# Patient Record
Sex: FEMALE | Race: WHITE | NOT HISPANIC OR LATINO | Employment: UNEMPLOYED | URBAN - METROPOLITAN AREA
[De-identification: names, ages, dates, MRNs, and addresses within clinical notes are randomized per-mention and may not be internally consistent; named-entity substitution may affect disease eponyms.]

---

## 2017-03-23 ENCOUNTER — OFFICE VISIT (OUTPATIENT)
Dept: ORTHOPEDIC SURGERY | Facility: CLINIC | Age: 62
End: 2017-03-23

## 2017-03-23 VITALS — TEMPERATURE: 99.2 F | WEIGHT: 152 LBS | HEIGHT: 67 IN | BODY MASS INDEX: 23.86 KG/M2

## 2017-03-23 DIAGNOSIS — Z98.890 HISTORY OF HAMMER TOE CORRECTION: Primary | ICD-10-CM

## 2017-03-23 DIAGNOSIS — Z87.39 HISTORY OF HAMMER TOE CORRECTION: Primary | ICD-10-CM

## 2017-03-23 PROCEDURE — 99204 OFFICE O/P NEW MOD 45 MIN: CPT | Performed by: ORTHOPAEDIC SURGERY

## 2017-03-23 RX ORDER — LEVOTHYROXINE SODIUM 0.07 MG/1
75 TABLET ORAL DAILY
COMMUNITY
End: 2019-02-13 | Stop reason: SDUPTHER

## 2017-03-23 RX ORDER — ROSUVASTATIN CALCIUM 20 MG/1
20 TABLET, COATED ORAL DAILY
COMMUNITY
End: 2019-04-17 | Stop reason: SDUPTHER

## 2017-03-23 RX ORDER — ALPRAZOLAM 0.25 MG/1
0.25 TABLET ORAL 2 TIMES DAILY PRN
COMMUNITY
End: 2019-08-05 | Stop reason: SDUPTHER

## 2017-03-23 RX ORDER — CETIRIZINE HYDROCHLORIDE 10 MG/1
10 TABLET ORAL DAILY
COMMUNITY
End: 2019-05-21 | Stop reason: SDUPTHER

## 2017-03-23 RX ORDER — PROMETHAZINE HYDROCHLORIDE 25 MG/1
TABLET ORAL
Refills: 0 | COMMUNITY
Start: 2016-12-28 | End: 2019-02-13

## 2017-03-23 RX ORDER — HYDROCHLOROTHIAZIDE 25 MG/1
25 TABLET ORAL DAILY
COMMUNITY
End: 2019-06-26 | Stop reason: SDUPTHER

## 2017-03-23 RX ORDER — OSELTAMIVIR PHOSPHATE 75 MG/1
CAPSULE ORAL
Refills: 0 | COMMUNITY
Start: 2017-03-11 | End: 2019-02-13

## 2017-03-23 RX ORDER — DICYCLOMINE HCL 20 MG
TABLET ORAL
Refills: 0 | COMMUNITY
Start: 2017-02-09 | End: 2019-02-13

## 2017-03-23 RX ORDER — FLUOXETINE HYDROCHLORIDE 20 MG/5ML
LIQUID ORAL DAILY
COMMUNITY
End: 2019-04-17 | Stop reason: SDUPTHER

## 2017-03-23 RX ORDER — HYDROCODONE BITARTRATE AND ACETAMINOPHEN 5; 325 MG/1; MG/1
TABLET ORAL
Refills: 0 | COMMUNITY
Start: 2016-12-28 | End: 2019-05-08

## 2017-03-23 RX ORDER — PROPRANOLOL HYDROCHLORIDE 40 MG/1
40 TABLET ORAL 3 TIMES DAILY
COMMUNITY
End: 2019-02-13 | Stop reason: SDUPTHER

## 2017-03-23 RX ORDER — ZOLPIDEM TARTRATE 10 MG/1
10 TABLET ORAL NIGHTLY PRN
COMMUNITY
End: 2019-02-13 | Stop reason: SDUPTHER

## 2017-03-23 RX ORDER — LOPERAMIDE HYDROCHLORIDE 2 MG/1
CAPSULE ORAL
Refills: 0 | COMMUNITY
Start: 2017-02-09 | End: 2019-02-13

## 2017-03-23 NOTE — PROGRESS NOTES
"Patient:  Cynthia Portillo is a 62 y.o. female    Chief Complaint/ Reason for Visit:    Chief Complaint   Patient presents with   • Right Foot - Pain     Second and third hammertoe corrections in December 2016     • Foot Swelling       HPI:  The patient presents today accompanied by a , who was a patient of mine in the past, complaining of persistent pain and swelling of a constant and primarily aching in nature since she underwent right second and third hammertoe corrections by her podiatrist in December of last year.  She feels like the deformities are recurring somewhat, though she does note that the toes are straighter than they were to begin with, especially the right second toe.  She has persistent swelling in these 2 toes, and she says that she discussed these things with her podiatrist, and he felt like the \"plastic implants\" he had used to do the surgery had not worked, and he recommended doing revision surgery using \"metal implants\".  She has not had fever, chills, sweats, or shakes.  She has not had any drainage or problems with her incisions.  She does say the pain has been slowly getting better, but she was concerned by its persistence, as well as the persistence of swelling.  The pain is exacerbated by wearing shoes, by walking, and by standing.  It's alleviated by rest and elevation.  The pain is nonradiating.  She has been wearing some Silastic sleeves on the toes to try and help alleviate the swelling with limited relief.  She has occasionally taken some over-the-counter analgesics with limited relief as well.      PMH:    Past Medical History:   Diagnosis Date   • Diverticulitis    • Diverticulosis        PSH:    Past Surgical History:   Procedure Laterality Date   • ABDOMINAL SURGERY  2005, 2014   • FOOT SURGERY Right 12/2016    Second and third hammertoe corrections   • KNEE SURGERY     • SHOULDER SURGERY         Social Hx:    Social History     Social History   • Marital status: "      Spouse name: N/A   • Number of children: N/A   • Years of education: N/A     Occupational History   • Not on file.     Social History Main Topics   • Smoking status: Never Smoker   • Smokeless tobacco: Never Used   • Alcohol use 0.6 oz/week     1 Glasses of wine per week   • Drug use: No   • Sexual activity: Defer     Other Topics Concern   • Not on file     Social History Narrative   • No narrative on file       Family Hx:    Family History   Problem Relation Age of Onset   • Osteoporosis Mother        Meds:    Current Outpatient Prescriptions:   •  ALPRAZolam (XANAX) 0.25 MG tablet, Take 0.25 mg by mouth 2 (Two) Times a Day As Needed for Anxiety., Disp: , Rfl:   •  calcium acetate (PHOSLO) 667 MG capsule, Take 1,334 mg by mouth 3 (Three) Times a Day With Meals., Disp: , Rfl:   •  cetirizine (zyrTEC) 10 MG tablet, Take 10 mg by mouth Daily., Disp: , Rfl:   •  FLUoxetine (PROzac) 20 MG/5ML solution, Take  by mouth Daily., Disp: , Rfl:   •  hydrochlorothiazide (HYDRODIURIL) 25 MG tablet, Take 25 mg by mouth Daily., Disp: , Rfl:   •  levothyroxine (SYNTHROID, LEVOTHROID) 75 MCG tablet, Take 75 mcg by mouth Daily., Disp: , Rfl:   •  propranolol (INDERAL) 40 MG tablet, Take 40 mg by mouth 3 (Three) Times a Day., Disp: , Rfl:   •  rosuvastatin (CRESTOR) 20 MG tablet, Take 20 mg by mouth Daily., Disp: , Rfl:   •  zolpidem (AMBIEN) 10 MG tablet, Take 10 mg by mouth At Night As Needed for Sleep., Disp: , Rfl:   •  dicyclomine (BENTYL) 20 MG tablet, TK 1 T PO QID, Disp: , Rfl: 0  •  HYDROcodone-acetaminophen (NORCO) 5-325 MG per tablet, TK 1 TO 2 TS PO Q 4 TO 6 HOURS PRN, Disp: , Rfl: 0  •  loperamide (IMODIUM) 2 MG capsule, TK 1 C PO Q 4 H PRF DH, Disp: , Rfl: 0  •  oseltamivir (TAMIFLU) 75 MG capsule, TK 1 C PO BID, Disp: , Rfl: 0  •  promethazine (PHENERGAN) 25 MG tablet, TK 1 T PO  Q 4 HOURS PRF NAUSEA AND VOMITING, Disp: , Rfl: 0    Allergies:    Allergies   Allergen Reactions   • Pravastatin    • Zetia  "[Ezetimibe]        ROS:  Review of Systems   Constitutional: Negative for chills, diaphoresis and fever.   Musculoskeletal: Positive for arthralgias and joint swelling.        Swelling, pain, and tenderness in the right second and third toes status post second and third hammertoe corrections in December 2016.   All other systems reviewed and are negative.      Vitals:    03/23/17 1211   Temp: 99.2 °F (37.3 °C)   Weight: 152 lb (68.9 kg)   Height: 66.5\" (168.9 cm)       Physical Exam    The patient is awake, alert, and oriented ×3.  The patient is in no acute distress.  Breathing is regular and unlabored with a respiratory rate of 12/m.  Extraocular movements and pupillary responses are symmetrically intact. Sclerae are anicteric.   Hearing is within normal limits.  Speech is within normal limits.  There is no jugular venous distention.    She does have a slight limp antalgic from the right on inspection of her gait.  Her right second and third toes are diffusely, circumferentially swollen, and a little erythematous.  They're not excessively warm.  Her incisions are well healed with no sign of wound breakdown or infection.  She has no cellulitis, no lymphangitis, and no popliteal lymphadenopathy in the right lower extremity.  She has no calf tenderness and no venous cord.  Pedal pulses are intact, regular, and of normal amplitude with a current heart rate of about 72 bpm.  She does have tenderness diffusely on palpation of the second and third toes, and a little tenderness plantarly beneath the second and third MTP joints.  She has no tenderness in the other toes.  She does not have a hallux valgus deformity.  Motor strength is 5 over 5 in the right lower extremity.  She has good movement of her toes.  Her toenails appear fairly normal.  She has brisk capillary filling in all toes.  When she stands, there is a slight tendency towards dorsiflexion of the right second toe, but she does not have a pronounced hyper " dorsiflexion at this point.  The patient does concede that the position of the second and third toes is better than it was before she had the surgery.  The tenderness, however, is not.      Radiology: X-rays: The patient brought multiple images from her podiatrist's office, the most recent of which were from a few weeks ago.  Once I reviewed her more recent, and I did see postop images, 3 views of her right foot.  I did review several series of her preop images as well.  She apparently had resection arthroplasties of the second and third MTP joints.  I don't see any obvious implants.  It appears as though, if she was supposed to have had PIP fusions, this has failed.  I do not see any erosive changes or anything on these films that would suggest infection.        Assessment:  1. History of right second and third hammer toe correction, 3 months ago approximately, with persistent pain and swelling in the right second and third toes          Plan:  I discussed everything with the patient and her .  At this point, I would recommend doing nothing in the form of any additional or revision surgery.  I recommended management of her pain by the application of a prescription topical medication to be obtained from a compounding pharmacy.  I did write that prescription for her today for a blend of ketoprofen, gabapentin, and lidocaine.    She's going to continue to use her compressive sleeve.    She can continue to use ice as needed.    She's going to use accommodative footwear.    I explained to them that, even if she did require revision surgery down the road, that there was no benefited in receding with any additional surgery at this time.  On the contrary, given more time, perhaps even as long as another year, her pain and swelling may improve, and she may not need any more surgery.  I advised her to try the medication prescribed today, continue to use her compressive sleeves, and be as patient that she can.    The  patient asked if I would do revision surgery for her right second and third toes, and I explained that at this point, I was not comfortable doing so.

## 2017-06-22 ENCOUNTER — OFFICE VISIT (OUTPATIENT)
Dept: ORTHOPEDIC SURGERY | Facility: CLINIC | Age: 62
End: 2017-06-22

## 2017-06-22 VITALS — TEMPERATURE: 98.3 F | BODY MASS INDEX: 28.28 KG/M2 | HEIGHT: 61 IN | WEIGHT: 149.8 LBS

## 2017-06-22 DIAGNOSIS — Z87.39 H/O HAMMER TOE CORRECTION: Primary | ICD-10-CM

## 2017-06-22 DIAGNOSIS — Z98.890 H/O HAMMER TOE CORRECTION: Primary | ICD-10-CM

## 2017-06-22 PROCEDURE — 99212 OFFICE O/P EST SF 10 MIN: CPT | Performed by: ORTHOPAEDIC SURGERY

## 2017-06-22 PROCEDURE — 73630 X-RAY EXAM OF FOOT: CPT | Performed by: ORTHOPAEDIC SURGERY

## 2017-06-22 NOTE — PROGRESS NOTES
Patient:  Cynthia Portillo is a 62 y.o. female    Chief Complaint/ Reason for Visit:    Chief Complaint   Patient presents with   • Right Foot - Follow-up, Pain       HPI:  Patient returns today.  She still having pain in her right second and third toes.  She had hammertoe corrections by her podiatrist back in December.  I gave her some topical medication previously, but it did not give her a lot of relief.  She is here seeking other options.    She and I have already discussed that I am not willing to do revision hammertoe surgery.      PMH:    Past Medical History:   Diagnosis Date   • Diverticulitis    • Diverticulosis        PSH:    Past Surgical History:   Procedure Laterality Date   • ABDOMINAL SURGERY  2005, 2014   • FOOT SURGERY Right 12/2016    Second and third hammertoe corrections   • KNEE SURGERY     • SHOULDER SURGERY         Social Hx:    Social History     Social History   • Marital status:      Spouse name: N/A   • Number of children: N/A   • Years of education: N/A     Occupational History   • Not on file.     Social History Main Topics   • Smoking status: Never Smoker   • Smokeless tobacco: Never Used   • Alcohol use 0.6 oz/week     1 Glasses of wine per week   • Drug use: No   • Sexual activity: Defer     Other Topics Concern   • Not on file     Social History Narrative       Family Hx:    Family History   Problem Relation Age of Onset   • Osteoporosis Mother        Meds:    Current Outpatient Prescriptions:   •  ALPRAZolam (XANAX) 0.25 MG tablet, Take 0.25 mg by mouth 2 (Two) Times a Day As Needed for Anxiety., Disp: , Rfl:   •  calcium acetate (PHOSLO) 667 MG capsule, Take 1,334 mg by mouth 3 (Three) Times a Day With Meals., Disp: , Rfl:   •  cetirizine (zyrTEC) 10 MG tablet, Take 10 mg by mouth Daily., Disp: , Rfl:   •  dicyclomine (BENTYL) 20 MG tablet, TK 1 T PO QID, Disp: , Rfl: 0  •  FLUoxetine (PROzac) 20 MG/5ML solution, Take  by mouth Daily., Disp: , Rfl:   •  hydrochlorothiazide  "(HYDRODIURIL) 25 MG tablet, Take 25 mg by mouth Daily., Disp: , Rfl:   •  HYDROcodone-acetaminophen (NORCO) 5-325 MG per tablet, TK 1 TO 2 TS PO Q 4 TO 6 HOURS PRN, Disp: , Rfl: 0  •  levothyroxine (SYNTHROID, LEVOTHROID) 75 MCG tablet, Take 75 mcg by mouth Daily., Disp: , Rfl:   •  loperamide (IMODIUM) 2 MG capsule, TK 1 C PO Q 4 H PRF DH, Disp: , Rfl: 0  •  oseltamivir (TAMIFLU) 75 MG capsule, TK 1 C PO BID, Disp: , Rfl: 0  •  promethazine (PHENERGAN) 25 MG tablet, TK 1 T PO  Q 4 HOURS PRF NAUSEA AND VOMITING, Disp: , Rfl: 0  •  propranolol (INDERAL) 40 MG tablet, Take 40 mg by mouth 3 (Three) Times a Day., Disp: , Rfl:   •  rosuvastatin (CRESTOR) 20 MG tablet, Take 20 mg by mouth Daily., Disp: , Rfl:   •  zolpidem (AMBIEN) 10 MG tablet, Take 10 mg by mouth At Night As Needed for Sleep., Disp: , Rfl:     Allergies:    Allergies   Allergen Reactions   • Pravastatin    • Zetia [Ezetimibe]        ROS:  Review of Systems    Vitals:    06/22/17 1034   Temp: 98.3 °F (36.8 °C)   Weight: 149 lb 12.8 oz (67.9 kg)   Height: 61\" (154.9 cm)       Physical Exam    The patient is awake, alert, and oriented ×3.  The patient is in no acute distress.  Breathing is regular and unlabored with a respiratory rate of 12/m.  Extraocular movements and pupillary responses are symmetrically intact. Sclerae are anicteric.   Hearing is within normal limits.  Speech is within normal limits.  There is no jugular venous distention.    She does have a slight limp antalgic from the right on inspection of her gait. Her right second and third toes are diffusely, circumferentially swollen, and a little erythematous. They're not excessively warm. Her incisions are well healed with no sign of wound breakdown or infection. She has no cellulitis, no lymphangitis, and no popliteal lymphadenopathy in the right lower extremity. She has no calf tenderness and no venous cord. Pedal pulses are intact, regular, and of normal amplitude with a current heart rate " of about 72 bpm. She does have tenderness diffusely on palpation of the second and third toes, and a little tenderness plantarly beneath the second and third MTP joints. She has no tenderness in the other toes. She does not have a hallux valgus deformity. Motor strength is 5 over 5 in the right lower extremity. She has good movement of her toes. Her toenails appear fairly normal. She has brisk capillary filling in all toes. When she stands, there is a slight tendency towards dorsiflexion of the right second toe, but she does not have a pronounced hyper dorsiflexion at this point.         Radiology:X-rays: 3 views the patient's right forefoot were ordered and reviewed today to assess patient's complaints of persistent pain and swelling in her right second and third toes.  There is evidence of hammertoe surgery with resection of the PIP joints of the right second and third toes.  I do not see any obvious evidence of infection.  I suspect the patient has some radiolucent implants, but I cannot be certain.  Comparison images were not immediately available.    Labs:      Assessment:  1. H/O hammer toe correction, Right second and third, with persistent discomfort and swelling          Plan:  I discussed everything with the patient and her .  I reiterated that I was not willing to perform revision surgery, and recommended, in fact, that she wait until she was at least a year postop before considering revision surgery.  However, I explained that I would not, under any circumstances, be willing to do the revision hammertoe surgery.  I suggested that she seek yet another opinion, or perhaps return for additional evaluation and treatment to the original treating podiatrist.  I did make suggestions regarding taping of the toes, gel sleeves, etc.  Neurovascular precautions with specific respect to all these recommendations were reviewed, and the patient voiced understanding.

## 2019-02-13 ENCOUNTER — OFFICE VISIT (OUTPATIENT)
Dept: FAMILY MEDICINE CLINIC | Facility: CLINIC | Age: 64
End: 2019-02-13

## 2019-02-13 VITALS
RESPIRATION RATE: 16 BRPM | WEIGHT: 137 LBS | SYSTOLIC BLOOD PRESSURE: 118 MMHG | TEMPERATURE: 98.6 F | HEART RATE: 62 BPM | BODY MASS INDEX: 25.86 KG/M2 | OXYGEN SATURATION: 94 % | DIASTOLIC BLOOD PRESSURE: 78 MMHG | HEIGHT: 61 IN

## 2019-02-13 DIAGNOSIS — J31.0 RHINOSINUSITIS: ICD-10-CM

## 2019-02-13 DIAGNOSIS — Z48.89 POSTOPERATIVE VISIT: ICD-10-CM

## 2019-02-13 DIAGNOSIS — Z96.611 PRESENCE OF RIGHT ARTIFICIAL SHOULDER JOINT: ICD-10-CM

## 2019-02-13 DIAGNOSIS — N94.10 DYSPAREUNIA, FEMALE: ICD-10-CM

## 2019-02-13 DIAGNOSIS — E78.00 HYPERCHOLESTEROLEMIA: ICD-10-CM

## 2019-02-13 DIAGNOSIS — M25.511 ACUTE PAIN OF RIGHT SHOULDER: Primary | ICD-10-CM

## 2019-02-13 DIAGNOSIS — E03.9 HYPOTHYROIDISM, UNSPECIFIED TYPE: ICD-10-CM

## 2019-02-13 DIAGNOSIS — J32.9 RHINOSINUSITIS: ICD-10-CM

## 2019-02-13 PROCEDURE — 99203 OFFICE O/P NEW LOW 30 MIN: CPT | Performed by: INTERNAL MEDICINE

## 2019-02-13 RX ORDER — LEVOTHYROXINE SODIUM 0.07 MG/1
75 TABLET ORAL DAILY
Qty: 90 TABLET | Refills: 3 | Status: SHIPPED | OUTPATIENT
Start: 2019-02-13 | End: 2019-02-14 | Stop reason: SDUPTHER

## 2019-02-13 RX ORDER — ZOLPIDEM TARTRATE 10 MG/1
10 TABLET ORAL NIGHTLY PRN
Qty: 30 TABLET | Refills: 2 | Status: SHIPPED | OUTPATIENT
Start: 2019-02-13 | End: 2019-09-04 | Stop reason: SDUPTHER

## 2019-02-13 RX ORDER — PROPRANOLOL HYDROCHLORIDE 40 MG/1
40 TABLET ORAL 3 TIMES DAILY
Qty: 90 TABLET | Refills: 3 | Status: SHIPPED | OUTPATIENT
Start: 2019-02-13 | End: 2019-02-14 | Stop reason: SDUPTHER

## 2019-02-13 RX ORDER — CEFDINIR 300 MG/1
300 CAPSULE ORAL 2 TIMES DAILY
Qty: 14 CAPSULE | Refills: 3 | Status: SHIPPED | OUTPATIENT
Start: 2019-02-13 | End: 2019-05-08

## 2019-02-14 LAB
ALBUMIN SERPL-MCNC: 5 G/DL (ref 3.6–4.8)
ALBUMIN/GLOB SERPL: 2.8 {RATIO} (ref 1.2–2.2)
ALP SERPL-CCNC: 91 IU/L (ref 39–117)
ALT SERPL-CCNC: 38 IU/L (ref 0–32)
AST SERPL-CCNC: 36 IU/L (ref 0–40)
BILIRUB SERPL-MCNC: 0.6 MG/DL (ref 0–1.2)
BUN SERPL-MCNC: 17 MG/DL (ref 8–27)
BUN/CREAT SERPL: 28 (ref 12–28)
CALCIUM SERPL-MCNC: 10.1 MG/DL (ref 8.7–10.3)
CHLORIDE SERPL-SCNC: 98 MMOL/L (ref 96–106)
CHOLEST SERPL-MCNC: 183 MG/DL (ref 100–199)
CO2 SERPL-SCNC: 26 MMOL/L (ref 20–29)
CREAT SERPL-MCNC: 0.61 MG/DL (ref 0.57–1)
GLOBULIN SER CALC-MCNC: 1.8 G/DL (ref 1.5–4.5)
GLUCOSE SERPL-MCNC: 79 MG/DL (ref 65–99)
HDLC SERPL-MCNC: 34 MG/DL
LDLC SERPL CALC-MCNC: 95 MG/DL (ref 0–99)
LDLC/HDLC SERPL: 2.8 RATIO (ref 0–3.2)
POTASSIUM SERPL-SCNC: 3.9 MMOL/L (ref 3.5–5.2)
PROT SERPL-MCNC: 6.8 G/DL (ref 6–8.5)
SODIUM SERPL-SCNC: 141 MMOL/L (ref 134–144)
T4 SERPL-MCNC: 9.3 UG/DL (ref 4.5–12)
TRIGL SERPL-MCNC: 268 MG/DL (ref 0–149)
TSH SERPL DL<=0.005 MIU/L-ACNC: 0.87 UIU/ML (ref 0.45–4.5)
VLDLC SERPL CALC-MCNC: 54 MG/DL (ref 5–40)

## 2019-02-14 RX ORDER — PROPRANOLOL HYDROCHLORIDE 40 MG/1
40 TABLET ORAL 3 TIMES DAILY
Qty: 90 TABLET | Refills: 3 | Status: SHIPPED | OUTPATIENT
Start: 2019-02-14 | End: 2020-02-14

## 2019-02-14 RX ORDER — LEVOTHYROXINE SODIUM 0.07 MG/1
75 TABLET ORAL DAILY
Qty: 90 TABLET | Refills: 3 | Status: SHIPPED | OUTPATIENT
Start: 2019-02-14 | End: 2019-09-13 | Stop reason: SDUPTHER

## 2019-02-28 ENCOUNTER — TELEPHONE (OUTPATIENT)
Dept: FAMILY MEDICINE CLINIC | Facility: CLINIC | Age: 64
End: 2019-02-28

## 2019-02-28 NOTE — TELEPHONE ENCOUNTER
CALLED PT ADVISED PT PER DR. OLIVEIRA LABS WHERE NORMAL. PT UNDERSTOOD AND WOULD LIKE TO SIGN UP FOR MY CHART.     ----- Message from Martha Clinton sent at 2/27/2019  4:09 PM EST -----  Pt was told to call about her labs and has done so. Please call her back at [phone] with information.  Thank you

## 2019-03-22 ENCOUNTER — TELEPHONE (OUTPATIENT)
Dept: FAMILY MEDICINE CLINIC | Facility: CLINIC | Age: 64
End: 2019-03-22

## 2019-03-22 RX ORDER — LACTOBACILLUS ACIDOPHILUS 500MM CELL
1 CAPSULE ORAL 2 TIMES DAILY
Qty: 60 CAPSULE | Refills: 5 | Status: SHIPPED | OUTPATIENT
Start: 2019-03-22 | End: 2022-07-25

## 2019-03-22 NOTE — TELEPHONE ENCOUNTER
Completed ok per dr. Peterson    ----- Message from Martha Clinton sent at 3/22/2019  4:18 PM EDT -----  PT NEEDS A REFILL ON LACTOBACILLUS ACIDOPHILUS ORAL TABLETS. BID NO MG. THIS WAS CALLED IN BY HER PREVIOUS DR IS USUALLY OVER THE COUNTER BUT HER DR CALLED IT IN BECAUSE IT IS LESS  EXPENSIVE  BY PRESCRIPTION. CALL INTO [facility] AT [facility] AND  [address].    -[phone] ANY QUESTIONS PLEASE CALL PT  THANK YOU

## 2019-04-17 RX ORDER — ROSUVASTATIN CALCIUM 20 MG/1
20 TABLET, COATED ORAL DAILY
Qty: 90 TABLET | Refills: 2 | Status: SHIPPED | OUTPATIENT
Start: 2019-04-17 | End: 2019-05-08 | Stop reason: SDUPTHER

## 2019-04-17 RX ORDER — FLUOXETINE HYDROCHLORIDE 20 MG/5ML
20 LIQUID ORAL DAILY
Qty: 90 ML | Refills: 2 | Status: SHIPPED | OUTPATIENT
Start: 2019-04-17 | End: 2019-04-18 | Stop reason: SDUPTHER

## 2019-04-18 ENCOUNTER — TELEPHONE (OUTPATIENT)
Dept: FAMILY MEDICINE CLINIC | Facility: CLINIC | Age: 64
End: 2019-04-18

## 2019-04-18 ENCOUNTER — OFFICE VISIT (OUTPATIENT)
Dept: OBSTETRICS AND GYNECOLOGY | Facility: CLINIC | Age: 64
End: 2019-04-18

## 2019-04-18 ENCOUNTER — PROCEDURE VISIT (OUTPATIENT)
Dept: OBSTETRICS AND GYNECOLOGY | Facility: CLINIC | Age: 64
End: 2019-04-18

## 2019-04-18 VITALS
SYSTOLIC BLOOD PRESSURE: 118 MMHG | BODY MASS INDEX: 26.62 KG/M2 | HEIGHT: 61 IN | WEIGHT: 141 LBS | DIASTOLIC BLOOD PRESSURE: 72 MMHG

## 2019-04-18 DIAGNOSIS — N95.2 VAGINAL ATROPHY: ICD-10-CM

## 2019-04-18 DIAGNOSIS — Z13.9 SCREENING FOR CONDITION: Primary | ICD-10-CM

## 2019-04-18 DIAGNOSIS — R10.2 PELVIC PAIN IN FEMALE: Primary | ICD-10-CM

## 2019-04-18 DIAGNOSIS — N94.2 VAGINISMUS: ICD-10-CM

## 2019-04-18 DIAGNOSIS — R10.2 PELVIC CRAMPING: ICD-10-CM

## 2019-04-18 DIAGNOSIS — N94.10 DYSPAREUNIA, FEMALE: ICD-10-CM

## 2019-04-18 LAB
BILIRUB BLD-MCNC: NEGATIVE MG/DL
CLARITY, POC: CLEAR
COLOR UR: YELLOW
GLUCOSE UR STRIP-MCNC: NEGATIVE MG/DL
KETONES UR QL: NEGATIVE
LEUKOCYTE EST, POC: NEGATIVE
NITRITE UR-MCNC: NEGATIVE MG/ML
PH UR: 6 [PH] (ref 5–8)
PROT UR STRIP-MCNC: NEGATIVE MG/DL
RBC # UR STRIP: NEGATIVE /UL
SP GR UR: 1.02 (ref 1–1.03)
UROBILINOGEN UR QL: NORMAL

## 2019-04-18 PROCEDURE — 76830 TRANSVAGINAL US NON-OB: CPT | Performed by: OBSTETRICS & GYNECOLOGY

## 2019-04-18 PROCEDURE — 99204 OFFICE O/P NEW MOD 45 MIN: CPT | Performed by: OBSTETRICS & GYNECOLOGY

## 2019-04-18 PROCEDURE — 81002 URINALYSIS NONAUTO W/O SCOPE: CPT | Performed by: OBSTETRICS & GYNECOLOGY

## 2019-04-18 RX ORDER — DEXTROAMPHETAMINE SACCHARATE, AMPHETAMINE ASPARTATE MONOHYDRATE, DEXTROAMPHETAMINE SULFATE AND AMPHETAMINE SULFATE 5; 5; 5; 5 MG/1; MG/1; MG/1; MG/1
CAPSULE, EXTENDED RELEASE ORAL
COMMUNITY
Start: 2019-02-21 | End: 2019-12-12 | Stop reason: SDUPTHER

## 2019-04-18 RX ORDER — HYDROCODONE BITARTRATE AND ACETAMINOPHEN 5; 325 MG/1; MG/1
1 TABLET ORAL
COMMUNITY
Start: 2019-04-03 | End: 2019-05-08 | Stop reason: ALTCHOICE

## 2019-04-18 RX ORDER — FLUOXETINE HYDROCHLORIDE 20 MG/1
CAPSULE ORAL
COMMUNITY
Start: 2019-04-17 | End: 2019-07-29 | Stop reason: SDUPTHER

## 2019-04-18 RX ORDER — ESTRADIOL 0.1 MG/G
1 CREAM VAGINAL 2 TIMES WEEKLY
Qty: 45 G | Refills: 6 | Status: SHIPPED | OUTPATIENT
Start: 2019-04-18 | End: 2020-04-17 | Stop reason: SDUPTHER

## 2019-04-18 NOTE — PROGRESS NOTES
New GYN Exam    CC- Here for pain with sex.     Cynthia Portillo is a 64 y.o. female new patient who presents for pain with sex. She was pain with insertion. She also has random uterine cramping, no VB.     OB History      Para Term  AB Living    4 2 2   2 2    SAB TAB Ectopic Molar Multiple Live Births        2              Obstetric Comments    2   2 ex lap for 2 ectopics          Menarche:12  Menopause:55  HRT:none  Current contraception: post menopausal status  History of abnormal Pap smear: no  History of abnormal mammogram: no  Family history of uterine, colon or ovarian cancer: no  Family history of breast cancer: no  STD's: none  Last pap smear:2018  Gardasil: none  YECENIA: none      Health Maintenance   Topic Date Due   • ANNUAL PHYSICAL  1958   • TDAP/TD VACCINES (1 - Tdap) 1974   • ZOSTER VACCINE (1 of 2) 2005   • HEPATITIS C SCREENING  2017   • MAMMOGRAM  2017   • PAP SMEAR  2017   • COLONOSCOPY  2017   • INFLUENZA VACCINE  2019   • LIPID PANEL  2020       Past Medical History:   Diagnosis Date   • Arthritis    • Depression    • Diverticulitis    • Diverticulosis    • Hyperlipidemia    • Hypertension    • Thyroid disease        Past Surgical History:   Procedure Laterality Date   • ABDOMINAL SURGERY  ,     ex lap x 2,  diverticulitis   • APPENDECTOMY     • COLOSTOMY      had colostomy and then is was reversed   • FOOT SURGERY Right 2016    Second and third hammertoe corrections   • KNEE SURGERY     • SHOULDER SURGERY     • TONSILLECTOMY     • WISDOM TOOTH EXTRACTION           Current Outpatient Medications:   •  HYDROcodone-acetaminophen (NORCO) 5-325 MG per tablet, Take 1 tablet by mouth., Disp: , Rfl:   •  Acidophilus Lactobacillus capsule, Take 1 capsule by mouth 2 (Two) Times a Day., Disp: 60 capsule, Rfl: 5  •  ALPRAZolam (XANAX) 0.25 MG tablet, Take 0.25 mg by mouth 2 (Two) Times a Day As Needed for Anxiety., Disp: ,  Rfl:   •  amphetamine-dextroamphetamine XR (ADDERALL XR) 20 MG 24 hr capsule, , Disp: , Rfl:   •  calcium acetate (PHOSLO) 667 MG capsule, Take 1,334 mg by mouth 3 (Three) Times a Day With Meals., Disp: , Rfl:   •  cefdinir (OMNICEF) 300 MG capsule, Take 1 capsule by mouth 2 (Two) Times a Day., Disp: 14 capsule, Rfl: 3  •  cetirizine (zyrTEC) 10 MG tablet, Take 10 mg by mouth Daily., Disp: , Rfl:   •  estradiol (ESTRACE) 0.1 MG/GM vaginal cream, Insert 1 g into the vagina 2 (Two) Times a Week., Disp: 45 g, Rfl: 6  •  FLUoxetine (PROzac) 20 MG capsule, , Disp: , Rfl:   •  hydrochlorothiazide (HYDRODIURIL) 25 MG tablet, Take 25 mg by mouth Daily., Disp: , Rfl:   •  HYDROcodone-acetaminophen (NORCO) 5-325 MG per tablet, TK 1 TO 2 TS PO Q 4 TO 6 HOURS PRN, Disp: , Rfl: 0  •  levothyroxine (SYNTHROID, LEVOTHROID) 75 MCG tablet, Take 1 tablet by mouth Daily., Disp: 90 tablet, Rfl: 3  •  propranolol (INDERAL) 40 MG tablet, Take 1 tablet by mouth 3 (Three) Times a Day., Disp: 90 tablet, Rfl: 3  •  rosuvastatin (CRESTOR) 20 MG tablet, Take 1 tablet by mouth Daily., Disp: 90 tablet, Rfl: 2  •  zolpidem (AMBIEN) 10 MG tablet, Take 1 tablet by mouth At Night As Needed for Sleep., Disp: 30 tablet, Rfl: 2    Allergies   Allergen Reactions   • Azithromycin Other (See Comments)     Reaction unknown to patient (unable to remember)   • Pravastatin    • Zetia [Ezetimibe]        Social History     Tobacco Use   • Smoking status: Never Smoker   • Smokeless tobacco: Never Used   Substance Use Topics   • Alcohol use: Yes     Alcohol/week: 0.6 oz     Types: 1 Glasses of wine per week   • Drug use: No       Family History   Problem Relation Age of Onset   • Osteoporosis Mother    • Breast cancer Neg Hx    • Ovarian cancer Neg Hx    • Colon cancer Neg Hx    • Deep vein thrombosis Neg Hx    • Pulmonary embolism Neg Hx        Review of Systems   Constitutional: Negative for appetite change, fatigue, fever and unexpected weight change.   Eyes:  "Negative for photophobia and visual disturbance.   Respiratory: Negative for cough and shortness of breath.    Cardiovascular: Negative for chest pain and palpitations.   Gastrointestinal: Negative for abdominal distention, abdominal pain, constipation, diarrhea and nausea.   Endocrine: Negative for cold intolerance and heat intolerance.   Genitourinary: Positive for dyspareunia, pelvic pain and vaginal pain (dryness). Negative for dysuria, menstrual problem and vaginal discharge.   Musculoskeletal: Negative for back pain.   Skin: Negative for color change and rash.   Neurological: Negative for headaches.   Hematological: Negative for adenopathy. Does not bruise/bleed easily.   Psychiatric/Behavioral: Negative for dysphoric mood. The patient is not nervous/anxious.        /72   Ht 154.9 cm (61\")   Wt 64 kg (141 lb)   BMI 26.64 kg/m²     Physical Exam   Constitutional: She is oriented to person, place, and time. She appears well-developed and well-nourished.   HENT:   Head: Normocephalic and atraumatic.   Eyes: Conjunctivae are normal. No scleral icterus.   Neck: Neck supple. No thyromegaly present.   Cardiovascular: Normal rate, regular rhythm and normal heart sounds.   Pulmonary/Chest: Effort normal and breath sounds normal.   Abdominal: Soft. Bowel sounds are normal. She exhibits no distension and no mass. There is no tenderness. There is no rebound and no guarding. No hernia.   Genitourinary: Uterus normal. Pelvic exam was performed with patient supine. There is no rash, tenderness, lesion or injury on the right labia. There is no rash, tenderness, lesion or injury on the left labia. Cervix exhibits no motion tenderness, no discharge and no friability. Right adnexum displays no mass, no tenderness and no fullness. Left adnexum displays no mass, no tenderness and no fullness. There is tenderness in the vagina. No erythema or bleeding in the vagina. No foreign body in the vagina. No signs of injury around " the vagina. No vaginal discharge found.   Genitourinary Comments: Severe atrophy  + LPS   Neurological: She is alert and oriented to person, place, and time.   Skin: Skin is warm and dry.   Psychiatric: She has a normal mood and affect. Her behavior is normal. Judgment and thought content normal.   Nursing note and vitals reviewed.         Assessment/Plan  1) Dyspareunia- pt has vaginismus. Refer pelvic floor PT.   2) GYN HM: UTD 9/2018  SBE demonstrated and encouraged.  3) STD screening: declines Condoms encouraged.  4) Bone health - Weight bearing exercise, dietary calcium recommendations and vitamin D reviewed.   5) Diet and Exercise discussed  6) Smoking Status: No  7) Vaginal atrophy- Rx Estrace x 2/week.Patient counseled that vaginal estrogen rings, creams and tablets are available and highly effective at treating local vaginal symptoms such as atrophy and vaginal dryness.  Vaginal estrogen does not cause uterine overgrowth and does not require a progestogen to protect the uterus.  Very small amounts of estrogen are absorbed systemically.  For patients with a history of an estrogen dependent cancer such as breast cancer, the decision to use local estrogen for local vaginal symptoms should be made after consultation with her oncologist.  Possible side effects include local irritation or burning and/or vaginal bleeding and should always be reported.    8)MMG: UTD 9/2018  9) DEXA-UTD per pt  10)C scope-UTD 18 mos ago   11) Pelvic cramping- US today shows 5.6 cm, EL 0.2 cm, small fibroid anteriorly 0.6 x 0.7 cm, normal ovaries, no comparable data.   12) Follow up 3 months to recheck symptoms.        Cynthia was seen today for dyspareunia.    Diagnoses and all orders for this visit:    Screening for condition  -     POC Urinalysis Dipstick    Vaginismus  -     Ambulatory Referral to Physical Therapy Pelvic Floor    Dyspareunia, female    Vaginal atrophy    Pelvic cramping    Other orders  -     estradiol (ESTRACE)  0.1 MG/GM vaginal cream; Insert 1 g into the vagina 2 (Two) Times a Week.        Elizabeth Rosenthal MD  4/18/19  9:27 PM

## 2019-04-18 NOTE — TELEPHONE ENCOUNTER
COMPLETED    ----- Message from Yarely Bravo sent at 4/17/2019  3:14 PM EDT -----  REFILL ON:    CRESTOR 40MG QTY: 90    PROZAC 20MG QTY: 180    PHARMACY: [id number]    THANK YOU

## 2019-04-20 PROBLEM — R10.2 PELVIC CRAMPING: Status: ACTIVE | Noted: 2019-04-20

## 2019-04-20 PROBLEM — N95.2 VAGINAL ATROPHY: Status: ACTIVE | Noted: 2019-04-20

## 2019-05-08 ENCOUNTER — OFFICE VISIT (OUTPATIENT)
Dept: FAMILY MEDICINE CLINIC | Facility: CLINIC | Age: 64
End: 2019-05-08

## 2019-05-08 VITALS
HEART RATE: 62 BPM | HEIGHT: 61 IN | WEIGHT: 141.8 LBS | SYSTOLIC BLOOD PRESSURE: 112 MMHG | OXYGEN SATURATION: 98 % | DIASTOLIC BLOOD PRESSURE: 66 MMHG | BODY MASS INDEX: 26.77 KG/M2 | RESPIRATION RATE: 16 BRPM | TEMPERATURE: 98.2 F

## 2019-05-08 DIAGNOSIS — F98.8 ATTENTION DEFICIT DISORDER (ADD) WITHOUT HYPERACTIVITY: Primary | ICD-10-CM

## 2019-05-08 DIAGNOSIS — F32.5 MAJOR DEPRESSIVE DISORDER WITH SINGLE EPISODE, IN FULL REMISSION (HCC): ICD-10-CM

## 2019-05-08 DIAGNOSIS — E78.00 HYPERCHOLESTEROLEMIA: ICD-10-CM

## 2019-05-08 PROCEDURE — 99214 OFFICE O/P EST MOD 30 MIN: CPT | Performed by: INTERNAL MEDICINE

## 2019-05-08 RX ORDER — ROSUVASTATIN CALCIUM 20 MG/1
20 TABLET, COATED ORAL DAILY
Qty: 90 TABLET | Refills: 2 | Status: SHIPPED | OUTPATIENT
Start: 2019-05-08 | End: 2019-05-13 | Stop reason: SDUPTHER

## 2019-05-12 PROBLEM — F98.8 ATTENTION DEFICIT DISORDER (ADD) WITHOUT HYPERACTIVITY: Status: ACTIVE | Noted: 2019-05-12

## 2019-05-12 PROBLEM — F32.5 MAJOR DEPRESSIVE DISORDER WITH SINGLE EPISODE, IN FULL REMISSION: Status: ACTIVE | Noted: 2019-05-12

## 2019-05-12 NOTE — PROGRESS NOTES
Subjective   Cynthia Portillo is a 64 y.o. female. Patient is here today for   Chief Complaint   Patient presents with   • ADD     pt would like to discuss having physician prescribe mediication   • Anxiety     pt would like to discuss having physician prescribe medication           Vitals:    05/08/19 1305   BP: 112/66   Pulse: 62   Resp: 16   Temp: 98.2 °F (36.8 °C)   SpO2: 98%       Past Medical History:   Diagnosis Date   • Arthritis    • Depression    • Diverticulitis    • Diverticulosis    • Hyperlipidemia    • Hypertension    • Thyroid disease       Allergies   Allergen Reactions   • Azithromycin Other (See Comments)     Reaction unknown to patient (unable to remember)   • Pravastatin    • Zetia [Ezetimibe]       Social History     Socioeconomic History   • Marital status:      Spouse name: Not on file   • Number of children: Not on file   • Years of education: Not on file   • Highest education level: Not on file   Tobacco Use   • Smoking status: Never Smoker   • Smokeless tobacco: Never Used   Substance and Sexual Activity   • Alcohol use: Yes     Alcohol/week: 0.6 oz     Types: 1 Glasses of wine per week   • Drug use: No   • Sexual activity: Not Currently     Partners: Male     Birth control/protection: Post-menopausal        Current Outpatient Medications:   •  Acidophilus Lactobacillus capsule, Take 1 capsule by mouth 2 (Two) Times a Day., Disp: 60 capsule, Rfl: 5  •  ALPRAZolam (XANAX) 0.25 MG tablet, Take 0.25 mg by mouth 2 (Two) Times a Day As Needed for Anxiety., Disp: , Rfl:   •  amphetamine-dextroamphetamine XR (ADDERALL XR) 20 MG 24 hr capsule, , Disp: , Rfl:   •  calcium acetate (PHOSLO) 667 MG capsule, Take 1,334 mg by mouth 3 (Three) Times a Day With Meals., Disp: , Rfl:   •  cetirizine (zyrTEC) 10 MG tablet, Take 10 mg by mouth Daily., Disp: , Rfl:   •  estradiol (ESTRACE) 0.1 MG/GM vaginal cream, Insert 1 g into the vagina 2 (Two) Times a Week., Disp: 45 g, Rfl: 6  •  FLUoxetine  (PROzac) 20 MG capsule, , Disp: , Rfl:   •  hydrochlorothiazide (HYDRODIURIL) 25 MG tablet, Take 25 mg by mouth Daily., Disp: , Rfl:   •  levothyroxine (SYNTHROID, LEVOTHROID) 75 MCG tablet, Take 1 tablet by mouth Daily., Disp: 90 tablet, Rfl: 3  •  propranolol (INDERAL) 40 MG tablet, Take 1 tablet by mouth 3 (Three) Times a Day., Disp: 90 tablet, Rfl: 3  •  rosuvastatin (CRESTOR) 20 MG tablet, Take 1 tablet by mouth Daily., Disp: 90 tablet, Rfl: 2  •  zolpidem (AMBIEN) 10 MG tablet, Take 1 tablet by mouth At Night As Needed for Sleep., Disp: 30 tablet, Rfl: 2     Objective     This patient has ADD and situational anxiety, and depression.  These prescriptions have been written for her by her gynecologist.  She wanted to know if I can write these prescriptions for her instead.    She is not due for refill of these medications yet.    She feels that her ADD, depression, and situational anxiety is relatively well controlled.    She has sleep onset insomnia which is primary.  She takes Ambien occasionally to help her get to sleep at night.             Review of Systems   Constitutional: Negative.    HENT: Negative.    Respiratory: Negative.    Cardiovascular: Negative.    Musculoskeletal: Negative.    Psychiatric/Behavioral: Negative.        Physical Exam   Constitutional: She is oriented to person, place, and time. She appears well-developed and well-nourished.   HENT:   Head: Normocephalic and atraumatic.   Cardiovascular: Normal rate and regular rhythm.   Pulmonary/Chest: Effort normal.   Neurological: She is alert and oriented to person, place, and time.   Psychiatric: She has a normal mood and affect. Her behavior is normal.   Nursing note and vitals reviewed.        Problem List Items Addressed This Visit        Cardiovascular and Mediastinum    Hypercholesterolemia    Relevant Medications    rosuvastatin (CRESTOR) 20 MG tablet       Other    Major depressive disorder with single episode, in full remission  (CMS/[facility])    Attention deficit disorder (ADD) without hyperactivity - Primary            PLAN  She requested a refill for Crestor which I provided today.    She feels that her major depressive disorder is well controlled as is her ADD.  I would not have a problem with writing for prescriptions required to manage these issues.    I have asked her to let me know when she requires a refill.    I like to see her back every 3 to 4 months.  No Follow-up on file.

## 2019-05-13 ENCOUNTER — TELEPHONE (OUTPATIENT)
Dept: OBSTETRICS AND GYNECOLOGY | Facility: CLINIC | Age: 64
End: 2019-05-13

## 2019-05-13 ENCOUNTER — TELEPHONE (OUTPATIENT)
Dept: FAMILY MEDICINE CLINIC | Facility: CLINIC | Age: 64
End: 2019-05-13

## 2019-05-13 RX ORDER — ROSUVASTATIN CALCIUM 20 MG/1
20 TABLET, COATED ORAL DAILY
Qty: 90 TABLET | Refills: 2 | Status: SHIPPED | OUTPATIENT
Start: 2019-05-13 | End: 2019-11-30 | Stop reason: SDUPTHER

## 2019-05-13 NOTE — TELEPHONE ENCOUNTER
This is not a normal response to the cream, so I would advise her to stop using it and come and see me on Wednesday so we can look and see if anything is wrong ( like an allergic reaction, etc). Ok to overbook for Wed. AKR

## 2019-05-13 NOTE — TELEPHONE ENCOUNTER
COMPLETED    ----- Message from Ellis Tang sent at 5/13/2019  8:42 AM EDT -----  PT'S SCRIPT REFILL FOR rosuvastatin (CRESTOR) 20 MG I SHOW THAT IS WAS SUBMITTED FOR A REFILL BUT IT IS MARKED AS IT WAS PRINTED AND NOT ROUTED TO THE PHARMACY.  PLEASE RESEND TO THE [facility] ON [address]

## 2019-05-13 NOTE — TELEPHONE ENCOUNTER
Patient has been having pain since she started the cream.  She sees Dr. Galindo on the 23rd.  Is there anything she isn't doing and will not cause pain??

## 2019-05-13 NOTE — TELEPHONE ENCOUNTER
Yes, I am sorry, she just is worried that something serious is wrong. But it's very painful now using the cream.  Is there anything she can do for the pain? Stop using cream??

## 2019-05-21 ENCOUNTER — TELEPHONE (OUTPATIENT)
Dept: FAMILY MEDICINE CLINIC | Facility: CLINIC | Age: 64
End: 2019-05-21

## 2019-05-21 RX ORDER — CETIRIZINE HYDROCHLORIDE 10 MG/1
10 TABLET ORAL DAILY
Qty: 90 TABLET | Refills: 1 | Status: SHIPPED | OUTPATIENT
Start: 2019-05-21 | End: 2019-09-13 | Stop reason: SDUPTHER

## 2019-05-21 RX ORDER — OLOPATADINE HYDROCHLORIDE 1 MG/ML
1 SOLUTION/ DROPS OPHTHALMIC 2 TIMES DAILY
Qty: 5 ML | Refills: 5 | Status: SHIPPED | OUTPATIENT
Start: 2019-05-21 | End: 2020-11-06 | Stop reason: SDUPTHER

## 2019-05-21 RX ORDER — LOPERAMIDE HYDROCHLORIDE 2 MG/1
2 TABLET ORAL AS NEEDED
Qty: 30 TABLET | Refills: 2 | Status: SHIPPED | OUTPATIENT
Start: 2019-05-21 | End: 2021-09-01

## 2019-06-07 ENCOUNTER — OFFICE VISIT (OUTPATIENT)
Dept: FAMILY MEDICINE CLINIC | Facility: CLINIC | Age: 64
End: 2019-06-07

## 2019-06-07 VITALS
RESPIRATION RATE: 16 BRPM | DIASTOLIC BLOOD PRESSURE: 62 MMHG | HEART RATE: 95 BPM | SYSTOLIC BLOOD PRESSURE: 102 MMHG | HEIGHT: 61 IN | TEMPERATURE: 98.2 F | WEIGHT: 137 LBS | OXYGEN SATURATION: 98 % | BODY MASS INDEX: 25.86 KG/M2

## 2019-06-07 DIAGNOSIS — R16.0 HEPATOMEGALY: Primary | ICD-10-CM

## 2019-06-07 DIAGNOSIS — K76.0 HEPATIC STEATOSIS: ICD-10-CM

## 2019-06-07 PROCEDURE — 99214 OFFICE O/P EST MOD 30 MIN: CPT | Performed by: INTERNAL MEDICINE

## 2019-06-07 RX ORDER — FAMOTIDINE 20 MG/1
TABLET, FILM COATED ORAL
Refills: 0 | COMMUNITY
Start: 2019-06-01 | End: 2020-04-17

## 2019-06-07 RX ORDER — ONDANSETRON 4 MG/1
TABLET, ORALLY DISINTEGRATING ORAL
Refills: 0 | COMMUNITY
Start: 2019-06-01 | End: 2020-04-17

## 2019-06-07 NOTE — PATIENT INSTRUCTIONS
Reviewed and discussed hospital records as well as possible etiologies of hepatic steatosis and hepatomegaly.  Will recheck a conference metabolic panel and hemoglobin A1c today.  Will refer to gastroenterology.

## 2019-06-07 NOTE — PROGRESS NOTES
Subjective   Cynthia Portillo is a 64 y.o. female. Patient is here today for   Chief Complaint   Patient presents with   • hospital f/u     dx with an enlarged liver needs a referral        (Not on file)-  Risk for Readmission (LACE) No Data Recorded         Vitals:    06/07/19 1456   BP: 102/62   Pulse: 95   Resp: 16   Temp: 98.2 °F (36.8 °C)   SpO2: 98%     The following portions of the patient's history were reviewed and updated as appropriate: allergies, current medications, past family history, past medical history, past social history, past surgical history and problem list.    Past Medical History:   Diagnosis Date   • Arthritis    • Depression    • Diverticulitis    • Diverticulosis    • Hyperlipidemia    • Hypertension    • Thyroid disease       Allergies   Allergen Reactions   • Azithromycin Other (See Comments)     Reaction unknown to patient (unable to remember)   • Pravastatin    • Zetia [Ezetimibe]       Social History     Socioeconomic History   • Marital status:      Spouse name: Not on file   • Number of children: Not on file   • Years of education: Not on file   • Highest education level: Not on file   Tobacco Use   • Smoking status: Never Smoker   • Smokeless tobacco: Never Used   Substance and Sexual Activity   • Alcohol use: Yes     Alcohol/week: 0.6 oz     Types: 1 Glasses of wine per week   • Drug use: No   • Sexual activity: Not Currently     Partners: Male     Birth control/protection: Post-menopausal        Current Outpatient Medications:   •  Acidophilus Lactobacillus capsule, Take 1 capsule by mouth 2 (Two) Times a Day., Disp: 60 capsule, Rfl: 5  •  ALPRAZolam (XANAX) 0.25 MG tablet, Take 0.25 mg by mouth 2 (Two) Times a Day As Needed for Anxiety., Disp: , Rfl:   •  amphetamine-dextroamphetamine XR (ADDERALL XR) 20 MG 24 hr capsule, , Disp: , Rfl:   •  calcium acetate (PHOSLO) 667 MG capsule, Take 1,334 mg by mouth 3 (Three) Times a Day With Meals., Disp: , Rfl:   •  cetirizine  (zyrTEC) 10 MG tablet, Take 1 tablet by mouth Daily., Disp: 90 tablet, Rfl: 1  •  estradiol (ESTRACE) 0.1 MG/GM vaginal cream, Insert 1 g into the vagina 2 (Two) Times a Week., Disp: 45 g, Rfl: 6  •  famotidine (PEPCID) 20 MG tablet, TK 1 T PO Q 12 H, Disp: , Rfl: 0  •  FLUoxetine (PROzac) 20 MG capsule, , Disp: , Rfl:   •  hydrochlorothiazide (HYDRODIURIL) 25 MG tablet, Take 25 mg by mouth Daily., Disp: , Rfl:   •  levothyroxine (SYNTHROID, LEVOTHROID) 75 MCG tablet, Take 1 tablet by mouth Daily., Disp: 90 tablet, Rfl: 3  •  loperamide (IMODIUM A-D) 2 MG tablet, Take 1 tablet by mouth As Needed for Diarrhea., Disp: 30 tablet, Rfl: 2  •  olopatadine (PATANOL) 0.1 % ophthalmic solution, Administer 1 drop to both eyes 2 (Two) Times a Day., Disp: 5 mL, Rfl: 5  •  ondansetron ODT (ZOFRAN-ODT) 4 MG disintegrating tablet, DISSOLVE 1 T PO Q 8 H PRN NV. ALLOW T TO DISSOLVE ON TONGUE, Disp: , Rfl: 0  •  propranolol (INDERAL) 40 MG tablet, Take 1 tablet by mouth 3 (Three) Times a Day., Disp: 90 tablet, Rfl: 3  •  rosuvastatin (CRESTOR) 20 MG tablet, Take 1 tablet by mouth Daily., Disp: 90 tablet, Rfl: 2  •  zolpidem (AMBIEN) 10 MG tablet, Take 1 tablet by mouth At Night As Needed for Sleep., Disp: 30 tablet, Rfl: 2     Objective     History of Present Illness Cynthia was visiting her son in [place] and developed fever and diarrhea.  She went to the emergency room where CAT scan was done of her abdomen which showed hepatomegaly and profound hepatic steatosis.  Transaminase enzymes were minimally elevated.  She does not drink alcohol and states that she does not have diabetes.  A hemoglobin A1c last October was in the prediabetic range at 6.0.  Her diarrhea has resolved but her stools are still not back to normal.  She states that she has had C. difficile multiple times.    Review of Systems   Constitutional: Negative.    Respiratory: Negative.    Cardiovascular: Negative.    Gastrointestinal: Negative for abdominal  pain.   Genitourinary: Negative.    Neurological: Negative.    Psychiatric/Behavioral: Negative.        Physical Exam   Constitutional: She appears well-developed and well-nourished.   Cardiovascular: Normal rate, regular rhythm and normal heart sounds.   Pulmonary/Chest: Effort normal and breath sounds normal.   Abdominal: Soft. Bowel sounds are normal.   Tender upper abdomen without guarding or rebound.  Hepatomegaly is not appreciated.   Neurological: She is alert.   Psychiatric: She has a normal mood and affect. Her behavior is normal. Judgment and thought content normal.   Vitals reviewed.      ASSESSMENT     Problem List Items Addressed This Visit        Digestive    Hepatomegaly - Primary    Relevant Orders    Hemoglobin A1c    Comprehensive Metabolic Panel    Ambulatory Referral to Gastroenterology    Hepatic steatosis    Relevant Orders    Hemoglobin A1c    Comprehensive Metabolic Panel    Ambulatory Referral to Gastroenterology          Current outpatient and discharge medications have been reconciled for the patient.  Reviewed by: Cam Frias MD      PLAN    Patient Instructions   Reviewed and discussed hospital records as well as possible etiologies of hepatic steatosis and hepatomegaly.  Will recheck a conference metabolic panel and hemoglobin A1c today.  Will refer to gastroenterology.    No Follow-up on file.

## 2019-06-08 LAB
ALBUMIN SERPL-MCNC: 4.4 G/DL (ref 3.5–5.2)
ALBUMIN/GLOB SERPL: 2.4 G/DL
ALP SERPL-CCNC: 74 U/L (ref 39–117)
ALT SERPL-CCNC: 43 U/L (ref 1–33)
AST SERPL-CCNC: 30 U/L (ref 1–32)
BILIRUB SERPL-MCNC: 0.5 MG/DL (ref 0.2–1.2)
BUN SERPL-MCNC: 15 MG/DL (ref 8–23)
BUN/CREAT SERPL: 21.7 (ref 7–25)
CALCIUM SERPL-MCNC: 10.2 MG/DL (ref 8.6–10.5)
CHLORIDE SERPL-SCNC: 103 MMOL/L (ref 98–107)
CO2 SERPL-SCNC: 29.2 MMOL/L (ref 22–29)
CREAT SERPL-MCNC: 0.69 MG/DL (ref 0.57–1)
GLOBULIN SER CALC-MCNC: 1.8 GM/DL
GLUCOSE SERPL-MCNC: 78 MG/DL (ref 65–99)
HBA1C MFR BLD: 5.9 % (ref 4.8–5.6)
POTASSIUM SERPL-SCNC: 3.4 MMOL/L (ref 3.5–5.2)
PROT SERPL-MCNC: 6.2 G/DL (ref 6–8.5)
SODIUM SERPL-SCNC: 144 MMOL/L (ref 136–145)

## 2019-06-14 ENCOUNTER — TELEPHONE (OUTPATIENT)
Dept: FAMILY MEDICINE CLINIC | Facility: CLINIC | Age: 64
End: 2019-06-14

## 2019-06-14 NOTE — TELEPHONE ENCOUNTER
Spoke to patient and informed her of 5.9 A1C result.  Advised her per Dr. Frias to lower her carb and sugar intake and to follow up with Dr. Petesron in August as previously scheduled.    Subjective:  HPI                    Objective:  System    Physical Exam    General     ROS    Assessment/Plan:    PROGRESS  REPORT    Progress reporting period is from 5/15/2018 to 6/26/2018.       SUBJECTIVE  Subjective changes noted by patient: Has been doing all right. Work has been very tough, but she got a new job and will be starting next Monday. Has not been able to get back to the gym to resume her normal exercise routine.     Current Pain level: 3/10.     Initial Pain level: 4/10.   Changes in function:  Yes (See Goal flowsheet attached for changes in current functional level)  Adverse reaction to treatment or activity: None    OBJECTIVE  Changes noted in objective findings:  Yes, Patient has some improvement with gait and ankle ROM.    Standing Posture: Foot posture demonstrates a mild pes planus bilaterally    Gait: Mild pronation, increase pelvic rotation    AROM (PROM): (* indicates patient's pain)   ROM R ROM L   Plantarflexion 40 48   DF, knee straight 19 9   DF, knee bent 15    Likely was an error in measurement (either     Strength: (* indicates patient's pain)   MMT R MMT L   DF/Inv  5*   DF/Ev  5*   PF/Ev  4-   PF/Inv   4   Continues to exhibit ankle weakness that could contribute to ankle instability both laterally and medially.     Ankle/Foot Mobilizations (hyper vs hypo): Joint mobility normal unless otherwise noted.   - Talocrual:  - Subtalar:  - Talonavicular:  - Calcaneocuboid:  - Cubonavicular-cuniform:      Palpation: Continues to have tenderness in the heel and along the tibialis posterior.     ASSESSMENT/PLAN  Updated problem list and treatment plan:   Diagnosis 1:  Left Ankle Pain  Pain -  hot/cold therapy, manual therapy, splint/taping/bracing/orthotics, self management, education and home program  Decreased strength - therapeutic exercise, therapeutic activities and home program  Impaired gait - gait training and home program  Decreased function - therapeutic activities and home  program    STG/LTGs have been met or progress has been made towards goals:  Yes (See Goal flow sheet completed today.)  Assessment of Progress: The patient's condition is improving.  The patient's condition has potential to improve.  Self Management Plans:  Patient has been instructed in a home treatment program.  Patient  has been instructed in self management of symptoms.  I have re-evaluated this patient and find that the nature, scope, duration and intensity of the therapy is appropriate for the medical condition of the patient.  Coni Brar continues to require the following intervention to meet STG and LTG's:  PT    Recommendations:  Patient will be starting a new job next week. It is likely that symptoms will improve when she is not on her feet for 8 hours a day. Her present work has likely been a barrier to to additional progress given the physical load of the work and the time demand that has reduced HEP compliance.    We discussed a follow-up in 3 weeks over the phone to allow time to settle in to the new job routine and to return to the gym.   We will decide at that time what more will be needed from PT based on remaining symptoms.     Please refer to the daily flowsheet for treatment today, total treatment time and time spent performing 1:1 timed codes.

## 2019-06-26 ENCOUNTER — TELEPHONE (OUTPATIENT)
Dept: FAMILY MEDICINE CLINIC | Facility: CLINIC | Age: 64
End: 2019-06-26

## 2019-06-26 RX ORDER — HYDROCHLOROTHIAZIDE 25 MG/1
25 TABLET ORAL DAILY
Qty: 90 TABLET | Refills: 1 | Status: SHIPPED | OUTPATIENT
Start: 2019-06-26 | End: 2019-09-17 | Stop reason: SDUPTHER

## 2019-06-26 NOTE — TELEPHONE ENCOUNTER
COMPLETED    ----- Message from Martha Clinton sent at 6/26/2019  1:03 PM EDT -----  PT NEEDS A REFILL ON HER hydrochlorothiazide (HYDRODIURIL) 25 MG tablet  1QD #90          [facility] [address] IS HER PHARMACY    [phone] PT'S NUMBER IF ANY QUESTIONS    THANK YOU

## 2019-07-09 ENCOUNTER — OFFICE VISIT (OUTPATIENT)
Dept: GASTROENTEROLOGY | Facility: CLINIC | Age: 64
End: 2019-07-09

## 2019-07-09 VITALS
BODY MASS INDEX: 26.36 KG/M2 | HEIGHT: 61 IN | DIASTOLIC BLOOD PRESSURE: 80 MMHG | WEIGHT: 139.6 LBS | TEMPERATURE: 98.7 F | SYSTOLIC BLOOD PRESSURE: 112 MMHG

## 2019-07-09 DIAGNOSIS — K59.09 CHRONIC CONSTIPATION: ICD-10-CM

## 2019-07-09 DIAGNOSIS — R16.0 HEPATOMEGALY: ICD-10-CM

## 2019-07-09 DIAGNOSIS — K76.0 HEPATIC STEATOSIS: Primary | ICD-10-CM

## 2019-07-09 DIAGNOSIS — R15.9 FULL INCONTINENCE OF FECES: ICD-10-CM

## 2019-07-09 PROCEDURE — 99204 OFFICE O/P NEW MOD 45 MIN: CPT | Performed by: INTERNAL MEDICINE

## 2019-07-09 NOTE — PATIENT INSTRUCTIONS
Labs today    Start daily fiber supplementation--  Citrucel, or fiber con    Start miralax every day    Work on healthier eating    For any additional questions, concerns or changes to your condition after today's office visit please contact the office at [phone].

## 2019-07-09 NOTE — PROGRESS NOTES
"Chief Complaint   Patient presents with   • Abdominal Pain   • Abnormal CT       Subjective     HPI    Cynthia Portillo is a 64 y.o. female with a past medical history noted below who presents for evaluation of fecal incontinence and abnormal CT imaging.  Fecal incontinence has been an issue for a number of years.  Her history is notable for a history of a perforated diverticulum in 2005.  This required a prolonged hospitalization as well as colectomy with actual takedown.  She feels that this was the likely precipitant to her issues.  The timing is intermittent with episodes occurring a couple of times per month.  She will have loose stool-- uncontrollable.  She does take Imodium to treat this.  However in between these episodes she usually varies in stool consistency.  Frequently going at least 3 days in between bowel movements and having hard and pebbly stools.  She has not taking any fiber supplementation nor any laxative.  She has been followed by Dr. Kevin Arenas at [facility] in the past.  Her last colonoscopy was in 2018.  Dr. Arenas has offered her a sacral stimulator to help with the incontinence episodes.      In May, she was visiting her son, had a \"GI attack\" with pain, nausea, vomiting.  She was in [place], she went the the ER, CT demonstrated hepatomegaly and diffuse hepatic steatosis.  She used to weigh in the 150s.  She had a lot of weight loss following her surgery.  She is prediabetic and has had high triglycerides.  Her  endorses that she has a fairly saccharine diet.  Review of her labs show that she has had elevations in her transaminases going back to at least 2018.    Denies a family history of any GI malignancy.  She denies a family history of any cirrhosis or autoimmune or genetic liver diseases.    She does not smoke, she does not drink.          Past Medical History:   Diagnosis Date   • Arthritis    • Depression    • Diverticulitis    • Diverticulitis of colon    • Diverticulosis  "   • Hyperlipidemia    • Hypertension    • Thyroid disease          Current Outpatient Medications:   •  Acidophilus Lactobacillus capsule, Take 1 capsule by mouth 2 (Two) Times a Day., Disp: 60 capsule, Rfl: 5  •  ALPRAZolam (XANAX) 0.25 MG tablet, Take 0.25 mg by mouth 2 (Two) Times a Day As Needed for Anxiety., Disp: , Rfl:   •  amphetamine-dextroamphetamine XR (ADDERALL XR) 20 MG 24 hr capsule, , Disp: , Rfl:   •  calcium acetate (PHOSLO) 667 MG capsule, Take 1,334 mg by mouth 3 (Three) Times a Day With Meals., Disp: , Rfl:   •  cetirizine (zyrTEC) 10 MG tablet, Take 1 tablet by mouth Daily., Disp: 90 tablet, Rfl: 1  •  estradiol (ESTRACE) 0.1 MG/GM vaginal cream, Insert 1 g into the vagina 2 (Two) Times a Week., Disp: 45 g, Rfl: 6  •  famotidine (PEPCID) 20 MG tablet, TK 1 T PO Q 12 H, Disp: , Rfl: 0  •  FLUoxetine (PROzac) 20 MG capsule, , Disp: , Rfl:   •  hydrochlorothiazide (HYDRODIURIL) 25 MG tablet, Take 1 tablet by mouth Daily., Disp: 90 tablet, Rfl: 1  •  levothyroxine (SYNTHROID, LEVOTHROID) 75 MCG tablet, Take 1 tablet by mouth Daily., Disp: 90 tablet, Rfl: 3  •  loperamide (IMODIUM A-D) 2 MG tablet, Take 1 tablet by mouth As Needed for Diarrhea., Disp: 30 tablet, Rfl: 2  •  olopatadine (PATANOL) 0.1 % ophthalmic solution, Administer 1 drop to both eyes 2 (Two) Times a Day., Disp: 5 mL, Rfl: 5  •  ondansetron ODT (ZOFRAN-ODT) 4 MG disintegrating tablet, DISSOLVE 1 T PO Q 8 H PRN NV. ALLOW T TO DISSOLVE ON TONGUE, Disp: , Rfl: 0  •  propranolol (INDERAL) 40 MG tablet, Take 1 tablet by mouth 3 (Three) Times a Day., Disp: 90 tablet, Rfl: 3  •  rosuvastatin (CRESTOR) 20 MG tablet, Take 1 tablet by mouth Daily., Disp: 90 tablet, Rfl: 2  •  zolpidem (AMBIEN) 10 MG tablet, Take 1 tablet by mouth At Night As Needed for Sleep., Disp: 30 tablet, Rfl: 2    Allergies   Allergen Reactions   • Azithromycin Other (See Comments)     Reaction unknown to patient (unable to remember)   • Pravastatin    • Zetia  [Ezetimibe]        Social History     Socioeconomic History   • Marital status:      Spouse name: Not on file   • Number of children: Not on file   • Years of education: Not on file   • Highest education level: Not on file   Tobacco Use   • Smoking status: Never Smoker   • Smokeless tobacco: Never Used   Substance and Sexual Activity   • Alcohol use: Yes     Alcohol/week: 0.6 oz     Types: 1 Glasses of wine per week   • Drug use: No   • Sexual activity: Not Currently     Partners: Male     Birth control/protection: Post-menopausal       Family History   Problem Relation Age of Onset   • Osteoporosis Mother    • Breast cancer Neg Hx    • Ovarian cancer Neg Hx    • Colon cancer Neg Hx    • Deep vein thrombosis Neg Hx    • Pulmonary embolism Neg Hx        Review of Systems   Constitutional: Negative for activity change, appetite change and fatigue.   HENT: Negative for sore throat and trouble swallowing.    Respiratory: Negative.    Cardiovascular: Negative.    Gastrointestinal: Positive for constipation and diarrhea. Negative for abdominal distention, abdominal pain and blood in stool.        Fecal incontinence   Endocrine: Negative for cold intolerance and heat intolerance.   Genitourinary: Negative for difficulty urinating, dysuria and frequency.   Musculoskeletal: Negative for arthralgias, back pain and myalgias.   Skin: Negative.    Hematological: Negative for adenopathy. Does not bruise/bleed easily.   All other systems reviewed and are negative.      Objective     Vitals:    07/09/19 1454   BP: 112/80   Temp: 98.7 °F (37.1 °C)         07/09/19  1454   Weight: 63.3 kg (139 lb 9.6 oz)     Body mass index is 26.38 kg/m².    Physical Exam   Constitutional: She is oriented to person, place, and time. She appears well-developed and well-nourished. No distress.   HENT:   Head: Normocephalic and atraumatic.   Right Ear: External ear normal.   Left Ear: External ear normal.   Nose: Nose normal.   Mouth/Throat:  Oropharynx is clear and moist.   Eyes: Conjunctivae and EOM are normal. Right eye exhibits no discharge. Left eye exhibits no discharge. No scleral icterus.   Neck: Normal range of motion. Neck supple. No thyromegaly present.   No supraclavicular adenopathy   Cardiovascular: Normal rate, regular rhythm, normal heart sounds and intact distal pulses. Exam reveals no gallop.   No murmur heard.  No lower extremity edema   Pulmonary/Chest: Effort normal and breath sounds normal. No respiratory distress. She has no wheezes.   Abdominal: Soft. Normal appearance and bowel sounds are normal. She exhibits no distension and no mass. There is no hepatosplenomegaly. There is tenderness. There is no rigidity, no rebound and no guarding. No hernia.   Central obesity   Genitourinary:   Genitourinary Comments: Rectal exam deferred   Musculoskeletal: Normal range of motion. She exhibits no edema or tenderness.   No atrophy of upper or lower extremities.  Normal digits and nails of both hands.   Lymphadenopathy:     She has no cervical adenopathy.   Neurological: She is alert and oriented to person, place, and time. She displays no atrophy. Coordination normal.   Skin: Skin is warm and dry. No rash noted. She is not diaphoretic. No erythema.   Psychiatric: She has a normal mood and affect. Her behavior is normal. Judgment and thought content normal.   Vitals reviewed.      WBC   Date Value Ref Range Status   12/27/2018 6.47 4.5 - 11.0 10*3/uL Final     RBC   Date Value Ref Range Status   12/27/2018 4.48 4.0 - 5.2 10*6/uL Final     Hemoglobin   Date Value Ref Range Status   01/09/2019 11.7 (L) 12.0 - 16.0 g/dL Final     Hematocrit   Date Value Ref Range Status   01/09/2019 35.4 (L) 36.0 - 46.0 % Final     MCV   Date Value Ref Range Status   12/27/2018 91.5 80.0 - 100.0 fL Final     MCH   Date Value Ref Range Status   12/27/2018 30.6 26.0 - 34.0 pg Final     MCHC   Date Value Ref Range Status   12/27/2018 33.4 31.0 - 37.0 g/dL Final      RDW   Date Value Ref Range Status   12/27/2018 13.0 12.0 - 16.8 % Final     MPV   Date Value Ref Range Status   12/27/2018 10.0 6.7 - 10.8 fL Final     Platelets   Date Value Ref Range Status   12/27/2018 246 140 - 440 10*3/uL Final     Neutrophil Rel %   Date Value Ref Range Status   12/27/2018 44.5 (L) 45 - 80 % Final     Lymphocyte Rel %   Date Value Ref Range Status   12/27/2018 43.7 15 - 50 % Final     Monocyte Rel %   Date Value Ref Range Status   12/27/2018 4.3 0 - 15 % Final     Eosinophil %   Date Value Ref Range Status   12/27/2018 7.0 0 - 7 % Final     Basophil Rel %   Date Value Ref Range Status   12/27/2018 0.3 0 - 2 % Final     Immature Grans %   Date Value Ref Range Status   12/27/2018 0.2 (H) 0 % Final     Neutrophils Absolute   Date Value Ref Range Status   12/27/2018 2.88 2.0 - 8.8 10*3/uL Final     Lymphocytes Absolute   Date Value Ref Range Status   12/27/2018 2.83 0.7 - 5.5 10*3/uL Final     Monocytes Absolute   Date Value Ref Range Status   12/27/2018 0.28 0.0 - 1.7 10*3/uL Final     Eosinophils Absolute   Date Value Ref Range Status   12/27/2018 0.45 0.0 - 0.8 10*3/uL Final     Basophils Absolute   Date Value Ref Range Status   12/27/2018 0.02 0.0 - 0.2 10*3/uL Final     Immature Grans, Absolute   Date Value Ref Range Status   12/27/2018 0.01 <1 10*3/uL Final     nRBC   Date Value Ref Range Status   12/27/2018 0 0 /100(WBC) Final       Sodium   Date Value Ref Range Status   06/07/2019 144 136 - 145 mmol/L Final   01/09/2019 138 137 - 145 mmol/L Final     Potassium   Date Value Ref Range Status   06/07/2019 3.4 (L) 3.5 - 5.2 mmol/L Final   01/09/2019 4.1 3.5 - 5.1 mmol/L Final     CO2   Date Value Ref Range Status   01/09/2019 25 22 - 30 mmol/L Final     Total CO2   Date Value Ref Range Status   06/07/2019 29.2 (H) 22.0 - 29.0 mmol/L Final     Chloride   Date Value Ref Range Status   06/07/2019 103 98 - 107 mmol/L Final   01/09/2019 108 (H) 98 - 107 mmol/L Final     Creatinine   Date Value  Ref Range Status   06/07/2019 0.69 0.57 - 1.00 mg/dL Final   01/09/2019 0.6 (L) 0.7 - 1.5 mg/dL Final     BUN   Date Value Ref Range Status   06/07/2019 15 8 - 23 mg/dL Final   01/09/2019 18 7 - 20 mg/dL Final     BUN/Creatinine Ratio   Date Value Ref Range Status   06/07/2019 21.7 7.0 - 25.0 Final   01/09/2019 32.7 RATIO Final     Calcium   Date Value Ref Range Status   06/07/2019 10.2 8.6 - 10.5 mg/dL Final   01/09/2019 8.6 8.4 - 10.2 mg/dL Final     eGFR Non  Am   Date Value Ref Range Status   06/07/2019 86 >60 mL/min/1.73 Final     Alkaline Phosphatase   Date Value Ref Range Status   06/07/2019 74 39 - 117 U/L Final   12/27/2018 65 38 - 126 U/L Final     Total Protein   Date Value Ref Range Status   12/27/2018 6.6 6.3 - 8.2 g/dL Final     ALT (SGPT)   Date Value Ref Range Status   06/07/2019 43 (H) 1 - 33 U/L Final   12/27/2018 62 13 - 69 U/L Final     AST (SGOT)   Date Value Ref Range Status   06/07/2019 30 1 - 32 U/L Final   12/27/2018 50 (H) 15 - 46 U/L Final     Total Bilirubin   Date Value Ref Range Status   06/07/2019 0.5 0.2 - 1.2 mg/dL Final   12/27/2018 0.5 0.2 - 1.3 mg/dL Final     Albumin   Date Value Ref Range Status   06/07/2019 4.40 3.50 - 5.20 g/dL Final   12/27/2018 4.3 3.5 - 5.0 g/dL Final     Globulin   Date Value Ref Range Status   12/27/2018 2.3 1.5 - 4.5 g/dL Final     A/G Ratio   Date Value Ref Range Status   06/07/2019 2.4 g/dL Final   12/27/2018 1.9 1.1 - 2.5 RATIO Final       CT abd    1. No acute inflammatory or obstructive process of the abdomen or pelvis.     2. Hepatomegaly and profound hepatic steatosis.    Signed By: Luis Daniel Newman on 5/31/2019 8:42 PM      No notes on file    Assessment/Plan    Fecal incontinence: I suspect this is more related to overflow incontinence from her irregular bowel movements.  She is not really on any medication to either bulk the stools or treat the constipation    Elevated transaminases: Since 2015, associated with fatty liver  disease    Hepatic steatosis with hepatomegaly: Pronounced on her May CT exam.  Suspect this is a combination of her centralized obesity compounded by prediabetic status and high triglycerides    Plan  Liver serologic work-up today  Advised healthier eating--decrease sugary foods, focus on some weight loss  Daily MiraLAX with the goal of improving regularity along with daily fiber supplementation to help give her stools more form  Advised 2 to 3 cups of brewed coffee every day  May need to consider liver biopsy    Cynthia was seen today for abdominal pain and abnormal ct.    Diagnoses and all orders for this visit:    Hepatic steatosis  -     Alpha - 1 - Antitrypsin  -     CHRISTO  -     Anti-Smooth Muscle Antibody Titer  -     Ceruloplasmin  -     Ferritin  -     Gamma GT  -     Mitochondrial Antibodies, M2  -     IgG  -     BURCH Fibrosure    Hepatomegaly    Full incontinence of feces    Chronic constipation        I have discussed the above plan with the patient.  They verbalize understanding and are in agreement with the plan.  They have been advised to contact the office for any questions, concerns, or changes related to their health.    Dictated utilizing Dragon dictation

## 2019-07-10 ENCOUNTER — OFFICE VISIT (OUTPATIENT)
Dept: FAMILY MEDICINE CLINIC | Facility: CLINIC | Age: 64
End: 2019-07-10

## 2019-07-10 VITALS
RESPIRATION RATE: 16 BRPM | HEART RATE: 68 BPM | WEIGHT: 142 LBS | BODY MASS INDEX: 26.81 KG/M2 | DIASTOLIC BLOOD PRESSURE: 66 MMHG | OXYGEN SATURATION: 96 % | TEMPERATURE: 98 F | SYSTOLIC BLOOD PRESSURE: 112 MMHG | HEIGHT: 61 IN

## 2019-07-10 DIAGNOSIS — J01.90 ACUTE SINUSITIS, RECURRENCE NOT SPECIFIED, UNSPECIFIED LOCATION: Primary | ICD-10-CM

## 2019-07-10 LAB
A1AT SERPL-MCNC: 108 MG/DL (ref 90–200)
ACTIN IGG SERPL-ACNC: 21 UNITS (ref 0–19)
ANA SER QL: POSITIVE
CERULOPLASMIN SERPL-MCNC: 22.6 MG/DL (ref 19–39)
DSDNA AB SER-ACNC: <1 IU/ML (ref 0–9)
FERRITIN SERPL-MCNC: 208 NG/ML (ref 13–150)
GGT SERPL-CCNC: 30 U/L (ref 5–36)
IGG SERPL-MCNC: 482 MG/DL (ref 700–1600)
Lab: NORMAL
MITOCHONDRIA M2 IGG SER-ACNC: <20 UNITS (ref 0–20)

## 2019-07-10 PROCEDURE — 99213 OFFICE O/P EST LOW 20 MIN: CPT | Performed by: NURSE PRACTITIONER

## 2019-07-10 RX ORDER — BENZONATATE 200 MG/1
200 CAPSULE ORAL 3 TIMES DAILY PRN
Qty: 30 CAPSULE | Refills: 1 | Status: SHIPPED | OUTPATIENT
Start: 2019-07-10 | End: 2019-09-11

## 2019-07-10 RX ORDER — CEFUROXIME AXETIL 250 MG/1
250 TABLET ORAL 2 TIMES DAILY
Qty: 20 TABLET | Refills: 0 | Status: SHIPPED | OUTPATIENT
Start: 2019-07-10 | End: 2019-09-05

## 2019-07-10 NOTE — PROGRESS NOTES
Subjective   Cynthia Portillo is a 64 y.o. female.   Chief Complaint   Patient presents with   • Cough   • Sore Throat     Vitals:    07/10/19 1423   BP: 112/66   Pulse: 68   Resp: 16   Temp: 98 °F (36.7 °C)   SpO2: 96%     No LMP recorded. Patient is premenopausal.    Cynthia is a patient of Dr Peterson who is here for an acute visit.       Sinus Problem   This is a new problem. The current episode started in the past 7 days. The problem has been gradually worsening since onset. There has been no fever. The pain is moderate. Associated symptoms include congestion, coughing, headaches, sinus pressure and a sore throat. Pertinent negatives include no ear pain or shortness of breath. Treatments tried: takes zyrtec daily         The following portions of the patient's history were reviewed and updated as appropriate: allergies, current medications, past family history, past medical history, past social history, past surgical history and problem list.    Review of Systems   Constitutional: Positive for fatigue. Negative for fever.   HENT: Positive for congestion, sinus pressure and sore throat. Negative for ear pain.    Respiratory: Positive for cough. Negative for shortness of breath and wheezing.    Cardiovascular: Negative.    Neurological: Positive for headaches.       Objective   Physical Exam   Constitutional: Vital signs are normal. She appears well-developed and well-nourished. No distress.   HENT:   Head: Normocephalic.   Right Ear: Tympanic membrane and ear canal normal.   Left Ear: Tympanic membrane and ear canal normal.   Nose: Mucosal edema and rhinorrhea present.   Mouth/Throat: Uvula is midline.   Neurological: She is alert.       Assessment/Plan   Cynthia was seen today for cough and sore throat.    Diagnoses and all orders for this visit:    Acute sinusitis, recurrence not specified, unspecified location    Other orders  -     benzonatate (TESSALON) 200 MG capsule; Take 1 capsule by mouth 3 (Three) Times  a Day As Needed for Cough.  -     cefuroxime (CEFTIN) 250 MG tablet; Take 1 tablet by mouth 2 (Two) Times a Day.      Recommend saline nasal spray as needed for congestion  Follow up in 2 weeks if your symptoms persist or sooner if symptoms worsen or if new symptoms develop

## 2019-07-11 ENCOUNTER — TELEPHONE (OUTPATIENT)
Dept: GASTROENTEROLOGY | Facility: CLINIC | Age: 64
End: 2019-07-11

## 2019-07-11 LAB
A2 MACROGLOB SERPL-MCNC: 195 MG/DL (ref 110–276)
ALT SERPL W P-5'-P-CCNC: 50 IU/L (ref 0–40)
APO A-I SERPL-MCNC: 126 MG/DL (ref 116–209)
AST SERPL W P-5'-P-CCNC: 38 IU/L (ref 0–40)
BILIRUB SERPL-MCNC: 0.5 MG/DL (ref 0–1.2)
CHOLEST SERPL-MCNC: 189 MG/DL (ref 100–199)
FIBROSIS SCORING:: ABNORMAL
FIBROSIS STAGE SERPL QL: ABNORMAL
GGT SERPL-CCNC: 31 IU/L (ref 0–60)
GLUCOSE SERPL-MCNC: 91 MG/DL (ref 65–99)
HAPTOGLOB SERPL-MCNC: 136 MG/DL (ref 34–200)
INTERPRETATIONS: (REFERENCE): ABNORMAL
LABORATORY COMMENT REPORT: ABNORMAL
LIVER FIBR SCORE SERPL CALC.FIBROSURE: 0.27 (ref 0–0.21)
NASH SCORING (REFERENCE): ABNORMAL
NECROINFLAMMATORY ACT GRADE SERPL QL: ABNORMAL
NECROINFLAMMATORY ACT SCORE SERPL: 0.75
SERVICE CMNT-IMP: ABNORMAL
STEATOSIS GRADE (REFERENCE): ABNORMAL
STEATOSIS GRADING (REFERENCE): ABNORMAL
STEATOSIS SCORE (REFERENCE): 0.74 (ref 0–0.3)
TRIGL SERPL-MCNC: 397 MG/DL (ref 0–149)

## 2019-07-11 NOTE — TELEPHONE ENCOUNTER
Called pt and advised per Dr Beck that her lab work does fairly strongly go along with fatty liver disease.      A couple of labs were found to be very modestly elevated that can be associated with some autoimmune liver diseases, though it does not look like she in reality has these.     She needs to focus on  Healthier eating , modest weight loss to  Help her liver disease.  She will continue  To f/u her lab work. Pt verb understanding.

## 2019-07-11 NOTE — TELEPHONE ENCOUNTER
----- Message from Preethi Beck MD sent at 7/11/2019  2:06 PM EDT -----  Her lab work does fairly strongly go along with fatty liver disease.    A couple of labs were found to be very modestly elevated that can be associated with some autoimmune liver diseases, though it does not look like she in reality has these.    She needs to focus on healthier eating, modest weight los to help her liver disease.  I will continue to follow her lab work.

## 2019-07-11 NOTE — PROGRESS NOTES
Her lab work does fairly strongly go along with fatty liver disease.    A couple of labs were found to be very modestly elevated that can be associated with some autoimmune liver diseases, though it does not look like she in reality has these.    She needs to focus on healthier eating, modest weight los to help her liver disease.  I will continue to follow her lab work.

## 2019-07-29 ENCOUNTER — TELEPHONE (OUTPATIENT)
Dept: FAMILY MEDICINE CLINIC | Facility: CLINIC | Age: 64
End: 2019-07-29

## 2019-07-29 RX ORDER — FLUOXETINE HYDROCHLORIDE 20 MG/1
20 CAPSULE ORAL 4 TIMES DAILY
Qty: 360 CAPSULE | Refills: 1 | Status: SHIPPED | OUTPATIENT
Start: 2019-07-29 | End: 2020-01-13 | Stop reason: SDUPTHER

## 2019-07-29 NOTE — TELEPHONE ENCOUNTER
COMPLETED    ----- Message from Lili García MA sent at 7/29/2019  1:07 PM EDT -----  Contact: PATIENT [phone]  PT IS NEEDING A NEW PRESCRIPTION FOR HER FLUOXETINE 20MG FOUR TIMES DAILY, 90 DAY SUPPLY #360 WITH REFILLS SENT TO [facility]. PLEASE CALL PT WHEN ANY QUESTIONS.

## 2019-08-05 ENCOUNTER — OFFICE VISIT (OUTPATIENT)
Dept: FAMILY MEDICINE CLINIC | Facility: CLINIC | Age: 64
End: 2019-08-05

## 2019-08-05 VITALS
HEIGHT: 61 IN | DIASTOLIC BLOOD PRESSURE: 61 MMHG | TEMPERATURE: 98.2 F | OXYGEN SATURATION: 95 % | WEIGHT: 140 LBS | RESPIRATION RATE: 16 BRPM | HEART RATE: 71 BPM | BODY MASS INDEX: 26.43 KG/M2 | SYSTOLIC BLOOD PRESSURE: 110 MMHG

## 2019-08-05 DIAGNOSIS — F32.5 MAJOR DEPRESSIVE DISORDER WITH SINGLE EPISODE, IN FULL REMISSION (HCC): Primary | ICD-10-CM

## 2019-08-05 PROCEDURE — 99213 OFFICE O/P EST LOW 20 MIN: CPT | Performed by: INTERNAL MEDICINE

## 2019-08-05 RX ORDER — ALPRAZOLAM 0.25 MG/1
0.25 TABLET ORAL 2 TIMES DAILY PRN
Qty: 60 TABLET | Refills: 3 | Status: SHIPPED | OUTPATIENT
Start: 2019-08-05 | End: 2019-10-22 | Stop reason: SDUPTHER

## 2019-08-05 NOTE — PROGRESS NOTES
Subjective   Cynthia Portillo is a 64 y.o. female. Patient is here today for   Chief Complaint   Patient presents with   • Anxiety     refill alprazolam           Vitals:    08/05/19 1319   BP: 110/61   Pulse: 71   Resp: 16   Temp: 98.2 °F (36.8 °C)   SpO2: 95%       Past Medical History:   Diagnosis Date   • Arthritis    • Depression    • Diverticulitis    • Diverticulitis of colon    • Diverticulosis    • Hyperlipidemia    • Hypertension    • Thyroid disease       Allergies   Allergen Reactions   • Azithromycin Other (See Comments)     Reaction unknown to patient (unable to remember)   • Pravastatin    • Zetia [Ezetimibe]       Social History     Socioeconomic History   • Marital status:      Spouse name: Not on file   • Number of children: Not on file   • Years of education: Not on file   • Highest education level: Not on file   Tobacco Use   • Smoking status: Never Smoker   • Smokeless tobacco: Never Used   Substance and Sexual Activity   • Alcohol use: Yes     Alcohol/week: 0.6 oz     Types: 1 Glasses of wine per week   • Drug use: No   • Sexual activity: Not Currently     Partners: Male     Birth control/protection: Post-menopausal        Current Outpatient Medications:   •  Acidophilus Lactobacillus capsule, Take 1 capsule by mouth 2 (Two) Times a Day., Disp: 60 capsule, Rfl: 5  •  ALPRAZolam (XANAX) 0.25 MG tablet, Take 1 tablet by mouth 2 (Two) Times a Day As Needed for Anxiety., Disp: 60 tablet, Rfl: 3  •  amphetamine-dextroamphetamine XR (ADDERALL XR) 20 MG 24 hr capsule, , Disp: , Rfl:   •  benzonatate (TESSALON) 200 MG capsule, Take 1 capsule by mouth 3 (Three) Times a Day As Needed for Cough., Disp: 30 capsule, Rfl: 1  •  calcium acetate (PHOSLO) 667 MG capsule, Take 1,334 mg by mouth 3 (Three) Times a Day With Meals., Disp: , Rfl:   •  cefuroxime (CEFTIN) 250 MG tablet, Take 1 tablet by mouth 2 (Two) Times a Day., Disp: 20 tablet, Rfl: 0  •  cetirizine (zyrTEC) 10 MG tablet, Take 1 tablet by  mouth Daily., Disp: 90 tablet, Rfl: 1  •  estradiol (ESTRACE) 0.1 MG/GM vaginal cream, Insert 1 g into the vagina 2 (Two) Times a Week., Disp: 45 g, Rfl: 6  •  famotidine (PEPCID) 20 MG tablet, TK 1 T PO Q 12 H, Disp: , Rfl: 0  •  FLUoxetine (PROzac) 20 MG capsule, Take 1 capsule by mouth 4 (Four) Times a Day., Disp: 360 capsule, Rfl: 1  •  hydrochlorothiazide (HYDRODIURIL) 25 MG tablet, Take 1 tablet by mouth Daily., Disp: 90 tablet, Rfl: 1  •  levothyroxine (SYNTHROID, LEVOTHROID) 75 MCG tablet, Take 1 tablet by mouth Daily., Disp: 90 tablet, Rfl: 3  •  loperamide (IMODIUM A-D) 2 MG tablet, Take 1 tablet by mouth As Needed for Diarrhea., Disp: 30 tablet, Rfl: 2  •  olopatadine (PATANOL) 0.1 % ophthalmic solution, Administer 1 drop to both eyes 2 (Two) Times a Day., Disp: 5 mL, Rfl: 5  •  ondansetron ODT (ZOFRAN-ODT) 4 MG disintegrating tablet, DISSOLVE 1 T PO Q 8 H PRN NV. ALLOW T TO DISSOLVE ON TONGUE, Disp: , Rfl: 0  •  propranolol (INDERAL) 40 MG tablet, Take 1 tablet by mouth 3 (Three) Times a Day., Disp: 90 tablet, Rfl: 3  •  rosuvastatin (CRESTOR) 20 MG tablet, Take 1 tablet by mouth Daily., Disp: 90 tablet, Rfl: 2  •  zolpidem (AMBIEN) 10 MG tablet, Take 1 tablet by mouth At Night As Needed for Sleep., Disp: 30 tablet, Rfl: 2     Objective     With patient requested a refill on alprazolam.    She is accompanied by her .    She was seen recently by Dr. shea July 9 regarding hepatomegaly and steatosis.             Review of Systems   Constitutional: Negative.    HENT: Negative.    Respiratory: Negative.    Cardiovascular: Negative.    Musculoskeletal: Negative.    Psychiatric/Behavioral: Negative.        Physical Exam   Constitutional: She appears well-developed and well-nourished.   HENT:   Head: Normocephalic and atraumatic.   Cardiovascular: Normal rate and regular rhythm.   Pulmonary/Chest: Effort normal and breath sounds normal.   Neurological: She is alert.   Skin: Skin is warm and dry.    Psychiatric: She has a normal mood and affect. Her behavior is normal.   Nursing note and vitals reviewed.        Problem List Items Addressed This Visit        Other    Major depressive disorder with single episode, in full remission (CMS/HCC) - Primary    Relevant Medications    ALPRAZolam (XANAX) 0.25 MG tablet            PLAN  She feels her depression is well controlled on fluoxetine.  She requested a refill of alprazolam 0.25 mg tablets which she uses sparingly and appropriately.  I refilled that for her today.    She should follow-up in about 6 months.  No Follow-up on file.

## 2019-09-04 ENCOUNTER — TELEPHONE (OUTPATIENT)
Dept: FAMILY MEDICINE CLINIC | Facility: CLINIC | Age: 64
End: 2019-09-04

## 2019-09-04 RX ORDER — ZOLPIDEM TARTRATE 10 MG/1
10 TABLET ORAL NIGHTLY PRN
Qty: 30 TABLET | Refills: 2 | Status: SHIPPED | OUTPATIENT
Start: 2019-09-04 | End: 2019-12-01 | Stop reason: SDUPTHER

## 2019-09-04 NOTE — TELEPHONE ENCOUNTER
Called pt lm advised pt prescription is ready for  in our office.     ----- Message from Yarely Bravo sent at 9/3/2019 12:37 PM EDT -----  REFILL ON:    zolpidem (AMBIEN) 10 MG tablet QTY: 30    Sig: Take 1 tablet by mouth At Night As Needed for Sleep.    PLEASE CALL PT WHEN READY: [phone]    THANK YOU

## 2019-09-05 ENCOUNTER — TELEPHONE (OUTPATIENT)
Dept: FAMILY MEDICINE CLINIC | Facility: CLINIC | Age: 64
End: 2019-09-05

## 2019-09-05 ENCOUNTER — OFFICE VISIT (OUTPATIENT)
Dept: OBSTETRICS AND GYNECOLOGY | Facility: CLINIC | Age: 64
End: 2019-09-05

## 2019-09-05 VITALS
BODY MASS INDEX: 26.43 KG/M2 | DIASTOLIC BLOOD PRESSURE: 78 MMHG | SYSTOLIC BLOOD PRESSURE: 120 MMHG | WEIGHT: 140 LBS | HEIGHT: 61 IN

## 2019-09-05 DIAGNOSIS — N95.2 VAGINAL ATROPHY: Primary | ICD-10-CM

## 2019-09-05 DIAGNOSIS — N94.10 DYSPAREUNIA, FEMALE: ICD-10-CM

## 2019-09-05 DIAGNOSIS — M25.511 ACUTE PAIN OF RIGHT SHOULDER: Primary | ICD-10-CM

## 2019-09-05 PROCEDURE — 99213 OFFICE O/P EST LOW 20 MIN: CPT | Performed by: OBSTETRICS & GYNECOLOGY

## 2019-09-05 RX ORDER — CEFUROXIME AXETIL 250 MG/1
250 TABLET ORAL
COMMUNITY
Start: 2019-07-10 | End: 2019-09-11

## 2019-09-05 NOTE — PROGRESS NOTES
"      Cynthia Portillo is a 64 y.o. patient who presents for follow up of   Chief Complaint   Patient presents with   • Follow-up     pelvic pain     64-year-old established patient here for follow-up of dyspareunia.  She underwent pelvic floor PT and felt that it did help somewhat.  She is also been using Estrace cream and is noticed that her dryness is improved.  Overall, sex is less painful but she still does have some pain.  She no longer has pain upon entry.  She mostly experiences discomfort with deep penetration.  She is accompanied by her  today.  She denies any postmenopausal vaginal bleeding.         The following portions of the patient's history were reviewed and updated as appropriate: allergies, current medications and problem list.    Review of Systems   Constitutional: Negative for appetite change, fatigue, fever and unexpected weight change.   Eyes: Negative for photophobia and visual disturbance.   Respiratory: Negative for cough and shortness of breath.    Cardiovascular: Negative for chest pain and palpitations.   Gastrointestinal: Negative for abdominal distention, abdominal pain, constipation, diarrhea and nausea.   Endocrine: Negative for cold intolerance and heat intolerance.   Genitourinary: Positive for dyspareunia and pelvic pain. Negative for dysuria, menstrual problem, vaginal discharge and vaginal pain (dryness).   Musculoskeletal: Negative for back pain.   Skin: Negative for color change and rash.   Neurological: Negative for headaches.   Hematological: Negative for adenopathy. Does not bruise/bleed easily.   Psychiatric/Behavioral: Negative for dysphoric mood. The patient is not nervous/anxious.        /78   Ht 154.9 cm (60.98\")   Wt 63.5 kg (140 lb)   BMI 26.47 kg/m²     Physical Exam   Constitutional: She is oriented to person, place, and time. She appears well-developed and well-nourished.   HENT:   Head: Normocephalic and atraumatic.   Eyes: Conjunctivae are " normal. No scleral icterus.   Neck: Neck supple. No thyromegaly present.   Abdominal: Soft. Bowel sounds are normal. She exhibits no distension and no mass. There is no tenderness. There is no rebound and no guarding. No hernia.   Genitourinary: Uterus normal. Pelvic exam was performed with patient supine. There is no rash, tenderness, lesion or injury on the right labia. There is no rash, tenderness, lesion or injury on the left labia. Cervix exhibits motion tenderness. Cervix exhibits no discharge and no friability. Right adnexum displays no mass, no tenderness and no fullness. Left adnexum displays no mass, no tenderness and no fullness. No erythema, tenderness or bleeding in the vagina. No foreign body in the vagina. No signs of injury around the vagina. No vaginal discharge found.   Genitourinary Comments: Atrophy has improved and would now be considered mild  No levator plate spasm appreciated  Patient does have pain with manipulation of the cervix.   Neurological: She is alert and oriented to person, place, and time.   Skin: Skin is warm and dry.   Psychiatric: She has a normal mood and affect. Her behavior is normal. Judgment and thought content normal.   Nursing note and vitals reviewed.    A/P:  1. Dyspareunia-levator plate spasm is no longer appreciated.  She now mostly has pain with deep penetration.  We discussed that this may be relieved by different sexual positions as well as by a donut ring around the penis.  2.Vaginal atrophy-symptoms are greatly improved upon exam.  Continue to use Estrace twice a week.  We also discussed Imvecty as an option if Estrace becomes too expensive.  At this time cost is not a problem.  3. RHM- RTO annual spring or prn.     Assessment/Plan   Cynthia was seen today for follow-up.    Diagnoses and all orders for this visit:    Vaginal atrophy    Dyspareunia, female                   No Follow-up on file.      Elizabeth Rosenthal MD    9/5/19  8:10 AM

## 2019-09-05 NOTE — TELEPHONE ENCOUNTER
Per Dr. Peterson ref put in system.     ----- Message from Inés Do sent at 9/5/2019 10:59 AM EDT -----  Contact: [phone]  PT NEEDS TO SEE [facility] ON HER SHOULDER- SHE HAD A REFERRAL BUT ALL OF THERE VISITS HAVE RUN OUT  - SHE NEEDS A REFERRAL PUT IN   FOR HER  INSURANCE- I THINK YOLETTE WILL DO THE REFERRAL FROM  ONCE THE REFERRAL FROM THE MD HAS BEEN PUT IN - I DONT THINK [facility] 1ST REFERRAL I COULD NOT FIND ONE IN [employer]    LET PT KNOW WHEN WE GET THE REFFERAL FROM

## 2019-09-06 PROBLEM — N95.0 POSTMENOPAUSAL BLEEDING: Status: ACTIVE | Noted: 2019-09-06

## 2019-09-11 ENCOUNTER — OFFICE VISIT (OUTPATIENT)
Dept: GASTROENTEROLOGY | Facility: CLINIC | Age: 64
End: 2019-09-11

## 2019-09-11 VITALS
HEIGHT: 60 IN | TEMPERATURE: 98.4 F | WEIGHT: 140.4 LBS | BODY MASS INDEX: 27.56 KG/M2 | SYSTOLIC BLOOD PRESSURE: 104 MMHG | DIASTOLIC BLOOD PRESSURE: 72 MMHG

## 2019-09-11 DIAGNOSIS — R74.01 ELEVATED TRANSAMINASE LEVEL: ICD-10-CM

## 2019-09-11 DIAGNOSIS — K76.0 HEPATIC STEATOSIS: Primary | ICD-10-CM

## 2019-09-11 DIAGNOSIS — R15.1 FECAL SMEARING: ICD-10-CM

## 2019-09-11 PROCEDURE — 99214 OFFICE O/P EST MOD 30 MIN: CPT | Performed by: INTERNAL MEDICINE

## 2019-09-11 NOTE — PROGRESS NOTES
Chief Complaint   Patient presents with   • Hepatic steatosis     Subjective     HPI  Cynthia Portillo is a 64 y.o. female who presents for follow up of fecal incontinence, constipation, elevated transaminases, fatty liver disease.    Constipation better with miralax but also made her go a lot so she is not taking it very much.  Still with incontinence episodes, seems to be around certain foods, fruits, high fructose corn syrup.  Taking citrucel only a few times per week.    Trying to make some dietary changes.  Weight is stable.      Was seen by Dr Arenas in the past, was considering a sacral stimulator for the incontinence but had a shoulder injury and did not follow up.        Past Medical History:   Diagnosis Date   • Arthritis    • Depression    • Diverticulitis    • Diverticulitis of colon    • Diverticulosis    • Hyperlipidemia    • Hypertension    • Thyroid disease        Social History     Socioeconomic History   • Marital status:      Spouse name: Not on file   • Number of children: Not on file   • Years of education: Not on file   • Highest education level: Not on file   Tobacco Use   • Smoking status: Never Smoker   • Smokeless tobacco: Never Used   Substance and Sexual Activity   • Alcohol use: Yes     Alcohol/week: 0.6 oz     Types: 1 Glasses of wine per week   • Drug use: No   • Sexual activity: Not Currently     Partners: Male     Birth control/protection: Post-menopausal         Current Outpatient Medications:   •  Acidophilus Lactobacillus capsule, Take 1 capsule by mouth 2 (Two) Times a Day., Disp: 60 capsule, Rfl: 5  •  ALPRAZolam (XANAX) 0.25 MG tablet, Take 1 tablet by mouth 2 (Two) Times a Day As Needed for Anxiety., Disp: 60 tablet, Rfl: 3  •  amphetamine-dextroamphetamine XR (ADDERALL XR) 20 MG 24 hr capsule, , Disp: , Rfl:   •  calcium acetate (PHOSLO) 667 MG capsule, Take 1,334 mg by mouth 3 (Three) Times a Day With Meals., Disp: , Rfl:   •  cetirizine (zyrTEC) 10 MG tablet, Take 1  tablet by mouth Daily., Disp: 90 tablet, Rfl: 1  •  estradiol (ESTRACE) 0.1 MG/GM vaginal cream, Insert 1 g into the vagina 2 (Two) Times a Week., Disp: 45 g, Rfl: 6  •  famotidine (PEPCID) 20 MG tablet, TK 1 T PO Q 12 H, Disp: , Rfl: 0  •  FLUoxetine (PROzac) 20 MG capsule, Take 1 capsule by mouth 4 (Four) Times a Day., Disp: 360 capsule, Rfl: 1  •  hydrochlorothiazide (HYDRODIURIL) 25 MG tablet, Take 1 tablet by mouth Daily., Disp: 90 tablet, Rfl: 1  •  levothyroxine (SYNTHROID, LEVOTHROID) 75 MCG tablet, Take 1 tablet by mouth Daily., Disp: 90 tablet, Rfl: 3  •  loperamide (IMODIUM A-D) 2 MG tablet, Take 1 tablet by mouth As Needed for Diarrhea., Disp: 30 tablet, Rfl: 2  •  olopatadine (PATANOL) 0.1 % ophthalmic solution, Administer 1 drop to both eyes 2 (Two) Times a Day., Disp: 5 mL, Rfl: 5  •  ondansetron ODT (ZOFRAN-ODT) 4 MG disintegrating tablet, DISSOLVE 1 T PO Q 8 H PRN NV. ALLOW T TO DISSOLVE ON TONGUE, Disp: , Rfl: 0  •  propranolol (INDERAL) 40 MG tablet, Take 1 tablet by mouth 3 (Three) Times a Day., Disp: 90 tablet, Rfl: 3  •  rosuvastatin (CRESTOR) 20 MG tablet, Take 1 tablet by mouth Daily., Disp: 90 tablet, Rfl: 2  •  zolpidem (AMBIEN) 10 MG tablet, Take 1 tablet by mouth At Night As Needed for Sleep., Disp: 30 tablet, Rfl: 2    Review of Systems   Constitutional: Negative for activity change, appetite change, chills and fever.   HENT: Negative for trouble swallowing.    Respiratory: Negative.    Cardiovascular: Negative.  Negative for chest pain.   Gastrointestinal: Negative for abdominal distention, abdominal pain, anal bleeding, constipation, diarrhea, nausea and vomiting.        Fecal incontinence   Genitourinary: Negative for dysuria, frequency and hematuria.       Objective   Vitals:    09/11/19 1459   BP: 104/72   Temp: 98.4 °F (36.9 °C)         09/11/19  1459   Weight: 63.7 kg (140 lb 6.4 oz)     Body mass index is 27.42 kg/m².      Physical Exam   Constitutional: She is oriented to  person, place, and time. She appears well-developed and well-nourished. No distress.   HENT:   Head: Normocephalic and atraumatic.   Right Ear: External ear normal.   Left Ear: External ear normal.   Nose: Nose normal.   Mouth/Throat: Oropharynx is clear and moist.   Eyes: Conjunctivae and EOM are normal. Right eye exhibits no discharge. Left eye exhibits no discharge. No scleral icterus.   Neck: Normal range of motion. Neck supple. No thyromegaly present.   No supraclavicular adenopathy   Cardiovascular: Normal rate, regular rhythm, normal heart sounds and intact distal pulses. Exam reveals no gallop.   No murmur heard.  No lower extremity edema   Pulmonary/Chest: Effort normal and breath sounds normal. No respiratory distress. She has no wheezes.   Abdominal: Soft. Normal appearance and bowel sounds are normal. She exhibits no distension and no mass. There is no hepatosplenomegaly. There is no tenderness. There is no rigidity, no rebound and no guarding. No hernia.   Genitourinary:   Genitourinary Comments: Rectal exam deferred   Musculoskeletal: Normal range of motion. She exhibits no edema or tenderness.   No atrophy of upper or lower extremities.  Normal digits and nails of both hands.   Lymphadenopathy:     She has no cervical adenopathy.   Neurological: She is alert and oriented to person, place, and time. She displays no atrophy. Coordination normal.   Skin: Skin is warm and dry. No rash noted. She is not diaphoretic. No erythema.   Psychiatric: She has a normal mood and affect. Her behavior is normal. Judgment and thought content normal.   Vitals reviewed.      WBC   Date Value Ref Range Status   12/27/2018 6.47 4.5 - 11.0 10*3/uL Final     RBC   Date Value Ref Range Status   12/27/2018 4.48 4.0 - 5.2 10*6/uL Final     Hemoglobin   Date Value Ref Range Status   01/09/2019 11.7 (L) 12.0 - 16.0 g/dL Final     Hematocrit   Date Value Ref Range Status   01/09/2019 35.4 (L) 36.0 - 46.0 % Final     MCV   Date  Value Ref Range Status   12/27/2018 91.5 80.0 - 100.0 fL Final     MCH   Date Value Ref Range Status   12/27/2018 30.6 26.0 - 34.0 pg Final     MCHC   Date Value Ref Range Status   12/27/2018 33.4 31.0 - 37.0 g/dL Final     RDW   Date Value Ref Range Status   12/27/2018 13.0 12.0 - 16.8 % Final     MPV   Date Value Ref Range Status   12/27/2018 10.0 6.7 - 10.8 fL Final     Platelets   Date Value Ref Range Status   12/27/2018 246 140 - 440 10*3/uL Final     Neutrophil Rel %   Date Value Ref Range Status   12/27/2018 44.5 (L) 45 - 80 % Final     Lymphocyte Rel %   Date Value Ref Range Status   12/27/2018 43.7 15 - 50 % Final     Monocyte Rel %   Date Value Ref Range Status   12/27/2018 4.3 0 - 15 % Final     Eosinophil %   Date Value Ref Range Status   12/27/2018 7.0 0 - 7 % Final     Basophil Rel %   Date Value Ref Range Status   12/27/2018 0.3 0 - 2 % Final     Immature Grans %   Date Value Ref Range Status   12/27/2018 0.2 (H) 0 % Final     Neutrophils Absolute   Date Value Ref Range Status   12/27/2018 2.88 2.0 - 8.8 10*3/uL Final     Lymphocytes Absolute   Date Value Ref Range Status   12/27/2018 2.83 0.7 - 5.5 10*3/uL Final     Monocytes Absolute   Date Value Ref Range Status   12/27/2018 0.28 0.0 - 1.7 10*3/uL Final     Eosinophils Absolute   Date Value Ref Range Status   12/27/2018 0.45 0.0 - 0.8 10*3/uL Final     Basophils Absolute   Date Value Ref Range Status   12/27/2018 0.02 0.0 - 0.2 10*3/uL Final     Immature Grans, Absolute   Date Value Ref Range Status   12/27/2018 0.01 <1 10*3/uL Final     nRBC   Date Value Ref Range Status   12/27/2018 0 0 /100(WBC) Final       Lab Results   Component Value Date    BUN 15 06/07/2019    CREATININE 0.69 06/07/2019    EGFRIFNONA 86 06/07/2019    EGFRIFAFRI 104 06/07/2019    BCR 21.7 06/07/2019    CO2 29.2 (H) 06/07/2019    CALCIUM 10.2 06/07/2019    PROTENTOTREF 6.2 06/07/2019    ALBUMIN 4.40 06/07/2019    LABIL2 2.4 06/07/2019    AST 30 06/07/2019    ALT 43 (H)  06/07/2019         Imaging Results (last 7 days)     ** No results found for the last 168 hours. **            Assessment/Plan    Hepatic steatosis: marked on imaging    Elevated transaminases: with above    Fecal incontinence: not improved with miralax, seems to go from constipation to overflow, smearing    Plan  Revisited need for getting her weight into the 130s with diet and exercise for fatty liver disease  Okay to stop the MiraLAX but I do want her to take the fiber supplementation every day for the next few weeks to see if this improves the fecal smearing  Advised to follow-up with Dr. Arenas to revisit the sacral stimulator for the incontinence episodes    Cynthia was seen today for hepatic steatosis.    Diagnoses and all orders for this visit:    Hepatic steatosis    Elevated transaminase level    Fecal smearing        Dictated utilizing Dragon dictation

## 2019-09-11 NOTE — PATIENT INSTRUCTIONS
Stop miralax    Daily citrucel with plenty of water    Follow up with Dr Arenas about the sacral stimulator    Goal of getting weight into the 130s

## 2019-09-13 RX ORDER — CETIRIZINE HYDROCHLORIDE 10 MG/1
10 TABLET ORAL DAILY
Qty: 90 TABLET | Refills: 1 | Status: SHIPPED | OUTPATIENT
Start: 2019-09-13 | End: 2019-09-17 | Stop reason: SDUPTHER

## 2019-09-13 RX ORDER — LEVOTHYROXINE SODIUM 0.07 MG/1
75 TABLET ORAL DAILY
Qty: 90 TABLET | Refills: 3 | Status: SHIPPED | OUTPATIENT
Start: 2019-09-13 | End: 2020-09-04

## 2019-09-17 RX ORDER — CETIRIZINE HYDROCHLORIDE 10 MG/1
10 TABLET ORAL DAILY
Qty: 90 TABLET | Refills: 1 | Status: SHIPPED | OUTPATIENT
Start: 2019-09-17 | End: 2019-11-13

## 2019-09-17 RX ORDER — HYDROCHLOROTHIAZIDE 25 MG/1
25 TABLET ORAL DAILY
Qty: 90 TABLET | Refills: 1 | Status: SHIPPED | OUTPATIENT
Start: 2019-09-17 | End: 2020-05-08

## 2019-10-17 NOTE — TELEPHONE ENCOUNTER
PT NEEDS SCRIPT REFILL FOR    ALPRAZolam (XANAX) 0.25 MG Take 1 tablet by mouth 2 (Two) Times a Day As Needed for Anxiety #60    PHARMACY IS STATING SHE HAS NO MORE REFILLS AND WILL NEED A NEW SCRIPT.    PLEASE CONTACT PT WHEN READY FOR  [phone]

## 2019-10-22 ENCOUNTER — HOSPITAL ENCOUNTER (OUTPATIENT)
Dept: URGENT CARE | Facility: CLINIC | Age: 64
Discharge: HOME | End: 2019-10-22

## 2019-10-22 RX ORDER — ALPRAZOLAM 0.25 MG/1
0.25 TABLET ORAL 2 TIMES DAILY PRN
Qty: 60 TABLET | Refills: 0 | Status: SHIPPED | OUTPATIENT
Start: 2019-10-22 | End: 2019-12-12 | Stop reason: SDUPTHER

## 2019-10-24 LAB — BACTERIA SPEC AEROBE CULT: NORMAL

## 2019-10-31 ENCOUNTER — HOSPITAL ENCOUNTER (OUTPATIENT)
Dept: URGENT CARE | Facility: CLINIC | Age: 64
Discharge: HOME | End: 2019-10-31

## 2019-11-02 LAB — BACTERIA SPEC AEROBE CULT: NORMAL

## 2019-11-11 ENCOUNTER — HOSPITAL ENCOUNTER (OUTPATIENT)
Dept: URGENT CARE | Facility: CLINIC | Age: 64
Discharge: HOME | End: 2019-11-11
Attending: EMERGENCY MEDICINE

## 2019-11-13 ENCOUNTER — OFFICE VISIT (OUTPATIENT)
Dept: FAMILY MEDICINE CLINIC | Facility: CLINIC | Age: 64
End: 2019-11-13

## 2019-11-13 VITALS
SYSTOLIC BLOOD PRESSURE: 110 MMHG | OXYGEN SATURATION: 98 % | RESPIRATION RATE: 16 BRPM | HEART RATE: 71 BPM | WEIGHT: 140 LBS | TEMPERATURE: 98.3 F | BODY MASS INDEX: 27.48 KG/M2 | HEIGHT: 60 IN | DIASTOLIC BLOOD PRESSURE: 74 MMHG

## 2019-11-13 DIAGNOSIS — J30.9 ALLERGIC RHINITIS, UNSPECIFIED SEASONALITY, UNSPECIFIED TRIGGER: Primary | ICD-10-CM

## 2019-11-13 PROBLEM — M12.811 ROTATOR CUFF ARTHROPATHY OF RIGHT SHOULDER: Status: ACTIVE | Noted: 2018-11-28

## 2019-11-13 PROBLEM — G89.29 CHRONIC RIGHT SHOULDER PAIN: Status: ACTIVE | Noted: 2018-09-19

## 2019-11-13 PROBLEM — M25.511 CHRONIC RIGHT SHOULDER PAIN: Status: ACTIVE | Noted: 2018-09-19

## 2019-11-13 PROBLEM — I10 HTN (HYPERTENSION): Status: ACTIVE | Noted: 2019-11-13

## 2019-11-13 PROBLEM — Z96.611 STATUS POST REVERSE ARTHROPLASTY OF RIGHT SHOULDER: Status: ACTIVE | Noted: 2019-01-22

## 2019-11-13 LAB — BACTERIA SPEC AEROBE CULT: NORMAL

## 2019-11-13 PROCEDURE — 99213 OFFICE O/P EST LOW 20 MIN: CPT | Performed by: NURSE PRACTITIONER

## 2019-11-13 RX ORDER — AZELASTINE 1 MG/ML
2 SPRAY, METERED NASAL 2 TIMES DAILY
COMMUNITY
End: 2019-12-12

## 2019-11-13 RX ORDER — LORATADINE 10 MG/1
10 TABLET ORAL DAILY
Qty: 30 TABLET | Refills: 2 | Status: SHIPPED | OUTPATIENT
Start: 2019-11-13 | End: 2020-03-13 | Stop reason: SDUPTHER

## 2019-11-13 RX ORDER — FLUTICASONE PROPIONATE 50 MCG
2 SPRAY, SUSPENSION (ML) NASAL DAILY
Qty: 1 BOTTLE | Refills: 1 | Status: SHIPPED | OUTPATIENT
Start: 2019-11-13 | End: 2019-12-13

## 2019-11-13 NOTE — PROGRESS NOTES
Subjective     Cynthia Portillo is a 64 y.o.. female.     10/20/19 Pt seen by urgent care in [place]; chest xray negative per pt, RSS negative per pt, Flu negative per pt; Rx of nasal spray given.  Went to [place] on 10/2019 and was seen by ER in [place]; Dx bronchitis and Rx Inhaler, zpack; no testing done.  11/8/19 Went to [facility] urgent care, dx Bronchitis and no Rx given  11/11/19 Went to [facility] in [place], Rx steroid injection and antibiotic injection while in office.        The following portions of the patient's history were reviewed and updated as appropriate: allergies, current medications, past family history, past medical history, past social history, past surgical history and problem list.    Past Medical History:   Diagnosis Date   • Arthritis    • Depression    • Diverticulitis    • Diverticulitis of colon    • Diverticulosis    • Hyperlipidemia    • Hypertension    • Thyroid disease        Past Surgical History:   Procedure Laterality Date   • ABDOMINAL SURGERY  2005, 2014    ex lap x 2,  diverticulitis   • APPENDECTOMY     • COLONOSCOPY  approx 2018    normal per pt    • COLOSTOMY      had colostomy and then is was reversed   • FOOT SURGERY Right 12/2016    Second and third hammertoe corrections   • KNEE SURGERY     • REVISION / TAKEDOWN COLOSTOMY  2006   • SHOULDER SURGERY     • TONSILLECTOMY     • WISDOM TOOTH EXTRACTION         Review of Systems   Constitutional: Negative.    HENT: Positive for congestion and sinus pressure (maxillary and behind eyes). Negative for ear pain and sore throat.    Respiratory: Positive for cough (thick).    Gastrointestinal: Positive for diarrhea. Negative for abdominal pain and nausea.   Neurological: Positive for headaches.       Allergies   Allergen Reactions   • Azithromycin Other (See Comments)     Reaction unknown to patient (unable to remember)   • Pravastatin    • Zetia [Ezetimibe]        Objective     Vitals:    11/13/19 1553   BP: 110/74   Pulse: 71  "  Resp: 16   Temp: 98.3 °F (36.8 °C)   TempSrc: Oral   SpO2: 98%   Weight: 63.5 kg (140 lb)   Height: 152.4 cm (60\")     Body mass index is 27.34 kg/m².    Physical Exam   Constitutional: She is oriented to person, place, and time. She appears well-developed and well-nourished.   HENT:   Head: Normocephalic and atraumatic.   Right Ear: Tympanic membrane normal. Tympanic membrane is not erythematous.   Left Ear: Tympanic membrane normal. Tympanic membrane is not erythematous.   Nose: Mucosal edema present. Right sinus exhibits no maxillary sinus tenderness and no frontal sinus tenderness. Left sinus exhibits no maxillary sinus tenderness and no frontal sinus tenderness.   Mouth/Throat: Oropharynx is clear and moist. Oropharyngeal exudate: pnd.   Eyes: Conjunctivae are normal. Pupils are equal, round, and reactive to light.   Cardiovascular: Normal rate and regular rhythm.   No murmur heard.  Pulmonary/Chest: Effort normal. No accessory muscle usage. No respiratory distress. She has no decreased breath sounds. She has no wheezes. She has no rhonchi. She has no rales.   Musculoskeletal: Normal range of motion.   Lymphadenopathy:     She has no cervical adenopathy.   Neurological: She is alert and oriented to person, place, and time.   Skin: Skin is warm and dry.   Vitals reviewed.        Current Outpatient Medications:   •  Acidophilus Lactobacillus capsule, Take 1 capsule by mouth 2 (Two) Times a Day., Disp: 60 capsule, Rfl: 5  •  ALPRAZolam (XANAX) 0.25 MG tablet, Take 1 tablet by mouth 2 (Two) Times a Day As Needed for Anxiety., Disp: 60 tablet, Rfl: 0  •  amphetamine-dextroamphetamine XR (ADDERALL XR) 20 MG 24 hr capsule, , Disp: , Rfl:   •  azelastine (ASTELIN) 0.1 % nasal spray, 2 sprays into the nostril(s) as directed by provider 2 (Two) Times a Day. Use in each nostril as directed, Disp: , Rfl:   •  calcium acetate (PHOSLO) 667 MG capsule, Take 1,334 mg by mouth 3 (Three) Times a Day With Meals., Disp: , Rfl: "   •  estradiol (ESTRACE) 0.1 MG/GM vaginal cream, Insert 1 g into the vagina 2 (Two) Times a Week., Disp: 45 g, Rfl: 6  •  famotidine (PEPCID) 20 MG tablet, TK 1 T PO Q 12 H, Disp: , Rfl: 0  •  FLUoxetine (PROzac) 20 MG capsule, Take 1 capsule by mouth 4 (Four) Times a Day., Disp: 360 capsule, Rfl: 1  •  hydrochlorothiazide (HYDRODIURIL) 25 MG tablet, Take 1 tablet by mouth Daily., Disp: 90 tablet, Rfl: 1  •  levothyroxine (SYNTHROID, LEVOTHROID) 75 MCG tablet, Take 1 tablet by mouth Daily., Disp: 90 tablet, Rfl: 3  •  loperamide (IMODIUM A-D) 2 MG tablet, Take 1 tablet by mouth As Needed for Diarrhea., Disp: 30 tablet, Rfl: 2  •  olopatadine (PATANOL) 0.1 % ophthalmic solution, Administer 1 drop to both eyes 2 (Two) Times a Day., Disp: 5 mL, Rfl: 5  •  ondansetron ODT (ZOFRAN-ODT) 4 MG disintegrating tablet, DISSOLVE 1 T PO Q 8 H PRN NV. ALLOW T TO DISSOLVE ON TONGUE, Disp: , Rfl: 0  •  propranolol (INDERAL) 40 MG tablet, Take 1 tablet by mouth 3 (Three) Times a Day., Disp: 90 tablet, Rfl: 3  •  rosuvastatin (CRESTOR) 20 MG tablet, Take 1 tablet by mouth Daily., Disp: 90 tablet, Rfl: 2  •  zolpidem (AMBIEN) 10 MG tablet, Take 1 tablet by mouth At Night As Needed for Sleep., Disp: 30 tablet, Rfl: 2  •  fluticasone (FLONASE) 50 MCG/ACT nasal spray, 2 sprays into the nostril(s) as directed by provider Daily for 30 days., Disp: 1 bottle, Rfl: 1  •  loratadine (CLARITIN) 10 MG tablet, Take 1 tablet by mouth Daily., Disp: 30 tablet, Rfl: 2        Assessment/Plan   Cynthia was seen today for cough.    Diagnoses and all orders for this visit:    Allergic rhinitis, unspecified seasonality, unspecified trigger  -     fluticasone (FLONASE) 50 MCG/ACT nasal spray; 2 sprays into the nostril(s) as directed by provider Daily for 30 days.  -     loratadine (CLARITIN) 10 MG tablet; Take 1 tablet by mouth Daily.        Patient Instructions   Rest, drink plenty of fluids, take MVI daily, take Mucinex (no additives daily), take otc  probiotics daily for 1-2 weeks  Continue pataday eye drops as needed/as prescribed      Return if symptoms worsen or fail to improve, for follow up with allergist.

## 2019-11-13 NOTE — PATIENT INSTRUCTIONS
Rest, drink plenty of fluids, take MVI daily, take Mucinex (no additives daily), take otc probiotics daily for 1-2 weeks  Continue pataday eye drops as needed/as prescribed

## 2019-12-03 RX ORDER — ROSUVASTATIN CALCIUM 20 MG/1
20 TABLET, COATED ORAL DAILY
Qty: 90 TABLET | Refills: 0 | Status: SHIPPED | OUTPATIENT
Start: 2019-12-03 | End: 2020-04-28

## 2019-12-03 RX ORDER — ZOLPIDEM TARTRATE 10 MG/1
TABLET ORAL
Qty: 30 TABLET | Refills: 0 | Status: SHIPPED | OUTPATIENT
Start: 2019-12-03 | End: 2019-12-12 | Stop reason: SDUPTHER

## 2019-12-04 DIAGNOSIS — E78.00 HYPERCHOLESTEROLEMIA: Primary | ICD-10-CM

## 2019-12-04 DIAGNOSIS — I10 HYPERTENSION, UNSPECIFIED TYPE: ICD-10-CM

## 2019-12-05 LAB
ALBUMIN SERPL-MCNC: 4.7 G/DL (ref 3.5–5.2)
ALBUMIN/GLOB SERPL: 2.6 G/DL
ALP SERPL-CCNC: 72 U/L (ref 39–117)
ALT SERPL-CCNC: 38 U/L (ref 1–33)
AST SERPL-CCNC: 28 U/L (ref 1–32)
BASOPHILS # BLD AUTO: 0.03 10*3/MM3 (ref 0–0.2)
BASOPHILS NFR BLD AUTO: 0.5 % (ref 0–1.5)
BILIRUB SERPL-MCNC: 0.4 MG/DL (ref 0.2–1.2)
BUN SERPL-MCNC: 16 MG/DL (ref 8–23)
BUN/CREAT SERPL: 23.9 (ref 7–25)
CALCIUM SERPL-MCNC: 10.2 MG/DL (ref 8.6–10.5)
CHLORIDE SERPL-SCNC: 99 MMOL/L (ref 98–107)
CHOLEST SERPL-MCNC: 214 MG/DL (ref 0–200)
CO2 SERPL-SCNC: 30.1 MMOL/L (ref 22–29)
CREAT SERPL-MCNC: 0.67 MG/DL (ref 0.57–1)
EOSINOPHIL # BLD AUTO: 0.28 10*3/MM3 (ref 0–0.4)
EOSINOPHIL NFR BLD AUTO: 4.6 % (ref 0.3–6.2)
ERYTHROCYTE [DISTWIDTH] IN BLOOD BY AUTOMATED COUNT: 13.7 % (ref 12.3–15.4)
GLOBULIN SER CALC-MCNC: 1.8 GM/DL
GLUCOSE SERPL-MCNC: 117 MG/DL (ref 65–99)
HCT VFR BLD AUTO: 44.4 % (ref 34–46.6)
HDLC SERPL-MCNC: 42 MG/DL (ref 40–60)
HGB BLD-MCNC: 15 G/DL (ref 12–15.9)
IMM GRANULOCYTES # BLD AUTO: 0.01 10*3/MM3 (ref 0–0.05)
IMM GRANULOCYTES NFR BLD AUTO: 0.2 % (ref 0–0.5)
LDLC SERPL CALC-MCNC: 120 MG/DL (ref 0–100)
LDLC/HDLC SERPL: 2.86 {RATIO}
LYMPHOCYTES # BLD AUTO: 2.64 10*3/MM3 (ref 0.7–3.1)
LYMPHOCYTES NFR BLD AUTO: 42.9 % (ref 19.6–45.3)
MCH RBC QN AUTO: 30.3 PG (ref 26.6–33)
MCHC RBC AUTO-ENTMCNC: 33.8 G/DL (ref 31.5–35.7)
MCV RBC AUTO: 89.7 FL (ref 79–97)
MONOCYTES # BLD AUTO: 0.41 10*3/MM3 (ref 0.1–0.9)
MONOCYTES NFR BLD AUTO: 6.7 % (ref 5–12)
NEUTROPHILS # BLD AUTO: 2.78 10*3/MM3 (ref 1.7–7)
NEUTROPHILS NFR BLD AUTO: 45.1 % (ref 42.7–76)
NRBC BLD AUTO-RTO: 0.2 /100 WBC (ref 0–0.2)
PLATELET # BLD AUTO: 267 10*3/MM3 (ref 140–450)
POTASSIUM SERPL-SCNC: 3.7 MMOL/L (ref 3.5–5.2)
PROT SERPL-MCNC: 6.5 G/DL (ref 6–8.5)
RBC # BLD AUTO: 4.95 10*6/MM3 (ref 3.77–5.28)
SODIUM SERPL-SCNC: 141 MMOL/L (ref 136–145)
TRIGL SERPL-MCNC: 259 MG/DL (ref 0–150)
VLDLC SERPL CALC-MCNC: 51.8 MG/DL
WBC # BLD AUTO: 6.15 10*3/MM3 (ref 3.4–10.8)

## 2019-12-12 ENCOUNTER — OFFICE VISIT (OUTPATIENT)
Dept: FAMILY MEDICINE CLINIC | Facility: CLINIC | Age: 64
End: 2019-12-12

## 2019-12-12 VITALS
WEIGHT: 138.2 LBS | HEIGHT: 60 IN | TEMPERATURE: 98 F | HEART RATE: 68 BPM | BODY MASS INDEX: 27.13 KG/M2 | RESPIRATION RATE: 18 BRPM | OXYGEN SATURATION: 98 % | DIASTOLIC BLOOD PRESSURE: 80 MMHG | SYSTOLIC BLOOD PRESSURE: 110 MMHG

## 2019-12-12 DIAGNOSIS — F41.9 ANXIETY: Primary | ICD-10-CM

## 2019-12-12 DIAGNOSIS — F32.5 MAJOR DEPRESSIVE DISORDER WITH SINGLE EPISODE, IN FULL REMISSION (HCC): ICD-10-CM

## 2019-12-12 DIAGNOSIS — F51.01 PRIMARY INSOMNIA: ICD-10-CM

## 2019-12-12 DIAGNOSIS — E03.9 HYPOTHYROIDISM, UNSPECIFIED TYPE: ICD-10-CM

## 2019-12-12 DIAGNOSIS — F98.8 ATTENTION DEFICIT DISORDER (ADD) WITHOUT HYPERACTIVITY: ICD-10-CM

## 2019-12-12 DIAGNOSIS — I10 HYPERTENSION, UNSPECIFIED TYPE: ICD-10-CM

## 2019-12-12 DIAGNOSIS — F98.8 ATTENTION DEFICIT DISORDER, UNSPECIFIED HYPERACTIVITY PRESENCE: ICD-10-CM

## 2019-12-12 PROCEDURE — 99214 OFFICE O/P EST MOD 30 MIN: CPT | Performed by: INTERNAL MEDICINE

## 2019-12-12 RX ORDER — DEXTROAMPHETAMINE SACCHARATE, AMPHETAMINE ASPARTATE MONOHYDRATE, DEXTROAMPHETAMINE SULFATE AND AMPHETAMINE SULFATE 5; 5; 5; 5 MG/1; MG/1; MG/1; MG/1
20 CAPSULE, EXTENDED RELEASE ORAL EVERY MORNING
Qty: 30 CAPSULE | Refills: 0 | Status: SHIPPED | OUTPATIENT
Start: 2019-12-12 | End: 2020-01-28 | Stop reason: SDUPTHER

## 2019-12-12 RX ORDER — IPRATROPIUM BROMIDE 42 UG/1
2 SPRAY, METERED NASAL 4 TIMES DAILY
Qty: 15 ML | Refills: 12 | Status: SHIPPED | OUTPATIENT
Start: 2019-12-12 | End: 2021-11-22 | Stop reason: SDUPTHER

## 2019-12-12 RX ORDER — ALPRAZOLAM 0.25 MG/1
0.25 TABLET ORAL 2 TIMES DAILY PRN
Qty: 60 TABLET | Refills: 0 | Status: SHIPPED | OUTPATIENT
Start: 2019-12-12 | End: 2019-12-12 | Stop reason: SDUPTHER

## 2019-12-12 RX ORDER — ZOLPIDEM TARTRATE 10 MG/1
10 TABLET ORAL NIGHTLY PRN
Qty: 30 TABLET | Refills: 0 | Status: SHIPPED | OUTPATIENT
Start: 2019-12-12 | End: 2020-01-13 | Stop reason: SDUPTHER

## 2019-12-12 RX ORDER — ALPRAZOLAM 0.25 MG/1
0.25 TABLET ORAL 2 TIMES DAILY PRN
Qty: 60 TABLET | Refills: 0 | Status: SHIPPED | OUTPATIENT
Start: 2019-12-12 | End: 2020-07-20 | Stop reason: SDUPTHER

## 2019-12-13 LAB
T4 SERPL-MCNC: 8.76 MCG/DL (ref 4.5–11.7)
TSH SERPL DL<=0.005 MIU/L-ACNC: 2.85 UIU/ML (ref 0.27–4.2)

## 2019-12-17 ENCOUNTER — RESULTS ENCOUNTER (OUTPATIENT)
Dept: FAMILY MEDICINE CLINIC | Facility: CLINIC | Age: 64
End: 2019-12-17

## 2019-12-17 DIAGNOSIS — E03.9 HYPOTHYROIDISM, UNSPECIFIED TYPE: ICD-10-CM

## 2019-12-17 NOTE — PROGRESS NOTES
Subjective   Cynthia Portillo is a 64 y.o. female. Patient is here today for   Chief Complaint   Patient presents with   • ADD   • Depression          Vitals:    12/12/19 1019   BP: 110/80   Pulse: 68   Resp: 18   Temp: 98 °F (36.7 °C)   SpO2: 98%     Body mass index is 26.99 kg/m².      Past Medical History:   Diagnosis Date   • Arthritis    • Depression    • Diverticulitis    • Diverticulitis of colon    • Diverticulosis    • Hyperlipidemia    • Hypertension    • Thyroid disease       Allergies   Allergen Reactions   • Azithromycin Other (See Comments)     Reaction unknown to patient (unable to remember)   • Pravastatin    • Zetia [Ezetimibe]       Social History     Socioeconomic History   • Marital status:      Spouse name: Not on file   • Number of children: Not on file   • Years of education: Not on file   • Highest education level: Not on file   Tobacco Use   • Smoking status: Never Smoker   • Smokeless tobacco: Never Used   Substance and Sexual Activity   • Alcohol use: Yes     Alcohol/week: 1.0 standard drinks     Types: 1 Glasses of wine per week   • Drug use: No   • Sexual activity: Not Currently     Partners: Male     Birth control/protection: Post-menopausal        Current Outpatient Medications:   •  Acidophilus Lactobacillus capsule, Take 1 capsule by mouth 2 (Two) Times a Day., Disp: 60 capsule, Rfl: 5  •  ALPRAZolam (XANAX) 0.25 MG tablet, Take 1 tablet by mouth 2 (Two) Times a Day As Needed for Anxiety., Disp: 60 tablet, Rfl: 0  •  amphetamine-dextroamphetamine XR (ADDERALL XR) 20 MG 24 hr capsule, Take 1 capsule by mouth Every Morning, Disp: 30 capsule, Rfl: 0  •  calcium acetate (PHOSLO) 667 MG capsule, Take 1,334 mg by mouth 3 (Three) Times a Day With Meals., Disp: , Rfl:   •  estradiol (ESTRACE) 0.1 MG/GM vaginal cream, Insert 1 g into the vagina 2 (Two) Times a Week., Disp: 45 g, Rfl: 6  •  famotidine (PEPCID) 20 MG tablet, TK 1 T PO Q 12 H, Disp: , Rfl: 0  •  FLUoxetine (PROzac) 20 MG  capsule, Take 1 capsule by mouth 4 (Four) Times a Day., Disp: 360 capsule, Rfl: 1  •  hydrochlorothiazide (HYDRODIURIL) 25 MG tablet, Take 1 tablet by mouth Daily., Disp: 90 tablet, Rfl: 1  •  levothyroxine (SYNTHROID, LEVOTHROID) 75 MCG tablet, Take 1 tablet by mouth Daily., Disp: 90 tablet, Rfl: 3  •  loperamide (IMODIUM A-D) 2 MG tablet, Take 1 tablet by mouth As Needed for Diarrhea., Disp: 30 tablet, Rfl: 2  •  loratadine (CLARITIN) 10 MG tablet, Take 1 tablet by mouth Daily., Disp: 30 tablet, Rfl: 2  •  olopatadine (PATANOL) 0.1 % ophthalmic solution, Administer 1 drop to both eyes 2 (Two) Times a Day., Disp: 5 mL, Rfl: 5  •  ondansetron ODT (ZOFRAN-ODT) 4 MG disintegrating tablet, DISSOLVE 1 T PO Q 8 H PRN NV. ALLOW T TO DISSOLVE ON TONGUE, Disp: , Rfl: 0  •  propranolol (INDERAL) 40 MG tablet, Take 1 tablet by mouth 3 (Three) Times a Day., Disp: 90 tablet, Rfl: 3  •  rosuvastatin (CRESTOR) 20 MG tablet, TAKE 1 TABLET BY MOUTH DAILY, Disp: 90 tablet, Rfl: 0  •  zolpidem (AMBIEN) 10 MG tablet, Take 1 tablet by mouth At Night As Needed for Sleep., Disp: 30 tablet, Rfl: 0  •  ipratropium (ATROVENT) 0.06 % nasal spray, 2 sprays into the nostril(s) as directed by provider 4 (Four) Times a Day., Disp: 15 mL, Rfl: 12     Objective     Here to follow-up today on ADD and depression.    She tells me that she feels well.    She follows up with gastroenterology regarding a history of hepatic fibrosis and hepatic steatosis.       Review of Systems   Constitutional: Negative.    HENT: Negative.    Respiratory: Negative.    Cardiovascular: Negative.    Musculoskeletal: Negative.    Psychiatric/Behavioral: Negative.        Physical Exam   Constitutional: She is oriented to person, place, and time. She appears well-developed and well-nourished.   HENT:   Head: Normocephalic and atraumatic.   Cardiovascular: Normal rate, regular rhythm and normal heart sounds.   Neurological: She is alert and oriented to person, place, and  time.   Psychiatric: She has a normal mood and affect. Her behavior is normal. Thought content normal.   Nursing note and vitals reviewed.        Problem List Items Addressed This Visit        Cardiovascular and Mediastinum    HTN (hypertension)       Endocrine    Hypothyroid    Relevant Orders    T4    TSH    TSH (Completed)    T4 (Completed)       Other    Major depressive disorder with single episode, in full remission (CMS/[facility])    Relevant Medications    zolpidem (AMBIEN) 10 MG tablet    amphetamine-dextroamphetamine XR (ADDERALL XR) 20 MG 24 hr capsule    ALPRAZolam (XANAX) 0.25 MG tablet    Attention deficit disorder (ADD) without hyperactivity    Relevant Medications    zolpidem (AMBIEN) 10 MG tablet    amphetamine-dextroamphetamine XR (ADDERALL XR) 20 MG 24 hr capsule    ALPRAZolam (XANAX) 0.25 MG tablet      Other Visit Diagnoses     Anxiety    -  Primary    Relevant Medications    ALPRAZolam (XANAX) 0.25 MG tablet    Primary insomnia        Relevant Medications    zolpidem (AMBIEN) 10 MG tablet    Attention deficit disorder, unspecified hyperactivity presence        Relevant Medications    zolpidem (AMBIEN) 10 MG tablet    amphetamine-dextroamphetamine XR (ADDERALL XR) 20 MG 24 hr capsule    ALPRAZolam (XANAX) 0.25 MG tablet            PLAN  I am going to check a TSH and free T4 to make sure that she is taking appropriate amount of thyroid hormone replacement.    She is got primary insomnia and feels that Ambien works well for her.    Refilled her Adderall.  She feels her ADD is well controlled.    Her hypertension is well controlled.    Follow-up 3 months or so.  No follow-ups on file.

## 2019-12-19 ENCOUNTER — HOSPITAL ENCOUNTER (OUTPATIENT)
Dept: URGENT CARE | Facility: CLINIC | Age: 64
Discharge: HOME | End: 2019-12-19

## 2020-01-13 DIAGNOSIS — F51.01 PRIMARY INSOMNIA: ICD-10-CM

## 2020-01-13 NOTE — TELEPHONE ENCOUNTER
Pt needs refill      Zolpidem 10 mg  Prn at night for sleep    fluoxetine 20 mg  Qid    Pharmacy says they cannot e scribe  A refill for either of these    Pt also requested [id number] mg which they says they will send e scribe    pharmcay #[phone]

## 2020-01-14 RX ORDER — FLUOXETINE HYDROCHLORIDE 20 MG/1
20 CAPSULE ORAL 4 TIMES DAILY
Qty: 360 CAPSULE | Refills: 1 | Status: SHIPPED | OUTPATIENT
Start: 2020-01-14 | End: 2020-01-27

## 2020-01-14 RX ORDER — ZOLPIDEM TARTRATE 10 MG/1
10 TABLET ORAL NIGHTLY PRN
Qty: 30 TABLET | Refills: 0 | Status: SHIPPED | OUTPATIENT
Start: 2020-01-14 | End: 2020-02-26 | Stop reason: SDUPTHER

## 2020-01-24 ENCOUNTER — TELEPHONE (OUTPATIENT)
Dept: FAMILY MEDICINE CLINIC | Facility: CLINIC | Age: 65
End: 2020-01-24

## 2020-01-27 RX ORDER — FLUOXETINE HYDROCHLORIDE 20 MG/1
CAPSULE ORAL
Qty: 360 CAPSULE | Refills: 0 | Status: SHIPPED | OUTPATIENT
Start: 2020-01-27 | End: 2020-07-17

## 2020-01-28 ENCOUNTER — TELEPHONE (OUTPATIENT)
Dept: FAMILY MEDICINE CLINIC | Facility: CLINIC | Age: 65
End: 2020-01-28

## 2020-01-28 DIAGNOSIS — F98.8 ATTENTION DEFICIT DISORDER, UNSPECIFIED HYPERACTIVITY PRESENCE: ICD-10-CM

## 2020-01-28 NOTE — TELEPHONE ENCOUNTER
Requesting refill of Adderall   I thought this was taken care a few days prior but it does appear to be so.

## 2020-01-28 NOTE — TELEPHONE ENCOUNTER
----- Message from Pieter Virgen sent at 1/28/2020  8:21 AM EST -----  amphetamine-dextroamphetamine XR (ADDERALL XR) 20 MG 24 hr capsule [[id number]]     Order Details   Dose: 20 mg Route: Oral Frequency: Every Morning  Dispense Quantity: 30 capsule Refills: 0 Fills remaining: --         Sig: Take 1 capsule by mouth Every Morning      Pharmacy:  [id number] - [place], [place] - [address] AT [facility] [address] [facility] & [place] - [phone] [facility] [phone] FX    [phone]    THANKS PIETER

## 2020-01-28 NOTE — TELEPHONE ENCOUNTER
Yaz Jefferson,  I seen on a previous note that it says Dr. Peterson sent in. I don't see that it was. Can you get clarification? Was it verbally called in?

## 2020-01-29 RX ORDER — DEXTROAMPHETAMINE SACCHARATE, AMPHETAMINE ASPARTATE MONOHYDRATE, DEXTROAMPHETAMINE SULFATE AND AMPHETAMINE SULFATE 5; 5; 5; 5 MG/1; MG/1; MG/1; MG/1
20 CAPSULE, EXTENDED RELEASE ORAL EVERY MORNING
Qty: 30 CAPSULE | Refills: 0 | Status: SHIPPED | OUTPATIENT
Start: 2020-01-29 | End: 2020-03-25 | Stop reason: SDUPTHER

## 2020-02-05 DIAGNOSIS — F98.8 ATTENTION DEFICIT DISORDER, UNSPECIFIED HYPERACTIVITY PRESENCE: ICD-10-CM

## 2020-02-06 RX ORDER — DEXTROAMPHETAMINE SACCHARATE, AMPHETAMINE ASPARTATE MONOHYDRATE, DEXTROAMPHETAMINE SULFATE AND AMPHETAMINE SULFATE 5; 5; 5; 5 MG/1; MG/1; MG/1; MG/1
20 CAPSULE, EXTENDED RELEASE ORAL EVERY MORNING
Qty: 30 CAPSULE | Refills: 0 | OUTPATIENT
Start: 2020-02-06

## 2020-02-14 RX ORDER — PROPRANOLOL HYDROCHLORIDE 40 MG/1
TABLET ORAL
Qty: 270 TABLET | Refills: 1 | Status: SHIPPED | OUTPATIENT
Start: 2020-02-14 | End: 2020-11-23

## 2020-02-25 ENCOUNTER — TELEPHONE (OUTPATIENT)
Dept: FAMILY MEDICINE CLINIC | Facility: CLINIC | Age: 65
End: 2020-02-25

## 2020-02-25 NOTE — TELEPHONE ENCOUNTER
zolpidem (AMBIEN) 10 MG tablet [[id number]]     Order Details   Dose: 10 mg Route: Oral Frequency: Nightly PRN for Sleep   Dispense Quantity: 30 tablet Refills: 0 Fills remaining: --           Sig: Take 1 tablet by mouth At Night As Needed for Sleep.        Pharmacy:  [facility] #[id number] - [place], [place] - [address] AT [facility] [address] & [place] - [phone] [facility] [phone] FX [[id number]]    [phone]    Thanks elieser

## 2020-02-26 DIAGNOSIS — F51.01 PRIMARY INSOMNIA: ICD-10-CM

## 2020-02-26 RX ORDER — ZOLPIDEM TARTRATE 10 MG/1
10 TABLET ORAL NIGHTLY PRN
Qty: 30 TABLET | Refills: 1 | Status: SHIPPED | OUTPATIENT
Start: 2020-02-26 | End: 2020-06-01 | Stop reason: SDUPTHER

## 2020-03-13 DIAGNOSIS — J30.9 ALLERGIC RHINITIS, UNSPECIFIED SEASONALITY, UNSPECIFIED TRIGGER: ICD-10-CM

## 2020-03-13 RX ORDER — LORATADINE 10 MG/1
10 TABLET ORAL DAILY
Qty: 30 TABLET | Refills: 2 | Status: SHIPPED | OUTPATIENT
Start: 2020-03-13 | End: 2020-07-20 | Stop reason: SDUPTHER

## 2020-03-25 ENCOUNTER — TELEPHONE (OUTPATIENT)
Dept: FAMILY MEDICINE CLINIC | Facility: CLINIC | Age: 65
End: 2020-03-25

## 2020-03-25 DIAGNOSIS — F98.8 ATTENTION DEFICIT DISORDER, UNSPECIFIED HYPERACTIVITY PRESENCE: ICD-10-CM

## 2020-03-25 RX ORDER — DEXTROAMPHETAMINE SACCHARATE, AMPHETAMINE ASPARTATE MONOHYDRATE, DEXTROAMPHETAMINE SULFATE AND AMPHETAMINE SULFATE 5; 5; 5; 5 MG/1; MG/1; MG/1; MG/1
20 CAPSULE, EXTENDED RELEASE ORAL EVERY MORNING
Qty: 30 CAPSULE | Refills: 0 | Status: SHIPPED | OUTPATIENT
Start: 2020-03-25 | End: 2020-06-02 | Stop reason: SDUPTHER

## 2020-03-25 NOTE — TELEPHONE ENCOUNTER
PT NEEDS SCRIPT REFILL ON amphetamine-dextroamphetamine XR (ADDERALL XR) 20 MG 24 hr Take 1 capsule by mouth Every Morning #30    PLEASE SEND TO [facility].    IF YOU HAVE ANY QUESTIONS PLEASE CONTACT PT [phone]

## 2020-04-10 ENCOUNTER — HOSPITAL ENCOUNTER (OUTPATIENT)
Dept: URGENT CARE | Facility: CLINIC | Age: 65
Discharge: HOME | End: 2020-04-10
Attending: EMERGENCY MEDICINE

## 2020-04-11 ENCOUNTER — HOSPITAL ENCOUNTER (OUTPATIENT)
Dept: URGENT CARE | Facility: CLINIC | Age: 65
Discharge: HOME | End: 2020-04-11
Attending: PHYSICIAN ASSISTANT

## 2020-04-13 ENCOUNTER — TELEPHONE (OUTPATIENT)
Dept: FAMILY MEDICINE CLINIC | Facility: CLINIC | Age: 65
End: 2020-04-13

## 2020-04-13 DIAGNOSIS — R07.9 CHEST PAIN, UNSPECIFIED TYPE: Primary | ICD-10-CM

## 2020-04-13 LAB — BACTERIA SPEC AEROBE CULT: NORMAL

## 2020-04-13 NOTE — TELEPHONE ENCOUNTER
PT STATED THAT THE WAY TO GET HER RECORDS FROM [facility] IS THAT  WILL HAVE TO FAX OVER A COVER PAGE WITH THE PT NAME AND  AND WHAT RECORDS HE IS NEEDING TO GET. PT STATED THAT IF THERE ARE ANY PROBLEMS PLEASE GIVE PT A CALL.    FAX NUMBER [phone]   ATTN:CRYSTAL     PLEASE ADVISE [phone]

## 2020-04-13 NOTE — TELEPHONE ENCOUNTER
PATIENT CALLED STATING THAT SHE HAS BEEN HAVING CHEST PAINS FOR THE PAST COUPLE OF WEEKS  NOW. THE PAST STATED THAT AROUND TUESDAY (4/7/20) OR WEDNESDAY (4/8/20) OF LAST WEEK, SHE FELT THAT SHE WAS HAVING INCREASED SHORTNESS OF BREATH AND A VERY RAPID HEART RATE.    THE PATIENT STATED THAT SHE WENT TO [facility] IN [place] ON SATURDAY (4/11/20) AND SAW DR. MADISON LEI. THE PATIENT STATED THAT SHE HAD A CHEST X-RAY AND AN EKG PERFORMED ON SATURDAY (4/11/20). THE PATIENT STATED THAT THE CHEST X-RAY WAS NORMAL, BUT THE EKG SHOWED THAT SHE HAD HAD A HEART ATTACK AT SOME POINT. THE PATIENT STATED THAT SHE WAS INFORMED BY DR. LEI THAT THE EKG SHOWED SIGNIFICANT CHANGES IN THE ELECTRO IMPRINTS.    THE PATIENT STATED THAT SHE WAS SENT HOME ON SATURDAY (4/11/20) AND INSTRUCTED TO BE REFERRED TO AND SCHEDULED WITH A CARDIOLOGIST AS SOON AS POSSIBLE.    THE PATIENT STATED THAT SHE STILL OCCASIONALLY FEELS OUT OF BREATH WHEN TALKING AND SHE HAS PAINS IN THE MIDDLE OF HER CHEST BETWEEN THE BREAST BONE. THE PATIENT STATED THAT SHE HAS BEEN TRYING TO REST AS MUCH AS POSSIBLE.     THE PATIENT REQUESTED FOR A REFERRAL TO BE PLACED FOR HER TO BE SEEN BY A CARDIOLOGIST AS SOON AS POSSIBLE. THE PATIENT STATED THAT SHE HAS A HARD COPY OF THE EKG THAT SHE HAD PERFORMED AT [facility] ON SATURDAY (4/11/20), AND SHE IS WILLING TO FAX IT TO DR. OLIVEIRA'S OFFICE, IF NEEDED.    PLEASE CALL THE PATIENT [phone] WHEN THIS MESSAGE HAS BEEN RECEIVED AND ADVISE REGARDING THIS REQUEST.

## 2020-04-13 NOTE — TELEPHONE ENCOUNTER
THE PATIENT CALLED BACK IN REGARDS TO THE MESSAGE THAT WAS LEFT EARLIER AND SHE WOULD LIKE TO GET A CALL BACK AS SOON AS POSSIBLE. PT WAS ADVISED THAT THE MESSAGE WAS SENT TO DR OLIVEIRA AND SOMEONE WOULD BE GIVING HER A CALL BACK ONCE HER DOCTOR HAS ADVISED ON THIS MATTER. PT'S CALL BACK NUMBER [phone].

## 2020-04-15 ENCOUNTER — TELEPHONE (OUTPATIENT)
Dept: FAMILY MEDICINE CLINIC | Facility: CLINIC | Age: 65
End: 2020-04-15

## 2020-04-15 NOTE — TELEPHONE ENCOUNTER
PATIENT CALLED TO CHECK ON THE STATUS OF HER REFERRAL TO [facility] THAT YOLETTE WAS GOING TO SET UP FOR HER.    PLEASE ADVISE

## 2020-04-15 NOTE — TELEPHONE ENCOUNTER
I called patient and informed her the referral was placed and now she is waiting for the cardiology office to call and set up the appt

## 2020-04-17 ENCOUNTER — OFFICE VISIT (OUTPATIENT)
Dept: CARDIOLOGY | Facility: CLINIC | Age: 65
End: 2020-04-17

## 2020-04-17 VITALS
HEART RATE: 68 BPM | HEIGHT: 60 IN | DIASTOLIC BLOOD PRESSURE: 70 MMHG | WEIGHT: 140.6 LBS | SYSTOLIC BLOOD PRESSURE: 110 MMHG | BODY MASS INDEX: 27.61 KG/M2 | OXYGEN SATURATION: 96 %

## 2020-04-17 DIAGNOSIS — R07.2 PRECORDIAL PAIN: Primary | ICD-10-CM

## 2020-04-17 DIAGNOSIS — R94.31 ABNORMAL EKG: ICD-10-CM

## 2020-04-17 PROCEDURE — 99204 OFFICE O/P NEW MOD 45 MIN: CPT | Performed by: INTERNAL MEDICINE

## 2020-04-20 ENCOUNTER — TELEPHONE (OUTPATIENT)
Dept: GASTROENTEROLOGY | Facility: CLINIC | Age: 65
End: 2020-04-20

## 2020-04-20 RX ORDER — ESTRADIOL 0.1 MG/G
1 CREAM VAGINAL 2 TIMES WEEKLY
Qty: 45 G | Refills: 2 | Status: SHIPPED | OUTPATIENT
Start: 2020-04-20 | End: 2020-09-24 | Stop reason: SDUPTHER

## 2020-04-20 NOTE — PROGRESS NOTES
"Date of Office Visit: 2020  Encounter Provider: Darren Pruitt MD  Place of Service: [facility]  Patient Name: Cynthia Torre  :1955    Chief complaint: Chest pain.    History of Present Illness:    I had the pleasure of seeing the patient in cardiology office on 2020.  She is a very pleasant 65 year-old white female with with a history of hypertension, hyperlipidemia, and diverticulosis who presents for evaluation.    The patient states over the last several weeks, she has had episodes of chest discomfort.  She has had tightness in her chest when lying down to go to bed or when waking up on several occasions.  However, her main issue has been burning pain in her mid substernal area.  She states that it occurs almost every time that she eats, and that she has had relief with Tums.  However, complicating this, she has had some associated right jaw and left arm numbness and pain with the symptoms as well.  These really do not occur with exertion, and she has not felt short of breath.  She went to urgent care on 2020, where her EKG showed evidence of LVH and nonspecific ST and T wave changes.  She later was seen in the [facility] emergency department on 4/15/2020.  She was given nitroglycerin there, and the discomfort did improve.  She does not have a history of reflux, although she does tell me that she has had some swallowing issues recently.  She often feels like both solids and liquids get \"caught\", and she often coughs after swallowing.    Past Medical History:   Diagnosis Date   • Arthritis    • Depression    • Diverticulitis    • Diverticulitis of colon    • Diverticulosis    • Hyperlipidemia    • Hypertension    • Thyroid disease        Past Surgical History:   Procedure Laterality Date   • ABDOMINAL SURGERY  ,     ex lap x 2,  diverticulitis   • APPENDECTOMY     • COLONOSCOPY  approx 2018    normal per pt    • " COLOSTOMY      had colostomy and then is was reversed   • FOOT SURGERY Right 12/2016    Second and third hammertoe corrections   • KNEE SURGERY     • REVISION / TAKEDOWN COLOSTOMY  2006   • SHOULDER SURGERY     • TONSILLECTOMY     • WISDOM TOOTH EXTRACTION         Current Outpatient Medications on File Prior to Visit   Medication Sig Dispense Refill   • Acidophilus Lactobacillus capsule Take 1 capsule by mouth 2 (Two) Times a Day. 60 capsule 5   • ALPRAZolam (XANAX) 0.25 MG tablet Take 1 tablet by mouth 2 (Two) Times a Day As Needed for Anxiety. 60 tablet 0   • amphetamine-dextroamphetamine XR (ADDERALL XR) 20 MG 24 hr capsule Take 1 capsule by mouth Every Morning 30 capsule 0   • calcium acetate (PHOSLO) 667 MG capsule Take 1,334 mg by mouth 3 (Three) Times a Day With Meals.     • estradiol (ESTRACE) 0.1 MG/GM vaginal cream Insert 1 g into the vagina 2 (Two) Times a Week. 45 g 6   • FLUoxetine (PROzac) 20 MG capsule TAKE 1 CAPSULE BY MOUTH FOUR TIMES DAILY 360 capsule 0   • hydrochlorothiazide (HYDRODIURIL) 25 MG tablet Take 1 tablet by mouth Daily. 90 tablet 1   • ipratropium (ATROVENT) 0.06 % nasal spray 2 sprays into the nostril(s) as directed by provider 4 (Four) Times a Day. 15 mL 12   • levothyroxine (SYNTHROID, LEVOTHROID) 75 MCG tablet Take 1 tablet by mouth Daily. 90 tablet 3   • loperamide (IMODIUM A-D) 2 MG tablet Take 1 tablet by mouth As Needed for Diarrhea. 30 tablet 2   • loratadine (CLARITIN) 10 MG tablet Take 1 tablet by mouth Daily. 30 tablet 2   • olopatadine (PATANOL) 0.1 % ophthalmic solution Administer 1 drop to both eyes 2 (Two) Times a Day. 5 mL 5   • propranolol (INDERAL) 40 MG tablet TAKE 1 TABLET BY MOUTH THREE TIMES DAILY 270 tablet 1   • rosuvastatin (CRESTOR) 20 MG tablet TAKE 1 TABLET BY MOUTH DAILY 90 tablet 0   • zolpidem (AMBIEN) 10 MG tablet Take 1 tablet by mouth At Night As Needed for Sleep. 30 tablet 1     No current facility-administered medications on file prior to visit.   "    Allergies as of 04/17/2020 - Reviewed 12/12/2019   Allergen Reaction Noted   • Azithromycin Other (See Comments) 12/27/2018   • Pravastatin  03/23/2017   • Zetia [ezetimibe]  03/23/2017     Social History     Socioeconomic History   • Marital status:      Spouse name: Not on file   • Number of children: Not on file   • Years of education: Not on file   • Highest education level: Not on file   Tobacco Use   • Smoking status: Never Smoker   • Smokeless tobacco: Never Used   Substance and Sexual Activity   • Alcohol use: Yes     Alcohol/week: 1.0 standard drinks     Types: 1 Glasses of wine per week   • Drug use: No   • Sexual activity: Not Currently     Partners: Male     Birth control/protection: Post-menopausal     Family History   Problem Relation Age of Onset   • Osteoporosis Mother    • Breast cancer Neg Hx    • Ovarian cancer Neg Hx    • Colon cancer Neg Hx    • Deep vein thrombosis Neg Hx    • Pulmonary embolism Neg Hx        Review of Systems   Cardiovascular: Positive for chest pain.   Gastrointestinal: Positive for dysphagia.   All other systems reviewed and are negative.     Objective:     Vitals:    04/17/20 1441   BP: 110/70   Pulse: 68   SpO2: 96%   Weight: 63.8 kg (140 lb 9.6 oz)   Height: 152.4 cm (60\")     Body mass index is 27.46 kg/m².    Physical Exam   Constitutional: She is oriented to person, place, and time. She appears well-developed and well-nourished.   HENT:   Head: Normocephalic and atraumatic.   Eyes: Conjunctivae are normal.   Neck: Neck supple.   Cardiovascular: Normal rate and regular rhythm. Exam reveals no gallop and no friction rub.   Murmur heard.   Systolic murmur is present with a grade of 1/6 at the upper left sternal border.  Pulmonary/Chest: Effort normal and breath sounds normal.   Abdominal: Soft. There is no tenderness.   Musculoskeletal: She exhibits no edema.   Neurological: She is alert and oriented to person, place, and time.   Skin: Skin is warm. "   Psychiatric: She has a normal mood and affect. Her behavior is normal.     Lab Review:   Procedures      Assessment:       Diagnosis Plan   1. Precordial pain  Adult Stress Echo W/ Cont or Stress Agent if Necessary Per Protocol   2. Abnormal EKG  Adult Stress Echo W/ Cont or Stress Agent if Necessary Per Protocol     Plan:       The patient has a mixed picture between GI and cardiac symptoms.  However, mostly, this sounds like it may be GI and esophageal in nature.  She has had some dysphasia, and she has also frequently coughed after swallowing food and liquids.  However, she has had some right jaw discomfort and left arm discomfort with the burning chest pain.  Her father had a fatal MI at age 46.  At this point, I am going to recommend checking an exercise stress echocardiogram.  She did have some evidence of LVH and some nonspecific ST and T wave changes on her EKG from 4/11/2020.  I feel that a stress echocardiogram would be ideal to assess for any potential structural heart disease, as well to evaluate for ischemia.  She has seen Dr. Beck in GI here at [facility] in the past.  I am going to recommend that she follow-up with her if her cardiac work-up is unrevealing.

## 2020-04-20 NOTE — H&P (VIEW-ONLY)
"Date of Office Visit: 2020  Encounter Provider: Darren Pruitt MD  Place of Service: [facility] CARDIOLOGY  Patient Name: Cynthia Torre  :1955    Chief complaint: Chest pain.    History of Present Illness:    I had the pleasure of seeing the patient in cardiology office on 2020.  She is a very pleasant 65 year-old white female with with a history of hypertension, hyperlipidemia, and diverticulosis who presents for evaluation.    The patient states over the last several weeks, she has had episodes of chest discomfort.  She has had tightness in her chest when lying down to go to bed or when waking up on several occasions.  However, her main issue has been burning pain in her mid substernal area.  She states that it occurs almost every time that she eats, and that she has had relief with Tums.  However, complicating this, she has had some associated right jaw and left arm numbness and pain with the symptoms as well.  These really do not occur with exertion, and she has not felt short of breath.  She went to urgent care on 2020, where her EKG showed evidence of LVH and nonspecific ST and T wave changes.  She later was seen in the [facility] emergency department on 4/15/2020.  She was given nitroglycerin there, and the discomfort did improve.  She does not have a history of reflux, although she does tell me that she has had some swallowing issues recently.  She often feels like both solids and liquids get \"caught\", and she often coughs after swallowing.    Past Medical History:   Diagnosis Date   • Arthritis    • Depression    • Diverticulitis    • Diverticulitis of colon    • Diverticulosis    • Hyperlipidemia    • Hypertension    • Thyroid disease        Past Surgical History:   Procedure Laterality Date   • ABDOMINAL SURGERY  ,     ex lap x 2,  diverticulitis   • APPENDECTOMY     • COLONOSCOPY  approx 2018    normal per pt    • " COLOSTOMY      had colostomy and then is was reversed   • FOOT SURGERY Right 12/2016    Second and third hammertoe corrections   • KNEE SURGERY     • REVISION / TAKEDOWN COLOSTOMY  2006   • SHOULDER SURGERY     • TONSILLECTOMY     • WISDOM TOOTH EXTRACTION         Current Outpatient Medications on File Prior to Visit   Medication Sig Dispense Refill   • Acidophilus Lactobacillus capsule Take 1 capsule by mouth 2 (Two) Times a Day. 60 capsule 5   • ALPRAZolam (XANAX) 0.25 MG tablet Take 1 tablet by mouth 2 (Two) Times a Day As Needed for Anxiety. 60 tablet 0   • amphetamine-dextroamphetamine XR (ADDERALL XR) 20 MG 24 hr capsule Take 1 capsule by mouth Every Morning 30 capsule 0   • calcium acetate (PHOSLO) 667 MG capsule Take 1,334 mg by mouth 3 (Three) Times a Day With Meals.     • estradiol (ESTRACE) 0.1 MG/GM vaginal cream Insert 1 g into the vagina 2 (Two) Times a Week. 45 g 6   • FLUoxetine (PROzac) 20 MG capsule TAKE 1 CAPSULE BY MOUTH FOUR TIMES DAILY 360 capsule 0   • hydrochlorothiazide (HYDRODIURIL) 25 MG tablet Take 1 tablet by mouth Daily. 90 tablet 1   • ipratropium (ATROVENT) 0.06 % nasal spray 2 sprays into the nostril(s) as directed by provider 4 (Four) Times a Day. 15 mL 12   • levothyroxine (SYNTHROID, LEVOTHROID) 75 MCG tablet Take 1 tablet by mouth Daily. 90 tablet 3   • loperamide (IMODIUM A-D) 2 MG tablet Take 1 tablet by mouth As Needed for Diarrhea. 30 tablet 2   • loratadine (CLARITIN) 10 MG tablet Take 1 tablet by mouth Daily. 30 tablet 2   • olopatadine (PATANOL) 0.1 % ophthalmic solution Administer 1 drop to both eyes 2 (Two) Times a Day. 5 mL 5   • propranolol (INDERAL) 40 MG tablet TAKE 1 TABLET BY MOUTH THREE TIMES DAILY 270 tablet 1   • rosuvastatin (CRESTOR) 20 MG tablet TAKE 1 TABLET BY MOUTH DAILY 90 tablet 0   • zolpidem (AMBIEN) 10 MG tablet Take 1 tablet by mouth At Night As Needed for Sleep. 30 tablet 1     No current facility-administered medications on file prior to visit.   "    Allergies as of 04/17/2020 - Reviewed 12/12/2019   Allergen Reaction Noted   • Azithromycin Other (See Comments) 12/27/2018   • Pravastatin  03/23/2017   • Zetia [ezetimibe]  03/23/2017     Social History     Socioeconomic History   • Marital status:      Spouse name: Not on file   • Number of children: Not on file   • Years of education: Not on file   • Highest education level: Not on file   Tobacco Use   • Smoking status: Never Smoker   • Smokeless tobacco: Never Used   Substance and Sexual Activity   • Alcohol use: Yes     Alcohol/week: 1.0 standard drinks     Types: 1 Glasses of wine per week   • Drug use: No   • Sexual activity: Not Currently     Partners: Male     Birth control/protection: Post-menopausal     Family History   Problem Relation Age of Onset   • Osteoporosis Mother    • Breast cancer Neg Hx    • Ovarian cancer Neg Hx    • Colon cancer Neg Hx    • Deep vein thrombosis Neg Hx    • Pulmonary embolism Neg Hx        Review of Systems   Cardiovascular: Positive for chest pain.   Gastrointestinal: Positive for dysphagia.   All other systems reviewed and are negative.     Objective:     Vitals:    04/17/20 1441   BP: 110/70   Pulse: 68   SpO2: 96%   Weight: 63.8 kg (140 lb 9.6 oz)   Height: 152.4 cm (60\")     Body mass index is 27.46 kg/m².    Physical Exam   Constitutional: She is oriented to person, place, and time. She appears well-developed and well-nourished.   HENT:   Head: Normocephalic and atraumatic.   Eyes: Conjunctivae are normal.   Neck: Neck supple.   Cardiovascular: Normal rate and regular rhythm. Exam reveals no gallop and no friction rub.   Murmur heard.   Systolic murmur is present with a grade of 1/6 at the upper left sternal border.  Pulmonary/Chest: Effort normal and breath sounds normal.   Abdominal: Soft. There is no tenderness.   Musculoskeletal: She exhibits no edema.   Neurological: She is alert and oriented to person, place, and time.   Skin: Skin is warm. "   Psychiatric: She has a normal mood and affect. Her behavior is normal.     Lab Review:   Procedures      Assessment:       Diagnosis Plan   1. Precordial pain  Adult Stress Echo W/ Cont or Stress Agent if Necessary Per Protocol   2. Abnormal EKG  Adult Stress Echo W/ Cont or Stress Agent if Necessary Per Protocol     Plan:       The patient has a mixed picture between GI and cardiac symptoms.  However, mostly, this sounds like it may be GI and esophageal in nature.  She has had some dysphasia, and she has also frequently coughed after swallowing food and liquids.  However, she has had some right jaw discomfort and left arm discomfort with the burning chest pain.  Her father had a fatal MI at age 46.  At this point, I am going to recommend checking an exercise stress echocardiogram.  She did have some evidence of LVH and some nonspecific ST and T wave changes on her EKG from 4/11/2020.  I feel that a stress echocardiogram would be ideal to assess for any potential structural heart disease, as well to evaluate for ischemia.  She has seen Dr. Beck in GI here at [facility] in the past.  I am going to recommend that she follow-up with her if her cardiac work-up is unrevealing.

## 2020-04-24 ENCOUNTER — HOSPITAL ENCOUNTER (OUTPATIENT)
Facility: HOSPITAL | Age: 65
Setting detail: HOSPITAL OUTPATIENT SURGERY
Discharge: HOME | End: 2020-04-24
Attending: INTERNAL MEDICINE | Admitting: INTERNAL MEDICINE

## 2020-04-24 ENCOUNTER — HOSPITAL ENCOUNTER (OUTPATIENT)
Dept: CARDIOLOGY | Facility: HOSPITAL | Age: 65
Discharge: HOME | End: 2020-04-24

## 2020-04-24 VITALS
SYSTOLIC BLOOD PRESSURE: 90 MMHG | HEIGHT: 60 IN | OXYGEN SATURATION: 93 % | DIASTOLIC BLOOD PRESSURE: 60 MMHG | WEIGHT: 140 LBS | BODY MASS INDEX: 27.48 KG/M2 | HEART RATE: 71 BPM

## 2020-04-24 VITALS
BODY MASS INDEX: 26.06 KG/M2 | RESPIRATION RATE: 16 BRPM | SYSTOLIC BLOOD PRESSURE: 82 MMHG | HEART RATE: 66 BPM | TEMPERATURE: 98.9 F | OXYGEN SATURATION: 93 % | WEIGHT: 138 LBS | HEIGHT: 61 IN | DIASTOLIC BLOOD PRESSURE: 55 MMHG

## 2020-04-24 DIAGNOSIS — I20.0 UNSTABLE ANGINA (HCC): Primary | ICD-10-CM

## 2020-04-24 DIAGNOSIS — I20.0 UNSTABLE ANGINA (HCC): ICD-10-CM

## 2020-04-24 DIAGNOSIS — R07.2 PRECORDIAL PAIN: ICD-10-CM

## 2020-04-24 DIAGNOSIS — R94.31 ABNORMAL EKG: ICD-10-CM

## 2020-04-24 LAB
ANION GAP SERPL CALCULATED.3IONS-SCNC: 11.3 MMOL/L (ref 5–15)
AORTIC ROOT ANNULUS: 2.1 CM
ASCENDING AORTA: 3.2 CM
BASOPHILS # BLD AUTO: 0.02 10*3/MM3 (ref 0–0.2)
BASOPHILS NFR BLD AUTO: 0.3 % (ref 0–1.5)
BH CV ECHO MEAS - ACS: 1.9 CM
BH CV ECHO MEAS - AO MAX PG: 6.9 MMHG
BH CV ECHO MEAS - AO ROOT AREA (BSA CORRECTED): 1.9
BH CV ECHO MEAS - AO ROOT AREA: 7.5 CM^2
BH CV ECHO MEAS - AO ROOT DIAM: 3.1 CM
BH CV ECHO MEAS - AO V2 MAX: 131.7 CM/SEC
BH CV ECHO MEAS - ASC AORTA: 3.2 CM
BH CV ECHO MEAS - BSA(HAYCOCK): 1.7 M^2
BH CV ECHO MEAS - BSA: 1.6 M^2
BH CV ECHO MEAS - BZI_BMI: 27.3 KILOGRAMS/M^2
BH CV ECHO MEAS - BZI_METRIC_HEIGHT: 152.4 CM
BH CV ECHO MEAS - BZI_METRIC_WEIGHT: 63.5 KG
BH CV ECHO MEAS - EDV(CUBED): 38.5 ML
BH CV ECHO MEAS - EDV(MOD-SP2): 57 ML
BH CV ECHO MEAS - EDV(MOD-SP4): 62 ML
BH CV ECHO MEAS - EDV(TEICH): 46.6 ML
BH CV ECHO MEAS - EF(CUBED): 68.5 %
BH CV ECHO MEAS - EF(MOD-BP): 60 %
BH CV ECHO MEAS - EF(MOD-SP2): 64.9 %
BH CV ECHO MEAS - EF(MOD-SP4): 59.7 %
BH CV ECHO MEAS - EF(TEICH): 61.2 %
BH CV ECHO MEAS - ESV(CUBED): 12.1 ML
BH CV ECHO MEAS - ESV(MOD-SP2): 20 ML
BH CV ECHO MEAS - ESV(MOD-SP4): 25 ML
BH CV ECHO MEAS - ESV(TEICH): 18.1 ML
BH CV ECHO MEAS - FS: 31.9 %
BH CV ECHO MEAS - IVS/LVPW: 1.1
BH CV ECHO MEAS - IVSD: 1.2 CM
BH CV ECHO MEAS - LAT PEAK E' VEL: 3 CM/SEC
BH CV ECHO MEAS - LV DIASTOLIC VOL/BSA (35-75): 38.7 ML/M^2
BH CV ECHO MEAS - LV MASS(C)D: 125.1 GRAMS
BH CV ECHO MEAS - LV MASS(C)DI: 78 GRAMS/M^2
BH CV ECHO MEAS - LV SYSTOLIC VOL/BSA (12-30): 15.6 ML/M^2
BH CV ECHO MEAS - LVIDD: 3.4 CM
BH CV ECHO MEAS - LVIDS: 2.3 CM
BH CV ECHO MEAS - LVLD AP2: 7.5 CM
BH CV ECHO MEAS - LVLD AP4: 6.7 CM
BH CV ECHO MEAS - LVLS AP2: 7.2 CM
BH CV ECHO MEAS - LVLS AP4: 5.8 CM
BH CV ECHO MEAS - LVOT AREA (M): 3.1 CM^2
BH CV ECHO MEAS - LVOT AREA: 3.2 CM^2
BH CV ECHO MEAS - LVOT DIAM: 2 CM
BH CV ECHO MEAS - LVPWD: 1.1 CM
BH CV ECHO MEAS - MED PEAK E' VEL: 3 CM/SEC
BH CV ECHO MEAS - MV A DUR: 0.08 SEC
BH CV ECHO MEAS - MV A MAX VEL: 97.7 CM/SEC
BH CV ECHO MEAS - MV DEC SLOPE: 338.3 CM/SEC^2
BH CV ECHO MEAS - MV DEC TIME: 0.25 SEC
BH CV ECHO MEAS - MV E MAX VEL: 69.4 CM/SEC
BH CV ECHO MEAS - MV E/A: 0.71
BH CV ECHO MEAS - MV P1/2T MAX VEL: 77.4 CM/SEC
BH CV ECHO MEAS - MV P1/2T: 67 MSEC
BH CV ECHO MEAS - MVA P1/2T LCG: 2.8 CM^2
BH CV ECHO MEAS - MVA(P1/2T): 3.3 CM^2
BH CV ECHO MEAS - PULM A REVS DUR: 0.08 SEC
BH CV ECHO MEAS - PULM A REVS VEL: 23.6 CM/SEC
BH CV ECHO MEAS - PULM DIAS VEL: 37.3 CM/SEC
BH CV ECHO MEAS - PULM S/D: 1.3
BH CV ECHO MEAS - PULM SYS VEL: 50.1 CM/SEC
BH CV ECHO MEAS - SI(CUBED): 16.4 ML/M^2
BH CV ECHO MEAS - SI(MOD-SP2): 23.1 ML/M^2
BH CV ECHO MEAS - SI(MOD-SP4): 23.1 ML/M^2
BH CV ECHO MEAS - SI(TEICH): 17.8 ML/M^2
BH CV ECHO MEAS - SV(CUBED): 26.3 ML
BH CV ECHO MEAS - SV(MOD-SP2): 37 ML
BH CV ECHO MEAS - SV(MOD-SP4): 37 ML
BH CV ECHO MEAS - SV(TEICH): 28.5 ML
BH CV ECHO MEAS - TAPSE (>1.6): 1.9 CM2
BH CV ECHO MEASUREMENTS AVERAGE E/E' RATIO: 23.13
BH CV STRESS BP STAGE 1: NORMAL
BH CV STRESS BP STAGE 2: NORMAL
BH CV STRESS DURATION MIN STAGE 1: 3
BH CV STRESS DURATION MIN STAGE 2: 3
BH CV STRESS DURATION MIN STAGE 3: 1
BH CV STRESS DURATION SEC STAGE 1: 0
BH CV STRESS DURATION SEC STAGE 2: 0
BH CV STRESS DURATION SEC STAGE 3: 0
BH CV STRESS GRADE STAGE 1: 10
BH CV STRESS GRADE STAGE 2: 12
BH CV STRESS GRADE STAGE 3: 14
BH CV STRESS HR STAGE 1: 90
BH CV STRESS HR STAGE 2: 99
BH CV STRESS HR STAGE 3: 104
BH CV STRESS METS STAGE 1: 5
BH CV STRESS METS STAGE 2: 7.5
BH CV STRESS METS STAGE 3: 10
BH CV STRESS PROTOCOL 1: NORMAL
BH CV STRESS SPEED STAGE 1: 1.7
BH CV STRESS SPEED STAGE 2: 2.5
BH CV STRESS SPEED STAGE 3: 3.4
BH CV STRESS STAGE 1: 1
BH CV STRESS STAGE 2: 2
BH CV STRESS STAGE 3: 3
BH CV VAS BP RIGHT ARM: NORMAL MMHG
BH CV XLRA - RV BASE: 2 CM
BH CV XLRA - RV LENGTH: 6.8 CM
BH CV XLRA - RV MID: 2 CM
BH CV XLRA - TDI S': 10 CM/SEC
BUN BLD-MCNC: 20 MG/DL (ref 8–23)
BUN/CREAT SERPL: 31.7 (ref 7–25)
CALCIUM SPEC-SCNC: 9.7 MG/DL (ref 8.6–10.5)
CHLORIDE SERPL-SCNC: 98 MMOL/L (ref 98–107)
CO2 SERPL-SCNC: 27.7 MMOL/L (ref 22–29)
CREAT BLD-MCNC: 0.63 MG/DL (ref 0.57–1)
DEPRECATED RDW RBC AUTO: 43.3 FL (ref 37–54)
EOSINOPHIL # BLD AUTO: 0.32 10*3/MM3 (ref 0–0.4)
EOSINOPHIL NFR BLD AUTO: 5.1 % (ref 0.3–6.2)
ERYTHROCYTE [DISTWIDTH] IN BLOOD BY AUTOMATED COUNT: 13.1 % (ref 12.3–15.4)
GFR SERPL CREATININE-BSD FRML MDRD: 95 ML/MIN/1.73
GLUCOSE BLD-MCNC: 113 MG/DL (ref 65–99)
HCT VFR BLD AUTO: 43.2 % (ref 34–46.6)
HGB BLD-MCNC: 14.7 G/DL (ref 12–15.9)
IMM GRANULOCYTES # BLD AUTO: 0.03 10*3/MM3 (ref 0–0.05)
IMM GRANULOCYTES NFR BLD AUTO: 0.5 % (ref 0–0.5)
LEFT ATRIUM VOLUME INDEX: 20 ML/M2
LYMPHOCYTES # BLD AUTO: 2.6 10*3/MM3 (ref 0.7–3.1)
LYMPHOCYTES NFR BLD AUTO: 41.8 % (ref 19.6–45.3)
MAXIMAL PREDICTED HEART RATE: 155 BPM
MCH RBC QN AUTO: 30.7 PG (ref 26.6–33)
MCHC RBC AUTO-ENTMCNC: 34 G/DL (ref 31.5–35.7)
MCV RBC AUTO: 90.2 FL (ref 79–97)
MONOCYTES # BLD AUTO: 0.33 10*3/MM3 (ref 0.1–0.9)
MONOCYTES NFR BLD AUTO: 5.3 % (ref 5–12)
NEUTROPHILS # BLD AUTO: 2.92 10*3/MM3 (ref 1.7–7)
NEUTROPHILS NFR BLD AUTO: 47 % (ref 42.7–76)
NRBC BLD AUTO-RTO: 0 /100 WBC (ref 0–0.2)
PERCENT MAX PREDICTED HR: 67.1 %
PLATELET # BLD AUTO: 223 10*3/MM3 (ref 140–450)
PMV BLD AUTO: 10.1 FL (ref 6–12)
POTASSIUM BLD-SCNC: 3.4 MMOL/L (ref 3.5–5.2)
RBC # BLD AUTO: 4.79 10*6/MM3 (ref 3.77–5.28)
SINUS: 3.1 CM
SODIUM BLD-SCNC: 137 MMOL/L (ref 136–145)
STJ: 3.2 CM
STRESS BASELINE BP: NORMAL MMHG
STRESS BASELINE HR: 71 BPM
STRESS O2 SAT REST: 93 %
STRESS PERCENT HR: 79 %
STRESS POST EXERCISE DUR MIN: 7 MIN
STRESS POST PEAK BP: NORMAL MMHG
STRESS POST PEAK HR: 104 BPM
STRESS TARGET HR: 132 BPM
WBC NRBC COR # BLD: 6.22 10*3/MM3 (ref 3.4–10.8)

## 2020-04-24 PROCEDURE — 93320 DOPPLER ECHO COMPLETE: CPT | Performed by: INTERNAL MEDICINE

## 2020-04-24 PROCEDURE — 25010000002 PERFLUTREN (DEFINITY) 8.476 MG IN SODIUM CHLORIDE (PF) 0.9 % 10 ML INJECTION: Performed by: INTERNAL MEDICINE

## 2020-04-24 PROCEDURE — 93016 CV STRESS TEST SUPVJ ONLY: CPT | Performed by: INTERNAL MEDICINE

## 2020-04-24 PROCEDURE — 93320 DOPPLER ECHO COMPLETE: CPT

## 2020-04-24 PROCEDURE — 93458 L HRT ARTERY/VENTRICLE ANGIO: CPT | Performed by: INTERNAL MEDICINE

## 2020-04-24 PROCEDURE — 25010000002 HEPARIN (PORCINE) PER 1000 UNITS: Performed by: INTERNAL MEDICINE

## 2020-04-24 PROCEDURE — 93018 CV STRESS TEST I&R ONLY: CPT | Performed by: INTERNAL MEDICINE

## 2020-04-24 PROCEDURE — 93325 DOPPLER ECHO COLOR FLOW MAPG: CPT | Performed by: INTERNAL MEDICINE

## 2020-04-24 PROCEDURE — 93350 STRESS TTE ONLY: CPT

## 2020-04-24 PROCEDURE — C1769 GUIDE WIRE: HCPCS | Performed by: INTERNAL MEDICINE

## 2020-04-24 PROCEDURE — 0 IOPAMIDOL PER 1 ML: Performed by: INTERNAL MEDICINE

## 2020-04-24 PROCEDURE — C1894 INTRO/SHEATH, NON-LASER: HCPCS | Performed by: INTERNAL MEDICINE

## 2020-04-24 PROCEDURE — 25010000002 MIDAZOLAM PER 1 MG: Performed by: INTERNAL MEDICINE

## 2020-04-24 PROCEDURE — 93352 ADMIN ECG CONTRAST AGENT: CPT | Performed by: INTERNAL MEDICINE

## 2020-04-24 PROCEDURE — 93325 DOPPLER ECHO COLOR FLOW MAPG: CPT

## 2020-04-24 PROCEDURE — 99152 MOD SED SAME PHYS/QHP 5/>YRS: CPT | Performed by: INTERNAL MEDICINE

## 2020-04-24 PROCEDURE — 25010000002 FENTANYL CITRATE (PF) 100 MCG/2ML SOLUTION: Performed by: INTERNAL MEDICINE

## 2020-04-24 PROCEDURE — 93017 CV STRESS TEST TRACING ONLY: CPT

## 2020-04-24 PROCEDURE — 85025 COMPLETE CBC W/AUTO DIFF WBC: CPT | Performed by: INTERNAL MEDICINE

## 2020-04-24 PROCEDURE — 93350 STRESS TTE ONLY: CPT | Performed by: INTERNAL MEDICINE

## 2020-04-24 PROCEDURE — 80048 BASIC METABOLIC PNL TOTAL CA: CPT | Performed by: INTERNAL MEDICINE

## 2020-04-24 RX ORDER — LIDOCAINE HYDROCHLORIDE 20 MG/ML
INJECTION, SOLUTION INFILTRATION; PERINEURAL AS NEEDED
Status: DISCONTINUED | OUTPATIENT
Start: 2020-04-24 | End: 2020-04-24 | Stop reason: HOSPADM

## 2020-04-24 RX ORDER — MORPHINE SULFATE 2 MG/ML
1 INJECTION, SOLUTION INTRAMUSCULAR; INTRAVENOUS EVERY 4 HOURS PRN
Status: CANCELLED | OUTPATIENT
Start: 2020-04-24 | End: 2020-05-04

## 2020-04-24 RX ORDER — ONDANSETRON 2 MG/ML
4 INJECTION INTRAMUSCULAR; INTRAVENOUS EVERY 6 HOURS PRN
Status: CANCELLED | OUTPATIENT
Start: 2020-04-24

## 2020-04-24 RX ORDER — ONDANSETRON 4 MG/1
4 TABLET, FILM COATED ORAL EVERY 6 HOURS PRN
Status: CANCELLED | OUTPATIENT
Start: 2020-04-24

## 2020-04-24 RX ORDER — SODIUM CHLORIDE 9 MG/ML
100 INJECTION, SOLUTION INTRAVENOUS CONTINUOUS
Status: CANCELLED | OUTPATIENT
Start: 2020-04-24

## 2020-04-24 RX ORDER — MIDAZOLAM HYDROCHLORIDE 1 MG/ML
INJECTION INTRAMUSCULAR; INTRAVENOUS AS NEEDED
Status: DISCONTINUED | OUTPATIENT
Start: 2020-04-24 | End: 2020-04-24 | Stop reason: HOSPADM

## 2020-04-24 RX ORDER — NALOXONE HCL 0.4 MG/ML
0.4 VIAL (ML) INJECTION
Status: CANCELLED | OUTPATIENT
Start: 2020-04-24

## 2020-04-24 RX ORDER — SODIUM CHLORIDE 0.9 % (FLUSH) 0.9 %
3 SYRINGE (ML) INJECTION EVERY 12 HOURS SCHEDULED
Status: DISCONTINUED | OUTPATIENT
Start: 2020-04-24 | End: 2020-04-24 | Stop reason: HOSPADM

## 2020-04-24 RX ORDER — TEMAZEPAM 15 MG/1
15 CAPSULE ORAL NIGHTLY PRN
Status: CANCELLED | OUTPATIENT
Start: 2020-04-24

## 2020-04-24 RX ORDER — LIDOCAINE HYDROCHLORIDE 10 MG/ML
0.1 INJECTION, SOLUTION EPIDURAL; INFILTRATION; INTRACAUDAL; PERINEURAL ONCE AS NEEDED
Status: DISCONTINUED | OUTPATIENT
Start: 2020-04-24 | End: 2020-04-24 | Stop reason: HOSPADM

## 2020-04-24 RX ORDER — SODIUM CHLORIDE 0.9 % (FLUSH) 0.9 %
10 SYRINGE (ML) INJECTION AS NEEDED
Status: DISCONTINUED | OUTPATIENT
Start: 2020-04-24 | End: 2020-04-24 | Stop reason: HOSPADM

## 2020-04-24 RX ORDER — ACETAMINOPHEN 325 MG/1
650 TABLET ORAL EVERY 4 HOURS PRN
Status: CANCELLED | OUTPATIENT
Start: 2020-04-24

## 2020-04-24 RX ORDER — SODIUM CHLORIDE 9 MG/ML
75 INJECTION, SOLUTION INTRAVENOUS CONTINUOUS
Status: DISCONTINUED | OUTPATIENT
Start: 2020-04-24 | End: 2020-04-24 | Stop reason: HOSPADM

## 2020-04-24 RX ORDER — FENTANYL CITRATE 50 UG/ML
INJECTION, SOLUTION INTRAMUSCULAR; INTRAVENOUS AS NEEDED
Status: DISCONTINUED | OUTPATIENT
Start: 2020-04-24 | End: 2020-04-24 | Stop reason: HOSPADM

## 2020-04-24 RX ORDER — HYDROCODONE BITARTRATE AND ACETAMINOPHEN 5; 325 MG/1; MG/1
1 TABLET ORAL EVERY 4 HOURS PRN
Status: CANCELLED | OUTPATIENT
Start: 2020-04-24

## 2020-04-24 RX ADMIN — SODIUM CHLORIDE 75 ML/HR: 9 INJECTION, SOLUTION INTRAVENOUS at 13:01

## 2020-04-24 RX ADMIN — PERFLUTREN 3 ML: 6.52 INJECTION, SUSPENSION INTRAVENOUS at 11:14

## 2020-04-24 NOTE — DISCHARGE INSTRUCTIONS
[facility]  [address] [place] [address]    Coronary Angiogram (Radial/Ulnar Approach) After Care    Refer to this sheet in the next few weeks. These instructions provide you with information on caring for yourself after your procedure. Your caregiver may also give you more specific instructions. Your treatment has been planned according to current medical practices, but problems sometimes occur. Call your caregiver if you have any problems or questions after your procedure.    Home Care Instructions:  · You may shower the day after the procedure. Remove the bandage (dressing) and gently wash the site with plain soap and water. Gently pat the site dry. You may apply a band aid daily for 2 days if desired.    · Do not apply powder or lotion to the site.  · Do not submerge the affected site in water for 3 to 5 days or until the site is completely healed.   · Do not lift, push or pull anything over 5 pounds for 5 days after your procedure.  · Inspect the site at least twice daily. You may notice some bruising at the site and it may be tender for 1 to 2 weeks.     · Increase your fluid intake for the next 2 days.    · Keep arm elevated for 24 hours. For the remainder of the day, keep your arm in “Pledge of Allegiance” position when up and about.     · You may drive 24 hours after the procedure unless otherwise instructed by your caregiver.  · Do not operate machinery or power tools for 24 hours.  · A responsible adult should be with you for the first 24 hours after you arrive home. Do not make any important legal decisions or sign legal papers for 24 hours.  Do not drink alcohol for 24 hours.        Call Your Doctor if:   · You have unusual pain at the radial/ulnar (wrist) site.  · You have redness, warmth, swelling, or pain at the radial/ulnar (wrist) site.  · You have drainage (other than a small amount of blood on the dressing).  · You have chills or a fever > 101.  · Your arm becomes pale or  dark, cool, tingly, or numb.  · You have heavy bleeding from the site, hold pressure on the site for 20 minutes.  If the bleeding stops, apply a fresh bandage and call your cardiologist.  However, if you continue to have bleeding, call 911.

## 2020-04-27 ENCOUNTER — TELEPHONE (OUTPATIENT)
Dept: CARDIOLOGY | Facility: CLINIC | Age: 65
End: 2020-04-27

## 2020-04-27 NOTE — TELEPHONE ENCOUNTER
04/27/2020@ 10:04AM   Pt called she was seen in office by Dr Pruitt. She had a  Echo and a Stress Test which was abnormal   And EKG was abnormal. She had a Cath on Friday 4/24/20 which was normal.   She states she is still having chest pain  And wants to know what to do?  Please advise   Pt's call back # [phone]  Thanks Mic WATTS

## 2020-04-27 NOTE — TELEPHONE ENCOUNTER
Please have her added to my schedule since she is post cath.  I need to check her cath site so a video visit is really what we need to do.

## 2020-04-28 ENCOUNTER — TELEMEDICINE (OUTPATIENT)
Dept: CARDIOLOGY | Facility: CLINIC | Age: 65
End: 2020-04-28

## 2020-04-28 VITALS
SYSTOLIC BLOOD PRESSURE: 116 MMHG | WEIGHT: 140 LBS | HEIGHT: 61 IN | HEART RATE: 61 BPM | DIASTOLIC BLOOD PRESSURE: 73 MMHG | BODY MASS INDEX: 26.43 KG/M2

## 2020-04-28 DIAGNOSIS — I10 HYPERTENSION, UNSPECIFIED TYPE: ICD-10-CM

## 2020-04-28 DIAGNOSIS — R07.2 PRECORDIAL PAIN: Primary | ICD-10-CM

## 2020-04-28 DIAGNOSIS — E78.00 HIGH CHOLESTEROL: ICD-10-CM

## 2020-04-28 PROCEDURE — 99214 OFFICE O/P EST MOD 30 MIN: CPT | Performed by: NURSE PRACTITIONER

## 2020-04-28 RX ORDER — ROSUVASTATIN CALCIUM 20 MG/1
20 TABLET, COATED ORAL DAILY
Qty: 90 TABLET | Refills: 0 | Status: SHIPPED | OUTPATIENT
Start: 2020-04-28 | End: 2020-07-20

## 2020-04-28 NOTE — PROGRESS NOTES
[facility] CARDIOLOGY  [address]  [facility] [address]  Phone: [phone]      Patient Name: Cynthia Escobar  :1955  Age: 65 y.o.  Primary Cardiologist: Timothy Pruitt MD  Encounter Provider:  PIYUSH Belle      Chief Complaint:   Chief Complaint   Patient presents with   • Follow-up     Video visit   • Chest Pain         HPI  Cynthia Escobar is a 65 y.o. female who presents today via video visit secondary to COVID pandemic. Patient presents today for hospital follow-up.     Pt has a  history significant for chest pain, hypertension, hyperlipidemia.    Patient has been followed by Dr. Pruitt for episodes of chest pain.  She had a stress echocardiogram which revealed LVEF 60% with ST depression of 2 mm in the inferior lateral leads noted during stress.  Patient could only reach 69% of age-predicted maximum heart rate.  Patient subsequently underwent cardiac catheterization on  which revealed normal coronary arteries as well as normal LV systolic function.      Patient reports that unfortunately she continues to have episodes of chest pressure and difficulty breathing.  She states that she also notes that when she eats that sometimes she has difficulty swallowing her food and she has to regurgitate it and she would again into smaller pieces in order to get it to pass into her esophagus.  She does note that her chest pressure sometimes worsens with exertion.  She reports that when she has these episodes it does cause heart rate to elevate.  She states that she has not had any episodes of fatigue.  Blood pressure has been stable.  She denies any problems with her cardiac catheter insertion site.    The following portions of the patient's history were reviewed and updated as appropriate: allergies, current medications, past family history, past medical history, past social history, past surgical history and problem  "list.      Review of Systems   Constitution: Negative for malaise/fatigue.   Cardiovascular: Positive for chest pain. Negative for leg swelling.   Respiratory: Positive for shortness of breath.    Neurological: Negative for light-headedness.   All other systems reviewed and are negative.      OBJECTIVE:     Vitals:    04/28/20 0913   BP: 116/73   Pulse: 61   Weight: 63.5 kg (140 lb)   Height: 154.9 cm (61\")     Body mass index is 26.45 kg/m².    Physical Exam   Constitutional: She is oriented to person, place, and time. She appears well-developed.   HENT:   Head: Normocephalic and atraumatic.   Eyes: Conjunctivae are normal.   Pulmonary/Chest: No respiratory distress.   Neurological: She is alert and oriented to person, place, and time. GCS eye subscore is 4. GCS verbal subscore is 5. GCS motor subscore is 6.   Skin:   Right radial cath site well healed without erythema or ecchymosis,  good motor function of hand with no hematoma, no sensory deficits noted.     Psychiatric: She has a normal mood and affect. Her speech is normal and behavior is normal. Judgment and thought content normal. Cognition and memory are normal.         Procedures    Cardiac Procedures:  1. Stress echocardiogram 4/24/2020.  LVEF 60%.  Normal LV cavity size.  All LV wall segments contract normally.  Grade 1 diastolic dysfunction.  Patient complained of chest pain 6/10 during exercise which did not resolve until 15 minutes into recovery.  ST segment depression of 2 mm in the inferior lateral leads was noted during stress and returned to baseline after 5 to 9 minutes of recovery.  Patient could only reach 69% of age-predicted maximum heart rate.  Patient referred for left diagnostic cardiac catheterization.  2. Left diagnostic cardiac catheterization 4/24/2020.  Normal LV function.  Normal coronary angiography.    ASSESSMENT:      Diagnosis Plan   1. Precordial pain     2. High cholesterol     3. Hypertension, unspecified type           PLAN OF " CARE:     Patient is having episodes of chest pain originally evaluated by Dr. Pruitt.  Patient was found to have abnormal ECG portion of a echocardiogram stress test.  She subsequently underwent cardiac catheterization which revealed normal LV function as well as normal coronary angiography.  Patient continues to complain of chest pain.  She does state that at times the pain is worsened with exertion and also worsened with foods.  She reports that this sensation is a chest pressure with associated symptoms of heart palpitations and difficulty getting a deep breath.  She reports that she sometimes notices that she has difficulty getting her food to pass and sometimes has to regurgitate her food and chew it into smaller pieces to get it to go down.  She is a patient of Dr. Cunningham, gastroenterology.  At this point since we have determined that patient's coronary angiography as well as LV function are normal I think that her pain could be related to her GI tract.  I recommended that she follow-up with her GI physician for further evaluation and recommendations.  Patient agreed and verbalized understanding.  She will follow-up with Dr. Pruitt in 3 months to reevaluate symptoms.    This patient has consented to a telehealth visit via video. I spent 25 minutes in total including but not limited to the direct conversation with this patient. All vitals recorded within this visit are reported by the patient.         Discharge Medications           Accurate as of April 28, 2020  9:41 AM. If you have any questions, ask your nurse or doctor.               Continue These Medications      Instructions Start Date   Acidophilus Lactobacillus capsule   1 capsule, Oral, 2 Times Daily      ALPRAZolam 0.25 MG tablet  Commonly known as:  XANAX   0.25 mg, Oral, 2 Times Daily PRN      amphetamine-dextroamphetamine XR 20 MG 24 hr capsule  Commonly known as:  ADDERALL XR   20 mg, Oral, Every Morning      calcium acetate 667 MG  capsule  Commonly known as:  PHOSLO   1,334 mg, Oral, 3 Times Daily With Meals      estradiol 0.1 MG/GM vaginal cream  Commonly known as:  ESTRACE   1 g, Vaginal, 2 Times Weekly      FLUoxetine 20 MG capsule  Commonly known as:  PROzac   TAKE 1 CAPSULE BY MOUTH FOUR TIMES DAILY      hydroCHLOROthiazide 25 MG tablet  Commonly known as:  HYDRODIURIL   25 mg, Oral, Daily      ipratropium 0.06 % nasal spray  Commonly known as:  Atrovent   2 sprays, Nasal, 4 Times Daily      levothyroxine 75 MCG tablet  Commonly known as:  SYNTHROID, LEVOTHROID   75 mcg, Oral, Daily      loperamide 2 MG tablet  Commonly known as:  IMODIUM A-D   2 mg, Oral, As Needed      loratadine 10 MG tablet  Commonly known as:  Claritin   10 mg, Oral, Daily      olopatadine 0.1 % ophthalmic solution  Commonly known as:  PATANOL   1 drop, Both Eyes, 2 Times Daily      propranolol 40 MG tablet  Commonly known as:  INDERAL   TAKE 1 TABLET BY MOUTH THREE TIMES DAILY      rosuvastatin 20 MG tablet  Commonly known as:  CRESTOR   20 mg, Oral, Daily      zolpidem 10 MG tablet  Commonly known as:  AMBIEN   10 mg, Oral, Nightly PRN             Thank you for allowing me to participate in the care of your patient,         PIYUSH Belle  [facility]  04/28/20  9:21 AM      **Pat Disclaimer:**  Much of this encounter note is an electronic transcription/translation of spoken language to printed text. The electronic translation of spoken language may permit erroneous, or at times, nonsensical words or phrases to be inadvertently transcribed. Although I have reviewed the note for such errors, some may still exist.

## 2020-05-06 ENCOUNTER — OFFICE VISIT (OUTPATIENT)
Dept: GASTROENTEROLOGY | Facility: CLINIC | Age: 65
End: 2020-05-06

## 2020-05-06 VITALS — HEIGHT: 62 IN | WEIGHT: 138 LBS | TEMPERATURE: 98.7 F | BODY MASS INDEX: 25.4 KG/M2

## 2020-05-06 DIAGNOSIS — R13.19 ESOPHAGEAL DYSPHAGIA: Primary | ICD-10-CM

## 2020-05-06 DIAGNOSIS — R15.1 FECAL SMEARING: ICD-10-CM

## 2020-05-06 DIAGNOSIS — R07.2 PRECORDIAL CHEST PAIN: ICD-10-CM

## 2020-05-06 DIAGNOSIS — R74.01 ELEVATED TRANSAMINASE LEVEL: ICD-10-CM

## 2020-05-06 PROCEDURE — 99214 OFFICE O/P EST MOD 30 MIN: CPT | Performed by: INTERNAL MEDICINE

## 2020-05-06 RX ORDER — SODIUM CHLORIDE, SODIUM LACTATE, POTASSIUM CHLORIDE, CALCIUM CHLORIDE 600; 310; 30; 20 MG/100ML; MG/100ML; MG/100ML; MG/100ML
30 INJECTION, SOLUTION INTRAVENOUS CONTINUOUS
Status: CANCELLED | OUTPATIENT
Start: 2020-05-27

## 2020-05-06 RX ORDER — PANTOPRAZOLE SODIUM 20 MG/1
20 TABLET, DELAYED RELEASE ORAL DAILY
Qty: 30 TABLET | Refills: 3 | Status: SHIPPED | OUTPATIENT
Start: 2020-05-06 | End: 2020-09-04 | Stop reason: SDUPTHER

## 2020-05-06 NOTE — PROGRESS NOTES
Chief Complaint   Patient presents with   • Difficulty Swallowing     Subjective     HPI  Cynthia Escobar is a 65 y.o. female who presents for follow-up.  She has been seen previously for issues with fatty liver, elevated transaminases, and fecal incontinence.    She comes into the office today with a issue today with chest pain and issues with swallowing. Symptoms have been present for about 6 or so weeks.  Feels food sticking in the middle of her esophagus.  Occurs with all foods.  Has to focus on taking small bites.  She has to cough it back up and then rechews and swallows it down.  Associated with some heartburn.  Taking tums here and there.  She is not on any regular acid reducing medication.    She was having chest pain and apparently went to [facility].  There were some concerning findings on her EKG and she was sent to cardiology.  She apparently had some ST segment depression on her exercise stress test and then underwent a cardiac catheterization which was normal as well as a normal echocardiogram.    At times she will wake from sleep in the middle the night with lots of mucus in her mouth.    Still having some issues with fecal incontinence.  She describes issues with leakage of liquid stool.  Says it is not as bad as it has been.  She has not followed back up with Dr. Arenas regarding the sacral stimulator yet.     She is taking metamucil but inconsistently.  Has a bowel movement about every 4 days.      Today's visit was in the office.  Both the patient and I were wearing face masks and proper hand hygiene was performed before and after the physical exam.    Past Medical History:   Diagnosis Date   • Arthritis    • Depression    • Diverticulitis    • Diverticulitis of colon    • Diverticulosis    • Hyperlipidemia    • Hypertension    • Thyroid disease        Social History     Socioeconomic History   • Marital status:      Spouse name: Not on file   • Number of children:  Not on file   • Years of education: Not on file   • Highest education level: Not on file   Tobacco Use   • Smoking status: Never Smoker   • Smokeless tobacco: Never Used   Substance and Sexual Activity   • Alcohol use: Yes     Alcohol/week: 1.0 standard drinks     Types: 1 Glasses of wine per week   • Drug use: No   • Sexual activity: Not Currently     Partners: Male     Birth control/protection: Post-menopausal         Current Outpatient Medications:   •  Acidophilus Lactobacillus capsule, Take 1 capsule by mouth 2 (Two) Times a Day., Disp: 60 capsule, Rfl: 5  •  ALPRAZolam (XANAX) 0.25 MG tablet, Take 1 tablet by mouth 2 (Two) Times a Day As Needed for Anxiety., Disp: 60 tablet, Rfl: 0  •  amphetamine-dextroamphetamine XR (ADDERALL XR) 20 MG 24 hr capsule, Take 1 capsule by mouth Every Morning, Disp: 30 capsule, Rfl: 0  •  calcium acetate (PHOSLO) 667 MG capsule, Take 1,334 mg by mouth 3 (Three) Times a Day With Meals., Disp: , Rfl:   •  estradiol (ESTRACE) 0.1 MG/GM vaginal cream, Insert 1 g into the vagina 2 (Two) Times a Week., Disp: 45 g, Rfl: 2  •  FLUoxetine (PROzac) 20 MG capsule, TAKE 1 CAPSULE BY MOUTH FOUR TIMES DAILY, Disp: 360 capsule, Rfl: 0  •  hydrochlorothiazide (HYDRODIURIL) 25 MG tablet, Take 1 tablet by mouth Daily., Disp: 90 tablet, Rfl: 1  •  ipratropium (ATROVENT) 0.06 % nasal spray, 2 sprays into the nostril(s) as directed by provider 4 (Four) Times a Day., Disp: 15 mL, Rfl: 12  •  levothyroxine (SYNTHROID, LEVOTHROID) 75 MCG tablet, Take 1 tablet by mouth Daily., Disp: 90 tablet, Rfl: 3  •  loperamide (IMODIUM A-D) 2 MG tablet, Take 1 tablet by mouth As Needed for Diarrhea., Disp: 30 tablet, Rfl: 2  •  loratadine (CLARITIN) 10 MG tablet, Take 1 tablet by mouth Daily., Disp: 30 tablet, Rfl: 2  •  olopatadine (PATANOL) 0.1 % ophthalmic solution, Administer 1 drop to both eyes 2 (Two) Times a Day., Disp: 5 mL, Rfl: 5  •  propranolol (INDERAL) 40 MG tablet, TAKE 1 TABLET BY MOUTH THREE TIMES  "DAILY, Disp: 270 tablet, Rfl: 1  •  rosuvastatin (CRESTOR) 20 MG tablet, TAKE 1 TABLET BY MOUTH DAILY, Disp: 90 tablet, Rfl: 0  •  zolpidem (AMBIEN) 10 MG tablet, Take 1 tablet by mouth At Night As Needed for Sleep., Disp: 30 tablet, Rfl: 1  •  pantoprazole (PROTONIX) 20 MG EC tablet, Take 1 tablet by mouth Daily. Don't take this within 2 hours of the thyroid medication, Disp: 30 tablet, Rfl: 3    Review of Systems   Constitutional: Negative for activity change, appetite change and fatigue.   HENT: Positive for trouble swallowing. Negative for sore throat.    Respiratory: Negative.    Cardiovascular: Positive for chest pain.   Gastrointestinal: Negative for abdominal distention, abdominal pain and blood in stool.        Irregular bowel movements with some stool leakage   Endocrine: Negative for cold intolerance and heat intolerance.   Genitourinary: Negative for difficulty urinating, dysuria and frequency.   Musculoskeletal: Negative for arthralgias, back pain and myalgias.   Skin: Negative.    Hematological: Negative for adenopathy. Does not bruise/bleed easily.   All other systems reviewed and are negative.      Objective   Vitals:    05/06/20 1243   Temp: 98.7 °F (37.1 °C)         05/06/20  1243   Weight: 62.6 kg (138 lb)     Body mass index is 25.24 kg/m².          Temp 98.7 °F (37.1 °C) (Temporal)   Ht 157.5 cm (62\")   Wt 62.6 kg (138 lb)   BMI 25.24 kg/m²     General Appearance:  Alert, cooperative, no distress, appears stated age   Head:  Normocephalic, without obvious abnormality, atraumatic   Eyes:  PERRL, conjunctiva/corneas clear, EOM's intact   Ears:  Normal external appearance of ear, hearing intact   Nose: Nares normal, septum midline,mucosa normal, no drainage or sinus tenderness   Throat: Lips, mucosa, and tongue normal; teeth and gums normal   Neck: Supple, symmetrical, trachea midline, no adenopathy;  thyroid: not enlarged, symmetric, no tenderness/mass/nodule   Back:   Symmetric, no curvature, " ROM normal   Lungs:   Clear to auscultation bilaterally, respirations unlabored, effort normal   Heart:  Regular rate and rhythm, S1 and S2 normal, no murmur, rub, or gallop, no lower extremity edema   Abdomen:   Soft, non-tender, bowel sounds active all four quadrants,  no masses, no organomegaly   Rectal: Deferred   Musculoskeletal: Normal gait, normal station, no atrophy of extremities, normal strength and tone   Skin: Normal color, texture, and turgor, no rashes or lesions   Lymph nodes: Cervical, supraclavicular, and axillary nodes normal   Psychiatric: Alert and oriented x 3, normal mood and affect,  normal recent and remote memory, normal judgment and insight         WBC   Date Value Ref Range Status   04/24/2020 6.22 3.40 - 10.80 10*3/mm3 Final   12/04/2019 6.15 3.40 - 10.80 10*3/mm3 Final   12/27/2018 6.47 4.5 - 11.0 10*3/uL Final     RBC   Date Value Ref Range Status   04/24/2020 4.79 3.77 - 5.28 10*6/mm3 Final   12/04/2019 4.95 3.77 - 5.28 10*6/mm3 Final   12/27/2018 4.48 4.0 - 5.2 10*6/uL Final     Hemoglobin   Date Value Ref Range Status   04/24/2020 14.7 12.0 - 15.9 g/dL Final   01/09/2019 11.7 (L) 12.0 - 16.0 g/dL Final     Hematocrit   Date Value Ref Range Status   04/24/2020 43.2 34.0 - 46.6 % Final   01/09/2019 35.4 (L) 36.0 - 46.0 % Final     MCV   Date Value Ref Range Status   04/24/2020 90.2 79.0 - 97.0 fL Final   12/27/2018 91.5 80.0 - 100.0 fL Final     MCH   Date Value Ref Range Status   04/24/2020 30.7 26.6 - 33.0 pg Final   12/27/2018 30.6 26.0 - 34.0 pg Final     MCHC   Date Value Ref Range Status   04/24/2020 34.0 31.5 - 35.7 g/dL Final   12/27/2018 33.4 31.0 - 37.0 g/dL Final     RDW   Date Value Ref Range Status   04/24/2020 13.1 12.3 - 15.4 % Final   12/27/2018 13.0 12.0 - 16.8 % Final     RDW-SD   Date Value Ref Range Status   04/24/2020 43.3 37.0 - 54.0 fl Final     MPV   Date Value Ref Range Status   04/24/2020 10.1 6.0 - 12.0 fL Final   12/27/2018 10.0 6.7 - 10.8 fL Final      Platelets   Date Value Ref Range Status   04/24/2020 223 140 - 450 10*3/mm3 Final   12/27/2018 246 140 - 440 10*3/uL Final     Neutrophil Rel %   Date Value Ref Range Status   12/27/2018 44.5 (L) 45 - 80 % Final     Neutrophil %   Date Value Ref Range Status   04/24/2020 47.0 42.7 - 76.0 % Final     Lymphocyte Rel %   Date Value Ref Range Status   12/27/2018 43.7 15 - 50 % Final     Lymphocyte %   Date Value Ref Range Status   04/24/2020 41.8 19.6 - 45.3 % Final     Monocyte Rel %   Date Value Ref Range Status   12/27/2018 4.3 0 - 15 % Final     Monocyte %   Date Value Ref Range Status   04/24/2020 5.3 5.0 - 12.0 % Final     Eosinophil %   Date Value Ref Range Status   04/24/2020 5.1 0.3 - 6.2 % Final   12/27/2018 7.0 0 - 7 % Final     Basophil Rel %   Date Value Ref Range Status   12/27/2018 0.3 0 - 2 % Final     Basophil %   Date Value Ref Range Status   04/24/2020 0.3 0.0 - 1.5 % Final     Immature Grans %   Date Value Ref Range Status   04/24/2020 0.5 0.0 - 0.5 % Final   12/27/2018 0.2 (H) 0 % Final     Neutrophils Absolute   Date Value Ref Range Status   12/27/2018 2.88 2.0 - 8.8 10*3/uL Final     Neutrophils, Absolute   Date Value Ref Range Status   04/24/2020 2.92 1.70 - 7.00 10*3/mm3 Final     Lymphocytes Absolute   Date Value Ref Range Status   12/27/2018 2.83 0.7 - 5.5 10*3/uL Final     Lymphocytes, Absolute   Date Value Ref Range Status   04/24/2020 2.60 0.70 - 3.10 10*3/mm3 Final     Monocytes Absolute   Date Value Ref Range Status   12/27/2018 0.28 0.0 - 1.7 10*3/uL Final     Monocytes, Absolute   Date Value Ref Range Status   04/24/2020 0.33 0.10 - 0.90 10*3/mm3 Final     Eosinophils Absolute   Date Value Ref Range Status   12/27/2018 0.45 0.0 - 0.8 10*3/uL Final     Eosinophils, Absolute   Date Value Ref Range Status   04/24/2020 0.32 0.00 - 0.40 10*3/mm3 Final     Basophils Absolute   Date Value Ref Range Status   12/27/2018 0.02 0.0 - 0.2 10*3/uL Final     Basophils, Absolute   Date Value Ref  Range Status   04/24/2020 0.02 0.00 - 0.20 10*3/mm3 Final     Immature Grans, Absolute   Date Value Ref Range Status   04/24/2020 0.03 0.00 - 0.05 10*3/mm3 Final   12/27/2018 0.01 <1 10*3/uL Final     nRBC   Date Value Ref Range Status   04/24/2020 0.0 0.0 - 0.2 /100 WBC Final       Lab Results   Component Value Date    GLUCOSE 113 (H) 04/24/2020    BUN 20 04/24/2020    CREATININE 0.63 04/24/2020    EGFRIFNONA 95 04/24/2020    EGFRIFAFRI 107 12/04/2019    BCR 31.7 (H) 04/24/2020    CO2 27.7 04/24/2020    CALCIUM 9.7 04/24/2020    PROTENTOTREF 6.5 12/04/2019    ALBUMIN 4.70 12/04/2019    LABIL2 2.6 12/04/2019    AST 28 12/04/2019    ALT 38 (H) 12/04/2019         Imaging Results (Last 7 Days)     ** No results found for the last 168 hours. **            Assessment/Plan    Esophageal dysphagia: New issue.  Associated with heartburn.  Differential of stricture, spasm, eosinophilic esophagitis    Chest pain: Suspect she is actually having more reflux than she realizes given negative cardiac work-up    Elevated transaminase level: Associated with fatty liver disease.  Very mildly elevated ASMA and elevated CHRISTO    Fecal smearing: Slightly better    Plan  Start low-dose pantoprazole  EGD for further evaluation  Advised to ER for any food sticking greater than an hour  Continue fiber supplementation  Follow-up with Dr. Arenas regarding consideration of sacral stimulator for fecal incontinence episodes  Further recommendations after endoscopy    Cynthia was seen today for difficulty swallowing.    Diagnoses and all orders for this visit:    Esophageal dysphagia  -     Case Request; Standing  -     Follow Anesthesia Guidelines / Standing Orders; Future  -     Obtain Informed Consent; Future  -     Case Request    Precordial chest pain  -     Case Request; Standing  -     Follow Anesthesia Guidelines / Standing Orders; Future  -     Obtain Informed Consent; Future  -     Case Request    Elevated transaminase level    Fecal  smearing    Other orders  -     pantoprazole (PROTONIX) 20 MG EC tablet; Take 1 tablet by mouth Daily. Don't take this within 2 hours of the thyroid medication        Dictated utilizing Dragon dictation

## 2020-05-08 RX ORDER — HYDROCHLOROTHIAZIDE 25 MG/1
25 TABLET ORAL DAILY
Qty: 90 TABLET | Refills: 1 | Status: SHIPPED | OUTPATIENT
Start: 2020-05-08 | End: 2020-11-11

## 2020-05-13 ENCOUNTER — HOSPITAL ENCOUNTER (OUTPATIENT)
Dept: URGENT CARE | Facility: CLINIC | Age: 65
Discharge: HOME | End: 2020-05-13

## 2020-05-19 ENCOUNTER — TRANSCRIBE ORDERS (OUTPATIENT)
Dept: SLEEP MEDICINE | Facility: HOSPITAL | Age: 65
End: 2020-05-19

## 2020-05-19 DIAGNOSIS — Z01.818 OTHER SPECIFIED PRE-OPERATIVE EXAMINATION: Primary | ICD-10-CM

## 2020-05-26 ENCOUNTER — LAB (OUTPATIENT)
Dept: LAB | Facility: HOSPITAL | Age: 65
End: 2020-05-26

## 2020-05-26 ENCOUNTER — TRANSCRIBE ORDERS (OUTPATIENT)
Dept: SLEEP MEDICINE | Facility: HOSPITAL | Age: 65
End: 2020-05-26

## 2020-05-26 DIAGNOSIS — Z01.818 OTHER SPECIFIED PRE-OPERATIVE EXAMINATION: ICD-10-CM

## 2020-05-26 DIAGNOSIS — Z01.818 OTHER SPECIFIED PRE-OPERATIVE EXAMINATION: Primary | ICD-10-CM

## 2020-05-26 LAB — SARS-COV-2 RNA RESP QL NAA+PROBE: NOT DETECTED

## 2020-05-26 PROCEDURE — 87635 SARS-COV-2 COVID-19 AMP PRB: CPT

## 2020-05-26 PROCEDURE — C9803 HOPD COVID-19 SPEC COLLECT: HCPCS

## 2020-05-27 ENCOUNTER — ANESTHESIA EVENT (OUTPATIENT)
Dept: GASTROENTEROLOGY | Facility: HOSPITAL | Age: 65
End: 2020-05-27

## 2020-05-27 ENCOUNTER — HOSPITAL ENCOUNTER (OUTPATIENT)
Facility: HOSPITAL | Age: 65
Setting detail: HOSPITAL OUTPATIENT SURGERY
Discharge: HOME | End: 2020-05-27
Attending: INTERNAL MEDICINE | Admitting: INTERNAL MEDICINE

## 2020-05-27 ENCOUNTER — ANESTHESIA (OUTPATIENT)
Dept: GASTROENTEROLOGY | Facility: HOSPITAL | Age: 65
End: 2020-05-27

## 2020-05-27 VITALS
WEIGHT: 138 LBS | HEIGHT: 61 IN | SYSTOLIC BLOOD PRESSURE: 107 MMHG | TEMPERATURE: 97.8 F | OXYGEN SATURATION: 96 % | DIASTOLIC BLOOD PRESSURE: 68 MMHG | RESPIRATION RATE: 16 BRPM | HEART RATE: 60 BPM | BODY MASS INDEX: 26.06 KG/M2

## 2020-05-27 DIAGNOSIS — R13.19 ESOPHAGEAL DYSPHAGIA: ICD-10-CM

## 2020-05-27 DIAGNOSIS — R07.2 PRECORDIAL CHEST PAIN: ICD-10-CM

## 2020-05-27 PROCEDURE — 43450 DILATE ESOPHAGUS 1/MULT PASS: CPT | Performed by: INTERNAL MEDICINE

## 2020-05-27 PROCEDURE — 25010000002 PROPOFOL 10 MG/ML EMULSION: Performed by: ANESTHESIOLOGY

## 2020-05-27 PROCEDURE — 88305 TISSUE EXAM BY PATHOLOGIST: CPT | Performed by: INTERNAL MEDICINE

## 2020-05-27 PROCEDURE — 43239 EGD BIOPSY SINGLE/MULTIPLE: CPT | Performed by: INTERNAL MEDICINE

## 2020-05-27 RX ORDER — SODIUM CHLORIDE, SODIUM LACTATE, POTASSIUM CHLORIDE, CALCIUM CHLORIDE 600; 310; 30; 20 MG/100ML; MG/100ML; MG/100ML; MG/100ML
30 INJECTION, SOLUTION INTRAVENOUS CONTINUOUS
Status: DISCONTINUED | OUTPATIENT
Start: 2020-05-27 | End: 2020-05-27 | Stop reason: HOSPADM

## 2020-05-27 RX ORDER — LIDOCAINE HYDROCHLORIDE 20 MG/ML
INJECTION, SOLUTION INFILTRATION; PERINEURAL AS NEEDED
Status: DISCONTINUED | OUTPATIENT
Start: 2020-05-27 | End: 2020-05-27 | Stop reason: SURG

## 2020-05-27 RX ORDER — PROPOFOL 10 MG/ML
VIAL (ML) INTRAVENOUS CONTINUOUS PRN
Status: DISCONTINUED | OUTPATIENT
Start: 2020-05-27 | End: 2020-05-27 | Stop reason: SURG

## 2020-05-27 RX ORDER — PROPOFOL 10 MG/ML
VIAL (ML) INTRAVENOUS AS NEEDED
Status: DISCONTINUED | OUTPATIENT
Start: 2020-05-27 | End: 2020-05-27 | Stop reason: SURG

## 2020-05-27 RX ADMIN — SODIUM CHLORIDE, POTASSIUM CHLORIDE, SODIUM LACTATE AND CALCIUM CHLORIDE 30 ML/HR: 600; 310; 30; 20 INJECTION, SOLUTION INTRAVENOUS at 09:10

## 2020-05-27 RX ADMIN — PROPOFOL 50 MG: 10 INJECTION, EMULSION INTRAVENOUS at 09:19

## 2020-05-27 RX ADMIN — LIDOCAINE HYDROCHLORIDE 40 MG: 20 INJECTION, SOLUTION INFILTRATION; PERINEURAL at 09:19

## 2020-05-27 RX ADMIN — SODIUM CHLORIDE, POTASSIUM CHLORIDE, SODIUM LACTATE AND CALCIUM CHLORIDE: 600; 310; 30; 20 INJECTION, SOLUTION INTRAVENOUS at 09:15

## 2020-05-27 RX ADMIN — PROPOFOL 140 MCG/KG/MIN: 10 INJECTION, EMULSION INTRAVENOUS at 09:19

## 2020-05-27 NOTE — ANESTHESIA PREPROCEDURE EVALUATION
Anesthesia Evaluation     Patient summary reviewed and Nursing notes reviewed   NPO Solid Status: > 8 hours  NPO Liquid Status: > 2 hours           Airway   Mallampati: II  TM distance: >3 FB  Neck ROM: full  Dental - normal exam     Pulmonary - negative pulmonary ROS and normal exam   Cardiovascular - normal exam    (+) hypertension, angina, hyperlipidemia,       Neuro/Psych  (+) psychiatric history Depression and ADHD,     GI/Hepatic/Renal/Endo    (+)   liver disease,     Musculoskeletal     Abdominal    Substance History - negative use     OB/GYN negative ob/gyn ROS         Other   arthritis,                      Anesthesia Plan    ASA 2     MAC       Anesthetic plan, all risks, benefits, and alternatives have been provided, discussed and informed consent has been obtained with: patient.

## 2020-05-27 NOTE — ANESTHESIA POSTPROCEDURE EVALUATION
"Patient: Cynthia Escobar    Procedure Summary     Date:  05/27/20 Room / Location:   [facility]    Anesthesia Start:  0915 Anesthesia Stop:  0938    Procedure:  ESOPHAGOGASTRODUODENOSCOPY WITH BIOPSY AND BIOPSY POLYPECTOMY AND MACIEL DIALATION (N/A Esophagus) Diagnosis:       Esophageal dysphagia      Precordial chest pain      (Esophageal dysphagia [R13.10])      (Precordial chest pain [R07.2])    Surgeon:  Preethi Beck MD Provider:  Lam Robertson MD    Anesthesia Type:  MAC ASA Status:  2          Anesthesia Type: MAC    Vitals  No vitals data found for the desired time range.          Post Anesthesia Care and Evaluation    Patient location during evaluation: bedside  Patient participation: complete - patient participated  Level of consciousness: awake  Pain score: 2  Pain management: adequate  Airway patency: patent  Anesthetic complications: No anesthetic complications  PONV Status: none  Cardiovascular status: acceptable  Respiratory status: acceptable  Hydration status: acceptable    Comments: /75   Pulse 66   Temp 36.7 °C (98 °F) (Oral)   Resp 16   Ht 154.9 cm (61\")   Wt 62.6 kg (138 lb)   SpO2 94%   BMI 26.07 kg/m²         "

## 2020-05-28 LAB
CYTO UR: NORMAL
LAB AP CASE REPORT: NORMAL
LAB AP DIAGNOSIS COMMENT: NORMAL
PATH REPORT.FINAL DX SPEC: NORMAL
PATH REPORT.GROSS SPEC: NORMAL

## 2020-05-29 DIAGNOSIS — F51.01 PRIMARY INSOMNIA: ICD-10-CM

## 2020-06-01 ENCOUNTER — TELEPHONE (OUTPATIENT)
Dept: GASTROENTEROLOGY | Facility: CLINIC | Age: 65
End: 2020-06-01

## 2020-06-01 DIAGNOSIS — F98.8 ATTENTION DEFICIT DISORDER, UNSPECIFIED HYPERACTIVITY PRESENCE: ICD-10-CM

## 2020-06-01 DIAGNOSIS — F51.01 PRIMARY INSOMNIA: ICD-10-CM

## 2020-06-01 RX ORDER — ZOLPIDEM TARTRATE 10 MG/1
10 TABLET ORAL NIGHTLY PRN
Qty: 30 TABLET | Refills: 1 | Status: CANCELLED | OUTPATIENT
Start: 2020-06-01

## 2020-06-01 RX ORDER — DEXTROAMPHETAMINE SACCHARATE, AMPHETAMINE ASPARTATE MONOHYDRATE, DEXTROAMPHETAMINE SULFATE AND AMPHETAMINE SULFATE 5; 5; 5; 5 MG/1; MG/1; MG/1; MG/1
20 CAPSULE, EXTENDED RELEASE ORAL EVERY MORNING
Qty: 30 CAPSULE | Refills: 0 | Status: CANCELLED | OUTPATIENT
Start: 2020-06-01

## 2020-06-01 RX ORDER — ZOLPIDEM TARTRATE 10 MG/1
10 TABLET ORAL NIGHTLY PRN
Qty: 30 TABLET | Refills: 1 | Status: SHIPPED | OUTPATIENT
Start: 2020-06-01 | End: 2020-06-02

## 2020-06-01 NOTE — TELEPHONE ENCOUNTER
PATIENT CALLED AND STATES NO PHARMACY CAN GET   zolpidem (AMBIEN) 10 MG tablet    IT IS ON BACK ORDER.     PHARMACY SUGGESTED THAT A NEW DOSE/PRESCRIPTION BE CALLED IN. SHE USUALLY SPLITS IN HALF ANYWAY.     [id number] - [place] - [address] AT [facility] OF [address]/[facility] & [place] - [phone]  - [phone] [place]  [phone]    PATIENT CALL BACK NUMBER [phone]

## 2020-06-01 NOTE — TELEPHONE ENCOUNTER
Patient called in requesting a re-fill for amphetamine-dextroamphetamine XR (ADDERALL XR) 20 MG 24 hr capsule    Jens Garber    Patient call back [phone]

## 2020-06-01 NOTE — TELEPHONE ENCOUNTER
----- Message from Preethi Beck MD sent at 6/1/2020 12:24 PM EDT -----  The biopsies from her EGD do confirm mild gastritis and concerns for eosinophilic esophagitis.  She should continue the daily pantoprazole.  Office follow-up as scheduled.

## 2020-06-01 NOTE — PROGRESS NOTES
The biopsies from her EGD do confirm mild gastritis and concerns for eosinophilic esophagitis.  She should continue the daily pantoprazole.  Office follow-up as scheduled.

## 2020-06-02 DIAGNOSIS — F51.01 PRIMARY INSOMNIA: ICD-10-CM

## 2020-06-02 DIAGNOSIS — F98.8 ATTENTION DEFICIT DISORDER, UNSPECIFIED HYPERACTIVITY PRESENCE: ICD-10-CM

## 2020-06-02 RX ORDER — ZOLPIDEM TARTRATE 5 MG/1
5 TABLET ORAL NIGHTLY PRN
Qty: 30 TABLET | Refills: 2 | Status: SHIPPED | OUTPATIENT
Start: 2020-06-02 | End: 2020-08-20 | Stop reason: SDUPTHER

## 2020-06-02 RX ORDER — DEXTROAMPHETAMINE SACCHARATE, AMPHETAMINE ASPARTATE MONOHYDRATE, DEXTROAMPHETAMINE SULFATE AND AMPHETAMINE SULFATE 5; 5; 5; 5 MG/1; MG/1; MG/1; MG/1
20 CAPSULE, EXTENDED RELEASE ORAL EVERY MORNING
Qty: 30 CAPSULE | Refills: 0 | Status: SHIPPED | OUTPATIENT
Start: 2020-06-02 | End: 2020-07-20 | Stop reason: SDUPTHER

## 2020-06-04 DIAGNOSIS — E78.00 HIGH CHOLESTEROL: ICD-10-CM

## 2020-06-04 DIAGNOSIS — E03.9 HYPOTHYROIDISM, UNSPECIFIED TYPE: Primary | ICD-10-CM

## 2020-06-04 DIAGNOSIS — I10 HYPERTENSION, UNSPECIFIED TYPE: ICD-10-CM

## 2020-06-12 ENCOUNTER — RESULTS ENCOUNTER (OUTPATIENT)
Dept: FAMILY MEDICINE CLINIC | Facility: CLINIC | Age: 65
End: 2020-06-12

## 2020-06-12 DIAGNOSIS — E03.9 HYPOTHYROIDISM, UNSPECIFIED TYPE: ICD-10-CM

## 2020-06-12 DIAGNOSIS — E78.00 HIGH CHOLESTEROL: ICD-10-CM

## 2020-06-12 DIAGNOSIS — I10 HYPERTENSION, UNSPECIFIED TYPE: ICD-10-CM

## 2020-06-13 LAB
ALBUMIN SERPL-MCNC: 4.5 G/DL (ref 3.5–5.2)
ALBUMIN/GLOB SERPL: 2.4 G/DL
ALP SERPL-CCNC: 61 U/L (ref 39–117)
ALT SERPL-CCNC: 53 U/L (ref 1–33)
AST SERPL-CCNC: 48 U/L (ref 1–32)
BASOPHILS # BLD AUTO: 0.03 10*3/MM3 (ref 0–0.2)
BASOPHILS NFR BLD AUTO: 0.6 % (ref 0–1.5)
BILIRUB SERPL-MCNC: 0.6 MG/DL (ref 0.2–1.2)
BUN SERPL-MCNC: 20 MG/DL (ref 8–23)
BUN/CREAT SERPL: 26 (ref 7–25)
CALCIUM SERPL-MCNC: 9.9 MG/DL (ref 8.6–10.5)
CHLORIDE SERPL-SCNC: 102 MMOL/L (ref 98–107)
CHOLEST SERPL-MCNC: 196 MG/DL (ref 0–200)
CO2 SERPL-SCNC: 29.1 MMOL/L (ref 22–29)
CREAT SERPL-MCNC: 0.77 MG/DL (ref 0.57–1)
EOSINOPHIL # BLD AUTO: 0.28 10*3/MM3 (ref 0–0.4)
EOSINOPHIL NFR BLD AUTO: 5.2 % (ref 0.3–6.2)
ERYTHROCYTE [DISTWIDTH] IN BLOOD BY AUTOMATED COUNT: 13.3 % (ref 12.3–15.4)
GLOBULIN SER CALC-MCNC: 1.9 GM/DL
GLUCOSE SERPL-MCNC: 100 MG/DL (ref 65–99)
HCT VFR BLD AUTO: 40.7 % (ref 34–46.6)
HDLC SERPL-MCNC: 34 MG/DL (ref 40–60)
HGB BLD-MCNC: 14.1 G/DL (ref 12–15.9)
IMM GRANULOCYTES # BLD AUTO: 0.01 10*3/MM3 (ref 0–0.05)
IMM GRANULOCYTES NFR BLD AUTO: 0.2 % (ref 0–0.5)
LDLC SERPL CALC-MCNC: 102 MG/DL (ref 0–100)
LDLC/HDLC SERPL: 3.01 {RATIO}
LYMPHOCYTES # BLD AUTO: 2.41 10*3/MM3 (ref 0.7–3.1)
LYMPHOCYTES NFR BLD AUTO: 45 % (ref 19.6–45.3)
MCH RBC QN AUTO: 31 PG (ref 26.6–33)
MCHC RBC AUTO-ENTMCNC: 34.6 G/DL (ref 31.5–35.7)
MCV RBC AUTO: 89.5 FL (ref 79–97)
MONOCYTES # BLD AUTO: 0.36 10*3/MM3 (ref 0.1–0.9)
MONOCYTES NFR BLD AUTO: 6.7 % (ref 5–12)
NEUTROPHILS # BLD AUTO: 2.27 10*3/MM3 (ref 1.7–7)
NEUTROPHILS NFR BLD AUTO: 42.3 % (ref 42.7–76)
NRBC BLD AUTO-RTO: 0 /100 WBC (ref 0–0.2)
PLATELET # BLD AUTO: 239 10*3/MM3 (ref 140–450)
POTASSIUM SERPL-SCNC: 3.6 MMOL/L (ref 3.5–5.2)
PROT SERPL-MCNC: 6.4 G/DL (ref 6–8.5)
RBC # BLD AUTO: 4.55 10*6/MM3 (ref 3.77–5.28)
SODIUM SERPL-SCNC: 140 MMOL/L (ref 136–145)
T4 FREE SERPL-MCNC: 1.16 NG/DL (ref 0.93–1.7)
TRIGL SERPL-MCNC: 298 MG/DL (ref 0–150)
TSH SERPL DL<=0.005 MIU/L-ACNC: 3.85 UIU/ML (ref 0.27–4.2)
VLDLC SERPL CALC-MCNC: 59.6 MG/DL
WBC # BLD AUTO: 5.36 10*3/MM3 (ref 3.4–10.8)

## 2020-06-24 ENCOUNTER — OFFICE VISIT (OUTPATIENT)
Dept: FAMILY MEDICINE CLINIC | Facility: CLINIC | Age: 65
End: 2020-06-24

## 2020-06-24 VITALS
OXYGEN SATURATION: 95 % | DIASTOLIC BLOOD PRESSURE: 62 MMHG | WEIGHT: 141.6 LBS | TEMPERATURE: 97.1 F | SYSTOLIC BLOOD PRESSURE: 108 MMHG | HEIGHT: 61 IN | BODY MASS INDEX: 26.73 KG/M2 | HEART RATE: 64 BPM

## 2020-06-24 DIAGNOSIS — I10 HYPERTENSION, UNSPECIFIED TYPE: Primary | ICD-10-CM

## 2020-06-24 DIAGNOSIS — F32.5 MAJOR DEPRESSIVE DISORDER WITH SINGLE EPISODE, IN FULL REMISSION (HCC): ICD-10-CM

## 2020-06-24 DIAGNOSIS — F51.01 PRIMARY INSOMNIA: ICD-10-CM

## 2020-06-24 DIAGNOSIS — E78.00 HIGH CHOLESTEROL: ICD-10-CM

## 2020-06-24 DIAGNOSIS — E03.9 HYPOTHYROIDISM, UNSPECIFIED TYPE: ICD-10-CM

## 2020-06-24 DIAGNOSIS — E78.1 HYPERTRIGLYCERIDEMIA: ICD-10-CM

## 2020-06-24 PROCEDURE — 99214 OFFICE O/P EST MOD 30 MIN: CPT | Performed by: INTERNAL MEDICINE

## 2020-06-24 RX ORDER — ICOSAPENT ETHYL 1000 MG/1
2 CAPSULE ORAL 2 TIMES DAILY WITH MEALS
Qty: 120 CAPSULE | Refills: 3 | Status: SHIPPED | OUTPATIENT
Start: 2020-06-24 | End: 2020-09-14 | Stop reason: SDUPTHER

## 2020-06-24 RX ORDER — CARBOXYMETHYLCELLULOSE SODIUM 5 MG/ML
1 SOLUTION/ DROPS OPHTHALMIC 2 TIMES DAILY
COMMUNITY
Start: 2020-04-07 | End: 2021-11-30

## 2020-06-25 ENCOUNTER — TELEPHONE (OUTPATIENT)
Dept: FAMILY MEDICINE CLINIC | Facility: CLINIC | Age: 65
End: 2020-06-25

## 2020-06-25 NOTE — TELEPHONE ENCOUNTER
PATIENT CALLED AND STATED SHE WAS PRESCRIBED A MEDICATION FOR TRIGLYCERIDES.  PATIENT STATES INSURANCE NEEDS A PRIOR AUTHORIZATION.    PATIENTS CALL BACK NUMBER; [phone]    [id number] - [place], [place] - [address] AT [facility] OF [address]/[facility] & [place] - [phone] [facility] [phone] FX

## 2020-06-26 DIAGNOSIS — L65.9 HAIR LOSS: Primary | ICD-10-CM

## 2020-06-29 ENCOUNTER — TELEPHONE (OUTPATIENT)
Dept: FAMILY MEDICINE CLINIC | Facility: CLINIC | Age: 65
End: 2020-06-29

## 2020-06-29 NOTE — TELEPHONE ENCOUNTER
Pt has questions about medication Vescepa, can you please call her at [phone]    Thanks elieser

## 2020-06-30 LAB
IRON SATN MFR SERPL: 24 % (ref 20–50)
IRON SERPL-MCNC: 80 MCG/DL (ref 37–145)
TIBC SERPL-MCNC: 330 MCG/DL
UIBC SERPL-MCNC: 250 MCG/DL (ref 112–346)

## 2020-07-05 PROBLEM — E78.1 HYPERTRIGLYCERIDEMIA: Status: ACTIVE | Noted: 2020-07-05

## 2020-07-05 PROBLEM — F51.01 PRIMARY INSOMNIA: Status: ACTIVE | Noted: 2020-07-05

## 2020-07-05 NOTE — PROGRESS NOTES
Jessica Escobar is a 65 y.o. female. Patient is here today for   Chief Complaint   Patient presents with   • Hypertension     follow up labs    • Hypothyroidism   • Hyperlipidemia   • Alopecia     x 5 months   • GI Problem     for a long time, the last week is worse          Vitals:    06/24/20 1537   BP: 108/62   Pulse: 64   Temp: 97.1 °F (36.2 °C)   SpO2: 95%     Body mass index is 26.77 kg/m².      Past Medical History:   Diagnosis Date   • Arthritis    • Depression    • Diverticulitis    • Diverticulitis of colon    • Diverticulosis    • Hyperlipidemia    • Hypertension    • Thyroid disease       Allergies   Allergen Reactions   • Azithromycin Other (See Comments)     Reaction unknown to patient (unable to remember)   • Pravastatin    • Zetia [Ezetimibe]       Social History     Socioeconomic History   • Marital status:      Spouse name: Not on file   • Number of children: Not on file   • Years of education: Not on file   • Highest education level: Not on file   Tobacco Use   • Smoking status: Never Smoker   • Smokeless tobacco: Never Used   Substance and Sexual Activity   • Alcohol use: Not Currently     Alcohol/week: 1.0 standard drinks     Types: 1 Glasses of wine per week   • Drug use: No   • Sexual activity: Not Currently     Partners: Male     Birth control/protection: Post-menopausal        Current Outpatient Medications:   •  Acidophilus Lactobacillus capsule, Take 1 capsule by mouth 2 (Two) Times a Day., Disp: 60 capsule, Rfl: 5  •  ALPRAZolam (XANAX) 0.25 MG tablet, Take 1 tablet by mouth 2 (Two) Times a Day As Needed for Anxiety., Disp: 60 tablet, Rfl: 0  •  amphetamine-dextroamphetamine XR (ADDERALL XR) 20 MG 24 hr capsule, Take 1 capsule by mouth Every Morning, Disp: 30 capsule, Rfl: 0  •  calcium acetate (PHOSLO) 667 MG capsule, Take 1,334 mg by mouth 3 (Three) Times a Day With Meals., Disp: , Rfl:   •  carboxymethylcellulose (Lubricant Eye Drops PF) 0.5 % solution,  Inject 1 drop into the eye 2 (two) times a day., Disp: , Rfl:   •  estradiol (ESTRACE) 0.1 MG/GM vaginal cream, Insert 1 g into the vagina 2 (Two) Times a Week., Disp: 45 g, Rfl: 2  •  FLUoxetine (PROzac) 20 MG capsule, TAKE 1 CAPSULE BY MOUTH FOUR TIMES DAILY, Disp: 360 capsule, Rfl: 0  •  hydroCHLOROthiazide (HYDRODIURIL) 25 MG tablet, TAKE 1 TABLET BY MOUTH DAILY, Disp: 90 tablet, Rfl: 1  •  ipratropium (ATROVENT) 0.06 % nasal spray, 2 sprays into the nostril(s) as directed by provider 4 (Four) Times a Day., Disp: 15 mL, Rfl: 12  •  levothyroxine (SYNTHROID, LEVOTHROID) 75 MCG tablet, Take 1 tablet by mouth Daily., Disp: 90 tablet, Rfl: 3  •  loperamide (IMODIUM A-D) 2 MG tablet, Take 1 tablet by mouth As Needed for Diarrhea., Disp: 30 tablet, Rfl: 2  •  loratadine (CLARITIN) 10 MG tablet, Take 1 tablet by mouth Daily., Disp: 30 tablet, Rfl: 2  •  olopatadine (PATANOL) 0.1 % ophthalmic solution, Administer 1 drop to both eyes 2 (Two) Times a Day., Disp: 5 mL, Rfl: 5  •  pantoprazole (PROTONIX) 20 MG EC tablet, Take 1 tablet by mouth Daily. Don't take this within 2 hours of the thyroid medication, Disp: 30 tablet, Rfl: 3  •  propranolol (INDERAL) 40 MG tablet, TAKE 1 TABLET BY MOUTH THREE TIMES DAILY, Disp: 270 tablet, Rfl: 1  •  rosuvastatin (CRESTOR) 20 MG tablet, TAKE 1 TABLET BY MOUTH DAILY, Disp: 90 tablet, Rfl: 0  •  zolpidem (AMBIEN) 5 MG tablet, Take 1 tablet by mouth At Night As Needed for Sleep., Disp: 30 tablet, Rfl: 2  •  icosapent ethyl (Vascepa) 1 g capsule capsule, Take 2 g by mouth 2 (Two) Times a Day With Meals., Disp: 120 capsule, Rfl: 3     Objective     She is here to follow-up on hypertension, hypothyroidism, hypercholesterolemia.    She complains of hair loss for the last 5 months.    She complains of reflux symptoms.       Review of Systems   Constitutional: Negative.    HENT: Negative.    Respiratory: Negative.    Cardiovascular: Negative.  Negative for chest pain, palpitations and leg  swelling.   Gastrointestinal: Negative for vomiting.        She complains of heartburn.   Genitourinary: Negative.    Skin:        She is concerned about hair loss on her scalp.   Psychiatric/Behavioral: Negative.        Physical Exam   Constitutional: She is oriented to person, place, and time. She appears well-developed and well-nourished.   Pleasant, neatly groomed, in no distress.   HENT:   Head: Normocephalic and atraumatic.   Cardiovascular: Normal rate and normal heart sounds.   Pulmonary/Chest: Effort normal and breath sounds normal. No respiratory distress.   Neurological: She is alert and oriented to person, place, and time.   Skin:   It is difficult for me to appreciate any thinning of the hair of the scalp.   Psychiatric: She has a normal mood and affect. Her behavior is normal. Thought content normal.   Nursing note and vitals reviewed.        Problem List Items Addressed This Visit        Cardiovascular and Mediastinum    High cholesterol    Relevant Medications    icosapent ethyl (Vascepa) 1 g capsule capsule    HTN (hypertension) - Primary    Hypertriglyceridemia    Relevant Medications    icosapent ethyl (Vascepa) 1 g capsule capsule       Endocrine    Hypothyroid       Other    Major depressive disorder with single episode, in full remission (CMS/[facility])    Primary insomnia            PLAN  Her cholesterol appears to be very well controlled however her triglycerides are persistently in the neighborhood of 300.  I asked her to start taking for [employer] 2000 mg twice daily.  Like to have her back in 3 months to recheck a lipid and a comprehensive metabolic panel.    She is concerned about hair loss.  She is recently seen more hair loss over the last several months.  Unknown to check serum iron level.  Her thyroid hormone appears to be appropriately replaced on levothyroxine 75 mcg daily.     Hypothyroidism, as above.    She feels her insomnia is relatively well controlled on Ambien nightly.  I refill that  prescription for her at the beginning of this month.    She feels her depression is relatively well controlled.      No follow-ups on file.

## 2020-07-17 RX ORDER — FLUOXETINE HYDROCHLORIDE 20 MG/1
CAPSULE ORAL
Qty: 360 CAPSULE | Refills: 0 | Status: SHIPPED | OUTPATIENT
Start: 2020-07-17 | End: 2020-10-20

## 2020-07-20 DIAGNOSIS — F41.9 ANXIETY: ICD-10-CM

## 2020-07-20 DIAGNOSIS — J30.9 ALLERGIC RHINITIS, UNSPECIFIED SEASONALITY, UNSPECIFIED TRIGGER: ICD-10-CM

## 2020-07-20 DIAGNOSIS — F98.8 ATTENTION DEFICIT DISORDER, UNSPECIFIED HYPERACTIVITY PRESENCE: ICD-10-CM

## 2020-07-20 RX ORDER — ALPRAZOLAM 0.25 MG/1
0.25 TABLET ORAL 2 TIMES DAILY PRN
Qty: 60 TABLET | Refills: 0 | Status: SHIPPED | OUTPATIENT
Start: 2020-07-20 | End: 2020-09-08

## 2020-07-20 RX ORDER — ROSUVASTATIN CALCIUM 20 MG/1
20 TABLET, COATED ORAL DAILY
Qty: 90 TABLET | Refills: 0 | Status: SHIPPED | OUTPATIENT
Start: 2020-07-20 | End: 2020-10-20

## 2020-07-20 RX ORDER — DEXTROAMPHETAMINE SACCHARATE, AMPHETAMINE ASPARTATE MONOHYDRATE, DEXTROAMPHETAMINE SULFATE AND AMPHETAMINE SULFATE 5; 5; 5; 5 MG/1; MG/1; MG/1; MG/1
20 CAPSULE, EXTENDED RELEASE ORAL EVERY MORNING
Qty: 30 CAPSULE | Refills: 0 | Status: SHIPPED | OUTPATIENT
Start: 2020-07-20 | End: 2020-11-06 | Stop reason: SDUPTHER

## 2020-07-20 RX ORDER — LORATADINE 10 MG/1
10 TABLET ORAL DAILY
Qty: 30 TABLET | Refills: 2 | Status: SHIPPED | OUTPATIENT
Start: 2020-07-20 | End: 2020-10-30

## 2020-07-20 RX ORDER — ROSUVASTATIN CALCIUM 20 MG/1
TABLET, COATED ORAL
Qty: 90 TABLET | Refills: 0 | Status: SHIPPED | OUTPATIENT
Start: 2020-07-20 | End: 2020-07-20 | Stop reason: SDUPTHER

## 2020-07-20 NOTE — TELEPHONE ENCOUNTER
Caller: LukaszCynthia castro ASHANTI    Relationship: Self    Best call back number: [phone]    Medication needed:   Requested Prescriptions     Pending Prescriptions Disp Refills   • loratadine (Claritin) 10 MG tablet 30 tablet 2     Sig: Take 1 tablet by mouth Daily.   • rosuvastatin (CRESTOR) 20 MG tablet 90 tablet 0     Sig: Take 1 tablet by mouth Daily.   • amphetamine-dextroamphetamine XR (ADDERALL XR) 20 MG 24 hr capsule 30 capsule 0     Sig: Take 1 capsule by mouth Every Morning   • ALPRAZolam (XANAX) 0.25 MG tablet 60 tablet 0     Sig: Take 1 tablet by mouth 2 (Two) Times a Day As Needed for Anxiety.       [id number] - [place], [place] - [address] AT [facility] OF [facility] [address] /[facility] & [place] - [phone] [facility] [phone] FX

## 2020-07-23 DIAGNOSIS — L65.9 HAIR LOSS: Primary | ICD-10-CM

## 2020-07-23 DIAGNOSIS — L65.9 HAIR THINNING: ICD-10-CM

## 2020-07-27 ENCOUNTER — TELEPHONE (OUTPATIENT)
Dept: FAMILY MEDICINE CLINIC | Facility: CLINIC | Age: 65
End: 2020-07-27

## 2020-07-27 NOTE — TELEPHONE ENCOUNTER
PT CALLED STATING SHE TESTED POSITIVE FOR COVID.     PLEASE ADVISE     PT CALL BACK   [phone]

## 2020-08-03 ENCOUNTER — TELEPHONE (OUTPATIENT)
Dept: FAMILY MEDICINE CLINIC | Facility: CLINIC | Age: 65
End: 2020-08-03

## 2020-08-03 DIAGNOSIS — U07.1 COVID-19 VIRUS DETECTED: Primary | ICD-10-CM

## 2020-08-03 NOTE — TELEPHONE ENCOUNTER
Patient tested positive for covid on 7/22- needs to be retested so she can go back to work. Is still having some problem with lose of Taste and smell     Can we send an  Order for the testing to    [address] [facility]    Fax [phone]    Pt # [phone]

## 2020-08-05 ENCOUNTER — HOSPITAL ENCOUNTER (OUTPATIENT)
Dept: URGENT CARE | Facility: CLINIC | Age: 65
Discharge: HOME | End: 2020-08-05
Attending: FAMILY MEDICINE

## 2020-08-08 LAB — SARS-COV-2 RNA SPEC QL NAA+PROBE: DETECTED

## 2020-08-10 ENCOUNTER — TELEPHONE (OUTPATIENT)
Dept: FAMILY MEDICINE CLINIC | Facility: CLINIC | Age: 65
End: 2020-08-10

## 2020-08-10 NOTE — TELEPHONE ENCOUNTER
Patient states she has had a medication in the past for herpes simplex that she get on her face under her nose    She thinks the name of it was Ackcylvair?- she says has had ointment or pills  I do not see listed in her medications so will be a new one- she is not sure who prescribed it for her in the past    Pt [phone]    Pharmacy J[facility]

## 2020-08-17 ENCOUNTER — TELEPHONE (OUTPATIENT)
Dept: FAMILY MEDICINE CLINIC | Facility: CLINIC | Age: 65
End: 2020-08-17

## 2020-08-17 ENCOUNTER — HOSPITAL ENCOUNTER (OUTPATIENT)
Dept: URGENT CARE | Facility: CLINIC | Age: 65
Discharge: HOME | End: 2020-08-17
Attending: FAMILY MEDICINE

## 2020-08-17 ENCOUNTER — OFFICE VISIT (OUTPATIENT)
Dept: FAMILY MEDICINE CLINIC | Facility: CLINIC | Age: 65
End: 2020-08-17

## 2020-08-17 DIAGNOSIS — B00.2 ORAL HERPES: Primary | ICD-10-CM

## 2020-08-17 DIAGNOSIS — U07.1 COVID-19: Primary | ICD-10-CM

## 2020-08-17 PROCEDURE — 99441 PR PHYS/QHP TELEPHONE EVALUATION 5-10 MIN: CPT | Performed by: INTERNAL MEDICINE

## 2020-08-17 RX ORDER — VALACYCLOVIR HYDROCHLORIDE 1 G/1
2000 TABLET, FILM COATED ORAL 2 TIMES DAILY
Qty: 8 TABLET | Refills: 1 | Status: SHIPPED | OUTPATIENT
Start: 2020-08-17 | End: 2021-08-10

## 2020-08-17 NOTE — TELEPHONE ENCOUNTER
Pt wants this faxed to the last place we sent '[address] [facility]'  Fax: [phone].  She is aware I am faxing over for her.

## 2020-08-17 NOTE — TELEPHONE ENCOUNTER
PATIENT CALLED IN AND IS REQUESTING A NEW COVID-19 TEST TO BE SENT TO [facility]  ON [address] .        PATIENT CALL BACK [phone]

## 2020-08-18 PROBLEM — B00.2 ORAL HERPES: Status: ACTIVE | Noted: 2020-08-18

## 2020-08-18 NOTE — PROGRESS NOTES
Jessica Escobar is a 65 y.o. female. Patient is here today for No chief complaint on file.         There were no vitals filed for this visit.  There is no height or weight on file to calculate BMI.      Past Medical History:   Diagnosis Date   • Arthritis    • Depression    • Diverticulitis    • Diverticulitis of colon    • Diverticulosis    • Hyperlipidemia    • Hypertension    • Oral herpes 8/18/2020   • Thyroid disease       Allergies   Allergen Reactions   • Azithromycin Other (See Comments)     Reaction unknown to patient (unable to remember)   • Pravastatin    • Zetia [Ezetimibe]       Social History     Socioeconomic History   • Marital status:      Spouse name: Not on file   • Number of children: Not on file   • Years of education: Not on file   • Highest education level: Not on file   Tobacco Use   • Smoking status: Never Smoker   • Smokeless tobacco: Never Used   Substance and Sexual Activity   • Alcohol use: Not Currently     Alcohol/week: 1.0 standard drinks     Types: 1 Glasses of wine per week   • Drug use: No   • Sexual activity: Not Currently     Partners: Male     Birth control/protection: Post-menopausal        Current Outpatient Medications:   •  Acidophilus Lactobacillus capsule, Take 1 capsule by mouth 2 (Two) Times a Day., Disp: 60 capsule, Rfl: 5  •  ALPRAZolam (XANAX) 0.25 MG tablet, Take 1 tablet by mouth 2 (Two) Times a Day As Needed for Anxiety., Disp: 60 tablet, Rfl: 0  •  amphetamine-dextroamphetamine XR (ADDERALL XR) 20 MG 24 hr capsule, Take 1 capsule by mouth Every Morning, Disp: 30 capsule, Rfl: 0  •  calcium acetate (PHOSLO) 667 MG capsule, Take 1,334 mg by mouth 3 (Three) Times a Day With Meals., Disp: , Rfl:   •  carboxymethylcellulose (Lubricant Eye Drops PF) 0.5 % solution, Inject 1 drop into the eye 2 (two) times a day., Disp: , Rfl:   •  estradiol (ESTRACE) 0.1 MG/GM vaginal cream, Insert 1 g into the vagina 2 (Two) Times a Week., Disp: 45 g, Rfl:  2  •  FLUoxetine (PROzac) 20 MG capsule, TAKE 1 CAPSULE BY MOUTH FOUR TIMES DAILY, Disp: 360 capsule, Rfl: 0  •  hydroCHLOROthiazide (HYDRODIURIL) 25 MG tablet, TAKE 1 TABLET BY MOUTH DAILY, Disp: 90 tablet, Rfl: 1  •  icosapent ethyl (Vascepa) 1 g capsule capsule, Take 2 g by mouth 2 (Two) Times a Day With Meals., Disp: 120 capsule, Rfl: 3  •  ipratropium (ATROVENT) 0.06 % nasal spray, 2 sprays into the nostril(s) as directed by provider 4 (Four) Times a Day., Disp: 15 mL, Rfl: 12  •  levothyroxine (SYNTHROID, LEVOTHROID) 75 MCG tablet, Take 1 tablet by mouth Daily., Disp: 90 tablet, Rfl: 3  •  loperamide (IMODIUM A-D) 2 MG tablet, Take 1 tablet by mouth As Needed for Diarrhea., Disp: 30 tablet, Rfl: 2  •  loratadine (Claritin) 10 MG tablet, Take 1 tablet by mouth Daily., Disp: 30 tablet, Rfl: 2  •  olopatadine (PATANOL) 0.1 % ophthalmic solution, Administer 1 drop to both eyes 2 (Two) Times a Day., Disp: 5 mL, Rfl: 5  •  pantoprazole (PROTONIX) 20 MG EC tablet, Take 1 tablet by mouth Daily. Don't take this within 2 hours of the thyroid medication, Disp: 30 tablet, Rfl: 3  •  propranolol (INDERAL) 40 MG tablet, TAKE 1 TABLET BY MOUTH THREE TIMES DAILY, Disp: 270 tablet, Rfl: 1  •  rosuvastatin (CRESTOR) 20 MG tablet, Take 1 tablet by mouth Daily., Disp: 90 tablet, Rfl: 0  •  valACYclovir (Valtrex) 1000 MG tablet, Take 2 tablets by mouth 2 (Two) Times a Day. For one day (complete treatment per episode), Disp: 8 tablet, Rfl: 1  •  zolpidem (AMBIEN) 5 MG tablet, Take 1 tablet by mouth At Night As Needed for Sleep., Disp: 30 tablet, Rfl: 2     Objective     This is documentation for a tele-visit.  The patient has occasional bouts of oral herpes.  She is tried acyclovir in the past and found it to be useful and she requested a prescription for this medication.    I thought maybe valacyclovir might be a better choice because seated so much more convenient to take.    She does not currently have an episode of oral herpes  but she wants to be prepared in case it does happen.    The patient consented to the visit today.       Review of Systems   Constitutional: Negative.    HENT: Negative.    Respiratory: Negative.  Negative for apnea.    Cardiovascular: Negative.    Musculoskeletal: Negative.    Psychiatric/Behavioral: Negative.        Physical Exam      Problem List Items Addressed This Visit        Other    Oral herpes - Primary    Relevant Medications    valACYclovir (Valtrex) 1000 MG tablet            PLAN  I sent a prescription out for this patient to use as needed.  She tells me that she gets a couple bouts of oral herpes every year.    She should follow-up for a Medicare wellness visit once yearly.    Follow-up PRN.  No follow-ups on file.

## 2020-08-19 LAB — SARS-COV-2 RNA SPEC QL NAA+PROBE: NOT DETECTED

## 2020-08-20 ENCOUNTER — TELEPHONE (OUTPATIENT)
Dept: FAMILY MEDICINE CLINIC | Facility: CLINIC | Age: 65
End: 2020-08-20

## 2020-08-20 DIAGNOSIS — F51.01 PRIMARY INSOMNIA: ICD-10-CM

## 2020-08-20 NOTE — TELEPHONE ENCOUNTER
Caller: Cynthia Escobar    Relationship: Self    Best call back number: [phone]    Medication needed:   Requested Prescriptions     Pending Prescriptions Disp Refills   • zolpidem (AMBIEN) 5 MG tablet 30 tablet 2     Sig: Take 1 tablet by mouth At Night As Needed for Sleep.       When do you need the refill by: PT HAS FOUR DAYS LEFT      Does the patient have less than a 3 day supply:  [] Yes  [x] No    What is the patient's preferred pharmacy: [id number] - [place], [place] - [address] AT [address]/[facility] & [place] - [phone] [facility] [phone] FX

## 2020-08-21 RX ORDER — ZOLPIDEM TARTRATE 5 MG/1
5 TABLET ORAL NIGHTLY PRN
Qty: 30 TABLET | Refills: 2 | Status: SHIPPED | OUTPATIENT
Start: 2020-08-21 | End: 2020-09-08

## 2020-09-02 ENCOUNTER — OFFICE VISIT (OUTPATIENT)
Dept: FAMILY MEDICINE CLINIC | Facility: CLINIC | Age: 65
End: 2020-09-02

## 2020-09-02 VITALS
HEART RATE: 79 BPM | OXYGEN SATURATION: 95 % | DIASTOLIC BLOOD PRESSURE: 60 MMHG | BODY MASS INDEX: 26.85 KG/M2 | TEMPERATURE: 98 F | RESPIRATION RATE: 20 BRPM | WEIGHT: 142.2 LBS | HEIGHT: 61 IN | SYSTOLIC BLOOD PRESSURE: 124 MMHG

## 2020-09-02 DIAGNOSIS — R41.3 MEMORY LOSS: ICD-10-CM

## 2020-09-02 DIAGNOSIS — I10 HYPERTENSION, UNSPECIFIED TYPE: ICD-10-CM

## 2020-09-02 DIAGNOSIS — G44.229 CHRONIC TENSION-TYPE HEADACHE, NOT INTRACTABLE: Primary | ICD-10-CM

## 2020-09-02 PROCEDURE — 99214 OFFICE O/P EST MOD 30 MIN: CPT | Performed by: INTERNAL MEDICINE

## 2020-09-02 NOTE — PROGRESS NOTES
Jessica Escobar is a 65 y.o. female. Patient is here today for   Chief Complaint   Patient presents with   • Shortness of Breath     [facility] COVID + X2 C/O SOA CIUGH BODYACHES HEADACHE C/O CONFUSION ANXIETY INSOMNIA    • Diarrhea   • Cough   • Headache   • Anxiety   • Hypertension          Vitals:    09/02/20 0837   BP: 124/60   Pulse: 79   Resp: 20   Temp: 98 °F (36.7 °C)   SpO2: 95%     Body mass index is 26.87 kg/m².      Past Medical History:   Diagnosis Date   • Arthritis    • Depression    • Diverticulitis    • Diverticulitis of colon    • Diverticulosis    • Hyperlipidemia    • Hypertension    • Oral herpes 8/18/2020   • Thyroid disease       Allergies   Allergen Reactions   • Azithromycin Other (See Comments)     Reaction unknown to patient (unable to remember)   • Pravastatin    • Zetia [Ezetimibe]       Social History     Socioeconomic History   • Marital status:      Spouse name: Not on file   • Number of children: Not on file   • Years of education: Not on file   • Highest education level: Not on file   Tobacco Use   • Smoking status: Never Smoker   • Smokeless tobacco: Never Used   Substance and Sexual Activity   • Alcohol use: Not Currently     Alcohol/week: 1.0 standard drinks     Types: 1 Glasses of wine per week   • Drug use: No   • Sexual activity: Not Currently     Partners: Male     Birth control/protection: Post-menopausal        Current Outpatient Medications:   •  Acidophilus Lactobacillus capsule, Take 1 capsule by mouth 2 (Two) Times a Day., Disp: 60 capsule, Rfl: 5  •  ALPRAZolam (XANAX) 0.25 MG tablet, Take 1 tablet by mouth 2 (Two) Times a Day As Needed for Anxiety., Disp: 60 tablet, Rfl: 0  •  amphetamine-dextroamphetamine XR (ADDERALL XR) 20 MG 24 hr capsule, Take 1 capsule by mouth Every Morning, Disp: 30 capsule, Rfl: 0  •  calcium acetate (PHOSLO) 667 MG capsule, Take 1,334 mg by mouth 3 (Three) Times a Day With Meals., Disp: , Rfl:   •   carboxymethylcellulose (Lubricant Eye Drops PF) 0.5 % solution, Inject 1 drop into the eye 2 (two) times a day., Disp: , Rfl:   •  estradiol (ESTRACE) 0.1 MG/GM vaginal cream, Insert 1 g into the vagina 2 (Two) Times a Week., Disp: 45 g, Rfl: 2  •  FLUoxetine (PROzac) 20 MG capsule, TAKE 1 CAPSULE BY MOUTH FOUR TIMES DAILY, Disp: 360 capsule, Rfl: 0  •  hydroCHLOROthiazide (HYDRODIURIL) 25 MG tablet, TAKE 1 TABLET BY MOUTH DAILY, Disp: 90 tablet, Rfl: 1  •  icosapent ethyl (Vascepa) 1 g capsule capsule, Take 2 g by mouth 2 (Two) Times a Day With Meals., Disp: 120 capsule, Rfl: 3  •  ipratropium (ATROVENT) 0.06 % nasal spray, 2 sprays into the nostril(s) as directed by provider 4 (Four) Times a Day., Disp: 15 mL, Rfl: 12  •  levothyroxine (SYNTHROID, LEVOTHROID) 75 MCG tablet, Take 1 tablet by mouth Daily., Disp: 90 tablet, Rfl: 3  •  loperamide (IMODIUM A-D) 2 MG tablet, Take 1 tablet by mouth As Needed for Diarrhea., Disp: 30 tablet, Rfl: 2  •  loratadine (Claritin) 10 MG tablet, Take 1 tablet by mouth Daily., Disp: 30 tablet, Rfl: 2  •  olopatadine (PATANOL) 0.1 % ophthalmic solution, Administer 1 drop to both eyes 2 (Two) Times a Day., Disp: 5 mL, Rfl: 5  •  pantoprazole (PROTONIX) 20 MG EC tablet, Take 1 tablet by mouth Daily. Don't take this within 2 hours of the thyroid medication, Disp: 30 tablet, Rfl: 3  •  propranolol (INDERAL) 40 MG tablet, TAKE 1 TABLET BY MOUTH THREE TIMES DAILY, Disp: 270 tablet, Rfl: 1  •  rosuvastatin (CRESTOR) 20 MG tablet, Take 1 tablet by mouth Daily., Disp: 90 tablet, Rfl: 0  •  valACYclovir (Valtrex) 1000 MG tablet, Take 2 tablets by mouth 2 (Two) Times a Day. For one day (complete treatment per episode), Disp: 8 tablet, Rfl: 1  •  zolpidem (AMBIEN) 5 MG tablet, Take 1 tablet by mouth At Night As Needed for Sleep., Disp: 30 tablet, Rfl: 2     Objective     This patient has tested positive for COVID-19 twice.    Most recently, a couple weeks ago, she tested negative.    She has a  headache which starts in the front and goes towards the back of her scalp at least once daily.  She finds that ibuprofen works pretty well to resolve this.    She has abdominal pain.  I had made a referral for her to see gastroenterology sometime ago.  She has an appointment to see a gastroenterologist early next week for this issue.    She notes palpitations.  She has a cardiologist that she follows up with.  Her palpitations occur infrequently and are not associated with any chest pains, shortness of breath, dyspnea etc.    She is accustomed to taking Ambien 10 mg nightly as needed.  This turned out to be most nights.    I inadvertently filled a prescription for 5 mg the last time I refilled it.  I will make sure to fill it for 10 mg tablets when asked to fill it again.    Recently, she has been concerned about memory loss.       Review of Systems   Constitutional: Negative.    Respiratory: Negative.    Cardiovascular: Positive for palpitations.   Neurological: Positive for headaches.   Psychiatric/Behavioral:        She complains of some memory loss.       Physical Exam   Constitutional: She is oriented to person, place, and time. She appears well-developed and well-nourished.   Pleasant, neatly groomed, in no distress.   HENT:   Head: Normocephalic and atraumatic.   Neck:   No carotid bruit.   Cardiovascular: Normal rate, regular rhythm and normal heart sounds.   Pulmonary/Chest: Effort normal and breath sounds normal.   Neurological: She is alert and oriented to person, place, and time.   Psychiatric: She has a normal mood and affect. Her behavior is normal. Thought content normal.   Nursing note and vitals reviewed.        Problem List Items Addressed This Visit        Cardiovascular and Mediastinum    HTN (hypertension)       Nervous and Auditory    Chronic tension-type headache, not intractable - Primary    Relevant Orders    MRI Brain With & Without Contrast       Other    Memory loss    Relevant Orders     MRI Brain With & Without Contrast            PLAN  Her hypertension is well controlled.    She has a new headache which sounds like a tension headache but this is been daily for a few weeks now.    She also has a complaint of memory loss.  I am going to get an MRI of her head with and without contrast.    She has a complaint of diarrhea for which she is following up with gastroenterology.    She has palpitations.  I asked her to follow-up with her cardiologist.  No follow-ups on file.

## 2020-09-04 DIAGNOSIS — F51.01 PRIMARY INSOMNIA: ICD-10-CM

## 2020-09-04 DIAGNOSIS — F41.9 ANXIETY: ICD-10-CM

## 2020-09-04 RX ORDER — LEVOTHYROXINE SODIUM 0.07 MG/1
75 TABLET ORAL DAILY
Qty: 90 TABLET | Refills: 3 | Status: SHIPPED | OUTPATIENT
Start: 2020-09-04 | End: 2021-09-27

## 2020-09-04 RX ORDER — PANTOPRAZOLE SODIUM 20 MG/1
20 TABLET, DELAYED RELEASE ORAL DAILY
Qty: 30 TABLET | Refills: 3 | Status: SHIPPED | OUTPATIENT
Start: 2020-09-04 | End: 2021-01-04

## 2020-09-04 NOTE — TELEPHONE ENCOUNTER
Patient called in requesting a re-fill for    pantoprazole (PROTONIX) 20 MG EC tablet    Jens Garber.    Best call back # [phone]

## 2020-09-08 RX ORDER — ZOLPIDEM TARTRATE 10 MG/1
TABLET ORAL
Qty: 30 TABLET | Refills: 1 | Status: SHIPPED | OUTPATIENT
Start: 2020-09-08 | End: 2020-11-06

## 2020-09-08 RX ORDER — ALPRAZOLAM 0.25 MG/1
TABLET ORAL
Qty: 60 TABLET | Refills: 2 | Status: SHIPPED | OUTPATIENT
Start: 2020-09-08 | End: 2021-06-03

## 2020-09-09 ENCOUNTER — HOSPITAL ENCOUNTER (OUTPATIENT)
Dept: URGENT CARE | Facility: CLINIC | Age: 65
Discharge: HOME | End: 2020-09-09

## 2020-09-14 RX ORDER — ICOSAPENT ETHYL 1000 MG/1
2 CAPSULE ORAL 2 TIMES DAILY WITH MEALS
Qty: 120 CAPSULE | Refills: 3 | Status: SHIPPED | OUTPATIENT
Start: 2020-09-14 | End: 2020-11-25

## 2020-09-17 LAB — SARS-COV-2 RNA SPEC QL NAA+PROBE: NOT DETECTED

## 2020-09-27 RX ORDER — ESTRADIOL 0.1 MG/G
1 CREAM VAGINAL 2 TIMES WEEKLY
Qty: 45 G | Refills: 2 | Status: SHIPPED | OUTPATIENT
Start: 2020-09-28 | End: 2021-02-25 | Stop reason: SDUPTHER

## 2020-10-08 ENCOUNTER — OFFICE VISIT (OUTPATIENT)
Dept: CARDIOLOGY | Facility: CLINIC | Age: 65
End: 2020-10-08

## 2020-10-08 VITALS
SYSTOLIC BLOOD PRESSURE: 118 MMHG | BODY MASS INDEX: 26.39 KG/M2 | HEART RATE: 75 BPM | HEIGHT: 61 IN | OXYGEN SATURATION: 97 % | DIASTOLIC BLOOD PRESSURE: 78 MMHG | WEIGHT: 139.8 LBS

## 2020-10-08 DIAGNOSIS — E78.00 HIGH CHOLESTEROL: ICD-10-CM

## 2020-10-08 DIAGNOSIS — L65.9 HAIR LOSS: ICD-10-CM

## 2020-10-08 DIAGNOSIS — I10 HYPERTENSION, UNSPECIFIED TYPE: ICD-10-CM

## 2020-10-08 DIAGNOSIS — R07.2 PRECORDIAL PAIN: Primary | ICD-10-CM

## 2020-10-08 PROCEDURE — 99214 OFFICE O/P EST MOD 30 MIN: CPT | Performed by: NURSE PRACTITIONER

## 2020-10-08 NOTE — PROGRESS NOTES
[facility]  [address]  [facility] [address]  Phone: [phone]      Patient Name: Cynthia Escobar  :1955  Age: 65 y.o.  Primary Cardiologist: Timothy Pruitt MD  Encounter Provider:  PIYUSH Belle      Chief Complaint:   Chief Complaint   Patient presents with   • Chest Pain   • Follow-up         HPI  Cynthia Escobar is a 65 y.o. female who presents today for 3-month reevaluation.     Pt has a  history significant for hyperlipidemia, hypertension.    Patient reports that since she was last evaluated by us she has tested positive for COVID on 2 separate occasions.  She reports that she tested positive in July and then again in August.  She reports that since that time she continues to have some episodes of chest discomfort as well as increased fatigue and shortness of breath with exertion.  Patient states that when she exercises approximately 10 minutes into exercise she experiences chest discomfort and shortness of breath.  She reports that she slows down and then her symptoms improve.  Patient does take Adderall for the past year and reports that some of the symptoms did occur when she started taking the medication and she also wonders if they are residual from her COVID-19 infection.  She also is noting that she is seeing a dermatologist for hair loss and receiving injections of medications for this.  She denies any episodes of lightheadedness or lower extremity edema.      The following portions of the patient's history were reviewed and updated as appropriate: allergies, current medications, past family history, past medical history, past social history, past surgical history and problem list.      Review of Systems   Constitution: Positive for malaise/fatigue.   Cardiovascular: Positive for chest pain and dyspnea on exertion. Negative for leg swelling.   Respiratory: Positive for shortness of breath.   "  Neurological: Negative for light-headedness.   All other systems reviewed and are negative.      OBJECTIVE:     Vitals:    10/08/20 0908   BP: 118/78   Pulse: 75   SpO2: 97%   Weight: 63.4 kg (139 lb 12.8 oz)   Height: 154.9 cm (60.98\")     Body mass index is 26.43 kg/m².    Vitals signs and nursing note reviewed.   Constitutional:       Appearance: Normal appearance. Well-developed.   Eyes:      Conjunctiva/sclera: Conjunctivae normal.   Neck:      Vascular: No carotid bruit.   Pulmonary:      Breath sounds: Normal breath sounds.   Cardiovascular:      Normal rate. Regular rhythm. Normal S1 with normal intensity. Normal S2 with normal intensity.      Murmurs: There is no murmur.      No gallop. No click. No rub.   Edema:     Peripheral edema absent.   Musculoskeletal: Normal range of motion.   Skin:     General: Skin is warm and dry.   Neurological:      Mental Status: Alert and oriented to person, place, and time.      GCS: GCS eye subscore is 4. GCS verbal subscore is 5. GCS motor subscore is 6.   Psychiatric:         Speech: Speech normal.         Behavior: Behavior normal.         Thought Content: Thought content normal.         Judgment: Judgment normal.         Procedures    Cardiac Procedures:  1. Stress echocardiogram 4/24/2020.  LVEF 60%.  Normal LV cavity size.  All LV wall segments contract normally.  Grade 1 diastolic dysfunction.  Patient complained of chest pain 6/10 during exercise which did not resolve until 15 minutes into recovery.  ST segment depression of 2 mm in the inferior lateral leads was noted during stress and returned to baseline after 5 to 9 minutes of recovery.  Patient could only reach 69% of age-predicted maximum heart rate.  Patient referred for left diagnostic cardiac catheterization.  2. Left diagnostic cardiac catheterization 4/24/2020.  Normal LV function.  Normal coronary angiography.        ASSESSMENT:      Diagnosis Plan   1. Precordial pain     2. Hypertension, unspecified " type     3. High cholesterol     4. Hair loss           PLAN OF CARE:     1. Precordial pain.  Patient continues to complain of episodes of chest pain that worsen approximately 10 minutes into exercise.  She has had a stress echocardiogram which revealed preserved LVEF without any valvular abnormalities.  She did undergo a diagnostic cardiac catheterization in April 2020 which revealed normal coronary angiography.  I do not think that patient's chest pain is cardiac related.  We did discuss the fact that stimulant such as Adderall could be contributing to her symptoms as well as anxiety.  She could also have some exercise intolerance secondary to COVID infections several months ago.  Patient encouraged to continue exercise but to slow down if she experience symptoms.  Will continue to monitor.  2. Hypertension.  Blood pressure controlled in office at 118/78.  She will continue HCTZ 25 mg/day, propanolol 40 mg 3 times per day.  3. High cholesterol.  Currently on Crestor.  Patient also experiencing hair loss.  Hair loss is a side effect potentially associated with Crestor.  Advised patient that if she would like to trial stopping Crestor for 2 weeks to see if this improves her hair loss symptoms that she could.  She will notify the office if this does improve her symptoms.  If it does not improve her symptoms she will restart Crestor.  4. Hair loss.  As above for high cholesterol plan.  5. Follow-up with Dr. Pruitt in 6 months.  Sooner for any problems or complications.             Discharge Medications          Accurate as of October 8, 2020  9:59 AM. If you have any questions, ask your nurse or doctor.            Continue These Medications      Instructions Start Date   Acidophilus Lactobacillus capsule   1 capsule, Oral, 2 Times Daily      ALPRAZolam 0.25 MG tablet  Commonly known as: XANAX   TAKE 1 TABLET BY MOUTH TWICE DAILY AS NEEDED FOR ANXIETY      amphetamine-dextroamphetamine XR 20 MG 24 hr  capsule  Commonly known as: ADDERALL XR   20 mg, Oral, Every Morning      calcium acetate 667 MG capsule  Commonly known as: PHOSLO   1,334 mg, Oral, 3 Times Daily With Meals      estradiol 0.1 MG/GM vaginal cream  Commonly known as: ESTRACE   1 g, Vaginal, 2 Times Weekly      FLUoxetine 20 MG capsule  Commonly known as: PROzac   TAKE 1 CAPSULE BY MOUTH FOUR TIMES DAILY      hydroCHLOROthiazide 25 MG tablet  Commonly known as: HYDRODIURIL   25 mg, Oral, Daily      ipratropium 0.06 % nasal spray  Commonly known as: Atrovent   2 sprays, Nasal, 4 Times Daily      levothyroxine 75 MCG tablet  Commonly known as: SYNTHROID, LEVOTHROID   75 mcg, Oral, Daily      loperamide 2 MG tablet  Commonly known as: IMODIUM A-D   2 mg, Oral, As Needed      loratadine 10 MG tablet  Commonly known as: Claritin   10 mg, Oral, Daily      Lubricant Eye Drops PF 0.5 % solution  Generic drug: carboxymethylcellulose   1 drop, Intraocular, 2 times daily      olopatadine 0.1 % ophthalmic solution  Commonly known as: PATANOL   1 drop, Both Eyes, 2 Times Daily      pantoprazole 20 MG EC tablet  Commonly known as: PROTONIX   20 mg, Oral, Daily, Don't take this within 2 hours of the thyroid medication      propranolol 40 MG tablet  Commonly known as: INDERAL   TAKE 1 TABLET BY MOUTH THREE TIMES DAILY      rosuvastatin 20 MG tablet  Commonly known as: CRESTOR   20 mg, Oral, Daily      valACYclovir 1000 MG tablet  Commonly known as: Valtrex   2,000 mg, Oral, 2 Times Daily, For one day (complete treatment per episode)      Vascepa 1 g capsule capsule  Generic drug: icosapent ethyl   2 g, Oral, 2 Times Daily With Meals      zolpidem 10 MG tablet  Commonly known as: AMBIEN   TAKE 1 TABLET BY MOUTH EVERY NIGHT AT BEDTIME AS NEEDED FOR SLEEP             Thank you for allowing me to participate in the care of your patient,         PIYUSH Belle  [facility]  10/08/20  09:41 EDT      **Pat Disclaimer:**  Much of this encounter  note is an electronic transcription/translation of spoken language to printed text. The electronic translation of spoken language may permit erroneous, or at times, nonsensical words or phrases to be inadvertently transcribed. Although I have reviewed the note for such errors, some may still exist.

## 2020-10-20 RX ORDER — FLUOXETINE HYDROCHLORIDE 20 MG/1
CAPSULE ORAL
Qty: 360 CAPSULE | Refills: 1 | Status: SHIPPED | OUTPATIENT
Start: 2020-10-20 | End: 2021-01-22

## 2020-10-20 RX ORDER — ROSUVASTATIN CALCIUM 20 MG/1
20 TABLET, COATED ORAL DAILY
Qty: 90 TABLET | Refills: 1 | Status: SHIPPED | OUTPATIENT
Start: 2020-10-20 | End: 2021-08-11

## 2020-10-29 DIAGNOSIS — J30.9 ALLERGIC RHINITIS, UNSPECIFIED SEASONALITY, UNSPECIFIED TRIGGER: ICD-10-CM

## 2020-11-02 RX ORDER — LORATADINE 10 MG/1
TABLET ORAL
Qty: 30 TABLET | Refills: 5 | Status: SHIPPED | OUTPATIENT
Start: 2020-11-02 | End: 2021-08-02 | Stop reason: SDUPTHER

## 2020-11-06 DIAGNOSIS — F98.8 ATTENTION DEFICIT DISORDER, UNSPECIFIED HYPERACTIVITY PRESENCE: ICD-10-CM

## 2020-11-06 DIAGNOSIS — F51.01 PRIMARY INSOMNIA: ICD-10-CM

## 2020-11-06 RX ORDER — OLOPATADINE HYDROCHLORIDE 1 MG/ML
1 SOLUTION/ DROPS OPHTHALMIC 2 TIMES DAILY
Qty: 5 ML | Refills: 5 | Status: SHIPPED | OUTPATIENT
Start: 2020-11-06 | End: 2021-11-23

## 2020-11-06 RX ORDER — ZOLPIDEM TARTRATE 10 MG/1
TABLET ORAL
Qty: 30 TABLET | Refills: 1 | Status: SHIPPED | OUTPATIENT
Start: 2020-11-06 | End: 2021-01-04

## 2020-11-06 RX ORDER — DEXTROAMPHETAMINE SACCHARATE, AMPHETAMINE ASPARTATE MONOHYDRATE, DEXTROAMPHETAMINE SULFATE AND AMPHETAMINE SULFATE 5; 5; 5; 5 MG/1; MG/1; MG/1; MG/1
20 CAPSULE, EXTENDED RELEASE ORAL EVERY MORNING
Qty: 30 CAPSULE | Refills: 0 | Status: SHIPPED | OUTPATIENT
Start: 2020-11-06 | End: 2021-03-02 | Stop reason: SDUPTHER

## 2020-11-11 RX ORDER — HYDROCHLOROTHIAZIDE 25 MG/1
25 TABLET ORAL DAILY
Qty: 90 TABLET | Refills: 1 | Status: SHIPPED | OUTPATIENT
Start: 2020-11-11 | End: 2021-05-06

## 2020-11-23 RX ORDER — PROPRANOLOL HYDROCHLORIDE 40 MG/1
TABLET ORAL
Qty: 270 TABLET | Refills: 1 | Status: SHIPPED | OUTPATIENT
Start: 2020-11-23 | End: 2021-06-01

## 2020-11-25 RX ORDER — ICOSAPENT ETHYL 1000 MG/1
CAPSULE ORAL
Qty: 120 CAPSULE | Refills: 11 | Status: SHIPPED | OUTPATIENT
Start: 2020-11-25 | End: 2021-11-30

## 2020-12-01 ENCOUNTER — HOSPITAL ENCOUNTER (OUTPATIENT)
Dept: MRI IMAGING | Facility: HOSPITAL | Age: 65
Discharge: HOME | End: 2020-12-01

## 2020-12-01 LAB
CREAT BLD-MCNC: 0.7 MG/DL (ref 0.6–1.4)
GFR SERPLBLD BASED ON 1.73 SQ M-ARVRAT: >60 ML/MIN/{1.73_M2}

## 2021-01-04 DIAGNOSIS — F51.01 PRIMARY INSOMNIA: ICD-10-CM

## 2021-01-04 RX ORDER — PANTOPRAZOLE SODIUM 20 MG/1
TABLET, DELAYED RELEASE ORAL
Qty: 30 TABLET | Refills: 5 | Status: SHIPPED | OUTPATIENT
Start: 2021-01-04 | End: 2021-05-12

## 2021-01-04 RX ORDER — ZOLPIDEM TARTRATE 10 MG/1
TABLET ORAL
Qty: 30 TABLET | Refills: 2 | Status: SHIPPED | OUTPATIENT
Start: 2021-01-04 | End: 2021-04-15 | Stop reason: SDUPTHER

## 2021-01-12 DIAGNOSIS — E03.9 HYPOTHYROIDISM, UNSPECIFIED TYPE: ICD-10-CM

## 2021-01-12 DIAGNOSIS — R73.9 HYPERGLYCEMIA: ICD-10-CM

## 2021-01-12 DIAGNOSIS — E78.1 HYPERTRIGLYCERIDEMIA: ICD-10-CM

## 2021-01-12 DIAGNOSIS — I10 HYPERTENSION, UNSPECIFIED TYPE: Primary | ICD-10-CM

## 2021-01-18 LAB
ALBUMIN SERPL-MCNC: 4.4 G/DL (ref 3.5–5.2)
ALBUMIN/GLOB SERPL: 3.4 G/DL
ALP SERPL-CCNC: 64 U/L (ref 39–117)
ALT SERPL-CCNC: 47 U/L (ref 1–33)
APPEARANCE UR: ABNORMAL
AST SERPL-CCNC: 44 U/L (ref 1–32)
BACTERIA #/AREA URNS HPF: ABNORMAL /HPF
BASOPHILS # BLD AUTO: 0.03 10*3/MM3 (ref 0–0.2)
BASOPHILS NFR BLD AUTO: 0.6 % (ref 0–1.5)
BILIRUB SERPL-MCNC: 0.4 MG/DL (ref 0–1.2)
BILIRUB UR QL STRIP: NEGATIVE
BUN SERPL-MCNC: 23 MG/DL (ref 8–23)
BUN/CREAT SERPL: 37.7 (ref 7–25)
CALCIUM SERPL-MCNC: 9.4 MG/DL (ref 8.6–10.5)
CASTS URNS MICRO: ABNORMAL
CHLORIDE SERPL-SCNC: 100 MMOL/L (ref 98–107)
CHOLEST SERPL-MCNC: 198 MG/DL (ref 0–200)
CO2 SERPL-SCNC: 29 MMOL/L (ref 22–29)
COLOR UR: ABNORMAL
CREAT SERPL-MCNC: 0.61 MG/DL (ref 0.57–1)
CRYSTALS URNS MICRO: ABNORMAL
EOSINOPHIL # BLD AUTO: 0.26 10*3/MM3 (ref 0–0.4)
EOSINOPHIL NFR BLD AUTO: 5 % (ref 0.3–6.2)
EPI CELLS #/AREA URNS HPF: ABNORMAL /HPF
ERYTHROCYTE [DISTWIDTH] IN BLOOD BY AUTOMATED COUNT: 12.7 % (ref 12.3–15.4)
GLOBULIN SER CALC-MCNC: 1.3 GM/DL
GLUCOSE SERPL-MCNC: 138 MG/DL (ref 65–99)
GLUCOSE UR QL: NEGATIVE
HBA1C MFR BLD: 6.2 % (ref 4.8–5.6)
HCT VFR BLD AUTO: 42.3 % (ref 34–46.6)
HDLC SERPL-MCNC: 30 MG/DL (ref 40–60)
HGB BLD-MCNC: 14.4 G/DL (ref 12–15.9)
HGB UR QL STRIP: NEGATIVE
IMM GRANULOCYTES # BLD AUTO: 0.01 10*3/MM3 (ref 0–0.05)
IMM GRANULOCYTES NFR BLD AUTO: 0.2 % (ref 0–0.5)
KETONES UR QL STRIP: NEGATIVE
LDLC SERPL CALC-MCNC: 110 MG/DL (ref 0–100)
LDLC/HDLC SERPL: 3.37 {RATIO}
LEUKOCYTE ESTERASE UR QL STRIP: ABNORMAL
LYMPHOCYTES # BLD AUTO: 2.38 10*3/MM3 (ref 0.7–3.1)
LYMPHOCYTES NFR BLD AUTO: 45.5 % (ref 19.6–45.3)
MCH RBC QN AUTO: 31 PG (ref 26.6–33)
MCHC RBC AUTO-ENTMCNC: 34 G/DL (ref 31.5–35.7)
MCV RBC AUTO: 91 FL (ref 79–97)
MONOCYTES # BLD AUTO: 0.34 10*3/MM3 (ref 0.1–0.9)
MONOCYTES NFR BLD AUTO: 6.5 % (ref 5–12)
NEUTROPHILS # BLD AUTO: 2.21 10*3/MM3 (ref 1.7–7)
NEUTROPHILS NFR BLD AUTO: 42.2 % (ref 42.7–76)
NITRITE UR QL STRIP: NEGATIVE
NRBC BLD AUTO-RTO: 0 /100 WBC (ref 0–0.2)
PH UR STRIP: 5.5 [PH] (ref 5–8)
PLATELET # BLD AUTO: 208 10*3/MM3 (ref 140–450)
POTASSIUM SERPL-SCNC: 3.9 MMOL/L (ref 3.5–5.2)
PROT SERPL-MCNC: 5.7 G/DL (ref 6–8.5)
PROT UR QL STRIP: ABNORMAL
RBC # BLD AUTO: 4.65 10*6/MM3 (ref 3.77–5.28)
RBC #/AREA URNS HPF: ABNORMAL /HPF
SODIUM SERPL-SCNC: 137 MMOL/L (ref 136–145)
SP GR UR: ABNORMAL (ref 1–1.03)
T4 FREE SERPL-MCNC: 1.12 NG/DL (ref 0.93–1.7)
TRIGL SERPL-MCNC: 335 MG/DL (ref 0–150)
TSH SERPL DL<=0.005 MIU/L-ACNC: 3.1 UIU/ML (ref 0.27–4.2)
UROBILINOGEN UR STRIP-MCNC: ABNORMAL MG/DL
VLDLC SERPL CALC-MCNC: 58 MG/DL (ref 5–40)
WBC # BLD AUTO: 5.23 10*3/MM3 (ref 3.4–10.8)
WBC #/AREA URNS HPF: ABNORMAL /HPF

## 2021-01-22 ENCOUNTER — OFFICE VISIT (OUTPATIENT)
Dept: FAMILY MEDICINE CLINIC | Facility: CLINIC | Age: 66
End: 2021-01-22

## 2021-01-22 VITALS
TEMPERATURE: 97.5 F | OXYGEN SATURATION: 98 % | DIASTOLIC BLOOD PRESSURE: 64 MMHG | HEART RATE: 75 BPM | RESPIRATION RATE: 18 BRPM | HEIGHT: 61 IN | BODY MASS INDEX: 26.28 KG/M2 | WEIGHT: 139.2 LBS | SYSTOLIC BLOOD PRESSURE: 114 MMHG

## 2021-01-22 DIAGNOSIS — E03.9 HYPOTHYROIDISM, UNSPECIFIED TYPE: ICD-10-CM

## 2021-01-22 DIAGNOSIS — F32.5 MAJOR DEPRESSIVE DISORDER WITH SINGLE EPISODE, IN FULL REMISSION (HCC): ICD-10-CM

## 2021-01-22 DIAGNOSIS — R73.9 HYPERGLYCEMIA: ICD-10-CM

## 2021-01-22 DIAGNOSIS — F51.01 PRIMARY INSOMNIA: ICD-10-CM

## 2021-01-22 DIAGNOSIS — N30.00 ACUTE CYSTITIS WITHOUT HEMATURIA: ICD-10-CM

## 2021-01-22 DIAGNOSIS — I10 HYPERTENSION, UNSPECIFIED TYPE: Primary | ICD-10-CM

## 2021-01-22 DIAGNOSIS — F98.8 ATTENTION DEFICIT DISORDER (ADD) WITHOUT HYPERACTIVITY: ICD-10-CM

## 2021-01-22 DIAGNOSIS — E78.1 HYPERTRIGLYCERIDEMIA: ICD-10-CM

## 2021-01-22 PROCEDURE — 99214 OFFICE O/P EST MOD 30 MIN: CPT | Performed by: INTERNAL MEDICINE

## 2021-01-22 RX ORDER — SULFAMETHOXAZOLE AND TRIMETHOPRIM 800; 160 MG/1; MG/1
1 TABLET ORAL 2 TIMES DAILY
Qty: 14 TABLET | Refills: 0 | Status: CANCELLED | OUTPATIENT
Start: 2021-01-22

## 2021-01-22 RX ORDER — FLUOXETINE HYDROCHLORIDE 20 MG/1
CAPSULE ORAL
Qty: 360 CAPSULE | Refills: 1 | Status: SHIPPED | OUTPATIENT
Start: 2021-01-22 | End: 2021-08-12

## 2021-01-25 ENCOUNTER — TELEPHONE (OUTPATIENT)
Dept: FAMILY MEDICINE CLINIC | Facility: CLINIC | Age: 66
End: 2021-01-25

## 2021-01-25 NOTE — TELEPHONE ENCOUNTER
PATIENT STATES SHE CALLED  ON CALL Friday BECAUSE THE BACTRIM FROM DR OLIVEIRA DID NOT GET SENT TO THE PHARMACY . HE CALLED IN SOMETHING DIFFERENT AND SHE WOULD LIKE TO HAVE A CALL BACK PLEASE.     [phone]

## 2021-01-26 RX ORDER — SULFAMETHOXAZOLE AND TRIMETHOPRIM 800; 160 MG/1; MG/1
1 TABLET ORAL 2 TIMES DAILY
Qty: 14 TABLET | Refills: 0 | Status: SHIPPED | OUTPATIENT
Start: 2021-01-26 | End: 2021-08-10

## 2021-02-03 ENCOUNTER — TELEPHONE (OUTPATIENT)
Dept: FAMILY MEDICINE CLINIC | Facility: CLINIC | Age: 66
End: 2021-02-03

## 2021-02-03 NOTE — TELEPHONE ENCOUNTER
PATIENT STATED THAT DR. OLIVEIRA WAS SUPPOSED TO REFER HER TO A NEUROLOGIST BASED ON HER MRI RESULTS.       PLEASE ADVISE      CALL BACK NUMBER: [phone]

## 2021-02-16 PROBLEM — R73.9 HYPERGLYCEMIA: Status: ACTIVE | Noted: 2021-02-16

## 2021-02-16 PROBLEM — N30.00 ACUTE CYSTITIS WITHOUT HEMATURIA: Status: ACTIVE | Noted: 2021-02-16

## 2021-02-17 DIAGNOSIS — G44.229 CHRONIC TENSION-TYPE HEADACHE, NOT INTRACTABLE: Primary | ICD-10-CM

## 2021-02-17 NOTE — PROGRESS NOTES
Jessica Escobar is a 65 y.o. female. Patient is here today for   Chief Complaint   Patient presents with   • Anxiety     lab f/u.    • Depression   • ADD   • Hyperlipidemia   • Hypertension   • Hypothyroidism          Vitals:    01/22/21 1558   BP: 114/64   Pulse: 75   Resp: 18   Temp: 97.5 °F (36.4 °C)   SpO2: 98%     Body mass index is 26.32 kg/m².      Past Medical History:   Diagnosis Date   • Arthritis    • Depression    • Diverticulitis    • Diverticulitis of colon    • Diverticulosis    • Hyperlipidemia    • Hypertension    • Oral herpes 8/18/2020   • Thyroid disease       Allergies   Allergen Reactions   • Azithromycin Other (See Comments)     Reaction unknown to patient (unable to remember)   • Pravastatin    • Zetia [Ezetimibe]       Social History     Socioeconomic History   • Marital status:      Spouse name: Not on file   • Number of children: Not on file   • Years of education: Not on file   • Highest education level: Not on file   Tobacco Use   • Smoking status: Never Smoker   • Smokeless tobacco: Never Used   Substance and Sexual Activity   • Alcohol use: Not Currently     Alcohol/week: 1.0 standard drinks     Types: 1 Glasses of wine per week   • Drug use: No   • Sexual activity: Not Currently     Partners: Male     Birth control/protection: Post-menopausal        Current Outpatient Medications:   •  ALPRAZolam (XANAX) 0.25 MG tablet, TAKE 1 TABLET BY MOUTH TWICE DAILY AS NEEDED FOR ANXIETY, Disp: 60 tablet, Rfl: 2  •  amphetamine-dextroamphetamine XR (ADDERALL XR) 20 MG 24 hr capsule, Take 1 capsule by mouth Every Morning, Disp: 30 capsule, Rfl: 0  •  calcium acetate (PHOSLO) 667 MG capsule, Take 1,334 mg by mouth 3 (Three) Times a Day With Meals., Disp: , Rfl:   •  carboxymethylcellulose (Lubricant Eye Drops PF) 0.5 % solution, Inject 1 drop into the eye 2 (two) times a day., Disp: , Rfl:   •  estradiol (ESTRACE) 0.1 MG/GM vaginal cream, Insert 1 g into the vagina 2  (Two) Times a Week., Disp: 45 g, Rfl: 2  •  FLUoxetine (PROzac) 20 MG capsule, TAKE 1 CAPSULE BY MOUTH FOUR TIMES DAILY, Disp: 360 capsule, Rfl: 1  •  hydroCHLOROthiazide (HYDRODIURIL) 25 MG tablet, TAKE 1 TABLET BY MOUTH DAILY, Disp: 90 tablet, Rfl: 1  •  ipratropium (ATROVENT) 0.06 % nasal spray, 2 sprays into the nostril(s) as directed by provider 4 (Four) Times a Day., Disp: 15 mL, Rfl: 12  •  levothyroxine (SYNTHROID, LEVOTHROID) 75 MCG tablet, TAKE 1 TABLET BY MOUTH DAILY, Disp: 90 tablet, Rfl: 3  •  loperamide (IMODIUM A-D) 2 MG tablet, Take 1 tablet by mouth As Needed for Diarrhea., Disp: 30 tablet, Rfl: 2  •  loratadine (CLARITIN) 10 MG tablet, TAKE 1 TABLET BY MOUTH EVERY DAY, Disp: 30 tablet, Rfl: 5  •  olopatadine (PATANOL) 0.1 % ophthalmic solution, Administer 1 drop to both eyes 2 (Two) Times a Day., Disp: 5 mL, Rfl: 5  •  pantoprazole (PROTONIX) 20 MG EC tablet, TAKE 1 TABLET BY MOUTH EVERY DAY. DO NOT TAKE WITHIN 2 HOURS OF THYROID MEDICATION, Disp: 30 tablet, Rfl: 5  •  propranolol (INDERAL) 40 MG tablet, TAKE 1 TABLET BY MOUTH THREE TIMES DAILY, Disp: 270 tablet, Rfl: 1  •  rosuvastatin (CRESTOR) 20 MG tablet, TAKE 1 TABLET BY MOUTH DAILY, Disp: 90 tablet, Rfl: 1  •  valACYclovir (Valtrex) 1000 MG tablet, Take 2 tablets by mouth 2 (Two) Times a Day. For one day (complete treatment per episode), Disp: 8 tablet, Rfl: 1  •  Vascepa 1 g capsule capsule, TAKE 2 CAPSULES (2 GRAMS) TWICE A DAY WITH MEALS, Disp: 120 capsule, Rfl: 11  •  zolpidem (AMBIEN) 10 MG tablet, TAKE 1 TABLET BY MOUTH EVERY NIGHT AT BEDTIME AS NEEDED FOR SLEEP, Disp: 30 tablet, Rfl: 2  •  Acidophilus Lactobacillus capsule, Take 1 capsule by mouth 2 (Two) Times a Day., Disp: 60 capsule, Rfl: 5  •  sulfamethoxazole-trimethoprim (BACTRIM DS,SEPTRA DS) 800-160 MG per tablet, Take 1 tablet by mouth 2 (Two) Times a Day., Disp: 14 tablet, Rfl: 0     Objective     She is here to follow-up on labs done last week.    She tells me she feels  relatively well although she is noticed that she is urinating frequently.  She feels that she may have a urinary tract infection.       Review of Systems   Constitutional: Negative.    HENT: Negative.    Genitourinary: Positive for frequency.   Psychiatric/Behavioral: Negative.        Physical Exam  Vitals signs and nursing note reviewed.   Constitutional:       Appearance: Normal appearance.      Comments: Pleasant, neatly groomed, no distress.   Neck:      Vascular: No carotid bruit.   Cardiovascular:      Rate and Rhythm: Regular rhythm.      Heart sounds: Normal heart sounds. No murmur. No gallop.    Pulmonary:      Effort: No respiratory distress.      Breath sounds: Normal breath sounds. No wheezing or rales.   Neurological:      Mental Status: She is alert and oriented to person, place, and time.   Psychiatric:         Mood and Affect: Mood normal.         Behavior: Behavior normal.           Problems Addressed this Visit        Cardiac and Vasculature    HTN (hypertension) - Primary    Hypertriglyceridemia       Endocrine and Metabolic    Hypothyroid    Hyperglycemia       Genitourinary and Reproductive     Acute cystitis without hematuria       Mental Health    Major depressive disorder with single episode, in full remission (CMS/Formerly [facility])    Attention deficit disorder (ADD) without hyperactivity       Sleep    Primary insomnia      Diagnoses       Codes Comments    Hypertension, unspecified type    -  Primary ICD-10-CM: I10  ICD-9-CM: 401.9     Hypertriglyceridemia     ICD-10-CM: E78.1  ICD-9-CM: 272.1     Hypothyroidism, unspecified type     ICD-10-CM: E03.9  ICD-9-CM: 244.9     Hyperglycemia     ICD-10-CM: R73.9  ICD-9-CM: 790.29     Acute cystitis without hematuria     ICD-10-CM: N30.00  ICD-9-CM: 595.0     Primary insomnia     ICD-10-CM: F51.01  ICD-9-CM: 307.42     Major depressive disorder with single episode, in full remission (CMS/Formerly [facility])     ICD-10-CM: F32.5  ICD-9-CM: 296.26     Attention deficit  disorder (ADD) without hyperactivity     ICD-10-CM: F98.8  ICD-9-CM: 314.00             PLAN  Her hypertension is well controlled.    She has hypertriglyceridemia and she is prediabetic.  She tells me that she does not drink a significant amount of alcohol.    I like for her triglycerides to be a bit lower.  She is on Vascepa and this does not appear to be making much difference.    Her hypercholesterolemia is relatively well controlled on Crestor 20 mg daily.    She has acute cystitis.  I sent out a prescription for Bactrim DS 1 p.o. twice daily #14 no refills.    She has major depressive disorder and ADD which she feels are well controlled.    She has chronic insomnia and takes Ambien routinely.  She feels this works well for her.    I like to see her back in about 4 months.  Fasting labs prior to that visit should include hemoglobin A1c, comprehensive metabolic panel, CBC, urinalysis and lipid profile..  No follow-ups on file.

## 2021-02-17 NOTE — TELEPHONE ENCOUNTER
I seen that MRI was completed. I placed in referral for pt as urgent as she has been waiting on this for awhile.

## 2021-02-18 ENCOUNTER — OFFICE VISIT (OUTPATIENT)
Dept: GASTROENTEROLOGY | Facility: CLINIC | Age: 66
End: 2021-02-18

## 2021-02-18 VITALS
SYSTOLIC BLOOD PRESSURE: 118 MMHG | WEIGHT: 139 LBS | HEIGHT: 61 IN | DIASTOLIC BLOOD PRESSURE: 65 MMHG | BODY MASS INDEX: 26.24 KG/M2

## 2021-02-18 DIAGNOSIS — R15.1 FECAL SMEARING: Chronic | ICD-10-CM

## 2021-02-18 DIAGNOSIS — K59.1 FUNCTIONAL DIARRHEA: Chronic | ICD-10-CM

## 2021-02-18 DIAGNOSIS — K76.0 NAFLD (NONALCOHOLIC FATTY LIVER DISEASE): ICD-10-CM

## 2021-02-18 DIAGNOSIS — R74.01 ELEVATED TRANSAMINASE LEVEL: Chronic | ICD-10-CM

## 2021-02-18 DIAGNOSIS — K20.0 ESOPHAGITIS, EOSINOPHILIC: Chronic | ICD-10-CM

## 2021-02-18 DIAGNOSIS — R13.19 ESOPHAGEAL DYSPHAGIA: Primary | Chronic | ICD-10-CM

## 2021-02-18 PROCEDURE — 99214 OFFICE O/P EST MOD 30 MIN: CPT | Performed by: INTERNAL MEDICINE

## 2021-02-18 RX ORDER — FLUTICASONE PROPIONATE 220 UG/1
AEROSOL, METERED RESPIRATORY (INHALATION)
Qty: 12 G | Refills: 1 | Status: SHIPPED | OUTPATIENT
Start: 2021-02-18 | End: 2021-09-27

## 2021-02-18 NOTE — PROGRESS NOTES
Chief Complaint   Patient presents with   • Difficulty Swallowing       Subjective     HPI    Cynthia Escobar is a 65 y.o. female with a past medical history noted below who presents for follow-up.  She has issues with dysphagia, EGD and May 2020 with esophageal biopsies consistent with eosinophilic esophagitis, issues with diarrhea and fecal incontinence, elevated liver enzymes and fatty liver.    No change with her dysphagia.  Still on ppi but still feels food get stuck on a regular basis  Worse with bread and meat.  Simply avoids meat due to concerns.  Frequently has to heaven foods with liquids, regurgitate food back up.    Sees Dr Arenas with [facility] next week.  Still with fecal incontinence, smearing.  Tried 4 mg of Imodium daily but got too constipated with this.  Now takes 1 tablet but only as needed.    She had COVID in the summer.    Today's visit was in the office.  Both the patient and I were wearing face masks and proper hand hygiene was performed before and after the physical exam.           Current Outpatient Medications:   •  ALPRAZolam (XANAX) 0.25 MG tablet, TAKE 1 TABLET BY MOUTH TWICE DAILY AS NEEDED FOR ANXIETY, Disp: 60 tablet, Rfl: 2  •  amphetamine-dextroamphetamine XR (ADDERALL XR) 20 MG 24 hr capsule, Take 1 capsule by mouth Every Morning, Disp: 30 capsule, Rfl: 0  •  calcium acetate (PHOSLO) 667 MG capsule, Take 1,334 mg by mouth 3 (Three) Times a Day With Meals., Disp: , Rfl:   •  carboxymethylcellulose (Lubricant Eye Drops PF) 0.5 % solution, Inject 1 drop into the eye 2 (two) times a day., Disp: , Rfl:   •  estradiol (ESTRACE) 0.1 MG/GM vaginal cream, Insert 1 g into the vagina 2 (Two) Times a Week., Disp: 45 g, Rfl: 2  •  FLUoxetine (PROzac) 20 MG capsule, TAKE 1 CAPSULE BY MOUTH FOUR TIMES DAILY, Disp: 360 capsule, Rfl: 1  •  hydroCHLOROthiazide (HYDRODIURIL) 25 MG tablet, TAKE 1 TABLET BY MOUTH DAILY, Disp: 90 tablet, Rfl: 1  •  ipratropium (ATROVENT) 0.06 % nasal spray, 2 sprays  into the nostril(s) as directed by provider 4 (Four) Times a Day., Disp: 15 mL, Rfl: 12  •  levothyroxine (SYNTHROID, LEVOTHROID) 75 MCG tablet, TAKE 1 TABLET BY MOUTH DAILY, Disp: 90 tablet, Rfl: 3  •  loperamide (IMODIUM A-D) 2 MG tablet, Take 1 tablet by mouth As Needed for Diarrhea., Disp: 30 tablet, Rfl: 2  •  loratadine (CLARITIN) 10 MG tablet, TAKE 1 TABLET BY MOUTH EVERY DAY, Disp: 30 tablet, Rfl: 5  •  olopatadine (PATANOL) 0.1 % ophthalmic solution, Administer 1 drop to both eyes 2 (Two) Times a Day., Disp: 5 mL, Rfl: 5  •  pantoprazole (PROTONIX) 20 MG EC tablet, TAKE 1 TABLET BY MOUTH EVERY DAY. DO NOT TAKE WITHIN 2 HOURS OF THYROID MEDICATION, Disp: 30 tablet, Rfl: 5  •  propranolol (INDERAL) 40 MG tablet, TAKE 1 TABLET BY MOUTH THREE TIMES DAILY, Disp: 270 tablet, Rfl: 1  •  rosuvastatin (CRESTOR) 20 MG tablet, TAKE 1 TABLET BY MOUTH DAILY, Disp: 90 tablet, Rfl: 1  •  valACYclovir (Valtrex) 1000 MG tablet, Take 2 tablets by mouth 2 (Two) Times a Day. For one day (complete treatment per episode), Disp: 8 tablet, Rfl: 1  •  Vascepa 1 g capsule capsule, TAKE 2 CAPSULES (2 GRAMS) TWICE A DAY WITH MEALS, Disp: 120 capsule, Rfl: 11  •  zolpidem (AMBIEN) 10 MG tablet, TAKE 1 TABLET BY MOUTH EVERY NIGHT AT BEDTIME AS NEEDED FOR SLEEP, Disp: 30 tablet, Rfl: 2  •  Acidophilus Lactobacillus capsule, Take 1 capsule by mouth 2 (Two) Times a Day., Disp: 60 capsule, Rfl: 5  •  fluticasone (FLOVENT HFA) 220 MCG/ACT inhaler, Spray 2 puffs in the mouth and swallow twice daily.  Do not inhale.  Do not eat for 30 minutes following., Disp: 12 g, Rfl: 1  •  sulfamethoxazole-trimethoprim (BACTRIM DS,SEPTRA DS) 800-160 MG per tablet, Take 1 tablet by mouth 2 (Two) Times a Day., Disp: 14 tablet, Rfl: 0      Objective     Vitals:    02/18/21 0954   BP: 118/65         02/18/21  0954   Weight: 63 kg (139 lb)     Body mass index is 26.26 kg/m².    Physical Exam  Vitals signs reviewed.   Constitutional:       General: She is not in  acute distress.     Appearance: Normal appearance. She is well-developed. She is not diaphoretic.   HENT:      Head: Normocephalic.   Neck:      Thyroid: No thyromegaly.   Cardiovascular:      Rate and Rhythm: Normal rate and regular rhythm.   Pulmonary:      Effort: Pulmonary effort is normal. No respiratory distress.      Breath sounds: Normal breath sounds. No wheezing.   Abdominal:      General: Bowel sounds are normal. There is no distension.      Palpations: Abdomen is soft. Abdomen is not rigid. There is no mass.      Tenderness: There is no abdominal tenderness. There is no guarding or rebound.      Hernia: No hernia is present.   Genitourinary:     Comments: Rectal exam deferred  Musculoskeletal:         General: No tenderness.   Neurological:      Mental Status: She is alert and oriented to person, place, and time.      Motor: No atrophy.      Coordination: Coordination normal.   Psychiatric:         Behavior: Behavior normal.         Thought Content: Thought content normal.         Judgment: Judgment normal.             WBC   Date Value Ref Range Status   01/18/2021 5.23 3.40 - 10.80 10*3/mm3 Final   12/27/2018 6.47 4.5 - 11.0 10*3/uL Final     RBC   Date Value Ref Range Status   01/18/2021 4.65 3.77 - 5.28 10*6/mm3 Final   12/27/2018 4.48 4.0 - 5.2 10*6/uL Final     Hemoglobin   Date Value Ref Range Status   01/18/2021 14.4 12.0 - 15.9 g/dL Final   04/24/2020 14.7 12.0 - 15.9 g/dL Final   01/09/2019 11.7 (L) 12.0 - 16.0 g/dL Final     Hematocrit   Date Value Ref Range Status   01/18/2021 42.3 34.0 - 46.6 % Final   04/24/2020 43.2 34.0 - 46.6 % Final   01/09/2019 35.4 (L) 36.0 - 46.0 % Final     MCV   Date Value Ref Range Status   01/18/2021 91.0 79.0 - 97.0 fL Final   04/24/2020 90.2 79.0 - 97.0 fL Final   12/27/2018 91.5 80.0 - 100.0 fL Final     MCH   Date Value Ref Range Status   01/18/2021 31.0 26.6 - 33.0 pg Final   04/24/2020 30.7 26.6 - 33.0 pg Final   12/27/2018 30.6 26.0 - 34.0 pg Final     [facility]    Date Value Ref Range Status   01/18/2021 34.0 31.5 - 35.7 g/dL Final   04/24/2020 34.0 31.5 - 35.7 g/dL Final   12/27/2018 33.4 31.0 - 37.0 g/dL Final     RDW   Date Value Ref Range Status   01/18/2021 12.7 12.3 - 15.4 % Final   04/24/2020 13.1 12.3 - 15.4 % Final   12/27/2018 13.0 12.0 - 16.8 % Final     RDW-SD   Date Value Ref Range Status   04/24/2020 43.3 37.0 - 54.0 fl Final     MPV   Date Value Ref Range Status   04/24/2020 10.1 6.0 - 12.0 fL Final   12/27/2018 10.0 6.7 - 10.8 fL Final     Platelets   Date Value Ref Range Status   01/18/2021 208 140 - 450 10*3/mm3 Final   04/24/2020 223 140 - 450 10*3/mm3 Final   12/27/2018 246 140 - 440 10*3/uL Final     Neutrophil Rel %   Date Value Ref Range Status   01/18/2021 42.2 (L) 42.7 - 76.0 % Final   12/27/2018 44.5 (L) 45 - 80 % Final     Neutrophil %   Date Value Ref Range Status   04/24/2020 47.0 42.7 - 76.0 % Final     Lymphocyte Rel %   Date Value Ref Range Status   01/18/2021 45.5 (H) 19.6 - 45.3 % Final   12/27/2018 43.7 15 - 50 % Final     Lymphocyte %   Date Value Ref Range Status   04/24/2020 41.8 19.6 - 45.3 % Final     Monocyte Rel %   Date Value Ref Range Status   01/18/2021 6.5 5.0 - 12.0 % Final   12/27/2018 4.3 0 - 15 % Final     Monocyte %   Date Value Ref Range Status   04/24/2020 5.3 5.0 - 12.0 % Final     Eosinophil Rel %   Date Value Ref Range Status   01/18/2021 5.0 0.3 - 6.2 % Final     Eosinophil %   Date Value Ref Range Status   04/24/2020 5.1 0.3 - 6.2 % Final   12/27/2018 7.0 0 - 7 % Final     Basophil Rel %   Date Value Ref Range Status   01/18/2021 0.6 0.0 - 1.5 % Final   12/27/2018 0.3 0 - 2 % Final     Basophil %   Date Value Ref Range Status   04/24/2020 0.3 0.0 - 1.5 % Final     Immature Grans %   Date Value Ref Range Status   04/24/2020 0.5 0.0 - 0.5 % Final   12/27/2018 0.2 (H) 0 % Final     Neutrophils Absolute   Date Value Ref Range Status   01/18/2021 2.21 1.70 - 7.00 10*3/mm3 Final   12/27/2018 2.88 2.0 - 8.8 10*3/uL Final      Neutrophils, Absolute   Date Value Ref Range Status   04/24/2020 2.92 1.70 - 7.00 10*3/mm3 Final     Lymphocytes Absolute   Date Value Ref Range Status   01/18/2021 2.38 0.70 - 3.10 10*3/mm3 Final   12/27/2018 2.83 0.7 - 5.5 10*3/uL Final     Lymphocytes, Absolute   Date Value Ref Range Status   04/24/2020 2.60 0.70 - 3.10 10*3/mm3 Final     Monocytes Absolute   Date Value Ref Range Status   01/18/2021 0.34 0.10 - 0.90 10*3/mm3 Final   12/27/2018 0.28 0.0 - 1.7 10*3/uL Final     Monocytes, Absolute   Date Value Ref Range Status   04/24/2020 0.33 0.10 - 0.90 10*3/mm3 Final     Eosinophils Absolute   Date Value Ref Range Status   01/18/2021 0.26 0.00 - 0.40 10*3/mm3 Final   12/27/2018 0.45 0.0 - 0.8 10*3/uL Final     Eosinophils, Absolute   Date Value Ref Range Status   04/24/2020 0.32 0.00 - 0.40 10*3/mm3 Final     Basophils Absolute   Date Value Ref Range Status   01/18/2021 0.03 0.00 - 0.20 10*3/mm3 Final   12/27/2018 0.02 0.0 - 0.2 10*3/uL Final     Basophils, Absolute   Date Value Ref Range Status   04/24/2020 0.02 0.00 - 0.20 10*3/mm3 Final     Immature Grans, Absolute   Date Value Ref Range Status   04/24/2020 0.03 0.00 - 0.05 10*3/mm3 Final   12/27/2018 0.01 <1 10*3/uL Final     nRBC   Date Value Ref Range Status   01/18/2021 0.0 0.0 - 0.2 /100 WBC Final   04/24/2020 0.0 0.0 - 0.2 /100 WBC Final       Lab Results   Component Value Date    GLUCOSE 113 (H) 04/24/2020    BUN 23 01/18/2021    CREATININE 0.61 01/18/2021    EGFRIFNONA 98 01/18/2021    EGFRIFAFRI 119 01/18/2021    BCR 37.7 (H) 01/18/2021    CO2 29.0 01/18/2021    CALCIUM 9.4 01/18/2021    PROTENTOTREF 5.7 (L) 01/18/2021    ALBUMIN 4.40 01/18/2021    LABIL2 3.4 01/18/2021    AST 44 (H) 01/18/2021    ALT 47 (H) 01/18/2021         Imaging Results (Last 7 Days)     ** No results found for the last 168 hours. **          I personally reviewed data as detailed below:     The labs listed above.    Endoscopy procedures and pathology from:5/27/20      No  notes on file    Assessment/Plan   1.  Esophageal dysphagia: Persistent despite PPI.  Secondary to his eosinophilic esophagitis    2.  Eosinophilic esophagitis: Not responding to PPI    3.  Diarrhea, fecal smearing    4.  Elevated transaminases, NAFLD    Plan  Advised 1/2 tablet of Imodium daily to see if this bulk stool without constipating her  Swallowed fluticasone--discussed how to spray this into her mouth and swallow, not inhale--to treat EOE x4 weeks  Follow-up with Dr. Arenas  Repeat liver ultrasound given elevated transaminases, no recent imaging    Diagnoses and all orders for this visit:    1. Esophageal dysphagia (Primary)    2. Esophagitis, eosinophilic    3. Fecal smearing    4. Functional diarrhea    5. NAFLD (nonalcoholic fatty liver disease)  -     US Liver; Future    6. Elevated transaminase level  -     US Liver; Future    Other orders  -     fluticasone (FLOVENT HFA) 220 MCG/ACT inhaler; Spray 2 puffs in the mouth and swallow twice daily.  Do not inhale.  Do not eat for 30 minutes following.  Dispense: 12 g; Refill: 1        I have discussed the above plan with the patient.  They verbalize understanding and are in agreement with the plan.  They have been advised to contact the office for any questions, concerns, or changes related to their health.    Dictated utilizing Dragon dictation

## 2021-02-25 ENCOUNTER — OFFICE VISIT (OUTPATIENT)
Dept: OBSTETRICS AND GYNECOLOGY | Facility: CLINIC | Age: 66
End: 2021-02-25

## 2021-02-25 VITALS
WEIGHT: 135.3 LBS | SYSTOLIC BLOOD PRESSURE: 120 MMHG | BODY MASS INDEX: 23.97 KG/M2 | DIASTOLIC BLOOD PRESSURE: 74 MMHG | HEIGHT: 63 IN

## 2021-02-25 DIAGNOSIS — Z11.51 ENCOUNTER FOR SCREENING FOR HUMAN PAPILLOMAVIRUS (HPV): ICD-10-CM

## 2021-02-25 DIAGNOSIS — Z13.9 SCREENING FOR CONDITION: ICD-10-CM

## 2021-02-25 DIAGNOSIS — N95.2 VAGINAL ATROPHY: ICD-10-CM

## 2021-02-25 DIAGNOSIS — Z78.0 ASYMPTOMATIC MENOPAUSAL STATE: ICD-10-CM

## 2021-02-25 DIAGNOSIS — Z12.31 ENCOUNTER FOR SCREENING MAMMOGRAM FOR MALIGNANT NEOPLASM OF BREAST: ICD-10-CM

## 2021-02-25 DIAGNOSIS — Z01.419 PAP SMEAR, LOW-RISK: Primary | ICD-10-CM

## 2021-02-25 LAB
BILIRUB BLD-MCNC: NEGATIVE MG/DL
CLARITY, POC: CLEAR
COLOR UR: YELLOW
GLUCOSE UR STRIP-MCNC: NEGATIVE MG/DL
KETONES UR QL: NEGATIVE
LEUKOCYTE EST, POC: NEGATIVE
NITRITE UR-MCNC: NEGATIVE MG/ML
PH UR: 5 [PH] (ref 5–8)
PROT UR STRIP-MCNC: NEGATIVE MG/DL
RBC # UR STRIP: NEGATIVE /UL
SP GR UR: 1 (ref 1–1.03)
UROBILINOGEN UR QL: NORMAL

## 2021-02-25 PROCEDURE — G0101 CA SCREEN;PELVIC/BREAST EXAM: HCPCS | Performed by: OBSTETRICS & GYNECOLOGY

## 2021-02-25 PROCEDURE — 81002 URINALYSIS NONAUTO W/O SCOPE: CPT | Performed by: OBSTETRICS & GYNECOLOGY

## 2021-02-25 RX ORDER — ESTRADIOL 0.1 MG/G
1 CREAM VAGINAL 2 TIMES WEEKLY
Qty: 45 G | Refills: 2 | Status: SHIPPED | OUTPATIENT
Start: 2021-02-25 | End: 2021-11-30

## 2021-02-25 NOTE — PROGRESS NOTES
New GYN Exam    CC- Here for pain with sex.     Cynthia Escobar is a 65 y.o. female est pt who presents for annual exam.her pelvic pain with sex is much better. She is working in [place] at [employer] but lives in [place]. She still wants to get all of her testing here. She denies  VB and is still using vaginal estrogen cream. She is not SA at present as she just broke up with her partner.  She had COVID and lost a lot of hair. She also had mulluscum and it was ablated.     OB History        4    Para   2    Term   2            AB   2    Living   2       SAB        TAB        Ectopic   2    Molar        Multiple        Live Births              Obstetric Comments   2   2 ex lap for 2 ectopics             Menarche:12  Menopause:55  HRT:none  Current contraception: post menopausal status  History of abnormal Pap smear: no  History of abnormal mammogram: no  Family history of uterine, colon or ovarian cancer: no  Family history of breast cancer: no  STD's: none, ? molluscum  Last pap smear:2018  Gardasil: none  YECENIA: none   Vaginismus- resolved with pelvic floor PT      Health Maintenance   Topic Date Due   • DXA SCAN  1955   • COLONOSCOPY  1955   • ZOSTER VACCINE (1 of 2) 2005   • HEPATITIS C SCREENING  2017   • ANNUAL WELLNESS VISIT  2017   • MAMMOGRAM  2017   • PAP SMEAR  2017   • Pneumococcal Vaccine 65+ (1 of 1 - PPSV23) 2020   • LIPID PANEL  2022   • TDAP/TD VACCINES (2 - Tdap) 2030   • INFLUENZA VACCINE  Completed   • MENINGOCOCCAL VACCINE  Aged Out       Past Medical History:   Diagnosis Date   • Arthritis    • COVID-19 2020   • Depression    • Diverticulitis    • Diverticulitis of colon    • Diverticulosis    • Hyperlipidemia    • Hypertension    • Oral herpes 2020   • Thyroid disease        Past Surgical History:   Procedure Laterality Date   • ABDOMINAL SURGERY  ,     ex lap x 2,  diverticulitis   •  APPENDECTOMY     • CARDIAC CATHETERIZATION N/A 4/24/2020    Procedure: Coronary angiography;  Surgeon: Roberto Briones MD;  Location:  [facility];  Service: Cardiovascular;  Laterality: N/A;   • CARDIAC CATHETERIZATION N/A 4/24/2020    Procedure: Left heart cath;  Surgeon: Roberto Briones MD;  Location:  [facility];  Service: Cardiovascular;  Laterality: N/A;   • CARDIAC CATHETERIZATION N/A 4/24/2020    Procedure: Left ventriculography;  Surgeon: Roberto Briones MD;  Location:  [facility];  Service: Cardiovascular;  Laterality: N/A;   • COLONOSCOPY  approx 2018    normal per pt    • COLOSTOMY      had colostomy and then is was reversed   • ENDOSCOPY N/A 5/27/2020    Procedure: ESOPHAGOGASTRODUODENOSCOPY WITH BIOPSY AND BIOPSY POLYPECTOMY AND MACIEL DIALATION;  Surgeon: Preethi Beck MD;  Location: [facility];  Service: Gastroenterology;  Laterality: N/A;  PRE: ESOPHAGEAL DYSPHAGIA  POST: Z LINE 46, ESOPHAGEAL SPASM, GASTRITIS, FUNDIC POLYP   • FOOT SURGERY Right 12/2016    Second and third hammertoe corrections   • KNEE SURGERY     • REVISION / TAKEDOWN COLOSTOMY  2006   • SHOULDER SURGERY     • TONSILLECTOMY     • WISDOM TOOTH EXTRACTION           Current Outpatient Medications:   •  Acidophilus Lactobacillus capsule, Take 1 capsule by mouth 2 (Two) Times a Day., Disp: 60 capsule, Rfl: 5  •  ALPRAZolam (XANAX) 0.25 MG tablet, TAKE 1 TABLET BY MOUTH TWICE DAILY AS NEEDED FOR ANXIETY, Disp: 60 tablet, Rfl: 2  •  amphetamine-dextroamphetamine XR (ADDERALL XR) 20 MG 24 hr capsule, Take 1 capsule by mouth Every Morning, Disp: 30 capsule, Rfl: 0  •  calcium acetate (PHOSLO) 667 MG capsule, Take 1,334 mg by mouth 3 (Three) Times a Day With Meals., Disp: , Rfl:   •  carboxymethylcellulose (Lubricant Eye Drops PF) 0.5 % solution, Inject 1 drop into the eye 2 (two) times a day., Disp: , Rfl:   •  estradiol (ESTRACE) 0.1 MG/GM vaginal cream,  Insert 1 g into the vagina 2 (Two) Times a Week., Disp: 45 g, Rfl: 2  •  FLUoxetine (PROzac) 20 MG capsule, TAKE 1 CAPSULE BY MOUTH FOUR TIMES DAILY, Disp: 360 capsule, Rfl: 1  •  fluticasone (FLOVENT HFA) 220 MCG/ACT inhaler, Spray 2 puffs in the mouth and swallow twice daily.  Do not inhale.  Do not eat for 30 minutes following., Disp: 12 g, Rfl: 1  •  hydroCHLOROthiazide (HYDRODIURIL) 25 MG tablet, TAKE 1 TABLET BY MOUTH DAILY, Disp: 90 tablet, Rfl: 1  •  ipratropium (ATROVENT) 0.06 % nasal spray, 2 sprays into the nostril(s) as directed by provider 4 (Four) Times a Day., Disp: 15 mL, Rfl: 12  •  levothyroxine (SYNTHROID, LEVOTHROID) 75 MCG tablet, TAKE 1 TABLET BY MOUTH DAILY, Disp: 90 tablet, Rfl: 3  •  loperamide (IMODIUM A-D) 2 MG tablet, Take 1 tablet by mouth As Needed for Diarrhea., Disp: 30 tablet, Rfl: 2  •  loratadine (CLARITIN) 10 MG tablet, TAKE 1 TABLET BY MOUTH EVERY DAY, Disp: 30 tablet, Rfl: 5  •  olopatadine (PATANOL) 0.1 % ophthalmic solution, Administer 1 drop to both eyes 2 (Two) Times a Day., Disp: 5 mL, Rfl: 5  •  pantoprazole (PROTONIX) 20 MG EC tablet, TAKE 1 TABLET BY MOUTH EVERY DAY. DO NOT TAKE WITHIN 2 HOURS OF THYROID MEDICATION, Disp: 30 tablet, Rfl: 5  •  propranolol (INDERAL) 40 MG tablet, TAKE 1 TABLET BY MOUTH THREE TIMES DAILY, Disp: 270 tablet, Rfl: 1  •  rosuvastatin (CRESTOR) 20 MG tablet, TAKE 1 TABLET BY MOUTH DAILY, Disp: 90 tablet, Rfl: 1  •  sulfamethoxazole-trimethoprim (BACTRIM DS,SEPTRA DS) 800-160 MG per tablet, Take 1 tablet by mouth 2 (Two) Times a Day., Disp: 14 tablet, Rfl: 0  •  valACYclovir (Valtrex) 1000 MG tablet, Take 2 tablets by mouth 2 (Two) Times a Day. For one day (complete treatment per episode), Disp: 8 tablet, Rfl: 1  •  Vascepa 1 g capsule capsule, TAKE 2 CAPSULES (2 GRAMS) TWICE A DAY WITH MEALS, Disp: 120 capsule, Rfl: 11  •  zolpidem (AMBIEN) 10 MG tablet, TAKE 1 TABLET BY MOUTH EVERY NIGHT AT BEDTIME AS NEEDED FOR SLEEP, Disp: 30 tablet, Rfl:  "2    Allergies   Allergen Reactions   • Azithromycin Other (See Comments)     Reaction unknown to patient (unable to remember)   • Pravastatin    • Zetia [Ezetimibe]        Social History     Tobacco Use   • Smoking status: Never Smoker   • Smokeless tobacco: Never Used   Substance Use Topics   • Alcohol use: Not Currently     Alcohol/week: 1.0 standard drinks     Types: 1 Glasses of wine per week   • Drug use: No       Family History   Problem Relation Age of Onset   • Osteoporosis Mother    • Breast cancer Neg Hx    • Ovarian cancer Neg Hx    • Colon cancer Neg Hx    • Deep vein thrombosis Neg Hx    • Pulmonary embolism Neg Hx        Review of Systems   Constitutional: Positive for activity change. Negative for appetite change, fatigue, fever and unexpected weight change.   Eyes: Negative for photophobia and visual disturbance.   Respiratory: Negative for cough and shortness of breath.    Cardiovascular: Negative for chest pain and palpitations.   Gastrointestinal: Negative for abdominal distention, abdominal pain, constipation, diarrhea and nausea.   Endocrine: Negative for cold intolerance and heat intolerance.   Genitourinary: Negative for dyspareunia, dysuria, menstrual problem, pelvic pain, vaginal discharge and vaginal pain.   Musculoskeletal: Negative for back pain.   Skin: Negative for color change and rash.   Neurological: Negative for headaches.   Hematological: Negative for adenopathy. Does not bruise/bleed easily.   Psychiatric/Behavioral: Negative for dysphoric mood. The patient is not nervous/anxious.        /74   Ht 160 cm (63\")   Wt 61.4 kg (135 lb 4.8 oz)   Breastfeeding No   BMI 23.97 kg/m²     Physical Exam   Constitutional: She is oriented to person, place, and time. She appears well-developed.   HENT:   Head: Normocephalic and atraumatic.   Eyes: Conjunctivae are normal. No scleral icterus.   Neck: Neck supple. No thyromegaly present.   Cardiovascular: Normal rate, regular rhythm and " normal heart sounds.   Pulmonary/Chest: Effort normal and breath sounds normal. Right breast exhibits no inverted nipple, no mass, no nipple discharge, no skin change and no tenderness. Left breast exhibits no inverted nipple, no mass, no nipple discharge, no skin change and no tenderness.   Abdominal: Soft. Normal appearance and bowel sounds are normal. She exhibits no distension and no mass. There is no abdominal tenderness. There is no rebound and no guarding. No hernia.   Genitourinary:    Pelvic exam was performed with patient supine.   There is no rash, tenderness, lesion or injury on the right labia. There is no rash, tenderness, lesion or injury on the left labia. Cervix exhibits no motion tenderness, no discharge and no friability. Right adnexum displays no mass, no tenderness and no fullness. Left adnexum displays no mass, no tenderness and no fullness.    No vaginal discharge, erythema, tenderness or bleeding.   No erythema, tenderness or bleeding in the vagina.    No foreign body in the vagina.      No signs of injury in the vagina.      Genitourinary Comments: No LPS  Mild atrophy     Neurological: She is alert and oriented to person, place, and time.   Skin: Skin is warm and dry.   Psychiatric: Her behavior is normal. Mood, judgment and thought content normal.   Nursing note and vitals reviewed.         Assessment/Plan  1) Dyspareunia- improved, had vaginismus and s/p  pelvic floor PT.   2) GYN HM: check pap.   SBE demonstrated and encouraged.  3) STD screening: declines Condoms encouraged.  4) Bone health - Weight bearing exercise, dietary calcium recommendations and vitamin D reviewed.   5) Diet and Exercise discussed  6) Smoking Status: No  7) Vaginal atrophy- Rx Estrace x 2/week.Patient counseled that vaginal estrogen rings, creams and tablets are available and highly effective at treating local vaginal symptoms such as atrophy and vaginal dryness.  Vaginal estrogen does not cause uterine  overgrowth and does not require a progestogen to protect the uterus.  Very small amounts of estrogen are absorbed systemically.  For patients with a history of an estrogen dependent cancer such as breast cancer, the decision to use local estrogen for local vaginal symptoms should be made after consultation with her oncologist.  Possible side effects include local irritation or burning and/or vaginal bleeding and should always be reported.    8)MMG: UTD 9/2018, schedule MMG  9) DEXA- due, schedule DEXA  10)C scope- UTD 2017, repeat 5 years.  11) Parts of this document have been copied or forwarded from her previous visits and have been reviewed, updated and edited as indicated.   12)I saw the patient with a face mask, gloves and eye protection  The patient herself was masked.  Social distancing was observed as appropriate.  13) RTO 2 years annual or prn.        Diagnoses and all orders for this visit:    1. Pap smear, low-risk (Primary)  -     Pap IG (Image Guided)    2. Screening for condition  -     POC Urinalysis Dipstick  -     Mammo Screening Bilateral With CAD; Future  -     DEXA Bone Density Axial; Future    3. Encounter for screening for human papillomavirus (HPV)  -     Pap IG (Image Guided)    4. Encounter for screening mammogram for malignant neoplasm of breast   -     Mammo Screening Bilateral With CAD; Future    5. Asymptomatic menopausal state   -     DEXA Bone Density Axial; Future    6. Vaginal atrophy    Other orders  -     estradiol (ESTRACE) 0.1 MG/GM vaginal cream; Insert 1 g into the vagina 2 (Two) Times a Week.  Dispense: 45 g; Refill: 2        Elizabeth Rosenthal MD  2/25/2021  15:07 EST

## 2021-03-01 LAB
CYTOLOGIST CVX/VAG CYTO: NORMAL
CYTOLOGY CVX/VAG DOC CYTO: NORMAL
CYTOLOGY CVX/VAG DOC THIN PREP: NORMAL
DX ICD CODE: NORMAL
HIV 1 & 2 AB SER-IMP: NORMAL
Lab: NORMAL
OTHER STN SPEC: NORMAL
STAT OF ADQ CVX/VAG CYTO-IMP: NORMAL

## 2021-03-02 DIAGNOSIS — F98.8 ATTENTION DEFICIT DISORDER, UNSPECIFIED HYPERACTIVITY PRESENCE: ICD-10-CM

## 2021-03-02 RX ORDER — DEXTROAMPHETAMINE SACCHARATE, AMPHETAMINE ASPARTATE MONOHYDRATE, DEXTROAMPHETAMINE SULFATE AND AMPHETAMINE SULFATE 5; 5; 5; 5 MG/1; MG/1; MG/1; MG/1
20 CAPSULE, EXTENDED RELEASE ORAL EVERY MORNING
Qty: 30 CAPSULE | Refills: 0 | Status: SHIPPED | OUTPATIENT
Start: 2021-03-02 | End: 2021-06-15 | Stop reason: SDUPTHER

## 2021-03-02 NOTE — TELEPHONE ENCOUNTER
Caller: Cynthia Escobar    Relationship: Self    Best call back number: [phone]    Medication needed:   Requested Prescriptions     Pending Prescriptions Disp Refills   • amphetamine-dextroamphetamine XR (ADDERALL XR) 20 MG 24 hr capsule 30 capsule 0     Sig: Take 1 capsule by mouth Every Morning       When do you need the refill by: 3/5/21    What details did the patient provide when requesting the medication: PT HAS 4 DAYS OF MEDICATION LEFT    Does the patient have less than a 3 day supply:  [] Yes  [x] No    What is the patient's preferred pharmacy: [facility] #[id number] - [place], [place] - [address] AT [facility] [address] [facility] [address] & [place] - [phone] [facility] [phone] FX

## 2021-03-06 ENCOUNTER — HOSPITAL ENCOUNTER (OUTPATIENT)
Dept: ULTRASOUND IMAGING | Facility: HOSPITAL | Age: 66
Discharge: HOME | End: 2021-03-06
Admitting: INTERNAL MEDICINE

## 2021-03-06 DIAGNOSIS — K76.0 NAFLD (NONALCOHOLIC FATTY LIVER DISEASE): ICD-10-CM

## 2021-03-06 DIAGNOSIS — R74.01 ELEVATED TRANSAMINASE LEVEL: Chronic | ICD-10-CM

## 2021-03-06 PROCEDURE — 76705 ECHO EXAM OF ABDOMEN: CPT

## 2021-04-08 ENCOUNTER — OFFICE VISIT (OUTPATIENT)
Dept: CARDIOLOGY | Facility: CLINIC | Age: 66
End: 2021-04-08

## 2021-04-08 VITALS
HEART RATE: 69 BPM | DIASTOLIC BLOOD PRESSURE: 60 MMHG | WEIGHT: 136 LBS | HEIGHT: 63 IN | BODY MASS INDEX: 24.1 KG/M2 | OXYGEN SATURATION: 98 % | SYSTOLIC BLOOD PRESSURE: 108 MMHG

## 2021-04-08 DIAGNOSIS — U07.1 COVID-19 VIRUS INFECTION: ICD-10-CM

## 2021-04-08 DIAGNOSIS — R07.2 PRECORDIAL PAIN: ICD-10-CM

## 2021-04-08 DIAGNOSIS — M79.605 LOWER EXTREMITY PAIN, LEFT: Primary | ICD-10-CM

## 2021-04-08 DIAGNOSIS — R09.89 OTHER SPECIFIED SYMPTOMS AND SIGNS INVOLVING THE CIRCULATORY AND RESPIRATORY SYSTEMS: ICD-10-CM

## 2021-04-08 PROCEDURE — 93000 ELECTROCARDIOGRAM COMPLETE: CPT | Performed by: INTERNAL MEDICINE

## 2021-04-08 PROCEDURE — 99214 OFFICE O/P EST MOD 30 MIN: CPT | Performed by: INTERNAL MEDICINE

## 2021-04-14 ENCOUNTER — TELEPHONE (OUTPATIENT)
Dept: GASTROENTEROLOGY | Facility: CLINIC | Age: 66
End: 2021-04-14

## 2021-04-14 NOTE — TELEPHONE ENCOUNTER
----- Message from Preethi Beck MD sent at 3/11/2021  9:44 AM EST -----  Liver US shows fatty liver, no other abnormalities

## 2021-04-15 ENCOUNTER — HOSPITAL ENCOUNTER (OUTPATIENT)
Dept: CARDIOLOGY | Facility: HOSPITAL | Age: 66
Discharge: HOME | End: 2021-04-15

## 2021-04-15 VITALS
BODY MASS INDEX: 24.1 KG/M2 | WEIGHT: 136 LBS | SYSTOLIC BLOOD PRESSURE: 128 MMHG | DIASTOLIC BLOOD PRESSURE: 68 MMHG | HEART RATE: 72 BPM | HEIGHT: 63 IN

## 2021-04-15 DIAGNOSIS — R07.2 PRECORDIAL PAIN: ICD-10-CM

## 2021-04-15 DIAGNOSIS — F51.01 PRIMARY INSOMNIA: ICD-10-CM

## 2021-04-15 DIAGNOSIS — U07.1 COVID-19 VIRUS INFECTION: ICD-10-CM

## 2021-04-15 DIAGNOSIS — M79.605 LOWER EXTREMITY PAIN, LEFT: ICD-10-CM

## 2021-04-15 DIAGNOSIS — R09.89 OTHER SPECIFIED SYMPTOMS AND SIGNS INVOLVING THE CIRCULATORY AND RESPIRATORY SYSTEMS: ICD-10-CM

## 2021-04-15 LAB
BH CV LOWER ARTERIAL LEFT ABI RATIO: 1.26
BH CV LOWER ARTERIAL LEFT CALF RATIO: 1.18
BH CV LOWER ARTERIAL LEFT HIGH THIGH SYS MAX: 152 MMHG
BH CV LOWER ARTERIAL LEFT LOW THIGH SYS MAX: 151 MMHG
BH CV LOWER ARTERIAL LEFT POPLITEAL SYS MAX: 143 MMHG
BH CV LOWER ARTERIAL LEFT POST TIBIAL SYS MAX: 152 MMHG
BH CV LOWER ARTERIAL RIGHT ABI RATIO: 1.16
BH CV LOWER ARTERIAL RIGHT CALF RATIO: 1.25
BH CV LOWER ARTERIAL RIGHT HIGH THIGH SYS MAX: 148 MMHG
BH CV LOWER ARTERIAL RIGHT LOW THIGH SYS MAX: 146 MMHG
BH CV LOWER ARTERIAL RIGHT POPLITEAL SYS MAX: 151 MMHG
BH CV LOWER ARTERIAL RIGHT POST TIBIAL SYS MAX: 140 MMHG
UPPER ARTERIAL LEFT ARM BRACHIAL SYS MAX: 120 MMHG
UPPER ARTERIAL RIGHT ARM BRACHIAL SYS MAX: 121 MMHG

## 2021-04-15 PROCEDURE — 93923 UPR/LXTR ART STDY 3+ LVLS: CPT

## 2021-04-15 PROCEDURE — 93306 TTE W/DOPPLER COMPLETE: CPT

## 2021-04-15 PROCEDURE — 93356 MYOCRD STRAIN IMG SPCKL TRCK: CPT | Performed by: INTERNAL MEDICINE

## 2021-04-15 PROCEDURE — 93306 TTE W/DOPPLER COMPLETE: CPT | Performed by: INTERNAL MEDICINE

## 2021-04-15 PROCEDURE — 93923 UPR/LXTR ART STDY 3+ LVLS: CPT | Performed by: INTERNAL MEDICINE

## 2021-04-15 PROCEDURE — 93356 MYOCRD STRAIN IMG SPCKL TRCK: CPT

## 2021-04-15 NOTE — TELEPHONE ENCOUNTER
[facility] #[id number] - [place], [place] - [address] AT [address] - [phone] [place] [phone] FX  [phone]      DAYANARALindseyMoniCynthia ASHANTI (Self) [phone] (H)         Caller: Cynthia Escobar    Relationship: Self    Best call back number:   Moni Escobarbertraci BURRELL (Self) [phone] (H)         Medication needed:   Requested Prescriptions     Pending Prescriptions Disp Refills   • zolpidem (AMBIEN) 10 MG tablet 30 tablet 2     Sig: Take 1 tablet by mouth At Night As Needed. for sleep       When do you need the refill by: ASAP     What additional details did the patient provide when requesting the medication:     Does the patient have less than a 3 day supply:  [x] Yes  [] No    What is the patient's preferred pharmacy: [facility] #[id number] - [place], [place] - [address] AT [address] [facility] [phone] [place] [phone] FX

## 2021-04-16 LAB
AORTIC ARCH: 2.3 CM
ASCENDING AORTA: 3.5 CM
AV HCM GRAD VALS: 18 MMHG
AV LVOT PEAK GRADIENT: 5 MMHG
BH CV ECHO MEAS - ACS: 1.8 CM
BH CV ECHO MEAS - AO MAX PG (FULL): 3.4 MMHG
BH CV ECHO MEAS - AO MAX PG: 8.3 MMHG
BH CV ECHO MEAS - AO MEAN PG (FULL): 1 MMHG
BH CV ECHO MEAS - AO MEAN PG: 4 MMHG
BH CV ECHO MEAS - AO ROOT AREA (BSA CORRECTED): 2.1
BH CV ECHO MEAS - AO ROOT AREA: 9.6 CM^2
BH CV ECHO MEAS - AO ROOT DIAM: 3.5 CM
BH CV ECHO MEAS - AO V2 MAX: 144 CM/SEC
BH CV ECHO MEAS - AO V2 MEAN: 89.6 CM/SEC
BH CV ECHO MEAS - AO V2 VTI: 33 CM
BH CV ECHO MEAS - ASC AORTA: 3.5 CM
BH CV ECHO MEAS - AVA(I,A): 2.4 CM^2
BH CV ECHO MEAS - AVA(I,D): 2.4 CM^2
BH CV ECHO MEAS - AVA(V,A): 2.2 CM^2
BH CV ECHO MEAS - AVA(V,D): 2.2 CM^2
BH CV ECHO MEAS - BSA(HAYCOCK): 1.7 M^2
BH CV ECHO MEAS - BSA: 1.6 M^2
BH CV ECHO MEAS - BZI_BMI: 24.1 KILOGRAMS/M^2
BH CV ECHO MEAS - BZI_METRIC_HEIGHT: 160 CM
BH CV ECHO MEAS - BZI_METRIC_WEIGHT: 61.7 KG
BH CV ECHO MEAS - EDV(MOD-SP2): 60 ML
BH CV ECHO MEAS - EDV(MOD-SP4): 70 ML
BH CV ECHO MEAS - EDV(TEICH): 62 ML
BH CV ECHO MEAS - EF(CUBED): 83.1 %
BH CV ECHO MEAS - EF(MOD-BP): 65 %
BH CV ECHO MEAS - EF(MOD-SP2): 65 %
BH CV ECHO MEAS - EF(MOD-SP4): 64.3 %
BH CV ECHO MEAS - EF(TEICH): 76.7 %
BH CV ECHO MEAS - ESV(MOD-SP2): 21 ML
BH CV ECHO MEAS - ESV(MOD-SP4): 25 ML
BH CV ECHO MEAS - ESV(TEICH): 14.4 ML
BH CV ECHO MEAS - FS: 44.7 %
BH CV ECHO MEAS - IVS/LVPW: 1
BH CV ECHO MEAS - IVSD: 1.4 CM
BH CV ECHO MEAS - LAT PEAK E' VEL: 6 CM/SEC
BH CV ECHO MEAS - LV DIASTOLIC VOL/BSA (35-75): 42.6 ML/M^2
BH CV ECHO MEAS - LV MASS(C)D: 194.1 GRAMS
BH CV ECHO MEAS - LV MASS(C)DI: 118.3 GRAMS/M^2
BH CV ECHO MEAS - LV MAX PG: 4.9 MMHG
BH CV ECHO MEAS - LV MEAN PG: 3 MMHG
BH CV ECHO MEAS - LV SYSTOLIC VOL/BSA (12-30): 15.2 ML/M^2
BH CV ECHO MEAS - LV V1 MAX: 111 CM/SEC
BH CV ECHO MEAS - LV V1 MEAN: 83.7 CM/SEC
BH CV ECHO MEAS - LV V1 VTI: 28.3 CM
BH CV ECHO MEAS - LVIDD: 3.8 CM
BH CV ECHO MEAS - LVIDS: 2.1 CM
BH CV ECHO MEAS - LVLD AP2: 7 CM
BH CV ECHO MEAS - LVLD AP4: 7.6 CM
BH CV ECHO MEAS - LVLS AP2: 6.6 CM
BH CV ECHO MEAS - LVLS AP4: 6.3 CM
BH CV ECHO MEAS - LVOT AREA (M): 2.8 CM^2
BH CV ECHO MEAS - LVOT AREA: 2.8 CM^2
BH CV ECHO MEAS - LVOT DIAM: 1.9 CM
BH CV ECHO MEAS - LVPWD: 1.4 CM
BH CV ECHO MEAS - MED PEAK E' VEL: 5 CM/SEC
BH CV ECHO MEAS - MR MAX PG: 79.2 MMHG
BH CV ECHO MEAS - MR MAX VEL: 445 CM/SEC
BH CV ECHO MEAS - MV A DUR: 0.2 SEC
BH CV ECHO MEAS - MV A MAX VEL: 85.3 CM/SEC
BH CV ECHO MEAS - MV DEC SLOPE: 358 CM/SEC^2
BH CV ECHO MEAS - MV DEC TIME: 0.28 SEC
BH CV ECHO MEAS - MV E MAX VEL: 95.8 CM/SEC
BH CV ECHO MEAS - MV E/A: 1.1
BH CV ECHO MEAS - MV MAX PG: 6.3 MMHG
BH CV ECHO MEAS - MV MEAN PG: 3 MMHG
BH CV ECHO MEAS - MV P1/2T MAX VEL: 90.8 CM/SEC
BH CV ECHO MEAS - MV P1/2T: 74.3 MSEC
BH CV ECHO MEAS - MV V2 MAX: 125 CM/SEC
BH CV ECHO MEAS - MV V2 MEAN: 88.4 CM/SEC
BH CV ECHO MEAS - MV V2 VTI: 34.4 CM
BH CV ECHO MEAS - MVA P1/2T LCG: 2.4 CM^2
BH CV ECHO MEAS - MVA(P1/2T): 3 CM^2
BH CV ECHO MEAS - MVA(VTI): 2.3 CM^2
BH CV ECHO MEAS - PA MAX PG (FULL): 1.6 MMHG
BH CV ECHO MEAS - PA MAX PG: 2.8 MMHG
BH CV ECHO MEAS - PA V2 MAX: 84.2 CM/SEC
BH CV ECHO MEAS - PAPD(PI EDV): 8 MMHG
BH CV ECHO MEAS - PI END-D VEL: 140 CM/SEC
BH CV ECHO MEAS - PULM A REVS DUR: 0.13 SEC
BH CV ECHO MEAS - PULM A REVS VEL: 31.3 CM/SEC
BH CV ECHO MEAS - PULM DIAS VEL: 36.8 CM/SEC
BH CV ECHO MEAS - PULM S/D: 1.2
BH CV ECHO MEAS - PULM SYS VEL: 45 CM/SEC
BH CV ECHO MEAS - PV RVPEPC: 12 SEC
BH CV ECHO MEAS - PVA(V,A): 1.9 CM^2
BH CV ECHO MEAS - PVA(V,D): 1.9 CM^2
BH CV ECHO MEAS - QP/QS: 0.5
BH CV ECHO MEAS - RV MAX PG: 1.3 MMHG
BH CV ECHO MEAS - RV MEAN PG: 1 MMHG
BH CV ECHO MEAS - RV V1 MAX: 56.1 CM/SEC
BH CV ECHO MEAS - RV V1 MEAN: 42.3 CM/SEC
BH CV ECHO MEAS - RV V1 VTI: 14.2 CM
BH CV ECHO MEAS - RVOT AREA: 2.8 CM^2
BH CV ECHO MEAS - RVOT DIAM: 1.9 CM
BH CV ECHO MEAS - SI(AO): 193.4 ML/M^2
BH CV ECHO MEAS - SI(CUBED): 27.8 ML/M^2
BH CV ECHO MEAS - SI(LVOT): 48.9 ML/M^2
BH CV ECHO MEAS - SI(MOD-SP2): 23.8 ML/M^2
BH CV ECHO MEAS - SI(MOD-SP4): 27.4 ML/M^2
BH CV ECHO MEAS - SI(TEICH): 29 ML/M^2
BH CV ECHO MEAS - SUP REN AO DIAM: 1.8 CM
BH CV ECHO MEAS - SV(AO): 317.5 ML
BH CV ECHO MEAS - SV(CUBED): 45.6 ML
BH CV ECHO MEAS - SV(LVOT): 80.2 ML
BH CV ECHO MEAS - SV(MOD-SP2): 39 ML
BH CV ECHO MEAS - SV(MOD-SP4): 45 ML
BH CV ECHO MEAS - SV(RVOT): 40.3 ML
BH CV ECHO MEAS - SV(TEICH): 47.5 ML
BH CV ECHO MEAS - TAPSE (>1.6): 2.6 CM
BH CV ECHO MEASUREMENTS AVERAGE E/E' RATIO: 17.42
BH CV XLRA - RV BASE: 2.7 CM
BH CV XLRA - RV LENGTH: 6.4 CM
BH CV XLRA - RV MID: 2.2 CM
BH CV XLRA - TDI S': 11.9 CM/SEC
LEFT ATRIUM VOLUME INDEX: 23 ML/M2
MAXIMAL PREDICTED HEART RATE: 154 BPM
SINUS: 3.3 CM
STJ: 2.7 CM
STRESS TARGET HR: 131 BPM

## 2021-04-16 RX ORDER — ZOLPIDEM TARTRATE 10 MG/1
10 TABLET ORAL NIGHTLY PRN
Qty: 30 TABLET | Refills: 2 | Status: SHIPPED | OUTPATIENT
Start: 2021-04-16 | End: 2021-06-24 | Stop reason: SDUPTHER

## 2021-04-16 NOTE — PROGRESS NOTES
Date of Office Visit:  2021  Encounter Provider: Darren Pruitt MD  Place of Service: [facility] CARDIOLOGY  Patient Name: Cynthia Escobar  :1955    Chief complaint: Follow-up for moderate LVH.  Left lower extremity pain.    History of Present Illness:    I again had the pleasure of seeing the patient in cardiology office on 2020.  She is a very pleasant 66 year-old female with a history of hypertension, hyperlipidemia, and LVH who presents for follow-up.    I initially saw the patient in the office on 2020.  She had been having several weeks of chest discomfort, including chest tightness when lying down to go to bed on several occasions.  She had also been having a burning pain in her mid substernal area.  She went to urgent care on 2020, where her EKG showed evidence of LVH and nonspecific ST and T wave changes.  She underwent a stress echocardiogram on 2020, at which time she developed 6 out of 10 chest pain while exercising, as well as significant ST depression in the inferolateral leads.  She also had hypokinesis of the inferior wall and inferoseptum, suggestive of ischemia.  Otherwise, her ejection fraction was 60%, there was moderate left ventricular hypertrophy, and grade 1 diastolic dysfunction.  She went for a semiurgent left heart catheterization on the same day on 2020.  This showed angiographically normal coronary arteries.  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    The patient presents today for follow-up.  She had COVID-19 in 2020.  She was fairly ill at the time, although she recovered.  Since that time, she has had intermittent severe cramping in her left calf, which occurs during both exertion and at rest.  Her EKG from today again shows LVH, but no new changes.  Her triglycerides have also been up significantly, and her lipid panel from 2021 showed a triglyceride level of 335, , and  HDL 30.    Past Medical History:   Diagnosis Date   • Arthritis    • COVID-19 07/2020   • Depression    • Diverticulitis    • Diverticulitis of colon    • Diverticulosis    • Hyperlipidemia    • Hypertension    • Oral herpes 8/18/2020   • Thyroid disease        Past Surgical History:   Procedure Laterality Date   • ABDOMINAL SURGERY  2005, 2014    ex lap x 2,  diverticulitis   • APPENDECTOMY     • CARDIAC CATHETERIZATION N/A 4/24/2020    Procedure: Coronary angiography;  Surgeon: Roberto Briones MD;  Location:  [facility]AS[facility];  Service: Cardiovascular;  Laterality: N/A;   • CARDIAC CATHETERIZATION N/A 4/24/2020    Procedure: Left heart cath;  Surgeon: Roberto Briones MD;  Location:  [facility];  Service: Cardiovascular;  Laterality: N/A;   • CARDIAC CATHETERIZATION N/A 4/24/2020    Procedure: Left ventriculography;  Surgeon: Roberto Briones MD;  Location:  [facility];  Service: Cardiovascular;  Laterality: N/A;   • COLONOSCOPY  approx 2018    normal per pt    • COLOSTOMY      had colostomy and then is was reversed   • ENDOSCOPY N/A 5/27/2020    Procedure: ESOPHAGOGASTRODUODENOSCOPY WITH BIOPSY AND BIOPSY POLYPECTOMY AND MACIEL DIALATION;  Surgeon: Preethi Beck MD;  Location: [facility] ENDOSCOPY;  Service: Gastroenterology;  Laterality: N/A;  PRE: ESOPHAGEAL DYSPHAGIA  POST: Z LINE 46, ESOPHAGEAL SPASM, GASTRITIS, FUNDIC POLYP   • FOOT SURGERY Right 12/2016    Second and third hammertoe corrections   • KNEE SURGERY     • REVISION / TAKEDOWN COLOSTOMY  2006   • SHOULDER SURGERY     • TONSILLECTOMY     • WISDOM TOOTH EXTRACTION         Current Outpatient Medications on File Prior to Visit   Medication Sig Dispense Refill   • Acidophilus Lactobacillus capsule Take 1 capsule by mouth 2 (Two) Times a Day. 60 capsule 5   • ALPRAZolam (XANAX) 0.25 MG tablet TAKE 1 TABLET BY MOUTH TWICE DAILY AS NEEDED FOR ANXIETY 60 tablet 2   •  amphetamine-dextroamphetamine XR (ADDERALL XR) 20 MG 24 hr capsule Take 1 capsule by mouth Every Morning 30 capsule 0   • calcium acetate (PHOSLO) 667 MG capsule Take 1,334 mg by mouth 3 (Three) Times a Day With Meals.     • carboxymethylcellulose (Lubricant Eye Drops PF) 0.5 % solution Inject 1 drop into the eye 2 (two) times a day.     • estradiol (ESTRACE) 0.1 MG/GM vaginal cream Insert 1 g into the vagina 2 (Two) Times a Week. 45 g 2   • FLUoxetine (PROzac) 20 MG capsule TAKE 1 CAPSULE BY MOUTH FOUR TIMES DAILY 360 capsule 1   • fluticasone (FLOVENT HFA) 220 MCG/ACT inhaler Spray 2 puffs in the mouth and swallow twice daily.  Do not inhale.  Do not eat for 30 minutes following. 12 g 1   • hydroCHLOROthiazide (HYDRODIURIL) 25 MG tablet TAKE 1 TABLET BY MOUTH DAILY 90 tablet 1   • ipratropium (ATROVENT) 0.06 % nasal spray 2 sprays into the nostril(s) as directed by provider 4 (Four) Times a Day. 15 mL 12   • levothyroxine (SYNTHROID, LEVOTHROID) 75 MCG tablet TAKE 1 TABLET BY MOUTH DAILY 90 tablet 3   • loperamide (IMODIUM A-D) 2 MG tablet Take 1 tablet by mouth As Needed for Diarrhea. 30 tablet 2   • loratadine (CLARITIN) 10 MG tablet TAKE 1 TABLET BY MOUTH EVERY DAY 30 tablet 5   • olopatadine (PATANOL) 0.1 % ophthalmic solution Administer 1 drop to both eyes 2 (Two) Times a Day. 5 mL 5   • pantoprazole (PROTONIX) 20 MG EC tablet TAKE 1 TABLET BY MOUTH EVERY DAY. DO NOT TAKE WITHIN 2 HOURS OF THYROID MEDICATION 30 tablet 5   • propranolol (INDERAL) 40 MG tablet TAKE 1 TABLET BY MOUTH THREE TIMES DAILY 270 tablet 1   • rosuvastatin (CRESTOR) 20 MG tablet TAKE 1 TABLET BY MOUTH DAILY 90 tablet 1   • sulfamethoxazole-trimethoprim (BACTRIM DS,SEPTRA DS) 800-160 MG per tablet Take 1 tablet by mouth 2 (Two) Times a Day. 14 tablet 0   • valACYclovir (Valtrex) 1000 MG tablet Take 2 tablets by mouth 2 (Two) Times a Day. For one day (complete treatment per episode) 8 tablet 1   • Vascepa 1 g capsule capsule TAKE 2  "CAPSULES (2 GRAMS) TWICE A DAY WITH MEALS 120 capsule 11   • zolpidem (AMBIEN) 10 MG tablet TAKE 1 TABLET BY MOUTH EVERY NIGHT AT BEDTIME AS NEEDED FOR SLEEP 30 tablet 2     No current facility-administered medications on file prior to visit.     Allergies as of 04/08/2021 - Reviewed 04/08/2021   Allergen Reaction Noted   • Azithromycin Other (See Comments) 12/27/2018   • Pravastatin  03/23/2017   • Zetia [ezetimibe]  03/23/2017     Social History     Socioeconomic History   • Marital status:      Spouse name: Not on file   • Number of children: Not on file   • Years of education: Not on file   • Highest education level: Not on file   Tobacco Use   • Smoking status: Never Smoker   • Smokeless tobacco: Never Used   Substance and Sexual Activity   • Alcohol use: Not Currently     Alcohol/week: 1.0 standard drinks     Types: 1 Glasses of wine per week   • Drug use: No   • Sexual activity: Not Currently     Partners: Male     Birth control/protection: Post-menopausal     Family History   Problem Relation Age of Onset   • Osteoporosis Mother    • Breast cancer Neg Hx    • Ovarian cancer Neg Hx    • Colon cancer Neg Hx    • Deep vein thrombosis Neg Hx    • Pulmonary embolism Neg Hx        Review of Systems   Cardiovascular: Positive for claudication.   Neurological: Positive for excessive daytime sleepiness.   All other systems reviewed and are negative.     Objective:     Vitals:    04/08/21 0946   BP: 108/60   Pulse: 69   SpO2: 98%   Weight: 61.7 kg (136 lb)   Height: 160 cm (63\")     Body mass index is 24.09 kg/m².    Constitutional:       Appearance: Healthy appearance. Well-developed.   Eyes:      Conjunctiva/sclera: Conjunctivae normal.   HENT:      Head: Normocephalic and atraumatic.   Pulmonary:      Effort: Pulmonary effort is normal.      Breath sounds: Normal breath sounds.   Cardiovascular:      Normal rate. Regular rhythm.      Murmurs: There is no murmur.      No gallop.   Edema:     Peripheral edema " absent.   Abdominal:      Palpations: Abdomen is soft.      Tenderness: There is no abdominal tenderness.   Musculoskeletal:      Cervical back: Neck supple. Skin:     General: Skin is warm.   Neurological:      Mental Status: Alert and oriented to person, place, and time.   Psychiatric:         Behavior: Behavior normal.       Lab Review:     ECG 12 Lead    Date/Time: 4/8/2021 9:35 AM  Performed by: Darren Pruitt MD  Authorized by: Darren Pruitt MD   Comparison: compared with previous ECG from 4/11/2020  Similar to previous ECG  Rhythm: sinus rhythm  Rate: normal  BPM: 69  Other findings: non-specific ST-T wave changes and left ventricular hypertrophy with strain    Clinical impression: abnormal EKG            Lipid Panel    Lipid Panel 6/12/20 1/18/21   Total Cholesterol 196 198   Triglycerides 298 (A) 335 (A)   HDL Cholesterol 34 (A) 30 (A)   VLDL Cholesterol 59.6 58 (A)   LDL Cholesterol  102 (A) 110 (A)   LDL/HDL Ratio 3.01 3.37   (A) Abnormal value       Comments are available for some flowsheets but are not being displayed.              Cardiac Procedures:  1.  Stress echocardiogram on 4/24/2020: The patient had 6 out of 10 chest pain.  There was ST depression in the inferolateral leads and hypokinesis of the inferior wall and inferoseptum, suggestive of ischemia.  The ejection fraction was 60%.  There was moderate LVH.  There was grade 1 diastolic dysfunction.  2.  Left heart catheterization on 4/24/2020: Angiographically normal coronary arteries.  There was a mild mid LAD intramyocardial bridge.    Assessment:       Diagnosis Plan   1. Lower extremity pain, left  Doppler Arterial Multi Level Lower Extremity - Bilateral CAR    Duplex Venous Lower Extremity - Bilateral CAR   2. Other specified symptoms and signs involving the circulatory and respiratory systems   Doppler Arterial Multi Level Lower Extremity - Bilateral CAR   3. Precordial pain  Adult Transthoracic Echo Complete w/ Color,  Spectral and Contrast if Necessary Per Protocol   4. COVID-19 virus infection  Adult Transthoracic Echo Complete w/ Color, Spectral and Contrast if Necessary Per Protocol     Plan:       Because of the left lower extremity discomfort, I am going to check both an RETA and a venous Doppler of her lower extremities.  The pain has both typical and atypical features of claudication, and I am not sure if this is a vascular issue.  She has good pulses bilaterally.  I am going to repeat an echocardiogram, mainly to reevaluate the left ventricular hypertrophy, and to also assess her ejection fraction after having COVID-19 in July 2020.  After reviewing her previous echocardiogram, there is an appearance that could be consistent with amyloid, and this may need to be explored further.  If the echocardiogram currently looks the same or more convincing, I will likely get a PYP scan.  The EKG does not have low voltage, which would go against this, although I do not have other explanations to explain her moderate LVH.  Her blood pressure is actually well controlled on the HCTZ and propranolol.    With regards to the triglycerides, they were 335 on 1/18/2021.  She is on Vascepa.  Another agent such as TriCor may need to be added in the future if these do not come down.  She is also on Crestor 20 mg/day, and her LDL was 110.  Unfortunately, her HDL is low at 30.  Again, her coronary arteries were normal on the heart catheterization other than the mid LAD intramyocardial bridge.    Further plans will be made pending the results of the above testing.

## 2021-04-16 NOTE — TELEPHONE ENCOUNTER
PATIENT CALLING BACK IN, SHE STATES SHE IS OUT OF THE MEDICATION AND NEEDS THIS SENT IN TODAY.    PLEASE ADVISE: [phone]

## 2021-04-22 DIAGNOSIS — R94.31 ABNORMAL EKG: Primary | ICD-10-CM

## 2021-04-22 DIAGNOSIS — R93.1 ABNORMAL ECHOCARDIOGRAM: ICD-10-CM

## 2021-04-22 DIAGNOSIS — I51.7 LEFT VENTRICULAR HYPERTROPHY: ICD-10-CM

## 2021-04-22 NOTE — PROGRESS NOTES
I reviewed the echo images personally, and I called and discussed them.  Her myocardium is thickened and has a bright appearance.  I am concerned about cardiac amyloidosis.  I discussed my concerns with her, and I am going to start a work-up with a PYP scan to assess for ATTR amyloid.  This is going to be set up in the office as soon as possible.  PURA

## 2021-04-23 ENCOUNTER — TRANSCRIBE ORDERS (OUTPATIENT)
Dept: CARDIOLOGY | Facility: CLINIC | Age: 66
End: 2021-04-23

## 2021-04-29 ENCOUNTER — HOSPITAL ENCOUNTER (OUTPATIENT)
Dept: CARDIOLOGY | Facility: HOSPITAL | Age: 66
Discharge: HOME | End: 2021-04-29
Admitting: INTERNAL MEDICINE

## 2021-04-29 VITALS — BODY MASS INDEX: 24.1 KG/M2 | WEIGHT: 136 LBS | HEIGHT: 63 IN

## 2021-04-29 DIAGNOSIS — R93.1 ABNORMAL ECHOCARDIOGRAM: ICD-10-CM

## 2021-04-29 DIAGNOSIS — I51.7 LEFT VENTRICULAR HYPERTROPHY: ICD-10-CM

## 2021-04-29 DIAGNOSIS — R94.31 ABNORMAL EKG: ICD-10-CM

## 2021-04-29 LAB
MAXIMAL PREDICTED HEART RATE: 154 BPM
STRESS TARGET HR: 131 BPM

## 2021-04-29 PROCEDURE — 0 TECHNETIUM TC99M PYROPHOSPHATE: Performed by: INTERNAL MEDICINE

## 2021-04-29 PROCEDURE — 78803 RP LOCLZJ TUM SPECT 1 AREA: CPT | Performed by: INTERNAL MEDICINE

## 2021-04-29 PROCEDURE — 78803 RP LOCLZJ TUM SPECT 1 AREA: CPT

## 2021-04-29 PROCEDURE — A9538 TC99M PYROPHOSPHATE: HCPCS | Performed by: INTERNAL MEDICINE

## 2021-04-29 RX ADMIN — TECHNETIUM TC99M PYROPHOSPHATE 1 DOSE: 12 INJECTION INTRAVENOUS at 10:52

## 2021-05-03 DIAGNOSIS — E78.1 HYPERTRIGLYCERIDEMIA: Primary | ICD-10-CM

## 2021-05-05 RX ORDER — FLUTICASONE PROPIONATE 220 UG/1
AEROSOL, METERED RESPIRATORY (INHALATION)
Qty: 12 G | Refills: 1 | OUTPATIENT
Start: 2021-05-05

## 2021-05-06 RX ORDER — HYDROCHLOROTHIAZIDE 25 MG/1
25 TABLET ORAL DAILY
Qty: 90 TABLET | Refills: 1 | Status: SHIPPED | OUTPATIENT
Start: 2021-05-06 | End: 2021-08-23

## 2021-05-12 RX ORDER — PANTOPRAZOLE SODIUM 20 MG/1
TABLET, DELAYED RELEASE ORAL
Qty: 30 TABLET | Refills: 5 | Status: SHIPPED | OUTPATIENT
Start: 2021-05-12 | End: 2021-08-23

## 2021-05-14 ENCOUNTER — LAB (OUTPATIENT)
Dept: LAB | Facility: HOSPITAL | Age: 66
End: 2021-05-14

## 2021-05-14 DIAGNOSIS — E78.1 HYPERTRIGLYCERIDEMIA: ICD-10-CM

## 2021-05-14 LAB
CHOLEST SERPL-MCNC: 155 MG/DL (ref 0–200)
HDLC SERPL-MCNC: 39 MG/DL (ref 40–60)
LDLC SERPL CALC-MCNC: 89 MG/DL (ref 0–100)
LDLC/HDLC SERPL: 2.2 {RATIO}
TRIGL SERPL-MCNC: 151 MG/DL (ref 0–150)
VLDLC SERPL-MCNC: 27 MG/DL (ref 5–40)

## 2021-05-14 PROCEDURE — 36415 COLL VENOUS BLD VENIPUNCTURE: CPT

## 2021-05-14 PROCEDURE — 80061 LIPID PANEL: CPT

## 2021-05-20 ENCOUNTER — APPOINTMENT (OUTPATIENT)
Dept: BONE DENSITY | Facility: HOSPITAL | Age: 66
End: 2021-05-20

## 2021-05-20 ENCOUNTER — APPOINTMENT (OUTPATIENT)
Dept: MAMMOGRAPHY | Facility: HOSPITAL | Age: 66
End: 2021-05-20

## 2021-06-01 RX ORDER — PROPRANOLOL HYDROCHLORIDE 40 MG/1
TABLET ORAL
Qty: 270 TABLET | Refills: 1 | Status: SHIPPED | OUTPATIENT
Start: 2021-06-01 | End: 2022-08-30

## 2021-06-03 DIAGNOSIS — F41.9 ANXIETY: ICD-10-CM

## 2021-06-03 RX ORDER — ALPRAZOLAM 0.25 MG/1
TABLET ORAL
Qty: 60 TABLET | Refills: 2 | Status: SHIPPED | OUTPATIENT
Start: 2021-06-03 | End: 2021-12-22 | Stop reason: SDUPTHER

## 2021-06-15 DIAGNOSIS — F98.8 ATTENTION DEFICIT DISORDER, UNSPECIFIED HYPERACTIVITY PRESENCE: ICD-10-CM

## 2021-06-15 NOTE — TELEPHONE ENCOUNTER
Caller: Cynthia Escobar    Relationship: Self    Best call back number: [phone]    Medication needed:   Requested Prescriptions     Pending Prescriptions Disp Refills   • amphetamine-dextroamphetamine XR (ADDERALL XR) 20 MG 24 hr capsule 30 capsule 0     Sig: Take 1 capsule by mouth Every Morning       When do you need the refill by: ASAP      Does the patient have less than a 3 day supply:  [x] Yes  [] No    What is the patient's preferred pharmacy: [id number] - [place], [place] [address] AT [facility] [address] /[facility] & [place] - [phone] [phone] FX

## 2021-06-16 RX ORDER — DEXTROAMPHETAMINE SACCHARATE, AMPHETAMINE ASPARTATE MONOHYDRATE, DEXTROAMPHETAMINE SULFATE AND AMPHETAMINE SULFATE 5; 5; 5; 5 MG/1; MG/1; MG/1; MG/1
20 CAPSULE, EXTENDED RELEASE ORAL EVERY MORNING
Qty: 30 CAPSULE | Refills: 0 | Status: SHIPPED | OUTPATIENT
Start: 2021-06-16 | End: 2021-09-27 | Stop reason: SDUPTHER

## 2021-06-21 ENCOUNTER — OFFICE VISIT (OUTPATIENT)
Dept: GASTROENTEROLOGY | Facility: CLINIC | Age: 66
End: 2021-06-21

## 2021-06-21 VITALS
HEIGHT: 63 IN | DIASTOLIC BLOOD PRESSURE: 64 MMHG | WEIGHT: 133.4 LBS | SYSTOLIC BLOOD PRESSURE: 126 MMHG | BODY MASS INDEX: 23.64 KG/M2

## 2021-06-21 DIAGNOSIS — R13.19 ESOPHAGEAL DYSPHAGIA: Primary | Chronic | ICD-10-CM

## 2021-06-21 DIAGNOSIS — R74.01 ELEVATED TRANSAMINASE LEVEL: Chronic | ICD-10-CM

## 2021-06-21 DIAGNOSIS — K59.1 FUNCTIONAL DIARRHEA: Chronic | ICD-10-CM

## 2021-06-21 DIAGNOSIS — K20.0 ESOPHAGITIS, EOSINOPHILIC: Chronic | ICD-10-CM

## 2021-06-21 DIAGNOSIS — K76.0 HEPATIC STEATOSIS: Chronic | ICD-10-CM

## 2021-06-21 LAB
ALBUMIN SERPL-MCNC: 4.4 G/DL (ref 3.5–5.2)
ALBUMIN/GLOB SERPL: 2.8 G/DL
ALP SERPL-CCNC: 83 U/L (ref 39–117)
ALT SERPL-CCNC: 45 U/L (ref 1–33)
AST SERPL-CCNC: 33 U/L (ref 1–32)
BASOPHILS # BLD AUTO: NORMAL 10*3/UL
BASOPHILS # BLD MANUAL: 0.07 10*3/MM3 (ref 0–0.2)
BASOPHILS NFR BLD MANUAL: 1.1 % (ref 0–1.5)
BILIRUB SERPL-MCNC: 0.5 MG/DL (ref 0–1.2)
BUN SERPL-MCNC: 13 MG/DL (ref 8–23)
BUN/CREAT SERPL: 23.2 (ref 7–25)
CALCIUM SERPL-MCNC: 10.1 MG/DL (ref 8.6–10.5)
CHLORIDE SERPL-SCNC: 103 MMOL/L (ref 98–107)
CO2 SERPL-SCNC: 31.1 MMOL/L (ref 22–29)
CREAT SERPL-MCNC: 0.56 MG/DL (ref 0.57–1)
DIFFERENTIAL COMMENT: ABNORMAL
EOSINOPHIL # BLD AUTO: NORMAL 10*3/UL
EOSINOPHIL # BLD MANUAL: 0.5 10*3/MM3 (ref 0–0.4)
EOSINOPHIL NFR BLD AUTO: NORMAL %
EOSINOPHIL NFR BLD MANUAL: 8.4 % (ref 0.3–6.2)
ERYTHROCYTE [DISTWIDTH] IN BLOOD BY AUTOMATED COUNT: 13 % (ref 12.3–15.4)
GLOBULIN SER CALC-MCNC: 1.6 GM/DL
GLUCOSE SERPL-MCNC: 122 MG/DL (ref 65–99)
HCT VFR BLD AUTO: 43.4 % (ref 34–46.6)
HGB BLD-MCNC: 14.8 G/DL (ref 12–15.9)
LYMPHOCYTES # BLD AUTO: NORMAL 10*3/UL
LYMPHOCYTES # BLD MANUAL: 1.2 10*3/MM3 (ref 0.7–3.1)
LYMPHOCYTES NFR BLD AUTO: NORMAL %
LYMPHOCYTES NFR BLD MANUAL: 20 % (ref 19.6–45.3)
MCH RBC QN AUTO: 30.7 PG (ref 26.6–33)
MCHC RBC AUTO-ENTMCNC: 34.1 G/DL (ref 31.5–35.7)
MCV RBC AUTO: 90 FL (ref 79–97)
MONOCYTES # BLD MANUAL: 0.19 10*3/MM3 (ref 0.1–0.9)
MONOCYTES NFR BLD AUTO: NORMAL %
MONOCYTES NFR BLD MANUAL: 3.2 % (ref 5–12)
NEUTROPHILS # BLD MANUAL: 4.04 10*3/MM3 (ref 1.7–7)
NEUTROPHILS NFR BLD AUTO: NORMAL %
NEUTROPHILS NFR BLD MANUAL: 67.4 % (ref 42.7–76)
PLATELET # BLD AUTO: 265 10*3/MM3 (ref 140–450)
PLATELET BLD QL SMEAR: ABNORMAL
POTASSIUM SERPL-SCNC: 3.5 MMOL/L (ref 3.5–5.2)
PROT SERPL-MCNC: 6 G/DL (ref 6–8.5)
RBC # BLD AUTO: 4.82 10*6/MM3 (ref 3.77–5.28)
RBC MORPH BLD: ABNORMAL
SODIUM SERPL-SCNC: 143 MMOL/L (ref 136–145)
WBC # BLD AUTO: 5.99 10*3/MM3 (ref 3.4–10.8)

## 2021-06-21 PROCEDURE — 99214 OFFICE O/P EST MOD 30 MIN: CPT | Performed by: INTERNAL MEDICINE

## 2021-06-21 NOTE — PROGRESS NOTES
Chief Complaint   Patient presents with   • Difficulty Swallowing   • Diarrhea   • Hepatic Disease   • Abnormal Lab       Subjective     HPI    Cynthia Escobar is a 66 y.o. female with a past medical history noted below who presents for dysphagia due to eosinophilic esophagitis, issues with diarrhea and fecal incontinence, elevated liver enzymes and fatty liver.      Swallowing has worsened. She feels most foods come back up.  She has had some modest weight loss she attributes to the symptoms.  It simply takes too much effort to try to eat.  Worse with things like bread..  She does have eosinophilic esophagitis and has been on ppi. I gave her a short course of swallowed fluticasone but no relief.  Has to regurgitate with most meals.  Has to drink a lot of water.  Distal esophageal spasm noted last EGD.      Taking citrucel, imodium and doing ok with BMs    Today's visit was in the office.  Both the patient and I were wearing face masks and proper hand hygiene was performed before and after the physical exam.           Current Outpatient Medications:   •  Acidophilus Lactobacillus capsule, Take 1 capsule by mouth 2 (Two) Times a Day., Disp: 60 capsule, Rfl: 5  •  ALPRAZolam (XANAX) 0.25 MG tablet, TAKE 1 TABLET BY MOUTH TWICE DAILY AS NEEDED ANXIETY, Disp: 60 tablet, Rfl: 2  •  amphetamine-dextroamphetamine XR (ADDERALL XR) 20 MG 24 hr capsule, Take 1 capsule by mouth Every Morning, Disp: 30 capsule, Rfl: 0  •  calcium acetate (PHOSLO) 667 MG capsule, Take 1,334 mg by mouth 3 (Three) Times a Day With Meals., Disp: , Rfl:   •  carboxymethylcellulose (Lubricant Eye Drops PF) 0.5 % solution, Inject 1 drop into the eye 2 (two) times a day., Disp: , Rfl:   •  estradiol (ESTRACE) 0.1 MG/GM vaginal cream, Insert 1 g into the vagina 2 (Two) Times a Week., Disp: 45 g, Rfl: 2  •  FLUoxetine (PROzac) 20 MG capsule, TAKE 1 CAPSULE BY MOUTH FOUR TIMES DAILY, Disp: 360 capsule, Rfl: 1  •  hydroCHLOROthiazide (HYDRODIURIL)  25 MG tablet, TAKE 1 TABLET BY MOUTH DAILY, Disp: 90 tablet, Rfl: 1  •  ipratropium (ATROVENT) 0.06 % nasal spray, 2 sprays into the nostril(s) as directed by provider 4 (Four) Times a Day., Disp: 15 mL, Rfl: 12  •  levothyroxine (SYNTHROID, LEVOTHROID) 75 MCG tablet, TAKE 1 TABLET BY MOUTH DAILY, Disp: 90 tablet, Rfl: 3  •  loperamide (IMODIUM A-D) 2 MG tablet, Take 1 tablet by mouth As Needed for Diarrhea., Disp: 30 tablet, Rfl: 2  •  loratadine (CLARITIN) 10 MG tablet, TAKE 1 TABLET BY MOUTH EVERY DAY, Disp: 30 tablet, Rfl: 5  •  olopatadine (PATANOL) 0.1 % ophthalmic solution, Administer 1 drop to both eyes 2 (Two) Times a Day., Disp: 5 mL, Rfl: 5  •  pantoprazole (PROTONIX) 20 MG EC tablet, TAKE 1 TABLET BY MOUTH EVERY DAY. DO NOT TAKE WITHIN 2 HOURS OF THYROID MEDICATION, Disp: 30 tablet, Rfl: 5  •  propranolol (INDERAL) 40 MG tablet, TAKE 1 TABLET BY MOUTH THREE TIMES DAILY, Disp: 270 tablet, Rfl: 1  •  rosuvastatin (CRESTOR) 20 MG tablet, TAKE 1 TABLET BY MOUTH DAILY, Disp: 90 tablet, Rfl: 1  •  Vascepa 1 g capsule capsule, TAKE 2 CAPSULES (2 GRAMS) TWICE A DAY WITH MEALS, Disp: 120 capsule, Rfl: 11  •  zolpidem (AMBIEN) 10 MG tablet, Take 1 tablet by mouth At Night As Needed for Sleep. for sleep, Disp: 30 tablet, Rfl: 2  •  fluticasone (FLOVENT HFA) 220 MCG/ACT inhaler, Spray 2 puffs in the mouth and swallow twice daily.  Do not inhale.  Do not eat for 30 minutes following., Disp: 12 g, Rfl: 1  •  sulfamethoxazole-trimethoprim (BACTRIM DS,SEPTRA DS) 800-160 MG per tablet, Take 1 tablet by mouth 2 (Two) Times a Day., Disp: 14 tablet, Rfl: 0  •  valACYclovir (Valtrex) 1000 MG tablet, Take 2 tablets by mouth 2 (Two) Times a Day. For one day (complete treatment per episode), Disp: 8 tablet, Rfl: 1      Objective     Vitals:    06/21/21 1020   BP: 126/64         06/21/21  1020   Weight: 60.5 kg (133 lb 6.4 oz)     Body mass index is 23.63 kg/m².    Physical Exam  Constitutional:       Appearance: Normal  appearance.   Pulmonary:      Effort: Pulmonary effort is normal.   Neurological:      Mental Status: She is alert and oriented to person, place, and time.   Psychiatric:         Mood and Affect: Mood normal.         Behavior: Behavior normal.         Thought Content: Thought content normal.         Judgment: Judgment normal.             WBC   Date Value Ref Range Status   01/18/2021 5.23 3.40 - 10.80 10*3/mm3 Final   12/27/2018 6.47 4.5 - 11.0 10*3/uL Final     RBC   Date Value Ref Range Status   01/18/2021 4.65 3.77 - 5.28 10*6/mm3 Final   12/27/2018 4.48 4.0 - 5.2 10*6/uL Final     Hemoglobin   Date Value Ref Range Status   01/18/2021 14.4 12.0 - 15.9 g/dL Final   04/24/2020 14.7 12.0 - 15.9 g/dL Final   01/09/2019 11.7 (L) 12.0 - 16.0 g/dL Final     Hematocrit   Date Value Ref Range Status   01/18/2021 42.3 34.0 - 46.6 % Final   04/24/2020 43.2 34.0 - 46.6 % Final   01/09/2019 35.4 (L) 36.0 - 46.0 % Final     MCV   Date Value Ref Range Status   01/18/2021 91.0 79.0 - 97.0 fL Final   04/24/2020 90.2 79.0 - 97.0 fL Final   12/27/2018 91.5 80.0 - 100.0 fL Final     MCH   Date Value Ref Range Status   01/18/2021 31.0 26.6 - 33.0 pg Final   04/24/2020 30.7 26.6 - 33.0 pg Final   12/27/2018 30.6 26.0 - 34.0 pg Final     MCHC   Date Value Ref Range Status   01/18/2021 34.0 31.5 - 35.7 g/dL Final   04/24/2020 34.0 31.5 - 35.7 g/dL Final   12/27/2018 33.4 31.0 - 37.0 g/dL Final     RDW   Date Value Ref Range Status   01/18/2021 12.7 12.3 - 15.4 % Final   04/24/2020 13.1 12.3 - 15.4 % Final   12/27/2018 13.0 12.0 - 16.8 % Final     RDW-SD   Date Value Ref Range Status   04/24/2020 43.3 37.0 - 54.0 fl Final     MPV   Date Value Ref Range Status   04/24/2020 10.1 6.0 - 12.0 fL Final   12/27/2018 10.0 6.7 - 10.8 fL Final     Platelets   Date Value Ref Range Status   01/18/2021 208 140 - 450 10*3/mm3 Final   04/24/2020 223 140 - 450 10*3/mm3 Final   12/27/2018 246 140 - 440 10*3/uL Final     Neutrophil Rel %   Date Value Ref  Range Status   01/18/2021 42.2 (L) 42.7 - 76.0 % Final   12/27/2018 44.5 (L) 45 - 80 % Final     Neutrophil %   Date Value Ref Range Status   04/24/2020 47.0 42.7 - 76.0 % Final     Lymphocyte Rel %   Date Value Ref Range Status   01/18/2021 45.5 (H) 19.6 - 45.3 % Final   12/27/2018 43.7 15 - 50 % Final     Lymphocyte %   Date Value Ref Range Status   04/24/2020 41.8 19.6 - 45.3 % Final     Monocyte Rel %   Date Value Ref Range Status   01/18/2021 6.5 5.0 - 12.0 % Final   12/27/2018 4.3 0 - 15 % Final     Monocyte %   Date Value Ref Range Status   04/24/2020 5.3 5.0 - 12.0 % Final     Eosinophil Rel %   Date Value Ref Range Status   01/18/2021 5.0 0.3 - 6.2 % Final     Eosinophil %   Date Value Ref Range Status   04/24/2020 5.1 0.3 - 6.2 % Final   12/27/2018 7.0 0 - 7 % Final     Basophil Rel %   Date Value Ref Range Status   01/18/2021 0.6 0.0 - 1.5 % Final   12/27/2018 0.3 0 - 2 % Final     Basophil %   Date Value Ref Range Status   04/24/2020 0.3 0.0 - 1.5 % Final     Immature Grans %   Date Value Ref Range Status   04/24/2020 0.5 0.0 - 0.5 % Final   12/27/2018 0.2 (H) 0 % Final     Neutrophils Absolute   Date Value Ref Range Status   01/18/2021 2.21 1.70 - 7.00 10*3/mm3 Final   12/27/2018 2.88 2.0 - 8.8 10*3/uL Final     Neutrophils, Absolute   Date Value Ref Range Status   04/24/2020 2.92 1.70 - 7.00 10*3/mm3 Final     Lymphocytes Absolute   Date Value Ref Range Status   01/18/2021 2.38 0.70 - 3.10 10*3/mm3 Final   12/27/2018 2.83 0.7 - 5.5 10*3/uL Final     Lymphocytes, Absolute   Date Value Ref Range Status   04/24/2020 2.60 0.70 - 3.10 10*3/mm3 Final     Monocytes Absolute   Date Value Ref Range Status   01/18/2021 0.34 0.10 - 0.90 10*3/mm3 Final   12/27/2018 0.28 0.0 - 1.7 10*3/uL Final     Monocytes, Absolute   Date Value Ref Range Status   04/24/2020 0.33 0.10 - 0.90 10*3/mm3 Final     Eosinophils Absolute   Date Value Ref Range Status   01/18/2021 0.26 0.00 - 0.40 10*3/mm3 Final   12/27/2018 0.45 0.0 -  0.8 10*3/uL Final     Eosinophils, Absolute   Date Value Ref Range Status   04/24/2020 0.32 0.00 - 0.40 10*3/mm3 Final     Basophils Absolute   Date Value Ref Range Status   01/18/2021 0.03 0.00 - 0.20 10*3/mm3 Final   12/27/2018 0.02 0.0 - 0.2 10*3/uL Final     Basophils, Absolute   Date Value Ref Range Status   04/24/2020 0.02 0.00 - 0.20 10*3/mm3 Final     Immature Grans, Absolute   Date Value Ref Range Status   04/24/2020 0.03 0.00 - 0.05 10*3/mm3 Final   12/27/2018 0.01 <1 10*3/uL Final     nRBC   Date Value Ref Range Status   01/18/2021 0.0 0.0 - 0.2 /100 WBC Final   04/24/2020 0.0 0.0 - 0.2 /100 WBC Final       Lab Results   Component Value Date    GLUCOSE 113 (H) 04/24/2020    BUN 23 01/18/2021    CREATININE 0.61 01/18/2021    EGFRIFNONA 98 01/18/2021    EGFRIFAFRI 119 01/18/2021    BCR 37.7 (H) 01/18/2021    CO2 29.0 01/18/2021    CALCIUM 9.4 01/18/2021    PROTENTOTREF 5.7 (L) 01/18/2021    ALBUMIN 4.40 01/18/2021    LABIL2 3.4 01/18/2021    AST 44 (H) 01/18/2021    ALT 47 (H) 01/18/2021         RIGHT UPPER QUADRANT ULTRASOUND     HISTORY: Fatty liver     COMPARISON: None     TECHNIQUE: Real time ultrasound imaging of the right upper quadrant was  performed. Static images reviewed.     FINDINGS: Visualized portions of the pancreas are unremarkable. There is  increased echogenicity of the liver consistent with fatty infiltration.  No gallstones, wall thickening or pericholecystic fluid. Common duct  normal in size measuring about 4 mm. Right kidney measures 11.5 cm in  length and contains a cyst that measures about 2.4 cm.     IMPRESSION:  Compatible with fatty infiltration     This report was finalized on 3/8/2021 4:13 PM by Dr. Nav Leslie M.D.         I personally reviewed data as detailed below:     The labs listed above.    The radiology studies listed above.    Office notes from: 4/8/21 Cardiology      No notes on file    Assessment/Plan    1.  Esophageal dysphagia: Worsening.  Description sounds  more like a motility issue though also likely underlying complication of eosinophilic esophagitis    2.  Eosinophilic esophagitis: She is on PPI.  No response to dysphagia with swallowed fluticasone    3.  Elevated liver enzymes, hepatic steatosis: BMI is in normal range.  She is on a statin.  Recent normal cardiac work-up    4.  Functional diarrhea, incontinence: Stable on Imodium and fiber.  Follows with Dr. Arenas    Plan  Esophagram for further evaluation of her swallowing  She may need esophageal motility testing  Encouraged better nutrition, protein shakes  Update labs today with CBC, CMP  Continue fiber, as needed Imodium  Continue PPI    Diagnoses and all orders for this visit:    1. Esophageal dysphagia (Primary)  -     FL Esophagram Complete Double-Contrast; Future  -     CBC & Differential  -     Comprehensive Metabolic Panel    2. Esophagitis, eosinophilic  -     CBC & Differential  -     Comprehensive Metabolic Panel    3. Elevated transaminase level  -     CBC & Differential  -     Comprehensive Metabolic Panel    4. Hepatic steatosis  -     CBC & Differential  -     Comprehensive Metabolic Panel    5. Functional diarrhea        I have discussed the above plan with the patient.  They verbalize understanding and are in agreement with the plan.  They have been advised to contact the office for any questions, concerns, or changes related to their health.    Dictated utilizing Dragon dictation

## 2021-06-24 ENCOUNTER — TELEPHONE (OUTPATIENT)
Dept: GASTROENTEROLOGY | Facility: CLINIC | Age: 66
End: 2021-06-24

## 2021-06-24 DIAGNOSIS — F51.01 PRIMARY INSOMNIA: ICD-10-CM

## 2021-06-24 RX ORDER — ZOLPIDEM TARTRATE 10 MG/1
10 TABLET ORAL NIGHTLY PRN
Qty: 30 TABLET | Refills: 0 | Status: SHIPPED | OUTPATIENT
Start: 2021-06-24 | End: 2021-08-02 | Stop reason: SDUPTHER

## 2021-06-24 NOTE — TELEPHONE ENCOUNTER
----- Message from Preethi Beck MD sent at 6/24/2021  1:32 PM EDT -----  CBC is in normal range    Liver enzymes remain slightly elevated, will continue to monitor

## 2021-06-24 NOTE — TELEPHONE ENCOUNTER
Caller: Cynthia Escobar    Relationship: Self    Best call back number: [phone]    Medication needed:   Requested Prescriptions     Pending Prescriptions Disp Refills   • zolpidem (AMBIEN) 10 MG tablet 30 tablet 2     Sig: Take 1 tablet by mouth At Night As Needed for Sleep. for sleep       When do you need the refill by: 6-24-21    What additional details did the patient provide when requesting the medication: PATIENT ONLY HAS 2 LEFT    Does the patient have less than a 3 day supply:  [x] Yes  [] No    What is the patient's preferred pharmacy: [facility] #[id number] - [place], [place] - [address] AT [facility] OF [address] [facility] & [place] - [phone] [facility] [phone] FX

## 2021-07-14 ENCOUNTER — APPOINTMENT (OUTPATIENT)
Dept: GENERAL RADIOLOGY | Facility: HOSPITAL | Age: 66
End: 2021-07-14

## 2021-07-27 ENCOUNTER — HOSPITAL ENCOUNTER (OUTPATIENT)
Dept: GENERAL RADIOLOGY | Facility: HOSPITAL | Age: 66
Discharge: HOME | End: 2021-07-27
Admitting: INTERNAL MEDICINE

## 2021-07-27 DIAGNOSIS — F51.01 PRIMARY INSOMNIA: ICD-10-CM

## 2021-07-27 DIAGNOSIS — J30.9 ALLERGIC RHINITIS, UNSPECIFIED SEASONALITY, UNSPECIFIED TRIGGER: ICD-10-CM

## 2021-07-27 DIAGNOSIS — R13.19 ESOPHAGEAL DYSPHAGIA: Chronic | ICD-10-CM

## 2021-07-27 DIAGNOSIS — F41.9 ANXIETY: ICD-10-CM

## 2021-07-27 PROCEDURE — 74221 X-RAY XM ESOPHAGUS 2CNTRST: CPT

## 2021-07-27 RX ORDER — LORATADINE 10 MG/1
10 TABLET ORAL DAILY
Qty: 30 TABLET | Refills: 5 | OUTPATIENT
Start: 2021-07-27

## 2021-07-27 RX ORDER — ALPRAZOLAM 0.25 MG/1
TABLET ORAL
Qty: 60 TABLET | Refills: 2 | OUTPATIENT
Start: 2021-07-27

## 2021-07-27 RX ORDER — ZOLPIDEM TARTRATE 10 MG/1
10 TABLET ORAL NIGHTLY PRN
Qty: 30 TABLET | Refills: 0 | OUTPATIENT
Start: 2021-07-27

## 2021-07-27 RX ADMIN — BARIUM SULFATE 355 ML: 0.6 SUSPENSION ORAL at 10:45

## 2021-07-27 RX ADMIN — BARIUM SULFATE 135 ML: 980 POWDER, FOR SUSPENSION ORAL at 10:45

## 2021-07-27 RX ADMIN — ANTACID/ANTIFLATULENT 1 PACKET: 380; 550; 10; 10 GRANULE, EFFERVESCENT ORAL at 10:45

## 2021-07-27 NOTE — TELEPHONE ENCOUNTER
Caller: Cynthia Escobar    Relationship: Self    Best call back number: [phone]    Medication needed:   Requested Prescriptions     Pending Prescriptions Disp Refills   • zolpidem (AMBIEN) 10 MG tablet 30 tablet 0     Sig: Take 1 tablet by mouth At Night As Needed for Sleep. PT MUST HAVE APPT BEFORE ANY FURTHER REFILLS   • loratadine (CLARITIN) 10 MG tablet 30 tablet 5     Sig: Take 1 tablet by mouth Daily.   • ALPRAZolam (XANAX) 0.25 MG tablet 60 tablet 2       When do you need the refill by: 07/29/2021    What additional details did the patient provide when requesting the medication: PATIENT WILL BE OUT OF AMBIEN BY 07/29/2021 AND WILL BE OUT OF THE REST OF MEDICATIONS BY 07/31/2021    Does the patient have less than a 3 day supply:  [x] Yes  [] No    What is the patient's preferred pharmacy: [facility] #[id number] - [place], [place] - [address] [address] [facility] [address] [facility] [phone] [facility] [phone] FX

## 2021-07-30 ENCOUNTER — TELEPHONE (OUTPATIENT)
Dept: FAMILY MEDICINE CLINIC | Facility: CLINIC | Age: 66
End: 2021-07-30

## 2021-07-30 NOTE — TELEPHONE ENCOUNTER
PT IS AWARE SHE NEEDS AN APPT HOWEVER THERE IS NO APPT'S WITH DR OLIVEIRA UNTIL HE RETURNS AND PT IS SCHEDULED TO SEE DR OLIVEIRA ON 8-17-21.    PT IS OUT OF HER     ZOLPIDEM 10 MG 1 QHS          AND    NEEDS A REFILL ON HER     LORATADINE (CLARITIN) 10 MG 1 QD     CAN DR OLIVEIRA PLEASE GIVE HER REFILLS (30 DAYS WORTH) BECAUSE SHE DOES HAVE AN APPT ON 8-17-21?    ALSO PT IS DUE FOR FASTING LABS HOWEVER   DR BAIG DID A LIPID ON 5-14-21    AND    MARGO GUZMAN DID A CBC CMP ON 6-21-21 SO PLEASE ASK DR OLIVEIRA IF PT NEEDS TO REPEAT LABS PRIOR TO COMING IN ON 8-17-21    GOYO CANNON  [phone]    PT'S # [phone]

## 2021-07-30 NOTE — TELEPHONE ENCOUNTER
PATIENT CALLED TO CHECK THE STATUS OF THIS REFILL REQUEST, WOULD LIKE TO KNOW WHY THEY HAVE NOT BEEN FILLED YET.    PATIENT IS COMPLETELY OUT OF AMBIEN AND WILL BE OUT OF THE OTHER TWO ON 07/31/21.    PLEASE ADVISE  [phone]

## 2021-08-01 DIAGNOSIS — R13.19 ESOPHAGEAL DYSPHAGIA: Primary | ICD-10-CM

## 2021-08-02 DIAGNOSIS — J30.9 ALLERGIC RHINITIS, UNSPECIFIED SEASONALITY, UNSPECIFIED TRIGGER: ICD-10-CM

## 2021-08-02 DIAGNOSIS — F51.01 PRIMARY INSOMNIA: ICD-10-CM

## 2021-08-02 RX ORDER — ZOLPIDEM TARTRATE 10 MG/1
10 TABLET ORAL NIGHTLY PRN
Qty: 30 TABLET | Refills: 0 | Status: SHIPPED | OUTPATIENT
Start: 2021-08-02 | End: 2021-09-01 | Stop reason: SDUPTHER

## 2021-08-02 RX ORDER — LORATADINE 10 MG/1
10 TABLET ORAL DAILY
Qty: 30 TABLET | Refills: 5 | Status: SHIPPED | OUTPATIENT
Start: 2021-08-02 | End: 2022-03-01

## 2021-08-02 NOTE — TELEPHONE ENCOUNTER
Caller: Cynthia Escobar    Relationship: Self    Best call back number: [phone] (H)    What is the best time to reach you: ANYTIME    Who are you requesting to speak with (clinical staff, provider,  specific staff member): YOLETTE    Do you know the name of the person who called:     What was the call regarding: SOME OF HER MEDICATIONS    Do you require a callback: YES      THANKS

## 2021-08-10 ENCOUNTER — OFFICE VISIT (OUTPATIENT)
Dept: OTOLARYNGOLOGY | Facility: CLINIC | Age: 66
End: 2021-08-10

## 2021-08-10 VITALS — HEIGHT: 65 IN | TEMPERATURE: 97.1 F | WEIGHT: 135.2 LBS | BODY MASS INDEX: 22.53 KG/M2

## 2021-08-10 DIAGNOSIS — J30.9 ALLERGIC RHINITIS, UNSPECIFIED SEASONALITY, UNSPECIFIED TRIGGER: ICD-10-CM

## 2021-08-10 DIAGNOSIS — R13.13 DYSPHAGIA, CRICOPHARYNGEAL: Primary | ICD-10-CM

## 2021-08-10 PROCEDURE — 31575 DIAGNOSTIC LARYNGOSCOPY: CPT | Performed by: NURSE PRACTITIONER

## 2021-08-10 NOTE — PROGRESS NOTES
Patient Name: Cynthia Escobar   Visit Date: 08/10/2021   Patient ID: [id number]  Provider: PIYUSH Astorga    Sex: female  Location: [facility]   YOB: 1955  Location Address: [address] [place] [address], [address] [place] [address],?[place]?[id number]    Primary Care Provider Arian Peterson MD  Location Phone: [phone]    Referring Provider: Preethi Beck MD        Chief Complaint  Difficulty Swallowing    Subjective   Cynthia Escobar is a 66 y.o. female who presents to [facility] today as a consult from Preethi Beck MD for evaluation of dysphagia.  Patient has been recently diagnosed with eosinophilic esophagitis and is currently seeing gastroenterologist Dr. Beck.  Patient states over the past several years she has had difficulty swallowing foods such as meats and breads.  She states she will frequently get choked and will have to try to cough up these foods.  She states she will occasionally get choked on liquids as well.  She reports feeling like she has a lot of mucus and phlegm in the back of her throat.  She states approximately 18 months ago she had an esophageal dilatation from her gastroenterologist which did help her symptoms for short period of time.  She denies any painful swallowing or changes in her voice.  She is not a smoker.  She did recently have an esophagram ordered by her gastroenterologist performed on 7/27/2021.  This showed a prominent cricopharyngeus muscle with normal esophageal transit and no distal esophageal stricture or narrowing.  She did have mild gastroesophageal reflux noted.  Additionally she notes she has had an increase in clear rhinitis recently.  She states that she is using azelastine nasal spray for this but has stopped using it.  She reports previously being on immunotherapy for allergic rhinitis.      Current Outpatient Medications on File Prior to Visit   Medication Sig   • Acidophilus  Lactobacillus capsule Take 1 capsule by mouth 2 (Two) Times a Day.   • ALPRAZolam (XANAX) 0.25 MG tablet TAKE 1 TABLET BY MOUTH TWICE DAILY AS NEEDED ANXIETY   • amphetamine-dextroamphetamine XR (ADDERALL XR) 20 MG 24 hr capsule Take 1 capsule by mouth Every Morning   • calcium acetate (PHOSLO) 667 MG capsule Take 1,334 mg by mouth 3 (Three) Times a Day With Meals.   • carboxymethylcellulose (Lubricant Eye Drops PF) 0.5 % solution Inject 1 drop into the eye 2 (two) times a day.   • estradiol (ESTRACE) 0.1 MG/GM vaginal cream Insert 1 g into the vagina 2 (Two) Times a Week.   • FLUoxetine (PROzac) 20 MG capsule TAKE 1 CAPSULE BY MOUTH FOUR TIMES DAILY   • fluticasone (FLOVENT HFA) 220 MCG/ACT inhaler Spray 2 puffs in the mouth and swallow twice daily.  Do not inhale.  Do not eat for 30 minutes following.   • hydroCHLOROthiazide (HYDRODIURIL) 25 MG tablet TAKE 1 TABLET BY MOUTH DAILY   • ipratropium (ATROVENT) 0.06 % nasal spray 2 sprays into the nostril(s) as directed by provider 4 (Four) Times a Day.   • levothyroxine (SYNTHROID, LEVOTHROID) 75 MCG tablet TAKE 1 TABLET BY MOUTH DAILY   • loperamide (IMODIUM A-D) 2 MG tablet Take 1 tablet by mouth As Needed for Diarrhea.   • loratadine (CLARITIN) 10 MG tablet Take 1 tablet by mouth Daily.   • olopatadine (PATANOL) 0.1 % ophthalmic solution Administer 1 drop to both eyes 2 (Two) Times a Day.   • pantoprazole (PROTONIX) 20 MG EC tablet TAKE 1 TABLET BY MOUTH EVERY DAY. DO NOT TAKE WITHIN 2 HOURS OF THYROID MEDICATION   • propranolol (INDERAL) 40 MG tablet TAKE 1 TABLET BY MOUTH THREE TIMES DAILY   • rosuvastatin (CRESTOR) 20 MG tablet TAKE 1 TABLET BY MOUTH DAILY   • Vascepa 1 g capsule capsule TAKE 2 CAPSULES (2 GRAMS) TWICE A DAY WITH MEALS   • zolpidem (AMBIEN) 10 MG tablet Take 1 tablet by mouth At Night As Needed for Sleep. PT MUST HAVE APPT BEFORE ANY FURTHER REFILLS   • sulfamethoxazole-trimethoprim (BACTRIM DS,SEPTRA DS) 800-160 MG per tablet Take 1 tablet by  "mouth 2 (Two) Times a Day.   • valACYclovir (Valtrex) 1000 MG tablet Take 2 tablets by mouth 2 (Two) Times a Day. For one day (complete treatment per episode)     No current facility-administered medications on file prior to visit.        Social History     Tobacco Use   • Smoking status: Never Smoker   • Smokeless tobacco: Never Used   Vaping Use   • Vaping Use: Never used   Substance Use Topics   • Alcohol use: Not Currently     Alcohol/week: 1.0 standard drinks     Types: 1 Glasses of wine per week   • Drug use: No       Objective     Vital Signs:   Temp 97.1 °F (36.2 °C) (Temporal)   Ht 165.1 cm (65\")   Wt 61.3 kg (135 lb 3.2 oz)   BMI 22.50 kg/m²       Physical Exam  Constitutional:       General: She is not in acute distress.     Appearance: Normal appearance. She is not ill-appearing.   HENT:      Head: Normocephalic and atraumatic.      Jaw: There is normal jaw occlusion. No tenderness or pain on movement.      Salivary Glands: Right salivary gland is not diffusely enlarged or tender. Left salivary gland is not diffusely enlarged or tender.      Right Ear: Tympanic membrane, ear canal and external ear normal.      Left Ear: Tympanic membrane, ear canal and external ear normal.      Nose: Nose normal. No septal deviation.      Right Sinus: No maxillary sinus tenderness or frontal sinus tenderness.      Left Sinus: No maxillary sinus tenderness or frontal sinus tenderness.      Mouth/Throat:      Lips: Pink. No lesions.      Mouth: Mucous membranes are moist. No oral lesions.      Dentition: Normal dentition.      Tongue: No lesions.      Palate: No mass and lesions.      Pharynx: Oropharynx is clear. Uvula midline.      Tonsils: No tonsillar exudate.   Eyes:      Extraocular Movements: Extraocular movements intact.      Conjunctiva/sclera: Conjunctivae normal.      Pupils: Pupils are equal, round, and reactive to light.   Neck:      Thyroid: No thyroid mass, thyromegaly or thyroid tenderness.      Trachea: " Trachea normal.   Pulmonary:      Effort: Pulmonary effort is normal. No respiratory distress.   Musculoskeletal:         General: Normal range of motion.      Cervical back: Normal range of motion and neck supple. No tenderness.   Lymphadenopathy:      Cervical: No cervical adenopathy.   Skin:     General: Skin is warm and dry.   Neurological:      General: No focal deficit present.      Mental Status: She is alert and oriented to person, place, and time.      Cranial Nerves: No cranial nerve deficit.      Motor: No weakness.      Gait: Gait normal.   Psychiatric:         Mood and Affect: Mood normal.         Behavior: Behavior normal.         Thought Content: Thought content normal.         Judgment: Judgment normal.         Laryngoscopy    Date/Time: 8/10/2021 2:15 PM  Performed by: Cyndi Leigh APRN  Authorized by: Cyndi Leigh APRN   Local anesthesia used: yes    Anesthesia:  Local anesthesia used: yes  Local Anesthetic: lidocaine spray    Sedation:  Patient sedated: no    Patient tolerance: patient tolerated the procedure well with no immediate complications  Comments: I performed a flexible laryngoscopy.  Patient's right naris was sprayed with topical anesthetic and decongestant spray.  After ample time was given for the medication to take effect I passed the flexible laryngoscope through the right naris.  The nose is free of any lesions, polyps or purulence.  The base of tongue, vallecula, epiglottis, arytenoid cartilages and piriform sinuses are all normal in appearance.    Bilateral vocal folds abducted and abducted normally.  The subglottis was widely patent.  I see no lesions or masses.  The patient tolerated the procedure well.                      Result Review :              Assessment and Plan    Diagnoses and all orders for this visit:    1. Dysphagia, cricopharyngeal (Primary)  -     Case Request; Standing  -     Case Request    2. Allergic rhinitis, unspecified seasonality, unspecified  trigger    Other orders  -     Laryngoscopy  -     Follow Anesthesia Guidelines / Protocol; Future  -     Follow Anesthesia Guidelines / Protocol; Standing  -     NPO Diet; Standing  -     Obtain Informed Consent; Future    Risk and benefits for direct laryngoscopy, esophagoscopy and bronchoscopy with esophageal bougienage was discussed with the patient.  Possible complications and alternatives are discussed with the patient.  She would like to proceed with this and I discussed this with Dr. Leslie who agrees with the assessment and plan.  We will get this scheduled mid-to-late September at the patient's request and plan to see her back after the procedure.    I have discussed my findings with Dr. Leslie who agrees with my assessment and plan.   Follow Up   Return Follow-up after esophageal dilatation procedure with Dr. Leslie or myself.  Patient was given instructions and counseling regarding her condition or for health maintenance advice. Please see specific information pulled into the AVS if appropriate.      PIYUSH Astorga

## 2021-08-11 RX ORDER — ROSUVASTATIN CALCIUM 20 MG/1
20 TABLET, COATED ORAL DAILY
Qty: 90 TABLET | Refills: 1 | Status: SHIPPED | OUTPATIENT
Start: 2021-08-11 | End: 2021-08-19 | Stop reason: SDUPTHER

## 2021-08-12 RX ORDER — FLUOXETINE HYDROCHLORIDE 20 MG/1
CAPSULE ORAL
Qty: 360 CAPSULE | Refills: 1 | Status: SHIPPED | OUTPATIENT
Start: 2021-08-12 | End: 2022-02-11

## 2021-08-19 ENCOUNTER — TELEPHONE (OUTPATIENT)
Dept: GASTROENTEROLOGY | Facility: CLINIC | Age: 66
End: 2021-08-19

## 2021-08-19 RX ORDER — ROSUVASTATIN CALCIUM 20 MG/1
20 TABLET, COATED ORAL DAILY
Qty: 30 TABLET | Refills: 0 | Status: SHIPPED | OUTPATIENT
Start: 2021-08-19 | End: 2022-01-27

## 2021-08-19 NOTE — TELEPHONE ENCOUNTER
"----- Message from Preethi Beck MD sent at 8/1/2021 12:29 PM EDT -----  Esophagram showed normal peristalsis, \"prominent cricopharyngeus muscle which may contribute to patient's reported dysphagia. Normal esophageal transit.  No distal esophageal stricture or narrowing. Mild gastroesophageal reflux.\"    Will refer to ENT to assess the prominent cricopharyngeus muscle.  "

## 2021-08-19 NOTE — TELEPHONE ENCOUNTER
VM to pt with request to contact office.     It is noted that pt has been seen by PIYUSH Astorga with ENT on 8/10.  Results reviewed.

## 2021-08-19 NOTE — TELEPHONE ENCOUNTER
Caller: Cynthia Escobar    Relationship: Self    Best call back number: [phone]  Medication needed:   Requested Prescriptions     Pending Prescriptions Disp Refills   • rosuvastatin (CRESTOR) 20 MG tablet 90 tablet 1     Sig: Take 1 tablet by mouth Daily.       Does the patient have less than a 3 day supply:  [x] Yes  [] No    What is the patient's preferred pharmacy: [facility] #[id number] - [facility] [address] [address] AT [address]/[facility] & [place] - [phone] [facility] [phone] FX

## 2021-08-23 ENCOUNTER — OFFICE VISIT (OUTPATIENT)
Dept: GASTROENTEROLOGY | Facility: CLINIC | Age: 66
End: 2021-08-23

## 2021-08-23 ENCOUNTER — TELEPHONE (OUTPATIENT)
Dept: OTOLARYNGOLOGY | Facility: CLINIC | Age: 66
End: 2021-08-23

## 2021-08-23 VITALS — BODY MASS INDEX: 22.84 KG/M2 | WEIGHT: 137.1 LBS | HEIGHT: 65 IN | TEMPERATURE: 96.8 F

## 2021-08-23 DIAGNOSIS — K76.0 NAFLD (NONALCOHOLIC FATTY LIVER DISEASE): ICD-10-CM

## 2021-08-23 DIAGNOSIS — R13.19 ESOPHAGEAL DYSPHAGIA: Primary | ICD-10-CM

## 2021-08-23 DIAGNOSIS — K59.1 FUNCTIONAL DIARRHEA: Chronic | ICD-10-CM

## 2021-08-23 DIAGNOSIS — K20.0 ESOPHAGITIS, EOSINOPHILIC: ICD-10-CM

## 2021-08-23 DIAGNOSIS — R74.01 ELEVATED TRANSAMINASE LEVEL: ICD-10-CM

## 2021-08-23 PROCEDURE — 99214 OFFICE O/P EST MOD 30 MIN: CPT | Performed by: NURSE PRACTITIONER

## 2021-08-23 RX ORDER — HYDROCHLOROTHIAZIDE 25 MG/1
25 TABLET ORAL DAILY
Qty: 90 TABLET | Refills: 1 | Status: SHIPPED | OUTPATIENT
Start: 2021-08-23 | End: 2022-08-01

## 2021-08-23 RX ORDER — PANTOPRAZOLE SODIUM 20 MG/1
TABLET, DELAYED RELEASE ORAL
Qty: 30 TABLET | Refills: 5 | Status: SHIPPED | OUTPATIENT
Start: 2021-08-23 | End: 2022-09-13

## 2021-08-23 NOTE — TELEPHONE ENCOUNTER
Called patient to discuss preferred day to schedule surgery with Dr. Leslie. No answer, left voicemail to return call.

## 2021-08-23 NOTE — PROGRESS NOTES
Chief Complaint   Patient presents with   • Difficulty Swallowing       Cynthia Escobar is a  66 y.o. female here for a follow up visit for trouble swallowing.    HPI  66-year-old female presents today for follow-up visit for trouble swallowing.  She is a patient of Dr. Beck.  She was last seen in the office on 6/21/2021.  She is new to me today.  She is been having an issue with esophageal dysphagia and ended up having an esophagram done recently that showed:  CONCLUSION:   Prominent cricopharyngeus muscle which may contribute to patient's reported dysphagia.     She was referred at that time to an ear nose and throat for further evaluation.  The patient reports she has been seen by ear nose and throat and they are planning to do a surgery which she describes as a balloon dilation.  She supposed to call back her nose and throat office to schedule the surgery.  She does have history of eosinophilic esophagitis and admits she does pretty well with Flovent.  She tells me that her swallowing issues are definitely worse with certain foods like meat and breads.  She denies any abdominal pain, nausea and vomiting, constipation, rectal bleeding or melena.  She does have a history of functional diarrhea and admits that is pretty well controlled with daily Metamucil and Imodium OTC as needed.  She does follow Dr. Arenas for this.  She tells me she is not seen him in a long time because she is done really well.  Her last colonoscopy was in 2018.  The patient reports it was normal.  Her last EGD is on 5/27/2020.  She also has history of elevated liver enzymes secondary to fatty liver disease.  Most recent liver ultrasound done last year did show fatty infiltration.      Past Medical History:   Diagnosis Date   • Arthritis    • COVID-19 07/2020   • Depression    • Diverticulitis    • Diverticulitis of colon    • Diverticulosis    • Hyperlipidemia    • Hypertension    • Oral herpes 8/18/2020   • Thyroid disease         Past Surgical History:   Procedure Laterality Date   • ABDOMINAL SURGERY  2005, 2014    ex lap x 2,  diverticulitis   • APPENDECTOMY     • CARDIAC CATHETERIZATION N/A 4/24/2020    Procedure: Coronary angiography;  Surgeon: Roberto Briones MD;  Location:  [facility];  Service: Cardiovascular;  Laterality: N/A;   • CARDIAC CATHETERIZATION N/A 4/24/2020    Procedure: Left heart cath;  Surgeon: Roberto Briones MD;  Location:  [facility];  Service: Cardiovascular;  Laterality: N/A;   • CARDIAC CATHETERIZATION N/A 4/24/2020    Procedure: Left ventriculography;  Surgeon: Roberto Briones MD;  Location:  [facility];  Service: Cardiovascular;  Laterality: N/A;   • COLONOSCOPY  approx 2018    normal per pt    • COLOSTOMY      had colostomy and then is was reversed   • ENDOSCOPY N/A 5/27/2020    Procedure: ESOPHAGOGASTRODUODENOSCOPY WITH BIOPSY AND BIOPSY POLYPECTOMY AND MACIEL DIALATION;  Surgeon: Preethi Beck MD;  Location: [facility];  Service: Gastroenterology;  Laterality: N/A;  PRE: ESOPHAGEAL DYSPHAGIA  POST: Z LINE 46, ESOPHAGEAL SPASM, GASTRITIS, FUNDIC POLYP   • FOOT SURGERY Right 12/2016    Second and third hammertoe corrections   • KNEE SURGERY     • REVISION / TAKEDOWN COLOSTOMY  2006   • SHOULDER SURGERY     • TONSILLECTOMY     • WISDOM TOOTH EXTRACTION         Scheduled Meds:    Continuous Infusions:No current facility-administered medications for this visit.      PRN Meds:.    Allergies   Allergen Reactions   • Azithromycin Other (See Comments)     Reaction unknown to patient (unable to remember)   • Pravastatin    • Zetia [Ezetimibe]        Social History     Socioeconomic History   • Marital status:      Spouse name: Not on file   • Number of children: Not on file   • Years of education: Not on file   • Highest education level: Not on file   Tobacco Use   • Smoking status: Never Smoker   • Smokeless tobacco:  Never Used   Vaping Use   • Vaping Use: Never used   Substance and Sexual Activity   • Alcohol use: Not Currently     Alcohol/week: 1.0 standard drinks     Types: 1 Glasses of wine per week   • Drug use: No   • Sexual activity: Not Currently     Partners: Male     Birth control/protection: Post-menopausal       Family History   Problem Relation Age of Onset   • Osteoporosis Mother    • Breast cancer Neg Hx    • Ovarian cancer Neg Hx    • Colon cancer Neg Hx    • Deep vein thrombosis Neg Hx    • Pulmonary embolism Neg Hx        Review of Systems   Constitutional: Negative for appetite change, chills, diaphoresis, fatigue, fever and unexpected weight change.   HENT: Positive for trouble swallowing. Negative for nosebleeds, postnasal drip, sore throat and voice change.    Respiratory: Negative for cough, choking, chest tightness, shortness of breath and wheezing.    Cardiovascular: Negative for chest pain, palpitations and leg swelling.   Gastrointestinal: Positive for diarrhea. Negative for abdominal distention, abdominal pain, anal bleeding, blood in stool, constipation, nausea, rectal pain and vomiting.   Endocrine: Negative for polydipsia, polyphagia and polyuria.   Musculoskeletal: Negative for gait problem.   Skin: Negative for rash and wound.   Allergic/Immunologic: Negative for food allergies.   Neurological: Negative for dizziness, speech difficulty and light-headedness.   Psychiatric/Behavioral: Negative for confusion, self-injury, sleep disturbance and suicidal ideas.       Vitals:    08/23/21 1002   Temp: 96.8 °F (36 °C)       Physical Exam  Constitutional:       General: She is not in acute distress.     Appearance: She is well-developed. She is not ill-appearing.   HENT:      Head: Normocephalic.   Eyes:      Pupils: Pupils are equal, round, and reactive to light.   Cardiovascular:      Rate and Rhythm: Normal rate and regular rhythm.      Heart sounds: Normal heart sounds.   Pulmonary:      Effort:  Pulmonary effort is normal.      Breath sounds: Normal breath sounds.   Abdominal:      General: Bowel sounds are normal. There is no distension.      Palpations: Abdomen is soft. There is no mass.      Tenderness: There is no abdominal tenderness. There is no guarding or rebound.      Hernia: No hernia is present.   Musculoskeletal:         General: Normal range of motion.   Skin:     General: Skin is warm and dry.   Neurological:      Mental Status: She is alert and oriented to person, place, and time.   Psychiatric:         Speech: Speech normal.         Behavior: Behavior normal.         Judgment: Judgment normal.         No radiology results for the last 7 days     Assessment and plan     1. Esophageal dysphagia    2. Esophagitis, eosinophilic    3. NAFLD (nonalcoholic fatty liver disease)    4. Elevated transaminase level    5. Functional diarrhea    Reviewed esophagram results with her today.  Patient to follow-up with ear nose and throat to have that procedure done to hopefully help with her dysphagia.  Continue Flovent for her eosinophilic esophagitis.  Diarrhea seems pretty well controlled on Metamucil daily and Imodium as needed.  Reviewed most recent labs with her today.  LFTs are still slightly elevated (AST & ALT).  We will repeat at next office visit.  Patient to call the office with any issues.  Patient to follow-up with Dr. Beck in 3 months.  Patient is agreeable to the plan.

## 2021-08-31 RX ORDER — LEVOTHYROXINE SODIUM 0.07 MG/1
TABLET ORAL
Qty: 90 TABLET | Refills: 3 | OUTPATIENT
Start: 2021-08-31

## 2021-09-01 ENCOUNTER — OFFICE VISIT (OUTPATIENT)
Dept: FAMILY MEDICINE CLINIC | Facility: CLINIC | Age: 66
End: 2021-09-01

## 2021-09-01 VITALS
SYSTOLIC BLOOD PRESSURE: 120 MMHG | OXYGEN SATURATION: 95 % | BODY MASS INDEX: 22.09 KG/M2 | HEIGHT: 65 IN | HEART RATE: 76 BPM | RESPIRATION RATE: 18 BRPM | WEIGHT: 132.6 LBS | TEMPERATURE: 97.5 F | DIASTOLIC BLOOD PRESSURE: 64 MMHG

## 2021-09-01 DIAGNOSIS — F51.01 PRIMARY INSOMNIA: ICD-10-CM

## 2021-09-01 DIAGNOSIS — R13.19 ESOPHAGEAL DYSPHAGIA: ICD-10-CM

## 2021-09-01 DIAGNOSIS — K59.1 FUNCTIONAL DIARRHEA: Primary | ICD-10-CM

## 2021-09-01 DIAGNOSIS — K76.0 NAFLD (NONALCOHOLIC FATTY LIVER DISEASE): ICD-10-CM

## 2021-09-01 PROCEDURE — 99214 OFFICE O/P EST MOD 30 MIN: CPT | Performed by: NURSE PRACTITIONER

## 2021-09-01 RX ORDER — SUCRALFATE 1 G/1
1 TABLET ORAL 3 TIMES DAILY
Qty: 42 TABLET | Refills: 0 | Status: SHIPPED | OUTPATIENT
Start: 2021-09-01 | End: 2021-09-15

## 2021-09-01 RX ORDER — ZOLPIDEM TARTRATE 10 MG/1
10 TABLET ORAL NIGHTLY PRN
Qty: 30 TABLET | Refills: 0 | Status: SHIPPED | OUTPATIENT
Start: 2021-09-01 | End: 2021-09-28

## 2021-09-01 RX ORDER — LOPERAMIDE HYDROCHLORIDE 2 MG/1
2 TABLET ORAL 3 TIMES DAILY PRN
Qty: 90 TABLET | Refills: 0 | Status: SHIPPED | OUTPATIENT
Start: 2021-09-01

## 2021-09-01 NOTE — PROGRESS NOTES
Subjective     Cynthia Escobar is a 66 y.o.. female.     Pt stating she sees Dr. Beck, GI and has had an upper endoscopy that showed esophageal dysphagia, esophagitis. Pt stating that there is another procedure there they are planning to do as well. Pt stating she has seen a Dr. Turk as well as an ENT.    Pt having a right upper quad. U/S on 3/8/2021 with results of Visualized portions of the pancreas are unremarkable. There is increased echogenicity of the liver consistent with fatty infiltration. No gallstones, wall thickening or pericholecystic fluid. Common duct normal in size measuring about 4 mm. Right kidney measures 11.5 cm in length and contains a cyst that measures about 2.4 cm.      Pt requesting refill of her ambien. Pt stating she has been on for years and it helps with her insomnia. Pt denies any issues/concerns with ambien and denies any side effects.     Diarrhea   This is a recurrent problem. Episode onset: few months. The problem has been unchanged. Diarrhea characteristics: soft stool. Diarrhea awakens from sleep: has waken her up about twice due to cramping. Associated symptoms include abdominal pain (off and on), bloating (off and on) and increased flatus (off and on). Pertinent negatives include no fever or vomiting. Exacerbated by: chocoloate, soda drinks. She has tried anti-motility drug (probiotic) for the symptoms.       The following portions of the patient's history were reviewed and updated as appropriate: allergies, current medications, past family history, past medical history, past social history, past surgical history and problem list.    Past Medical History:   Diagnosis Date   • Arthritis    • COVID-19 07/2020   • Depression    • Diverticulitis    • Diverticulitis of colon    • Diverticulosis    • Hyperlipidemia    • Hypertension    • Oral herpes 8/18/2020   • Thyroid disease        Past Surgical History:   Procedure Laterality Date   • ABDOMINAL SURGERY  2005, 2014     ex lap x 2,  diverticulitis   • APPENDECTOMY     • CARDIAC CATHETERIZATION N/A 4/24/2020    Procedure: Coronary angiography;  Surgeon: Roberto Briones MD;  Location:  [facility];  Service: Cardiovascular;  Laterality: N/A;   • CARDIAC CATHETERIZATION N/A 4/24/2020    Procedure: Left heart cath;  Surgeon: Roberto Briones MD;  Location:  [facility];  Service: Cardiovascular;  Laterality: N/A;   • CARDIAC CATHETERIZATION N/A 4/24/2020    Procedure: Left ventriculography;  Surgeon: Roberto Briones MD;  Location:  [facility];  Service: Cardiovascular;  Laterality: N/A;   • COLONOSCOPY  approx 2018    normal per pt    • COLOSTOMY      had colostomy and then is was reversed   • ENDOSCOPY N/A 5/27/2020    Procedure: ESOPHAGOGASTRODUODENOSCOPY WITH BIOPSY AND BIOPSY POLYPECTOMY AND MACIEL DIALATION;  Surgeon: Preethi Beck MD;  Location: [facility] ENDOSCOPY;  Service: Gastroenterology;  Laterality: N/A;  PRE: ESOPHAGEAL DYSPHAGIA  POST: Z LINE 46, ESOPHAGEAL SPASM, GASTRITIS, FUNDIC POLYP   • FOOT SURGERY Right 12/2016    Second and third hammertoe corrections   • KNEE SURGERY     • REVISION / TAKEDOWN COLOSTOMY  2006   • SHOULDER SURGERY     • TONSILLECTOMY     • WISDOM TOOTH EXTRACTION         Review of Systems   Constitutional: Negative for fever.   Respiratory: Negative.    Cardiovascular: Negative.    Gastrointestinal: Positive for abdominal pain (off and on), bloating (off and on), diarrhea and flatus (off and on). Negative for vomiting.   Psychiatric/Behavioral: Negative for sleep disturbance (with use of ambien).       Allergies   Allergen Reactions   • Azithromycin Other (See Comments)     Reaction unknown to patient (unable to remember)   • Pravastatin    • Zetia [Ezetimibe]        Objective     Vitals:    09/01/21 0842   BP: 120/64   BP Location: Left arm   Patient Position: Sitting   Pulse: 76   Resp: 18   Temp: 97.5 °F (36.4 °C)  "  TempSrc: Oral   SpO2: 95%   Weight: 60.1 kg (132 lb 9.6 oz)   Height: 165.1 cm (65\")     Body mass index is 22.07 kg/m².    Physical Exam  Vitals reviewed.   HENT:      Head: Normocephalic.   Eyes:      Pupils: Pupils are equal, round, and reactive to light.   Cardiovascular:      Rate and Rhythm: Normal rate and regular rhythm.   Pulmonary:      Effort: Pulmonary effort is normal.      Breath sounds: Normal breath sounds.   Abdominal:      General: Bowel sounds are normal. There is no distension.      Palpations: Abdomen is soft. There is no hepatomegaly or splenomegaly.      Tenderness: There is abdominal tenderness in the right upper quadrant. There is no guarding or rebound. Negative signs include McBurney's sign.      Hernia: No hernia is present.   Musculoskeletal:         General: Normal range of motion.   Neurological:      Mental Status: She is alert and oriented to person, place, and time.   Psychiatric:         Behavior: Behavior normal.           Current Outpatient Medications:   •  Acidophilus Lactobacillus capsule, Take 1 capsule by mouth 2 (Two) Times a Day., Disp: 60 capsule, Rfl: 5  •  ALPRAZolam (XANAX) 0.25 MG tablet, TAKE 1 TABLET BY MOUTH TWICE DAILY AS NEEDED ANXIETY, Disp: 60 tablet, Rfl: 2  •  amphetamine-dextroamphetamine XR (ADDERALL XR) 20 MG 24 hr capsule, Take 1 capsule by mouth Every Morning, Disp: 30 capsule, Rfl: 0  •  calcium acetate (PHOSLO) 667 MG capsule, Take 1,334 mg by mouth 3 (Three) Times a Day With Meals., Disp: , Rfl:   •  carboxymethylcellulose (Lubricant Eye Drops PF) 0.5 % solution, Inject 1 drop into the eye 2 (two) times a day., Disp: , Rfl:   •  estradiol (ESTRACE) 0.1 MG/GM vaginal cream, Insert 1 g into the vagina 2 (Two) Times a Week., Disp: 45 g, Rfl: 2  •  FLUoxetine (PROzac) 20 MG capsule, TAKE 1 CAPSULE BY MOUTH FOUR TIMES DAILY, Disp: 360 capsule, Rfl: 1  •  fluticasone (FLOVENT HFA) 220 MCG/ACT inhaler, Spray 2 puffs in the mouth and swallow twice daily.  " Do not inhale.  Do not eat for 30 minutes following., Disp: 12 g, Rfl: 1  •  hydroCHLOROthiazide (HYDRODIURIL) 25 MG tablet, TAKE 1 TABLET BY MOUTH DAILY, Disp: 90 tablet, Rfl: 1  •  ipratropium (ATROVENT) 0.06 % nasal spray, 2 sprays into the nostril(s) as directed by provider 4 (Four) Times a Day., Disp: 15 mL, Rfl: 12  •  levothyroxine (SYNTHROID, LEVOTHROID) 75 MCG tablet, TAKE 1 TABLET BY MOUTH DAILY, Disp: 90 tablet, Rfl: 3  •  loratadine (CLARITIN) 10 MG tablet, Take 1 tablet by mouth Daily., Disp: 30 tablet, Rfl: 5  •  olopatadine (PATANOL) 0.1 % ophthalmic solution, Administer 1 drop to both eyes 2 (Two) Times a Day., Disp: 5 mL, Rfl: 5  •  pantoprazole (PROTONIX) 20 MG EC tablet, TAKE 1 TABLET BY MOUTH EVERY DAY. DO NOT TAKE WITHIN 2 HOURS OF THYROID MEDICATION, Disp: 30 tablet, Rfl: 5  •  propranolol (INDERAL) 40 MG tablet, TAKE 1 TABLET BY MOUTH THREE TIMES DAILY, Disp: 270 tablet, Rfl: 1  •  rosuvastatin (CRESTOR) 20 MG tablet, Take 1 tablet by mouth Daily., Disp: 30 tablet, Rfl: 0  •  Vascepa 1 g capsule capsule, TAKE 2 CAPSULES (2 GRAMS) TWICE A DAY WITH MEALS, Disp: 120 capsule, Rfl: 11  •  zolpidem (AMBIEN) 10 MG tablet, Take 1 tablet by mouth At Night As Needed for Sleep. PT MUST HAVE APPT BEFORE ANY FURTHER REFILLS, Disp: 30 tablet, Rfl: 0  •  loperamide (IMODIUM A-D) 2 MG tablet, Take 1 tablet by mouth 3 (Three) Times a Day As Needed for Diarrhea., Disp: 90 tablet, Rfl: 0  •  sucralfate (Carafate) 1 g tablet, Take 1 tablet by mouth 3 (Three) Times a Day for 14 days., Disp: 42 tablet, Rfl: 0    Recent Results (from the past 2016 hour(s))   CBC & Differential    Collection Time: 06/21/21 11:11 AM    Specimen: Blood   Result Value Ref Range    WBC 5.99 3.40 - 10.80 10*3/mm3    RBC 4.82 3.77 - 5.28 10*6/mm3    Hemoglobin 14.8 12.0 - 15.9 g/dL    Hematocrit 43.4 34.0 - 46.6 %    MCV 90.0 79.0 - 97.0 fL    MCH 30.7 26.6 - 33.0 pg    MCHC 34.1 31.5 - 35.7 g/dL    RDW 13.0 12.3 - 15.4 %    Platelets 265  140 - 450 10*3/mm3    Neutrophil Rel % CANCELED     Lymphocyte Rel % CANCELED     Monocyte Rel % CANCELED     Eosinophil Rel % CANCELED     Lymphocytes Absolute CANCELED     Eosinophils Absolute CANCELED     Basophils Absolute CANCELED    Comprehensive Metabolic Panel    Collection Time: 06/21/21 11:11 AM    Specimen: Blood   Result Value Ref Range    Glucose 122 (H) 65 - 99 mg/dL    BUN 13 8 - 23 mg/dL    Creatinine 0.56 (L) 0.57 - 1.00 mg/dL    eGFR Non African Am 108 >60 mL/min/1.73    eGFR African Am 131 >60 mL/min/1.73    BUN/Creatinine Ratio 23.2 7.0 - 25.0    Sodium 143 136 - 145 mmol/L    Potassium 3.5 3.5 - 5.2 mmol/L    Chloride 103 98 - 107 mmol/L    Total CO2 31.1 (H) 22.0 - 29.0 mmol/L    Calcium 10.1 8.6 - 10.5 mg/dL    Total Protein 6.0 6.0 - 8.5 g/dL    Albumin 4.40 3.50 - 5.20 g/dL    Globulin 1.6 gm/dL    A/G Ratio 2.8 g/dL    Total Bilirubin 0.5 0.0 - 1.2 mg/dL    Alkaline Phosphatase 83 39 - 117 U/L    AST (SGOT) 33 (H) 1 - 32 U/L    ALT (SGPT) 45 (H) 1 - 33 U/L   Manual Differential    Collection Time: 06/21/21 11:11 AM   Result Value Ref Range    Neutrophil Rel % 67.4 42.7 - 76.0 %    Lymphocyte Rel % 20.0 19.6 - 45.3 %    Monocyte Rel % 3.2 (L) 5.0 - 12.0 %    Eosinophil Rel % 8.4 (H) 0.3 - 6.2 %    Basophil Rel % 1.1 0.0 - 1.5 %    Neutrophils Absolute 4.04 1.70 - 7.00 10*3/mm3    Lymphocytes Absolute 1.20 0.70 - 3.10 10*3/mm3    Monocytes Absolute 0.19 0.10 - 0.90 10*3/mm3    Eosinophil Abs 0.50 (H) 0.00 - 0.40 10*3/mm3    Basophils Absolute 0.07 0.00 - 0.20 10*3/mm3    Differential Comment Comment     Comment Comment     Plt Comment Comment        FL Esophagram Complete Double-Contrast  PROCEDURE: FL ESOPHAGRAM COMPLETE DOUBLE-CONTRAST     COMPARISON: None     INDICATIONS: dysphagia, history of eosinophilic esophagitis   / 55 mGy/ F.T. 1.8 MIN/ 10 IMAGES     TECHNIQUE: An esophagram with air contrast was performed with fluoroscopy in the usual manner.     FINDINGS:   Images of the upper,  mid, and lower 1/3 of the esophagus were obtained.  There is normal esophageal   transit.  There is prominent indentation at the upper esophagus related to prominent   cricopharyngeus muscle (series 2 image 8/11).  No mucosal abnormality identified within the mid or   distal esophagus.  Mild gastroesophageal reflux during the exam.  Esophageal peristalsis appears   within normal limits.  There is a degenerative findings in the cervical spine with uncovertebral   spurring contributing to foraminal stenosis the C3-4, C4-5 and C5-6 levels partially imaged.    Patient successfully swallowed a barium tablet.  Total fluoroscopy time 1.8 minutes.     CONCLUSION:   Prominent cricopharyngeus muscle which may contribute to patient's reported dysphagia.     Normal esophageal transit.  No distal esophageal stricture or narrowing.       Mild gastroesophageal reflux.           JEANINE GONZALEZ MD         Electronically Signed and Approved By: JEANINE GONZALEZ MD on 7/27/2021 at 15:49                       Assessment/Plan   Diagnoses and all orders for this visit:    1. Functional diarrhea (Primary)  -     loperamide (IMODIUM A-D) 2 MG tablet; Take 1 tablet by mouth 3 (Three) Times a Day As Needed for Diarrhea.  Dispense: 90 tablet; Refill: 0  -     sucralfate (Carafate) 1 g tablet; Take 1 tablet by mouth 3 (Three) Times a Day for 14 days.  Dispense: 42 tablet; Refill: 0    2. Esophageal dysphagia    3. NAFLD (nonalcoholic fatty liver disease)    4. Primary insomnia  -     zolpidem (AMBIEN) 10 MG tablet; Take 1 tablet by mouth At Night As Needed for Sleep. PT MUST HAVE APPT BEFORE ANY FURTHER REFILLS  Dispense: 30 tablet; Refill: 0        Patient Instructions   Continue to eat a bland/brat diet, drink plenty of fluids-water preferably  Follow up with GI and ENT as directed/as recommended  Follow up with Dr. Peterson in 1-2 months  mary kate pulled and appropriate.      Return for follow up with GI as scheduled; Dr. Peterson in 1-2 months.

## 2021-09-01 NOTE — PATIENT INSTRUCTIONS
Continue to eat a bland/brat diet, drink plenty of fluids-water preferably  Follow up with GI and ENT as directed/as recommended  Follow up with Dr. Peterson in 1-2 months  mary kate valdivia and appropriate.

## 2021-09-10 ENCOUNTER — TELEPHONE (OUTPATIENT)
Dept: GASTROENTEROLOGY | Facility: CLINIC | Age: 66
End: 2021-09-10

## 2021-09-10 NOTE — TELEPHONE ENCOUNTER
----- Message from Ellis Velásquez sent at 9/10/2021  3:45 PM EDT -----  Regarding: Diarrhea  Contact: [phone]  Pt called and stated has been experiencing excessive diarrhea and unsure how to treat.  Please contact pt.  Thank You

## 2021-09-10 NOTE — TELEPHONE ENCOUNTER
"Call to pt.  Reports for past 5 days, having episodes of \"gushing diarrhea\".   Experiences cramping relieved after stooling.  Has used imodium with relief, and then diarrhea returns.  Episode of stool incontinence.      Denies blood, fever.      Asking how to manage ongoing symptoms.  Advise will send message to Dr Beck, and in the meantime - make sure hydrates ie: sip gatorade.  If OOC - go to ER.  May also contact MD on call as needed.  Verb understanding   "

## 2021-09-10 NOTE — TELEPHONE ENCOUNTER
Take imodium 2 tablets TID, brat diet, adequate fluids.  To ER or urgent care if symptoms not improving.

## 2021-09-12 PROCEDURE — 87635 SARS-COV-2 COVID-19 AMP PRB: CPT | Performed by: FAMILY MEDICINE

## 2021-09-13 ENCOUNTER — TELEPHONE (OUTPATIENT)
Dept: URGENT CARE | Facility: CLINIC | Age: 66
End: 2021-09-13

## 2021-09-13 NOTE — TELEPHONE ENCOUNTER
Patient called requesting her Covid 19 results.  She was tested yesterday with a PCR test here at [facility].  She is informed her results are not back yet.  She is informed that she may check her results on the [facility] [employer] website.  Also she is informed that if it is a positive test we will contact her.  Continue quarantining for now

## 2021-09-15 ENCOUNTER — TRANSCRIBE ORDERS (OUTPATIENT)
Dept: ADMINISTRATIVE | Facility: HOSPITAL | Age: 66
End: 2021-09-15

## 2021-09-15 DIAGNOSIS — U07.1 CLINICAL DIAGNOSIS OF SEVERE ACUTE RESPIRATORY SYNDROME CORONAVIRUS 2 (SARS-COV-2) DISEASE: Primary | ICD-10-CM

## 2021-09-16 ENCOUNTER — TELEPHONE (OUTPATIENT)
Dept: URGENT CARE | Facility: CLINIC | Age: 66
End: 2021-09-16

## 2021-09-16 RX ORDER — EPINEPHRINE 1 MG/ML
0.3 INJECTION, SOLUTION, CONCENTRATE INTRAVENOUS AS NEEDED
Status: DISCONTINUED | OUTPATIENT
Start: 2021-09-20 | End: 2021-09-22 | Stop reason: HOSPADM

## 2021-09-16 RX ORDER — DIPHENHYDRAMINE HYDROCHLORIDE 50 MG/ML
50 INJECTION INTRAMUSCULAR; INTRAVENOUS AS NEEDED
Status: DISCONTINUED | OUTPATIENT
Start: 2021-09-16 | End: 2021-09-22 | Stop reason: HOSPADM

## 2021-09-16 NOTE — TELEPHONE ENCOUNTER
Discussed positive COVID-19 test results with patient or parent/guardian.  Patient was advised to quarantine for 10 days from the onset of their symptoms or positive test result.  Patient was instructed to seek further evaluation if they develop chest pain or any difficulties breathing.  Patient vocalized understanding and agreement with this plan.

## 2021-09-20 ENCOUNTER — HOSPITAL ENCOUNTER (OUTPATIENT)
Dept: INFUSION THERAPY | Facility: HOSPITAL | Age: 66
Discharge: HOME | End: 2021-09-20
Admitting: EMERGENCY MEDICINE

## 2021-09-20 VITALS
RESPIRATION RATE: 16 BRPM | TEMPERATURE: 98.9 F | OXYGEN SATURATION: 96 % | DIASTOLIC BLOOD PRESSURE: 64 MMHG | HEART RATE: 67 BPM | SYSTOLIC BLOOD PRESSURE: 116 MMHG

## 2021-09-20 DIAGNOSIS — U07.1 COVID-19: Primary | ICD-10-CM

## 2021-09-20 PROCEDURE — M0243 CASIRIVI AND IMDEVI INFUSION: HCPCS | Performed by: EMERGENCY MEDICINE

## 2021-09-20 PROCEDURE — 25010000006 INJECTION, CASIRIVIMAB AND IMDEVIMAB, 1200 MG: Performed by: EMERGENCY MEDICINE

## 2021-09-20 RX ADMIN — CASIRIVIMAB AND IMDEVIMAB 300 MG: 600; 600 INJECTION, SOLUTION, CONCENTRATE INTRAVENOUS at 09:31

## 2021-09-23 PROCEDURE — 87635 SARS-COV-2 COVID-19 AMP PRB: CPT | Performed by: FAMILY MEDICINE

## 2021-09-27 ENCOUNTER — HOSPITAL ENCOUNTER (OUTPATIENT)
Dept: BONE DENSITY | Facility: HOSPITAL | Age: 66
End: 2021-09-27

## 2021-09-27 ENCOUNTER — HOSPITAL ENCOUNTER (OUTPATIENT)
Dept: MAMMOGRAPHY | Facility: HOSPITAL | Age: 66
End: 2021-09-27

## 2021-09-27 DIAGNOSIS — F98.8 ATTENTION DEFICIT DISORDER, UNSPECIFIED HYPERACTIVITY PRESENCE: ICD-10-CM

## 2021-09-27 DIAGNOSIS — F51.01 PRIMARY INSOMNIA: ICD-10-CM

## 2021-09-27 RX ORDER — FLUTICASONE PROPIONATE 220 UG/1
AEROSOL, METERED RESPIRATORY (INHALATION)
Qty: 12 G | Refills: 1 | Status: SHIPPED | OUTPATIENT
Start: 2021-09-27 | End: 2021-11-30

## 2021-09-27 RX ORDER — LEVOTHYROXINE SODIUM 0.07 MG/1
TABLET ORAL
Qty: 90 TABLET | Refills: 3 | Status: SHIPPED | OUTPATIENT
Start: 2021-09-27 | End: 2022-09-14

## 2021-09-27 NOTE — TELEPHONE ENCOUNTER
Caller: Cynthia Escobar    Relationship: Self    Medication requested (name and dosage):   amphetamine-dextroamphetamine XR (ADDERALL XR) 20 MG 24 hr capsule    Pharmacy where request should be sent:   [id number] - [place], [place] - [address] [facility] AT [facility] [address] [facility] [address] & [place] - [phone]  - [phone] FX  [phone]    Additional details provided by patient:     Best call back number: [phone]    Does the patient have less than a 3 day supply:  [] Yes  [x] No    Ellis Ortez Rep   09/27/21 12:11 EDT

## 2021-09-28 RX ORDER — ZOLPIDEM TARTRATE 10 MG/1
TABLET ORAL
Qty: 30 TABLET | Refills: 1 | Status: SHIPPED | OUTPATIENT
Start: 2021-09-28 | End: 2021-11-30 | Stop reason: SDUPTHER

## 2021-09-28 RX ORDER — DEXTROAMPHETAMINE SACCHARATE, AMPHETAMINE ASPARTATE MONOHYDRATE, DEXTROAMPHETAMINE SULFATE AND AMPHETAMINE SULFATE 5; 5; 5; 5 MG/1; MG/1; MG/1; MG/1
20 CAPSULE, EXTENDED RELEASE ORAL EVERY MORNING
Qty: 30 CAPSULE | Refills: 0 | Status: SHIPPED | OUTPATIENT
Start: 2021-09-28 | End: 2022-04-04 | Stop reason: SDUPTHER

## 2021-10-13 ENCOUNTER — OFFICE VISIT (OUTPATIENT)
Dept: FAMILY MEDICINE CLINIC | Facility: CLINIC | Age: 66
End: 2021-10-13

## 2021-10-13 VITALS
BODY MASS INDEX: 22.16 KG/M2 | RESPIRATION RATE: 18 BRPM | HEART RATE: 88 BPM | OXYGEN SATURATION: 98 % | DIASTOLIC BLOOD PRESSURE: 78 MMHG | TEMPERATURE: 97.7 F | SYSTOLIC BLOOD PRESSURE: 128 MMHG | WEIGHT: 133 LBS | HEIGHT: 65 IN

## 2021-10-13 DIAGNOSIS — I10 HYPERTENSION, UNSPECIFIED TYPE: Primary | ICD-10-CM

## 2021-10-13 PROCEDURE — 99214 OFFICE O/P EST MOD 30 MIN: CPT | Performed by: INTERNAL MEDICINE

## 2021-10-13 NOTE — PROGRESS NOTES
Jessica Escobar is a 66 y.o. female. Patient is here today for   Chief Complaint   Patient presents with   • Hypertension     follow up           Vitals:    10/13/21 1123   BP: 128/78   Pulse: 88   Resp: 18   Temp: 97.7 °F (36.5 °C)   SpO2: 98%     Body mass index is 22.16 kg/m².      Past Medical History:   Diagnosis Date   • Arthritis    • COVID-19 07/2020   • Depression    • Diverticulitis    • Diverticulitis of colon    • Diverticulosis    • Hyperlipidemia    • Hypertension    • Oral herpes 8/18/2020   • Thyroid disease       Allergies   Allergen Reactions   • Azithromycin Other (See Comments)     Reaction unknown to patient (unable to remember)   • Zetia [Ezetimibe] Other (See Comments)     cramps   • Pravastatin Rash      Social History     Socioeconomic History   • Marital status:    Tobacco Use   • Smoking status: Never Smoker   • Smokeless tobacco: Never Used   Vaping Use   • Vaping Use: Never used   Substance and Sexual Activity   • Alcohol use: Not Currently     Alcohol/week: 1.0 standard drink     Types: 1 Glasses of wine per week   • Drug use: No   • Sexual activity: Not Currently     Partners: Male     Birth control/protection: Post-menopausal        Current Outpatient Medications:   •  Acidophilus Lactobacillus capsule, Take 1 capsule by mouth 2 (Two) Times a Day., Disp: 60 capsule, Rfl: 5  •  ALPRAZolam (XANAX) 0.25 MG tablet, TAKE 1 TABLET BY MOUTH TWICE DAILY AS NEEDED ANXIETY, Disp: 60 tablet, Rfl: 2  •  amphetamine-dextroamphetamine XR (ADDERALL XR) 20 MG 24 hr capsule, Take 1 capsule by mouth Every Morning, Disp: 30 capsule, Rfl: 0  •  calcium acetate (PHOSLO) 667 MG capsule, Take 1,334 mg by mouth 3 (Three) Times a Day With Meals., Disp: , Rfl:   •  carboxymethylcellulose (Lubricant Eye Drops PF) 0.5 % solution, Inject 1 drop into the eye 2 (two) times a day., Disp: , Rfl:   •  estradiol (ESTRACE) 0.1 MG/GM vaginal cream, Insert 1 g into the vagina 2 (Two) Times a  Week., Disp: 45 g, Rfl: 2  •  FLUoxetine (PROzac) 20 MG capsule, TAKE 1 CAPSULE BY MOUTH FOUR TIMES DAILY, Disp: 360 capsule, Rfl: 1  •  fluticasone (Flovent HFA) 220 MCG/ACT inhaler, INSTILL 2 SPRAYS INTO THE MOUTH TWICE A DAY, Disp: 12 g, Rfl: 1  •  hydroCHLOROthiazide (HYDRODIURIL) 25 MG tablet, TAKE 1 TABLET BY MOUTH DAILY, Disp: 90 tablet, Rfl: 1  •  ipratropium (ATROVENT) 0.06 % nasal spray, 2 sprays into the nostril(s) as directed by provider 4 (Four) Times a Day., Disp: 15 mL, Rfl: 12  •  levothyroxine (SYNTHROID, LEVOTHROID) 75 MCG tablet, TAKE 1 TABLET BY MOUTH EVERY DAY, Disp: 90 tablet, Rfl: 3  •  loperamide (IMODIUM A-D) 2 MG tablet, Take 1 tablet by mouth 3 (Three) Times a Day As Needed for Diarrhea., Disp: 90 tablet, Rfl: 0  •  loratadine (CLARITIN) 10 MG tablet, Take 1 tablet by mouth Daily., Disp: 30 tablet, Rfl: 5  •  olopatadine (PATANOL) 0.1 % ophthalmic solution, Administer 1 drop to both eyes 2 (Two) Times a Day., Disp: 5 mL, Rfl: 5  •  pantoprazole (PROTONIX) 20 MG EC tablet, TAKE 1 TABLET BY MOUTH EVERY DAY. DO NOT TAKE WITHIN 2 HOURS OF THYROID MEDICATION, Disp: 30 tablet, Rfl: 5  •  propranolol (INDERAL) 40 MG tablet, TAKE 1 TABLET BY MOUTH THREE TIMES DAILY, Disp: 270 tablet, Rfl: 1  •  rosuvastatin (CRESTOR) 20 MG tablet, Take 1 tablet by mouth Daily., Disp: 30 tablet, Rfl: 0  •  sucralfate (CARAFATE) 1 GM/10ML suspension, Take 10 mL by mouth 4 (Four) Times a Day for 30 days., Disp: 1200 mL, Rfl: 0  •  Vascepa 1 g capsule capsule, TAKE 2 CAPSULES (2 GRAMS) TWICE A DAY WITH MEALS, Disp: 120 capsule, Rfl: 11  •  zolpidem (AMBIEN) 10 MG tablet, TAKE 1 TABLET BY MOUTH AT NIGHT AS NEEDED FOR SLEEP, Disp: 30 tablet, Rfl: 1     Objective     She is here to follow-up on hypertension.    She feels well.       Review of Systems   Constitutional: Negative.    HENT: Negative.    Respiratory: Negative.    Cardiovascular: Negative.    Musculoskeletal: Negative.    Psychiatric/Behavioral: Negative.         Physical Exam  Constitutional:       Appearance: Normal appearance.   Cardiovascular:      Rate and Rhythm: Regular rhythm.      Heart sounds: Normal heart sounds. No murmur heard.  No gallop.    Pulmonary:      Effort: No respiratory distress.      Breath sounds: Normal breath sounds. No wheezing or rales.   Neurological:      Mental Status: She is alert and oriented to person, place, and time.   Psychiatric:         Mood and Affect: Mood normal.         Behavior: Behavior normal.         Thought Content: Thought content normal.           Problems Addressed this Visit        Cardiac and Vasculature    HTN (hypertension) - Primary      Diagnoses       Codes Comments    Hypertension, unspecified type    -  Primary ICD-10-CM: I10  ICD-9-CM: 401.9             PLAN  Her hypertension is well controlled.    I asked her to follow-up for wellness visit in 3 to 4 months.  Fasting labs prior to that visit should include: Lipid profile, comprehensive metabolic panel, CBC, urinalysis, vitamin D level TSH and free T4.  No follow-ups on file.

## 2021-10-15 PROBLEM — R13.13 DYSPHAGIA, CRICOPHARYNGEAL: Status: ACTIVE | Noted: 2021-10-15

## 2021-11-15 ENCOUNTER — TELEPHONE (OUTPATIENT)
Dept: GASTROENTEROLOGY | Facility: CLINIC | Age: 66
End: 2021-11-15

## 2021-11-15 RX ORDER — ONDANSETRON 4 MG/1
4 TABLET, ORALLY DISINTEGRATING ORAL EVERY 8 HOURS PRN
Qty: 12 TABLET | Refills: 0 | OUTPATIENT
Start: 2021-11-15 | End: 2021-11-16

## 2021-11-15 NOTE — TELEPHONE ENCOUNTER
----- Message from Ellis Virk Rep sent at 11/15/2021  1:34 PM EST -----  Regarding: Question/issues  Contact: [phone]  Pt is having some issues and would like to speak to someone

## 2021-11-15 NOTE — TELEPHONE ENCOUNTER
Called pt and she reports waking up this am with lots of nausea and vomiting. She reports she has gotten sick 6x , it was yellow in color.  Pt reports that she has had very loose stools for the last 3-4 days.  Pt reports that she has been having one very loose stool per day. Pt denies a fever and chills.  Pt does report body aches.  Pt not sure what she has going on , but is asking if this could be related to her liver. Pt has not taken anything for diarrhea and is asking what can she take. Pt also asking if she can have something for nausea. Advised will send message to Dr Beck , but if symptoms worsen , advised to seek medical attn.  Verb understanding.

## 2021-11-15 NOTE — TELEPHONE ENCOUNTER
I am sending her some Zofran.  It sounds like she has some kind of viral gastroenteritis.  Recommend bland diet, staying hydrated with water, Gatorade/Pedialyte and to urgent care or ER if symptoms worsen.  I do not think it is related to her liver.

## 2021-11-16 NOTE — TELEPHONE ENCOUNTER
Call to pt.  Advise per DR Beck note.  Verb understanding.     States did go to Urgent Care today - tested negative for flu and covid.  Will comply with DR Beck' advice.

## 2021-11-22 RX ORDER — IPRATROPIUM BROMIDE 42 UG/1
2 SPRAY, METERED NASAL 4 TIMES DAILY
Qty: 15 ML | Refills: 12 | Status: SHIPPED | OUTPATIENT
Start: 2021-11-22 | End: 2022-03-04

## 2021-11-22 NOTE — TELEPHONE ENCOUNTER
Rx Refill Note  Requested Prescriptions     Pending Prescriptions Disp Refills   • ipratropium (Atrovent) 0.06 % nasal spray 15 mL 12     Si sprays into the nostril(s) as directed by provider 4 (Four) Times a Day.      Last office visit with prescribing clinician: 10/13/2021      Next office visit with prescribing clinician: 2022            Yarely Espino MA  21, 09:49 EST

## 2021-11-22 NOTE — TELEPHONE ENCOUNTER
Caller: Cynthia Escobar    Relationship: Self    Best call back number: [phone]     Requested Prescriptions     Pending Prescriptions Disp Refills   • ipratropium (Atrovent) 0.06 % nasal spray 15 mL 12     Si sprays into the nostril(s) as directed by provider 4 (Four) Times a Day.        Pharmacy where request should be sent:  [id number] - [place], [place] - [address] [facility] AT [facility] OF [address]/[facility] & [place] - [phone] [facility] [phone]   [phone]    Does the patient have less than a 3 day supply:  [x] Yes  [] No    PATIENT'S PRESCRIPTION HAS  BUT WOULD LIKE A NEW ONE CALLED IN FOR HER.     PATIENT IS ALSO REQUESTING VALACYCLOVIR 1GM TABLET. HUB DID NOT SEE THIS MEDICATION ON HER MED LIST.     Naty Doshi, Ellis Rep   21 09:37 EST

## 2021-11-23 RX ORDER — OLOPATADINE HYDROCHLORIDE 1 MG/ML
SOLUTION/ DROPS OPHTHALMIC
Qty: 5 ML | Refills: 5 | Status: SHIPPED | OUTPATIENT
Start: 2021-11-23 | End: 2022-12-01

## 2021-11-23 NOTE — TELEPHONE ENCOUNTER
Rx Refill Note  Requested Prescriptions     Pending Prescriptions Disp Refills   • olopatadine (PATANOL) 0.1 % ophthalmic solution [Pharmacy Med Name: OLOPATADINE 0.1% OPTH SOLN 5ML] 5 mL 5     Sig: INSTILL 1 DROP IN BOTH EYES TWICE DAILY      Last office visit with prescribing clinician: 10/13/2021      Next office visit with prescribing clinician: 4/13/2022            Yarely Espino MA  11/23/21, 09:52 EST

## 2021-11-30 ENCOUNTER — PRE-ADMISSION TESTING (OUTPATIENT)
Dept: PREADMISSION TESTING | Facility: HOSPITAL | Age: 66
End: 2021-11-30

## 2021-11-30 VITALS
DIASTOLIC BLOOD PRESSURE: 78 MMHG | WEIGHT: 130.07 LBS | HEART RATE: 63 BPM | SYSTOLIC BLOOD PRESSURE: 124 MMHG | RESPIRATION RATE: 16 BRPM | HEIGHT: 61 IN | TEMPERATURE: 98.5 F | BODY MASS INDEX: 24.56 KG/M2 | OXYGEN SATURATION: 92 %

## 2021-11-30 DIAGNOSIS — F51.01 PRIMARY INSOMNIA: ICD-10-CM

## 2021-11-30 DIAGNOSIS — Z01.818 PRE-OP TESTING: Primary | ICD-10-CM

## 2021-11-30 LAB
ANION GAP SERPL CALCULATED.3IONS-SCNC: 9.9 MMOL/L (ref 5–15)
BUN SERPL-MCNC: 19 MG/DL (ref 8–23)
BUN/CREAT SERPL: 32.2 (ref 7–25)
CALCIUM SPEC-SCNC: 9.4 MG/DL (ref 8.6–10.5)
CHLORIDE SERPL-SCNC: 103 MMOL/L (ref 98–107)
CO2 SERPL-SCNC: 24.1 MMOL/L (ref 22–29)
CREAT SERPL-MCNC: 0.59 MG/DL (ref 0.57–1)
GFR SERPL CREATININE-BSD FRML MDRD: 102 ML/MIN/1.73
GLUCOSE SERPL-MCNC: 84 MG/DL (ref 65–99)
POTASSIUM SERPL-SCNC: 3.8 MMOL/L (ref 3.5–5.2)
SODIUM SERPL-SCNC: 137 MMOL/L (ref 136–145)

## 2021-11-30 PROCEDURE — 36415 COLL VENOUS BLD VENIPUNCTURE: CPT

## 2021-11-30 PROCEDURE — 80048 BASIC METABOLIC PNL TOTAL CA: CPT

## 2021-11-30 RX ORDER — MULTIVIT-MINS NO.7/FOLIC ACID 1 MG
1 CAPSULE ORAL DAILY
COMMUNITY
End: 2021-11-30

## 2021-11-30 RX ORDER — ZOLPIDEM TARTRATE 10 MG/1
10 TABLET ORAL NIGHTLY PRN
Qty: 30 TABLET | Refills: 1 | Status: SHIPPED | OUTPATIENT
Start: 2021-11-30 | End: 2022-02-09

## 2021-11-30 RX ORDER — CHOLECALCIFEROL (VITAMIN D3) 125 MCG
1 CAPSULE ORAL DAILY
COMMUNITY

## 2021-11-30 NOTE — TELEPHONE ENCOUNTER
Caller: Cynthia Escobar    Relationship: Self    Best call back number: [phone]    Requested Prescriptions:   Requested Prescriptions     Pending Prescriptions Disp Refills   • zolpidem (AMBIEN) 10 MG tablet 30 tablet 1     Sig: Take 1 tablet by mouth At Night As Needed for Sleep.        Pharmacy where request should be sent: [facility] #[id number] - [place], [place] - [address] [facility] AT [facility] OF [address]/[facility] & [place] - [phone] [facility] [phone] FX     Additional details provided by patient: PATIENT ONLY HAS 2 TABLETS LEFT    Does the patient have less than a 3 day supply:  [x] Yes  [] No    Ellis Stevenson Rep   11/30/21 09:45 EST

## 2021-11-30 NOTE — TELEPHONE ENCOUNTER
Rx Refill Note  Requested Prescriptions     Pending Prescriptions Disp Refills   • zolpidem (AMBIEN) 10 MG tablet 30 tablet 1     Sig: Take 1 tablet by mouth At Night As Needed for Sleep.      Last office visit with prescribing clinician: 10/13/2021      Next office visit with prescribing clinician: 4/13/2022            Lili Arrington MA  11/30/21, 12:47 EST

## 2021-12-03 ENCOUNTER — TELEPHONE (OUTPATIENT)
Dept: OTOLARYNGOLOGY | Facility: CLINIC | Age: 66
End: 2021-12-03

## 2021-12-03 ENCOUNTER — ANESTHESIA EVENT (OUTPATIENT)
Dept: PERIOP | Facility: HOSPITAL | Age: 66
End: 2021-12-03

## 2021-12-03 NOTE — TELEPHONE ENCOUNTER
DELETE AFTER REVIEWING: Telephone encounter to be sent to the  pool     Caller: SANDOR HARTLEYMEHULIN    Relationship: SELF    Best call back number: [phone]    What is the best time to reach you: ASAP    Who are you requesting to speak with (clinical staff, provider,  specific staff member): CLINICAL STAFF      What was the call regarding: PT IS CALLING TO SPEAK WITH SOMEONE TOWARDS SURGERY SHE HAS ON 12.07.21 W/ DR. ROMANO. SHE STATES SHE IS A LITTLE NERVOUS AND HAS HAD PROBLEM IN THE PAST AND WOULD LIKE TO GET A GOOD IDEA ON HOW SURGERY WILL BE PERFORMED. ALSO ASKED IF SOMEONE COULD GO OVER HER LABS SHE HAD DONE. I ADVISED I'D SEND AS HIGH PRIORITY DUE TO HER SURGERY BEING ON Tuesday.     Do you require a callback: YES

## 2021-12-07 ENCOUNTER — ANESTHESIA (OUTPATIENT)
Dept: PERIOP | Facility: HOSPITAL | Age: 66
End: 2021-12-07

## 2021-12-07 ENCOUNTER — HOSPITAL ENCOUNTER (OUTPATIENT)
Facility: HOSPITAL | Age: 66
Setting detail: HOSPITAL OUTPATIENT SURGERY
Discharge: HOME | End: 2021-12-07
Attending: OTOLARYNGOLOGY | Admitting: OTOLARYNGOLOGY

## 2021-12-07 VITALS
HEART RATE: 61 BPM | DIASTOLIC BLOOD PRESSURE: 62 MMHG | SYSTOLIC BLOOD PRESSURE: 129 MMHG | RESPIRATION RATE: 16 BRPM | TEMPERATURE: 97 F | HEIGHT: 60 IN | WEIGHT: 127.43 LBS | BODY MASS INDEX: 25.02 KG/M2 | OXYGEN SATURATION: 97 %

## 2021-12-07 PROCEDURE — 25010000002 PROPOFOL 10 MG/ML EMULSION: Performed by: NURSE ANESTHETIST, CERTIFIED REGISTERED

## 2021-12-07 PROCEDURE — 25010000002 FENTANYL CITRATE (PF) 50 MCG/ML SOLUTION: Performed by: NURSE ANESTHETIST, CERTIFIED REGISTERED

## 2021-12-07 PROCEDURE — 25010000002 MIDAZOLAM PER 1MG: Performed by: ANESTHESIOLOGY

## 2021-12-07 PROCEDURE — 43450 DILATE ESOPHAGUS 1/MULT PASS: CPT | Performed by: OTOLARYNGOLOGY

## 2021-12-07 RX ORDER — PROMETHAZINE HYDROCHLORIDE 12.5 MG/1
25 TABLET ORAL ONCE AS NEEDED
Status: DISCONTINUED | OUTPATIENT
Start: 2021-12-07 | End: 2021-12-07 | Stop reason: HOSPADM

## 2021-12-07 RX ORDER — OXYCODONE HYDROCHLORIDE 5 MG/1
5 TABLET ORAL
Status: DISCONTINUED | OUTPATIENT
Start: 2021-12-07 | End: 2021-12-07 | Stop reason: HOSPADM

## 2021-12-07 RX ORDER — SODIUM CHLORIDE, SODIUM LACTATE, POTASSIUM CHLORIDE, CALCIUM CHLORIDE 600; 310; 30; 20 MG/100ML; MG/100ML; MG/100ML; MG/100ML
9 INJECTION, SOLUTION INTRAVENOUS CONTINUOUS PRN
Status: DISCONTINUED | OUTPATIENT
Start: 2021-12-07 | End: 2021-12-07 | Stop reason: HOSPADM

## 2021-12-07 RX ORDER — MEPERIDINE HYDROCHLORIDE 25 MG/ML
12.5 INJECTION INTRAMUSCULAR; INTRAVENOUS; SUBCUTANEOUS
Status: DISCONTINUED | OUTPATIENT
Start: 2021-12-07 | End: 2021-12-07 | Stop reason: HOSPADM

## 2021-12-07 RX ORDER — LIDOCAINE HYDROCHLORIDE 20 MG/ML
INJECTION, SOLUTION INFILTRATION; PERINEURAL AS NEEDED
Status: DISCONTINUED | OUTPATIENT
Start: 2021-12-07 | End: 2021-12-07 | Stop reason: SURG

## 2021-12-07 RX ORDER — FENTANYL CITRATE 50 UG/ML
INJECTION, SOLUTION INTRAMUSCULAR; INTRAVENOUS AS NEEDED
Status: DISCONTINUED | OUTPATIENT
Start: 2021-12-07 | End: 2021-12-07 | Stop reason: SURG

## 2021-12-07 RX ORDER — ONDANSETRON 2 MG/ML
4 INJECTION INTRAMUSCULAR; INTRAVENOUS ONCE AS NEEDED
Status: DISCONTINUED | OUTPATIENT
Start: 2021-12-07 | End: 2021-12-07 | Stop reason: HOSPADM

## 2021-12-07 RX ORDER — PROMETHAZINE HYDROCHLORIDE 25 MG/1
25 SUPPOSITORY RECTAL ONCE AS NEEDED
Status: DISCONTINUED | OUTPATIENT
Start: 2021-12-07 | End: 2021-12-07 | Stop reason: HOSPADM

## 2021-12-07 RX ORDER — ACETAMINOPHEN 500 MG
1000 TABLET ORAL ONCE
Status: COMPLETED | OUTPATIENT
Start: 2021-12-07 | End: 2021-12-07

## 2021-12-07 RX ORDER — MIDAZOLAM HYDROCHLORIDE 2 MG/2ML
2 INJECTION, SOLUTION INTRAMUSCULAR; INTRAVENOUS ONCE
Status: COMPLETED | OUTPATIENT
Start: 2021-12-07 | End: 2021-12-07

## 2021-12-07 RX ADMIN — LIDOCAINE HYDROCHLORIDE 100 MG: 20 INJECTION, SOLUTION INFILTRATION; PERINEURAL at 11:03

## 2021-12-07 RX ADMIN — SODIUM CHLORIDE, POTASSIUM CHLORIDE, SODIUM LACTATE AND CALCIUM CHLORIDE 9 ML/HR: 600; 310; 30; 20 INJECTION, SOLUTION INTRAVENOUS at 08:50

## 2021-12-07 RX ADMIN — ACETAMINOPHEN 1000 MG: 500 TABLET ORAL at 08:52

## 2021-12-07 RX ADMIN — FENTANYL CITRATE 25 MCG: 50 INJECTION, SOLUTION INTRAMUSCULAR; INTRAVENOUS at 11:02

## 2021-12-07 RX ADMIN — MIDAZOLAM HYDROCHLORIDE 2 MG: 1 INJECTION, SOLUTION INTRAMUSCULAR; INTRAVENOUS at 10:50

## 2021-12-07 RX ADMIN — PROPOFOL 99 MCG/KG/MIN: 10 INJECTION, EMULSION INTRAVENOUS at 11:03

## 2021-12-07 NOTE — OP NOTE
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  MAC anesthesia was established and the patient was prepped and draped in the usual manner for esophageal bougienage.  A 50 Fijian dilator was lubricated and inserted to 20 cm for 1 minute.  This process was repeated serially until the final dilation with a 60 Fijian dilator was performed.  The patient was then returned to the care of anesthesia.  The patient tolerated the procedure well and was transferred to the recovery room in satisfactory condition and without apparent complication.          Jamey Leslie MD     Date: 12/7/2021  Time: 11:36 EST

## 2021-12-07 NOTE — ANESTHESIA POSTPROCEDURE EVALUATION
Patient: Cynthia Escobar    Procedure Summary     Date: 12/07/21 Room / Location: [facility] OR 1 / [facility] OR [facility]    Anesthesia Start: 1100 Anesthesia Stop: 1125    Procedure: OR ESOPHAGEAL DILATATION with pacheco dilators  (N/A Esophagus) Diagnosis:       Dysphagia, cricopharyngeal      (Dysphagia, cricopharyngeal [R13.13])    Surgeons: Jamey Leslie MD Provider: Kenji Delgado MD    Anesthesia Type: general ASA Status: 3          Anesthesia Type: general    Vitals  Vitals Value Taken Time   /62 12/07/21 1202   Temp 36.1 °C (97 °F) 12/07/21 1232   Pulse 60 12/07/21 1206   Resp 16 12/07/21 1200   SpO2 95 % 12/07/21 1206   Vitals shown include unvalidated device data.        Post Anesthesia Care and Evaluation    Patient location during evaluation: bedside  Patient participation: complete - patient participated  Level of consciousness: awake  Pain management: adequate  Airway patency: patent  Anesthetic complications: No anesthetic complications  PONV Status: none  Cardiovascular status: acceptable and stable  Respiratory status: acceptable  Hydration status: acceptable    Comments: An Anesthesiologist personally participated in the most demanding procedures (including induction and emergence if applicable) in the anesthesia plan, monitored the course of anesthesia administration at frequent intervals and remained physically present and available for immediate diagnosis and treatment of emergencies.

## 2021-12-07 NOTE — H&P
"[facility]   HISTORY AND PHYSICAL    Patient Name: Cynthia Escobar  : 1955  MRN: [id number]  Primary Care Physician:  Arian Peterson MD  Date of admission: 2021    Subjective   Subjective     Chief Complaint: \"I am having trouble with solids and liquids.\"    HPI:    Cynthia Escobar is a 66 y.o. female who presents today for esophageal bougienage.  She was originally seen by PIYUSH Astorga on 8/10/2021 for evaluation of dysphagia.  She underwent an esophagram on 2021 which demonstrated prominent cricopharyngeus.  She does have a history of GERD.  She tells me she has not had any changes in her symptoms since her appointment.  She is otherwise doing well.    Review of Systems   The following systems were reviewed and negative;  constitution, eyes, ENT, respiratory, cardiovascular, gastrointestinal, genitourinary, integument, hematologic / lymphatic, musculoskeletal, neurological, behavioral/psych, endocrine, and allergies / immunologic.    Personal History     Past Medical History:   Diagnosis Date   • Arthritis    • COVID-19 2021   • Depression    • Diverticulitis of colon    • Diverticulosis    • GERD (gastroesophageal reflux disease)    • Hyperlipidemia    • Hypertension    • Left ventricular hypertrophy     FOLLOWED BY DR MARI. DENIES CHEST PAIN BUT STATES DOES GET SOA AT TIMES WITH EXERTION REPORTS THAT IT IS NOT A NEW ISSUE    • Oral herpes 2020   • SOB (shortness of breath)     STATES WITH EXERTION HAS BEEN ONGOING ISSUE NOT A NEW ISSUE   • Thyroid disease        Past Surgical History:   Procedure Laterality Date   • ABDOMINAL SURGERY  ,     ex lap x 2,  diverticulitis   • APPENDECTOMY     • CARDIAC CATHETERIZATION N/A 2020    Procedure: Coronary angiography;  Surgeon: Roberto Briones MD;  Location: [facility] INVASIVE LOCATION;  Service: Cardiovascular;  Laterality: N/A;   • CARDIAC CATHETERIZATION N/A 2020    " Procedure: Left heart cath;  Surgeon: Roberto Briones MD;  Location:  [facility] CATH INVASIVE LOCATION;  Service: Cardiovascular;  Laterality: N/A;   • CARDIAC CATHETERIZATION N/A 4/24/2020    Procedure: Left ventriculography;  Surgeon: Roberto Briones MD;  Location:  [facility] CATH INVASIVE LOCATION;  Service: Cardiovascular;  Laterality: N/A;   • COLONOSCOPY  approx 2018    normal per pt    • COLOSTOMY      had colostomy and then is was reversed   • ENDOSCOPY N/A 5/27/2020    Procedure: ESOPHAGOGASTRODUODENOSCOPY WITH BIOPSY AND BIOPSY POLYPECTOMY AND MACIEL DIALATION;  Surgeon: Preethi Beck MD;  Location: [facility] ENDOSCOPY;  Service: Gastroenterology;  Laterality: N/A;  PRE: ESOPHAGEAL DYSPHAGIA  POST: Z LINE 46, ESOPHAGEAL SPASM, GASTRITIS, FUNDIC POLYP   • FOOT SURGERY Right 12/2016    Second and third hammertoe corrections   • KNEE SURGERY Right    • REVISION / TAKEDOWN COLOSTOMY  2006   • SHOULDER SURGERY      HAS HAS COMPLETE SHOULDER ON RIGHT. LEFT SCOPED AND HAD 2ND SURGERY WITH HARDWARE PLACED   • TONSILLECTOMY     • WISDOM TOOTH EXTRACTION         Family History: family history includes Osteoporosis in her mother. Otherwise pertinent FHx was reviewed and not pertinent to current issue.    Social History:  reports that she has never smoked. She has never used smokeless tobacco. She reports previous alcohol use of about 1.0 standard drink of alcohol per week. She reports that she does not use drugs.    Home Medications:  ALPRAZolam, Acidophilus Lactobacillus, FLUoxetine, Vitamin D3, amphetamine-dextroamphetamine XR, hydroCHLOROthiazide, ipratropium, levothyroxine, loperamide, loratadine, olopatadine, pantoprazole, propranolol, rosuvastatin, and zolpidem      Allergies:  Allergies   Allergen Reactions   • Azithromycin Rash   • Pravastatin Rash   • Zetia [Ezetimibe] Myalgia     cramps       Objective   Objective     Vitals:   Temp:  [97.8 °F (36.6 °C)] 97.8 °F (36.6 °C)  Heart Rate:  [65] 65  Resp:   [16] 16  BP: (110)/(66) 110/66  Physical Exam   General: Well developed, well nourished patient of stated age in no acute distress. Voice is strong and clear.   Head: Normocephalic and atraumatic.   Face: No lesions. House-Brackmann I/VI bilaterally.    Neck: Supple, no thyromegaly, no lymphadenopathy, trachea midline   Respiratory: Clear to auscultation bilaterally, nonlabored respirations    Cardiovascular: RRR, no murmurs, rubs, or gallops, palpable pedal pulses bilaterally   Psychiatric: Appropriate affect, cooperative        Result Review    Result Review:  I have personally reviewed the results from the time of this admission to 12/7/2021 10:44 EST and agree with these findings:  []  Laboratory  []  Microbiology  []  Radiology  []  EKG/Telemetry   []  Cardiology/Vascular   []  Pathology  []  Old records  []  Other    Assessment/Plan   Assessment / Plan     Brief Patient Summary:  Cynthia Escobar is a 66 y.o. 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  We discussed the pathophysiology and natural history of this condition.  Options for management were discussed as well as the risks, benefits, and alternatives.  After thorough discussion she elected to proceed with esophageal bougienage.    Active Hospital Problems:  Active Hospital Problems    Diagnosis    • **Dysphagia, cricopharyngeal      Added automatically from request for surgery [id number]         Plan: Proceed with surgery      CODE STATUS:         Electronically signed by Jamey Leslie MD, 12/07/21, 10:44 AM EST.

## 2021-12-07 NOTE — ANESTHESIA PREPROCEDURE EVALUATION
Anesthesia Evaluation                  Airway   Mallampati: II  TM distance: >3 FB  Neck ROM: full  No difficulty expected  Dental      Pulmonary - normal exam    breath sounds clear to auscultation  Cardiovascular - normal exam    Rhythm: regular    (+) hypertension, hyperlipidemia,       Neuro/Psych  (+) psychiatric history ADHD and Depression,     GI/Hepatic/Renal/Endo    (+)  GERD,  liver disease fatty liver disease,     ROS Comment: dysphagia    Musculoskeletal     Abdominal    Substance History      OB/GYN          Other   arthritis,                      Anesthesia Plan    ASA 3     general       Anesthetic plan, all risks, benefits, and alternatives have been provided, discussed and informed consent has been obtained with: patient.

## 2021-12-14 ENCOUNTER — IMMUNIZATION (OUTPATIENT)
Dept: VACCINE CLINIC | Facility: HOSPITAL | Age: 66
End: 2021-12-14

## 2021-12-14 ENCOUNTER — TELEPHONE (OUTPATIENT)
Dept: GASTROENTEROLOGY | Facility: CLINIC | Age: 66
End: 2021-12-14

## 2021-12-14 ENCOUNTER — OFFICE VISIT (OUTPATIENT)
Dept: GASTROENTEROLOGY | Facility: CLINIC | Age: 66
End: 2021-12-14

## 2021-12-14 VITALS
HEIGHT: 60 IN | WEIGHT: 135 LBS | SYSTOLIC BLOOD PRESSURE: 132 MMHG | BODY MASS INDEX: 26.5 KG/M2 | DIASTOLIC BLOOD PRESSURE: 68 MMHG

## 2021-12-14 DIAGNOSIS — R74.01 ELEVATED TRANSAMINASE LEVEL: Chronic | ICD-10-CM

## 2021-12-14 DIAGNOSIS — K59.1 FUNCTIONAL DIARRHEA: Chronic | ICD-10-CM

## 2021-12-14 DIAGNOSIS — R13.14 PHARYNGOESOPHAGEAL DYSPHAGIA: Primary | Chronic | ICD-10-CM

## 2021-12-14 PROCEDURE — 91300 HC SARSCOV02 VAC 30MCG/0.3ML IM: CPT | Performed by: INTERNAL MEDICINE

## 2021-12-14 PROCEDURE — 0004A HC ADM SARSCOV2 30MCG/0.3ML BOOSTER: CPT | Performed by: INTERNAL MEDICINE

## 2021-12-14 PROCEDURE — 99214 OFFICE O/P EST MOD 30 MIN: CPT | Performed by: INTERNAL MEDICINE

## 2021-12-14 NOTE — PROGRESS NOTES
Chief Complaint   Patient presents with   • Difficulty Swallowing   • Diarrhea       Subjective     HPI    Cynthia Escobar is a 66 y.o. female with a past medical history noted below who presents for follow up.  She has a history of dysphagia due to eosinophilic esophagitis and a prominent cricopharyngeus,  issues with diarrhea and fecal incontinence, elevated liver enzymes and fatty liver    She is still complaining quite a bit of difficulty swallowing.  She has been on PPI as well as swallowed steroids for eosinophilic esophagitis and had no relief.  Recent esophagram demonstrated prominent cricopharyngeus.  Of note, she was able to completely swallow the barium tablet without any issue.  She connected with an ENT and just had dilation with that provider at [facility], she was dilated to 60 French.  Unfortunately, she says that this does not help.  Soft foods such as yogurt and applesauce go down easily.  She is able to drink liquids.  However meat, bread, scrambled eggs are still getting stuck.  She is points to her sternal notch.  Her weight is stable but she has lost significant weight due to symptoms and has to focus a lot on eating    Liver enzymes still mildly elevated.    BMs well controlled with imodium, takes at most 2 in a day.     Today's visit was in the office.  Both the patient and I were wearing face masks and proper hand hygiene was performed before and after the physical exam.           Current Outpatient Medications:   •  Acidophilus Lactobacillus capsule, Take 1 capsule by mouth 2 (Two) Times a Day., Disp: 60 capsule, Rfl: 5  •  ALPRAZolam (XANAX) 0.25 MG tablet, TAKE 1 TABLET BY MOUTH TWICE DAILY AS NEEDED ANXIETY, Disp: 60 tablet, Rfl: 2  •  amphetamine-dextroamphetamine XR (ADDERALL XR) 20 MG 24 hr capsule, Take 1 capsule by mouth Every Morning (Patient taking differently: Take 20 mg by mouth Daily As Needed  ), Disp: 30 capsule, Rfl: 0  •  Cholecalciferol (Vitamin D3) 50  MCG (2000 UT) tablet, Take 1 tablet by mouth Daily., Disp: , Rfl:   •  FLUoxetine (PROzac) 20 MG capsule, TAKE 1 CAPSULE BY MOUTH FOUR TIMES DAILY, Disp: 360 capsule, Rfl: 1  •  hydroCHLOROthiazide (HYDRODIURIL) 25 MG tablet, TAKE 1 TABLET BY MOUTH DAILY, Disp: 90 tablet, Rfl: 1  •  ibuprofen (ADVIL,MOTRIN) 100 MG/5ML suspension, Take 30 mL by mouth Every 6 (Six) Hours As Needed (pain)., Disp: 946 mL, Rfl: 0  •  ipratropium (Atrovent) 0.06 % nasal spray, 2 sprays into the nostril(s) as directed by provider 4 (Four) Times a Day., Disp: 15 mL, Rfl: 12  •  levothyroxine (SYNTHROID, LEVOTHROID) 75 MCG tablet, TAKE 1 TABLET BY MOUTH EVERY DAY, Disp: 90 tablet, Rfl: 3  •  loperamide (IMODIUM A-D) 2 MG tablet, Take 1 tablet by mouth 3 (Three) Times a Day As Needed for Diarrhea., Disp: 90 tablet, Rfl: 0  •  loratadine (CLARITIN) 10 MG tablet, Take 1 tablet by mouth Daily., Disp: 30 tablet, Rfl: 5  •  olopatadine (PATANOL) 0.1 % ophthalmic solution, INSTILL 1 DROP IN BOTH EYES TWICE DAILY, Disp: 5 mL, Rfl: 5  •  pantoprazole (PROTONIX) 20 MG EC tablet, TAKE 1 TABLET BY MOUTH EVERY DAY. DO NOT TAKE WITHIN 2 HOURS OF THYROID MEDICATION, Disp: 30 tablet, Rfl: 5  •  propranolol (INDERAL) 40 MG tablet, TAKE 1 TABLET BY MOUTH THREE TIMES DAILY (Patient taking differently: Take 40 mg by mouth Daily. ORDERED TID BUT REPORTS ONLY TAKES QD), Disp: 270 tablet, Rfl: 1  •  rosuvastatin (CRESTOR) 20 MG tablet, Take 1 tablet by mouth Daily. (Patient taking differently: Take 20 mg by mouth Every Night.), Disp: 30 tablet, Rfl: 0  •  zolpidem (AMBIEN) 10 MG tablet, Take 1 tablet by mouth At Night As Needed for Sleep., Disp: 30 tablet, Rfl: 1  •  hyoscyamine (LEVSIN) 0.125 MG SL tablet, Take 1 tablet by mouth 3 (Three) Times a Day Before Meals., Disp: 120 tablet, Rfl: 0      Objective     Vitals:    12/14/21 1339   BP: 132/68         12/14/21  1339   Weight: 61.2 kg (135 lb)     Body mass index is 26.37 kg/m².    Physical Exam  Constitutional:        General: She is not in acute distress.  Pulmonary:      Effort: Pulmonary effort is normal.   Neurological:      Mental Status: She is alert and oriented to person, place, and time.   Psychiatric:         Mood and Affect: Mood normal.         Behavior: Behavior normal.         Thought Content: Thought content normal.         Judgment: Judgment normal.             WBC   Date Value Ref Range Status   06/21/2021 5.99 3.40 - 10.80 10*3/mm3 Final   12/27/2018 6.47 4.5 - 11.0 10*3/uL Final     RBC   Date Value Ref Range Status   06/21/2021 4.82 3.77 - 5.28 10*6/mm3 Final   12/27/2018 4.48 4.0 - 5.2 10*6/uL Final     Hemoglobin   Date Value Ref Range Status   06/21/2021 14.8 12.0 - 15.9 g/dL Final   04/24/2020 14.7 12.0 - 15.9 g/dL Final   01/09/2019 11.7 (L) 12.0 - 16.0 g/dL Final     Hematocrit   Date Value Ref Range Status   06/21/2021 43.4 34.0 - 46.6 % Final   04/24/2020 43.2 34.0 - 46.6 % Final   01/09/2019 35.4 (L) 36.0 - 46.0 % Final     MCV   Date Value Ref Range Status   06/21/2021 90.0 79.0 - 97.0 fL Final   04/24/2020 90.2 79.0 - 97.0 fL Final   12/27/2018 91.5 80.0 - 100.0 fL Final     MCH   Date Value Ref Range Status   06/21/2021 30.7 26.6 - 33.0 pg Final   04/24/2020 30.7 26.6 - 33.0 pg Final   12/27/2018 30.6 26.0 - 34.0 pg Final     MCHC   Date Value Ref Range Status   06/21/2021 34.1 31.5 - 35.7 g/dL Final   04/24/2020 34.0 31.5 - 35.7 g/dL Final   12/27/2018 33.4 31.0 - 37.0 g/dL Final     RDW   Date Value Ref Range Status   06/21/2021 13.0 12.3 - 15.4 % Final   04/24/2020 13.1 12.3 - 15.4 % Final   12/27/2018 13.0 12.0 - 16.8 % Final     RDW-SD   Date Value Ref Range Status   04/24/2020 43.3 37.0 - 54.0 fl Final     MPV   Date Value Ref Range Status   04/24/2020 10.1 6.0 - 12.0 fL Final   12/27/2018 10.0 6.7 - 10.8 fL Final     Platelets   Date Value Ref Range Status   06/21/2021 265 140 - 450 10*3/mm3 Final   04/24/2020 223 140 - 450 10*3/mm3 Final   12/27/2018 246 140 - 440 10*3/uL Final      Neutrophil Rel %   Date Value Ref Range Status   06/21/2021 CANCELED       Comment:     Test not performed    Result canceled by the ancillary.     12/27/2018 44.5 (L) 45 - 80 % Final     Neutrophil %   Date Value Ref Range Status   04/24/2020 47.0 42.7 - 76.0 % Final     Lymphocyte Rel %   Date Value Ref Range Status   06/21/2021 CANCELED       Comment:     Test not performed    Result canceled by the ancillary.     12/27/2018 43.7 15 - 50 % Final     Lymphocyte %   Date Value Ref Range Status   04/24/2020 41.8 19.6 - 45.3 % Final     Monocyte Rel %   Date Value Ref Range Status   06/21/2021 CANCELED       Comment:     Test not performed    Result canceled by the ancillary.     12/27/2018 4.3 0 - 15 % Final     Monocyte %   Date Value Ref Range Status   04/24/2020 5.3 5.0 - 12.0 % Final     Eosinophil Rel %   Date Value Ref Range Status   06/21/2021 CANCELED       Comment:     Test not performed    Result canceled by the ancillary.     06/21/2021 8.4 (H) 0.3 - 6.2 % Final     Eosinophil %   Date Value Ref Range Status   04/24/2020 5.1 0.3 - 6.2 % Final   12/27/2018 7.0 0 - 7 % Final     Basophil Rel %   Date Value Ref Range Status   01/18/2021 0.6 0.0 - 1.5 % Final   12/27/2018 0.3 0 - 2 % Final     Basophil %   Date Value Ref Range Status   04/24/2020 0.3 0.0 - 1.5 % Final     Immature Grans %   Date Value Ref Range Status   04/24/2020 0.5 0.0 - 0.5 % Final   12/27/2018 0.2 (H) 0 % Final     Neutrophils Absolute   Date Value Ref Range Status   06/21/2021 4.04 1.70 - 7.00 10*3/mm3 Final   01/18/2021 2.21 1.70 - 7.00 10*3/mm3 Final   12/27/2018 2.88 2.0 - 8.8 10*3/uL Final     Neutrophils, Absolute   Date Value Ref Range Status   04/24/2020 2.92 1.70 - 7.00 10*3/mm3 Final     Lymphocytes Absolute   Date Value Ref Range Status   06/21/2021 CANCELED       Comment:     Test not performed    Result canceled by the ancillary.     06/21/2021 1.20 0.70 - 3.10 10*3/mm3 Final   12/27/2018 2.83 0.7 - 5.5 10*3/uL Final      Lymphocytes, Absolute   Date Value Ref Range Status   04/24/2020 2.60 0.70 - 3.10 10*3/mm3 Final     Monocytes Absolute   Date Value Ref Range Status   06/21/2021 0.19 0.10 - 0.90 10*3/mm3 Final   01/18/2021 0.34 0.10 - 0.90 10*3/mm3 Final   12/27/2018 0.28 0.0 - 1.7 10*3/uL Final     Monocytes, Absolute   Date Value Ref Range Status   04/24/2020 0.33 0.10 - 0.90 10*3/mm3 Final     Eosinophils Absolute   Date Value Ref Range Status   06/21/2021 CANCELED       Comment:     Test not performed    Result canceled by the ancillary.     12/27/2018 0.45 0.0 - 0.8 10*3/uL Final     Eosinophil Abs   Date Value Ref Range Status   06/21/2021 0.50 (H) 0.00 - 0.40 10*3/mm3 Final     Eosinophils, Absolute   Date Value Ref Range Status   04/24/2020 0.32 0.00 - 0.40 10*3/mm3 Final     Basophils Absolute   Date Value Ref Range Status   06/21/2021 CANCELED       Comment:     Test not performed    Result canceled by the ancillary.     06/21/2021 0.07 0.00 - 0.20 10*3/mm3 Final   12/27/2018 0.02 0.0 - 0.2 10*3/uL Final     Basophils, Absolute   Date Value Ref Range Status   04/24/2020 0.02 0.00 - 0.20 10*3/mm3 Final     Immature Grans, Absolute   Date Value Ref Range Status   04/24/2020 0.03 0.00 - 0.05 10*3/mm3 Final   12/27/2018 0.01 <1 10*3/uL Final     nRBC   Date Value Ref Range Status   01/18/2021 0.0 0.0 - 0.2 /100 WBC Final   04/24/2020 0.0 0.0 - 0.2 /100 WBC Final       Lab Results   Component Value Date    GLUCOSE 84 11/30/2021    BUN 19 11/30/2021    CREATININE 0.59 11/30/2021    EGFRIFNONA 102 11/30/2021    EGFRIFAFRI 131 06/21/2021    BCR 32.2 (H) 11/30/2021    CO2 24.1 11/30/2021    CALCIUM 9.4 11/30/2021    PROTENTOTREF 6.0 06/21/2021    ALBUMIN 4.40 06/21/2021    LABIL2 2.8 06/21/2021    AST 33 (H) 06/21/2021    ALT 45 (H) 06/21/2021     Esophagram  CONCLUSION:   Prominent cricopharyngeus muscle which may contribute to patient's reported dysphagia.     Normal esophageal transit.  No distal esophageal stricture or  narrowing.       Mild gastroesophageal reflux.           JEANINE GONZALEZ MD         Electronically Signed and Approved By: JEANINE GONZALEZ MD on 7/27/2021 at 15:49       I personally reviewed data as detailed below:       The radiology studies listed above.    Office notes from: 10/13/21 pcp note    Endoscopy procedures 12/7/21 ENT dilation      No notes on file    Assessment/Plan    1.  Pharyngoesophageal dysphagia: Given that she has not responded to treatments for dilated cricopharyngeus and treating the eosinophilic esophagitis, I really think there is a motility issue at play.  Also, the barium tablet on her esophagram past easily without getting hung up.    2.  Elevated transaminase level: Suspected medication associated versus fatty liver    3.  Functional diarrhea: She is doing well on fiber and Imodium    Plan  Update liver labs today along with GGT, repeating smooth muscle antibody  Check celiac studies  Hyoscyamine about 15 minutes before meals to see if this improves her swallowing  Will get esophageal motility testing to see what is going on with her swallowing    Diagnoses and all orders for this visit:    1. Pharyngoesophageal dysphagia (Primary)  -     COVID PRE-OP / PRE-PROCEDURE SCREENING ORDER (NO ISOLATION) - Swab, Nasopharynx  -     Case Request; Standing  -     Case Request    2. Elevated transaminase level  -     Comprehensive Metabolic Panel  -     Gamma GT  -     Anti-Smooth Muscle Antibody Titer  -     IgA  -     Tissue Transglutaminase, IgG  -     Tissue Transglutaminase, IgA    3. Functional diarrhea    Other orders  -     hyoscyamine (LEVSIN) 0.125 MG SL tablet; Take 1 tablet by mouth 3 (Three) Times a Day Before Meals.  Dispense: 120 tablet; Refill: 0  -     Follow Anesthesia Guidelines / Protocol; Future  -     Obtain Informed Consent; Future        I have discussed the above plan with the patient.  They verbalize understanding and are in agreement with the plan.  They have been advised to  contact the office for any questions, concerns, or changes related to their health.    Dictated utilizing Dragon dictation

## 2021-12-14 NOTE — H&P (VIEW-ONLY)
Chief Complaint   Patient presents with   • Difficulty Swallowing   • Diarrhea       Subjective     HPI    Cynthia Escobar is a 66 y.o. female with a past medical history noted below who presents for follow up.  She has a history of dysphagia due to eosinophilic esophagitis and a prominent cricopharyngeus,  issues with diarrhea and fecal incontinence, elevated liver enzymes and fatty liver    She is still complaining quite a bit of difficulty swallowing.  She has been on PPI as well as swallowed steroids for eosinophilic esophagitis and had no relief.  Recent esophagram demonstrated prominent cricopharyngeus.  Of note, she was able to completely swallow the barium tablet without any issue.  She connected with an ENT and just had dilation with that provider at [facility], she was dilated to 60 French.  Unfortunately, she says that this does not help.  Soft foods such as yogurt and applesauce go down easily.  She is able to drink liquids.  However meat, bread, scrambled eggs are still getting stuck.  She is points to her sternal notch.  Her weight is stable but she has lost significant weight due to symptoms and has to focus a lot on eating    Liver enzymes still mildly elevated.    BMs well controlled with imodium, takes at most 2 in a day.     Today's visit was in the office.  Both the patient and I were wearing face masks and proper hand hygiene was performed before and after the physical exam.           Current Outpatient Medications:   •  Acidophilus Lactobacillus capsule, Take 1 capsule by mouth 2 (Two) Times a Day., Disp: 60 capsule, Rfl: 5  •  ALPRAZolam (XANAX) 0.25 MG tablet, TAKE 1 TABLET BY MOUTH TWICE DAILY AS NEEDED ANXIETY, Disp: 60 tablet, Rfl: 2  •  amphetamine-dextroamphetamine XR (ADDERALL XR) 20 MG 24 hr capsule, Take 1 capsule by mouth Every Morning (Patient taking differently: Take 20 mg by mouth Daily As Needed  ), Disp: 30 capsule, Rfl: 0  •  Cholecalciferol (Vitamin D3) 50  MCG (2000 UT) tablet, Take 1 tablet by mouth Daily., Disp: , Rfl:   •  FLUoxetine (PROzac) 20 MG capsule, TAKE 1 CAPSULE BY MOUTH FOUR TIMES DAILY, Disp: 360 capsule, Rfl: 1  •  hydroCHLOROthiazide (HYDRODIURIL) 25 MG tablet, TAKE 1 TABLET BY MOUTH DAILY, Disp: 90 tablet, Rfl: 1  •  ibuprofen (ADVIL,MOTRIN) 100 MG/5ML suspension, Take 30 mL by mouth Every 6 (Six) Hours As Needed (pain)., Disp: 946 mL, Rfl: 0  •  ipratropium (Atrovent) 0.06 % nasal spray, 2 sprays into the nostril(s) as directed by provider 4 (Four) Times a Day., Disp: 15 mL, Rfl: 12  •  levothyroxine (SYNTHROID, LEVOTHROID) 75 MCG tablet, TAKE 1 TABLET BY MOUTH EVERY DAY, Disp: 90 tablet, Rfl: 3  •  loperamide (IMODIUM A-D) 2 MG tablet, Take 1 tablet by mouth 3 (Three) Times a Day As Needed for Diarrhea., Disp: 90 tablet, Rfl: 0  •  loratadine (CLARITIN) 10 MG tablet, Take 1 tablet by mouth Daily., Disp: 30 tablet, Rfl: 5  •  olopatadine (PATANOL) 0.1 % ophthalmic solution, INSTILL 1 DROP IN BOTH EYES TWICE DAILY, Disp: 5 mL, Rfl: 5  •  pantoprazole (PROTONIX) 20 MG EC tablet, TAKE 1 TABLET BY MOUTH EVERY DAY. DO NOT TAKE WITHIN 2 HOURS OF THYROID MEDICATION, Disp: 30 tablet, Rfl: 5  •  propranolol (INDERAL) 40 MG tablet, TAKE 1 TABLET BY MOUTH THREE TIMES DAILY (Patient taking differently: Take 40 mg by mouth Daily. ORDERED TID BUT REPORTS ONLY TAKES QD), Disp: 270 tablet, Rfl: 1  •  rosuvastatin (CRESTOR) 20 MG tablet, Take 1 tablet by mouth Daily. (Patient taking differently: Take 20 mg by mouth Every Night.), Disp: 30 tablet, Rfl: 0  •  zolpidem (AMBIEN) 10 MG tablet, Take 1 tablet by mouth At Night As Needed for Sleep., Disp: 30 tablet, Rfl: 1  •  hyoscyamine (LEVSIN) 0.125 MG SL tablet, Take 1 tablet by mouth 3 (Three) Times a Day Before Meals., Disp: 120 tablet, Rfl: 0      Objective     Vitals:    12/14/21 1339   BP: 132/68         12/14/21  1339   Weight: 61.2 kg (135 lb)     Body mass index is 26.37 kg/m².    Physical Exam  Constitutional:        General: She is not in acute distress.  Pulmonary:      Effort: Pulmonary effort is normal.   Neurological:      Mental Status: She is alert and oriented to person, place, and time.   Psychiatric:         Mood and Affect: Mood normal.         Behavior: Behavior normal.         Thought Content: Thought content normal.         Judgment: Judgment normal.             WBC   Date Value Ref Range Status   06/21/2021 5.99 3.40 - 10.80 10*3/mm3 Final   12/27/2018 6.47 4.5 - 11.0 10*3/uL Final     RBC   Date Value Ref Range Status   06/21/2021 4.82 3.77 - 5.28 10*6/mm3 Final   12/27/2018 4.48 4.0 - 5.2 10*6/uL Final     Hemoglobin   Date Value Ref Range Status   06/21/2021 14.8 12.0 - 15.9 g/dL Final   04/24/2020 14.7 12.0 - 15.9 g/dL Final   01/09/2019 11.7 (L) 12.0 - 16.0 g/dL Final     Hematocrit   Date Value Ref Range Status   06/21/2021 43.4 34.0 - 46.6 % Final   04/24/2020 43.2 34.0 - 46.6 % Final   01/09/2019 35.4 (L) 36.0 - 46.0 % Final     MCV   Date Value Ref Range Status   06/21/2021 90.0 79.0 - 97.0 fL Final   04/24/2020 90.2 79.0 - 97.0 fL Final   12/27/2018 91.5 80.0 - 100.0 fL Final     MCH   Date Value Ref Range Status   06/21/2021 30.7 26.6 - 33.0 pg Final   04/24/2020 30.7 26.6 - 33.0 pg Final   12/27/2018 30.6 26.0 - 34.0 pg Final     MCHC   Date Value Ref Range Status   06/21/2021 34.1 31.5 - 35.7 g/dL Final   04/24/2020 34.0 31.5 - 35.7 g/dL Final   12/27/2018 33.4 31.0 - 37.0 g/dL Final     RDW   Date Value Ref Range Status   06/21/2021 13.0 12.3 - 15.4 % Final   04/24/2020 13.1 12.3 - 15.4 % Final   12/27/2018 13.0 12.0 - 16.8 % Final     RDW-SD   Date Value Ref Range Status   04/24/2020 43.3 37.0 - 54.0 fl Final     MPV   Date Value Ref Range Status   04/24/2020 10.1 6.0 - 12.0 fL Final   12/27/2018 10.0 6.7 - 10.8 fL Final     Platelets   Date Value Ref Range Status   06/21/2021 265 140 - 450 10*3/mm3 Final   04/24/2020 223 140 - 450 10*3/mm3 Final   12/27/2018 246 140 - 440 10*3/uL Final      Neutrophil Rel %   Date Value Ref Range Status   06/21/2021 CANCELED       Comment:     Test not performed    Result canceled by the ancillary.     12/27/2018 44.5 (L) 45 - 80 % Final     Neutrophil %   Date Value Ref Range Status   04/24/2020 47.0 42.7 - 76.0 % Final     Lymphocyte Rel %   Date Value Ref Range Status   06/21/2021 CANCELED       Comment:     Test not performed    Result canceled by the ancillary.     12/27/2018 43.7 15 - 50 % Final     Lymphocyte %   Date Value Ref Range Status   04/24/2020 41.8 19.6 - 45.3 % Final     Monocyte Rel %   Date Value Ref Range Status   06/21/2021 CANCELED       Comment:     Test not performed    Result canceled by the ancillary.     12/27/2018 4.3 0 - 15 % Final     Monocyte %   Date Value Ref Range Status   04/24/2020 5.3 5.0 - 12.0 % Final     Eosinophil Rel %   Date Value Ref Range Status   06/21/2021 CANCELED       Comment:     Test not performed    Result canceled by the ancillary.     06/21/2021 8.4 (H) 0.3 - 6.2 % Final     Eosinophil %   Date Value Ref Range Status   04/24/2020 5.1 0.3 - 6.2 % Final   12/27/2018 7.0 0 - 7 % Final     Basophil Rel %   Date Value Ref Range Status   01/18/2021 0.6 0.0 - 1.5 % Final   12/27/2018 0.3 0 - 2 % Final     Basophil %   Date Value Ref Range Status   04/24/2020 0.3 0.0 - 1.5 % Final     Immature Grans %   Date Value Ref Range Status   04/24/2020 0.5 0.0 - 0.5 % Final   12/27/2018 0.2 (H) 0 % Final     Neutrophils Absolute   Date Value Ref Range Status   06/21/2021 4.04 1.70 - 7.00 10*3/mm3 Final   01/18/2021 2.21 1.70 - 7.00 10*3/mm3 Final   12/27/2018 2.88 2.0 - 8.8 10*3/uL Final     Neutrophils, Absolute   Date Value Ref Range Status   04/24/2020 2.92 1.70 - 7.00 10*3/mm3 Final     Lymphocytes Absolute   Date Value Ref Range Status   06/21/2021 CANCELED       Comment:     Test not performed    Result canceled by the ancillary.     06/21/2021 1.20 0.70 - 3.10 10*3/mm3 Final   12/27/2018 2.83 0.7 - 5.5 10*3/uL Final      Lymphocytes, Absolute   Date Value Ref Range Status   04/24/2020 2.60 0.70 - 3.10 10*3/mm3 Final     Monocytes Absolute   Date Value Ref Range Status   06/21/2021 0.19 0.10 - 0.90 10*3/mm3 Final   01/18/2021 0.34 0.10 - 0.90 10*3/mm3 Final   12/27/2018 0.28 0.0 - 1.7 10*3/uL Final     Monocytes, Absolute   Date Value Ref Range Status   04/24/2020 0.33 0.10 - 0.90 10*3/mm3 Final     Eosinophils Absolute   Date Value Ref Range Status   06/21/2021 CANCELED       Comment:     Test not performed    Result canceled by the ancillary.     12/27/2018 0.45 0.0 - 0.8 10*3/uL Final     Eosinophil Abs   Date Value Ref Range Status   06/21/2021 0.50 (H) 0.00 - 0.40 10*3/mm3 Final     Eosinophils, Absolute   Date Value Ref Range Status   04/24/2020 0.32 0.00 - 0.40 10*3/mm3 Final     Basophils Absolute   Date Value Ref Range Status   06/21/2021 CANCELED       Comment:     Test not performed    Result canceled by the ancillary.     06/21/2021 0.07 0.00 - 0.20 10*3/mm3 Final   12/27/2018 0.02 0.0 - 0.2 10*3/uL Final     Basophils, Absolute   Date Value Ref Range Status   04/24/2020 0.02 0.00 - 0.20 10*3/mm3 Final     Immature Grans, Absolute   Date Value Ref Range Status   04/24/2020 0.03 0.00 - 0.05 10*3/mm3 Final   12/27/2018 0.01 <1 10*3/uL Final     nRBC   Date Value Ref Range Status   01/18/2021 0.0 0.0 - 0.2 /100 WBC Final   04/24/2020 0.0 0.0 - 0.2 /100 WBC Final       Lab Results   Component Value Date    GLUCOSE 84 11/30/2021    BUN 19 11/30/2021    CREATININE 0.59 11/30/2021    EGFRIFNONA 102 11/30/2021    EGFRIFAFRI 131 06/21/2021    BCR 32.2 (H) 11/30/2021    CO2 24.1 11/30/2021    CALCIUM 9.4 11/30/2021    PROTENTOTREF 6.0 06/21/2021    ALBUMIN 4.40 06/21/2021    LABIL2 2.8 06/21/2021    AST 33 (H) 06/21/2021    ALT 45 (H) 06/21/2021     Esophagram  CONCLUSION:   Prominent cricopharyngeus muscle which may contribute to patient's reported dysphagia.     Normal esophageal transit.  No distal esophageal stricture or  narrowing.       Mild gastroesophageal reflux.           JEANINE GONZALEZ MD         Electronically Signed and Approved By: JEANINE GONZALEZ MD on 7/27/2021 at 15:49       I personally reviewed data as detailed below:       The radiology studies listed above.    Office notes from: 10/13/21 pcp note    Endoscopy procedures 12/7/21 ENT dilation      No notes on file    Assessment/Plan    1.  Pharyngoesophageal dysphagia: Given that she has not responded to treatments for dilated cricopharyngeus and treating the eosinophilic esophagitis, I really think there is a motility issue at play.  Also, the barium tablet on her esophagram past easily without getting hung up.    2.  Elevated transaminase level: Suspected medication associated versus fatty liver    3.  Functional diarrhea: She is doing well on fiber and Imodium    Plan  Update liver labs today along with GGT, repeating smooth muscle antibody  Check celiac studies  Hyoscyamine about 15 minutes before meals to see if this improves her swallowing  Will get esophageal motility testing to see what is going on with her swallowing    Diagnoses and all orders for this visit:    1. Pharyngoesophageal dysphagia (Primary)  -     COVID PRE-OP / PRE-PROCEDURE SCREENING ORDER (NO ISOLATION) - Swab, Nasopharynx  -     Case Request; Standing  -     Case Request    2. Elevated transaminase level  -     Comprehensive Metabolic Panel  -     Gamma GT  -     Anti-Smooth Muscle Antibody Titer  -     IgA  -     Tissue Transglutaminase, IgG  -     Tissue Transglutaminase, IgA    3. Functional diarrhea    Other orders  -     hyoscyamine (LEVSIN) 0.125 MG SL tablet; Take 1 tablet by mouth 3 (Three) Times a Day Before Meals.  Dispense: 120 tablet; Refill: 0  -     Follow Anesthesia Guidelines / Protocol; Future  -     Obtain Informed Consent; Future        I have discussed the above plan with the patient.  They verbalize understanding and are in agreement with the plan.  They have been advised to  contact the office for any questions, concerns, or changes related to their health.    Dictated utilizing Dragon dictation

## 2021-12-14 NOTE — TELEPHONE ENCOUNTER
SW Eloise for Esophageal Motility on 12/20  arrive at 8am  . Gave Prep instructions in office.    Advised PT  that  will call with final arrival time  24 hrs before procedure. If they do not get a phone call, arrival time will stay the same as given on instructions

## 2021-12-15 ENCOUNTER — HOSPITAL ENCOUNTER (OUTPATIENT)
Dept: MAMMOGRAPHY | Facility: HOSPITAL | Age: 66
Discharge: HOME | End: 2021-12-15

## 2021-12-15 ENCOUNTER — HOSPITAL ENCOUNTER (OUTPATIENT)
Dept: BONE DENSITY | Facility: HOSPITAL | Age: 66
Discharge: HOME | End: 2021-12-15

## 2021-12-15 DIAGNOSIS — Z13.9 SCREENING FOR CONDITION: ICD-10-CM

## 2021-12-15 DIAGNOSIS — Z78.0 ASYMPTOMATIC MENOPAUSAL STATE: ICD-10-CM

## 2021-12-15 DIAGNOSIS — Z12.31 ENCOUNTER FOR SCREENING MAMMOGRAM FOR MALIGNANT NEOPLASM OF BREAST: ICD-10-CM

## 2021-12-15 LAB
ACTIN IGG SERPL-ACNC: 18 UNITS (ref 0–19)
ALBUMIN SERPL-MCNC: 4.5 G/DL (ref 3.8–4.8)
ALBUMIN/GLOB SERPL: 2.6 {RATIO} (ref 1.2–2.2)
ALP SERPL-CCNC: 76 IU/L (ref 44–121)
ALT SERPL-CCNC: 35 IU/L (ref 0–32)
AST SERPL-CCNC: 34 IU/L (ref 0–40)
BILIRUB SERPL-MCNC: 0.5 MG/DL (ref 0–1.2)
BUN SERPL-MCNC: 20 MG/DL (ref 8–27)
BUN/CREAT SERPL: 30 (ref 12–28)
CALCIUM SERPL-MCNC: 9.7 MG/DL (ref 8.7–10.3)
CHLORIDE SERPL-SCNC: 102 MMOL/L (ref 96–106)
CO2 SERPL-SCNC: 24 MMOL/L (ref 20–29)
CREAT SERPL-MCNC: 0.67 MG/DL (ref 0.57–1)
GGT SERPL-CCNC: 24 IU/L (ref 0–60)
GLOBULIN SER CALC-MCNC: 1.7 G/DL (ref 1.5–4.5)
GLUCOSE SERPL-MCNC: 120 MG/DL (ref 65–99)
IGA SERPL-MCNC: 68 MG/DL (ref 87–352)
POTASSIUM SERPL-SCNC: 3.6 MMOL/L (ref 3.5–5.2)
PROT SERPL-MCNC: 6.2 G/DL (ref 6–8.5)
SODIUM SERPL-SCNC: 140 MMOL/L (ref 134–144)
TTG IGA SER-ACNC: <2 U/ML (ref 0–3)
TTG IGG SER-ACNC: <2 U/ML (ref 0–5)

## 2021-12-15 PROCEDURE — 77063 BREAST TOMOSYNTHESIS BI: CPT

## 2021-12-15 PROCEDURE — 77067 SCR MAMMO BI INCL CAD: CPT

## 2021-12-15 PROCEDURE — 77080 DXA BONE DENSITY AXIAL: CPT

## 2021-12-16 ENCOUNTER — TRANSCRIBE ORDERS (OUTPATIENT)
Dept: GASTROENTEROLOGY | Facility: CLINIC | Age: 66
End: 2021-12-16

## 2021-12-16 ENCOUNTER — TELEPHONE (OUTPATIENT)
Dept: GASTROENTEROLOGY | Facility: CLINIC | Age: 66
End: 2021-12-16

## 2021-12-16 DIAGNOSIS — Z01.812 ENCOUNTER FOR PREOPERATIVE SCREENING LABORATORY TESTING FOR COVID-19 VIRUS: Primary | ICD-10-CM

## 2021-12-16 DIAGNOSIS — Z01.818 OTHER SPECIFIED PRE-OPERATIVE EXAMINATION: Primary | ICD-10-CM

## 2021-12-16 DIAGNOSIS — Z20.822 ENCOUNTER FOR PREOPERATIVE SCREENING LABORATORY TESTING FOR COVID-19 VIRUS: Primary | ICD-10-CM

## 2021-12-17 ENCOUNTER — LAB (OUTPATIENT)
Dept: LAB | Facility: HOSPITAL | Age: 66
End: 2021-12-17

## 2021-12-17 ENCOUNTER — TRANSCRIBE ORDERS (OUTPATIENT)
Dept: LAB | Facility: HOSPITAL | Age: 66
End: 2021-12-17

## 2021-12-17 ENCOUNTER — TELEPHONE (OUTPATIENT)
Dept: GASTROENTEROLOGY | Facility: CLINIC | Age: 66
End: 2021-12-17

## 2021-12-17 DIAGNOSIS — Z01.818 PRE-OP TESTING: ICD-10-CM

## 2021-12-17 DIAGNOSIS — Z20.822 ENCOUNTER FOR PREOPERATIVE SCREENING LABORATORY TESTING FOR COVID-19 VIRUS: Primary | ICD-10-CM

## 2021-12-17 DIAGNOSIS — Z01.818 PRE-OP TESTING: Primary | ICD-10-CM

## 2021-12-17 DIAGNOSIS — Z01.812 ENCOUNTER FOR PREOPERATIVE SCREENING LABORATORY TESTING FOR COVID-19 VIRUS: Primary | ICD-10-CM

## 2021-12-17 LAB — SARS-COV-2 RNA PNL SPEC NAA+PROBE: NOT DETECTED

## 2021-12-17 PROCEDURE — U0004 COV-19 TEST NON-CDC HGH THRU: HCPCS

## 2021-12-17 PROCEDURE — U0005 INFEC AGEN DETEC AMPLI PROBE: HCPCS

## 2021-12-17 NOTE — TELEPHONE ENCOUNTER
----- Message from Geno Kunz CMA sent at 12/17/2021  9:02 AM EST -----  Regarding: Covid Orders  Please place order for pre- procedure covid test  Thank you

## 2021-12-20 ENCOUNTER — TELEPHONE (OUTPATIENT)
Dept: GASTROENTEROLOGY | Facility: CLINIC | Age: 66
End: 2021-12-20

## 2021-12-20 ENCOUNTER — HOSPITAL ENCOUNTER (OUTPATIENT)
Facility: HOSPITAL | Age: 66
Setting detail: HOSPITAL OUTPATIENT SURGERY
Discharge: HOME | End: 2021-12-20
Attending: INTERNAL MEDICINE | Admitting: INTERNAL MEDICINE

## 2021-12-20 PROCEDURE — G0463 HOSPITAL OUTPT CLINIC VISIT: HCPCS | Performed by: INTERNAL MEDICINE

## 2021-12-20 NOTE — TELEPHONE ENCOUNTER
Patient arrived for esophageal motility study and had to reschedule as she drank something on the way to her appointment. Patient has been rescheduled for 12/27/2021 per Yarely in scheduling. They need a new order for this procedure.

## 2021-12-20 NOTE — PROGRESS NOTES
PIP= DEXA shows osteopenia with a low risk of fracture. Rec adequate calcium and vit D daily with weight bearing exercises. Repeat DEXA in 2-3 years.

## 2021-12-21 ENCOUNTER — TELEPHONE (OUTPATIENT)
Dept: GASTROENTEROLOGY | Facility: CLINIC | Age: 66
End: 2021-12-21

## 2021-12-21 DIAGNOSIS — R76.8 LOW SERUM IGA FOR AGE: Primary | ICD-10-CM

## 2021-12-21 NOTE — PROGRESS NOTES
Labs show that her smooth muscle antibody is now in normal range (was mildly elevated 2 years ago), celiac testing is negative, ALT liver enzyme is mildly elevated but overall improved from prior, GGT is in normal range would suggest no evidence of significant liver inflammation.  Her IgA level is low and I am going to send her to allergy/immunology to assess this, this is usually not a significant issue but I think it warrants other work-up

## 2021-12-21 NOTE — TELEPHONE ENCOUNTER
----- Message from Preethi Beck MD sent at 12/21/2021 12:57 PM EST -----  Labs show that her smooth muscle antibody is now in normal range (was mildly elevated 2 years ago), celiac testing is negative, ALT liver enzyme is mildly elevated but overall improved from prior, GGT is in normal range would suggest no evidence of significant liver inflammation.  Her IgA level is low and I am going to send her to allergy/immunology to assess this, this is usually not a significant issue but I think it warrants other work-up

## 2021-12-22 DIAGNOSIS — F41.9 ANXIETY: ICD-10-CM

## 2021-12-22 RX ORDER — ALPRAZOLAM 0.25 MG/1
0.25 TABLET ORAL 2 TIMES DAILY PRN
Qty: 14 TABLET | Refills: 0 | Status: SHIPPED | OUTPATIENT
Start: 2021-12-22 | End: 2022-01-14

## 2021-12-28 PROCEDURE — U0005 INFEC AGEN DETEC AMPLI PROBE: HCPCS | Performed by: EMERGENCY MEDICINE

## 2021-12-28 PROCEDURE — U0004 COV-19 TEST NON-CDC HGH THRU: HCPCS | Performed by: EMERGENCY MEDICINE

## 2021-12-28 NOTE — TELEPHONE ENCOUNTER
Attempt return call to pt.  VM left with request to contact office.     DR Beck' note sent to pt via [facility].

## 2021-12-29 NOTE — TELEPHONE ENCOUNTER
Called pt and advised of Dr Beck note. Verb understanding.     Pt states she is ready to schedule her esophageal motility study.  Advised will send message to Betina in scheduling.

## 2021-12-30 ENCOUNTER — TELEPHONE (OUTPATIENT)
Dept: GASTROENTEROLOGY | Facility: CLINIC | Age: 66
End: 2021-12-30

## 2021-12-30 NOTE — TELEPHONE ENCOUNTER
Call to pt.  Advise referral has been placed to Dr Dwayne Butler - Allergist.  That office will contact her to arrange appt.  Verb understanding.

## 2021-12-30 NOTE — TELEPHONE ENCOUNTER
----- Message from Ellis Del Cid sent at 12/29/2021  3:51 PM EST -----  Regarding: call back  Contact: [phone]  Questions about a blood doctor..

## 2022-01-03 ENCOUNTER — TELEPHONE (OUTPATIENT)
Dept: GASTROENTEROLOGY | Facility: CLINIC | Age: 67
End: 2022-01-03

## 2022-01-03 DIAGNOSIS — R92.8 ABNORMAL MAMMOGRAM: Primary | ICD-10-CM

## 2022-01-05 DIAGNOSIS — R92.8 ABNORMAL MAMMOGRAM: Primary | ICD-10-CM

## 2022-01-11 ENCOUNTER — TELEPHONE (OUTPATIENT)
Dept: GASTROENTEROLOGY | Facility: CLINIC | Age: 67
End: 2022-01-11

## 2022-01-11 NOTE — TELEPHONE ENCOUNTER
----- Message from Ellis Frye sent at 1/11/2022  9:54 AM EST -----  Contact: [phone]  Pt is calling because she says she has this pain in her stomach and then the next minute she's just filled with diarrhea

## 2022-01-12 NOTE — TELEPHONE ENCOUNTER
Called pt and left vm for pt to call back.  Have attempted to reach pt multiple times with no success.

## 2022-01-13 ENCOUNTER — TELEPHONE (OUTPATIENT)
Dept: GASTROENTEROLOGY | Facility: CLINIC | Age: 67
End: 2022-01-13

## 2022-01-13 DIAGNOSIS — F41.9 ANXIETY: ICD-10-CM

## 2022-01-13 NOTE — TELEPHONE ENCOUNTER
See message of 1/13.  Pt calling to schedule esophageal motility study.      Message to Betina LAWSON

## 2022-01-13 NOTE — TELEPHONE ENCOUNTER
If she is having issues with breathing, I recommend that she be seen in urgent care for further assessment    She should be using the hyoscyamine prescribed in December--this will help with both diarrhea and the esophageal pain    Needs to get the motility testing rescheduled as soon as she is able.

## 2022-01-13 NOTE — TELEPHONE ENCOUNTER
Call to pt.  Reports constant epigastric discomfort - ranks at 5-6 on pain scale.  Sometimes interferes with breathing.      Occasional episodes of diarrhea - although none since 1/11.  Denies blood.     See note of 1/11 re: scheduling esophageal motility study.  Advise pt will send message to Dr Beck and also to .

## 2022-01-13 NOTE — TELEPHONE ENCOUNTER
----- Message from Ellis Vallecillo sent at 1/13/2022  1:48 PM EST -----  Regarding: call back  Contact: [phone]  Pt is returning phone call. Please give pt a call back.

## 2022-01-14 RX ORDER — ALPRAZOLAM 0.25 MG/1
TABLET ORAL
Qty: 60 TABLET | Refills: 0 | Status: SHIPPED | OUTPATIENT
Start: 2022-01-14 | End: 2022-06-16

## 2022-01-24 DIAGNOSIS — R13.14 PHARYNGOESOPHAGEAL DYSPHAGIA: Primary | ICD-10-CM

## 2022-01-24 NOTE — TELEPHONE ENCOUNTER
Hyoscyamine renewed    It looks like we have been struggling to get in touch with her to get her motility test rescheduled, can we please reach out to do so.

## 2022-01-24 NOTE — TELEPHONE ENCOUNTER
spoke with pt scheduled for esophageal motility on feb 3 arrive at 0730 am esau sheikh--reminded pt not to eat for 8 hours before and that covid testing is required

## 2022-01-27 RX ORDER — ROSUVASTATIN CALCIUM 20 MG/1
20 TABLET, COATED ORAL DAILY
Qty: 90 TABLET | Refills: 0 | Status: SHIPPED | OUTPATIENT
Start: 2022-01-27 | End: 2022-05-02

## 2022-01-27 NOTE — TELEPHONE ENCOUNTER
Rx Refill Note  Requested Prescriptions     Pending Prescriptions Disp Refills   • rosuvastatin (CRESTOR) 20 MG tablet [Pharmacy Med Name: ROSUVASTATIN 20MG TABLETS] 90 tablet 0     Sig: TAKE 1 TABLET BY MOUTH DAILY      Last office visit with prescribing clinician: 10/13/2021      Next office visit with prescribing clinician: 4/13/2022            Yarely Espino MA  01/27/22, 11:26 EST

## 2022-02-01 ENCOUNTER — LAB (OUTPATIENT)
Dept: LAB | Facility: HOSPITAL | Age: 67
End: 2022-02-01

## 2022-02-01 DIAGNOSIS — R13.14 PHARYNGOESOPHAGEAL DYSPHAGIA: ICD-10-CM

## 2022-02-01 LAB — SARS-COV-2 ORF1AB RESP QL NAA+PROBE: NOT DETECTED

## 2022-02-01 PROCEDURE — U0004 COV-19 TEST NON-CDC HGH THRU: HCPCS

## 2022-02-01 PROCEDURE — U0005 INFEC AGEN DETEC AMPLI PROBE: HCPCS

## 2022-02-01 PROCEDURE — C9803 HOPD COVID-19 SPEC COLLECT: HCPCS

## 2022-02-03 ENCOUNTER — HOSPITAL ENCOUNTER (OUTPATIENT)
Facility: HOSPITAL | Age: 67
Setting detail: HOSPITAL OUTPATIENT SURGERY
Discharge: HOME | End: 2022-02-03
Attending: INTERNAL MEDICINE | Admitting: INTERNAL MEDICINE

## 2022-02-03 VITALS
OXYGEN SATURATION: 92 % | BODY MASS INDEX: 24.54 KG/M2 | SYSTOLIC BLOOD PRESSURE: 109 MMHG | WEIGHT: 125 LBS | HEART RATE: 65 BPM | HEIGHT: 60 IN | DIASTOLIC BLOOD PRESSURE: 69 MMHG | RESPIRATION RATE: 16 BRPM

## 2022-02-03 PROCEDURE — 91010 ESOPHAGUS MOTILITY STUDY: CPT

## 2022-02-04 ENCOUNTER — APPOINTMENT (OUTPATIENT)
Dept: MAMMOGRAPHY | Facility: HOSPITAL | Age: 67
End: 2022-02-04

## 2022-02-04 ENCOUNTER — HOSPITAL ENCOUNTER (OUTPATIENT)
Dept: MAMMOGRAPHY | Facility: HOSPITAL | Age: 67
Discharge: HOME | End: 2022-02-04

## 2022-02-04 ENCOUNTER — HOSPITAL ENCOUNTER (OUTPATIENT)
Dept: ULTRASOUND IMAGING | Facility: HOSPITAL | Age: 67
Discharge: HOME | End: 2022-02-04

## 2022-02-04 DIAGNOSIS — R92.8 ABNORMAL MAMMOGRAM: ICD-10-CM

## 2022-02-04 PROCEDURE — 77065 DX MAMMO INCL CAD UNI: CPT

## 2022-02-04 PROCEDURE — 76642 ULTRASOUND BREAST LIMITED: CPT

## 2022-02-04 PROCEDURE — G0279 TOMOSYNTHESIS, MAMMO: HCPCS

## 2022-02-04 NOTE — H&P
[facility]  Pre Procedure History & Physical    Chief Complaint: Esophageal dysphagia      HPI:  66 y.o. female with a past medical history noted below who presents for follow up.  She has a history of dysphagia due to eosinophilic esophagitis and a prominent cricopharyngeus,  issues with diarrhea and fecal incontinence, elevated liver enzymes and fatty liver     She is still complaining quite a bit of difficulty swallowing.  She has been on PPI as well as swallowed steroids for eosinophilic esophagitis and had no relief.  Recent esophagram demonstrated prominent cricopharyngeus.  Of note, she was able to completely swallow the barium tablet without any issue.  She connected with an ENT and just had dilation with that provider at [facility], she was dilated to 60 Welsh.  Unfortunately, she says that this does not help.  Soft foods such as yogurt and applesauce go down easily.  She is able to drink liquids.  However meat, bread, scrambled eggs are still getting stuck.  She is points to her sternal notch.  Her weight is stable but she has lost significant weight due to symptoms and has to focus a lot on eating     Liver enzymes still mildly elevated.     BMs well controlled with imodium, takes at most 2 in a day.     Past Medical History:   Past Medical History:   Diagnosis Date   • Arthritis    • COVID-19 07/2020 9/7/2021   • Depression    • Diverticulitis of colon    • Diverticulosis    • GERD (gastroesophageal reflux disease)    • Hyperlipidemia    • Hypertension    • Left ventricular hypertrophy     FOLLOWED BY DR MARI. DENIES CHEST PAIN BUT STATES DOES GET SOA AT TIMES WITH EXERTION REPORTS THAT IT IS NOT A NEW ISSUE    • Oral herpes 8/18/2020   • SOB (shortness of breath)     STATES WITH EXERTION HAS BEEN ONGOING ISSUE NOT A NEW ISSUE   • Thyroid disease        Family History:  Family History   Problem Relation Age of Onset   • Osteoporosis Mother    • Breast  "cancer Neg Hx    • Ovarian cancer Neg Hx    • Colon cancer Neg Hx    • Deep vein thrombosis Neg Hx    • Pulmonary embolism Neg Hx    • Malig Hyperthermia Neg Hx        Social History:   reports that she has never smoked. She has never used smokeless tobacco. She reports previous alcohol use of about 1.0 standard drink of alcohol per week. She reports that she does not use drugs.    Medications:   No medications prior to admission.       Allergies:  Azithromycin, Pravastatin, and Zetia [ezetimibe]    ROS:    Pertinent items are noted in HPI     Objective     Blood pressure 109/69, pulse 65, resp. rate 16, height 152.4 cm (60\"), weight 56.7 kg (125 lb), SpO2 92 %, not currently breastfeeding.    Physical Exam   Constitutional: Pt is oriented to person, place, and time and well-developed, well-nourished, and in no distress.   HENT:   Mouth/Throat: Oropharynx is clear and moist.   Neck: Normal range of motion. Neck supple.   Cardiovascular: Normal rate, regular rhythm and normal heart sounds.    Pulmonary/Chest: Effort normal and breath sounds normal. No respiratory distress. No  wheezes.   Abdominal: Soft. Bowel sounds are normal.   Skin: Skin is warm and dry.   Psychiatric: Mood, memory, affect and judgment normal.     Assessment/Plan     Diagnosis: Esophageal dysphagia      Anticipated Surgical Procedure:  Esophageal motility testing      The risks, benefits, and alternatives of this procedure have been discussed with the patient or the responsible party- the patient understands and agrees to proceed.  "

## 2022-02-07 ENCOUNTER — TELEPHONE (OUTPATIENT)
Dept: OBSTETRICS AND GYNECOLOGY | Facility: CLINIC | Age: 67
End: 2022-02-07

## 2022-02-07 ENCOUNTER — OFFICE VISIT (OUTPATIENT)
Dept: CARDIOLOGY | Facility: CLINIC | Age: 67
End: 2022-02-07

## 2022-02-07 VITALS
OXYGEN SATURATION: 98 % | HEIGHT: 60 IN | HEART RATE: 69 BPM | SYSTOLIC BLOOD PRESSURE: 104 MMHG | WEIGHT: 125.8 LBS | BODY MASS INDEX: 24.7 KG/M2 | DIASTOLIC BLOOD PRESSURE: 68 MMHG

## 2022-02-07 DIAGNOSIS — R00.2 PALPITATIONS: Primary | ICD-10-CM

## 2022-02-07 DIAGNOSIS — F51.01 PRIMARY INSOMNIA: ICD-10-CM

## 2022-02-07 DIAGNOSIS — I51.7 LVH (LEFT VENTRICULAR HYPERTROPHY): ICD-10-CM

## 2022-02-07 PROCEDURE — 99214 OFFICE O/P EST MOD 30 MIN: CPT | Performed by: INTERNAL MEDICINE

## 2022-02-07 NOTE — TELEPHONE ENCOUNTER
I called again and pt did not answer . LMOVM to call back re: breast imaging.   Elizabeth Rosenthal MD

## 2022-02-08 NOTE — BRIEF OP NOTE
ESOPHAGEAL MOTILITY STUDY  Progress Note    Cynthia Flower  2/3/2022    Pre-op Diagnosis:   Pharyngoesophageal dysphagia [R13.14]       Post-Op Diagnosis Codes:     * Pharyngoesophageal dysphagia [R13.14]    Please see nursing note from 2/3/22 for documentation on this procedure      Preethi Beck MD     Date: 2/8/2022  Time: 17:06 EST

## 2022-02-09 RX ORDER — ZOLPIDEM TARTRATE 10 MG/1
10 TABLET ORAL NIGHTLY PRN
Qty: 30 TABLET | Refills: 1 | Status: SHIPPED | OUTPATIENT
Start: 2022-02-09 | End: 2022-04-14 | Stop reason: SDUPTHER

## 2022-02-09 NOTE — TELEPHONE ENCOUNTER
Rx Refill Note  Requested Prescriptions     Pending Prescriptions Disp Refills   • zolpidem (AMBIEN) 10 MG tablet [Pharmacy Med Name: ZOLPIDEM 10MG TABLETS] 30 tablet      Sig: TAKE 1 TABLET BY MOUTH AT NIGHT AS NEEDED FOR SLEEP      Last office visit with prescribing clinician: 10/13/2021      Next office visit with prescribing clinician: 4/13/2022            Yarely Espino MA  02/09/22, 10:01 EST

## 2022-02-11 RX ORDER — FLUOXETINE HYDROCHLORIDE 20 MG/1
CAPSULE ORAL
Qty: 360 CAPSULE | Refills: 0 | Status: SHIPPED | OUTPATIENT
Start: 2022-02-11 | End: 2022-06-13

## 2022-02-13 DIAGNOSIS — R92.8 ABNORMAL MAMMOGRAM: Primary | ICD-10-CM

## 2022-02-14 ENCOUNTER — TELEPHONE (OUTPATIENT)
Dept: SURGERY | Facility: CLINIC | Age: 67
End: 2022-02-14

## 2022-02-14 DIAGNOSIS — R92.8 ABNORMAL FINDING ON BREAST IMAGING: Primary | ICD-10-CM

## 2022-02-14 NOTE — PROGRESS NOTES
I called and s/w pt last week and she is interested in having an MRI and seeing a breast specialist. Please arrange MRI for her at [facility] ( she lives in [facility]) and refer her to Dr Álvarez.

## 2022-02-14 NOTE — TELEPHONE ENCOUNTER
I attempted to call the pt regarding the need for additional imaging. I have scheduled her stereotactic bx here at [facility] per providers request.  Dr Álvarez is requesting that Dr. Mcbride so the biopsy, so we would like for her to come here for that.  Left message for pt to call back so I can go over the date and time.    Bx scheduled at [facility] on 03/07/2022 arrive at 7:30 a.m.      CMA

## 2022-02-14 NOTE — PROGRESS NOTES
Date of Office Visit:  2022  Encounter Provider: Darren Pruitt MD  Place of Service: [facility] CARDIOLOGY  Patient Name: Cynthia Flower  :1955    Chief complaint: Follow-up for moderate LVH.  Left lower extremity pain.    History of Present Illness:    I again had the pleasure of seeing the patient in cardiology office on 2022.  She is a very pleasant 66 year-old female with a history of hypertension, hyperlipidemia, and LVH who presents for follow-up.    I initially saw the patient in the office on 2020.  She had been having several weeks of chest discomfort, including chest tightness when lying down to go to bed on several occasions.  She had also been having a burning pain in her mid substernal area.  She went to urgent care on 2020, where her EKG showed evidence of LVH and nonspecific ST and T wave changes.  She underwent a stress echocardiogram on 2020, at which time she developed 6 out of 10 chest pain while exercising, as well as significant ST depression in the inferolateral leads.  She also had hypokinesis of the inferior wall and inferoseptum, suggestive of ischemia.  Otherwise, her ejection fraction was 60%, there was moderate left ventricular hypertrophy, and grade 1 diastolic dysfunction.  She went for a semiurgent left heart catheterization on the same day on 2020.  This showed angiographically normal coronary arteries.  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  A follow-up echocardiogram on 4/15/2021 again showed moderate concentric LVH.  She did undergo a PYP scan on 2021, which was not suggestive of cardiac amyloidosis.    The patient presents today for follow-up.  She states that she has had several episodes of palpitations during which time she feels her heart racing between 5 and 20 minutes at a time.  She states that she has noticed this a few times per week.  It seems to be regular rather  than erratic.  She has not had any chest discomfort or shortness of breath.  She has had some issues with swallowing recently, and she is following up with gastroenterology for this.  Her blood pressure has actually been excellent, and is 104/68 today.    Past Medical History:   Diagnosis Date   • Arthritis    • COVID-19 07/2020 9/7/2021   • Depression    • Diverticulitis of colon    • Diverticulosis    • GERD (gastroesophageal reflux disease)    • Hyperlipidemia    • Hypertension    • Left ventricular hypertrophy     FOLLOWED BY DR MARI. DENIES CHEST PAIN BUT STATES DOES GET SOA AT TIMES WITH EXERTION REPORTS THAT IT IS NOT A NEW ISSUE    • Oral herpes 8/18/2020   • SOB (shortness of breath)     STATES WITH EXERTION HAS BEEN ONGOING ISSUE NOT A NEW ISSUE   • Thyroid disease        Past Surgical History:   Procedure Laterality Date   • ABDOMINAL SURGERY  2005, 2014    ex lap x 2,  diverticulitis   • APPENDECTOMY     • CARDIAC CATHETERIZATION N/A 4/24/2020    Procedure: Coronary angiography;  Surgeon: Roberto Briones MD;  Location: [facility] CATH INVASIVE LOCATION;  Service: Cardiovascular;  Laterality: N/A;   • CARDIAC CATHETERIZATION N/A 4/24/2020    Procedure: Left heart cath;  Surgeon: Roberto Briones MD;  Location: [facility] CATH INVASIVE LOCATION;  Service: Cardiovascular;  Laterality: N/A;   • CARDIAC CATHETERIZATION N/A 4/24/2020    Procedure: Left ventriculography;  Surgeon: Roberto Briones MD;  Location: [facility] CATH INVASIVE LOCATION;  Service: Cardiovascular;  Laterality: N/A;   • COLONOSCOPY  approx 2018    normal per pt    • COLOSTOMY      had colostomy and then is was reversed   • ENDOSCOPY N/A 5/27/2020    Procedure: ESOPHAGOGASTRODUODENOSCOPY WITH BIOPSY AND BIOPSY POLYPECTOMY AND MACIEL DIALATION;  Surgeon: Preethi Beck MD;  Location: [facility] ENDOSCOPY;  Service: Gastroenterology;  Laterality: N/A;  PRE: ESOPHAGEAL DYSPHAGIA  POST: Z LINE 46, ESOPHAGEAL SPASM, GASTRITIS,  FUNDIC POLYP   • ESOPHAGEAL DILATATION N/A 12/7/2021    Procedure: OR ESOPHAGEAL DILATATION with pacheco dilators ;  Surgeon: Jamey Leslie MD;  Location:  [facility];  Service: ENT;  Laterality: N/A;   • ESOPHAGEAL MOTILITY STUDY N/A 2/3/2022    Procedure: ESOPHAGEAL MOTILITY STUDY;  Surgeon: Harshil, Nurse Performed;  Location:  [facility];  Service: Gastroenterology;  Laterality: N/A;  dypshagia    • FOOT SURGERY Right 12/2016    Second and third hammertoe corrections   • KNEE SURGERY Right    • REVISION / TAKEDOWN COLOSTOMY  2006   • SHOULDER SURGERY      HAS HAS COMPLETE SHOULDER ON RIGHT. LEFT SCOPED AND HAD 2ND SURGERY WITH HARDWARE PLACED   • TONSILLECTOMY     • WISDOM TOOTH EXTRACTION         Current Outpatient Medications on File Prior to Visit   Medication Sig Dispense Refill   • Acidophilus Lactobacillus capsule Take 1 capsule by mouth 2 (Two) Times a Day. 60 capsule 5   • ALPRAZolam (XANAX) 0.25 MG tablet TAKE 1 TABLET BY MOUTH TWICE DAILY AS NEEDED FOR ANXIETY 60 tablet 0   • amphetamine-dextroamphetamine XR (ADDERALL XR) 20 MG 24 hr capsule Take 1 capsule by mouth Every Morning (Patient taking differently: Take 20 mg by mouth Daily As Needed  ) 30 capsule 0   • Cholecalciferol (Vitamin D3) 50 MCG (2000 UT) tablet Take 1 tablet by mouth Daily.     • hydroCHLOROthiazide (HYDRODIURIL) 25 MG tablet TAKE 1 TABLET BY MOUTH DAILY 90 tablet 1   • hyoscyamine (LEVSIN) 0.125 MG SL tablet DISSOLVE 1 TABLET UNDER THE TONGUE THREE TIMES DAILY BEFORE MEALS 270 tablet 0   • ibuprofen (ADVIL,MOTRIN) 100 MG/5ML suspension Take 30 mL by mouth Every 6 (Six) Hours As Needed (pain). 946 mL 0   • ipratropium (Atrovent) 0.06 % nasal spray 2 sprays into the nostril(s) as directed by provider 4 (Four) Times a Day. 15 mL 12   • levothyroxine (SYNTHROID, LEVOTHROID) 75 MCG tablet TAKE 1 TABLET BY MOUTH EVERY DAY 90 tablet 3   • loperamide (IMODIUM A-D) 2 MG tablet Take 1 tablet by mouth 3 (Three) Times a Day As  "Needed for Diarrhea. 90 tablet 0   • loratadine (CLARITIN) 10 MG tablet Take 1 tablet by mouth Daily. 30 tablet 5   • Multiple Vitamins-Minerals (V-C Forte) capsule Take 1 capsule by mouth Daily.     • olopatadine (PATANOL) 0.1 % ophthalmic solution INSTILL 1 DROP IN BOTH EYES TWICE DAILY 5 mL 5   • pantoprazole (PROTONIX) 20 MG EC tablet TAKE 1 TABLET BY MOUTH EVERY DAY. DO NOT TAKE WITHIN 2 HOURS OF THYROID MEDICATION 30 tablet 5   • propranolol (INDERAL) 40 MG tablet TAKE 1 TABLET BY MOUTH THREE TIMES DAILY (Patient taking differently: Take 40 mg by mouth Daily. ORDERED TID BUT REPORTS ONLY TAKES QD) 270 tablet 1   • rosuvastatin (CRESTOR) 20 MG tablet TAKE 1 TABLET BY MOUTH DAILY 90 tablet 0     No current facility-administered medications on file prior to visit.     Allergies as of 02/07/2022 - Reviewed 02/07/2022   Allergen Reaction Noted   • Azithromycin Rash 12/27/2018   • Pravastatin Rash 03/23/2017   • Zetia [ezetimibe] Myalgia 03/23/2017     Social History     Socioeconomic History   • Marital status:    Tobacco Use   • Smoking status: Never Smoker   • Smokeless tobacco: Never Used   Vaping Use   • Vaping Use: Never used   Substance and Sexual Activity   • Alcohol use: Not Currently     Alcohol/week: 1.0 standard drink     Types: 1 Glasses of wine per week   • Drug use: Never   • Sexual activity: Defer     Family History   Problem Relation Age of Onset   • Osteoporosis Mother    • Breast cancer Neg Hx    • Ovarian cancer Neg Hx    • Colon cancer Neg Hx    • Deep vein thrombosis Neg Hx    • Pulmonary embolism Neg Hx    • Malig Hyperthermia Neg Hx        Review of Systems   Cardiovascular: Positive for claudication.   Neurological: Positive for excessive daytime sleepiness.   All other systems reviewed and are negative.     Objective:     Vitals:    02/07/22 1437   BP: 104/68   Pulse: 69   SpO2: 98%   Weight: 57.1 kg (125 lb 12.8 oz)   Height: 152.4 cm (60\")     Body mass index is 24.57 " kg/m².    Constitutional:       Appearance: Healthy appearance. Well-developed.   Eyes:      Conjunctiva/sclera: Conjunctivae normal.   HENT:      Head: Normocephalic and atraumatic.   Pulmonary:      Effort: Pulmonary effort is normal.      Breath sounds: Normal breath sounds.   Cardiovascular:      Normal rate. Regular rhythm.      Murmurs: There is no murmur.      No gallop.   Edema:     Peripheral edema absent.   Abdominal:      Palpations: Abdomen is soft.      Tenderness: There is no abdominal tenderness.   Musculoskeletal:      Cervical back: Neck supple. Skin:     General: Skin is warm.   Neurological:      Mental Status: Alert and oriented to person, place, and time.   Psychiatric:         Behavior: Behavior normal.       Lab Review:   Procedures    Lipid Panel    Lipid Panel 5/14/21   Total Cholesterol 155   Triglycerides 151 (A)   HDL Cholesterol 39 (A)   VLDL Cholesterol 27   LDL Cholesterol  89   LDL/HDL Ratio 2.20   (A) Abnormal value               Cardiac Procedures:  1.  Stress echocardiogram on 4/24/2020: The patient had 6 out of 10 chest pain.  There was ST depression in the inferolateral leads and hypokinesis of the inferior wall and inferoseptum, suggestive of ischemia.  The ejection fraction was 60%.  There was moderate LVH.  There was grade 1 diastolic dysfunction.  2.  Left heart catheterization on 4/24/2020: Angiographically normal coronary arteries.  There was a mild mid LAD intramyocardial bridge.  3.  Echocardiogram on 4/15/2021: The ejection fraction was 60 to 65%.  There was moderate LVH.  There was grade 2 diastolic dysfunction.  4.  PYP scan on 4/29/2021: Not suggestive of ATTR cardiac amyloidosis.    Assessment:       Diagnosis Plan   1. Palpitations  Holter Monitor - 24 Hour   2. LVH (left ventricular hypertrophy)  [place] / Lambda Light Chains, Urine, 24 Hour - Urine, Clean Catch    Immunoglobulin Free LT Chains Blood    Protein Elec + Interp, Serum    Protein Electrophoresis, 24 Hr  Urine - Urine, Clean Catch     Plan:       Again, she has had palpitations several times a week for the last several weeks.  These last between 5 and 20 minutes, and feel fast and regular.  This would suggest she may be having episodes of SVT, although atrial fibrillation certainly needs to be ruled out as well.  I have recommended starting with a 24-hour Holter monitor.  If this is unrevealing, I will likely move to a 14-day Zio patch.    Her LVH is somewhat perplexing to me.  She really does not have a history of uncontrolled hypertension, and her blood pressure is excellent currently.  She is only on HCTZ and propranolol.  I did check a PYP scan on her previously, which was not suggestive of ATTR cardiac amyloidosis.  However, I am going to complete the work-up with light chain labs and protein electrophoresis assessment.    Her recent triglycerides were much better controlled at 151.  She will continue on the Crestor for now.  Her coronary arteries were normal on her heart catheterization other than the mid LAD intramyocardial bridge.

## 2022-02-15 NOTE — PROGRESS NOTES
Bx scheduled at [facility] on 03/07/2022 , left stereo

## 2022-02-16 ENCOUNTER — TELEPHONE (OUTPATIENT)
Dept: GASTROENTEROLOGY | Facility: CLINIC | Age: 67
End: 2022-02-16

## 2022-02-16 NOTE — TELEPHONE ENCOUNTER
----- Message from Ellis Friedman sent at 2/16/2022 11:16 AM EST -----  Regarding: Call Back  Contact: [phone]  PT has been up since 3:00 am with diarrhea and vomiting.Requesting to talk to a Dr. Beck or nurse.  PT also would like to discuss the results from the motility test.

## 2022-02-16 NOTE — TELEPHONE ENCOUNTER
If she is not taking her Imodium and fiber supplementation, I recommend using that.  If she thinks this is related to a viral illness with associated nausea, fever and chills, recommend supportive care with fluids and hydration.  To ER if she does develop dehydration.  She can also use the hyoscyamine that I prescribed to help with her esophagus; this can help ease cramping and diarrhea.

## 2022-02-16 NOTE — TELEPHONE ENCOUNTER
Called pt and pt reports she is still having lots of problems with vomiting and diarrhea. Pt reports that her diarrhea worsened today at 330a. She states she has had more than 10 watery stools with urgency today.  Pt denies a fever and chills.   Pt is asking what can she do or take .  Pt also asking about motility test results. Advised will send message to Dr Beck.

## 2022-02-21 ENCOUNTER — TELEPHONE (OUTPATIENT)
Dept: GASTROENTEROLOGY | Facility: CLINIC | Age: 67
End: 2022-02-21

## 2022-02-24 ENCOUNTER — LAB (OUTPATIENT)
Dept: LAB | Facility: HOSPITAL | Age: 67
End: 2022-02-24

## 2022-02-24 DIAGNOSIS — I51.7 LVH (LEFT VENTRICULAR HYPERTROPHY): ICD-10-CM

## 2022-02-24 PROCEDURE — 84155 ASSAY OF PROTEIN SERUM: CPT

## 2022-02-24 PROCEDURE — 36415 COLL VENOUS BLD VENIPUNCTURE: CPT

## 2022-02-24 PROCEDURE — 84165 PROTEIN E-PHORESIS SERUM: CPT

## 2022-02-24 PROCEDURE — 83521 IG LIGHT CHAINS FREE EACH: CPT

## 2022-02-25 ENCOUNTER — LAB (OUTPATIENT)
Dept: LAB | Facility: HOSPITAL | Age: 67
End: 2022-02-25

## 2022-02-25 DIAGNOSIS — I51.7 LVH (LEFT VENTRICULAR HYPERTROPHY): ICD-10-CM

## 2022-02-25 LAB
ALBUMIN SERPL ELPH-MCNC: 3.9 G/DL (ref 2.9–4.4)
ALBUMIN/GLOB SERPL: 1.8 {RATIO} (ref 0.7–1.7)
ALPHA1 GLOB SERPL ELPH-MCNC: 0.2 G/DL (ref 0–0.4)
ALPHA2 GLOB SERPL ELPH-MCNC: 0.8 G/DL (ref 0.4–1)
B-GLOBULIN SERPL ELPH-MCNC: 0.8 G/DL (ref 0.7–1.3)
GAMMA GLOB SERPL ELPH-MCNC: 0.5 G/DL (ref 0.4–1.8)
GLOBULIN SER CALC-MCNC: 2.2 G/DL (ref 2.2–3.9)
LABORATORY COMMENT REPORT: ABNORMAL
M PROTEIN SERPL ELPH-MCNC: ABNORMAL G/DL
PROT PATTERN SERPL ELPH-IMP: ABNORMAL
PROT SERPL-MCNC: 6.1 G/DL (ref 6–8.5)

## 2022-02-25 PROCEDURE — 84166 PROTEIN E-PHORESIS/URINE/CSF: CPT

## 2022-02-25 PROCEDURE — 84156 ASSAY OF PROTEIN URINE: CPT

## 2022-02-25 PROCEDURE — 81050 URINALYSIS VOLUME MEASURE: CPT

## 2022-02-25 PROCEDURE — 83521 IG LIGHT CHAINS FREE EACH: CPT

## 2022-02-26 LAB
KAPPA LC FREE SER-MCNC: 10.5 MG/L (ref 3.3–19.4)
KAPPA LC FREE/LAMBDA FREE SER: 0.09 {RATIO} (ref 0.26–1.65)
LAMBDA LC FREE SERPL-MCNC: 121.4 MG/L (ref 5.7–26.3)

## 2022-02-28 NOTE — PROGRESS NOTES
Dr. Hale has done a preliminary read, her motility studies suggestive of an esophageal spasm.  I am going to reach out to her cardiologist to see about using diltiazem (a blood pressure medicine that can be used for esophageal spasm) and we will keep her posted on the complete report once he has dictated it.

## 2022-03-01 ENCOUNTER — TELEPHONE (OUTPATIENT)
Dept: GASTROENTEROLOGY | Facility: CLINIC | Age: 67
End: 2022-03-01

## 2022-03-01 ENCOUNTER — TELEPHONE (OUTPATIENT)
Dept: CARDIOLOGY | Facility: CLINIC | Age: 67
End: 2022-03-01

## 2022-03-01 DIAGNOSIS — J30.9 ALLERGIC RHINITIS, UNSPECIFIED SEASONALITY, UNSPECIFIED TRIGGER: ICD-10-CM

## 2022-03-01 LAB
ALBUMIN 24H MFR UR ELPH: 31.7 %
ALPHA1 GLOB 24H MFR UR ELPH: 4.1 %
ALPHA2 GLOB 24H MFR UR ELPH: 19.4 %
B-GLOBULIN 24H MFR UR ELPH: 23.6 %
GAMMA GLOB 24H MFR UR ELPH: 21.3 %
KAPPA LC FREE 24H UR-MRATE: 14.12 MG/24 HR
KAPPA LC UR-MCNC: 15.69 MG/L (ref 0.63–113.79)
KAPPA LC/LAMBDA UR: 0.48 {RATIO} (ref 1.03–31.76)
LABORATORY COMMENT REPORT: ABNORMAL
LAMBDA LC FREE 24H UR-MRATE: 29.73 MG/24 HR
LAMBDA LC UR-MCNC: 33.03 MG/L (ref 0.47–11.77)
M PROTEIN 24H MFR UR ELPH: 11.4 %
M PROTEIN 24H UR ELPH-MRATE: 12.7 MG/24 HR
PROT 24H UR-MRATE: 112 MG/24 HR (ref 30–150)
PROT UR-MCNC: 12.4 MG/DL

## 2022-03-01 RX ORDER — LORATADINE 10 MG/1
10 TABLET ORAL DAILY
Qty: 30 TABLET | Refills: 5 | Status: SHIPPED | OUTPATIENT
Start: 2022-03-01 | End: 2022-11-08

## 2022-03-01 NOTE — TELEPHONE ENCOUNTER
----- Message from Preethi Beck MD sent at 2/28/2022  3:44 PM EST -----  Dr. Hale has done a preliminary read, her motility studies suggestive of an esophageal spasm.  I am going to reach out to her cardiologist to see about using diltiazem (a blood pressure medicine that can be used for esophageal spasm) and we will keep her posted on the complete report once he has dictated it.

## 2022-03-01 NOTE — TELEPHONE ENCOUNTER
Patient is returning Dr. Pruitt's call regarding test results.  I believe it is for the holter.  / [facility]     Patient can be reached at [phone]

## 2022-03-01 NOTE — TELEPHONE ENCOUNTER
Update-- Dr Pruitt is ok with the diltiazem.  He did caution that if her heart rate gets too low, that he would take her off the propranolol.    I am ordering the diltiazem, sending it to the [facility].    She should schedule office follow up 6 -8 weeks to see how she is doing

## 2022-03-02 NOTE — TELEPHONE ENCOUNTER
Called pt and advised of Dr Beck note. Verb understanding.  Pt made f/u appt for 04/18 at 1015a with Dr Beck.      Pt is asking if she is to continue the hyoscyamine. Advised will send message to Dr Beck.

## 2022-03-03 ENCOUNTER — TELEPHONE (OUTPATIENT)
Dept: GASTROENTEROLOGY | Facility: CLINIC | Age: 67
End: 2022-03-03

## 2022-03-03 DIAGNOSIS — D89.89 LIGHT CHAIN DISEASE, LAMBDA TYPE: Primary | ICD-10-CM

## 2022-03-03 DIAGNOSIS — R00.2 PALPITATIONS: Primary | ICD-10-CM

## 2022-03-03 NOTE — TELEPHONE ENCOUNTER
----- Message from Ellis Frye sent at 3/3/2022  8:05 AM EST -----  Contact: [phone]  Pt is calling says she wants to talk about the new medication they put her on says really bad side effects

## 2022-03-03 NOTE — TELEPHONE ENCOUNTER
Called pt and pt reports taking one dose of diltiazem yesterday around 4p.  She states that her heart was racing and her cheeks turned bright red.  When she would go stand up she would feel light headed and dizzy.. Pt did not check her bp or heart rate during this.  This am she feels much better.  She is asking what should she do.  Advised will send the message to Dr Beck and in the meantime advised pt hold med.   Verb understanding.

## 2022-03-04 ENCOUNTER — TELEPHONE (OUTPATIENT)
Dept: SURGERY | Facility: CLINIC | Age: 67
End: 2022-03-04

## 2022-03-04 ENCOUNTER — APPOINTMENT (OUTPATIENT)
Dept: GENERAL RADIOLOGY | Facility: HOSPITAL | Age: 67
End: 2022-03-04

## 2022-03-04 ENCOUNTER — HOSPITAL ENCOUNTER (EMERGENCY)
Facility: HOSPITAL | Age: 67
Discharge: HOME | End: 2022-03-05
Attending: EMERGENCY MEDICINE | Admitting: EMERGENCY MEDICINE

## 2022-03-04 DIAGNOSIS — R42 VERTIGO: ICD-10-CM

## 2022-03-04 DIAGNOSIS — R07.9 CHEST PAIN, UNSPECIFIED TYPE: Primary | ICD-10-CM

## 2022-03-04 DIAGNOSIS — E87.6 HYPOKALEMIA: ICD-10-CM

## 2022-03-04 LAB
ALBUMIN SERPL-MCNC: 4.6 G/DL (ref 3.5–5.2)
ALBUMIN/GLOB SERPL: 2.1 G/DL
ALP SERPL-CCNC: 72 U/L (ref 39–117)
ALT SERPL W P-5'-P-CCNC: 40 U/L (ref 1–33)
ANION GAP SERPL CALCULATED.3IONS-SCNC: 14.8 MMOL/L (ref 5–15)
AST SERPL-CCNC: 37 U/L (ref 1–32)
BASOPHILS # BLD AUTO: 0.04 10*3/MM3 (ref 0–0.2)
BASOPHILS NFR BLD AUTO: 0.4 % (ref 0–1.5)
BILIRUB SERPL-MCNC: 0.6 MG/DL (ref 0–1.2)
BUN SERPL-MCNC: 17 MG/DL (ref 8–23)
BUN/CREAT SERPL: 25.8 (ref 7–25)
CALCIUM SPEC-SCNC: 10.6 MG/DL (ref 8.6–10.5)
CHLORIDE SERPL-SCNC: 101 MMOL/L (ref 98–107)
CK MB SERPL-CCNC: 10.87 NG/ML
CK SERPL-CCNC: 212 U/L (ref 20–180)
CO2 SERPL-SCNC: 24.2 MMOL/L (ref 22–29)
CREAT SERPL-MCNC: 0.66 MG/DL (ref 0.57–1)
DEPRECATED RDW RBC AUTO: 40.8 FL (ref 37–54)
EGFRCR SERPLBLD CKD-EPI 2021: 96.9 ML/MIN/1.73
EOSINOPHIL # BLD AUTO: 0.21 10*3/MM3 (ref 0–0.4)
EOSINOPHIL NFR BLD AUTO: 1.9 % (ref 0.3–6.2)
ERYTHROCYTE [DISTWIDTH] IN BLOOD BY AUTOMATED COUNT: 13 % (ref 12.3–15.4)
GLOBULIN UR ELPH-MCNC: 2.2 GM/DL
GLUCOSE SERPL-MCNC: 93 MG/DL (ref 65–99)
HCT VFR BLD AUTO: 43.8 % (ref 34–46.6)
HGB BLD-MCNC: 15.7 G/DL (ref 12–15.9)
HOLD SPECIMEN: NORMAL
HOLD SPECIMEN: NORMAL
IMM GRANULOCYTES # BLD AUTO: 0.03 10*3/MM3 (ref 0–0.05)
IMM GRANULOCYTES NFR BLD AUTO: 0.3 % (ref 0–0.5)
LIPASE SERPL-CCNC: 47 U/L (ref 13–60)
LYMPHOCYTES # BLD AUTO: 5.37 10*3/MM3 (ref 0.7–3.1)
LYMPHOCYTES NFR BLD AUTO: 49.6 % (ref 19.6–45.3)
MAGNESIUM SERPL-MCNC: 2 MG/DL (ref 1.6–2.4)
MCH RBC QN AUTO: 31.1 PG (ref 26.6–33)
MCHC RBC AUTO-ENTMCNC: 35.8 G/DL (ref 31.5–35.7)
MCV RBC AUTO: 86.7 FL (ref 79–97)
MONOCYTES # BLD AUTO: 0.62 10*3/MM3 (ref 0.1–0.9)
MONOCYTES NFR BLD AUTO: 5.7 % (ref 5–12)
NEUTROPHILS NFR BLD AUTO: 4.56 10*3/MM3 (ref 1.7–7)
NEUTROPHILS NFR BLD AUTO: 42.1 % (ref 42.7–76)
NRBC BLD AUTO-RTO: 0 /100 WBC (ref 0–0.2)
NT-PROBNP SERPL-MCNC: 2306 PG/ML (ref 0–900)
PLATELET # BLD AUTO: 273 10*3/MM3 (ref 140–450)
PMV BLD AUTO: 10.6 FL (ref 6–12)
POTASSIUM SERPL-SCNC: 3.4 MMOL/L (ref 3.5–5.2)
PROT SERPL-MCNC: 6.8 G/DL (ref 6–8.5)
RBC # BLD AUTO: 5.05 10*6/MM3 (ref 3.77–5.28)
SODIUM SERPL-SCNC: 140 MMOL/L (ref 136–145)
TROPONIN I SERPL-MCNC: 0.03 NG/ML (ref 0–0.6)
WBC NRBC COR # BLD: 10.83 10*3/MM3 (ref 3.4–10.8)
WHOLE BLOOD HOLD SPECIMEN: NORMAL
WHOLE BLOOD HOLD SPECIMEN: NORMAL

## 2022-03-04 PROCEDURE — 82553 CREATINE MB FRACTION: CPT | Performed by: EMERGENCY MEDICINE

## 2022-03-04 PROCEDURE — 93005 ELECTROCARDIOGRAM TRACING: CPT | Performed by: EMERGENCY MEDICINE

## 2022-03-04 PROCEDURE — 83735 ASSAY OF MAGNESIUM: CPT | Performed by: EMERGENCY MEDICINE

## 2022-03-04 PROCEDURE — 93010 ELECTROCARDIOGRAM REPORT: CPT | Performed by: INTERNAL MEDICINE

## 2022-03-04 PROCEDURE — 93005 ELECTROCARDIOGRAM TRACING: CPT

## 2022-03-04 PROCEDURE — 83880 ASSAY OF NATRIURETIC PEPTIDE: CPT | Performed by: EMERGENCY MEDICINE

## 2022-03-04 PROCEDURE — 99283 EMERGENCY DEPT VISIT LOW MDM: CPT

## 2022-03-04 PROCEDURE — 80053 COMPREHEN METABOLIC PANEL: CPT | Performed by: EMERGENCY MEDICINE

## 2022-03-04 PROCEDURE — 84484 ASSAY OF TROPONIN QUANT: CPT

## 2022-03-04 PROCEDURE — 82550 ASSAY OF CK (CPK): CPT | Performed by: EMERGENCY MEDICINE

## 2022-03-04 PROCEDURE — 71045 X-RAY EXAM CHEST 1 VIEW: CPT

## 2022-03-04 PROCEDURE — 36415 COLL VENOUS BLD VENIPUNCTURE: CPT

## 2022-03-04 PROCEDURE — 85025 COMPLETE CBC W/AUTO DIFF WBC: CPT

## 2022-03-04 PROCEDURE — 83690 ASSAY OF LIPASE: CPT | Performed by: EMERGENCY MEDICINE

## 2022-03-04 RX ORDER — SODIUM CHLORIDE 0.9 % (FLUSH) 0.9 %
10 SYRINGE (ML) INJECTION AS NEEDED
Status: DISCONTINUED | OUTPATIENT
Start: 2022-03-04 | End: 2022-03-05 | Stop reason: HOSPADM

## 2022-03-04 RX ORDER — ASPIRIN 81 MG/1
324 TABLET, CHEWABLE ORAL ONCE
Status: DISCONTINUED | OUTPATIENT
Start: 2022-03-04 | End: 2022-03-05 | Stop reason: HOSPADM

## 2022-03-04 NOTE — TELEPHONE ENCOUNTER
Stereo Biopsy scheduled 3/7 had to be canceled.   This is being rescheduled for Dr. Mcbride to do.   I had to leave patient a message to call me back.

## 2022-03-05 ENCOUNTER — APPOINTMENT (OUTPATIENT)
Dept: CT IMAGING | Facility: HOSPITAL | Age: 67
End: 2022-03-05

## 2022-03-05 VITALS
DIASTOLIC BLOOD PRESSURE: 65 MMHG | OXYGEN SATURATION: 99 % | HEART RATE: 77 BPM | WEIGHT: 132.5 LBS | TEMPERATURE: 97.6 F | BODY MASS INDEX: 25.02 KG/M2 | HEIGHT: 61 IN | RESPIRATION RATE: 15 BRPM | SYSTOLIC BLOOD PRESSURE: 113 MMHG

## 2022-03-05 LAB
HOLD SPECIMEN: NORMAL
HOLD SPECIMEN: NORMAL
TROPONIN I SERPL-MCNC: 0.04 NG/ML (ref 0–0.6)

## 2022-03-05 PROCEDURE — 0 IOPAMIDOL PER 1 ML: Performed by: EMERGENCY MEDICINE

## 2022-03-05 PROCEDURE — 71275 CT ANGIOGRAPHY CHEST: CPT

## 2022-03-05 PROCEDURE — 84484 ASSAY OF TROPONIN QUANT: CPT

## 2022-03-05 RX ORDER — MECLIZINE HYDROCHLORIDE 25 MG/1
25 TABLET ORAL 3 TIMES DAILY PRN
Qty: 15 TABLET | Refills: 0 | Status: SHIPPED | OUTPATIENT
Start: 2022-03-05 | End: 2022-07-25

## 2022-03-05 RX ORDER — POTASSIUM CHLORIDE 750 MG/1
10 TABLET, FILM COATED, EXTENDED RELEASE ORAL DAILY
Qty: 10 TABLET | Refills: 0 | Status: SHIPPED | OUTPATIENT
Start: 2022-03-05 | End: 2022-08-18

## 2022-03-05 RX ADMIN — IOPAMIDOL 60 ML: 755 INJECTION, SOLUTION INTRAVENOUS at 03:43

## 2022-03-05 NOTE — EXTERNAL PATIENT INSTRUCTIONS
Patient Education   Table of Contents       Dizziness       Hypokalemia       Nonspecific Chest Pain, Adult     To view videos and all your education online visit,   [email or web address]   or scan this QR code with your smartphone.                  Dizziness     Dizziness is a common problem. It is a feeling of unsteadiness or light-headedness. You may feel like you are about to faint. Dizziness can lead to injury if you stumble or fall. Anyone can become dizzy, but dizziness is more common in older adults. This condition can be caused by a number of things, including medicines, dehydration, or illness.   Follow these instructions at home:   Eating and drinking         Drink enough fluid to keep your urine clear or pale yellow. This helps to keep you from becoming dehydrated. Try to drink more clear fluids, such as water.      Do not  drink alcohol.       Limit your caffeine intake if told to do so by your health care provider. Check ingredients and nutrition facts to see if a food or beverage contains caffeine.       Limit your salt (sodium) intake if told to do so by your health care provider. Check ingredients and nutrition facts to see if a food or beverage contains sodium.     Activity        Avoid making quick movements.       Rise slowly from chairs and steady yourself until you feel okay.       In the morning, first sit up on the side of the bed. When you feel okay, stand slowly while you hold onto something until you know that your balance is fine.       If you need to  one place for a long time, move your legs often. Tighten and relax the muscles in your legs while you are standing.      Do not  drive or use heavy machinery if you feel dizzy.       Avoid bending down if you feel dizzy. Place items in your home so that they are easy for you to reach without leaning over.     Lifestyle        Do not  use any products that contain nicotine or tobacco, such as cigarettes and e-cigarettes. If  you need help quitting, ask your health care provider.       Try to reduce your stress level by using methods such as yoga or meditation. Talk with your health care provider if you need help to manage your stress.     General instructions         Watch your dizziness for any changes.       Take over-the-counter and prescription medicines only as told by your health care provider. Talk with your health care provider if you think that your dizziness is caused by a medicine that you are taking.       Tell a friend or a family member that you are feeling dizzy. If he or she notices any changes in your behavior, have this person call your health care provider.       Keep all follow-up visits as told by your health care provider. This is important.     Contact a health care provider if:         Your dizziness does not go away.       Your dizziness or light-headedness gets worse.       You feel nauseous.       You have reduced hearing.       You have new symptoms.       You are unsteady on your feet or you feel like the room is spinning.     Get help right away if:         You vomit or have diarrhea and are unable to eat or drink anything.       You have problems talking, walking, swallowing, or using your arms, hands, or legs.       You feel generally weak.       You are not thinking clearly or you have trouble forming sentences. It may take a friend or family member to notice this.       You have chest pain, abdominal pain, shortness of breath, or sweating.       Your vision changes.       You have any bleeding.       You have a severe headache.       You have neck pain or a stiff neck.       You have a fever.     These symptoms may represent a serious problem that is an emergency. Do not wait to see if the symptoms will go away. Get medical help right away. Call your local emergency services (911 in the [place] Do not drive yourself to the hospital.   Summary         Dizziness is a feeling of unsteadiness or  light-headedness. This condition can be caused by a number of things, including medicines, dehydration, or illness.       Anyone can become dizzy, but dizziness is more common in older adults.       Drink enough fluid to keep your urine clear or pale yellow. Do not  drink alcohol.       Avoid making quick movements if you feel dizzy. Monitor your dizziness for any changes.     This information is not intended to replace advice given to you by your health care provider. Make sure you discuss any questions you have with your health care provider.     Document Released: 06/13/2002Document Revised: 12/21/2018Document Reviewed: 01/20/2018     [employer] Patient Education ? 2021 [employer]         Hypokalemia     Hypokalemia means that the amount of potassium in the blood is lower than normal. Potassium is a chemical (electrolyte) that helps regulate the amount of fluid in the body. It also stimulates muscle tightening (contraction) and helps nerves work properly.   Normally, most of the body's potassium is inside cells, and only a very small amount is in the blood. Because the amount in the blood is so small, minor changes to potassium levels in the blood can be life-threatening.   What are the causes?    This condition may be caused by:       Antibiotic medicine.       Diarrhea or vomiting. Taking too much of a medicine that helps you have a bowel movement (laxative) can cause diarrhea and lead to hypokalemia.       Chronic kidney disease (CKD).       Medicines that help the body get rid of excess fluid (diuretics).       Eating disorders, such as bulimia.       Low magnesium levels in the body.       Sweating a lot.     What are the signs or symptoms?    Symptoms of this condition include:       Weakness.       Constipation.       Fatigue.       Muscle cramps.       Mental confusion.       Skipped heartbeats or irregular heartbeat (palpitations).       Tingling or numbness.     How is this diagnosed?   This condition  is diagnosed with a blood test.   How is this treated?    This condition may be treated by:       Taking potassium supplements by mouth.       Adjusting the medicines that you take.       Eating more foods that contain a lot of potassium.     If your potassium level is very low, you may need to get potassium through an IV and be monitored in the hospital.   Follow these instructions at home:            Take over-the-counter and prescription medicines only as told by your health care provider. This includes vitamins and supplements.       Eat a healthy diet. A healthy diet includes fresh fruits and vegetables, whole grains, healthy fats, and lean proteins.      If instructed, eat more foods that contain a lot of potassium. This includes:       Nuts, such as peanuts and pistachios.       Seeds, such as sunflower seeds and pumpkin seeds.       Peas, lentils, and lima beans.       Whole grain and bran cereals and breads.       Fresh fruits and vegetables, such as apricots, avocado, bananas, cantaloupe, kiwi, oranges, tomatoes, asparagus, and potatoes.       Orange juice.       Tomato juice.       Red meats.       Yogurt.       Keep all follow-up visits as told by your health care provider. This is important.       Contact a health care provider if you:         Have weakness that gets worse.       Feel your heart pounding or racing.       Vomit.       Have diarrhea.       Have diabetes (diabetes mellitus) and you have trouble keeping your blood sugar (glucose) in your target range.     Get help right away if you:         Have chest pain.       Have shortness of breath.       Have vomiting or diarrhea that lasts for more than 2 days.       Faint.     Summary         Hypokalemia means that the amount of potassium in the blood is lower than normal.       This condition is diagnosed with a blood test.       Hypokalemia may be treated by taking potassium supplements, adjusting the medicines that you take, or eating more foods  that are high in potassium.       If your potassium level is very low, you may need to get potassium through an IV and be monitored in the hospital.     This information is not intended to replace advice given to you by your health care provider. Make sure you discuss any questions you have with your health care provider.     Document Released: 12/18/2006Document Revised: 07/31/2019Document Reviewed: 07/31/2019     [employer] Patient Education ? 2021 [employer]         Nonspecific Chest Pain, Adult     Chest pain can be caused by many different conditions. It can be caused by a condition that is life-threatening and requires treatment right away. It can also be caused by something that is not life-threatening. If you have chest pain, it can be hard to know the difference, so it is important to get help right away to make sure that you do not have a serious condition.    Some life-threatening causes of chest pain include:       Heart attack.       A tear in the body's main blood vessel (aortic dissection).       Inflammation around your heart (pericarditis).       A problem in the lungs, such as a blood clot (pulmonary embolism) or a collapsed lung (pneumothorax).      Some non life-threatening causes of chest pain include:       Heartburn.       Anxiety or stress.       Damage to the bones, muscles, and cartilage that make up your chest wall.       Pneumonia or bronchitis.       Shingles infection (varicella-zoster virus).      Chest pain can feel like:       Pain or discomfort on the surface of your chest or deep in your chest.       Crushing, pressure, aching, or squeezing pain.       Burning or tingling.       Dull or sharp pain that is worse when you move, cough, or take a deep breath.       Pain or discomfort that is also felt in your back, neck, jaw, shoulder, or arm, or pain that spreads to any of these areas.     Your chest pain may come and go. It may also be constant. Your health care provider will do lab  tests and other studies to find the cause of your pain. Treatment will depend on the cause of your chest pain.   Follow these instructions at home:   Medicines         Take over-the-counter and prescription medicines only as told by your health care provider.       If you were prescribed an antibiotic, take it as told by your health care provider. Do not  stop taking the antibiotic even if you start to feel better.     Lifestyle            Rest as directed by your health care provider.      Do not  use any products that contain nicotine or tobacco, such as cigarettes and e-cigarettes. If you need help quitting, ask your health care provider.      Do not  drink alcohol.      Make healthy lifestyle choices as recommended. These may include:       Getting regular exercise. Ask your health care provider to suggest some activities that are safe for you.       Eating a heart-healthy diet. This includes plenty of fresh fruits and vegetables, whole grains, low-fat (lean) protein, and low-fat dairy products. A dietitian can help you find healthy eating options.       Maintaining a healthy weight.       Managing any other health conditions you have, such as high blood pressure (hypertension) or diabetes.       Reducing stress, such as with yoga or relaxation techniques.     General instructions         Pay attention to any changes in your symptoms. Tell your health care provider about them or any new symptoms.       Avoid any activities that cause chest pain.       Keep all follow-up visits as told by your health care provider. This is important. This includes visits for any further testing if your chest pain does not go away.       Contact a health care provider if:         Your chest pain does not go away.       You feel depressed.       You have a fever.     Get help right away if:         Your chest pain gets worse.       You have a cough that gets worse, or you cough up blood.       You have severe pain in your abdomen.        You faint.       You have sudden, unexplained chest discomfort.       You have sudden, unexplained discomfort in your arms, back, neck, or jaw.       You have shortness of breath at any time.       You suddenly start to sweat, or your skin gets clammy.       You feel nausea or you vomit.       You suddenly feel lightheaded or dizzy.       You have severe weakness, or unexplained weakness or fatigue.       Your heart begins to beat quickly, or it feels like it is skipping beats.     These symptoms may represent a serious problem that is an emergency. Do not wait to see if the symptoms will go away. Get medical help right away. Call your local emergency services (911 in the [place] Do not drive yourself to the hospital.   Summary         Chest pain can be caused by a condition that is serious and requires urgent treatment. It may also be caused by something that is not life-threatening.       If you have chest pain, it is very important to see your health care provider. Your health care provider may do lab tests and other studies to find the cause of your pain.       Follow your health care provider's instructions on taking medicines, making lifestyle changes, and getting emergency treatment if symptoms become worse.       Keep all follow-up visits as told by your health care provider. This includes visits for any further testing if your chest pain does not go away.     This information is not intended to replace advice given to you by your health care provider. Make sure you discuss any questions you have with your health care provider.     Document Released: 09/27/2006Document Revised: 06/20/2019Document Reviewed: 06/20/2019     [employer] Patient Education ? 2021 [employer]

## 2022-03-05 NOTE — ED PROVIDER NOTES
Time: 11:22 PM EST  Arrived by: ambulance  Chief Complaint: CP, lightheadedness  History provided by: Patient  History is limited by: N/A     History of Present Illness:  Patient is a 66 y.o. female that presents to the emergency department with lightheadedness described as feeling off balance and intermittent chest discomfort beginning around 2030 today. She has not had this dizziness before. Her symptoms have improved now. She reports getting sweaty with the dizziness. Pt was driving when it began. She has had this happen a couple of times last week. She denies hearing loss, SOA, HA, N/V, and other new neurological symptoms. Pt works at [employer]. She denies Hx of CHF. She is not on anticoagulants.       History provided by:  Patient   used: No        Similar Symptoms Previously: Yes  Recently seen: Today at Urgent Care for CP      Patient Care Team  Primary Care Provider: Arian Peterson MD    Past Medical History:     Allergies   Allergen Reactions   • Azithromycin Rash   • Pravastatin Rash   • Zetia [Ezetimibe] Myalgia     cramps     Past Medical History:   Diagnosis Date   • Arthritis    • COVID-19 07/2020 9/7/2021   • Depression    • Diverticulitis of colon    • Diverticulosis    • GERD (gastroesophageal reflux disease)    • Hyperlipidemia    • Hypertension    • Left ventricular hypertrophy     FOLLOWED BY DR MARI. DENIES CHEST PAIN BUT STATES DOES GET SOA AT TIMES WITH EXERTION REPORTS THAT IT IS NOT A NEW ISSUE    • Oral herpes 8/18/2020   • SOB (shortness of breath)     STATES WITH EXERTION HAS BEEN ONGOING ISSUE NOT A NEW ISSUE   • Thyroid disease      Past Surgical History:   Procedure Laterality Date   • ABDOMINAL SURGERY  2005, 2014    ex lap x 2,  diverticulitis   • APPENDECTOMY     • CARDIAC CATHETERIZATION N/A 4/24/2020    Procedure: Coronary angiography;  Surgeon: Roberto Briones MD;  Location: [facility] CATH INVASIVE LOCATION;  Service: Cardiovascular;  Laterality:  N/A;   • CARDIAC CATHETERIZATION N/A 4/24/2020    Procedure: Left heart cath;  Surgeon: Roberto Briones MD;  Location: [facility] CATH INVASIVE LOCATION;  Service: Cardiovascular;  Laterality: N/A;   • CARDIAC CATHETERIZATION N/A 4/24/2020    Procedure: Left ventriculography;  Surgeon: Roberto Briones MD;  Location:  [facility] CATH INVASIVE LOCATION;  Service: Cardiovascular;  Laterality: N/A;   • COLONOSCOPY  approx 2018    normal per pt    • COLOSTOMY      had colostomy and then is was reversed   • ENDOSCOPY N/A 5/27/2020    Procedure: ESOPHAGOGASTRODUODENOSCOPY WITH BIOPSY AND BIOPSY POLYPECTOMY AND MACIEL DIALATION;  Surgeon: Preethi Beck MD;  Location:  [facility];  Service: Gastroenterology;  Laterality: N/A;  PRE: ESOPHAGEAL DYSPHAGIA  POST: Z LINE 46, ESOPHAGEAL SPASM, GASTRITIS, FUNDIC POLYP   • ESOPHAGEAL DILATATION N/A 12/7/2021    Procedure: OR ESOPHAGEAL DILATATION with maciel dilators ;  Surgeon: Jamey Leslie MD;  Location:  [facility];  Service: ENT;  Laterality: N/A;   • ESOPHAGEAL MOTILITY STUDY N/A 2/3/2022    Procedure: ESOPHAGEAL MOTILITY STUDY;  Surgeon: Harshil, Nurse Performed;  Location:  [facility];  Service: Gastroenterology;  Laterality: N/A;  dypshagia    • FOOT SURGERY Right 12/2016    Second and third hammertoe corrections   • KNEE SURGERY Right    • REVISION / TAKEDOWN COLOSTOMY  2006   • SHOULDER SURGERY      HAS HAS COMPLETE SHOULDER ON RIGHT. LEFT SCOPED AND HAD 2ND SURGERY WITH HARDWARE PLACED   • TONSILLECTOMY     • WISDOM TOOTH EXTRACTION       Family History   Problem Relation Age of Onset   • Osteoporosis Mother    • Breast cancer Neg Hx    • Ovarian cancer Neg Hx    • Colon cancer Neg Hx    • Deep vein thrombosis Neg Hx    • Pulmonary embolism Neg Hx    • Malig Hyperthermia Neg Hx        Home Medications:  Prior to Admission medications    Medication Sig Start Date End Date Taking? Authorizing Provider   Acidophilus Lactobacillus capsule Take  1 capsule by mouth 2 (Two) Times a Day. 3/22/19   Arian Peterson MD   ALPRAZolam (XANAX) 0.25 MG tablet TAKE 1 TABLET BY MOUTH TWICE DAILY AS NEEDED FOR ANXIETY 1/14/22   Arian Peterson MD   amphetamine-dextroamphetamine XR (ADDERALL XR) 20 MG 24 hr capsule Take 1 capsule by mouth Every Morning  Patient taking differently: Take 20 mg by mouth Daily As Needed   9/28/21   Arian Peterson MD   Cholecalciferol (Vitamin D3) 50 MCG (2000 UT) tablet Take 1 tablet by mouth Daily.    Provider, MD Eric   FLUoxetine (PROzac) 20 MG capsule TAKE 1 CAPSULE BY MOUTH FOUR TIMES DAILY 2/11/22   Arian Peterson MD   hydroCHLOROthiazide (HYDRODIURIL) 25 MG tablet TAKE 1 TABLET BY MOUTH DAILY 8/23/21   Arian Peterson MD   hyoscyamine (LEVSIN) 0.125 MG SL tablet DISSOLVE 1 TABLET UNDER THE TONGUE THREE TIMES DAILY BEFORE MEALS 1/24/22   Preethi Beck MD   ibuprofen (ADVIL,MOTRIN) 100 MG/5ML suspension Take 30 mL by mouth Every 6 (Six) Hours As Needed (pain). 12/7/21   Jamey Leslie MD   levothyroxine (SYNTHROID, LEVOTHROID) 75 MCG tablet TAKE 1 TABLET BY MOUTH EVERY DAY 9/27/21   Arian Peterson MD   loperamide (IMODIUM A-D) 2 MG tablet Take 1 tablet by mouth 3 (Three) Times a Day As Needed for Diarrhea. 9/1/21   Roxie Wilkinson APRN   loratadine (CLARITIN) 10 MG tablet TAKE 1 TABLET BY MOUTH DAILY 3/1/22   Arian Peterson MD   Multiple Vitamins-Minerals (V-C Forte) capsule Take 1 capsule by mouth Daily. 12/20/21   Emergency, Nurse Epic, RN   olopatadine (PATANOL) 0.1 % ophthalmic solution INSTILL 1 DROP IN BOTH EYES TWICE DAILY 11/23/21   Arian Peterson MD   pantoprazole (PROTONIX) 20 MG EC tablet TAKE 1 TABLET BY MOUTH EVERY DAY. DO NOT TAKE WITHIN 2 HOURS OF THYROID MEDICATION 8/23/21   Arian Peterson MD   propranolol (INDERAL) 40 MG tablet TAKE 1 TABLET BY MOUTH THREE TIMES DAILY  Patient taking differently: Take 40 mg by mouth Daily. ORDERED TID BUT REPORTS ONLY TAKES QD 6/1/21   Nicholas,  "Arian SANTOS MD   rosuvastatin (CRESTOR) 20 MG tablet TAKE 1 TABLET BY MOUTH DAILY 1/27/22   Arian Peterson MD   zolpidem (AMBIEN) 10 MG tablet TAKE 1 TABLET BY MOUTH AT NIGHT AS NEEDED FOR SLEEP 2/9/22   Arian Peterson MD   dilTIAZem (Cardizem) 30 MG tablet Take 1 tablet by mouth 3 (Three) Times a Day. 3/1/22 3/4/22  Preethi Beck MD   ipratropium (Atrovent) 0.06 % nasal spray 2 sprays into the nostril(s) as directed by provider 4 (Four) Times a Day. 11/22/21 3/4/22  Arian Peterson MD        Social History:   Social History     Tobacco Use   • Smoking status: Never Smoker   • Smokeless tobacco: Never Used   Vaping Use   • Vaping Use: Never used   Substance Use Topics   • Alcohol use: Not Currently     Alcohol/week: 1.0 standard drink     Types: 1 Glasses of wine per week   • Drug use: Never     Recent travel: not applicable     Review of Systems:  Review of Systems   Constitutional: Positive for diaphoresis. Negative for chills and fever.   HENT: Negative for ear discharge, hearing loss and nosebleeds.    Eyes: Negative for photophobia.   Respiratory: Positive for cough. Negative for shortness of breath.    Cardiovascular: Positive for chest pain.   Gastrointestinal: Negative for diarrhea, nausea and vomiting.   Genitourinary: Negative for dysuria.   Musculoskeletal: Negative for back pain and neck pain.   Skin: Negative for rash.   Neurological: Positive for dizziness and light-headedness. Negative for headaches.        Physical Exam:  /65   Pulse 77   Temp 97.6 °F (36.4 °C) (Oral)   Resp 15   Ht 154.9 cm (61\")   Wt 60.1 kg (132 lb 7.9 oz)   SpO2 99%   BMI 25.03 kg/m²     Physical Exam  Vitals and nursing note reviewed.   Constitutional:       General: She is not in acute distress.     Appearance: Normal appearance.   HENT:      Head: Normocephalic and atraumatic.      Nose: Nose normal.   Eyes:      General: No scleral icterus.  Cardiovascular:      Rate and Rhythm: Normal rate and regular " rhythm.      Heart sounds: Normal heart sounds.   Pulmonary:      Effort: Pulmonary effort is normal. No respiratory distress.      Breath sounds: Normal breath sounds.   Abdominal:      Palpations: Abdomen is soft.      Tenderness: There is no abdominal tenderness.   Musculoskeletal:         General: Normal range of motion.      Cervical back: Neck supple.      Right lower leg: No edema.      Left lower leg: No edema.   Skin:     General: Skin is warm and dry.   Neurological:      General: No focal deficit present.      Mental Status: She is alert and oriented to person, place, and time.      Cranial Nerves: No cranial nerve deficit.      Sensory: No sensory deficit.      Motor: No weakness.      Coordination: Coordination is intact.      Gait: Gait is intact.                Medications in the Emergency Department:  Medications   iopamidol (ISOVUE-370) 76 % injection 100 mL (60 mL Intravenous Given 3/5/22 0343)        Labs  Lab Results (last 24 hours)     ** No results found for the last 24 hours. **           Imaging:  No Radiology Exams Resulted Within Past 24 Hours    Procedures:  Procedures    EKG:    Rhythm: Sinus  Rate: 79  Intervals: Normal P wave, normal ND interval, left ventricular hypertrophy, normal axis, normal QT and QTc  ST Segment: non specific ST changes     EKG Comparison: no change when compared to 4/8/21    Interpreted by me      Progress                            Medical Decision Making:  MDM  Number of Diagnoses or Management Options  Chest pain, unspecified type  Hypokalemia  Vertigo  Diagnosis management comments: I spoke with the cardiologist on-call for Dr. Pruitt she felt that the patient can be discharged home.  The patient is to take medications as directed.  Return for worsening symptoms.  Follow-up with your doctor on Monday.       Amount and/or Complexity of Data Reviewed  Clinical lab tests: reviewed  Tests in the radiology section of CPT®: reviewed  Tests in the medicine  section of CPT®: reviewed  Decide to obtain previous medical records or to obtain history from someone other than the patient: yes  Obtain history from someone other than the patient: yes  Review and summarize past medical records: yes  Discuss the patient with other providers: yes  Independent visualization of images, tracings, or specimens: yes    Patient Progress  Patient progress: improved       Final diagnoses:   Chest pain, unspecified type   Hypokalemia   Vertigo        Disposition:  ED Disposition     ED Disposition   Discharge    Condition   Stable    Comment   --             Documentation assistance provided by Brian Hoff acting as scribe for Dr. Noel Nunes MD. Information recorded by the scribe was done at my direction and has been verified and validated by me.          Brian Hoff  03/04/22 2020       Noel Nunes DO  03/06/22 0748

## 2022-03-06 LAB
QT INTERVAL: 447 MS
QT INTERVAL: 492 MS

## 2022-03-07 ENCOUNTER — HOSPITAL ENCOUNTER (OUTPATIENT)
Dept: MAMMOGRAPHY | Facility: HOSPITAL | Age: 67
Discharge: HOME | End: 2022-03-07

## 2022-03-07 ENCOUNTER — TELEPHONE (OUTPATIENT)
Dept: SURGERY | Facility: CLINIC | Age: 67
End: 2022-03-07

## 2022-03-07 NOTE — TELEPHONE ENCOUNTER
Biopsy has been rescheduled to 3/14/2022 @ 8:30am, patient arrival 7:30am.    Called and left message with patient about appointment time, patient to call back and confirm.

## 2022-03-14 ENCOUNTER — HOSPITAL ENCOUNTER (OUTPATIENT)
Dept: MAMMOGRAPHY | Facility: HOSPITAL | Age: 67
Discharge: HOME | End: 2022-03-14
Admitting: NURSE PRACTITIONER

## 2022-03-14 VITALS
BODY MASS INDEX: 23.22 KG/M2 | SYSTOLIC BLOOD PRESSURE: 120 MMHG | WEIGHT: 123 LBS | OXYGEN SATURATION: 97 % | DIASTOLIC BLOOD PRESSURE: 69 MMHG | HEIGHT: 61 IN | TEMPERATURE: 98.2 F | RESPIRATION RATE: 18 BRPM | HEART RATE: 69 BPM

## 2022-03-14 DIAGNOSIS — R92.8 ABNORMAL FINDING ON BREAST IMAGING: ICD-10-CM

## 2022-03-14 PROCEDURE — 88341 IMHCHEM/IMCYTCHM EA ADD ANTB: CPT | Performed by: NURSE PRACTITIONER

## 2022-03-14 PROCEDURE — 88305 TISSUE EXAM BY PATHOLOGIST: CPT | Performed by: NURSE PRACTITIONER

## 2022-03-14 PROCEDURE — 88342 IMHCHEM/IMCYTCHM 1ST ANTB: CPT | Performed by: NURSE PRACTITIONER

## 2022-03-14 PROCEDURE — 0 LIDOCAINE 1 % SOLUTION: Performed by: NURSE PRACTITIONER

## 2022-03-14 RX ORDER — DIAZEPAM 5 MG/1
5 TABLET ORAL ONCE AS NEEDED
Status: DISCONTINUED | OUTPATIENT
Start: 2022-03-14 | End: 2022-03-15 | Stop reason: HOSPADM

## 2022-03-14 RX ORDER — LIDOCAINE HYDROCHLORIDE 10 MG/ML
1 INJECTION, SOLUTION INFILTRATION; PERINEURAL ONCE
Status: COMPLETED | OUTPATIENT
Start: 2022-03-14 | End: 2022-03-14

## 2022-03-14 RX ADMIN — Medication 1 ML: at 08:54

## 2022-03-14 RX ADMIN — LIDOCAINE HYDROCHLORIDE 18 ML: 10; .005 INJECTION, SOLUTION EPIDURAL; INFILTRATION; INTRACAUDAL; PERINEURAL at 08:55

## 2022-03-14 NOTE — NURSING NOTE
Biopsy site to left outer breast clear with Dermabond dry and intact. No firmness or swelling noted at or around biopsy site. Denies pain. Ice pack with protective covering applied to biopsy site. Discharge instructions discussed with understanding voiced by patient. Copies provided to patient. No distress noted. To home via private vehicle.

## 2022-03-14 NOTE — H&P
Name: Cynthia Flower ADMIT: 3/14/2022   : 1955  PCP: Arian Peterson MD    MRN: [id number] LOS: 0 days   AGE/SEX: 67 y.o. female  ROOM: Room/bed info not found       Chief complaint left breast lesion    Present Illness or Internal History:  Patient is a 67 y.o. female presents with a left breast lesion.     Past Surgical History:  Past Surgical History:   Procedure Laterality Date   • ABDOMINAL SURGERY  ,     ex lap x 2,  diverticulitis   • APPENDECTOMY     • CARDIAC CATHETERIZATION N/A 2020    Procedure: Coronary angiography;  Surgeon: Roberto Briones MD;  Location: [facility] CATH INVASIVE LOCATION;  Service: Cardiovascular;  Laterality: N/A;   • CARDIAC CATHETERIZATION N/A 2020    Procedure: Left heart cath;  Surgeon: Roberto Briones MD;  Location: [facility] CATH INVASIVE LOCATION;  Service: Cardiovascular;  Laterality: N/A;   • CARDIAC CATHETERIZATION N/A 2020    Procedure: Left ventriculography;  Surgeon: Roberto Briones MD;  Location: [facility] CATH INVASIVE LOCATION;  Service: Cardiovascular;  Laterality: N/A;   • COLONOSCOPY  approx 2018    normal per pt    • COLOSTOMY      had colostomy and then is was reversed   • ENDOSCOPY N/A 2020    Procedure: ESOPHAGOGASTRODUODENOSCOPY WITH BIOPSY AND BIOPSY POLYPECTOMY AND MACIEL DIALATION;  Surgeon: Preethi Beck MD;  Location: [facility] ENDOSCOPY;  Service: Gastroenterology;  Laterality: N/A;  PRE: ESOPHAGEAL DYSPHAGIA  POST: Z LINE 46, ESOPHAGEAL SPASM, GASTRITIS, FUNDIC POLYP   • ESOPHAGEAL DILATATION N/A 2021    Procedure: OR ESOPHAGEAL DILATATION with maciel dilators ;  Surgeon: Jamey Leslie MD;  Location: [facility];  Service: ENT;  Laterality: N/A;   • ESOPHAGEAL MOTILITY STUDY N/A 2/3/2022    Procedure: ESOPHAGEAL MOTILITY STUDY;  Surgeon: Harshil, Nurse Performed;  Location: [facility] ENDOSCOPY;  Service: Gastroenterology;  Laterality: N/A;  dypshagia    • FOOT SURGERY Right  12/2016    Second and third hammertoe corrections   • KNEE SURGERY Right    • REVISION / TAKEDOWN COLOSTOMY  2006   • SHOULDER SURGERY      HAS HAS COMPLETE SHOULDER ON RIGHT. LEFT SCOPED AND HAD 2ND SURGERY WITH HARDWARE PLACED   • TONSILLECTOMY     • WISDOM TOOTH EXTRACTION         Past Medical History:  Past Medical History:   Diagnosis Date   • Arthritis    • COVID-19 07/2020 9/7/2021   • Depression    • Diverticulitis of colon    • Diverticulosis    • GERD (gastroesophageal reflux disease)    • Hyperlipidemia    • Hypertension    • Left ventricular hypertrophy     FOLLOWED BY DR MARI. DENIES CHEST PAIN BUT STATES DOES GET SOA AT TIMES WITH EXERTION REPORTS THAT IT IS NOT A NEW ISSUE    • Oral herpes 8/18/2020   • SOB (shortness of breath)     STATES WITH EXERTION HAS BEEN ONGOING ISSUE NOT A NEW ISSUE   • Thyroid disease        Home Medications:  (Not in a hospital admission)      Allergies:  Azithromycin, Pravastatin, and Zetia [ezetimibe]    Family History:  Family History   Problem Relation Age of Onset   • Osteoporosis Mother    • Breast cancer Neg Hx    • Ovarian cancer Neg Hx    • Colon cancer Neg Hx    • Deep vein thrombosis Neg Hx    • Pulmonary embolism Neg Hx    • Malig Hyperthermia Neg Hx        Social History:  Social History     Tobacco Use   • Smoking status: Never Smoker   • Smokeless tobacco: Never Used   Vaping Use   • Vaping Use: Never used   Substance Use Topics   • Alcohol use: Not Currently     Alcohol/week: 1.0 standard drink     Types: 1 Glasses of wine per week   • Drug use: Never        Objective     Physical Exam:    No exam performed today,    Vital Signs  Temp:  [98.2 °F (36.8 °C)] 98.2 °F (36.8 °C)  Heart Rate:  [71] 71  Resp:  [18] 18  BP: (115)/(75) 115/75    Anticipated Surgical Procedure:  Stereotactic/ tomosynthesis guided left breast biopsy with clip placement    The risks, benefits and alternatives of this procedure have been discussed with the patient or  responsible party: Yes        Adam Mcbride Jr., MD  03/14/22  08:35 EDT

## 2022-03-15 LAB
LAB AP CASE REPORT: NORMAL
LAB AP DIAGNOSIS COMMENT: NORMAL
PATH REPORT.FINAL DX SPEC: NORMAL
PATH REPORT.GROSS SPEC: NORMAL

## 2022-03-16 ENCOUNTER — TELEPHONE (OUTPATIENT)
Dept: SURGERY | Facility: CLINIC | Age: 67
End: 2022-03-16

## 2022-03-16 NOTE — TELEPHONE ENCOUNTER
Patient called requesting stereo biopsy results     Wendy ordered breast bx    No chart on patient-prepared chart for MD review

## 2022-03-17 ENCOUNTER — TELEPHONE (OUTPATIENT)
Dept: SURGERY | Facility: CLINIC | Age: 67
End: 2022-03-17

## 2022-03-17 NOTE — TELEPHONE ENCOUNTER
New Patient Appointment scheduled with Wendy Roman NP on 9/19/2022 @ 10:30am, Arrive @ 10:00am    Called Pt. Patient expressed v/u of appointment    New Patient Packet sent in mail

## 2022-03-21 ENCOUNTER — TELEPHONE (OUTPATIENT)
Dept: SURGERY | Facility: CLINIC | Age: 67
End: 2022-03-21

## 2022-03-21 NOTE — PROGRESS NOTES
Please contact patient with benign biopsy results.  Routine mammogram is recommended, I will discuss further with her at her NEW patient appointment on 9/19/22, please add her to cancel list to be seen sooner if possible.  thanks

## 2022-03-21 NOTE — TELEPHONE ENCOUNTER
Patient is aware of negative results.       ----- Message from PIYUSH Catherine sent at 3/21/2022  6:18 AM EDT -----  Please contact patient with benign biopsy results.  Routine mammogram is recommended, I will discuss further with her at her NEW patient appointment on 9/19/22, please add her to cancel list to be seen sooner if possible.  thanks

## 2022-03-30 ENCOUNTER — LAB (OUTPATIENT)
Dept: LAB | Facility: HOSPITAL | Age: 67
End: 2022-03-30

## 2022-03-30 ENCOUNTER — CONSULT (OUTPATIENT)
Dept: ONCOLOGY | Facility: CLINIC | Age: 67
End: 2022-03-30

## 2022-03-30 ENCOUNTER — TELEPHONE (OUTPATIENT)
Dept: OBSTETRICS AND GYNECOLOGY | Facility: CLINIC | Age: 67
End: 2022-03-30

## 2022-03-30 VITALS
HEIGHT: 61 IN | WEIGHT: 126.5 LBS | HEART RATE: 75 BPM | BODY MASS INDEX: 23.88 KG/M2 | OXYGEN SATURATION: 95 % | DIASTOLIC BLOOD PRESSURE: 73 MMHG | TEMPERATURE: 96.8 F | SYSTOLIC BLOOD PRESSURE: 127 MMHG | RESPIRATION RATE: 16 BRPM

## 2022-03-30 DIAGNOSIS — D89.89 LIGHT CHAIN DISEASE, LAMBDA TYPE: Primary | ICD-10-CM

## 2022-03-30 DIAGNOSIS — D89.89 LIGHT CHAIN DISEASE, LAMBDA TYPE: ICD-10-CM

## 2022-03-30 LAB
ALBUMIN SERPL-MCNC: 4.3 G/DL (ref 3.5–5.2)
ALBUMIN/GLOB SERPL: 2 G/DL (ref 1.1–2.4)
ALP SERPL-CCNC: 69 U/L (ref 38–116)
ALT SERPL W P-5'-P-CCNC: 35 U/L (ref 0–33)
ANION GAP SERPL CALCULATED.3IONS-SCNC: 9.7 MMOL/L (ref 5–15)
AST SERPL-CCNC: 33 U/L (ref 0–32)
BASOPHILS # BLD AUTO: 0.03 10*3/MM3 (ref 0–0.2)
BASOPHILS NFR BLD AUTO: 0.4 % (ref 0–1.5)
BILIRUB SERPL-MCNC: 0.7 MG/DL (ref 0.2–1.2)
BUN SERPL-MCNC: 19 MG/DL (ref 6–20)
BUN/CREAT SERPL: 27.9 (ref 7.3–30)
CALCIUM SPEC-SCNC: 9.4 MG/DL (ref 8.5–10.2)
CHLORIDE SERPL-SCNC: 103 MMOL/L (ref 98–107)
CO2 SERPL-SCNC: 27.3 MMOL/L (ref 22–29)
CREAT SERPL-MCNC: 0.68 MG/DL (ref 0.6–1.1)
DEPRECATED RDW RBC AUTO: 42.7 FL (ref 37–54)
EGFRCR SERPLBLD CKD-EPI 2021: 95.6 ML/MIN/1.73
EOSINOPHIL # BLD AUTO: 0.45 10*3/MM3 (ref 0–0.4)
EOSINOPHIL NFR BLD AUTO: 5.3 % (ref 0.3–6.2)
ERYTHROCYTE [DISTWIDTH] IN BLOOD BY AUTOMATED COUNT: 13.3 % (ref 12.3–15.4)
GLOBULIN UR ELPH-MCNC: 2.2 GM/DL (ref 1.8–3.5)
GLUCOSE SERPL-MCNC: 118 MG/DL (ref 74–124)
HCT VFR BLD AUTO: 40.5 % (ref 34–46.6)
HGB BLD-MCNC: 14.1 G/DL (ref 12–15.9)
IMM GRANULOCYTES # BLD AUTO: 0.03 10*3/MM3 (ref 0–0.05)
IMM GRANULOCYTES NFR BLD AUTO: 0.4 % (ref 0–0.5)
LYMPHOCYTES # BLD AUTO: 2.82 10*3/MM3 (ref 0.7–3.1)
LYMPHOCYTES NFR BLD AUTO: 32.9 % (ref 19.6–45.3)
MCH RBC QN AUTO: 30.7 PG (ref 26.6–33)
MCHC RBC AUTO-ENTMCNC: 34.8 G/DL (ref 31.5–35.7)
MCV RBC AUTO: 88 FL (ref 79–97)
MONOCYTES # BLD AUTO: 0.55 10*3/MM3 (ref 0.1–0.9)
MONOCYTES NFR BLD AUTO: 6.4 % (ref 5–12)
NEUTROPHILS NFR BLD AUTO: 4.69 10*3/MM3 (ref 1.7–7)
NEUTROPHILS NFR BLD AUTO: 54.6 % (ref 42.7–76)
NRBC BLD AUTO-RTO: 0 /100 WBC (ref 0–0.2)
PLATELET # BLD AUTO: 225 10*3/MM3 (ref 140–450)
PMV BLD AUTO: 10.4 FL (ref 6–12)
POTASSIUM SERPL-SCNC: 4.1 MMOL/L (ref 3.5–4.7)
PROT SERPL-MCNC: 6.5 G/DL (ref 6.3–8)
RBC # BLD AUTO: 4.6 10*6/MM3 (ref 3.77–5.28)
SODIUM SERPL-SCNC: 140 MMOL/L (ref 134–145)
WBC NRBC COR # BLD: 8.57 10*3/MM3 (ref 3.4–10.8)

## 2022-03-30 PROCEDURE — 80053 COMPREHEN METABOLIC PANEL: CPT | Performed by: INTERNAL MEDICINE

## 2022-03-30 PROCEDURE — 85025 COMPLETE CBC W/AUTO DIFF WBC: CPT

## 2022-03-30 PROCEDURE — 36415 COLL VENOUS BLD VENIPUNCTURE: CPT

## 2022-03-30 PROCEDURE — 99204 OFFICE O/P NEW MOD 45 MIN: CPT | Performed by: INTERNAL MEDICINE

## 2022-03-30 RX ORDER — MOMETASONE FUROATE 1 MG/G
OINTMENT TOPICAL
COMMUNITY
Start: 2022-03-02 | End: 2022-07-25

## 2022-03-30 NOTE — PROGRESS NOTES
REFERRING PROVIDER:    Darren Pruitt MD  [id number]  [facility] [address]  [place] [address]    REASON FOR CONSULTATION:    Concern for AL amyloidosis    HISTORY OF PRESENT ILLNESS:  Cynthia Flower is a 67 y.o. female who is referred today due to concern for AL amyloidosis.    She had a stress echocardiogram on 4/24/2020 showing a normal left ventricular ejection fraction of 60% with moderate concentric hypertrophy but no wall motion abnormalities of the left ventricle.  There was impaired relaxation noted.  She complained of chest pain during the examination.  There was some ST segment depression.  Cardiac catheterization was performed on the same day.  No intervention was required.    Follow-up echocardiogram on 4/15/2021 showed a left ventricular ejection fraction of 61 to 65% with normal LV cavity size.  Left ventricular wall thickness showed moderate concentric hypertrophy.  Diastolic function was impaired.  No pericardial effusion.  Small left pleural effusion.    She had follow-up PYP imaging for cardiac amyloidosis that was not suggestive of ATTR amyloidosis.    Laboratory values on 2/24/2022 showed a high serum free light chain lambda of 121, normal kappa of 10.5, low ratio at 0.09.  Serum protein electrophoresis showed no evidence of an M spike.  Urine light chains were abnormal with an elevated lambda light chain of 33 and a low ratio of 0.48 with a 12.7 mg M spike on 24-hour urine protein electrophoresis.    BNP was elevated at 2306 on 3/4/2022.  The troponin was normal at 0.03.  CK was 212 with a CK-MB of 10.87.    No chest pain.  She notes some dyspnea on exertion and fatigue.  She notes some lightheadedness at times.  She does have some esophageal dysmotility.  She denies any issues with an enlarged tongue.  No history of blood disorders.      Past Medical History:   Diagnosis Date   • Anxiety    • Arthritis    • COVID-19 07/2020 9/7/2021   • Depression    • Diverticulitis of colon    •  Diverticulosis    • GERD (gastroesophageal reflux disease)    • History of Clostridioides difficile infection 2008    HAS HAD SEVERAL TIMES IN PAST PER PT (SAW INFECTIOUS DISEASE MD FOR THIS S/P COLOSTOMY/EXTENSIVE ANBX THERAPY)   • History of prediabetes    • History of snoring    • History of stress incontinence    • Hypercholesterolemia    • Hyperlipidemia    • Hypertension    • Left ventricular hypertrophy     FOLLOWED BY DR MARI. DENIES CHEST PAIN BUT STATES DOES GET SOA AT TIMES WITH EXERTION REPORTS THAT IT IS NOT A NEW ISSUE    • Oral herpes 08/18/2020   • Seasonal allergies     used to have allergy shots in the past   • SOB (shortness of breath)     STATES WITH EXERTION HAS BEEN ONGOING ISSUE NOT A NEW ISSUE   • Thyroid disease     Hypothyroid       Past Surgical History:   Procedure Laterality Date   • ABDOMINAL SURGERY  2005, 2014    ex lap x 2,  diverticulitis   • ABDOMINOPLASTY  2016   • ADENOIDECTOMY     • APPENDECTOMY     • CARDIAC CATHETERIZATION N/A 04/24/2020    Procedure: Coronary angiography;  Surgeon: Roberto Briones MD;  Location: [facility] INVASIVE LOCATION;  Service: Cardiovascular;  Laterality: N/A;   • CARDIAC CATHETERIZATION N/A 04/24/2020    Procedure: Left heart cath;  Surgeon: Roberto Briones MD;  Location: [facility] INVASIVE LOCATION;  Service: Cardiovascular;  Laterality: N/A;   • CARDIAC CATHETERIZATION N/A 04/24/2020    Procedure: Left ventriculography;  Surgeon: Roberto Briones MD;  Location: [facility] INVASIVE LOCATION;  Service: Cardiovascular;  Laterality: N/A;   • COLONOSCOPY  approx 2018    normal per pt    • COLOSTOMY  2005    had colostomy and then is was reversed   • COLOSTOMY CLOSURE  2006   • CORRECTION HAMMER TOE Right 2017   • ECTOPIC PREGNANCY SURGERY      1979, 1980   • ENDOSCOPY N/A 05/27/2020    Procedure: ESOPHAGOGASTRODUODENOSCOPY WITH BIOPSY AND BIOPSY POLYPECTOMY AND MACIEL DIALATION;  Surgeon: Preethi Beck MD;  Location:   [facility];  Service: Gastroenterology;  Laterality: N/A;  PRE: ESOPHAGEAL DYSPHAGIA  POST: Z LINE 46, ESOPHAGEAL SPASM, GASTRITIS, FUNDIC POLYP   • ESOPHAGEAL DILATATION N/A 12/07/2021    Procedure: OR ESOPHAGEAL DILATATION with pacheco dilators ;  Surgeon: Jamey Leslie MD;  Location:  [facility];  Service: ENT;  Laterality: N/A;   • ESOPHAGEAL MOTILITY STUDY N/A 02/03/2022    Procedure: ESOPHAGEAL MOTILITY STUDY;  Surgeon: Harshil, Nurse Performed;  Location:  [facility];  Service: Gastroenterology;  Laterality: N/A;  dypshagia    • FOOT SURGERY Right 12/2016    Second and third hammertoe corrections   • KNEE ARTHROSCOPY Right 2009   • KNEE SURGERY Right    • REVISION / TAKEDOWN COLOSTOMY  2006   • SHOULDER ARTHROSCOPY Right 2018    right shoulder arthroscopy with extensive rotator cuff, biceps and labral debridement, chondroplasty, & subacromial decompression with acromioplasty   • SHOULDER SURGERY      HAS HAS COMPLETE SHOULDER ON RIGHT. LEFT SCOPED AND HAD 2ND SURGERY WITH HARDWARE PLACED   • SHOULDER SURGERY Left     2012. 2013   • TONSILLECTOMY     • TOTAL SHOULDER REVERSE ARTHROPLASTY Right 2019   • WISDOM TOOTH EXTRACTION         SOCIAL HISTORY:   reports that she has never smoked. She has never used smokeless tobacco. She reports previous alcohol use of about 1.0 standard drink of alcohol per week. She reports that she does not use drugs.    FAMILY HISTORY:  family history includes Osteoporosis in her mother.    ALLERGIES:  Allergies   Allergen Reactions   • Azithromycin Rash   • Pravastatin Rash   • Zetia [Ezetimibe] Myalgia     cramps       MEDICATIONS:  The medication list has been reviewed with the patient by the medical assistant, and the list has been updated in the electronic medical record, which I reviewed.  Medication dosages and frequencies were confirmed to be accurate.    Review of Systems   Constitutional: Positive for activity change.   HENT: Positive for trouble  "swallowing.    Respiratory: Positive for shortness of breath.    Neurological: Positive for light-headedness.   All other systems reviewed and are negative.      Vitals:    03/30/22 1404   BP: 127/73   Pulse: 75   Resp: 16   Temp: 96.8 °F (36 °C)   TempSrc: Temporal   SpO2: 95%   Weight: 57.4 kg (126 lb 8 oz)   Height: 155 cm (61.02\")   PainSc: 0-No pain  Comment: Concern for AL amyloidosis       Physical Exam  Vitals reviewed.   Constitutional:       Appearance: She is well-developed.   HENT:      Head: Normocephalic and atraumatic.      Nose: Nose normal.   Eyes:      Conjunctiva/sclera: Conjunctivae normal.      Pupils: Pupils are equal, round, and reactive to light.   Cardiovascular:      Rate and Rhythm: Normal rate and regular rhythm.      Heart sounds: S1 normal and S2 normal. Murmur heard.    Systolic murmur is present with a grade of 3/6.    No friction rub. No gallop.   Pulmonary:      Effort: Pulmonary effort is normal. No respiratory distress.      Breath sounds: Normal breath sounds. No stridor. No wheezing, rhonchi or rales.   Chest:      Chest wall: No tenderness.   Breasts:      Right: No supraclavicular adenopathy.      Left: No supraclavicular adenopathy.       Abdominal:      General: Bowel sounds are normal. There is no distension.      Palpations: Abdomen is soft. There is no mass.      Tenderness: There is no abdominal tenderness. There is no guarding or rebound.   Musculoskeletal:         General: Normal range of motion.      Cervical back: Neck supple.   Lymphadenopathy:      Cervical: No cervical adenopathy.      Upper Body:      Right upper body: No supraclavicular adenopathy.      Left upper body: No supraclavicular adenopathy.   Skin:     General: Skin is warm and dry.      Findings: No erythema or rash.   Neurological:      Mental Status: She is alert and oriented to person, place, and time.      Cranial Nerves: No cranial nerve deficit.      Sensory: No sensory deficit.   Psychiatric:   "       Behavior: Behavior normal.         Thought Content: Thought content normal.         Judgment: Judgment normal.         DIAGNOSTIC DATA:  CBC & Differential (03/30/2022 14:22)      IMAGING:    None reviewed    ASSESSMENT:  This is a 67 y.o. female with:    *Concern for amyloidosis  · She had a stress echocardiogram on 4/24/2020 showing a normal left ventricular ejection fraction of 60% with moderate concentric hypertrophy but no wall motion abnormalities of the left ventricle.  There was impaired relaxation noted.  She complained of chest pain during the examination.  There was some ST segment depression.  Cardiac catheterization was performed on the same day.  No intervention was required.  · Follow-up echocardiogram on 4/15/2021 showed a left ventricular ejection fraction of 61 to 65% with normal LV cavity size.  Left ventricular wall thickness showed moderate concentric hypertrophy.  Diastolic function was impaired.  No pericardial effusion.  Small left pleural effusion.  · She had follow-up [facility] imaging for cardiac amyloidosis that was not suggestive of ATTR amyloidosis  · Laboratory values on 2/24/2022 showed a high serum free light chain lambda of 121, normal kappa of 10.5, low ratio at 0.09.  Serum protein electrophoresis showed no evidence of an M spike.  Urine light chains were abnormal with an elevated lambda light chain of 33 and a low ratio of 0.48 with a 12.7 mg M spike on 24-hour urine protein electrophoresis.  · BNP was elevated at 2306 on 3/4/2022.  The troponin was normal at 0.03.  CK was 212 with a CK-MB of 10.87.    PLAN:   1. Repeat labs today  2. Bone marrow aspiration and biopsy  3. May need a fat pad biopsy.  We will see what the bone marrow biopsy shows first.  Ultimately, may need myocardial biopsy.  4. Follow-up with me after the bone marrow biopsy for review    ORDERS PLACED DURING THIS ENCOUNTER  Orders Placed This Encounter   Procedures   • CT Guided Bone Marrow Biopsy And Aspiration      Standing Status:   Future     Standing Expiration Date:   3/30/2023     Order Specific Question:   Reason for Exam:     Answer:   eval for amyloidosis, abnormal light chains, concern for cardiac amyloid     Order Specific Question:   Release to patient     Answer:   Immediate   • Comprehensive Metabolic Panel     Order Specific Question:   Release to patient     Answer:   Immediate   • JUSTIN,PE and FLC, Serum     Order Specific Question:   Release to patient     Answer:   Immediate   • CBC & Differential     Standing Status:   Future     Number of Occurrences:   1     Standing Expiration Date:   3/25/2023     Order Specific Question:   Manual Differential     Answer:   No     Order Specific Question:   Release to patient     Answer:   Immediate

## 2022-03-30 NOTE — TELEPHONE ENCOUNTER
I have tried to call her to ask if she wants to cancel the MRI since her biopsy was normal, but I have not been able to reach her. Can you call her and see if she is okay cancelling MRI for now until she sees the breast specialist in the fall?   Thanks. Dr GUTIERREZ

## 2022-03-31 ENCOUNTER — TELEPHONE (OUTPATIENT)
Dept: ONCOLOGY | Facility: CLINIC | Age: 67
End: 2022-03-31

## 2022-03-31 LAB
ALBUMIN SERPL ELPH-MCNC: 3.7 G/DL (ref 2.9–4.4)
ALBUMIN/GLOB SERPL: 1.8 {RATIO} (ref 0.7–1.7)
ALPHA1 GLOB SERPL ELPH-MCNC: 0.2 G/DL (ref 0–0.4)
ALPHA2 GLOB SERPL ELPH-MCNC: 0.8 G/DL (ref 0.4–1)
B-GLOBULIN SERPL ELPH-MCNC: 0.7 G/DL (ref 0.7–1.3)
GAMMA GLOB SERPL ELPH-MCNC: 0.4 G/DL (ref 0.4–1.8)
GLOBULIN SER-MCNC: 2.1 G/DL (ref 2.2–3.9)
IGA SERPL-MCNC: 42 MG/DL (ref 87–352)
IGG SERPL-MCNC: 456 MG/DL (ref 586–1602)
IGM SERPL-MCNC: 68 MG/DL (ref 26–217)
INTERPRETATION SERPL IEP-IMP: ABNORMAL
KAPPA LC FREE SER-MCNC: 8.9 MG/L (ref 3.3–19.4)
KAPPA LC FREE/LAMBDA FREE SER: 0.08 {RATIO} (ref 0.26–1.65)
LABORATORY COMMENT REPORT: ABNORMAL
LAMBDA LC FREE SERPL-MCNC: 114.8 MG/L (ref 5.7–26.3)
M PROTEIN SERPL ELPH-MCNC: ABNORMAL G/DL
PROT SERPL-MCNC: 5.8 G/DL (ref 6–8.5)

## 2022-03-31 NOTE — TELEPHONE ENCOUNTER
Returned call to patient to let her know her CBC was normal and her CMP had elevated LFT's which she has had in the past, but they have improved slightly.  I explained that the other lab tests that were done are not back yet and may not be back till Monday.  She would like a call regarding those labs once they are back.

## 2022-03-31 NOTE — TELEPHONE ENCOUNTER
Caller: Cynthia Flower    Relationship: Self    Best call back number: [phone]    What is the best time to reach you: ANY TIME     Who are you requesting to speak with (clinical staff, provider,  specific staff member): CLINICAL    What was the call regarding: PATIENT WANTED TO KNOW LAB RESULTS    Do you require a callback: YES

## 2022-04-01 ENCOUNTER — TELEPHONE (OUTPATIENT)
Dept: ONCOLOGY | Facility: CLINIC | Age: 67
End: 2022-04-01

## 2022-04-01 NOTE — TELEPHONE ENCOUNTER
Caller: Cynthia Flower    Relationship: Self    Best call back number: [phone]  CAN CALL ANYTIME AND MAY LEAVE VM IF NEEDED.     Who are you requesting to speak with (clinical staff, provider,  specific staff member): CLINICAL STAFF.    What was the call regarding: PATIENT WOULD LIKE TO HAVE SOMEONE FROM THE CLINICAL STAFF TO CALL BACK TO REVIEW LAB RESULTS.    Do you require a callback: YES ASAP.            DJC/CRISTHIAN

## 2022-04-01 NOTE — TELEPHONE ENCOUNTER
Reviewed labs with pt. Pt will have bone marrow biopsy and follow up with Dr. Sal as scheduled. Pt V/U.

## 2022-04-04 ENCOUNTER — TELEPHONE (OUTPATIENT)
Dept: ONCOLOGY | Facility: CLINIC | Age: 67
End: 2022-04-04

## 2022-04-04 DIAGNOSIS — F98.8 ATTENTION DEFICIT DISORDER, UNSPECIFIED HYPERACTIVITY PRESENCE: ICD-10-CM

## 2022-04-04 NOTE — TELEPHONE ENCOUNTER
Caller: Cynthia Flower    Relationship: Self    Best call back number: [phone]    Requested Prescriptions:   Requested Prescriptions     Pending Prescriptions Disp Refills   • amphetamine-dextroamphetamine XR (ADDERALL XR) 20 MG 24 hr capsule 30 capsule 0     Sig: Take 1 capsule by mouth Every Morning        Pharmacy where request should be sent: [id number] - [place], [place] - [address] AT [facility] OF [address] & [place] - [phone] [facility] [phone] FX     Does the patient have less than a 3 day supply:  [] Yes  [] No    Ellis Hand Rep   04/04/22 09:59 EDT

## 2022-04-04 NOTE — TELEPHONE ENCOUNTER
Pts C/O bilateral calf pain and wrist pain for a while. She cant remember when. It wakes her up at night. She describes it as burning pain. D/W Martha Gaytan NP. Per Martha Pt will try NSAIDS and report to ED if pain is to much. She will call if this does not help. Pt V/U.

## 2022-04-04 NOTE — TELEPHONE ENCOUNTER
Caller: Cynthia Flower    Relationship: Self    Best call back number: [phone]    What is the best time to reach you: ANYTIME    Who are you requesting to speak with (clinical staff, provider,  specific staff member): DR VILLANUEVA OR NURSE    What was the call regarding: PT HAS A FEW MORE QUESTIONS REGARDING HER MOST RECENT LAB RESULTS. PLEASE CALL TO ADVISE.     Do you require a callback: YES

## 2022-04-05 NOTE — TELEPHONE ENCOUNTER
Rx Refill Note  Requested Prescriptions     Pending Prescriptions Disp Refills   • amphetamine-dextroamphetamine XR (ADDERALL XR) 20 MG 24 hr capsule 30 capsule 0     Sig: Take 1 capsule by mouth Every Morning      Last office visit with prescribing clinician: 10/13/2021      Next office visit with prescribing clinician: Visit date not found            Lili Arrington MA  04/05/22, 08:32 EDT

## 2022-04-06 NOTE — TELEPHONE ENCOUNTER
CALLED PT AND [facility] PER PT NEEDS TO SCHEDULE APPT FOR MED CHECK FOR MORE SCRIPT REFILLS    HUB TO READ:     PT NEEDS TO SCHEDULE APPT FOR MED CHECK FOR MORE SCRIPT REFILLS

## 2022-04-11 RX ORDER — DEXTROAMPHETAMINE SACCHARATE, AMPHETAMINE ASPARTATE MONOHYDRATE, DEXTROAMPHETAMINE SULFATE AND AMPHETAMINE SULFATE 5; 5; 5; 5 MG/1; MG/1; MG/1; MG/1
20 CAPSULE, EXTENDED RELEASE ORAL EVERY MORNING
Qty: 30 CAPSULE | Refills: 0 | Status: SHIPPED | OUTPATIENT
Start: 2022-04-11 | End: 2022-08-18

## 2022-04-11 NOTE — TELEPHONE ENCOUNTER
Rx Refill Note  Requested Prescriptions     Pending Prescriptions Disp Refills   • amphetamine-dextroamphetamine XR (ADDERALL XR) 20 MG 24 hr capsule 30 capsule 0     Sig: Take 1 capsule by mouth Every Morning      Last office visit with prescribing clinician: 10/13/2021      Next office visit with prescribing clinician: 5/16/2022            Yarely Espino MA  04/11/22, 14:23 EDT

## 2022-04-13 ENCOUNTER — HOSPITAL ENCOUNTER (OUTPATIENT)
Dept: CT IMAGING | Facility: HOSPITAL | Age: 67
Discharge: HOME | End: 2022-04-13
Admitting: INTERNAL MEDICINE

## 2022-04-13 VITALS
HEART RATE: 66 BPM | WEIGHT: 124 LBS | OXYGEN SATURATION: 93 % | TEMPERATURE: 98 F | BODY MASS INDEX: 23.41 KG/M2 | DIASTOLIC BLOOD PRESSURE: 49 MMHG | RESPIRATION RATE: 18 BRPM | HEIGHT: 61 IN | SYSTOLIC BLOOD PRESSURE: 106 MMHG

## 2022-04-13 DIAGNOSIS — D89.89 LIGHT CHAIN DISEASE, LAMBDA TYPE: ICD-10-CM

## 2022-04-13 LAB
BASOPHILS # BLD AUTO: 0.02 10*3/MM3 (ref 0–0.2)
BASOPHILS NFR BLD AUTO: 0.3 % (ref 0–1.5)
DEPRECATED RDW RBC AUTO: 41.6 FL (ref 37–54)
EOSINOPHIL # BLD AUTO: 0.36 10*3/MM3 (ref 0–0.4)
EOSINOPHIL NFR BLD AUTO: 5 % (ref 0.3–6.2)
ERYTHROCYTE [DISTWIDTH] IN BLOOD BY AUTOMATED COUNT: 13.1 % (ref 12.3–15.4)
HCT VFR BLD AUTO: 40.7 % (ref 34–46.6)
HGB BLD-MCNC: 14.1 G/DL (ref 12–15.9)
IMM GRANULOCYTES # BLD AUTO: 0.02 10*3/MM3 (ref 0–0.05)
IMM GRANULOCYTES NFR BLD AUTO: 0.3 % (ref 0–0.5)
INR PPP: 1 (ref 0.8–1.2)
LYMPHOCYTES # BLD AUTO: 3.21 10*3/MM3 (ref 0.7–3.1)
LYMPHOCYTES NFR BLD AUTO: 44.6 % (ref 19.6–45.3)
MCH RBC QN AUTO: 30.4 PG (ref 26.6–33)
MCHC RBC AUTO-ENTMCNC: 34.6 G/DL (ref 31.5–35.7)
MCV RBC AUTO: 87.7 FL (ref 79–97)
MONOCYTES # BLD AUTO: 0.51 10*3/MM3 (ref 0.1–0.9)
MONOCYTES NFR BLD AUTO: 7.1 % (ref 5–12)
NEUTROPHILS NFR BLD AUTO: 3.07 10*3/MM3 (ref 1.7–7)
NEUTROPHILS NFR BLD AUTO: 42.7 % (ref 42.7–76)
NRBC BLD AUTO-RTO: 0 /100 WBC (ref 0–0.2)
PLATELET # BLD AUTO: 244 10*3/MM3 (ref 140–450)
PMV BLD AUTO: 10.4 FL (ref 6–12)
PROTHROMBIN TIME: 12.5 SECONDS (ref 12.8–15.2)
RBC # BLD AUTO: 4.64 10*6/MM3 (ref 3.77–5.28)
WBC NRBC COR # BLD: 7.19 10*3/MM3 (ref 3.4–10.8)

## 2022-04-13 PROCEDURE — 88184 FLOWCYTOMETRY/ TC 1 MARKER: CPT

## 2022-04-13 PROCEDURE — 25010000002 FENTANYL CITRATE (PF) 50 MCG/ML SOLUTION: Performed by: RADIOLOGY

## 2022-04-13 PROCEDURE — 88305 TISSUE EXAM BY PATHOLOGIST: CPT | Performed by: INTERNAL MEDICINE

## 2022-04-13 PROCEDURE — 85610 PROTHROMBIN TIME: CPT

## 2022-04-13 PROCEDURE — 88323 CONSLTJ&REPRT MATRL PREP SLD: CPT

## 2022-04-13 PROCEDURE — 88368 INSITU HYBRIDIZATION MANUAL: CPT

## 2022-04-13 PROCEDURE — 85025 COMPLETE CBC W/AUTO DIFF WBC: CPT | Performed by: RADIOLOGY

## 2022-04-13 PROCEDURE — 88341 IMHCHEM/IMCYTCHM EA ADD ANTB: CPT

## 2022-04-13 PROCEDURE — 88264 CHROMOSOME ANALYSIS 20-25: CPT

## 2022-04-13 PROCEDURE — 77012 CT SCAN FOR NEEDLE BIOPSY: CPT

## 2022-04-13 PROCEDURE — 88182 CELL MARKER STUDY: CPT

## 2022-04-13 PROCEDURE — 88185 FLOWCYTOMETRY/TC ADD-ON: CPT

## 2022-04-13 PROCEDURE — 88313 SPECIAL STAINS GROUP 2: CPT | Performed by: INTERNAL MEDICINE

## 2022-04-13 PROCEDURE — 88313 SPECIAL STAINS GROUP 2: CPT

## 2022-04-13 PROCEDURE — 88237 TISSUE CULTURE BONE MARROW: CPT

## 2022-04-13 PROCEDURE — 88311 DECALCIFY TISSUE: CPT | Performed by: INTERNAL MEDICINE

## 2022-04-13 PROCEDURE — 25010000002 MIDAZOLAM PER 1 MG: Performed by: RADIOLOGY

## 2022-04-13 PROCEDURE — 88341 IMHCHEM/IMCYTCHM EA ADD ANTB: CPT | Performed by: INTERNAL MEDICINE

## 2022-04-13 PROCEDURE — 0 LIDOCAINE 1 % SOLUTION: Performed by: RADIOLOGY

## 2022-04-13 PROCEDURE — 88342 IMHCHEM/IMCYTCHM 1ST ANTB: CPT | Performed by: INTERNAL MEDICINE

## 2022-04-13 PROCEDURE — 88369 M/PHMTRC ALYSISHQUANT/SEMIQ: CPT

## 2022-04-13 PROCEDURE — 88377 M/PHMTRC ALYS ISHQUANT/SEMIQ: CPT

## 2022-04-13 PROCEDURE — 88300 SURGICAL PATH GROSS: CPT | Performed by: INTERNAL MEDICINE

## 2022-04-13 RX ORDER — LIDOCAINE HYDROCHLORIDE 10 MG/ML
20 INJECTION, SOLUTION INFILTRATION; PERINEURAL ONCE
Status: COMPLETED | OUTPATIENT
Start: 2022-04-13 | End: 2022-04-13

## 2022-04-13 RX ORDER — SODIUM CHLORIDE 9 MG/ML
INJECTION, SOLUTION INTRAVENOUS
Status: COMPLETED | OUTPATIENT
Start: 2022-04-13 | End: 2022-04-13

## 2022-04-13 RX ORDER — MOMETASONE FUROATE 1 MG/G
CREAM TOPICAL
COMMUNITY
Start: 2022-03-02 | End: 2022-05-04

## 2022-04-13 RX ORDER — MIDAZOLAM HYDROCHLORIDE 1 MG/ML
INJECTION INTRAMUSCULAR; INTRAVENOUS
Status: COMPLETED | OUTPATIENT
Start: 2022-04-13 | End: 2022-04-13

## 2022-04-13 RX ORDER — FENTANYL CITRATE 50 UG/ML
INJECTION, SOLUTION INTRAMUSCULAR; INTRAVENOUS
Status: COMPLETED | OUTPATIENT
Start: 2022-04-13 | End: 2022-04-13

## 2022-04-13 RX ADMIN — SODIUM CHLORIDE 25 ML/HR: 9 INJECTION, SOLUTION INTRAVENOUS at 10:12

## 2022-04-13 RX ADMIN — MIDAZOLAM 1 MG: 1 INJECTION INTRAMUSCULAR; INTRAVENOUS at 10:23

## 2022-04-13 RX ADMIN — LIDOCAINE HYDROCHLORIDE 20 ML: 10 INJECTION, SOLUTION INFILTRATION; PERINEURAL at 10:25

## 2022-04-13 RX ADMIN — FENTANYL CITRATE 50 MCG: 0.05 INJECTION, SOLUTION INTRAMUSCULAR; INTRAVENOUS at 10:23

## 2022-04-13 NOTE — POST-PROCEDURE NOTE
POST PROCEDURE NOTE    Procedure: bone marrow bx    Pre-Procedure Diagnosis: eval for amyloidosis    Post-procedure Diagnosis: same    Findings: successful ct guided bone marrow bx    Complications: no immediate    Blood loss: none    Specimen Removed: bone marrow aspirate and core    Disposition:   Expected discharge home

## 2022-04-13 NOTE — NURSING NOTE
Patient arrived in xray triage for a bone marrow biopsy. Protective goggles and mask in place with all patient interactions today.

## 2022-04-13 NOTE — NURSING NOTE
Patient taken out to car via wheelchair and RN assist. Patient's friend picked up. No issues or concerns noted.

## 2022-04-13 NOTE — DISCHARGE INSTRUCTIONS
EDUCATION /DISCHARGE INSTRUCTIONS  CT/US guided biopsy:  A biopsy is a procedure done to remove tissue for further analysis.  Before images are taken to locate the target area.  Images can be obtained using ultrasound, CT or MRI.  A physician will clean your skin with antiseptic soap, place a sterile towel around the site and administer a local anesthetic to numb the area.  The physician will then insert a special needle.  Sometimes images are taken of the needle after it is inserted to ensure the needle is in the correct area to be biopsied.   A sample is obtained and sent to the laboratory for study.  Occasionally the laboratory is unable to make a diagnosis from the sample and the procedure may need to be repeated.  Within a week the radiologist will send a report to your physician.  A pathologist will also examine the tissue and send a report.    Risks of the procedure include but are not limited to:   *  Bleeding    *  Infection   *  Puncture of surrounding organs *  Death     *  Lung collapse if the biopsy is near the chest which may require insertion of a      chest tube to re-inflate the lung if severe.    Benefits of the procedure:  Using x-ray helps to locate the area that requires a biopsy. The procedure is less invasive than a surgical procedure, there are no large incisions and it does not require anesthesia.    Alternatives to the procedure:  A biopsy can be performed surgically.  Risks of a surgical biopsy include exposure to anesthesia, infection, excessive bleeding and injury to abdominal organs.  A benefit of surgical biopsy is the ability to see the area to be biopsied and remove of a larger piece of tissue.    THIS EDUCATION INFORMATION WAS REVIEWED PRIOR TO PROCEDURE AND CONSENT. Patient initials__________________Time_____0908______________    Post Procedure:    *  Expect the biopsy site may be tender up to one week.    *  Rest today (no pushing pulling or straining).   *  Slowly increase  activity tomorrow.    *  If you received sedation do not drive for 24 hours.   *  Keep dressing clean and dry.   *  Leave dressing on puncture site for 24 hours.    *  You may shower when dressing removed.  Call your doctor if experiencing:   *  Signs of infection such as redness, swelling, excessive pain and / or foul        smelling drainage from the puncture site.   *  Chills or fever over 101 degrees (by mouth).   *  Unrelieved pain.   *  Any new or severe symptoms.   *  If experiencing sudden / severe shortness of breath or chest pain go to the       nearest emergency room.   Following the procedure:     Follow-up with the ordering physician as directed.    Continue to take other medications as directed by your physician unless    otherwise instructed.   If applicable, resume taking your blood thinners or Aspirin on __4/13/22_________.    If you have any concerns please call the Radiology Nurses Desk at [phone].  You are the most important factor in your recovery.  Follow the above instructions carefully.    Moderate Sedation  Sedation is the use of medicines to promote relaxation and relieve discomfort and anxiety during your procedure. Moderate sedation is a type of mild sedation, it is not anesthesia. When receiving moderate sedation you are less alert than normal, but you are still able to respond to instructions, touch, or both.    What are the risks?  Generally, this is a safe procedure. However, problems may occur, including but not limited to:  Getting too much medicine (over sedation).  Nausea or vomiting  Allergic reaction to medicines.  Trouble breathing. If this happens, a breathing tube may be used to help with breathing. It will be removed when you are awake and breathing on your own.    What happens prior to the procedure?  An IV catheter will be inserted into one of your veins.  Your nurse will do a full work up including reviewing your medications, allergies, medical history, who is  taking your home and other pertinent information.  Medicine to help you relax will be given through the IV tubing.  The registered nurse and physician will monitor you closely during the entire procedure.    What happens after the procedure?  Your blood pressure, heart rate, breathing rate, and blood oxygen level will be monitored often until the medicines you were given have worn off.  Do not drive for 24 hours.  If you are an inpatient, you will return to your room where your nurse will monitor you appropriately.        I read, or had read to me, this education sheet.        __________________________________________________________      ______4/13/22 0908________________________________  Patient/Person authorized to sign for patient                                                    Date/Time          ___________________________________________________________     _______4/13/22 0908_______________________________       Witness signature                                                                                              Date/Time    Discharge Instructions after receiving Moderate Sedation  These instructions provide you with information about caring for yourself after your procedure. Your health care provider may also give you more specific instructions. Your treatment has been planned according to current medical practices, but problems sometimes occur. Call your physican or the Radiology Nurses ([phone]) if you have any problems or questions after your procedure.    What can I expect after the procedure?  After your procedure, you may:  Feel sleepy or light headed for several hours.  Feel clumsy, dizzy or have poor balance for several hours.  Feel forgetful about what happened after the procedure.  Have poor judgment for several hours.  Feel nauseous or vomit.    For at least 24 hours after the procedure:  Have a responsible adult stay with you or frequently check on you until you are awake and  alert.   Rest as needed.    Do not:  Participate in activities in which you could fall or become injured.  Drive or operate heavy machinery  Take sleeping pills or medicines that cause drowsiness.  Make important decisions or sign legal documents.  Take care of children on your own.    Eating and drinking: Clear liquids then progress slowly to a normal diet.  If you vomit, drink water, juice, or soup when you can drink without vomiting.  Make sure you have little or no nausea before eating solid foods.    General instructions: Take over-the-counter and prescription medicines only as told by your health care provider .If you have sleep apnea, surgery and certain medicines can increase your risk for breathing problems. Follow instructions from your health care provider about wearing your sleep device: If you smoke, do not smoke without supervision.  Keep all follow-up visits as told by your health care provider. This is important.    Contact your physician if:  You continue to keep feeling nauseous or you keep vomiting. You continue to feel light-headed, develop a fever or a rash.  Get help right away if you have trouble breathing    I acknowledge the above instructions:    Patient/ Responsible person _________________________________Date ___4/13/22_________Time ____0908________    Witnessed by____________________________________________ Date____4/13/22_________ Time_____0908_______

## 2022-04-14 ENCOUNTER — TELEPHONE (OUTPATIENT)
Dept: INTERVENTIONAL RADIOLOGY/VASCULAR | Facility: HOSPITAL | Age: 67
End: 2022-04-14

## 2022-04-14 DIAGNOSIS — F51.01 PRIMARY INSOMNIA: ICD-10-CM

## 2022-04-14 RX ORDER — SODIUM CHLORIDE 9 MG/ML
25 INJECTION, SOLUTION INTRAVENOUS ONCE
Status: DISCONTINUED | OUTPATIENT
Start: 2022-04-14 | End: 2022-08-19

## 2022-04-14 RX ORDER — SODIUM CHLORIDE 0.9 % (FLUSH) 0.9 %
3 SYRINGE (ML) INJECTION EVERY 12 HOURS SCHEDULED
Status: DISCONTINUED | OUTPATIENT
Start: 2022-04-14 | End: 2022-08-19

## 2022-04-14 RX ORDER — SODIUM CHLORIDE 0.9 % (FLUSH) 0.9 %
10 SYRINGE (ML) INJECTION AS NEEDED
Status: DISCONTINUED | OUTPATIENT
Start: 2022-04-14 | End: 2022-08-19

## 2022-04-14 NOTE — TELEPHONE ENCOUNTER
Rx Refill Note  Requested Prescriptions     Pending Prescriptions Disp Refills   • zolpidem (AMBIEN) 10 MG tablet 30 tablet 1     Sig: Take 1 tablet by mouth At Night As Needed for Sleep.      Last office visit with prescribing clinician: 10/13/2021      Next office visit with prescribing clinician: 5/16/2022            Yarely Espino MA  04/14/22, 15:27 EDT

## 2022-04-14 NOTE — TELEPHONE ENCOUNTER
Caller: Cynthia Flower    Relationship: Self    Best call back number: [phone]    Requested Prescriptions:   Requested Prescriptions     Pending Prescriptions Disp Refills   • zolpidem (AMBIEN) 10 MG tablet 30 tablet 1     Sig: Take 1 tablet by mouth At Night As Needed for Sleep.        Pharmacy where request should be sent: [facility] #[id number] - [place], [place] - [address] [facility] OF [address] & [place] - [phone] [facility] [phone] FX       Does the patient have less than a 3 day supply:  [x] Yes  [] No    Ellis Machado Rep   04/14/22 11:10 EDT

## 2022-04-15 RX ORDER — ZOLPIDEM TARTRATE 10 MG/1
10 TABLET ORAL NIGHTLY PRN
Qty: 30 TABLET | Refills: 1 | Status: SHIPPED | OUTPATIENT
Start: 2022-04-15 | End: 2022-06-16

## 2022-04-18 ENCOUNTER — OFFICE VISIT (OUTPATIENT)
Dept: GASTROENTEROLOGY | Facility: CLINIC | Age: 67
End: 2022-04-18

## 2022-04-18 VITALS
TEMPERATURE: 97.2 F | HEIGHT: 61 IN | DIASTOLIC BLOOD PRESSURE: 68 MMHG | BODY MASS INDEX: 23.22 KG/M2 | WEIGHT: 123 LBS | SYSTOLIC BLOOD PRESSURE: 110 MMHG

## 2022-04-18 DIAGNOSIS — K59.1 FUNCTIONAL DIARRHEA: Chronic | ICD-10-CM

## 2022-04-18 DIAGNOSIS — R74.01 ELEVATED TRANSAMINASE LEVEL: Chronic | ICD-10-CM

## 2022-04-18 DIAGNOSIS — R63.4 WEIGHT LOSS, ABNORMAL: Chronic | ICD-10-CM

## 2022-04-18 DIAGNOSIS — K20.0 ESOPHAGITIS, EOSINOPHILIC: Chronic | ICD-10-CM

## 2022-04-18 DIAGNOSIS — R13.14 PHARYNGOESOPHAGEAL DYSPHAGIA: Primary | Chronic | ICD-10-CM

## 2022-04-18 DIAGNOSIS — K76.0 NAFLD (NONALCOHOLIC FATTY LIVER DISEASE): Chronic | ICD-10-CM

## 2022-04-18 PROCEDURE — 99214 OFFICE O/P EST MOD 30 MIN: CPT | Performed by: INTERNAL MEDICINE

## 2022-04-18 RX ORDER — MONTELUKAST SODIUM 10 MG/1
10 TABLET ORAL
Status: ON HOLD | COMMUNITY
Start: 2022-04-06 | End: 2022-08-19

## 2022-04-18 RX ORDER — FLUTICASONE PROPIONATE 50 MCG
2 SPRAY, SUSPENSION (ML) NASAL DAILY
COMMUNITY
Start: 2022-04-06 | End: 2022-07-25

## 2022-04-18 NOTE — PROGRESS NOTES
Chief Complaint   Patient presents with   • Difficulty Swallowing   • Esophageal Spasm   • Diarrhea       Subjective     HPI    Cynthia Flower is a 67 y.o. female with a past medical history noted below who presents for follow up.   She has a history of dysphagia that is multifactorial with eosinophilic esophagitis, a prominent cricopharyngeus, and esophageal dysmotility.  She also has   issues with diarrhea and fecal incontinence, elevated liver enzymes and fatty liver.  Esophageal motility testing, read by Dr. Hale, suggestive of esophageal spasm.  We trialed her on diltiazem with the permission of her cardiologist.  She took one dose, got leg cramps, flushing, dizzy and did not take any further    She is currently getting worked up for light chain amyloidosis-- she is being worked up by Dr Sal    Swallowing still laborious.  Takes long to eat solid food.  Does have explosive diarrhea episodes, occurs about 4 times per week.  Usually associated with dairy which she is not avoiding.  She has lost weight due to symptoms but current weight is stable to the past month.    Tried hyoscyamine, thinks it may have helped but she is not taking it regularly.    Taking ppi.  She tried swallowed steroids (fluticasone) with no relief    Liquids and soft foods go down easily.  She is eating things like activity a yogurt.  She has not tried to focus on blenderized and a lot of foods for weight maintenance.  Back in December, her weight was 135 pounds.        Today's visit was in the office.  Both the patient and I were wearing face masks and proper hand hygiene was performed before and after the physical exam.           Current Outpatient Medications:   •  Acidophilus Lactobacillus capsule, Take 1 capsule by mouth 2 (Two) Times a Day., Disp: 60 capsule, Rfl: 5  •  ALPRAZolam (XANAX) 0.25 MG tablet, TAKE 1 TABLET BY MOUTH TWICE DAILY AS NEEDED FOR ANXIETY, Disp: 60 tablet, Rfl: 0  •  amphetamine-dextroamphetamine XR  (ADDERALL XR) 20 MG 24 hr capsule, Take 1 capsule by mouth Every Morning, Disp: 30 capsule, Rfl: 0  •  Cholecalciferol (Vitamin D3) 50 MCG (2000 UT) tablet, Take 1 tablet by mouth Daily., Disp: , Rfl:   •  FLUoxetine (PROzac) 20 MG capsule, TAKE 1 CAPSULE BY MOUTH FOUR TIMES DAILY, Disp: 360 capsule, Rfl: 0  •  fluticasone (FLONASE) 50 MCG/ACT nasal spray, 2 sprays by Each Nare route Daily. Shake well before using., Disp: , Rfl:   •  hydroCHLOROthiazide (HYDRODIURIL) 25 MG tablet, TAKE 1 TABLET BY MOUTH DAILY, Disp: 90 tablet, Rfl: 1  •  hyoscyamine (LEVSIN) 0.125 MG SL tablet, DISSOLVE 1 TABLET UNDER THE TONGUE THREE TIMES DAILY BEFORE MEALS, Disp: 270 tablet, Rfl: 0  •  ibuprofen (ADVIL,MOTRIN) 100 MG/5ML suspension, Take 30 mL by mouth Every 6 (Six) Hours As Needed (pain)., Disp: 946 mL, Rfl: 0  •  levothyroxine (SYNTHROID, LEVOTHROID) 75 MCG tablet, TAKE 1 TABLET BY MOUTH EVERY DAY, Disp: 90 tablet, Rfl: 3  •  loperamide (IMODIUM A-D) 2 MG tablet, Take 1 tablet by mouth 3 (Three) Times a Day As Needed for Diarrhea., Disp: 90 tablet, Rfl: 0  •  loratadine (CLARITIN) 10 MG tablet, TAKE 1 TABLET BY MOUTH DAILY, Disp: 30 tablet, Rfl: 5  •  meclizine (ANTIVERT) 25 MG tablet, Take 1 tablet by mouth 3 (Three) Times a Day As Needed for Dizziness., Disp: 15 tablet, Rfl: 0  •  mometasone (ELOCON) 0.1 % cream, APPLY SPARINGLY TWO TIMES PER DAY TO THE RIGHT THUMB, Disp: , Rfl:   •  mometasone (ELOCON) 0.1 % ointment, APPLY SPARINGLY TWO TIMES PER DAY TO THE RIGHT THUMB, Disp: , Rfl:   •  montelukast (SINGULAIR) 10 MG tablet, Take 10 mg by mouth every night at bedtime., Disp: , Rfl:   •  Multiple Vitamins-Minerals (V-C Forte) capsule, Take 1 capsule by mouth Daily., Disp: , Rfl:   •  mupirocin (BACTROBAN) 2 % ointment, APPLY TWO TIMES PER DAY TO THE INFECTION/BIOPSY SITES, Disp: , Rfl:   •  olopatadine (PATANOL) 0.1 % ophthalmic solution, INSTILL 1 DROP IN BOTH EYES TWICE DAILY, Disp: 5 mL, Rfl: 5  •  pantoprazole (PROTONIX)  20 MG EC tablet, TAKE 1 TABLET BY MOUTH EVERY DAY. DO NOT TAKE WITHIN 2 HOURS OF THYROID MEDICATION, Disp: 30 tablet, Rfl: 5  •  potassium chloride 10 MEQ CR tablet, Take 1 tablet by mouth Daily., Disp: 10 tablet, Rfl: 0  •  propranolol (INDERAL) 40 MG tablet, TAKE 1 TABLET BY MOUTH THREE TIMES DAILY (Patient taking differently: Take 40 mg by mouth Daily. ORDERED TID BUT REPORTS ONLY TAKES QD), Disp: 270 tablet, Rfl: 1  •  rosuvastatin (CRESTOR) 20 MG tablet, TAKE 1 TABLET BY MOUTH DAILY, Disp: 90 tablet, Rfl: 0  •  zolpidem (AMBIEN) 10 MG tablet, Take 1 tablet by mouth At Night As Needed for Sleep., Disp: 30 tablet, Rfl: 1  No current facility-administered medications for this visit.    Facility-Administered Medications Ordered in Other Visits:   •  sodium chloride 0.9 % flush 10 mL, 10 mL, Intravenous, PRN, Nav Leslie MD  •  sodium chloride 0.9 % flush 3 mL, 3 mL, Intravenous, Q12H, Nav Leslie MD  •  sodium chloride 0.9 % infusion, 25 mL/hr, Intravenous, Once, Nav Leslie MD      Objective     Vitals:    04/18/22 1042   BP: 110/68   Temp: 97.2 °F (36.2 °C)         04/18/22  1042   Weight: 55.8 kg (123 lb)     Body mass index is 23.24 kg/m².    Physical Exam        WBC   Date Value Ref Range Status   04/13/2022 7.19 3.40 - 10.80 10*3/mm3 Final   06/21/2021 5.99 3.40 - 10.80 10*3/mm3 Final   12/27/2018 6.47 4.5 - 11.0 10*3/uL Final     RBC   Date Value Ref Range Status   04/13/2022 4.64 3.77 - 5.28 10*6/mm3 Final   06/21/2021 4.82 3.77 - 5.28 10*6/mm3 Final   12/27/2018 4.48 4.0 - 5.2 10*6/uL Final     Hemoglobin   Date Value Ref Range Status   04/13/2022 14.1 12.0 - 15.9 g/dL Final   01/09/2019 11.7 (L) 12.0 - 16.0 g/dL Final     Hematocrit   Date Value Ref Range Status   04/13/2022 40.7 34.0 - 46.6 % Final   01/09/2019 35.4 (L) 36.0 - 46.0 % Final     MCV   Date Value Ref Range Status   04/13/2022 87.7 79.0 - 97.0 fL Final   12/27/2018 91.5 80.0 - 100.0 fL Final     MCH   Date Value  Ref Range Status   04/13/2022 30.4 26.6 - 33.0 pg Final   12/27/2018 30.6 26.0 - 34.0 pg Final     MCHC   Date Value Ref Range Status   04/13/2022 34.6 31.5 - 35.7 g/dL Final   12/27/2018 33.4 31.0 - 37.0 g/dL Final     RDW   Date Value Ref Range Status   04/13/2022 13.1 12.3 - 15.4 % Final   12/27/2018 13.0 12.0 - 16.8 % Final     RDW-SD   Date Value Ref Range Status   04/13/2022 41.6 37.0 - 54.0 fl Final     MPV   Date Value Ref Range Status   04/13/2022 10.4 6.0 - 12.0 fL Final   12/27/2018 10.0 6.7 - 10.8 fL Final     Platelets   Date Value Ref Range Status   04/13/2022 244 140 - 450 10*3/mm3 Final   12/27/2018 246 140 - 440 10*3/uL Final     Neutrophil Rel %   Date Value Ref Range Status   12/27/2018 44.5 (L) 45 - 80 % Final     Neutrophil %   Date Value Ref Range Status   04/13/2022 42.7 42.7 - 76.0 % Final     Lymphocyte Rel %   Date Value Ref Range Status   12/27/2018 43.7 15 - 50 % Final     Lymphocyte %   Date Value Ref Range Status   04/13/2022 44.6 19.6 - 45.3 % Final     Monocyte Rel %   Date Value Ref Range Status   12/27/2018 4.3 0 - 15 % Final     Monocyte %   Date Value Ref Range Status   04/13/2022 7.1 5.0 - 12.0 % Final     Eosinophil %   Date Value Ref Range Status   04/13/2022 5.0 0.3 - 6.2 % Final   12/27/2018 7.0 0 - 7 % Final     Basophil Rel %   Date Value Ref Range Status   12/27/2018 0.3 0 - 2 % Final     Basophil %   Date Value Ref Range Status   04/13/2022 0.3 0.0 - 1.5 % Final     Immature Grans %   Date Value Ref Range Status   04/13/2022 0.3 0.0 - 0.5 % Final   12/27/2018 0.2 (H) 0 % Final     Neutrophils Absolute   Date Value Ref Range Status   12/27/2018 2.88 2.0 - 8.8 10*3/uL Final     Neutrophils, Absolute   Date Value Ref Range Status   04/13/2022 3.07 1.70 - 7.00 10*3/mm3 Final     Lymphocytes Absolute   Date Value Ref Range Status   12/27/2018 2.83 0.7 - 5.5 10*3/uL Final     Lymphocytes, Absolute   Date Value Ref Range Status   04/13/2022 3.21 (H) 0.70 - 3.10 10*3/mm3 Final      Monocytes Absolute   Date Value Ref Range Status   12/27/2018 0.28 0.0 - 1.7 10*3/uL Final     Monocytes, Absolute   Date Value Ref Range Status   04/13/2022 0.51 0.10 - 0.90 10*3/mm3 Final     Eosinophils Absolute   Date Value Ref Range Status   12/27/2018 0.45 0.0 - 0.8 10*3/uL Final     Eosinophils, Absolute   Date Value Ref Range Status   04/13/2022 0.36 0.00 - 0.40 10*3/mm3 Final     Basophils Absolute   Date Value Ref Range Status   12/27/2018 0.02 0.0 - 0.2 10*3/uL Final     Basophils, Absolute   Date Value Ref Range Status   04/13/2022 0.02 0.00 - 0.20 10*3/mm3 Final     Immature Grans, Absolute   Date Value Ref Range Status   04/13/2022 0.02 0.00 - 0.05 10*3/mm3 Final   12/27/2018 0.01 <1 10*3/uL Final     nRBC   Date Value Ref Range Status   04/13/2022 0.0 0.0 - 0.2 /100 WBC Final       Lab Results   Component Value Date    GLUCOSE 118 03/30/2022    BUN 19 03/30/2022    CREATININE 0.68 03/30/2022    EGFRIFNONA 92 12/14/2021    EGFRIFAFRI 106 12/14/2021    BCR 27.9 03/30/2022    CO2 27.3 03/30/2022    CALCIUM 9.4 03/30/2022    PROTENTOTREF 5.8 (L) 03/30/2022    ALBUMIN 4.30 03/30/2022    ALBUMIN 3.7 03/30/2022    LABIL2 1.8 (H) 03/30/2022    AST 33 (H) 03/30/2022    ALT 35 (H) 03/30/2022     CT PE protocol  IMPRESSION:                 No pulmonary embolism.  There is a small pericardial effusion.  No cardiac enlargement.  Diffuse   thickening of the left ventricular wall is identified.  No acute infiltrate.  Please see above   comments for further detail.                BRAN ECHEVERRIA JR, MD         Electronically Signed and Approved By: BRAN ECHEVERRIA JR, MD on 3/05/2022 at 3:13       I personally reviewed data as detailed below:     The labs listed above.    The radiology studies listed above.    Office notes from: 3/30/22 heme onc note           Assessment/Plan    1.  Pharyngoesophageal dysphagia: Multifactorial with esophageal dysmotility, cricopharyngeal achalasia, eosinophilic esophagitis.  She  has been on PPI without much relief.  Did not tolerate diltiazem.  Has not been taking the hyoscyamine regularly.  Failed swallowed steroids.    2.  Nonalcoholic fatty liver disease/elevated transaminase level: Serologic work-up negative.  She has lost quite a bit of weight so I am quite puzzled why her transaminases remain a bit high    3.  Functional diarrhea: I think this is lactose intolerance    4.  Eosinophilic esophagitis: On PPI    Plan  I encouraged her to use the hyoscyamine with all meals to see if this improves her swallowing-- she has not consistently tried this  I encouraged her to eat solid food but to invest in a good  and work on getting nutrition by mostly liquid means as she is able to tolerate this  We will follow-up her work-up for amyloid light chain disease as this may be part of the issue  I encouraged her to weigh her self regularly  I encouraged her to try a lactose-free diet  I will continue to follow her liver enzymes,?  Biopsy    Diagnoses and all orders for this visit:    1. Pharyngoesophageal dysphagia (Primary)    2. NAFLD (nonalcoholic fatty liver disease)    3. Elevated transaminase level    4. Functional diarrhea    5. Esophagitis, eosinophilic    6. Weight loss, abnormal        I have discussed the above plan with the patient.  They verbalize understanding and are in agreement with the plan.  They have been advised to contact the office for any questions, concerns, or changes related to their health.    Dictated utilizing Dragon dictation

## 2022-04-20 NOTE — PROGRESS NOTES
"[facility] GROUP OUTPATIENT FOLLOW UP CLINIC VISIT    REASON FOR FOLLOW-UP:    Concern for AL amyloidosis    HISTORY OF PRESENT ILLNESS:  Cynthia Flower is a 67 y.o. female who returns today for follow up of the above issue.      She remains fatigued.  Cardiac palpitations persist.  She tolerated the bone marrow aspiration and biopsy well.    REVIEW OF SYSTEMS:  As per the HPI    PHYSICAL EXAMINATION:    Vitals:    04/21/22 0926   BP: 122/73   Pulse: 69   Resp: 16   Temp: 97.1 °F (36.2 °C)   TempSrc: Temporal   SpO2: 98%   Weight: 55.4 kg (122 lb 1.6 oz)   Height: 155 cm (61.01\")   PainSc: 5  Comment: Concern for AL amylodiosis   PainLoc: Neck       General:  No acute distress, awake, alert and oriented  Skin:  Warm and dry, no visible rash  HEENT:  Normocephalic/atraumatic.  Wearing a face mask.  Chest:  Normal respiratory effort  Extremities:  No visible clubbing, cyanosis, or edema  Neuro/psych:  Grossly nonfocal.  Normal mood and affect.        DIAGNOSTIC DATA:  Tissue Pathology Exam (04/13/2022 10:28)      IMAGING:    None reviewed    ASSESSMENT:  This is a 67 y.o. female with:    *Concern for amyloidosis  · She had a stress echocardiogram on 4/24/2020 showing a normal left ventricular ejection fraction of 60% with moderate concentric hypertrophy but no wall motion abnormalities of the left ventricle.  There was impaired relaxation noted.  She complained of chest pain during the examination.  There was some ST segment depression.  Cardiac catheterization was performed on the same day.  No intervention was required.  · Follow-up echocardiogram on 4/15/2021 showed a left ventricular ejection fraction of 61 to 65% with normal LV cavity size.  Left ventricular wall thickness showed moderate concentric hypertrophy.  Diastolic function was impaired.  No pericardial effusion.  Small left pleural effusion.  · She had follow-up [facility] imaging for cardiac amyloidosis that was not suggestive of ATTR " amyloidosis  · Laboratory values on 2/24/2022 showed a high serum free light chain lambda of 121, normal kappa of 10.5, low ratio at 0.09.  Serum protein electrophoresis showed no evidence of an M spike.  Urine light chains were abnormal with an elevated lambda light chain of 33 and a low ratio of 0.48 with a 12.7 mg M spike on 24-hour urine protein electrophoresis.  · BNP was elevated at 2306 on 3/4/2022.  The troponin was normal at 0.03.  CK was 212 with a CK-MB of 10.87.  · Initial consult on 3/30/22. No M spike on serum protein electrophoresis. SIFE 'presence of monoclonal protein is unclear.'   · Bone marrow biopsy 4/13/22 normocellular, 12.1% plasma cells consistent with low volume plasma cell dyscrasia. FISH pending. No amyloid noted as Congo red staining negative.       PLAN:  1. Follow-up bone marrow FISH studies  2. I will communicate with Dr. Pruitt as the patient states she still needs to have some cardiac heart rhythm monitoring done (previously delayed because of her need for a breast biopsy).  3. I think at this point a fat pad biopsy is warranted.  I will refer her to general surgery for this.  4. If the fat pad biopsy is negative she will likely need a myocardial biopsy and she may need referral to a tertiary care center for this.  I will communicate with Dr. Pruitt her cardiologist about this as well.  5. I will follow-up with a fat pad biopsy results and make plans as appropriate based on this but otherwise plan to see her back in 3 to 4 months with labs done a few days prior.    I spent 51 minutes in this visit today reviewing her record, communicating with her and her , communicating with staff here in the office, communicating with interventional radiology, placing orders, documenting the encounter.

## 2022-04-21 ENCOUNTER — APPOINTMENT (OUTPATIENT)
Dept: LAB | Facility: HOSPITAL | Age: 67
End: 2022-04-21

## 2022-04-21 ENCOUNTER — OFFICE VISIT (OUTPATIENT)
Dept: ONCOLOGY | Facility: CLINIC | Age: 67
End: 2022-04-21

## 2022-04-21 VITALS
SYSTOLIC BLOOD PRESSURE: 122 MMHG | OXYGEN SATURATION: 98 % | HEIGHT: 61 IN | RESPIRATION RATE: 16 BRPM | WEIGHT: 122.1 LBS | DIASTOLIC BLOOD PRESSURE: 73 MMHG | TEMPERATURE: 97.1 F | BODY MASS INDEX: 23.05 KG/M2 | HEART RATE: 69 BPM

## 2022-04-21 DIAGNOSIS — R76.8 ELEVATED SERUM IMMUNOGLOBULIN FREE LIGHT CHAIN LEVEL: ICD-10-CM

## 2022-04-21 DIAGNOSIS — E65 FAT PAD: Primary | ICD-10-CM

## 2022-04-21 PROCEDURE — 99215 OFFICE O/P EST HI 40 MIN: CPT | Performed by: INTERNAL MEDICINE

## 2022-04-28 ENCOUNTER — TELEPHONE (OUTPATIENT)
Dept: ONCOLOGY | Facility: CLINIC | Age: 67
End: 2022-04-28

## 2022-04-28 NOTE — TELEPHONE ENCOUNTER
Pt called for Pathology results. I explained I would reviewed them with Dr. Sal when he is in the office tomorrow. She was informed myself or Dr. Sal would call her with results. Pt V/U.

## 2022-05-02 ENCOUNTER — TELEPHONE (OUTPATIENT)
Dept: ONCOLOGY | Facility: CLINIC | Age: 67
End: 2022-05-02

## 2022-05-02 ENCOUNTER — TELEPHONE (OUTPATIENT)
Dept: SURGERY | Facility: CLINIC | Age: 67
End: 2022-05-02

## 2022-05-02 RX ORDER — ROSUVASTATIN CALCIUM 20 MG/1
20 TABLET, COATED ORAL DAILY
Qty: 90 TABLET | Refills: 0 | Status: SHIPPED | OUTPATIENT
Start: 2022-05-02 | End: 2022-07-08 | Stop reason: SDUPTHER

## 2022-05-02 NOTE — TELEPHONE ENCOUNTER
Provider: KAY  Caller: SANDOR CRAWLEY   Relationship to Patient: SELF    Phone Number: [phone]  Reason for Call: PT IS WANTING DR VILLANUEVA'S NURSE, JESS TO CALL HER BACK ABOUT SOME TEST RESULTS

## 2022-05-02 NOTE — TELEPHONE ENCOUNTER
Pt C/O feels fatigued and states she has no energy for anything. Its interfering with her ADL. Reviewed Dr. Mcgraht note regarding the FISH results. Pt will have her Holter monitor placed soon and see Dr. Pruitt. Hopefully this helps determine the cause of her fatigue. Pt V/U.

## 2022-05-04 ENCOUNTER — HOSPITAL ENCOUNTER (OUTPATIENT)
Facility: HOSPITAL | Age: 67
Setting detail: HOSPITAL OUTPATIENT SURGERY
End: 2022-05-04
Attending: SURGERY | Admitting: SURGERY

## 2022-05-04 ENCOUNTER — OFFICE VISIT (OUTPATIENT)
Dept: SURGERY | Facility: CLINIC | Age: 67
End: 2022-05-04

## 2022-05-04 VITALS — BODY MASS INDEX: 23.18 KG/M2 | HEIGHT: 61 IN | WEIGHT: 122.8 LBS

## 2022-05-04 DIAGNOSIS — R76.8 ELEVATED SERUM IMMUNOGLOBULIN FREE LIGHT CHAIN LEVEL: Primary | ICD-10-CM

## 2022-05-04 PROBLEM — E85.81 LIGHT CHAIN (AL) AMYLOIDOSIS (HCC): Status: ACTIVE | Noted: 2022-05-04

## 2022-05-04 PROCEDURE — 99203 OFFICE O/P NEW LOW 30 MIN: CPT | Performed by: SURGERY

## 2022-05-04 NOTE — PROGRESS NOTES
General Surgery H&P/Consultation    Impression/Plan:    Notes. Cynthia Flower is a 67 y.o. with elevated serum immunoglobulin free light chains.  Abdominal fat pad biopsy was desired. All risks (including bleeding, infection), benefits, and alternatives were explained to the patient who agreed and wished to proceed.    Referring Provider: Nav Sal MD    Chief Complaint:    Fatigue    History of Present Illness:    Miss. Cynthia Flower is a 67 y.o. presenting for work-up of amyloidosis.  She has fatigue which has been prominent over the last 4 to 6 months.  There also are associated palpitations.  There are no alleviating factors.    Past Medical History:   Past Medical History:   Diagnosis Date   • Anxiety    • Arthritis    • COVID-19 07/2020 9/7/2021   • Depression    • Diverticulitis of colon    • Diverticulosis    • GERD (gastroesophageal reflux disease)    • History of Clostridioides difficile infection 2008    HAS HAD SEVERAL TIMES IN PAST PER PT (SAW INFECTIOUS DISEASE MD FOR THIS S/P COLOSTOMY/EXTENSIVE ANBX THERAPY)   • History of prediabetes    • History of snoring    • History of stress incontinence    • Hypercholesterolemia    • Hyperlipidemia    • Hypertension    • Left ventricular hypertrophy     FOLLOWED BY DR MARI. DENIES CHEST PAIN BUT STATES DOES GET SOA AT TIMES WITH EXERTION REPORTS THAT IT IS NOT A NEW ISSUE    • Liver disease    • Oral herpes 08/18/2020   • Seasonal allergies     used to have allergy shots in the past   • SOB (shortness of breath)     STATES WITH EXERTION HAS BEEN ONGOING ISSUE NOT A NEW ISSUE   • Thyroid disease     Hypothyroid          Past Surgical History:    Past Surgical History:   Procedure Laterality Date   • ABDOMINAL SURGERY  2005, 2014    ex lap x 2,  diverticulitis   • ABDOMINOPLASTY  2016   • ADENOIDECTOMY     • APPENDECTOMY     • CARDIAC CATHETERIZATION N/A 04/24/2020    Procedure: Coronary angiography;  Surgeon: Roberto Briones  MD;  Location: [facility] CATH INVASIVE LOCATION;  Service: Cardiovascular;  Laterality: N/A;   • CARDIAC CATHETERIZATION N/A 04/24/2020    Procedure: Left heart cath;  Surgeon: Roberto Briones MD;  Location: [facility] CATH INVASIVE LOCATION;  Service: Cardiovascular;  Laterality: N/A;   • CARDIAC CATHETERIZATION N/A 04/24/2020    Procedure: Left ventriculography;  Surgeon: Roberto Briones MD;  Location: [facility] CATH INVASIVE LOCATION;  Service: Cardiovascular;  Laterality: N/A;   • COLONOSCOPY  approx 2018    normal per pt    • COLOSTOMY  2005    had colostomy and then is was reversed   • COLOSTOMY CLOSURE  2006   • CORRECTION HAMMER TOE Right 2017   • ECTOPIC PREGNANCY SURGERY      1979, 1980   • ENDOSCOPY N/A 05/27/2020    Procedure: ESOPHAGOGASTRODUODENOSCOPY WITH BIOPSY AND BIOPSY POLYPECTOMY AND MACIEL DIALATION;  Surgeon: Preethi Beck MD;  Location: [facility] ENDOSCOPY;  Service: Gastroenterology;  Laterality: N/A;  PRE: ESOPHAGEAL DYSPHAGIA  POST: Z LINE 46, ESOPHAGEAL SPASM, GASTRITIS, FUNDIC POLYP   • ESOPHAGEAL DILATATION N/A 12/07/2021    Procedure: OR ESOPHAGEAL DILATATION with maciel dilators ;  Surgeon: Jamey Leslie MD;  Location: [facility] [place];  Service: ENT;  Laterality: N/A;   • ESOPHAGEAL MOTILITY STUDY N/A 02/03/2022    Procedure: ESOPHAGEAL MOTILITY STUDY;  Surgeon: Harshil, Nurse Performed;  Location: [facility] ENDOSCOPY;  Service: Gastroenterology;  Laterality: N/A;  dypshagia    • FOOT SURGERY Right 12/2016    Second and third hammertoe corrections   • KNEE ARTHROSCOPY Right 2009   • KNEE SURGERY Right    • REVISION / TAKEDOWN COLOSTOMY  2006   • SHOULDER ARTHROSCOPY Right 2018    right shoulder arthroscopy with extensive rotator cuff, biceps and labral debridement, chondroplasty, & subacromial decompression with acromioplasty   • SHOULDER SURGERY      HAS HAS COMPLETE SHOULDER ON RIGHT. LEFT SCOPED AND HAD 2ND SURGERY WITH HARDWARE PLACED   • SHOULDER SURGERY Left     2012.  2013   • TONSILLECTOMY     • TOTAL SHOULDER REVERSE ARTHROPLASTY Right 2019   • WISDOM TOOTH EXTRACTION           Family History:    Family History   Problem Relation Age of Onset   • Hypertension Mother    • Osteoporosis Mother    • Skin cancer Mother    • Heart disease Father    • Hypertension Father    • Breast cancer Neg Hx    • Ovarian cancer Neg Hx    • Colon cancer Neg Hx    • Deep vein thrombosis Neg Hx    • Pulmonary embolism Neg Hx    • Malig Hyperthermia Neg Hx          Social History:    Social History     Socioeconomic History   • Marital status:    Tobacco Use   • Smoking status: Never Smoker   • Smokeless tobacco: Never Used   Vaping Use   • Vaping Use: Never used   Substance and Sexual Activity   • Alcohol use: Not Currently     Alcohol/week: 1.0 standard drink     Types: 1 Glasses of wine per week   • Drug use: Never   • Sexual activity: Defer         Allergies:   Allergies   Allergen Reactions   • Azithromycin Rash   • Pravastatin Rash   • Zetia [Ezetimibe] Myalgia     cramps       Medications:     Current Outpatient Medications:   •  Acidophilus Lactobacillus capsule, Take 1 capsule by mouth 2 (Two) Times a Day., Disp: 60 capsule, Rfl: 5  •  ALPRAZolam (XANAX) 0.25 MG tablet, TAKE 1 TABLET BY MOUTH TWICE DAILY AS NEEDED FOR ANXIETY, Disp: 60 tablet, Rfl: 0  •  amphetamine-dextroamphetamine XR (ADDERALL XR) 20 MG 24 hr capsule, Take 1 capsule by mouth Every Morning, Disp: 30 capsule, Rfl: 0  •  Cholecalciferol (Vitamin D3) 50 MCG (2000 UT) tablet, Take 1 tablet by mouth Daily., Disp: , Rfl:   •  FLUoxetine (PROzac) 20 MG capsule, TAKE 1 CAPSULE BY MOUTH FOUR TIMES DAILY, Disp: 360 capsule, Rfl: 0  •  fluticasone (FLONASE) 50 MCG/ACT nasal spray, 2 sprays by Each Nare route Daily. Shake well before using., Disp: , Rfl:   •  hydroCHLOROthiazide (HYDRODIURIL) 25 MG tablet, TAKE 1 TABLET BY MOUTH DAILY, Disp: 90 tablet, Rfl: 1  •  hyoscyamine (LEVSIN) 0.125 MG SL tablet, DISSOLVE 1 TABLET UNDER  THE TONGUE THREE TIMES DAILY BEFORE MEALS, Disp: 270 tablet, Rfl: 0  •  ibuprofen (ADVIL,MOTRIN) 100 MG/5ML suspension, Take 30 mL by mouth Every 6 (Six) Hours As Needed (pain)., Disp: 946 mL, Rfl: 0  •  levothyroxine (SYNTHROID, LEVOTHROID) 75 MCG tablet, TAKE 1 TABLET BY MOUTH EVERY DAY, Disp: 90 tablet, Rfl: 3  •  loperamide (IMODIUM A-D) 2 MG tablet, Take 1 tablet by mouth 3 (Three) Times a Day As Needed for Diarrhea., Disp: 90 tablet, Rfl: 0  •  loratadine (CLARITIN) 10 MG tablet, TAKE 1 TABLET BY MOUTH DAILY, Disp: 30 tablet, Rfl: 5  •  meclizine (ANTIVERT) 25 MG tablet, Take 1 tablet by mouth 3 (Three) Times a Day As Needed for Dizziness., Disp: 15 tablet, Rfl: 0  •  olopatadine (PATANOL) 0.1 % ophthalmic solution, INSTILL 1 DROP IN BOTH EYES TWICE DAILY, Disp: 5 mL, Rfl: 5  •  pantoprazole (PROTONIX) 20 MG EC tablet, TAKE 1 TABLET BY MOUTH EVERY DAY. DO NOT TAKE WITHIN 2 HOURS OF THYROID MEDICATION, Disp: 30 tablet, Rfl: 5  •  propranolol (INDERAL) 40 MG tablet, TAKE 1 TABLET BY MOUTH THREE TIMES DAILY (Patient taking differently: Take 40 mg by mouth Daily. ORDERED TID BUT REPORTS ONLY TAKES QD), Disp: 270 tablet, Rfl: 1  •  rosuvastatin (CRESTOR) 20 MG tablet, TAKE 1 TABLET BY MOUTH DAILY, Disp: 90 tablet, Rfl: 0  •  mometasone (ELOCON) 0.1 % ointment, APPLY SPARINGLY TWO TIMES PER DAY TO THE RIGHT THUMB, Disp: , Rfl:   •  montelukast (SINGULAIR) 10 MG tablet, Take 10 mg by mouth every night at bedtime., Disp: , Rfl:   •  Multiple Vitamins-Minerals (V-C Forte) capsule, Take 1 capsule by mouth Daily., Disp: , Rfl:   •  mupirocin (BACTROBAN) 2 % ointment, APPLY TWO TIMES PER DAY TO THE INFECTION/BIOPSY SITES, Disp: , Rfl:   •  potassium chloride 10 MEQ CR tablet, Take 1 tablet by mouth Daily., Disp: 10 tablet, Rfl: 0  •  zolpidem (AMBIEN) 10 MG tablet, Take 1 tablet by mouth At Night As Needed for Sleep., Disp: 30 tablet, Rfl: 1  No current facility-administered medications for this  visit.    Facility-Administered Medications Ordered in Other Visits:   •  sodium chloride 0.9 % flush 10 mL, 10 mL, Intravenous, PRN, Nav Leslie MD  •  sodium chloride 0.9 % flush 3 mL, 3 mL, Intravenous, Q12H, Nav Leslie MD  •  sodium chloride 0.9 % infusion, 25 mL/hr, Intravenous, Once, Nav Leslie MD      Physical Exam:   • Constitutional: Well-developed well-nourished, no acute distress  • Eyes: Conjunctiva normal, sclera nonicteric  • Neck: Supple, trachea midline  • Respiratory: No increased work of breathing, normal inspiratory effort  • Cardiovascular: Regular rate, no jugular venous distention  • Gastrointestinal: Nondistended  • Skin:  Warm, dry, no rash on visualized skin surfaces  • Musculoskeletal: Symmetric strength, normal gait  • Psychiatric: Alert and oriented ×3, normal affect       Ender Hunt MD  General and Endoscopic Surgery  [facility]    [address], [address]  [place], [place], [address]  P: [phone]  F: [phone]

## 2022-05-06 ENCOUNTER — TELEPHONE (OUTPATIENT)
Dept: SURGERY | Facility: CLINIC | Age: 67
End: 2022-05-06

## 2022-05-06 NOTE — TELEPHONE ENCOUNTER
Patient called about switching her procedure to an in office procedure instead. She does not have a  for the surgery. Is it okay to do in office?

## 2022-05-09 ENCOUNTER — APPOINTMENT (OUTPATIENT)
Dept: PREADMISSION TESTING | Facility: HOSPITAL | Age: 67
End: 2022-05-09

## 2022-05-11 ENCOUNTER — PROCEDURE VISIT (OUTPATIENT)
Dept: SURGERY | Facility: CLINIC | Age: 67
End: 2022-05-11

## 2022-05-11 DIAGNOSIS — R76.8 ELEVATED SERUM IMMUNOGLOBULIN FREE LIGHT CHAIN LEVEL: Primary | ICD-10-CM

## 2022-05-11 PROCEDURE — 88325 CONSLTJ COMPRE RVW REC REPRT: CPT

## 2022-05-11 PROCEDURE — 11104 PUNCH BX SKIN SINGLE LESION: CPT | Performed by: SURGERY

## 2022-05-11 PROCEDURE — 82542 COL CHROMOTOGRAPHY QUAL/QUAN: CPT

## 2022-05-11 PROCEDURE — 88380 MICRODISSECTION LASER: CPT

## 2022-05-11 PROCEDURE — 88307 TISSUE EXAM BY PATHOLOGIST: CPT | Performed by: SURGERY

## 2022-05-11 PROCEDURE — 88313 SPECIAL STAINS GROUP 2: CPT | Performed by: SURGERY

## 2022-05-11 NOTE — PROGRESS NOTES
DATE OF Procedure: 05/11/22      SURGEON:   Ender Hunt MD     PREOPERATIVE DIAGNOSIS:  Elevated serum immunoglobulin free light chain level     POSTOPERATIVE DIAGNOSIS: Same     PROCEDURE PERFORMED:   Abdominal fat pad biopsy     ANESTHESIA: Local     SPECIMEN:  Abdominal fat pad biopsy     BLOOD LOSS: minimal     INDICATIONS FOR OPERATION:  Ms. Trent Portillo is a 67-year-old lady referred for fat pad biopsy for possible amyloidosis. All risks , benefits, and alternatives were explained to the patient and  she agreed and wished to proceed.  Informed consent was obtained.     OPERATIVE REPORT: In the procedure room a timeout was performed identifying correct patient, procedure, and location of procedure.  A skin incision was made overlying the anterior abdominal wall after injection of local anesthetic.  An approximately 2 by 2 x 2 centimeter area of skin and subcutaneous fat was removed for biopsy.  This was done with blunt and sharp dissection. Hemostasis was obtained.  The wound was closed in 2 layers with 3-0 Vicryl suture.  Dermabond was then applied over the incision.  She tolerated the procedure well.        Ender Hunt M.D.  General and Endoscopic Surgery  [facility]     [address] [place] [address], [address]  [place], [place], [address]  P: [phone]  F: [phone]

## 2022-05-19 ENCOUNTER — TELEPHONE (OUTPATIENT)
Dept: ONCOLOGY | Facility: CLINIC | Age: 67
End: 2022-05-19

## 2022-05-19 NOTE — TELEPHONE ENCOUNTER
Caller: YOLETTE    Relationship: Self    Best call back number: [phone]    Caller requesting test results: YOLETTE    What test was performed: BIOPSY    When was the test performed: 5/11/2022    Where was the test performed: [facility]

## 2022-05-19 NOTE — TELEPHONE ENCOUNTER
MADDIE, I READ JESS'S MESSAGE TO PATIENT & SHE V/U., NO NEED TO RETURN HER CALL, SHE WILL WAIT UNTIL SOMEONE CALLS HER WITH RESULTS.

## 2022-05-25 ENCOUNTER — TELEPHONE (OUTPATIENT)
Dept: SURGERY | Facility: CLINIC | Age: 67
End: 2022-05-25

## 2022-05-31 DIAGNOSIS — E85.81 LIGHT CHAIN (AL) AMYLOIDOSIS: Primary | ICD-10-CM

## 2022-06-03 ENCOUNTER — TELEPHONE (OUTPATIENT)
Dept: ONCOLOGY | Facility: CLINIC | Age: 67
End: 2022-06-03

## 2022-06-07 ENCOUNTER — TELEPHONE (OUTPATIENT)
Dept: ONCOLOGY | Facility: CLINIC | Age: 67
End: 2022-06-07

## 2022-06-07 NOTE — TELEPHONE ENCOUNTER
Pt C/O bilateral shooting leg and arm pain. Pt states this started a month or so ago. D/W Martha Gaytan NP. Per Martha Pt will need to contact her PCP. Pt informed and V/U.

## 2022-06-07 NOTE — TELEPHONE ENCOUNTER
Caller: Cynthia Flower    Relationship: Self    Best call back number:[phone]    What was the call regarding: CYNTHIA CALLED TO SAY THAT SHE IS HAVING A LOT OF PAIN IN HER LEGS WITH NO RELIEF. SHE SAYS NOTHING IS HELPING, AND IS WANTING A CALL BACK TO DISCUSS WHAT SHE SHOULD DO.    Do you require a callback: YES

## 2022-06-08 ENCOUNTER — OFFICE VISIT (OUTPATIENT)
Dept: FAMILY MEDICINE CLINIC | Facility: CLINIC | Age: 67
End: 2022-06-08

## 2022-06-08 VITALS
TEMPERATURE: 97.5 F | HEART RATE: 86 BPM | BODY MASS INDEX: 22.28 KG/M2 | DIASTOLIC BLOOD PRESSURE: 80 MMHG | WEIGHT: 118 LBS | RESPIRATION RATE: 18 BRPM | HEIGHT: 61 IN | SYSTOLIC BLOOD PRESSURE: 136 MMHG | OXYGEN SATURATION: 99 %

## 2022-06-08 DIAGNOSIS — R20.2 PARESTHESIA AND PAIN OF BOTH UPPER EXTREMITIES: Primary | ICD-10-CM

## 2022-06-08 DIAGNOSIS — R20.2 PARESTHESIA OF BILATERAL LEGS: ICD-10-CM

## 2022-06-08 DIAGNOSIS — M79.601 PARESTHESIA AND PAIN OF BOTH UPPER EXTREMITIES: Primary | ICD-10-CM

## 2022-06-08 DIAGNOSIS — M79.602 PARESTHESIA AND PAIN OF BOTH UPPER EXTREMITIES: Primary | ICD-10-CM

## 2022-06-08 PROCEDURE — 99214 OFFICE O/P EST MOD 30 MIN: CPT | Performed by: INTERNAL MEDICINE

## 2022-06-08 RX ORDER — TRAMADOL HYDROCHLORIDE 50 MG/1
50 TABLET ORAL EVERY 6 HOURS PRN
Qty: 40 TABLET | Refills: 1 | Status: SHIPPED | OUTPATIENT
Start: 2022-06-08 | End: 2022-07-08 | Stop reason: SDUPTHER

## 2022-06-09 ENCOUNTER — TELEPHONE (OUTPATIENT)
Dept: FAMILY MEDICINE CLINIC | Facility: CLINIC | Age: 67
End: 2022-06-09

## 2022-06-09 LAB
ALBUMIN SERPL-MCNC: 4.8 G/DL (ref 3.8–4.8)
ALBUMIN/GLOB SERPL: 3.2 {RATIO} (ref 1.2–2.2)
ALP SERPL-CCNC: 64 IU/L (ref 44–121)
ALT SERPL-CCNC: 39 IU/L (ref 0–32)
AST SERPL-CCNC: 42 IU/L (ref 0–40)
BASOPHILS # BLD AUTO: 0 X10E3/UL (ref 0–0.2)
BASOPHILS NFR BLD AUTO: 0 %
BILIRUB SERPL-MCNC: 0.7 MG/DL (ref 0–1.2)
BUN SERPL-MCNC: 15 MG/DL (ref 8–27)
BUN/CREAT SERPL: 27 (ref 12–28)
CALCIUM SERPL-MCNC: 9.4 MG/DL (ref 8.7–10.3)
CHLORIDE SERPL-SCNC: 103 MMOL/L (ref 96–106)
CK SERPL-CCNC: 530 U/L (ref 32–182)
CO2 SERPL-SCNC: 26 MMOL/L (ref 20–29)
CREAT SERPL-MCNC: 0.56 MG/DL (ref 0.57–1)
EGFRCR SERPLBLD CKD-EPI 2021: 100 ML/MIN/1.73
EOSINOPHIL # BLD AUTO: 0.2 X10E3/UL (ref 0–0.4)
EOSINOPHIL NFR BLD AUTO: 4 %
ERYTHROCYTE [DISTWIDTH] IN BLOOD BY AUTOMATED COUNT: 13.2 % (ref 11.7–15.4)
ERYTHROCYTE [SEDIMENTATION RATE] IN BLOOD BY WESTERGREN METHOD: 2 MM/HR (ref 0–40)
GLOBULIN SER CALC-MCNC: 1.5 G/DL (ref 1.5–4.5)
GLUCOSE SERPL-MCNC: 73 MG/DL (ref 65–99)
HCT VFR BLD AUTO: 43.5 % (ref 34–46.6)
HGB BLD-MCNC: 14.2 G/DL (ref 11.1–15.9)
IMM GRANULOCYTES # BLD AUTO: 0 X10E3/UL (ref 0–0.1)
IMM GRANULOCYTES NFR BLD AUTO: 0 %
LYMPHOCYTES # BLD AUTO: 2.8 X10E3/UL (ref 0.7–3.1)
LYMPHOCYTES NFR BLD AUTO: 49 %
MCH RBC QN AUTO: 29.5 PG (ref 26.6–33)
MCHC RBC AUTO-ENTMCNC: 32.6 G/DL (ref 31.5–35.7)
MCV RBC AUTO: 90 FL (ref 79–97)
MONOCYTES # BLD AUTO: 0.3 X10E3/UL (ref 0.1–0.9)
MONOCYTES NFR BLD AUTO: 6 %
NEUTROPHILS # BLD AUTO: 2.3 X10E3/UL (ref 1.4–7)
NEUTROPHILS NFR BLD AUTO: 41 %
PLATELET # BLD AUTO: 246 X10E3/UL (ref 150–450)
POTASSIUM SERPL-SCNC: 4 MMOL/L (ref 3.5–5.2)
PROT SERPL-MCNC: 6.3 G/DL (ref 6–8.5)
RBC # BLD AUTO: 4.82 X10E6/UL (ref 3.77–5.28)
SODIUM SERPL-SCNC: 142 MMOL/L (ref 134–144)
WBC # BLD AUTO: 5.6 X10E3/UL (ref 3.4–10.8)

## 2022-06-09 NOTE — TELEPHONE ENCOUNTER
Caller: JC    Relationship: [facility]     Best call back number: [phone]    EXT: [id number]    What test was performed: LABS    When was the test performed: 6/8/2022    Where was the test performed: [facility]     Additional notes: JC WANTED TO CALL AND LET THE PATIENTS PROVIDER KNOW THE PATIENT'S     CK LEVEL WAS HIGH

## 2022-06-09 NOTE — PROGRESS NOTES
Jessica Flower is a 67 y.o. female. Patient is here today for   Chief Complaint   Patient presents with   • Leg Pain     Bilateral leg pain for 6 months           Vitals:    06/08/22 1330   BP: 136/80   Pulse: 86   Resp: 18   Temp: 97.5 °F (36.4 °C)   SpO2: 99%     Body mass index is 22.31 kg/m².      Past Medical History:   Diagnosis Date   • Anxiety    • Arthritis    • COVID-19 07/2020 9/7/2021   • Depression    • Diverticulitis of colon    • Diverticulosis    • GERD (gastroesophageal reflux disease)    • History of Clostridioides difficile infection 2008    HAS HAD SEVERAL TIMES IN PAST PER PT (SAW INFECTIOUS DISEASE MD FOR THIS S/P COLOSTOMY/EXTENSIVE ANBX THERAPY)   • History of prediabetes    • History of snoring    • History of stress incontinence    • Hypercholesterolemia    • Hyperlipidemia    • Hypertension    • Left ventricular hypertrophy     FOLLOWED BY DR MARI. DENIES CHEST PAIN BUT STATES DOES GET SOA AT TIMES WITH EXERTION REPORTS THAT IT IS NOT A NEW ISSUE    • Liver disease    • Oral herpes 08/18/2020   • Seasonal allergies     used to have allergy shots in the past   • SOB (shortness of breath)     STATES WITH EXERTION HAS BEEN ONGOING ISSUE NOT A NEW ISSUE   • Thyroid disease     Hypothyroid      Allergies   Allergen Reactions   • Azithromycin Rash   • Pravastatin Rash   • Zetia [Ezetimibe] Myalgia     cramps      Social History     Socioeconomic History   • Marital status:    Tobacco Use   • Smoking status: Never Smoker   • Smokeless tobacco: Never Used   Vaping Use   • Vaping Use: Never used   Substance and Sexual Activity   • Alcohol use: Not Currently     Alcohol/week: 1.0 standard drink     Types: 1 Glasses of wine per week   • Drug use: Never   • Sexual activity: Defer        Current Outpatient Medications:   •  Acidophilus Lactobacillus capsule, Take 1 capsule by mouth 2 (Two) Times a Day., Disp: 60 capsule, Rfl: 5  •  ALPRAZolam (XANAX) 0.25 MG  tablet, TAKE 1 TABLET BY MOUTH TWICE DAILY AS NEEDED FOR ANXIETY, Disp: 60 tablet, Rfl: 0  •  amphetamine-dextroamphetamine XR (ADDERALL XR) 20 MG 24 hr capsule, Take 1 capsule by mouth Every Morning, Disp: 30 capsule, Rfl: 0  •  Cholecalciferol (Vitamin D3) 50 MCG (2000 UT) tablet, Take 1 tablet by mouth Daily., Disp: , Rfl:   •  fluticasone (FLONASE) 50 MCG/ACT nasal spray, 2 sprays by Each Nare route Daily. Shake well before using., Disp: , Rfl:   •  hydroCHLOROthiazide (HYDRODIURIL) 25 MG tablet, TAKE 1 TABLET BY MOUTH DAILY, Disp: 90 tablet, Rfl: 1  •  hyoscyamine (LEVSIN) 0.125 MG SL tablet, DISSOLVE 1 TABLET UNDER THE TONGUE THREE TIMES DAILY BEFORE MEALS, Disp: 270 tablet, Rfl: 0  •  ibuprofen (ADVIL,MOTRIN) 100 MG/5ML suspension, Take 30 mL by mouth Every 6 (Six) Hours As Needed (pain)., Disp: 946 mL, Rfl: 0  •  levothyroxine (SYNTHROID, LEVOTHROID) 75 MCG tablet, TAKE 1 TABLET BY MOUTH EVERY DAY, Disp: 90 tablet, Rfl: 3  •  loperamide (IMODIUM A-D) 2 MG tablet, Take 1 tablet by mouth 3 (Three) Times a Day As Needed for Diarrhea., Disp: 90 tablet, Rfl: 0  •  loratadine (CLARITIN) 10 MG tablet, TAKE 1 TABLET BY MOUTH DAILY, Disp: 30 tablet, Rfl: 5  •  meclizine (ANTIVERT) 25 MG tablet, Take 1 tablet by mouth 3 (Three) Times a Day As Needed for Dizziness., Disp: 15 tablet, Rfl: 0  •  mometasone (ELOCON) 0.1 % ointment, APPLY SPARINGLY TWO TIMES PER DAY TO THE RIGHT THUMB, Disp: , Rfl:   •  montelukast (SINGULAIR) 10 MG tablet, Take 10 mg by mouth every night at bedtime., Disp: , Rfl:   •  Multiple Vitamins-Minerals (V-C Forte) capsule, Take 1 capsule by mouth Daily., Disp: , Rfl:   •  mupirocin (BACTROBAN) 2 % ointment, APPLY TWO TIMES PER DAY TO THE INFECTION/BIOPSY SITES, Disp: , Rfl:   •  olopatadine (PATANOL) 0.1 % ophthalmic solution, INSTILL 1 DROP IN BOTH EYES TWICE DAILY, Disp: 5 mL, Rfl: 5  •  pantoprazole (PROTONIX) 20 MG EC tablet, TAKE 1 TABLET BY MOUTH EVERY DAY. DO NOT TAKE WITHIN 2 HOURS OF  THYROID MEDICATION, Disp: 30 tablet, Rfl: 5  •  potassium chloride 10 MEQ CR tablet, Take 1 tablet by mouth Daily., Disp: 10 tablet, Rfl: 0  •  propranolol (INDERAL) 40 MG tablet, TAKE 1 TABLET BY MOUTH THREE TIMES DAILY (Patient taking differently: Take 40 mg by mouth Daily. ORDERED TID BUT REPORTS ONLY TAKES QD), Disp: 270 tablet, Rfl: 1  •  rosuvastatin (CRESTOR) 20 MG tablet, TAKE 1 TABLET BY MOUTH DAILY, Disp: 90 tablet, Rfl: 0  •  zolpidem (AMBIEN) 10 MG tablet, Take 1 tablet by mouth At Night As Needed for Sleep., Disp: 30 tablet, Rfl: 1  •  FLUoxetine (PROzac) 20 MG capsule, TAKE 1 CAPSULE BY MOUTH FOUR TIMES DAILY, Disp: 360 capsule, Rfl: 0  •  traMADol (ULTRAM) 50 MG tablet, Take 1 tablet by mouth Every 6 (Six) Hours As Needed for Moderate Pain ., Disp: 40 tablet, Rfl: 1  No current facility-administered medications for this visit.    Facility-Administered Medications Ordered in Other Visits:   •  sodium chloride 0.9 % flush 10 mL, 10 mL, Intravenous, PRN, Nav Leslie MD  •  sodium chloride 0.9 % flush 3 mL, 3 mL, Intravenous, Q12H, Nav Leslie MD  •  sodium chloride 0.9 % infusion, 25 mL/hr, Intravenous, Once, Nav Leslie MD     Objective     Complains of bilateral leg pain for about 6 months.  Its interfering with her sleep.       Review of Systems   Constitutional: Negative.    HENT: Negative.    Respiratory: Negative.    Cardiovascular: Negative.    Musculoskeletal:        Bilateral lower extremity pain is been going on for months.   Psychiatric/Behavioral: Negative.        Physical Exam  Vitals and nursing note reviewed.   Constitutional:       Appearance: Normal appearance.      Comments: Pleasant, neatly groomed, in no distress.   Cardiovascular:      Rate and Rhythm: Regular rhythm.      Heart sounds: Normal heart sounds. No murmur heard.    No gallop.   Pulmonary:      Effort: No respiratory distress.      Breath sounds: Normal breath sounds. No wheezing or rales.    Neurological:      Mental Status: She is alert and oriented to person, place, and time.   Psychiatric:         Mood and Affect: Mood normal.         Behavior: Behavior normal.         Thought Content: Thought content normal.           Problems Addressed this Visit    None     Visit Diagnoses     Paresthesia and pain of both upper extremities    -  Primary    Relevant Orders    CBC & Differential (Completed)    Comprehensive Metabolic Panel (Completed)    Sedimentation rate, automated (Completed)    CK (Completed)    EMG & Nerve Conduction Test    EMG & Nerve Conduction Test    Paresthesia of bilateral legs        Relevant Orders    CBC & Differential (Completed)    Comprehensive Metabolic Panel (Completed)    Sedimentation rate, automated (Completed)    CK (Completed)    EMG & Nerve Conduction Test    EMG & Nerve Conduction Test      Diagnoses       Codes Comments    Paresthesia and pain of both upper extremities    -  Primary ICD-10-CM: R20.2, M79.601, M79.602  ICD-9-CM: 782.0, 729.5     Paresthesia of bilateral legs     ICD-10-CM: R20.2  ICD-9-CM: 782.0             PLAN  On room to arrange for her to get some lab work done and I would like her to get an EMG and nerve conduction test.  Would like her to follow-up to discuss the results when they are available.  No follow-ups on file.

## 2022-06-10 ENCOUNTER — TELEPHONE (OUTPATIENT)
Dept: FAMILY MEDICINE CLINIC | Facility: CLINIC | Age: 67
End: 2022-06-10

## 2022-06-10 NOTE — TELEPHONE ENCOUNTER
MARISABEL WITH DR REYNOLDS WITH [facility] CALLED TO SEE IF 8 PAGE LAB REQ WAS RECEIVED. FAXED 6/8/ AND 6/10    CALL BACK NUMBER [phone]  REF # [id number]    NEEDS SIGNATURE, DATE AND SENT BACK

## 2022-06-13 RX ORDER — FLUOXETINE HYDROCHLORIDE 20 MG/1
CAPSULE ORAL
Qty: 360 CAPSULE | Refills: 0 | Status: SHIPPED | OUTPATIENT
Start: 2022-06-13 | End: 2022-09-14

## 2022-06-14 ENCOUNTER — TELEPHONE (OUTPATIENT)
Dept: FAMILY MEDICINE CLINIC | Facility: CLINIC | Age: 67
End: 2022-06-14

## 2022-06-14 LAB
CYTO UR: NORMAL
DX PRELIMINARY: NORMAL
LAB AP CASE REPORT: NORMAL
LAB AP CLINICAL INFORMATION: NORMAL
LAB AP FLOW CYTOMETRY SUMMARY: NORMAL
LAB AP SPECIAL STAINS: NORMAL
Lab: NORMAL
PATH REPORT.ADDENDUM SPEC: NORMAL
PATH REPORT.FINAL DX SPEC: NORMAL
PATH REPORT.GROSS SPEC: NORMAL

## 2022-06-15 ENCOUNTER — TELEPHONE (OUTPATIENT)
Dept: FAMILY MEDICINE CLINIC | Facility: CLINIC | Age: 67
End: 2022-06-15

## 2022-06-15 DIAGNOSIS — F41.9 ANXIETY: ICD-10-CM

## 2022-06-15 DIAGNOSIS — F51.01 PRIMARY INSOMNIA: ICD-10-CM

## 2022-06-15 NOTE — TELEPHONE ENCOUNTER
Caller: Cynthia Flower    Relationship: Self    Best call back number: [phone]    Who are you requesting to speak with (clinical staff, provider,  specific staff member): YOLETTE     What was the call regarding: HAS A COUPLE QUESTIONS WOULD LIKE TO DISCUSS WITH YOLETTE     Do you require a callback: YES

## 2022-06-16 ENCOUNTER — HOSPITAL ENCOUNTER (OUTPATIENT)
Dept: GENERAL RADIOLOGY | Facility: HOSPITAL | Age: 67
Discharge: HOME | End: 2022-06-16
Admitting: PHYSICIAN ASSISTANT

## 2022-06-16 ENCOUNTER — OFFICE VISIT (OUTPATIENT)
Dept: GASTROENTEROLOGY | Facility: CLINIC | Age: 67
End: 2022-06-16

## 2022-06-16 VITALS
HEIGHT: 61 IN | DIASTOLIC BLOOD PRESSURE: 71 MMHG | TEMPERATURE: 97.2 F | SYSTOLIC BLOOD PRESSURE: 108 MMHG | WEIGHT: 116.6 LBS | HEART RATE: 75 BPM | BODY MASS INDEX: 22.01 KG/M2

## 2022-06-16 DIAGNOSIS — K76.0 NAFLD (NONALCOHOLIC FATTY LIVER DISEASE): ICD-10-CM

## 2022-06-16 DIAGNOSIS — K20.0 ESOPHAGITIS, EOSINOPHILIC: ICD-10-CM

## 2022-06-16 DIAGNOSIS — R63.4 WEIGHT LOSS: ICD-10-CM

## 2022-06-16 DIAGNOSIS — K59.1 FUNCTIONAL DIARRHEA: Chronic | ICD-10-CM

## 2022-06-16 DIAGNOSIS — R13.13 DYSPHAGIA, CRICOPHARYNGEAL: ICD-10-CM

## 2022-06-16 DIAGNOSIS — R13.19 ESOPHAGEAL DYSPHAGIA: Primary | ICD-10-CM

## 2022-06-16 PROCEDURE — 74018 RADEX ABDOMEN 1 VIEW: CPT

## 2022-06-16 PROCEDURE — 99215 OFFICE O/P EST HI 40 MIN: CPT | Performed by: PHYSICIAN ASSISTANT

## 2022-06-16 RX ORDER — ONDANSETRON 4 MG/1
4 TABLET, FILM COATED ORAL EVERY 8 HOURS PRN
Qty: 60 TABLET | Refills: 2 | Status: SHIPPED | OUTPATIENT
Start: 2022-06-16 | End: 2022-07-25

## 2022-06-16 RX ORDER — ZOLPIDEM TARTRATE 10 MG/1
10 TABLET ORAL NIGHTLY PRN
Qty: 30 TABLET | Refills: 2 | Status: SHIPPED | OUTPATIENT
Start: 2022-06-16 | End: 2022-09-21 | Stop reason: SDUPTHER

## 2022-06-16 RX ORDER — ALPRAZOLAM 0.25 MG/1
TABLET ORAL
Qty: 60 TABLET | Refills: 1 | Status: SHIPPED | OUTPATIENT
Start: 2022-06-16 | End: 2022-10-26

## 2022-06-16 NOTE — TELEPHONE ENCOUNTER
Pt. Requesting a refill of  ALPRAZolam (XANAX) 0.25 MG tablet  zolpidem (AMBIEN) 10 MG tablet  06/16/22 [facility]

## 2022-06-16 NOTE — PROGRESS NOTES
"Chief Complaint  Abdominal Pain, Nausea, and Diarrhea    Subjective        History of Present Illness  Cynthia Flower is a  67 y.o. female patient of Dr. Beck, new to me today, here for complaints of abdominal pain, nausea and diarrhea.  She has a past medical history significant for dysphagia which is multifactorial with eosinophilic esophagitis, prominent cricopharyngeus, and esophageal dysmotility.  She also has history of diarrhea and fecal incontinence as well as elevated liver enzymes secondary to fatty liver.    Her past surgical history is significant for complicated diverticulitis status post resection with subsequent takedown.  Her last colonoscopy was completed by Dr. Tariq Arenas at [facility].    She presents today with complaints of \"horrible stomach pain\" sometimes explosive diarrhea occurring up to 6-8 times per day.  She does report today she had crampy abdominal pain followed by solid chunks with liquid stool.  She continues to struggle with difficulty swallowing but states this is manageable.  She feels as though the Levsin has been helping in this regard.  She has been taking Imodium to help aid with the diarrhea.    She has undergone esophageal motility testing read by Dr. Hale suggestive of esophageal spasms.  She has been tried on diltiazem with permission of cardiologist but this was discontinued secondary to leg cramps, flushing, dizziness following 1 dose.    Recent labs from 6/8/2022: Total , ALT 39, AST 42, normal total bilirubin and alkaline phosphatase.  CBC without abnormality. Last hepatic ultrasound completed on 3/6/2021 consistent with fatty infiltration of the liver, no gallstones wall thickening or pericholecystic fluid.  No ductal dilatation.    She underwent work-up for amyloidosis 5/11/2022.  Left abdominal fat pad tissue with amyloid present.  She has been referred to [facility] for consultation with upcoming appointment on 6/22/2022.    She has lost 7 pounds " since last being seen in April.      Past Medical History:   Diagnosis Date   • Anxiety    • Arthritis    • COVID-19 07/2020 9/7/2021   • Depression    • Diverticulitis of colon    • Diverticulosis    • GERD (gastroesophageal reflux disease)    • History of Clostridioides difficile infection 2008    HAS HAD SEVERAL TIMES IN PAST PER PT (SAW INFECTIOUS DISEASE MD FOR THIS S/P COLOSTOMY/EXTENSIVE ANBX THERAPY)   • History of prediabetes    • History of snoring    • History of stress incontinence    • Hypercholesterolemia    • Hyperlipidemia    • Hypertension    • Left ventricular hypertrophy     FOLLOWED BY DR MARI. DENIES CHEST PAIN BUT STATES DOES GET SOA AT TIMES WITH EXERTION REPORTS THAT IT IS NOT A NEW ISSUE    • Liver disease    • Oral herpes 08/18/2020   • Seasonal allergies     used to have allergy shots in the past   • SOB (shortness of breath)     STATES WITH EXERTION HAS BEEN ONGOING ISSUE NOT A NEW ISSUE   • Thyroid disease     Hypothyroid       Past Surgical History:   Procedure Laterality Date   • ABDOMINAL SURGERY  2005, 2014    ex lap x 2,  diverticulitis   • ABDOMINOPLASTY  2016   • ADENOIDECTOMY     • APPENDECTOMY     • CARDIAC CATHETERIZATION N/A 04/24/2020    Procedure: Coronary angiography;  Surgeon: Roberto Briones MD;  Location:  [facility];  Service: Cardiovascular;  Laterality: N/A;   • CARDIAC CATHETERIZATION N/A 04/24/2020    Procedure: Left heart cath;  Surgeon: Roberto Briones MD;  Location:  [facility];  Service: Cardiovascular;  Laterality: N/A;   • CARDIAC CATHETERIZATION N/A 04/24/2020    Procedure: Left ventriculography;  Surgeon: Roberto Briones MD;  Location:  [facility];  Service: Cardiovascular;  Laterality: N/A;   • COLONOSCOPY  approx 2018    normal per pt    • COLOSTOMY  2005    had colostomy and then is was reversed   • COLOSTOMY CLOSURE  2006   • CORRECTION HAMMER TOE Right 2017   • ECTOPIC  PREGNANCY SURGERY      1979, 1980   • ENDOSCOPY N/A 05/27/2020    Procedure: ESOPHAGOGASTRODUODENOSCOPY WITH BIOPSY AND BIOPSY POLYPECTOMY AND MACIEL DIALATION;  Surgeon: Preethi Beck MD;  Location:  [facility];  Service: Gastroenterology;  Laterality: N/A;  PRE: ESOPHAGEAL DYSPHAGIA  POST: Z LINE 46, ESOPHAGEAL SPASM, GASTRITIS, FUNDIC POLYP   • ESOPHAGEAL DILATATION N/A 12/07/2021    Procedure: OR ESOPHAGEAL DILATATION with maciel dilators ;  Surgeon: Jamey Leslie MD;  Location:  [facility];  Service: ENT;  Laterality: N/A;   • ESOPHAGEAL MOTILITY STUDY N/A 02/03/2022    Procedure: ESOPHAGEAL MOTILITY STUDY;  Surgeon: Endo, Nurse Performed;  Location:  [facility];  Service: Gastroenterology;  Laterality: N/A;  dypshagia    • FOOT SURGERY Right 12/2016    Second and third hammertoe corrections   • KNEE ARTHROSCOPY Right 2009   • KNEE SURGERY Right    • REVISION / TAKEDOWN COLOSTOMY  2006   • SHOULDER ARTHROSCOPY Right 2018    right shoulder arthroscopy with extensive rotator cuff, biceps and labral debridement, chondroplasty, & subacromial decompression with acromioplasty   • SHOULDER SURGERY      HAS HAS COMPLETE SHOULDER ON RIGHT. LEFT SCOPED AND HAD 2ND SURGERY WITH HARDWARE PLACED   • SHOULDER SURGERY Left     2012. 2013   • TONSILLECTOMY     • TOTAL SHOULDER REVERSE ARTHROPLASTY Right 2019   • WISDOM TOOTH EXTRACTION         Family History   Problem Relation Age of Onset   • Hypertension Mother    • Osteoporosis Mother    • Skin cancer Mother    • Heart disease Father    • Hypertension Father    • Breast cancer Neg Hx    • Ovarian cancer Neg Hx    • Colon cancer Neg Hx    • Deep vein thrombosis Neg Hx    • Pulmonary embolism Neg Hx    • Malig Hyperthermia Neg Hx        Social History     Socioeconomic History   • Marital status:    Tobacco Use   • Smoking status: Never Smoker   • Smokeless tobacco: Never Used   Vaping Use   • Vaping Use: Never used   Substance and Sexual  Activity   • Alcohol use: Not Currently     Alcohol/week: 1.0 standard drink     Types: 1 Glasses of wine per week   • Drug use: Never   • Sexual activity: Defer       Allergies   Allergen Reactions   • Azithromycin Rash   • Pravastatin Rash   • Zetia [Ezetimibe] Myalgia     cramps       Current Outpatient Medications on File Prior to Visit   Medication Sig Dispense Refill   • Acidophilus Lactobacillus capsule Take 1 capsule by mouth 2 (Two) Times a Day. 60 capsule 5   • ALPRAZolam (XANAX) 0.25 MG tablet TAKE 1 TABLET BY MOUTH TWICE DAILY AS NEEDED FOR ANXIETY 60 tablet 1   • amphetamine-dextroamphetamine XR (ADDERALL XR) 20 MG 24 hr capsule Take 1 capsule by mouth Every Morning (Patient taking differently: Take 20 mg by mouth As Needed) 30 capsule 0   • Cholecalciferol (Vitamin D3) 50 MCG (2000 UT) tablet Take 1 tablet by mouth Daily.     • FLUoxetine (PROzac) 20 MG capsule TAKE 1 CAPSULE BY MOUTH FOUR TIMES DAILY 360 capsule 0   • fluticasone (FLONASE) 50 MCG/ACT nasal spray 2 sprays by Each Nare route Daily. Shake well before using.     • hydroCHLOROthiazide (HYDRODIURIL) 25 MG tablet TAKE 1 TABLET BY MOUTH DAILY 90 tablet 1   • hyoscyamine (LEVSIN) 0.125 MG SL tablet DISSOLVE 1 TABLET UNDER THE TONGUE THREE TIMES DAILY BEFORE MEALS 270 tablet 0   • levothyroxine (SYNTHROID, LEVOTHROID) 75 MCG tablet TAKE 1 TABLET BY MOUTH EVERY DAY 90 tablet 3   • loperamide (IMODIUM A-D) 2 MG tablet Take 1 tablet by mouth 3 (Three) Times a Day As Needed for Diarrhea. 90 tablet 0   • loratadine (CLARITIN) 10 MG tablet TAKE 1 TABLET BY MOUTH DAILY 30 tablet 5   • mupirocin (BACTROBAN) 2 % ointment APPLY TWO TIMES PER DAY TO THE INFECTION/BIOPSY SITES     • olopatadine (PATANOL) 0.1 % ophthalmic solution INSTILL 1 DROP IN BOTH EYES TWICE DAILY 5 mL 5   • pantoprazole (PROTONIX) 20 MG EC tablet TAKE 1 TABLET BY MOUTH EVERY DAY. DO NOT TAKE WITHIN 2 HOURS OF THYROID MEDICATION 30 tablet 5   • potassium chloride 10 MEQ CR tablet Take  "1 tablet by mouth Daily. 10 tablet 0   • propranolol (INDERAL) 40 MG tablet TAKE 1 TABLET BY MOUTH THREE TIMES DAILY (Patient taking differently: Take 40 mg by mouth Daily. ORDERED TID BUT REPORTS ONLY TAKES QD) 270 tablet 1   • rosuvastatin (CRESTOR) 20 MG tablet TAKE 1 TABLET BY MOUTH DAILY 90 tablet 0   • traMADol (ULTRAM) 50 MG tablet Take 1 tablet by mouth Every 6 (Six) Hours As Needed for Moderate Pain . 40 tablet 1   • zolpidem (AMBIEN) 10 MG tablet TAKE 1 TABLET BY MOUTH AT NIGHT AS NEEDED FOR SLEEP 30 tablet 2   • ibuprofen (ADVIL,MOTRIN) 100 MG/5ML suspension Take 30 mL by mouth Every 6 (Six) Hours As Needed (pain). 946 mL 0   • meclizine (ANTIVERT) 25 MG tablet Take 1 tablet by mouth 3 (Three) Times a Day As Needed for Dizziness. 15 tablet 0   • mometasone (ELOCON) 0.1 % ointment APPLY SPARINGLY TWO TIMES PER DAY TO THE RIGHT THUMB     • montelukast (SINGULAIR) 10 MG tablet Take 10 mg by mouth every night at bedtime.     • Multiple Vitamins-Minerals (V-C Forte) capsule Take 1 capsule by mouth Daily.     • [DISCONTINUED] ALPRAZolam (XANAX) 0.25 MG tablet TAKE 1 TABLET BY MOUTH TWICE DAILY AS NEEDED FOR ANXIETY 60 tablet 0   • [DISCONTINUED] zolpidem (AMBIEN) 10 MG tablet Take 1 tablet by mouth At Night As Needed for Sleep. 30 tablet 1     Current Facility-Administered Medications on File Prior to Visit   Medication Dose Route Frequency Provider Last Rate Last Admin   • sodium chloride 0.9 % flush 10 mL  10 mL Intravenous PRN Nav Leslie MD       • sodium chloride 0.9 % flush 3 mL  3 mL Intravenous Q12H Nav Leslie MD       • sodium chloride 0.9 % infusion  25 mL/hr Intravenous Once Nav Leslie MD           Review of Systems     Objective   Vital Signs:   /71   Pulse 75   Temp 97.2 °F (36.2 °C)   Ht 154.9 cm (61\")   Wt 52.9 kg (116 lb 9.6 oz)   BMI 22.03 kg/m²       Physical Exam  Vitals and nursing note reviewed.   Constitutional:       General: She is not in acute " distress.     Appearance: Normal appearance. She is not ill-appearing.   HENT:      Head: Normocephalic and atraumatic.      Right Ear: External ear normal.      Left Ear: External ear normal.   Eyes:      General: No scleral icterus.     Conjunctiva/sclera: Conjunctivae normal.      Pupils: Pupils are equal, round, and reactive to light.   Cardiovascular:      Rate and Rhythm: Normal rate and regular rhythm.      Heart sounds: Normal heart sounds.   Pulmonary:      Effort: Pulmonary effort is normal.      Breath sounds: Normal breath sounds.   Abdominal:      General: Abdomen is flat. Bowel sounds are normal. There is no distension.      Palpations: Abdomen is soft.      Tenderness: There is no abdominal tenderness. There is no guarding or rebound.   Musculoskeletal:      Cervical back: Normal range of motion and neck supple.   Skin:     General: Skin is warm and dry.   Neurological:      Mental Status: She is alert and oriented to person, place, and time.   Psychiatric:         Mood and Affect: Mood normal.         Behavior: Behavior normal.          Result Review :     CMP    CMP 3/4/22 3/30/22 3/30/22 6/8/22     1522 1522    Glucose 93 118  73   BUN 17 19  15   Creatinine 0.66 0.68  0.56 (A)   Sodium 140 140  142   Potassium 3.4 (A) 4.1  4.0   Chloride 101 103  103   Calcium 10.6 (A) 9.4  9.4   Total Protein   5.8 (A) 6.3   Albumin 4.60 4.30 3.7 4.8   Globulin   2.1 (A) 1.5   Total Bilirubin 0.6 0.7  0.7   Alkaline Phosphatase 72 69  64   AST (SGOT) 37 (A) 33 (A)  42 (A)   ALT (SGPT) 40 (A) 35 (A)  39 (A)   (A) Abnormal value       Comments are available for some flowsheets but are not being displayed.           CBC w/diff    CBC w/Diff 3/30/22 4/13/22 6/8/22   WBC 8.57 7.19 5.6   RBC 4.60 4.64 4.82   Hemoglobin 14.1 14.1 14.2   Hematocrit 40.5 40.7 43.5   MCV 88.0 87.7 90   MCH 30.7 30.4 29.5   MCHC 34.8 34.6 32.6   RDW 13.3 13.1 13.2   Platelets 225 244 246   Neutrophil Rel % 54.6 42.7 41   Immature  Granulocyte Rel % 0.4 0.3    Lymphocyte Rel % 32.9 44.6 49   Monocyte Rel % 6.4 7.1 6   Eosinophil Rel % 5.3 5.0 4   Basophil Rel % 0.4 0.3 0                             Assessment and Plan    Diagnoses and all orders for this visit:    1. Esophageal dysphagia (Primary)    2. Esophagitis, eosinophilic    3. Dysphagia, cricopharyngeal    4. NAFLD (nonalcoholic fatty liver disease)  -     US Liver; Future  -     XR Abdomen KUB; Future    5. Functional diarrhea  -     US Liver; Future  -     XR Abdomen KUB; Future    6. Weight loss    Other orders  -     ondansetron (Zofran) 4 MG tablet; Take 1 tablet by mouth Every 8 (Eight) Hours As Needed for Nausea or Vomiting.  Dispense: 60 tablet; Refill: 2      · Will obtain KUB to evaluate fecal burden particularly given the fact that she is reporting crampy abdominal pain intermixed with solid and liquid stools.   · She continues to have a mild persistent elevation in transaminases and a history of nonalcoholic fatty liver disease.  We will repeat hepatic ultrasound  · Continue 20 mg pantoprazole.  · Continue Levsin.  · We discussed the need for nutritional supplementation.  It does appear she has intolerance to lactose.  Also suggested clear boost or Ensure's.  We also discussed blending her meals in order to obtain adequate nutrition.  She reports she has not been doing this because she has been overwhelmed with her ongoing issues and recent diagnosis.  · Also send a prescription in for Zofran to help with her nausea.    · Follow-up in 3 months, sooner if necessary.      Follow Up   Return in about 3 months (around 9/16/2022) for dr. shea.    I spent 40 minutes caring for Cynthia Flower on this date of service. This time includes time spent by me in the following activities: preparing for the visit, reviewing tests, obtaining and/or reviewing a separately obtained history, performing a medically appropriate examination and/or evaluation , counseling and educating the  patient/family/caregiver, ordering medications, tests, or procedures, documenting information in the medical record, independently interpreting results and communicating that information with the patient/family/caregiver and care coordination.    Dragon dictation used throughout this note.     MERE Hurst

## 2022-06-22 ENCOUNTER — TELEPHONE (OUTPATIENT)
Dept: FAMILY MEDICINE CLINIC | Facility: CLINIC | Age: 67
End: 2022-06-22

## 2022-06-22 NOTE — TELEPHONE ENCOUNTER
Caller: [facility]    Best call back number: [phone]    What is the best time to reach you: 9:30-6 Monday-Friday     Who are you requesting to speak with (clinical staff, provider,  specific staff member): CLINICAL STAFF    Do you know the name of the person who called: TORREY    What was the call regarding: MARISABEL STATES SHE IS CHECKING ON AN 8 PAGE LAB SENT BY FAX ON June 8TH FOR DR OLIVEIRA TO SIGN OFF ON AND RETURN VIA FAX: [phone]    Do you require a callback: YES

## 2022-06-27 ENCOUNTER — TELEPHONE (OUTPATIENT)
Dept: FAMILY MEDICINE CLINIC | Facility: CLINIC | Age: 67
End: 2022-06-27

## 2022-06-27 NOTE — TELEPHONE ENCOUNTER
Caller: Cynthia Flower    Relationship to patient: Self    Best call back number: [phone]    Type of visit: OV    If rescheduling, when is the original appointment: 6/27/22    Additional notes: PATIENT STATES SHE IS UNABLE TO MAKE IT TO THE APPOINTMENT DUE TO A SCHEDULE CONFLICT WITH OTHER DOCTORS FOR TREATMENT OF AMYLOIDOSIS. REQUESTS A CALL BACK TO EITHER RESCHEDULE, OR SIMPLY REVIEW LABS RESULTS WITHOUT A FULL APPOINTMENT IF ABLE

## 2022-06-29 NOTE — TELEPHONE ENCOUNTER
CALLED PT AND RE-SCHEDULED APPT AS TELE-HEALTH VISIT WITH PCP ON 7/8/22 PER PT REQUEST (PT WAS UNABLE TO MAKE APPT ON 6/27/22 DUE TO A FEVER)

## 2022-06-29 NOTE — TELEPHONE ENCOUNTER
Caller: [facility]    Relationship: Other    Best call back number: [phone]    What was the call regarding: CALLING TO CHECK ON THE STATUS OF THE PAPERWORK THAT WAS FAXED ON 6/13. PLEASE ADVISE.     PLEASE FAX TO: [phone]    Do you require a callback: IF IT NEEDS TO BE RESENT.

## 2022-06-30 NOTE — TELEPHONE ENCOUNTER
I CALLED [email or web address] BACK AND INFORMED THAT TEE [facility]ICE IS STILL REVIEWING PAPERWORK AND WILL SEND IN AS SOON AS DR. OLIVEIRA FILL OUT, SHE VERBALIZED UNDERSTANDING.

## 2022-07-08 ENCOUNTER — OFFICE VISIT (OUTPATIENT)
Dept: FAMILY MEDICINE CLINIC | Facility: CLINIC | Age: 67
End: 2022-07-08

## 2022-07-08 DIAGNOSIS — E78.00 HIGH CHOLESTEROL: Primary | ICD-10-CM

## 2022-07-08 DIAGNOSIS — M19.90 ARTHRITIS: ICD-10-CM

## 2022-07-08 DIAGNOSIS — E85.81 LIGHT CHAIN (AL) AMYLOIDOSIS: ICD-10-CM

## 2022-07-08 PROCEDURE — 99442 PR PHYS/QHP TELEPHONE EVALUATION 11-20 MIN: CPT | Performed by: INTERNAL MEDICINE

## 2022-07-08 RX ORDER — TRAMADOL HYDROCHLORIDE 50 MG/1
50 TABLET ORAL EVERY 6 HOURS PRN
Qty: 40 TABLET | Refills: 2 | Status: SHIPPED | OUTPATIENT
Start: 2022-07-08

## 2022-07-08 RX ORDER — ROSUVASTATIN CALCIUM 20 MG/1
20 TABLET, COATED ORAL DAILY
Qty: 90 TABLET | Refills: 0 | Status: SHIPPED | OUTPATIENT
Start: 2022-07-08 | End: 2022-10-04

## 2022-07-12 NOTE — PROGRESS NOTES
Subjective   Cynthia Flower is a 67 y.o. female.     This is documentation for telephone visit.    The patient consented to the visit.    She has been enrolled in a clinical trial for treatment of amyloidosis.  Follows up with oncologist in [place] and also at [facility].              Review of Systems   Constitutional: Negative.    HENT: Negative.    Respiratory: Negative.    Cardiovascular: Negative.    Musculoskeletal: Negative.    Psychiatric/Behavioral: Negative.        Objective   Physical Exam  Of physical examination was not done.  This is a telephone visit.  Assessment & Plan   Diagnoses and all orders for this visit:    1. High cholesterol (Primary)    2. Arthritis    3. Light chain (AL) amyloidosis (HCC)    Other orders  -     traMADol (ULTRAM) 50 MG tablet; Take 1 tablet by mouth Every 6 (Six) Hours As Needed for Moderate Pain .  Dispense: 40 tablet; Refill: 2  -     rosuvastatin (CRESTOR) 20 MG tablet; Take 1 tablet by mouth Daily.  Dispense: 90 tablet; Refill: 0         She finds tramadol works pretty well to help manage her diffuse degenerative arthritis pain.  I refilled that for her today.    She needed a refill of rosuvastatin.  When we last checked her lipid profile and March, and looked very good so I refill that medication today 2.    She and I reviewed her note from her specialist at [place] regarding the diagnosis of in the management of amyloidosis.    I asked her to make a telephone visit appointment to see me in about 6 months.  Fasting labs prior that visit should include: Lipid profile, comprehensive metabolic panel, CBC, urinalysis,    Total time spent in consultation with the patient on the phone and and documentation exceeded 11 minutes.

## 2022-07-13 ENCOUNTER — APPOINTMENT (OUTPATIENT)
Dept: ULTRASOUND IMAGING | Facility: HOSPITAL | Age: 67
End: 2022-07-13

## 2022-07-20 ENCOUNTER — TELEPHONE (OUTPATIENT)
Dept: ONCOLOGY | Facility: CLINIC | Age: 67
End: 2022-07-20

## 2022-07-20 NOTE — TELEPHONE ENCOUNTER
Caller: Cynthia Flower    Relationship: Self    Best call back number: [phone]    What is the best time to reach you: ANYTIME    Who are you requesting to speak with (clinical staff, provider,  specific staff member): DR VILLANUEVA OR NURSE    What was the call regarding: PT JUST HAD LABS WITH DR LEE LAST WEEK SOMETIME. SHE WOULD LIKE TO KNOW IF SHE SHOULD STILL COME TO OUR OFFICE 7/27 FOR LABS AND THEN 8/1 FOR HER F/U WITH DR VILLANUEVA.     PLEASE CALL ADVISE.     Do you require a callback: YES

## 2022-07-21 NOTE — NURSING NOTE
Pt would like to skip her lab appt and just see Dr. Sal. She had labs on 6/22 at [facility]. Message sent to Dr. Sal to see if this is ok. Pt V/U.

## 2022-07-22 ENCOUNTER — TELEPHONE (OUTPATIENT)
Dept: SURGERY | Facility: CLINIC | Age: 67
End: 2022-07-22

## 2022-07-22 NOTE — TELEPHONE ENCOUNTER
Called pt to see if she was still interested in moving up her appt with PIYUSH Childress.  She states that she is having some medical issues and would prefer to keep her appt on 09/19/2022 at 10:30.  I mailed her NP paperwork to her again today- she said she has possibly lost it.

## 2022-07-25 ENCOUNTER — PATIENT MESSAGE (OUTPATIENT)
Dept: ONCOLOGY | Facility: HOSPITAL | Age: 67
End: 2022-07-25

## 2022-07-27 ENCOUNTER — APPOINTMENT (OUTPATIENT)
Dept: LAB | Facility: HOSPITAL | Age: 67
End: 2022-07-27

## 2022-08-01 ENCOUNTER — APPOINTMENT (OUTPATIENT)
Dept: LAB | Facility: HOSPITAL | Age: 67
End: 2022-08-01

## 2022-08-01 LAB
DX PRELIMINARY: NORMAL
LAB AP CASE REPORT: NORMAL
LAB AP CLINICAL INFORMATION: NORMAL
LAB AP DIAGNOSIS COMMENT: NORMAL
LAB AP SPECIAL STAINS: NORMAL
Lab: NORMAL
PATH REPORT.ADDENDUM SPEC: NORMAL
PATH REPORT.FINAL DX SPEC: NORMAL
PATH REPORT.GROSS SPEC: NORMAL

## 2022-08-01 RX ORDER — HYDROCHLOROTHIAZIDE 25 MG/1
25 TABLET ORAL DAILY
Qty: 90 TABLET | Refills: 1 | Status: SHIPPED | OUTPATIENT
Start: 2022-08-01 | End: 2022-08-30

## 2022-08-01 NOTE — PROGRESS NOTES
Discussed with Dr. Buenrostro at [facility].  He has been trying to get her on a clinical study there with standard of care daratumumab, Cytoxan, bortezomib, dexamethasone (Sissy-CyBorD) plus an antibody but her troponin levels with the study sponsor have all been normal/too low to enroll in the study.  Suspect stage IIIa disease.    Therefore, plan standard of care therapy here.  We will schedule follow-up for her.  Plan 6 cycles of therapy and then reevaluation at [facility] for high-dose therapy with autologous stem cell rescue.  If she has had a fantastic response after 3 cycles of therapy he could consider stem cell collection at that point.    Sissy-CyBorD    Daratumumab and hyaluronidase (Darzalex Faspro) as follows:  Cycles 1 & 2: 1800 mg SC once per day on days 1, 8, 15, 22  Cycles 3 to 6: 1800 mg SC once per day on days 1 & 15  Cycles 7 up to 24: 1800 mg SC once on day 1    Bortezomib (Velcade) as follows:  Cycles 1 to 6: 1.3 mg/m2 SC once per day on days 1, 8, 15, 22    Cyclophosphamide (Cytoxan) as follows:  Cycles 1 to 6: 300 mg/m2 (maximum dose of 500 mg) PO or IV once per day on days 1, 8, 15, 22    Dexamethasone (Decadron) as follows:  Cycles 1 to 6: 40 mg PO or IV once per day on days 1, 8, 15, 22 (see note)  28-day cycle for up to 24 cycles

## 2022-08-02 ENCOUNTER — TELEPHONE (OUTPATIENT)
Dept: GENERAL RADIOLOGY | Facility: HOSPITAL | Age: 67
End: 2022-08-02

## 2022-08-02 ENCOUNTER — TELEPHONE (OUTPATIENT)
Dept: ONCOLOGY | Facility: CLINIC | Age: 67
End: 2022-08-02

## 2022-08-02 DIAGNOSIS — I10 HYPERTENSION, UNSPECIFIED TYPE: ICD-10-CM

## 2022-08-02 DIAGNOSIS — Z79.899 ENCOUNTER FOR LONG-TERM (CURRENT) USE OF OTHER MEDICATIONS: ICD-10-CM

## 2022-08-02 DIAGNOSIS — E85.81 AL AMYLOIDOSIS: ICD-10-CM

## 2022-08-02 DIAGNOSIS — Z91.89 HIGH RISK FOR CHEMOTHERAPY-INDUCED INFECTIOUS COMPLICATION: ICD-10-CM

## 2022-08-02 DIAGNOSIS — Z13.220 SCREENING FOR CHOLESTEROL LEVEL: ICD-10-CM

## 2022-08-02 DIAGNOSIS — E85.81 LIGHT CHAIN (AL) AMYLOIDOSIS: ICD-10-CM

## 2022-08-02 DIAGNOSIS — I20.0 UNSTABLE ANGINA: ICD-10-CM

## 2022-08-02 DIAGNOSIS — R79.89 ELEVATED BRAIN NATRIURETIC PEPTIDE (BNP) LEVEL: Primary | ICD-10-CM

## 2022-08-02 DIAGNOSIS — Z79.899 HIGH RISK MEDICATION USE: ICD-10-CM

## 2022-08-02 DIAGNOSIS — R19.7 DIARRHEA, UNSPECIFIED TYPE: Primary | ICD-10-CM

## 2022-08-02 RX ORDER — DIPHENOXYLATE HYDROCHLORIDE AND ATROPINE SULFATE 2.5; .025 MG/1; MG/1
1 TABLET ORAL 4 TIMES DAILY PRN
Qty: 120 TABLET | Refills: 0 | Status: SHIPPED | OUTPATIENT
Start: 2022-08-02 | End: 2023-01-17 | Stop reason: SDUPTHER

## 2022-08-02 NOTE — TELEPHONE ENCOUNTER
Caller: Cynthia Flower    Relationship: Self    Best call back number: [phone]    Requested Prescriptions:   LOMOTIL 2.5 MG      Pharmacy where request should be sent: [id number] - [place], [place] - [address] AT [facility] OF [address] [facility] & [place] - [phone] [facility] [phone] FX     Additional details provided by patient: PT STATES THAT DR SY LEE WAS PRESCRIBING HER LOMOTIL 2.5 MG AND SHE IS GOING TO CONTINUE CARE AGAIN WITH DR VILLANUEVA. SHE HAS ONE PILL LEFT AND DOESN'T WANT TO RUN OUT OF THIS MED. PLEASE FILL ASAP AND CALL PT TO ADVISE.     Does the patient have less than a 3 day supply:  [x] Yes  [] No

## 2022-08-02 NOTE — TELEPHONE ENCOUNTER
Called pt with her appt with dr encarnacion and she has   asked if she needs to do all her scans at [facility] / mri etc etc - advised pt to talk with dr encarnacion write all questions since she did not qualify for the trial - she has also asked if a rx could be called in (dithenoxylate/atropin 2.5 mg) sent this mess to [facility]

## 2022-08-02 NOTE — TELEPHONE ENCOUNTER
----- Message from Nav Sal MD sent at 8/1/2022  4:24 PM EDT -----  Regarding: Follow-up  Wendy,    She has been trying to get on a clinical trial at [facility] but unfortunately she is not eligible.  Therefore, she needs to see me to discuss starting therapy with labs that day.  Labs including CBC, CMP, serum protein electrophoresis with immunofixation and serum free light chains, troponin, NTproBNP, 5th gen Troponin T (if we have that, otherwise just troponin T).    I will see her first with just labs.  Please also schedule an education session for treatment a few days after she sees me.  Please then schedule her first treatment soon after that.    Laura, see my note.  Plan Sissy-Emelina per protocol.  The protocol is outlined in my note.  Please put in the treatment plan for approval.    Thanks, LEWIS

## 2022-08-08 NOTE — PROGRESS NOTES
[facility] GROUP OUTPATIENT FOLLOW UP CLINIC VISIT    REASON FOR FOLLOW-UP:    Concern for AL amyloidosis    HISTORY OF PRESENT ILLNESS:  Cynthia Flower is a 67 y.o. female who returns today for follow up of the above issue.      She remains fatigued.  Cardiac palpitations persist.  She tolerated the bone marrow aspiration and biopsy well.  ***  REVIEW OF SYSTEMS:  As per the \[facility] [place] [facility]\""    PHYSICAL EXAMINATION:    There were no vitals filed for this visit.    General:  No acute distress, awake, alert and oriented  Skin:  Warm and dry, no visible rash  HEENT:  Normocephalic/atraumatic.  Wearing a face mask.  Chest:  Normal respiratory effort  Extremities:  No visible clubbing, cyanosis, or edema  Neuro/psych:  Grossly nonfocal.  Normal mood and affect.  ***      DIAGNOSTIC DATA:  ***  IMAGING:    ***  None reviewed    ASSESSMENT:  This is a 67 y.o. female with:    *Concern for amyloidosis  · She had a stress echocardiogram on 4/24/2020 showing a normal left ventricular ejection fraction of 60% with moderate concentric hypertrophy but no wall motion abnormalities of the left ventricle.  There was impaired relaxation noted.  She complained of chest pain during the examination.  There was some ST segment depression.  Cardiac catheterization was performed on the same day.  No intervention was required.  · Follow-up echocardiogram on 4/15/2021 showed a left ventricular ejection fraction of 61 to 65% with normal LV cavity size.  Left ventricular wall thickness showed moderate concentric hypertrophy.  Diastolic function was impaired.  No pericardial effusion.  Small left pleural effusion.  · She had follow-up PYP imaging for cardiac amyloidosis that was not suggestive of ATTR amyloidosis  · Laboratory values on 2/24/2022 showed a high serum free light chain lambda of 121, normal kappa of 10.5, low ratio at 0.09.  Serum protein electrophoresis showed no evidence of an M spike.  Urine light chains were abnormal with  an elevated lambda light chain of 33 and a low ratio of 0.48 with a 12.7 mg M spike on 24-hour urine protein electrophoresis.  · BNP was elevated at 2306 on 3/4/2022.  The troponin was normal at 0.03.  CK was 212 with a CK-MB of 10.87.  · Initial consult on 3/30/22. No M spike on serum protein electrophoresis. SIFE 'presence of monoclonal protein is unclear.'   · Bone marrow biopsy 4/13/22 normocellular, 12.1% plasma cells consistent with low volume plasma cell dyscrasia. FISH pending. No amyloid noted as Congo red staining negative.       PLAN:  ***  1. Follow-up bone marrow FISH studies  2. I will communicate with Dr. Pruitt as the patient states she still needs to have some cardiac heart rhythm monitoring done (previously delayed because of her need for a breast biopsy).  3. I think at this point a fat pad biopsy is warranted.  I will refer her to general surgery for this.  4. If the fat pad biopsy is negative she will likely need a myocardial biopsy and she may need referral to a tertiary care center for this.  I will communicate with Dr. Pruitt her cardiologist about this as well.  5. I will follow-up with a fat pad biopsy results and make plans as appropriate based on this but otherwise plan to see her back in 3 to 4 months with labs done a few days prior.    I spent 51 minutes in this visit today reviewing her record, communicating with her and her , communicating with staff here in the office, communicating with interventional radiology, placing orders, documenting the encounter.

## 2022-08-09 ENCOUNTER — OFFICE VISIT (OUTPATIENT)
Dept: ONCOLOGY | Facility: CLINIC | Age: 67
End: 2022-08-09

## 2022-08-09 ENCOUNTER — LAB (OUTPATIENT)
Dept: LAB | Facility: HOSPITAL | Age: 67
End: 2022-08-09

## 2022-08-09 ENCOUNTER — TELEPHONE (OUTPATIENT)
Dept: ONCOLOGY | Facility: HOSPITAL | Age: 67
End: 2022-08-09

## 2022-08-09 VITALS
WEIGHT: 116.9 LBS | OXYGEN SATURATION: 97 % | BODY MASS INDEX: 22.07 KG/M2 | DIASTOLIC BLOOD PRESSURE: 66 MMHG | RESPIRATION RATE: 16 BRPM | HEART RATE: 70 BPM | HEIGHT: 61 IN | SYSTOLIC BLOOD PRESSURE: 117 MMHG | TEMPERATURE: 97.8 F

## 2022-08-09 DIAGNOSIS — E85.81 LIGHT CHAIN (AL) AMYLOIDOSIS: ICD-10-CM

## 2022-08-09 DIAGNOSIS — I42.9 CARDIOMYOPATHY, UNSPECIFIED TYPE: ICD-10-CM

## 2022-08-09 DIAGNOSIS — E85.81 AL AMYLOIDOSIS: Primary | ICD-10-CM

## 2022-08-09 DIAGNOSIS — E85.81 AL AMYLOIDOSIS: ICD-10-CM

## 2022-08-09 DIAGNOSIS — E85.9 AMYLOIDOSIS: ICD-10-CM

## 2022-08-09 PROBLEM — Z92.25 STATUS POST RECENT IMMUNOSUPPRESSIVE THERAPY: Status: ACTIVE | Noted: 2022-08-09

## 2022-08-09 LAB
ALBUMIN SERPL-MCNC: 4.5 G/DL (ref 3.5–5.2)
ALBUMIN/GLOB SERPL: 2.3 G/DL (ref 1.1–2.4)
ALP SERPL-CCNC: 65 U/L (ref 38–116)
ALT SERPL W P-5'-P-CCNC: 59 U/L (ref 0–33)
ANION GAP SERPL CALCULATED.3IONS-SCNC: 12.6 MMOL/L (ref 5–15)
AST SERPL-CCNC: 45 U/L (ref 0–32)
BASOPHILS # BLD AUTO: 0.04 10*3/MM3 (ref 0–0.2)
BASOPHILS NFR BLD AUTO: 0.5 % (ref 0–1.5)
BILIRUB SERPL-MCNC: 0.5 MG/DL (ref 0.2–1.2)
BUN SERPL-MCNC: 10 MG/DL (ref 6–20)
BUN/CREAT SERPL: 14.9 (ref 7.3–30)
CALCIUM SPEC-SCNC: 10.1 MG/DL (ref 8.5–10.2)
CHLORIDE SERPL-SCNC: 100 MMOL/L (ref 98–107)
CO2 SERPL-SCNC: 26.4 MMOL/L (ref 22–29)
CREAT SERPL-MCNC: 0.67 MG/DL (ref 0.6–1.1)
DEPRECATED RDW RBC AUTO: 42.1 FL (ref 37–54)
EGFRCR SERPLBLD CKD-EPI 2021: 95.9 ML/MIN/1.73
EOSINOPHIL # BLD AUTO: 0.28 10*3/MM3 (ref 0–0.4)
EOSINOPHIL NFR BLD AUTO: 3.5 % (ref 0.3–6.2)
ERYTHROCYTE [DISTWIDTH] IN BLOOD BY AUTOMATED COUNT: 12.7 % (ref 12.3–15.4)
GLOBULIN UR ELPH-MCNC: 2 GM/DL (ref 1.8–3.5)
GLUCOSE SERPL-MCNC: 85 MG/DL (ref 74–124)
HCT VFR BLD AUTO: 42.6 % (ref 34–46.6)
HGB BLD-MCNC: 14.6 G/DL (ref 12–15.9)
IMM GRANULOCYTES # BLD AUTO: 0.01 10*3/MM3 (ref 0–0.05)
IMM GRANULOCYTES NFR BLD AUTO: 0.1 % (ref 0–0.5)
LYMPHOCYTES # BLD AUTO: 3.56 10*3/MM3 (ref 0.7–3.1)
LYMPHOCYTES NFR BLD AUTO: 44.7 % (ref 19.6–45.3)
MCH RBC QN AUTO: 31 PG (ref 26.6–33)
MCHC RBC AUTO-ENTMCNC: 34.3 G/DL (ref 31.5–35.7)
MCV RBC AUTO: 90.4 FL (ref 79–97)
MONOCYTES # BLD AUTO: 0.37 10*3/MM3 (ref 0.1–0.9)
MONOCYTES NFR BLD AUTO: 4.6 % (ref 5–12)
NEUTROPHILS NFR BLD AUTO: 3.71 10*3/MM3 (ref 1.7–7)
NEUTROPHILS NFR BLD AUTO: 46.6 % (ref 42.7–76)
NRBC BLD AUTO-RTO: 0 /100 WBC (ref 0–0.2)
NT-PROBNP SERPL-MCNC: 3960 PG/ML (ref 0–900)
PLATELET # BLD AUTO: 216 10*3/MM3 (ref 140–450)
PMV BLD AUTO: 10 FL (ref 6–12)
POTASSIUM SERPL-SCNC: 4 MMOL/L (ref 3.5–4.7)
PROT SERPL-MCNC: 6.5 G/DL (ref 6.3–8)
RBC # BLD AUTO: 4.71 10*6/MM3 (ref 3.77–5.28)
SODIUM SERPL-SCNC: 139 MMOL/L (ref 134–145)
TROPONIN T SERPL-MCNC: 0.03 NG/ML (ref 0–0.03)
WBC NRBC COR # BLD: 7.97 10*3/MM3 (ref 3.4–10.8)

## 2022-08-09 PROCEDURE — 99215 OFFICE O/P EST HI 40 MIN: CPT | Performed by: INTERNAL MEDICINE

## 2022-08-09 PROCEDURE — 84484 ASSAY OF TROPONIN QUANT: CPT | Performed by: INTERNAL MEDICINE

## 2022-08-09 PROCEDURE — 85025 COMPLETE CBC W/AUTO DIFF WBC: CPT

## 2022-08-09 PROCEDURE — 36415 COLL VENOUS BLD VENIPUNCTURE: CPT

## 2022-08-09 PROCEDURE — 80053 COMPREHEN METABOLIC PANEL: CPT

## 2022-08-09 PROCEDURE — 83880 ASSAY OF NATRIURETIC PEPTIDE: CPT | Performed by: INTERNAL MEDICINE

## 2022-08-09 RX ORDER — MEPERIDINE HYDROCHLORIDE 25 MG/ML
25 INJECTION INTRAMUSCULAR; INTRAVENOUS; SUBCUTANEOUS
Status: CANCELLED | OUTPATIENT
Start: 2022-08-30

## 2022-08-09 RX ORDER — ACETAMINOPHEN 500 MG
1000 TABLET ORAL ONCE
Status: CANCELLED | OUTPATIENT
Start: 2022-08-19

## 2022-08-09 RX ORDER — ACETAMINOPHEN 500 MG
1000 TABLET ORAL ONCE
Status: CANCELLED | OUTPATIENT
Start: 2022-08-30

## 2022-08-09 RX ORDER — PALONOSETRON 0.05 MG/ML
0.25 INJECTION, SOLUTION INTRAVENOUS ONCE
Status: CANCELLED | OUTPATIENT
Start: 2022-08-19

## 2022-08-09 RX ORDER — MEPERIDINE HYDROCHLORIDE 25 MG/ML
25 INJECTION INTRAMUSCULAR; INTRAVENOUS; SUBCUTANEOUS
Status: CANCELLED | OUTPATIENT
Start: 2022-08-19

## 2022-08-09 RX ORDER — ACETAMINOPHEN 500 MG
1000 TABLET ORAL ONCE
Status: CANCELLED | OUTPATIENT
Start: 2022-09-06

## 2022-08-09 RX ORDER — CHOLESTYRAMINE 4 G/9G
1 POWDER, FOR SUSPENSION ORAL
Qty: 60 PACKET | Refills: 4 | Status: SHIPPED | OUTPATIENT
Start: 2022-08-09

## 2022-08-09 RX ORDER — BORTEZOMIB 3.5 MG/1
1.3 INJECTION, POWDER, LYOPHILIZED, FOR SOLUTION INTRAVENOUS; SUBCUTANEOUS ONCE
Status: CANCELLED | OUTPATIENT
Start: 2022-08-19

## 2022-08-09 RX ORDER — FAMOTIDINE 10 MG/ML
20 INJECTION, SOLUTION INTRAVENOUS AS NEEDED
Status: CANCELLED | OUTPATIENT
Start: 2022-09-13

## 2022-08-09 RX ORDER — BORTEZOMIB 3.5 MG/1
1.3 INJECTION, POWDER, LYOPHILIZED, FOR SOLUTION INTRAVENOUS; SUBCUTANEOUS ONCE
Status: CANCELLED | OUTPATIENT
Start: 2022-08-30

## 2022-08-09 RX ORDER — PALONOSETRON 0.05 MG/ML
0.25 INJECTION, SOLUTION INTRAVENOUS ONCE
Status: CANCELLED | OUTPATIENT
Start: 2022-09-13

## 2022-08-09 RX ORDER — DIPHENHYDRAMINE HCL 25 MG
50 TABLET ORAL ONCE AS NEEDED
Status: CANCELLED | OUTPATIENT
Start: 2022-08-12

## 2022-08-09 RX ORDER — BORTEZOMIB 3.5 MG/1
1.3 INJECTION, POWDER, LYOPHILIZED, FOR SOLUTION INTRAVENOUS; SUBCUTANEOUS ONCE
Status: CANCELLED | OUTPATIENT
Start: 2022-09-06

## 2022-08-09 RX ORDER — SODIUM CHLORIDE 9 MG/ML
250 INJECTION, SOLUTION INTRAVENOUS ONCE
Status: CANCELLED | OUTPATIENT
Start: 2022-08-19

## 2022-08-09 RX ORDER — DIPHENHYDRAMINE HYDROCHLORIDE 50 MG/ML
50 INJECTION INTRAMUSCULAR; INTRAVENOUS AS NEEDED
Status: CANCELLED | OUTPATIENT
Start: 2022-08-19

## 2022-08-09 RX ORDER — MEPERIDINE HYDROCHLORIDE 25 MG/ML
25 INJECTION INTRAMUSCULAR; INTRAVENOUS; SUBCUTANEOUS
Status: CANCELLED | OUTPATIENT
Start: 2022-09-13

## 2022-08-09 RX ORDER — FAMOTIDINE 10 MG/ML
20 INJECTION, SOLUTION INTRAVENOUS AS NEEDED
Status: CANCELLED | OUTPATIENT
Start: 2022-08-30

## 2022-08-09 RX ORDER — MEPERIDINE HYDROCHLORIDE 25 MG/ML
25 INJECTION INTRAMUSCULAR; INTRAVENOUS; SUBCUTANEOUS
Status: CANCELLED | OUTPATIENT
Start: 2022-09-06

## 2022-08-09 RX ORDER — DIPHENHYDRAMINE HYDROCHLORIDE 50 MG/ML
50 INJECTION INTRAMUSCULAR; INTRAVENOUS AS NEEDED
Status: CANCELLED | OUTPATIENT
Start: 2022-09-13

## 2022-08-09 RX ORDER — ACETAMINOPHEN 500 MG
1000 TABLET ORAL ONCE
Status: CANCELLED | OUTPATIENT
Start: 2022-09-13

## 2022-08-09 RX ORDER — PALONOSETRON 0.05 MG/ML
0.25 INJECTION, SOLUTION INTRAVENOUS ONCE
Status: CANCELLED | OUTPATIENT
Start: 2022-09-06

## 2022-08-09 RX ORDER — DIPHENHYDRAMINE HYDROCHLORIDE 50 MG/ML
50 INJECTION INTRAMUSCULAR; INTRAVENOUS AS NEEDED
Status: CANCELLED | OUTPATIENT
Start: 2022-08-30

## 2022-08-09 RX ORDER — PALONOSETRON 0.05 MG/ML
0.25 INJECTION, SOLUTION INTRAVENOUS ONCE
Status: CANCELLED | OUTPATIENT
Start: 2022-08-30

## 2022-08-09 RX ORDER — FAMOTIDINE 10 MG/ML
20 INJECTION, SOLUTION INTRAVENOUS AS NEEDED
Status: CANCELLED | OUTPATIENT
Start: 2022-09-06

## 2022-08-09 RX ORDER — EPINEPHRINE 1 MG/ML
0.3 INJECTION, SOLUTION, CONCENTRATE INTRAVENOUS AS NEEDED
Status: CANCELLED | OUTPATIENT
Start: 2022-08-12

## 2022-08-09 RX ORDER — FAMOTIDINE 10 MG/ML
20 INJECTION, SOLUTION INTRAVENOUS AS NEEDED
Status: CANCELLED | OUTPATIENT
Start: 2022-08-19

## 2022-08-09 RX ORDER — SODIUM CHLORIDE 9 MG/ML
250 INJECTION, SOLUTION INTRAVENOUS ONCE
Status: CANCELLED | OUTPATIENT
Start: 2022-09-13

## 2022-08-09 RX ORDER — DIPHENHYDRAMINE HYDROCHLORIDE 50 MG/ML
50 INJECTION INTRAMUSCULAR; INTRAVENOUS AS NEEDED
Status: CANCELLED | OUTPATIENT
Start: 2022-09-06

## 2022-08-09 RX ORDER — BORTEZOMIB 3.5 MG/1
1.3 INJECTION, POWDER, LYOPHILIZED, FOR SOLUTION INTRAVENOUS; SUBCUTANEOUS ONCE
Status: CANCELLED | OUTPATIENT
Start: 2022-09-13

## 2022-08-09 RX ORDER — MONTELUKAST SODIUM 10 MG/1
10 TABLET ORAL ONCE
Status: CANCELLED | OUTPATIENT
Start: 2022-08-19

## 2022-08-09 RX ORDER — SODIUM CHLORIDE 9 MG/ML
250 INJECTION, SOLUTION INTRAVENOUS ONCE
Status: CANCELLED | OUTPATIENT
Start: 2022-08-30

## 2022-08-09 RX ORDER — SODIUM CHLORIDE 9 MG/ML
250 INJECTION, SOLUTION INTRAVENOUS ONCE
Status: CANCELLED | OUTPATIENT
Start: 2022-09-06

## 2022-08-09 NOTE — TELEPHONE ENCOUNTER
Therapy plan placed for Evlilyeld and message sent to scheduling. I attempted to call pt to let her know NOT to get the covid booster until 2 weeks after the evusheld. I provided my direct line to return call.

## 2022-08-09 NOTE — PROGRESS NOTES
"[facility] GROUP OUTPATIENT FOLLOW UP CLINIC VISIT    REASON FOR FOLLOW-UP:    AL amyloidosis    HISTORY OF PRESENT ILLNESS:  Cynthia Flower is a 67 y.o. female who returns today for follow up of the above issue.      She went to [place] and was hoping to enroll in a clinical study.  However, she ended up not being a candidate for the study and returns here to discuss systemic therapy.    She notes left lower extremity abdominal pain.  She notes frequent diarrhea.  Lomotil is helping some.  No bleeding.  Energy level is adequate but lower than she would like.    REVIEW OF SYSTEMS:  As per the HPI    PHYSICAL EXAMINATION:    Vitals:    08/09/22 1423   BP: 117/66   Pulse: 70   Resp: 16   Temp: 97.8 °F (36.6 °C)   TempSrc: Temporal   SpO2: 97%   Weight: 53 kg (116 lb 14.4 oz)   Height: 154.9 cm (60.98\")   PainSc:   6   PainLoc: Leg       General:  No acute distress, awake, alert and oriented  Skin:  Warm and dry, no visible rash  HEENT:  Normocephalic/atraumatic.  Wearing a face mask.  Chest:  Normal respiratory effort.  Lungs clear to auscultation bilaterally.  Heart: Regular rate and rhythm  Extremities:  No visible clubbing, cyanosis, or edema  Neuro/psych:  Grossly nonfocal.  Normal mood and affect.        DIAGNOSTIC DATA:  Tissue Pathology Exam (04/13/2022 10:28)  CBC & Differential (08/09/2022 14:12)  Comprehensive Metabolic Panel (08/09/2022 14:12)      IMAGING:    None reviewed    ASSESSMENT:  This is a 67 y.o. female with:    *AL amyloidosis  · She had a stress echocardiogram on 4/24/2020 showing a normal left ventricular ejection fraction of 60% with moderate concentric hypertrophy but no wall motion abnormalities of the left ventricle.  There was impaired relaxation noted.  She complained of chest pain during the examination.  There was some ST segment depression.  Cardiac catheterization was performed on the same day.  No intervention was required.  · Follow-up echocardiogram on 4/15/2021 " showed a left ventricular ejection fraction of 61 to 65% with normal LV cavity size.  Left ventricular wall thickness showed moderate concentric hypertrophy.  Diastolic function was impaired.  No pericardial effusion.  Small left pleural effusion.  · She had follow-up [facility] imaging for cardiac amyloidosis that was not suggestive of ATTR amyloidosis  · Laboratory values on 2/24/2022 showed a high serum free light chain lambda of 121, normal kappa of 10.5, low ratio at 0.09.  Serum protein electrophoresis showed no evidence of an M spike.  Urine light chains were abnormal with an elevated lambda light chain of 33 and a low ratio of 0.48 with a 12.7 mg M spike on 24-hour urine protein electrophoresis.  · BNP was elevated at 2306 on 3/4/2022.  The troponin was normal at 0.03.  CK was 212 with a CK-MB of 10.87.  · Initial consult on 3/30/22. No M spike on serum protein electrophoresis. SIFE 'presence of monoclonal protein is unclear.'   · Bone marrow biopsy 4/13/22 normocellular, 12.1% plasma cells consistent with low volume plasma cell dyscrasia. FISH with low level t(11;14) fusion only. No amyloid noted as Congo red staining negative.   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 Buenrostro at [facility]. Intended to enroll on a clinical study but her troponin as tested by the study sponsor was not high enough    *Diarrhea  · She is going to see a gastroenterologist at [facility] soon  · Suspicion for amyloid involvement of the GI tract  · She will continue Lomotil.  I recommended alternating with Imodium.  · Also, prescription for cholestyramine electronically sent to her pharmacy    PLAN:  1. Plan treatment with michael-CyBorD. Plan 6 cycles of therapy and then reevaluation at [facility] for high-dose therapy with autologous stem cell rescue.  If she has had a fantastic response after 3 cycles of therapy we could consider stem cell collection at that point.  2. She will follow-up at  [place] with cardiology and gastroenterology there in the near future  3. Follow-up pending labs from today  4. Plan [place] on Friday with her education appointment  5. She will have an education session with a nurse practitioner on Friday.  6. Appropriate prophylaxis to include acyclovir, Bactrim  7. We will try to treat through peripheral IVs.  8. We hope to be able to start with therapy next week.  We will schedule 8 consecutive weeks of therapy.  9. She agrees to proceed with treatment.  Goal is disease control.  Hopeful for autologous stem cell transplant at [place] after induction therapy.      Sissy-CyBorD regimen     Daratumumab and hyaluronidase (Darzalex Faspro) as follows:  Cycles 1 & 2: 1800 mg SC once per day on days 1, 8, 15, 22  Cycles 3 to 6: 1800 mg SC once per day on days 1 & 15  Cycles 7 up to 24: 1800 mg SC once on day 1     Bortezomib (Velcade) as follows:  Cycles 1 to 6: 1.3 mg/m2 SC once per day on days 1, 8, 15, 22     Cyclophosphamide (Cytoxan) as follows:  Cycles 1 to 6: 300 mg/m2 (maximum dose of 500 mg) PO or IV once per day on days 1, 8, 15, 22    Plan IV Cytoxan as she has difficulty swallowing pills.     Dexamethasone (Decadron) as follows:  Cycles 1 to 6: 40 mg PO or IV once per day on days 1, 8, 15, 22 (see note)  28-day cycle for up to 24 cycles    I spent 50 minutes in this visit today reviewing her record communicating with her and her  examining her placing orders communicating with staff and documenting the encounter.

## 2022-08-09 NOTE — TELEPHONE ENCOUNTER
----- Message from Nav Sal MD sent at 8/9/2022  3:45 PM EDT -----  Regarding: RE: few things  I know the tx plan has been in for a while; not sure why it isn't approved yet.     I advised her to get a 4th vaccine. Can you let her know if she wants the evusheld on Friday not to do it before then? Can you let scheduling know to schedulue it on Friday please? Thanks!    ----- Message -----  From: Laura Grace RN  Sent: 8/9/2022   3:43 PM EDT  To: Nav Sal MD  Subject: RE: few things                                   OP AMYLOIDOSIS Bortezomib SQ / Cyclophosphamide IV / Dexamethasone IV / Daratumumab and Hyaluronidase-fihj  plan was placed last week. Just sent a message to [employer] to see if it will be approved in time.     She can receive Evusheld on the day of her education if she has not had a covid booster in the last 2 weeks. If her immunization record is up to day she is good to go.   ----- Message -----  From: Nav Sal MD  Sent: 8/9/2022   3:09 PM EDT  To: Laura Grace RN  Subject: few things                                       1. She needs evusheld please. Could she have that Friday when she comes in for education?    2. I would typically go with oral cyclophosphamide but due to swallowing issues she would prefer to have IV    3. Is her treatment plan in the queue for insurance approval?    AURELIANO

## 2022-08-10 ENCOUNTER — TELEPHONE (OUTPATIENT)
Dept: ONCOLOGY | Facility: HOSPITAL | Age: 67
End: 2022-08-10

## 2022-08-10 LAB
ALBUMIN SERPL ELPH-MCNC: 3.8 G/DL (ref 2.9–4.4)
ALBUMIN/GLOB SERPL: 1.9 {RATIO} (ref 0.7–1.7)
ALPHA1 GLOB SERPL ELPH-MCNC: 0.2 G/DL (ref 0–0.4)
ALPHA2 GLOB SERPL ELPH-MCNC: 0.7 G/DL (ref 0.4–1)
B-GLOBULIN SERPL ELPH-MCNC: 0.7 G/DL (ref 0.7–1.3)
GAMMA GLOB SERPL ELPH-MCNC: 0.4 G/DL (ref 0.4–1.8)
GLOBULIN SER-MCNC: 2.1 G/DL (ref 2.2–3.9)
IGA SERPL-MCNC: 38 MG/DL (ref 87–352)
IGG SERPL-MCNC: 501 MG/DL (ref 586–1602)
IGM SERPL-MCNC: 70 MG/DL (ref 26–217)
INTERPRETATION SERPL IEP-IMP: ABNORMAL
KAPPA LC FREE SER-MCNC: 10.8 MG/L (ref 3.3–19.4)
KAPPA LC FREE/LAMBDA FREE SER: 0.09 {RATIO} (ref 0.26–1.65)
LABORATORY COMMENT REPORT: ABNORMAL
LAMBDA LC FREE SERPL-MCNC: 125.9 MG/L (ref 5.7–26.3)
M PROTEIN SERPL ELPH-MCNC: ABNORMAL G/DL
PROT SERPL-MCNC: 5.9 G/DL (ref 6–8.5)

## 2022-08-10 NOTE — TELEPHONE ENCOUNTER
I spoke to pt and she will receive Evusheld on 8/12/22. She will wait to get the covid booster until 2 weeks after Evusheld. Pt V/U.

## 2022-08-11 ENCOUNTER — APPOINTMENT (OUTPATIENT)
Dept: LAB | Facility: HOSPITAL | Age: 67
End: 2022-08-11

## 2022-08-11 RX ORDER — POTASSIUM CHLORIDE 1500 MG/1
20 TABLET, FILM COATED, EXTENDED RELEASE ORAL 2 TIMES DAILY
Qty: 60 TABLET | Refills: 1 | Status: SHIPPED | OUTPATIENT
Start: 2022-08-11 | End: 2022-10-06

## 2022-08-11 NOTE — TELEPHONE ENCOUNTER
Caller: Cynthia Flower    Relationship: Self    Best call back number: [phone]    Requested Prescriptions:   Requested Prescriptions     Pending Prescriptions Disp Refills   • potassium chloride ER (K-TAB) 20 MEQ tablet controlled-release ER tablet 60 tablet    • triamcinolone (KENALOG) 0.1 % ointment          Pharmacy where request should be sent: [id number] - [place], [place] - [address] [place], [place] [address]IE [facility] AT [facility] & [place] - [phone] [facility] [phone] FX     Additional details provided by patient: OUT OF POTASSIUM    Does the patient have less than a 3 day supply:  [x] Yes  [] No    Ellis Horton Rep   08/11/22 12:28 EDT

## 2022-08-12 ENCOUNTER — INFUSION (OUTPATIENT)
Dept: ONCOLOGY | Facility: HOSPITAL | Age: 67
End: 2022-08-12

## 2022-08-12 ENCOUNTER — LAB (OUTPATIENT)
Dept: LAB | Facility: HOSPITAL | Age: 67
End: 2022-08-12

## 2022-08-12 ENCOUNTER — APPOINTMENT (OUTPATIENT)
Dept: LAB | Facility: HOSPITAL | Age: 67
End: 2022-08-12

## 2022-08-12 ENCOUNTER — OFFICE VISIT (OUTPATIENT)
Dept: ONCOLOGY | Facility: CLINIC | Age: 67
End: 2022-08-12

## 2022-08-12 VITALS
SYSTOLIC BLOOD PRESSURE: 99 MMHG | BODY MASS INDEX: 22.15 KG/M2 | HEIGHT: 61 IN | TEMPERATURE: 97.8 F | HEART RATE: 71 BPM | DIASTOLIC BLOOD PRESSURE: 62 MMHG | WEIGHT: 117.3 LBS | RESPIRATION RATE: 16 BRPM

## 2022-08-12 VITALS — HEART RATE: 89 BPM | SYSTOLIC BLOOD PRESSURE: 107 MMHG | DIASTOLIC BLOOD PRESSURE: 72 MMHG

## 2022-08-12 DIAGNOSIS — E85.81 LIGHT CHAIN (AL) AMYLOIDOSIS: ICD-10-CM

## 2022-08-12 DIAGNOSIS — Z92.25 STATUS POST RECENT IMMUNOSUPPRESSIVE THERAPY: Primary | ICD-10-CM

## 2022-08-12 DIAGNOSIS — E85.81 AL AMYLOIDOSIS: Primary | ICD-10-CM

## 2022-08-12 LAB
ABO GROUP BLD: NORMAL
BLD GP AB SCN SERPL QL: NEGATIVE
KELL: NEGATIVE
RH BLD: POSITIVE
T&S EXPIRATION DATE: NORMAL

## 2022-08-12 PROCEDURE — 86850 RBC ANTIBODY SCREEN: CPT | Performed by: INTERNAL MEDICINE

## 2022-08-12 PROCEDURE — 25010000002 INJECTION, TIXAGEVIMAB AND CILGAVIMAB,: Performed by: INTERNAL MEDICINE

## 2022-08-12 PROCEDURE — 86901 BLOOD TYPING SEROLOGIC RH(D): CPT | Performed by: INTERNAL MEDICINE

## 2022-08-12 PROCEDURE — 36415 COLL VENOUS BLD VENIPUNCTURE: CPT

## 2022-08-12 PROCEDURE — 86900 BLOOD TYPING SEROLOGIC ABO: CPT | Performed by: INTERNAL MEDICINE

## 2022-08-12 PROCEDURE — 99215 OFFICE O/P EST HI 40 MIN: CPT | Performed by: NURSE PRACTITIONER

## 2022-08-12 PROCEDURE — M0220 HC INJECTION, TIXAGEVIMAB AND CILGAVIMAB: HCPCS | Performed by: INTERNAL MEDICINE

## 2022-08-12 PROCEDURE — 86905 BLOOD TYPING RBC ANTIGENS: CPT | Performed by: INTERNAL MEDICINE

## 2022-08-12 RX ORDER — ACYCLOVIR 400 MG/1
400 TABLET ORAL 2 TIMES DAILY
Qty: 60 TABLET | Refills: 5 | Status: SHIPPED | OUTPATIENT
Start: 2022-08-12 | End: 2022-10-18 | Stop reason: SDUPTHER

## 2022-08-12 RX ORDER — EPINEPHRINE 1 MG/ML
0.3 INJECTION, SOLUTION, CONCENTRATE INTRAVENOUS AS NEEDED
Status: CANCELLED | OUTPATIENT
Start: 2022-08-12

## 2022-08-12 RX ORDER — SULFAMETHOXAZOLE AND TRIMETHOPRIM 800; 160 MG/1; MG/1
1 TABLET ORAL 3 TIMES WEEKLY
Qty: 12 TABLET | Refills: 5 | Status: SHIPPED | OUTPATIENT
Start: 2022-08-12 | End: 2022-08-30 | Stop reason: SDUPTHER

## 2022-08-12 RX ORDER — DIPHENHYDRAMINE HCL 25 MG
50 CAPSULE ORAL ONCE AS NEEDED
Status: CANCELLED | OUTPATIENT
Start: 2022-08-12

## 2022-08-12 RX ORDER — ONDANSETRON HYDROCHLORIDE 8 MG/1
8 TABLET, FILM COATED ORAL 3 TIMES DAILY PRN
Qty: 30 TABLET | Refills: 5 | Status: SHIPPED | OUTPATIENT
Start: 2022-08-12 | End: 2022-11-08

## 2022-08-12 RX ADMIN — AZD7442 300 MG: KIT at 15:27

## 2022-08-12 NOTE — NURSING NOTE
"Patient presents for Evusheld injections    Patient given copy of Evusheld \"fact sheet for patients\".  Verbal consent for EUA Evusheld injections obtained.  Pt denies any symptoms of COVID and denies exposure to individual with COVID.  Evusheld administered in 2 separate IM injections.  Patients observed for ADR X 1 hour.  Patient discharged without complaints.            "

## 2022-08-12 NOTE — PROGRESS NOTES
[facility] Hematology/Oncology Treatment Plan Summary    Name: Cynthia Flower  Acct# [id number]  MD:     Diagnosis:     ICD-10-CM ICD-9-CM   1. AL amyloidosis (HCC)  E85.81 277.39          Goal of treatment: disease control    Treatment Medication(s):   1. Daratumumab and hyaluronidase (Darzalex Faspro) as follows:  Cycles 1 & 2: injection 1, 8, 15, 22  Cycles 3 to 6: injection 1 & 15    2. Bortezomib (Velcade) as follows:  Cycles 1 to 6: Injection 1, 8, 15, 22     3. Cyclophosphamide (Cytoxan) as follows:  Cycles 1 to 6:  IV on days 1, 8, 15, 22     4. Dexamethasone (Decadron) as follows:  Cycles 1 to 6: 40 mg in a.m. by mouth on days 1, 8, 15, 22       Number of cycles: 6 cycles then reevaluation for stem cell transplant    Starting on: 8/16/2022    Items for home use: Thermometer    Rx written for: [x] Nausea    [x] Pre-Treatment   acyclovir 400 mg by mouth twice daily , Bactrim DS 1 tab by mouth on Mondays, Wednesdays, and Fridays and ondansetron 8 mg by mouth every 8 hours as needed for nausea    Notes:         Completing Provider: PIYUSH Grewal           Date/time: 08/12/2022      Please note: You will be seen by a provider frequently with your treatment plan. This plan may change depending on many factors, if so, this will be discussed with you by your physician.  Last update 03/2022.

## 2022-08-12 NOTE — PROGRESS NOTES
TREATMENT  PREPARATION    Cynthia Flower  [id number]  1955    Chief Complaint: Treatment preparation and needs assessment    History of present illness:  Cynthia Flower is a 67 y.o. year old female who is here today for treatment preparation and needs assessment.  The patient has been diagnosed with   Encounter Diagnoses   Name Primary?   • AL amyloidosis (HCC) Yes   • Light chain (AL) amyloidosis (HCC)     and is scheduled to begin treatment with:     Oncology History:    Oncology/Hematology History    No history exists.       The current medication list and allergy list were reviewed and reconciled.     Past Medical History, Past Surgical History, Social History, Family History have been reviewed and are without significant changes except as mentioned.    Physical Exam:    Vitals:    08/12/22 1404   BP: 99/62   Pulse: 71   Resp: 16   Temp: 97.8 °F (36.6 °C)     Vitals:    08/12/22 1404   PainSc: 0-No pain        ECOG score: 0             Physical Exam  HENT:      Head: Normocephalic and atraumatic.   Eyes:      Extraocular Movements: Extraocular movements intact.      Conjunctiva/sclera: Conjunctivae normal.   Pulmonary:      Effort: Pulmonary effort is normal. No respiratory distress.   Neurological:      General: No focal deficit present.      Mental Status: She is alert and oriented to person, place, and time.   Psychiatric:         Mood and Affect: Mood normal.         Behavior: Behavior normal.           NEEDS ASSESSMENTS    Genetics  The patient's new diagnosis and family history have been reviewed for genetic counseling needs. A genetic referral is not recommended.     Psychosocial and Barriers to care  The patient has completed a PHQ-9 Depression Screening and the Distress Thermometer (DT) today.  PHQ-9 results show PHQ-2 Total Score: 6 PHQ-9 Total Score: PHQ-9 Total Score: 6     The patient scored their distress today as Distress Level: 6 on a scale of 0-10 with 0 being no distress  and 10 being extreme distress. Problems marked by the patient as being an issue for them within the last week include Practical Problems  Treatment decisions: Yes  Physical concerns  Fatigue: Yes  Pain: Yes .      Results were reviewed along with psychosocial resources offered by our cancer center.  Our Supportive Oncology team will be flagged for a score of 4 or above, and a same day call will be made for a score of 9 or 10.  A mental health referral is offered at that time. Patients who score less than 4 have been educated on our support services and can be referred to our  upon request.  The patient will be referred to our .       Nutrition  The patient has completed the malnutrition screening today. They scored Malnutrition Screening Tool  Have you recently lost weight without trying?  If yes, how much weight have you lost?: 0--> No  Have you been eating poorly because of a decreased appetite?: 0--> No  MST score: 0   with a score of 0-1 meaning not at risk in a score of 2 or greater meaning at risk.  Patients with a score of 3 or higher will be referred to our oncology dietitian for support. Patients beginning at risk treatment regimens or who have dietary concerns will also be referred to our oncology dietitian. The patient will be referred.    Functional Assessment  Persons who are age 70 or greater will be screened for qualification of a comprehensive geriatric assessment by our survivorship nurse practitioner.  Older adults with cancer face unique challenges. These may include an increased risk of drug reactions, financial burdens, and caregiver stress. The patient scored Patients scoring 14 or lower will referred for an older adult functional assessment with the survivorship advanced practice registered nurse to ensure all needed support is provided as patients plan for their treatments. The patient will not be referred.    Intravenous Access Assessment  The patient and I  "discussed planned intravenous chemo/biotherapy as well as other IV treatments that are often needed throughout the course of treatment. These may include, but are not limited to blood transfusions, antibiotics, and IV hydration. Discussed that depending on selected treatment and vein assessment, patient may require venous access device (VAD) which could include but not limited to a Mediport or PICC line. Risks and benefits of VADs reviewed. The patient will be treated via PIV.    Reproductive/Sexual Activity   People should avoid becoming pregnant and should not get a partner pregnant while undergoing chemo/biotherapy.  People of childbearing age should use effective contraception during active therapy. The best recommendation for all people is to use a barrier method for a minimum of 1 week after the last infusion of chemo/biotherapy to prevent your partner being exposed to byproducts from treatment medications in bodily fluids. Effective contraception should be discussed with your oncology team to make sure it is safe to take based on your diagnosis. Possible options include oral contraceptives, barrier methods. Chemo/biotherapy can change your ability to reproduce children in the future.  There are options for fertility preservation. NOT APPLICABLE    Advanced Care Planning  Advance Care Planning   The patient and I discussed advanced care planning, \"Conversations that Matter\".   This service is offered for development of advance directives with a certified ACP facilitator.  The patient does not have an up-to-date advanced directive. This document is not on file with our office. The patient is interested in an appointment with one of our facilitators to create or update their advanced directives.     Smoking cessation  Tobacco Use: Low Risk    • Smoking Tobacco Use: Never Smoker   • Smokeless Tobacco Use: Never Used       Patient and I discussed their tobacco use history. Referral will not be made for smoking " cessation.      Palliative Care  When appropriate, the patient and I discussed the availability palliative care services and when appropriate Hospice care. Palliative care is not the same as Hospice care which was explained to the patient.  The patient is not interested in additional information from our  on these services.     Survivorship   When appropriate, we discussed that we will refer the patient to survivorship clinic to discuss next steps following completion of planned treatment.  Reviewed this visit will include assessment of your physical, psychological, functional, and spiritual needs as a survivor and the need at attend this visit when scheduled.    TREATMENT EDUCATION    Today I met with the patient to discuss the chemo/biotherapy regimen recommended for treatment of AL amyloidosis (HCC)  - Ambulatory Referral to Multi-Disciplinary Clinic    Light chain (AL) amyloidosis (HCC)  - Provider communication  - Provider Communication  - Provider Communication  - Provider Communication  - acyclovir (Zovirax) 400 MG tablet  - sulfamethoxazole-trimethoprim (BACTRIM DS,SEPTRA DS) 800-160 MG per tablet  - ondansetron (ZOFRAN) 8 MG tablet  .  The patient was given explanation of treatment premed side effects including office policy that prohibits patients to drive if sedating medications are administered, MD explanation given regarding benefits, side effects, toxicities and goals of treatment.  The patient received a Chemotherapy/Biotherapy Plan Summary including diagnosis and explanation of specific treatment plan.    SIDE EFFECTS:  Common side effects were discussed with the patient and/or significant other.  Discussion included where applicable hair loss/discoloration, anemia/fatigue, infection/chills/fever, appetite, bleeding risk/precautions, constipation, diarrhea, mouth sores, taste alteration, loss of appetite, nausea/vomiting, peripheral neuropathy, skin/nail changes, rash, muscle  aches/weakness, photosensitivity, weight gain/loss, hearing loss, dizziness, menopausal symptoms, menstrual irregularity, sterility, high blood pressure, heart damage, liver damage, lung damage, kidney damage, DVT/PE risk, fluid retention, pleural/pericardial effusion, somnolence, electrolyte/LFT imbalance, vein exercises and/or the possible need for vascular access/port placement.  The patient was advised that although uncommon, leakage of an infused medication from the vein or venous access device may lead to skin breakdown and/or other tissue damage.  The patient was advised that he/she may have pain, bleeding, and/or bruising from the insertion of a needle in their vein or venous access device (port).  The patient was further advised that, in spite of proper technique, infection with redness and irritation may rarely occur at the site where the needle was inserted.  The patient was advised that if complications occur, additional medical treatment is available.  Finally, where applicable we have reviewed rare but potential immune mediated side effects including shortness of breath, cough, chest pain (pneumonitis), abdominal pain, diarrhea (colitis), thyroiditis (hypothyroid or hyperthyroid), hepatitis and liver dysfunction, nephritis and renal dysfunction.    Discussion also included side effects specific to drugs in the treatment plan, specifically:    Treatment Plans     Name Type Plan Dates Plan Provider         Active    OP AMYLOIDOSIS Bortezomib SQ / Cyclophosphamide IV / Dexamethasone IV / Daratumumab and Hyaluronidase-[facility]  ONCOLOGY TREATMENT  8/11/2022 - Present Nav Sal MD                    Questions answered and additional information discussed on topics including:  Anemia, Thrombocytopenia, Neutropenia, Nutrition and appetite changes, Constipation, Diarrhea, Nausea & vomiting, Mouth sores, Alopecia, Nervous system changes, Skin & nail changes, Organ toxicities and Vaccinations       Assessment and  Plan:    Diagnoses and all orders for this visit:    1. AL amyloidosis (HCC) (Primary)  -     Ambulatory Referral to Multi-Disciplinary Clinic    2. Light chain (AL) amyloidosis (HCC)  -     Provider communication  -     Provider Communication  -     Provider Communication  -     Provider Communication  -     acyclovir (Zovirax) 400 MG tablet; Take 1 tablet by mouth 2 (Two) Times a Day.  Dispense: 60 tablet; Refill: 5  -     sulfamethoxazole-trimethoprim (BACTRIM DS,SEPTRA DS) 800-160 MG per tablet; Take 1 tablet by mouth 3 (Three) Times a Week.  Dispense: 12 tablet; Refill: 5  -     ondansetron (ZOFRAN) 8 MG tablet; Take 1 tablet by mouth 3 (Three) Times a Day As Needed for Nausea or Vomiting.  Dispense: 30 tablet; Refill: 5      Orders Placed This Encounter   Procedures   • Ambulatory Referral to Multi-Disciplinary Clinic     Referral Priority:   Routine     Referral Type:   Consultation     Referral Reason:   Specialty Services Required     Number of Visits Requested:   1   • Provider communication     Darzalex Faspro is not indicated and is not recommended for use in patients with Light Chain Amyloidosis who have NYHA Class IIIB or Class IV Cardiac disease or Franco Stage IIIB.   • Provider Communication     Consider ordering Hepatitis B testing prior to starting Daratumumab.   • Provider Communication     Sign Type and Screen order within Day -6 of Cycle 1.   • Provider Communication     Consider prescribing a proton pump inhibitor due to high dose steroids.         1. The patient and I have reviewed their diagnosis and scheduled treatment plan. Needs assessment was completed where applicable including genetics, psychosocial needs, barriers to care, VAD evaluation, advanced care planning, survivorship, and palliative care services where indicated. Referrals have been ordered as appropriate based upon evaluation today and patient desires.   2. Chemo/biotherapy teaching was completed today and consent obtained. See  separate documentation for further details.  3. Adequate time was given to answer questions.  Patient made aware of their care team members and contact information if they have questions or problems throughout the treatment course.  4. Discussion held and written information provided describing frequency of office visits and ongoing monitoring throughout the treatment plan.     5. Reviewed with patient any prescribed medication sent to pharmacy.  Education provided regarding proper storage, safe handling, and proper disposal of unused medication.  6. Proper handling of body fluids and waste discussed and written information provided.  7. If appropriate, patient had pretreatment labs drawn today.  8. Discussion performed on Evusheld today, patient receiving today.  9. Daratumumab specific type and screen obtained today.    Learning assessment completed at initial patient encounter. See separate flowsheet. Chemo/biotherapy education comprehension assessed at today's visit.    I spent 75 minutes caring for Cynthia on this date of service. This time includes time spent by me in the following activities: preparing for the visit, reviewing tests, performing a medically appropriate examination and/or evaluation, counseling and educating the patient/family/caregiver, ordering medications, tests, or procedures, referring and communicating with other health care professionals, documenting information in the medical record and care coordination.     Alize Hernandez, APRN   08/12/22

## 2022-08-15 ENCOUNTER — TELEPHONE (OUTPATIENT)
Dept: OTHER | Facility: HOSPITAL | Age: 67
End: 2022-08-15

## 2022-08-15 ENCOUNTER — TELEPHONE (OUTPATIENT)
Dept: ONCOLOGY | Facility: CLINIC | Age: 67
End: 2022-08-15

## 2022-08-15 NOTE — TELEPHONE ENCOUNTER
Caller: Cynthia Flower    Relationship: Self    Best call back number: [phone]    What is the best time to reach you: ANYTIME    Who are you requesting to speak with (clinical staff, provider,  specific staff member): DR VILLANUEVA OR NURSE    What was the call regarding: PT RECVD 3 MEDS ON 8/12 - ACYCLOVIR, BACTRIM, AND ONDANSETRON. SHE NEEDS TO KNOW WHEN TO START THESE MEDS.    PLEASE CALL TO CLARIFY.     Do you require a callback: YES

## 2022-08-15 NOTE — TELEPHONE ENCOUNTER
Returned call to patient who I advised to start her medications today as directed, specifically the Bactrim DS and the acyclovir.  She will take the Zofran as needed once she starts her chemotherapy.  She v/u.  She also had questions regarding her schedule for treatment and that was discussed with her.  She v/u.

## 2022-08-15 NOTE — TELEPHONE ENCOUNTER
[facility] MULTIDISCIPLINARY CLINIC  SURVIVORSHIP SERVICES CARE COORDINATION NOTE  PHONE      Call placed to PATIENT RE: open referral to advance care planning visit.    Left voice mail for patient    Introduced myself and reviewed purpose and goals of advance care planning/shared decision making appointment..     Conversations that Matter advance care planning guide.    Patient encouraged to call the office at any point for additional information, resources or support.

## 2022-08-15 NOTE — PROGRESS NOTES
"[facility] GROUP OUTPATIENT FOLLOW UP CLINIC VISIT    REASON FOR FOLLOW-UP:    AL amyloidosis  Therapy with daratumumab, CyBorD initiated 8/16/2022    HISTORY OF PRESENT ILLNESS:  Cynthia Flower is a 67 y.o. female who returns today for follow up of the above issue.      She overall is doing well.  She notes ongoing fatigue as well as leg pain.  She has an appointment at [place] tomorrow with cardiology there.    REVIEW OF SYSTEMS:  As per the [facility]    PHYSICAL EXAMINATION:    Vitals:    08/16/22 0820   BP: 120/73   Pulse: 68   Resp: 16   Temp: 98 °F (36.7 °C)   SpO2: 96%   Weight: 54 kg (119 lb)   Height: 154.9 cm (60.98\")   PainSc: 0-No pain       General:  No acute distress, awake, alert and oriented  Skin:  Warm and dry, no visible rash  HEENT:  Normocephalic/atraumatic.  Wearing a face mask.  Chest:  Normal respiratory effort.  Lungs clear to auscultation bilaterally.  Heart: Regular rate and rhythm  Extremities:  No visible clubbing, cyanosis, or edema  Neuro/psych:  Grossly nonfocal.  Normal mood and affect.        DIAGNOSTIC DATA:  CBC and Differential (08/16/2022 07:47)    IMAGING:    None reviewed    ASSESSMENT:  This is a 67 y.o. female with:    *AL amyloidosis  · She had a stress echocardiogram on 4/24/2020 showing a normal left ventricular ejection fraction of 60% with moderate concentric hypertrophy but no wall motion abnormalities of the left ventricle.  There was impaired relaxation noted.  She complained of chest pain during the examination.  There was some ST segment depression.  Cardiac catheterization was performed on the same day.  No intervention was required.  · Follow-up echocardiogram on 4/15/2021 showed a left ventricular ejection fraction of 61 to 65% with normal LV cavity size.  Left ventricular wall thickness showed moderate concentric hypertrophy.  Diastolic function was impaired.  No pericardial effusion.  Small left pleural effusion.  · She had follow-up [facility] imaging " for cardiac amyloidosis that was not suggestive of ATTR amyloidosis  · Laboratory values on 2/24/2022 showed a high serum free light chain lambda of 121, normal kappa of 10.5, low ratio at 0.09.  Serum protein electrophoresis showed no evidence of an M spike.  Urine light chains were abnormal with an elevated lambda light chain of 33 and a low ratio of 0.48 with a 12.7 mg M spike on 24-hour urine protein electrophoresis.  · BNP was elevated at 2306 on 3/4/2022.  The troponin was normal at 0.03.  CK was 212 with a CK-MB of 10.87.  · Initial consult on 3/30/22. No M spike on serum protein electrophoresis. SIFE 'presence of monoclonal protein is unclear.'   · Bone marrow biopsy 4/13/22 normocellular, 12.1% plasma cells consistent with low volume plasma cell dyscrasia. FISH with low level t(11;14) fusion only. No amyloid noted as Congo red staining negative.   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 Buenrostro at [place]. Intended to enroll on a clinical study but her troponin as tested by the study sponsor was not high enough  · Therapy with michael-CyBorD initiated 8/16/2022    *Diarrhea  · She is going to see a gastroenterologist at [place] soon  · Suspicion for amyloid involvement of the GI tract  · She will continue Lomotil.  I recommended alternating with Imodium.  · Also, prescription for cholestyramine electronically sent to her pharmacy    PLAN:  1. Cycle 1 day 1 of therapy today.She agrees to proceed with treatment.  Goal is disease control.  Hopeful for autologous stem cell transplant at [place] after induction therapy.  2. Plan treatment with michael-CyBorD. Plan 6 cycles of therapy and then reevaluation at [place] for high-dose therapy with autologous stem cell rescue.  If she has had a fantastic response after 3 cycles of therapy we could consider stem cell collection at that point.  3. She will follow-up at [place] with cardiology and gastroenterology as  appropriate, cardiology tomorrow  4. Appropriate prophylaxis to include acyclovir, Bactrim which she started on 8/15  5. We will try to treat through peripheral IVs if possible.  6. Follow-up weekly as scheduled  7. I advised her to notify us with any adverse effects, questions or concerns.    High risk therapy requiring intensive monitoring      Sissy-CyBorD regimen     Daratumumab and hyaluronidase (Darzalex Faspro) as follows:  Cycles 1 & 2: 1800 mg SC once per day on days 1, 8, 15, 22  Cycles 3 to 6: 1800 mg SC once per day on days 1 & 15  Cycles 7 up to 24: 1800 mg SC once on day 1     Bortezomib (Velcade) as follows:  Cycles 1 to 6: 1.3 mg/m2 SC once per day on days 1, 8, 15, 22     Cyclophosphamide (Cytoxan) as follows:  Cycles 1 to 6: 300 mg/m2 (maximum dose of 500 mg) PO or IV once per day on days 1, 8, 15, 22    Plan IV Cytoxan as she has difficulty swallowing pills.     Dexamethasone (Decadron) as follows:  Cycles 1 to 6: 40 mg PO or IV once per day on days 1, 8, 15, 22 (see note)  28-day cycle for up to 24 cycles

## 2022-08-16 ENCOUNTER — INFUSION (OUTPATIENT)
Dept: ONCOLOGY | Facility: HOSPITAL | Age: 67
End: 2022-08-16

## 2022-08-16 ENCOUNTER — OFFICE VISIT (OUTPATIENT)
Dept: ONCOLOGY | Facility: CLINIC | Age: 67
End: 2022-08-16

## 2022-08-16 VITALS — HEART RATE: 69 BPM | SYSTOLIC BLOOD PRESSURE: 99 MMHG | DIASTOLIC BLOOD PRESSURE: 64 MMHG

## 2022-08-16 VITALS
BODY MASS INDEX: 22.47 KG/M2 | HEIGHT: 61 IN | RESPIRATION RATE: 16 BRPM | OXYGEN SATURATION: 96 % | SYSTOLIC BLOOD PRESSURE: 120 MMHG | DIASTOLIC BLOOD PRESSURE: 73 MMHG | TEMPERATURE: 98 F | HEART RATE: 68 BPM | WEIGHT: 119 LBS

## 2022-08-16 DIAGNOSIS — E85.81 LIGHT CHAIN (AL) AMYLOIDOSIS: ICD-10-CM

## 2022-08-16 DIAGNOSIS — E85.81 LIGHT CHAIN (AL) AMYLOIDOSIS: Primary | ICD-10-CM

## 2022-08-16 LAB
ALBUMIN SERPL-MCNC: 4.3 G/DL (ref 3.5–5.2)
ALBUMIN/GLOB SERPL: 2.2 G/DL (ref 1.1–2.4)
ALP SERPL-CCNC: 62 U/L (ref 38–116)
ALT SERPL W P-5'-P-CCNC: 34 U/L (ref 0–33)
ANION GAP SERPL CALCULATED.3IONS-SCNC: 10 MMOL/L (ref 5–15)
AST SERPL-CCNC: 29 U/L (ref 0–32)
BASOPHILS # BLD AUTO: 0.02 10*3/MM3 (ref 0–0.2)
BASOPHILS NFR BLD AUTO: 0.3 % (ref 0–1.5)
BILIRUB SERPL-MCNC: 0.5 MG/DL (ref 0.2–1.2)
BUN SERPL-MCNC: 11 MG/DL (ref 6–20)
BUN/CREAT SERPL: 13.8 (ref 7.3–30)
CALCIUM SPEC-SCNC: 10 MG/DL (ref 8.5–10.2)
CHLORIDE SERPL-SCNC: 104 MMOL/L (ref 98–107)
CO2 SERPL-SCNC: 26 MMOL/L (ref 22–29)
CREAT SERPL-MCNC: 0.8 MG/DL (ref 0.6–1.1)
DEPRECATED RDW RBC AUTO: 42.2 FL (ref 37–54)
EGFRCR SERPLBLD CKD-EPI 2021: 80.9 ML/MIN/1.73
EOSINOPHIL # BLD AUTO: 0.25 10*3/MM3 (ref 0–0.4)
EOSINOPHIL NFR BLD AUTO: 3.9 % (ref 0.3–6.2)
ERYTHROCYTE [DISTWIDTH] IN BLOOD BY AUTOMATED COUNT: 13 % (ref 12.3–15.4)
GLOBULIN UR ELPH-MCNC: 2 GM/DL (ref 1.8–3.5)
GLUCOSE SERPL-MCNC: 91 MG/DL (ref 74–124)
HCT VFR BLD AUTO: 38.7 % (ref 34–46.6)
HGB BLD-MCNC: 13.1 G/DL (ref 12–15.9)
IMM GRANULOCYTES # BLD AUTO: 0.01 10*3/MM3 (ref 0–0.05)
IMM GRANULOCYTES NFR BLD AUTO: 0.2 % (ref 0–0.5)
LYMPHOCYTES # BLD AUTO: 2.63 10*3/MM3 (ref 0.7–3.1)
LYMPHOCYTES NFR BLD AUTO: 41 % (ref 19.6–45.3)
MAGNESIUM SERPL-MCNC: 1.9 MG/DL (ref 1.8–2.5)
MCH RBC QN AUTO: 30.8 PG (ref 26.6–33)
MCHC RBC AUTO-ENTMCNC: 33.9 G/DL (ref 31.5–35.7)
MCV RBC AUTO: 90.8 FL (ref 79–97)
MONOCYTES # BLD AUTO: 0.4 10*3/MM3 (ref 0.1–0.9)
MONOCYTES NFR BLD AUTO: 6.2 % (ref 5–12)
NEUTROPHILS NFR BLD AUTO: 3.11 10*3/MM3 (ref 1.7–7)
NEUTROPHILS NFR BLD AUTO: 48.4 % (ref 42.7–76)
NRBC BLD AUTO-RTO: 0 /100 WBC (ref 0–0.2)
PLATELET # BLD AUTO: 221 10*3/MM3 (ref 140–450)
PMV BLD AUTO: 9.9 FL (ref 6–12)
POTASSIUM SERPL-SCNC: 4.1 MMOL/L (ref 3.5–4.7)
PROT SERPL-MCNC: 6.3 G/DL (ref 6.3–8)
RBC # BLD AUTO: 4.26 10*6/MM3 (ref 3.77–5.28)
SODIUM SERPL-SCNC: 140 MMOL/L (ref 134–145)
WBC NRBC COR # BLD: 6.42 10*3/MM3 (ref 3.4–10.8)

## 2022-08-16 PROCEDURE — 25010000002 DEXAMETHASONE SODIUM PHOSPHATE 100 MG/10ML SOLUTION 10 ML VIAL: Performed by: INTERNAL MEDICINE

## 2022-08-16 PROCEDURE — 96413 CHEMO IV INFUSION 1 HR: CPT

## 2022-08-16 PROCEDURE — 85025 COMPLETE CBC W/AUTO DIFF WBC: CPT

## 2022-08-16 PROCEDURE — 25010000002 DARATUMUMAB-HYALURONIDASE-FIHJ 1800-30000 MG-UT/15ML SOLUTION: Performed by: INTERNAL MEDICINE

## 2022-08-16 PROCEDURE — 99214 OFFICE O/P EST MOD 30 MIN: CPT | Performed by: INTERNAL MEDICINE

## 2022-08-16 PROCEDURE — 96375 TX/PRO/DX INJ NEW DRUG ADDON: CPT

## 2022-08-16 PROCEDURE — 25010000002 DIPHENHYDRAMINE PER 50 MG: Performed by: INTERNAL MEDICINE

## 2022-08-16 PROCEDURE — 96401 CHEMO ANTI-NEOPL SQ/IM: CPT

## 2022-08-16 PROCEDURE — 25010000002 LORAZEPAM PER 2 MG: Performed by: INTERNAL MEDICINE

## 2022-08-16 PROCEDURE — 83735 ASSAY OF MAGNESIUM: CPT

## 2022-08-16 PROCEDURE — 25010000002 CYCLOPHOSPHAMIDE 1 GM/5ML SOLUTION 5 ML VIAL: Performed by: INTERNAL MEDICINE

## 2022-08-16 PROCEDURE — 25010000002 PALONOSETRON PER 25 MCG: Performed by: INTERNAL MEDICINE

## 2022-08-16 PROCEDURE — 80053 COMPREHEN METABOLIC PANEL: CPT

## 2022-08-16 PROCEDURE — 25010000002 BORTEZOMIB PER 0.1 MG: Performed by: INTERNAL MEDICINE

## 2022-08-16 RX ORDER — LORAZEPAM 2 MG/ML
0.5 INJECTION INTRAMUSCULAR ONCE
Status: COMPLETED | OUTPATIENT
Start: 2022-08-16 | End: 2022-08-16

## 2022-08-16 RX ORDER — SODIUM CHLORIDE 9 MG/ML
250 INJECTION, SOLUTION INTRAVENOUS ONCE
Status: COMPLETED | OUTPATIENT
Start: 2022-08-16 | End: 2022-08-16

## 2022-08-16 RX ORDER — ACETAMINOPHEN 500 MG
1000 TABLET ORAL ONCE
Status: COMPLETED | OUTPATIENT
Start: 2022-08-16 | End: 2022-08-16

## 2022-08-16 RX ORDER — MONTELUKAST SODIUM 10 MG/1
10 TABLET ORAL ONCE
Status: COMPLETED | OUTPATIENT
Start: 2022-08-16 | End: 2022-08-16

## 2022-08-16 RX ORDER — PALONOSETRON 0.05 MG/ML
0.25 INJECTION, SOLUTION INTRAVENOUS ONCE
Status: COMPLETED | OUTPATIENT
Start: 2022-08-16 | End: 2022-08-16

## 2022-08-16 RX ORDER — BORTEZOMIB 3.5 MG/1
1.3 INJECTION, POWDER, LYOPHILIZED, FOR SOLUTION INTRAVENOUS; SUBCUTANEOUS ONCE
Status: COMPLETED | OUTPATIENT
Start: 2022-08-16 | End: 2022-08-16

## 2022-08-16 RX ADMIN — DEXAMETHASONE SODIUM PHOSPHATE 40 MG: 10 INJECTION, SOLUTION INTRAMUSCULAR; INTRAVENOUS at 09:18

## 2022-08-16 RX ADMIN — ACETAMINOPHEN 1000 MG: 500 TABLET ORAL at 09:01

## 2022-08-16 RX ADMIN — PALONOSETRON 0.25 MG: 0.05 INJECTION, SOLUTION INTRAVENOUS at 09:03

## 2022-08-16 RX ADMIN — LORAZEPAM 0.5 MG: 2 INJECTION INTRAMUSCULAR; INTRAVENOUS at 10:02

## 2022-08-16 RX ADMIN — MONTELUKAST 10 MG: 10 TABLET, FILM COATED ORAL at 09:01

## 2022-08-16 RX ADMIN — DARATUMUMAB AND HYALURONIDASE-FIHJ (HUMAN RECOMBINANT) 1800 MG: 1800; 30000 INJECTION SUBCUTANEOUS at 10:16

## 2022-08-16 RX ADMIN — CYCLOPHOSPHAMIDE 450 MG: 200 INJECTION, SOLUTION INTRAVENOUS at 10:28

## 2022-08-16 RX ADMIN — DIPHENHYDRAMINE HYDROCHLORIDE 50 MG: 50 INJECTION, SOLUTION INTRAMUSCULAR; INTRAVENOUS at 09:01

## 2022-08-16 RX ADMIN — BORTEZOMIB 2 MG: 3.5 INJECTION, POWDER, LYOPHILIZED, FOR SOLUTION INTRAVENOUS; SUBCUTANEOUS at 10:23

## 2022-08-16 RX ADMIN — SODIUM CHLORIDE 250 ML: 9 INJECTION, SOLUTION INTRAVENOUS at 08:56

## 2022-08-17 ENCOUNTER — INFUSION (OUTPATIENT)
Dept: ONCOLOGY | Facility: HOSPITAL | Age: 67
End: 2022-08-17

## 2022-08-17 ENCOUNTER — APPOINTMENT (OUTPATIENT)
Dept: OTHER | Facility: HOSPITAL | Age: 67
End: 2022-08-17

## 2022-08-17 ENCOUNTER — TELEPHONE (OUTPATIENT)
Dept: ONCOLOGY | Facility: CLINIC | Age: 67
End: 2022-08-17

## 2022-08-17 ENCOUNTER — OFFICE VISIT (OUTPATIENT)
Dept: ONCOLOGY | Facility: CLINIC | Age: 67
End: 2022-08-17

## 2022-08-17 VITALS
HEIGHT: 61 IN | HEART RATE: 73 BPM | TEMPERATURE: 98.2 F | OXYGEN SATURATION: 97 % | DIASTOLIC BLOOD PRESSURE: 54 MMHG | RESPIRATION RATE: 16 BRPM | SYSTOLIC BLOOD PRESSURE: 94 MMHG | WEIGHT: 121.4 LBS | BODY MASS INDEX: 22.92 KG/M2

## 2022-08-17 DIAGNOSIS — E85.81 AL AMYLOIDOSIS: Primary | ICD-10-CM

## 2022-08-17 DIAGNOSIS — R74.8 ELEVATED LIVER ENZYMES: ICD-10-CM

## 2022-08-17 DIAGNOSIS — R42 DIZZINESS: ICD-10-CM

## 2022-08-17 DIAGNOSIS — E85.81 LIGHT CHAIN (AL) AMYLOIDOSIS: Primary | ICD-10-CM

## 2022-08-17 LAB
ALBUMIN SERPL-MCNC: 4.7 G/DL (ref 3.5–5.2)
ALBUMIN/GLOB SERPL: 2.2 G/DL
ALP SERPL-CCNC: 73 U/L (ref 39–117)
ALT SERPL W P-5'-P-CCNC: 144 U/L (ref 1–33)
ANION GAP SERPL CALCULATED.3IONS-SCNC: 11.4 MMOL/L (ref 5–15)
AST SERPL-CCNC: 87 U/L (ref 1–32)
BASOPHILS # BLD AUTO: 0.01 10*3/MM3 (ref 0–0.2)
BASOPHILS NFR BLD AUTO: 0.1 % (ref 0–1.5)
BILIRUB SERPL-MCNC: 0.4 MG/DL (ref 0–1.2)
BUN SERPL-MCNC: 15 MG/DL (ref 8–23)
BUN/CREAT SERPL: 22.7 (ref 7–25)
CALCIUM SPEC-SCNC: 9.9 MG/DL (ref 8.6–10.5)
CHLORIDE SERPL-SCNC: 101 MMOL/L (ref 98–107)
CO2 SERPL-SCNC: 25.6 MMOL/L (ref 22–29)
CREAT SERPL-MCNC: 0.66 MG/DL (ref 0.57–1)
DEPRECATED RDW RBC AUTO: 43.1 FL (ref 37–54)
EGFRCR SERPLBLD CKD-EPI 2021: 96.3 ML/MIN/1.73
EOSINOPHIL # BLD AUTO: 0 10*3/MM3 (ref 0–0.4)
EOSINOPHIL NFR BLD AUTO: 0 % (ref 0.3–6.2)
ERYTHROCYTE [DISTWIDTH] IN BLOOD BY AUTOMATED COUNT: 13.1 % (ref 12.3–15.4)
GLOBULIN UR ELPH-MCNC: 2.1 GM/DL
GLUCOSE SERPL-MCNC: 107 MG/DL (ref 65–99)
HCT VFR BLD AUTO: 36.5 % (ref 34–46.6)
HGB BLD-MCNC: 12.6 G/DL (ref 12–15.9)
IMM GRANULOCYTES # BLD AUTO: 0.08 10*3/MM3 (ref 0–0.05)
IMM GRANULOCYTES NFR BLD AUTO: 0.5 % (ref 0–0.5)
LYMPHOCYTES # BLD AUTO: 0.94 10*3/MM3 (ref 0.7–3.1)
LYMPHOCYTES NFR BLD AUTO: 6.3 % (ref 19.6–45.3)
MAGNESIUM SERPL-MCNC: 2.1 MG/DL (ref 1.6–2.4)
MCH RBC QN AUTO: 31 PG (ref 26.6–33)
MCHC RBC AUTO-ENTMCNC: 34.5 G/DL (ref 31.5–35.7)
MCV RBC AUTO: 89.7 FL (ref 79–97)
MONOCYTES # BLD AUTO: 0.54 10*3/MM3 (ref 0.1–0.9)
MONOCYTES NFR BLD AUTO: 3.6 % (ref 5–12)
NEUTROPHILS NFR BLD AUTO: 13.42 10*3/MM3 (ref 1.7–7)
NEUTROPHILS NFR BLD AUTO: 89.5 % (ref 42.7–76)
NRBC BLD AUTO-RTO: 0 /100 WBC (ref 0–0.2)
PLATELET # BLD AUTO: 245 10*3/MM3 (ref 140–450)
PMV BLD AUTO: 10.6 FL (ref 6–12)
POTASSIUM SERPL-SCNC: 4.1 MMOL/L (ref 3.5–5.2)
PROT SERPL-MCNC: 6.8 G/DL (ref 6–8.5)
RBC # BLD AUTO: 4.07 10*6/MM3 (ref 3.77–5.28)
SODIUM SERPL-SCNC: 138 MMOL/L (ref 136–145)
WBC NRBC COR # BLD: 14.99 10*3/MM3 (ref 3.4–10.8)

## 2022-08-17 PROCEDURE — 85025 COMPLETE CBC W/AUTO DIFF WBC: CPT

## 2022-08-17 PROCEDURE — 83735 ASSAY OF MAGNESIUM: CPT

## 2022-08-17 PROCEDURE — 80053 COMPREHEN METABOLIC PANEL: CPT

## 2022-08-17 PROCEDURE — 96360 HYDRATION IV INFUSION INIT: CPT

## 2022-08-17 PROCEDURE — 99214 OFFICE O/P EST MOD 30 MIN: CPT

## 2022-08-17 RX ORDER — SODIUM CHLORIDE 9 MG/ML
500 INJECTION, SOLUTION INTRAVENOUS ONCE
Status: COMPLETED | OUTPATIENT
Start: 2022-08-17 | End: 2022-08-17

## 2022-08-17 RX ADMIN — SODIUM CHLORIDE 500 ML: 9 INJECTION, SOLUTION INTRAVENOUS at 14:49

## 2022-08-17 RX ADMIN — SODIUM CHLORIDE 500 ML: 9 INJECTION, SOLUTION INTRAVENOUS at 15:25

## 2022-08-17 NOTE — TELEPHONE ENCOUNTER
"  Caller: Cynthia Flower    Relationship: Self    Best call back number: [phone]    Who are you requesting to speak with (clinical staff, provider,  specific staff member): CLINICAL    What was the call regarding: PATIENT IS EXPERIENCING FATIGUE, LOSS OF BALANCE, STATES IT FEELS \"LIKE SHE HAS LOW BLOOD PRESSURE\" AND HAS HAD DIARRHEA     DWT TO OFFICE  "

## 2022-08-17 NOTE — PROGRESS NOTES
[facility] GROUP OUTPATIENT FOLLOW UP CLINIC VISIT    REASON FOR FOLLOW-UP:    AL amyloidosis  Therapy with daratumumab, CyBorD initiated 8/16/2022    HISTORY OF PRESENT ILLNESS: Cynthia Flower is a 67-year-old female with the above-mentioned history returning to the office today following cycle 1 of chemotherapy yesterday.  Patient called the office stating she woke up around 1 AM this morning feeling fatigued and as if her blood pressure was low.  She also reported some dizziness upon standing.  Patient also reports episodes of diarrhea.  Patient does report she has not had anything to eat or drink since dinner last night.  She states she was scheduled to see cardiology with [place] today but canceled appointment due to her symptoms.  She does continue to report ongoing leg pain.  However, this is unchanged from her baseline.  She denies fever, chills, signs infection.  She has no additional concerns today.    REVIEW OF SYSTEMS:  As per the [facility]    PHYSICAL EXAMINATION:    There were no vitals filed for this visit.   BP 94/54  Heart rate 73   Respirations 16  Temperature 98.2    General:  No acute distress, awake, alert and oriented  Skin:  Warm and dry, no visible rash  HEENT:  Normocephalic/atraumatic.  Wearing a face mask.  Chest:  Normal respiratory effort.  Lungs clear to auscultation bilaterally.  Heart: Regular rate and rhythm  Extremities:  No visible clubbing, cyanosis, or edema  Neuro/psych:  Grossly nonfocal.  Normal mood and affect.    I have reexamined the patient and the results are consistent with the previously documented exam. PIYUSH Saravia     DIAGNOSTIC DATA:  CBC & Differential (08/17/2022 14:42)  Comprehensive Metabolic Panel (08/17/2022 14:42)  Magnesium (08/17/2022 14:42)    IMAGING:    None reviewed    ASSESSMENT:  This is a 67 y.o. female with:    *AL amyloidosis  · She had a stress echocardiogram on 4/24/2020 showing a normal left ventricular ejection fraction of  60% with moderate concentric hypertrophy but no wall motion abnormalities of the left ventricle.  There was impaired relaxation noted.  She complained of chest pain during the examination.  There was some ST segment depression.  Cardiac catheterization was performed on the same day.  No intervention was required.  · Follow-up echocardiogram on 4/15/2021 showed a left ventricular ejection fraction of 61 to 65% with normal LV cavity size.  Left ventricular wall thickness showed moderate concentric hypertrophy.  Diastolic function was impaired.  No pericardial effusion.  Small left pleural effusion.  · She had follow-up [facility] imaging for cardiac amyloidosis that was not suggestive of ATTR amyloidosis  · Laboratory values on 2/24/2022 showed a high serum free light chain lambda of 121, normal kappa of 10.5, low ratio at 0.09.  Serum protein electrophoresis showed no evidence of an M spike.  Urine light chains were abnormal with an elevated lambda light chain of 33 and a low ratio of 0.48 with a 12.7 mg M spike on 24-hour urine protein electrophoresis.  · BNP was elevated at 2306 on 3/4/2022.  The troponin was normal at 0.03.  CK was 212 with a CK-MB of 10.87.  · Initial consult on 3/30/22. No M spike on serum protein electrophoresis. SIFE 'presence of monoclonal protein is unclear.'   · Bone marrow biopsy 4/13/22 normocellular, 12.1% plasma cells consistent with low volume plasma cell dyscrasia. FISH with low level t(11;14) fusion only. No amyloid noted as Congo red staining negative.   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 Buenrostro at [facility]. Intended to enroll on a clinical study but her troponin as tested by the study sponsor was not high enough  · Therapy with michael-CyBorD initiated 8/16/2022  · Patient seen in the office on 8/17/2022 for triage visit.  Patient with complaints of fatigue, dizziness and diarrhea.  Patient reports symptoms began early this morning.  Patient  reports dizziness worsening upon standing.  Blood pressure 94/54.  Patient does reports she is not have much to eat or drink since her treatment yesterday.  Plan to administer 1 L normal saline IV today in office. Discussed with the patient that some of the symptoms she is experiencing could be due to dehydration as well as premedication she received yesterday, including IV dexamethasone and IV Benadryl.  She was encouraged to utilize Lomotil and Imodium as needed.  Patient also taking Questran for bowel control. Additionally, CMP revealed elevated liver enzymes.  We will repeat these labs in 2 days.  Patient to report feeling some improvement following fluids today. We will plan to  additional IV fluids on Friday.    *Diarrhea  · She is going to see a gastroenterologist at [facility] soon  · Suspicion for amyloid involvement of the GI tract  · She will continue Lomotil.  I recommended alternating with Imodium.  · Also, prescription for cholestyramine electronically sent to her pharmacy    *Elevated liver enzymes  · 8/17/2022: AST 87, , bilirubin normal at 0.4 patient received cycle 1 day 1 Sissy-CyBorD yesterday.  Plan to have patient return on 8/19/2022 for repeat labs.       PLAN:  1. 1 L normal saline IV today  2. Return in 2 days for repeat labs, NP evaluation, and IV fluids  3. Continue supportive measures for diarrhea including Lomotil as needed  4. Cycle 1 day 8 scheduled for 8/26/2022. Goal is disease control.  Hopeful for autologous stem cell transplant at [facility] after induction therapy.  5. Plan treatment with sissy-CyBorD. Plan 6 cycles of therapy and then reevaluation at [facility] for high-dose therapy with autologous stem cell rescue.  If she has had a fantastic response after 3 cycles of therapy we could consider stem cell collection at that point.  6. She will follow-up at [facility] with cardiology and gastroenterology as appropriate, cardiology scheduled for the end of the  month  7. Continue acyclovir and Bactrim, which she started on 8/15  8. We will try to treat through peripheral IVs if possible.  9. Follow-up weekly as scheduled  10. Patient to notify us with any adverse effects, questions or concerns.    High risk therapy requiring intensive monitoring      Sissy-CyBorD regimen     Daratumumab and hyaluronidase (Darzalex Faspro) as follows:  Cycles 1 & 2: 1800 mg SC once per day on days 1, 8, 15, 22  Cycles 3 to 6: 1800 mg SC once per day on days 1 & 15  Cycles 7 up to 24: 1800 mg SC once on day 1     Bortezomib (Velcade) as follows:  Cycles 1 to 6: 1.3 mg/m2 SC once per day on days 1, 8, 15, 22     Cyclophosphamide (Cytoxan) as follows:  Cycles 1 to 6: 300 mg/m2 (maximum dose of 500 mg) PO or IV once per day on days 1, 8, 15, 22    Plan IV Cytoxan as she has difficulty swallowing pills.     Dexamethasone (Decadron) as follows:  Cycles 1 to 6: 40 mg PO or IV once per day on days 1, 8, 15, 22 (see note)  28-day cycle for up to 24 cycles

## 2022-08-17 NOTE — TELEPHONE ENCOUNTER
Okayed visit with BARBIE Garcia, today at [place] location. Patient reports she's been feeling unwell since about 1 am. She's been dizzy, disoriented, bumping into furniture and feels faint when standing. She confirmed someone could safely drive her to [place]. She admits to having a reaction yesterday during first treatment. She was due to see Alvian with [facility] yesterday, but that was cancelled and wanted to know when she should go about having that rescheduled. Asked scheduling to contact her for add-on today. Will route this to Radha for her review.

## 2022-08-17 NOTE — TELEPHONE ENCOUNTER
CALLED PT ABOUT HER APPT PT IS ON HER WAY - ADVISED SERGIO THAT THE PT IS COMING IN GOING STRAIGHT TO THE BACK FOR HER INFUSION -

## 2022-08-18 ENCOUNTER — APPOINTMENT (OUTPATIENT)
Dept: CT IMAGING | Facility: HOSPITAL | Age: 67
End: 2022-08-18

## 2022-08-18 ENCOUNTER — TELEPHONE (OUTPATIENT)
Dept: ONCOLOGY | Facility: CLINIC | Age: 67
End: 2022-08-18

## 2022-08-18 ENCOUNTER — HOSPITAL ENCOUNTER (INPATIENT)
Facility: HOSPITAL | Age: 67
LOS: 3 days | Discharge: HOME | End: 2022-08-21
Attending: EMERGENCY MEDICINE | Admitting: HOSPITALIST

## 2022-08-18 ENCOUNTER — APPOINTMENT (OUTPATIENT)
Dept: GENERAL RADIOLOGY | Facility: HOSPITAL | Age: 67
End: 2022-08-18

## 2022-08-18 DIAGNOSIS — J18.9 PNEUMONIA OF BOTH LUNGS DUE TO INFECTIOUS ORGANISM, UNSPECIFIED PART OF LUNG: ICD-10-CM

## 2022-08-18 DIAGNOSIS — J81.0 ACUTE PULMONARY EDEMA: Primary | ICD-10-CM

## 2022-08-18 DIAGNOSIS — R13.12 OROPHARYNGEAL DYSPHAGIA: ICD-10-CM

## 2022-08-18 PROBLEM — J96.01 ACUTE RESPIRATORY FAILURE WITH HYPOXIA (HCC): Status: ACTIVE | Noted: 2022-08-18

## 2022-08-18 LAB
ALBUMIN SERPL-MCNC: 4 G/DL (ref 3.5–5.2)
ALBUMIN/GLOB SERPL: 2 G/DL
ALP SERPL-CCNC: 65 U/L (ref 39–117)
ALT SERPL W P-5'-P-CCNC: 115 U/L (ref 1–33)
ANION GAP SERPL CALCULATED.3IONS-SCNC: 12.4 MMOL/L (ref 5–15)
AST SERPL-CCNC: 60 U/L (ref 1–32)
BASOPHILS # BLD AUTO: 0.01 10*3/MM3 (ref 0–0.2)
BASOPHILS NFR BLD AUTO: 0.1 % (ref 0–1.5)
BILIRUB SERPL-MCNC: 0.6 MG/DL (ref 0–1.2)
BUN SERPL-MCNC: 15 MG/DL (ref 8–23)
BUN/CREAT SERPL: 21.4 (ref 7–25)
CALCIUM SPEC-SCNC: 9.3 MG/DL (ref 8.6–10.5)
CHLORIDE SERPL-SCNC: 102 MMOL/L (ref 98–107)
CO2 SERPL-SCNC: 20.6 MMOL/L (ref 22–29)
CREAT SERPL-MCNC: 0.7 MG/DL (ref 0.57–1)
D-LACTATE SERPL-SCNC: 1.1 MMOL/L (ref 0.5–2)
DEPRECATED RDW RBC AUTO: 44.4 FL (ref 37–54)
EGFRCR SERPLBLD CKD-EPI 2021: 94.9 ML/MIN/1.73
EOSINOPHIL # BLD AUTO: 0.03 10*3/MM3 (ref 0–0.4)
EOSINOPHIL NFR BLD AUTO: 0.3 % (ref 0.3–6.2)
ERYTHROCYTE [DISTWIDTH] IN BLOOD BY AUTOMATED COUNT: 13.6 % (ref 12.3–15.4)
GLOBULIN UR ELPH-MCNC: 2 GM/DL
GLUCOSE SERPL-MCNC: 93 MG/DL (ref 65–99)
HCT VFR BLD AUTO: 34.7 % (ref 34–46.6)
HGB BLD-MCNC: 11.9 G/DL (ref 12–15.9)
HOLD SPECIMEN: NORMAL
HOLD SPECIMEN: NORMAL
IMM GRANULOCYTES # BLD AUTO: 0.06 10*3/MM3 (ref 0–0.05)
IMM GRANULOCYTES NFR BLD AUTO: 0.5 % (ref 0–0.5)
LYMPHOCYTES # BLD AUTO: 1.69 10*3/MM3 (ref 0.7–3.1)
LYMPHOCYTES NFR BLD AUTO: 15.3 % (ref 19.6–45.3)
MCH RBC QN AUTO: 30.7 PG (ref 26.6–33)
MCHC RBC AUTO-ENTMCNC: 34.3 G/DL (ref 31.5–35.7)
MCV RBC AUTO: 89.4 FL (ref 79–97)
MONOCYTES # BLD AUTO: 0.72 10*3/MM3 (ref 0.1–0.9)
MONOCYTES NFR BLD AUTO: 6.5 % (ref 5–12)
MRSA DNA SPEC QL NAA+PROBE: NORMAL
NEUTROPHILS NFR BLD AUTO: 77.3 % (ref 42.7–76)
NEUTROPHILS NFR BLD AUTO: 8.53 10*3/MM3 (ref 1.7–7)
NRBC BLD AUTO-RTO: 0 /100 WBC (ref 0–0.2)
NT-PROBNP SERPL-MCNC: ABNORMAL PG/ML (ref 0–900)
PLATELET # BLD AUTO: 207 10*3/MM3 (ref 140–450)
PMV BLD AUTO: 10.8 FL (ref 6–12)
POTASSIUM SERPL-SCNC: 3.9 MMOL/L (ref 3.5–5.2)
PROCALCITONIN SERPL-MCNC: 0.08 NG/ML (ref 0–0.25)
PROT SERPL-MCNC: 6 G/DL (ref 6–8.5)
RBC # BLD AUTO: 3.88 10*6/MM3 (ref 3.77–5.28)
SODIUM SERPL-SCNC: 135 MMOL/L (ref 136–145)
TROPONIN T SERPL-MCNC: 0.02 NG/ML (ref 0–0.03)
WBC NRBC COR # BLD: 11.04 10*3/MM3 (ref 3.4–10.8)
WHOLE BLOOD HOLD COAG: NORMAL
WHOLE BLOOD HOLD SPECIMEN: NORMAL

## 2022-08-18 PROCEDURE — 94760 N-INVAS EAR/PLS OXIMETRY 1: CPT

## 2022-08-18 PROCEDURE — 25010000002 ENOXAPARIN PER 10 MG: Performed by: HOSPITALIST

## 2022-08-18 PROCEDURE — 93005 ELECTROCARDIOGRAM TRACING: CPT | Performed by: EMERGENCY MEDICINE

## 2022-08-18 PROCEDURE — 84484 ASSAY OF TROPONIN QUANT: CPT | Performed by: EMERGENCY MEDICINE

## 2022-08-18 PROCEDURE — 99284 EMERGENCY DEPT VISIT MOD MDM: CPT

## 2022-08-18 PROCEDURE — 25010000002 VANCOMYCIN 5 G RECONSTITUTED SOLUTION: Performed by: HOSPITALIST

## 2022-08-18 PROCEDURE — 87040 BLOOD CULTURE FOR BACTERIA: CPT | Performed by: EMERGENCY MEDICINE

## 2022-08-18 PROCEDURE — 71260 CT THORAX DX C+: CPT

## 2022-08-18 PROCEDURE — 87070 CULTURE OTHR SPECIMN AEROBIC: CPT | Performed by: HOSPITALIST

## 2022-08-18 PROCEDURE — 87205 SMEAR GRAM STAIN: CPT | Performed by: HOSPITALIST

## 2022-08-18 PROCEDURE — 25010000002 LORAZEPAM PER 2 MG: Performed by: HOSPITALIST

## 2022-08-18 PROCEDURE — 25010000002 CEFEPIME PER 500 MG: Performed by: HOSPITALIST

## 2022-08-18 PROCEDURE — 25010000002 FUROSEMIDE PER 20 MG: Performed by: EMERGENCY MEDICINE

## 2022-08-18 PROCEDURE — 93010 ELECTROCARDIOGRAM REPORT: CPT | Performed by: INTERNAL MEDICINE

## 2022-08-18 PROCEDURE — 25010000002 PIPERACILLIN SOD-TAZOBACTAM PER 1 G: Performed by: EMERGENCY MEDICINE

## 2022-08-18 PROCEDURE — 83880 ASSAY OF NATRIURETIC PEPTIDE: CPT | Performed by: EMERGENCY MEDICINE

## 2022-08-18 PROCEDURE — U0005 INFEC AGEN DETEC AMPLI PROBE: HCPCS | Performed by: EMERGENCY MEDICINE

## 2022-08-18 PROCEDURE — 87641 MR-STAPH DNA AMP PROBE: CPT | Performed by: HOSPITALIST

## 2022-08-18 PROCEDURE — 83605 ASSAY OF LACTIC ACID: CPT | Performed by: EMERGENCY MEDICINE

## 2022-08-18 PROCEDURE — 80053 COMPREHEN METABOLIC PANEL: CPT | Performed by: EMERGENCY MEDICINE

## 2022-08-18 PROCEDURE — 25010000002 KETOROLAC TROMETHAMINE PER 15 MG: Performed by: HOSPITALIST

## 2022-08-18 PROCEDURE — 0 IOPAMIDOL PER 1 ML: Performed by: EMERGENCY MEDICINE

## 2022-08-18 PROCEDURE — 71045 X-RAY EXAM CHEST 1 VIEW: CPT

## 2022-08-18 PROCEDURE — 99223 1ST HOSP IP/OBS HIGH 75: CPT | Performed by: HOSPITALIST

## 2022-08-18 PROCEDURE — 84145 PROCALCITONIN (PCT): CPT | Performed by: HOSPITALIST

## 2022-08-18 PROCEDURE — U0004 COV-19 TEST NON-CDC HGH THRU: HCPCS | Performed by: EMERGENCY MEDICINE

## 2022-08-18 PROCEDURE — 85025 COMPLETE CBC W/AUTO DIFF WBC: CPT | Performed by: EMERGENCY MEDICINE

## 2022-08-18 RX ORDER — FUROSEMIDE 10 MG/ML
20 INJECTION INTRAMUSCULAR; INTRAVENOUS ONCE
Status: COMPLETED | OUTPATIENT
Start: 2022-08-18 | End: 2022-08-18

## 2022-08-18 RX ORDER — CEFEPIME 1 G/50ML
2 INJECTION, SOLUTION INTRAVENOUS ONCE
Status: COMPLETED | OUTPATIENT
Start: 2022-08-18 | End: 2022-08-18

## 2022-08-18 RX ORDER — ONDANSETRON 4 MG/1
4 TABLET, FILM COATED ORAL EVERY 6 HOURS PRN
Status: DISCONTINUED | OUTPATIENT
Start: 2022-08-18 | End: 2022-08-19 | Stop reason: SDUPTHER

## 2022-08-18 RX ORDER — MELATONIN
2000 DAILY
Status: DISCONTINUED | OUTPATIENT
Start: 2022-08-18 | End: 2022-08-21 | Stop reason: HOSPADM

## 2022-08-18 RX ORDER — PANTOPRAZOLE SODIUM 40 MG/1
40 TABLET, DELAYED RELEASE ORAL
Status: DISCONTINUED | OUTPATIENT
Start: 2022-08-19 | End: 2022-08-21 | Stop reason: HOSPADM

## 2022-08-18 RX ORDER — CEFEPIME 1 G/50ML
2 INJECTION, SOLUTION INTRAVENOUS EVERY 8 HOURS
Status: DISCONTINUED | OUTPATIENT
Start: 2022-08-19 | End: 2022-08-21 | Stop reason: HOSPADM

## 2022-08-18 RX ORDER — SODIUM CHLORIDE 0.9 % (FLUSH) 0.9 %
10 SYRINGE (ML) INJECTION AS NEEDED
Status: DISCONTINUED | OUTPATIENT
Start: 2022-08-18 | End: 2022-08-21 | Stop reason: HOSPADM

## 2022-08-18 RX ORDER — ENOXAPARIN SODIUM 100 MG/ML
40 INJECTION SUBCUTANEOUS DAILY
Status: DISCONTINUED | OUTPATIENT
Start: 2022-08-18 | End: 2022-08-21 | Stop reason: HOSPADM

## 2022-08-18 RX ORDER — FUROSEMIDE 10 MG/ML
40 INJECTION INTRAMUSCULAR; INTRAVENOUS ONCE
Status: COMPLETED | OUTPATIENT
Start: 2022-08-19 | End: 2022-08-19

## 2022-08-18 RX ORDER — MONTELUKAST SODIUM 10 MG/1
10 TABLET ORAL NIGHTLY
Status: DISCONTINUED | OUTPATIENT
Start: 2022-08-18 | End: 2022-08-20

## 2022-08-18 RX ORDER — ALBUTEROL SULFATE 2.5 MG/3ML
2.5 SOLUTION RESPIRATORY (INHALATION) ONCE AS NEEDED
Status: DISCONTINUED | OUTPATIENT
Start: 2022-08-18 | End: 2022-08-21 | Stop reason: HOSPADM

## 2022-08-18 RX ORDER — LORAZEPAM 2 MG/ML
1 INJECTION INTRAMUSCULAR NIGHTLY PRN
Status: DISCONTINUED | OUTPATIENT
Start: 2022-08-18 | End: 2022-08-21 | Stop reason: HOSPADM

## 2022-08-18 RX ORDER — ACYCLOVIR 800 MG/1
400 TABLET ORAL 2 TIMES DAILY
Status: DISCONTINUED | OUTPATIENT
Start: 2022-08-18 | End: 2022-08-21 | Stop reason: HOSPADM

## 2022-08-18 RX ORDER — SODIUM CHLORIDE 0.9 % (FLUSH) 0.9 %
10 SYRINGE (ML) INJECTION EVERY 12 HOURS SCHEDULED
Status: DISCONTINUED | OUTPATIENT
Start: 2022-08-18 | End: 2022-08-21 | Stop reason: HOSPADM

## 2022-08-18 RX ORDER — ALPRAZOLAM 0.25 MG/1
0.25 TABLET ORAL 2 TIMES DAILY PRN
Status: DISCONTINUED | OUTPATIENT
Start: 2022-08-18 | End: 2022-08-21 | Stop reason: HOSPADM

## 2022-08-18 RX ORDER — LEVOTHYROXINE SODIUM 0.07 MG/1
75 TABLET ORAL
Status: DISCONTINUED | OUTPATIENT
Start: 2022-08-19 | End: 2022-08-21 | Stop reason: HOSPADM

## 2022-08-18 RX ORDER — ROSUVASTATIN CALCIUM 20 MG/1
20 TABLET, COATED ORAL NIGHTLY
Status: DISCONTINUED | OUTPATIENT
Start: 2022-08-18 | End: 2022-08-21 | Stop reason: HOSPADM

## 2022-08-18 RX ORDER — KETOROLAC TROMETHAMINE 30 MG/ML
30 INJECTION, SOLUTION INTRAMUSCULAR; INTRAVENOUS EVERY 6 HOURS PRN
Status: DISCONTINUED | OUTPATIENT
Start: 2022-08-18 | End: 2022-08-21 | Stop reason: HOSPADM

## 2022-08-18 RX ORDER — ZOLPIDEM TARTRATE 5 MG/1
10 TABLET ORAL NIGHTLY PRN
Status: DISCONTINUED | OUTPATIENT
Start: 2022-08-18 | End: 2022-08-21 | Stop reason: HOSPADM

## 2022-08-18 RX ORDER — CETIRIZINE HYDROCHLORIDE 10 MG/1
10 TABLET ORAL DAILY
Status: DISCONTINUED | OUTPATIENT
Start: 2022-08-18 | End: 2022-08-21 | Stop reason: HOSPADM

## 2022-08-18 RX ORDER — SODIUM CHLORIDE 9 MG/ML
40 INJECTION, SOLUTION INTRAVENOUS AS NEEDED
Status: DISCONTINUED | OUTPATIENT
Start: 2022-08-18 | End: 2022-08-21 | Stop reason: HOSPADM

## 2022-08-18 RX ORDER — FLUOXETINE HYDROCHLORIDE 20 MG/1
20 CAPSULE ORAL 4 TIMES DAILY
Status: DISCONTINUED | OUTPATIENT
Start: 2022-08-18 | End: 2022-08-21 | Stop reason: HOSPADM

## 2022-08-18 RX ORDER — TRAMADOL HYDROCHLORIDE 50 MG/1
50 TABLET ORAL EVERY 6 HOURS PRN
Status: DISCONTINUED | OUTPATIENT
Start: 2022-08-18 | End: 2022-08-21 | Stop reason: HOSPADM

## 2022-08-18 RX ORDER — ACETAMINOPHEN 325 MG/1
650 TABLET ORAL EVERY 4 HOURS PRN
Status: DISCONTINUED | OUTPATIENT
Start: 2022-08-18 | End: 2022-08-21 | Stop reason: HOSPADM

## 2022-08-18 RX ORDER — ONDANSETRON 4 MG/1
8 TABLET, FILM COATED ORAL 3 TIMES DAILY PRN
Status: DISCONTINUED | OUTPATIENT
Start: 2022-08-18 | End: 2022-08-21 | Stop reason: HOSPADM

## 2022-08-18 RX ORDER — ONDANSETRON 2 MG/ML
4 INJECTION INTRAMUSCULAR; INTRAVENOUS EVERY 6 HOURS PRN
Status: DISCONTINUED | OUTPATIENT
Start: 2022-08-18 | End: 2022-08-21 | Stop reason: HOSPADM

## 2022-08-18 RX ORDER — KETOTIFEN FUMARATE 0.35 MG/ML
1 SOLUTION/ DROPS OPHTHALMIC 2 TIMES DAILY
Status: DISCONTINUED | OUTPATIENT
Start: 2022-08-18 | End: 2022-08-21 | Stop reason: HOSPADM

## 2022-08-18 RX ADMIN — Medication 10 ML: at 20:07

## 2022-08-18 RX ADMIN — KETOTIFEN FUMARATE 1 DROP: 0.35 SOLUTION/ DROPS OPHTHALMIC at 20:08

## 2022-08-18 RX ADMIN — ENOXAPARIN SODIUM 40 MG: 100 INJECTION SUBCUTANEOUS at 21:54

## 2022-08-18 RX ADMIN — LORAZEPAM 1 MG: 2 INJECTION INTRAMUSCULAR; INTRAVENOUS at 22:43

## 2022-08-18 RX ADMIN — FUROSEMIDE 20 MG: 10 INJECTION, SOLUTION INTRAMUSCULAR; INTRAVENOUS at 16:26

## 2022-08-18 RX ADMIN — VANCOMYCIN HYDROCHLORIDE 1250 MG: 5 INJECTION, POWDER, LYOPHILIZED, FOR SOLUTION INTRAVENOUS at 20:08

## 2022-08-18 RX ADMIN — IOPAMIDOL 100 ML: 755 INJECTION, SOLUTION INTRAVENOUS at 13:41

## 2022-08-18 RX ADMIN — KETOROLAC TROMETHAMINE 30 MG: 30 INJECTION, SOLUTION INTRAMUSCULAR; INTRAVENOUS at 22:41

## 2022-08-18 RX ADMIN — CEFEPIME 2 G: 1 INJECTION, SOLUTION INTRAVENOUS at 21:54

## 2022-08-18 RX ADMIN — TAZOBACTAM SODIUM AND PIPERACILLIN SODIUM 3.38 G: 375; 3 INJECTION, SOLUTION INTRAVENOUS at 15:37

## 2022-08-18 NOTE — TELEPHONE ENCOUNTER
Patient stated that she became short of breath and started having chest pain yesterday, she says her symptoms get slightly better when she takes Protonix, but when she gets up to walk she becomes so winded and has to sit back down. She also stated she has a cough and fever. RN told the patient that she should go to her nearest ER to be evaluated for her chest pain and shortness of breath. She v/u. Patient lives near [facility] so she will be going to their ER.

## 2022-08-19 ENCOUNTER — APPOINTMENT (OUTPATIENT)
Dept: ONCOLOGY | Facility: HOSPITAL | Age: 67
End: 2022-08-19

## 2022-08-19 ENCOUNTER — APPOINTMENT (OUTPATIENT)
Dept: GENERAL RADIOLOGY | Facility: HOSPITAL | Age: 67
End: 2022-08-19

## 2022-08-19 ENCOUNTER — TELEPHONE (OUTPATIENT)
Dept: ONCOLOGY | Facility: CLINIC | Age: 67
End: 2022-08-19

## 2022-08-19 ENCOUNTER — APPOINTMENT (OUTPATIENT)
Dept: CARDIOLOGY | Facility: HOSPITAL | Age: 67
End: 2022-08-19

## 2022-08-19 ENCOUNTER — APPOINTMENT (OUTPATIENT)
Dept: OTHER | Facility: HOSPITAL | Age: 67
End: 2022-08-19

## 2022-08-19 LAB
ANION GAP SERPL CALCULATED.3IONS-SCNC: 11 MMOL/L (ref 5–15)
ANION GAP SERPL CALCULATED.3IONS-SCNC: 11.5 MMOL/L (ref 5–15)
BASOPHILS # BLD AUTO: 0.01 10*3/MM3 (ref 0–0.2)
BASOPHILS NFR BLD AUTO: 0.1 % (ref 0–1.5)
BH CV ECHO MEAS - AO MAX PG: 61 MMHG
BH CV ECHO MEAS - AO MEAN PG: 26 MMHG
BH CV ECHO MEAS - AO ROOT DIAM: 2.6 CM
BH CV ECHO MEAS - AO V2 MAX: 392 CM/SEC
BH CV ECHO MEAS - AO V2 VTI: 57.1 CM
BH CV ECHO MEAS - EF(MOD-BP): 68 %
BH CV ECHO MEAS - IVSD: 2.2 CM
BH CV ECHO MEAS - LA DIMENSION: 3.4 CM
BH CV ECHO MEAS - LAT PEAK E' VEL: 6.4 CM/SEC
BH CV ECHO MEAS - LV MAX PG: 21 MMHG
BH CV ECHO MEAS - LV MEAN PG: 9 MMHG
BH CV ECHO MEAS - LV V1 MAX: 230 CM/SEC
BH CV ECHO MEAS - LV V1 VTI: 34.8 CM
BH CV ECHO MEAS - LVIDD: 3.2 CM
BH CV ECHO MEAS - LVIDS: 1.6 CM
BH CV ECHO MEAS - LVOT DIAM: 2.4 CM
BH CV ECHO MEAS - LVPWD: 1.9 CM
BH CV ECHO MEAS - MED PEAK E' VEL: 3.81 CM/SEC
BH CV ECHO MEAS - MR MAX PG: 47 MMHG
BH CV ECHO MEAS - MR MAX VEL: 344 CM/SEC
BH CV ECHO MEAS - MV A MAX VEL: 113 CM/SEC
BH CV ECHO MEAS - MV DEC TIME: 227 MSEC
BH CV ECHO MEAS - MV E MAX VEL: 79.7 CM/SEC
BH CV ECHO MEAS - MV E/A: 0.7
BH CV ECHO MEASUREMENTS AVERAGE E/E' RATIO: 15.61
BUN SERPL-MCNC: 14 MG/DL (ref 8–23)
BUN SERPL-MCNC: 16 MG/DL (ref 8–23)
BUN/CREAT SERPL: 17.5 (ref 7–25)
BUN/CREAT SERPL: 22.5 (ref 7–25)
CALCIUM SPEC-SCNC: 9.1 MG/DL (ref 8.6–10.5)
CALCIUM SPEC-SCNC: 9.2 MG/DL (ref 8.6–10.5)
CHLORIDE SERPL-SCNC: 101 MMOL/L (ref 98–107)
CHLORIDE SERPL-SCNC: 102 MMOL/L (ref 98–107)
CO2 SERPL-SCNC: 27 MMOL/L (ref 22–29)
CO2 SERPL-SCNC: 27.5 MMOL/L (ref 22–29)
CREAT SERPL-MCNC: 0.71 MG/DL (ref 0.57–1)
CREAT SERPL-MCNC: 0.8 MG/DL (ref 0.57–1)
DEPRECATED RDW RBC AUTO: 44.4 FL (ref 37–54)
EGFRCR SERPLBLD CKD-EPI 2021: 80.9 ML/MIN/1.73
EGFRCR SERPLBLD CKD-EPI 2021: 93.3 ML/MIN/1.73
EOSINOPHIL # BLD AUTO: 0.15 10*3/MM3 (ref 0–0.4)
EOSINOPHIL NFR BLD AUTO: 1.9 % (ref 0.3–6.2)
ERYTHROCYTE [DISTWIDTH] IN BLOOD BY AUTOMATED COUNT: 13.6 % (ref 12.3–15.4)
GLUCOSE SERPL-MCNC: 79 MG/DL (ref 65–99)
GLUCOSE SERPL-MCNC: 82 MG/DL (ref 65–99)
HCT VFR BLD AUTO: 36.8 % (ref 34–46.6)
HGB BLD-MCNC: 12.6 G/DL (ref 12–15.9)
IMM GRANULOCYTES # BLD AUTO: 0.02 10*3/MM3 (ref 0–0.05)
IMM GRANULOCYTES NFR BLD AUTO: 0.3 % (ref 0–0.5)
LEFT ATRIUM VOLUME INDEX: 36.4 ML/M2
LYMPHOCYTES # BLD AUTO: 2.88 10*3/MM3 (ref 0.7–3.1)
LYMPHOCYTES NFR BLD AUTO: 36.1 % (ref 19.6–45.3)
MAGNESIUM SERPL-MCNC: 2.1 MG/DL (ref 1.6–2.4)
MAXIMAL PREDICTED HEART RATE: 153 BPM
MCH RBC QN AUTO: 30.8 PG (ref 26.6–33)
MCHC RBC AUTO-ENTMCNC: 34.2 G/DL (ref 31.5–35.7)
MCV RBC AUTO: 90 FL (ref 79–97)
MONOCYTES # BLD AUTO: 0.72 10*3/MM3 (ref 0.1–0.9)
MONOCYTES NFR BLD AUTO: 9 % (ref 5–12)
NEUTROPHILS NFR BLD AUTO: 4.2 10*3/MM3 (ref 1.7–7)
NEUTROPHILS NFR BLD AUTO: 52.6 % (ref 42.7–76)
NRBC BLD AUTO-RTO: 0 /100 WBC (ref 0–0.2)
PLATELET # BLD AUTO: 185 10*3/MM3 (ref 140–450)
PMV BLD AUTO: 10.7 FL (ref 6–12)
POTASSIUM SERPL-SCNC: 3.2 MMOL/L (ref 3.5–5.2)
POTASSIUM SERPL-SCNC: 3.2 MMOL/L (ref 3.5–5.2)
RBC # BLD AUTO: 4.09 10*6/MM3 (ref 3.77–5.28)
SARS-COV-2 RNA PNL SPEC NAA+PROBE: NOT DETECTED
SODIUM SERPL-SCNC: 139 MMOL/L (ref 136–145)
SODIUM SERPL-SCNC: 141 MMOL/L (ref 136–145)
STRESS TARGET HR: 130 BPM
WBC NRBC COR # BLD: 7.98 10*3/MM3 (ref 3.4–10.8)

## 2022-08-19 PROCEDURE — 85025 COMPLETE CBC W/AUTO DIFF WBC: CPT | Performed by: HOSPITALIST

## 2022-08-19 PROCEDURE — 83735 ASSAY OF MAGNESIUM: CPT | Performed by: INTERNAL MEDICINE

## 2022-08-19 PROCEDURE — 25010000002 ONDANSETRON PER 1 MG: Performed by: HOSPITALIST

## 2022-08-19 PROCEDURE — 92610 EVALUATE SWALLOWING FUNCTION: CPT

## 2022-08-19 PROCEDURE — 80048 BASIC METABOLIC PNL TOTAL CA: CPT | Performed by: HOSPITALIST

## 2022-08-19 PROCEDURE — 74230 X-RAY XM SWLNG FUNCJ C+: CPT

## 2022-08-19 PROCEDURE — 93306 TTE W/DOPPLER COMPLETE: CPT

## 2022-08-19 PROCEDURE — 25010000002 FUROSEMIDE PER 20 MG: Performed by: HOSPITALIST

## 2022-08-19 PROCEDURE — 25010000002 CEFEPIME PER 500 MG: Performed by: HOSPITALIST

## 2022-08-19 PROCEDURE — 93306 TTE W/DOPPLER COMPLETE: CPT | Performed by: INTERNAL MEDICINE

## 2022-08-19 PROCEDURE — 25010000002 ENOXAPARIN PER 10 MG: Performed by: HOSPITALIST

## 2022-08-19 PROCEDURE — 80048 BASIC METABOLIC PNL TOTAL CA: CPT | Performed by: INTERNAL MEDICINE

## 2022-08-19 PROCEDURE — 92611 MOTION FLUOROSCOPY/SWALLOW: CPT

## 2022-08-19 PROCEDURE — 99232 SBSQ HOSP IP/OBS MODERATE 35: CPT | Performed by: INTERNAL MEDICINE

## 2022-08-19 PROCEDURE — 25010000002 KETOROLAC TROMETHAMINE PER 15 MG: Performed by: HOSPITALIST

## 2022-08-19 PROCEDURE — 25010000002 VANCOMYCIN 5 G RECONSTITUTED SOLUTION: Performed by: HOSPITALIST

## 2022-08-19 RX ORDER — POTASSIUM CHLORIDE 750 MG/1
40 CAPSULE, EXTENDED RELEASE ORAL ONCE
Status: COMPLETED | OUTPATIENT
Start: 2022-08-19 | End: 2022-08-19

## 2022-08-19 RX ORDER — POTASSIUM CHLORIDE 1.5 G/1.77G
40 POWDER, FOR SOLUTION ORAL EVERY 4 HOURS
Status: DISCONTINUED | OUTPATIENT
Start: 2022-08-19 | End: 2022-08-19

## 2022-08-19 RX ORDER — LOPERAMIDE HYDROCHLORIDE 2 MG/1
2 CAPSULE ORAL 4 TIMES DAILY PRN
Status: DISCONTINUED | OUTPATIENT
Start: 2022-08-19 | End: 2022-08-21 | Stop reason: HOSPADM

## 2022-08-19 RX ORDER — TRAMADOL HYDROCHLORIDE 50 MG/1
50 TABLET ORAL ONCE
Status: COMPLETED | OUTPATIENT
Start: 2022-08-19 | End: 2022-08-19

## 2022-08-19 RX ORDER — MORPHINE SULFATE 2 MG/ML
2 INJECTION, SOLUTION INTRAMUSCULAR; INTRAVENOUS EVERY 4 HOURS PRN
Status: DISCONTINUED | OUTPATIENT
Start: 2022-08-19 | End: 2022-08-21 | Stop reason: HOSPADM

## 2022-08-19 RX ORDER — ALPRAZOLAM 0.25 MG/1
0.25 TABLET ORAL ONCE
Status: COMPLETED | OUTPATIENT
Start: 2022-08-19 | End: 2022-08-19

## 2022-08-19 RX ORDER — DOXYCYCLINE 100 MG/1
100 CAPSULE ORAL EVERY 12 HOURS SCHEDULED
Status: DISCONTINUED | OUTPATIENT
Start: 2022-08-19 | End: 2022-08-21 | Stop reason: HOSPADM

## 2022-08-19 RX ADMIN — Medication 10 ML: at 09:00

## 2022-08-19 RX ADMIN — TRAMADOL HYDROCHLORIDE 50 MG: 50 TABLET, COATED ORAL at 12:09

## 2022-08-19 RX ADMIN — CETIRIZINE HYDROCHLORIDE TABLETS 10 MG: 10 TABLET, FILM COATED ORAL at 12:09

## 2022-08-19 RX ADMIN — BARIUM SULFATE 50 ML: 400 SUSPENSION ORAL at 13:14

## 2022-08-19 RX ADMIN — POTASSIUM CHLORIDE 40 MEQ: 750 CAPSULE, EXTENDED RELEASE ORAL at 18:21

## 2022-08-19 RX ADMIN — BARIUM SULFATE 1 TEASPOON(S): 0.6 CREAM ORAL at 13:14

## 2022-08-19 RX ADMIN — FLUOXETINE 20 MG: 20 CAPSULE ORAL at 18:21

## 2022-08-19 RX ADMIN — FLUOXETINE 20 MG: 20 CAPSULE ORAL at 22:40

## 2022-08-19 RX ADMIN — POTASSIUM CHLORIDE 40 MEQ: 1.5 POWDER, FOR SOLUTION ORAL at 12:09

## 2022-08-19 RX ADMIN — ALPRAZOLAM 0.25 MG: 0.25 TABLET ORAL at 12:10

## 2022-08-19 RX ADMIN — VANCOMYCIN HYDROCHLORIDE 750 MG: 5 INJECTION, POWDER, LYOPHILIZED, FOR SOLUTION INTRAVENOUS at 10:53

## 2022-08-19 RX ADMIN — ONDANSETRON 4 MG: 2 INJECTION INTRAMUSCULAR; INTRAVENOUS at 12:30

## 2022-08-19 RX ADMIN — ACYCLOVIR 400 MG: 800 TABLET ORAL at 12:11

## 2022-08-19 RX ADMIN — ACETAMINOPHEN 325MG 650 MG: 325 TABLET ORAL at 15:21

## 2022-08-19 RX ADMIN — ROSUVASTATIN 20 MG: 20 TABLET, FILM COATED ORAL at 21:01

## 2022-08-19 RX ADMIN — CEFEPIME 2 G: 1 INJECTION, SOLUTION INTRAVENOUS at 21:00

## 2022-08-19 RX ADMIN — FUROSEMIDE 40 MG: 10 INJECTION, SOLUTION INTRAMUSCULAR; INTRAVENOUS at 06:19

## 2022-08-19 RX ADMIN — KETOTIFEN FUMARATE 1 DROP: 0.35 SOLUTION/ DROPS OPHTHALMIC at 12:19

## 2022-08-19 RX ADMIN — PANTOPRAZOLE SODIUM 40 MG: 40 TABLET, DELAYED RELEASE ORAL at 12:09

## 2022-08-19 RX ADMIN — Medication 10 ML: at 21:00

## 2022-08-19 RX ADMIN — LOPERAMIDE HYDROCHLORIDE 2 MG: 2 CAPSULE ORAL at 22:40

## 2022-08-19 RX ADMIN — FLUOXETINE 20 MG: 20 CAPSULE ORAL at 15:20

## 2022-08-19 RX ADMIN — BARIUM SULFATE 55 ML: 0.81 POWDER, FOR SUSPENSION ORAL at 13:14

## 2022-08-19 RX ADMIN — TRAMADOL HYDROCHLORIDE 50 MG: 50 TABLET, COATED ORAL at 15:22

## 2022-08-19 RX ADMIN — KETOROLAC TROMETHAMINE 30 MG: 30 INJECTION, SOLUTION INTRAMUSCULAR; INTRAVENOUS at 06:27

## 2022-08-19 RX ADMIN — DOXYCYCLINE 100 MG: 100 CAPSULE ORAL at 21:01

## 2022-08-19 RX ADMIN — KETOTIFEN FUMARATE 1 DROP: 0.35 SOLUTION/ DROPS OPHTHALMIC at 21:05

## 2022-08-19 RX ADMIN — CEFEPIME 2 G: 1 INJECTION, SOLUTION INTRAVENOUS at 06:19

## 2022-08-19 RX ADMIN — ACETAMINOPHEN 325MG 650 MG: 325 TABLET ORAL at 12:09

## 2022-08-19 RX ADMIN — ALPRAZOLAM 0.25 MG: 0.25 TABLET ORAL at 15:21

## 2022-08-19 RX ADMIN — ACYCLOVIR 400 MG: 800 TABLET ORAL at 21:01

## 2022-08-19 RX ADMIN — CEFEPIME 2 G: 1 INJECTION, SOLUTION INTRAVENOUS at 15:20

## 2022-08-19 RX ADMIN — ZOLPIDEM TARTRATE 10 MG: 5 TABLET, FILM COATED ORAL at 21:01

## 2022-08-19 RX ADMIN — Medication 2000 UNITS: at 12:09

## 2022-08-19 NOTE — TELEPHONE ENCOUNTER
Need to cancel appt for today.  Wants Dr. Sal to know she was admitted to [facility] yesterday. She had too much fluid.

## 2022-08-20 LAB
ANION GAP SERPL CALCULATED.3IONS-SCNC: 7.5 MMOL/L (ref 5–15)
BUN SERPL-MCNC: 20 MG/DL (ref 8–23)
BUN/CREAT SERPL: 26.7 (ref 7–25)
CALCIUM SPEC-SCNC: 9.3 MG/DL (ref 8.6–10.5)
CHLORIDE SERPL-SCNC: 105 MMOL/L (ref 98–107)
CO2 SERPL-SCNC: 25.5 MMOL/L (ref 22–29)
CREAT SERPL-MCNC: 0.75 MG/DL (ref 0.57–1)
EGFRCR SERPLBLD CKD-EPI 2021: 87.4 ML/MIN/1.73
GLUCOSE SERPL-MCNC: 94 MG/DL (ref 65–99)
L PNEUMO1 AG UR QL IA: NEGATIVE
MAGNESIUM SERPL-MCNC: 2.2 MG/DL (ref 1.6–2.4)
POTASSIUM SERPL-SCNC: 4.2 MMOL/L (ref 3.5–5.2)
S PNEUM AG SPEC QL LA: NEGATIVE
SODIUM SERPL-SCNC: 138 MMOL/L (ref 136–145)

## 2022-08-20 PROCEDURE — 99232 SBSQ HOSP IP/OBS MODERATE 35: CPT | Performed by: INTERNAL MEDICINE

## 2022-08-20 PROCEDURE — 83735 ASSAY OF MAGNESIUM: CPT | Performed by: INTERNAL MEDICINE

## 2022-08-20 PROCEDURE — 80048 BASIC METABOLIC PNL TOTAL CA: CPT | Performed by: INTERNAL MEDICINE

## 2022-08-20 PROCEDURE — 25010000002 CEFEPIME PER 500 MG: Performed by: HOSPITALIST

## 2022-08-20 PROCEDURE — 25010000002 KETOROLAC TROMETHAMINE PER 15 MG: Performed by: HOSPITALIST

## 2022-08-20 PROCEDURE — 25010000002 ENOXAPARIN PER 10 MG: Performed by: HOSPITALIST

## 2022-08-20 PROCEDURE — 87449 NOS EACH ORGANISM AG IA: CPT | Performed by: INTERNAL MEDICINE

## 2022-08-20 PROCEDURE — 87899 AGENT NOS ASSAY W/OPTIC: CPT | Performed by: INTERNAL MEDICINE

## 2022-08-20 RX ADMIN — FLUOXETINE 20 MG: 20 CAPSULE ORAL at 11:07

## 2022-08-20 RX ADMIN — CEFEPIME 2 G: 1 INJECTION, SOLUTION INTRAVENOUS at 12:41

## 2022-08-20 RX ADMIN — DOXYCYCLINE 100 MG: 100 CAPSULE ORAL at 21:05

## 2022-08-20 RX ADMIN — FLUOXETINE 20 MG: 20 CAPSULE ORAL at 08:41

## 2022-08-20 RX ADMIN — CEFEPIME 2 G: 1 INJECTION, SOLUTION INTRAVENOUS at 21:04

## 2022-08-20 RX ADMIN — KETOROLAC TROMETHAMINE 30 MG: 30 INJECTION, SOLUTION INTRAMUSCULAR; INTRAVENOUS at 16:20

## 2022-08-20 RX ADMIN — LEVOTHYROXINE SODIUM 75 MCG: 0.07 TABLET ORAL at 08:40

## 2022-08-20 RX ADMIN — FLUOXETINE 20 MG: 20 CAPSULE ORAL at 17:20

## 2022-08-20 RX ADMIN — Medication 2000 UNITS: at 08:41

## 2022-08-20 RX ADMIN — Medication 10 ML: at 08:42

## 2022-08-20 RX ADMIN — Medication 10 ML: at 21:05

## 2022-08-20 RX ADMIN — ZOLPIDEM TARTRATE 10 MG: 5 TABLET, FILM COATED ORAL at 21:05

## 2022-08-20 RX ADMIN — TRAMADOL HYDROCHLORIDE 50 MG: 50 TABLET, COATED ORAL at 09:11

## 2022-08-20 RX ADMIN — FLUOXETINE 20 MG: 20 CAPSULE ORAL at 21:05

## 2022-08-20 RX ADMIN — PANTOPRAZOLE SODIUM 40 MG: 40 TABLET, DELAYED RELEASE ORAL at 08:40

## 2022-08-20 RX ADMIN — TRAMADOL HYDROCHLORIDE 50 MG: 50 TABLET, COATED ORAL at 18:37

## 2022-08-20 RX ADMIN — ROSUVASTATIN 20 MG: 20 TABLET, FILM COATED ORAL at 21:05

## 2022-08-20 RX ADMIN — DOXYCYCLINE 100 MG: 100 CAPSULE ORAL at 08:41

## 2022-08-20 RX ADMIN — KETOTIFEN FUMARATE 1 DROP: 0.35 SOLUTION/ DROPS OPHTHALMIC at 21:05

## 2022-08-20 RX ADMIN — CETIRIZINE HYDROCHLORIDE TABLETS 10 MG: 10 TABLET, FILM COATED ORAL at 08:41

## 2022-08-20 RX ADMIN — ENOXAPARIN SODIUM 40 MG: 100 INJECTION SUBCUTANEOUS at 08:41

## 2022-08-20 RX ADMIN — ACYCLOVIR 400 MG: 800 TABLET ORAL at 21:04

## 2022-08-20 RX ADMIN — ACYCLOVIR 400 MG: 800 TABLET ORAL at 08:40

## 2022-08-20 RX ADMIN — CEFEPIME 2 G: 1 INJECTION, SOLUTION INTRAVENOUS at 05:22

## 2022-08-21 ENCOUNTER — READMISSION MANAGEMENT (OUTPATIENT)
Dept: CALL CENTER | Facility: HOSPITAL | Age: 67
End: 2022-08-21

## 2022-08-21 VITALS
OXYGEN SATURATION: 98 % | DIASTOLIC BLOOD PRESSURE: 57 MMHG | HEART RATE: 82 BPM | TEMPERATURE: 98.4 F | BODY MASS INDEX: 23.33 KG/M2 | RESPIRATION RATE: 18 BRPM | HEIGHT: 61 IN | SYSTOLIC BLOOD PRESSURE: 128 MMHG | WEIGHT: 123.55 LBS

## 2022-08-21 PROBLEM — J81.0 ACUTE PULMONARY EDEMA (HCC): Status: RESOLVED | Noted: 2022-08-18 | Resolved: 2022-08-21

## 2022-08-21 PROBLEM — J96.01 ACUTE RESPIRATORY FAILURE WITH HYPOXIA (HCC): Status: RESOLVED | Noted: 2022-08-18 | Resolved: 2022-08-21

## 2022-08-21 PROBLEM — E85.9 AMYLOIDOSIS: Status: ACTIVE | Noted: 2022-05-04

## 2022-08-21 LAB
ANION GAP SERPL CALCULATED.3IONS-SCNC: 8.6 MMOL/L (ref 5–15)
BACTERIA SPEC RESP CULT: NORMAL
BUN SERPL-MCNC: 20 MG/DL (ref 8–23)
BUN/CREAT SERPL: 30.8 (ref 7–25)
CALCIUM SPEC-SCNC: 9.2 MG/DL (ref 8.6–10.5)
CHLORIDE SERPL-SCNC: 108 MMOL/L (ref 98–107)
CO2 SERPL-SCNC: 23.4 MMOL/L (ref 22–29)
CREAT SERPL-MCNC: 0.65 MG/DL (ref 0.57–1)
EGFRCR SERPLBLD CKD-EPI 2021: 96.6 ML/MIN/1.73
GLUCOSE SERPL-MCNC: 89 MG/DL (ref 65–99)
GRAM STN SPEC: NORMAL
GRAM STN SPEC: NORMAL
POTASSIUM SERPL-SCNC: 4.4 MMOL/L (ref 3.5–5.2)
QT INTERVAL: 419 MS
SODIUM SERPL-SCNC: 140 MMOL/L (ref 136–145)

## 2022-08-21 PROCEDURE — 25010000002 ENOXAPARIN PER 10 MG: Performed by: HOSPITALIST

## 2022-08-21 PROCEDURE — 25010000002 CEFEPIME PER 500 MG: Performed by: HOSPITALIST

## 2022-08-21 PROCEDURE — 99239 HOSP IP/OBS DSCHRG MGMT >30: CPT | Performed by: INTERNAL MEDICINE

## 2022-08-21 PROCEDURE — 80048 BASIC METABOLIC PNL TOTAL CA: CPT | Performed by: INTERNAL MEDICINE

## 2022-08-21 PROCEDURE — 25010000002 KETOROLAC TROMETHAMINE PER 15 MG: Performed by: HOSPITALIST

## 2022-08-21 RX ORDER — LEVOFLOXACIN 750 MG/1
750 TABLET ORAL DAILY
Qty: 7 TABLET | Refills: 0 | Status: SHIPPED | OUTPATIENT
Start: 2022-08-21 | End: 2022-08-28

## 2022-08-21 RX ADMIN — KETOROLAC TROMETHAMINE 30 MG: 30 INJECTION, SOLUTION INTRAMUSCULAR; INTRAVENOUS at 09:41

## 2022-08-21 RX ADMIN — ACYCLOVIR 400 MG: 800 TABLET ORAL at 09:43

## 2022-08-21 RX ADMIN — FLUOXETINE 20 MG: 20 CAPSULE ORAL at 09:44

## 2022-08-21 RX ADMIN — TRAMADOL HYDROCHLORIDE 50 MG: 50 TABLET, COATED ORAL at 05:24

## 2022-08-21 RX ADMIN — CEFEPIME 2 G: 1 INJECTION, SOLUTION INTRAVENOUS at 05:24

## 2022-08-21 RX ADMIN — Medication 2000 UNITS: at 09:43

## 2022-08-21 RX ADMIN — ENOXAPARIN SODIUM 40 MG: 100 INJECTION SUBCUTANEOUS at 09:44

## 2022-08-21 RX ADMIN — FLUOXETINE 20 MG: 20 CAPSULE ORAL at 12:11

## 2022-08-21 RX ADMIN — DOXYCYCLINE 100 MG: 100 CAPSULE ORAL at 09:44

## 2022-08-21 RX ADMIN — CEFEPIME 2 G: 1 INJECTION, SOLUTION INTRAVENOUS at 13:00

## 2022-08-21 RX ADMIN — LEVOTHYROXINE SODIUM 75 MCG: 0.07 TABLET ORAL at 05:25

## 2022-08-21 RX ADMIN — Medication 10 ML: at 09:45

## 2022-08-21 RX ADMIN — LEVOTHYROXINE SODIUM 75 MCG: 0.07 TABLET ORAL at 09:44

## 2022-08-21 RX ADMIN — PANTOPRAZOLE SODIUM 40 MG: 40 TABLET, DELAYED RELEASE ORAL at 05:25

## 2022-08-21 RX ADMIN — CETIRIZINE HYDROCHLORIDE TABLETS 10 MG: 10 TABLET, FILM COATED ORAL at 09:43

## 2022-08-21 NOTE — OUTREACH NOTE
Prep Survey    Flowsheet Row Responses   [facility]od[facility] facility patient discharged from? [facility]   Is LACE score < 7 ? No   Emergency Room discharge w/ pulse ox? No   Eligibility [facility]   Hospital [facility]   Date of Admission 08/18/22   Date of Discharge 08/21/22   Discharge Disposition Home or Self Care   Discharge diagnosis Acute hypoxic resp failure, Acute pulmonary edema, PNA, esophageal dysphagia   Does the patient have one of the following disease processes/diagnoses(primary or secondary)? COPD/Pneumonia   Does the patient have Home health ordered? No   Is there a DME ordered? No   Prep survey completed? Yes          JUAN ARROYO - Registered Nurse

## 2022-08-22 ENCOUNTER — TELEPHONE (OUTPATIENT)
Dept: ONCOLOGY | Facility: CLINIC | Age: 67
End: 2022-08-22

## 2022-08-22 ENCOUNTER — TRANSITIONAL CARE MANAGEMENT TELEPHONE ENCOUNTER (OUTPATIENT)
Dept: CALL CENTER | Facility: HOSPITAL | Age: 67
End: 2022-08-22

## 2022-08-22 NOTE — TELEPHONE ENCOUNTER
Caller: Cynthia Flower    Relationship: Self    Best call back number: [phone]    Who are you requesting to speak with (clinical staff, provider,  specific staff member): CLINICAL    Do you know the name of the person who called: DR VILLANUEVA'S NURSE    What was the call regarding: YOLETTE HAS HAD PNEUMONIA AND SHE IS WANTING TO KNOW WHEN SHE NEEDS TO R/S HER INFUSIONS      Do you require a callback: YES

## 2022-08-22 NOTE — OUTREACH NOTE
Call Center TCM Note    Flowsheet Row Responses   [facility] patient discharged from? [facility]   Does the patient have one of the following disease processes/diagnoses(primary or secondary)? COPD/Pneumonia   Was the primary reason for admission: Pneumonia   TCM attempt successful? Yes   Call start time 1336   Call end time 1346   Discharge diagnosis Acute hypoxic resp failure, Acute pulmonary edema, PNA, esophageal dysphagia   Person spoke with today (if not patient) and relationship Patient- YOLETTE   Medication alerts for this patient evoFLOXacin (Levaquin)   Meds reviewed with patient/caregiver? Yes   Is the patient having any side effects they believe may be caused by any medication additions or changes? Yes   Side effects comments  Has been expierencing Yellow diarrhea for months noted.   Does the patient have all medications ordered at discharge? Yes   Is the patient taking all medications as directed (includes completed medication regime)? Yes   Does the patient have a primary care provider?  Yes   Does the patient have an appointment with their PCP or specialist within 7 days of discharge? Yes   Comments regarding PCP Was able to schedule patient hosp fu with [facility]- 08/24 @9a   Comments Wants to see Dr. Sal only not her PCP. Was suggested not to leave the home for 7 days-    Has home health visited the patient within 72 hours of discharge? N/A   Pulse Ox monitoring None   Did the patient receive a copy of their discharge instructions? Yes   Nursing interventions Reviewed instructions with patient, Educated on MyChart   What is the patient's perception of their health status since discharge? Improving   Are the patient's immunizations up to date?  Yes   Nursing interventions Educated on importance of maintaining up to date immunizations as advised by provider, Advised patient to discuss with provider at next visit   If the patient is a current smoker, are they able to teach back resources for cessation? Not a  smoker   Is the patient/caregiver able to teach back the hierarchy of who to call/visit for symptoms/problems? PCP, Specialist, Home health nurse, Urgent Care, ED, 911 Yes   Is the patient able to teach back COPD zones? Yes   Is the patient/caregiver able to teach back signs and symptoms of worsening condition: Fever/chills, Shortness of breath, Chest pain   Is the patient/caregiver able to teach back importance of completing antibiotic course of treatment? Yes   TCM call completed? Yes   Wrap up additional comments Patients bottom is sore from diarrhea- Recommended vaseline- and to bring concern up to PCP          Felicitas Aguilar RN    8/22/2022, 13:47 EDT

## 2022-08-23 ENCOUNTER — APPOINTMENT (OUTPATIENT)
Dept: ONCOLOGY | Facility: HOSPITAL | Age: 67
End: 2022-08-23

## 2022-08-23 LAB
BACTERIA SPEC AEROBE CULT: NORMAL
BACTERIA SPEC AEROBE CULT: NORMAL

## 2022-08-24 ENCOUNTER — OFFICE VISIT (OUTPATIENT)
Dept: FAMILY MEDICINE CLINIC | Facility: CLINIC | Age: 67
End: 2022-08-24

## 2022-08-24 VITALS
BODY MASS INDEX: 22.09 KG/M2 | TEMPERATURE: 98.4 F | SYSTOLIC BLOOD PRESSURE: 126 MMHG | OXYGEN SATURATION: 97 % | WEIGHT: 117 LBS | HEART RATE: 74 BPM | DIASTOLIC BLOOD PRESSURE: 74 MMHG | HEIGHT: 61 IN | RESPIRATION RATE: 18 BRPM

## 2022-08-24 DIAGNOSIS — J18.9 PNEUMONIA OF BOTH LUNGS DUE TO INFECTIOUS ORGANISM, UNSPECIFIED PART OF LUNG: Primary | ICD-10-CM

## 2022-08-24 PROCEDURE — 99213 OFFICE O/P EST LOW 20 MIN: CPT | Performed by: INTERNAL MEDICINE

## 2022-08-24 NOTE — PROGRESS NOTES
Jessica Flower is a 67 y.o. female. Patient is here today for   Chief Complaint   Patient presents with   • Hospital Follow Up Visit     HOSP FOLLOW UP, PNEUMONIA           Vitals:    08/24/22 0853   BP: 126/74   Pulse: 74   Resp: 18   Temp: 98.4 °F (36.9 °C)   SpO2: 97%     Body mass index is 22.12 kg/m².      Past Medical History:   Diagnosis Date   • Anxiety    • Arthritis    • COVID-19 07/2020 9/7/2021   • Depression    • Diverticulitis of colon    • Diverticulosis    • GERD (gastroesophageal reflux disease)    • History of Clostridioides difficile infection 2008    HAS HAD SEVERAL TIMES IN PAST PER PT (SAW INFECTIOUS DISEASE MD FOR THIS S/P COLOSTOMY/EXTENSIVE ANBX THERAPY)   • History of prediabetes    • History of snoring    • History of stress incontinence    • Hypercholesterolemia    • Hyperlipidemia    • Hypertension    • Left ventricular hypertrophy     FOLLOWED BY DR MARI. DENIES CHEST PAIN BUT STATES DOES GET SOA AT TIMES WITH EXERTION REPORTS THAT IT IS NOT A NEW ISSUE    • Liver disease    • Oral herpes 08/18/2020   • Seasonal allergies     used to have allergy shots in the past   • SOB (shortness of breath)     STATES WITH EXERTION HAS BEEN ONGOING ISSUE NOT A NEW ISSUE   • Thyroid disease     Hypothyroid      Allergies   Allergen Reactions   • Azithromycin Rash and Other (See Comments)     Reaction unknown to patient (unable to remember)  Other reaction(s): Other: stomach issues, Stomach ache, Other: stomach issues, Stomach ache   • Ezetimibe Myalgia, Other (See Comments) and Rash     cramps  cramps   • Pravastatin Rash and Other (See Comments)      Social History     Socioeconomic History   • Marital status:    Tobacco Use   • Smoking status: Never Smoker   • Smokeless tobacco: Never Used   Vaping Use   • Vaping Use: Never used   Substance and Sexual Activity   • Alcohol use: Not Currently     Alcohol/week: 1.0 standard drink     Types: 1 Glasses of wine per  week   • Drug use: Never   • Sexual activity: Defer        Current Outpatient Medications:   •  acyclovir (Zovirax) 400 MG tablet, Take 1 tablet by mouth 2 (Two) Times a Day., Disp: 60 tablet, Rfl: 5  •  ALPRAZolam (XANAX) 0.25 MG tablet, TAKE 1 TABLET BY MOUTH TWICE DAILY AS NEEDED FOR ANXIETY, Disp: 60 tablet, Rfl: 1  •  Cholecalciferol (Vitamin D3) 50 MCG (2000 UT) tablet, Take 1 tablet by mouth Daily., Disp: , Rfl:   •  cholestyramine (QUESTRAN) 4 g packet, Take 1 packet by mouth 3 (Three) Times a Day With Meals. 1 packet  TID PRN diarrhea, Disp: 60 packet, Rfl: 4  •  diphenoxylate-atropine (LOMOTIL) 2.5-0.025 MG per tablet, Take 1 tablet by mouth 4 (Four) Times a Day As Needed for Diarrhea., Disp: 120 tablet, Rfl: 0  •  FLUoxetine (PROzac) 20 MG capsule, TAKE 1 CAPSULE BY MOUTH FOUR TIMES DAILY, Disp: 360 capsule, Rfl: 0  •  hydroCHLOROthiazide (HYDRODIURIL) 25 MG tablet, TAKE 1 TABLET BY MOUTH DAILY, Disp: 90 tablet, Rfl: 1  •  levoFLOXacin (Levaquin) 750 MG tablet, Take 1 tablet by mouth Daily for 7 days., Disp: 7 tablet, Rfl: 0  •  levothyroxine (SYNTHROID, LEVOTHROID) 75 MCG tablet, TAKE 1 TABLET BY MOUTH EVERY DAY, Disp: 90 tablet, Rfl: 3  •  loperamide (IMODIUM A-D) 2 MG tablet, Take 1 tablet by mouth 3 (Three) Times a Day As Needed for Diarrhea., Disp: 90 tablet, Rfl: 0  •  loratadine (CLARITIN) 10 MG tablet, TAKE 1 TABLET BY MOUTH DAILY, Disp: 30 tablet, Rfl: 5  •  mupirocin (BACTROBAN) 2 % ointment, APPLY TWO TIMES PER DAY TO THE INFECTION/BIOPSY SITES, Disp: , Rfl:   •  olopatadine (PATANOL) 0.1 % ophthalmic solution, INSTILL 1 DROP IN BOTH EYES TWICE DAILY, Disp: 5 mL, Rfl: 5  •  ondansetron (ZOFRAN) 8 MG tablet, Take 1 tablet by mouth 3 (Three) Times a Day As Needed for Nausea or Vomiting., Disp: 30 tablet, Rfl: 5  •  pantoprazole (PROTONIX) 20 MG EC tablet, TAKE 1 TABLET BY MOUTH EVERY DAY. DO NOT TAKE WITHIN 2 HOURS OF THYROID MEDICATION (Patient taking differently: Take 20 mg by mouth Daily.),  Disp: 30 tablet, Rfl: 5  •  potassium chloride ER (K-TAB) 20 MEQ tablet controlled-release ER tablet, Take 1 tablet by mouth 2 (Two) Times a Day., Disp: 60 tablet, Rfl: 1  •  propranolol (INDERAL) 40 MG tablet, TAKE 1 TABLET BY MOUTH THREE TIMES DAILY (Patient taking differently: Take 40 mg by mouth Daily. ORDERED TID BUT REPORTS ONLY TAKES QD), Disp: 270 tablet, Rfl: 1  •  propranolol (INDERAL) 40 MG tablet, Take 40 mg by mouth Daily., Disp: , Rfl:   •  rosuvastatin (CRESTOR) 20 MG tablet, Take 1 tablet by mouth Daily., Disp: 90 tablet, Rfl: 0  •  sulfamethoxazole-trimethoprim (BACTRIM DS,SEPTRA DS) 800-160 MG per tablet, Take 1 tablet by mouth 3 (Three) Times a Week., Disp: 12 tablet, Rfl: 5  •  traMADol (ULTRAM) 50 MG tablet, Take 1 tablet by mouth Every 6 (Six) Hours As Needed for Moderate Pain ., Disp: 40 tablet, Rfl: 2  •  triamcinolone (KENALOG) 0.1 % ointment, Apply  topically to the appropriate area as directed 2 (Two) Times a Day., Disp: 30 g, Rfl: 1  •  zolpidem (AMBIEN) 10 MG tablet, TAKE 1 TABLET BY MOUTH AT NIGHT AS NEEDED FOR SLEEP, Disp: 30 tablet, Rfl: 2     Objective     This patient was admitted to the hospital on August 18 and discharged on August 21.    She had her first round of chemotherapy for amyloidosis 2 days before she presented to the hospital.  The following day she went to the emergency room.  She was given 2 L of fluid and discharged.  The next morning she awoke short of breath she presented to the hospital again she was found to have a pneumonia on CT scan.    CT chest with contrast on August 18 revealed widespread interlobular septal thickening with patchy groundglass opacities in a peribronchial distribution suggesting pulmonary edema or atypical infection.    She had bilateral pleural effusions with overlaying basilar consolidation either due to atelectasis or pneumonia, she had cardiomegaly with small pericardial effusion and left ventricular wall thickening, she had subcarinal  and mediastinal lymphadenopathy.    She had a 5 mm indeterminate left lower lobe pulmonary nodule and serial CT follow-up was recommended.    She underwent an EGD with gastroenterology who did an esophageal dilation.    Sputum culture and COVID testing came back negative.  Procalcitonin was negative.       Review of Systems   Constitutional: Negative.    HENT: Negative.    Respiratory: Negative.    Cardiovascular: Negative.    Musculoskeletal: Negative.    Psychiatric/Behavioral: Negative.        Physical Exam  Vitals and nursing note reviewed.   Constitutional:       Appearance: Normal appearance. She is normal weight.      Comments: Pleasant, neatly groomed, in no distress.   Cardiovascular:      Rate and Rhythm: Regular rhythm.      Heart sounds: Murmur heard.      Comments: She had a 2/6 systolic ejection murmur.  Pulmonary:      Effort: Pulmonary effort is normal. No respiratory distress.      Breath sounds: Normal breath sounds. No stridor. No wheezing, rhonchi or rales.   Neurological:      Mental Status: She is alert and oriented to person, place, and time.   Psychiatric:         Mood and Affect: Mood normal.         Behavior: Behavior normal.         Thought Content: Thought content normal.           Problems Addressed this Visit        Pulmonary and Pneumonias    Pneumonia of both lungs due to infectious organism - Primary      Diagnoses       Codes Comments    Pneumonia of both lungs due to infectious organism, unspecified part of lung    -  Primary ICD-10-CM: J18.9  ICD-9-CM: 483.8             PLAN  I am going to arrange for her to get a CT scan with contrast of her chest to follow-up on her pneumonia.  I put an order in for this to be done in approximately 4 weeks.  No follow-ups on file.

## 2022-08-26 ENCOUNTER — TELEPHONE (OUTPATIENT)
Dept: ONCOLOGY | Facility: CLINIC | Age: 67
End: 2022-08-26

## 2022-08-26 NOTE — PROGRESS NOTES
"[facility] CBC GROUP OUTPATIENT FOLLOW UP CLINIC VISIT    REASON FOR FOLLOW-UP:    AL amyloidosis  Therapy with daratumumab, CyBorD initiated 8/16/2022    HISTORY OF PRESENT ILLNESS: Cynthia Flower is a 67 y.o.   female with the above-mentioned history returning to the office today for follow-up.    After treatment she developed some diarrhea which is not uncommon for her.  It was felt she needed IV fluids so she received IV fluids in the office.  The next day she developed shortness of breath.    She was admitted at [facility] from 8/18 through 8/21/2022 with pneumonia after presenting with shortness of breath.  She had a likely related to volume overload bnormal chest x-ray followed by CT imaging on 8/18/2022.  This showed no pulmonary embolism.  There is widespread interlobular thickening and patchy groundglass opacities suggesting pulmonary edema or potentially atypical infection.  Bilateral pleural effusions were noted.  Some cardiomegaly was noted.  Subcarinal mediastinal adenopathy were noted measuring up to 2.7 cm.  There was a 5 mm indeterminate left lower lobe pulmonary nodule.  She had a swallow study while she was there due to difficulty swallowing.  There was difficulty with A-P propulsion of the bolus with residues.  There was penetration with large bolus volumes but no penetration with single sips.  Mechanical soft diet with single sips of thin liquids were recommended.    She is feeling better at this point.  Intermittent diarrhea persists.  She has been lightheaded and her blood pressure has been running a little bit low.    She will see cardiology at [place] tomorrow.    REVIEW OF SYSTEMS:  As per the HPI    PHYSICAL EXAMINATION:    Vitals:    08/30/22 0906   BP: 98/63   Pulse: 72   Resp: 16   Temp: 97.1 °F (36.2 °C)   TempSrc: Temporal   SpO2: 98%   Weight: 53.5 kg (118 lb)   Height: 154.9 cm (60.98\")   PainSc: 0-No pain       General:  No acute distress, awake, alert and " oriented  Skin:  Warm and dry, no visible rash  HEENT:  Normocephalic/atraumatic.  Wearing a face mask.  Chest:  Normal respiratory effort.  Lungs clear to auscultation bilaterally.  Heart: Regular rate and rhythm  Extremities:  No visible clubbing, cyanosis, or edema  Neuro/psych:  Grossly nonfocal.  Normal mood and affect.    DIAGNOSTIC DATA:  CBC and Differential (08/30/2022 08:54)    IMAGING:    CT Chest With Contrast Diagnostic (08/18/2022 13:40)  I personally reviewed CT images.  Bilateral pleural effusions and evidence for volume overload.    ASSESSMENT:  This is a 67 y.o. female with:    *AL amyloidosis  · She had a stress echocardiogram on 4/24/2020 showing a normal left ventricular ejection fraction of 60% with moderate concentric hypertrophy but no wall motion abnormalities of the left ventricle.  There was impaired relaxation noted.  She complained of chest pain during the examination.  There was some ST segment depression.  Cardiac catheterization was performed on the same day.  No intervention was required.  · Follow-up echocardiogram on 4/15/2021 showed a left ventricular ejection fraction of 61 to 65% with normal LV cavity size.  Left ventricular wall thickness showed moderate concentric hypertrophy.  Diastolic function was impaired.  No pericardial effusion.  Small left pleural effusion.  · She had follow-up PYP imaging for cardiac amyloidosis that was not suggestive of ATTR amyloidosis  · Laboratory values on 2/24/2022 showed a high serum free light chain lambda of 121, normal kappa of 10.5, low ratio at 0.09.  Serum protein electrophoresis showed no evidence of an M spike.  Urine light chains were abnormal with an elevated lambda light chain of 33 and a low ratio of 0.48 with a 12.7 mg M spike on 24-hour urine protein electrophoresis.  · BNP was elevated at 2306 on 3/4/2022.  The troponin was normal at 0.03.  CK was 212 with a CK-MB of 10.87.  · Initial consult on 3/30/22. No M spike on serum  protein electrophoresis. SIFE 'presence of monoclonal protein is unclear.'   · Bone marrow biopsy 4/13/22 normocellular, 12.1% plasma cells consistent with low volume plasma cell dyscrasia. FISH with low level t(11;14) fusion only. No amyloid noted as Congo red staining negative.   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 Buenrostro at [place]. Intended to enroll on a clinical study but her troponin as tested by the study sponsor was not high enough  · Therapy with sissy-CyBorD initiated 8/16/2022  · Patient seen in the office on 8/17/2022 for triage visit.  Patient with complaints of fatigue, dizziness and diarrhea.  She was mildly hypotensive.  She was given IV fluids.  · Subsequent admission at [facility] with shortness of breath and volume overload.  She was diuresed and treated with antibiotics.    *Diarrhea  · She is going to see a gastroenterologist at [place] next week  · Suspicion for amyloid involvement of the GI tract  · She will continue Lomotil.  I recommended alternating with Imodium.  · Also, prescription for cholestyramine previously electronically sent to her pharmacy    *Elevated liver enzymes  · 8/17/2022: AST 87, , bilirubin normal at 0.4 patient received cycle 1 day 1 Sissy-CyBorD yesterday.  Plan to have patient return on 8/19/2022 for repeat labs.     *Cardiomyopathy:  · Likely due to amyloid  · Most recent echocardiogram on 8/17/2022 showed left ventricular wall thickness consistent with moderate to severe concentric hypertrophy along with left ventricular septal wall measuring 2.2 cm and posterior wall thickness at 1.9 cm likely due to amyloidosis.  LVEF 61 to 65%.  Grade 1 impaired relaxation.      PLAN:  1. Proceed with cycle 1 day 8 therapy with sissy-CyBorD today.  IV Cytoxan as she does not like taking pills.  Adjust the regimen accordingly related to adverse effects.  2. She will go to [place] tomorrow to be seen by cardio  oncology there  3. Discontinue hydrochlorothiazide.  Continue propranolol for now.  I suspect her medications will be adjusted tomorrow.  4. No more IV fluid infusions and she will keep up with excess fluid losses with oral intake.  5. She will be seen by gastroenterology at [place] next week  6. If possible, hopeful for autologous stem cell transplant at [place] after induction therapy following 6 cycles.  Depending on response after 3 cycles consideration of stem cell collection at that point.  7. Continue acyclovir and Bactrim, which she started on 8/15  8. We will try to treat through peripheral IVs if possible.  9. Follow-up weekly as scheduled  10. Patient to notify us with any adverse effects, questions or concerns.    High risk therapy requiring intensive monitoring    Sissy-CyBorD regimen     Daratumumab and hyaluronidase (Darzalex Faspro) as follows:  Cycles 1 & 2: 1800 mg SC once per day on days 1, 8, 15, 22  Cycles 3 to 6: 1800 mg SC once per day on days 1 & 15  Cycles 7 up to 24: 1800 mg SC once on day 1     Bortezomib (Velcade) as follows:  Cycles 1 to 6: 1.3 mg/m2 SC once per day on days 1, 8, 15, 22     Cyclophosphamide (Cytoxan) as follows:  Cycles 1 to 6: 300 mg/m2 (maximum dose of 500 mg) PO or IV once per day on days 1, 8, 15, 22    Plan IV Cytoxan as she has difficulty swallowing pills.     Dexamethasone (Decadron) as follows:  Cycles 1 to 6: 40 mg PO or IV once per day on days 1, 8, 15, 22 (see note)  28-day cycle for up to 24 cycles    I spent 40 minutes in this visit today reviewing her record communicating with her examining her placing orders communicating with staff and documenting the encounter.

## 2022-08-30 ENCOUNTER — INFUSION (OUTPATIENT)
Dept: ONCOLOGY | Facility: HOSPITAL | Age: 67
End: 2022-08-30

## 2022-08-30 ENCOUNTER — TELEPHONE (OUTPATIENT)
Dept: ONCOLOGY | Facility: CLINIC | Age: 67
End: 2022-08-30

## 2022-08-30 ENCOUNTER — OFFICE VISIT (OUTPATIENT)
Dept: ONCOLOGY | Facility: CLINIC | Age: 67
End: 2022-08-30

## 2022-08-30 VITALS
HEART RATE: 72 BPM | RESPIRATION RATE: 16 BRPM | DIASTOLIC BLOOD PRESSURE: 63 MMHG | BODY MASS INDEX: 22.28 KG/M2 | OXYGEN SATURATION: 98 % | SYSTOLIC BLOOD PRESSURE: 98 MMHG | TEMPERATURE: 97.1 F | HEIGHT: 61 IN | WEIGHT: 118 LBS

## 2022-08-30 DIAGNOSIS — E85.9 AMYLOIDOSIS: ICD-10-CM

## 2022-08-30 DIAGNOSIS — E85.81 LIGHT CHAIN (AL) AMYLOIDOSIS: Primary | ICD-10-CM

## 2022-08-30 DIAGNOSIS — R76.8 ELEVATED SERUM IMMUNOGLOBULIN FREE LIGHT CHAIN LEVEL: ICD-10-CM

## 2022-08-30 DIAGNOSIS — E85.81 LIGHT CHAIN (AL) AMYLOIDOSIS: ICD-10-CM

## 2022-08-30 LAB
ALBUMIN SERPL-MCNC: 4.3 G/DL (ref 3.5–5.2)
ALBUMIN/GLOB SERPL: 2.2 G/DL (ref 1.1–2.4)
ALP SERPL-CCNC: 67 U/L (ref 38–116)
ALT SERPL W P-5'-P-CCNC: 35 U/L (ref 0–33)
ANION GAP SERPL CALCULATED.3IONS-SCNC: 12.3 MMOL/L (ref 5–15)
AST SERPL-CCNC: 29 U/L (ref 0–32)
BASOPHILS # BLD AUTO: 0.03 10*3/MM3 (ref 0–0.2)
BASOPHILS NFR BLD AUTO: 0.5 % (ref 0–1.5)
BILIRUB SERPL-MCNC: 0.3 MG/DL (ref 0.2–1.2)
BUN SERPL-MCNC: 9 MG/DL (ref 6–20)
BUN/CREAT SERPL: 13.8 (ref 7.3–30)
CALCIUM SPEC-SCNC: 9.8 MG/DL (ref 8.5–10.2)
CHLORIDE SERPL-SCNC: 101 MMOL/L (ref 98–107)
CO2 SERPL-SCNC: 26.7 MMOL/L (ref 22–29)
CREAT SERPL-MCNC: 0.65 MG/DL (ref 0.6–1.1)
DEPRECATED RDW RBC AUTO: 46.5 FL (ref 37–54)
EGFRCR SERPLBLD CKD-EPI 2021: 96.6 ML/MIN/1.73
EOSINOPHIL # BLD AUTO: 0.18 10*3/MM3 (ref 0–0.4)
EOSINOPHIL NFR BLD AUTO: 3.1 % (ref 0.3–6.2)
ERYTHROCYTE [DISTWIDTH] IN BLOOD BY AUTOMATED COUNT: 13.6 % (ref 12.3–15.4)
GLOBULIN UR ELPH-MCNC: 2 GM/DL (ref 1.8–3.5)
GLUCOSE SERPL-MCNC: 89 MG/DL (ref 74–124)
HCT VFR BLD AUTO: 41.8 % (ref 34–46.6)
HGB BLD-MCNC: 13.7 G/DL (ref 12–15.9)
IMM GRANULOCYTES # BLD AUTO: 0.01 10*3/MM3 (ref 0–0.05)
IMM GRANULOCYTES NFR BLD AUTO: 0.2 % (ref 0–0.5)
LYMPHOCYTES # BLD AUTO: 2.63 10*3/MM3 (ref 0.7–3.1)
LYMPHOCYTES NFR BLD AUTO: 44.7 % (ref 19.6–45.3)
MCH RBC QN AUTO: 30.5 PG (ref 26.6–33)
MCHC RBC AUTO-ENTMCNC: 32.8 G/DL (ref 31.5–35.7)
MCV RBC AUTO: 93.1 FL (ref 79–97)
MONOCYTES # BLD AUTO: 0.5 10*3/MM3 (ref 0.1–0.9)
MONOCYTES NFR BLD AUTO: 8.5 % (ref 5–12)
NEUTROPHILS NFR BLD AUTO: 2.53 10*3/MM3 (ref 1.7–7)
NEUTROPHILS NFR BLD AUTO: 43 % (ref 42.7–76)
NRBC BLD AUTO-RTO: 0 /100 WBC (ref 0–0.2)
PLATELET # BLD AUTO: 256 10*3/MM3 (ref 140–450)
PMV BLD AUTO: 10.2 FL (ref 6–12)
POTASSIUM SERPL-SCNC: 4.2 MMOL/L (ref 3.5–4.7)
PROT SERPL-MCNC: 6.3 G/DL (ref 6.3–8)
RBC # BLD AUTO: 4.49 10*6/MM3 (ref 3.77–5.28)
SODIUM SERPL-SCNC: 140 MMOL/L (ref 134–145)
WBC NRBC COR # BLD: 5.88 10*3/MM3 (ref 3.4–10.8)

## 2022-08-30 PROCEDURE — 85025 COMPLETE CBC W/AUTO DIFF WBC: CPT

## 2022-08-30 PROCEDURE — 96401 CHEMO ANTI-NEOPL SQ/IM: CPT

## 2022-08-30 PROCEDURE — 96375 TX/PRO/DX INJ NEW DRUG ADDON: CPT

## 2022-08-30 PROCEDURE — 25010000002 PALONOSETRON PER 25 MCG: Performed by: INTERNAL MEDICINE

## 2022-08-30 PROCEDURE — 25010000002 CYCLOPHOSPHAMIDE 1 GM/5ML SOLUTION 5 ML VIAL: Performed by: INTERNAL MEDICINE

## 2022-08-30 PROCEDURE — 80053 COMPREHEN METABOLIC PANEL: CPT

## 2022-08-30 PROCEDURE — 99215 OFFICE O/P EST HI 40 MIN: CPT | Performed by: INTERNAL MEDICINE

## 2022-08-30 PROCEDURE — 96413 CHEMO IV INFUSION 1 HR: CPT

## 2022-08-30 PROCEDURE — 25010000002 DARATUMUMAB-HYALURONIDASE-FIHJ 1800-30000 MG-UT/15ML SOLUTION: Performed by: INTERNAL MEDICINE

## 2022-08-30 PROCEDURE — 25010000002 BORTEZOMIB PER 0.1 MG: Performed by: INTERNAL MEDICINE

## 2022-08-30 PROCEDURE — 25010000002 DEXAMETHASONE SODIUM PHOSPHATE 100 MG/10ML SOLUTION 10 ML VIAL: Performed by: INTERNAL MEDICINE

## 2022-08-30 RX ORDER — DIPHENHYDRAMINE HCL 25 MG
50 CAPSULE ORAL ONCE AS NEEDED
Status: CANCELLED | OUTPATIENT
Start: 2022-08-30

## 2022-08-30 RX ORDER — ACETAMINOPHEN 500 MG
1000 TABLET ORAL ONCE
Status: COMPLETED | OUTPATIENT
Start: 2022-08-30 | End: 2022-08-30

## 2022-08-30 RX ORDER — EPINEPHRINE 1 MG/ML
0.3 INJECTION, SOLUTION, CONCENTRATE INTRAVENOUS AS NEEDED
Status: CANCELLED | OUTPATIENT
Start: 2022-08-30

## 2022-08-30 RX ORDER — HYDROXYZINE PAMOATE 25 MG/1
25 CAPSULE ORAL ONCE
Status: COMPLETED | OUTPATIENT
Start: 2022-08-30 | End: 2022-08-30

## 2022-08-30 RX ORDER — DEXAMETHASONE 4 MG/1
40 TABLET ORAL ONCE
Status: CANCELLED
Start: 2022-09-06 | End: 2022-09-06

## 2022-08-30 RX ORDER — SODIUM CHLORIDE 9 MG/ML
250 INJECTION, SOLUTION INTRAVENOUS ONCE
Status: COMPLETED | OUTPATIENT
Start: 2022-08-30 | End: 2022-08-30

## 2022-08-30 RX ORDER — SULFAMETHOXAZOLE AND TRIMETHOPRIM 800; 160 MG/1; MG/1
1 TABLET ORAL 3 TIMES WEEKLY
Qty: 12 TABLET | Refills: 5 | Status: SHIPPED | OUTPATIENT
Start: 2022-08-31 | End: 2022-10-18 | Stop reason: SDUPTHER

## 2022-08-30 RX ORDER — BORTEZOMIB 3.5 MG/1
1.3 INJECTION, POWDER, LYOPHILIZED, FOR SOLUTION INTRAVENOUS; SUBCUTANEOUS ONCE
Status: COMPLETED | OUTPATIENT
Start: 2022-08-30 | End: 2022-08-30

## 2022-08-30 RX ORDER — HYDROXYZINE PAMOATE 25 MG/1
25 CAPSULE ORAL ONCE
Status: CANCELLED
Start: 2022-09-06 | End: 2022-09-06

## 2022-08-30 RX ORDER — HYDROXYZINE HYDROCHLORIDE 25 MG/ML
25 INJECTION, SOLUTION INTRAMUSCULAR ONCE
Status: CANCELLED
Start: 2022-08-30 | End: 2022-08-30

## 2022-08-30 RX ORDER — PALONOSETRON 0.05 MG/ML
0.25 INJECTION, SOLUTION INTRAVENOUS ONCE
Status: COMPLETED | OUTPATIENT
Start: 2022-08-30 | End: 2022-08-30

## 2022-08-30 RX ADMIN — SODIUM CHLORIDE 250 ML: 9 INJECTION, SOLUTION INTRAVENOUS at 10:26

## 2022-08-30 RX ADMIN — BORTEZOMIB 2 MG: 3.5 INJECTION, POWDER, LYOPHILIZED, FOR SOLUTION INTRAVENOUS; SUBCUTANEOUS at 11:49

## 2022-08-30 RX ADMIN — CYCLOPHOSPHAMIDE 450 MG: 200 INJECTION, SOLUTION INTRAVENOUS at 10:47

## 2022-08-30 RX ADMIN — DEXAMETHASONE SODIUM PHOSPHATE 40 MG: 10 INJECTION, SOLUTION INTRAMUSCULAR; INTRAVENOUS at 10:26

## 2022-08-30 RX ADMIN — PALONOSETRON 0.25 MG: 0.05 INJECTION, SOLUTION INTRAVENOUS at 10:17

## 2022-08-30 RX ADMIN — DARATUMUMAB AND HYALURONIDASE-FIHJ (HUMAN RECOMBINANT) 1800 MG: 1800; 30000 INJECTION SUBCUTANEOUS at 11:49

## 2022-08-30 RX ADMIN — ACETAMINOPHEN 1000 MG: 500 TABLET ORAL at 10:17

## 2022-08-30 RX ADMIN — HYDROXYZINE PAMOATE 25 MG: 25 CAPSULE ORAL at 10:17

## 2022-08-30 NOTE — TELEPHONE ENCOUNTER
Caller: Cynthia Flower    Relationship: Self    Best call back number: [phone]    What is the best time to reach you: ANYTIME    Who are you requesting to speak with (clinical staff, provider,  specific staff member): NURSE    What was the call regarding: PT CALLED BACK TRIED TO W/T TO ELIZABETH NO ANSWER.    PLEASE CALL BACK SHE NEEDS TO SPEAK TO ELIZABETH    Do you require a callback: YES

## 2022-08-31 ENCOUNTER — TELEPHONE (OUTPATIENT)
Dept: ONCOLOGY | Facility: CLINIC | Age: 67
End: 2022-08-31

## 2022-08-31 NOTE — TELEPHONE ENCOUNTER
Returned call to pharmacy after reviewing with Dr. Sal's nurse and the Decadron script is to be discontinued.  Pharmacist v/u.

## 2022-09-06 ENCOUNTER — INFUSION (OUTPATIENT)
Dept: ONCOLOGY | Facility: HOSPITAL | Age: 67
End: 2022-09-06

## 2022-09-06 ENCOUNTER — CLINICAL SUPPORT (OUTPATIENT)
Dept: OTHER | Facility: HOSPITAL | Age: 67
End: 2022-09-06

## 2022-09-06 ENCOUNTER — TELEPHONE (OUTPATIENT)
Dept: OTHER | Facility: HOSPITAL | Age: 67
End: 2022-09-06

## 2022-09-06 ENCOUNTER — OFFICE VISIT (OUTPATIENT)
Dept: ONCOLOGY | Facility: CLINIC | Age: 67
End: 2022-09-06

## 2022-09-06 VITALS
TEMPERATURE: 97.3 F | RESPIRATION RATE: 16 BRPM | HEART RATE: 71 BPM | WEIGHT: 120.7 LBS | DIASTOLIC BLOOD PRESSURE: 65 MMHG | BODY MASS INDEX: 22.79 KG/M2 | HEIGHT: 61 IN | OXYGEN SATURATION: 96 % | SYSTOLIC BLOOD PRESSURE: 112 MMHG

## 2022-09-06 VITALS — HEART RATE: 75 BPM | DIASTOLIC BLOOD PRESSURE: 74 MMHG | OXYGEN SATURATION: 96 % | SYSTOLIC BLOOD PRESSURE: 140 MMHG

## 2022-09-06 VITALS — BODY MASS INDEX: 22.82 KG/M2 | HEIGHT: 61 IN

## 2022-09-06 DIAGNOSIS — E85.81 AL AMYLOIDOSIS: ICD-10-CM

## 2022-09-06 DIAGNOSIS — E85.81 LIGHT CHAIN (AL) AMYLOIDOSIS: Primary | ICD-10-CM

## 2022-09-06 DIAGNOSIS — E85.81 LIGHT CHAIN (AL) AMYLOIDOSIS: ICD-10-CM

## 2022-09-06 LAB
ALBUMIN SERPL-MCNC: 4.3 G/DL (ref 3.5–5.2)
ALBUMIN/GLOB SERPL: 2.3 G/DL (ref 1.1–2.4)
ALP SERPL-CCNC: 63 U/L (ref 38–116)
ALT SERPL W P-5'-P-CCNC: 34 U/L (ref 0–33)
ANION GAP SERPL CALCULATED.3IONS-SCNC: 11.7 MMOL/L (ref 5–15)
AST SERPL-CCNC: 26 U/L (ref 0–32)
BASOPHILS # BLD AUTO: 0.05 10*3/MM3 (ref 0–0.2)
BASOPHILS NFR BLD AUTO: 0.7 % (ref 0–1.5)
BILIRUB SERPL-MCNC: 0.6 MG/DL (ref 0.2–1.2)
BUN SERPL-MCNC: 13 MG/DL (ref 6–20)
BUN/CREAT SERPL: 16.5 (ref 7.3–30)
CALCIUM SPEC-SCNC: 9.5 MG/DL (ref 8.5–10.2)
CHLORIDE SERPL-SCNC: 104 MMOL/L (ref 98–107)
CO2 SERPL-SCNC: 23.3 MMOL/L (ref 22–29)
CREAT SERPL-MCNC: 0.79 MG/DL (ref 0.6–1.1)
DEPRECATED RDW RBC AUTO: 46.6 FL (ref 37–54)
EGFRCR SERPLBLD CKD-EPI 2021: 82.1 ML/MIN/1.73
EOSINOPHIL # BLD AUTO: 0.1 10*3/MM3 (ref 0–0.4)
EOSINOPHIL NFR BLD AUTO: 1.3 % (ref 0.3–6.2)
ERYTHROCYTE [DISTWIDTH] IN BLOOD BY AUTOMATED COUNT: 13.8 % (ref 12.3–15.4)
GLOBULIN UR ELPH-MCNC: 1.9 GM/DL (ref 1.8–3.5)
GLUCOSE SERPL-MCNC: 89 MG/DL (ref 74–124)
HCT VFR BLD AUTO: 40.8 % (ref 34–46.6)
HGB BLD-MCNC: 13.3 G/DL (ref 12–15.9)
IMM GRANULOCYTES # BLD AUTO: 0.05 10*3/MM3 (ref 0–0.05)
IMM GRANULOCYTES NFR BLD AUTO: 0.7 % (ref 0–0.5)
LYMPHOCYTES # BLD AUTO: 2.61 10*3/MM3 (ref 0.7–3.1)
LYMPHOCYTES NFR BLD AUTO: 34 % (ref 19.6–45.3)
MCH RBC QN AUTO: 30.4 PG (ref 26.6–33)
MCHC RBC AUTO-ENTMCNC: 32.6 G/DL (ref 31.5–35.7)
MCV RBC AUTO: 93.4 FL (ref 79–97)
MONOCYTES # BLD AUTO: 0.65 10*3/MM3 (ref 0.1–0.9)
MONOCYTES NFR BLD AUTO: 8.5 % (ref 5–12)
NEUTROPHILS NFR BLD AUTO: 4.22 10*3/MM3 (ref 1.7–7)
NEUTROPHILS NFR BLD AUTO: 54.8 % (ref 42.7–76)
NRBC BLD AUTO-RTO: 0 /100 WBC (ref 0–0.2)
PLATELET # BLD AUTO: 261 10*3/MM3 (ref 140–450)
PMV BLD AUTO: 10.7 FL (ref 6–12)
POTASSIUM SERPL-SCNC: 4.6 MMOL/L (ref 3.5–4.7)
PROT SERPL-MCNC: 6.2 G/DL (ref 6.3–8)
RBC # BLD AUTO: 4.37 10*6/MM3 (ref 3.77–5.28)
SODIUM SERPL-SCNC: 139 MMOL/L (ref 134–145)
WBC NRBC COR # BLD: 7.68 10*3/MM3 (ref 3.4–10.8)

## 2022-09-06 PROCEDURE — 96375 TX/PRO/DX INJ NEW DRUG ADDON: CPT

## 2022-09-06 PROCEDURE — 96413 CHEMO IV INFUSION 1 HR: CPT

## 2022-09-06 PROCEDURE — 80053 COMPREHEN METABOLIC PANEL: CPT

## 2022-09-06 PROCEDURE — 25010000002 PALONOSETRON PER 25 MCG: Performed by: INTERNAL MEDICINE

## 2022-09-06 PROCEDURE — 96401 CHEMO ANTI-NEOPL SQ/IM: CPT

## 2022-09-06 PROCEDURE — 99214 OFFICE O/P EST MOD 30 MIN: CPT | Performed by: INTERNAL MEDICINE

## 2022-09-06 PROCEDURE — 25010000002 BORTEZOMIB PER 0.1 MG: Performed by: INTERNAL MEDICINE

## 2022-09-06 PROCEDURE — 85025 COMPLETE CBC W/AUTO DIFF WBC: CPT

## 2022-09-06 PROCEDURE — 25010000002 CYCLOPHOSPHAMIDE 1 GM/5ML SOLUTION 5 ML VIAL: Performed by: INTERNAL MEDICINE

## 2022-09-06 PROCEDURE — 25010000002 DARATUMUMAB-HYALURONIDASE-FIHJ 1800-30000 MG-UT/15ML SOLUTION: Performed by: INTERNAL MEDICINE

## 2022-09-06 PROCEDURE — 63710000001 DEXAMETHASONE PER 0.25 MG: Performed by: INTERNAL MEDICINE

## 2022-09-06 RX ORDER — SODIUM CHLORIDE 9 MG/ML
250 INJECTION, SOLUTION INTRAVENOUS ONCE
Status: COMPLETED | OUTPATIENT
Start: 2022-09-06 | End: 2022-09-06

## 2022-09-06 RX ORDER — HYDROXYZINE PAMOATE 25 MG/1
25 CAPSULE ORAL ONCE
Status: COMPLETED | OUTPATIENT
Start: 2022-09-06 | End: 2022-09-06

## 2022-09-06 RX ORDER — ACETAMINOPHEN 500 MG
1000 TABLET ORAL ONCE
Status: COMPLETED | OUTPATIENT
Start: 2022-09-06 | End: 2022-09-06

## 2022-09-06 RX ORDER — BORTEZOMIB 3.5 MG/1
1.3 INJECTION, POWDER, LYOPHILIZED, FOR SOLUTION INTRAVENOUS; SUBCUTANEOUS ONCE
Status: COMPLETED | OUTPATIENT
Start: 2022-09-06 | End: 2022-09-06

## 2022-09-06 RX ORDER — DEXAMETHASONE 4 MG/1
40 TABLET ORAL ONCE
Status: CANCELLED
Start: 2022-09-13 | End: 2022-09-13

## 2022-09-06 RX ORDER — SPIRONOLACTONE 25 MG/1
TABLET ORAL
COMMUNITY
Start: 2022-08-31

## 2022-09-06 RX ORDER — HYDROXYZINE PAMOATE 25 MG/1
25 CAPSULE ORAL ONCE
Status: CANCELLED
Start: 2022-09-13 | End: 2022-09-13

## 2022-09-06 RX ORDER — PALONOSETRON 0.05 MG/ML
0.25 INJECTION, SOLUTION INTRAVENOUS ONCE
Status: COMPLETED | OUTPATIENT
Start: 2022-09-06 | End: 2022-09-06

## 2022-09-06 RX ORDER — DEXAMETHASONE 4 MG/1
40 TABLET ORAL ONCE
Status: COMPLETED | OUTPATIENT
Start: 2022-09-06 | End: 2022-09-06

## 2022-09-06 RX ADMIN — HYDROXYZINE PAMOATE 25 MG: 25 CAPSULE ORAL at 11:52

## 2022-09-06 RX ADMIN — ACETAMINOPHEN 1000 MG: 500 TABLET ORAL at 11:52

## 2022-09-06 RX ADMIN — BORTEZOMIB 2 MG: 3.5 INJECTION, POWDER, LYOPHILIZED, FOR SOLUTION INTRAVENOUS; SUBCUTANEOUS at 12:12

## 2022-09-06 RX ADMIN — CYCLOPHOSPHAMIDE 450 MG: 200 INJECTION, SOLUTION INTRAVENOUS at 12:42

## 2022-09-06 RX ADMIN — DEXAMETHASONE 40 MG: 4 TABLET ORAL at 11:53

## 2022-09-06 RX ADMIN — SODIUM CHLORIDE 250 ML: 9 INJECTION, SOLUTION INTRAVENOUS at 11:51

## 2022-09-06 RX ADMIN — DARATUMUMAB AND HYALURONIDASE-FIHJ (HUMAN RECOMBINANT) 1800 MG: 1800; 30000 INJECTION SUBCUTANEOUS at 12:36

## 2022-09-06 RX ADMIN — PALONOSETRON 0.25 MG: 0.05 INJECTION, SOLUTION INTRAVENOUS at 12:01

## 2022-09-06 NOTE — TELEPHONE ENCOUNTER
[facility] MULTIDISCIPLINARY CLINIC  SURVIVORSHIP SERVICES CARE COORDINATION NOTE  PHONE      Call placed to patient RE: missed appointment for advance care planning visit.    Left voice mail for patient    Introduced myself and reviewed purpose and goals of advance care planning/shared decision making appointment..    Patient mailed handout - choosing a healthcare surrogate, advance care planning pamphlet, registration information for one hour virtual class held monthly as well as my contact information    Patient encouraged to call the office at any point for additional information, resources or support.

## 2022-09-06 NOTE — NURSING NOTE
Pt reported some dizziness on the way out of the infusion center. No SOA reported, pt stated she thinks she got up too fast. VSS, /74, HR 75, O2 96%. Pt insisted she was fine. I offered to take her down in a wheelchair but her  stated he would escort her. Instructed him to call us or 911, if necessary, if anything additional happens. MD notified.

## 2022-09-06 NOTE — PROGRESS NOTES
"Outpatient Oncology Nutrition  Assessment/PES    Patient Name:  Cynthia Flower  YOB: 1955  MRN: [id number]    Assessment Date:  9/6/2022    Comments:  Met patient today in the infusion area.   She reports that her appetite is fair, varies from day to day. She has been trying to limit food high in insoluble fiber but these are the types of foods she enjoys.   She does not tolerate boost or ensure supplements due to diarrhea.  She is already avoiding lactose containing foods.   Discussed Enterade and provided her with a taste of the product. She agreed to receive a trial supply. Recommended that she try to consume two a day but at least one a day. Will follow up to see if it was helpful in managing her symptoms.      General Info     Row Name 09/06/22 151       Today's Session    Person(s) attending today's session Patient;Spouse       General Information    Oncology patient? yes  AL amyloidosis, GI tract involvement       Oncology Specific Assessment    Type of treatment Chemotherapy    Frequency of treatment velcade, Cyclophosphamide, daratumumab, with aloxi    Reported symptoms Diarrhea                 Retired Nutritional Info/Activity     Row Name 09/06/22 1532          Nutrition/Diet History    Supplemental Drinks/Foods/Additives does not tolerate Ensure or Boost- worsened diarrhea                Home Nutrition Report     Row Name 09/06/22 1532          Home Nutrition Report    Diet No specific     Typical Intake (Food/Fluid/EN/PN) lactose free, trying to limit foods that are high in insoluble fiber but she prefers theses foods     Supplemental Drinks/Foods/Additives does not tolerate Ensure or Boost- worsened diarrhea     Factors Affecting Nutritional Intake appetite                Estimated/Assessed Needs - Anthropometrics     Row Name 09/06/22 4959          Anthropometrics    Height 154.9 cm (60.98\")     Weight for Calculation 54.4 kg (120 lb)     Additional Documentation Usual Body " Weight (UBW) (Group)            Usual Body Weight (UBW)    Usual Body Weight 56.7 kg (125 lb)     Weight Change (Amount and Duration) 5 lb wt loss over 6 months            Estimated/Assessed Needs    Additional Documentation KCAL/KG (Group);Protein Requirements (Group);Fluid Requirements (Group)            KCAL/KG    KCAL/KG 30 Kcal/Kg (kcal)     30 Kcal/Kg (kcal) 1632.96            Protein Requirements    Weight Used For Protein Calculations 54.4 kg (120 lb)     Est Protein Requirement Amount (gms/kg) 1.2 gm protein     Estimated Protein Requirements (gms/day) 65.32            Fluid Requirements    Fluid Requirements (mL/day) 1600     Estimated Fluid Requirement Method RDA Method     RDA Method (mL) 1600                Labs/Tests/Procedures/Meds     Row Name 09/06/22 1527          Labs/Procedures/Meds    Lab Results Reviewed reviewed            Diagnostic Tests/Procedures    Diagnostic Test/Procedure Reviewed reviewed            Medications    Pertinent Medications Reviewed reviewed     Pertinent Medications Comments xanax, vitamin D, questran, lomotil, synthroid, imodium, zofran,                   Problem/Interventions:   Problem 1     Row Name 09/06/22 1527          Nutrition Diagnoses Problem 1    Problem 1 Altered GI Function     Etiology (related to) Medical Diagnosis     Oncology Other (comment)  AL amyloidosis     Signs/Symptoms (evidenced by) Report/Observation     Reported GI Symptoms Diarrhea                        Intervention Goal     Row Name 09/06/22 1531          Intervention Goal    General Maintain nutrition;Meet nutritional needs for age/condition;Disease management/therapy;Provide information regarding MNT for treatment/condition;Reduce/improve symptoms     PO Tolerate PO;Meet estimated needs     Weight No significant weight loss                    Education/Evaluation     Row Name 09/06/22 1531          Education    Education Provided education regarding;Education topics     Education Topics --   foods with insoluble fiber, soluble fiber            Monitor/Evaluation    Monitor PO intake;Pertinent labs;Symptoms;Weight     Education Follow-up Reinforce PRN  will follow for tolerance, provided enterade for pt to try                 Electronically signed by:  Alvina Mcdonough RD, LD  09/06/22 15:34 EDT

## 2022-09-08 ENCOUNTER — TELEPHONE (OUTPATIENT)
Dept: ONCOLOGY | Facility: CLINIC | Age: 67
End: 2022-09-08

## 2022-09-08 NOTE — TELEPHONE ENCOUNTER
Caller: Cynthia Flower    Relationship: Self    Best call back number: [phone]    What is the best time to reach you: ANYTIME    Who are you requesting to speak with (clinical staff, provider,  specific staff member): DR VILLANUEVA    What was the call regarding: PT STATED SHE THOUGHT SHE WAS SUPPOSED TO COME BACK TO OUR OFFICE FOR AN EVUSHELD BOOSTER 2 WEEKS AFTER THE FIRST DOSE. SHE IS CONFUSED IF SHE HAS RECVD THIS OR NOT BECAUSE EMILIANA MENTIONED NOT BEING DUE AGAIN UNTIL FEB 2023.     PLEASE CALL PT TO ADVISE.     Do you require a callback: YES

## 2022-09-08 NOTE — TELEPHONE ENCOUNTER
Returned call to the patient with the information that was provided from ROMAIN Burrows RN, that her Evusheld is not due till 2/2023.  She v/u.

## 2022-09-09 NOTE — PROGRESS NOTES
"[facility] CBC GROUP OUTPATIENT FOLLOW UP CLINIC VISIT    REASON FOR FOLLOW-UP:    AL amyloidosis  Therapy with daratumumab, CyBorD initiated 8/16/2022    HISTORY OF PRESENT ILLNESS: Cynthia Flower is a 67 y.o. female with the above-mentioned history returning to the office today for follow-up.    She was seen at [facility] on 8/31. Light chains were already normal.  She had a cardiac MRI there last week that indicates the presence of amyloidosis.    She is occasionally lightheaded with near syncope but no syncope.  She feels good after treatment but then developed some left leg pain.  She notes that her tongue is a little bit sore again.    REVIEW OF SYSTEMS:  As per the [facility]    PHYSICAL EXAMINATION:    Vitals:    09/13/22 1059   BP: 94/43  Comment: feels light headed and dizzy while walking   Pulse: 68   Resp: 16   Temp: 97.3 °F (36.3 °C)   TempSrc: Temporal   SpO2: 97%   Weight: 53.9 kg (118 lb 12.8 oz)   Height: 154.9 cm (60.98\")   PainSc: 0-No pain     General:  No acute distress, awake, alert and oriented  Skin:  Warm and dry, no visible rash  HEENT:  Normocephalic/atraumatic.  Wearing a face mask.  I briefly had her pulled on her face mask.  Her tongue is irregular consistent with amyloidosis.  No thrush.  Chest:  Normal respiratory effort.  Lungs clear to auscultation bilaterally.  Heart: Regular rate and rhythm  Extremities:  No visible clubbing, cyanosis, or edema  Neuro/psych:  Grossly nonfocal.  Normal mood and affect.  Lymphatics: No palpable cervical supraclavicular axillary adenopathy    DIAGNOSTIC DATA:  CBC and Differential (09/13/2022 10:20)    IMAGING:    Cardiac MRI at [place] 9/9/2022  1.  Small left ventricular chamber size and normal systolic function (LVEF 64%). Moderate concentric hypertrophy.   2.  Small right ventricular chamber size and normal systolic function (RVEF 53%).   3.  Systolic anterior motion of the subvalvular mitral apparatus with mild-moderate MR.   4.  Mild TR. "   5.  There is diffuse subendocardial LGE with T1/ECV elevation in a pattern consistent with cardiac amyloidosis.   6.  No prior study for comparison.       ASSESSMENT:  This is a 67 y.o. female with:    *AL amyloidosis  · She had a stress echocardiogram on 4/24/2020 showing a normal left ventricular ejection fraction of 60% with moderate concentric hypertrophy but no wall motion abnormalities of the left ventricle.  There was impaired relaxation noted.  She complained of chest pain during the examination.  There was some ST segment depression.  Cardiac catheterization was performed on the same day.  No intervention was required.  · Follow-up echocardiogram on 4/15/2021 showed a left ventricular ejection fraction of 61 to 65% with normal LV cavity size.  Left ventricular wall thickness showed moderate concentric hypertrophy.  Diastolic function was impaired.  No pericardial effusion.  Small left pleural effusion.  · She had follow-up PYP imaging for cardiac amyloidosis that was not suggestive of ATTR amyloidosis  · Laboratory values on 2/24/2022 showed a high serum free light chain lambda of 121, normal kappa of 10.5, low ratio at 0.09.  Serum protein electrophoresis showed no evidence of an M spike.  Urine light chains were abnormal with an elevated lambda light chain of 33 and a low ratio of 0.48 with a 12.7 mg M spike on 24-hour urine protein electrophoresis.  · BNP was elevated at 2306 on 3/4/2022.  The troponin was normal at 0.03.  CK was 212 with a CK-MB of 10.87.  · Initial consult on 3/30/22. No M spike on serum protein electrophoresis. SIFE 'presence of monoclonal protein is unclear.'   · Bone marrow biopsy 4/13/22 normocellular, 12.1% plasma cells consistent with low volume plasma cell dyscrasia. FISH with low level t(11;14) fusion only. No amyloid noted as Congo red staining negative.   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 Buenrostro at [place]. Intended to enroll  on a clinical study but her troponin as tested by the study sponsor was not high enough  · Therapy with michael-CyBorD initiated 8/16/2022  · Patient seen in the office on 8/17/2022 for triage visit.  Patient with complaints of fatigue, dizziness and diarrhea.  She was mildly hypotensive.  She was given IV fluids.  · Subsequent admission at [facility] with shortness of breath and volume overload.  She was diuresed and treated with antibiotics.  · D8 therapy administered on 8/30  · Light chains normal at [place] on 8/31/22 (lambda light chain 1.3, down from 12.44)  · 9/6/22: Proceed with her next cycle of therapy.  · 9/13/22: Proceed with therapy    *Diarrhea  · She saw Dr. Hoyos with GI at [place] on 9/7.  · Suspicion for amyloid involvement of the GI tract  · Continue Lomotil and Imodium. Rifaximin prescribed by Dr. Hoyos.     *Elevated liver enzymes  · 8/17/2022: AST 87, , bilirubin normal at 0.4  · Normalized    *Cardiomyopathy:  · Most recent echocardiogram on 8/17/2022 showed left ventricular wall thickness consistent with moderate to severe concentric hypertrophy along with left ventricular septal wall measuring 2.2 cm and posterior wall thickness at 1.9 cm likely due to amyloidosis.  LVEF 61 to 65%.  Grade 1 impaired relaxation.  · Now followed by Dr. Lyles at [facility] with concerns for pre-existing HCM and AL cardiac amyloid.   · Cardiac MRI results above.  Amyloidosis evident.    *Sore tongue: If she wants Magic mouthwash she will let us know.    PLAN:  1. Proceed with cycle 1 day 22 therapy with michael-CyBorD today.  IV Cytoxan as she does not like taking pills.  She is tolerating oral dexamethasone.  Again, she does not believe that she will tolerate the pills.  Adjust the regimen accordingly related to adverse effects.  2. Spironolactone per Dr. Lyles at [facility]  3. No more IV fluid infusions and she will keep up with excess fluid losses with oral intake.  4. Follow-up with cardiology and  gastroenterology at [place].  She is also going to see an esophageal motility specialist there as well.  5. Cardiac catheterization and cardiac biopsy at [place] in the near future.  6. If possible, hopeful for autologous stem cell transplant at [place] after induction therapy following 6 cycles.  Depending on response after 3 cycles consideration of stem cell collection at that point.  To be determined.  7. Continue acyclovir and Bactrim for prophylaxis  8. We will try to treat through peripheral IVs if possible.  9. Follow-up weekly as scheduled  10. She knows to notify us with any concerns or issues in between her appointments.    High risk therapy requiring intensive monitoring    Sissy-CyBorD regimen     Daratumumab and hyaluronidase (Darzalex Faspro) as follows:  Cycles 1 & 2: 1800 mg SC once per day on days 1, 8, 15, 22  Cycles 3 to 6: 1800 mg SC once per day on days 1 & 15  Cycles 7 up to 24: 1800 mg SC once on day 1     Bortezomib (Velcade) as follows:  Cycles 1 to 6: 1.3 mg/m2 SC once per day on days 1, 8, 15, 22     Cyclophosphamide (Cytoxan) as follows:  Cycles 1 to 6: 300 mg/m2 (maximum dose of 500 mg) PO or IV once per day on days 1, 8, 15, 22    Plan IV Cytoxan as she has difficulty swallowing pills.     Dexamethasone (Decadron) as follows:  Cycles 1 to 6: 40 mg PO or IV once per day on days 1, 8, 15, 22 (see note)  28-day cycle for up to 24 cycles

## 2022-09-13 ENCOUNTER — OFFICE VISIT (OUTPATIENT)
Dept: ONCOLOGY | Facility: CLINIC | Age: 67
End: 2022-09-13

## 2022-09-13 ENCOUNTER — APPOINTMENT (OUTPATIENT)
Dept: OTHER | Facility: HOSPITAL | Age: 67
End: 2022-09-13

## 2022-09-13 ENCOUNTER — INFUSION (OUTPATIENT)
Dept: ONCOLOGY | Facility: HOSPITAL | Age: 67
End: 2022-09-13

## 2022-09-13 ENCOUNTER — READMISSION MANAGEMENT (OUTPATIENT)
Dept: CALL CENTER | Facility: HOSPITAL | Age: 67
End: 2022-09-13

## 2022-09-13 VITALS
BODY MASS INDEX: 22.43 KG/M2 | HEIGHT: 61 IN | RESPIRATION RATE: 16 BRPM | OXYGEN SATURATION: 97 % | DIASTOLIC BLOOD PRESSURE: 43 MMHG | HEART RATE: 68 BPM | TEMPERATURE: 97.3 F | SYSTOLIC BLOOD PRESSURE: 94 MMHG | WEIGHT: 118.8 LBS

## 2022-09-13 DIAGNOSIS — Z79.899 HIGH RISK MEDICATION USE: Primary | ICD-10-CM

## 2022-09-13 DIAGNOSIS — E85.81 LIGHT CHAIN (AL) AMYLOIDOSIS: Primary | ICD-10-CM

## 2022-09-13 DIAGNOSIS — E85.81 LIGHT CHAIN (AL) AMYLOIDOSIS: ICD-10-CM

## 2022-09-13 LAB
ALBUMIN SERPL-MCNC: 4.2 G/DL (ref 3.5–5.2)
ALBUMIN/GLOB SERPL: 2.3 G/DL (ref 1.1–2.4)
ALP SERPL-CCNC: 54 U/L (ref 38–116)
ALT SERPL W P-5'-P-CCNC: 36 U/L (ref 0–33)
ANION GAP SERPL CALCULATED.3IONS-SCNC: 8.6 MMOL/L (ref 5–15)
AST SERPL-CCNC: 26 U/L (ref 0–32)
BASOPHILS # BLD AUTO: 0.02 10*3/MM3 (ref 0–0.2)
BASOPHILS NFR BLD AUTO: 0.3 % (ref 0–1.5)
BILIRUB SERPL-MCNC: 0.5 MG/DL (ref 0.2–1.2)
BUN SERPL-MCNC: 15 MG/DL (ref 6–20)
BUN/CREAT SERPL: 20.3 (ref 7.3–30)
CALCIUM SPEC-SCNC: 9.9 MG/DL (ref 8.5–10.2)
CHLORIDE SERPL-SCNC: 105 MMOL/L (ref 98–107)
CO2 SERPL-SCNC: 25.4 MMOL/L (ref 22–29)
CREAT SERPL-MCNC: 0.74 MG/DL (ref 0.6–1.1)
DEPRECATED RDW RBC AUTO: 46.2 FL (ref 37–54)
EGFRCR SERPLBLD CKD-EPI 2021: 88.8 ML/MIN/1.73
EOSINOPHIL # BLD AUTO: 0.18 10*3/MM3 (ref 0–0.4)
EOSINOPHIL NFR BLD AUTO: 2.7 % (ref 0.3–6.2)
ERYTHROCYTE [DISTWIDTH] IN BLOOD BY AUTOMATED COUNT: 13.6 % (ref 12.3–15.4)
GLOBULIN UR ELPH-MCNC: 1.8 GM/DL (ref 1.8–3.5)
GLUCOSE SERPL-MCNC: 80 MG/DL (ref 74–124)
HCT VFR BLD AUTO: 41 % (ref 34–46.6)
HGB BLD-MCNC: 13.2 G/DL (ref 12–15.9)
IMM GRANULOCYTES # BLD AUTO: 0.01 10*3/MM3 (ref 0–0.05)
IMM GRANULOCYTES NFR BLD AUTO: 0.2 % (ref 0–0.5)
LYMPHOCYTES # BLD AUTO: 2.13 10*3/MM3 (ref 0.7–3.1)
LYMPHOCYTES NFR BLD AUTO: 32.4 % (ref 19.6–45.3)
MCH RBC QN AUTO: 30.1 PG (ref 26.6–33)
MCHC RBC AUTO-ENTMCNC: 32.2 G/DL (ref 31.5–35.7)
MCV RBC AUTO: 93.6 FL (ref 79–97)
MONOCYTES # BLD AUTO: 0.58 10*3/MM3 (ref 0.1–0.9)
MONOCYTES NFR BLD AUTO: 8.8 % (ref 5–12)
NEUTROPHILS NFR BLD AUTO: 3.65 10*3/MM3 (ref 1.7–7)
NEUTROPHILS NFR BLD AUTO: 55.6 % (ref 42.7–76)
NRBC BLD AUTO-RTO: 0 /100 WBC (ref 0–0.2)
PLATELET # BLD AUTO: 209 10*3/MM3 (ref 140–450)
PMV BLD AUTO: 10.6 FL (ref 6–12)
POTASSIUM SERPL-SCNC: 4.9 MMOL/L (ref 3.5–4.7)
PROT SERPL-MCNC: 6 G/DL (ref 6.3–8)
RBC # BLD AUTO: 4.38 10*6/MM3 (ref 3.77–5.28)
SODIUM SERPL-SCNC: 139 MMOL/L (ref 134–145)
WBC NRBC COR # BLD: 6.57 10*3/MM3 (ref 3.4–10.8)

## 2022-09-13 PROCEDURE — 25010000002 DARATUMUMAB-HYALURONIDASE-FIHJ 1800-30000 MG-UT/15ML SOLUTION: Performed by: INTERNAL MEDICINE

## 2022-09-13 PROCEDURE — 80053 COMPREHEN METABOLIC PANEL: CPT

## 2022-09-13 PROCEDURE — 63710000001 DEXAMETHASONE PER 0.25 MG: Performed by: INTERNAL MEDICINE

## 2022-09-13 PROCEDURE — 85025 COMPLETE CBC W/AUTO DIFF WBC: CPT

## 2022-09-13 PROCEDURE — 25010000002 BORTEZOMIB PER 0.1 MG: Performed by: INTERNAL MEDICINE

## 2022-09-13 PROCEDURE — 99214 OFFICE O/P EST MOD 30 MIN: CPT | Performed by: INTERNAL MEDICINE

## 2022-09-13 PROCEDURE — 25010000002 PALONOSETRON PER 25 MCG: Performed by: INTERNAL MEDICINE

## 2022-09-13 PROCEDURE — 96375 TX/PRO/DX INJ NEW DRUG ADDON: CPT

## 2022-09-13 PROCEDURE — 96413 CHEMO IV INFUSION 1 HR: CPT

## 2022-09-13 PROCEDURE — 96401 CHEMO ANTI-NEOPL SQ/IM: CPT

## 2022-09-13 PROCEDURE — 25010000002 CYCLOPHOSPHAMIDE 1 GM/5ML SOLUTION 5 ML VIAL: Performed by: INTERNAL MEDICINE

## 2022-09-13 RX ORDER — BORTEZOMIB 3.5 MG/1
1.3 INJECTION, POWDER, LYOPHILIZED, FOR SOLUTION INTRAVENOUS; SUBCUTANEOUS ONCE
Status: COMPLETED | OUTPATIENT
Start: 2022-09-13 | End: 2022-09-13

## 2022-09-13 RX ORDER — DEXAMETHASONE 4 MG/1
40 TABLET ORAL ONCE
Status: COMPLETED | OUTPATIENT
Start: 2022-09-13 | End: 2022-09-13

## 2022-09-13 RX ORDER — HYDROXYZINE PAMOATE 25 MG/1
25 CAPSULE ORAL ONCE
Status: COMPLETED | OUTPATIENT
Start: 2022-09-13 | End: 2022-09-13

## 2022-09-13 RX ORDER — DIPHENOXYLATE HYDROCHLORIDE AND ATROPINE SULFATE 2.5; .025 MG/1; MG/1
1 TABLET ORAL ONCE
Status: COMPLETED | OUTPATIENT
Start: 2022-09-13 | End: 2022-09-13

## 2022-09-13 RX ORDER — SODIUM CHLORIDE 9 MG/ML
250 INJECTION, SOLUTION INTRAVENOUS ONCE
Status: COMPLETED | OUTPATIENT
Start: 2022-09-13 | End: 2022-09-13

## 2022-09-13 RX ORDER — PALONOSETRON 0.05 MG/ML
0.25 INJECTION, SOLUTION INTRAVENOUS ONCE
Status: COMPLETED | OUTPATIENT
Start: 2022-09-13 | End: 2022-09-13

## 2022-09-13 RX ORDER — PANTOPRAZOLE SODIUM 20 MG/1
TABLET, DELAYED RELEASE ORAL
Qty: 30 TABLET | Refills: 5 | Status: SHIPPED | OUTPATIENT
Start: 2022-09-13

## 2022-09-13 RX ORDER — ACETAMINOPHEN 500 MG
1000 TABLET ORAL ONCE
Status: COMPLETED | OUTPATIENT
Start: 2022-09-13 | End: 2022-09-13

## 2022-09-13 RX ADMIN — SODIUM CHLORIDE 250 ML: 9 INJECTION, SOLUTION INTRAVENOUS at 11:45

## 2022-09-13 RX ADMIN — CYCLOPHOSPHAMIDE 450 MG: 200 INJECTION, SOLUTION INTRAVENOUS at 12:05

## 2022-09-13 RX ADMIN — HYDROXYZINE PAMOATE 25 MG: 25 CAPSULE ORAL at 11:42

## 2022-09-13 RX ADMIN — DARATUMUMAB AND HYALURONIDASE-FIHJ (HUMAN RECOMBINANT) 1800 MG: 1800; 30000 INJECTION SUBCUTANEOUS at 12:43

## 2022-09-13 RX ADMIN — BORTEZOMIB 2 MG: 3.5 INJECTION, POWDER, LYOPHILIZED, FOR SOLUTION INTRAVENOUS; SUBCUTANEOUS at 12:43

## 2022-09-13 RX ADMIN — DEXAMETHASONE 40 MG: 4 TABLET ORAL at 11:41

## 2022-09-13 RX ADMIN — PALONOSETRON HYDROCHLORIDE 0.25 MG: 0.25 INJECTION INTRAVENOUS at 11:47

## 2022-09-13 RX ADMIN — DIPHENOXYLATE HYDROCHLORIDE AND ATROPINE SULFATE 1 TABLET: 2.5; .025 TABLET ORAL at 12:34

## 2022-09-13 RX ADMIN — ACETAMINOPHEN 1000 MG: 500 TABLET ORAL at 11:42

## 2022-09-13 NOTE — OUTREACH NOTE
COPD/PN Week 4 Survey    Flowsheet Row Responses   [facility] facility patient discharged from? [facility]   Does the patient have one of the following disease processes/diagnoses(primary or secondary)? Pneumonia   Week 4 attempt successful? No  [according to chart--scheduled for chemo today and currently at visit]          BORIS MARCANO - Registered Nurse

## 2022-09-14 ENCOUNTER — TELEMEDICINE - AUDIO (OUTPATIENT)
Dept: NUTRITION | Facility: HOSPITAL | Age: 67
End: 2022-09-14

## 2022-09-14 ENCOUNTER — TELEPHONE (OUTPATIENT)
Dept: ONCOLOGY | Facility: CLINIC | Age: 67
End: 2022-09-14

## 2022-09-14 ENCOUNTER — DOCUMENTATION (OUTPATIENT)
Dept: ONCOLOGY | Facility: CLINIC | Age: 67
End: 2022-09-14

## 2022-09-14 RX ORDER — LEVOTHYROXINE SODIUM 0.07 MG/1
TABLET ORAL
Qty: 90 TABLET | Refills: 3 | Status: SHIPPED | OUTPATIENT
Start: 2022-09-14

## 2022-09-14 RX ORDER — FLUOXETINE HYDROCHLORIDE 20 MG/1
CAPSULE ORAL
Qty: 360 CAPSULE | Refills: 0 | Status: SHIPPED | OUTPATIENT
Start: 2022-09-14 | End: 2022-12-19

## 2022-09-14 NOTE — TELEPHONE ENCOUNTER
Rx Refill Note  Requested Prescriptions     Pending Prescriptions Disp Refills   • levothyroxine (SYNTHROID, LEVOTHROID) 75 MCG tablet [Pharmacy Med Name: LEVOTHYROXINE 0.075MG (75MCG) TABS] 90 tablet 3     Sig: TAKE 1 TABLET BY MOUTH EVERY DAY   • FLUoxetine (PROzac) 20 MG capsule [Pharmacy Med Name: FLUOXETINE 20MG CAPSULES] 360 capsule 0     Sig: TAKE 1 CAPSULE BY MOUTH FOUR TIMES DAILY      Last office visit with prescribing clinician: 8/24/2022      Next office visit with prescribing clinician: 3/3/2023            Yarely Espino MA  09/14/22, 09:22 EDT

## 2022-09-14 NOTE — TELEPHONE ENCOUNTER
Airway    Date/Time: 9/7/2022 7:20 AM  Performed by: Ben Beck M.D.  Authorized by: Ben Beck M.D.     Location:  OR  Urgency:  Elective  Indications for Airway Management:  Anesthesia      Spontaneous Ventilation: absent    Sedation Level:  Deep  Preoxygenated: Yes    Patient Position:  Sniffing  Mask Difficulty Assessment:  2 - vent by mask + OA or adjuvant +/- NMBA  Final Airway Type:  Endotracheal airway  Final Endotracheal Airway:  ETT  Cuffed: Yes    Technique Used for Successful ETT Placement:  Video laryngoscopy    Insertion Site:  Oral  Blade Type:  Glide  Laryngoscope Blade/Videolaryngoscope Blade Size:  3  ETT Size (mm):  7.5  Measured from:  Lips  ETT to Lips (cm):  21  Placement Verified by: auscultation and capnometry    Cormack-Lehane Classification:  Grade I - full view of glottis  Number of Attempts at Approach:  1           Returned call to patient who is reporting that she has dumping syndrome and she is excoriated in her rectal area with some blood noted on the tissue.  She thinks she may have a hemorrhoid that is not external.  She is currently using Desitin to the area, but this is not helping.  She needs further recommendations.  She is also wanting to know if the [employer] Magic solution could be sent to her pharmacy.  She stated that she spoke with Dr. Sal about this yesterday.

## 2022-09-14 NOTE — PROGRESS NOTES
Outpatient Oncology Nutrition      Patient Name:  Cynthia Flower  YOB: 1955  MRN: [id number]    Assessment Date:  9/14/2022    Comments:  Spoke to patient on the phone today as I missed following up yesterday. She reports that she does think Enterade was somewhat helpful in managing diarrhea. Agreeable to try two bottles a day. Left bag of enterade for her to be given next week when she is the infusion area.           Electronically signed by:  Alvina Mcdonough RD, DARI  09/14/22 15:30 EDT

## 2022-09-14 NOTE — TELEPHONE ENCOUNTER
Returned call to patient with recommendation that she use the flushable wipes instead of toilet tissue.  Use Aquaphor as a barrier in the area.  She should contact her GI MD at [facility] if has further rectal bleeding.  She can also use Sitz bath if available.   She v/u.

## 2022-09-19 ENCOUNTER — OFFICE VISIT (OUTPATIENT)
Dept: SURGERY | Facility: CLINIC | Age: 67
End: 2022-09-19

## 2022-09-19 VITALS
BODY MASS INDEX: 22.28 KG/M2 | HEIGHT: 61 IN | SYSTOLIC BLOOD PRESSURE: 110 MMHG | DIASTOLIC BLOOD PRESSURE: 70 MMHG | WEIGHT: 118 LBS

## 2022-09-19 DIAGNOSIS — D24.2 BENIGN NEOPLASM OF LEFT BREAST: Primary | ICD-10-CM

## 2022-09-19 DIAGNOSIS — Z80.3 FH: BREAST CANCER: ICD-10-CM

## 2022-09-19 PROCEDURE — 99214 OFFICE O/P EST MOD 30 MIN: CPT | Performed by: NURSE PRACTITIONER

## 2022-09-19 NOTE — PROGRESS NOTES
"[facility] GROUP OUTPATIENT FOLLOW UP CLINIC VISIT    REASON FOR FOLLOW-UP:    AL amyloidosis  Therapy with michael-CyBorD initiated 8/16/2022    HISTORY OF PRESENT ILLNESS: Cynthia Flower is a 67 y.o. female with the above-mentioned history returning to the office today for follow-up.    She continues to have significant diarrhea.  She is also having orthostatic symptoms.  Diarrhea waxes and wanes with her diet.      REVIEW OF SYSTEMS:  As per the HPI    PHYSICAL EXAMINATION:    Vitals:    09/20/22 1106   BP: 103/66   Pulse: 66   Resp: 16   Temp: 97.1 °F (36.2 °C)   TempSrc: Temporal   SpO2: 98%   Weight: 53.6 kg (118 lb 1.6 oz)   Height: 154.9 cm (60.98\")   PainSc: 0-No pain     General:  No acute distress, awake, alert and oriented  Skin:  Warm and dry, no visible rash  HEENT:  Normocephalic/atraumatic.  Wearing a face mask.  Chest:  Normal respiratory effort.  Lungs clear to auscultation bilaterally.  Heart: Regular rate and rhythm  Extremities:  No visible clubbing, cyanosis, or edema  Neuro/psych:  Grossly nonfocal.  Normal mood and affect.    DIAGNOSTIC DATA:  CBC and Differential (09/20/2022 10:38)      IMAGING:    Cardiac MRI at [facility] 9/9/2022  1.  Small left ventricular chamber size and normal systolic function (LVEF 64%). Moderate concentric hypertrophy.   2.  Small right ventricular chamber size and normal systolic function (RVEF 53%).   3.  Systolic anterior motion of the subvalvular mitral apparatus with mild-moderate MR.   4.  Mild TR.   5.  There is diffuse subendocardial LGE with T1/ECV elevation in a pattern consistent with cardiac amyloidosis.   6.  No prior study for comparison.       ASSESSMENT:  This is a 67 y.o. female with:    *AL amyloidosis  · She had a stress echocardiogram on 4/24/2020 showing a normal left ventricular ejection fraction of 60% with moderate concentric hypertrophy but no wall motion abnormalities of the left ventricle.  There was impaired relaxation " noted.  She complained of chest pain during the examination.  There was some ST segment depression.  Cardiac catheterization was performed on the same day.  No intervention was required.  · Follow-up echocardiogram on 4/15/2021 showed a left ventricular ejection fraction of 61 to 65% with normal LV cavity size.  Left ventricular wall thickness showed moderate concentric hypertrophy.  Diastolic function was impaired.  No pericardial effusion.  Small left pleural effusion.  · She had follow-up [facility] imaging for cardiac amyloidosis that was not suggestive of ATTR amyloidosis  · Laboratory values on 2/24/2022 showed a high serum free light chain lambda of 121, normal kappa of 10.5, low ratio at 0.09.  Serum protein electrophoresis showed no evidence of an M spike.  Urine light chains were abnormal with an elevated lambda light chain of 33 and a low ratio of 0.48 with a 12.7 mg M spike on 24-hour urine protein electrophoresis.  · BNP was elevated at 2306 on 3/4/2022.  The troponin was normal at 0.03.  CK was 212 with a CK-MB of 10.87.  · Initial consult on 3/30/22. No M spike on serum protein electrophoresis. SIFE 'presence of monoclonal protein is unclear.'   · Bone marrow biopsy 4/13/22 normocellular, 12.1% plasma cells consistent with low volume plasma cell dyscrasia. FISH with low level t(11;14) fusion only. No amyloid noted as Congo red staining negative.   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 Buenrostro at [place]. Intended to enroll on a clinical study but her troponin as tested by the study sponsor was not high enough  · Therapy with michael-CyBorD initiated 8/16/2022  · Patient seen in the office on 8/17/2022 for triage visit.  Patient with complaints of fatigue, dizziness and diarrhea.  She was mildly hypotensive.  She was given IV fluids.  · Subsequent admission at [facility] with shortness of breath and volume overload.  She was diuresed and treated with  antibiotics.  · D8 therapy administered on 8/30  · Light chains normal at [facility] on 8/31/22 (lambda light chain 1.3, down from 12.44)  · 9/20/22: Proceed with cycle 2-day 1 therapy    *Diarrhea  · She saw Dr. Hoyos with GI at [facility] [place] on 9/7.  · Suspicion for amyloid involvement of the GI tract  · Continue Lomotil and Imodium. Rifaximin prescribed by Dr. Hoyos.  She has completed this.    *Elevated liver enzymes  · 8/17/2022: AST 87, , bilirubin normal at 0.4  · Normalized    *Cardiomyopathy:  · Most recent echocardiogram on 8/17/2022 showed left ventricular wall thickness consistent with moderate to severe concentric hypertrophy along with left ventricular septal wall measuring 2.2 cm and posterior wall thickness at 1.9 cm likely due to amyloidosis.  LVEF 61 to 65%.  Grade 1 impaired relaxation.  · Now followed by Dr. Lyles at [facility] with concerns for pre-existing HCM and AL cardiac amyloid.   · Cardiac MRI results above.  Amyloidosis evident.  · Catheterization planned on 10/10.    *Sore tongue: If she wants Magic mouthwash she will let us know.  She did not complain of this today.    PLAN:  1. Proceed with cycle 2day 1 therapy with michael-CyBorD today.  IV Cytoxan as she does not like taking pills.  She is tolerating oral dexamethasone.  Adjust the regimen accordingly related to adverse effects.  2. Spironolactone per Dr. Lyles at [facility].  Management of orthostasis per [facility] [place] cardiology as well.  3. No more IV fluid infusions and she will keep up with excess fluid losses with oral intake.  4. Follow-up with cardiology and gastroenterology at [facility].  She is also going to see an esophageal motility specialist there as well.  5. Cardiac catheterization and cardiac biopsy at [facility] on 10/10.  6. If possible, hopeful for autologous stem cell transplant at [facility] after induction therapy following 6 cycles.  Depending on response after 3 cycles consideration of stem cell collection at that point.   To be determined.  7. Continue acyclovir and Bactrim for prophylaxis  8. We will try to treat through peripheral IVs as long as possible.  9. Follow-up weekly as scheduled  10. She knows to notify us with any concerns or issues in between her appointments.    High risk therapy requiring intensive monitoring    Sissy-CyBorD regimen     Daratumumab and hyaluronidase (Darzalex Faspro) as follows:  Cycles 1 & 2: 1800 mg SC once per day on days 1, 8, 15, 22  Cycles 3 to 6: 1800 mg SC once per day on days 1 & 15  Cycles 7 up to 24: 1800 mg SC once on day 1     Bortezomib (Velcade) as follows:  Cycles 1 to 6: 1.3 mg/m2 SC once per day on days 1, 8, 15, 22     Cyclophosphamide (Cytoxan) as follows:  Cycles 1 to 6: 300 mg/m2 (maximum dose of 500 mg) PO or IV once per day on days 1, 8, 15, 22    Plan IV Cytoxan as she has difficulty swallowing pills.     Dexamethasone (Decadron) as follows:  Cycles 1 to 6: 40 mg PO or IV once per day on days 1, 8, 15, 22 (see note)  28-day cycle for up to 24 cycles

## 2022-09-19 NOTE — PROGRESS NOTES
[facility]     Referring Provider: Elizabeth Rosenthal*     Chief complaint: abnormal breast imaging     HPI: Ms. Cynthia Flower is a 66 yo woman, seen at the request of Elizabeth Rosenthal*, for biopsy follow up.    I personally reviewed her records and summarized her relevant breast history/imaging:    She has a personal history of benign left stereotactic biopsy in 3/22, she has a family history of breast cancer in her MGM diagnosed at age 87, no family history of ovarian cancer.     12/15/2021: bilateral screening mammogram with tomosynthesis at [facility]  FINDINGS:  Ill-defined asymmetry is seen in the upper outer mid left breast.   No suspicious mass, area of architectural distortion or suspicious microcalcification is seen in the right breast.     CONCLUSION:  Needs further imaging evaluation.  The patient should be scheduled return for a diagnostic left mammogram.  The patient should also be scheduled for a targeted left breast ultrasound, should this be needed.   No radiographic changes of malignancy, right breast.   RECOMMENDATION(S):               ADDITIONAL MAMMOGRAPHIC VIEWS REQUIRED: LEFT BREAST  --NEED ADDITIONAL IMAGING EVALUATION.     ULTRASOUND: LEFT BREAST  --NEED ADDITIONAL IMAGING EVALUATION.     BIRADS:               DIAGNOSTIC CATEGORY 0--INCOMPLETE: NEEDS ADDITIONAL IMAGING EVALUATION.       BREAST COMPOSITION:          Scattered areas fibroglandular density.    2/4/2022: left diagnostic mammogram with tomosynthesis at [facility]  FINDINGS:          Mammogram:  The focal asymmetry in the upper-outer left breast disperses on the supplemental view   without well-defined mass.  There is residual glandular D on the spot views.   Ultrasound:  High-resolution focused/physician directed left breast ultrasound of the upper-outer   left breast from the 1200 hours to the 3 o'clock position performed, 9 cm from the nipple.  There   is a ridge of glandular tissue with a 3 mm simple  cyst.  No abnormal shadowing or suspicious mass.   IMPRESSION:  The focal asymmetry in the upper-outer left breast does represent a change compared to previous   mammograms, however no persistent mammographic mass or suspicious sonographic correlate.    Therefore, recommend breast MRI for further assessment.    If the patient is not a candidate for   breast MRI than additional options would include attempt at stereotactic biopsy .  All findings and   recommendations discussed directly with the patient at the time of exam.   RECOMMENDATION(S):               BREAST MRI: LEFT BREAST     BIRADS:               DIAGNOSTIC CATEGORY 0--INCOMPLETE: NEEDS ADDITIONAL IMAGING EVALUATION.     BREAST COMPOSITION:          Scattered areas fibroglandular density.    3/14/2022: left stereotactic biopsy at [facility]  PROCEDURE NOTE: Informed consent was obtained. Preliminary Tomosynthesis images of the left breast were obtained. The overlying skin was prepped in the usual sterile fashion. Local anesthesia was achieved with 1% lidocaine. A nick was made in the skin with a scalpel. Through the nick was inserted an 8 gauge Mammotome vacuum assisted device. Repeat stereotactic/Tomosynthesis images were obtained demonstrating adequate placement of the biopsy needle. Multiple tissue specimens were obtained.  A specimen radiograph was obtained demonstrating focal areas of increased density within the specimen. A metallic clip was placed to kathia the site. Pressure was applied until bleeding subsided and the overlying skin was cleaned and bandaged. Postbiopsy mammography of the left  breast demonstrates placement of a metallic clip at the 3 o'clock position.     The pathology result has returned as fibroadenomatoid hyperplasia with focal dense stromal fibrosis and sclerosing adenosis. This is concordant with imaging findings.     IMPRESSION:  Technically successful Tomosynthesis guided Mammotome vacuum assisted left breast biopsy with placement of  a metallic clip. The pathology result has returned as benign. Routine clinical and imaging follow-up is appropriate.    3/15/2022: pathology left breast biopsy  Final Diagnosis   1. Left Breast at 3:00 o'clock (not for calcifications), Stereotactic Core Biopsy:                 A. Fibroadenomatoid hyperplasia, focal dense stromal fibrosis, focal sclerosing adenosis and clustered      ductal cysts.                B. No atypical hyperplasia, in-situ carcinoma nor invasive carcinoma identified (see comment).                C. Rare focus of organizing fat necrosis noted       Today she presents with no breast concerns, she denies any breast lumps, pain, skin changes, or nipple discharge.  She denies any prior history of abnormal mammograms.   She is currently treated for cardiac amyloidosis.     She was by herself in clinic today.       Review of Systems   Constitutional: Positive for unexpected weight change.   HENT:   Positive for mouth sores, tinnitus and trouble swallowing.    Cardiovascular: Positive for chest pain.   Gastrointestinal: Positive for abdominal pain and diarrhea.   Neurological: Positive for dizziness.   All other systems reviewed and are negative.      Medications:    Current Outpatient Medications:   •  acyclovir (Zovirax) 400 MG tablet, Take 1 tablet by mouth 2 (Two) Times a Day., Disp: 60 tablet, Rfl: 5  •  ALPRAZolam (XANAX) 0.25 MG tablet, TAKE 1 TABLET BY MOUTH TWICE DAILY AS NEEDED FOR ANXIETY, Disp: 60 tablet, Rfl: 1  •  Cholecalciferol (Vitamin D3) 50 MCG (2000 UT) tablet, Take 1 tablet by mouth Daily., Disp: , Rfl:   •  cholestyramine (QUESTRAN) 4 g packet, Take 1 packet by mouth 3 (Three) Times a Day With Meals. 1 packet  TID PRN diarrhea, Disp: 60 packet, Rfl: 4  •  diphenoxylate-atropine (LOMOTIL) 2.5-0.025 MG per tablet, Take 1 tablet by mouth 4 (Four) Times a Day As Needed for Diarrhea., Disp: 120 tablet, Rfl: 0  •  FLUoxetine (PROzac) 20 MG capsule, TAKE 1 CAPSULE BY MOUTH FOUR TIMES  DAILY, Disp: 360 capsule, Rfl: 0  •  levothyroxine (SYNTHROID, LEVOTHROID) 75 MCG tablet, TAKE 1 TABLET BY MOUTH EVERY DAY, Disp: 90 tablet, Rfl: 3  •  loperamide (IMODIUM A-D) 2 MG tablet, Take 1 tablet by mouth 3 (Three) Times a Day As Needed for Diarrhea., Disp: 90 tablet, Rfl: 0  •  loratadine (CLARITIN) 10 MG tablet, TAKE 1 TABLET BY MOUTH DAILY, Disp: 30 tablet, Rfl: 5  •  mupirocin (BACTROBAN) 2 % ointment, APPLY TWO TIMES PER DAY TO THE INFECTION/BIOPSY SITES, Disp: , Rfl:   •  olopatadine (PATANOL) 0.1 % ophthalmic solution, INSTILL 1 DROP IN BOTH EYES TWICE DAILY, Disp: 5 mL, Rfl: 5  •  ondansetron (ZOFRAN) 8 MG tablet, Take 1 tablet by mouth 3 (Three) Times a Day As Needed for Nausea or Vomiting., Disp: 30 tablet, Rfl: 5  •  pantoprazole (PROTONIX) 20 MG EC tablet, TAKE 1 TABLET BY MOUTH EVERY DAY. DO NOT TAKE WITHIN 2 HOURS OF THYROID MEDICATION, Disp: 30 tablet, Rfl: 5  •  potassium chloride ER (K-TAB) 20 MEQ tablet controlled-release ER tablet, Take 1 tablet by mouth 2 (Two) Times a Day., Disp: 60 tablet, Rfl: 1  •  propranolol (INDERAL) 40 MG tablet, Take 40 mg by mouth Daily., Disp: , Rfl:   •  riFAXIMin (XIFAXAN) 550 MG tablet, Take 550 mg by mouth Every 8 (Eight) Hours., Disp: , Rfl:   •  rosuvastatin (CRESTOR) 20 MG tablet, Take 1 tablet by mouth Daily., Disp: 90 tablet, Rfl: 0  •  spironolactone (ALDACTONE) 25 MG tablet, , Disp: , Rfl:   •  sulfamethoxazole-trimethoprim (BACTRIM DS,SEPTRA DS) 800-160 MG per tablet, Take 1 tablet by mouth 3 (Three) Times a Week., Disp: 12 tablet, Rfl: 5  •  traMADol (ULTRAM) 50 MG tablet, Take 1 tablet by mouth Every 6 (Six) Hours As Needed for Moderate Pain ., Disp: 40 tablet, Rfl: 2  •  triamcinolone (KENALOG) 0.1 % ointment, Apply  topically to the appropriate area as directed 2 (Two) Times a Day., Disp: 30 g, Rfl: 1  •  zolpidem (AMBIEN) 10 MG tablet, TAKE 1 TABLET BY MOUTH AT NIGHT AS NEEDED FOR SLEEP, Disp: 30 tablet, Rfl: 2    Allergies:  Allergies    Allergen Reactions   • Azithromycin Rash and Other (See Comments)     Reaction unknown to patient (unable to remember)  Other reaction(s): Other: stomach issues, Stomach ache, Other: stomach issues, Stomach ache   • Ezetimibe Myalgia, Other (See Comments) and Rash     cramps  cramps   • Pravastatin Rash and Other (See Comments)       Medical history:  Past Medical History:   Diagnosis Date   • Anxiety    • Arthritis    • COVID-19 07/2020 9/7/2021   • Depression    • Diverticulitis of colon    • Diverticulosis    • GERD (gastroesophageal reflux disease)    • History of Clostridioides difficile infection 2008    HAS HAD SEVERAL TIMES IN PAST PER PT (SAW INFECTIOUS DISEASE MD FOR THIS S/P COLOSTOMY/EXTENSIVE ANBX THERAPY)   • History of prediabetes    • History of snoring    • History of stress incontinence    • Hypercholesterolemia    • Hyperlipidemia    • Hypertension    • Left ventricular hypertrophy     FOLLOWED BY DR MARI. DENIES CHEST PAIN BUT STATES DOES GET SOA AT TIMES WITH EXERTION REPORTS THAT IT IS NOT A NEW ISSUE    • Liver disease    • Oral herpes 08/18/2020   • Seasonal allergies     used to have allergy shots in the past   • SOB (shortness of breath)     STATES WITH EXERTION HAS BEEN ONGOING ISSUE NOT A NEW ISSUE   • Thyroid disease     Hypothyroid       Surgical History:  Past Surgical History:   Procedure Laterality Date   • ABDOMINAL SURGERY  2005, 2014    ex lap x 2,  diverticulitis   • ABDOMINOPLASTY  2016   • ADENOIDECTOMY     • APPENDECTOMY     • CARDIAC CATHETERIZATION N/A 04/24/2020    Procedure: Coronary angiography;  Surgeon: Roberto Briones MD;  Location: [facility] INVASIVE LOCATION;  Service: Cardiovascular;  Laterality: N/A;   • CARDIAC CATHETERIZATION N/A 04/24/2020    Procedure: Left heart cath;  Surgeon: Roberto Briones MD;  Location: [facility] CATH INVASIVE LOCATION;  Service: Cardiovascular;  Laterality: N/A;   • CARDIAC CATHETERIZATION N/A 04/24/2020     Procedure: Left ventriculography;  Surgeon: Roberto Briones MD;  Location: [facility] CATH INVASIVE LOCATION;  Service: Cardiovascular;  Laterality: N/A;   • COLONOSCOPY  approx 2018    normal per pt    • COLOSTOMY  2005    had colostomy and then is was reversed   • COLOSTOMY CLOSURE  2006   • CORRECTION HAMMER TOE Right 2017   • ECTOPIC PREGNANCY SURGERY      1979, 1980   • ENDOSCOPY N/A 05/27/2020    Procedure: ESOPHAGOGASTRODUODENOSCOPY WITH BIOPSY AND BIOPSY POLYPECTOMY AND MACIEL DIALATION;  Surgeon: Preethi Beck MD;  Location: [facility] ENDOSCOPY;  Service: Gastroenterology;  Laterality: N/A;  PRE: ESOPHAGEAL DYSPHAGIA  POST: Z LINE 46, ESOPHAGEAL SPASM, GASTRITIS, FUNDIC POLYP   • ESOPHAGEAL DILATATION N/A 12/07/2021    Procedure: OR ESOPHAGEAL DILATATION with maciel dilators ;  Surgeon: Jamey Leslie MD;  [place] [facility]  Service: ENT;  Laterality: N/A;   • ESOPHAGEAL MOTILITY STUDY N/A 02/03/2022    Procedure: ESOPHAGEAL MOTILITY STUDY;  Surgeon: Harshil, Nurse Performed;  Location: [facility] ENDOSCOPY;  Service: Gastroenterology;  Laterality: N/A;  dypshagia    • FOOT SURGERY Right 12/2016    Second and third hammertoe corrections   • KNEE ARTHROSCOPY Right 2009   • KNEE SURGERY Right    • REVISION / TAKEDOWN COLOSTOMY  2006   • SHOULDER ARTHROSCOPY Right 2018    right shoulder arthroscopy with extensive rotator cuff, biceps and labral debridement, chondroplasty, & subacromial decompression with acromioplasty   • SHOULDER SURGERY      HAS HAS COMPLETE SHOULDER ON RIGHT. LEFT SCOPED AND HAD 2ND SURGERY WITH HARDWARE PLACED   • SHOULDER SURGERY Left     2012. 2013   • TONSILLECTOMY     • TOTAL SHOULDER REVERSE ARTHROPLASTY Right 2019   • WISDOM TOOTH EXTRACTION         Family History:  Family History   Problem Relation Age of Onset   • Hypertension Mother    • Osteoporosis Mother    • Skin cancer Mother    • Heart disease Father    • Hypertension Father    • Breast cancer Maternal  "Grandmother    • Ovarian cancer Neg Hx    • Colon cancer Neg Hx    • Deep vein thrombosis Neg Hx    • Pulmonary embolism Neg Hx    • Malig Hyperthermia Neg Hx        Social History:   Social History     Socioeconomic History   • Marital status:    Tobacco Use   • Smoking status: Never Smoker   • Smokeless tobacco: Never Used   Vaping Use   • Vaping Use: Never used   Substance and Sexual Activity   • Alcohol use: Not Currently     Alcohol/week: 1.0 standard drink     Types: 1 Glasses of wine per week   • Drug use: Never   • Sexual activity: Defer     Patient does not drink caffeine.       GYNECOLOGIC HISTORY:   . P: 2. AB: 2.  Last menstrual period: age 55  Age at menarche: 12  Age at first childbirth: 25  Lactation/How long: n/a  Age at menopause: 55  Total years of oral contraceptive use: n/a  Total years of hormone replacement therapy: n/a      Physical Exam  Vitals:    22 1036   BP: 110/70   /70 (BP Location: Left arm)   Ht 154.9 cm (61\")   Wt 53.5 kg (118 lb)   BMI 22.30 kg/m²     ECOG 0 - Asymptomatic  General: NAD, well appearing  Psych: a&o x 3, normal mood and affect  Eyes: EOMI, no scleral icterus  ENMT: neck supple without masses or thyromegaly, mucus membranes moist  Resp: normal effort, CTAB  CV: RRR, no murmurs, no edema   GI: soft, NT, ND  MSK: normal gait, normal ROM in bilateral shoulders  Lymph nodes:  no cervical, supraclavicular or axillary lymphadenopathy  Breast: symmetric with bilateral ptosis  Right:  No visible abnormalities on inspection while seated, with arms raised or hands on hips. No masses, skin changes, or nipple abnormalities.  Left:  No visible abnormalities on inspection while seated, with arms raised or hands on hips. No masses, skin changes, or nipple abnormalities.      Assessment:    1)  67 y.o. F with left breast biopsy, benign and concordant in 3/22    2)  Family history of breast cancer  MGM at age 87      Discussion:  Imaging and pathology results " were reviewed, benign and concordant with routine follow up recommended.  We discussed recommendations for routine follow up with screening mammogram in 12/2022.  She is in agreement with this plan.    Her risk for breast cancer based on personal and family history was calculated and found to be average risk, 6.1% lifetime risk.    Plan:  In view of her normal imaging and examination I will not give her a follow-up in our office.  I have asked her to check her self-exam and to call us in the interim with any concerns and I would be happy to see her back.    She will be due her screening mammogram in 12/2022.    I have advised the patient to continue monthly breast self examination and to return to notify us if she develops new breast symptoms.       PIYUSH Catherine    I spent 45 minutes caring for Ms Flower on this date of service. This time includes time spent by me in the following activities: preparing for the visit, reviewing tests, obtaining and/or reviewing a separately obtained history, performing a medically appropriate examination and/or evaluation , counseling and educating the patient/family/caregiver and documenting information in the medical record.    CC:  Elizabeth Rosenthal*  Arian Peterson MD    EMR Dragon/transcription disclaimer:  Dictated using Dragon dictation

## 2022-09-20 ENCOUNTER — OFFICE VISIT (OUTPATIENT)
Dept: ONCOLOGY | Facility: CLINIC | Age: 67
End: 2022-09-20

## 2022-09-20 ENCOUNTER — INFUSION (OUTPATIENT)
Dept: ONCOLOGY | Facility: HOSPITAL | Age: 67
End: 2022-09-20

## 2022-09-20 VITALS
DIASTOLIC BLOOD PRESSURE: 66 MMHG | RESPIRATION RATE: 16 BRPM | HEART RATE: 66 BPM | TEMPERATURE: 97.1 F | WEIGHT: 118.1 LBS | HEIGHT: 61 IN | OXYGEN SATURATION: 98 % | SYSTOLIC BLOOD PRESSURE: 103 MMHG | BODY MASS INDEX: 22.3 KG/M2

## 2022-09-20 DIAGNOSIS — E85.81 LIGHT CHAIN (AL) AMYLOIDOSIS: ICD-10-CM

## 2022-09-20 DIAGNOSIS — E85.81 LIGHT CHAIN (AL) AMYLOIDOSIS: Primary | ICD-10-CM

## 2022-09-20 LAB
ALBUMIN SERPL-MCNC: 4.1 G/DL (ref 3.5–5.2)
ALBUMIN/GLOB SERPL: 2.2 G/DL (ref 1.1–2.4)
ALP SERPL-CCNC: 56 U/L (ref 38–116)
ALT SERPL W P-5'-P-CCNC: 36 U/L (ref 0–33)
ANION GAP SERPL CALCULATED.3IONS-SCNC: 9.7 MMOL/L (ref 5–15)
AST SERPL-CCNC: 28 U/L (ref 0–32)
BASOPHILS # BLD AUTO: 0.02 10*3/MM3 (ref 0–0.2)
BASOPHILS NFR BLD AUTO: 0.3 % (ref 0–1.5)
BILIRUB SERPL-MCNC: 0.6 MG/DL (ref 0.2–1.2)
BUN SERPL-MCNC: 17 MG/DL (ref 6–20)
BUN/CREAT SERPL: 23.3 (ref 7.3–30)
CALCIUM SPEC-SCNC: 9.4 MG/DL (ref 8.5–10.2)
CHLORIDE SERPL-SCNC: 103 MMOL/L (ref 98–107)
CO2 SERPL-SCNC: 23.3 MMOL/L (ref 22–29)
CREAT SERPL-MCNC: 0.73 MG/DL (ref 0.6–1.1)
DEPRECATED RDW RBC AUTO: 47.1 FL (ref 37–54)
EGFRCR SERPLBLD CKD-EPI 2021: 90.3 ML/MIN/1.73
EOSINOPHIL # BLD AUTO: 0.14 10*3/MM3 (ref 0–0.4)
EOSINOPHIL NFR BLD AUTO: 2.2 % (ref 0.3–6.2)
ERYTHROCYTE [DISTWIDTH] IN BLOOD BY AUTOMATED COUNT: 14.2 % (ref 12.3–15.4)
GLOBULIN UR ELPH-MCNC: 1.9 GM/DL (ref 1.8–3.5)
GLUCOSE SERPL-MCNC: 87 MG/DL (ref 74–124)
HCT VFR BLD AUTO: 39.3 % (ref 34–46.6)
HGB BLD-MCNC: 13.1 G/DL (ref 12–15.9)
IMM GRANULOCYTES # BLD AUTO: 0.02 10*3/MM3 (ref 0–0.05)
IMM GRANULOCYTES NFR BLD AUTO: 0.3 % (ref 0–0.5)
LYMPHOCYTES # BLD AUTO: 1.82 10*3/MM3 (ref 0.7–3.1)
LYMPHOCYTES NFR BLD AUTO: 28.8 % (ref 19.6–45.3)
MCH RBC QN AUTO: 31 PG (ref 26.6–33)
MCHC RBC AUTO-ENTMCNC: 33.3 G/DL (ref 31.5–35.7)
MCV RBC AUTO: 92.9 FL (ref 79–97)
MONOCYTES # BLD AUTO: 0.51 10*3/MM3 (ref 0.1–0.9)
MONOCYTES NFR BLD AUTO: 8.1 % (ref 5–12)
NEUTROPHILS NFR BLD AUTO: 3.82 10*3/MM3 (ref 1.7–7)
NEUTROPHILS NFR BLD AUTO: 60.3 % (ref 42.7–76)
NRBC BLD AUTO-RTO: 0 /100 WBC (ref 0–0.2)
PLATELET # BLD AUTO: 178 10*3/MM3 (ref 140–450)
PMV BLD AUTO: 10.9 FL (ref 6–12)
POTASSIUM SERPL-SCNC: 4.5 MMOL/L (ref 3.5–4.7)
PROT SERPL-MCNC: 6 G/DL (ref 6.3–8)
RBC # BLD AUTO: 4.23 10*6/MM3 (ref 3.77–5.28)
SODIUM SERPL-SCNC: 136 MMOL/L (ref 134–145)
WBC NRBC COR # BLD: 6.33 10*3/MM3 (ref 3.4–10.8)

## 2022-09-20 PROCEDURE — 63710000001 DEXAMETHASONE PER 0.25 MG: Performed by: INTERNAL MEDICINE

## 2022-09-20 PROCEDURE — 99214 OFFICE O/P EST MOD 30 MIN: CPT | Performed by: INTERNAL MEDICINE

## 2022-09-20 PROCEDURE — 25010000002 PALONOSETRON PER 25 MCG: Performed by: INTERNAL MEDICINE

## 2022-09-20 PROCEDURE — 96413 CHEMO IV INFUSION 1 HR: CPT

## 2022-09-20 PROCEDURE — 80053 COMPREHEN METABOLIC PANEL: CPT

## 2022-09-20 PROCEDURE — 96401 CHEMO ANTI-NEOPL SQ/IM: CPT

## 2022-09-20 PROCEDURE — 25010000002 DARATUMUMAB-HYALURONIDASE-FIHJ 1800-30000 MG-UT/15ML SOLUTION: Performed by: INTERNAL MEDICINE

## 2022-09-20 PROCEDURE — 96375 TX/PRO/DX INJ NEW DRUG ADDON: CPT

## 2022-09-20 PROCEDURE — 25010000002 BORTEZOMIB PER 0.1 MG: Performed by: INTERNAL MEDICINE

## 2022-09-20 PROCEDURE — 25010000002 CYCLOPHOSPHAMIDE 1 GM/5ML SOLUTION 5 ML VIAL: Performed by: INTERNAL MEDICINE

## 2022-09-20 PROCEDURE — 85025 COMPLETE CBC W/AUTO DIFF WBC: CPT

## 2022-09-20 RX ORDER — HYDROXYZINE PAMOATE 25 MG/1
25 CAPSULE ORAL ONCE
Status: COMPLETED | OUTPATIENT
Start: 2022-09-20 | End: 2022-09-20

## 2022-09-20 RX ORDER — ACETAMINOPHEN 500 MG
1000 TABLET ORAL ONCE
Status: CANCELLED | OUTPATIENT
Start: 2022-09-20

## 2022-09-20 RX ORDER — FAMOTIDINE 10 MG/ML
20 INJECTION, SOLUTION INTRAVENOUS AS NEEDED
Status: CANCELLED | OUTPATIENT
Start: 2022-09-20

## 2022-09-20 RX ORDER — SODIUM CHLORIDE 9 MG/ML
250 INJECTION, SOLUTION INTRAVENOUS ONCE
Status: CANCELLED | OUTPATIENT
Start: 2022-09-20

## 2022-09-20 RX ORDER — ACETAMINOPHEN 500 MG
1000 TABLET ORAL ONCE
Status: COMPLETED | OUTPATIENT
Start: 2022-09-20 | End: 2022-09-20

## 2022-09-20 RX ORDER — HYDROXYZINE PAMOATE 25 MG/1
25 CAPSULE ORAL ONCE
Status: CANCELLED
Start: 2022-09-20 | End: 2022-09-20

## 2022-09-20 RX ORDER — SODIUM CHLORIDE 9 MG/ML
250 INJECTION, SOLUTION INTRAVENOUS ONCE
Status: COMPLETED | OUTPATIENT
Start: 2022-09-20 | End: 2022-09-20

## 2022-09-20 RX ORDER — PALONOSETRON 0.05 MG/ML
0.25 INJECTION, SOLUTION INTRAVENOUS ONCE
Status: COMPLETED | OUTPATIENT
Start: 2022-09-20 | End: 2022-09-20

## 2022-09-20 RX ORDER — PALONOSETRON 0.05 MG/ML
0.25 INJECTION, SOLUTION INTRAVENOUS ONCE
Status: CANCELLED | OUTPATIENT
Start: 2022-09-20

## 2022-09-20 RX ORDER — DEXAMETHASONE 4 MG/1
40 TABLET ORAL ONCE
Status: COMPLETED | OUTPATIENT
Start: 2022-09-20 | End: 2022-09-20

## 2022-09-20 RX ORDER — DIPHENHYDRAMINE HYDROCHLORIDE 50 MG/ML
50 INJECTION INTRAMUSCULAR; INTRAVENOUS AS NEEDED
Status: CANCELLED | OUTPATIENT
Start: 2022-09-20

## 2022-09-20 RX ORDER — BORTEZOMIB 3.5 MG/1
1.3 INJECTION, POWDER, LYOPHILIZED, FOR SOLUTION INTRAVENOUS; SUBCUTANEOUS ONCE
Status: CANCELLED | OUTPATIENT
Start: 2022-09-20

## 2022-09-20 RX ORDER — BORTEZOMIB 3.5 MG/1
1.3 INJECTION, POWDER, LYOPHILIZED, FOR SOLUTION INTRAVENOUS; SUBCUTANEOUS ONCE
Status: COMPLETED | OUTPATIENT
Start: 2022-09-20 | End: 2022-09-20

## 2022-09-20 RX ORDER — MEPERIDINE HYDROCHLORIDE 25 MG/ML
25 INJECTION INTRAMUSCULAR; INTRAVENOUS; SUBCUTANEOUS
Status: CANCELLED | OUTPATIENT
Start: 2022-09-20

## 2022-09-20 RX ORDER — DEXAMETHASONE 4 MG/1
40 TABLET ORAL ONCE
Status: CANCELLED
Start: 2022-09-20 | End: 2022-09-20

## 2022-09-20 RX ADMIN — ACETAMINOPHEN 1000 MG: 500 TABLET ORAL at 12:00

## 2022-09-20 RX ADMIN — CYCLOPHOSPHAMIDE 450 MG: 200 INJECTION, SOLUTION INTRAVENOUS at 12:29

## 2022-09-20 RX ADMIN — HYDROXYZINE PAMOATE 25 MG: 25 CAPSULE ORAL at 12:00

## 2022-09-20 RX ADMIN — BORTEZOMIB 2 MG: 3.5 INJECTION, POWDER, LYOPHILIZED, FOR SOLUTION INTRAVENOUS; SUBCUTANEOUS at 12:46

## 2022-09-20 RX ADMIN — PALONOSETRON 0.25 MG: 0.25 INJECTION, SOLUTION INTRAVENOUS at 12:12

## 2022-09-20 RX ADMIN — DARATUMUMAB AND HYALURONIDASE-FIHJ (HUMAN RECOMBINANT) 1800 MG: 1800; 30000 INJECTION SUBCUTANEOUS at 12:46

## 2022-09-20 RX ADMIN — SODIUM CHLORIDE 250 ML: 9 INJECTION, SOLUTION INTRAVENOUS at 11:56

## 2022-09-20 RX ADMIN — DEXAMETHASONE 40 MG: 4 TABLET ORAL at 12:01

## 2022-09-21 DIAGNOSIS — F51.01 PRIMARY INSOMNIA: ICD-10-CM

## 2022-09-21 RX ORDER — ZOLPIDEM TARTRATE 10 MG/1
10 TABLET ORAL NIGHTLY PRN
Qty: 30 TABLET | Refills: 2 | Status: SHIPPED | OUTPATIENT
Start: 2022-09-21 | End: 2023-01-27

## 2022-09-21 NOTE — TELEPHONE ENCOUNTER
Caller: Cynthia Flower    Relationship: Self    Best call back number: [phone]       Requested Prescriptions:   Requested Prescriptions     Pending Prescriptions Disp Refills   • zolpidem (AMBIEN) 10 MG tablet 30 tablet 2     Sig: Take 1 tablet by mouth At Night As Needed for Sleep.        Pharmacy where request should be sent:      Additional details provided by patient: PATIENT HAS A 1 DAY SUPPLY LEFT OF THIS MEDICATION   Does the patient have less than a 3 day supply:  [x] Yes  [] No    Ellis Gomez Rep   09/21/22 08:08 EDT

## 2022-09-22 ENCOUNTER — PATIENT ROUNDING (BHMG ONLY) (OUTPATIENT)
Dept: SURGERY | Facility: CLINIC | Age: 67
End: 2022-09-22

## 2022-09-22 LAB
ALBUMIN SERPL ELPH-MCNC: 3.6 G/DL (ref 2.9–4.4)
ALBUMIN/GLOB SERPL: 1.8 {RATIO} (ref 0.7–1.7)
ALPHA1 GLOB SERPL ELPH-MCNC: 0.2 G/DL (ref 0–0.4)
ALPHA2 GLOB SERPL ELPH-MCNC: 0.8 G/DL (ref 0.4–1)
B-GLOBULIN SERPL ELPH-MCNC: 0.8 G/DL (ref 0.7–1.3)
GAMMA GLOB SERPL ELPH-MCNC: 0.3 G/DL (ref 0.4–1.8)
GLOBULIN SER-MCNC: 2.1 G/DL (ref 2.2–3.9)
IGA SERPL-MCNC: 9 MG/DL (ref 87–352)
IGG SERPL-MCNC: 391 MG/DL (ref 586–1602)
IGM SERPL-MCNC: 36 MG/DL (ref 26–217)
INTERPRETATION SERPL IEP-IMP: ABNORMAL
KAPPA LC FREE SER-MCNC: 5.3 MG/L (ref 3.3–19.4)
KAPPA LC FREE/LAMBDA FREE SER: 0.39 {RATIO} (ref 0.26–1.65)
LABORATORY COMMENT REPORT: ABNORMAL
LAMBDA LC FREE SERPL-MCNC: 13.7 MG/L (ref 5.7–26.3)
M PROTEIN SERPL ELPH-MCNC: 0.1 G/DL
PROT SERPL-MCNC: 5.7 G/DL (ref 6–8.5)

## 2022-09-22 NOTE — PROGRESS NOTES
September 22, 2022    Hello, may I speak with Cynthia Flower?    My name is Keke      I am  with [facility]  [address] [facility] [address] [phone].    Before we get started may I verify your date of birth? 1955    I am calling to officially welcome you to our practice and ask about your recent visit. Is this a good time to talk? No lmtc    Tell me about your visit with us. What things went well?         We're always looking for ways to make our patients' experiences even better. Do you have recommendations on ways we may improve?      Overall were you satisfied with your first visit to our practice?        I appreciate you taking the time to speak with me today. Is there anything else I can do for you?       Thank you, and have a great day.

## 2022-09-23 NOTE — PROGRESS NOTES
[facility] GROUP OUTPATIENT FOLLOW UP CLINIC VISIT    REASON FOR FOLLOW-UP:    AL amyloidosis  Therapy with michael-CyBorD initiated 8/16/2022    HISTORY OF PRESENT ILLNESS: Cynthia Flower is a 67 y.o. female with the above-mentioned history returning to the office today for follow-up.    Cycle 2-day 8 therapy today    She overall tolerates therapy very well.  She continues to have diarrhea.  She states this is very much dependent on what she eats.  She is not avoiding dairy products as well as she believes she should be.  She has had some lightheadedness and near syncope but no syncope.  She states that her urine is often dark.  She tries to keep up with water intake.  She does note some left-sided neck pain that started a few days ago.  She has been using acetaminophen with minimal improvement.  She does have some difficulty turning her neck in both directions.  No known injury.    REVIEW OF SYSTEMS:  As per the [facility]    PHYSICAL EXAMINATION:    There were no vitals filed for this visit.   General:  No acute distress, awake, alert and oriented  Skin:  Warm and dry, no visible rash  Neck: She is tender at the upper sternocleidomastoid area on the left just at the skull base  HEENT:  Normocephalic/atraumatic.  Wearing a face mask.  Chest:  Normal respiratory effort.  Lungs clear to auscultation bilaterally.  Heart: Regular rate and rhythm with grade 2 out of 6 early systolic murmur  Extremities:  No visible clubbing, cyanosis, or edema  Neuro/psych:  Grossly nonfocal.  Normal mood and affect.    DIAGNOSTIC DATA:  CBC and Differential (09/27/2022 09:51)      IMAGING:    Cardiac MRI at [place] 9/9/2022  1.  Small left ventricular chamber size and normal systolic function (LVEF 64%). Moderate concentric hypertrophy.   2.  Small right ventricular chamber size and normal systolic function (RVEF 53%).   3.  Systolic anterior motion of the subvalvular mitral apparatus with mild-moderate MR.   4.  Mild TR.    5.  There is diffuse subendocardial LGE with T1/ECV elevation in a pattern consistent with cardiac amyloidosis.   6.  No prior study for comparison.       ASSESSMENT:  This is a 67 y.o. female with:    *AL amyloidosis  · She had a stress echocardiogram on 4/24/2020 showing a normal left ventricular ejection fraction of 60% with moderate concentric hypertrophy but no wall motion abnormalities of the left ventricle.  There was impaired relaxation noted.  She complained of chest pain during the examination.  There was some ST segment depression.  Cardiac catheterization was performed on the same day.  No intervention was required.  · Follow-up echocardiogram on 4/15/2021 showed a left ventricular ejection fraction of 61 to 65% with normal LV cavity size.  Left ventricular wall thickness showed moderate concentric hypertrophy.  Diastolic function was impaired.  No pericardial effusion.  Small left pleural effusion.  · She had follow-up PYP imaging for cardiac amyloidosis that was not suggestive of ATTR amyloidosis  · Laboratory values on 2/24/2022 showed a high serum free light chain lambda of 121, normal kappa of 10.5, low ratio at 0.09.  Serum protein electrophoresis showed no evidence of an M spike.  Urine light chains were abnormal with an elevated lambda light chain of 33 and a low ratio of 0.48 with a 12.7 mg M spike on 24-hour urine protein electrophoresis.  · BNP was elevated at 2306 on 3/4/2022.  The troponin was normal at 0.03.  CK was 212 with a CK-MB of 10.87.  · Initial consult on 3/30/22. No M spike on serum protein electrophoresis. SIFE 'presence of monoclonal protein is unclear.'   · Bone marrow biopsy 4/13/22 normocellular, 12.1% plasma cells consistent with low volume plasma cell dyscrasia. FISH with low level t(11;14) fusion only. No amyloid noted as Congo red staining negative.   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 Buenrostro at [place]. Intended to enroll  on a clinical study but her troponin as tested by the study sponsor was not high enough  · Therapy with michael-CyBorD initiated 8/16/2022  · Patient seen in the office on 8/17/2022 for triage visit.  Patient with complaints of fatigue, dizziness and diarrhea.  She was mildly hypotensive.  She was given IV fluids.  · Subsequent admission at [facility] with shortness of breath and volume overload.  She was diuresed and treated with antibiotics.  · D8 therapy administered on 8/30  · Light chains normal at [place] on 8/31/22 (lambda light chain 1.3, down from 12.44)  · 9/27/2022: Cycle 2-day 8 of therapy    *Diarrhea  · She saw Dr. Hoyos with GI at [place] on 9/7.  · Suspicion for amyloid involvement of the GI tract  · Continue Lomotil and Imodium. Rifaximin prescribed by Dr. Hoyos.  She has completed this.  · Diarrhea waxes and wanes with what she eats and dairy products particularly worsen diarrhea    *Elevated liver enzymes  · 8/17/2022: AST 87, , bilirubin normal at 0.4  · Normalized.  Pending today.    *Cardiomyopathy:  · Most recent echocardiogram on 8/17/2022 showed left ventricular wall thickness consistent with moderate to severe concentric hypertrophy along with left ventricular septal wall measuring 2.2 cm and posterior wall thickness at 1.9 cm likely due to amyloidosis.  LVEF 61 to 65%.  Grade 1 impaired relaxation.  · Now followed by Dr. Lyles at [facility] with concerns for pre-existing HCM and AL cardiac amyloid.   · Cardiac MRI results above.  Amyloidosis evident.  · Catheterization planned at [place] on 10/11.    *Sore tongue: If she wants Magic mouthwash she will let us know.  She did not complain of this today.    *She does have some left neck pain without injury.    PLAN:    1. Proceed with cycle 2 day 8 therapy with michael-CyBorD today.  IV Cytoxan as she does not like taking pills.  She is tolerating oral dexamethasone.  Adjust the regimen accordingly related to adverse  effects.  2. Spironolactone per Dr. Lyles at [facility].  Management of orthostasis per [place] cardiology as well.  3. No more IV fluid infusions and she will keep up with excess fluid losses with oral intake.  4. Follow-up with cardiology and gastroenterology at [place].  She is also going to see an esophageal motility specialist there as well.  Swallowing is better at this point.  5. Cardiac catheterization and cardiac biopsy at [place] on 10/11.  6. If possible, hopeful for autologous stem cell transplant at [place] after induction therapy following 6 cycles.  Depending on response after 3 cycles consideration of stem cell collection at that point.  To be determined.  7. Continue acyclovir and Bactrim for prophylaxis  8. We will try to treat through peripheral IVs as long as possible.  9. Massage and heating pads for the left neck pain  10. She has follow-up scheduled for cycle 2-day 15 next week.  We will skip treatment in 2 weeks as she will be at [place] for her cardiac catheterization.  I will have her see a nurse practitioner in 3 weeks then for cycle 2-day 22 (1 week delayed).  I will see her back in 4 weeks for cycle 3-day 1.    High risk therapy requiring intensive monitoring    Sissy-CyBorD regimen     Daratumumab and hyaluronidase (Darzalex Faspro) as follows:  Cycles 1 & 2: 1800 mg SC once per day on days 1, 8, 15, 22  Cycles 3 to 6: 1800 mg SC once per day on days 1 & 15  Cycles 7 up to 24: 1800 mg SC once on day 1     Bortezomib (Velcade) as follows:  Cycles 1 to 6: 1.3 mg/m2 SC once per day on days 1, 8, 15, 22     Cyclophosphamide (Cytoxan) as follows:  Cycles 1 to 6: 300 mg/m2 (maximum dose of 500 mg) PO or IV once per day on days 1, 8, 15, 22    Plan IV Cytoxan as she has difficulty swallowing pills.     Dexamethasone (Decadron) as follows:  Cycles 1 to 6: 40 mg PO or IV once per day on days 1, 8, 15, 22 (see note)  28-day cycle for up to 24 cycles

## 2022-09-27 ENCOUNTER — OFFICE VISIT (OUTPATIENT)
Dept: ONCOLOGY | Facility: CLINIC | Age: 67
End: 2022-09-27

## 2022-09-27 ENCOUNTER — INFUSION (OUTPATIENT)
Dept: ONCOLOGY | Facility: HOSPITAL | Age: 67
End: 2022-09-27

## 2022-09-27 VITALS
TEMPERATURE: 98 F | WEIGHT: 121.6 LBS | HEIGHT: 61 IN | DIASTOLIC BLOOD PRESSURE: 71 MMHG | BODY MASS INDEX: 22.96 KG/M2 | OXYGEN SATURATION: 99 % | RESPIRATION RATE: 18 BRPM | SYSTOLIC BLOOD PRESSURE: 107 MMHG | HEART RATE: 69 BPM

## 2022-09-27 DIAGNOSIS — E85.81 LIGHT CHAIN (AL) AMYLOIDOSIS: Primary | ICD-10-CM

## 2022-09-27 DIAGNOSIS — E85.81 LIGHT CHAIN (AL) AMYLOIDOSIS: ICD-10-CM

## 2022-09-27 LAB
ALBUMIN SERPL-MCNC: 4.3 G/DL (ref 3.5–5.2)
ALBUMIN/GLOB SERPL: 2.3 G/DL (ref 1.1–2.4)
ALP SERPL-CCNC: 63 U/L (ref 38–116)
ALT SERPL W P-5'-P-CCNC: 48 U/L (ref 0–33)
ANION GAP SERPL CALCULATED.3IONS-SCNC: 12.1 MMOL/L (ref 5–15)
AST SERPL-CCNC: 36 U/L (ref 0–32)
BASOPHILS # BLD AUTO: 0.02 10*3/MM3 (ref 0–0.2)
BASOPHILS NFR BLD AUTO: 0.3 % (ref 0–1.5)
BILIRUB SERPL-MCNC: 0.5 MG/DL (ref 0.2–1.2)
BUN SERPL-MCNC: 14 MG/DL (ref 6–20)
BUN/CREAT SERPL: 16.3 (ref 7.3–30)
CALCIUM SPEC-SCNC: 9.7 MG/DL (ref 8.5–10.2)
CHLORIDE SERPL-SCNC: 102 MMOL/L (ref 98–107)
CO2 SERPL-SCNC: 23.9 MMOL/L (ref 22–29)
CREAT SERPL-MCNC: 0.86 MG/DL (ref 0.6–1.1)
DEPRECATED RDW RBC AUTO: 49.5 FL (ref 37–54)
EGFRCR SERPLBLD CKD-EPI 2021: 74.2 ML/MIN/1.73
EOSINOPHIL # BLD AUTO: 0.15 10*3/MM3 (ref 0–0.4)
EOSINOPHIL NFR BLD AUTO: 2.5 % (ref 0.3–6.2)
ERYTHROCYTE [DISTWIDTH] IN BLOOD BY AUTOMATED COUNT: 14.6 % (ref 12.3–15.4)
GLOBULIN UR ELPH-MCNC: 1.9 GM/DL (ref 1.8–3.5)
GLUCOSE SERPL-MCNC: 96 MG/DL (ref 74–124)
HCT VFR BLD AUTO: 38.9 % (ref 34–46.6)
HGB BLD-MCNC: 12.9 G/DL (ref 12–15.9)
IMM GRANULOCYTES # BLD AUTO: 0.03 10*3/MM3 (ref 0–0.05)
IMM GRANULOCYTES NFR BLD AUTO: 0.5 % (ref 0–0.5)
LYMPHOCYTES # BLD AUTO: 1.55 10*3/MM3 (ref 0.7–3.1)
LYMPHOCYTES NFR BLD AUTO: 26 % (ref 19.6–45.3)
MCH RBC QN AUTO: 31.1 PG (ref 26.6–33)
MCHC RBC AUTO-ENTMCNC: 33.2 G/DL (ref 31.5–35.7)
MCV RBC AUTO: 93.7 FL (ref 79–97)
MONOCYTES # BLD AUTO: 0.39 10*3/MM3 (ref 0.1–0.9)
MONOCYTES NFR BLD AUTO: 6.5 % (ref 5–12)
NEUTROPHILS NFR BLD AUTO: 3.82 10*3/MM3 (ref 1.7–7)
NEUTROPHILS NFR BLD AUTO: 64.2 % (ref 42.7–76)
NRBC BLD AUTO-RTO: 0 /100 WBC (ref 0–0.2)
PLATELET # BLD AUTO: 171 10*3/MM3 (ref 140–450)
PMV BLD AUTO: 11.1 FL (ref 6–12)
POTASSIUM SERPL-SCNC: 4.5 MMOL/L (ref 3.5–4.7)
PROT SERPL-MCNC: 6.2 G/DL (ref 6.3–8)
RBC # BLD AUTO: 4.15 10*6/MM3 (ref 3.77–5.28)
SODIUM SERPL-SCNC: 138 MMOL/L (ref 134–145)
WBC NRBC COR # BLD: 5.96 10*3/MM3 (ref 3.4–10.8)

## 2022-09-27 PROCEDURE — 99214 OFFICE O/P EST MOD 30 MIN: CPT | Performed by: INTERNAL MEDICINE

## 2022-09-27 PROCEDURE — 96375 TX/PRO/DX INJ NEW DRUG ADDON: CPT

## 2022-09-27 PROCEDURE — 25010000002 CYCLOPHOSPHAMIDE 1 GM/5ML SOLUTION 5 ML VIAL: Performed by: INTERNAL MEDICINE

## 2022-09-27 PROCEDURE — 63710000001 DEXAMETHASONE PER 0.25 MG: Performed by: INTERNAL MEDICINE

## 2022-09-27 PROCEDURE — 96401 CHEMO ANTI-NEOPL SQ/IM: CPT

## 2022-09-27 PROCEDURE — 25010000002 PALONOSETRON PER 25 MCG: Performed by: INTERNAL MEDICINE

## 2022-09-27 PROCEDURE — 25010000002 BORTEZOMIB PER 0.1 MG: Performed by: INTERNAL MEDICINE

## 2022-09-27 PROCEDURE — 25010000002 DARATUMUMAB-HYALURONIDASE-FIHJ 1800-30000 MG-UT/15ML SOLUTION: Performed by: INTERNAL MEDICINE

## 2022-09-27 PROCEDURE — 36415 COLL VENOUS BLD VENIPUNCTURE: CPT | Performed by: INTERNAL MEDICINE

## 2022-09-27 PROCEDURE — 85025 COMPLETE CBC W/AUTO DIFF WBC: CPT

## 2022-09-27 PROCEDURE — 80053 COMPREHEN METABOLIC PANEL: CPT | Performed by: INTERNAL MEDICINE

## 2022-09-27 PROCEDURE — 96413 CHEMO IV INFUSION 1 HR: CPT

## 2022-09-27 RX ORDER — SODIUM CHLORIDE 9 MG/ML
250 INJECTION, SOLUTION INTRAVENOUS ONCE
Status: CANCELLED | OUTPATIENT
Start: 2022-10-04

## 2022-09-27 RX ORDER — DEXAMETHASONE 4 MG/1
40 TABLET ORAL ONCE
Status: COMPLETED | OUTPATIENT
Start: 2022-09-27 | End: 2022-09-27

## 2022-09-27 RX ORDER — BORTEZOMIB 3.5 MG/1
1.3 INJECTION, POWDER, LYOPHILIZED, FOR SOLUTION INTRAVENOUS; SUBCUTANEOUS ONCE
Status: CANCELLED | OUTPATIENT
Start: 2022-09-27

## 2022-09-27 RX ORDER — DIPHENHYDRAMINE HYDROCHLORIDE 50 MG/ML
50 INJECTION INTRAMUSCULAR; INTRAVENOUS AS NEEDED
Status: CANCELLED | OUTPATIENT
Start: 2022-10-18

## 2022-09-27 RX ORDER — PALONOSETRON 0.05 MG/ML
0.25 INJECTION, SOLUTION INTRAVENOUS ONCE
Status: CANCELLED | OUTPATIENT
Start: 2022-10-04

## 2022-09-27 RX ORDER — PALONOSETRON 0.05 MG/ML
0.25 INJECTION, SOLUTION INTRAVENOUS ONCE
Status: COMPLETED | OUTPATIENT
Start: 2022-09-27 | End: 2022-09-27

## 2022-09-27 RX ORDER — MEPERIDINE HYDROCHLORIDE 25 MG/ML
25 INJECTION INTRAMUSCULAR; INTRAVENOUS; SUBCUTANEOUS
Status: CANCELLED | OUTPATIENT
Start: 2022-10-18

## 2022-09-27 RX ORDER — HYDROXYZINE PAMOATE 25 MG/1
25 CAPSULE ORAL ONCE
Status: COMPLETED | OUTPATIENT
Start: 2022-09-27 | End: 2022-09-27

## 2022-09-27 RX ORDER — SODIUM CHLORIDE 9 MG/ML
250 INJECTION, SOLUTION INTRAVENOUS ONCE
Status: CANCELLED | OUTPATIENT
Start: 2022-10-18

## 2022-09-27 RX ORDER — BORTEZOMIB 3.5 MG/1
1.3 INJECTION, POWDER, LYOPHILIZED, FOR SOLUTION INTRAVENOUS; SUBCUTANEOUS ONCE
Status: CANCELLED | OUTPATIENT
Start: 2022-10-18

## 2022-09-27 RX ORDER — ACETAMINOPHEN 500 MG
1000 TABLET ORAL ONCE
Status: COMPLETED | OUTPATIENT
Start: 2022-09-27 | End: 2022-09-27

## 2022-09-27 RX ORDER — PALONOSETRON 0.05 MG/ML
0.25 INJECTION, SOLUTION INTRAVENOUS ONCE
Status: CANCELLED | OUTPATIENT
Start: 2022-10-18

## 2022-09-27 RX ORDER — ACETAMINOPHEN 500 MG
1000 TABLET ORAL ONCE
Status: CANCELLED | OUTPATIENT
Start: 2022-10-04

## 2022-09-27 RX ORDER — MEPERIDINE HYDROCHLORIDE 25 MG/ML
25 INJECTION INTRAMUSCULAR; INTRAVENOUS; SUBCUTANEOUS
Status: CANCELLED | OUTPATIENT
Start: 2022-10-04

## 2022-09-27 RX ORDER — ACETAMINOPHEN 500 MG
1000 TABLET ORAL ONCE
Status: CANCELLED | OUTPATIENT
Start: 2022-09-27

## 2022-09-27 RX ORDER — SODIUM CHLORIDE 9 MG/ML
250 INJECTION, SOLUTION INTRAVENOUS ONCE
Status: CANCELLED | OUTPATIENT
Start: 2022-09-27

## 2022-09-27 RX ORDER — DEXAMETHASONE 4 MG/1
40 TABLET ORAL ONCE
Status: CANCELLED
Start: 2022-10-04 | End: 2022-10-04

## 2022-09-27 RX ORDER — DEXAMETHASONE 4 MG/1
40 TABLET ORAL ONCE
Status: CANCELLED
Start: 2022-09-27 | End: 2022-09-27

## 2022-09-27 RX ORDER — PALONOSETRON 0.05 MG/ML
0.25 INJECTION, SOLUTION INTRAVENOUS ONCE
Status: CANCELLED | OUTPATIENT
Start: 2022-09-27

## 2022-09-27 RX ORDER — FAMOTIDINE 10 MG/ML
20 INJECTION, SOLUTION INTRAVENOUS AS NEEDED
Status: CANCELLED | OUTPATIENT
Start: 2022-10-18

## 2022-09-27 RX ORDER — ACETAMINOPHEN 500 MG
1000 TABLET ORAL ONCE
Status: CANCELLED | OUTPATIENT
Start: 2022-10-18

## 2022-09-27 RX ORDER — FAMOTIDINE 10 MG/ML
20 INJECTION, SOLUTION INTRAVENOUS AS NEEDED
Status: CANCELLED | OUTPATIENT
Start: 2022-10-04

## 2022-09-27 RX ORDER — DEXAMETHASONE 4 MG/1
40 TABLET ORAL ONCE
Status: CANCELLED
Start: 2022-10-18 | End: 2022-10-18

## 2022-09-27 RX ORDER — MEPERIDINE HYDROCHLORIDE 25 MG/ML
25 INJECTION INTRAMUSCULAR; INTRAVENOUS; SUBCUTANEOUS
Status: CANCELLED | OUTPATIENT
Start: 2022-09-27

## 2022-09-27 RX ORDER — BORTEZOMIB 3.5 MG/1
1.3 INJECTION, POWDER, LYOPHILIZED, FOR SOLUTION INTRAVENOUS; SUBCUTANEOUS ONCE
Status: COMPLETED | OUTPATIENT
Start: 2022-09-27 | End: 2022-09-27

## 2022-09-27 RX ORDER — DIPHENHYDRAMINE HYDROCHLORIDE 50 MG/ML
50 INJECTION INTRAMUSCULAR; INTRAVENOUS AS NEEDED
Status: CANCELLED | OUTPATIENT
Start: 2022-09-27

## 2022-09-27 RX ORDER — DIPHENHYDRAMINE HYDROCHLORIDE 50 MG/ML
50 INJECTION INTRAMUSCULAR; INTRAVENOUS AS NEEDED
Status: CANCELLED | OUTPATIENT
Start: 2022-10-04

## 2022-09-27 RX ORDER — BORTEZOMIB 3.5 MG/1
1.3 INJECTION, POWDER, LYOPHILIZED, FOR SOLUTION INTRAVENOUS; SUBCUTANEOUS ONCE
Status: CANCELLED | OUTPATIENT
Start: 2022-10-04

## 2022-09-27 RX ORDER — HYDROXYZINE PAMOATE 25 MG/1
25 CAPSULE ORAL ONCE
Status: CANCELLED
Start: 2022-10-04 | End: 2022-10-04

## 2022-09-27 RX ORDER — HYDROXYZINE PAMOATE 25 MG/1
25 CAPSULE ORAL ONCE
Status: CANCELLED
Start: 2022-09-27 | End: 2022-09-27

## 2022-09-27 RX ORDER — FAMOTIDINE 10 MG/ML
20 INJECTION, SOLUTION INTRAVENOUS AS NEEDED
Status: CANCELLED | OUTPATIENT
Start: 2022-09-27

## 2022-09-27 RX ORDER — HYDROXYZINE PAMOATE 25 MG/1
25 CAPSULE ORAL ONCE
Status: CANCELLED
Start: 2022-10-18 | End: 2022-10-18

## 2022-09-27 RX ORDER — SODIUM CHLORIDE 9 MG/ML
250 INJECTION, SOLUTION INTRAVENOUS ONCE
Status: COMPLETED | OUTPATIENT
Start: 2022-09-27 | End: 2022-09-27

## 2022-09-27 RX ADMIN — CYCLOPHOSPHAMIDE 450 MG: 200 INJECTION, SOLUTION INTRAVENOUS at 11:29

## 2022-09-27 RX ADMIN — HYDROXYZINE PAMOATE 25 MG: 25 CAPSULE ORAL at 11:00

## 2022-09-27 RX ADMIN — PALONOSETRON 0.25 MG: 0.05 INJECTION, SOLUTION INTRAVENOUS at 11:00

## 2022-09-27 RX ADMIN — DEXAMETHASONE 40 MG: 4 TABLET ORAL at 11:00

## 2022-09-27 RX ADMIN — BORTEZOMIB 2 MG: 3.5 INJECTION, POWDER, LYOPHILIZED, FOR SOLUTION INTRAVENOUS; SUBCUTANEOUS at 12:06

## 2022-09-27 RX ADMIN — SODIUM CHLORIDE 250 ML: 9 INJECTION, SOLUTION INTRAVENOUS at 11:29

## 2022-09-27 RX ADMIN — ACETAMINOPHEN 1000 MG: 500 TABLET, FILM COATED ORAL at 11:00

## 2022-09-27 RX ADMIN — DARATUMUMAB AND HYALURONIDASE-FIHJ (HUMAN RECOMBINANT) 1800 MG: 1800; 30000 INJECTION SUBCUTANEOUS at 12:06

## 2022-10-04 ENCOUNTER — LAB (OUTPATIENT)
Dept: LAB | Facility: HOSPITAL | Age: 67
End: 2022-10-04

## 2022-10-04 ENCOUNTER — INFUSION (OUTPATIENT)
Dept: ONCOLOGY | Facility: HOSPITAL | Age: 67
End: 2022-10-04

## 2022-10-04 ENCOUNTER — OFFICE VISIT (OUTPATIENT)
Dept: ONCOLOGY | Facility: CLINIC | Age: 67
End: 2022-10-04

## 2022-10-04 ENCOUNTER — TELEMEDICINE - AUDIO (OUTPATIENT)
Dept: NUTRITION | Facility: HOSPITAL | Age: 67
End: 2022-10-04

## 2022-10-04 VITALS
TEMPERATURE: 97.7 F | DIASTOLIC BLOOD PRESSURE: 63 MMHG | RESPIRATION RATE: 18 BRPM | HEART RATE: 75 BPM | OXYGEN SATURATION: 96 % | SYSTOLIC BLOOD PRESSURE: 101 MMHG | BODY MASS INDEX: 22.88 KG/M2 | WEIGHT: 121.2 LBS | HEIGHT: 61 IN

## 2022-10-04 DIAGNOSIS — Z79.899 HIGH RISK MEDICATION USE: ICD-10-CM

## 2022-10-04 DIAGNOSIS — E85.81 LIGHT CHAIN (AL) AMYLOIDOSIS: Primary | ICD-10-CM

## 2022-10-04 DIAGNOSIS — E85.81 LIGHT CHAIN (AL) AMYLOIDOSIS: ICD-10-CM

## 2022-10-04 LAB
ALBUMIN SERPL-MCNC: 4.1 G/DL (ref 3.5–5.2)
ALBUMIN/GLOB SERPL: 2 G/DL (ref 1.1–2.4)
ALP SERPL-CCNC: 66 U/L (ref 38–116)
ALT SERPL W P-5'-P-CCNC: 39 U/L (ref 0–33)
ANION GAP SERPL CALCULATED.3IONS-SCNC: 9.5 MMOL/L (ref 5–15)
AST SERPL-CCNC: 36 U/L (ref 0–32)
BASOPHILS # BLD AUTO: 0.02 10*3/MM3 (ref 0–0.2)
BASOPHILS NFR BLD AUTO: 0.4 % (ref 0–1.5)
BILIRUB SERPL-MCNC: 0.7 MG/DL (ref 0.2–1.2)
BUN SERPL-MCNC: 16 MG/DL (ref 6–20)
BUN/CREAT SERPL: 19.5 (ref 7.3–30)
CALCIUM SPEC-SCNC: 9.5 MG/DL (ref 8.5–10.2)
CHLORIDE SERPL-SCNC: 104 MMOL/L (ref 98–107)
CO2 SERPL-SCNC: 23.5 MMOL/L (ref 22–29)
CREAT SERPL-MCNC: 0.82 MG/DL (ref 0.6–1.1)
DEPRECATED RDW RBC AUTO: 53.7 FL (ref 37–54)
EGFRCR SERPLBLD CKD-EPI 2021: 78.5 ML/MIN/1.73
EOSINOPHIL # BLD AUTO: 0.15 10*3/MM3 (ref 0–0.4)
EOSINOPHIL NFR BLD AUTO: 3.1 % (ref 0.3–6.2)
ERYTHROCYTE [DISTWIDTH] IN BLOOD BY AUTOMATED COUNT: 15.6 % (ref 12.3–15.4)
GLOBULIN UR ELPH-MCNC: 2.1 GM/DL (ref 1.8–3.5)
GLUCOSE BLDC-MCNC: 87 MG/DL (ref 74–124)
GLUCOSE SERPL-MCNC: 45 MG/DL (ref 74–124)
HCT VFR BLD AUTO: 40.9 % (ref 34–46.6)
HGB BLD-MCNC: 13.1 G/DL (ref 12–15.9)
IMM GRANULOCYTES # BLD AUTO: 0.02 10*3/MM3 (ref 0–0.05)
IMM GRANULOCYTES NFR BLD AUTO: 0.4 % (ref 0–0.5)
LYMPHOCYTES # BLD AUTO: 1.45 10*3/MM3 (ref 0.7–3.1)
LYMPHOCYTES NFR BLD AUTO: 30 % (ref 19.6–45.3)
Lab: NORMAL
MCH RBC QN AUTO: 30.5 PG (ref 26.6–33)
MCHC RBC AUTO-ENTMCNC: 32 G/DL (ref 31.5–35.7)
MCV RBC AUTO: 95.3 FL (ref 79–97)
MONOCYTES # BLD AUTO: 0.48 10*3/MM3 (ref 0.1–0.9)
MONOCYTES NFR BLD AUTO: 9.9 % (ref 5–12)
NEUTROPHILS NFR BLD AUTO: 2.71 10*3/MM3 (ref 1.7–7)
NEUTROPHILS NFR BLD AUTO: 56.2 % (ref 42.7–76)
NRBC BLD AUTO-RTO: 0 /100 WBC (ref 0–0.2)
PLATELET # BLD AUTO: 199 10*3/MM3 (ref 140–450)
PMV BLD AUTO: 11.2 FL (ref 6–12)
POTASSIUM SERPL-SCNC: 4.9 MMOL/L (ref 3.5–4.7)
PROT SERPL-MCNC: 6.2 G/DL (ref 6.3–8)
RBC # BLD AUTO: 4.29 10*6/MM3 (ref 3.77–5.28)
SODIUM SERPL-SCNC: 137 MMOL/L (ref 134–145)
WBC NRBC COR # BLD: 4.83 10*3/MM3 (ref 3.4–10.8)

## 2022-10-04 PROCEDURE — 25010000002 DARATUMUMAB-HYALURONIDASE-FIHJ 1800-30000 MG-UT/15ML SOLUTION: Performed by: INTERNAL MEDICINE

## 2022-10-04 PROCEDURE — 85025 COMPLETE CBC W/AUTO DIFF WBC: CPT

## 2022-10-04 PROCEDURE — 82962 GLUCOSE BLOOD TEST: CPT

## 2022-10-04 PROCEDURE — 96375 TX/PRO/DX INJ NEW DRUG ADDON: CPT

## 2022-10-04 PROCEDURE — 99214 OFFICE O/P EST MOD 30 MIN: CPT | Performed by: NURSE PRACTITIONER

## 2022-10-04 PROCEDURE — 25010000002 BORTEZOMIB PER 0.1 MG: Performed by: INTERNAL MEDICINE

## 2022-10-04 PROCEDURE — 80053 COMPREHEN METABOLIC PANEL: CPT

## 2022-10-04 PROCEDURE — 96401 CHEMO ANTI-NEOPL SQ/IM: CPT

## 2022-10-04 PROCEDURE — 25010000002 PALONOSETRON PER 25 MCG: Performed by: INTERNAL MEDICINE

## 2022-10-04 PROCEDURE — 25010000002 CYCLOPHOSPHAMIDE 1 GM/5ML SOLUTION 5 ML VIAL: Performed by: INTERNAL MEDICINE

## 2022-10-04 PROCEDURE — 63710000001 DEXAMETHASONE PER 0.25 MG: Performed by: INTERNAL MEDICINE

## 2022-10-04 PROCEDURE — 96413 CHEMO IV INFUSION 1 HR: CPT

## 2022-10-04 RX ORDER — SODIUM CHLORIDE 9 MG/ML
250 INJECTION, SOLUTION INTRAVENOUS ONCE
Status: COMPLETED | OUTPATIENT
Start: 2022-10-04 | End: 2022-10-04

## 2022-10-04 RX ORDER — ACETAMINOPHEN 500 MG
1000 TABLET ORAL ONCE
Status: COMPLETED | OUTPATIENT
Start: 2022-10-04 | End: 2022-10-04

## 2022-10-04 RX ORDER — HYDROXYZINE PAMOATE 25 MG/1
25 CAPSULE ORAL ONCE
Status: COMPLETED | OUTPATIENT
Start: 2022-10-04 | End: 2022-10-04

## 2022-10-04 RX ORDER — PALONOSETRON 0.05 MG/ML
0.25 INJECTION, SOLUTION INTRAVENOUS ONCE
Status: COMPLETED | OUTPATIENT
Start: 2022-10-04 | End: 2022-10-04

## 2022-10-04 RX ORDER — ROSUVASTATIN CALCIUM 20 MG/1
20 TABLET, COATED ORAL DAILY
Qty: 90 TABLET | Refills: 0 | Status: SHIPPED | OUTPATIENT
Start: 2022-10-04 | End: 2023-01-09

## 2022-10-04 RX ORDER — BORTEZOMIB 3.5 MG/1
1.3 INJECTION, POWDER, LYOPHILIZED, FOR SOLUTION INTRAVENOUS; SUBCUTANEOUS ONCE
Status: COMPLETED | OUTPATIENT
Start: 2022-10-04 | End: 2022-10-04

## 2022-10-04 RX ORDER — DEXAMETHASONE 4 MG/1
40 TABLET ORAL ONCE
Status: COMPLETED | OUTPATIENT
Start: 2022-10-04 | End: 2022-10-04

## 2022-10-04 RX ADMIN — CYCLOPHOSPHAMIDE 450 MG: 200 INJECTION, SOLUTION INTRAVENOUS at 12:53

## 2022-10-04 RX ADMIN — PALONOSETRON 0.25 MG: 0.05 INJECTION, SOLUTION INTRAVENOUS at 12:23

## 2022-10-04 RX ADMIN — BORTEZOMIB 2 MG: 3.5 INJECTION, POWDER, LYOPHILIZED, FOR SOLUTION INTRAVENOUS; SUBCUTANEOUS at 13:24

## 2022-10-04 RX ADMIN — DEXAMETHASONE 40 MG: 4 TABLET ORAL at 12:25

## 2022-10-04 RX ADMIN — SODIUM CHLORIDE 250 ML: 9 INJECTION, SOLUTION INTRAVENOUS at 12:23

## 2022-10-04 RX ADMIN — HYDROXYZINE PAMOATE 25 MG: 25 CAPSULE ORAL at 12:24

## 2022-10-04 RX ADMIN — ACETAMINOPHEN 1000 MG: 500 TABLET, FILM COATED ORAL at 12:24

## 2022-10-04 RX ADMIN — DARATUMUMAB AND HYALURONIDASE-FIHJ (HUMAN RECOMBINANT) 1800 MG: 1800; 30000 INJECTION SUBCUTANEOUS at 13:24

## 2022-10-04 NOTE — TELEPHONE ENCOUNTER
Rx Refill Note  Requested Prescriptions     Pending Prescriptions Disp Refills   • rosuvastatin (CRESTOR) 20 MG tablet [Pharmacy Med Name: ROSUVASTATIN 20MG TABLETS] 90 tablet 0     Sig: TAKE 1 TABLET BY MOUTH DAILY      Last office visit with prescribing clinician: 8/24/2022      Next office visit with prescribing clinician: 3/3/2023            Yarely Espino MA  10/04/22, 08:33 EDT

## 2022-10-04 NOTE — PROGRESS NOTES
"[facility] GROUP OUTPATIENT FOLLOW UP CLINIC VISIT    REASON FOR FOLLOW-UP:    AL amyloidosis  Therapy with michael-CyBorD initiated 8/16/2022    HISTORY OF PRESENT ILLNESS: Cynthia Flower is a 67 y.o. female with the above-mentioned history returning to the office today for follow-up.    Cycle 2-day 15 therapy today    She reports she is trying to keep up with her fluid intake.  She is looking forward to her appointment [place] as the ongoing dizziness is bothersome to her.  She will see them next week.  She continues having some stiffness in her neck and has taken Tylenol intermittently.  She has not tried any warm compresses or heating pads we discussed this today as well as routine stretching.  She denies any numbness and tingling or radiating pain.  She denies any increased headaches.  She is had no recent fevers, chills, or nausea.  Her urine fluctuates between darker and lighter colors which is an improvement upon last week she reports.    REVIEW OF SYSTEMS:  As per the HPI    PHYSICAL EXAMINATION:    Vitals:    10/04/22 1141   BP: 101/63   Pulse: 75   Resp: 18   Temp: 97.7 °F (36.5 °C)   TempSrc: Temporal   SpO2: 96%   Weight: 55 kg (121 lb 3.2 oz)   Height: 154.9 cm (60.98\")   PainSc:   6   PainLoc: Neck      General:  No acute distress, awake, alert and oriented  Eyes: EOMI  Skin:  Warm and dry, no visible rash  Neck: She is tender at the upper sternocleidomastoid area on the left just at the skull base  HEENT:  Normocephalic/atraumatic.  Wearing a face mask.  Chest:  Normal respiratory effort.  Lungs clear to auscultation bilaterally.  Heart: Regular rate and rhythm with grade 2 out of 6 early systolic murmur  Extremities:  No visible clubbing, cyanosis, or edema  Neuro/psych:  Grossly nonfocal.  Normal mood and affect.    DIAGNOSTIC DATA:  Glucose, Fingerstick (10/04/2022 13:21)  Comprehensive Metabolic Panel (10/04/2022 11:00)  CBC and Differential (10/04/2022 10:59)        IMAGING:  "   Cardiac MRI at [place] 9/9/2022  1.  Small left ventricular chamber size and normal systolic function (LVEF 64%). Moderate concentric hypertrophy.   2.  Small right ventricular chamber size and normal systolic function (RVEF 53%).   3.  Systolic anterior motion of the subvalvular mitral apparatus with mild-moderate MR.   4.  Mild TR.   5.  There is diffuse subendocardial LGE with T1/ECV elevation in a pattern consistent with cardiac amyloidosis.   6.  No prior study for comparison.       ASSESSMENT:  This is a 67 y.o. female with:    *AL amyloidosis  · She had a stress echocardiogram on 4/24/2020 showing a normal left ventricular ejection fraction of 60% with moderate concentric hypertrophy but no wall motion abnormalities of the left ventricle.  There was impaired relaxation noted.  She complained of chest pain during the examination.  There was some ST segment depression.  Cardiac catheterization was performed on the same day.  No intervention was required.  · Follow-up echocardiogram on 4/15/2021 showed a left ventricular ejection fraction of 61 to 65% with normal LV cavity size.  Left ventricular wall thickness showed moderate concentric hypertrophy.  Diastolic function was impaired.  No pericardial effusion.  Small left pleural effusion.  · She had follow-up PYP imaging for cardiac amyloidosis that was not suggestive of ATTR amyloidosis  · Laboratory values on 2/24/2022 showed a high serum free light chain lambda of 121, normal kappa of 10.5, low ratio at 0.09.  Serum protein electrophoresis showed no evidence of an M spike.  Urine light chains were abnormal with an elevated lambda light chain of 33 and a low ratio of 0.48 with a 12.7 mg M spike on 24-hour urine protein electrophoresis.  · BNP was elevated at 2306 on 3/4/2022.  The troponin was normal at 0.03.  CK was 212 with a CK-MB of 10.87.  · Initial consult on 3/30/22. No M spike on serum protein electrophoresis. SIFE 'presence of monoclonal protein  is unclear.'   · Bone marrow biopsy 4/13/22 normocellular, 12.1% plasma cells consistent with low volume plasma cell dyscrasia. FISH with low level t(11;14) fusion only. No amyloid noted as Congo red staining negative.   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 Buenrostro at [place]. Intended to enroll on a clinical study but her troponin as tested by the study sponsor was not high enough  · Therapy with michael-CyBorD initiated 8/16/2022  · Patient seen in the office on 8/17/2022 for triage visit.  Patient with complaints of fatigue, dizziness and diarrhea.  She was mildly hypotensive.  She was given IV fluids.  · Subsequent admission at [facility] with shortness of breath and volume overload.  She was diuresed and treated with antibiotics.  · D8 therapy administered on 8/30  · Light chains normal at [place] on 8/31/22 (lambda light chain 1.3, down from 12.44)  · 9/27/2022: Cycle 2-day 8 of therapy  · 10/4/2022 cycle 2-day 8 CyBorD.  Patient continues to push oral hydration.  She continues to have some neck stiffness but this has not worsened.  She will see [place] next week for further cardiac work-up.  Glucose was low upon initial labs today however the patient was given something to eat by nursing prior to rechecking.  This was therefore rechecked prior to her leaving the office and was 87.  Patient reports she was feeling fine.    *Diarrhea  · She saw Dr. Hoyos with GI at [place] on 9/7.  · Suspicion for amyloid involvement of the GI tract  · Continue Lomotil and Imodium. Rifaximin prescribed by Dr. Hoyos.  She has completed this.  · Diarrhea waxes and wanes with what she eats and dairy products particularly worsen diarrhea  · 10/4/2022 stable symptoms.    *Elevated liver enzymes  · 8/17/2022: AST 87, , bilirubin normal at 0.4  · 10/4/2022 ALT 39 and AST 36, previously 48 and 36.    *Cardiomyopathy:  · Most recent echocardiogram on 8/17/2022 showed  left ventricular wall thickness consistent with moderate to severe concentric hypertrophy along with left ventricular septal wall measuring 2.2 cm and posterior wall thickness at 1.9 cm likely due to amyloidosis.  LVEF 61 to 65%.  Grade 1 impaired relaxation.  · Now followed by Dr. Lyles at [facility] with concerns for pre-existing HCM and AL cardiac amyloid.   · Cardiac MRI results above.  Amyloidosis evident.  · Catheterization planned at [place] on 10/11.    *Sore tongue: If she wants Magic mouthwash she will let us know.  She did not complain of this today.    *She does have some left neck pain without injury.  This is stable.  Her movement is not limited and she has no radiating pain.  She will trial heating pad and gentle stretching.    PLAN:    1. Proceed with cycle 2 day 15 therapy with michael-CyBorD today.  IV Cytoxan as she does not like taking pills.  She is tolerating oral dexamethasone.  Adjust the regimen accordingly related to adverse effects.  2. Spironolactone per Dr. Lyles at [facility].  Management of orthostasis per [place] cardiology as well.  3. No more IV fluid infusions and she will keep up with excess fluid losses with oral intake.  4. Follow-up with cardiology and gastroenterology at [place].  She is also going to see an esophageal motility specialist there as well.  Swallowing is better at this point.  5. Cardiac catheterization and cardiac biopsy at [place] on 10/11.  6. If possible, hopeful for autologous stem cell transplant at [place] after induction therapy following 6 cycles.  Depending on response after 3 cycles consideration of stem cell collection at that point.  To be determined.  7. Continue acyclovir and Bactrim for prophylaxis  8. We will try to treat through peripheral IVs as long as possible.  9. Massage and heating pads for the left neck pain  10. Patient encouraged to eat routinely to prevent further hypoglycemic episodes though she was asymptomatic today.  11. We will skip  treatment next week as she will be at [employer] [facility] for her cardiac catheterization.  She will have her see a nurse practitioner in 2 weeks then for cycle 2-day 22 (1 week delayed).  Dr. Sal will see her back in 3 weeks for cycle 3-day 1.    High risk therapy requiring intensive monitoring    Sissy-CyBorD regimen     Daratumumab and hyaluronidase (Darzalex Faspro) as follows:  Cycles 1 & 2: 1800 mg SC once per day on days 1, 8, 15, 22  Cycles 3 to 6: 1800 mg SC once per day on days 1 & 15  Cycles 7 up to 24: 1800 mg SC once on day 1     Bortezomib (Velcade) as follows:  Cycles 1 to 6: 1.3 mg/m2 SC once per day on days 1, 8, 15, 22     Cyclophosphamide (Cytoxan) as follows:  Cycles 1 to 6: 300 mg/m2 (maximum dose of 500 mg) PO or IV once per day on days 1, 8, 15, 22    Plan IV Cytoxan as she has difficulty swallowing pills.     Dexamethasone (Decadron) as follows:  Cycles 1 to 6: 40 mg PO or IV once per day on days 1, 8, 15, 22 (see note)  28-day cycle for up to 24 cycles

## 2022-10-04 NOTE — PROGRESS NOTES
Outpatient Oncology Nutrition      Patient Name:  Cynthia Flower  YOB: 1955  MRN: [id number]    Assessment Date:  10/4/2022    Comments:  Provided several bottles of Enterade today for patient to use at home. She has several bottles left from last time. Encouraged patient to use at least one a day as she does feel it is helpful.         Electronically signed by:  Alvina Mcdonough RD, DARI  10/04/22 16:30 EDT

## 2022-10-06 RX ORDER — POTASSIUM CHLORIDE 1500 MG/1
TABLET, FILM COATED, EXTENDED RELEASE ORAL
Qty: 60 TABLET | Refills: 1 | Status: SHIPPED | OUTPATIENT
Start: 2022-10-06 | End: 2022-12-14

## 2022-10-07 ENCOUNTER — APPOINTMENT (OUTPATIENT)
Dept: INFUSION THERAPY | Facility: HOSPITAL | Age: 67
End: 2022-10-07

## 2022-10-17 DIAGNOSIS — E85.81 LIGHT CHAIN (AL) AMYLOIDOSIS: Primary | ICD-10-CM

## 2022-10-17 RX ORDER — BORTEZOMIB 3.5 MG/1
1 INJECTION, POWDER, LYOPHILIZED, FOR SOLUTION INTRAVENOUS; SUBCUTANEOUS ONCE
Status: CANCELLED | OUTPATIENT
Start: 2022-10-17

## 2022-10-17 NOTE — PROGRESS NOTES
Telephone call from Dr. Buenrostro at [place]. Due to persistent orthostasis, plan to decrease Velcade to 1.0 mg/m2. She also has some martin-orbital hematomas, and we will plan to check coags and a factor X level.     The dose has been decreased in the treatment plan, and the orders have been placed.

## 2022-10-18 ENCOUNTER — OFFICE VISIT (OUTPATIENT)
Dept: ONCOLOGY | Facility: CLINIC | Age: 67
End: 2022-10-18

## 2022-10-18 ENCOUNTER — INFUSION (OUTPATIENT)
Dept: ONCOLOGY | Facility: HOSPITAL | Age: 67
End: 2022-10-18

## 2022-10-18 ENCOUNTER — CLINICAL SUPPORT (OUTPATIENT)
Dept: OTHER | Facility: HOSPITAL | Age: 67
End: 2022-10-18

## 2022-10-18 VITALS
HEIGHT: 61 IN | DIASTOLIC BLOOD PRESSURE: 68 MMHG | BODY MASS INDEX: 23.24 KG/M2 | TEMPERATURE: 97.7 F | HEART RATE: 65 BPM | SYSTOLIC BLOOD PRESSURE: 111 MMHG | WEIGHT: 123.1 LBS | RESPIRATION RATE: 18 BRPM | OXYGEN SATURATION: 96 %

## 2022-10-18 DIAGNOSIS — Z79.899 HIGH RISK MEDICATION USE: Primary | ICD-10-CM

## 2022-10-18 DIAGNOSIS — E85.81 LIGHT CHAIN (AL) AMYLOIDOSIS: ICD-10-CM

## 2022-10-18 DIAGNOSIS — E85.81 LIGHT CHAIN (AL) AMYLOIDOSIS: Primary | ICD-10-CM

## 2022-10-18 LAB
ALBUMIN SERPL-MCNC: 4 G/DL (ref 3.5–5.2)
ALBUMIN/GLOB SERPL: 2.1 G/DL (ref 1.1–2.4)
ALP SERPL-CCNC: 69 U/L (ref 38–116)
ALT SERPL W P-5'-P-CCNC: 34 U/L (ref 0–33)
ANION GAP SERPL CALCULATED.3IONS-SCNC: 10.6 MMOL/L (ref 5–15)
APTT PPP: 32.1 SECONDS (ref 22.7–35.4)
AST SERPL-CCNC: 35 U/L (ref 0–32)
BASOPHILS # BLD AUTO: 0.02 10*3/MM3 (ref 0–0.2)
BASOPHILS NFR BLD AUTO: 0.4 % (ref 0–1.5)
BILIRUB SERPL-MCNC: 0.5 MG/DL (ref 0.2–1.2)
BUN SERPL-MCNC: 12 MG/DL (ref 6–20)
BUN/CREAT SERPL: 14.6 (ref 7.3–30)
CALCIUM SPEC-SCNC: 9.6 MG/DL (ref 8.5–10.2)
CHLORIDE SERPL-SCNC: 103 MMOL/L (ref 98–107)
CO2 SERPL-SCNC: 23.4 MMOL/L (ref 22–29)
CREAT SERPL-MCNC: 0.82 MG/DL (ref 0.6–1.1)
DEPRECATED RDW RBC AUTO: 55 FL (ref 37–54)
EGFRCR SERPLBLD CKD-EPI 2021: 78.5 ML/MIN/1.73
EOSINOPHIL # BLD AUTO: 0.21 10*3/MM3 (ref 0–0.4)
EOSINOPHIL NFR BLD AUTO: 4.1 % (ref 0.3–6.2)
ERYTHROCYTE [DISTWIDTH] IN BLOOD BY AUTOMATED COUNT: 15.8 % (ref 12.3–15.4)
FIBRINOGEN PPP-MCNC: 341 MG/DL (ref 219–464)
GLOBULIN UR ELPH-MCNC: 1.9 GM/DL (ref 1.8–3.5)
GLUCOSE SERPL-MCNC: 88 MG/DL (ref 74–124)
HCT VFR BLD AUTO: 35.9 % (ref 34–46.6)
HGB BLD-MCNC: 11.8 G/DL (ref 12–15.9)
IMM GRANULOCYTES # BLD AUTO: 0.02 10*3/MM3 (ref 0–0.05)
IMM GRANULOCYTES NFR BLD AUTO: 0.4 % (ref 0–0.5)
INR PPP: 0.98 (ref 0.9–1.1)
LYMPHOCYTES # BLD AUTO: 2.33 10*3/MM3 (ref 0.7–3.1)
LYMPHOCYTES NFR BLD AUTO: 45.7 % (ref 19.6–45.3)
MCH RBC QN AUTO: 31.4 PG (ref 26.6–33)
MCHC RBC AUTO-ENTMCNC: 32.9 G/DL (ref 31.5–35.7)
MCV RBC AUTO: 95.5 FL (ref 79–97)
MONOCYTES # BLD AUTO: 0.46 10*3/MM3 (ref 0.1–0.9)
MONOCYTES NFR BLD AUTO: 9 % (ref 5–12)
NEUTROPHILS NFR BLD AUTO: 2.06 10*3/MM3 (ref 1.7–7)
NEUTROPHILS NFR BLD AUTO: 40.4 % (ref 42.7–76)
NRBC BLD AUTO-RTO: 0 /100 WBC (ref 0–0.2)
PLATELET # BLD AUTO: 177 10*3/MM3 (ref 140–450)
PMV BLD AUTO: 10 FL (ref 6–12)
POTASSIUM SERPL-SCNC: 4.8 MMOL/L (ref 3.5–4.7)
PROT SERPL-MCNC: 5.9 G/DL (ref 6.3–8)
PROTHROMBIN TIME: 13.1 SECONDS (ref 11.7–14.2)
RBC # BLD AUTO: 3.76 10*6/MM3 (ref 3.77–5.28)
SODIUM SERPL-SCNC: 137 MMOL/L (ref 134–145)
WBC NRBC COR # BLD: 5.1 10*3/MM3 (ref 3.4–10.8)

## 2022-10-18 PROCEDURE — 25010000002 BORTEZOMIB PER 0.1 MG: Performed by: INTERNAL MEDICINE

## 2022-10-18 PROCEDURE — 85730 THROMBOPLASTIN TIME PARTIAL: CPT | Performed by: INTERNAL MEDICINE

## 2022-10-18 PROCEDURE — 80053 COMPREHEN METABOLIC PANEL: CPT

## 2022-10-18 PROCEDURE — 85610 PROTHROMBIN TIME: CPT | Performed by: INTERNAL MEDICINE

## 2022-10-18 PROCEDURE — 25010000002 CYCLOPHOSPHAMIDE 1 GM/5ML SOLUTION 5 ML VIAL: Performed by: INTERNAL MEDICINE

## 2022-10-18 PROCEDURE — 63710000001 DEXAMETHASONE PER 0.25 MG: Performed by: INTERNAL MEDICINE

## 2022-10-18 PROCEDURE — 25010000002 PALONOSETRON PER 25 MCG: Performed by: INTERNAL MEDICINE

## 2022-10-18 PROCEDURE — 96401 CHEMO ANTI-NEOPL SQ/IM: CPT

## 2022-10-18 PROCEDURE — 96413 CHEMO IV INFUSION 1 HR: CPT

## 2022-10-18 PROCEDURE — 96375 TX/PRO/DX INJ NEW DRUG ADDON: CPT

## 2022-10-18 PROCEDURE — 99214 OFFICE O/P EST MOD 30 MIN: CPT | Performed by: NURSE PRACTITIONER

## 2022-10-18 PROCEDURE — 25010000002 DARATUMUMAB-HYALURONIDASE-FIHJ 1800-30000 MG-UT/15ML SOLUTION: Performed by: INTERNAL MEDICINE

## 2022-10-18 PROCEDURE — 85025 COMPLETE CBC W/AUTO DIFF WBC: CPT

## 2022-10-18 PROCEDURE — 85384 FIBRINOGEN ACTIVITY: CPT | Performed by: INTERNAL MEDICINE

## 2022-10-18 RX ORDER — SULFAMETHOXAZOLE AND TRIMETHOPRIM 800; 160 MG/1; MG/1
1 TABLET ORAL 3 TIMES WEEKLY
Qty: 12 TABLET | Refills: 5 | Status: SHIPPED | OUTPATIENT
Start: 2022-10-19 | End: 2022-11-08

## 2022-10-18 RX ORDER — SODIUM CHLORIDE 9 MG/ML
250 INJECTION, SOLUTION INTRAVENOUS ONCE
Status: COMPLETED | OUTPATIENT
Start: 2022-10-18 | End: 2022-10-18

## 2022-10-18 RX ORDER — PALONOSETRON 0.05 MG/ML
0.25 INJECTION, SOLUTION INTRAVENOUS ONCE
Status: COMPLETED | OUTPATIENT
Start: 2022-10-18 | End: 2022-10-18

## 2022-10-18 RX ORDER — HYDROXYZINE PAMOATE 25 MG/1
25 CAPSULE ORAL ONCE
Status: COMPLETED | OUTPATIENT
Start: 2022-10-18 | End: 2022-10-18

## 2022-10-18 RX ORDER — ACYCLOVIR 400 MG/1
400 TABLET ORAL 2 TIMES DAILY
Qty: 60 TABLET | Refills: 5 | Status: SHIPPED | OUTPATIENT
Start: 2022-10-18 | End: 2022-11-08

## 2022-10-18 RX ORDER — MIDODRINE HYDROCHLORIDE 2.5 MG/1
2.5 TABLET ORAL 3 TIMES DAILY
Qty: 90 TABLET | Refills: 1 | COMMUNITY
Start: 2022-09-30 | End: 2022-11-01

## 2022-10-18 RX ORDER — ACETAMINOPHEN 500 MG
1000 TABLET ORAL ONCE
Status: COMPLETED | OUTPATIENT
Start: 2022-10-18 | End: 2022-10-18

## 2022-10-18 RX ORDER — BORTEZOMIB 3.5 MG/1
1 INJECTION, POWDER, LYOPHILIZED, FOR SOLUTION INTRAVENOUS; SUBCUTANEOUS ONCE
Status: COMPLETED | OUTPATIENT
Start: 2022-10-18 | End: 2022-10-18

## 2022-10-18 RX ORDER — MULTIVIT-MINS NO.7/FOLIC ACID 1 MG
1 CAPSULE ORAL DAILY
COMMUNITY
Start: 2022-09-21

## 2022-10-18 RX ORDER — DEXAMETHASONE 4 MG/1
40 TABLET ORAL ONCE
Status: COMPLETED | OUTPATIENT
Start: 2022-10-18 | End: 2022-10-18

## 2022-10-18 RX ADMIN — DARATUMUMAB AND HYALURONIDASE-FIHJ (HUMAN RECOMBINANT) 1800 MG: 1800; 30000 INJECTION SUBCUTANEOUS at 12:17

## 2022-10-18 RX ADMIN — CYCLOPHOSPHAMIDE 450 MG: 200 INJECTION, SOLUTION INTRAVENOUS at 11:40

## 2022-10-18 RX ADMIN — ACETAMINOPHEN 1000 MG: 500 TABLET, FILM COATED ORAL at 11:08

## 2022-10-18 RX ADMIN — PALONOSETRON 0.25 MG: 0.05 INJECTION, SOLUTION INTRAVENOUS at 11:13

## 2022-10-18 RX ADMIN — DEXAMETHASONE 40 MG: 4 TABLET ORAL at 11:09

## 2022-10-18 RX ADMIN — HYDROXYZINE PAMOATE 25 MG: 25 CAPSULE ORAL at 11:08

## 2022-10-18 RX ADMIN — BORTEZOMIB 1.5 MG: 3.5 INJECTION, POWDER, LYOPHILIZED, FOR SOLUTION INTRAVENOUS; SUBCUTANEOUS at 12:17

## 2022-10-18 RX ADMIN — SODIUM CHLORIDE 250 ML: 9 INJECTION, SOLUTION INTRAVENOUS at 11:07

## 2022-10-18 NOTE — PROGRESS NOTES
Outpatient Oncology Nutrition      Patient Name:  Cynthia Flower  YOB: 1955  MRN: [id number]    Assessment Date:  10/18/2022    Comments:  Follow up with patient today in infusion. Weight 123 lb.    Wt Readings from Last 3 Encounters:   10/18/22 55.8 kg (123 lb 1.6 oz)   10/04/22 55 kg (121 lb 3.2 oz)   09/27/22 55.2 kg (121 lb 9.6 oz)     Receiving Bortezomib, cyclphosphamide, dexamethasone, daratumumab and hyaluronidase today.   Reports appetite is a bit improved. Trying to drink Enterade and says it seems to be helping with the diarrhea and nausea. Also limiting dairy products which seem to make it worse.   Has enough Enterade until next week- will check back and provide more if needed.    Electronically signed by:  Alvina Mcdonough RD, LD  10/18/22 15:52 EDT

## 2022-10-19 LAB — FACT X ACT/NOR PPP: 97 % (ref 76–183)

## 2022-10-24 DIAGNOSIS — F41.9 ANXIETY: ICD-10-CM

## 2022-10-24 NOTE — PROGRESS NOTES
"[facility] CBC GROUP OUTPATIENT FOLLOW UP CLINIC VISIT    REASON FOR FOLLOW-UP:    AL amyloidosis  Therapy with michael-CyBorD initiated 8/16/2022    HISTORY OF PRESENT ILLNESS: Cynthia Flower is a 67 y.o. female with the above-mentioned history who is here today for lab review and evaluation    Due for cycle 3-day 1 Velcade and Cytoxan.      She is doing better at this point.  She is on midodrine 3 times daily now with improvement and orthostasis and hypotension.  She did have some periorbital hematomas immediately following her heart catheterization at [place].  We checked coagulation studies and a factor X level and all of these were normal.  Periorbital hematomas have resolved.  She feels some fullness in the right auditory canal.  No neuropathy.  Diarrhea persists.  She otherwise is doing well.      REVIEW OF SYSTEMS:  As per the HPI    PHYSICAL EXAMINATION:    Vitals:    10/25/22 0910   BP: 123/70   Pulse: 69   Resp: 18   Temp: 97.9 °F (36.6 °C)   TempSrc: Temporal   SpO2: 96%   Weight: 54 kg (119 lb 1.6 oz)   Height: 154.9 cm (60.98\")   PainSc: 0-No pain     Physical Exam  Vitals reviewed.   Constitutional:       General: She is not in acute distress.     Appearance: Normal appearance. She is well-developed.   HENT:      Head: Normocephalic and atraumatic.      Ears:      Comments: Both tympanic membranes are dull but adequate light reflex and no erythema.     Mouth/Throat:      Pharynx: No oropharyngeal exudate.   Eyes:      Pupils: Pupils are equal, round, and reactive to light.      Comments: No periorbital hematomas visible at this time   Cardiovascular:      Rate and Rhythm: Normal rate and regular rhythm.      Heart sounds: Murmur heard.    Systolic murmur is present with a grade of 2/6.  Pulmonary:      Effort: Pulmonary effort is normal. No respiratory distress.      Breath sounds: Normal breath sounds. No wheezing, rhonchi or rales.   Abdominal:      General: Bowel sounds are normal. " There is no distension.      Palpations: Abdomen is soft.   Musculoskeletal:         General: Normal range of motion.      Cervical back: Normal range of motion.   Skin:     General: Skin is warm and dry.      Findings: No rash.   Neurological:      Mental Status: She is alert and oriented to person, place, and time.       DIAGNOSTIC DATA:  CBC and Differential (10/25/2022 08:55)    IMAGING:    Cardiac MRI at [place] 9/9/2022  1.  Small left ventricular chamber size and normal systolic function (LVEF 64%). Moderate concentric hypertrophy.   2.  Small right ventricular chamber size and normal systolic function (RVEF 53%).   3.  Systolic anterior motion of the subvalvular mitral apparatus with mild-moderate MR.   4.  Mild TR.   5.  There is diffuse subendocardial LGE with T1/ECV elevation in a pattern consistent with cardiac amyloidosis.   6.  No prior study for comparison.       ASSESSMENT:  This is a 67 y.o. female with:    *AL amyloidosis  · She had a stress echocardiogram on 4/24/2020 showing a normal left ventricular ejection fraction of 60% with moderate concentric hypertrophy but no wall motion abnormalities of the left ventricle.  There was impaired relaxation noted.  She complained of chest pain during the examination.  There was some ST segment depression.  Cardiac catheterization was performed on the same day.  No intervention was required.  · Follow-up echocardiogram on 4/15/2021 showed a left ventricular ejection fraction of 61 to 65% with normal LV cavity size.  Left ventricular wall thickness showed moderate concentric hypertrophy.  Diastolic function was impaired.  No pericardial effusion.  Small left pleural effusion.  · She had follow-up PYP imaging for cardiac amyloidosis that was not suggestive of ATTR amyloidosis  · Laboratory values on 2/24/2022 showed a high serum free light chain lambda of 121, normal kappa of 10.5, low ratio at 0.09.  Serum protein electrophoresis showed no evidence of an M  spike.  Urine light chains were abnormal with an elevated lambda light chain of 33 and a low ratio of 0.48 with a 12.7 mg M spike on 24-hour urine protein electrophoresis.  · BNP was elevated at 2306 on 3/4/2022.  The troponin was normal at 0.03.  CK was 212 with a CK-MB of 10.87.  · Initial consult on 3/30/22. No M spike on serum protein electrophoresis. SIFE 'presence of monoclonal protein is unclear.'   · Bone marrow biopsy 4/13/22 normocellular, 12.1% plasma cells consistent with low volume plasma cell dyscrasia. FISH with low level t(11;14) fusion only. No amyloid noted as Congo red staining negative.   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 Buenrostro at [place]. Intended to enroll on a clinical study but her troponin as tested by the study sponsor was not high enough  · Therapy with sissy-CyBorD initiated 8/16/2022  · Patient seen in the office on 8/17/2022 for triage visit.  Patient with complaints of fatigue, dizziness and diarrhea.  She was mildly hypotensive.  She was given IV fluids.  · Subsequent admission at [facility] with shortness of breath and volume overload.  She was diuresed and treated with antibiotics.  · D8 therapy administered on 8/30  · Light chains normal at [place] on 8/31/22 (lambda light chain 1.3, down from 12.44)  · 9/27/2022: Cycle 2-day 8 of therapy  · 10/4/2022 cycle 2-day 8 CyBorD.  Patient continues to push oral hydration.  She continues to have some neck stiffness but this has not worsened.  She will see [place] next week for further cardiac work-up.  Glucose was low upon initial labs today however the patient was given something to eat by nursing prior to rechecking.  This was therefore rechecked prior to her leaving the office and was 87.  Patient reports she was feeling fine.  · 10/18/2020 to cycle 2-day 22 Sissy -CyBorD.  Velcade dose will be reduced to 1.0 mg/m² due to patient's persistent issues with  orthostasis.  · 10/25/2022: Cycle 3-day 1    *Diarrhea  · She saw Dr. Hoyos with GI at [place] on 9/7.  · Suspicion for amyloid involvement of the GI tract  · Continue Lomotil and Imodium. Rifaximin prescribed by Dr. Hoyos.  She has completed this.  · Diarrhea waxes and wanes with what she eats and dairy products particularly worsen diarrhea  · 10/25/2022: Symptoms stable    *Elevated liver enzymes  · 8/17/2022: AST 87, , bilirubin normal at 0.4  · 10/4/2022 ALT 39 and AST 36, previously 48 and 36.  · Liver labs pending    *Cardiomyopathy:  · Most recent echocardiogram on 8/17/2022 showed left ventricular wall thickness consistent with moderate to severe concentric hypertrophy along with left ventricular septal wall measuring 2.2 cm and posterior wall thickness at 1.9 cm likely due to amyloidosis.  LVEF 61 to 65%.  Grade 1 impaired relaxation.  · Now followed by Dr. Lyles at [facility] with concerns for pre-existing HCM and AL cardiac amyloid.   · Cardiac MRI results above.  Amyloidosis evident.  · Catheterization planned at [place] on 10/11.  · Status post cardiac cath at [place] placed on midodrine to help with orthostasis.  · Now on 2 tablets 3 times daily with improvement    *Sore tongue: If she wants Magic mouthwash she will let us know.  She did not complain of this again today.    *Periorbital hematomas: These have resolved.  Coagulation studies and a factor X level were all normal.        PLAN:  1. Proceed with cycle 3-day 1 Sissy-CyBorD today.  The Velcade dose has been reduced to 1.0 mg/m².  2. Patient receives IV Cytoxan as she does not like taking pills.  She continues on oral dexamethasone 40 mg given in the office.  3. Patient will continue midodrine as prescribed by [place].  At 2 tablets 3 times daily her symptoms have improved.  4. No more IV fluid infusions and she will keep up with excess fluid losses with oral intake.  5. Follow-up with cardiology and gastroenterology at [place].   She is also going to see an esophageal motility specialist there as well in the next few weeks.  Swallowing is better at this point.  6. If possible, hopeful for autologous stem cell transplant at [place] after induction therapy following 6 cycles.  Depending on response after 3 cycles consideration of stem cell collection at that point.  To be determined.  She follows up with Dr. Buenrostro in a few weeks.  7. Continue acyclovir and Bactrim for prophylaxis  8. We will try to treat through peripheral IVs as long as possible.    High risk therapy requiring intensive monitoring    Sissy-CyBorD regimen     Daratumumab and hyaluronidase (Darzalex Faspro) as follows:  Cycles 1 & 2: 1800 mg SC once per day on days 1, 8, 15, 22  Cycles 3 to 6: 1800 mg SC once per day on days 1 & 15  Cycles 7 up to 24: 1800 mg SC once on day 1     Bortezomib (Velcade) as follows:  Cycles 1 to 6: 1.3 mg/m2 SC once per day on days 1, 8, 15, 22     Cyclophosphamide (Cytoxan) as follows:  Cycles 1 to 6: 300 mg/m2 (maximum dose of 500 mg) PO or IV once per day on days 1, 8, 15, 22    Plan IV Cytoxan as she has difficulty swallowing pills.     Dexamethasone (Decadron) as follows:  Cycles 1 to 6: 40 mg PO or IV once per day on days 1, 8, 15, 22 (see note)  28-day cycle for up to 24 cycles

## 2022-10-25 ENCOUNTER — OFFICE VISIT (OUTPATIENT)
Dept: ONCOLOGY | Facility: CLINIC | Age: 67
End: 2022-10-25

## 2022-10-25 ENCOUNTER — INFUSION (OUTPATIENT)
Dept: ONCOLOGY | Facility: HOSPITAL | Age: 67
End: 2022-10-25

## 2022-10-25 VITALS
HEART RATE: 69 BPM | TEMPERATURE: 97.9 F | SYSTOLIC BLOOD PRESSURE: 123 MMHG | BODY MASS INDEX: 22.48 KG/M2 | OXYGEN SATURATION: 96 % | WEIGHT: 119.1 LBS | RESPIRATION RATE: 18 BRPM | HEIGHT: 61 IN | DIASTOLIC BLOOD PRESSURE: 70 MMHG

## 2022-10-25 DIAGNOSIS — E85.81 LIGHT CHAIN (AL) AMYLOIDOSIS: Primary | ICD-10-CM

## 2022-10-25 DIAGNOSIS — E85.81 LIGHT CHAIN (AL) AMYLOIDOSIS: ICD-10-CM

## 2022-10-25 LAB
ALBUMIN SERPL-MCNC: 4.2 G/DL (ref 3.5–5.2)
ALBUMIN/GLOB SERPL: 2 G/DL (ref 1.1–2.4)
ALP SERPL-CCNC: 71 U/L (ref 38–116)
ALT SERPL W P-5'-P-CCNC: 42 U/L (ref 0–33)
ANION GAP SERPL CALCULATED.3IONS-SCNC: 9.9 MMOL/L (ref 5–15)
AST SERPL-CCNC: 31 U/L (ref 0–32)
BASOPHILS # BLD AUTO: 0.04 10*3/MM3 (ref 0–0.2)
BASOPHILS NFR BLD AUTO: 0.4 % (ref 0–1.5)
BILIRUB SERPL-MCNC: 0.6 MG/DL (ref 0.2–1.2)
BUN SERPL-MCNC: 21 MG/DL (ref 6–20)
BUN/CREAT SERPL: 25.9 (ref 7.3–30)
CALCIUM SPEC-SCNC: 9.5 MG/DL (ref 8.5–10.2)
CHLORIDE SERPL-SCNC: 102 MMOL/L (ref 98–107)
CO2 SERPL-SCNC: 25.1 MMOL/L (ref 22–29)
CREAT SERPL-MCNC: 0.81 MG/DL (ref 0.6–1.1)
DEPRECATED RDW RBC AUTO: 54.7 FL (ref 37–54)
EGFRCR SERPLBLD CKD-EPI 2021: 79.7 ML/MIN/1.73
EOSINOPHIL # BLD AUTO: 0.17 10*3/MM3 (ref 0–0.4)
EOSINOPHIL NFR BLD AUTO: 1.9 % (ref 0.3–6.2)
ERYTHROCYTE [DISTWIDTH] IN BLOOD BY AUTOMATED COUNT: 15.8 % (ref 12.3–15.4)
GLOBULIN UR ELPH-MCNC: 2.1 GM/DL (ref 1.8–3.5)
GLUCOSE SERPL-MCNC: 92 MG/DL (ref 74–124)
HCT VFR BLD AUTO: 41.3 % (ref 34–46.6)
HGB BLD-MCNC: 13.7 G/DL (ref 12–15.9)
IMM GRANULOCYTES # BLD AUTO: 0.05 10*3/MM3 (ref 0–0.05)
IMM GRANULOCYTES NFR BLD AUTO: 0.6 % (ref 0–0.5)
LYMPHOCYTES # BLD AUTO: 3.43 10*3/MM3 (ref 0.7–3.1)
LYMPHOCYTES NFR BLD AUTO: 38.2 % (ref 19.6–45.3)
MCH RBC QN AUTO: 31.5 PG (ref 26.6–33)
MCHC RBC AUTO-ENTMCNC: 33.2 G/DL (ref 31.5–35.7)
MCV RBC AUTO: 94.9 FL (ref 79–97)
MONOCYTES # BLD AUTO: 0.78 10*3/MM3 (ref 0.1–0.9)
MONOCYTES NFR BLD AUTO: 8.7 % (ref 5–12)
NEUTROPHILS NFR BLD AUTO: 4.52 10*3/MM3 (ref 1.7–7)
NEUTROPHILS NFR BLD AUTO: 50.2 % (ref 42.7–76)
NRBC BLD AUTO-RTO: 0 /100 WBC (ref 0–0.2)
PLATELET # BLD AUTO: 258 10*3/MM3 (ref 140–450)
PMV BLD AUTO: 10.1 FL (ref 6–12)
POTASSIUM SERPL-SCNC: 4.4 MMOL/L (ref 3.5–4.7)
PROT SERPL-MCNC: 6.3 G/DL (ref 6.3–8)
RBC # BLD AUTO: 4.35 10*6/MM3 (ref 3.77–5.28)
SODIUM SERPL-SCNC: 137 MMOL/L (ref 134–145)
WBC NRBC COR # BLD: 8.99 10*3/MM3 (ref 3.4–10.8)

## 2022-10-25 PROCEDURE — 96401 CHEMO ANTI-NEOPL SQ/IM: CPT

## 2022-10-25 PROCEDURE — 99214 OFFICE O/P EST MOD 30 MIN: CPT | Performed by: INTERNAL MEDICINE

## 2022-10-25 PROCEDURE — 25010000002 BORTEZOMIB PER 0.1 MG: Performed by: INTERNAL MEDICINE

## 2022-10-25 PROCEDURE — 25010000002 CYCLOPHOSPHAMIDE 1 GM/5ML SOLUTION 5 ML VIAL: Performed by: INTERNAL MEDICINE

## 2022-10-25 PROCEDURE — 25010000002 DARATUMUMAB-HYALURONIDASE-FIHJ 1800-30000 MG-UT/15ML SOLUTION: Performed by: INTERNAL MEDICINE

## 2022-10-25 PROCEDURE — 96375 TX/PRO/DX INJ NEW DRUG ADDON: CPT

## 2022-10-25 PROCEDURE — 96413 CHEMO IV INFUSION 1 HR: CPT

## 2022-10-25 PROCEDURE — 85025 COMPLETE CBC W/AUTO DIFF WBC: CPT

## 2022-10-25 PROCEDURE — 80053 COMPREHEN METABOLIC PANEL: CPT

## 2022-10-25 PROCEDURE — 63710000001 DEXAMETHASONE PER 0.25 MG: Performed by: INTERNAL MEDICINE

## 2022-10-25 PROCEDURE — 25010000002 PALONOSETRON PER 25 MCG: Performed by: INTERNAL MEDICINE

## 2022-10-25 RX ORDER — DEXAMETHASONE 4 MG/1
40 TABLET ORAL ONCE
Status: CANCELLED
Start: 2022-11-08 | End: 2022-11-01

## 2022-10-25 RX ORDER — PALONOSETRON 0.05 MG/ML
0.25 INJECTION, SOLUTION INTRAVENOUS ONCE
Status: CANCELLED | OUTPATIENT
Start: 2022-11-08

## 2022-10-25 RX ORDER — ACETAMINOPHEN 500 MG
1000 TABLET ORAL ONCE
Status: CANCELLED | OUTPATIENT
Start: 2022-11-08

## 2022-10-25 RX ORDER — SODIUM CHLORIDE 9 MG/ML
250 INJECTION, SOLUTION INTRAVENOUS ONCE
Status: COMPLETED | OUTPATIENT
Start: 2022-10-25 | End: 2022-10-25

## 2022-10-25 RX ORDER — DEXAMETHASONE 4 MG/1
40 TABLET ORAL ONCE
Status: COMPLETED | OUTPATIENT
Start: 2022-10-25 | End: 2022-10-25

## 2022-10-25 RX ORDER — DIPHENHYDRAMINE HYDROCHLORIDE 50 MG/ML
50 INJECTION INTRAMUSCULAR; INTRAVENOUS AS NEEDED
Status: CANCELLED | OUTPATIENT
Start: 2022-11-08

## 2022-10-25 RX ORDER — ACETAMINOPHEN 500 MG
1000 TABLET ORAL ONCE
Status: CANCELLED | OUTPATIENT
Start: 2022-10-25

## 2022-10-25 RX ORDER — MEPERIDINE HYDROCHLORIDE 25 MG/ML
25 INJECTION INTRAMUSCULAR; INTRAVENOUS; SUBCUTANEOUS
Status: CANCELLED | OUTPATIENT
Start: 2022-10-25

## 2022-10-25 RX ORDER — MEPERIDINE HYDROCHLORIDE 25 MG/ML
25 INJECTION INTRAMUSCULAR; INTRAVENOUS; SUBCUTANEOUS
Status: CANCELLED | OUTPATIENT
Start: 2022-11-08

## 2022-10-25 RX ORDER — HYDROXYZINE PAMOATE 25 MG/1
25 CAPSULE ORAL ONCE
Status: CANCELLED
Start: 2022-10-25 | End: 2022-10-25

## 2022-10-25 RX ORDER — FAMOTIDINE 10 MG/ML
20 INJECTION, SOLUTION INTRAVENOUS AS NEEDED
Status: CANCELLED | OUTPATIENT
Start: 2022-10-25

## 2022-10-25 RX ORDER — HYDROXYZINE PAMOATE 25 MG/1
25 CAPSULE ORAL ONCE
Status: CANCELLED
Start: 2022-11-08 | End: 2022-11-08

## 2022-10-25 RX ORDER — BORTEZOMIB 3.5 MG/1
1 INJECTION, POWDER, LYOPHILIZED, FOR SOLUTION INTRAVENOUS; SUBCUTANEOUS ONCE
Status: CANCELLED | OUTPATIENT
Start: 2022-11-22

## 2022-10-25 RX ORDER — PALONOSETRON 0.05 MG/ML
0.25 INJECTION, SOLUTION INTRAVENOUS ONCE
Status: CANCELLED | OUTPATIENT
Start: 2022-10-25

## 2022-10-25 RX ORDER — BORTEZOMIB 3.5 MG/1
1 INJECTION, POWDER, LYOPHILIZED, FOR SOLUTION INTRAVENOUS; SUBCUTANEOUS ONCE
Status: CANCELLED | OUTPATIENT
Start: 2022-10-25

## 2022-10-25 RX ORDER — ACETAMINOPHEN 500 MG
1000 TABLET ORAL ONCE
Status: COMPLETED | OUTPATIENT
Start: 2022-10-25 | End: 2022-10-25

## 2022-10-25 RX ORDER — SODIUM CHLORIDE 9 MG/ML
250 INJECTION, SOLUTION INTRAVENOUS ONCE
Status: CANCELLED | OUTPATIENT
Start: 2022-11-08

## 2022-10-25 RX ORDER — PALONOSETRON 0.05 MG/ML
0.25 INJECTION, SOLUTION INTRAVENOUS ONCE
Status: COMPLETED | OUTPATIENT
Start: 2022-10-25 | End: 2022-10-25

## 2022-10-25 RX ORDER — FAMOTIDINE 10 MG/ML
20 INJECTION, SOLUTION INTRAVENOUS AS NEEDED
Status: CANCELLED | OUTPATIENT
Start: 2022-11-08

## 2022-10-25 RX ORDER — HYDROXYZINE PAMOATE 25 MG/1
25 CAPSULE ORAL ONCE
Status: COMPLETED | OUTPATIENT
Start: 2022-10-25 | End: 2022-10-25

## 2022-10-25 RX ORDER — DEXAMETHASONE 4 MG/1
40 TABLET ORAL ONCE
Status: CANCELLED
Start: 2022-11-22 | End: 2022-11-15

## 2022-10-25 RX ORDER — SODIUM CHLORIDE 9 MG/ML
250 INJECTION, SOLUTION INTRAVENOUS ONCE
Status: CANCELLED | OUTPATIENT
Start: 2022-11-22

## 2022-10-25 RX ORDER — SODIUM CHLORIDE 9 MG/ML
250 INJECTION, SOLUTION INTRAVENOUS ONCE
Status: CANCELLED | OUTPATIENT
Start: 2022-10-25

## 2022-10-25 RX ORDER — BORTEZOMIB 3.5 MG/1
1 INJECTION, POWDER, LYOPHILIZED, FOR SOLUTION INTRAVENOUS; SUBCUTANEOUS ONCE
Status: COMPLETED | OUTPATIENT
Start: 2022-10-25 | End: 2022-10-25

## 2022-10-25 RX ORDER — DEXAMETHASONE 4 MG/1
40 TABLET ORAL ONCE
Status: CANCELLED
Start: 2022-10-25 | End: 2022-10-25

## 2022-10-25 RX ORDER — DEXAMETHASONE 4 MG/1
40 TABLET ORAL ONCE
Status: CANCELLED
Start: 2022-11-08 | End: 2022-11-08

## 2022-10-25 RX ORDER — BORTEZOMIB 3.5 MG/1
1 INJECTION, POWDER, LYOPHILIZED, FOR SOLUTION INTRAVENOUS; SUBCUTANEOUS ONCE
Status: CANCELLED | OUTPATIENT
Start: 2022-11-08

## 2022-10-25 RX ORDER — PALONOSETRON 0.05 MG/ML
0.25 INJECTION, SOLUTION INTRAVENOUS ONCE
Status: CANCELLED | OUTPATIENT
Start: 2022-11-22

## 2022-10-25 RX ORDER — DIPHENHYDRAMINE HYDROCHLORIDE 50 MG/ML
50 INJECTION INTRAMUSCULAR; INTRAVENOUS AS NEEDED
Status: CANCELLED | OUTPATIENT
Start: 2022-10-25

## 2022-10-25 RX ADMIN — DARATUMUMAB AND HYALURONIDASE-FIHJ (HUMAN RECOMBINANT) 1800 MG: 1800; 30000 INJECTION SUBCUTANEOUS at 11:19

## 2022-10-25 RX ADMIN — BORTEZOMIB 1.5 MG: 3.5 INJECTION, POWDER, LYOPHILIZED, FOR SOLUTION INTRAVENOUS; SUBCUTANEOUS at 10:44

## 2022-10-25 RX ADMIN — SODIUM CHLORIDE 250 ML: 9 INJECTION, SOLUTION INTRAVENOUS at 10:15

## 2022-10-25 RX ADMIN — ACETAMINOPHEN 1000 MG: 500 TABLET ORAL at 10:17

## 2022-10-25 RX ADMIN — CYCLOPHOSPHAMIDE 450 MG: 200 INJECTION, SOLUTION INTRAVENOUS at 10:50

## 2022-10-25 RX ADMIN — PALONOSETRON 0.25 MG: 0.05 INJECTION, SOLUTION INTRAVENOUS at 10:16

## 2022-10-25 RX ADMIN — HYDROXYZINE PAMOATE 25 MG: 25 CAPSULE ORAL at 10:17

## 2022-10-25 RX ADMIN — DEXAMETHASONE 40 MG: 4 TABLET ORAL at 10:17

## 2022-10-26 ENCOUNTER — LAB (OUTPATIENT)
Dept: LAB | Facility: HOSPITAL | Age: 67
End: 2022-10-26

## 2022-10-26 DIAGNOSIS — N39.41 URGENCY INCONTINENCE: ICD-10-CM

## 2022-10-26 DIAGNOSIS — N39.41 URGENCY INCONTINENCE: Primary | ICD-10-CM

## 2022-10-26 LAB
ALBUMIN SERPL ELPH-MCNC: 3.6 G/DL (ref 2.9–4.4)
ALBUMIN/GLOB SERPL: 1.6 {RATIO} (ref 0.7–1.7)
ALPHA1 GLOB SERPL ELPH-MCNC: 0.2 G/DL (ref 0–0.4)
ALPHA2 GLOB SERPL ELPH-MCNC: 0.9 G/DL (ref 0.4–1)
B-GLOBULIN SERPL ELPH-MCNC: 0.8 G/DL (ref 0.7–1.3)
BILIRUB UR QL STRIP: NEGATIVE
CLARITY UR: CLEAR
COLOR UR: YELLOW
GAMMA GLOB SERPL ELPH-MCNC: 0.4 G/DL (ref 0.4–1.8)
GLOBULIN SER-MCNC: 2.3 G/DL (ref 2.2–3.9)
GLUCOSE UR STRIP-MCNC: NEGATIVE MG/DL
HGB UR QL STRIP.AUTO: NEGATIVE
IGA SERPL-MCNC: 8 MG/DL (ref 87–352)
IGG SERPL-MCNC: 370 MG/DL (ref 586–1602)
IGM SERPL-MCNC: 34 MG/DL (ref 26–217)
INTERPRETATION SERPL IEP-IMP: ABNORMAL
KAPPA LC FREE SER-MCNC: 6.2 MG/L (ref 3.3–19.4)
KAPPA LC FREE/LAMBDA FREE SER: 0.59 {RATIO} (ref 0.26–1.65)
KETONES UR QL STRIP: NEGATIVE
LABORATORY COMMENT REPORT: ABNORMAL
LAMBDA LC FREE SERPL-MCNC: 10.5 MG/L (ref 5.7–26.3)
LEUKOCYTE ESTERASE UR QL STRIP.AUTO: NEGATIVE
M PROTEIN SERPL ELPH-MCNC: 0.2 G/DL
NITRITE UR QL STRIP: NEGATIVE
PH UR STRIP.AUTO: 6 [PH] (ref 4.5–8)
PROT SERPL-MCNC: 5.9 G/DL (ref 6–8.5)
PROT UR QL STRIP: NEGATIVE
SP GR UR STRIP: 1.02 (ref 1–1.03)
UROBILINOGEN UR QL STRIP: NORMAL

## 2022-10-26 PROCEDURE — 81003 URINALYSIS AUTO W/O SCOPE: CPT

## 2022-10-27 RX ORDER — ALPRAZOLAM 0.25 MG/1
TABLET ORAL
Qty: 60 TABLET | Refills: 1 | Status: SHIPPED | OUTPATIENT
Start: 2022-10-27

## 2022-11-01 ENCOUNTER — APPOINTMENT (OUTPATIENT)
Dept: ONCOLOGY | Facility: HOSPITAL | Age: 67
End: 2022-11-01

## 2022-11-01 ENCOUNTER — TELEPHONE (OUTPATIENT)
Dept: ONCOLOGY | Facility: CLINIC | Age: 67
End: 2022-11-01

## 2022-11-01 PROCEDURE — 87081 CULTURE SCREEN ONLY: CPT | Performed by: FAMILY MEDICINE

## 2022-11-01 PROCEDURE — U0004 COV-19 TEST NON-CDC HGH THRU: HCPCS | Performed by: FAMILY MEDICINE

## 2022-11-01 NOTE — TELEPHONE ENCOUNTER
Returned call to patient after speaking with MELVA Grace RN, Dr. Sal's clinic nurse.  Patient has had exposure to strep, now has temp of 101. Cough, sore throat and sinus congestion.  Advised that we will cancel appointment for today, she should be seen by an Urgent Care Provider and call me back with the findings.  I gave her my direct phone number.  Recommended that she have flu, Covid and strep testing.  She will call me back.

## 2022-11-01 NOTE — TELEPHONE ENCOUNTER
Returned call to patient with the information from Dr. Sal's clinic nurse.  No chemo today.  Keep follow up appointment next week and call our office with the flu and Covid results once obtained.  She v/u.        Patient returned call to report that she is negative for Covid and strep from the 24 hour test that was sent out.  She remains congested, but no longer has a fever.  She will follow up as scheduled next week.

## 2022-11-01 NOTE — TELEPHONE ENCOUNTER
Returned call to patient who has tested negative at Urgent care for Strep, Flu and Covid.  The Urgent Care has also sent the flu and Covid tests out.  Those results should be back in the next couple of days.  Her temperature is now 97.6, B/P is 97/36.  Please advise if she should go ahead and come in today, or wait for the results of the other testing.

## 2022-11-07 ENCOUNTER — TELEPHONE (OUTPATIENT)
Dept: ONCOLOGY | Facility: CLINIC | Age: 67
End: 2022-11-07

## 2022-11-07 NOTE — PROGRESS NOTES
"[facility] CBC GROUP OUTPATIENT FOLLOW UP CLINIC VISIT    REASON FOR FOLLOW-UP:    AL amyloidosis  Therapy with michael-CyBorD initiated 8/16/2022    HISTORY OF PRESENT ILLNESS: Cynthia Flower is a 67 y.o. female with the above-mentioned history who is here today for lab review and evaluation    She missed cycle 3-day 8 Velcade and Cytoxan last week due to an upper respiratory infection.  Strep, COVID-19, and influenza testing were negative.  She continues to have some clear rhinorrhea but this has improved significantly.  She continues to have diarrhea which is unchanged.  She remains fatigued.  She is sleeping a lot.  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    REVIEW OF SYSTEMS:  As per the HPI    PHYSICAL EXAMINATION:    Vitals:    11/08/22 0804   BP: 124/71   Pulse: 67   Resp: 18   Temp: 97.3 °F (36.3 °C)   TempSrc: Temporal   SpO2: 98%   Weight: 55.2 kg (121 lb 11.2 oz)   Height: 154.9 cm (60.98\")   PainSc: 0-No pain     Physical Exam  Vitals reviewed.   Constitutional:       General: She is not in acute distress.     Appearance: Normal appearance. She is well-developed.   HENT:      Head: Normocephalic and atraumatic.      Mouth/Throat:      Pharynx: No oropharyngeal exudate.   Eyes:      Pupils: Pupils are equal, round, and reactive to light.   Cardiovascular:      Rate and Rhythm: Normal rate and regular rhythm.      Heart sounds: Murmur heard.    Systolic murmur is present with a grade of 2/6.  Pulmonary:      Effort: Pulmonary effort is normal. No respiratory distress.      Breath sounds: Normal breath sounds. No wheezing, rhonchi or rales.   Abdominal:      General: Bowel sounds are normal. There is no distension.      Palpations: Abdomen is soft.   Musculoskeletal:         General: Normal range of motion.      Cervical back: Normal range of motion.   Skin:     General: Skin is warm and dry. "      Findings: No rash.   Neurological:      Mental Status: She is alert and oriented to person, place, and time.       DIAGNOSTIC DATA:  CBC and Differential (11/08/2022 07:55)  Comprehensive Metabolic Panel (11/08/2022 07:55)      IMAGING:    None reviewed      ASSESSMENT:  This is a 67 y.o. female with:    *AL amyloidosis  · She had a stress echocardiogram on 4/24/2020 showing a normal left ventricular ejection fraction of 60% with moderate concentric hypertrophy but no wall motion abnormalities of the left ventricle.  There was impaired relaxation noted.  She complained of chest pain during the examination.  There was some ST segment depression.  Cardiac catheterization was performed on the same day.  No intervention was required.  · Follow-up echocardiogram on 4/15/2021 showed a left ventricular ejection fraction of 61 to 65% with normal LV cavity size.  Left ventricular wall thickness showed moderate concentric hypertrophy.  Diastolic function was impaired.  No pericardial effusion.  Small left pleural effusion.  · She had follow-up PYP imaging for cardiac amyloidosis that was not suggestive of ATTR amyloidosis  · Laboratory values on 2/24/2022 showed a high serum free light chain lambda of 121, normal kappa of 10.5, low ratio at 0.09.  Serum protein electrophoresis showed no evidence of an M spike.  Urine light chains were abnormal with an elevated lambda light chain of 33 and a low ratio of 0.48 with a 12.7 mg M spike on 24-hour urine protein electrophoresis.  · BNP was elevated at 2306 on 3/4/2022.  The troponin was normal at 0.03.  CK was 212 with a CK-MB of 10.87.  · Initial consult on 3/30/22. No M spike on serum protein electrophoresis. SIFE 'presence of monoclonal protein is unclear.'   · Bone marrow biopsy 4/13/22 normocellular, 12.1% plasma cells consistent with low volume plasma cell dyscrasia. FISH with low level t(11;14) fusion only. No amyloid noted as Congo red staining negative.   · Fat pad  biopsy 5/11/22 positive for amyloid, AL lambda  · Referred to Dr. Buenrostro at [place]. Intended to enroll on a clinical study but her troponin as tested by the study sponsor was not high enough  · Therapy with sissy-CyBorD initiated 8/16/2022  · Patient seen in the office on 8/17/2022 for triage visit.  Patient with complaints of fatigue, dizziness and diarrhea.  She was mildly hypotensive.  She was given IV fluids.  · Subsequent admission at [facility] with shortness of breath and volume overload.  She was diuresed and treated with antibiotics.  · D8 therapy administered on 8/30  · Light chains normal at [place] on 8/31/22 (lambda light chain 1.3, down from 12.44)  · 9/27/2022: Cycle 2-day 8 of therapy  · 10/4/2022 cycle 2-day 8 CyBorD.  Patient continues to push oral hydration.  She continues to have some neck stiffness but this has not worsened.  She will see [facility] next week for further cardiac work-up.  Glucose was low upon initial labs today however the patient was given something to eat by nursing prior to rechecking.  This was therefore rechecked prior to her leaving the office and was 87.  Patient reports she was feeling fine.  · 10/18/2020 to cycle 2-day 22 Sissy -CyBorD.  Velcade dose will be reduced to 1.0 mg/m² due to patient's persistent issues with orthostasis.  · 10/25/2022: Cycle 3-day 1  · 11/8/2022: Delay in cycle 3-day 15 therapy.  Day 8 omitted due to an upper respiratory infection.    *Diarrhea  · She saw Dr. Hoyos with GI at [place] on 9/7.  · Suspicion for amyloid involvement of the GI tract  · Continue Lomotil and Imodium. Rifaximin prescribed by Dr. Hoyos.  She has completed this.  · Diarrhea waxes and wanes with what she eats and dairy products particularly worsen diarrhea  · 11/8/2022: Symptoms stable    *Elevated liver enzymes  · 8/17/2022: AST 87, , bilirubin normal at 0.4  · 10/4/2022 ALT 39 and AST 36, previously 48 and 36.  · Liver labs normal as of  11/8/2022    *Cardiomyopathy:  · Most recent echocardiogram on 8/17/2022 showed left ventricular wall thickness consistent with moderate to severe concentric hypertrophy along with left ventricular septal wall measuring 2.2 cm and posterior wall thickness at 1.9 cm likely due to amyloidosis.  LVEF 61 to 65%.  Grade 1 impaired relaxation.  · Now followed by Dr. Lyles at [facility] with concerns for pre-existing HCM and AL cardiac amyloid.   · Cardiac MRI results above.  Amyloidosis evident.  · Catheterization performed at [place].  She is now going to see cardiac surgeon.    *Sore tongue: If she wants Magic mouthwash she will let us know.  She did not complain of this again today 11/8/2022.    *Periorbital hematomas: These have resolved.  Coagulation studies and a factor X level were all normal.      PLAN:  1. Proceed with cycle 3-day 15 Sissy-CyBorD today.  The Velcade dose has been reduced to 1.0 mg/m².  2. Patient receives IV Cytoxan as she does not like taking pills.    3. She continues on oral dexamethasone 40 mg given in the office.  4. Continue cardiac medications as managed by cardiology at [place]  5. No more IV fluid infusions and she will keep up with excess fluid losses with oral intake.  6. Follow-up with cardiology and gastroenterology at [place].  She will also be seen by cardiac surgery next Tuesday 11/15.  7. If possible, hopeful for autologous stem cell transplant at [place] after induction therapy following 6 cycles.  Depending on response after 3 cycles consideration of stem cell collection at that point.  To be determined.  She follows up with Dr. Buenrostro at [place]  8. Continue acyclovir and Bactrim for prophylaxis  9. We will try to treat through peripheral IVs as long as possible.  10. 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  I encouraged her to notify us when the  surgery might be and if she needs to be seen for treatment prior to the surgery.  Otherwise we will plan to see her after she has recovered and returned to [place] from [place].    High risk therapy requiring intensive monitoring    Sissy-CyBorD regimen     Daratumumab and hyaluronidase (Darzalex Faspro) as follows:  Cycles 1 & 2: 1800 mg SC once per day on days 1, 8, 15, 22  Cycles 3 to 6: 1800 mg SC once per day on days 1 & 15  Cycles 7 up to 24: 1800 mg SC once on day 1     Bortezomib (Velcade) as follows:  Cycles 1 to 6: 1.3 mg/m2 SC once per day on days 1, 8, 15, 22.  (Now dose reduced to 1.0 mg per metered squared.)     Cyclophosphamide (Cytoxan) as follows:  Cycles 1 to 6: 300 mg/m2 (maximum dose of 500 mg) PO or IV once per day on days 1, 8, 15, 22    Plan IV Cytoxan as she has difficulty swallowing pills.     Dexamethasone (Decadron) as follows:  Cycles 1 to 6: 40 mg PO or IV once per day on days 1, 8, 15, 22 (see note)  28-day cycle for up to 24 cycles

## 2022-11-07 NOTE — TELEPHONE ENCOUNTER
Caller: Cynthia Flower    Relationship to patient: Self    Best call back number: [phone]    Chief complaint: PATIENT NEEDS TO RESCHEDULE    Type of visit: PORT FLUSH, FOLLOW UP, AND INFUSION     Requested date: BEFORE  11-15-22 OR AFTER 11-15-22    If rescheduling, when is the original appointment: 11-15-22     Additional notes:PLEASE CALL PATIENT TO ADVISE

## 2022-11-08 ENCOUNTER — INFUSION (OUTPATIENT)
Dept: ONCOLOGY | Facility: HOSPITAL | Age: 67
End: 2022-11-08

## 2022-11-08 ENCOUNTER — OFFICE VISIT (OUTPATIENT)
Dept: ONCOLOGY | Facility: CLINIC | Age: 67
End: 2022-11-08

## 2022-11-08 VITALS
HEIGHT: 61 IN | DIASTOLIC BLOOD PRESSURE: 71 MMHG | HEART RATE: 67 BPM | TEMPERATURE: 97.3 F | WEIGHT: 121.7 LBS | RESPIRATION RATE: 18 BRPM | OXYGEN SATURATION: 98 % | SYSTOLIC BLOOD PRESSURE: 124 MMHG | BODY MASS INDEX: 22.98 KG/M2

## 2022-11-08 DIAGNOSIS — E85.81 LIGHT CHAIN (AL) AMYLOIDOSIS: Primary | ICD-10-CM

## 2022-11-08 DIAGNOSIS — E85.81 LIGHT CHAIN (AL) AMYLOIDOSIS: ICD-10-CM

## 2022-11-08 DIAGNOSIS — J30.9 ALLERGIC RHINITIS, UNSPECIFIED SEASONALITY, UNSPECIFIED TRIGGER: ICD-10-CM

## 2022-11-08 LAB
ALBUMIN SERPL-MCNC: 4.3 G/DL (ref 3.5–5.2)
ALBUMIN/GLOB SERPL: 2 G/DL (ref 1.1–2.4)
ALP SERPL-CCNC: 72 U/L (ref 38–116)
ALT SERPL W P-5'-P-CCNC: 28 U/L (ref 0–33)
ANION GAP SERPL CALCULATED.3IONS-SCNC: 11.4 MMOL/L (ref 5–15)
AST SERPL-CCNC: 29 U/L (ref 0–32)
BASOPHILS # BLD AUTO: 0.02 10*3/MM3 (ref 0–0.2)
BASOPHILS NFR BLD AUTO: 0.4 % (ref 0–1.5)
BILIRUB SERPL-MCNC: 0.4 MG/DL (ref 0.2–1.2)
BUN SERPL-MCNC: 12 MG/DL (ref 6–20)
BUN/CREAT SERPL: 14.6 (ref 7.3–30)
CALCIUM SPEC-SCNC: 9.8 MG/DL (ref 8.5–10.2)
CHLORIDE SERPL-SCNC: 102 MMOL/L (ref 98–107)
CO2 SERPL-SCNC: 23.6 MMOL/L (ref 22–29)
CREAT SERPL-MCNC: 0.82 MG/DL (ref 0.6–1.1)
DEPRECATED RDW RBC AUTO: 53.5 FL (ref 37–54)
EGFRCR SERPLBLD CKD-EPI 2021: 78.5 ML/MIN/1.73
EOSINOPHIL # BLD AUTO: 0.2 10*3/MM3 (ref 0–0.4)
EOSINOPHIL NFR BLD AUTO: 3.7 % (ref 0.3–6.2)
ERYTHROCYTE [DISTWIDTH] IN BLOOD BY AUTOMATED COUNT: 14.9 % (ref 12.3–15.4)
GLOBULIN UR ELPH-MCNC: 2.2 GM/DL (ref 1.8–3.5)
GLUCOSE SERPL-MCNC: 108 MG/DL (ref 74–124)
HCT VFR BLD AUTO: 39.9 % (ref 34–46.6)
HGB BLD-MCNC: 12.9 G/DL (ref 12–15.9)
IMM GRANULOCYTES # BLD AUTO: 0.02 10*3/MM3 (ref 0–0.05)
IMM GRANULOCYTES NFR BLD AUTO: 0.4 % (ref 0–0.5)
LYMPHOCYTES # BLD AUTO: 2.23 10*3/MM3 (ref 0.7–3.1)
LYMPHOCYTES NFR BLD AUTO: 41.8 % (ref 19.6–45.3)
MCH RBC QN AUTO: 31.4 PG (ref 26.6–33)
MCHC RBC AUTO-ENTMCNC: 32.3 G/DL (ref 31.5–35.7)
MCV RBC AUTO: 97.1 FL (ref 79–97)
MONOCYTES # BLD AUTO: 0.48 10*3/MM3 (ref 0.1–0.9)
MONOCYTES NFR BLD AUTO: 9 % (ref 5–12)
NEUTROPHILS NFR BLD AUTO: 2.39 10*3/MM3 (ref 1.7–7)
NEUTROPHILS NFR BLD AUTO: 44.7 % (ref 42.7–76)
NRBC BLD AUTO-RTO: 0 /100 WBC (ref 0–0.2)
PLATELET # BLD AUTO: 195 10*3/MM3 (ref 140–450)
PMV BLD AUTO: 9.9 FL (ref 6–12)
POTASSIUM SERPL-SCNC: 4.4 MMOL/L (ref 3.5–4.7)
PROT SERPL-MCNC: 6.5 G/DL (ref 6.3–8)
RBC # BLD AUTO: 4.11 10*6/MM3 (ref 3.77–5.28)
SODIUM SERPL-SCNC: 137 MMOL/L (ref 134–145)
WBC NRBC COR # BLD: 5.34 10*3/MM3 (ref 3.4–10.8)

## 2022-11-08 PROCEDURE — 80053 COMPREHEN METABOLIC PANEL: CPT | Performed by: INTERNAL MEDICINE

## 2022-11-08 PROCEDURE — 63710000001 DEXAMETHASONE PER 0.25 MG: Performed by: INTERNAL MEDICINE

## 2022-11-08 PROCEDURE — 96375 TX/PRO/DX INJ NEW DRUG ADDON: CPT

## 2022-11-08 PROCEDURE — 25010000002 BORTEZOMIB PER 0.1 MG: Performed by: INTERNAL MEDICINE

## 2022-11-08 PROCEDURE — 96401 CHEMO ANTI-NEOPL SQ/IM: CPT

## 2022-11-08 PROCEDURE — 25010000002 DARATUMUMAB-HYALURONIDASE-FIHJ 1800-30000 MG-UT/15ML SOLUTION: Performed by: INTERNAL MEDICINE

## 2022-11-08 PROCEDURE — 99214 OFFICE O/P EST MOD 30 MIN: CPT | Performed by: INTERNAL MEDICINE

## 2022-11-08 PROCEDURE — 96413 CHEMO IV INFUSION 1 HR: CPT

## 2022-11-08 PROCEDURE — 25010000002 PALONOSETRON PER 25 MCG: Performed by: INTERNAL MEDICINE

## 2022-11-08 PROCEDURE — 85025 COMPLETE CBC W/AUTO DIFF WBC: CPT

## 2022-11-08 PROCEDURE — 25010000002 CYCLOPHOSPHAMIDE 1 GM/5ML SOLUTION 5 ML VIAL: Performed by: INTERNAL MEDICINE

## 2022-11-08 RX ORDER — ACETAMINOPHEN 500 MG
1000 TABLET ORAL ONCE
Status: COMPLETED | OUTPATIENT
Start: 2022-11-08 | End: 2022-11-08

## 2022-11-08 RX ORDER — HYDROCHLOROTHIAZIDE 25 MG/1
25 TABLET ORAL DAILY
COMMUNITY
Start: 2022-10-28 | End: 2023-01-26

## 2022-11-08 RX ORDER — LORATADINE 10 MG/1
10 TABLET ORAL DAILY
Qty: 30 TABLET | Refills: 5 | Status: SHIPPED | OUTPATIENT
Start: 2022-11-08

## 2022-11-08 RX ORDER — DEXAMETHASONE 4 MG/1
40 TABLET ORAL ONCE
Status: COMPLETED | OUTPATIENT
Start: 2022-11-08 | End: 2022-11-08

## 2022-11-08 RX ORDER — ONDANSETRON HYDROCHLORIDE 8 MG/1
8 TABLET, FILM COATED ORAL 3 TIMES DAILY PRN
Qty: 30 TABLET | Refills: 5 | Status: SHIPPED | OUTPATIENT
Start: 2022-11-08

## 2022-11-08 RX ORDER — HYDROXYZINE PAMOATE 25 MG/1
25 CAPSULE ORAL ONCE
Status: COMPLETED | OUTPATIENT
Start: 2022-11-08 | End: 2022-11-08

## 2022-11-08 RX ORDER — PALONOSETRON 0.05 MG/ML
0.25 INJECTION, SOLUTION INTRAVENOUS ONCE
Status: COMPLETED | OUTPATIENT
Start: 2022-11-08 | End: 2022-11-08

## 2022-11-08 RX ORDER — ACYCLOVIR 400 MG/1
400 TABLET ORAL 2 TIMES DAILY
Qty: 60 TABLET | Refills: 5 | Status: SHIPPED | OUTPATIENT
Start: 2022-11-08 | End: 2023-01-17 | Stop reason: SDUPTHER

## 2022-11-08 RX ORDER — BORTEZOMIB 3.5 MG/1
1 INJECTION, POWDER, LYOPHILIZED, FOR SOLUTION INTRAVENOUS; SUBCUTANEOUS ONCE
Status: COMPLETED | OUTPATIENT
Start: 2022-11-08 | End: 2022-11-08

## 2022-11-08 RX ORDER — SULFAMETHOXAZOLE AND TRIMETHOPRIM 800; 160 MG/1; MG/1
1 TABLET ORAL 3 TIMES WEEKLY
Qty: 12 TABLET | Refills: 5 | Status: SHIPPED | OUTPATIENT
Start: 2022-11-09 | End: 2023-01-17 | Stop reason: SDUPTHER

## 2022-11-08 RX ORDER — SODIUM CHLORIDE 9 MG/ML
250 INJECTION, SOLUTION INTRAVENOUS ONCE
Status: COMPLETED | OUTPATIENT
Start: 2022-11-08 | End: 2022-11-08

## 2022-11-08 RX ADMIN — BORTEZOMIB 1.5 MG: 3.5 INJECTION, POWDER, LYOPHILIZED, FOR SOLUTION INTRAVENOUS; SUBCUTANEOUS at 09:10

## 2022-11-08 RX ADMIN — ACETAMINOPHEN 1000 MG: 500 TABLET ORAL at 08:38

## 2022-11-08 RX ADMIN — HYDROXYZINE PAMOATE 25 MG: 25 CAPSULE ORAL at 08:38

## 2022-11-08 RX ADMIN — PALONOSETRON 0.25 MG: 0.05 INJECTION, SOLUTION INTRAVENOUS at 08:43

## 2022-11-08 RX ADMIN — CYCLOPHOSPHAMIDE 450 MG: 200 INJECTION, SOLUTION INTRAVENOUS at 09:15

## 2022-11-08 RX ADMIN — SODIUM CHLORIDE 250 ML: 9 INJECTION, SOLUTION INTRAVENOUS at 08:38

## 2022-11-08 RX ADMIN — DARATUMUMAB AND HYALURONIDASE-FIHJ (HUMAN RECOMBINANT) 1800 MG: 1800; 30000 INJECTION SUBCUTANEOUS at 09:56

## 2022-11-08 RX ADMIN — DEXAMETHASONE 40 MG: 4 TABLET ORAL at 08:39

## 2022-11-15 ENCOUNTER — TELEPHONE (OUTPATIENT)
Dept: ONCOLOGY | Facility: CLINIC | Age: 67
End: 2022-11-15

## 2022-11-15 NOTE — TELEPHONE ENCOUNTER
Caller: Cynthia Flower    Relationship to patient: Self    Best call back number: [phone]    Chief complaint: PATIENT CALLING TO RESCHEDULE FROM 11/15/22    Type of visit:PORT FLUSH, FU, INFUSION    Requested date: NEXT AVAILABLE

## 2022-11-16 ENCOUNTER — TELEPHONE (OUTPATIENT)
Dept: ONCOLOGY | Facility: CLINIC | Age: 67
End: 2022-11-16

## 2022-11-16 NOTE — TELEPHONE ENCOUNTER
Pt will keep her Tuesday appt. She was seen at [place] for elective septal reduction surgery. Pt states the will schedule after her 3 cycle of Chemo.

## 2022-11-16 NOTE — TELEPHONE ENCOUNTER
----- Message from Fawn Ng sent at 11/16/2022 12:14 PM EST -----  Regarding: RE: APPT  Please let her know he wants her to keep the Tuesday appt, thanks  ----- Message -----  From: Nav Sal MD  Sent: 11/16/2022   8:01 AM EST  To: Fawn Ng, Laura Grace RN  Subject: RE: APPT                                         Next Tuesday is fine. She can stay on a Tuesday schedule.    Laura, can you call her, find out what the plan is, and see if she needs anything before then? [facility]        ----- Message -----  From: Fawn Ng  Sent: 11/15/2022   5:28 PM EST  To: Nav Sal MD  Subject: APPT                                             Patient is back from [facility],I placed her on your schedule 11/22 right after the other one you have in mid morning/afternoon. I was just guessing, anyway, she really wants this week, please advise?    Fawn

## 2022-11-17 ENCOUNTER — TELEPHONE (OUTPATIENT)
Dept: ONCOLOGY | Facility: CLINIC | Age: 67
End: 2022-11-17

## 2022-11-17 NOTE — TELEPHONE ENCOUNTER
Caller: Cynthia Flower    Relationship: Self    Best call back number: [phone]    Requested Prescriptions:   Requested Prescriptions     Pending Prescriptions Disp Refills   • propranolol (INDERAL) 40 MG tablet       Sig: Take 1 tablet by mouth Daily.        Pharmacy where request should be sent: [id number] - [place], [place] - [address] [facility] AT [facility] OF [address] [place] & [place] - [phone] [facility] [phone] FX       Does the patient have less than a 3 day supply:  [] Yes  [x] No    Ellis Menjivar Rep   11/17/22 09:02 EST

## 2022-11-17 NOTE — TELEPHONE ENCOUNTER
Pt called to see if her TX on 11/22 was the last day of cycle 3. I informed her it is day 22, the last day of cycle 3. Pt V/U.

## 2022-11-18 RX ORDER — PROPRANOLOL HYDROCHLORIDE 40 MG/1
40 TABLET ORAL DAILY
Qty: 90 TABLET | Refills: 1 | Status: SHIPPED | OUTPATIENT
Start: 2022-11-18 | End: 2023-02-02

## 2022-11-18 NOTE — TELEPHONE ENCOUNTER
Caller: Cynthia Flower    Relationship to patient: Self    Best call back number:[phone]    Patient is needing: PATIENT IS CALLING TO CHECK ON THE STATUS OF THIS REFILL REQUEST

## 2022-11-21 NOTE — PROGRESS NOTES
"[facility] GROUP OUTPATIENT FOLLOW UP CLINIC VISIT    REASON FOR FOLLOW-UP:    AL amyloidosis  Therapy with michael-CyBorD initiated 8/16/2022    HISTORY OF PRESENT ILLNESS: Cynthia Flower is a 67 y.o. female with the above-mentioned history who is here today for lab review and evaluation    She was seen by cardiac surgery at [facility].  Surgery is planned probably in the next few weeks.  Dyspnea on exertion and fatigue overall are better.  She does note some myalgias that started yesterday.  She notes that almost all of her muscles are sore.  She continues to have significant diarrhea after eating.  She saw an esophageal specialist at [facility] yesterday and was started on some new medication.    REVIEW OF SYSTEMS:  As per the HPI    PHYSICAL EXAMINATION:    Vitals:    11/22/22 1212   BP: 124/78   Pulse: 67   Resp: 18   Temp: 98.2 °F (36.8 °C)   TempSrc: Temporal   SpO2: 96%   Weight: 55.5 kg (122 lb 4.8 oz)   Height: 154.9 cm (60.98\")   PainSc:   4   PainLoc: Generalized     Physical Exam  Vitals reviewed.   Constitutional:       General: She is not in acute distress.     Appearance: Normal appearance. She is well-developed.   HENT:      Head: Normocephalic and atraumatic.      Mouth/Throat:      Pharynx: No oropharyngeal exudate.   Eyes:      Pupils: Pupils are equal, round, and reactive to light.   Cardiovascular:      Rate and Rhythm: Normal rate and regular rhythm.      Heart sounds: Murmur heard.    Systolic murmur is present with a grade of 2/6.  Pulmonary:      Effort: Pulmonary effort is normal. No respiratory distress.      Breath sounds: Normal breath sounds. No wheezing, rhonchi or rales.   Abdominal:      General: Bowel sounds are normal. There is no distension.      Palpations: Abdomen is soft.   Musculoskeletal:         General: Normal range of motion.      Cervical back: Normal range of motion.   Skin:     General: Skin is warm and dry.      Findings: No rash.   Neurological:      " Mental Status: She is alert and oriented to person, place, and time.       DIAGNOSTIC DATA:  CBC and Differential (11/22/2022 11:56)      IMAGING:    None reviewed      ASSESSMENT:  This is a 67 y.o. female with:    *AL amyloidosis  · She had a stress echocardiogram on 4/24/2020 showing a normal left ventricular ejection fraction of 60% with moderate concentric hypertrophy but no wall motion abnormalities of the left ventricle.  There was impaired relaxation noted.  She complained of chest pain during the examination.  There was some ST segment depression.  Cardiac catheterization was performed on the same day.  No intervention was required.  · Follow-up echocardiogram on 4/15/2021 showed a left ventricular ejection fraction of 61 to 65% with normal LV cavity size.  Left ventricular wall thickness showed moderate concentric hypertrophy.  Diastolic function was impaired.  No pericardial effusion.  Small left pleural effusion.  · She had follow-up PYP imaging for cardiac amyloidosis that was not suggestive of ATTR amyloidosis  · Laboratory values on 2/24/2022 showed a high serum free light chain lambda of 121, normal kappa of 10.5, low ratio at 0.09.  Serum protein electrophoresis showed no evidence of an M spike.  Urine light chains were abnormal with an elevated lambda light chain of 33 and a low ratio of 0.48 with a 12.7 mg M spike on 24-hour urine protein electrophoresis.  · BNP was elevated at 2306 on 3/4/2022.  The troponin was normal at 0.03.  CK was 212 with a CK-MB of 10.87.  · Initial consult on 3/30/22. No M spike on serum protein electrophoresis. SIFE 'presence of monoclonal protein is unclear.'   · Bone marrow biopsy 4/13/22 normocellular, 12.1% plasma cells consistent with low volume plasma cell dyscrasia. FISH with low level t(11;14) fusion only. No amyloid noted as Congo red staining negative.   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 Buenrostro at [place]. Intended  to enroll on a clinical study but her troponin as tested by the study sponsor was not high enough  · Therapy with sissy-CyBorD initiated 8/16/2022  · Patient seen in the office on 8/17/2022 for triage visit.  Patient with complaints of fatigue, dizziness and diarrhea.  She was mildly hypotensive.  She was given IV fluids.  · Subsequent admission at [facility] with shortness of breath and volume overload.  She was diuresed and treated with antibiotics.  · D8 therapy administered on 8/30  · Light chains normal at [facility] on 8/31/22 (lambda light chain 1.3, down from 12.44)  · 9/27/2022: Cycle 2-day 8 of therapy  · 10/4/2022 cycle 2-day 8 CyBorD.  Patient continues to push oral hydration.  She continues to have some neck stiffness but this has not worsened.  She will see [facility] next week for further cardiac work-up.  Glucose was low upon initial labs today however the patient was given something to eat by nursing prior to rechecking.  This was therefore rechecked prior to her leaving the office and was 87.  Patient reports she was feeling fine.  · 10/18/2020 to cycle 2-day 22 Sissy -CyBorD.  Velcade dose will be reduced to 1.0 mg/m² due to patient's persistent issues with orthostasis.  · 10/25/2022: Cycle 3-day 1  · 11/8/2022: Delay in cycle 3-day 15 therapy.  Day 8 omitted due to an upper respiratory infection.  · 11/22/2022: Cycle 3-day 22.  This will be the final planned treatment before she goes to [facility] for cardiac surgery.    *Diarrhea  · She saw Dr. Hoyos with GI at [facility] on 9/7.  · Suspicion for amyloid involvement of the GI tract  · Continue Lomotil and Imodium. Rifaximin prescribed by Dr. Hoyos.  She has completed this.  · Diarrhea waxes and wanes with what she eats and dairy products particularly worsen diarrhea  · Symptoms remain stable    *Elevated liver enzymes  · 8/17/2022: AST 87, , bilirubin normal at 0.4  · 10/4/2022 ALT 39 and AST 36, previously 48 and 36.  · Liver  labs normal as of 11/8/2022.  Pending today.    *Cardiomyopathy:  · Most recent echocardiogram on 8/17/2022 showed left ventricular wall thickness consistent with moderate to severe concentric hypertrophy along with left ventricular septal wall measuring 2.2 cm and posterior wall thickness at 1.9 cm likely due to amyloidosis.  LVEF 61 to 65%.  Grade 1 impaired relaxation.  · Now followed by Dr. Lyles at [facility] with concerns for pre-existing HCM and AL cardiac amyloid.   · Cardiac MRI results above.  Amyloidosis evident.  · Catheterization performed at [facility].  She has seen a cardiac surgeon.  Surgery planned.    *Sore tongue: If she wants Magic mouthwash she will let us know.  No complaints of this at this time..    *Periorbital hematomas: These have resolved.  Coagulation studies and a factor X level were all normal.    *Myalgias: Check a CK and magnesium level today      PLAN:  1. Proceed with cycle 3-day 22 Sissy-CyBorD today.  The Velcade dose has been reduced to 1.0 mg/m².  2. Patient receives IV Cytoxan as she does not like taking pills.    3. She continues on oral dexamethasone 40 mg weekly given in the office.  4. Continue cardiac medications as managed by cardiology at [facility]  5. No more IV fluid infusions and she will keep up with excess fluid losses with oral intake.  6. Follow-up with cardiology and gastroenterology at [facility].   7. Plans for cardiac surgery likely in a few weeks.  Anytime in December is fine with me.  8. If possible, then, hopeful for autologous stem cell transplant at [facility] after induction therapy.  She follows up with Dr. Buenrostro at [facility]  9. Continue acyclovir and Bactrim for prophylaxis  10. We will treat through peripheral IVs as long as possible.  11. As of 11/22/2022 I anticipate she will be going to [facility] for cardiac surgery sometime in December.  I will plan to see her back in mid January but this can be adjusted as needed..    High risk therapy requiring  intensive monitoring    Sissy-CyBorD regimen     Daratumumab and hyaluronidase (Darzalex Faspro) as follows:  Cycles 1 & 2: 1800 mg SC once per day on days 1, 8, 15, 22  Cycles 3 to 6: 1800 mg SC once per day on days 1 & 15  Cycles 7 up to 24: 1800 mg SC once on day 1     Bortezomib (Velcade) as follows:  Cycles 1 to 6: 1.3 mg/m2 SC once per day on days 1, 8, 15, 22.  (Now dose reduced to 1.0 mg per metered squared.)     Cyclophosphamide (Cytoxan) as follows:  Cycles 1 to 6: 300 mg/m2 (maximum dose of 500 mg) PO or IV once per day on days 1, 8, 15, 22    Plan IV Cytoxan as she has difficulty swallowing pills.     Dexamethasone (Decadron) as follows:  Cycles 1 to 6: 40 mg PO or IV once per day on days 1, 8, 15, 22 (see note)  28-day cycle for up to 24 cycles

## 2022-11-22 ENCOUNTER — INFUSION (OUTPATIENT)
Dept: ONCOLOGY | Facility: HOSPITAL | Age: 67
End: 2022-11-22

## 2022-11-22 ENCOUNTER — OFFICE VISIT (OUTPATIENT)
Dept: ONCOLOGY | Facility: CLINIC | Age: 67
End: 2022-11-22

## 2022-11-22 VITALS
DIASTOLIC BLOOD PRESSURE: 78 MMHG | HEIGHT: 61 IN | HEART RATE: 67 BPM | OXYGEN SATURATION: 96 % | RESPIRATION RATE: 18 BRPM | SYSTOLIC BLOOD PRESSURE: 124 MMHG | BODY MASS INDEX: 23.09 KG/M2 | WEIGHT: 122.3 LBS | TEMPERATURE: 98.2 F

## 2022-11-22 DIAGNOSIS — E85.81 LIGHT CHAIN (AL) AMYLOIDOSIS: ICD-10-CM

## 2022-11-22 DIAGNOSIS — E85.81 LIGHT CHAIN (AL) AMYLOIDOSIS: Primary | ICD-10-CM

## 2022-11-22 DIAGNOSIS — M79.10 MYALGIA: Primary | ICD-10-CM

## 2022-11-22 LAB
ALBUMIN SERPL-MCNC: 4.4 G/DL (ref 3.5–5.2)
ALBUMIN/GLOB SERPL: 2.2 G/DL (ref 1.1–2.4)
ALP SERPL-CCNC: 63 U/L (ref 38–116)
ALT SERPL W P-5'-P-CCNC: 30 U/L (ref 0–33)
ANION GAP SERPL CALCULATED.3IONS-SCNC: 10.8 MMOL/L (ref 5–15)
AST SERPL-CCNC: 33 U/L (ref 0–32)
BASOPHILS # BLD AUTO: 0.02 10*3/MM3 (ref 0–0.2)
BASOPHILS NFR BLD AUTO: 0.3 % (ref 0–1.5)
BILIRUB SERPL-MCNC: 0.4 MG/DL (ref 0.2–1.2)
BUN SERPL-MCNC: 15 MG/DL (ref 6–20)
BUN/CREAT SERPL: 17 (ref 7.3–30)
CALCIUM SPEC-SCNC: 9.9 MG/DL (ref 8.5–10.2)
CHLORIDE SERPL-SCNC: 106 MMOL/L (ref 98–107)
CK SERPL-CCNC: 142 U/L (ref 20–180)
CO2 SERPL-SCNC: 22.2 MMOL/L (ref 22–29)
CREAT SERPL-MCNC: 0.88 MG/DL (ref 0.6–1.1)
DEPRECATED RDW RBC AUTO: 51.7 FL (ref 37–54)
EGFRCR SERPLBLD CKD-EPI 2021: 72.1 ML/MIN/1.73
EOSINOPHIL # BLD AUTO: 0.2 10*3/MM3 (ref 0–0.4)
EOSINOPHIL NFR BLD AUTO: 3.5 % (ref 0.3–6.2)
ERYTHROCYTE [DISTWIDTH] IN BLOOD BY AUTOMATED COUNT: 14.7 % (ref 12.3–15.4)
GLOBULIN UR ELPH-MCNC: 2 GM/DL (ref 1.8–3.5)
GLUCOSE SERPL-MCNC: 103 MG/DL (ref 74–124)
HCT VFR BLD AUTO: 40.7 % (ref 34–46.6)
HGB BLD-MCNC: 13.4 G/DL (ref 12–15.9)
IMM GRANULOCYTES # BLD AUTO: 0.01 10*3/MM3 (ref 0–0.05)
IMM GRANULOCYTES NFR BLD AUTO: 0.2 % (ref 0–0.5)
LYMPHOCYTES # BLD AUTO: 2.03 10*3/MM3 (ref 0.7–3.1)
LYMPHOCYTES NFR BLD AUTO: 35.1 % (ref 19.6–45.3)
MAGNESIUM SERPL-MCNC: 2.2 MG/DL (ref 1.8–2.5)
MCH RBC QN AUTO: 31.6 PG (ref 26.6–33)
MCHC RBC AUTO-ENTMCNC: 32.9 G/DL (ref 31.5–35.7)
MCV RBC AUTO: 96 FL (ref 79–97)
MONOCYTES # BLD AUTO: 0.43 10*3/MM3 (ref 0.1–0.9)
MONOCYTES NFR BLD AUTO: 7.4 % (ref 5–12)
NEUTROPHILS NFR BLD AUTO: 3.09 10*3/MM3 (ref 1.7–7)
NEUTROPHILS NFR BLD AUTO: 53.5 % (ref 42.7–76)
NRBC BLD AUTO-RTO: 0 /100 WBC (ref 0–0.2)
PLATELET # BLD AUTO: 211 10*3/MM3 (ref 140–450)
PMV BLD AUTO: 10.3 FL (ref 6–12)
POTASSIUM SERPL-SCNC: 4.7 MMOL/L (ref 3.5–4.7)
PROT SERPL-MCNC: 6.4 G/DL (ref 6.3–8)
RBC # BLD AUTO: 4.24 10*6/MM3 (ref 3.77–5.28)
SODIUM SERPL-SCNC: 139 MMOL/L (ref 134–145)
WBC NRBC COR # BLD: 5.78 10*3/MM3 (ref 3.4–10.8)

## 2022-11-22 PROCEDURE — 83735 ASSAY OF MAGNESIUM: CPT | Performed by: INTERNAL MEDICINE

## 2022-11-22 PROCEDURE — 63710000001 DEXAMETHASONE PER 0.25 MG: Performed by: INTERNAL MEDICINE

## 2022-11-22 PROCEDURE — 96401 CHEMO ANTI-NEOPL SQ/IM: CPT

## 2022-11-22 PROCEDURE — 82550 ASSAY OF CK (CPK): CPT | Performed by: INTERNAL MEDICINE

## 2022-11-22 PROCEDURE — 99214 OFFICE O/P EST MOD 30 MIN: CPT | Performed by: INTERNAL MEDICINE

## 2022-11-22 PROCEDURE — 96375 TX/PRO/DX INJ NEW DRUG ADDON: CPT

## 2022-11-22 PROCEDURE — 25010000002 PALONOSETRON PER 25 MCG: Performed by: INTERNAL MEDICINE

## 2022-11-22 PROCEDURE — 85025 COMPLETE CBC W/AUTO DIFF WBC: CPT

## 2022-11-22 PROCEDURE — 36415 COLL VENOUS BLD VENIPUNCTURE: CPT | Performed by: INTERNAL MEDICINE

## 2022-11-22 PROCEDURE — 96413 CHEMO IV INFUSION 1 HR: CPT

## 2022-11-22 PROCEDURE — 80053 COMPREHEN METABOLIC PANEL: CPT

## 2022-11-22 PROCEDURE — 25010000002 CYCLOPHOSPHAMIDE 1 GM/5ML SOLUTION 5 ML VIAL: Performed by: INTERNAL MEDICINE

## 2022-11-22 PROCEDURE — 25010000002 BORTEZOMIB PER 0.1 MG: Performed by: INTERNAL MEDICINE

## 2022-11-22 RX ORDER — PALONOSETRON 0.05 MG/ML
0.25 INJECTION, SOLUTION INTRAVENOUS ONCE
Status: COMPLETED | OUTPATIENT
Start: 2022-11-22 | End: 2022-11-22

## 2022-11-22 RX ORDER — BORTEZOMIB 3.5 MG/1
1 INJECTION, POWDER, LYOPHILIZED, FOR SOLUTION INTRAVENOUS; SUBCUTANEOUS ONCE
Status: COMPLETED | OUTPATIENT
Start: 2022-11-22 | End: 2022-11-22

## 2022-11-22 RX ORDER — ONDANSETRON HYDROCHLORIDE 8 MG/1
8 TABLET, FILM COATED ORAL
COMMUNITY
Start: 2022-08-12 | End: 2023-03-21 | Stop reason: SDUPTHER

## 2022-11-22 RX ORDER — MONTELUKAST SODIUM 10 MG/1
10 TABLET ORAL
COMMUNITY
Start: 2022-11-04

## 2022-11-22 RX ORDER — DEXAMETHASONE 4 MG/1
40 TABLET ORAL ONCE
Status: COMPLETED | OUTPATIENT
Start: 2022-11-22 | End: 2022-11-22

## 2022-11-22 RX ORDER — HYOSCYAMINE SULFATE EXTENDED-RELEASE 0.38 MG/1
0.38 TABLET ORAL
COMMUNITY
Start: 2022-11-22 | End: 2023-05-22

## 2022-11-22 RX ORDER — OMEPRAZOLE 40 MG/1
40 CAPSULE, DELAYED RELEASE ORAL DAILY
COMMUNITY
Start: 2022-11-21 | End: 2023-02-02

## 2022-11-22 RX ORDER — SODIUM CHLORIDE 9 MG/ML
250 INJECTION, SOLUTION INTRAVENOUS ONCE
Status: COMPLETED | OUTPATIENT
Start: 2022-11-22 | End: 2022-11-22

## 2022-11-22 RX ADMIN — CYCLOPHOSPHAMIDE 450 MG: 200 INJECTION, SOLUTION INTRAVENOUS at 13:24

## 2022-11-22 RX ADMIN — SODIUM CHLORIDE 250 ML: 9 INJECTION, SOLUTION INTRAVENOUS at 13:23

## 2022-11-22 RX ADMIN — BORTEZOMIB 1.5 MG: 3.5 INJECTION, POWDER, LYOPHILIZED, FOR SOLUTION INTRAVENOUS; SUBCUTANEOUS at 13:56

## 2022-11-22 RX ADMIN — DEXAMETHASONE 40 MG: 4 TABLET ORAL at 12:56

## 2022-11-22 RX ADMIN — PALONOSETRON 0.25 MG: 0.05 INJECTION, SOLUTION INTRAVENOUS at 12:57

## 2022-11-22 NOTE — TELEPHONE ENCOUNTER
Call to pt.  Advise per DR Beck note.  Verb understanding.    Cheek Interpolation Flap Text: A decision was made to reconstruct the defect utilizing an interpolation axial flap and a staged reconstruction.  A telfa template was made of the defect.  This telfa template was then used to outline the Cheek Interpolation flap.  The donor area for the pedicle flap was then injected with anesthesia.  The flap was excised through the skin and subcutaneous tissue down to the layer of the underlying musculature.  The interpolation flap was carefully excised within this deep plane to maintain its blood supply.  The edges of the donor site were undermined.   The donor site was closed in a primary fashion.  The pedicle was then rotated into position and sutured.  Once the tube was sutured into place, adequate blood supply was confirmed with blanching and refill.  The pedicle was then wrapped with xeroform gauze and dressed appropriately with a telfa and gauze bandage to ensure continued blood supply and protect the attached pedicle.

## 2022-11-23 ENCOUNTER — TELEPHONE (OUTPATIENT)
Dept: FAMILY MEDICINE CLINIC | Facility: CLINIC | Age: 67
End: 2022-11-23

## 2022-11-23 NOTE — TELEPHONE ENCOUNTER
"Caller: Cynthia Flower    Relationship: Self    Best call back number:[phone]    What form or medical record are you requesting: ANY TESTING RESULTS OR OFFICE NOTES IN REGARDS TO PATIENT'S HEART ISSUES     Who is requesting this form or medical record from you: [facility].    How would you like to receive the form or medical records (pick-up, mail, fax): PLEASE UPLOAD TO PATIENT'S [employer]     Timeframe paperwork needed:     Additional notes: \"ATTENTION: DR. MARQUEZ\"    PHONE: [phone]    ADDRESS: [address] [place] [address], 5TH FLOOR, [facility]          "

## 2022-11-28 PROCEDURE — 87081 CULTURE SCREEN ONLY: CPT

## 2022-12-01 RX ORDER — OLOPATADINE HYDROCHLORIDE 1 MG/ML
SOLUTION/ DROPS OPHTHALMIC
Qty: 5 ML | Refills: 5 | Status: SHIPPED | OUTPATIENT
Start: 2022-12-01

## 2022-12-14 RX ORDER — POTASSIUM CHLORIDE 1500 MG/1
TABLET, FILM COATED, EXTENDED RELEASE ORAL
Qty: 60 TABLET | Refills: 1 | Status: SHIPPED | OUTPATIENT
Start: 2022-12-14

## 2022-12-19 RX ORDER — FLUOXETINE HYDROCHLORIDE 20 MG/1
CAPSULE ORAL
Qty: 360 CAPSULE | Refills: 0 | Status: SHIPPED | OUTPATIENT
Start: 2022-12-19

## 2023-01-09 RX ORDER — ROSUVASTATIN CALCIUM 20 MG/1
20 TABLET, COATED ORAL DAILY
Qty: 90 TABLET | Refills: 0 | Status: SHIPPED | OUTPATIENT
Start: 2023-01-09

## 2023-01-16 NOTE — PROGRESS NOTES
"[facility] GROUP OUTPATIENT FOLLOW UP CLINIC VISIT    REASON FOR FOLLOW-UP:    AL amyloidosis  Therapy with michael-CyBorD initiated 8/16/2022    HISTORY OF PRESENT ILLNESS: Cynthia Flower is a 67 y.o. female with the above-mentioned history who is here today for lab review and evaluation    Cardiac surgery performed at [facility] on 12/5/2022.  Pathology from the myectomy showed cardiac amyloidosis extensively involving the vessels and endocardium with myocyte hypertrophy and interstitial fibrosis.  Congo red stain was positive.    She spent some time in rehabilitation but now is doing well.  She continues to have some upper sternal pain and pain around the mid clavicles.  Dyspnea on exertion is improved a little bit.    She was seen at [facility] recently with plans to resume therapy for 3 more cycles and then consideration of peripheral blood stem cell and/or cardiac transplant.    REVIEW OF SYSTEMS:  As per the HPI    PHYSICAL EXAMINATION:    Vitals:    01/17/23 1530   BP: 120/74   Pulse: 74   Resp: 18   Temp: 97.7 °F (36.5 °C)   TempSrc: Temporal   SpO2: 96%   Weight: 53.7 kg (118 lb 6.4 oz)   Height: 154.9 cm (60.98\")   PainSc:   6   PainLoc: Chest     General:  No acute distress, awake, alert and oriented  Skin:  Warm and dry, no visible rash  HEENT:  Normocephalic/atraumatic.  Wearing a face mask.  Chest:  Normal respiratory effort.  Lungs clear to auscultation bilaterally.  Healed median sternotomy scar.  Heart: Regular rate and rhythm  Extremities:  No visible clubbing, cyanosis, or edema  Neuro/psych:  Grossly nonfocal.  Normal mood and affect.        DIAGNOSTIC DATA:  CBC & Differential (01/17/2023 15:09)    IMAGING:    None reviewed      ASSESSMENT:  This is a 67 y.o. female with:    *AL amyloidosis  · She had a stress echocardiogram on 4/24/2020 showing a normal left ventricular ejection fraction of 60% with moderate concentric hypertrophy but no wall motion abnormalities of the left " ventricle.  There was impaired relaxation noted.  She complained of chest pain during the examination.  There was some ST segment depression.  Cardiac catheterization was performed on the same day.  No intervention was required.  · Follow-up echocardiogram on 4/15/2021 showed a left ventricular ejection fraction of 61 to 65% with normal LV cavity size.  Left ventricular wall thickness showed moderate concentric hypertrophy.  Diastolic function was impaired.  No pericardial effusion.  Small left pleural effusion.  · She had follow-up PYP imaging for cardiac amyloidosis that was not suggestive of ATTR amyloidosis  · Laboratory values on 2/24/2022 showed a high serum free light chain lambda of 121, normal kappa of 10.5, low ratio at 0.09.  Serum protein electrophoresis showed no evidence of an M spike.  Urine light chains were abnormal with an elevated lambda light chain of 33 and a low ratio of 0.48 with a 12.7 mg M spike on 24-hour urine protein electrophoresis.  · BNP was elevated at 2306 on 3/4/2022.  The troponin was normal at 0.03.  CK was 212 with a CK-MB of 10.87.  · Initial consult on 3/30/22. No M spike on serum protein electrophoresis. SIFE 'presence of monoclonal protein is unclear.'   · Bone marrow biopsy 4/13/22 normocellular, 12.1% plasma cells consistent with low volume plasma cell dyscrasia. FISH with low level t(11;14) fusion only. No amyloid noted as Congo red staining negative.   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 Buenrostro at [facility]. Intended to enroll on a clinical study but her troponin as tested by the study sponsor was not high enough  · Therapy with michael-CyBorD initiated 8/16/2022  · Patient seen in the office on 8/17/2022 for triage visit.  Patient with complaints of fatigue, dizziness and diarrhea.  She was mildly hypotensive.  She was given IV fluids.  · Subsequent admission at [facility] with shortness of breath and volume overload.   She was diuresed and treated with antibiotics.  · D8 therapy administered on 8/30  · Light chains normal at [facility] on 8/31/22 (lambda light chain 1.3, down from 12.44)  · 9/27/2022: Cycle 2-day 8 of therapy  · 10/4/2022 cycle 2-day 8 CyBorD.  Patient continues to push oral hydration.  She continues to have some neck stiffness but this has not worsened.  She will see [facility] next week for further cardiac work-up.  Glucose was low upon initial labs today however the patient was given something to eat by nursing prior to rechecking.  This was therefore rechecked prior to her leaving the office and was 87.  Patient reports she was feeling fine.  · 10/18/2020 to cycle 2-day 22 Sissy -CyBorD.  Velcade dose will be reduced to 1.0 mg/m² due to patient's persistent issues with orthostasis.  · 10/25/2022: Cycle 3-day 1  · 11/8/2022: Delay in cycle 3-day 15 therapy.  Day 8 omitted due to an upper respiratory infection.  · 11/22/2022: Cycle 3-day 22.   final planned treatment before she goes to [facility] for cardiac surgery.  · Cardiac surgery performed at [facility] on 12/5/2022.  Pathology from the myectomy showed cardiac amyloidosis extensively involving the vessels and endocardium with myocyte hypertrophy and interstitial fibrosis.  Congo red stain was positive.  · Reviewed back on 1/17/2023 with plans to resume Darzalex, Cytoxan, Velcade, dexamethasone with cycle #4 on 1/24.    *Diarrhea  · She saw Dr. Hoyos with GI at [facility] on 9/7.  · Suspicion for amyloid involvement of the GI tract  · Continue Lomotil and Imodium. Rifaximin prescribed by Dr. Hoyos.  She has completed this.  · Diarrhea waxes and wanes with what she eats and dairy products particularly worsen diarrhea  · Symptoms remain stable    *Elevated liver enzymes  · Normalized and remained normal.    *Cardiomyopathy:  · Most recent echocardiogram on 8/17/2022 showed left ventricular wall thickness consistent with moderate to severe concentric  hypertrophy along with left ventricular septal wall measuring 2.2 cm and posterior wall thickness at 1.9 cm likely due to amyloidosis.  LVEF 61 to 65%.  Grade 1 impaired relaxation.  · Now followed by Dr. Lyles at [facility] with concerns for pre-existing HCM and AL cardiac amyloid.   · Cardiac MRI results above.  Amyloidosis evident.  · Catheterization performed at [facility].    · Cardiac surgery performed at [facility] on 12/5/2022.  Pathology from the myectomy showed cardiac amyloidosis extensively involving the vessels and endocardium with myocyte hypertrophy and interstitial fibrosis.  Congo red stain was positive.    *Periorbital hematomas: These have resolved.  Coagulation studies and a factor X level were all normal.    *History of myalgias    *History of sore tongue    PLAN:  1. On 1/24/2023 proceed with cycle #4 of therapy.  The Velcade dose has been reduced to 1.0 mg/m².  2. Patient receives IV Cytoxan as she does not tolerate taking pills.    3. She continues on oral dexamethasone weekly given in the office but we will plan to decrease the dose to 20 mg with ongoing cycles.  4. Continue cardiac medications as managed by cardiology at [facility]  5. Plan 3 more cycles of therapy for a total of 6.  After that, consideration of autologous peripheral blood stem cell transplant and potentially cardiac transplant.  6. Follow-up with cardiology and gastroenterology at [facility].   7. She continues to follow up with Dr. Buenrostro at [facility] and I will communicate with him  8. Resume acyclovir and Bactrim for prophylaxis  9. We will treat through peripheral IVs as long as possible.  10. Nurse practitioner visit in 2 weeks.  I will see her in 4 weeks.    High risk therapy requiring intensive monitoring    Sissy-CyBorD regimen     Daratumumab and hyaluronidase (Darzalex Faspro) as follows:  Cycles 1 & 2: 1800 mg SC once per day on days 1, 8, 15, 22  Cycles 3 to 6: 1800 mg SC once per day on days 1 & 15  Cycles 7 up to  24: 1800 mg SC once on day 1     Bortezomib (Velcade) as follows:  Cycles 1 to 6: 1.3 mg/m2 SC once per day on days 1, 8, 15, 22.  (Now dose reduced to 1.0 mg per metered squared.)     Cyclophosphamide (Cytoxan) as follows:  Cycles 1 to 6: 300 mg/m2 (maximum dose of 500 mg) PO or IV once per day on days 1, 8, 15, 22    Plan IV Cytoxan as she has difficulty swallowing pills.     Dexamethasone (Decadron) as follows:  Cycles 1 to 6: 40 mg PO or IV once per day on days 1, 8, 15, 22 (see note)  28-day cycle for up to 24 cycles  Plan to reduce the dexamethasone dose to 20 mg weekly    I spent 50 minutes in this visit today reviewing her record communicating with her examining her communicating with staff placing orders and documenting the encounter.

## 2023-01-17 ENCOUNTER — OFFICE VISIT (OUTPATIENT)
Dept: ONCOLOGY | Facility: CLINIC | Age: 68
End: 2023-01-17
Payer: MEDICARE

## 2023-01-17 ENCOUNTER — LAB (OUTPATIENT)
Dept: LAB | Facility: HOSPITAL | Age: 68
End: 2023-01-17
Payer: MEDICARE

## 2023-01-17 VITALS
RESPIRATION RATE: 18 BRPM | WEIGHT: 118.4 LBS | HEART RATE: 74 BPM | OXYGEN SATURATION: 96 % | HEIGHT: 61 IN | TEMPERATURE: 97.7 F | BODY MASS INDEX: 22.36 KG/M2 | DIASTOLIC BLOOD PRESSURE: 74 MMHG | SYSTOLIC BLOOD PRESSURE: 120 MMHG

## 2023-01-17 DIAGNOSIS — E85.81 LIGHT CHAIN (AL) AMYLOIDOSIS: ICD-10-CM

## 2023-01-17 DIAGNOSIS — R19.7 DIARRHEA, UNSPECIFIED TYPE: ICD-10-CM

## 2023-01-17 DIAGNOSIS — E85.81 LIGHT CHAIN (AL) AMYLOIDOSIS: Primary | ICD-10-CM

## 2023-01-17 LAB
ALBUMIN SERPL-MCNC: 4.7 G/DL (ref 3.5–5.2)
ALBUMIN/GLOB SERPL: 2.5 G/DL (ref 1.1–2.4)
ALP SERPL-CCNC: 75 U/L (ref 38–116)
ALT SERPL W P-5'-P-CCNC: 21 U/L (ref 0–33)
ANION GAP SERPL CALCULATED.3IONS-SCNC: 10.5 MMOL/L (ref 5–15)
AST SERPL-CCNC: 27 U/L (ref 0–32)
BASOPHILS # BLD AUTO: 0.02 10*3/MM3 (ref 0–0.2)
BASOPHILS NFR BLD AUTO: 0.3 % (ref 0–1.5)
BILIRUB SERPL-MCNC: 0.4 MG/DL (ref 0.2–1.2)
BUN SERPL-MCNC: 12 MG/DL (ref 6–20)
BUN/CREAT SERPL: 17.1 (ref 7.3–30)
CALCIUM SPEC-SCNC: 10 MG/DL (ref 8.5–10.2)
CHLORIDE SERPL-SCNC: 103 MMOL/L (ref 98–107)
CO2 SERPL-SCNC: 26.5 MMOL/L (ref 22–29)
CREAT SERPL-MCNC: 0.7 MG/DL (ref 0.6–1.1)
DEPRECATED RDW RBC AUTO: 44.1 FL (ref 37–54)
EGFRCR SERPLBLD CKD-EPI 2021: 94.9 ML/MIN/1.73
EOSINOPHIL # BLD AUTO: 0.28 10*3/MM3 (ref 0–0.4)
EOSINOPHIL NFR BLD AUTO: 4.1 % (ref 0.3–6.2)
ERYTHROCYTE [DISTWIDTH] IN BLOOD BY AUTOMATED COUNT: 13.1 % (ref 12.3–15.4)
GLOBULIN UR ELPH-MCNC: 1.9 GM/DL (ref 1.8–3.5)
GLUCOSE SERPL-MCNC: 96 MG/DL (ref 74–124)
HCT VFR BLD AUTO: 42.6 % (ref 34–46.6)
HGB BLD-MCNC: 13.1 G/DL (ref 12–15.9)
IMM GRANULOCYTES # BLD AUTO: 0.01 10*3/MM3 (ref 0–0.05)
IMM GRANULOCYTES NFR BLD AUTO: 0.1 % (ref 0–0.5)
LYMPHOCYTES # BLD AUTO: 3.13 10*3/MM3 (ref 0.7–3.1)
LYMPHOCYTES NFR BLD AUTO: 45.6 % (ref 19.6–45.3)
MCH RBC QN AUTO: 28.5 PG (ref 26.6–33)
MCHC RBC AUTO-ENTMCNC: 30.8 G/DL (ref 31.5–35.7)
MCV RBC AUTO: 92.6 FL (ref 79–97)
MONOCYTES # BLD AUTO: 0.39 10*3/MM3 (ref 0.1–0.9)
MONOCYTES NFR BLD AUTO: 5.7 % (ref 5–12)
NEUTROPHILS NFR BLD AUTO: 3.04 10*3/MM3 (ref 1.7–7)
NEUTROPHILS NFR BLD AUTO: 44.2 % (ref 42.7–76)
NRBC BLD AUTO-RTO: 0 /100 WBC (ref 0–0.2)
PLATELET # BLD AUTO: 259 10*3/MM3 (ref 140–450)
PMV BLD AUTO: 9.4 FL (ref 6–12)
POTASSIUM SERPL-SCNC: 4 MMOL/L (ref 3.5–4.7)
PROT SERPL-MCNC: 6.6 G/DL (ref 6.3–8)
RBC # BLD AUTO: 4.6 10*6/MM3 (ref 3.77–5.28)
SODIUM SERPL-SCNC: 140 MMOL/L (ref 134–145)
WBC NRBC COR # BLD: 6.87 10*3/MM3 (ref 3.4–10.8)

## 2023-01-17 PROCEDURE — 99215 OFFICE O/P EST HI 40 MIN: CPT | Performed by: INTERNAL MEDICINE

## 2023-01-17 PROCEDURE — 85025 COMPLETE CBC W/AUTO DIFF WBC: CPT

## 2023-01-17 PROCEDURE — 36415 COLL VENOUS BLD VENIPUNCTURE: CPT

## 2023-01-17 PROCEDURE — 80053 COMPREHEN METABOLIC PANEL: CPT

## 2023-01-17 RX ORDER — METOPROLOL SUCCINATE 25 MG/1
TABLET, EXTENDED RELEASE ORAL
COMMUNITY
Start: 2023-01-02

## 2023-01-17 RX ORDER — BORTEZOMIB 3.5 MG/1
1 INJECTION, POWDER, LYOPHILIZED, FOR SOLUTION INTRAVENOUS; SUBCUTANEOUS ONCE
Status: CANCELLED | OUTPATIENT
Start: 2023-02-14

## 2023-01-17 RX ORDER — BORTEZOMIB 3.5 MG/1
1 INJECTION, POWDER, LYOPHILIZED, FOR SOLUTION INTRAVENOUS; SUBCUTANEOUS ONCE
Status: CANCELLED | OUTPATIENT
Start: 2023-02-07

## 2023-01-17 RX ORDER — BORTEZOMIB 3.5 MG/1
1 INJECTION, POWDER, LYOPHILIZED, FOR SOLUTION INTRAVENOUS; SUBCUTANEOUS ONCE
Status: CANCELLED | OUTPATIENT
Start: 2023-01-31

## 2023-01-17 RX ORDER — FAMOTIDINE 10 MG/ML
20 INJECTION, SOLUTION INTRAVENOUS AS NEEDED
Status: CANCELLED | OUTPATIENT
Start: 2023-01-24

## 2023-01-17 RX ORDER — SODIUM CHLORIDE 9 MG/ML
250 INJECTION, SOLUTION INTRAVENOUS ONCE
Status: CANCELLED | OUTPATIENT
Start: 2023-02-14

## 2023-01-17 RX ORDER — PALONOSETRON 0.05 MG/ML
0.25 INJECTION, SOLUTION INTRAVENOUS ONCE
Status: CANCELLED | OUTPATIENT
Start: 2023-01-24

## 2023-01-17 RX ORDER — ACETAMINOPHEN 500 MG
1000 TABLET ORAL ONCE
Status: CANCELLED | OUTPATIENT
Start: 2023-01-24

## 2023-01-17 RX ORDER — FAMOTIDINE 10 MG/ML
20 INJECTION, SOLUTION INTRAVENOUS AS NEEDED
Status: CANCELLED | OUTPATIENT
Start: 2023-02-07

## 2023-01-17 RX ORDER — LIDOCAINE 50 MG/G
PATCH TOPICAL
COMMUNITY
Start: 2023-01-02

## 2023-01-17 RX ORDER — ACYCLOVIR 400 MG/1
400 TABLET ORAL 2 TIMES DAILY
Qty: 60 TABLET | Refills: 5 | Status: SHIPPED | OUTPATIENT
Start: 2023-01-17 | End: 2023-01-18 | Stop reason: SDUPTHER

## 2023-01-17 RX ORDER — BORTEZOMIB 3.5 MG/1
1 INJECTION, POWDER, LYOPHILIZED, FOR SOLUTION INTRAVENOUS; SUBCUTANEOUS ONCE
Status: CANCELLED | OUTPATIENT
Start: 2023-01-24

## 2023-01-17 RX ORDER — DIPHENHYDRAMINE HYDROCHLORIDE 50 MG/ML
50 INJECTION INTRAMUSCULAR; INTRAVENOUS AS NEEDED
Status: CANCELLED | OUTPATIENT
Start: 2023-02-07

## 2023-01-17 RX ORDER — OXYCODONE HYDROCHLORIDE 5 MG/1
TABLET ORAL
COMMUNITY
Start: 2023-01-02 | End: 2023-02-02

## 2023-01-17 RX ORDER — SODIUM CHLORIDE 9 MG/ML
250 INJECTION, SOLUTION INTRAVENOUS ONCE
Status: CANCELLED | OUTPATIENT
Start: 2023-02-07

## 2023-01-17 RX ORDER — SULFAMETHOXAZOLE AND TRIMETHOPRIM 800; 160 MG/1; MG/1
1 TABLET ORAL 3 TIMES WEEKLY
Qty: 12 TABLET | Refills: 5 | Status: SHIPPED | OUTPATIENT
Start: 2023-01-18 | End: 2023-01-18 | Stop reason: SDUPTHER

## 2023-01-17 RX ORDER — PALONOSETRON 0.05 MG/ML
0.25 INJECTION, SOLUTION INTRAVENOUS ONCE
Status: CANCELLED | OUTPATIENT
Start: 2023-02-14

## 2023-01-17 RX ORDER — METHOCARBAMOL 500 MG/1
500 TABLET, FILM COATED ORAL 2 TIMES DAILY PRN
COMMUNITY
Start: 2023-01-12

## 2023-01-17 RX ORDER — PALONOSETRON 0.05 MG/ML
0.25 INJECTION, SOLUTION INTRAVENOUS ONCE
Status: CANCELLED | OUTPATIENT
Start: 2023-01-31

## 2023-01-17 RX ORDER — PALONOSETRON 0.05 MG/ML
0.25 INJECTION, SOLUTION INTRAVENOUS ONCE
Status: CANCELLED | OUTPATIENT
Start: 2023-02-07

## 2023-01-17 RX ORDER — DIPHENHYDRAMINE HYDROCHLORIDE 50 MG/ML
50 INJECTION INTRAMUSCULAR; INTRAVENOUS AS NEEDED
Status: CANCELLED | OUTPATIENT
Start: 2023-01-24

## 2023-01-17 RX ORDER — SODIUM CHLORIDE 9 MG/ML
250 INJECTION, SOLUTION INTRAVENOUS ONCE
Status: CANCELLED | OUTPATIENT
Start: 2023-01-24

## 2023-01-17 RX ORDER — ASPIRIN 81 MG/1
TABLET, CHEWABLE ORAL
COMMUNITY
Start: 2023-01-12

## 2023-01-17 RX ORDER — HYDROXYZINE PAMOATE 25 MG/1
25 CAPSULE ORAL ONCE
Status: CANCELLED
Start: 2023-01-24 | End: 2023-01-24

## 2023-01-17 RX ORDER — HYDROXYZINE PAMOATE 25 MG/1
25 CAPSULE ORAL ONCE
Status: CANCELLED
Start: 2023-02-07 | End: 2023-02-07

## 2023-01-17 RX ORDER — MEPERIDINE HYDROCHLORIDE 25 MG/ML
25 INJECTION INTRAMUSCULAR; INTRAVENOUS; SUBCUTANEOUS
Status: CANCELLED | OUTPATIENT
Start: 2023-02-07

## 2023-01-17 RX ORDER — DIPHENOXYLATE HYDROCHLORIDE AND ATROPINE SULFATE 2.5; .025 MG/1; MG/1
1 TABLET ORAL 4 TIMES DAILY PRN
Qty: 120 TABLET | Refills: 0 | Status: SHIPPED | OUTPATIENT
Start: 2023-01-17 | End: 2023-03-28 | Stop reason: SDUPTHER

## 2023-01-17 RX ORDER — ACETAMINOPHEN 500 MG
1000 TABLET ORAL ONCE
Status: CANCELLED | OUTPATIENT
Start: 2023-02-07

## 2023-01-17 RX ORDER — NALOXONE HYDROCHLORIDE 4 MG/.1ML
SPRAY NASAL
COMMUNITY
Start: 2022-12-19 | End: 2023-02-02

## 2023-01-17 RX ORDER — SODIUM CHLORIDE 9 MG/ML
250 INJECTION, SOLUTION INTRAVENOUS ONCE
Status: CANCELLED | OUTPATIENT
Start: 2023-01-31

## 2023-01-17 RX ORDER — MEPERIDINE HYDROCHLORIDE 25 MG/ML
25 INJECTION INTRAMUSCULAR; INTRAVENOUS; SUBCUTANEOUS
Status: CANCELLED | OUTPATIENT
Start: 2023-01-24

## 2023-01-18 ENCOUNTER — TELEPHONE (OUTPATIENT)
Dept: ONCOLOGY | Facility: CLINIC | Age: 68
End: 2023-01-18
Payer: MEDICARE

## 2023-01-18 DIAGNOSIS — R19.7 DIARRHEA, UNSPECIFIED TYPE: ICD-10-CM

## 2023-01-18 DIAGNOSIS — E85.81 LIGHT CHAIN (AL) AMYLOIDOSIS: ICD-10-CM

## 2023-01-18 LAB
ALBUMIN SERPL ELPH-MCNC: 4 G/DL (ref 2.9–4.4)
ALBUMIN/GLOB SERPL: 1.9 {RATIO} (ref 0.7–1.7)
ALPHA1 GLOB SERPL ELPH-MCNC: 0.2 G/DL (ref 0–0.4)
ALPHA2 GLOB SERPL ELPH-MCNC: 0.9 G/DL (ref 0.4–1)
B-GLOBULIN SERPL ELPH-MCNC: 0.8 G/DL (ref 0.7–1.3)
GAMMA GLOB SERPL ELPH-MCNC: 0.3 G/DL (ref 0.4–1.8)
GLOBULIN SER-MCNC: 2.2 G/DL (ref 2.2–3.9)
IGA SERPL-MCNC: 7 MG/DL (ref 87–352)
IGG SERPL-MCNC: 331 MG/DL (ref 586–1602)
IGM SERPL-MCNC: 23 MG/DL (ref 26–217)
INTERPRETATION SERPL IEP-IMP: ABNORMAL
KAPPA LC FREE SER-MCNC: 5.3 MG/L (ref 3.3–19.4)
KAPPA LC FREE/LAMBDA FREE SER: 0.76 {RATIO} (ref 0.26–1.65)
LABORATORY COMMENT REPORT: ABNORMAL
LAMBDA LC FREE SERPL-MCNC: 7 MG/L (ref 5.7–26.3)
M PROTEIN SERPL ELPH-MCNC: 0.1 G/DL
PROT SERPL-MCNC: 6.2 G/DL (ref 6–8.5)

## 2023-01-18 RX ORDER — SULFAMETHOXAZOLE AND TRIMETHOPRIM 800; 160 MG/1; MG/1
1 TABLET ORAL 3 TIMES WEEKLY
Qty: 12 TABLET | Refills: 5 | Status: SHIPPED | OUTPATIENT
Start: 2023-01-18

## 2023-01-18 RX ORDER — DIPHENOXYLATE HYDROCHLORIDE AND ATROPINE SULFATE 2.5; .025 MG/1; MG/1
1 TABLET ORAL 4 TIMES DAILY PRN
Qty: 120 TABLET | Refills: 0 | Status: CANCELLED | OUTPATIENT
Start: 2023-01-18

## 2023-01-18 RX ORDER — ACYCLOVIR 400 MG/1
400 TABLET ORAL 2 TIMES DAILY
Qty: 60 TABLET | Refills: 5 | Status: SHIPPED | OUTPATIENT
Start: 2023-01-18 | End: 2023-03-28 | Stop reason: SDUPTHER

## 2023-01-18 NOTE — TELEPHONE ENCOUNTER
Pt asking if she should resume her potassium medication. Potassium as WNL yesterday. Dr. Peterson, PCP prescribed her the potassium. I advised ehr to reach out to him with questions regarding her potassium. Pt V/U.

## 2023-01-18 NOTE — TELEPHONE ENCOUNTER
----- Message from Ellis Kovacs sent at 1/18/2023 11:53 AM EST -----  Appts made pt was asking if she needs to start her potassium back up.    Thanks,  Martha   ----- Message -----  From: Laura Grace RN  Sent: 1/17/2023   4:29 PM EST  To: Ellis Kovacs    Can we discuss her appt for next week and moving forward. I have to review her plan.

## 2023-01-24 ENCOUNTER — TELEPHONE (OUTPATIENT)
Dept: ONCOLOGY | Facility: CLINIC | Age: 68
End: 2023-01-24
Payer: MEDICARE

## 2023-01-24 ENCOUNTER — LAB (OUTPATIENT)
Dept: LAB | Facility: HOSPITAL | Age: 68
End: 2023-01-24
Payer: MEDICARE

## 2023-01-24 ENCOUNTER — INFUSION (OUTPATIENT)
Dept: ONCOLOGY | Facility: HOSPITAL | Age: 68
End: 2023-01-24
Payer: MEDICARE

## 2023-01-24 VITALS
DIASTOLIC BLOOD PRESSURE: 64 MMHG | RESPIRATION RATE: 16 BRPM | TEMPERATURE: 98.4 F | HEART RATE: 77 BPM | SYSTOLIC BLOOD PRESSURE: 115 MMHG | WEIGHT: 120.2 LBS | BODY MASS INDEX: 22.72 KG/M2 | OXYGEN SATURATION: 94 %

## 2023-01-24 DIAGNOSIS — E85.81 LIGHT CHAIN (AL) AMYLOIDOSIS: Primary | ICD-10-CM

## 2023-01-24 LAB
ALBUMIN SERPL-MCNC: 4.2 G/DL (ref 3.5–5.2)
ALBUMIN/GLOB SERPL: 2.1 G/DL (ref 1.1–2.4)
ALP SERPL-CCNC: 72 U/L (ref 38–116)
ALT SERPL W P-5'-P-CCNC: 21 U/L (ref 0–33)
ANION GAP SERPL CALCULATED.3IONS-SCNC: 9.3 MMOL/L (ref 5–15)
AST SERPL-CCNC: 26 U/L (ref 0–32)
BASOPHILS # BLD AUTO: 0.03 10*3/MM3 (ref 0–0.2)
BASOPHILS NFR BLD AUTO: 0.4 % (ref 0–1.5)
BILIRUB SERPL-MCNC: 0.3 MG/DL (ref 0.2–1.2)
BUN SERPL-MCNC: 16 MG/DL (ref 6–20)
BUN/CREAT SERPL: 24.6 (ref 7.3–30)
CALCIUM SPEC-SCNC: 9.8 MG/DL (ref 8.5–10.2)
CHLORIDE SERPL-SCNC: 103 MMOL/L (ref 98–107)
CO2 SERPL-SCNC: 26.7 MMOL/L (ref 22–29)
CREAT SERPL-MCNC: 0.65 MG/DL (ref 0.6–1.1)
DEPRECATED RDW RBC AUTO: 43.9 FL (ref 37–54)
EGFRCR SERPLBLD CKD-EPI 2021: 96.6 ML/MIN/1.73
EOSINOPHIL # BLD AUTO: 0.29 10*3/MM3 (ref 0–0.4)
EOSINOPHIL NFR BLD AUTO: 3.8 % (ref 0.3–6.2)
ERYTHROCYTE [DISTWIDTH] IN BLOOD BY AUTOMATED COUNT: 13.4 % (ref 12.3–15.4)
GLOBULIN UR ELPH-MCNC: 2 GM/DL (ref 1.8–3.5)
GLUCOSE SERPL-MCNC: 96 MG/DL (ref 74–124)
HCT VFR BLD AUTO: 39.6 % (ref 34–46.6)
HGB BLD-MCNC: 13 G/DL (ref 12–15.9)
IMM GRANULOCYTES # BLD AUTO: 0.01 10*3/MM3 (ref 0–0.05)
IMM GRANULOCYTES NFR BLD AUTO: 0.1 % (ref 0–0.5)
LYMPHOCYTES # BLD AUTO: 2.8 10*3/MM3 (ref 0.7–3.1)
LYMPHOCYTES NFR BLD AUTO: 36.9 % (ref 19.6–45.3)
MCH RBC QN AUTO: 29.5 PG (ref 26.6–33)
MCHC RBC AUTO-ENTMCNC: 32.8 G/DL (ref 31.5–35.7)
MCV RBC AUTO: 89.8 FL (ref 79–97)
MONOCYTES # BLD AUTO: 0.54 10*3/MM3 (ref 0.1–0.9)
MONOCYTES NFR BLD AUTO: 7.1 % (ref 5–12)
NEUTROPHILS NFR BLD AUTO: 3.92 10*3/MM3 (ref 1.7–7)
NEUTROPHILS NFR BLD AUTO: 51.7 % (ref 42.7–76)
NRBC BLD AUTO-RTO: 0 /100 WBC (ref 0–0.2)
PLATELET # BLD AUTO: 261 10*3/MM3 (ref 140–450)
PMV BLD AUTO: 11 FL (ref 6–12)
POTASSIUM SERPL-SCNC: 4.2 MMOL/L (ref 3.5–4.7)
PROT SERPL-MCNC: 6.2 G/DL (ref 6.3–8)
RBC # BLD AUTO: 4.41 10*6/MM3 (ref 3.77–5.28)
SODIUM SERPL-SCNC: 139 MMOL/L (ref 134–145)
WBC NRBC COR # BLD: 7.59 10*3/MM3 (ref 3.4–10.8)

## 2023-01-24 PROCEDURE — 25010000002 DARATUMUMAB-HYALURONIDASE-FIHJ 1800-30000 MG-UT/15ML SOLUTION: Performed by: INTERNAL MEDICINE

## 2023-01-24 PROCEDURE — 96375 TX/PRO/DX INJ NEW DRUG ADDON: CPT

## 2023-01-24 PROCEDURE — 25010000002 BORTEZOMIB PER 0.1 MG: Performed by: INTERNAL MEDICINE

## 2023-01-24 PROCEDURE — 96413 CHEMO IV INFUSION 1 HR: CPT

## 2023-01-24 PROCEDURE — 25010000002 PALONOSETRON PER 25 MCG: Performed by: INTERNAL MEDICINE

## 2023-01-24 PROCEDURE — 80053 COMPREHEN METABOLIC PANEL: CPT

## 2023-01-24 PROCEDURE — 85025 COMPLETE CBC W/AUTO DIFF WBC: CPT

## 2023-01-24 PROCEDURE — 63710000001 DEXAMETHASONE PER 0.25 MG: Performed by: INTERNAL MEDICINE

## 2023-01-24 PROCEDURE — 96401 CHEMO ANTI-NEOPL SQ/IM: CPT

## 2023-01-24 PROCEDURE — 25010000002 CYCLOPHOSPHAMIDE 1 GM/5ML SOLUTION 5 ML VIAL: Performed by: INTERNAL MEDICINE

## 2023-01-24 RX ORDER — PALONOSETRON 0.05 MG/ML
0.25 INJECTION, SOLUTION INTRAVENOUS ONCE
Status: COMPLETED | OUTPATIENT
Start: 2023-01-24 | End: 2023-01-24

## 2023-01-24 RX ORDER — BORTEZOMIB 3.5 MG/1
1 INJECTION, POWDER, LYOPHILIZED, FOR SOLUTION INTRAVENOUS; SUBCUTANEOUS ONCE
Status: COMPLETED | OUTPATIENT
Start: 2023-01-24 | End: 2023-01-24

## 2023-01-24 RX ORDER — ACETAMINOPHEN 500 MG
1000 TABLET ORAL ONCE
Status: COMPLETED | OUTPATIENT
Start: 2023-01-24 | End: 2023-01-24

## 2023-01-24 RX ORDER — SODIUM CHLORIDE 9 MG/ML
250 INJECTION, SOLUTION INTRAVENOUS ONCE
Status: COMPLETED | OUTPATIENT
Start: 2023-01-24 | End: 2023-01-24

## 2023-01-24 RX ORDER — DEXAMETHASONE 4 MG/1
20 TABLET ORAL ONCE
Status: COMPLETED | OUTPATIENT
Start: 2023-01-24 | End: 2023-01-24

## 2023-01-24 RX ORDER — HYDROXYZINE PAMOATE 25 MG/1
25 CAPSULE ORAL ONCE
Status: COMPLETED | OUTPATIENT
Start: 2023-01-24 | End: 2023-01-24

## 2023-01-24 RX ADMIN — DARATUMUMAB AND HYALURONIDASE-FIHJ (HUMAN RECOMBINANT) 1800 MG: 1800; 30000 INJECTION SUBCUTANEOUS at 12:45

## 2023-01-24 RX ADMIN — PALONOSETRON 0.25 MG: 0.05 INJECTION, SOLUTION INTRAVENOUS at 12:28

## 2023-01-24 RX ADMIN — BORTEZOMIB 1.5 MG: 3.5 INJECTION, POWDER, LYOPHILIZED, FOR SOLUTION INTRAVENOUS; SUBCUTANEOUS at 12:46

## 2023-01-24 RX ADMIN — HYDROXYZINE PAMOATE 25 MG: 25 CAPSULE ORAL at 12:09

## 2023-01-24 RX ADMIN — ACETAMINOPHEN 1000 MG: 500 TABLET, FILM COATED ORAL at 12:09

## 2023-01-24 RX ADMIN — DEXAMETHASONE 20 MG: 4 TABLET ORAL at 12:08

## 2023-01-24 RX ADMIN — SODIUM CHLORIDE 250 ML: 9 INJECTION, SOLUTION INTRAVENOUS at 12:27

## 2023-01-24 RX ADMIN — CYCLOPHOSPHAMIDE 450 MG: 200 INJECTION, SOLUTION INTRAVENOUS at 12:30

## 2023-01-24 NOTE — TELEPHONE ENCOUNTER
Pt informed we have not received any information from [facility] [employer]. Fax number provided to pt so they can fax over. Pt V/U.

## 2023-01-24 NOTE — TELEPHONE ENCOUNTER
----- Message from Wendy Choe sent at 1/23/2023  2:40 PM EST -----  We don't have any forms on her.  There is something where it looks like they sent it to [facility] on 1-7 under encounter tab.  ----- Message -----  From: Lilia Flores  Sent: 1/20/2023   2:39 PM EST  To: Wendy Choe      ----- Message -----  From: Laura Grace RN  Sent: 1/20/2023   2:27 PM EST  To: [facility] Medical Records Pool    [employer] sent paperwork on 1/10/2023 to be filled out and they were needing medical records from June 30/2022 to present. Do we have the paperwork?

## 2023-01-26 DIAGNOSIS — F51.01 PRIMARY INSOMNIA: ICD-10-CM

## 2023-01-26 RX ORDER — HYDROCHLOROTHIAZIDE 25 MG/1
25 TABLET ORAL DAILY
Qty: 90 TABLET | Refills: 0 | Status: SHIPPED | OUTPATIENT
Start: 2023-01-26

## 2023-01-27 RX ORDER — ZOLPIDEM TARTRATE 10 MG/1
10 TABLET ORAL NIGHTLY PRN
Qty: 30 TABLET | Refills: 1 | Status: SHIPPED | OUTPATIENT
Start: 2023-01-27 | End: 2023-04-03 | Stop reason: SDUPTHER

## 2023-01-31 ENCOUNTER — APPOINTMENT (OUTPATIENT)
Dept: ONCOLOGY | Facility: HOSPITAL | Age: 68
End: 2023-01-31
Payer: MEDICARE

## 2023-02-02 ENCOUNTER — OFFICE VISIT (OUTPATIENT)
Dept: CARDIAC REHAB | Facility: HOSPITAL | Age: 68
End: 2023-02-02

## 2023-02-02 VITALS
WEIGHT: 120.81 LBS | OXYGEN SATURATION: 97 % | HEART RATE: 83 BPM | HEIGHT: 61 IN | DIASTOLIC BLOOD PRESSURE: 70 MMHG | RESPIRATION RATE: 20 BRPM | BODY MASS INDEX: 22.81 KG/M2 | SYSTOLIC BLOOD PRESSURE: 100 MMHG

## 2023-02-02 DIAGNOSIS — Z98.890 STATUS POST VENTRICULAR SEPTAL MYECTOMY: Primary | ICD-10-CM

## 2023-02-02 NOTE — PROGRESS NOTES
Phase II and III Education    Discipline Teaching:  Cardiac Rehab Phase II []      Cardiac Rehab Phase III []      Pulmonary Rehab II []      Pulmonary Rehab III []      Peripheral Artery Disease []        Class Given:  Asthma Management []      Beginners Cardiac Rehab []      Beginners Pulmonary Rehab []      CAD I []      CAD II []      CHF []      Diabetes Mellitus []     Diet & Cholesterol []     Diet Consult []      Energy Conservation []     Exercise []     Grocery Store Tour []     Harmonica Therapy []     High Blood Pressure []     Living with COPD []     Medication Safety []     Peripheral Artery Disease []     S & S of Infection []      Stress []        Education Topics:  Angina []      Arrhythmias []      Asthma Management []      Beginning Cardiac Rehab []      Blood Pressure [x]     Blood Sugar []     Breathing Retrainment []     CHF []     Cooking []     Daily Weight [x]     Diabetes Mellitus []      Diet []     Energy Conservation []     Exercise [x]      Foot Care []      Grocery Store Tour []      Heart Disease [x]      Heart Function []      Incision Care []     Living with COPD []     Medication Safety []     PAD Symptoms/Treatment []     Reconditioning Exercises []     S & S Infection []     Smoking Consult []     Stress Management []     Test Results []       Teaching Aids:  Video []      PAD Handout []      Pulmonary Workbook []      CAD Teaching Packet []      Stress Teaching Packet []     Exercise Teaching Packet []      Diet Teaching Packet []      CHF Teaching Packet []      Blood Pressure Teaching Packet [x]      Smoking Cessation Packet []      Medication Safety Handout []      Pflex Training []      Diabetes Teaching Packet []      Harmonica Therapy Packet []        Teaching Method:  Discussion [x]      Written Information [x]      Audio Visual [x]      Demonstration []        Teaching Recipient:  Patient [x]      Family []      Friend []      Legal Guardian []      Primary Care Giver []       Significant Other []        Barriers To Learning:  None []      Auditory []      Cultural []      Emotional []      Financial []      Language []    Mental []      Motivation []      Physical []      Reading Skills []      Nondenominational []      Verbal/Cognitive []      Visual []      Written/Cognitive []        Teaching Response:  Verified Via Teach back []      Return Demo Via Teach Back []      Reinforcement Needed []      Unable to Return Demo []      Unable to Comprehend []      Declines Teaching  []        Additional Education Topics:  Discussed  availability , verbalized understanding     Follow Up Instruction Comment:  Cardiac Rehab Phase 3, Did well with 6 minute walk and exercises.

## 2023-02-02 NOTE — PROGRESS NOTES
Chief Complaint  Cardiac Rehab Phase III    Cynthia Flower presents to [facility] CARDIOPULMONARY REHABILITATION    Physical Exam  Cardiovascular:      Rate and Rhythm: Normal rate and regular rhythm.      Pulses: Normal pulses.      Heart sounds: Normal heart sounds.   Pulmonary:      Effort: Pulmonary effort is normal.      Breath sounds: Normal breath sounds.   Skin:     General: Skin is warm and dry.   Neurological:      General: No focal deficit present.      Mental Status: She is alert and oriented to person, place, and time.   Psychiatric:         Mood and Affect: Mood normal.         Behavior: Behavior normal.      Comments: FPHQ-9 high ,  does have a , states spoke with her last week and can call her as much as needed. Offered  and gave her list of phone numbers. Declined  at this time but verbalized understanding        Result Review :          Assessment and Plan    Diagnoses and all orders for this visit:    1. Status post ventricular septal myectomy (Primary)        Aylin Osborn RN

## 2023-02-07 ENCOUNTER — INFUSION (OUTPATIENT)
Dept: ONCOLOGY | Facility: HOSPITAL | Age: 68
End: 2023-02-07
Payer: MEDICARE

## 2023-02-07 ENCOUNTER — OFFICE VISIT (OUTPATIENT)
Dept: ONCOLOGY | Facility: CLINIC | Age: 68
End: 2023-02-07
Payer: MEDICARE

## 2023-02-07 VITALS
HEIGHT: 61 IN | SYSTOLIC BLOOD PRESSURE: 101 MMHG | WEIGHT: 120.4 LBS | BODY MASS INDEX: 22.73 KG/M2 | RESPIRATION RATE: 18 BRPM | HEART RATE: 81 BPM | OXYGEN SATURATION: 96 % | TEMPERATURE: 97.1 F | DIASTOLIC BLOOD PRESSURE: 60 MMHG

## 2023-02-07 DIAGNOSIS — Z79.899 HIGH RISK MEDICATION USE: ICD-10-CM

## 2023-02-07 DIAGNOSIS — E85.81 LIGHT CHAIN (AL) AMYLOIDOSIS: Primary | ICD-10-CM

## 2023-02-07 LAB
ALBUMIN SERPL-MCNC: 4.5 G/DL (ref 3.5–5.2)
ALBUMIN/GLOB SERPL: 2.4 G/DL (ref 1.1–2.4)
ALP SERPL-CCNC: 73 U/L (ref 38–116)
ALT SERPL W P-5'-P-CCNC: 33 U/L (ref 0–33)
ANION GAP SERPL CALCULATED.3IONS-SCNC: 10.4 MMOL/L (ref 5–15)
AST SERPL-CCNC: 35 U/L (ref 0–32)
BASOPHILS # BLD AUTO: 0.03 10*3/MM3 (ref 0–0.2)
BASOPHILS NFR BLD AUTO: 0.5 % (ref 0–1.5)
BILIRUB SERPL-MCNC: 0.4 MG/DL (ref 0.2–1.2)
BUN SERPL-MCNC: 19 MG/DL (ref 6–20)
BUN/CREAT SERPL: 26.4 (ref 7.3–30)
CALCIUM SPEC-SCNC: 10 MG/DL (ref 8.5–10.2)
CHLORIDE SERPL-SCNC: 104 MMOL/L (ref 98–107)
CO2 SERPL-SCNC: 24.6 MMOL/L (ref 22–29)
CREAT SERPL-MCNC: 0.72 MG/DL (ref 0.6–1.1)
DEPRECATED RDW RBC AUTO: 43.9 FL (ref 37–54)
EGFRCR SERPLBLD CKD-EPI 2021: 91.8 ML/MIN/1.73
EOSINOPHIL # BLD AUTO: 0.19 10*3/MM3 (ref 0–0.4)
EOSINOPHIL NFR BLD AUTO: 3.4 % (ref 0.3–6.2)
ERYTHROCYTE [DISTWIDTH] IN BLOOD BY AUTOMATED COUNT: 13.7 % (ref 12.3–15.4)
GLOBULIN UR ELPH-MCNC: 1.9 GM/DL (ref 1.8–3.5)
GLUCOSE SERPL-MCNC: 88 MG/DL (ref 74–124)
HCT VFR BLD AUTO: 41.3 % (ref 34–46.6)
HGB BLD-MCNC: 13.3 G/DL (ref 12–15.9)
IMM GRANULOCYTES # BLD AUTO: 0.02 10*3/MM3 (ref 0–0.05)
IMM GRANULOCYTES NFR BLD AUTO: 0.4 % (ref 0–0.5)
LYMPHOCYTES # BLD AUTO: 2.35 10*3/MM3 (ref 0.7–3.1)
LYMPHOCYTES NFR BLD AUTO: 41.6 % (ref 19.6–45.3)
MCH RBC QN AUTO: 28.5 PG (ref 26.6–33)
MCHC RBC AUTO-ENTMCNC: 32.2 G/DL (ref 31.5–35.7)
MCV RBC AUTO: 88.6 FL (ref 79–97)
MONOCYTES # BLD AUTO: 0.45 10*3/MM3 (ref 0.1–0.9)
MONOCYTES NFR BLD AUTO: 8 % (ref 5–12)
NEUTROPHILS NFR BLD AUTO: 2.61 10*3/MM3 (ref 1.7–7)
NEUTROPHILS NFR BLD AUTO: 46.1 % (ref 42.7–76)
NRBC BLD AUTO-RTO: 0 /100 WBC (ref 0–0.2)
PLATELET # BLD AUTO: 230 10*3/MM3 (ref 140–450)
PMV BLD AUTO: 9.7 FL (ref 6–12)
POTASSIUM SERPL-SCNC: 4.3 MMOL/L (ref 3.5–4.7)
PROT SERPL-MCNC: 6.4 G/DL (ref 6.3–8)
RBC # BLD AUTO: 4.66 10*6/MM3 (ref 3.77–5.28)
SODIUM SERPL-SCNC: 139 MMOL/L (ref 134–145)
WBC NRBC COR # BLD: 5.65 10*3/MM3 (ref 3.4–10.8)

## 2023-02-07 PROCEDURE — 25010000002 DARATUMUMAB-HYALURONIDASE-FIHJ 1800-30000 MG-UT/15ML SOLUTION: Performed by: INTERNAL MEDICINE

## 2023-02-07 PROCEDURE — 99214 OFFICE O/P EST MOD 30 MIN: CPT | Performed by: NURSE PRACTITIONER

## 2023-02-07 PROCEDURE — 63710000001 DEXAMETHASONE PER 0.25 MG: Performed by: INTERNAL MEDICINE

## 2023-02-07 PROCEDURE — 25010000002 CYCLOPHOSPHAMIDE 1 GM/5ML SOLUTION 5 ML VIAL: Performed by: INTERNAL MEDICINE

## 2023-02-07 PROCEDURE — 80053 COMPREHEN METABOLIC PANEL: CPT

## 2023-02-07 PROCEDURE — 25010000002 PALONOSETRON PER 25 MCG: Performed by: INTERNAL MEDICINE

## 2023-02-07 PROCEDURE — 85025 COMPLETE CBC W/AUTO DIFF WBC: CPT

## 2023-02-07 PROCEDURE — 96401 CHEMO ANTI-NEOPL SQ/IM: CPT

## 2023-02-07 PROCEDURE — 96413 CHEMO IV INFUSION 1 HR: CPT

## 2023-02-07 PROCEDURE — 96375 TX/PRO/DX INJ NEW DRUG ADDON: CPT

## 2023-02-07 PROCEDURE — 25010000002 BORTEZOMIB PER 0.1 MG: Performed by: INTERNAL MEDICINE

## 2023-02-07 RX ORDER — SODIUM CHLORIDE 9 MG/ML
250 INJECTION, SOLUTION INTRAVENOUS ONCE
Status: COMPLETED | OUTPATIENT
Start: 2023-02-07 | End: 2023-02-07

## 2023-02-07 RX ORDER — PALONOSETRON 0.05 MG/ML
0.25 INJECTION, SOLUTION INTRAVENOUS ONCE
Status: COMPLETED | OUTPATIENT
Start: 2023-02-07 | End: 2023-02-07

## 2023-02-07 RX ORDER — HYDROXYZINE PAMOATE 25 MG/1
25 CAPSULE ORAL ONCE
Status: COMPLETED | OUTPATIENT
Start: 2023-02-07 | End: 2023-02-07

## 2023-02-07 RX ORDER — BORTEZOMIB 3.5 MG/1
1 INJECTION, POWDER, LYOPHILIZED, FOR SOLUTION INTRAVENOUS; SUBCUTANEOUS ONCE
Status: COMPLETED | OUTPATIENT
Start: 2023-02-07 | End: 2023-02-07

## 2023-02-07 RX ORDER — DEXAMETHASONE 4 MG/1
20 TABLET ORAL ONCE
Status: COMPLETED | OUTPATIENT
Start: 2023-02-07 | End: 2023-02-07

## 2023-02-07 RX ORDER — ACETAMINOPHEN 500 MG
1000 TABLET ORAL ONCE
Status: COMPLETED | OUTPATIENT
Start: 2023-02-07 | End: 2023-02-07

## 2023-02-07 RX ADMIN — CYCLOPHOSPHAMIDE 450 MG: 200 INJECTION, SOLUTION INTRAVENOUS at 14:48

## 2023-02-07 RX ADMIN — HYDROXYZINE PAMOATE 25 MG: 25 CAPSULE ORAL at 14:09

## 2023-02-07 RX ADMIN — SODIUM CHLORIDE 250 ML: 9 INJECTION, SOLUTION INTRAVENOUS at 14:18

## 2023-02-07 RX ADMIN — ACETAMINOPHEN 1000 MG: 500 TABLET, FILM COATED ORAL at 14:09

## 2023-02-07 RX ADMIN — BORTEZOMIB 1.5 MG: 3.5 INJECTION, POWDER, LYOPHILIZED, FOR SOLUTION INTRAVENOUS; SUBCUTANEOUS at 15:23

## 2023-02-07 RX ADMIN — DARATUMUMAB AND HYALURONIDASE-FIHJ (HUMAN RECOMBINANT) 1800 MG: 1800; 30000 INJECTION SUBCUTANEOUS at 15:27

## 2023-02-07 RX ADMIN — PALONOSETRON 0.25 MG: 0.05 INJECTION, SOLUTION INTRAVENOUS at 14:16

## 2023-02-07 RX ADMIN — DEXAMETHASONE 20 MG: 4 TABLET ORAL at 14:09

## 2023-02-07 NOTE — PROGRESS NOTES
"[facility] CBC GROUP OUTPATIENT FOLLOW UP CLINIC VISIT    REASON FOR FOLLOW-UP:    AL amyloidosis  Therapy with michael-CyBorD initiated 8/16/2022    HISTORY OF PRESENT ILLNESS: Cynthia Her is a 67 y.o. female with the above-mentioned history who is here today for lab review and evaluation  Still feels like she is recovering from cardiac surgery. Does cardiac rehab 2x/ week.     She reports that her diarrhea/ dumping is still present -taking medication as prescribed by gastro MD from [facility]. Alternates between lomotil and imodium 3 of each per day.       REVIEW OF SYSTEMS:  As per the HPI    PHYSICAL EXAMINATION:    Vitals:    02/07/23 1326   BP: 101/60   Pulse: 81   Resp: 18   Temp: 97.1 °F (36.2 °C)   TempSrc: Temporal   SpO2: 96%   Weight: 54.6 kg (120 lb 6.4 oz)   Height: 154.9 cm (60.98\")   PainSc:   4   PainLoc: Chest       Physical Exam  Vitals reviewed.   Constitutional:       General: She is not in acute distress.     Appearance: Normal appearance. She is well-developed.   HENT:      Head: Normocephalic and atraumatic.   Eyes:      Pupils: Pupils are equal, round, and reactive to light.   Cardiovascular:      Rate and Rhythm: Normal rate and regular rhythm.      Heart sounds: Normal heart sounds. No murmur heard.  Pulmonary:      Effort: Pulmonary effort is normal. No respiratory distress.      Breath sounds: Normal breath sounds. No wheezing, rhonchi or rales.   Chest:      Comments: Well healed sternotomy scar.   Abdominal:      General: Bowel sounds are normal. There is no distension.      Palpations: Abdomen is soft.   Musculoskeletal:         General: Normal range of motion.      Cervical back: Normal range of motion.   Skin:     General: Skin is warm and dry.      Findings: No rash.   Neurological:      Mental Status: She is alert and oriented to person, place, and time.         DIAGNOSTIC DATA:  CBC and Differential (02/07/2023 13:07)  Comprehensive Metabolic Panel (02/07/2023 " 13:07)    IMAGING:    None reviewed      ASSESSMENT:  This is a 67 y.o. female with:    *AL amyloidosis  · She had a stress echocardiogram on 4/24/2020 showing a normal left ventricular ejection fraction of 60% with moderate concentric hypertrophy but no wall motion abnormalities of the left ventricle.  There was impaired relaxation noted.  She complained of chest pain during the examination.  There was some ST segment depression.  Cardiac catheterization was performed on the same day.  No intervention was required.  · Follow-up echocardiogram on 4/15/2021 showed a left ventricular ejection fraction of 61 to 65% with normal LV cavity size.  Left ventricular wall thickness showed moderate concentric hypertrophy.  Diastolic function was impaired.  No pericardial effusion.  Small left pleural effusion.  · She had follow-up PYP imaging for cardiac amyloidosis that was not suggestive of ATTR amyloidosis  · Laboratory values on 2/24/2022 showed a high serum free light chain lambda of 121, normal kappa of 10.5, low ratio at 0.09.  Serum protein electrophoresis showed no evidence of an M spike.  Urine light chains were abnormal with an elevated lambda light chain of 33 and a low ratio of 0.48 with a 12.7 mg M spike on 24-hour urine protein electrophoresis.  · BNP was elevated at 2306 on 3/4/2022.  The troponin was normal at 0.03.  CK was 212 with a CK-MB of 10.87.  · Initial consult on 3/30/22. No M spike on serum protein electrophoresis. SIFE 'presence of monoclonal protein is unclear.'   · Bone marrow biopsy 4/13/22 normocellular, 12.1% plasma cells consistent with low volume plasma cell dyscrasia. FISH with low level t(11;14) fusion only. No amyloid noted as Congo red staining negative.   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 Buenrostro at [place]. Intended to enroll on a clinical study but her troponin as tested by the study sponsor was not high enough  · Therapy with michael-CyBorD  initiated 8/16/2022  · Patient seen in the office on 8/17/2022 for triage visit.  Patient with complaints of fatigue, dizziness and diarrhea.  She was mildly hypotensive.  She was given IV fluids.  · Subsequent admission at [facility] with shortness of breath and volume overload.  She was diuresed and treated with antibiotics.  · D8 therapy administered on 8/30  · Light chains normal at [place] on 8/31/22 (lambda light chain 1.3, down from 12.44)  · 9/27/2022: Cycle 2-day 8 of therapy  · 10/4/2022 cycle 2-day 8 CyBorD.  Patient continues to push oral hydration.  She continues to have some neck stiffness but this has not worsened.  She will see [place] next week for further cardiac work-up.  Glucose was low upon initial labs today however the patient was given something to eat by nursing prior to rechecking.  This was therefore rechecked prior to her leaving the office and was 87.  Patient reports she was feeling fine.  · 10/18/2020 to cycle 2-day 22 Sissy -CyBorD.  Velcade dose will be reduced to 1.0 mg/m² due to patient's persistent issues with orthostasis.  · 10/25/2022: Cycle 3-day 1  · 11/8/2022: Delay in cycle 3-day 15 therapy.  Day 8 omitted due to an upper respiratory infection.  · 11/22/2022: Cycle 3-day 22.   final planned treatment before she goes to [place] for cardiac surgery.  · Cardiac surgery performed at [place] on 12/5/2022.  Pathology from the myectomy showed cardiac amyloidosis extensively involving the vessels and endocardium with myocyte hypertrophy and interstitial fibrosis.  Congo red stain was positive.  · Reviewed back on 1/17/2023 with plans to resume Darzalex, Cytoxan, Velcade, dexamethasone with cycle #4 on 1/24.  · Patient seen 2/7/2023 (missed 1/31/2023 due to weather, which would have been day 8).  We will go ahead and proceed with today as cycle 4 day 15, day 15.    *Diarrhea  · She saw Dr. Hoyos with GI at [place] on 9/7.  · Suspicion for amyloid involvement  of the GI tract  · Continue Lomotil and Imodium. Rifaximin prescribed by Dr. Hoyos.  She has completed this.  · Diarrhea waxes and wanes with what she eats and dairy products particularly worsen diarrhea  · Symptoms remain stable, with Imodium and Lomotil.     *Elevated liver enzymes  · Normalized and remained normal.  · 2/7/2023 AST 35, will continue to monitor.     *Cardiomyopathy:  · Most recent echocardiogram on 8/17/2022 showed left ventricular wall thickness consistent with moderate to severe concentric hypertrophy along with left ventricular septal wall measuring 2.2 cm and posterior wall thickness at 1.9 cm likely due to amyloidosis.  LVEF 61 to 65%.  Grade 1 impaired relaxation.  · Now followed by Dr. Lyles at [facility] with concerns for pre-existing HCM and AL cardiac amyloid.   · Cardiac MRI results above.  Amyloidosis evident.  · Catheterization performed at [place].    · Cardiac surgery performed at [place] on 12/5/2022.  Pathology from the myectomy showed cardiac amyloidosis extensively involving the vessels and endocardium with myocyte hypertrophy and interstitial fibrosis.  Congo red stain was positive.    *Periorbital hematomas: These have resolved.  Coagulation studies and a factor X level were all normal.    *History of myalgias    *History of sore tongue    PLAN:  1. Proceed with cycle 4, day 15 Velcade, IV cytoxan, and Darzalex faspro today.  The Velcade dose has been reduced to 1.0 mg/m².  2. Patient receives IV Cytoxan as she does not tolerate taking pills.  3. Patient is given oral dexamethasone weekly in the office at a reduced dose of 20 mg.  4. Continue cardiac medications as managed by cardiology at [place].  5. Planning for total of 6 cycles, after that, consideration of autologous peripheral blood stem cell transplant, and potentially cardiac transplant.  6. Follow-up with cardiology and gastroenterology at [place].   7. She continues to follow up with Dr. Buenrostro at  [place].  8. Continue acyclovir and Bactrim for prophylaxis  9. We will treat through peripheral IVs as long as possible.  10. Follow-up in 2 weeks with Dr. Sal with repeat labs reevaluation prior to cycle 5, day 1.      High risk therapy requiring intensive monitoring    Sissy-CyBorD regimen     Daratumumab and hyaluronidase (Darzalex Faspro) as follows:  Cycles 1 & 2: 1800 mg SC once per day on days 1, 8, 15, 22  Cycles 3 to 6: 1800 mg SC once per day on days 1 & 15  Cycles 7 up to 24: 1800 mg SC once on day 1     Bortezomib (Velcade) as follows:  Cycles 1 to 6: 1.3 mg/m2 SC once per day on days 1, 8, 15, 22.  (Now dose reduced to 1.0 mg per metered squared.)     Cyclophosphamide (Cytoxan) as follows:  Cycles 1 to 6: 300 mg/m2 (maximum dose of 500 mg) PO or IV once per day on days 1, 8, 15, 22    Plan IV Cytoxan as she has difficulty swallowing pills.     Dexamethasone (Decadron) as follows:  Cycles 1 to 6: 40 mg PO or IV once per day on days 1, 8, 15, 22 (see note)  28-day cycle for up to 24 cycles  Plan to reduce the dexamethasone dose to 20 mg weekly    I

## 2023-02-10 ENCOUNTER — OFFICE VISIT (OUTPATIENT)
Dept: CARDIAC REHAB | Facility: HOSPITAL | Age: 68
End: 2023-02-10

## 2023-02-10 VITALS
WEIGHT: 120.81 LBS | SYSTOLIC BLOOD PRESSURE: 118 MMHG | OXYGEN SATURATION: 96 % | BODY MASS INDEX: 22.84 KG/M2 | HEART RATE: 90 BPM | DIASTOLIC BLOOD PRESSURE: 70 MMHG

## 2023-02-10 DIAGNOSIS — Z98.890 STATUS POST VENTRICULAR SEPTAL MYECTOMY: Primary | ICD-10-CM

## 2023-02-13 ENCOUNTER — OFFICE VISIT (OUTPATIENT)
Dept: CARDIAC REHAB | Facility: HOSPITAL | Age: 68
End: 2023-02-13

## 2023-02-13 VITALS
DIASTOLIC BLOOD PRESSURE: 70 MMHG | OXYGEN SATURATION: 94 % | WEIGHT: 121.69 LBS | SYSTOLIC BLOOD PRESSURE: 120 MMHG | BODY MASS INDEX: 23.01 KG/M2 | HEART RATE: 88 BPM

## 2023-02-13 DIAGNOSIS — Z98.890 STATUS POST VENTRICULAR SEPTAL MYECTOMY: Primary | ICD-10-CM

## 2023-02-13 NOTE — PROGRESS NOTES
"[facility] CBC GROUP OUTPATIENT FOLLOW UP CLINIC VISIT    REASON FOR FOLLOW-UP:    AL amyloidosis  Therapy with michael-CyBorD initiated 8/16/2022    HISTORY OF PRESENT ILLNESS: Cynthia Her is a 67 y.o. female with the above-mentioned history who is here today for lab review and evaluation    She continues cardiac rehabilitation.  She has fatigue after this but otherwise tolerates it well.  Her biggest issue continues to be diarrhea which she has not been able to get under adequate control.  She also notes some new subcutaneous nodules.      REVIEW OF SYSTEMS:  As per the HPI    PHYSICAL EXAMINATION:    Vitals:    02/14/23 0905   BP: 125/78   Pulse: 73   Resp: 18   Temp: 97.5 °F (36.4 °C)   TempSrc: Temporal   SpO2: 97%   Weight: 54.8 kg (120 lb 12.8 oz)   Height: 154.9 cm (60.98\")   PainSc: 0-No pain       Physical Exam  Vitals reviewed.   Constitutional:       General: She is not in acute distress.     Appearance: Normal appearance. She is well-developed.   HENT:      Head: Normocephalic and atraumatic.   Eyes:      Pupils: Pupils are equal, round, and reactive to light.   Cardiovascular:      Rate and Rhythm: Normal rate and regular rhythm.      Heart sounds: Normal heart sounds. No murmur heard.  Pulmonary:      Effort: Pulmonary effort is normal. No respiratory distress.      Breath sounds: Normal breath sounds. No wheezing, rhonchi or rales.   Chest:      Comments: Well healed sternotomy scar.   Abdominal:      General: Bowel sounds are normal. There is no distension.      Palpations: Abdomen is soft.   Musculoskeletal:         General: Normal range of motion.      Cervical back: Normal range of motion.   Skin:     General: Skin is warm and dry.      Findings: No rash.   Neurological:      Mental Status: She is alert and oriented to person, place, and time.         DIAGNOSTIC DATA:  CBC and Differential (02/14/2023 08:52)      IMAGING:    None reviewed      ASSESSMENT:  This is a 67 y.o. female " with:    *AL amyloidosis  · She had a stress echocardiogram on 4/24/2020 showing a normal left ventricular ejection fraction of 60% with moderate concentric hypertrophy but no wall motion abnormalities of the left ventricle.  There was impaired relaxation noted.  She complained of chest pain during the examination.  There was some ST segment depression.  Cardiac catheterization was performed on the same day.  No intervention was required.  · Follow-up echocardiogram on 4/15/2021 showed a left ventricular ejection fraction of 61 to 65% with normal LV cavity size.  Left ventricular wall thickness showed moderate concentric hypertrophy.  Diastolic function was impaired.  No pericardial effusion.  Small left pleural effusion.  · She had follow-up PYP imaging for cardiac amyloidosis that was not suggestive of ATTR amyloidosis  · Laboratory values on 2/24/2022 showed a high serum free light chain lambda of 121, normal kappa of 10.5, low ratio at 0.09.  Serum protein electrophoresis showed no evidence of an M spike.  Urine light chains were abnormal with an elevated lambda light chain of 33 and a low ratio of 0.48 with a 12.7 mg M spike on 24-hour urine protein electrophoresis.  · BNP was elevated at 2306 on 3/4/2022.  The troponin was normal at 0.03.  CK was 212 with a CK-MB of 10.87.  · Initial consult on 3/30/22. No M spike on serum protein electrophoresis. SIFE 'presence of monoclonal protein is unclear.'   · Bone marrow biopsy 4/13/22 normocellular, 12.1% plasma cells consistent with low volume plasma cell dyscrasia. FISH with low level t(11;14) fusion only. No amyloid noted as Congo red staining negative.   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 Buenrostro at [place]. Intended to enroll on a clinical study but her troponin as tested by the study sponsor was not high enough  · Therapy with michael-CyBorD initiated 8/16/2022  · Patient seen in the office on 8/17/2022 for triage visit.   Patient with complaints of fatigue, dizziness and diarrhea.  She was mildly hypotensive.  She was given IV fluids.  · Subsequent admission at [facility] with shortness of breath and volume overload.  She was diuresed and treated with antibiotics.  · D8 therapy administered on 8/30  · Light chains normal at [place] on 8/31/22 (lambda light chain 1.3, down from 12.44)  · 9/27/2022: Cycle 2-day 8 of therapy  · 10/4/2022 cycle 2-day 8 CyBorD.  Patient continues to push oral hydration.  She continues to have some neck stiffness but this has not worsened.  She will see [place] next week for further cardiac work-up.  Glucose was low upon initial labs today however the patient was given something to eat by nursing prior to rechecking.  This was therefore rechecked prior to her leaving the office and was 87.  Patient reports she was feeling fine.  · 10/18/2020 to cycle 2-day 22 Sissy -CyBorD.  Velcade dose will be reduced to 1.0 mg/m² due to patient's persistent issues with orthostasis.  · 10/25/2022: Cycle 3-day 1  · 11/8/2022: Delay in cycle 3-day 15 therapy.  Day 8 omitted due to an upper respiratory infection.  · 11/22/2022: Cycle 3-day 22.   final planned treatment before she goes to [place] for cardiac surgery.  · Cardiac surgery performed at [place] on 12/5/2022.  Pathology from the myectomy showed cardiac amyloidosis extensively involving the vessels and endocardium with myocyte hypertrophy and interstitial fibrosis.  Congo red stain was positive.  · Reviewed back on 1/17/2023 with plans to resume Darzalex, Cytoxan, Velcade, dexamethasone with cycle #4 on 1/24.  · Patient seen 2/7/2023 (missed 1/31/2023 due to weather, which would have been day 8).  We will go ahead and proceed with as cycle 4 day 15, day 15.  · 2/14/2023: Cycle 4-day 22    *Diarrhea  · She saw Dr. Hoyos with GI at [place] on 9/7.  · Suspicion for amyloid involvement of the GI tract  · Continue Lomotil and Imodium. Rifaximin  prescribed by Dr. Hoyos which she continues to use intermittently  · Diarrhea waxes and wanes with what she eats and dairy products particularly worsen diarrhea  · Symptoms remain stable, with Imodium and Lomotil.   · I encouraged also the use of Questran    *Elevated liver enzymes  · 2/14/2023: Mildly elevated    *Cardiomyopathy:  · Most recent echocardiogram on 8/17/2022 showed left ventricular wall thickness consistent with moderate to severe concentric hypertrophy along with left ventricular septal wall measuring 2.2 cm and posterior wall thickness at 1.9 cm likely due to amyloidosis.  LVEF 61 to 65%.  Grade 1 impaired relaxation.  · Now followed by Dr. Lyles at [facility] with concerns for pre-existing HCM and AL cardiac amyloid.   · Cardiac MRI results above.  Amyloidosis evident.  · Catheterization performed at [place].    · Cardiac surgery performed at [place] on 12/5/2022.  Pathology from the myectomy showed cardiac amyloidosis extensively involving the vessels and endocardium with myocyte hypertrophy and interstitial fibrosis.  Congo red stain was positive.    *Periorbital hematomas: These have resolved.  Coagulation studies and a factor X level were all normal.    *History of myalgias    *History of sore tongue    PLAN:  1. Proceed with cycle 4, day 22 Velcade, IV cytoxan, and Darzalex faspro today.  The Velcade dose has been reduced to 1.0 mg/m².  2. Patient receives IV Cytoxan as she does not tolerate taking pills.  3. Patient is given oral dexamethasone weekly in the office at a reduced dose of 20 mg.  4. Continue cardiac medications as managed by cardiology at [place].  5. Planning for total of 6 cycles, after that, consideration of autologous peripheral blood stem cell transplant.  She is told now that she does not require cardiac transplant.  6. Follow-up with cardiology and gastroenterology at [place].   7. She continues to follow up with Dr. Buenrostro at [place].  8. She will have a  thorough evaluation at [place] at the end of April 9. Continue acyclovir and Bactrim for prophylaxis  10. We will treat through peripheral IVs as long as possible.  11. Schedule weekly follow-up for the next 8 weeks    High risk therapy requiring intensive monitoring    Sissy-CyBorD regimen     Daratumumab and hyaluronidase (Darzalex Faspro) as follows:  Cycles 1 & 2: 1800 mg SC once per day on days 1, 8, 15, 22  Cycles 3 to 6: 1800 mg SC once per day on days 1 & 15  Cycles 7 up to 24: 1800 mg SC once on day 1     Bortezomib (Velcade) as follows:  Cycles 1 to 6: 1.3 mg/m2 SC once per day on days 1, 8, 15, 22.  (Now dose reduced to 1.0 mg per metered squared.)     Cyclophosphamide (Cytoxan) as follows:  Cycles 1 to 6: 300 mg/m2 (maximum dose of 500 mg) PO or IV once per day on days 1, 8, 15, 22    Plan IV Cytoxan as she has difficulty swallowing pills.     Dexamethasone (Decadron) as follows:  Cycles 1 to 6: 40 mg PO or IV once per day on days 1, 8, 15, 22 (see note)  28-day cycle for up to 24 cycles  Reduced the dexamethasone dose to 20 mg weekly

## 2023-02-14 ENCOUNTER — INFUSION (OUTPATIENT)
Dept: ONCOLOGY | Facility: HOSPITAL | Age: 68
End: 2023-02-14
Payer: MEDICARE

## 2023-02-14 ENCOUNTER — OFFICE VISIT (OUTPATIENT)
Dept: ONCOLOGY | Facility: CLINIC | Age: 68
End: 2023-02-14
Payer: MEDICARE

## 2023-02-14 VITALS
BODY MASS INDEX: 22.81 KG/M2 | OXYGEN SATURATION: 97 % | RESPIRATION RATE: 18 BRPM | SYSTOLIC BLOOD PRESSURE: 125 MMHG | HEIGHT: 61 IN | WEIGHT: 120.8 LBS | HEART RATE: 73 BPM | TEMPERATURE: 97.5 F | DIASTOLIC BLOOD PRESSURE: 78 MMHG

## 2023-02-14 DIAGNOSIS — E85.81 LIGHT CHAIN (AL) AMYLOIDOSIS: Primary | ICD-10-CM

## 2023-02-14 DIAGNOSIS — E85.81 LIGHT CHAIN (AL) AMYLOIDOSIS: ICD-10-CM

## 2023-02-14 DIAGNOSIS — E85.81 AL AMYLOIDOSIS: Primary | ICD-10-CM

## 2023-02-14 LAB
ALBUMIN SERPL-MCNC: 4.4 G/DL (ref 3.5–5.2)
ALBUMIN/GLOB SERPL: 2.2 G/DL (ref 1.1–2.4)
ALP SERPL-CCNC: 69 U/L (ref 38–116)
ALT SERPL W P-5'-P-CCNC: 38 U/L (ref 0–33)
ANION GAP SERPL CALCULATED.3IONS-SCNC: 13 MMOL/L (ref 5–15)
AST SERPL-CCNC: 41 U/L (ref 0–32)
BASOPHILS # BLD AUTO: 0.05 10*3/MM3 (ref 0–0.2)
BASOPHILS NFR BLD AUTO: 0.9 % (ref 0–1.5)
BILIRUB SERPL-MCNC: 0.4 MG/DL (ref 0.2–1.2)
BUN SERPL-MCNC: 16 MG/DL (ref 6–20)
BUN/CREAT SERPL: 21.3 (ref 7.3–30)
CALCIUM SPEC-SCNC: 10 MG/DL (ref 8.5–10.2)
CHLORIDE SERPL-SCNC: 102 MMOL/L (ref 98–107)
CO2 SERPL-SCNC: 23 MMOL/L (ref 22–29)
CREAT SERPL-MCNC: 0.75 MG/DL (ref 0.6–1.1)
DEPRECATED RDW RBC AUTO: 45.1 FL (ref 37–54)
EGFRCR SERPLBLD CKD-EPI 2021: 87.4 ML/MIN/1.73
EOSINOPHIL # BLD AUTO: 0.25 10*3/MM3 (ref 0–0.4)
EOSINOPHIL NFR BLD AUTO: 4.3 % (ref 0.3–6.2)
ERYTHROCYTE [DISTWIDTH] IN BLOOD BY AUTOMATED COUNT: 14 % (ref 12.3–15.4)
GLOBULIN UR ELPH-MCNC: 2 GM/DL (ref 1.8–3.5)
GLUCOSE SERPL-MCNC: 83 MG/DL (ref 74–124)
HCT VFR BLD AUTO: 43.4 % (ref 34–46.6)
HGB BLD-MCNC: 13.9 G/DL (ref 12–15.9)
IMM GRANULOCYTES # BLD AUTO: 0.02 10*3/MM3 (ref 0–0.05)
IMM GRANULOCYTES NFR BLD AUTO: 0.3 % (ref 0–0.5)
LYMPHOCYTES # BLD AUTO: 2.3 10*3/MM3 (ref 0.7–3.1)
LYMPHOCYTES NFR BLD AUTO: 39.5 % (ref 19.6–45.3)
MCH RBC QN AUTO: 28.5 PG (ref 26.6–33)
MCHC RBC AUTO-ENTMCNC: 32 G/DL (ref 31.5–35.7)
MCV RBC AUTO: 88.9 FL (ref 79–97)
MONOCYTES # BLD AUTO: 0.45 10*3/MM3 (ref 0.1–0.9)
MONOCYTES NFR BLD AUTO: 7.7 % (ref 5–12)
NEUTROPHILS NFR BLD AUTO: 2.76 10*3/MM3 (ref 1.7–7)
NEUTROPHILS NFR BLD AUTO: 47.3 % (ref 42.7–76)
NRBC BLD AUTO-RTO: 0 /100 WBC (ref 0–0.2)
PLATELET # BLD AUTO: 246 10*3/MM3 (ref 140–450)
PMV BLD AUTO: 10.1 FL (ref 6–12)
POTASSIUM SERPL-SCNC: 5 MMOL/L (ref 3.5–4.7)
PROT SERPL-MCNC: 6.4 G/DL (ref 6.3–8)
RBC # BLD AUTO: 4.88 10*6/MM3 (ref 3.77–5.28)
SODIUM SERPL-SCNC: 138 MMOL/L (ref 134–145)
WBC NRBC COR # BLD: 5.83 10*3/MM3 (ref 3.4–10.8)

## 2023-02-14 PROCEDURE — 25010000002 CYCLOPHOSPHAMIDE 1 GM/5ML SOLUTION 5 ML VIAL: Performed by: INTERNAL MEDICINE

## 2023-02-14 PROCEDURE — 25010000002 PALONOSETRON PER 25 MCG: Performed by: INTERNAL MEDICINE

## 2023-02-14 PROCEDURE — 80053 COMPREHEN METABOLIC PANEL: CPT

## 2023-02-14 PROCEDURE — 99214 OFFICE O/P EST MOD 30 MIN: CPT | Performed by: INTERNAL MEDICINE

## 2023-02-14 PROCEDURE — 63710000001 DEXAMETHASONE PER 0.25 MG: Performed by: INTERNAL MEDICINE

## 2023-02-14 PROCEDURE — 96401 CHEMO ANTI-NEOPL SQ/IM: CPT

## 2023-02-14 PROCEDURE — 25010000002 BORTEZOMIB PER 0.1 MG: Performed by: INTERNAL MEDICINE

## 2023-02-14 PROCEDURE — 85025 COMPLETE CBC W/AUTO DIFF WBC: CPT

## 2023-02-14 PROCEDURE — 96413 CHEMO IV INFUSION 1 HR: CPT

## 2023-02-14 PROCEDURE — 96375 TX/PRO/DX INJ NEW DRUG ADDON: CPT

## 2023-02-14 RX ORDER — BORTEZOMIB 3.5 MG/1
1 INJECTION, POWDER, LYOPHILIZED, FOR SOLUTION INTRAVENOUS; SUBCUTANEOUS ONCE
Status: COMPLETED | OUTPATIENT
Start: 2023-02-14 | End: 2023-02-14

## 2023-02-14 RX ORDER — DEXAMETHASONE 4 MG/1
20 TABLET ORAL ONCE
Status: COMPLETED | OUTPATIENT
Start: 2023-02-14 | End: 2023-02-14

## 2023-02-14 RX ORDER — SODIUM CHLORIDE 9 MG/ML
250 INJECTION, SOLUTION INTRAVENOUS ONCE
Status: COMPLETED | OUTPATIENT
Start: 2023-02-14 | End: 2023-02-14

## 2023-02-14 RX ORDER — PALONOSETRON 0.05 MG/ML
0.25 INJECTION, SOLUTION INTRAVENOUS ONCE
Status: COMPLETED | OUTPATIENT
Start: 2023-02-14 | End: 2023-02-14

## 2023-02-14 RX ADMIN — SODIUM CHLORIDE 250 ML: 9 INJECTION, SOLUTION INTRAVENOUS at 09:55

## 2023-02-14 RX ADMIN — PALONOSETRON 0.25 MG: 0.05 INJECTION, SOLUTION INTRAVENOUS at 09:54

## 2023-02-14 RX ADMIN — BORTEZOMIB 1.5 MG: 3.5 INJECTION, POWDER, LYOPHILIZED, FOR SOLUTION INTRAVENOUS; SUBCUTANEOUS at 10:13

## 2023-02-14 RX ADMIN — CYCLOPHOSPHAMIDE 450 MG: 200 INJECTION, SOLUTION INTRAVENOUS at 10:14

## 2023-02-14 RX ADMIN — DEXAMETHASONE 20 MG: 4 TABLET ORAL at 09:53

## 2023-02-17 ENCOUNTER — APPOINTMENT (OUTPATIENT)
Dept: CARDIAC REHAB | Facility: HOSPITAL | Age: 68
End: 2023-02-17

## 2023-02-20 ENCOUNTER — OFFICE VISIT (OUTPATIENT)
Dept: CARDIAC REHAB | Facility: HOSPITAL | Age: 68
End: 2023-02-20

## 2023-02-20 VITALS
BODY MASS INDEX: 22.8 KG/M2 | OXYGEN SATURATION: 97 % | HEART RATE: 90 BPM | SYSTOLIC BLOOD PRESSURE: 110 MMHG | DIASTOLIC BLOOD PRESSURE: 68 MMHG | WEIGHT: 120.59 LBS

## 2023-02-20 DIAGNOSIS — Z98.890 STATUS POST VENTRICULAR SEPTAL MYECTOMY: Primary | ICD-10-CM

## 2023-02-21 ENCOUNTER — INFUSION (OUTPATIENT)
Dept: ONCOLOGY | Facility: HOSPITAL | Age: 68
End: 2023-02-21
Payer: MEDICARE

## 2023-02-21 ENCOUNTER — OFFICE VISIT (OUTPATIENT)
Dept: ONCOLOGY | Facility: CLINIC | Age: 68
End: 2023-02-21
Payer: MEDICARE

## 2023-02-21 VITALS
SYSTOLIC BLOOD PRESSURE: 116 MMHG | RESPIRATION RATE: 18 BRPM | TEMPERATURE: 97.7 F | HEIGHT: 61 IN | DIASTOLIC BLOOD PRESSURE: 71 MMHG | BODY MASS INDEX: 22.9 KG/M2 | OXYGEN SATURATION: 98 % | HEART RATE: 79 BPM | WEIGHT: 121.3 LBS

## 2023-02-21 DIAGNOSIS — E85.81 LIGHT CHAIN (AL) AMYLOIDOSIS: Primary | ICD-10-CM

## 2023-02-21 DIAGNOSIS — Z79.899 HIGH RISK MEDICATION USE: ICD-10-CM

## 2023-02-21 DIAGNOSIS — E85.81 LIGHT CHAIN (AL) AMYLOIDOSIS: ICD-10-CM

## 2023-02-21 LAB
ALBUMIN SERPL-MCNC: 4.4 G/DL (ref 3.5–5.2)
ALBUMIN/GLOB SERPL: 2.2 G/DL (ref 1.1–2.4)
ALP SERPL-CCNC: 69 U/L (ref 38–116)
ALT SERPL W P-5'-P-CCNC: 34 U/L (ref 0–33)
ANION GAP SERPL CALCULATED.3IONS-SCNC: 12.1 MMOL/L (ref 5–15)
AST SERPL-CCNC: 28 U/L (ref 0–32)
BASOPHILS # BLD AUTO: 0.02 10*3/MM3 (ref 0–0.2)
BASOPHILS NFR BLD AUTO: 0.4 % (ref 0–1.5)
BILIRUB SERPL-MCNC: 0.5 MG/DL (ref 0.2–1.2)
BUN SERPL-MCNC: 16 MG/DL (ref 6–20)
BUN/CREAT SERPL: 22.2 (ref 7.3–30)
CALCIUM SPEC-SCNC: 9.8 MG/DL (ref 8.5–10.2)
CHLORIDE SERPL-SCNC: 106 MMOL/L (ref 98–107)
CO2 SERPL-SCNC: 22.9 MMOL/L (ref 22–29)
CREAT SERPL-MCNC: 0.72 MG/DL (ref 0.6–1.1)
DEPRECATED RDW RBC AUTO: 46.5 FL (ref 37–54)
EGFRCR SERPLBLD CKD-EPI 2021: 91.8 ML/MIN/1.73
EOSINOPHIL # BLD AUTO: 0.17 10*3/MM3 (ref 0–0.4)
EOSINOPHIL NFR BLD AUTO: 3.2 % (ref 0.3–6.2)
ERYTHROCYTE [DISTWIDTH] IN BLOOD BY AUTOMATED COUNT: 14.3 % (ref 12.3–15.4)
GLOBULIN UR ELPH-MCNC: 2 GM/DL (ref 1.8–3.5)
GLUCOSE SERPL-MCNC: 84 MG/DL (ref 74–124)
HCT VFR BLD AUTO: 42.5 % (ref 34–46.6)
HGB BLD-MCNC: 13.7 G/DL (ref 12–15.9)
IMM GRANULOCYTES # BLD AUTO: 0.02 10*3/MM3 (ref 0–0.05)
IMM GRANULOCYTES NFR BLD AUTO: 0.4 % (ref 0–0.5)
LYMPHOCYTES # BLD AUTO: 1.4 10*3/MM3 (ref 0.7–3.1)
LYMPHOCYTES NFR BLD AUTO: 26.7 % (ref 19.6–45.3)
MCH RBC QN AUTO: 29.1 PG (ref 26.6–33)
MCHC RBC AUTO-ENTMCNC: 32.2 G/DL (ref 31.5–35.7)
MCV RBC AUTO: 90.2 FL (ref 79–97)
MONOCYTES # BLD AUTO: 0.29 10*3/MM3 (ref 0.1–0.9)
MONOCYTES NFR BLD AUTO: 5.5 % (ref 5–12)
NEUTROPHILS NFR BLD AUTO: 3.34 10*3/MM3 (ref 1.7–7)
NEUTROPHILS NFR BLD AUTO: 63.8 % (ref 42.7–76)
NRBC BLD AUTO-RTO: 0 /100 WBC (ref 0–0.2)
PLATELET # BLD AUTO: 188 10*3/MM3 (ref 140–450)
PMV BLD AUTO: 10.2 FL (ref 6–12)
POTASSIUM SERPL-SCNC: 3.9 MMOL/L (ref 3.5–4.7)
PROT SERPL-MCNC: 6.4 G/DL (ref 6.3–8)
RBC # BLD AUTO: 4.71 10*6/MM3 (ref 3.77–5.28)
SODIUM SERPL-SCNC: 141 MMOL/L (ref 134–145)
WBC NRBC COR # BLD: 5.24 10*3/MM3 (ref 3.4–10.8)

## 2023-02-21 PROCEDURE — 25010000002 CYCLOPHOSPHAMIDE 1 GM/5ML SOLUTION 5 ML VIAL: Performed by: NURSE PRACTITIONER

## 2023-02-21 PROCEDURE — 85025 COMPLETE CBC W/AUTO DIFF WBC: CPT

## 2023-02-21 PROCEDURE — 25010000002 BORTEZOMIB PER 0.1 MG: Performed by: NURSE PRACTITIONER

## 2023-02-21 PROCEDURE — 99214 OFFICE O/P EST MOD 30 MIN: CPT | Performed by: NURSE PRACTITIONER

## 2023-02-21 PROCEDURE — 63710000001 DEXAMETHASONE PER 0.25 MG: Performed by: NURSE PRACTITIONER

## 2023-02-21 PROCEDURE — 80053 COMPREHEN METABOLIC PANEL: CPT

## 2023-02-21 PROCEDURE — 25010000002 PALONOSETRON PER 25 MCG: Performed by: NURSE PRACTITIONER

## 2023-02-21 PROCEDURE — 25010000002 DARATUMUMAB-HYALURONIDASE-FIHJ 1800-30000 MG-UT/15ML SOLUTION: Performed by: NURSE PRACTITIONER

## 2023-02-21 PROCEDURE — 96401 CHEMO ANTI-NEOPL SQ/IM: CPT

## 2023-02-21 PROCEDURE — 96413 CHEMO IV INFUSION 1 HR: CPT

## 2023-02-21 PROCEDURE — 96375 TX/PRO/DX INJ NEW DRUG ADDON: CPT

## 2023-02-21 RX ORDER — FAMOTIDINE 10 MG/ML
20 INJECTION, SOLUTION INTRAVENOUS AS NEEDED
Status: CANCELLED | OUTPATIENT
Start: 2023-02-21

## 2023-02-21 RX ORDER — DIPHENHYDRAMINE HYDROCHLORIDE 50 MG/ML
50 INJECTION INTRAMUSCULAR; INTRAVENOUS AS NEEDED
Status: CANCELLED | OUTPATIENT
Start: 2023-02-21

## 2023-02-21 RX ORDER — HYDROXYZINE PAMOATE 25 MG/1
25 CAPSULE ORAL ONCE
Status: CANCELLED
Start: 2023-02-21 | End: 2023-02-21

## 2023-02-21 RX ORDER — PALONOSETRON 0.05 MG/ML
0.25 INJECTION, SOLUTION INTRAVENOUS ONCE
Status: COMPLETED | OUTPATIENT
Start: 2023-02-21 | End: 2023-02-21

## 2023-02-21 RX ORDER — ACETAMINOPHEN 500 MG
1000 TABLET ORAL ONCE
Status: COMPLETED | OUTPATIENT
Start: 2023-02-21 | End: 2023-02-21

## 2023-02-21 RX ORDER — BORTEZOMIB 3.5 MG/1
1 INJECTION, POWDER, LYOPHILIZED, FOR SOLUTION INTRAVENOUS; SUBCUTANEOUS ONCE
Status: COMPLETED | OUTPATIENT
Start: 2023-02-21 | End: 2023-02-21

## 2023-02-21 RX ORDER — BORTEZOMIB 3.5 MG/1
1 INJECTION, POWDER, LYOPHILIZED, FOR SOLUTION INTRAVENOUS; SUBCUTANEOUS ONCE
Status: CANCELLED | OUTPATIENT
Start: 2023-02-21

## 2023-02-21 RX ORDER — PALONOSETRON 0.05 MG/ML
0.25 INJECTION, SOLUTION INTRAVENOUS ONCE
Status: CANCELLED | OUTPATIENT
Start: 2023-02-21

## 2023-02-21 RX ORDER — SODIUM CHLORIDE 9 MG/ML
250 INJECTION, SOLUTION INTRAVENOUS ONCE
Status: CANCELLED | OUTPATIENT
Start: 2023-02-21

## 2023-02-21 RX ORDER — LOPERAMIDE HYDROCHLORIDE 2 MG/1
2 CAPSULE ORAL ONCE
Status: COMPLETED | OUTPATIENT
Start: 2023-02-21 | End: 2023-02-21

## 2023-02-21 RX ORDER — ACETAMINOPHEN 500 MG
1000 TABLET ORAL ONCE
Status: CANCELLED | OUTPATIENT
Start: 2023-02-21

## 2023-02-21 RX ORDER — MEPERIDINE HYDROCHLORIDE 25 MG/ML
25 INJECTION INTRAMUSCULAR; INTRAVENOUS; SUBCUTANEOUS
Status: CANCELLED | OUTPATIENT
Start: 2023-02-21

## 2023-02-21 RX ORDER — HYDROXYZINE PAMOATE 25 MG/1
25 CAPSULE ORAL ONCE
Status: COMPLETED | OUTPATIENT
Start: 2023-02-21 | End: 2023-02-21

## 2023-02-21 RX ORDER — DEXAMETHASONE 4 MG/1
20 TABLET ORAL ONCE
Status: COMPLETED | OUTPATIENT
Start: 2023-02-21 | End: 2023-02-21

## 2023-02-21 RX ORDER — SODIUM CHLORIDE 9 MG/ML
250 INJECTION, SOLUTION INTRAVENOUS ONCE
Status: COMPLETED | OUTPATIENT
Start: 2023-02-21 | End: 2023-02-21

## 2023-02-21 RX ADMIN — ACETAMINOPHEN 1000 MG: 500 TABLET, FILM COATED ORAL at 10:36

## 2023-02-21 RX ADMIN — SODIUM CHLORIDE 250 ML: 9 INJECTION, SOLUTION INTRAVENOUS at 10:35

## 2023-02-21 RX ADMIN — BORTEZOMIB 1.5 MG: 3.5 INJECTION, POWDER, LYOPHILIZED, FOR SOLUTION INTRAVENOUS; SUBCUTANEOUS at 11:39

## 2023-02-21 RX ADMIN — CYCLOPHOSPHAMIDE 450 MG: 200 INJECTION, SOLUTION INTRAVENOUS at 11:01

## 2023-02-21 RX ADMIN — DARATUMUMAB AND HYALURONIDASE-FIHJ (HUMAN RECOMBINANT) 1800 MG: 1800; 30000 INJECTION SUBCUTANEOUS at 11:41

## 2023-02-21 RX ADMIN — DEXAMETHASONE 20 MG: 4 TABLET ORAL at 10:35

## 2023-02-21 RX ADMIN — PALONOSETRON HYDROCHLORIDE 0.25 MG: 0.25 INJECTION INTRAVENOUS at 10:37

## 2023-02-21 RX ADMIN — LOPERAMIDE HYDROCHLORIDE 2 MG: 2 CAPSULE ORAL at 11:08

## 2023-02-21 RX ADMIN — HYDROXYZINE PAMOATE 25 MG: 25 CAPSULE ORAL at 10:36

## 2023-02-21 NOTE — PROGRESS NOTES
"[facility] GROUP OUTPATIENT FOLLOW UP CLINIC VISIT    REASON FOR FOLLOW-UP:    AL amyloidosis  Therapy with michael-CyBorD initiated 8/16/2022    HISTORY OF PRESENT ILLNESS: Cynthia Her is a 67 y.o. with the above-mentioned issues here today for lab review and evaluation due for cycle 5, day 1 Darzalex Faspro, Velcade, cyclophosphamide.  She also receives dexamethasone 20 mg p.o. weekly in the office.      She does continue with cardiac rehabilitation.  She does report that she gets fatigued easily.  Does occasionally get pain in chest, relieved with lidocaine patches.     Last week ran temp after up treatment, up to 100.9 the evening after and has not recurred.  She denies any other  infectious symptoms.    Still with dumping issues.  Did start using questran and it has helped.  Her diarrhea/dumping issues are also very dependant on what she eats.       REVIEW OF SYSTEMS:  As per the HPI    PHYSICAL EXAMINATION:    Vitals:    02/21/23 0953   BP: 116/71   Pulse: 79   Resp: 18   Temp: 97.7 °F (36.5 °C)   TempSrc: Temporal   SpO2: 98%   Weight: 55 kg (121 lb 4.8 oz)   Height: 154.9 cm (60.98\")   PainSc:   3   PainLoc: Chest       Physical Exam  Vitals reviewed.   Constitutional:       General: She is not in acute distress.     Appearance: Normal appearance. She is well-developed.   HENT:      Head: Normocephalic and atraumatic.   Eyes:      Pupils: Pupils are equal, round, and reactive to light.   Cardiovascular:      Rate and Rhythm: Normal rate and regular rhythm.      Heart sounds: Normal heart sounds. No murmur heard.  Pulmonary:      Effort: Pulmonary effort is normal. No respiratory distress.      Breath sounds: Normal breath sounds. No wheezing, rhonchi or rales.   Chest:      Comments: Well healed sternotomy scar.   Abdominal:      General: Bowel sounds are normal. There is no distension.      Palpations: Abdomen is soft.   Musculoskeletal:         General: Normal range of motion.      " Cervical back: Normal range of motion.   Skin:     General: Skin is warm and dry.      Findings: No rash.   Neurological:      Mental Status: She is alert and oriented to person, place, and time.         DIAGNOSTIC DATA:  Lab Results   Component Value Date    WBC 5.24 02/21/2023    HGB 13.7 02/21/2023    HCT 42.5 02/21/2023    MCV 90.2 02/21/2023     02/21/2023     Lab Results   Component Value Date    GLUCOSE 84 02/21/2023    BUN 16 02/21/2023    CREATININE 0.72 02/21/2023    EGFRRESULT 100 06/08/2022    EGFR 91.8 02/21/2023    BCR 22.2 02/21/2023    K 3.9 02/21/2023    CO2 22.9 02/21/2023    CALCIUM 9.8 02/21/2023    PROTENTOTREF 6.2 01/17/2023    ALBUMIN 4.4 02/21/2023    LABIL2 1.9 (H) 01/17/2023    AST 28 02/21/2023    ALT 34 (H) 02/21/2023         IMAGING:    None reviewed      ASSESSMENT:  This is a 67 y.o. female with:    *AL amyloidosis  · She had a stress echocardiogram on 4/24/2020 showing a normal left ventricular ejection fraction of 60% with moderate concentric hypertrophy but no wall motion abnormalities of the left ventricle.  There was impaired relaxation noted.  She complained of chest pain during the examination.  There was some ST segment depression.  Cardiac catheterization was performed on the same day.  No intervention was required.  · Follow-up echocardiogram on 4/15/2021 showed a left ventricular ejection fraction of 61 to 65% with normal LV cavity size.  Left ventricular wall thickness showed moderate concentric hypertrophy.  Diastolic function was impaired.  No pericardial effusion.  Small left pleural effusion.  · She had follow-up PYP imaging for cardiac amyloidosis that was not suggestive of ATTR amyloidosis  · Laboratory values on 2/24/2022 showed a high serum free light chain lambda of 121, normal kappa of 10.5, low ratio at 0.09.  Serum protein electrophoresis showed no evidence of an M spike.  Urine light chains were abnormal with an elevated lambda light chain of 33 and a  low ratio of 0.48 with a 12.7 mg M spike on 24-hour urine protein electrophoresis.  · BNP was elevated at 2306 on 3/4/2022.  The troponin was normal at 0.03.  CK was 212 with a CK-MB of 10.87.  · Initial consult on 3/30/22. No M spike on serum protein electrophoresis. SIFE 'presence of monoclonal protein is unclear.'   · Bone marrow biopsy 4/13/22 normocellular, 12.1% plasma cells consistent with low volume plasma cell dyscrasia. FISH with low level t(11;14) fusion only. No amyloid noted as Congo red staining negative.   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 Buenrostro at [place]. Intended to enroll on a clinical study but her troponin as tested by the study sponsor was not high enough  · Therapy with sissy-CyBorD initiated 8/16/2022  · Patient seen in the office on 8/17/2022 for triage visit.  Patient with complaints of fatigue, dizziness and diarrhea.  She was mildly hypotensive.  She was given IV fluids.  · Subsequent admission at [facility] with shortness of breath and volume overload.  She was diuresed and treated with antibiotics.  · D8 therapy administered on 8/30  · Light chains normal at [place] on 8/31/22 (lambda light chain 1.3, down from 12.44)  · 9/27/2022: Cycle 2-day 8 of therapy  · 10/4/2022 cycle 2-day 8 CyBorD.  Patient continues to push oral hydration.  She continues to have some neck stiffness but this has not worsened.  She will see [place] next week for further cardiac work-up.  Glucose was low upon initial labs today however the patient was given something to eat by nursing prior to rechecking.  This was therefore rechecked prior to her leaving the office and was 87.  Patient reports she was feeling fine.  · 10/18/2020 to cycle 2-day 22 Sissy -CyBorD.  Velcade dose will be reduced to 1.0 mg/m² due to patient's persistent issues with orthostasis.  · 10/25/2022: Cycle 3-day 1  · 11/8/2022: Delay in cycle 3-day 15 therapy.  Day 8 omitted due  to an upper respiratory infection.  · 11/22/2022: Cycle 3-day 22.   final planned treatment before she goes to [place] for cardiac surgery.  · Cardiac surgery performed at [place] on 12/5/2022.  Pathology from the myectomy showed cardiac amyloidosis extensively involving the vessels and endocardium with myocyte hypertrophy and interstitial fibrosis.  Congo red stain was positive.  · Reviewed back on 1/17/2023 with plans to resume Darzalex, Cytoxan, Velcade, dexamethasone with cycle #4 on 1/24.  · Patient seen 2/7/2023 (missed 1/31/2023 due to weather, which would have been day 8).  We will go ahead and proceed with as cycle 4 day 15, day 15.  · 2/14/2023: Cycle 4-day 22  · 2/21/2023 cycle 5-day 1 Darzalex, Cytoxan (IV Cytoxan given in the office), Velcade, dexamethasone (20 mg p.o. given in the office)..    *Diarrhea  · She saw Dr. Hoyos with GI at [place] on 9/7.  · Suspicion for amyloid involvement of the GI tract  · Continue Lomotil and Imodium. Rifaximin prescribed by Dr. Hoyos which she continues to use intermittently  · Diarrhea waxes and wanes with what she eats and dairy products particularly worsen diarrhea  · Symptoms remain stable, with Imodium and Lomotil.   · I encouraged also the use of Questran    *Elevated liver enzymes  · 2/14/2023: Mildly elevated  · 2/21/2023 slightly improved today, will continue to monitor.    *Cardiomyopathy:  · Most recent echocardiogram on 8/17/2022 showed left ventricular wall thickness consistent with moderate to severe concentric hypertrophy along with left ventricular septal wall measuring 2.2 cm and posterior wall thickness at 1.9 cm likely due to amyloidosis.  LVEF 61 to 65%.  Grade 1 impaired relaxation.  · Now followed by Dr. Lyles at [facility] with concerns for pre-existing HCM and AL cardiac amyloid.   · Cardiac MRI results above.  Amyloidosis evident.  · Catheterization performed at [place].    · Cardiac surgery performed at [place] on 12/5/2022.   Pathology from the myectomy showed cardiac amyloidosis extensively involving the vessels and endocardium with myocyte hypertrophy and interstitial fibrosis.  Congo red stain was positive.    *Periorbital hematomas: These have resolved.  Coagulation studies and a factor X level were all normal.    *History of myalgias    *History of sore tongue    PLAN:  1. Proceed with cycle 5, day 1 Velcade, IV Cytoxan, Darzalex Faspro, and oral dexamethasone 20 mg weekly in the office.  2. Continue cardiac medications and cardiac rehab as managed by cardiology at [place].  3. Continue Imodium, Lomotil, and Questran for diarrhea control.  4. Return weekly for follow-up with MD or NP for labs and treatment.  5. Planning for total of 6 cycles, after that, consideration of autologous peripheral blood stem cell transplant.  She is told now that she does not require cardiac transplant.  6. Follow-up with cardiology and gastroenterology at [place].   7. She continues to follow up with Dr. Buenrostro at [place].  8. She will have a thorough evaluation at [place] at the end of April  9. Continue acyclovir and Bactrim for prophylaxis  10. We will treat through peripheral IVs as long as possible.  11. Schedule weekly follow-up for the next 8 weeks    High risk therapy requiring intensive monitoring    Sissy-CyBorD regimen     Daratumumab and hyaluronidase (Darzalex Faspro) as follows:  Cycles 1 & 2: 1800 mg SC once per day on days 1, 8, 15, 22  Cycles 3 to 6: 1800 mg SC once per day on days 1 & 15  Cycles 7 up to 24: 1800 mg SC once on day 1     Bortezomib (Velcade) as follows:  Cycles 1 to 6: 1.3 mg/m2 SC once per day on days 1, 8, 15, 22.  (Now dose reduced to 1.0 mg per metered squared.)     Cyclophosphamide (Cytoxan) as follows:  Cycles 1 to 6: 300 mg/m2 (maximum dose of 500 mg) PO or IV once per day on days 1, 8, 15, 22    Plan IV Cytoxan as she has difficulty swallowing pills.     Dexamethasone (Decadron) as follows:  Cycles 1  to 6: 40 mg PO or IV once per day on days 1, 8, 15, 22 (see note)  28-day cycle for up to 24 cycles  Reduced the dexamethasone dose to 20 mg weekly

## 2023-02-22 ENCOUNTER — TELEPHONE (OUTPATIENT)
Dept: ONCOLOGY | Facility: CLINIC | Age: 68
End: 2023-02-22
Payer: MEDICARE

## 2023-02-22 LAB
ALBUMIN SERPL ELPH-MCNC: 4 G/DL (ref 2.9–4.4)
ALBUMIN/GLOB SERPL: 1.2 {RATIO} (ref 0.7–1.7)
ALPHA1 GLOB SERPL ELPH-MCNC: 0.3 G/DL (ref 0–0.4)
ALPHA2 GLOB SERPL ELPH-MCNC: 0.8 G/DL (ref 0.4–1)
B-GLOBULIN SERPL ELPH-MCNC: 1.2 G/DL (ref 0.7–1.3)
GAMMA GLOB SERPL ELPH-MCNC: 1.1 G/DL (ref 0.4–1.8)
GLOBULIN SER-MCNC: 3.5 G/DL (ref 2.2–3.9)
IGA SERPL-MCNC: 16 MG/DL (ref 87–352)
IGG SERPL-MCNC: 1265 MG/DL (ref 586–1602)
IGM SERPL-MCNC: 85 MG/DL (ref 26–217)
INTERPRETATION SERPL IEP-IMP: ABNORMAL
KAPPA LC FREE SER-MCNC: 85.8 MG/L (ref 3.3–19.4)
KAPPA LC FREE/LAMBDA FREE SER: 8.58 {RATIO} (ref 0.26–1.65)
LABORATORY COMMENT REPORT: ABNORMAL
LAMBDA LC FREE SERPL-MCNC: 10 MG/L (ref 5.7–26.3)
M PROTEIN SERPL ELPH-MCNC: 0.8 G/DL
PROT SERPL-MCNC: 7.5 G/DL (ref 6–8.5)

## 2023-02-22 NOTE — TELEPHONE ENCOUNTER
Pt C/O  fever of 100.7 with bright red cheeks. She also expressed she feels very depressed. D/W Dr. Sal. Per Dr. Sal  should monitor and call if her fever returns or she develops new symptoms. Pt states she has a therapist ans will reeach out to her concerning her depression. Pt V/U.

## 2023-02-23 DIAGNOSIS — I10 HYPERTENSION, UNSPECIFIED TYPE: Primary | ICD-10-CM

## 2023-02-23 DIAGNOSIS — Z79.899 ENCOUNTER FOR LONG-TERM (CURRENT) USE OF OTHER MEDICATIONS: ICD-10-CM

## 2023-02-24 ENCOUNTER — APPOINTMENT (OUTPATIENT)
Dept: CARDIAC REHAB | Facility: HOSPITAL | Age: 68
End: 2023-02-24

## 2023-02-28 ENCOUNTER — TELEPHONE (OUTPATIENT)
Dept: FAMILY MEDICINE CLINIC | Facility: CLINIC | Age: 68
End: 2023-02-28

## 2023-02-28 ENCOUNTER — INFUSION (OUTPATIENT)
Dept: ONCOLOGY | Facility: HOSPITAL | Age: 68
End: 2023-02-28
Payer: MEDICARE

## 2023-02-28 ENCOUNTER — OFFICE VISIT (OUTPATIENT)
Dept: ONCOLOGY | Facility: CLINIC | Age: 68
End: 2023-02-28
Payer: MEDICARE

## 2023-02-28 VITALS
SYSTOLIC BLOOD PRESSURE: 124 MMHG | DIASTOLIC BLOOD PRESSURE: 79 MMHG | OXYGEN SATURATION: 99 % | HEIGHT: 61 IN | TEMPERATURE: 97.1 F | BODY MASS INDEX: 22.96 KG/M2 | WEIGHT: 121.6 LBS | HEART RATE: 72 BPM | RESPIRATION RATE: 16 BRPM

## 2023-02-28 DIAGNOSIS — E85.81 LIGHT CHAIN (AL) AMYLOIDOSIS: ICD-10-CM

## 2023-02-28 DIAGNOSIS — E85.81 LIGHT CHAIN (AL) AMYLOIDOSIS: Primary | ICD-10-CM

## 2023-02-28 DIAGNOSIS — Z79.899 HIGH RISK MEDICATION USE: ICD-10-CM

## 2023-02-28 LAB
ALBUMIN SERPL-MCNC: 4.3 G/DL (ref 3.5–5.2)
ALBUMIN/GLOB SERPL: 2.4 G/DL (ref 1.1–2.4)
ALP SERPL-CCNC: 63 U/L (ref 38–116)
ALT SERPL W P-5'-P-CCNC: 32 U/L (ref 0–33)
ANION GAP SERPL CALCULATED.3IONS-SCNC: 10.9 MMOL/L (ref 5–15)
AST SERPL-CCNC: 33 U/L (ref 0–32)
BASOPHILS # BLD AUTO: 0.03 10*3/MM3 (ref 0–0.2)
BASOPHILS NFR BLD AUTO: 0.6 % (ref 0–1.5)
BILIRUB SERPL-MCNC: 0.5 MG/DL (ref 0.2–1.2)
BUN SERPL-MCNC: 17 MG/DL (ref 6–20)
BUN/CREAT SERPL: 24.6 (ref 7.3–30)
CALCIUM SPEC-SCNC: 9.5 MG/DL (ref 8.5–10.2)
CHLORIDE SERPL-SCNC: 106 MMOL/L (ref 98–107)
CO2 SERPL-SCNC: 23.1 MMOL/L (ref 22–29)
CREAT SERPL-MCNC: 0.69 MG/DL (ref 0.6–1.1)
DEPRECATED RDW RBC AUTO: 48.2 FL (ref 37–54)
EGFRCR SERPLBLD CKD-EPI 2021: 95.3 ML/MIN/1.73
EOSINOPHIL # BLD AUTO: 0.19 10*3/MM3 (ref 0–0.4)
EOSINOPHIL NFR BLD AUTO: 3.6 % (ref 0.3–6.2)
ERYTHROCYTE [DISTWIDTH] IN BLOOD BY AUTOMATED COUNT: 15.1 % (ref 12.3–15.4)
GLOBULIN UR ELPH-MCNC: 1.8 GM/DL (ref 1.8–3.5)
GLUCOSE SERPL-MCNC: 80 MG/DL (ref 74–124)
HCT VFR BLD AUTO: 40.6 % (ref 34–46.6)
HGB BLD-MCNC: 13.2 G/DL (ref 12–15.9)
IMM GRANULOCYTES # BLD AUTO: 0.02 10*3/MM3 (ref 0–0.05)
IMM GRANULOCYTES NFR BLD AUTO: 0.4 % (ref 0–0.5)
LYMPHOCYTES # BLD AUTO: 1.65 10*3/MM3 (ref 0.7–3.1)
LYMPHOCYTES NFR BLD AUTO: 31 % (ref 19.6–45.3)
MCH RBC QN AUTO: 29.1 PG (ref 26.6–33)
MCHC RBC AUTO-ENTMCNC: 32.5 G/DL (ref 31.5–35.7)
MCV RBC AUTO: 89.4 FL (ref 79–97)
MONOCYTES # BLD AUTO: 0.36 10*3/MM3 (ref 0.1–0.9)
MONOCYTES NFR BLD AUTO: 6.8 % (ref 5–12)
NEUTROPHILS NFR BLD AUTO: 3.07 10*3/MM3 (ref 1.7–7)
NEUTROPHILS NFR BLD AUTO: 57.6 % (ref 42.7–76)
NRBC BLD AUTO-RTO: 0 /100 WBC (ref 0–0.2)
PLATELET # BLD AUTO: 192 10*3/MM3 (ref 140–450)
PMV BLD AUTO: 10.3 FL (ref 6–12)
POTASSIUM SERPL-SCNC: 4.6 MMOL/L (ref 3.5–4.7)
PROT SERPL-MCNC: 6.1 G/DL (ref 6.3–8)
RBC # BLD AUTO: 4.54 10*6/MM3 (ref 3.77–5.28)
SODIUM SERPL-SCNC: 140 MMOL/L (ref 134–145)
WBC NRBC COR # BLD: 5.32 10*3/MM3 (ref 3.4–10.8)

## 2023-02-28 PROCEDURE — 63710000001 DEXAMETHASONE PER 0.25 MG: Performed by: NURSE PRACTITIONER

## 2023-02-28 PROCEDURE — 96401 CHEMO ANTI-NEOPL SQ/IM: CPT

## 2023-02-28 PROCEDURE — 25010000002 CYCLOPHOSPHAMIDE 1 GM/5ML SOLUTION 5 ML VIAL: Performed by: NURSE PRACTITIONER

## 2023-02-28 PROCEDURE — 25010000002 PALONOSETRON PER 25 MCG: Performed by: NURSE PRACTITIONER

## 2023-02-28 PROCEDURE — 96375 TX/PRO/DX INJ NEW DRUG ADDON: CPT

## 2023-02-28 PROCEDURE — 85025 COMPLETE CBC W/AUTO DIFF WBC: CPT

## 2023-02-28 PROCEDURE — 96413 CHEMO IV INFUSION 1 HR: CPT

## 2023-02-28 PROCEDURE — 99214 OFFICE O/P EST MOD 30 MIN: CPT | Performed by: NURSE PRACTITIONER

## 2023-02-28 PROCEDURE — 25010000002 BORTEZOMIB PER 0.1 MG: Performed by: NURSE PRACTITIONER

## 2023-02-28 RX ORDER — DEXAMETHASONE 4 MG/1
20 TABLET ORAL ONCE
Status: COMPLETED | OUTPATIENT
Start: 2023-02-28 | End: 2023-02-28

## 2023-02-28 RX ORDER — PALONOSETRON 0.05 MG/ML
0.25 INJECTION, SOLUTION INTRAVENOUS ONCE
Status: CANCELLED | OUTPATIENT
Start: 2023-02-28

## 2023-02-28 RX ORDER — BORTEZOMIB 3.5 MG/1
1 INJECTION, POWDER, LYOPHILIZED, FOR SOLUTION INTRAVENOUS; SUBCUTANEOUS ONCE
Status: CANCELLED | OUTPATIENT
Start: 2023-02-28

## 2023-02-28 RX ORDER — PALONOSETRON 0.05 MG/ML
0.25 INJECTION, SOLUTION INTRAVENOUS ONCE
Status: COMPLETED | OUTPATIENT
Start: 2023-02-28 | End: 2023-02-28

## 2023-02-28 RX ORDER — SODIUM CHLORIDE 9 MG/ML
250 INJECTION, SOLUTION INTRAVENOUS ONCE
Status: COMPLETED | OUTPATIENT
Start: 2023-02-28 | End: 2023-02-28

## 2023-02-28 RX ORDER — SODIUM CHLORIDE 9 MG/ML
250 INJECTION, SOLUTION INTRAVENOUS ONCE
Status: CANCELLED | OUTPATIENT
Start: 2023-02-28

## 2023-02-28 RX ORDER — BORTEZOMIB 3.5 MG/1
1 INJECTION, POWDER, LYOPHILIZED, FOR SOLUTION INTRAVENOUS; SUBCUTANEOUS ONCE
Status: COMPLETED | OUTPATIENT
Start: 2023-02-28 | End: 2023-02-28

## 2023-02-28 RX ADMIN — CYCLOPHOSPHAMIDE 450 MG: 200 INJECTION, SOLUTION INTRAVENOUS at 10:46

## 2023-02-28 RX ADMIN — PALONOSETRON HYDROCHLORIDE 0.25 MG: 0.25 INJECTION INTRAVENOUS at 10:39

## 2023-02-28 RX ADMIN — SODIUM CHLORIDE 250 ML: 9 INJECTION, SOLUTION INTRAVENOUS at 10:40

## 2023-02-28 RX ADMIN — DEXAMETHASONE 20 MG: 4 TABLET ORAL at 10:34

## 2023-02-28 RX ADMIN — BORTEZOMIB 1.5 MG: 3.5 INJECTION, POWDER, LYOPHILIZED, FOR SOLUTION INTRAVENOUS; SUBCUTANEOUS at 11:17

## 2023-02-28 NOTE — TELEPHONE ENCOUNTER
Caller: Cynthia Her    Relationship: Self    Best call back number: [phone]    What was the call regarding: PATIENT STATED SHE HAS HAD LABS PREFORMED TODAY, 02-28-20203, BY [facility]COL[facility].    PATIENT IS REQUESTING TO KNOW IF THE RESULTS FROM THESE LABS MAY BE USED AT HER UPCOMING VISIT WITH DR OLIVERIA ON 03- INSTEAD OF HAVING LABS DRAWN TOMORROW, 03- AS SHE NEEDS TO LIMIT HER DRIVING.    Do you require a callback: PLEASE CALL TO DISCUSS AND ADVISE.

## 2023-02-28 NOTE — PROGRESS NOTES
"[facility] GROUP OUTPATIENT FOLLOW UP CLINIC VISIT    REASON FOR FOLLOW-UP:    AL amyloidosis  Therapy with michael-CyBorD initiated 8/16/2022    HISTORY OF PRESENT ILLNESS: Cynthia Her is a 67 y.o. female with the above mentioned history here today for lab review and evaluation prior to cycle 5, day 8 velcade, darzalex, cyclophosphamide.  She also receives dexamethasone 20 mg p.o. weekly in the office.    Following her treatment last week she did again have a fever.  She states that it started the afternoon after treatments.  Her temperature got up to about 100.7 or 100.9.  It resolved after Tylenol and Motrin, and did not recur the next day.  She denies any other infectious symptoms.  She has noticed some worsening pain in her left lower extremity, which is concerning as this is one of the symptoms when she was first diagnosed.  She is also noticed that her M spike has increased up to 0.8.  She was told that Dr. Sal is reaching out to [place].  The patient is also contacted them as well.  There was discussion about moving her April appointments up until March, however she has not yet heard back.    She reports that she had significant issues last week after treatment with dumping as well.  She admits she is not taking Questran on a regular basis.  The past few days she has been taking it more often it does seem to help.  She admits she does not take it regularly as she does not like the taste, and having to dissolve it in liquid.    She does have ongoing fatigue.  She states that she has not been doing cardiac rehab regularly due to the fatigue.      REVIEW OF SYSTEMS:  As per the HPI    PHYSICAL EXAMINATION:    Vitals:    02/28/23 1003   BP: 124/79   Pulse: 72   Resp: 16   Temp: 97.1 °F (36.2 °C)   TempSrc: Temporal   SpO2: 99%   Weight: 55.2 kg (121 lb 9.6 oz)   Height: 154.9 cm (60.98\")   PainSc: 0-No pain       Physical Exam  Vitals reviewed.   Constitutional:       General: She is not in " acute distress.     Appearance: Normal appearance. She is well-developed.   HENT:      Head: Normocephalic and atraumatic.   Eyes:      Pupils: Pupils are equal, round, and reactive to light.   Cardiovascular:      Rate and Rhythm: Normal rate and regular rhythm.      Heart sounds: Normal heart sounds. No murmur heard.  Pulmonary:      Effort: Pulmonary effort is normal. No respiratory distress.      Breath sounds: Normal breath sounds. No wheezing, rhonchi or rales.   Abdominal:      General: Bowel sounds are normal. There is no distension.      Palpations: Abdomen is soft.   Musculoskeletal:         General: Normal range of motion.      Cervical back: Normal range of motion.   Skin:     General: Skin is warm and dry.      Findings: No rash.   Neurological:      Mental Status: She is alert and oriented to person, place, and time.         DIAGNOSTIC DATA:  Lab Results   Component Value Date    WBC 5.32 02/28/2023    HGB 13.2 02/28/2023    HCT 40.6 02/28/2023    MCV 89.4 02/28/2023     02/28/2023     Lab Results   Component Value Date    GLUCOSE 80 02/28/2023    BUN 17 02/28/2023    CREATININE 0.69 02/28/2023    EGFRRESULT 100 06/08/2022    EGFR 95.3 02/28/2023    BCR 24.6 02/28/2023    K 4.6 02/28/2023    CO2 23.1 02/28/2023    CALCIUM 9.5 02/28/2023    PROTENTOTREF 7.5 02/21/2023    ALBUMIN 4.3 02/28/2023    LABIL2 1.2 02/21/2023    AST 33 (H) 02/28/2023    ALT 32 02/28/2023         IMAGING:    None reviewed      ASSESSMENT:  This is a 67 y.o. female with:    *AL amyloidosis  · She had a stress echocardiogram on 4/24/2020 showing a normal left ventricular ejection fraction of 60% with moderate concentric hypertrophy but no wall motion abnormalities of the left ventricle.  There was impaired relaxation noted.  She complained of chest pain during the examination.  There was some ST segment depression.  Cardiac catheterization was performed on the same day.  No intervention was required.  · Follow-up  echocardiogram on 4/15/2021 showed a left ventricular ejection fraction of 61 to 65% with normal LV cavity size.  Left ventricular wall thickness showed moderate concentric hypertrophy.  Diastolic function was impaired.  No pericardial effusion.  Small left pleural effusion.  · She had follow-up PYP imaging for cardiac amyloidosis that was not suggestive of ATTR amyloidosis  · Laboratory values on 2/24/2022 showed a high serum free light chain lambda of 121, normal kappa of 10.5, low ratio at 0.09.  Serum protein electrophoresis showed no evidence of an M spike.  Urine light chains were abnormal with an elevated lambda light chain of 33 and a low ratio of 0.48 with a 12.7 mg M spike on 24-hour urine protein electrophoresis.  · BNP was elevated at 2306 on 3/4/2022.  The troponin was normal at 0.03.  CK was 212 with a CK-MB of 10.87.  · Initial consult on 3/30/22. No M spike on serum protein electrophoresis. SIFE 'presence of monoclonal protein is unclear.'   · Bone marrow biopsy 4/13/22 normocellular, 12.1% plasma cells consistent with low volume plasma cell dyscrasia. FISH with low level t(11;14) fusion only. No amyloid noted as Congo red staining negative.   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 Buenrostro at [place]. Intended to enroll on a clinical study but her troponin as tested by the study sponsor was not high enough  · Therapy with michael-CyBorD initiated 8/16/2022  · Patient seen in the office on 8/17/2022 for triage visit.  Patient with complaints of fatigue, dizziness and diarrhea.  She was mildly hypotensive.  She was given IV fluids.  · Subsequent admission at [facility] with shortness of breath and volume overload.  She was diuresed and treated with antibiotics.  · D8 therapy administered on 8/30  · Light chains normal at [place] on 8/31/22 (lambda light chain 1.3, down from 12.44)  · 9/27/2022: Cycle 2-day 8 of therapy  · 10/4/2022 cycle 2-day 8  CyBorD.  Patient continues to push oral hydration.  She continues to have some neck stiffness but this has not worsened.  She will see [place] next week for further cardiac work-up.  Glucose was low upon initial labs today however the patient was given something to eat by nursing prior to rechecking.  This was therefore rechecked prior to her leaving the office and was 87.  Patient reports she was feeling fine.  · 10/18/2020 to cycle 2-day 22 Sissy -CyBorD.  Velcade dose will be reduced to 1.0 mg/m² due to patient's persistent issues with orthostasis.  · 10/25/2022: Cycle 3-day 1  · 11/8/2022: Delay in cycle 3-day 15 therapy.  Day 8 omitted due to an upper respiratory infection.  · 11/22/2022: Cycle 3-day 22.   final planned treatment before she goes to [place] for cardiac surgery.  · Cardiac surgery performed at [place] on 12/5/2022.  Pathology from the myectomy showed cardiac amyloidosis extensively involving the vessels and endocardium with myocyte hypertrophy and interstitial fibrosis.  Congo red stain was positive.  · Reviewed back on 1/17/2023 with plans to resume Darzalex, Cytoxan, Velcade, dexamethasone with cycle #4 on 1/24.  · Patient seen 2/7/2023 (missed 1/31/2023 due to weather, which would have been day 8).  We will go ahead and proceed with as cycle 4 day 15, day 15.  · 2/14/2023: Cycle 4-day 22  · 2/21/2023 cycle 5-day 1 Darzalex, Cytoxan (IV Cytoxan given in the office), Velcade, dexamethasone (20 mg p.o. given in the office).  · Repeat labs from 2/21/2023 showing M spike up to 0.8, free kappa light chain up to 85.8 (previously 5.3 on 1/17/2023), ratio 8.58 (was previously 0.76 on 1/17/2023).    *Diarrhea  · She saw Dr. Hoyos with GI at [place] on 9/7.  · Suspicion for amyloid involvement of the GI tract  · Continue Lomotil and Imodium. Rifaximin prescribed by Dr. Hoyos which she continues to use intermittently  · Diarrhea waxes and wanes with what she eats and dairy products particularly  worsen diarrhea  · Symptoms remain stable, with Imodium and Lomotil.   · Again encouraged the regular use of Questran    *Elevated liver enzymes  · 2/14/2023: Mildly elevated  · 2/21/2023 slightly improved today, will continue to monitor.    *Cardiomyopathy:  · Most recent echocardiogram on 8/17/2022 showed left ventricular wall thickness consistent with moderate to severe concentric hypertrophy along with left ventricular septal wall measuring 2.2 cm and posterior wall thickness at 1.9 cm likely due to amyloidosis.  LVEF 61 to 65%.  Grade 1 impaired relaxation.  · Now followed by Dr. Lyles at [facility] with concerns for pre-existing HCM and AL cardiac amyloid.   · Cardiac MRI results above.  Amyloidosis evident.  · Catheterization performed at [place].    · Cardiac surgery performed at [place] on 12/5/2022.  Pathology from the myectomy showed cardiac amyloidosis extensively involving the vessels and endocardium with myocyte hypertrophy and interstitial fibrosis.  Congo red stain was positive.    *Periorbital hematomas: These have resolved.  Coagulation studies and a factor X level were all normal.    *History of myalgias    *History of sore tongue    PLAN:  1. Proceed with cycle 5-day 8 Velcade, IV cyclophosphamide, and oral dexamethasone 20 mg today.  2. Continue Imodium, Lomotil, and Questran for diarrhea control.  3. Dr. Sal is reaching out to [place], Dr. Buenrostro regarding elevation in recents SPEP, JUSTIN, FLC.   4. Continue cardiac medications and cardiac rehab as managed by cardiology at [place].  5. Return weekly for follow-up with MD or NP for labs and treatment.  6. Planning for total of 6 cycles, after that, consideration of autologous peripheral blood stem cell transplant.  She is told now that she does not require cardiac transplant.  7. Follow-up with cardiology and gastroenterology at [place].   8. She continues to follow up with Dr. Buenrostro at [place].  9. She will have a thorough  evaluation at [place] at the end of April although this may be moved up to sometime in March given recent elevations in blood work.  10. Continue acyclovir and Bactrim for prophylaxis  11. We will treat through peripheral IVs as long as possible.  12. Schedule weekly follow-up for the next 8 weeks    High risk therapy requiring intensive monitoring    Sissy-CyBorD regimen     Daratumumab and hyaluronidase (Darzalex Faspro) as follows:  Cycles 1 & 2: 1800 mg SC once per day on days 1, 8, 15, 22  Cycles 3 to 6: 1800 mg SC once per day on days 1 & 15  Cycles 7 up to 24: 1800 mg SC once on day 1     Bortezomib (Velcade) as follows:  Cycles 1 to 6: 1.3 mg/m2 SC once per day on days 1, 8, 15, 22.  (Now dose reduced to 1.0 mg per metered squared.)     Cyclophosphamide (Cytoxan) as follows:  Cycles 1 to 6: 300 mg/m2 (maximum dose of 500 mg) PO or IV once per day on days 1, 8, 15, 22    Plan IV Cytoxan as she has difficulty swallowing pills.     Dexamethasone (Decadron) as follows:  Cycles 1 to 6: 40 mg PO or IV once per day on days 1, 8, 15, 22 (see note)  28-day cycle for up to 24 cycles  Reduced the dexamethasone dose to 20 mg weekly

## 2023-03-07 ENCOUNTER — OFFICE VISIT (OUTPATIENT)
Dept: ONCOLOGY | Facility: CLINIC | Age: 68
End: 2023-03-07
Payer: MEDICARE

## 2023-03-07 ENCOUNTER — TELEPHONE (OUTPATIENT)
Dept: ONCOLOGY | Facility: CLINIC | Age: 68
End: 2023-03-07
Payer: MEDICARE

## 2023-03-07 ENCOUNTER — INFUSION (OUTPATIENT)
Dept: ONCOLOGY | Facility: HOSPITAL | Age: 68
End: 2023-03-07
Payer: MEDICARE

## 2023-03-07 VITALS
RESPIRATION RATE: 16 BRPM | HEART RATE: 73 BPM | SYSTOLIC BLOOD PRESSURE: 130 MMHG | BODY MASS INDEX: 23.07 KG/M2 | TEMPERATURE: 98.2 F | OXYGEN SATURATION: 98 % | HEIGHT: 61 IN | DIASTOLIC BLOOD PRESSURE: 74 MMHG | WEIGHT: 122.2 LBS

## 2023-03-07 DIAGNOSIS — E85.81 LIGHT CHAIN (AL) AMYLOIDOSIS: ICD-10-CM

## 2023-03-07 DIAGNOSIS — E85.81 LIGHT CHAIN (AL) AMYLOIDOSIS: Primary | ICD-10-CM

## 2023-03-07 DIAGNOSIS — Z79.899 HIGH RISK MEDICATION USE: Primary | ICD-10-CM

## 2023-03-07 LAB
ALBUMIN SERPL-MCNC: 4.2 G/DL (ref 3.5–5.2)
ALBUMIN/GLOB SERPL: 2.3 G/DL (ref 1.1–2.4)
ALP SERPL-CCNC: 67 U/L (ref 38–116)
ALT SERPL W P-5'-P-CCNC: 34 U/L (ref 0–33)
ANION GAP SERPL CALCULATED.3IONS-SCNC: 11.4 MMOL/L (ref 5–15)
AST SERPL-CCNC: 30 U/L (ref 0–32)
BASOPHILS # BLD AUTO: 0.02 10*3/MM3 (ref 0–0.2)
BASOPHILS NFR BLD AUTO: 0.4 % (ref 0–1.5)
BILIRUB SERPL-MCNC: 0.4 MG/DL (ref 0.2–1.2)
BUN SERPL-MCNC: 15 MG/DL (ref 6–20)
BUN/CREAT SERPL: 19.7 (ref 7.3–30)
CALCIUM SPEC-SCNC: 9.7 MG/DL (ref 8.5–10.2)
CHLORIDE SERPL-SCNC: 104 MMOL/L (ref 98–107)
CO2 SERPL-SCNC: 23.6 MMOL/L (ref 22–29)
CREAT SERPL-MCNC: 0.76 MG/DL (ref 0.6–1.1)
DEPRECATED RDW RBC AUTO: 51.2 FL (ref 37–54)
EGFRCR SERPLBLD CKD-EPI 2021: 86 ML/MIN/1.73
EOSINOPHIL # BLD AUTO: 0.15 10*3/MM3 (ref 0–0.4)
EOSINOPHIL NFR BLD AUTO: 2.7 % (ref 0.3–6.2)
ERYTHROCYTE [DISTWIDTH] IN BLOOD BY AUTOMATED COUNT: 15.7 % (ref 12.3–15.4)
GLOBULIN UR ELPH-MCNC: 1.8 GM/DL (ref 1.8–3.5)
GLUCOSE SERPL-MCNC: 83 MG/DL (ref 74–124)
HCT VFR BLD AUTO: 41 % (ref 34–46.6)
HGB BLD-MCNC: 13.3 G/DL (ref 12–15.9)
IGA1 MFR SER: <50 MG/DL (ref 70–400)
IGG1 SER-MCNC: <300 MG/DL (ref 700–1600)
IGM SERPL-MCNC: <25 MG/DL (ref 40–230)
IMM GRANULOCYTES # BLD AUTO: 0.03 10*3/MM3 (ref 0–0.05)
IMM GRANULOCYTES NFR BLD AUTO: 0.5 % (ref 0–0.5)
LYMPHOCYTES # BLD AUTO: 1.9 10*3/MM3 (ref 0.7–3.1)
LYMPHOCYTES NFR BLD AUTO: 34.6 % (ref 19.6–45.3)
MCH RBC QN AUTO: 29.4 PG (ref 26.6–33)
MCHC RBC AUTO-ENTMCNC: 32.4 G/DL (ref 31.5–35.7)
MCV RBC AUTO: 90.5 FL (ref 79–97)
MONOCYTES # BLD AUTO: 0.44 10*3/MM3 (ref 0.1–0.9)
MONOCYTES NFR BLD AUTO: 8 % (ref 5–12)
NEUTROPHILS NFR BLD AUTO: 2.95 10*3/MM3 (ref 1.7–7)
NEUTROPHILS NFR BLD AUTO: 53.8 % (ref 42.7–76)
NRBC BLD AUTO-RTO: 0 /100 WBC (ref 0–0.2)
PLATELET # BLD AUTO: 212 10*3/MM3 (ref 140–450)
PMV BLD AUTO: 10.4 FL (ref 6–12)
POTASSIUM SERPL-SCNC: 4.5 MMOL/L (ref 3.5–4.7)
PROT SERPL-MCNC: 6 G/DL (ref 6.3–8)
RBC # BLD AUTO: 4.53 10*6/MM3 (ref 3.77–5.28)
SODIUM SERPL-SCNC: 139 MMOL/L (ref 134–145)
WBC NRBC COR # BLD: 5.49 10*3/MM3 (ref 3.4–10.8)

## 2023-03-07 PROCEDURE — 96375 TX/PRO/DX INJ NEW DRUG ADDON: CPT

## 2023-03-07 PROCEDURE — 82784 ASSAY IGA/IGD/IGG/IGM EACH: CPT | Performed by: INTERNAL MEDICINE

## 2023-03-07 PROCEDURE — 85025 COMPLETE CBC W/AUTO DIFF WBC: CPT

## 2023-03-07 PROCEDURE — 96413 CHEMO IV INFUSION 1 HR: CPT

## 2023-03-07 PROCEDURE — 25010000002 DARATUMUMAB-HYALURONIDASE-FIHJ 1800-30000 MG-UT/15ML SOLUTION: Performed by: NURSE PRACTITIONER

## 2023-03-07 PROCEDURE — 96401 CHEMO ANTI-NEOPL SQ/IM: CPT

## 2023-03-07 PROCEDURE — 99214 OFFICE O/P EST MOD 30 MIN: CPT | Performed by: NURSE PRACTITIONER

## 2023-03-07 PROCEDURE — 63710000001 DEXAMETHASONE PER 0.25 MG: Performed by: NURSE PRACTITIONER

## 2023-03-07 PROCEDURE — 80053 COMPREHEN METABOLIC PANEL: CPT

## 2023-03-07 PROCEDURE — 25010000002 CYCLOPHOSPHAMIDE 1 GM/5ML SOLUTION 5 ML VIAL: Performed by: NURSE PRACTITIONER

## 2023-03-07 PROCEDURE — 25010000002 BORTEZOMIB PER 0.1 MG: Performed by: NURSE PRACTITIONER

## 2023-03-07 PROCEDURE — 25010000002 PALONOSETRON PER 25 MCG: Performed by: NURSE PRACTITIONER

## 2023-03-07 RX ORDER — HYDROXYZINE PAMOATE 25 MG/1
25 CAPSULE ORAL ONCE
Status: CANCELLED
Start: 2023-03-07 | End: 2023-03-07

## 2023-03-07 RX ORDER — SODIUM CHLORIDE 9 MG/ML
250 INJECTION, SOLUTION INTRAVENOUS ONCE
Status: COMPLETED | OUTPATIENT
Start: 2023-03-07 | End: 2023-03-07

## 2023-03-07 RX ORDER — DEXAMETHASONE 4 MG/1
20 TABLET ORAL ONCE
Status: COMPLETED | OUTPATIENT
Start: 2023-03-07 | End: 2023-03-07

## 2023-03-07 RX ORDER — PALONOSETRON 0.05 MG/ML
0.25 INJECTION, SOLUTION INTRAVENOUS ONCE
Status: COMPLETED | OUTPATIENT
Start: 2023-03-07 | End: 2023-03-07

## 2023-03-07 RX ORDER — ACETAMINOPHEN 500 MG
1000 TABLET ORAL ONCE
Status: CANCELLED | OUTPATIENT
Start: 2023-03-07

## 2023-03-07 RX ORDER — FAMOTIDINE 10 MG/ML
20 INJECTION, SOLUTION INTRAVENOUS AS NEEDED
Status: CANCELLED | OUTPATIENT
Start: 2023-03-07

## 2023-03-07 RX ORDER — ACETAMINOPHEN 500 MG
1000 TABLET ORAL ONCE
Status: COMPLETED | OUTPATIENT
Start: 2023-03-07 | End: 2023-03-07

## 2023-03-07 RX ORDER — PALONOSETRON 0.05 MG/ML
0.25 INJECTION, SOLUTION INTRAVENOUS ONCE
Status: CANCELLED | OUTPATIENT
Start: 2023-03-07

## 2023-03-07 RX ORDER — SODIUM CHLORIDE 9 MG/ML
250 INJECTION, SOLUTION INTRAVENOUS ONCE
Status: CANCELLED | OUTPATIENT
Start: 2023-03-07

## 2023-03-07 RX ORDER — HYDROXYZINE PAMOATE 25 MG/1
25 CAPSULE ORAL ONCE
Status: COMPLETED | OUTPATIENT
Start: 2023-03-07 | End: 2023-03-07

## 2023-03-07 RX ORDER — DIPHENHYDRAMINE HYDROCHLORIDE 50 MG/ML
50 INJECTION INTRAMUSCULAR; INTRAVENOUS AS NEEDED
Status: CANCELLED | OUTPATIENT
Start: 2023-03-07

## 2023-03-07 RX ORDER — BORTEZOMIB 3.5 MG/1
1 INJECTION, POWDER, LYOPHILIZED, FOR SOLUTION INTRAVENOUS; SUBCUTANEOUS ONCE
Status: COMPLETED | OUTPATIENT
Start: 2023-03-07 | End: 2023-03-07

## 2023-03-07 RX ORDER — BORTEZOMIB 3.5 MG/1
1 INJECTION, POWDER, LYOPHILIZED, FOR SOLUTION INTRAVENOUS; SUBCUTANEOUS ONCE
Status: CANCELLED | OUTPATIENT
Start: 2023-03-07

## 2023-03-07 RX ADMIN — ACETAMINOPHEN 1000 MG: 500 TABLET, FILM COATED ORAL at 10:57

## 2023-03-07 RX ADMIN — HYDROXYZINE PAMOATE 25 MG: 25 CAPSULE ORAL at 10:57

## 2023-03-07 RX ADMIN — SODIUM CHLORIDE 250 ML: 9 INJECTION, SOLUTION INTRAVENOUS at 10:56

## 2023-03-07 RX ADMIN — DEXAMETHASONE 20 MG: 4 TABLET ORAL at 10:57

## 2023-03-07 RX ADMIN — BORTEZOMIB 1.5 MG: 3.5 INJECTION, POWDER, LYOPHILIZED, FOR SOLUTION INTRAVENOUS; SUBCUTANEOUS at 11:53

## 2023-03-07 RX ADMIN — PALONOSETRON HYDROCHLORIDE 0.25 MG: 0.25 INJECTION INTRAVENOUS at 10:58

## 2023-03-07 RX ADMIN — CYCLOPHOSPHAMIDE 450 MG: 200 INJECTION, SOLUTION INTRAVENOUS at 11:19

## 2023-03-07 RX ADMIN — DARATUMUMAB AND HYALURONIDASE-FIHJ (HUMAN RECOMBINANT) 1800 MG: 1800; 30000 INJECTION SUBCUTANEOUS at 11:52

## 2023-03-07 NOTE — PROGRESS NOTES
"[facility] GROUP OUTPATIENT FOLLOW UP CLINIC VISIT    REASON FOR FOLLOW-UP:    AL amyloidosis  Therapy with michael-CyBorD initiated 8/16/2022    HISTORY OF PRESENT ILLNESS: Cynthia Her is a 67 y.o. female with the above mentioned history here today for lab review and evaluation prior to cycle 5, day 15 velcade, darzalex, cyclophosphamide.  She also receives dexamethasone 20 mg p.o. weekly in the office.    Patient with recent M spike increased up to 0.8.  This prompted discussion with Dr. Sal and her team at [place], Dr. Buenrostro.  We will draw a repeat paraprotein studies today for review.  Patient is already scheduled for additional follow-up including repeat bone marrow biopsy in April at [place].    Patient has had difficulty with low-grade fevers after treatment.  She has also been having trouble with dumping syndrome.  She had gotten off taking Questran but is now back to taking this routinely.    She admittedly is more anxious with recent change in her numbers.    She denies other concerns at this time.    REVIEW OF SYSTEMS:  As per the HPI    PHYSICAL EXAMINATION:    Vitals:    03/07/23 1024   BP: 130/74   Pulse: 73   Resp: 16   Temp: 98.2 °F (36.8 °C)   TempSrc: Temporal   SpO2: 98%   Weight: 55.4 kg (122 lb 3.2 oz)   Height: 154.9 cm (60.98\")   PainSc:   4   PainLoc: Leg       Physical Exam  Vitals reviewed.   Constitutional:       General: She is not in acute distress.     Appearance: Normal appearance. She is well-developed.   HENT:      Head: Normocephalic and atraumatic.   Eyes:      Pupils: Pupils are equal, round, and reactive to light.   Cardiovascular:      Rate and Rhythm: Normal rate and regular rhythm.      Heart sounds: Normal heart sounds. No murmur heard.  Pulmonary:      Effort: Pulmonary effort is normal. No respiratory distress.      Breath sounds: Normal breath sounds. No wheezing, rhonchi or rales.   Abdominal:      General: Bowel sounds are normal. There is no " distension.      Palpations: Abdomen is soft.   Musculoskeletal:         General: Normal range of motion.      Cervical back: Normal range of motion.   Skin:     General: Skin is warm and dry.      Findings: No rash.   Neurological:      Mental Status: She is alert and oriented to person, place, and time.         DIAGNOSTIC DATA:  Lab Results   Component Value Date    WBC 5.49 03/07/2023    HGB 13.3 03/07/2023    HCT 41.0 03/07/2023    MCV 90.5 03/07/2023     03/07/2023     Lab Results   Component Value Date    GLUCOSE 83 03/07/2023    BUN 15 03/07/2023    CREATININE 0.76 03/07/2023    EGFRRESULT 100 06/08/2022    EGFR 86.0 03/07/2023    BCR 19.7 03/07/2023    K 4.5 03/07/2023    CO2 23.6 03/07/2023    CALCIUM 9.7 03/07/2023    PROTENTOTREF 7.5 02/21/2023    ALBUMIN 4.2 03/07/2023    LABIL2 1.2 02/21/2023    AST 30 03/07/2023    ALT 34 (H) 03/07/2023         IMAGING:    None reviewed      ASSESSMENT:  This is a 67 y.o. female with:    *AL amyloidosis  · She had a stress echocardiogram on 4/24/2020 showing a normal left ventricular ejection fraction of 60% with moderate concentric hypertrophy but no wall motion abnormalities of the left ventricle.  There was impaired relaxation noted.  She complained of chest pain during the examination.  There was some ST segment depression.  Cardiac catheterization was performed on the same day.  No intervention was required.  · Follow-up echocardiogram on 4/15/2021 showed a left ventricular ejection fraction of 61 to 65% with normal LV cavity size.  Left ventricular wall thickness showed moderate concentric hypertrophy.  Diastolic function was impaired.  No pericardial effusion.  Small left pleural effusion.  · She had follow-up PYP imaging for cardiac amyloidosis that was not suggestive of ATTR amyloidosis  · Laboratory values on 2/24/2022 showed a high serum free light chain lambda of 121, normal kappa of 10.5, low ratio at 0.09.  Serum protein electrophoresis showed no  evidence of an M spike.  Urine light chains were abnormal with an elevated lambda light chain of 33 and a low ratio of 0.48 with a 12.7 mg M spike on 24-hour urine protein electrophoresis.  · BNP was elevated at 2306 on 3/4/2022.  The troponin was normal at 0.03.  CK was 212 with a CK-MB of 10.87.  · Initial consult on 3/30/22. No M spike on serum protein electrophoresis. SIFE 'presence of monoclonal protein is unclear.'   · Bone marrow biopsy 4/13/22 normocellular, 12.1% plasma cells consistent with low volume plasma cell dyscrasia. FISH with low level t(11;14) fusion only. No amyloid noted as Congo red staining negative.   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 Buenrostro at [place]. Intended to enroll on a clinical study but her troponin as tested by the study sponsor was not high enough  · Therapy with sissy-CyBorD initiated 8/16/2022  · Patient seen in the office on 8/17/2022 for triage visit.  Patient with complaints of fatigue, dizziness and diarrhea.  She was mildly hypotensive.  She was given IV fluids.  · Subsequent admission at [facility] with shortness of breath and volume overload.  She was diuresed and treated with antibiotics.  · D8 therapy administered on 8/30  · Light chains normal at [place] on 8/31/22 (lambda light chain 1.3, down from 12.44)  · 9/27/2022: Cycle 2-day 8 of therapy  · 10/4/2022 cycle 2-day 8 CyBorD.  Patient continues to push oral hydration.  She continues to have some neck stiffness but this has not worsened.  She will see [place] next week for further cardiac work-up.  Glucose was low upon initial labs today however the patient was given something to eat by nursing prior to rechecking.  This was therefore rechecked prior to her leaving the office and was 87.  Patient reports she was feeling fine.  · 10/18/2020 to cycle 2-day 22 Sissy -CyBorD.  Velcade dose will be reduced to 1.0 mg/m² due to patient's persistent issues with  orthostasis.  · 10/25/2022: Cycle 3-day 1  · 11/8/2022: Delay in cycle 3-day 15 therapy.  Day 8 omitted due to an upper respiratory infection.  · 11/22/2022: Cycle 3-day 22.   final planned treatment before she goes to [place] for cardiac surgery.  · Cardiac surgery performed at [place] on 12/5/2022.  Pathology from the myectomy showed cardiac amyloidosis extensively involving the vessels and endocardium with myocyte hypertrophy and interstitial fibrosis.  Congo red stain was positive.  · Reviewed back on 1/17/2023 with plans to resume Darzalex, Cytoxan, Velcade, dexamethasone with cycle #4 on 1/24.  · Patient seen 2/7/2023 (missed 1/31/2023 due to weather, which would have been day 8).  We will go ahead and proceed with as cycle 4 day 15, day 15.  · 2/14/2023: Cycle 4-day 22  · 2/21/2023 cycle 5-day 1 Darzalex, Cytoxan (IV Cytoxan given in the office), Velcade, dexamethasone (20 mg p.o. given in the office).  · Repeat labs from 2/21/2023 showing M spike up to 0.8, free kappa light chain up to 85.8 (previously 5.3 on 1/17/2023), ratio 8.58 (was previously 0.76 on 1/17/2023).    *Diarrhea  · She saw Dr. Hoyos with GI at [place] on 9/7.  · Suspicion for amyloid involvement of the GI tract  · Continue Lomotil and Imodium. Rifaximin prescribed by Dr. Hoyos which she continues to use intermittently  · Diarrhea waxes and wanes with what she eats and dairy products particularly worsen diarrhea  · Continue Questran, Lomotil and Imodium.    *Elevated liver enzymes  · 2/14/2023: Mildly elevated  · 2/21/2023 slightly improved today, will continue to monitor.    *Cardiomyopathy:  · Most recent echocardiogram on 8/17/2022 showed left ventricular wall thickness consistent with moderate to severe concentric hypertrophy along with left ventricular septal wall measuring 2.2 cm and posterior wall thickness at 1.9 cm likely due to amyloidosis.  LVEF 61 to 65%.  Grade 1 impaired relaxation.  · Now followed by Dr. Lyles at [facility]  with concerns for pre-existing HCM and AL cardiac amyloid.   · Cardiac MRI results above.  Amyloidosis evident.  · Catheterization performed at [place].    · Cardiac surgery performed at [place] on 12/5/2022.  Pathology from the myectomy showed cardiac amyloidosis extensively involving the vessels and endocardium with myocyte hypertrophy and interstitial fibrosis.  Congo red stain was positive.    *Periorbital hematomas: These have resolved.  Coagulation studies and a factor X level were all normal.    *History of myalgias    *History of sore tongue    PLAN:  1. Proceed with cycle 5-day 15 Velcade, IV cyclophosphamide, and oral dexamethasone 20 mg today.  2. Continue Imodium, Lomotil, and Questran for diarrhea control.  3. Dr. Sal discussed patient's case with [place], Dr. Buenrostro regarding elevation in recents SPEP, JUSTIN, FLC.  Repeat paraprotein studies ordered for today, pending.  4. Continue cardiac medications and cardiac rehab as managed by cardiology at [place].  5. Return weekly for follow-up with MD or NP for labs and treatment.  6. Planning for total of 6 cycles, after that, consideration of autologous peripheral blood stem cell transplant.  She is told now that she does not require cardiac transplant.  7. Follow-up with cardiology and gastroenterology at [place].   8. She continues to follow up with Dr. Buenrostro at [place].  9. She will have a thorough evaluation at [place] in April though again we are obtaining additional lab work again today in light of increasing M spike recently.    10. Continue acyclovir and Bactrim for prophylaxis  11. We will treat through peripheral IVs as long as possible.    High risk therapy requiring intensive monitoring    Sissy-CyBorD regimen     Daratumumab and hyaluronidase (Darzalex Faspro) as follows:  Cycles 1 & 2: 1800 mg SC once per day on days 1, 8, 15, 22  Cycles 3 to 6: 1800 mg SC once per day on days 1 & 15  Cycles 7 up to 24: 1800 mg SC once  on day 1     Bortezomib (Velcade) as follows:  Cycles 1 to 6: 1.3 mg/m2 SC once per day on days 1, 8, 15, 22.  (Now dose reduced to 1.0 mg per metered squared.)     Cyclophosphamide (Cytoxan) as follows:  Cycles 1 to 6: 300 mg/m2 (maximum dose of 500 mg) PO or IV once per day on days 1, 8, 15, 22    Plan IV Cytoxan as she has difficulty swallowing pills.     Dexamethasone (Decadron) as follows:  Cycles 1 to 6: 40 mg PO or IV once per day on days 1, 8, 15, 22 (see note)  28-day cycle for up to 24 cycles  Reduced the dexamethasone dose to 20 mg weekly

## 2023-03-07 NOTE — NURSING NOTE
V/O given by Olamide for Dr. Buenrostro. We will collect a SPEP, free light chain and Immunoglobulins. Orders placed and infusions MANNY Preciado notified to collect today.

## 2023-03-07 NOTE — TELEPHONE ENCOUNTER
Spoke to Dr. Buenrostro office to request labs to draw in our office today. Brigida states she will send the message STAT. Phone number and fax number provided.

## 2023-03-08 LAB
ALBUMIN SERPL ELPH-MCNC: 3.6 G/DL (ref 2.9–4.4)
ALBUMIN/GLOB SERPL: 1.9 {RATIO} (ref 0.7–1.7)
ALPHA1 GLOB SERPL ELPH-MCNC: 0.2 G/DL (ref 0–0.4)
ALPHA2 GLOB SERPL ELPH-MCNC: 0.8 G/DL (ref 0.4–1)
B-GLOBULIN SERPL ELPH-MCNC: 0.7 G/DL (ref 0.7–1.3)
GAMMA GLOB SERPL ELPH-MCNC: 0.2 G/DL (ref 0.4–1.8)
GLOBULIN SER CALC-MCNC: 1.9 G/DL (ref 2.2–3.9)
KAPPA LC FREE SER-MCNC: 3.4 MG/L (ref 3.3–19.4)
KAPPA LC FREE/LAMBDA FREE SER: 0.83 {RATIO} (ref 0.26–1.65)
LABORATORY COMMENT REPORT: ABNORMAL
LAMBDA LC FREE SERPL-MCNC: 4.1 MG/L (ref 5.7–26.3)
M PROTEIN SERPL ELPH-MCNC: 0.1 G/DL
PROT PATTERN SERPL ELPH-IMP: ABNORMAL
PROT SERPL-MCNC: 5.5 G/DL (ref 6–8.5)

## 2023-03-14 ENCOUNTER — INFUSION (OUTPATIENT)
Dept: ONCOLOGY | Facility: HOSPITAL | Age: 68
End: 2023-03-14
Payer: MEDICARE

## 2023-03-14 ENCOUNTER — OFFICE VISIT (OUTPATIENT)
Dept: ONCOLOGY | Facility: CLINIC | Age: 68
End: 2023-03-14
Payer: MEDICARE

## 2023-03-14 VITALS
DIASTOLIC BLOOD PRESSURE: 67 MMHG | BODY MASS INDEX: 23.2 KG/M2 | SYSTOLIC BLOOD PRESSURE: 132 MMHG | WEIGHT: 122.9 LBS | OXYGEN SATURATION: 95 % | RESPIRATION RATE: 18 BRPM | HEIGHT: 61 IN | HEART RATE: 78 BPM | TEMPERATURE: 97.7 F

## 2023-03-14 DIAGNOSIS — E85.81 LIGHT CHAIN (AL) AMYLOIDOSIS: Primary | ICD-10-CM

## 2023-03-14 LAB
ALBUMIN SERPL-MCNC: 4.8 G/DL (ref 3.5–5.2)
ALBUMIN/GLOB SERPL: 2.4 G/DL (ref 1.1–2.4)
ALP SERPL-CCNC: 72 U/L (ref 38–116)
ALT SERPL W P-5'-P-CCNC: 36 U/L (ref 0–33)
ANION GAP SERPL CALCULATED.3IONS-SCNC: 12 MMOL/L (ref 5–15)
AST SERPL-CCNC: 31 U/L (ref 0–32)
BASOPHILS # BLD AUTO: 0.03 10*3/MM3 (ref 0–0.2)
BASOPHILS NFR BLD AUTO: 0.5 % (ref 0–1.5)
BILIRUB SERPL-MCNC: 0.5 MG/DL (ref 0.2–1.2)
BUN SERPL-MCNC: 16 MG/DL (ref 6–20)
BUN/CREAT SERPL: 21.9 (ref 7.3–30)
CALCIUM SPEC-SCNC: 10.2 MG/DL (ref 8.5–10.2)
CHLORIDE SERPL-SCNC: 102 MMOL/L (ref 98–107)
CO2 SERPL-SCNC: 25 MMOL/L (ref 22–29)
CREAT SERPL-MCNC: 0.73 MG/DL (ref 0.6–1.1)
DEPRECATED RDW RBC AUTO: 51.8 FL (ref 37–54)
EGFRCR SERPLBLD CKD-EPI 2021: 89.7 ML/MIN/1.73
EOSINOPHIL # BLD AUTO: 0.2 10*3/MM3 (ref 0–0.4)
EOSINOPHIL NFR BLD AUTO: 3.3 % (ref 0.3–6.2)
ERYTHROCYTE [DISTWIDTH] IN BLOOD BY AUTOMATED COUNT: 16 % (ref 12.3–15.4)
GLOBULIN UR ELPH-MCNC: 2 GM/DL (ref 1.8–3.5)
GLUCOSE SERPL-MCNC: 72 MG/DL (ref 74–124)
HCT VFR BLD AUTO: 44.9 % (ref 34–46.6)
HGB BLD-MCNC: 14.4 G/DL (ref 12–15.9)
IMM GRANULOCYTES # BLD AUTO: 0.02 10*3/MM3 (ref 0–0.05)
IMM GRANULOCYTES NFR BLD AUTO: 0.3 % (ref 0–0.5)
LYMPHOCYTES # BLD AUTO: 2.35 10*3/MM3 (ref 0.7–3.1)
LYMPHOCYTES NFR BLD AUTO: 38.3 % (ref 19.6–45.3)
MCH RBC QN AUTO: 29 PG (ref 26.6–33)
MCHC RBC AUTO-ENTMCNC: 32.1 G/DL (ref 31.5–35.7)
MCV RBC AUTO: 90.5 FL (ref 79–97)
MONOCYTES # BLD AUTO: 0.52 10*3/MM3 (ref 0.1–0.9)
MONOCYTES NFR BLD AUTO: 8.5 % (ref 5–12)
NEUTROPHILS NFR BLD AUTO: 3.02 10*3/MM3 (ref 1.7–7)
NEUTROPHILS NFR BLD AUTO: 49.1 % (ref 42.7–76)
NRBC BLD AUTO-RTO: 0 /100 WBC (ref 0–0.2)
PLATELET # BLD AUTO: 237 10*3/MM3 (ref 140–450)
PMV BLD AUTO: 10.3 FL (ref 6–12)
POTASSIUM SERPL-SCNC: 4 MMOL/L (ref 3.5–4.7)
PROT SERPL-MCNC: 6.8 G/DL (ref 6.3–8)
RBC # BLD AUTO: 4.96 10*6/MM3 (ref 3.77–5.28)
SODIUM SERPL-SCNC: 139 MMOL/L (ref 134–145)
WBC NRBC COR # BLD: 6.14 10*3/MM3 (ref 3.4–10.8)

## 2023-03-14 PROCEDURE — 99214 OFFICE O/P EST MOD 30 MIN: CPT | Performed by: NURSE PRACTITIONER

## 2023-03-14 PROCEDURE — 1159F MED LIST DOCD IN RCRD: CPT | Performed by: NURSE PRACTITIONER

## 2023-03-14 PROCEDURE — 25010000002 CYCLOPHOSPHAMIDE 1 GM/5ML SOLUTION 5 ML VIAL: Performed by: NURSE PRACTITIONER

## 2023-03-14 PROCEDURE — 96401 CHEMO ANTI-NEOPL SQ/IM: CPT

## 2023-03-14 PROCEDURE — 1160F RVW MEDS BY RX/DR IN RCRD: CPT | Performed by: NURSE PRACTITIONER

## 2023-03-14 PROCEDURE — 3078F DIAST BP <80 MM HG: CPT | Performed by: NURSE PRACTITIONER

## 2023-03-14 PROCEDURE — 3075F SYST BP GE 130 - 139MM HG: CPT | Performed by: NURSE PRACTITIONER

## 2023-03-14 PROCEDURE — 63710000001 DEXAMETHASONE PER 0.25 MG: Performed by: NURSE PRACTITIONER

## 2023-03-14 PROCEDURE — 25010000002 BORTEZOMIB PER 0.1 MG: Performed by: NURSE PRACTITIONER

## 2023-03-14 PROCEDURE — 80053 COMPREHEN METABOLIC PANEL: CPT

## 2023-03-14 PROCEDURE — 25010000002 PALONOSETRON PER 25 MCG: Performed by: NURSE PRACTITIONER

## 2023-03-14 PROCEDURE — 1126F AMNT PAIN NOTED NONE PRSNT: CPT | Performed by: NURSE PRACTITIONER

## 2023-03-14 PROCEDURE — 85025 COMPLETE CBC W/AUTO DIFF WBC: CPT

## 2023-03-14 PROCEDURE — 96375 TX/PRO/DX INJ NEW DRUG ADDON: CPT

## 2023-03-14 PROCEDURE — 96413 CHEMO IV INFUSION 1 HR: CPT

## 2023-03-14 RX ORDER — BORTEZOMIB 3.5 MG/1
1 INJECTION, POWDER, LYOPHILIZED, FOR SOLUTION INTRAVENOUS; SUBCUTANEOUS ONCE
Status: CANCELLED | OUTPATIENT
Start: 2023-03-14

## 2023-03-14 RX ORDER — SODIUM CHLORIDE 9 MG/ML
250 INJECTION, SOLUTION INTRAVENOUS ONCE
Status: CANCELLED | OUTPATIENT
Start: 2023-03-14

## 2023-03-14 RX ORDER — PALONOSETRON 0.05 MG/ML
0.25 INJECTION, SOLUTION INTRAVENOUS ONCE
Status: COMPLETED | OUTPATIENT
Start: 2023-03-14 | End: 2023-03-14

## 2023-03-14 RX ORDER — DEXAMETHASONE 4 MG/1
20 TABLET ORAL ONCE
Status: COMPLETED | OUTPATIENT
Start: 2023-03-14 | End: 2023-03-14

## 2023-03-14 RX ORDER — BORTEZOMIB 3.5 MG/1
1 INJECTION, POWDER, LYOPHILIZED, FOR SOLUTION INTRAVENOUS; SUBCUTANEOUS ONCE
Status: COMPLETED | OUTPATIENT
Start: 2023-03-14 | End: 2023-03-14

## 2023-03-14 RX ORDER — PALONOSETRON 0.05 MG/ML
0.25 INJECTION, SOLUTION INTRAVENOUS ONCE
Status: CANCELLED | OUTPATIENT
Start: 2023-03-14

## 2023-03-14 RX ORDER — SODIUM CHLORIDE 9 MG/ML
250 INJECTION, SOLUTION INTRAVENOUS ONCE
Status: COMPLETED | OUTPATIENT
Start: 2023-03-14 | End: 2023-03-14

## 2023-03-14 RX ADMIN — BORTEZOMIB 1.5 MG: 3.5 INJECTION, POWDER, LYOPHILIZED, FOR SOLUTION INTRAVENOUS; SUBCUTANEOUS at 11:34

## 2023-03-14 RX ADMIN — PALONOSETRON 0.25 MG: 0.05 INJECTION, SOLUTION INTRAVENOUS at 11:12

## 2023-03-14 RX ADMIN — DEXAMETHASONE 20 MG: 4 TABLET ORAL at 11:11

## 2023-03-14 RX ADMIN — CYCLOPHOSPHAMIDE 450 MG: 200 INJECTION, SOLUTION INTRAVENOUS at 11:36

## 2023-03-14 RX ADMIN — SODIUM CHLORIDE 250 ML: 9 INJECTION, SOLUTION INTRAVENOUS at 11:13

## 2023-03-14 NOTE — PROGRESS NOTES
"[facility] GROUP OUTPATIENT FOLLOW UP CLINIC VISIT    REASON FOR FOLLOW-UP:    AL amyloidosis  Therapy with michael-CyBorD initiated 8/16/2022    HISTORY OF PRESENT ILLNESS: Cynthia Flower is a 68 y.o. female with the above mentioned sure today for lab review and evaluation prior to cycle 5, day 22 Velcade, cyclophosphamide.  She also receives dexamethasone 20 mg p.o. weekly in the office.  Thankfully, the repeat serum paraprotein studies which were done a week ago were more in line with her previous results.  She is currently scheduled to go in April back to [place] for lots of testing prior to reconsideration of renal transplant.      She continues to report intermittent pain in the right side of her chest, relieved with lidocaine patches.  She is no longer doing cardiac rehab, but rather doing some exercises at home.      She does still have some low grade fevers intermittently after treatment.  She also continues to struggle with dumping syndrome which has improved with the use of Questran.  She has ongoing fatigue.  No other new problems or concerns today.        REVIEW OF SYSTEMS:  As per the HPI    PHYSICAL EXAMINATION:    Vitals:    03/14/23 1041   BP: 132/67   Pulse: 78   Resp: 18   Temp: 97.7 °F (36.5 °C)   TempSrc: Temporal   SpO2: 95%   Weight: 55.7 kg (122 lb 14.4 oz)   Height: 154.9 cm (60.98\")   PainSc: 0-No pain       Physical Exam  Vitals reviewed.   Constitutional:       General: She is not in acute distress.     Appearance: Normal appearance. She is well-developed.   HENT:      Head: Normocephalic and atraumatic.   Eyes:      Pupils: Pupils are equal, round, and reactive to light.   Cardiovascular:      Rate and Rhythm: Normal rate and regular rhythm.      Heart sounds: Normal heart sounds. No murmur heard.  Pulmonary:      Effort: Pulmonary effort is normal. No respiratory distress.      Breath sounds: Normal breath sounds. No wheezing, rhonchi or rales.   Abdominal:      General: " Bowel sounds are normal. There is no distension.      Palpations: Abdomen is soft.   Musculoskeletal:         General: Normal range of motion.      Cervical back: Normal range of motion.   Skin:     General: Skin is warm and dry.      Findings: No rash.   Neurological:      Mental Status: She is alert and oriented to person, place, and time.         DIAGNOSTIC DATA:  Lab Results   Component Value Date    WBC 6.14 03/14/2023    HGB 14.4 03/14/2023    HCT 44.9 03/14/2023    MCV 90.5 03/14/2023     03/14/2023     Lab Results   Component Value Date    GLUCOSE 72 (L) 03/14/2023    BUN 16 03/14/2023    CREATININE 0.73 03/14/2023    EGFRRESULT 100 06/08/2022    EGFR 89.7 03/14/2023    BCR 21.9 03/14/2023    K 4.0 03/14/2023    CO2 25.0 03/14/2023    CALCIUM 10.2 03/14/2023    PROTENTOTREF 5.5 (L) 03/07/2023    ALBUMIN 4.8 03/14/2023    LABIL2 1.9 (H) 03/07/2023    AST 31 03/14/2023    ALT 36 (H) 03/14/2023         IMAGING:    None reviewed      ASSESSMENT:  This is a 68 y.o. female with:    *AL amyloidosis  · She had a stress echocardiogram on 4/24/2020 showing a normal left ventricular ejection fraction of 60% with moderate concentric hypertrophy but no wall motion abnormalities of the left ventricle.  There was impaired relaxation noted.  She complained of chest pain during the examination.  There was some ST segment depression.  Cardiac catheterization was performed on the same day.  No intervention was required.  · Follow-up echocardiogram on 4/15/2021 showed a left ventricular ejection fraction of 61 to 65% with normal LV cavity size.  Left ventricular wall thickness showed moderate concentric hypertrophy.  Diastolic function was impaired.  No pericardial effusion.  Small left pleural effusion.  · She had follow-up PYP imaging for cardiac amyloidosis that was not suggestive of ATTR amyloidosis  · Laboratory values on 2/24/2022 showed a high serum free light chain lambda of 121, normal kappa of 10.5, low ratio  at 0.09.  Serum protein electrophoresis showed no evidence of an M spike.  Urine light chains were abnormal with an elevated lambda light chain of 33 and a low ratio of 0.48 with a 12.7 mg M spike on 24-hour urine protein electrophoresis.  · BNP was elevated at 2306 on 3/4/2022.  The troponin was normal at 0.03.  CK was 212 with a CK-MB of 10.87.  · Initial consult on 3/30/22. No M spike on serum protein electrophoresis. SIFE 'presence of monoclonal protein is unclear.'   · Bone marrow biopsy 4/13/22 normocellular, 12.1% plasma cells consistent with low volume plasma cell dyscrasia. FISH with low level t(11;14) fusion only. No amyloid noted as Congo red staining negative.   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 Buenrostro at [place]. Intended to enroll on a clinical study but her troponin as tested by the study sponsor was not high enough  · Therapy with sissy-CyBorD initiated 8/16/2022  · Patient seen in the office on 8/17/2022 for triage visit.  Patient with complaints of fatigue, dizziness and diarrhea.  She was mildly hypotensive.  She was given IV fluids.  · Subsequent admission at [facility] with shortness of breath and volume overload.  She was diuresed and treated with antibiotics.  · D8 therapy administered on 8/30  · Light chains normal at [place] on 8/31/22 (lambda light chain 1.3, down from 12.44)  · 9/27/2022: Cycle 2-day 8 of therapy  · 10/4/2022 cycle 2-day 8 CyBorD.  Patient continues to push oral hydration.  She continues to have some neck stiffness but this has not worsened.  She will see [place] next week for further cardiac work-up.  Glucose was low upon initial labs today however the patient was given something to eat by nursing prior to rechecking.  This was therefore rechecked prior to her leaving the office and was 87.  Patient reports she was feeling fine.  · 10/18/2020 to cycle 2-day 22 Sissy -CyBorD.  Velcade dose will be reduced to 1.0  mg/m² due to patient's persistent issues with orthostasis.  · 10/25/2022: Cycle 3-day 1  · 11/8/2022: Delay in cycle 3-day 15 therapy.  Day 8 omitted due to an upper respiratory infection.  · 11/22/2022: Cycle 3-day 22.   final planned treatment before she goes to [place] for cardiac surgery.  · Cardiac surgery performed at [place] on 12/5/2022.  Pathology from the myectomy showed cardiac amyloidosis extensively involving the vessels and endocardium with myocyte hypertrophy and interstitial fibrosis.  Congo red stain was positive.  · Reviewed back on 1/17/2023 with plans to resume Darzalex, Cytoxan, Velcade, dexamethasone with cycle #4 on 1/24.  · Patient seen 2/7/2023 (missed 1/31/2023 due to weather, which would have been day 8).  We will go ahead and proceed with as cycle 4 day 15, day 15.  · 2/14/2023: Cycle 4-day 22  · 2/21/2023 cycle 5-day 1 Darzalex, Cytoxan (IV Cytoxan given in the office), Velcade, dexamethasone (20 mg p.o. given in the office).  · Repeat labs from 2/21/2023 showing M spike up to 0.8, free kappa light chain up to 85.8 (previously 5.3 on 1/17/2023), ratio 8.58 (was previously 0.76 on 1/17/2023).  · We ended up repeating labs on 3/7/2023 M spike 0.1, free kappa light chain 3.4, ratio 0.83.  We will continue on with treatment.  She is scheduled to return to [place] in April.    *Diarrhea  · She saw Dr. Hoyos with GI at [place] on 9/7.  · Suspicion for amyloid involvement of the GI tract  · Continue Lomotil and Imodium. Rifaximin prescribed by Dr. Hoyos which she continues to use intermittently  · Diarrhea waxes and wanes with what she eats and dairy products particularly worsen diarrhea  · Continue Questran, Lomotil and Imodium.    *Elevated liver enzymes  · 2/14/2023: Mildly elevated  · 2/21/2023 slightly improved today, will continue to monitor.  · 3/14/2023 ALT 36, AST 31, total bilirubin 0.5.    *Cardiomyopathy:  · Most recent echocardiogram on 8/17/2022 showed left ventricular  wall thickness consistent with moderate to severe concentric hypertrophy along with left ventricular septal wall measuring 2.2 cm and posterior wall thickness at 1.9 cm likely due to amyloidosis.  LVEF 61 to 65%.  Grade 1 impaired relaxation.  · Now followed by Dr. Lyles at [facility] with concerns for pre-existing HCM and AL cardiac amyloid.   · Cardiac MRI results above.  Amyloidosis evident.  · Catheterization performed at [place].    · Cardiac surgery performed at [place] on 12/5/2022.  Pathology from the myectomy showed cardiac amyloidosis extensively involving the vessels and endocardium with myocyte hypertrophy and interstitial fibrosis.  Congo red stain was positive.    *Periorbital hematomas: These have resolved.  Coagulation studies and a factor X level were all normal.    *History of myalgias    *History of sore tongue    PLAN:  1. Proceed with cycle 5, day 22 Velcade, IV cyclophosphamide, and oral dexamethasone 20 mg daily.  2. Continue Imodium, Lomotil, and Questran for diarrhea control.  3. Patient is scheduled to follow-up at [place] in April.  4. Continue cardiac medications as managed by cardiology at [place].  5. Planning for total of 6 cycles, after that, consideration of autologous peripheral blood stem cell transplant.  She is told now that she does not require cardiac transplant.  6. Follow-up with cardiology and gastroenterology at [place].   7. She continues to follow up with Dr. Buenrostro at [place].  8. Continue acyclovir and Bactrim for prophylaxis  9. We will treat through peripheral IVs as long as possible.    High risk therapy requiring intensive monitoring    Sissy-CyBorD regimen     Daratumumab and hyaluronidase (Darzalex Faspro) as follows:  Cycles 1 & 2: 1800 mg SC once per day on days 1, 8, 15, 22  Cycles 3 to 6: 1800 mg SC once per day on days 1 & 15  Cycles 7 up to 24: 1800 mg SC once on day 1     Bortezomib (Velcade) as follows:  Cycles 1 to 6: 1.3 mg/m2 SC once per day  on days 1, 8, 15, 22.  (Now dose reduced to 1.0 mg per metered squared.)     Cyclophosphamide (Cytoxan) as follows:  Cycles 1 to 6: 300 mg/m2 (maximum dose of 500 mg) PO or IV once per day on days 1, 8, 15, 22    Plan IV Cytoxan as she has difficulty swallowing pills.     Dexamethasone (Decadron) as follows:  Cycles 1 to 6: 40 mg PO or IV once per day on days 1, 8, 15, 22 (see note)  28-day cycle for up to 24 cycles  Reduced the dexamethasone dose to 20 mg weekly

## 2023-03-21 ENCOUNTER — INFUSION (OUTPATIENT)
Dept: ONCOLOGY | Facility: HOSPITAL | Age: 68
End: 2023-03-21
Payer: MEDICARE

## 2023-03-21 ENCOUNTER — OFFICE VISIT (OUTPATIENT)
Dept: ONCOLOGY | Facility: CLINIC | Age: 68
End: 2023-03-21
Payer: MEDICARE

## 2023-03-21 VITALS
BODY MASS INDEX: 23.35 KG/M2 | OXYGEN SATURATION: 97 % | DIASTOLIC BLOOD PRESSURE: 76 MMHG | TEMPERATURE: 97.7 F | SYSTOLIC BLOOD PRESSURE: 127 MMHG | HEART RATE: 78 BPM | RESPIRATION RATE: 16 BRPM | WEIGHT: 123.7 LBS | HEIGHT: 61 IN

## 2023-03-21 DIAGNOSIS — Z79.899 HIGH RISK MEDICATION USE: ICD-10-CM

## 2023-03-21 DIAGNOSIS — E85.81 LIGHT CHAIN (AL) AMYLOIDOSIS: Primary | ICD-10-CM

## 2023-03-21 LAB
ALBUMIN SERPL-MCNC: 4.4 G/DL (ref 3.5–5.2)
ALBUMIN/GLOB SERPL: 2.3 G/DL (ref 1.1–2.4)
ALP SERPL-CCNC: 70 U/L (ref 38–116)
ALT SERPL W P-5'-P-CCNC: 37 U/L (ref 0–33)
ANION GAP SERPL CALCULATED.3IONS-SCNC: 12.2 MMOL/L (ref 5–15)
AST SERPL-CCNC: 31 U/L (ref 0–32)
BASOPHILS # BLD AUTO: 0.03 10*3/MM3 (ref 0–0.2)
BASOPHILS NFR BLD AUTO: 0.5 % (ref 0–1.5)
BILIRUB SERPL-MCNC: 0.4 MG/DL (ref 0.2–1.2)
BUN SERPL-MCNC: 16 MG/DL (ref 6–20)
BUN/CREAT SERPL: 20.5 (ref 7.3–30)
CALCIUM SPEC-SCNC: 9.7 MG/DL (ref 8.5–10.2)
CHLORIDE SERPL-SCNC: 107 MMOL/L (ref 98–107)
CO2 SERPL-SCNC: 22.8 MMOL/L (ref 22–29)
CREAT SERPL-MCNC: 0.78 MG/DL (ref 0.6–1.1)
DEPRECATED RDW RBC AUTO: 53.3 FL (ref 37–54)
EGFRCR SERPLBLD CKD-EPI 2021: 82.9 ML/MIN/1.73
EOSINOPHIL # BLD AUTO: 0.24 10*3/MM3 (ref 0–0.4)
EOSINOPHIL NFR BLD AUTO: 4.4 % (ref 0.3–6.2)
ERYTHROCYTE [DISTWIDTH] IN BLOOD BY AUTOMATED COUNT: 16.4 % (ref 12.3–15.4)
GLOBULIN UR ELPH-MCNC: 1.9 GM/DL (ref 1.8–3.5)
GLUCOSE SERPL-MCNC: 86 MG/DL (ref 74–124)
HCT VFR BLD AUTO: 42.6 % (ref 34–46.6)
HGB BLD-MCNC: 13.8 G/DL (ref 12–15.9)
IMM GRANULOCYTES # BLD AUTO: 0.03 10*3/MM3 (ref 0–0.05)
IMM GRANULOCYTES NFR BLD AUTO: 0.5 % (ref 0–0.5)
LYMPHOCYTES # BLD AUTO: 1.8 10*3/MM3 (ref 0.7–3.1)
LYMPHOCYTES NFR BLD AUTO: 33 % (ref 19.6–45.3)
MCH RBC QN AUTO: 29.1 PG (ref 26.6–33)
MCHC RBC AUTO-ENTMCNC: 32.4 G/DL (ref 31.5–35.7)
MCV RBC AUTO: 89.7 FL (ref 79–97)
MONOCYTES # BLD AUTO: 0.51 10*3/MM3 (ref 0.1–0.9)
MONOCYTES NFR BLD AUTO: 9.3 % (ref 5–12)
NEUTROPHILS NFR BLD AUTO: 2.85 10*3/MM3 (ref 1.7–7)
NEUTROPHILS NFR BLD AUTO: 52.3 % (ref 42.7–76)
NRBC BLD AUTO-RTO: 0 /100 WBC (ref 0–0.2)
PLATELET # BLD AUTO: 215 10*3/MM3 (ref 140–450)
PMV BLD AUTO: 10.4 FL (ref 6–12)
POTASSIUM SERPL-SCNC: 4.6 MMOL/L (ref 3.5–4.7)
PROT SERPL-MCNC: 6.3 G/DL (ref 6.3–8)
RBC # BLD AUTO: 4.75 10*6/MM3 (ref 3.77–5.28)
SODIUM SERPL-SCNC: 142 MMOL/L (ref 134–145)
WBC NRBC COR # BLD: 5.46 10*3/MM3 (ref 3.4–10.8)

## 2023-03-21 PROCEDURE — 96375 TX/PRO/DX INJ NEW DRUG ADDON: CPT

## 2023-03-21 PROCEDURE — 1126F AMNT PAIN NOTED NONE PRSNT: CPT | Performed by: NURSE PRACTITIONER

## 2023-03-21 PROCEDURE — 25010000002 DARATUMUMAB-HYALURONIDASE-FIHJ 1800-30000 MG-UT/15ML SOLUTION: Performed by: NURSE PRACTITIONER

## 2023-03-21 PROCEDURE — 96401 CHEMO ANTI-NEOPL SQ/IM: CPT

## 2023-03-21 PROCEDURE — 80053 COMPREHEN METABOLIC PANEL: CPT

## 2023-03-21 PROCEDURE — 85025 COMPLETE CBC W/AUTO DIFF WBC: CPT

## 2023-03-21 PROCEDURE — 25010000002 CYCLOPHOSPHAMIDE 1 GM/5ML SOLUTION 5 ML VIAL: Performed by: NURSE PRACTITIONER

## 2023-03-21 PROCEDURE — 99214 OFFICE O/P EST MOD 30 MIN: CPT | Performed by: NURSE PRACTITIONER

## 2023-03-21 PROCEDURE — 63710000001 DEXAMETHASONE PER 0.25 MG: Performed by: NURSE PRACTITIONER

## 2023-03-21 PROCEDURE — 3074F SYST BP LT 130 MM HG: CPT | Performed by: NURSE PRACTITIONER

## 2023-03-21 PROCEDURE — 3078F DIAST BP <80 MM HG: CPT | Performed by: NURSE PRACTITIONER

## 2023-03-21 PROCEDURE — 25010000002 PALONOSETRON PER 25 MCG: Performed by: NURSE PRACTITIONER

## 2023-03-21 PROCEDURE — 96413 CHEMO IV INFUSION 1 HR: CPT

## 2023-03-21 PROCEDURE — 25010000002 BORTEZOMIB PER 0.1 MG: Performed by: NURSE PRACTITIONER

## 2023-03-21 RX ORDER — DIPHENHYDRAMINE HYDROCHLORIDE 50 MG/ML
50 INJECTION INTRAMUSCULAR; INTRAVENOUS AS NEEDED
Status: CANCELLED | OUTPATIENT
Start: 2023-03-21

## 2023-03-21 RX ORDER — SODIUM CHLORIDE 9 MG/ML
250 INJECTION, SOLUTION INTRAVENOUS ONCE
Status: CANCELLED | OUTPATIENT
Start: 2023-03-21

## 2023-03-21 RX ORDER — BORTEZOMIB 3.5 MG/1
1 INJECTION, POWDER, LYOPHILIZED, FOR SOLUTION INTRAVENOUS; SUBCUTANEOUS ONCE
Status: CANCELLED | OUTPATIENT
Start: 2023-03-28

## 2023-03-21 RX ORDER — DIPHENHYDRAMINE HYDROCHLORIDE 50 MG/ML
50 INJECTION INTRAMUSCULAR; INTRAVENOUS AS NEEDED
Status: CANCELLED | OUTPATIENT
Start: 2023-04-04

## 2023-03-21 RX ORDER — ACETAMINOPHEN 500 MG
1000 TABLET ORAL ONCE
Status: COMPLETED | OUTPATIENT
Start: 2023-03-21 | End: 2023-03-21

## 2023-03-21 RX ORDER — SODIUM CHLORIDE 9 MG/ML
250 INJECTION, SOLUTION INTRAVENOUS ONCE
Status: CANCELLED | OUTPATIENT
Start: 2023-03-28

## 2023-03-21 RX ORDER — HYDROXYZINE PAMOATE 25 MG/1
25 CAPSULE ORAL ONCE
Status: COMPLETED | OUTPATIENT
Start: 2023-03-21 | End: 2023-03-21

## 2023-03-21 RX ORDER — SODIUM CHLORIDE 9 MG/ML
250 INJECTION, SOLUTION INTRAVENOUS ONCE
Status: COMPLETED | OUTPATIENT
Start: 2023-03-21 | End: 2023-03-21

## 2023-03-21 RX ORDER — BORTEZOMIB 3.5 MG/1
1 INJECTION, POWDER, LYOPHILIZED, FOR SOLUTION INTRAVENOUS; SUBCUTANEOUS ONCE
Status: COMPLETED | OUTPATIENT
Start: 2023-03-21 | End: 2023-03-21

## 2023-03-21 RX ORDER — ACETAMINOPHEN 500 MG
1000 TABLET ORAL ONCE
Status: CANCELLED | OUTPATIENT
Start: 2023-04-04

## 2023-03-21 RX ORDER — FAMOTIDINE 10 MG/ML
20 INJECTION, SOLUTION INTRAVENOUS AS NEEDED
Status: CANCELLED | OUTPATIENT
Start: 2023-04-04

## 2023-03-21 RX ORDER — PALONOSETRON 0.05 MG/ML
0.25 INJECTION, SOLUTION INTRAVENOUS ONCE
Status: CANCELLED | OUTPATIENT
Start: 2023-03-28

## 2023-03-21 RX ORDER — BORTEZOMIB 3.5 MG/1
1 INJECTION, POWDER, LYOPHILIZED, FOR SOLUTION INTRAVENOUS; SUBCUTANEOUS ONCE
OUTPATIENT
Start: 2023-04-11

## 2023-03-21 RX ORDER — PALONOSETRON 0.05 MG/ML
0.25 INJECTION, SOLUTION INTRAVENOUS ONCE
Status: CANCELLED | OUTPATIENT
Start: 2023-04-04

## 2023-03-21 RX ORDER — BORTEZOMIB 3.5 MG/1
1 INJECTION, POWDER, LYOPHILIZED, FOR SOLUTION INTRAVENOUS; SUBCUTANEOUS ONCE
Status: CANCELLED | OUTPATIENT
Start: 2023-04-04

## 2023-03-21 RX ORDER — HYDROXYZINE PAMOATE 25 MG/1
25 CAPSULE ORAL ONCE
Status: CANCELLED
Start: 2023-04-04 | End: 2023-04-04

## 2023-03-21 RX ORDER — HYDROXYZINE PAMOATE 25 MG/1
25 CAPSULE ORAL ONCE
Status: CANCELLED
Start: 2023-03-21 | End: 2023-03-21

## 2023-03-21 RX ORDER — PALONOSETRON 0.05 MG/ML
0.25 INJECTION, SOLUTION INTRAVENOUS ONCE
Status: COMPLETED | OUTPATIENT
Start: 2023-03-21 | End: 2023-03-21

## 2023-03-21 RX ORDER — ACETAMINOPHEN 500 MG
1000 TABLET ORAL ONCE
Status: CANCELLED | OUTPATIENT
Start: 2023-03-21

## 2023-03-21 RX ORDER — SODIUM CHLORIDE 9 MG/ML
250 INJECTION, SOLUTION INTRAVENOUS ONCE
Status: CANCELLED | OUTPATIENT
Start: 2023-04-04

## 2023-03-21 RX ORDER — DEXAMETHASONE 4 MG/1
20 TABLET ORAL ONCE
Status: COMPLETED | OUTPATIENT
Start: 2023-03-21 | End: 2023-03-21

## 2023-03-21 RX ORDER — SODIUM CHLORIDE 9 MG/ML
250 INJECTION, SOLUTION INTRAVENOUS ONCE
OUTPATIENT
Start: 2023-04-11

## 2023-03-21 RX ORDER — BORTEZOMIB 3.5 MG/1
1 INJECTION, POWDER, LYOPHILIZED, FOR SOLUTION INTRAVENOUS; SUBCUTANEOUS ONCE
Status: CANCELLED | OUTPATIENT
Start: 2023-03-21

## 2023-03-21 RX ORDER — FAMOTIDINE 10 MG/ML
20 INJECTION, SOLUTION INTRAVENOUS AS NEEDED
Status: CANCELLED | OUTPATIENT
Start: 2023-03-21

## 2023-03-21 RX ORDER — PALONOSETRON 0.05 MG/ML
0.25 INJECTION, SOLUTION INTRAVENOUS ONCE
OUTPATIENT
Start: 2023-04-11

## 2023-03-21 RX ORDER — PALONOSETRON 0.05 MG/ML
0.25 INJECTION, SOLUTION INTRAVENOUS ONCE
Status: CANCELLED | OUTPATIENT
Start: 2023-03-21

## 2023-03-21 RX ADMIN — ACETAMINOPHEN 1000 MG: 500 TABLET, FILM COATED ORAL at 09:36

## 2023-03-21 RX ADMIN — HYDROXYZINE PAMOATE 25 MG: 25 CAPSULE ORAL at 09:36

## 2023-03-21 RX ADMIN — BORTEZOMIB 1.5 MG: 3.5 INJECTION, POWDER, LYOPHILIZED, FOR SOLUTION INTRAVENOUS; SUBCUTANEOUS at 10:19

## 2023-03-21 RX ADMIN — DARATUMUMAB AND HYALURONIDASE-FIHJ (HUMAN RECOMBINANT) 1800 MG: 1800; 30000 INJECTION SUBCUTANEOUS at 10:19

## 2023-03-21 RX ADMIN — CYCLOPHOSPHAMIDE 450 MG: 200 INJECTION, SOLUTION INTRAVENOUS at 10:28

## 2023-03-21 RX ADMIN — DEXAMETHASONE 20 MG: 4 TABLET ORAL at 09:37

## 2023-03-21 RX ADMIN — PALONOSETRON 0.25 MG: 0.05 INJECTION, SOLUTION INTRAVENOUS at 09:40

## 2023-03-21 RX ADMIN — SODIUM CHLORIDE 250 ML: 9 INJECTION, SOLUTION INTRAVENOUS at 09:39

## 2023-03-21 NOTE — PROGRESS NOTES
"[facility] GROUP OUTPATIENT FOLLOW UP CLINIC VISIT    REASON FOR FOLLOW-UP:    AL amyloidosis  Therapy with michael-CyBorD initiated 8/16/2022    HISTORY OF PRESENT ILLNESS: Cynthia Flower is a 68 y.o. female with the above mentioned sure today for lab review and evaluation prior to cycle 6, day 1 Velcade, cyclophosphamide.  She also receives dexamethasone 20 mg p.o. weekly in the office.  Thankfully, the repeat serum paraprotein studies which were done 2 weeks ago were more in line with her previous results.  She is currently scheduled to go back to [place] in April for reevaluation.     She is struggling with intermittent pain in her tongue related to the amyloidosis involvement.  She is using Magic mouthwash which is effective but often short-lived.  She tries to watch what she eats as well.    Patient does have dumping syndrome but this is currently well managed with Questran and Imodium.    She denies other concerns today.    REVIEW OF SYSTEMS:  As per the HPI    PHYSICAL EXAMINATION:    Vitals:    03/21/23 0912   BP: 127/76   Pulse: 78   Resp: 16   Temp: 97.7 °F (36.5 °C)   TempSrc: Temporal   SpO2: 97%   Weight: 56.1 kg (123 lb 11.2 oz)   Height: 154.9 cm (60.98\")   PainSc: 0-No pain       Physical Exam  Vitals reviewed.   Constitutional:       General: She is not in acute distress.     Appearance: Normal appearance. She is well-developed.   HENT:      Head: Normocephalic and atraumatic.      Mouth/Throat:      Tongue: Lesions (rippling of left side of tongue - AL involvement) present.   Eyes:      Pupils: Pupils are equal, round, and reactive to light.   Cardiovascular:      Rate and Rhythm: Normal rate and regular rhythm.      Heart sounds: Normal heart sounds. No murmur heard.  Pulmonary:      Effort: Pulmonary effort is normal. No respiratory distress.      Breath sounds: Normal breath sounds. No wheezing, rhonchi or rales.   Abdominal:      General: Bowel sounds are normal. There is no " distension.      Palpations: Abdomen is soft.   Musculoskeletal:         General: Normal range of motion.      Cervical back: Normal range of motion.   Skin:     General: Skin is warm and dry.      Findings: No rash.   Neurological:      Mental Status: She is alert and oriented to person, place, and time.         DIAGNOSTIC DATA:  Lab Results   Component Value Date    WBC 5.46 03/21/2023    HGB 13.8 03/21/2023    HCT 42.6 03/21/2023    MCV 89.7 03/21/2023     03/21/2023     Lab Results   Component Value Date    GLUCOSE 72 (L) 03/14/2023    BUN 16 03/14/2023    CREATININE 0.73 03/14/2023    EGFRRESULT 100 06/08/2022    EGFR 89.7 03/14/2023    BCR 21.9 03/14/2023    K 4.0 03/14/2023    CO2 25.0 03/14/2023    CALCIUM 10.2 03/14/2023    PROTENTOTREF 5.5 (L) 03/07/2023    ALBUMIN 4.8 03/14/2023    LABIL2 1.9 (H) 03/07/2023    AST 31 03/14/2023    ALT 36 (H) 03/14/2023         IMAGING:    None reviewed      ASSESSMENT:  This is a 68 y.o. female with:    *AL amyloidosis  · She had a stress echocardiogram on 4/24/2020 showing a normal left ventricular ejection fraction of 60% with moderate concentric hypertrophy but no wall motion abnormalities of the left ventricle.  There was impaired relaxation noted.  She complained of chest pain during the examination.  There was some ST segment depression.  Cardiac catheterization was performed on the same day.  No intervention was required.  · Follow-up echocardiogram on 4/15/2021 showed a left ventricular ejection fraction of 61 to 65% with normal LV cavity size.  Left ventricular wall thickness showed moderate concentric hypertrophy.  Diastolic function was impaired.  No pericardial effusion.  Small left pleural effusion.  · She had follow-up PYP imaging for cardiac amyloidosis that was not suggestive of ATTR amyloidosis  · Laboratory values on 2/24/2022 showed a high serum free light chain lambda of 121, normal kappa of 10.5, low ratio at 0.09.  Serum protein  electrophoresis showed no evidence of an M spike.  Urine light chains were abnormal with an elevated lambda light chain of 33 and a low ratio of 0.48 with a 12.7 mg M spike on 24-hour urine protein electrophoresis.  · BNP was elevated at 2306 on 3/4/2022.  The troponin was normal at 0.03.  CK was 212 with a CK-MB of 10.87.  · Initial consult on 3/30/22. No M spike on serum protein electrophoresis. SIFE 'presence of monoclonal protein is unclear.'   · Bone marrow biopsy 4/13/22 normocellular, 12.1% plasma cells consistent with low volume plasma cell dyscrasia. FISH with low level t(11;14) fusion only. No amyloid noted as Congo red staining negative.   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 Buenrostro at [place]. Intended to enroll on a clinical study but her troponin as tested by the study sponsor was not high enough  · Therapy with sissy-CyBorD initiated 8/16/2022  · Patient seen in the office on 8/17/2022 for triage visit.  Patient with complaints of fatigue, dizziness and diarrhea.  She was mildly hypotensive.  She was given IV fluids.  · Subsequent admission at [facility] with shortness of breath and volume overload.  She was diuresed and treated with antibiotics.  · D8 therapy administered on 8/30  · Light chains normal at [place] on 8/31/22 (lambda light chain 1.3, down from 12.44)  · 9/27/2022: Cycle 2-day 8 of therapy  · 10/4/2022 cycle 2-day 8 CyBorD.  Patient continues to push oral hydration.  She continues to have some neck stiffness but this has not worsened.  She will see [place] next week for further cardiac work-up.  Glucose was low upon initial labs today however the patient was given something to eat by nursing prior to rechecking.  This was therefore rechecked prior to her leaving the office and was 87.  Patient reports she was feeling fine.  · 10/18/2020 to cycle 2-day 22 Sissy -CyBorD.  Velcade dose will be reduced to 1.0 mg/m² due to patient's  persistent issues with orthostasis.  · 10/25/2022: Cycle 3-day 1  · 11/8/2022: Delay in cycle 3-day 15 therapy.  Day 8 omitted due to an upper respiratory infection.  · 11/22/2022: Cycle 3-day 22.   final planned treatment before she goes to [place] for cardiac surgery.  · Cardiac surgery performed at [place] on 12/5/2022.  Pathology from the myectomy showed cardiac amyloidosis extensively involving the vessels and endocardium with myocyte hypertrophy and interstitial fibrosis.  Congo red stain was positive.  · Reviewed back on 1/17/2023 with plans to resume Darzalex, Cytoxan, Velcade, dexamethasone with cycle #4 on 1/24.  · Patient seen 2/7/2023 (missed 1/31/2023 due to weather, which would have been day 8).  We will go ahead and proceed with as cycle 4 day 15, day 15.  · 2/14/2023: Cycle 4-day 22  · 2/21/2023 cycle 5-day 1 Darzalex, Cytoxan (IV Cytoxan given in the office), Velcade, dexamethasone (20 mg p.o. given in the office).  · Repeat labs from 2/21/2023 showing M spike up to 0.8, free kappa light chain up to 85.8 (previously 5.3 on 1/17/2023), ratio 8.58 (was previously 0.76 on 1/17/2023).  · We ended up repeating labs on 3/7/2023 M spike 0.1, free kappa light chain 3.4, ratio 0.83.  We will continue on with treatment.  She is scheduled to return to [place] in April.    *Diarrhea  · She saw Dr. Hoyos with GI at [place] on 9/7.  · Suspicion for amyloid involvement of the GI tract  · Continue Lomotil and Imodium. Rifaximin prescribed by Dr. Hoyos which she continues to use intermittently  · Diarrhea waxes and wanes with what she eats and dairy products particularly worsen diarrhea  · Continue Questran, Lomotil and Imodium.    *Elevated liver enzymes  · 2/14/2023: Mildly elevated  · 2/21/2023 slightly improved today, will continue to monitor.  · 3/21/2023 ALT 37, AST 31, total bilirubin 0.4.    *Cardiomyopathy:  · Most recent echocardiogram on 8/17/2022 showed left ventricular wall thickness  consistent with moderate to severe concentric hypertrophy along with left ventricular septal wall measuring 2.2 cm and posterior wall thickness at 1.9 cm likely due to amyloidosis.  LVEF 61 to 65%.  Grade 1 impaired relaxation.  · Now followed by Dr. Lyles at [facility] with concerns for pre-existing HCM and AL cardiac amyloid.   · Cardiac MRI results above.  Amyloidosis evident.  · Catheterization performed at [place].    · Cardiac surgery performed at [place] on 12/5/2022.  Pathology from the myectomy showed cardiac amyloidosis extensively involving the vessels and endocardium with myocyte hypertrophy and interstitial fibrosis.  Congo red stain was positive.    *Periorbital hematomas: These have resolved.  Coagulation studies and a factor X level were all normal.    *History of myalgias    *History of sore tongue. Continuing magic mouthwash.    PLAN:  1. Proceed with cycle 6, day 1 Velcade, IV cyclophosphamide, and oral dexamethasone 20 mg daily.  2. Continue Imodium, Lomotil, and Questran for diarrhea control.  3. Continue magic mouthwash for sore tongue.  4. Patient is scheduled to follow-up at [place] in April.  5. Continue cardiac medications as managed by cardiology at [place].  6. Planning for total of 6 cycles, after that, consideration of autologous peripheral blood stem cell transplant.  She is told now that she does not require cardiac transplant.  7. Follow-up with cardiology and gastroenterology at [place].   8. She continues to follow up with Dr. Buenrostro at [place].  9. Continue acyclovir and Bactrim for prophylaxis  10. We will treat through peripheral IVs as long as possible.    This patient is on high risk drug therapy requiring intensive monitoring for toxicity.        Sissy-CyBorD regimen     Daratumumab and hyaluronidase (Darzalex Faspro) as follows:  Cycles 1 & 2: 1800 mg SC once per day on days 1, 8, 15, 22  Cycles 3 to 6: 1800 mg SC once per day on days 1 & 15  Cycles 7 up to 24:  1800 mg SC once on day 1     Bortezomib (Velcade) as follows:  Cycles 1 to 6: 1.3 mg/m2 SC once per day on days 1, 8, 15, 22.  (Now dose reduced to 1.0 mg per metered squared.)     Cyclophosphamide (Cytoxan) as follows:  Cycles 1 to 6: 300 mg/m2 (maximum dose of 500 mg) PO or IV once per day on days 1, 8, 15, 22    Plan IV Cytoxan as she has difficulty swallowing pills.     Dexamethasone (Decadron) as follows:  Cycles 1 to 6: 40 mg PO or IV once per day on days 1, 8, 15, 22 (see note)  28-day cycle for up to 24 cycles  Reduced the dexamethasone dose to 20 mg weekly

## 2023-03-22 LAB
ALBUMIN SERPL ELPH-MCNC: 3.9 G/DL (ref 2.9–4.4)
ALBUMIN/GLOB SERPL: 1.9 {RATIO} (ref 0.7–1.7)
ALPHA1 GLOB SERPL ELPH-MCNC: 0.2 G/DL (ref 0–0.4)
ALPHA2 GLOB SERPL ELPH-MCNC: 0.8 G/DL (ref 0.4–1)
B-GLOBULIN SERPL ELPH-MCNC: 0.8 G/DL (ref 0.7–1.3)
GAMMA GLOB SERPL ELPH-MCNC: 0.3 G/DL (ref 0.4–1.8)
GLOBULIN SER-MCNC: 2.1 G/DL (ref 2.2–3.9)
IGA SERPL-MCNC: <5 MG/DL (ref 87–352)
IGG SERPL-MCNC: 328 MG/DL (ref 586–1602)
IGM SERPL-MCNC: 21 MG/DL (ref 26–217)
INTERPRETATION SERPL IEP-IMP: ABNORMAL
KAPPA LC FREE SER-MCNC: 3.9 MG/L (ref 3.3–19.4)
KAPPA LC FREE/LAMBDA FREE SER: 0.98 {RATIO} (ref 0.26–1.65)
LABORATORY COMMENT REPORT: ABNORMAL
LAMBDA LC FREE SERPL-MCNC: 4 MG/L (ref 5.7–26.3)
M PROTEIN SERPL ELPH-MCNC: 0.1 G/DL
PROT SERPL-MCNC: 6 G/DL (ref 6–8.5)

## 2023-03-27 NOTE — PROGRESS NOTES
"[facility] GROUP OUTPATIENT FOLLOW UP CLINIC VISIT    REASON FOR FOLLOW-UP:    AL amyloidosis  Therapy with michael-CyBorD initiated 8/16/2022    HISTORY OF PRESENT ILLNESS: Cynthia Flower is a 68 y.o. female with the above mentioned sure today for lab review and evaluation    Cycle 6-day 8 therapy today.    Her tongue remains tender.  She continues to have some tenderness on the hard palate as well.  Diarrhea continues, sometimes worse than others.  Questran and Lomotil do help.    In the near future she will travel to [place] for a thorough evaluation.    REVIEW OF SYSTEMS:  As per the HPI    PHYSICAL EXAMINATION:    Vitals:    03/28/23 1123   BP: 123/73   Pulse: 76   Resp: 18   Temp: 97.5 °F (36.4 °C)   TempSrc: Temporal   SpO2: 96%   Weight: 56.4 kg (124 lb 6.4 oz)   Height: 154.9 cm (60.98\")   PainSc: 0-No pain       Physical Exam  Vitals reviewed.   Constitutional:       General: She is not in acute distress.     Appearance: Normal appearance. She is well-developed.   HENT:      Head: Normocephalic and atraumatic.      Mouth/Throat:      Tongue: Lesions (rippling of left side of tongue - AL involvement) present.   Eyes:      Pupils: Pupils are equal, round, and reactive to light.   Cardiovascular:      Rate and Rhythm: Normal rate and regular rhythm.      Heart sounds: Normal heart sounds. No murmur heard.  Pulmonary:      Effort: Pulmonary effort is normal. No respiratory distress.      Breath sounds: Normal breath sounds. No wheezing, rhonchi or rales.   Abdominal:      General: Bowel sounds are normal. There is no distension.      Palpations: Abdomen is soft.   Musculoskeletal:         General: Normal range of motion.      Cervical back: Normal range of motion.   Skin:     General: Skin is warm and dry.      Findings: No rash.   Neurological:      Mental Status: She is alert and oriented to person, place, and time.         DIAGNOSTIC DATA:  CBC and Differential (03/28/2023 " 11:10)  Comprehensive Metabolic Panel (03/28/2023 11:10)      IMAGING:    None reviewed      ASSESSMENT:  This is a 68 y.o. female with:    *AL amyloidosis  · She had a stress echocardiogram on 4/24/2020 showing a normal left ventricular ejection fraction of 60% with moderate concentric hypertrophy but no wall motion abnormalities of the left ventricle.  There was impaired relaxation noted.  She complained of chest pain during the examination.  There was some ST segment depression.  Cardiac catheterization was performed on the same day.  No intervention was required.  · Follow-up echocardiogram on 4/15/2021 showed a left ventricular ejection fraction of 61 to 65% with normal LV cavity size.  Left ventricular wall thickness showed moderate concentric hypertrophy.  Diastolic function was impaired.  No pericardial effusion.  Small left pleural effusion.  · She had follow-up PYP imaging for cardiac amyloidosis that was not suggestive of ATTR amyloidosis  · Laboratory values on 2/24/2022 showed a high serum free light chain lambda of 121, normal kappa of 10.5, low ratio at 0.09.  Serum protein electrophoresis showed no evidence of an M spike.  Urine light chains were abnormal with an elevated lambda light chain of 33 and a low ratio of 0.48 with a 12.7 mg M spike on 24-hour urine protein electrophoresis.  · BNP was elevated at 2306 on 3/4/2022.  The troponin was normal at 0.03.  CK was 212 with a CK-MB of 10.87.  · Initial consult on 3/30/22. No M spike on serum protein electrophoresis. SIFE 'presence of monoclonal protein is unclear.'   · Bone marrow biopsy 4/13/22 normocellular, 12.1% plasma cells consistent with low volume plasma cell dyscrasia. FISH with low level t(11;14) fusion only. No amyloid noted as Congo red staining negative.   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 Buenrostro at [place]. Intended to enroll on a clinical study but her troponin as tested by the study  sponsor was not high enough  · Therapy with sissy-CyBorD initiated 8/16/2022  · Patient seen in the office on 8/17/2022 for triage visit.  Patient with complaints of fatigue, dizziness and diarrhea.  She was mildly hypotensive.  She was given IV fluids.  · Subsequent admission at [facility] with shortness of breath and volume overload.  She was diuresed and treated with antibiotics.  · D8 therapy administered on 8/30  · Light chains normal at [place] [facility] on 8/31/22 (lambda light chain 1.3, down from 12.44)  · 9/27/2022: Cycle 2-day 8 of therapy  · 10/4/2022 cycle 2-day 8 CyBorD.  Patient continues to push oral hydration.  She continues to have some neck stiffness but this has not worsened.  She will see [place] next week for further cardiac work-up.  Glucose was low upon initial labs today however the patient was given something to eat by nursing prior to rechecking.  This was therefore rechecked prior to her leaving the office and was 87.  Patient reports she was feeling fine.  · 10/18/2020 to cycle 2-day 22 Sissy -CyBorD.  Velcade dose will be reduced to 1.0 mg/m² due to patient's persistent issues with orthostasis.  · 10/25/2022: Cycle 3-day 1  · 11/8/2022: Delay in cycle 3-day 15 therapy.  Day 8 omitted due to an upper respiratory infection.  · 11/22/2022: Cycle 3-day 22.   final planned treatment before she goes to [place] for cardiac surgery.  · Cardiac surgery performed at [place] on 12/5/2022.  Pathology from the myectomy showed cardiac amyloidosis extensively involving the vessels and endocardium with myocyte hypertrophy and interstitial fibrosis.  Congo red stain was positive.  · Reviewed back on 1/17/2023 with plans to resume Darzalex, Cytoxan, Velcade, dexamethasone with cycle #4 on 1/24.  · Patient seen 2/7/2023 (missed 1/31/2023 due to weather, which would have been day 8).  We will go ahead and proceed with as cycle 4 day 15, day 15.  · 2/14/2023: Cycle 4-day 22  · 2/21/2023 cycle  5-day 1 Darzalex, Cytoxan (IV Cytoxan given in the office), Velcade, dexamethasone (20 mg p.o. given in the office).  · Repeat labs from 2/21/2023 showing M spike up to 0.8, free kappa light chain up to 85.8 (previously 5.3 on 1/17/2023), ratio 8.58 (was previously 0.76 on 1/17/2023).  · We ended up repeating labs on 3/7/2023 M spike 0.1, free kappa light chain 3.4, ratio 0.83.  We will continue on with treatment.  She is scheduled to return to [place] in April.    *Diarrhea  · She saw Dr. Hoyos with GI at [place] on 9/7.  · Suspicion for amyloid involvement of the GI tract  · Continue Lomotil and Imodium. Rifaximin prescribed by Dr. Hoyos which she continues to use intermittently  · Diarrhea waxes and wanes with what she eats and dairy products particularly worsen diarrhea  · Continue Questran, Lomotil and Imodium.    *Elevated liver enzymes  · 3/28/2023: ALT 36, AST 36, alkaline phosphatase normal    *Cardiomyopathy:  · Most recent echocardiogram on 8/17/2022 showed left ventricular wall thickness consistent with moderate to severe concentric hypertrophy along with left ventricular septal wall measuring 2.2 cm and posterior wall thickness at 1.9 cm likely due to amyloidosis.  LVEF 61 to 65%.  Grade 1 impaired relaxation.  · Now followed by Dr. Lyles at [facility] with concerns for pre-existing HCM and AL cardiac amyloid.   · Cardiac MRI results above.  Amyloidosis evident.  · Catheterization performed at [place].    · Cardiac surgery performed at [facility] on 12/5/2022.  Pathology from the myectomy showed cardiac amyloidosis extensively involving the vessels and endocardium with myocyte hypertrophy and interstitial fibrosis.  Congo red stain was positive.    *History of periorbital hematomas: These have resolved.  Coagulation studies and a factor X level were all normal.    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 Continuing magic mouthwash.    PLAN:  1. Proceed  with cycle 6, day 8 Velcade, IV cyclophosphamide, and oral dexamethasone 20 mg daily.  2. Continue Imodium, Lomotil, and Questran for diarrhea control.  3. Continue magic mouthwash for glossitis.  4. Patient is scheduled to follow-up at [place] in April.  5. Continue cardiac medications as managed by cardiology at [place].  6. Planning for total of 6 cycles, after that, consideration of autologous peripheral blood stem cell transplant.  She is told now that she does not require cardiac transplant.  7. Follow-up with cardiology and gastroenterology at [place].   8. She continues to follow up with Dr. Buenrostro at [place].  9. Continue acyclovir and Bactrim for prophylaxis  10. We will treat through peripheral IVs as long as possible.    This patient is on high risk drug therapy requiring intensive monitoring for toxicity.        Sissy-CyBorD regimen     Daratumumab and hyaluronidase (Darzalex Faspro) as follows:  Cycles 1 & 2: 1800 mg SC once per day on days 1, 8, 15, 22  Cycles 3 to 6: 1800 mg SC once per day on days 1 & 15  Cycles 7 up to 24: 1800 mg SC once on day 1     Bortezomib (Velcade) as follows:  Cycles 1 to 6: 1.3 mg/m2 SC once per day on days 1, 8, 15, 22.  (Now dose reduced to 1.0 mg per metered squared.)     Cyclophosphamide (Cytoxan) as follows:  Cycles 1 to 6: 300 mg/m2 (maximum dose of 500 mg) PO or IV once per day on days 1, 8, 15, 22    Plan IV Cytoxan as she has difficulty swallowing pills.     Dexamethasone (Decadron) as follows:  Cycles 1 to 6: 40 mg PO or IV once per day on days 1, 8, 15, 22 (see note)  28-day cycle for up to 24 cycles  Reduced the dexamethasone dose to 20 mg weekly

## 2023-03-28 ENCOUNTER — OFFICE VISIT (OUTPATIENT)
Dept: ONCOLOGY | Facility: CLINIC | Age: 68
End: 2023-03-28
Payer: MEDICARE

## 2023-03-28 ENCOUNTER — INFUSION (OUTPATIENT)
Dept: ONCOLOGY | Facility: HOSPITAL | Age: 68
End: 2023-03-28
Payer: MEDICARE

## 2023-03-28 VITALS
TEMPERATURE: 97.5 F | BODY MASS INDEX: 23.49 KG/M2 | HEIGHT: 61 IN | OXYGEN SATURATION: 96 % | HEART RATE: 76 BPM | SYSTOLIC BLOOD PRESSURE: 123 MMHG | DIASTOLIC BLOOD PRESSURE: 73 MMHG | RESPIRATION RATE: 18 BRPM | WEIGHT: 124.4 LBS

## 2023-03-28 DIAGNOSIS — E85.81 LIGHT CHAIN (AL) AMYLOIDOSIS: ICD-10-CM

## 2023-03-28 DIAGNOSIS — R19.7 DIARRHEA, UNSPECIFIED TYPE: ICD-10-CM

## 2023-03-28 DIAGNOSIS — E85.81 LIGHT CHAIN (AL) AMYLOIDOSIS: Primary | ICD-10-CM

## 2023-03-28 LAB
ALBUMIN SERPL-MCNC: 4.2 G/DL (ref 3.5–5.2)
ALBUMIN/GLOB SERPL: 2.2 G/DL (ref 1.1–2.4)
ALP SERPL-CCNC: 62 U/L (ref 38–116)
ALT SERPL W P-5'-P-CCNC: 36 U/L (ref 0–33)
ANION GAP SERPL CALCULATED.3IONS-SCNC: 12.3 MMOL/L (ref 5–15)
AST SERPL-CCNC: 36 U/L (ref 0–32)
BASOPHILS # BLD AUTO: 0.03 10*3/MM3 (ref 0–0.2)
BASOPHILS NFR BLD AUTO: 0.6 % (ref 0–1.5)
BILIRUB SERPL-MCNC: 0.5 MG/DL (ref 0.2–1.2)
BUN SERPL-MCNC: 18 MG/DL (ref 6–20)
BUN/CREAT SERPL: 23.7 (ref 7.3–30)
CALCIUM SPEC-SCNC: 9.6 MG/DL (ref 8.5–10.2)
CHLORIDE SERPL-SCNC: 105 MMOL/L (ref 98–107)
CO2 SERPL-SCNC: 21.7 MMOL/L (ref 22–29)
CREAT SERPL-MCNC: 0.76 MG/DL (ref 0.6–1.1)
DEPRECATED RDW RBC AUTO: 56.4 FL (ref 37–54)
EGFRCR SERPLBLD CKD-EPI 2021: 85.5 ML/MIN/1.73
EOSINOPHIL # BLD AUTO: 0.23 10*3/MM3 (ref 0–0.4)
EOSINOPHIL NFR BLD AUTO: 4.5 % (ref 0.3–6.2)
ERYTHROCYTE [DISTWIDTH] IN BLOOD BY AUTOMATED COUNT: 17.3 % (ref 12.3–15.4)
GLOBULIN UR ELPH-MCNC: 1.9 GM/DL (ref 1.8–3.5)
GLUCOSE SERPL-MCNC: 77 MG/DL (ref 74–124)
HCT VFR BLD AUTO: 39.7 % (ref 34–46.6)
HGB BLD-MCNC: 13 G/DL (ref 12–15.9)
IMM GRANULOCYTES # BLD AUTO: 0.02 10*3/MM3 (ref 0–0.05)
IMM GRANULOCYTES NFR BLD AUTO: 0.4 % (ref 0–0.5)
LYMPHOCYTES # BLD AUTO: 1.56 10*3/MM3 (ref 0.7–3.1)
LYMPHOCYTES NFR BLD AUTO: 30.5 % (ref 19.6–45.3)
MCH RBC QN AUTO: 29.5 PG (ref 26.6–33)
MCHC RBC AUTO-ENTMCNC: 32.7 G/DL (ref 31.5–35.7)
MCV RBC AUTO: 90 FL (ref 79–97)
MONOCYTES # BLD AUTO: 0.39 10*3/MM3 (ref 0.1–0.9)
MONOCYTES NFR BLD AUTO: 7.6 % (ref 5–12)
NEUTROPHILS NFR BLD AUTO: 2.88 10*3/MM3 (ref 1.7–7)
NEUTROPHILS NFR BLD AUTO: 56.4 % (ref 42.7–76)
NRBC BLD AUTO-RTO: 0 /100 WBC (ref 0–0.2)
PLATELET # BLD AUTO: 205 10*3/MM3 (ref 140–450)
PMV BLD AUTO: 10.5 FL (ref 6–12)
POTASSIUM SERPL-SCNC: 4.3 MMOL/L (ref 3.5–4.7)
PROT SERPL-MCNC: 6.1 G/DL (ref 6.3–8)
RBC # BLD AUTO: 4.41 10*6/MM3 (ref 3.77–5.28)
SODIUM SERPL-SCNC: 139 MMOL/L (ref 134–145)
WBC NRBC COR # BLD: 5.11 10*3/MM3 (ref 3.4–10.8)

## 2023-03-28 PROCEDURE — 99214 OFFICE O/P EST MOD 30 MIN: CPT | Performed by: INTERNAL MEDICINE

## 2023-03-28 PROCEDURE — 1159F MED LIST DOCD IN RCRD: CPT | Performed by: INTERNAL MEDICINE

## 2023-03-28 PROCEDURE — 80053 COMPREHEN METABOLIC PANEL: CPT

## 2023-03-28 PROCEDURE — 25010000002 CYCLOPHOSPHAMIDE 1 GM/5ML SOLUTION 5 ML VIAL: Performed by: NURSE PRACTITIONER

## 2023-03-28 PROCEDURE — 25010000002 BORTEZOMIB PER 0.1 MG: Performed by: NURSE PRACTITIONER

## 2023-03-28 PROCEDURE — 25010000002 PALONOSETRON PER 25 MCG: Performed by: NURSE PRACTITIONER

## 2023-03-28 PROCEDURE — 3074F SYST BP LT 130 MM HG: CPT | Performed by: INTERNAL MEDICINE

## 2023-03-28 PROCEDURE — 1126F AMNT PAIN NOTED NONE PRSNT: CPT | Performed by: INTERNAL MEDICINE

## 2023-03-28 PROCEDURE — 1160F RVW MEDS BY RX/DR IN RCRD: CPT | Performed by: INTERNAL MEDICINE

## 2023-03-28 PROCEDURE — 96375 TX/PRO/DX INJ NEW DRUG ADDON: CPT

## 2023-03-28 PROCEDURE — 3078F DIAST BP <80 MM HG: CPT | Performed by: INTERNAL MEDICINE

## 2023-03-28 PROCEDURE — 96413 CHEMO IV INFUSION 1 HR: CPT

## 2023-03-28 PROCEDURE — 63710000001 DEXAMETHASONE PER 0.25 MG: Performed by: INTERNAL MEDICINE

## 2023-03-28 PROCEDURE — 85025 COMPLETE CBC W/AUTO DIFF WBC: CPT

## 2023-03-28 PROCEDURE — 96401 CHEMO ANTI-NEOPL SQ/IM: CPT

## 2023-03-28 RX ORDER — BORTEZOMIB 3.5 MG/1
1 INJECTION, POWDER, LYOPHILIZED, FOR SOLUTION INTRAVENOUS; SUBCUTANEOUS ONCE
Status: COMPLETED | OUTPATIENT
Start: 2023-03-28 | End: 2023-03-28

## 2023-03-28 RX ORDER — MEPERIDINE HYDROCHLORIDE 25 MG/ML
25 INJECTION INTRAMUSCULAR; INTRAVENOUS; SUBCUTANEOUS
Status: CANCELLED | OUTPATIENT
Start: 2023-04-04

## 2023-03-28 RX ORDER — DIPHENOXYLATE HYDROCHLORIDE AND ATROPINE SULFATE 2.5; .025 MG/1; MG/1
1 TABLET ORAL 4 TIMES DAILY PRN
Qty: 120 TABLET | Refills: 0 | Status: SHIPPED | OUTPATIENT
Start: 2023-03-28

## 2023-03-28 RX ORDER — SODIUM CHLORIDE 9 MG/ML
250 INJECTION, SOLUTION INTRAVENOUS ONCE
Status: COMPLETED | OUTPATIENT
Start: 2023-03-28 | End: 2023-03-28

## 2023-03-28 RX ORDER — DEXAMETHASONE 4 MG/1
20 TABLET ORAL ONCE
Status: COMPLETED | OUTPATIENT
Start: 2023-03-28 | End: 2023-03-28

## 2023-03-28 RX ORDER — ACYCLOVIR 400 MG/1
400 TABLET ORAL 2 TIMES DAILY
Qty: 60 TABLET | Refills: 5 | Status: SHIPPED | OUTPATIENT
Start: 2023-03-28

## 2023-03-28 RX ORDER — PALONOSETRON 0.05 MG/ML
0.25 INJECTION, SOLUTION INTRAVENOUS ONCE
Status: COMPLETED | OUTPATIENT
Start: 2023-03-28 | End: 2023-03-28

## 2023-03-28 RX ADMIN — CYCLOPHOSPHAMIDE 450 MG: 200 INJECTION, SOLUTION INTRAVENOUS at 13:27

## 2023-03-28 RX ADMIN — SODIUM CHLORIDE 250 ML: 9 INJECTION, SOLUTION INTRAVENOUS at 12:27

## 2023-03-28 RX ADMIN — DEXAMETHASONE 20 MG: 4 TABLET ORAL at 12:57

## 2023-03-28 RX ADMIN — PALONOSETRON 0.25 MG: 0.05 INJECTION, SOLUTION INTRAVENOUS at 12:28

## 2023-03-28 RX ADMIN — BORTEZOMIB 1.5 MG: 3.5 INJECTION, POWDER, LYOPHILIZED, FOR SOLUTION INTRAVENOUS; SUBCUTANEOUS at 13:23

## 2023-03-29 ENCOUNTER — DOCUMENTATION (OUTPATIENT)
Dept: ONCOLOGY | Facility: CLINIC | Age: 68
End: 2023-03-29
Payer: MEDICARE

## 2023-03-29 ENCOUNTER — HOSPITAL ENCOUNTER (EMERGENCY)
Facility: HOSPITAL | Age: 68
Discharge: HOME | End: 2023-03-29
Attending: EMERGENCY MEDICINE | Admitting: EMERGENCY MEDICINE
Payer: MEDICARE

## 2023-03-29 ENCOUNTER — TELEPHONE (OUTPATIENT)
Dept: ONCOLOGY | Facility: CLINIC | Age: 68
End: 2023-03-29
Payer: MEDICARE

## 2023-03-29 ENCOUNTER — APPOINTMENT (OUTPATIENT)
Dept: GENERAL RADIOLOGY | Facility: HOSPITAL | Age: 68
End: 2023-03-29
Payer: MEDICARE

## 2023-03-29 VITALS
RESPIRATION RATE: 19 BRPM | OXYGEN SATURATION: 96 % | WEIGHT: 131.17 LBS | DIASTOLIC BLOOD PRESSURE: 72 MMHG | BODY MASS INDEX: 24.77 KG/M2 | HEIGHT: 61 IN | SYSTOLIC BLOOD PRESSURE: 129 MMHG | HEART RATE: 73 BPM | TEMPERATURE: 98.7 F

## 2023-03-29 DIAGNOSIS — Z86.39 HISTORY OF AMYLOIDOSIS: ICD-10-CM

## 2023-03-29 DIAGNOSIS — R50.9 FEVER, UNSPECIFIED FEVER CAUSE: Primary | ICD-10-CM

## 2023-03-29 LAB
ALBUMIN SERPL-MCNC: 4.2 G/DL (ref 3.5–5.2)
ALBUMIN/GLOB SERPL: 2.3 G/DL
ALP SERPL-CCNC: 62 U/L (ref 39–117)
ALT SERPL W P-5'-P-CCNC: 40 U/L (ref 1–33)
ANION GAP SERPL CALCULATED.3IONS-SCNC: 9.9 MMOL/L (ref 5–15)
AST SERPL-CCNC: 31 U/L (ref 1–32)
BASOPHILS # BLD AUTO: 0.02 10*3/MM3 (ref 0–0.2)
BASOPHILS NFR BLD AUTO: 0.1 % (ref 0–1.5)
BILIRUB SERPL-MCNC: 0.5 MG/DL (ref 0–1.2)
BILIRUB UR QL STRIP: NEGATIVE
BUN SERPL-MCNC: 18 MG/DL (ref 8–23)
BUN/CREAT SERPL: 24.7 (ref 7–25)
CALCIUM SPEC-SCNC: 9.3 MG/DL (ref 8.6–10.5)
CHLORIDE SERPL-SCNC: 104 MMOL/L (ref 98–107)
CLARITY UR: CLEAR
CO2 SERPL-SCNC: 24.1 MMOL/L (ref 22–29)
COLOR UR: YELLOW
CREAT SERPL-MCNC: 0.73 MG/DL (ref 0.57–1)
D-LACTATE SERPL-SCNC: 1.3 MMOL/L (ref 0.5–2)
DEPRECATED RDW RBC AUTO: 56.1 FL (ref 37–54)
EGFRCR SERPLBLD CKD-EPI 2021: 89.7 ML/MIN/1.73
EOSINOPHIL # BLD AUTO: 0 10*3/MM3 (ref 0–0.4)
EOSINOPHIL NFR BLD AUTO: 0 % (ref 0.3–6.2)
ERYTHROCYTE [DISTWIDTH] IN BLOOD BY AUTOMATED COUNT: 17.6 % (ref 12.3–15.4)
FLUAV AG NPH QL: NEGATIVE
FLUBV AG NPH QL IA: NEGATIVE
GLOBULIN UR ELPH-MCNC: 1.8 GM/DL
GLUCOSE SERPL-MCNC: 119 MG/DL (ref 65–99)
GLUCOSE UR STRIP-MCNC: ABNORMAL MG/DL
HCT VFR BLD AUTO: 35.9 % (ref 34–46.6)
HGB BLD-MCNC: 12.1 G/DL (ref 12–15.9)
HGB UR QL STRIP.AUTO: NEGATIVE
HOLD SPECIMEN: NORMAL
HOLD SPECIMEN: NORMAL
IMM GRANULOCYTES # BLD AUTO: 0.09 10*3/MM3 (ref 0–0.05)
IMM GRANULOCYTES NFR BLD AUTO: 0.6 % (ref 0–0.5)
KETONES UR QL STRIP: NEGATIVE
LEUKOCYTE ESTERASE UR QL STRIP.AUTO: NEGATIVE
LYMPHOCYTES # BLD AUTO: 1.56 10*3/MM3 (ref 0.7–3.1)
LYMPHOCYTES NFR BLD AUTO: 10.9 % (ref 19.6–45.3)
MCH RBC QN AUTO: 29.4 PG (ref 26.6–33)
MCHC RBC AUTO-ENTMCNC: 33.7 G/DL (ref 31.5–35.7)
MCV RBC AUTO: 87.1 FL (ref 79–97)
MONOCYTES # BLD AUTO: 0.85 10*3/MM3 (ref 0.1–0.9)
MONOCYTES NFR BLD AUTO: 6 % (ref 5–12)
NEUTROPHILS NFR BLD AUTO: 11.73 10*3/MM3 (ref 1.7–7)
NEUTROPHILS NFR BLD AUTO: 82.4 % (ref 42.7–76)
NITRITE UR QL STRIP: NEGATIVE
NRBC BLD AUTO-RTO: 0 /100 WBC (ref 0–0.2)
PH UR STRIP.AUTO: 6 [PH] (ref 5–8)
PLATELET # BLD AUTO: 204 10*3/MM3 (ref 140–450)
PMV BLD AUTO: 10.7 FL (ref 6–12)
POTASSIUM SERPL-SCNC: 4 MMOL/L (ref 3.5–5.2)
PROT SERPL-MCNC: 6 G/DL (ref 6–8.5)
PROT UR QL STRIP: NEGATIVE
RBC # BLD AUTO: 4.12 10*6/MM3 (ref 3.77–5.28)
S PYO AG THROAT QL: NEGATIVE
SARS-COV-2 RNA RESP QL NAA+PROBE: NOT DETECTED
SODIUM SERPL-SCNC: 138 MMOL/L (ref 136–145)
SP GR UR STRIP: >1.03 (ref 1–1.03)
UROBILINOGEN UR QL STRIP: ABNORMAL
WBC NRBC COR # BLD: 14.25 10*3/MM3 (ref 3.4–10.8)
WHOLE BLOOD HOLD COAG: NORMAL
WHOLE BLOOD HOLD SPECIMEN: NORMAL

## 2023-03-29 PROCEDURE — 71045 X-RAY EXAM CHEST 1 VIEW: CPT

## 2023-03-29 PROCEDURE — 85025 COMPLETE CBC W/AUTO DIFF WBC: CPT

## 2023-03-29 PROCEDURE — 87804 INFLUENZA ASSAY W/OPTIC: CPT | Performed by: PHYSICIAN ASSISTANT

## 2023-03-29 PROCEDURE — 87086 URINE CULTURE/COLONY COUNT: CPT | Performed by: PHYSICIAN ASSISTANT

## 2023-03-29 PROCEDURE — 87081 CULTURE SCREEN ONLY: CPT | Performed by: PHYSICIAN ASSISTANT

## 2023-03-29 PROCEDURE — 87880 STREP A ASSAY W/OPTIC: CPT | Performed by: PHYSICIAN ASSISTANT

## 2023-03-29 PROCEDURE — 81003 URINALYSIS AUTO W/O SCOPE: CPT | Performed by: PHYSICIAN ASSISTANT

## 2023-03-29 PROCEDURE — 99283 EMERGENCY DEPT VISIT LOW MDM: CPT

## 2023-03-29 PROCEDURE — U0005 INFEC AGEN DETEC AMPLI PROBE: HCPCS | Performed by: PHYSICIAN ASSISTANT

## 2023-03-29 PROCEDURE — C9803 HOPD COVID-19 SPEC COLLECT: HCPCS | Performed by: PHYSICIAN ASSISTANT

## 2023-03-29 PROCEDURE — 80053 COMPREHEN METABOLIC PANEL: CPT

## 2023-03-29 PROCEDURE — 83605 ASSAY OF LACTIC ACID: CPT | Performed by: PHYSICIAN ASSISTANT

## 2023-03-29 PROCEDURE — U0004 COV-19 TEST NON-CDC HGH THRU: HCPCS | Performed by: PHYSICIAN ASSISTANT

## 2023-03-29 RX ORDER — DIPHENHYDRAMINE HYDROCHLORIDE AND LIDOCAINE HYDROCHLORIDE AND ALUMINUM HYDROXIDE AND MAGNESIUM HYDRO
10 KIT ONCE
Status: COMPLETED | OUTPATIENT
Start: 2023-03-29 | End: 2023-03-29

## 2023-03-29 RX ADMIN — DIPHENHYDRAMINE HYDROCHLORIDE AND LIDOCAINE HYDROCHLORIDE AND ALUMINUM HYDROXIDE AND MAGNESIUM HYDRO 10 ML: KIT at 17:40

## 2023-03-29 NOTE — TELEPHONE ENCOUNTER
Pt states she's had a fever since 9 am. She cannot control it with OTC medication. Last check 1 hour ago it was 101.0. She is achy and fatigued. She denies any other symptoms.  Pt was advised to report to [place] urgent care or ED. She needs blood cultures and to be seen locally. Pt V/U.

## 2023-03-29 NOTE — TELEPHONE ENCOUNTER
PT CALLING BACK TO LET ELIZABETH KNOW THAT SHE IS AT [facility] RIGHT NOW.    CALL PT WITH ANY QUESTIONS [phone].

## 2023-03-29 NOTE — TELEPHONE ENCOUNTER
----- Message from Cynthia Flower sent at 3/29/2023  1:19 PM EDT -----  Regarding: Fever  Contact: [phone]  Hi Dr. Sal or Laura,  I’m running a 101 temp and it doesn’t seem to be going down after meds.    Cynthia Portillo   [phone]

## 2023-03-29 NOTE — ED PROVIDER NOTES
"Time: 4:39 PM EDT  Date of encounter:  3/29/2023  Independent Historian/Clinical History and Information was obtained by:   Patient  Chief Complaint   Patient presents with   • Fever       History is limited by: N/A    History of Present Illness:  Patient is a 68 y.o. year old female who presents to the emergency department for evaluation of fever while on chemo. Pt with hx of amyloidosis. Had fever of 101.4 this AM. States having mouth irritation and usually uses mucositis mouthwash but denies any other symptoms. Had 1 of 2 chemo shots yesterday. States had \"bowel dumping\" prior to chemo which is normal for her. Has been taking lomotil with some relief.  (Joey Ruth PA-C provider in triage 4:40 PM EDT )     HPI    Patient Care Team  Primary Care Provider: Arian Peterson MD    Past Medical History:     Allergies   Allergen Reactions   • Azithromycin Rash and Other (See Comments)     Reaction unknown to patient (unable to remember)  Other reaction(s): Other: stomach issues, Stomach ache, Other: stomach issues, Stomach ache   • Ezetimibe Myalgia, Other (See Comments) and Rash     cramps  cramps   • Pravastatin Rash and Other (See Comments)     Past Medical History:   Diagnosis Date   • AL amyloidosis (HCC)    • Anxiety    • Arthritis    • COVID-19 07/2020 9/7/2021   • Depression    • Diverticulitis of colon    • Diverticulosis    • GERD (gastroesophageal reflux disease)    • History of Clostridioides difficile infection 2008    HAS HAD SEVERAL TIMES IN PAST PER PT (SAW INFECTIOUS DISEASE MD FOR THIS S/P COLOSTOMY/EXTENSIVE ANBX THERAPY)   • History of prediabetes    • History of snoring    • History of stress incontinence    • Hypercholesterolemia    • Hyperlipidemia    • Hypertension    • Left ventricular hypertrophy     FOLLOWED BY DR MARI. DENIES CHEST PAIN BUT STATES DOES GET SOA AT TIMES WITH EXERTION REPORTS THAT IT IS NOT A NEW ISSUE    • Liver disease    • Oral herpes 08/18/2020   • Seasonal " allergies     used to have allergy shots in the past   • SOB (shortness of breath)     STATES WITH EXERTION HAS BEEN ONGOING ISSUE NOT A NEW ISSUE   • Thyroid disease     Hypothyroid     Past Surgical History:   Procedure Laterality Date   • ABDOMINAL SURGERY  2005, 2014    ex lap x 2,  diverticulitis   • ABDOMINOPLASTY  2016   • ADENOIDECTOMY     • APPENDECTOMY     • CARDIAC CATHETERIZATION N/A 04/24/2020    Procedure: Coronary angiography;  Surgeon: Roberto Briones MD;  Location: [facility] CATH INVASIVE LOCATION;  Service: Cardiovascular;  Laterality: N/A;   • CARDIAC CATHETERIZATION N/A 04/24/2020    Procedure: Left heart cath;  Surgeon: Roberto Briones MD;  Location:  [facility] CATH INVASIVE LOCATION;  Service: Cardiovascular;  Laterality: N/A;   • CARDIAC CATHETERIZATION N/A 04/24/2020    Procedure: Left ventriculography;  Surgeon: Roberto Briones MD;  Location:  [facility] CATH INVASIVE LOCATION;  Service: Cardiovascular;  Laterality: N/A;   • COLONOSCOPY  approx 2018    normal per pt    • COLOSTOMY  2005    had colostomy and then is was reversed   • COLOSTOMY CLOSURE  2006   • CORRECTION HAMMER TOE Right 2017   • ECTOPIC PREGNANCY SURGERY      1979, 1980   • ENDOSCOPY N/A 05/27/2020    Procedure: ESOPHAGOGASTRODUODENOSCOPY WITH BIOPSY AND BIOPSY POLYPECTOMY AND MACIEL DIALATION;  Surgeon: Preethi Beck MD;  Location: [facility] ENDOSCOPY;  Service: Gastroenterology;  Laterality: N/A;  PRE: ESOPHAGEAL DYSPHAGIA  POST: Z LINE 46, ESOPHAGEAL SPASM, GASTRITIS, FUNDIC POLYP   • ESOPHAGEAL DILATATION N/A 12/07/2021    Procedure: OR ESOPHAGEAL DILATATION with maciel dilators ;  Surgeon: Jamey Leslie MD;  [facility];  Service: ENT;  Laterality: N/A;   • ESOPHAGEAL MOTILITY STUDY N/A 02/03/2022    Procedure: ESOPHAGEAL MOTILITY STUDY;  Surgeon: Endo, Nurse Performed;  Location: [facility] ENDOSCOPY;  Service: Gastroenterology;  Laterality: N/A;  dypshagia    • FOOT SURGERY Right  12/2016    Second and third hammertoe corrections   • KNEE ARTHROSCOPY Right 2009   • KNEE SURGERY Right    • REVISION / TAKEDOWN COLOSTOMY  2006   • SHOULDER ARTHROSCOPY Right 2018    right shoulder arthroscopy with extensive rotator cuff, biceps and labral debridement, chondroplasty, & subacromial decompression with acromioplasty   • SHOULDER SURGERY      HAS HAS COMPLETE SHOULDER ON RIGHT. LEFT SCOPED AND HAD 2ND SURGERY WITH HARDWARE PLACED   • SHOULDER SURGERY Left     2012. 2013   • TONSILLECTOMY     • TOTAL SHOULDER REVERSE ARTHROPLASTY Right 2019   • WISDOM TOOTH EXTRACTION       Family History   Problem Relation Age of Onset   • Hypertension Mother    • Osteoporosis Mother    • Skin cancer Mother    • Heart disease Father    • Hypertension Father    • Breast cancer Maternal Grandmother    • Ovarian cancer Neg Hx    • Colon cancer Neg Hx    • Deep vein thrombosis Neg Hx    • Pulmonary embolism Neg Hx    • Malig Hyperthermia Neg Hx        Home Medications:  Prior to Admission medications    Medication Sig Start Date End Date Taking? Authorizing Provider   acyclovir (Zovirax) 400 MG tablet Take 1 tablet by mouth 2 (Two) Times a Day. 3/28/23   Nav Sal MD   ALPRAZolam (XANAX) 0.25 MG tablet TAKE 1 TABLET BY MOUTH TWICE DAILY AS NEEDED FOR ANXIETY 10/27/22   Cam Frias MD   aspirin 81 MG chewable tablet CHEW AND SWALLOW 1 TABLET DAILY WITH BREAKFAST 1/12/23   ProviderEric MD   Cholecalciferol (Vitamin D3) 50 MCG (2000 UT) tablet Take 1 tablet by mouth Daily.    Eric Weston MD   Cholecalciferol 50 MCG (2000 UT) tablet Take 1 tablet by mouth Daily.    Eric Weston MD   cholestyramine (QUESTRAN) 4 g packet Take 1 packet by mouth 3 (Three) Times a Day With Meals. 1 packet  TID PRN diarrhea 8/9/22   Nav Sal MD   ciclopirox (LOPROX) 1 % shampoo APPLY TOPICALLY 3 TIMES EVERY WEEK 10/20/22   Eric Weston MD   diphenoxylate-atropine (LOMOTIL) 2.5-0.025 MG per tablet  Take 1 tablet by mouth 4 (Four) Times a Day As Needed for Diarrhea. 3/28/23   Nav Sal MD   empagliflozin (JARDIANCE) 10 MG tablet tablet Take 1 tablet by mouth Daily. 11/7/22   Eric Weston MD   FLUoxetine (PROzac) 20 MG capsule TAKE 1 CAPSULE BY MOUTH FOUR TIMES DAILY 12/19/22   Arian Peterson MD   guaiFENesin (MUCINEX) 600 MG 12 hr tablet Take 2 tablets by mouth 2 (Two) Times a Day. 12/1/22   Bethany Anders MD   hydroCHLOROthiazide (HYDRODIURIL) 25 MG tablet TAKE 1 TABLET BY MOUTH DAILY 1/26/23   Arian Peterson MD   hyoscyamine (LEVBID) 0.375 MG 12 hr tablet Take 1 tablet by mouth. 11/22/22 5/22/23  Eric Weston MD   levothyroxine (SYNTHROID, LEVOTHROID) 75 MCG tablet TAKE 1 TABLET BY MOUTH EVERY DAY 9/14/22   Arian Peterson MD   lidocaine (LIDODERM) 5 % APPLY 1 PATCH TOPICALLY EVERY DAY. REMOVE AND DISCARD AFTER 12 HOURS 1/2/23   Eric Weston MD   loperamide (IMODIUM A-D) 2 MG tablet Take 1 tablet by mouth 3 (Three) Times a Day As Needed for Diarrhea. 9/1/21   Roxie Wilkinson APRN   loratadine (CLARITIN) 10 MG tablet TAKE 1 TABLET BY MOUTH DAILY 11/8/22   Arian Peterson MD   methocarbamol (ROBAXIN) 500 MG tablet Take 1 tablet by mouth 2 (Two) Times a Day As Needed. 1/12/23   Eric Weston MD   metoprolol succinate XL (TOPROL-XL) 25 MG 24 hr tablet  1/2/23   Eric Weston MD   midodrine (PROAMATINE) 5 MG tablet Take 1 tablet by mouth 3 (Three) Times a Day. 10/26/22 2/2/23  Eric Weston MD   montelukast (SINGULAIR) 10 MG tablet Take 1 tablet by mouth every night at bedtime. 11/4/22   Eric Weston MD   Multiple Vitamins-Minerals (V-C FORTE PO) Take 1 capsule by mouth Daily. 9/21/22   Eric Weston MD   Multiple Vitamins-Minerals (V-C Forte) capsule Take 1 capsule by mouth Daily. 9/21/22   Provider, MD Eric   mupirocin (BACTROBAN) 2 % ointment APPLY TWO TIMES PER DAY TO THE INFECTION/BIOPSY SITES 3/1/22   Provider,  MD Eric   olopatadine (PATANOL) 0.1 % ophthalmic solution INSTILL 1 DROP IN BOTH EYES TWICE DAILY 12/1/22   Arian Peterson MD   ondansetron (ZOFRAN) 8 MG tablet Take 1 tablet by mouth 3 (Three) Times a Day As Needed for Nausea or Vomiting. 11/8/22   Nav Sal MD   pantoprazole (PROTONIX) 20 MG EC tablet TAKE 1 TABLET BY MOUTH EVERY DAY. DO NOT TAKE WITHIN 2 HOURS OF THYROID MEDICATION 9/13/22   Arian Peterson MD   Pediatric Multivitamins-Iron (multivitamin chewable) chewable tablet Chew 1 tablet Daily. 12/20/22 12/21/23  Eric Weston MD   potassium chloride ER (K-TAB) 20 MEQ tablet controlled-release ER tablet TAKE 1 TABLET BY MOUTH TWICE DAILY 12/14/22   Arian Peterson MD   rosuvastatin (CRESTOR) 20 MG tablet TAKE 1 TABLET BY MOUTH DAILY 1/9/23   Arian Peterson MD   spironolactone (ALDACTONE) 25 MG tablet  8/31/22   ProviderEric MD   sulfamethoxazole-trimethoprim (BACTRIM DS,SEPTRA DS) 800-160 MG per tablet Take 1 tablet by mouth 3 (Three) Times a Week. 1/18/23   Nav Sal MD   traMADol (ULTRAM) 50 MG tablet Take 1 tablet by mouth Every 6 (Six) Hours As Needed for Moderate Pain . 7/8/22   Arian Peterson MD   triamcinolone (KENALOG) 0.1 % ointment Apply  topically to the appropriate area as directed 2 (Two) Times a Day. 8/11/22   Arian Peterson MD   zolpidem (AMBIEN) 10 MG tablet TAKE 1 TABLET BY MOUTH AT NIGHT AS NEEDED FOR SLEEP 1/27/23   Arian Peterson MD        Social History:   Social History     Tobacco Use   • Smoking status: Never   • Smokeless tobacco: Never   Vaping Use   • Vaping Use: Never used   Substance Use Topics   • Alcohol use: Not Currently     Alcohol/week: 1.0 standard drink     Types: 1 Glasses of wine per week   • Drug use: Never         Review of Systems:  Review of Systems   Constitutional: Positive for fever. Negative for chills.   HENT: Negative for congestion and sore throat.         +tongue pain   Respiratory: Negative for cough and  "shortness of breath.    Cardiovascular: Negative for chest pain and palpitations.   Gastrointestinal: Negative for abdominal pain, diarrhea, nausea and vomiting.   Genitourinary: Negative for dysuria.   Neurological: Negative for headaches.   All other systems reviewed and are negative.       Physical Exam:  /72   Pulse 73   Temp 98.7 °F (37.1 °C) (Oral)   Resp 19   Ht 154.9 cm (61\")   Wt 59.5 kg (131 lb 2.8 oz)   SpO2 96%   BMI 24.79 kg/m²     Physical Exam  Vitals and nursing note reviewed.   Constitutional:       Appearance: Normal appearance. She is not ill-appearing or toxic-appearing.   HENT:      Head: Normocephalic.      Nose: Nose normal.      Mouth/Throat:      Mouth: Mucous membranes are moist.      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   Cardiovascular:      Rate and Rhythm: Normal rate and regular rhythm.   Pulmonary:      Effort: Pulmonary effort is normal.      Breath sounds: Normal breath sounds.   Abdominal:      Palpations: Abdomen is soft.      Tenderness: There is no abdominal tenderness. There is no guarding or rebound.      Comments: Chemo injection site with minimal erythema, no induration, no tenderness, no drainage.   Musculoskeletal:         General: Normal range of motion.      Cervical back: Normal range of motion.   Skin:     General: Skin is warm and dry.      Capillary Refill: Capillary refill takes less than 2 seconds.   Neurological:      Mental Status: She is alert.                  Procedures:  Procedures      Medical Decision Making:      Comorbidities that affect care:    amyloidosis, HTN, thyroid disease    External Notes reviewed:      Oncology note reviewed from 3/28/2023.  History of amyloidosis.  Therapy with [facility] initiated 8/16/22. she is going to be seen at [facility] for evaluation. CBC drawn at office showed WBC 5.11    The following orders were placed and all results were independently analyzed " by me:  Orders Placed This Encounter   Procedures   • COVID-19,APTIMA PANTHER(MARYELLEN), [facility]/ [facility], NP/OP SWAB IN UTM/VTM/SALINE TRANSPORT MEDIA,24 HR TAT - Swab, Nasal Cavity   • Influenza Antigen, Rapid - Swab, Nasopharynx   • Rapid Strep A Screen - Swab, Throat   • Beta Strep Culture, Throat - Swab, Throat   • Urine Culture - Urine,   • XR Chest 1 View   • [place] Draw   • Comprehensive Metabolic Panel   • CBC Auto Differential   • Lactic Acid, Plasma   • Urinalysis With Culture If Indicated - Urine, Clean Catch   • CBC & Differential   • Green Top (Gel)   • Lavender Top   • Gold Top - SST   • Light Blue Top       Medications Given in the Emergency Department:  Medications   First Mouthwash (Magic Mouthwash) 10 mL (10 mL Swish & Spit Given 3/29/23 1740)        ED Course:    The patient was initially evaluated in the triage area where orders were placed. The patient was later dispositioned by Joey Ruth PA-C.      The patient was advised to stay for completion of workup which includes but is not limited to communication of labs and radiological results, reassessment and plan. The patient was advised that leaving prior to disposition by a provider could result in critical findings that are not communicated to the patient.     ED Course as of 03/30/23 0208   Wed Mar 29, 2023   1946 Consult with Dr. Kingsley with [facility] oncology. Discussed labs, presentation. States recommendation of supportive care and antipyretics with a CBC to be redrawn in the office on Friday.  [KM]      ED Course User Index  [KM] Joey Ruth PA-C       Labs:    Lab Results (last 24 hours)     Procedure Component Value Units Date/Time    CBC & Differential [[id number]]  (Abnormal) Collected: 03/29/23 1624    Specimen: Blood Updated: 03/29/23 1633    Narrative:      The following orders were created for panel order CBC & Differential.  Procedure                               Abnormality         Status                     ---------                                -----------         ------                     CBC Auto Differential[[id number]]        Abnormal            Final result                 Please view results for these tests on the individual orders.    Comprehensive Metabolic Panel [[id number]]  (Abnormal) Collected: 03/29/23 1624    Specimen: Blood Updated: 03/29/23 1717     Glucose 119 mg/dL      BUN 18 mg/dL      Creatinine 0.73 mg/dL      Sodium 138 mmol/L      Potassium 4.0 mmol/L      Comment: Slight hemolysis detected by analyzer. Results may be affected.        Chloride 104 mmol/L      CO2 24.1 mmol/L      Calcium 9.3 mg/dL      Total Protein 6.0 g/dL      Albumin 4.2 g/dL      ALT (SGPT) 40 U/L      AST (SGOT) 31 U/L      Comment: Slight hemolysis detected by analyzer. Results may be affected.        Alkaline Phosphatase 62 U/L      Total Bilirubin 0.5 mg/dL      Globulin 1.8 gm/dL      A/G Ratio 2.3 g/dL      BUN/Creatinine Ratio 24.7     Anion Gap 9.9 mmol/L      eGFR 89.7 mL/min/1.73     Narrative:      GFR Normal >60  Chronic Kidney Disease <60  Kidney Failure <15      CBC Auto Differential [[id number]]  (Abnormal) Collected: 03/29/23 1624    Specimen: Blood Updated: 03/29/23 1633     WBC 14.25 10*3/mm3      RBC 4.12 10*6/mm3      Hemoglobin 12.1 g/dL      Hematocrit 35.9 %      MCV 87.1 fL      MCH 29.4 pg      MCHC 33.7 g/dL      RDW 17.6 %      RDW-SD 56.1 fl      MPV 10.7 fL      Platelets 204 10*3/mm3      Neutrophil % 82.4 %      Lymphocyte % 10.9 %      Monocyte % 6.0 %      Eosinophil % 0.0 %      Basophil % 0.1 %      Immature Grans % 0.6 %      Neutrophils, Absolute 11.73 10*3/mm3      Lymphocytes, Absolute 1.56 10*3/mm3      Monocytes, Absolute 0.85 10*3/mm3      Eosinophils, Absolute 0.00 10*3/mm3      Basophils, Absolute 0.02 10*3/mm3      Immature Grans, Absolute 0.09 10*3/mm3      nRBC 0.0 /100 WBC     COVID-19,APTIMA PANTHER(MARYELLEN),[facility]/ [facility], NP/OP SWAB IN UTM/VTM/SALINE TRANSPORT MEDIA,24 HR TAT - Swab, Nasal Cavity  [[id number]]  (Normal) Collected: 03/29/23 1709    Specimen: Swab from Nasal Cavity Updated: 03/29/23 2247     COVID19 Not Detected    Narrative:      Fact sheet for providers: https://www.fda.gov/media/024645/download     Fact sheet for patients: https://www.fda.gov/media/992995/download    Test performed by RT PCR.    Influenza Antigen, Rapid - Swab, Nasopharynx [[id number]]  (Normal) Collected: 03/29/23 1709    Specimen: Swab from Nasopharynx Updated: 03/29/23 1741     Influenza A Ag, EIA Negative     Influenza B Ag, EIA Negative    Rapid Strep A Screen - Swab, Throat [[id number]]  (Normal) Collected: 03/29/23 1709    Specimen: Swab from Throat Updated: 03/29/23 1736     Strep A Ag Negative    Beta Strep Culture, Throat - Swab, Throat [[id number]] Collected: 03/29/23 1709    Specimen: Swab from Throat Updated: 03/29/23 1736    Urinalysis With Culture If Indicated - Urine, Clean Catch [[id number]]  (Abnormal) Collected: 03/29/23 1728    Specimen: Urine, Clean Catch Updated: 03/29/23 1738     Color, UA Yellow     Appearance, UA Clear     pH, UA 6.0     Specific Gravity, UA >1.030     Glucose, UA >=1000 mg/dL (3+)     Ketones, UA Negative     Bilirubin, UA Negative     Blood, UA Negative     Protein, UA Negative     Leuk Esterase, UA Negative     Nitrite, UA Negative     Urobilinogen, UA 1.0 E.U./dL    Narrative:      In absence of clinical symptoms, the presence of pyuria, bacteria, and/or nitrites on the urinalysis result does not correlate with infection.  Urine microscopic not indicated.    Urine Culture - Urine, Urine, Clean Catch [[id number]] Collected: 03/29/23 1728    Specimen: Urine, Clean Catch Updated: 03/29/23 1738    Lactic Acid, Plasma [[id number]]  (Normal) Collected: 03/29/23 1739    Specimen: Blood Updated: 03/29/23 1805     Lactate 1.3 mmol/L            Imaging:    XR Chest 1 View    Result Date: 3/29/2023  PROCEDURE: XR CHEST 1 VW  COMPARISON: [facility], CR, XR CHEST 1 VW, 8/18/2022,  11:28.  INDICATIONS: fever, immunocompromised  FINDINGS:  Patient is now status post median sternotomy.  Heart size and pulmonary vessels are within normal limits.  Lungs are clear bilaterally.  No pleural effusion.  No evidence pneumothorax.  Patient is again noted be status post reversed total right shoulder arthroplasty.        1. Interval median sternotomy 2. No acute cardiopulmonary disease       FAM RAY MD       Electronically Signed and Approved By: FAM ARY MD on 3/29/2023 at 19:02                 Differential Diagnosis and Discussion:      Fever: Based on the complaint of fever, differential diagnosis includes but is not limited to meningitis, pneumonia, pyelonephritis, acute uti,  systemic immune response syndrome, sepsis, viral syndrome, fungal infection, tick born illness and other bacterial infections.    All labs were reviewed and interpreted by me.  All X-rays were independently reviewed by me.    MDM         Patient Care Considerations:          Consultants/Shared Management Plan:    Consultant: I have discussed the case with Dr. Kingsley with hem/onc at [facility] with pt oncology group who states recommendation for supportive care and f/u in clinic in 2 days for recheck of CBC    Social Determinants of Health:    Patient is independent, reliable, and has access to care.       Disposition and Care Coordination:    Discharged: The patient is suitable and stable for discharge with no need for consideration of observation or admission.    I have explained the patient´s condition, diagnoses and treatment plan based on the information available to me at this time. I have answered questions and addressed any concerns. The patient has a good  understanding of the patient´s diagnosis, condition, and treatment plan as can be expected at this point. The vital signs have been stable. The patient´s condition is stable and appropriate for discharge from the emergency department.      The  patient will pursue further outpatient evaluation with the primary care physician or other designated or consulting physician as outlined in the discharge instructions. They are agreeable to this plan of care and follow-up instructions have been explained in detail. The patient has received these instructions in written format and have expressed an understanding of the discharge instructions. The patient is aware that any significant change in condition or worsening of symptoms should prompt an immediate return to this or the closest emergency department or call to 91.  I have explained discharge medications and the need for follow up with the patient/caretakers. This was also printed in the discharge instructions. Patient was discharged with the following medications and follow up:      Medication List      No changes were made to your prescriptions during this visit.      Arian Peterson MD  [address] [facility] [address] [phone]          [facility] EMERGENCY ROOM  [address] [place] [id number] [phone]    If symptoms worsen    [facility] RADIATION ONCOLOGY  [address] [place] [id number] [phone]  Call   and schedule a CBC re-draw on Friday 3/31/2023       Final diagnoses:   Fever, unspecified fever cause   History of amyloidosis        ED Disposition     ED Disposition   Discharge    Condition   Stable    Comment   --             This medical record created using voice recognition software.           Joey Ruth PA-C  03/30/23 0209

## 2023-03-29 NOTE — DISCHARGE INSTRUCTIONS
I have consulted with your oncologist group who recommends using Tylenol and ibuprofen for fever as needed and have your blood counts redrawn in the office on Friday.  Please call tomorrow morning to schedule a blood draw.  Return to the emergency department if you have any worsening concerning symptoms including abdominal pain, nausea, vomiting, fever uncontrolled by medications, unable to eat or drink, worsening redness/swelling/pain around injection site.

## 2023-03-30 DIAGNOSIS — F41.9 ANXIETY: ICD-10-CM

## 2023-03-30 LAB — BACTERIA SPEC AEROBE CULT: NO GROWTH

## 2023-03-30 RX ORDER — ALPRAZOLAM 0.25 MG/1
TABLET ORAL
Qty: 60 TABLET | OUTPATIENT
Start: 2023-03-30

## 2023-03-30 NOTE — PROGRESS NOTES
On-call MD note:    Patient called reporting having fever up to 101 Fahrenheit.  We directed patient to check out at [facility].    Patient had a work-up, no evidence for urinary tract infection, negative for strep throat.  Viral panel including COVID-19    CBC study reported mild leukocytosis WBC 14,250 including ANC 11,000, from baseline WBC 5100 yesterday.    Suspect the patient may have some kind of viral infection.    I recommended to repeat CBC in 2 days.    Discussed with Joey Ruth PA-C at [facility].    ARINA MEYERS M.D., Ph.D.    3/29/2023.

## 2023-03-31 LAB — BACTERIA SPEC AEROBE CULT: NORMAL

## 2023-04-03 DIAGNOSIS — F51.01 PRIMARY INSOMNIA: ICD-10-CM

## 2023-04-03 NOTE — TELEPHONE ENCOUNTER
"Caller: Cynthia Flower \"YOLETTE\"    Relationship: Self    Best call back number: [phone]    Requested Prescriptions:   Requested Prescriptions     Pending Prescriptions Disp Refills   • zolpidem (AMBIEN) 10 MG tablet 30 tablet 1     Sig: Take 1 tablet by mouth At Night As Needed for Sleep.        Pharmacy where request should be sent: [id number] - [place], [place] - [address] [facility] [address] /[facility] [phone] [facility] [phone]      Last office visit with prescribing clinician: 8/24/2022   Last telemedicine visit with prescribing clinician: 4/10/2023   Next office visit with prescribing clinician: 4/10/2023     Additional details provided by patient: PATIENT STATES SHE HAS 4 DAYS REMAINING    Does the patient have less than a 3 day supply:  [x] Yes  [] No    Would you like a call back once the refill request has been completed: [x] Yes [] No    If the office needs to give you a call back, can they leave a voicemail: [x] Yes [] No    Ellis Branham   04/03/23 09:45 EDT         "

## 2023-04-04 ENCOUNTER — OFFICE VISIT (OUTPATIENT)
Dept: ONCOLOGY | Facility: CLINIC | Age: 68
End: 2023-04-04
Payer: MEDICARE

## 2023-04-04 ENCOUNTER — INFUSION (OUTPATIENT)
Dept: ONCOLOGY | Facility: HOSPITAL | Age: 68
End: 2023-04-04
Payer: MEDICARE

## 2023-04-04 VITALS
RESPIRATION RATE: 16 BRPM | HEIGHT: 61 IN | DIASTOLIC BLOOD PRESSURE: 70 MMHG | WEIGHT: 125.1 LBS | OXYGEN SATURATION: 95 % | TEMPERATURE: 97.1 F | HEART RATE: 70 BPM | BODY MASS INDEX: 23.62 KG/M2 | SYSTOLIC BLOOD PRESSURE: 115 MMHG

## 2023-04-04 DIAGNOSIS — Z91.89 HIGH RISK FOR CHEMOTHERAPY-INDUCED INFECTIOUS COMPLICATION: Primary | ICD-10-CM

## 2023-04-04 DIAGNOSIS — E85.81 LIGHT CHAIN (AL) AMYLOIDOSIS: Primary | ICD-10-CM

## 2023-04-04 DIAGNOSIS — E85.81 LIGHT CHAIN (AL) AMYLOIDOSIS: ICD-10-CM

## 2023-04-04 LAB
ALBUMIN SERPL-MCNC: 4.1 G/DL (ref 3.5–5.2)
ALBUMIN/GLOB SERPL: 2.2 G/DL (ref 1.1–2.4)
ALP SERPL-CCNC: 67 U/L (ref 38–116)
ALT SERPL W P-5'-P-CCNC: 35 U/L (ref 0–33)
ANION GAP SERPL CALCULATED.3IONS-SCNC: 9.7 MMOL/L (ref 5–15)
AST SERPL-CCNC: 36 U/L (ref 0–32)
BASOPHILS # BLD AUTO: 0.03 10*3/MM3 (ref 0–0.2)
BASOPHILS NFR BLD AUTO: 0.7 % (ref 0–1.5)
BILIRUB SERPL-MCNC: 0.4 MG/DL (ref 0.2–1.2)
BUN SERPL-MCNC: 12 MG/DL (ref 6–20)
BUN/CREAT SERPL: 15.4 (ref 7.3–30)
CALCIUM SPEC-SCNC: 9.4 MG/DL (ref 8.5–10.2)
CHLORIDE SERPL-SCNC: 105 MMOL/L (ref 98–107)
CO2 SERPL-SCNC: 25.3 MMOL/L (ref 22–29)
CREAT SERPL-MCNC: 0.78 MG/DL (ref 0.6–1.1)
DEPRECATED RDW RBC AUTO: 57.1 FL (ref 37–54)
EGFRCR SERPLBLD CKD-EPI 2021: 82.9 ML/MIN/1.73
EOSINOPHIL # BLD AUTO: 0.23 10*3/MM3 (ref 0–0.4)
EOSINOPHIL NFR BLD AUTO: 5.5 % (ref 0.3–6.2)
ERYTHROCYTE [DISTWIDTH] IN BLOOD BY AUTOMATED COUNT: 17.5 % (ref 12.3–15.4)
GLOBULIN UR ELPH-MCNC: 1.9 GM/DL (ref 1.8–3.5)
GLUCOSE SERPL-MCNC: 95 MG/DL (ref 74–124)
HCT VFR BLD AUTO: 38.4 % (ref 34–46.6)
HGB BLD-MCNC: 12.7 G/DL (ref 12–15.9)
IMM GRANULOCYTES # BLD AUTO: 0.01 10*3/MM3 (ref 0–0.05)
IMM GRANULOCYTES NFR BLD AUTO: 0.2 % (ref 0–0.5)
LYMPHOCYTES # BLD AUTO: 1.43 10*3/MM3 (ref 0.7–3.1)
LYMPHOCYTES NFR BLD AUTO: 34.2 % (ref 19.6–45.3)
MCH RBC QN AUTO: 29.7 PG (ref 26.6–33)
MCHC RBC AUTO-ENTMCNC: 33.1 G/DL (ref 31.5–35.7)
MCV RBC AUTO: 89.7 FL (ref 79–97)
MONOCYTES # BLD AUTO: 0.34 10*3/MM3 (ref 0.1–0.9)
MONOCYTES NFR BLD AUTO: 8.1 % (ref 5–12)
NEUTROPHILS NFR BLD AUTO: 2.14 10*3/MM3 (ref 1.7–7)
NEUTROPHILS NFR BLD AUTO: 51.3 % (ref 42.7–76)
NRBC BLD AUTO-RTO: 0 /100 WBC (ref 0–0.2)
PLATELET # BLD AUTO: 167 10*3/MM3 (ref 140–450)
PMV BLD AUTO: 9.9 FL (ref 6–12)
POTASSIUM SERPL-SCNC: 4.3 MMOL/L (ref 3.5–4.7)
PROT SERPL-MCNC: 6 G/DL (ref 6.3–8)
RBC # BLD AUTO: 4.28 10*6/MM3 (ref 3.77–5.28)
SODIUM SERPL-SCNC: 140 MMOL/L (ref 134–145)
WBC NRBC COR # BLD: 4.18 10*3/MM3 (ref 3.4–10.8)

## 2023-04-04 PROCEDURE — 63710000001 DEXAMETHASONE PER 0.25 MG: Performed by: NURSE PRACTITIONER

## 2023-04-04 PROCEDURE — 96375 TX/PRO/DX INJ NEW DRUG ADDON: CPT

## 2023-04-04 PROCEDURE — 96401 CHEMO ANTI-NEOPL SQ/IM: CPT

## 2023-04-04 PROCEDURE — 85025 COMPLETE CBC W/AUTO DIFF WBC: CPT

## 2023-04-04 PROCEDURE — 99214 OFFICE O/P EST MOD 30 MIN: CPT | Performed by: NURSE PRACTITIONER

## 2023-04-04 PROCEDURE — 80053 COMPREHEN METABOLIC PANEL: CPT

## 2023-04-04 PROCEDURE — 25010000002 BORTEZOMIB PER 0.1 MG: Performed by: NURSE PRACTITIONER

## 2023-04-04 PROCEDURE — 25010000002 CYCLOPHOSPHAMIDE 1 GM/5ML SOLUTION 5 ML VIAL: Performed by: NURSE PRACTITIONER

## 2023-04-04 PROCEDURE — 1159F MED LIST DOCD IN RCRD: CPT | Performed by: NURSE PRACTITIONER

## 2023-04-04 PROCEDURE — 96413 CHEMO IV INFUSION 1 HR: CPT

## 2023-04-04 PROCEDURE — 25010000002 PALONOSETRON PER 25 MCG: Performed by: NURSE PRACTITIONER

## 2023-04-04 PROCEDURE — 3078F DIAST BP <80 MM HG: CPT | Performed by: NURSE PRACTITIONER

## 2023-04-04 PROCEDURE — 3074F SYST BP LT 130 MM HG: CPT | Performed by: NURSE PRACTITIONER

## 2023-04-04 PROCEDURE — 1126F AMNT PAIN NOTED NONE PRSNT: CPT | Performed by: NURSE PRACTITIONER

## 2023-04-04 PROCEDURE — 25010000002 DARATUMUMAB-HYALURONIDASE-FIHJ 1800-30000 MG-UT/15ML SOLUTION: Performed by: NURSE PRACTITIONER

## 2023-04-04 PROCEDURE — 1160F RVW MEDS BY RX/DR IN RCRD: CPT | Performed by: NURSE PRACTITIONER

## 2023-04-04 RX ORDER — PALONOSETRON 0.05 MG/ML
0.25 INJECTION, SOLUTION INTRAVENOUS ONCE
Status: COMPLETED | OUTPATIENT
Start: 2023-04-04 | End: 2023-04-04

## 2023-04-04 RX ORDER — HYDROXYZINE PAMOATE 25 MG/1
25 CAPSULE ORAL ONCE
Status: COMPLETED | OUTPATIENT
Start: 2023-04-04 | End: 2023-04-04

## 2023-04-04 RX ORDER — SODIUM CHLORIDE 9 MG/ML
250 INJECTION, SOLUTION INTRAVENOUS ONCE
Status: COMPLETED | OUTPATIENT
Start: 2023-04-04 | End: 2023-04-04

## 2023-04-04 RX ORDER — BORTEZOMIB 3.5 MG/1
1 INJECTION, POWDER, LYOPHILIZED, FOR SOLUTION INTRAVENOUS; SUBCUTANEOUS ONCE
Status: COMPLETED | OUTPATIENT
Start: 2023-04-04 | End: 2023-04-04

## 2023-04-04 RX ORDER — ACETAMINOPHEN 500 MG
1000 TABLET ORAL ONCE
Status: COMPLETED | OUTPATIENT
Start: 2023-04-04 | End: 2023-04-04

## 2023-04-04 RX ORDER — DEXAMETHASONE 4 MG/1
20 TABLET ORAL ONCE
Status: COMPLETED | OUTPATIENT
Start: 2023-04-04 | End: 2023-04-04

## 2023-04-04 RX ORDER — ZOLPIDEM TARTRATE 10 MG/1
10 TABLET ORAL NIGHTLY PRN
Qty: 30 TABLET | Refills: 1 | Status: SHIPPED | OUTPATIENT
Start: 2023-04-04 | End: 2023-04-10 | Stop reason: SDUPTHER

## 2023-04-04 RX ADMIN — DARATUMUMAB AND HYALURONIDASE-FIHJ (HUMAN RECOMBINANT) 1800 MG: 1800; 30000 INJECTION SUBCUTANEOUS at 11:26

## 2023-04-04 RX ADMIN — CYCLOPHOSPHAMIDE 450 MG: 200 INJECTION, SOLUTION INTRAVENOUS at 10:46

## 2023-04-04 RX ADMIN — HYDROXYZINE PAMOATE 25 MG: 25 CAPSULE ORAL at 10:36

## 2023-04-04 RX ADMIN — ACETAMINOPHEN 1000 MG: 500 TABLET, FILM COATED ORAL at 10:36

## 2023-04-04 RX ADMIN — PALONOSETRON 0.25 MG: 0.05 INJECTION, SOLUTION INTRAVENOUS at 10:35

## 2023-04-04 RX ADMIN — DEXAMETHASONE 20 MG: 4 TABLET ORAL at 10:36

## 2023-04-04 RX ADMIN — BORTEZOMIB 1.5 MG: 3.5 INJECTION, POWDER, LYOPHILIZED, FOR SOLUTION INTRAVENOUS; SUBCUTANEOUS at 11:26

## 2023-04-04 RX ADMIN — SODIUM CHLORIDE 250 ML: 9 INJECTION, SOLUTION INTRAVENOUS at 10:36

## 2023-04-04 NOTE — PROGRESS NOTES
"[facility] CBC GROUP OUTPATIENT FOLLOW UP CLINIC VISIT    REASON FOR FOLLOW-UP:    AL amyloidosis  Therapy with sissy-CyBorD initiated 8/16/2022    HISTORY OF PRESENT ILLNESS: Cynthia Flower is a 68 y.o. female with the above-mentioned history who is here today for lab review and evaluation due for continued Sissy-CyBorD therapy.  She is due for cycle 6, day 15 today.    Following her last treatment on 3/29/2023 patient went to the ER due to persistent fever up to 101.  Patient was seen at [facility] ER, and work-up was essentially negative.  No further fevers but a lot more fatigue.  We discussed potentially preemptively premedicating this evening and tomorrow for fevers.    Diarrhea about the same as it has been.  Lomotil and Questran do help.  Questran actually makes her constipated sometimes.    April 12th back to [place] and for more testing on the 17th.     Her tongue remains tender.  She continues to have some tenderness on the hard palate as well.     REVIEW OF SYSTEMS:  As per the HPI    PHYSICAL EXAMINATION:    Vitals:    04/04/23 0955   BP: 115/70   Pulse: 70   Resp: 16   Temp: 97.1 °F (36.2 °C)   TempSrc: Temporal   SpO2: 95%   Weight: 56.7 kg (125 lb 1.6 oz)   Height: 154.9 cm (60.98\")   PainSc: 0-No pain       Physical Exam  Vitals reviewed.   Constitutional:       General: She is not in acute distress.     Appearance: Normal appearance. She is well-developed.   HENT:      Head: Normocephalic and atraumatic.      Mouth/Throat:      Tongue: Lesions (rippling of left side of tongue - AL involvement) present.   Eyes:      Pupils: Pupils are equal, round, and reactive to light.   Cardiovascular:      Rate and Rhythm: Normal rate and regular rhythm.      Heart sounds: Normal heart sounds. No murmur heard.  Pulmonary:      Effort: Pulmonary effort is normal. No respiratory distress.      Breath sounds: Normal breath sounds. No wheezing, rhonchi or rales.   Abdominal:      General: " Bowel sounds are normal. There is no distension.      Palpations: Abdomen is soft.   Musculoskeletal:         General: Normal range of motion.      Cervical back: Normal range of motion.   Skin:     General: Skin is warm and dry.      Findings: No rash.   Neurological:      Mental Status: She is alert and oriented to person, place, and time.         DIAGNOSTIC DATA:  CBC and Differential (04/04/2023 09:42)        IMAGING:    None reviewed      ASSESSMENT:  This is a 68 y.o. female with:    *AL amyloidosis  · She had a stress echocardiogram on 4/24/2020 showing a normal left ventricular ejection fraction of 60% with moderate concentric hypertrophy but no wall motion abnormalities of the left ventricle.  There was impaired relaxation noted.  She complained of chest pain during the examination.  There was some ST segment depression.  Cardiac catheterization was performed on the same day.  No intervention was required.  · Follow-up echocardiogram on 4/15/2021 showed a left ventricular ejection fraction of 61 to 65% with normal LV cavity size.  Left ventricular wall thickness showed moderate concentric hypertrophy.  Diastolic function was impaired.  No pericardial effusion.  Small left pleural effusion.  · She had follow-up PYP imaging for cardiac amyloidosis that was not suggestive of ATTR amyloidosis  · Laboratory values on 2/24/2022 showed a high serum free light chain lambda of 121, normal kappa of 10.5, low ratio at 0.09.  Serum protein electrophoresis showed no evidence of an M spike.  Urine light chains were abnormal with an elevated lambda light chain of 33 and a low ratio of 0.48 with a 12.7 mg M spike on 24-hour urine protein electrophoresis.  · BNP was elevated at 2306 on 3/4/2022.  The troponin was normal at 0.03.  CK was 212 with a CK-MB of 10.87.  · Initial consult on 3/30/22. No M spike on serum protein electrophoresis. MARIO 'presence of monoclonal protein is unclear.'   · Bone marrow biopsy 4/13/22  normocellular, 12.1% plasma cells consistent with low volume plasma cell dyscrasia. FISH with low level t(11;14) fusion only. No amyloid noted as Congo red staining negative.   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 Buenrostro at [place]. Intended to enroll on a clinical study but her troponin as tested by the study sponsor was not high enough  · Therapy with sissy-CyBorD initiated 8/16/2022  · Patient seen in the office on 8/17/2022 for triage visit.  Patient with complaints of fatigue, dizziness and diarrhea.  She was mildly hypotensive.  She was given IV fluids.  · Subsequent admission at [facility] with shortness of breath and volume overload.  She was diuresed and treated with antibiotics.  · D8 therapy administered on 8/30  · Light chains normal at [facility] on 8/31/22 (lambda light chain 1.3, down from 12.44)  · 9/27/2022: Cycle 2-day 8 of therapy  · 10/4/2022 cycle 2-day 8 CyBorD.  Patient continues to push oral hydration.  She continues to have some neck stiffness but this has not worsened.  She will see [facility] next week for further cardiac work-up.  Glucose was low upon initial labs today however the patient was given something to eat by nursing prior to rechecking.  This was therefore rechecked prior to her leaving the office and was 87.  Patient reports she was feeling fine.  · 10/18/2020 to cycle 2-day 22 Sissy -CyBorD.  Velcade dose will be reduced to 1.0 mg/m² due to patient's persistent issues with orthostasis.  · 10/25/2022: Cycle 3-day 1  · 11/8/2022: Delay in cycle 3-day 15 therapy.  Day 8 omitted due to an upper respiratory infection.  · 11/22/2022: Cycle 3-day 22.   final planned treatment before she goes to [place] for cardiac surgery.  · Cardiac surgery performed at [facility] on 12/5/2022.  Pathology from the myectomy showed cardiac amyloidosis extensively involving the vessels and endocardium with myocyte hypertrophy and interstitial  fibrosis.  Congo red stain was positive.  · Reviewed back on 1/17/2023 with plans to resume Darzalex, Cytoxan, Velcade, dexamethasone with cycle #4 on 1/24.  · Patient seen 2/7/2023 (missed 1/31/2023 due to weather, which would have been day 8).  We will go ahead and proceed with as cycle 4 day 15, day 15.  · 2/14/2023: Cycle 4-day 22  · 2/21/2023 cycle 5-day 1 Darzalex, Cytoxan (IV Cytoxan given in the office), Velcade, dexamethasone (20 mg p.o. given in the office).  · Repeat labs from 2/21/2023 showing M spike up to 0.8, free kappa light chain up to 85.8 (previously 5.3 on 1/17/2023), ratio 8.58 (was previously 0.76 on 1/17/2023).  · We ended up repeating labs on 3/7/2023 M spike 0.1, free kappa light chain 3.4, ratio 0.83.  We will continue on with treatment.  She is scheduled to return to [place] in April.  · 4/4/2023 cycle 6, day 15 Darzalex-CyBorD.  Patient will be returning next week to [place] on the 12th.    *Diarrhea  · She saw Dr. Hoyos with GI at [place] on 9/7.  · Suspicion for amyloid involvement of the GI tract  · Continue Lomotil and Imodium. Rifaximin prescribed by Dr. Hoyos which she continues to use intermittently  · Diarrhea waxes and wanes with what she eats and dairy products particularly worsen diarrhea  · Continue Questran, Lomotil and Imodium.    *Elevated liver enzymes  · 4/4/2023 AST 36, ALT 35, alkaline phosphatase 67, total bilirubin 0.4.    *Cardiomyopathy:  · Most recent echocardiogram on 8/17/2022 showed left ventricular wall thickness consistent with moderate to severe concentric hypertrophy along with left ventricular septal wall measuring 2.2 cm and posterior wall thickness at 1.9 cm likely due to amyloidosis.  LVEF 61 to 65%.  Grade 1 impaired relaxation.  · Now followed by Dr. Lyles at [facility] with concerns for pre-existing HCM and AL cardiac amyloid.   · Cardiac MRI results above.  Amyloidosis evident.  · Catheterization performed at [place].    · Cardiac surgery  performed at [place] on 12/5/2022.  Pathology from the myectomy showed cardiac amyloidosis extensively involving the vessels and endocardium with myocyte hypertrophy and interstitial fibrosis.  Congo red stain was positive.    *History of periorbital hematomas: These have resolved.  Coagulation studies and a factor X level were all normal.    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 Continuing magic mouthwash.    PLAN:  1. Proceed with cycle 6, day 15 Velcade, IV Cytoxan, Darzalex Faspro, dexamethasone.  2. Continue Imodium, Lomotil, and Questran for diarrhea control.  3. Continue Magic mouthwash for glossitis.  4. Patient returns in 1 week for follow-up with Dr. Sal for cycle 6-day 22-due for Velcade, IV Cytoxan, dexamethasone.  5. After that, patient will return to [place] April 12 for testing and she states additional testing on the 17th for consideration of autologous peripheral blood stem cell transplant.  She is told now that she does not require cardiac transplant.  6. Continue cardiac medications as managed by cardiology at [place].  7. Follow-up with cardiology and gastroenterology at [place].   8. She continues to follow up with Dr. Buenrostro at [place].  9. Continue acyclovir and Bactrim for prophylaxis  10. We will treat through peripheral IVs as long as possible.    This patient is on high risk drug therapy requiring intensive monitoring for toxicity.        Sissy-CyBorD regimen     Daratumumab and hyaluronidase (Darzalex Faspro) as follows:  Cycles 1 & 2: 1800 mg SC once per day on days 1, 8, 15, 22  Cycles 3 to 6: 1800 mg SC once per day on days 1 & 15  Cycles 7 up to 24: 1800 mg SC once on day 1     Bortezomib (Velcade) as follows:  Cycles 1 to 6: 1.3 mg/m2 SC once per day on days 1, 8, 15, 22.  (Now dose reduced to 1.0 mg per metered squared.)     Cyclophosphamide (Cytoxan) as follows:  Cycles 1 to 6: 300 mg/m2 (maximum dose of 500 mg) PO  or IV once per day on days 1, 8, 15, 22    Plan IV Cytoxan as she has difficulty swallowing pills.     Dexamethasone (Decadron) as follows:  Cycles 1 to 6: 40 mg PO or IV once per day on days 1, 8, 15, 22 (see note)  28-day cycle for up to 24 cycles  Reduced the dexamethasone dose to 20 mg weekly

## 2023-04-10 ENCOUNTER — OFFICE VISIT (OUTPATIENT)
Dept: FAMILY MEDICINE CLINIC | Facility: CLINIC | Age: 68
End: 2023-04-10
Payer: MEDICARE

## 2023-04-10 VITALS
TEMPERATURE: 94.8 F | OXYGEN SATURATION: 95 % | DIASTOLIC BLOOD PRESSURE: 66 MMHG | SYSTOLIC BLOOD PRESSURE: 118 MMHG | WEIGHT: 126 LBS | BODY MASS INDEX: 23.79 KG/M2 | HEART RATE: 85 BPM | HEIGHT: 61 IN

## 2023-04-10 DIAGNOSIS — F41.9 ANXIETY: ICD-10-CM

## 2023-04-10 DIAGNOSIS — F51.01 PRIMARY INSOMNIA: ICD-10-CM

## 2023-04-10 PROCEDURE — 99213 OFFICE O/P EST LOW 20 MIN: CPT | Performed by: INTERNAL MEDICINE

## 2023-04-10 PROCEDURE — 3078F DIAST BP <80 MM HG: CPT | Performed by: INTERNAL MEDICINE

## 2023-04-10 PROCEDURE — 3074F SYST BP LT 130 MM HG: CPT | Performed by: INTERNAL MEDICINE

## 2023-04-10 RX ORDER — ZOLPIDEM TARTRATE 10 MG/1
10 TABLET ORAL NIGHTLY PRN
Qty: 30 TABLET | Refills: 1 | Status: SHIPPED | OUTPATIENT
Start: 2023-04-10

## 2023-04-10 RX ORDER — ALPRAZOLAM 0.25 MG/1
0.25 TABLET ORAL 2 TIMES DAILY PRN
Qty: 60 TABLET | Refills: 1 | Status: SHIPPED | OUTPATIENT
Start: 2023-04-10

## 2023-04-10 NOTE — PROGRESS NOTES
"[facility] CBC GROUP OUTPATIENT FOLLOW UP CLINIC VISIT    REASON FOR FOLLOW-UP:    AL amyloidosis  Therapy with sissy-CyBorD initiated 8/16/2022    HISTORY OF PRESENT ILLNESS: Cynthia Flower is a 68 y.o. female with the above-mentioned history who is here today for lab review and evaluation due for continued Sissy-CyBorD therapy.      She is due for cycle 6, day 22 today.    Everything is stable.  Intermittent right upper extremity and left lower extremity numbness.  She continues to have intermittent diarrhea.  Tongue remains tender particularly on the left side.  No further fevers.    REVIEW OF SYSTEMS:  As per the HPI    PHYSICAL EXAMINATION:    Vitals:    04/11/23 0924   BP: 129/68   Pulse: 74   Resp: 18   Temp: 97.3 °F (36.3 °C)   TempSrc: Temporal   SpO2: 97%   Weight: 57 kg (125 lb 9.6 oz)   Height: 154.9 cm (60.98\")   PainSc: 0-No pain     General:  No acute distress, awake, alert and oriented  Skin:  Warm and dry, no visible rash  HEENT:  Normocephalic/atraumatic.   Chest:  Normal respiratory effort.  Lungs clear to auscultation bilaterally  Heart: Regular rate and rhythm  Extremities:  No visible clubbing, cyanosis, or edema  Neuro/psych:  Grossly nonfocal.  Normal mood and affect.    DIAGNOSTIC DATA:  CBC and Differential (04/11/2023 09:04)    IMAGING:    None reviewed      ASSESSMENT:  This is a 68 y.o. female with:    *AL amyloidosis  · She had a stress echocardiogram on 4/24/2020 showing a normal left ventricular ejection fraction of 60% with moderate concentric hypertrophy but no wall motion abnormalities of the left ventricle.  There was impaired relaxation noted.  She complained of chest pain during the examination.  There was some ST segment depression.  Cardiac catheterization was performed on the same day.  No intervention was required.  · Follow-up echocardiogram on 4/15/2021 showed a left ventricular ejection fraction of 61 to 65% with normal LV cavity size.  Left ventricular wall " thickness showed moderate concentric hypertrophy.  Diastolic function was impaired.  No pericardial effusion.  Small left pleural effusion.  · She had follow-up PYP imaging for cardiac amyloidosis that was not suggestive of ATTR amyloidosis  · Laboratory values on 2/24/2022 showed a high serum free light chain lambda of 121, normal kappa of 10.5, low ratio at 0.09.  Serum protein electrophoresis showed no evidence of an M spike.  Urine light chains were abnormal with an elevated lambda light chain of 33 and a low ratio of 0.48 with a 12.7 mg M spike on 24-hour urine protein electrophoresis.  · BNP was elevated at 2306 on 3/4/2022.  The troponin was normal at 0.03.  CK was 212 with a CK-MB of 10.87.  · Initial consult on 3/30/22. No M spike on serum protein electrophoresis. SIFE 'presence of monoclonal protein is unclear.'   · Bone marrow biopsy 4/13/22 normocellular, 12.1% plasma cells consistent with low volume plasma cell dyscrasia. FISH with low level t(11;14) fusion only. No amyloid noted as Congo red staining negative.   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 Buenrostro at [place]. Intended to enroll on a clinical study but her troponin as tested by the study sponsor was not high enough  · Therapy with michael-CyBorD initiated 8/16/2022  · Patient seen in the office on 8/17/2022 for triage visit.  Patient with complaints of fatigue, dizziness and diarrhea.  She was mildly hypotensive.  She was given IV fluids.  · Subsequent admission at [facility] with shortness of breath and volume overload.  She was diuresed and treated with antibiotics.  · D8 therapy administered on 8/30  · Light chains normal at [place] [facility] on 8/31/22 (lambda light chain 1.3, down from 12.44)  · 9/27/2022: Cycle 2-day 8 of therapy  · 10/4/2022 cycle 2-day 8 CyBorD.  Patient continues to push oral hydration.  She continues to have some neck stiffness but this has not worsened.  She will see  [facility] next week for further cardiac work-up.  Glucose was low upon initial labs today however the patient was given something to eat by nursing prior to rechecking.  This was therefore rechecked prior to her leaving the office and was 87.  Patient reports she was feeling fine.  · 10/18/2020 to cycle 2-day 22 Sissy -CyBorD.  Velcade dose will be reduced to 1.0 mg/m² due to patient's persistent issues with orthostasis.  · 10/25/2022: Cycle 3-day 1  · 11/8/2022: Delay in cycle 3-day 15 therapy.  Day 8 omitted due to an upper respiratory infection.  · 11/22/2022: Cycle 3-day 22.   final planned treatment before she goes to [place] for cardiac surgery.  · Cardiac surgery performed at [place] on 12/5/2022.  Pathology from the myectomy showed cardiac amyloidosis extensively involving the vessels and endocardium with myocyte hypertrophy and interstitial fibrosis.  Congo red stain was positive.  · Reviewed back on 1/17/2023 with plans to resume Darzalex, Cytoxan, Velcade, dexamethasone with cycle #4 on 1/24.  · Patient seen 2/7/2023 (missed 1/31/2023 due to weather, which would have been day 8).  We will go ahead and proceed with as cycle 4 day 15, day 15.  · 2/14/2023: Cycle 4-day 22  · 2/21/2023 cycle 5-day 1 Darzalex, Cytoxan (IV Cytoxan given in the office), Velcade, dexamethasone (20 mg p.o. given in the office).  · Repeat labs from 2/21/2023 showing M spike up to 0.8, free kappa light chain up to 85.8 (previously 5.3 on 1/17/2023), ratio 8.58 (was previously 0.76 on 1/17/2023).  · We ended up repeating labs on 3/7/2023 M spike 0.1, free kappa light chain 3.4, ratio 0.83.  We will continue on with treatment.  She is scheduled to return to [place] in April.  · 4/11/2023 cycle 6, day 22 Darzalex-CyBorD.  Final planned therapy.  · She travels to [place] tomorrow for extensive evaluation    *Diarrhea  · She saw Dr. Hoyos with GI at [facility] on 9/7.  · Suspicion for amyloid involvement of the GI  tract  · Continue Lomotil and Imodium. Rifaximin prescribed by Dr. Hoyos which she continues to use intermittently  · Diarrhea waxes and wanes with what she eats and dairy products particularly worsen diarrhea  · Continue Questran, Lomotil and Imodium.    *Elevated liver enzymes  · Pending today    *Cardiomyopathy:  · Most recent echocardiogram on 8/17/2022 showed left ventricular wall thickness consistent with moderate to severe concentric hypertrophy along with left ventricular septal wall measuring 2.2 cm and posterior wall thickness at 1.9 cm likely due to amyloidosis.  LVEF 61 to 65%.  Grade 1 impaired relaxation.  · Now followed by Dr. Lyles at [facility] with concerns for pre-existing HCM and AL cardiac amyloid.   · Cardiac MRI results above.  Amyloidosis evident.  · Catheterization performed at [place].   · Cardiac surgery performed at [place] on 12/5/2022.  Pathology from the myectomy showed cardiac amyloidosis extensively involving the vessels and endocardium with myocyte hypertrophy and interstitial fibrosis.  Congo red stain was positive.    *History of periorbital hematomas: These have resolved.  Coagulation studies and a factor X level were all normal.    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 Continuing magic mouthwash.    PLAN:  1. Proceed with cycle 6, day 22 of therapy today  2. Continue Imodium, Lomotil, and Questran for diarrhea control.  3. Continue Magic mouthwash for glossitis.  4. Evaluation at [place] tomorrow  5. Continue cardiac medications as managed by cardiology at [place].  6. Continue acyclovir and Bactrim for prophylaxis  7. We will treat through peripheral IVs as long as possible.  8. Follow-up here to be determined based on plans at [place]    This patient is on high risk drug therapy requiring intensive monitoring for toxicity.

## 2023-04-10 NOTE — PROGRESS NOTES
Jessica Flower is a 68 y.o. female. Patient is here today for   Chief Complaint   Patient presents with   • Follow-up     Meds refill           Vitals:    04/10/23 1337   BP: 118/66   Pulse: 85   Temp: 94.8 °F (34.9 °C)   SpO2: 95%     Body mass index is 23.82 kg/m².      Past Medical History:   Diagnosis Date   • AL amyloidosis    • Anxiety    • Arthritis    • COVID-19 07/2020 9/7/2021   • Depression    • Diverticulitis of colon    • Diverticulosis    • GERD (gastroesophageal reflux disease)    • History of Clostridioides difficile infection 2008    HAS HAD SEVERAL TIMES IN PAST PER PT (SAW INFECTIOUS DISEASE MD FOR THIS S/P COLOSTOMY/EXTENSIVE ANBX THERAPY)   • History of prediabetes    • History of snoring    • History of stress incontinence    • Hypercholesterolemia    • Hyperlipidemia    • Hypertension    • Left ventricular hypertrophy     FOLLOWED BY DR MARI. DENIES CHEST PAIN BUT STATES DOES GET SOA AT TIMES WITH EXERTION REPORTS THAT IT IS NOT A NEW ISSUE    • Liver disease    • Oral herpes 08/18/2020   • Seasonal allergies     used to have allergy shots in the past   • SOB (shortness of breath)     STATES WITH EXERTION HAS BEEN ONGOING ISSUE NOT A NEW ISSUE   • Thyroid disease     Hypothyroid      Allergies   Allergen Reactions   • Azithromycin Rash and Other (See Comments)     Reaction unknown to patient (unable to remember)  Other reaction(s): Other: stomach issues, Stomach ache, Other: stomach issues, Stomach ache   • Ezetimibe Myalgia, Other (See Comments) and Rash     cramps  cramps   • Pravastatin Rash and Other (See Comments)      Social History     Socioeconomic History   • Marital status:    • Number of children: 2   Tobacco Use   • Smoking status: Never   • Smokeless tobacco: Never   Vaping Use   • Vaping Use: Never used   Substance and Sexual Activity   • Alcohol use: Not Currently     Alcohol/week: 1.0 standard drink     Types: 1 Glasses of wine per week    • Drug use: Never   • Sexual activity: Defer        Current Outpatient Medications:   •  acyclovir (Zovirax) 400 MG tablet, Take 1 tablet by mouth 2 (Two) Times a Day., Disp: 60 tablet, Rfl: 5  •  ALPRAZolam (XANAX) 0.25 MG tablet, Take 1 tablet by mouth 2 (Two) Times a Day As Needed for Anxiety. for anxiety, Disp: 60 tablet, Rfl: 1  •  aspirin 81 MG chewable tablet, CHEW AND SWALLOW 1 TABLET DAILY WITH BREAKFAST, Disp: , Rfl:   •  Cholecalciferol (Vitamin D3) 50 MCG (2000 UT) tablet, Take 1 tablet by mouth Daily., Disp: , Rfl:   •  cholestyramine (QUESTRAN) 4 g packet, Take 1 packet by mouth 3 (Three) Times a Day With Meals. 1 packet  TID PRN diarrhea, Disp: 60 packet, Rfl: 4  •  ciclopirox (LOPROX) 1 % shampoo, APPLY TOPICALLY 3 TIMES EVERY WEEK, Disp: , Rfl:   •  diphenoxylate-atropine (LOMOTIL) 2.5-0.025 MG per tablet, Take 1 tablet by mouth 4 (Four) Times a Day As Needed for Diarrhea., Disp: 120 tablet, Rfl: 0  •  empagliflozin (JARDIANCE) 10 MG tablet tablet, Take 1 tablet by mouth Daily., Disp: , Rfl:   •  FLUoxetine (PROzac) 20 MG capsule, TAKE 1 CAPSULE BY MOUTH FOUR TIMES DAILY, Disp: 360 capsule, Rfl: 0  •  guaiFENesin (MUCINEX) 600 MG 12 hr tablet, Take 2 tablets by mouth 2 (Two) Times a Day., Disp: 40 tablet, Rfl: 0  •  hyoscyamine (LEVBID) 0.375 MG 12 hr tablet, Take 1 tablet by mouth., Disp: , Rfl:   •  levothyroxine (SYNTHROID, LEVOTHROID) 75 MCG tablet, TAKE 1 TABLET BY MOUTH EVERY DAY, Disp: 90 tablet, Rfl: 3  •  lidocaine (LIDODERM) 5 %, APPLY 1 PATCH TOPICALLY EVERY DAY. REMOVE AND DISCARD AFTER 12 HOURS, Disp: , Rfl:   •  loperamide (IMODIUM A-D) 2 MG tablet, Take 1 tablet by mouth 3 (Three) Times a Day As Needed for Diarrhea., Disp: 90 tablet, Rfl: 0  •  loratadine (CLARITIN) 10 MG tablet, TAKE 1 TABLET BY MOUTH DAILY, Disp: 30 tablet, Rfl: 5  •  metoprolol succinate XL (TOPROL-XL) 25 MG 24 hr tablet, , Disp: , Rfl:   •  Multiple Vitamins-Minerals (V-C FORTE PO), Take 1 capsule by mouth  Daily., Disp: , Rfl:   •  Multiple Vitamins-Minerals (V-C Forte) capsule, Take 1 capsule by mouth Daily., Disp: , Rfl:   •  mupirocin (BACTROBAN) 2 % ointment, APPLY TWO TIMES PER DAY TO THE INFECTION/BIOPSY SITES, Disp: , Rfl:   •  olopatadine (PATANOL) 0.1 % ophthalmic solution, INSTILL 1 DROP IN BOTH EYES TWICE DAILY, Disp: 5 mL, Rfl: 5  •  ondansetron (ZOFRAN) 8 MG tablet, Take 1 tablet by mouth 3 (Three) Times a Day As Needed for Nausea or Vomiting., Disp: 30 tablet, Rfl: 5  •  pantoprazole (PROTONIX) 20 MG EC tablet, TAKE 1 TABLET BY MOUTH EVERY DAY. DO NOT TAKE WITHIN 2 HOURS OF THYROID MEDICATION, Disp: 30 tablet, Rfl: 5  •  Pediatric Multivitamins-Iron (multivitamin chewable) chewable tablet, Chew 1 tablet Daily., Disp: , Rfl:   •  riFAXIMin (Xifaxan) 550 MG tablet, Take 1 tablet by mouth Every 8 (Eight) Hours for 14 days. For irritable bowel syndrome or diarrhea, Disp: 42 tablet, Rfl: 0  •  rosuvastatin (CRESTOR) 20 MG tablet, TAKE 1 TABLET BY MOUTH DAILY, Disp: 90 tablet, Rfl: 0  •  spironolactone (ALDACTONE) 25 MG tablet, , Disp: , Rfl:   •  sulfamethoxazole-trimethoprim (BACTRIM DS,SEPTRA DS) 800-160 MG per tablet, Take 1 tablet by mouth 3 (Three) Times a Week., Disp: 12 tablet, Rfl: 5  •  traMADol (ULTRAM) 50 MG tablet, Take 1 tablet by mouth Every 6 (Six) Hours As Needed for Moderate Pain ., Disp: 40 tablet, Rfl: 2  •  zolpidem (AMBIEN) 10 MG tablet, Take 1 tablet by mouth At Night As Needed for Sleep., Disp: 30 tablet, Rfl: 1  •  hydroCHLOROthiazide (HYDRODIURIL) 25 MG tablet, TAKE 1 TABLET BY MOUTH DAILY (Patient not taking: Reported on 4/10/2023), Disp: 90 tablet, Rfl: 0  •  methocarbamol (ROBAXIN) 500 MG tablet, Take 1 tablet by mouth 2 (Two) Times a Day As Needed., Disp: , Rfl:   •  midodrine (PROAMATINE) 5 MG tablet, Take 1 tablet by mouth 3 (Three) Times a Day., Disp: 90 tablet, Rfl: 3  •  montelukast (SINGULAIR) 10 MG tablet, Take 1 tablet by mouth every night at bedtime. (Patient not taking:  Reported on 4/10/2023), Disp: , Rfl:   •  potassium chloride ER (K-TAB) 20 MEQ tablet controlled-release ER tablet, TAKE 1 TABLET BY MOUTH TWICE DAILY (Patient not taking: Reported on 4/10/2023), Disp: 60 tablet, Rfl: 1  •  triamcinolone (KENALOG) 0.1 % ointment, Apply  topically to the appropriate area as directed 2 (Two) Times a Day. (Patient not taking: Reported on 4/10/2023), Disp: 30 g, Rfl: 1     Objective     History of Present Illness  She needs some medications filled today.  Specifically she requested clonazepam which she takes situationally for her anxiety and she feels that works well for her.    She has also been taking Ambien 10 mg nightly for many years and finds that to be indispensable.    Otherwise she feels relatively well though she has been Very busy by physicians and [place] regarding her amyloidosis.  She recently had a myomectomy to treat her cardiac amyloidosis and she is feeling pretty well regarding her cardiac function.  She denies any dyspnea, she has no lower extremity edema.  She denies chest pain.    She also continues to receive chemotherapy for the management of her amyloidosis.       Review of Systems   Constitutional: Negative.    HENT: Negative.    Respiratory: Negative.    Cardiovascular: Negative.    Musculoskeletal: Negative.    Psychiatric/Behavioral: Negative.        Physical Exam  Vitals and nursing note reviewed.   Constitutional:       Appearance: Normal appearance.      Comments: Pleasant, neatly groomed, no distress.   Cardiovascular:      Rate and Rhythm: Regular rhythm.      Heart sounds: Normal heart sounds. No murmur heard.    No gallop.   Pulmonary:      Effort: No respiratory distress.      Breath sounds: Normal breath sounds. No wheezing or rales.   Neurological:      Mental Status: She is alert and oriented to person, place, and time.   Psychiatric:         Mood and Affect: Mood normal.         Behavior: Behavior normal.         Thought Content: Thought  content normal.           Problems Addressed this Visit        Sleep    Primary insomnia    Relevant Medications    zolpidem (AMBIEN) 10 MG tablet   Other Visit Diagnoses     Anxiety        Relevant Medications    ALPRAZolam (XANAX) 0.25 MG tablet      Diagnoses       Codes Comments    Primary insomnia     ICD-10-CM: F51.01  ICD-9-CM: 307.42     Anxiety     ICD-10-CM: F41.9  ICD-9-CM: 300.00             PLAN  She feels her generalized anxiety is well controlled onAlprazolam as needed.  I refill that prescription for today.    I also refilled her prescription for Ambien 10 mg nightly.    I reminded her that both of these medications cause sedation and that she should not use Ambien and clonazepam at the same time.    She has never had a Medicare annual wellness visit which she should arrange to see me in the future.      No follow-ups on file.

## 2023-04-11 ENCOUNTER — OFFICE VISIT (OUTPATIENT)
Dept: ONCOLOGY | Facility: CLINIC | Age: 68
End: 2023-04-11
Payer: MEDICARE

## 2023-04-11 ENCOUNTER — INFUSION (OUTPATIENT)
Dept: ONCOLOGY | Facility: HOSPITAL | Age: 68
End: 2023-04-11
Payer: MEDICARE

## 2023-04-11 VITALS
HEART RATE: 74 BPM | TEMPERATURE: 97.3 F | SYSTOLIC BLOOD PRESSURE: 129 MMHG | HEIGHT: 61 IN | DIASTOLIC BLOOD PRESSURE: 68 MMHG | RESPIRATION RATE: 18 BRPM | BODY MASS INDEX: 23.71 KG/M2 | OXYGEN SATURATION: 97 % | WEIGHT: 125.6 LBS

## 2023-04-11 DIAGNOSIS — E85.81 LIGHT CHAIN (AL) AMYLOIDOSIS: Primary | ICD-10-CM

## 2023-04-11 LAB
ALBUMIN SERPL-MCNC: 4 G/DL (ref 3.5–5.2)
ALBUMIN/GLOB SERPL: 1.9 G/DL (ref 1.1–2.4)
ALP SERPL-CCNC: 64 U/L (ref 38–116)
ALT SERPL W P-5'-P-CCNC: 31 U/L (ref 0–33)
ANION GAP SERPL CALCULATED.3IONS-SCNC: 9.8 MMOL/L (ref 5–15)
AST SERPL-CCNC: 27 U/L (ref 0–32)
BASOPHILS # BLD AUTO: 0.02 10*3/MM3 (ref 0–0.2)
BASOPHILS NFR BLD AUTO: 0.5 % (ref 0–1.5)
BILIRUB SERPL-MCNC: 0.6 MG/DL (ref 0.2–1.2)
BUN SERPL-MCNC: 14 MG/DL (ref 6–20)
BUN/CREAT SERPL: 17.9 (ref 7.3–30)
CALCIUM SPEC-SCNC: 9.5 MG/DL (ref 8.5–10.2)
CHLORIDE SERPL-SCNC: 105 MMOL/L (ref 98–107)
CO2 SERPL-SCNC: 25.2 MMOL/L (ref 22–29)
CREAT SERPL-MCNC: 0.78 MG/DL (ref 0.6–1.1)
DEPRECATED RDW RBC AUTO: 57.3 FL (ref 37–54)
EGFRCR SERPLBLD CKD-EPI 2021: 82.9 ML/MIN/1.73
EOSINOPHIL # BLD AUTO: 0.18 10*3/MM3 (ref 0–0.4)
EOSINOPHIL NFR BLD AUTO: 4.5 % (ref 0.3–6.2)
ERYTHROCYTE [DISTWIDTH] IN BLOOD BY AUTOMATED COUNT: 17.8 % (ref 12.3–15.4)
GLOBULIN UR ELPH-MCNC: 2.1 GM/DL (ref 1.8–3.5)
GLUCOSE SERPL-MCNC: 113 MG/DL (ref 74–124)
HCT VFR BLD AUTO: 38.3 % (ref 34–46.6)
HGB BLD-MCNC: 12.9 G/DL (ref 12–15.9)
IMM GRANULOCYTES # BLD AUTO: 0.01 10*3/MM3 (ref 0–0.05)
IMM GRANULOCYTES NFR BLD AUTO: 0.3 % (ref 0–0.5)
LYMPHOCYTES # BLD AUTO: 1.04 10*3/MM3 (ref 0.7–3.1)
LYMPHOCYTES NFR BLD AUTO: 26.3 % (ref 19.6–45.3)
MCH RBC QN AUTO: 30.3 PG (ref 26.6–33)
MCHC RBC AUTO-ENTMCNC: 33.7 G/DL (ref 31.5–35.7)
MCV RBC AUTO: 89.9 FL (ref 79–97)
MONOCYTES # BLD AUTO: 0.32 10*3/MM3 (ref 0.1–0.9)
MONOCYTES NFR BLD AUTO: 8.1 % (ref 5–12)
NEUTROPHILS NFR BLD AUTO: 2.39 10*3/MM3 (ref 1.7–7)
NEUTROPHILS NFR BLD AUTO: 60.3 % (ref 42.7–76)
NRBC BLD AUTO-RTO: 0 /100 WBC (ref 0–0.2)
PLATELET # BLD AUTO: 156 10*3/MM3 (ref 140–450)
PMV BLD AUTO: 10 FL (ref 6–12)
POTASSIUM SERPL-SCNC: 4.3 MMOL/L (ref 3.5–4.7)
PROT SERPL-MCNC: 6.1 G/DL (ref 6.3–8)
RBC # BLD AUTO: 4.26 10*6/MM3 (ref 3.77–5.28)
SODIUM SERPL-SCNC: 140 MMOL/L (ref 134–145)
WBC NRBC COR # BLD: 3.96 10*3/MM3 (ref 3.4–10.8)

## 2023-04-11 PROCEDURE — 96401 CHEMO ANTI-NEOPL SQ/IM: CPT

## 2023-04-11 PROCEDURE — 3074F SYST BP LT 130 MM HG: CPT | Performed by: INTERNAL MEDICINE

## 2023-04-11 PROCEDURE — 3078F DIAST BP <80 MM HG: CPT | Performed by: INTERNAL MEDICINE

## 2023-04-11 PROCEDURE — 1126F AMNT PAIN NOTED NONE PRSNT: CPT | Performed by: INTERNAL MEDICINE

## 2023-04-11 PROCEDURE — 36415 COLL VENOUS BLD VENIPUNCTURE: CPT

## 2023-04-11 PROCEDURE — 99214 OFFICE O/P EST MOD 30 MIN: CPT | Performed by: INTERNAL MEDICINE

## 2023-04-11 PROCEDURE — 85025 COMPLETE CBC W/AUTO DIFF WBC: CPT

## 2023-04-11 PROCEDURE — 80053 COMPREHEN METABOLIC PANEL: CPT

## 2023-04-11 PROCEDURE — 25010000002 CYCLOPHOSPHAMIDE 1 GM/5ML SOLUTION 5 ML VIAL: Performed by: NURSE PRACTITIONER

## 2023-04-11 PROCEDURE — 1160F RVW MEDS BY RX/DR IN RCRD: CPT | Performed by: INTERNAL MEDICINE

## 2023-04-11 PROCEDURE — 96413 CHEMO IV INFUSION 1 HR: CPT

## 2023-04-11 PROCEDURE — 1159F MED LIST DOCD IN RCRD: CPT | Performed by: INTERNAL MEDICINE

## 2023-04-11 PROCEDURE — 63710000001 DEXAMETHASONE PER 0.25 MG: Performed by: NURSE PRACTITIONER

## 2023-04-11 PROCEDURE — 96375 TX/PRO/DX INJ NEW DRUG ADDON: CPT

## 2023-04-11 PROCEDURE — 25010000002 PALONOSETRON PER 25 MCG: Performed by: NURSE PRACTITIONER

## 2023-04-11 PROCEDURE — 25010000002 BORTEZOMIB PER 0.1 MG: Performed by: NURSE PRACTITIONER

## 2023-04-11 RX ORDER — SODIUM CHLORIDE 9 MG/ML
250 INJECTION, SOLUTION INTRAVENOUS ONCE
Status: COMPLETED | OUTPATIENT
Start: 2023-04-11 | End: 2023-04-11

## 2023-04-11 RX ORDER — PALONOSETRON 0.05 MG/ML
0.25 INJECTION, SOLUTION INTRAVENOUS ONCE
Status: COMPLETED | OUTPATIENT
Start: 2023-04-11 | End: 2023-04-11

## 2023-04-11 RX ORDER — DEXAMETHASONE 4 MG/1
20 TABLET ORAL ONCE
Status: COMPLETED | OUTPATIENT
Start: 2023-04-11 | End: 2023-04-11

## 2023-04-11 RX ORDER — BORTEZOMIB 3.5 MG/1
1 INJECTION, POWDER, LYOPHILIZED, FOR SOLUTION INTRAVENOUS; SUBCUTANEOUS ONCE
Status: COMPLETED | OUTPATIENT
Start: 2023-04-11 | End: 2023-04-11

## 2023-04-11 RX ADMIN — SODIUM CHLORIDE 250 ML: 9 INJECTION, SOLUTION INTRAVENOUS at 10:10

## 2023-04-11 RX ADMIN — PALONOSETRON 0.25 MG: 0.05 INJECTION, SOLUTION INTRAVENOUS at 10:10

## 2023-04-11 RX ADMIN — DEXAMETHASONE 20 MG: 4 TABLET ORAL at 10:11

## 2023-04-11 RX ADMIN — CYCLOPHOSPHAMIDE 450 MG: 200 INJECTION, SOLUTION INTRAVENOUS at 10:38

## 2023-04-11 RX ADMIN — BORTEZOMIB 1.5 MG: 3.5 INJECTION, POWDER, LYOPHILIZED, FOR SOLUTION INTRAVENOUS; SUBCUTANEOUS at 11:21

## 2023-04-24 ENCOUNTER — TELEPHONE (OUTPATIENT)
Dept: ONCOLOGY | Facility: CLINIC | Age: 68
End: 2023-04-24
Payer: MEDICARE

## 2023-04-24 NOTE — TELEPHONE ENCOUNTER
"  Caller: Cynthia Flower \"YOLETTE\"    Relationship: Self    Best call back number: [phone]    What is the best time to reach you: ANYTIME    Who are you requesting to speak with (clinical staff, provider, specific staff member): ELIZABETH BRYANT    What was the call regarding: PT WOULD LIKE TO SPEAK WITH ELIZABETH ABOUT HER VISIT TO [facility]. PLEASE CALL TO DISCUSS FURTHER.     Do you require a callback: YES  "

## 2023-04-24 NOTE — TELEPHONE ENCOUNTER
Pt states Dr. Buenrostro would like pt to continue Darzalex monthly for 18 months. He also advised her to stop taking Bactrim and continue acyclovir. I have requested records from Dr. Roper office. I will review with Dr. Sal when we have records. Pt V/U.

## 2023-04-27 RX ORDER — PANTOPRAZOLE SODIUM 20 MG/1
TABLET, DELAYED RELEASE ORAL
Qty: 30 TABLET | Refills: 5 | Status: SHIPPED | OUTPATIENT
Start: 2023-04-27

## 2023-05-03 ENCOUNTER — TELEPHONE (OUTPATIENT)
Dept: ONCOLOGY | Facility: CLINIC | Age: 68
End: 2023-05-03
Payer: MEDICARE

## 2023-05-03 NOTE — TELEPHONE ENCOUNTER
Pt was diagnosed with the flu last Monday. She was given tamiflu. [facility] went [facility] to [facility] because she wasn't feeling well. They prescribed prednisone and omnicef. She is going back to [facility] because she still doesn't feel better. She is scheduled in our office for darzalex on Tuesday. Pt will check in on Friday and let us know how she is doing. We will decide then whether to proceed on Tuesday or reschedule. Pt V/U.

## 2023-05-09 ENCOUNTER — INFUSION (OUTPATIENT)
Dept: ONCOLOGY | Facility: HOSPITAL | Age: 68
End: 2023-05-09
Payer: MEDICARE

## 2023-05-09 VITALS
BODY MASS INDEX: 23.35 KG/M2 | WEIGHT: 123.6 LBS | SYSTOLIC BLOOD PRESSURE: 126 MMHG | TEMPERATURE: 98.4 F | RESPIRATION RATE: 16 BRPM | HEART RATE: 84 BPM | DIASTOLIC BLOOD PRESSURE: 69 MMHG | OXYGEN SATURATION: 98 %

## 2023-05-09 DIAGNOSIS — E85.81 LIGHT CHAIN (AL) AMYLOIDOSIS: Primary | ICD-10-CM

## 2023-05-09 LAB
ALBUMIN SERPL-MCNC: 4.3 G/DL (ref 3.5–5.2)
ALBUMIN/GLOB SERPL: 2.3 G/DL (ref 1.1–2.4)
ALP SERPL-CCNC: 72 U/L (ref 38–116)
ALT SERPL W P-5'-P-CCNC: 33 U/L (ref 0–33)
ANION GAP SERPL CALCULATED.3IONS-SCNC: 12.4 MMOL/L (ref 5–15)
AST SERPL-CCNC: 35 U/L (ref 0–32)
BASOPHILS # BLD AUTO: 0.02 10*3/MM3 (ref 0–0.2)
BASOPHILS NFR BLD AUTO: 0.4 % (ref 0–1.5)
BILIRUB SERPL-MCNC: 0.6 MG/DL (ref 0.2–1.2)
BUN SERPL-MCNC: 13 MG/DL (ref 6–20)
BUN/CREAT SERPL: 17.6 (ref 7.3–30)
CALCIUM SPEC-SCNC: 9.9 MG/DL (ref 8.5–10.2)
CHLORIDE SERPL-SCNC: 103 MMOL/L (ref 98–107)
CO2 SERPL-SCNC: 23.6 MMOL/L (ref 22–29)
CREAT SERPL-MCNC: 0.74 MG/DL (ref 0.6–1.1)
DEPRECATED RDW RBC AUTO: 52.4 FL (ref 37–54)
EGFRCR SERPLBLD CKD-EPI 2021: 88.3 ML/MIN/1.73
EOSINOPHIL # BLD AUTO: 0.12 10*3/MM3 (ref 0–0.4)
EOSINOPHIL NFR BLD AUTO: 2.3 % (ref 0.3–6.2)
ERYTHROCYTE [DISTWIDTH] IN BLOOD BY AUTOMATED COUNT: 15.7 % (ref 12.3–15.4)
GLOBULIN UR ELPH-MCNC: 1.9 GM/DL (ref 1.8–3.5)
GLUCOSE SERPL-MCNC: 79 MG/DL (ref 74–124)
HCT VFR BLD AUTO: 39.2 % (ref 34–46.6)
HGB BLD-MCNC: 12.7 G/DL (ref 12–15.9)
IMM GRANULOCYTES # BLD AUTO: 0.02 10*3/MM3 (ref 0–0.05)
IMM GRANULOCYTES NFR BLD AUTO: 0.4 % (ref 0–0.5)
LYMPHOCYTES # BLD AUTO: 1.77 10*3/MM3 (ref 0.7–3.1)
LYMPHOCYTES NFR BLD AUTO: 34.3 % (ref 19.6–45.3)
MCH RBC QN AUTO: 29.7 PG (ref 26.6–33)
MCHC RBC AUTO-ENTMCNC: 32.4 G/DL (ref 31.5–35.7)
MCV RBC AUTO: 91.6 FL (ref 79–97)
MONOCYTES # BLD AUTO: 0.37 10*3/MM3 (ref 0.1–0.9)
MONOCYTES NFR BLD AUTO: 7.2 % (ref 5–12)
NEUTROPHILS NFR BLD AUTO: 2.86 10*3/MM3 (ref 1.7–7)
NEUTROPHILS NFR BLD AUTO: 55.4 % (ref 42.7–76)
NRBC BLD AUTO-RTO: 0 /100 WBC (ref 0–0.2)
PLATELET # BLD AUTO: 189 10*3/MM3 (ref 140–450)
PMV BLD AUTO: 9.8 FL (ref 6–12)
POTASSIUM SERPL-SCNC: 4.1 MMOL/L (ref 3.5–4.7)
PROT SERPL-MCNC: 6.2 G/DL (ref 6.3–8)
RBC # BLD AUTO: 4.28 10*6/MM3 (ref 3.77–5.28)
SODIUM SERPL-SCNC: 139 MMOL/L (ref 134–145)
WBC NRBC COR # BLD: 5.16 10*3/MM3 (ref 3.4–10.8)

## 2023-05-09 PROCEDURE — 85025 COMPLETE CBC W/AUTO DIFF WBC: CPT

## 2023-05-09 PROCEDURE — 25010000002 DARATUMUMAB-HYALURONIDASE-FIHJ 1800-30000 MG-UT/15ML SOLUTION: Performed by: NURSE PRACTITIONER

## 2023-05-09 PROCEDURE — 96401 CHEMO ANTI-NEOPL SQ/IM: CPT

## 2023-05-09 PROCEDURE — 80053 COMPREHEN METABOLIC PANEL: CPT

## 2023-05-09 PROCEDURE — 63710000001 DEXAMETHASONE PER 0.25 MG: Performed by: NURSE PRACTITIONER

## 2023-05-09 RX ORDER — DEXAMETHASONE 4 MG/1
20 TABLET ORAL ONCE
Status: COMPLETED | OUTPATIENT
Start: 2023-05-09 | End: 2023-05-09

## 2023-05-09 RX ORDER — ACETAMINOPHEN 500 MG
1000 TABLET ORAL ONCE
Status: COMPLETED | OUTPATIENT
Start: 2023-05-09 | End: 2023-05-09

## 2023-05-09 RX ORDER — HYDROXYZINE PAMOATE 25 MG/1
25 CAPSULE ORAL ONCE
Status: COMPLETED | OUTPATIENT
Start: 2023-05-09 | End: 2023-05-09

## 2023-05-09 RX ORDER — HYDROXYZINE PAMOATE 25 MG/1
25 CAPSULE ORAL ONCE
Status: CANCELLED | OUTPATIENT
Start: 2023-05-09 | End: 2023-05-09

## 2023-05-09 RX ORDER — FAMOTIDINE 10 MG/ML
20 INJECTION, SOLUTION INTRAVENOUS AS NEEDED
Status: CANCELLED | OUTPATIENT
Start: 2023-05-09

## 2023-05-09 RX ORDER — DIPHENHYDRAMINE HYDROCHLORIDE 50 MG/ML
50 INJECTION INTRAMUSCULAR; INTRAVENOUS AS NEEDED
Status: CANCELLED | OUTPATIENT
Start: 2023-05-09

## 2023-05-09 RX ORDER — ACETAMINOPHEN 500 MG
1000 TABLET ORAL ONCE
Status: CANCELLED | OUTPATIENT
Start: 2023-05-09

## 2023-05-09 RX ADMIN — DARATUMUMAB AND HYALURONIDASE-FIHJ (HUMAN RECOMBINANT) 1800 MG: 1800; 30000 INJECTION SUBCUTANEOUS at 15:11

## 2023-05-09 RX ADMIN — ACETAMINOPHEN 1000 MG: 500 TABLET, FILM COATED ORAL at 14:25

## 2023-05-09 RX ADMIN — DEXAMETHASONE 20 MG: 4 TABLET ORAL at 14:22

## 2023-05-09 RX ADMIN — HYDROXYZINE PAMOATE 25 MG: 25 CAPSULE ORAL at 14:24

## 2023-05-10 LAB
ALBUMIN SERPL ELPH-MCNC: 3.7 G/DL (ref 2.9–4.4)
ALBUMIN/GLOB SERPL: 1.8 {RATIO} (ref 0.7–1.7)
ALPHA1 GLOB SERPL ELPH-MCNC: 0.2 G/DL (ref 0–0.4)
ALPHA2 GLOB SERPL ELPH-MCNC: 0.9 G/DL (ref 0.4–1)
B-GLOBULIN SERPL ELPH-MCNC: 0.8 G/DL (ref 0.7–1.3)
GAMMA GLOB SERPL ELPH-MCNC: 0.3 G/DL (ref 0.4–1.8)
GLOBULIN SER-MCNC: 2.1 G/DL (ref 2.2–3.9)
IGA SERPL-MCNC: 6 MG/DL (ref 87–352)
IGG SERPL-MCNC: 285 MG/DL (ref 586–1602)
IGM SERPL-MCNC: 27 MG/DL (ref 26–217)
INTERPRETATION SERPL IEP-IMP: ABNORMAL
KAPPA LC FREE SER-MCNC: 4.8 MG/L (ref 3.3–19.4)
KAPPA LC FREE/LAMBDA FREE SER: 0.86 {RATIO} (ref 0.26–1.65)
LABORATORY COMMENT REPORT: ABNORMAL
LAMBDA LC FREE SERPL-MCNC: 5.6 MG/L (ref 5.7–26.3)
M PROTEIN SERPL ELPH-MCNC: 0.1 G/DL
PROT SERPL-MCNC: 5.8 G/DL (ref 6–8.5)

## 2023-05-11 RX ORDER — ROSUVASTATIN CALCIUM 20 MG/1
20 TABLET, COATED ORAL DAILY
Qty: 90 TABLET | Refills: 0 | OUTPATIENT
Start: 2023-05-11

## 2023-05-17 RX ORDER — ROSUVASTATIN CALCIUM 20 MG/1
20 TABLET, COATED ORAL DAILY
Qty: 90 TABLET | Refills: 0 | OUTPATIENT
Start: 2023-05-17

## 2023-05-17 NOTE — TELEPHONE ENCOUNTER
"  Caller: Cynthia Flower \"YOLETTE\"    Relationship: Self    Best call back number: [phone]    Who are you requesting to speak with (clinical staff, provider,  specific staff member): DR. OLIVEIRA OR MA    What was the call regarding: PATIENT STATES SHE WAS INFORMED BY HER PHARMACY THAT THEY WERE UNABLE TO REFILL THIS MEDICATION AND SHE WASN'T FOR SURE WHY. PATIENT IS REQUESTING A CALL BACK TO DISCUSS THIS.     HUB ATTEMPTED TO WARM TRANSFER AND WAS UNSUCCESSFUL.       "

## 2023-05-17 NOTE — TELEPHONE ENCOUNTER
"  Caller: Cynthia Flower \"YOLETTE\"    Relationship: Self    Best call back number: [phone]*    What is the best time to reach you: ANYTIME    Who are you requesting to speak with (clinical staff, provider,  specific staff member): CLINICAL    What was the call regarding: PATIENT REQUESTING A CALL BACK TO ADVISE ON WHY THE ROSUVASTATIN HAS BEEN DENIED REFILLS. THE PATIENT STATES SHE HAS BEEN OUT OF MEDICATION FOR 2 DAYS.    Do you require a callback: YES          "

## 2023-05-18 RX ORDER — ROSUVASTATIN CALCIUM 20 MG/1
20 TABLET, COATED ORAL DAILY
Qty: 90 TABLET | Refills: 0 | Status: SHIPPED | OUTPATIENT
Start: 2023-05-18

## 2023-06-06 ENCOUNTER — LAB (OUTPATIENT)
Dept: LAB | Facility: HOSPITAL | Age: 68
End: 2023-06-06
Payer: MEDICARE

## 2023-06-06 ENCOUNTER — OFFICE VISIT (OUTPATIENT)
Dept: ONCOLOGY | Facility: CLINIC | Age: 68
End: 2023-06-06
Payer: MEDICARE

## 2023-06-06 ENCOUNTER — INFUSION (OUTPATIENT)
Dept: ONCOLOGY | Facility: HOSPITAL | Age: 68
End: 2023-06-06
Payer: MEDICARE

## 2023-06-06 VITALS
RESPIRATION RATE: 18 BRPM | WEIGHT: 128.6 LBS | TEMPERATURE: 97.9 F | DIASTOLIC BLOOD PRESSURE: 76 MMHG | HEIGHT: 61 IN | OXYGEN SATURATION: 96 % | SYSTOLIC BLOOD PRESSURE: 145 MMHG | HEART RATE: 75 BPM | BODY MASS INDEX: 24.28 KG/M2

## 2023-06-06 DIAGNOSIS — R19.7 DIARRHEA, UNSPECIFIED TYPE: ICD-10-CM

## 2023-06-06 DIAGNOSIS — E85.81 LIGHT CHAIN (AL) AMYLOIDOSIS: Primary | ICD-10-CM

## 2023-06-06 DIAGNOSIS — E85.81 LIGHT CHAIN (AL) AMYLOIDOSIS: ICD-10-CM

## 2023-06-06 LAB
ALBUMIN SERPL-MCNC: 4.4 G/DL (ref 3.5–5.2)
ALBUMIN/GLOB SERPL: 2.8 G/DL (ref 1.1–2.4)
ALP SERPL-CCNC: 74 U/L (ref 38–116)
ALT SERPL W P-5'-P-CCNC: 23 U/L (ref 0–33)
ANION GAP SERPL CALCULATED.3IONS-SCNC: 9.7 MMOL/L (ref 5–15)
AST SERPL-CCNC: 24 U/L (ref 0–32)
BASOPHILS # BLD AUTO: 0.02 10*3/MM3 (ref 0–0.2)
BASOPHILS NFR BLD AUTO: 0.4 % (ref 0–1.5)
BILIRUB SERPL-MCNC: 0.6 MG/DL (ref 0.2–1.2)
BUN SERPL-MCNC: 19 MG/DL (ref 6–20)
BUN/CREAT SERPL: 27.5 (ref 7.3–30)
CALCIUM SPEC-SCNC: 10 MG/DL (ref 8.5–10.2)
CHLORIDE SERPL-SCNC: 104 MMOL/L (ref 98–107)
CO2 SERPL-SCNC: 26.3 MMOL/L (ref 22–29)
CREAT SERPL-MCNC: 0.69 MG/DL (ref 0.6–1.1)
DEPRECATED RDW RBC AUTO: 44.2 FL (ref 37–54)
EGFRCR SERPLBLD CKD-EPI 2021: 94.7 ML/MIN/1.73
EOSINOPHIL # BLD AUTO: 0.15 10*3/MM3 (ref 0–0.4)
EOSINOPHIL NFR BLD AUTO: 3.1 % (ref 0.3–6.2)
ERYTHROCYTE [DISTWIDTH] IN BLOOD BY AUTOMATED COUNT: 12.9 % (ref 12.3–15.4)
GLOBULIN UR ELPH-MCNC: 1.6 GM/DL (ref 1.8–3.5)
GLUCOSE SERPL-MCNC: 96 MG/DL (ref 74–124)
HCT VFR BLD AUTO: 41.2 % (ref 34–46.6)
HGB BLD-MCNC: 13.2 G/DL (ref 12–15.9)
IMM GRANULOCYTES # BLD AUTO: 0.01 10*3/MM3 (ref 0–0.05)
IMM GRANULOCYTES NFR BLD AUTO: 0.2 % (ref 0–0.5)
LYMPHOCYTES # BLD AUTO: 1.95 10*3/MM3 (ref 0.7–3.1)
LYMPHOCYTES NFR BLD AUTO: 40.8 % (ref 19.6–45.3)
MCH RBC QN AUTO: 29.7 PG (ref 26.6–33)
MCHC RBC AUTO-ENTMCNC: 32 G/DL (ref 31.5–35.7)
MCV RBC AUTO: 92.8 FL (ref 79–97)
MONOCYTES # BLD AUTO: 0.42 10*3/MM3 (ref 0.1–0.9)
MONOCYTES NFR BLD AUTO: 8.8 % (ref 5–12)
NEUTROPHILS NFR BLD AUTO: 2.23 10*3/MM3 (ref 1.7–7)
NEUTROPHILS NFR BLD AUTO: 46.7 % (ref 42.7–76)
NRBC BLD AUTO-RTO: 0 /100 WBC (ref 0–0.2)
PLATELET # BLD AUTO: 193 10*3/MM3 (ref 140–450)
PMV BLD AUTO: 10 FL (ref 6–12)
POTASSIUM SERPL-SCNC: 4.5 MMOL/L (ref 3.5–4.7)
PROT SERPL-MCNC: 6 G/DL (ref 6.3–8)
RBC # BLD AUTO: 4.44 10*6/MM3 (ref 3.77–5.28)
SODIUM SERPL-SCNC: 140 MMOL/L (ref 134–145)
WBC NRBC COR # BLD: 4.78 10*3/MM3 (ref 3.4–10.8)

## 2023-06-06 PROCEDURE — 96401 CHEMO ANTI-NEOPL SQ/IM: CPT

## 2023-06-06 PROCEDURE — 1160F RVW MEDS BY RX/DR IN RCRD: CPT | Performed by: INTERNAL MEDICINE

## 2023-06-06 PROCEDURE — 3077F SYST BP >= 140 MM HG: CPT | Performed by: INTERNAL MEDICINE

## 2023-06-06 PROCEDURE — 99214 OFFICE O/P EST MOD 30 MIN: CPT | Performed by: INTERNAL MEDICINE

## 2023-06-06 PROCEDURE — 80053 COMPREHEN METABOLIC PANEL: CPT | Performed by: INTERNAL MEDICINE

## 2023-06-06 PROCEDURE — 1126F AMNT PAIN NOTED NONE PRSNT: CPT | Performed by: INTERNAL MEDICINE

## 2023-06-06 PROCEDURE — 36415 COLL VENOUS BLD VENIPUNCTURE: CPT

## 2023-06-06 PROCEDURE — 63710000001 DEXAMETHASONE PER 0.25 MG: Performed by: INTERNAL MEDICINE

## 2023-06-06 PROCEDURE — 1159F MED LIST DOCD IN RCRD: CPT | Performed by: INTERNAL MEDICINE

## 2023-06-06 PROCEDURE — 25010000002 DARATUMUMAB-HYALURONIDASE-FIHJ 1800-30000 MG-UT/15ML SOLUTION: Performed by: INTERNAL MEDICINE

## 2023-06-06 PROCEDURE — 85025 COMPLETE CBC W/AUTO DIFF WBC: CPT

## 2023-06-06 PROCEDURE — 3078F DIAST BP <80 MM HG: CPT | Performed by: INTERNAL MEDICINE

## 2023-06-06 RX ORDER — DEXAMETHASONE 4 MG/1
20 TABLET ORAL ONCE
Status: COMPLETED | OUTPATIENT
Start: 2023-06-06 | End: 2023-06-06

## 2023-06-06 RX ORDER — HYDROXYZINE PAMOATE 25 MG/1
25 CAPSULE ORAL ONCE
Status: COMPLETED | OUTPATIENT
Start: 2023-06-06 | End: 2023-06-06

## 2023-06-06 RX ORDER — HYDROXYZINE PAMOATE 25 MG/1
25 CAPSULE ORAL ONCE
Status: CANCELLED | OUTPATIENT
Start: 2023-06-06 | End: 2023-06-06

## 2023-06-06 RX ORDER — ACETAMINOPHEN 500 MG
1000 TABLET ORAL ONCE
Status: CANCELLED | OUTPATIENT
Start: 2023-06-06

## 2023-06-06 RX ORDER — FAMOTIDINE 10 MG/ML
20 INJECTION, SOLUTION INTRAVENOUS AS NEEDED
Status: CANCELLED | OUTPATIENT
Start: 2023-06-06

## 2023-06-06 RX ORDER — DIPHENHYDRAMINE HYDROCHLORIDE 50 MG/ML
50 INJECTION INTRAMUSCULAR; INTRAVENOUS AS NEEDED
Status: CANCELLED | OUTPATIENT
Start: 2023-06-06

## 2023-06-06 RX ORDER — SODIUM FLUORIDE 5 MG/G
CREAM DENTAL
COMMUNITY
Start: 2023-05-11

## 2023-06-06 RX ORDER — DIPHENOXYLATE HYDROCHLORIDE AND ATROPINE SULFATE 2.5; .025 MG/1; MG/1
1 TABLET ORAL 4 TIMES DAILY PRN
Qty: 120 TABLET | Refills: 0 | Status: SHIPPED | OUTPATIENT
Start: 2023-06-06

## 2023-06-06 RX ORDER — MEPERIDINE HYDROCHLORIDE 25 MG/ML
25 INJECTION INTRAMUSCULAR; INTRAVENOUS; SUBCUTANEOUS
Status: CANCELLED | OUTPATIENT
Start: 2023-06-06

## 2023-06-06 RX ORDER — ACYCLOVIR 400 MG/1
400 TABLET ORAL 2 TIMES DAILY
Qty: 60 TABLET | Refills: 5 | Status: SHIPPED | OUTPATIENT
Start: 2023-06-06

## 2023-06-06 RX ORDER — ACETAMINOPHEN 500 MG
1000 TABLET ORAL ONCE
Status: COMPLETED | OUTPATIENT
Start: 2023-06-06 | End: 2023-06-06

## 2023-06-06 RX ADMIN — DEXAMETHASONE 20 MG: 4 TABLET ORAL at 10:43

## 2023-06-06 RX ADMIN — DARATUMUMAB AND HYALURONIDASE-FIHJ (HUMAN RECOMBINANT) 1800 MG: 1800; 30000 INJECTION SUBCUTANEOUS at 11:22

## 2023-06-06 RX ADMIN — HYDROXYZINE PAMOATE 25 MG: 25 CAPSULE ORAL at 10:43

## 2023-06-06 RX ADMIN — ACETAMINOPHEN 1000 MG: 500 TABLET, FILM COATED ORAL at 10:43

## 2023-06-06 NOTE — PROGRESS NOTES
"[facility] OUTPATIENT FOLLOW UP CLINIC VISIT    REASON FOR FOLLOW-UP:    AL amyloidosis  Therapy with michael-CyBorD initiated 8/16/2022    HISTORY OF PRESENT ILLNESS: Cynthia Flower is a 68 y.o. female with the above-mentioned history who is here today for lab review and evaluation.    She was seen at [facility] with recommendations to continue monthly Darzelex for 18 months. Advised stopping Bactrim and continuing acyclovir. Initiated this on 5/9.    She continues to have difficulty swallowing.  She saw Dr. Aranda at [facility] with an EGD and Botox planned in a few weeks.    She continues to have diarrhea.  This improves with Xifaxan but then relapses.    Otherwise her cardiac status is excellent at this point and she denies any other problems.    REVIEW OF SYSTEMS:  As per the HPI    PHYSICAL EXAMINATION:    Vitals:    06/06/23 1007   BP: 145/76   Pulse: 75   Resp: 18   Temp: 97.9 °F (36.6 °C)   TempSrc: Temporal   SpO2: 96%   Weight: 58.3 kg (128 lb 9.6 oz)   Height: 154.9 cm (60.98\")   PainSc: 0-No pain     General:  No acute distress, awake, alert and oriented  Skin:  Warm and dry, no visible rash  HEENT:  Normocephalic/atraumatic.   Chest:  Normal respiratory effort.  Lungs clear to auscultation bilaterally  Heart: Regular rate and rhythm  Extremities:  No visible clubbing, cyanosis, or edema  Neuro/psych:  Grossly nonfocal.  Normal mood and affect.    DIAGNOSTIC DATA:  CBC and Differential (06/06/2023 09:49)     IMAGING:    None reviewed      ASSESSMENT:  This is a 68 y.o. female with:    *AL amyloidosis  She had a stress echocardiogram on 4/24/2020 showing a normal left ventricular ejection fraction of 60% with moderate concentric hypertrophy but no wall motion abnormalities of the left ventricle.  There was impaired relaxation noted.  She complained of chest pain during the examination.  There was some ST segment depression.  Cardiac catheterization was performed on the same day.  No " intervention was required.  Follow-up echocardiogram on 4/15/2021 showed a left ventricular ejection fraction of 61 to 65% with normal LV cavity size.  Left ventricular wall thickness showed moderate concentric hypertrophy.  Diastolic function was impaired.  No pericardial effusion.  Small left pleural effusion.  She had follow-up [facility] imaging for cardiac amyloidosis that was not suggestive of ATTR amyloidosis  Laboratory values on 2/24/2022 showed a high serum free light chain lambda of 121, normal kappa of 10.5, low ratio at 0.09.  Serum protein electrophoresis showed no evidence of an M spike.  Urine light chains were abnormal with an elevated lambda light chain of 33 and a low ratio of 0.48 with a 12.7 mg M spike on 24-hour urine protein electrophoresis.  BNP was elevated at 2306 on 3/4/2022.  The troponin was normal at 0.03.  CK was 212 with a CK-MB of 10.87.  Initial consult on 3/30/22. No M spike on serum protein electrophoresis. SIFE 'presence of monoclonal protein is unclear.'   Bone marrow biopsy 4/13/22 normocellular, 12.1% plasma cells consistent with low volume plasma cell dyscrasia. FISH with low level t(11;14) fusion only. No amyloid noted as Congo red staining negative.   Fat pad biopsy 5/11/22 positive for amyloid, AL lambda  Referred to Dr. Buenrostro at [facility]. Intended to enroll on a clinical study but her troponin as tested by the study sponsor was not high enough  Therapy with michael-CyBorD initiated 8/16/2022  Patient seen in the office on 8/17/2022 for triage visit.  Patient with complaints of fatigue, dizziness and diarrhea.  She was mildly hypotensive.  She was given IV fluids.  Subsequent admission at [facility] with shortness of breath and volume overload.  She was diuresed and treated with antibiotics.  D8 therapy administered on 8/30  Light chains normal at [facility] on 8/31/22 (lambda light chain 1.3, down from 12.44)  9/27/2022: Cycle 2-day 8 of therapy  10/4/2022 cycle  2-day 8 CyBorD.  Patient continues to push oral hydration.  She continues to have some neck stiffness but this has not worsened.  She will see [facility] next week for further cardiac work-up.  Glucose was low upon initial labs today however the patient was given something to eat by nursing prior to rechecking.  This was therefore rechecked prior to her leaving the office and was 87.  Patient reports she was feeling fine.  10/18/2020 to cycle 2-day 22 Sissy -CyBorD.  Velcade dose will be reduced to 1.0 mg/m² due to patient's persistent issues with orthostasis.  10/25/2022: Cycle 3-day 1  11/8/2022: Delay in cycle 3-day 15 therapy.  Day 8 omitted due to an upper respiratory infection.  11/22/2022: Cycle 3-day 22.   final planned treatment before she goes to [facility] for cardiac surgery.  Cardiac surgery performed at [facility] on 12/5/2022.  Pathology from the myectomy showed cardiac amyloidosis extensively involving the vessels and endocardium with myocyte hypertrophy and interstitial fibrosis.  Congo red stain was positive.  Reviewed back on 1/17/2023 with plans to resume Darzalex, Cytoxan, Velcade, dexamethasone with cycle #4 on 1/24.  Patient seen 2/7/2023 (missed 1/31/2023 due to weather, which would have been day 8).  We will go ahead and proceed with as cycle 4 day 15, day 15.  2/14/2023: Cycle 4-day 22  2/21/2023 cycle 5-day 1 Darzalex, Cytoxan (IV Cytoxan given in the office), Velcade, dexamethasone (20 mg p.o. given in the office).  Repeat labs from 2/21/2023 showing M spike up to 0.8, free kappa light chain up to 85.8 (previously 5.3 on 1/17/2023), ratio 8.58 (was previously 0.76 on 1/17/2023).  We ended up repeating labs on 3/7/2023 M spike 0.1, free kappa light chain 3.4, ratio 0.83.  We will continue on with treatment.  She is scheduled to return to [facility] in April.  4/11/2023 cycle 6, day 22 Darzalex-CyBorD.  Final planned therapy.  She was seen at [facility] with recommendations for monthly  Darzalex for 18 months. Bactrim stopped and she continues acyclovir. No immediate plans for ASCT    *Diarrhea  She saw Dr. Hoyos with GI at [facility] on 9/7.  Suspicion for amyloid involvement of the GI tract  Continue Lomotil and Imodium. Rifaximin prescribed by Dr. Hoyos which she continues to use intermittently  Diarrhea waxes and wanes with what she eats and dairy products particularly worsen diarrhea  Continue Questran, Lomotil and Imodium.    *Elevated liver enzymes  Pending today    *Cardiomyopathy:  Most recent echocardiogram on 8/17/2022 showed left ventricular wall thickness consistent with moderate to severe concentric hypertrophy along with left ventricular septal wall measuring 2.2 cm and posterior wall thickness at 1.9 cm likely due to amyloidosis.  LVEF 61 to 65%.  Grade 1 impaired relaxation.  Now followed by Dr. Lyles at [facility] with concerns for pre-existing HCM and AL cardiac amyloid.   Cardiac MRI results above.  Amyloidosis evident.  Catheterization performed at [facility].   Cardiac surgery performed at [facility] on 12/5/2022.  Pathology from the myectomy showed cardiac amyloidosis extensively involving the vessels and endocardium with myocyte hypertrophy and interstitial fibrosis.  Congo red stain was positive.  Symptoms improved significantly    *History of periorbital hematomas: These have resolved.  Coagulation studies and a factor X level were all normal.    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 Continuing magic mouthwash.    *Dysphagia:  She saw Dr. Aranda at [facility] with plans for an EGD and Botox in a few weeks    PLAN:  Monthly Darzalex continues x 18 months, initiated in May 2023.  Proceed today.  Continue Imodium, Lomotil, and Questran for diarrhea control.  Lomotil refilled today.  Continue Magic mouthwash as needed for glossitis.  Continue cardiac medications as managed by cardiology at [facility].  Procedure with   Manoj at [place] in a few weeks  Continue acyclovir for prophylaxis.  No longer on Bactrim.  Follow-up with me on 7/5 for her next Darzalex injection.  Continue to monitor serum free light chains.    This patient is on high risk drug therapy requiring intensive monitoring for toxicity.

## 2023-06-07 LAB
ALBUMIN SERPL ELPH-MCNC: 3.8 G/DL (ref 2.9–4.4)
ALBUMIN/GLOB SERPL: 1.8 {RATIO} (ref 0.7–1.7)
ALPHA1 GLOB SERPL ELPH-MCNC: 0.2 G/DL (ref 0–0.4)
ALPHA2 GLOB SERPL ELPH-MCNC: 0.8 G/DL (ref 0.4–1)
B-GLOBULIN SERPL ELPH-MCNC: 0.9 G/DL (ref 0.7–1.3)
GAMMA GLOB SERPL ELPH-MCNC: 0.3 G/DL (ref 0.4–1.8)
GLOBULIN SER-MCNC: 2.2 G/DL (ref 2.2–3.9)
IGA SERPL-MCNC: <5 MG/DL (ref 87–352)
IGG SERPL-MCNC: 287 MG/DL (ref 586–1602)
IGM SERPL-MCNC: 21 MG/DL (ref 26–217)
INTERPRETATION SERPL IEP-IMP: ABNORMAL
KAPPA LC FREE SER-MCNC: 3.6 MG/L (ref 3.3–19.4)
KAPPA LC FREE/LAMBDA FREE SER: 0.9 {RATIO} (ref 0.26–1.65)
LABORATORY COMMENT REPORT: ABNORMAL
LAMBDA LC FREE SERPL-MCNC: 4 MG/L (ref 5.7–26.3)
M PROTEIN SERPL ELPH-MCNC: 0.1 G/DL
PROT SERPL-MCNC: 6 G/DL (ref 6–8.5)

## 2023-06-08 DIAGNOSIS — F51.01 PRIMARY INSOMNIA: ICD-10-CM

## 2023-06-08 DIAGNOSIS — J30.9 ALLERGIC RHINITIS, UNSPECIFIED SEASONALITY, UNSPECIFIED TRIGGER: ICD-10-CM

## 2023-06-08 RX ORDER — LORATADINE 10 MG/1
10 TABLET ORAL DAILY
Qty: 30 TABLET | Refills: 5 | Status: SHIPPED | OUTPATIENT
Start: 2023-06-08

## 2023-06-08 RX ORDER — ZOLPIDEM TARTRATE 10 MG/1
10 TABLET ORAL NIGHTLY PRN
Qty: 30 TABLET | Refills: 1 | Status: SHIPPED | OUTPATIENT
Start: 2023-06-08

## 2023-06-08 NOTE — TELEPHONE ENCOUNTER
"Caller: Cynthia Flower \"YOLETTE\"    Relationship: Self    Best call back number: [phone]*    Requested Prescriptions:   Requested Prescriptions     Pending Prescriptions Disp Refills    loratadine (CLARITIN) 10 MG tablet 30 tablet 5     Sig: Take 1 tablet by mouth Daily.    zolpidem (AMBIEN) 10 MG tablet 30 tablet 1     Sig: Take 1 tablet by mouth At Night As Needed for Sleep.        Pharmacy where request should be sent: [id number] - [facility] [address] [place], [place] [address]IE [facility] AT [facility] OF [address] [facility] & [place] - [phone] [facility] [phone] FX     Last office visit with prescribing clinician: 4/10/2023   Last telemedicine visit with prescribing clinician: Visit date not found   Next office visit with prescribing clinician: 8/14/2023     Additional details provided by patient: PATIENT OUT OF THE LORATADINE. PATIENT HAS 5 DAYS OF THE ZOLPIDEM REMAINING.    Does the patient have less than a 3 day supply:  [x] Yes  [] No    Would you like a call back once the refill request has been completed: [] Yes [x] No    If the office needs to give you a call back, can they leave a voicemail: [] Yes [x] No    Milton Narvaez   06/08/23 12:03 EDT         "

## 2023-07-03 RX ORDER — BUPROPION HYDROCHLORIDE 100 MG/1
100 TABLET, EXTENDED RELEASE ORAL 2 TIMES DAILY
Status: CANCELLED | OUTPATIENT
Start: 2023-07-03

## 2023-07-07 NOTE — TELEPHONE ENCOUNTER
CONTACTED PT AND SCHEDULED HOSPITAL FU FOR 9/2/20 WITH DR. OLIVEIRA  
Caller: Cynthia Escobar    Relationship to patient: Self    Best call back number: [phone]    Type of visit: E.R. FOLLOW-UP    Requested date: PT STATES DR. OLIVEIRA TOLD HER TO FOLLOW UP IN TWO WEEKS.     
no

## 2023-08-07 RX ORDER — FLUOXETINE HYDROCHLORIDE 20 MG/1
CAPSULE ORAL
Qty: 360 CAPSULE | Refills: 0 | Status: SHIPPED | OUTPATIENT
Start: 2023-08-07

## 2023-08-08 NOTE — PROGRESS NOTES
"[facility] GROUP OUTPATIENT FOLLOW UP CLINIC VISIT    REASON FOR FOLLOW-UP:    AL amyloidosis  Therapy with michael-CyBorD initiated 8/16/2022    HISTORY OF PRESENT ILLNESS: Cynthia Flower is a 68 y.o. female with the above-mentioned history who is here today for lab review and evaluation.    She continues to do about the same.  She traveled last week and is exhausted from the trip but overall did very well.  She watched her diet very closely and had minimal diarrhea while traveling.  She does continue to have some perineal and perianal tenderness and occasional bleeding from all of the diarrhea.  She is using some topical barrier creams and ointments.      REVIEW OF SYSTEMS:  As per the HPI    PHYSICAL EXAMINATION:    Vitals:    08/09/23 1040   BP: 133/78   Pulse: 82   Resp: 18   Temp: 98 øF (36.7 øC)   TempSrc: Temporal   SpO2: 97%   Weight: 57.2 kg (126 lb 3.2 oz)   Height: 154.9 cm (60.98\")   PainSc: 0-No pain       General:  No acute distress, awake, alert and oriented  Skin:  Warm and dry, no visible rash  HEENT:  Normocephalic/atraumatic.   Chest:  Normal respiratory effort.  Lungs clear to auscultation bilaterally  Heart: Regular rate and rhythm with a grade 2 out of 6 early systolic murmur, stable  Extremities:  No visible clubbing, cyanosis, or edema  Neuro/psych:  Grossly nonfocal.  Normal mood and affect.    DIAGNOSTIC DATA:  CBC and Differential (08/09/2023 10:39)       IMAGING:    None reviewed      ASSESSMENT:  This is a 68 y.o. female with:    *AL amyloidosis  She had a stress echocardiogram on 4/24/2020 showing a normal left ventricular ejection fraction of 60% with moderate concentric hypertrophy but no wall motion abnormalities of the left ventricle.  There was impaired relaxation noted.  She complained of chest pain during the examination.  There was some ST segment depression.  Cardiac catheterization was performed on the same day.  No intervention was required.  Follow-up " echocardiogram on 4/15/2021 showed a left ventricular ejection fraction of 61 to 65% with normal LV cavity size.  Left ventricular wall thickness showed moderate concentric hypertrophy.  Diastolic function was impaired.  No pericardial effusion.  Small left pleural effusion.  She had follow-up PYP imaging for cardiac amyloidosis that was not suggestive of ATTR amyloidosis  Laboratory values on 2/24/2022 showed a high serum free light chain lambda of 121, normal kappa of 10.5, low ratio at 0.09.  Serum protein electrophoresis showed no evidence of an M spike.  Urine light chains were abnormal with an elevated lambda light chain of 33 and a low ratio of 0.48 with a 12.7 mg M spike on 24-hour urine protein electrophoresis.  BNP was elevated at 2306 on 3/4/2022.  The troponin was normal at 0.03.  CK was 212 with a CK-MB of 10.87.  Initial consult on 3/30/22. No M spike on serum protein electrophoresis. SIFE 'presence of monoclonal protein is unclear.'   Bone marrow biopsy 4/13/22 normocellular, 12.1% plasma cells consistent with low volume plasma cell dyscrasia. FISH with low level t(11;14) fusion only. No amyloid noted as Congo red staining negative.   Fat pad biopsy 5/11/22 positive for amyloid, AL lambda  Referred to Dr. Buenrostro at [place]. Intended to enroll on a clinical study but her troponin as tested by the study sponsor was not high enough  Therapy with michael-CyBorD initiated 8/16/2022  Patient seen in the office on 8/17/2022 for triage visit.  Patient with complaints of fatigue, dizziness and diarrhea.  She was mildly hypotensive.  She was given IV fluids.  Subsequent admission at [facility] with shortness of breath and volume overload.  She was diuresed and treated with antibiotics.  D8 therapy administered on 8/30  Light chains normal at [place] on 8/31/22 (lambda light chain 1.3, down from 12.44)  9/27/2022: Cycle 2-day 8 of therapy  10/4/2022 cycle 2-day 8 CyBorD.  Patient continues to  push oral hydration.  She continues to have some neck stiffness but this has not worsened.  She will see [facility] next week for further cardiac work-up.  Glucose was low upon initial labs today however the patient was given something to eat by nursing prior to rechecking.  This was therefore rechecked prior to her leaving the office and was 87.  Patient reports she was feeling fine.  10/18/2020 to cycle 2-day 22 Sissy -CyBorD.  Velcade dose will be reduced to 1.0 mg/mý due to patient's persistent issues with orthostasis.  10/25/2022: Cycle 3-day 1  11/8/2022: Delay in cycle 3-day 15 therapy.  Day 8 omitted due to an upper respiratory infection.  11/22/2022: Cycle 3-day 22.   final planned treatment before she goes to [facility] for cardiac surgery.  Cardiac surgery performed at [facility] on 12/5/2022.  Pathology from the myectomy showed cardiac amyloidosis extensively involving the vessels and endocardium with myocyte hypertrophy and interstitial fibrosis.  Congo red stain was positive.  Reviewed back on 1/17/2023 with plans to resume Darzalex, Cytoxan, Velcade, dexamethasone with cycle #4 on 1/24.  Patient seen 2/7/2023 (missed 1/31/2023 due to weather, which would have been day 8).  We will go ahead and proceed with as cycle 4 day 15, day 15.  2/14/2023: Cycle 4-day 22  2/21/2023 cycle 5-day 1 Darzalex, Cytoxan (IV Cytoxan given in the office), Velcade, dexamethasone (20 mg p.o. given in the office).  Repeat labs from 2/21/2023 showing M spike up to 0.8, free kappa light chain up to 85.8 (previously 5.3 on 1/17/2023), ratio 8.58 (was previously 0.76 on 1/17/2023).  We ended up repeating labs on 3/7/2023 M spike 0.1, free kappa light chain 3.4, ratio 0.83.  We will continue on with treatment.  She is scheduled to return to [facility] in April.  4/11/2023 cycle 6, day 22 Darzalex-CyBorD.  Final planned therapy.  She was seen at [facility] with recommendations for monthly Darzalex for 18 months. Bactrim stopped and  she continues acyclovir. No immediate plans for ASCT  8/9/2023: Proceed with Darzalex    *Diarrhea  She saw Dr. Hoyos with GI at [place] on 9/7.  Suspicion for amyloid involvement of the GI tract  Continue Lomotil and Imodium. Rifaximin prescribed by Dr. Hoyos which she continues to use intermittently with improvement but this is only prescribed for 1 week out of 4  Diarrhea waxes and wanes with what she eats and dairy products particularly worsen diarrhea  Continue Questran, Lomotil and Imodium.    *Elevated liver enzymes  Follow intermittently    *Cardiomyopathy:  Most recent echocardiogram on 8/17/2022 showed left ventricular wall thickness consistent with moderate to severe concentric hypertrophy along with left ventricular septal wall measuring 2.2 cm and posterior wall thickness at 1.9 cm likely due to amyloidosis.  LVEF 61 to 65%.  Grade 1 impaired relaxation.  Now followed by Dr. Lyles at [facility] with concerns for pre-existing HCM and AL cardiac amyloid.   Cardiac MRI results above.  Amyloidosis evident.  Catheterization performed at [facility].   Cardiac surgery performed at [facility] on 12/5/2022.  Pathology from the myectomy showed cardiac amyloidosis extensively involving the vessels and endocardium with myocyte hypertrophy and interstitial fibrosis.  Congo red stain was positive.  Symptoms improved significantly    *History of periorbital hematomas: These have resolved.  Coagulation studies and a factor X level were all normal.    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 Continuing magic mouthwash.    *Dysphagia:  She saw Dr. Aranda at [place] with Botox injections performed a couple of weeks ago.  Symptoms unfortunately are not much better.    PLAN:    Monthly Darzalex continues x 18 months, initiated in May 2023.  Proceed again today.  Continue Xifaxan Imodium, Lomotil, and Questran for diarrhea control.   Continue barrier creams and  ointments  Continue Magic mouthwash as needed for glossitis.  Continue cardiac medications as managed by cardiology at [place].  Continue acyclovir for prophylaxis.  No longer on Bactrim.  Follow-up in 4 weeks with labs and Darzalex    This patient remains on high risk drug therapy requiring intensive monitoring for toxicity.

## 2023-08-09 ENCOUNTER — INFUSION (OUTPATIENT)
Dept: ONCOLOGY | Facility: HOSPITAL | Age: 68
End: 2023-08-09
Payer: MEDICARE

## 2023-08-09 ENCOUNTER — LAB (OUTPATIENT)
Dept: LAB | Facility: HOSPITAL | Age: 68
End: 2023-08-09
Payer: MEDICARE

## 2023-08-09 ENCOUNTER — OFFICE VISIT (OUTPATIENT)
Dept: ONCOLOGY | Facility: CLINIC | Age: 68
End: 2023-08-09
Payer: MEDICARE

## 2023-08-09 VITALS
OXYGEN SATURATION: 97 % | RESPIRATION RATE: 18 BRPM | HEIGHT: 61 IN | BODY MASS INDEX: 23.83 KG/M2 | DIASTOLIC BLOOD PRESSURE: 78 MMHG | WEIGHT: 126.2 LBS | SYSTOLIC BLOOD PRESSURE: 133 MMHG | HEART RATE: 82 BPM | TEMPERATURE: 98 F

## 2023-08-09 DIAGNOSIS — E85.81 LIGHT CHAIN (AL) AMYLOIDOSIS: ICD-10-CM

## 2023-08-09 DIAGNOSIS — R19.7 DIARRHEA, UNSPECIFIED TYPE: ICD-10-CM

## 2023-08-09 DIAGNOSIS — E85.81 LIGHT CHAIN (AL) AMYLOIDOSIS: Primary | ICD-10-CM

## 2023-08-09 LAB
BASOPHILS # BLD AUTO: 0.03 10*3/MM3 (ref 0–0.2)
BASOPHILS NFR BLD AUTO: 0.6 % (ref 0–1.5)
DEPRECATED RDW RBC AUTO: 43.6 FL (ref 37–54)
EOSINOPHIL # BLD AUTO: 0.18 10*3/MM3 (ref 0–0.4)
EOSINOPHIL NFR BLD AUTO: 3.5 % (ref 0.3–6.2)
ERYTHROCYTE [DISTWIDTH] IN BLOOD BY AUTOMATED COUNT: 13.6 % (ref 12.3–15.4)
HCT VFR BLD AUTO: 43.4 % (ref 34–46.6)
HGB BLD-MCNC: 14.2 G/DL (ref 12–15.9)
IMM GRANULOCYTES # BLD AUTO: 0.02 10*3/MM3 (ref 0–0.05)
IMM GRANULOCYTES NFR BLD AUTO: 0.4 % (ref 0–0.5)
LYMPHOCYTES # BLD AUTO: 2.02 10*3/MM3 (ref 0.7–3.1)
LYMPHOCYTES NFR BLD AUTO: 39.8 % (ref 19.6–45.3)
MCH RBC QN AUTO: 28.6 PG (ref 26.6–33)
MCHC RBC AUTO-ENTMCNC: 32.7 G/DL (ref 31.5–35.7)
MCV RBC AUTO: 87.5 FL (ref 79–97)
MONOCYTES # BLD AUTO: 0.38 10*3/MM3 (ref 0.1–0.9)
MONOCYTES NFR BLD AUTO: 7.5 % (ref 5–12)
NEUTROPHILS NFR BLD AUTO: 2.45 10*3/MM3 (ref 1.7–7)
NEUTROPHILS NFR BLD AUTO: 48.2 % (ref 42.7–76)
NRBC BLD AUTO-RTO: 0 /100 WBC (ref 0–0.2)
PLATELET # BLD AUTO: 201 10*3/MM3 (ref 140–450)
PMV BLD AUTO: 10.6 FL (ref 6–12)
RBC # BLD AUTO: 4.96 10*6/MM3 (ref 3.77–5.28)
WBC NRBC COR # BLD: 5.08 10*3/MM3 (ref 3.4–10.8)

## 2023-08-09 PROCEDURE — 99214 OFFICE O/P EST MOD 30 MIN: CPT | Performed by: INTERNAL MEDICINE

## 2023-08-09 PROCEDURE — 36415 COLL VENOUS BLD VENIPUNCTURE: CPT

## 2023-08-09 PROCEDURE — 1160F RVW MEDS BY RX/DR IN RCRD: CPT | Performed by: INTERNAL MEDICINE

## 2023-08-09 PROCEDURE — 96401 CHEMO ANTI-NEOPL SQ/IM: CPT

## 2023-08-09 PROCEDURE — 63710000001 DEXAMETHASONE PER 0.25 MG: Performed by: INTERNAL MEDICINE

## 2023-08-09 PROCEDURE — 1126F AMNT PAIN NOTED NONE PRSNT: CPT | Performed by: INTERNAL MEDICINE

## 2023-08-09 PROCEDURE — 85025 COMPLETE CBC W/AUTO DIFF WBC: CPT

## 2023-08-09 PROCEDURE — 3078F DIAST BP <80 MM HG: CPT | Performed by: INTERNAL MEDICINE

## 2023-08-09 PROCEDURE — 3075F SYST BP GE 130 - 139MM HG: CPT | Performed by: INTERNAL MEDICINE

## 2023-08-09 PROCEDURE — 25010000002 DARATUMUMAB-HYALURONIDASE-FIHJ 1800-30000 MG-UT/15ML SOLUTION: Performed by: INTERNAL MEDICINE

## 2023-08-09 PROCEDURE — 1159F MED LIST DOCD IN RCRD: CPT | Performed by: INTERNAL MEDICINE

## 2023-08-09 RX ORDER — DIPHENOXYLATE HYDROCHLORIDE AND ATROPINE SULFATE 2.5; .025 MG/1; MG/1
1 TABLET ORAL 4 TIMES DAILY PRN
Qty: 120 TABLET | Refills: 0 | Status: SHIPPED | OUTPATIENT
Start: 2023-08-09

## 2023-08-09 RX ORDER — HYDROXYZINE PAMOATE 25 MG/1
25 CAPSULE ORAL ONCE
Status: COMPLETED | OUTPATIENT
Start: 2023-08-09 | End: 2023-08-09

## 2023-08-09 RX ORDER — ACETAMINOPHEN 500 MG
1000 TABLET ORAL ONCE
Status: COMPLETED | OUTPATIENT
Start: 2023-08-09 | End: 2023-08-09

## 2023-08-09 RX ORDER — DEXAMETHASONE 4 MG/1
20 TABLET ORAL ONCE
Status: COMPLETED | OUTPATIENT
Start: 2023-08-09 | End: 2023-08-09

## 2023-08-09 RX ORDER — SODIUM FLUORIDE 5 MG/G
CREAM DENTAL
COMMUNITY
Start: 2023-07-19

## 2023-08-09 RX ADMIN — DEXAMETHASONE 20 MG: 4 TABLET ORAL at 11:22

## 2023-08-09 RX ADMIN — HYDROXYZINE PAMOATE 25 MG: 25 CAPSULE ORAL at 11:22

## 2023-08-09 RX ADMIN — ACETAMINOPHEN 1000 MG: 500 TABLET, FILM COATED ORAL at 11:22

## 2023-08-09 RX ADMIN — DARATUMUMAB AND HYALURONIDASE-FIHJ (HUMAN RECOMBINANT) 1800 MG: 1800; 30000 INJECTION SUBCUTANEOUS at 12:00

## 2023-08-14 ENCOUNTER — TELEPHONE (OUTPATIENT)
Dept: FAMILY MEDICINE CLINIC | Facility: CLINIC | Age: 68
End: 2023-08-14

## 2023-08-14 DIAGNOSIS — F51.01 PRIMARY INSOMNIA: ICD-10-CM

## 2023-08-14 RX ORDER — ZOLPIDEM TARTRATE 10 MG/1
10 TABLET ORAL NIGHTLY PRN
Qty: 30 TABLET | Refills: 1 | Status: SHIPPED | OUTPATIENT
Start: 2023-08-14

## 2023-08-14 RX ORDER — ROSUVASTATIN CALCIUM 20 MG/1
20 TABLET, COATED ORAL DAILY
Qty: 90 TABLET | Refills: 0 | Status: SHIPPED | OUTPATIENT
Start: 2023-08-14

## 2023-08-14 NOTE — TELEPHONE ENCOUNTER
"    Caller: Cynthia Flower \"YOLETTE\"    Relationship: Self    Best call back number:     [phone] (Mobile)       Requested Prescriptions:   Requested Prescriptions     Pending Prescriptions Disp Refills    zolpidem (AMBIEN) 10 MG tablet 30 tablet 1     Sig: Take 1 tablet by mouth At Night As Needed for Sleep.    rosuvastatin (Crestor) 20 MG tablet 90 tablet 0     Sig: Take 1 tablet by mouth Daily.        Pharmacy where request should be sent: [id number] - [place], [place] - [address] [facility] AT [facility] [address] & [place] - [phone] [facility] [phone] FX     Last office visit with prescribing clinician: 4/10/2023   Last telemedicine visit with prescribing clinician: Visit date not found   Next office visit with prescribing clinician: 8/28/2023     Additional details provided by patient:     Does the patient have less than a 3 day supply:  [] Yes  [] No    Would you like a call back once the refill request has been completed: [] Yes [] No    If the office needs to give you a call back, can they leave a voicemail: [] Yes [] No    Ellis Monteiro   08/14/23 10:49 EDT       "

## 2023-08-14 NOTE — TELEPHONE ENCOUNTER
"    Caller: Cynthia Flower \"YOLETTE\"    Relationship: Self    Best call back number:[phone] (Mobile)     What medication are you requesting: TRY ANOTHER ANTIDEPRESSANT / TO REPLACE PROZAC    What are your current symptoms:     How long have you been experiencing symptoms:     Have you had these symptoms before:    [x] Yes  [] No    Have you been treated for these symptoms before:   [x] Yes  [] No    If a prescription is needed, what is your preferred pharmacy and phone number: [id number] - [place], [place] - [address] AT [address]/[facility] & [place] - [phone]  - [phone] FX     Additional notes:      "

## 2023-09-01 NOTE — PROGRESS NOTES
"[facility] GROUP OUTPATIENT FOLLOW UP CLINIC VISIT    REASON FOR FOLLOW-UP:    AL amyloidosis  Therapy with michael-CyBorD initiated 8/16/2022    HISTORY OF PRESENT ILLNESS: Cynthia Flower is a 68 y.o. female with the above-mentioned history who is here today for lab review and evaluation.    She is doing about the same.  She continues to have difficulty swallowing.  Recently at [place] she did have a fluoroscopic esophagram that did not demonstrate esophageal motility.  She continues to have diarrhea.  Medications she is taking to help but are not completely controlling the diarrhea.  She does note increasing simultaneous nodules mostly on her legs as well as some skin changes with hypopigmentation.  She also developed a blister on the left side of her nose that is healing.    REVIEW OF SYSTEMS:  As per the HPI    PHYSICAL EXAMINATION:    Vitals:    09/06/23 0950   BP: 127/77   Pulse: 79   Resp: 18   Temp: 98.6 °F (37 °C)   TempSrc: Temporal   SpO2: 96%   Weight: 57.3 kg (126 lb 4.8 oz)   Height: 154.9 cm (60.98\")   PainSc: 0-No pain     General:  No acute distress, awake, alert and oriented  Skin:  Warm and dry, no visible rash.  Scattered small hypopigmented patches.  Small 3 to 4 mm area of excoriation on the left side of her nose.  HEENT:  Normocephalic/atraumatic.   Chest:  Normal respiratory effort.  Lungs clear to auscultation bilaterally  Heart: Regular rate and rhythm with a grade 2 out of 6 early systolic murmur, stable  Extremities:  No visible clubbing, cyanosis, or edema  Neuro/psych:  Grossly nonfocal.  Normal mood and affect.    DIAGNOSTIC DATA:  CBC and Differential (09/06/2023 09:45)       IMAGING:    None reviewed      ASSESSMENT:  This is a 68 y.o. female with:    *AL amyloidosis  She had a stress echocardiogram on 4/24/2020 showing a normal left ventricular ejection fraction of 60% with moderate concentric hypertrophy but no wall motion abnormalities of the left ventricle.  There " was impaired relaxation noted.  She complained of chest pain during the examination.  There was some ST segment depression.  Cardiac catheterization was performed on the same day.  No intervention was required.  Follow-up echocardiogram on 4/15/2021 showed a left ventricular ejection fraction of 61 to 65% with normal LV cavity size.  Left ventricular wall thickness showed moderate concentric hypertrophy.  Diastolic function was impaired.  No pericardial effusion.  Small left pleural effusion.  She had follow-up PYP imaging for cardiac amyloidosis that was not suggestive of ATTR amyloidosis  Laboratory values on 2/24/2022 showed a high serum free light chain lambda of 121, normal kappa of 10.5, low ratio at 0.09.  Serum protein electrophoresis showed no evidence of an M spike.  Urine light chains were abnormal with an elevated lambda light chain of 33 and a low ratio of 0.48 with a 12.7 mg M spike on 24-hour urine protein electrophoresis.  BNP was elevated at 2306 on 3/4/2022.  The troponin was normal at 0.03.  CK was 212 with a CK-MB of 10.87.  Initial consult on 3/30/22. No M spike on serum protein electrophoresis. SIFE 'presence of monoclonal protein is unclear.'   Bone marrow biopsy 4/13/22 normocellular, 12.1% plasma cells consistent with low volume plasma cell dyscrasia. FISH with low level t(11;14) fusion only. No amyloid noted as Congo red staining negative.   Fat pad biopsy 5/11/22 positive for amyloid, AL lambda  Referred to Dr. Buenrostro at [place]. Intended to enroll on a clinical study but her troponin as tested by the study sponsor was not high enough  Therapy with michael-CyBorD initiated 8/16/2022  Patient seen in the office on 8/17/2022 for triage visit.  Patient with complaints of fatigue, dizziness and diarrhea.  She was mildly hypotensive.  She was given IV fluids.  Subsequent admission at [facility] with shortness of breath and volume overload.  She was diuresed and treated with  antibiotics.  D8 therapy administered on 8/30  Light chains normal at [facility] on 8/31/22 (lambda light chain 1.3, down from 12.44)  9/27/2022: Cycle 2-day 8 of therapy  10/4/2022 cycle 2-day 8 CyBorD.  Patient continues to push oral hydration.  She continues to have some neck stiffness but this has not worsened.  She will see [facility] next week for further cardiac work-up.  Glucose was low upon initial labs today however the patient was given something to eat by nursing prior to rechecking.  This was therefore rechecked prior to her leaving the office and was 87.  Patient reports she was feeling fine.  10/18/2020 to cycle 2-day 22 Sissy -CyBorD.  Velcade dose will be reduced to 1.0 mg/m² due to patient's persistent issues with orthostasis.  10/25/2022: Cycle 3-day 1  11/8/2022: Delay in cycle 3-day 15 therapy.  Day 8 omitted due to an upper respiratory infection.  11/22/2022: Cycle 3-day 22.   final planned treatment before she goes to [place] for cardiac surgery.  Cardiac surgery performed at [facility] on 12/5/2022.  Pathology from the myectomy showed cardiac amyloidosis extensively involving the vessels and endocardium with myocyte hypertrophy and interstitial fibrosis.  Congo red stain was positive.  Reviewed back on 1/17/2023 with plans to resume Darzalex, Cytoxan, Velcade, dexamethasone with cycle #4 on 1/24.  Patient seen 2/7/2023 (missed 1/31/2023 due to weather, which would have been day 8).  We will go ahead and proceed with as cycle 4 day 15, day 15.  2/14/2023: Cycle 4-day 22  2/21/2023 cycle 5-day 1 Darzalex, Cytoxan (IV Cytoxan given in the office), Velcade, dexamethasone (20 mg p.o. given in the office).  Repeat labs from 2/21/2023 showing M spike up to 0.8, free kappa light chain up to 85.8 (previously 5.3 on 1/17/2023), ratio 8.58 (was previously 0.76 on 1/17/2023).  We ended up repeating labs on 3/7/2023 M spike 0.1, free kappa light chain 3.4, ratio 0.83.  We will continue on with  treatment.  She is scheduled to return to [place] in April.  4/11/2023 cycle 6, day 22 Darzalex-CyBorD.  Final planned therapy.  She was seen at [facility] with recommendations for monthly Darzalex for 18 months. Bactrim stopped and she continues acyclovir. No immediate plans for ASCT  8/9/2023: Proceed with Darzalex  9/6/2023: Proceed with Darzalex    *Diarrhea  She saw Dr. Hoyos with GI at [facility] on 9/7.  Suspicion for amyloid involvement of the GI tract  Continue Lomotil and Imodium. Rifaximin prescribed by Dr. Hoyos which she continues to use intermittently with improvement but this is only prescribed for 1 week out of 4  Diarrhea waxes and wanes with what she eats and dairy products particularly worsen diarrhea  Continue Questran, Lomotil and Imodium.  These help but did not completely control the issue.    *Elevated liver enzymes  Follow intermittently    *Cardiomyopathy:  Most recent echocardiogram on 8/17/2022 showed left ventricular wall thickness consistent with moderate to severe concentric hypertrophy along with left ventricular septal wall measuring 2.2 cm and posterior wall thickness at 1.9 cm likely due to amyloidosis.  LVEF 61 to 65%.  Grade 1 impaired relaxation.  Now followed by Dr. Lyles at [facility] with concerns for pre-existing HCM and AL cardiac amyloid.   Cardiac MRI results above.  Amyloidosis evident.  Catheterization performed at [facility].   Cardiac surgery performed at [facility] on 12/5/2022.  Pathology from the myectomy showed cardiac amyloidosis extensively involving the vessels and endocardium with myocyte hypertrophy and interstitial fibrosis.  Congo red stain was positive.  Symptoms improved significantly    *History of periorbital hematomas: These have resolved.  Coagulation studies and a factor X level were all normal.    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 Continuing magic mouthwash.    *Dysphagia:  She saw   Manoj at [facility] with Botox injections performed.  Symptoms unfortunately are not much better.  Recent esophagram confirmed esophageal dysmotility.  Follow-up at [facility].    *Skin changes with hypopigmentation and blister on the left side of her nose  Concerning for new manifestations of amyloidosis    *Subcutaneous nodules, also increasing in number and size    PLAN:  Monthly Darzalex continues x 18 months, initiated in May 2023.  Proceed again today.  Continue Xifaxan Imodium, Lomotil, and Questran for diarrhea control.   Continue barrier creams and ointments  Continue Magic mouthwash as needed for glossitis.  Continue cardiac medications as managed by cardiology at [facility].  Continue acyclovir for prophylaxis.  No longer on Bactrim.  She will be seen at [facility] in 4 weeks.  Therefore, her next visit here will be in 5 weeks with a nurse practitioner.  I will see her back in 9 weeks.  If at [facility] it is felt like she is progressing, we will need to discuss with Dr. Buenrostro change in therapy.    This patient remains on high risk drug therapy requiring intensive monitoring for toxicity.

## 2023-09-06 ENCOUNTER — LAB (OUTPATIENT)
Dept: LAB | Facility: HOSPITAL | Age: 68
End: 2023-09-06
Payer: MEDICARE

## 2023-09-06 ENCOUNTER — OFFICE VISIT (OUTPATIENT)
Dept: ONCOLOGY | Facility: CLINIC | Age: 68
End: 2023-09-06
Payer: MEDICARE

## 2023-09-06 ENCOUNTER — INFUSION (OUTPATIENT)
Dept: ONCOLOGY | Facility: HOSPITAL | Age: 68
End: 2023-09-06
Payer: MEDICARE

## 2023-09-06 VITALS
BODY MASS INDEX: 23.85 KG/M2 | HEART RATE: 79 BPM | SYSTOLIC BLOOD PRESSURE: 127 MMHG | WEIGHT: 126.3 LBS | OXYGEN SATURATION: 96 % | RESPIRATION RATE: 18 BRPM | TEMPERATURE: 98.6 F | DIASTOLIC BLOOD PRESSURE: 77 MMHG | HEIGHT: 61 IN

## 2023-09-06 DIAGNOSIS — E85.81 LIGHT CHAIN (AL) AMYLOIDOSIS: ICD-10-CM

## 2023-09-06 DIAGNOSIS — E85.81 LIGHT CHAIN (AL) AMYLOIDOSIS: Primary | ICD-10-CM

## 2023-09-06 LAB
ALBUMIN SERPL-MCNC: 4.5 G/DL (ref 3.5–5.2)
ALBUMIN/GLOB SERPL: 2.8 G/DL
ALP SERPL-CCNC: 75 U/L (ref 39–117)
ALT SERPL W P-5'-P-CCNC: 32 U/L (ref 1–33)
ANION GAP SERPL CALCULATED.3IONS-SCNC: 13.8 MMOL/L (ref 5–15)
AST SERPL-CCNC: 29 U/L (ref 1–32)
BASOPHILS # BLD AUTO: 0.01 10*3/MM3 (ref 0–0.2)
BASOPHILS NFR BLD AUTO: 0.2 % (ref 0–1.5)
BILIRUB SERPL-MCNC: 0.7 MG/DL (ref 0–1.2)
BUN SERPL-MCNC: 19 MG/DL (ref 8–23)
BUN/CREAT SERPL: 24.7 (ref 7–25)
CALCIUM SPEC-SCNC: 9.5 MG/DL (ref 8.6–10.5)
CHLORIDE SERPL-SCNC: 103 MMOL/L (ref 98–107)
CO2 SERPL-SCNC: 23.2 MMOL/L (ref 22–29)
CREAT SERPL-MCNC: 0.77 MG/DL (ref 0.6–1.1)
DEPRECATED RDW RBC AUTO: 47.6 FL (ref 37–54)
EGFRCR SERPLBLD CKD-EPI 2021: 84.1 ML/MIN/1.73
EOSINOPHIL # BLD AUTO: 0.15 10*3/MM3 (ref 0–0.4)
EOSINOPHIL NFR BLD AUTO: 3.2 % (ref 0.3–6.2)
ERYTHROCYTE [DISTWIDTH] IN BLOOD BY AUTOMATED COUNT: 14.2 % (ref 12.3–15.4)
GLOBULIN UR ELPH-MCNC: 1.6 GM/DL
GLUCOSE SERPL-MCNC: 81 MG/DL (ref 65–99)
HCT VFR BLD AUTO: 45.6 % (ref 34–46.6)
HGB BLD-MCNC: 14.4 G/DL (ref 12–15.9)
IMM GRANULOCYTES # BLD AUTO: 0.02 10*3/MM3 (ref 0–0.05)
IMM GRANULOCYTES NFR BLD AUTO: 0.4 % (ref 0–0.5)
LYMPHOCYTES # BLD AUTO: 1.81 10*3/MM3 (ref 0.7–3.1)
LYMPHOCYTES NFR BLD AUTO: 38 % (ref 19.6–45.3)
MCH RBC QN AUTO: 28.6 PG (ref 26.6–33)
MCHC RBC AUTO-ENTMCNC: 31.6 G/DL (ref 31.5–35.7)
MCV RBC AUTO: 90.5 FL (ref 79–97)
MONOCYTES # BLD AUTO: 0.38 10*3/MM3 (ref 0.1–0.9)
MONOCYTES NFR BLD AUTO: 8 % (ref 5–12)
NEUTROPHILS NFR BLD AUTO: 2.39 10*3/MM3 (ref 1.7–7)
NEUTROPHILS NFR BLD AUTO: 50.2 % (ref 42.7–76)
NRBC BLD AUTO-RTO: 0 /100 WBC (ref 0–0.2)
PLATELET # BLD AUTO: 207 10*3/MM3 (ref 140–450)
PMV BLD AUTO: 10 FL (ref 6–12)
POTASSIUM SERPL-SCNC: 4.3 MMOL/L (ref 3.5–5.2)
PROT SERPL-MCNC: 6.1 G/DL (ref 6–8.5)
RBC # BLD AUTO: 5.04 10*6/MM3 (ref 3.77–5.28)
SODIUM SERPL-SCNC: 140 MMOL/L (ref 136–145)
WBC NRBC COR # BLD: 4.76 10*3/MM3 (ref 3.4–10.8)

## 2023-09-06 PROCEDURE — 96401 CHEMO ANTI-NEOPL SQ/IM: CPT

## 2023-09-06 PROCEDURE — 3074F SYST BP LT 130 MM HG: CPT | Performed by: INTERNAL MEDICINE

## 2023-09-06 PROCEDURE — 85025 COMPLETE CBC W/AUTO DIFF WBC: CPT

## 2023-09-06 PROCEDURE — 99214 OFFICE O/P EST MOD 30 MIN: CPT | Performed by: INTERNAL MEDICINE

## 2023-09-06 PROCEDURE — 3078F DIAST BP <80 MM HG: CPT | Performed by: INTERNAL MEDICINE

## 2023-09-06 PROCEDURE — 36415 COLL VENOUS BLD VENIPUNCTURE: CPT

## 2023-09-06 PROCEDURE — 1160F RVW MEDS BY RX/DR IN RCRD: CPT | Performed by: INTERNAL MEDICINE

## 2023-09-06 PROCEDURE — 25010000002 DARATUMUMAB-HYALURONIDASE-FIHJ 1800-30000 MG-UT/15ML SOLUTION: Performed by: INTERNAL MEDICINE

## 2023-09-06 PROCEDURE — 63710000001 DEXAMETHASONE PER 0.25 MG: Performed by: INTERNAL MEDICINE

## 2023-09-06 PROCEDURE — 1126F AMNT PAIN NOTED NONE PRSNT: CPT | Performed by: INTERNAL MEDICINE

## 2023-09-06 PROCEDURE — 1159F MED LIST DOCD IN RCRD: CPT | Performed by: INTERNAL MEDICINE

## 2023-09-06 RX ORDER — ACETAMINOPHEN 500 MG
1000 TABLET ORAL ONCE
Status: CANCELLED | OUTPATIENT
Start: 2023-09-06

## 2023-09-06 RX ORDER — HYDROXYZINE PAMOATE 25 MG/1
25 CAPSULE ORAL ONCE
Status: COMPLETED | OUTPATIENT
Start: 2023-09-06 | End: 2023-09-06

## 2023-09-06 RX ORDER — MEPERIDINE HYDROCHLORIDE 25 MG/ML
25 INJECTION INTRAMUSCULAR; INTRAVENOUS; SUBCUTANEOUS
Status: CANCELLED | OUTPATIENT
Start: 2023-09-06

## 2023-09-06 RX ORDER — HYDROXYZINE PAMOATE 25 MG/1
25 CAPSULE ORAL ONCE
Status: CANCELLED | OUTPATIENT
Start: 2023-09-06 | End: 2023-09-06

## 2023-09-06 RX ORDER — FAMOTIDINE 10 MG/ML
20 INJECTION, SOLUTION INTRAVENOUS AS NEEDED
Status: CANCELLED | OUTPATIENT
Start: 2023-09-06

## 2023-09-06 RX ORDER — DIPHENHYDRAMINE HYDROCHLORIDE 50 MG/ML
50 INJECTION INTRAMUSCULAR; INTRAVENOUS AS NEEDED
Status: CANCELLED | OUTPATIENT
Start: 2023-09-06

## 2023-09-06 RX ORDER — DEXAMETHASONE 4 MG/1
20 TABLET ORAL ONCE
Status: COMPLETED | OUTPATIENT
Start: 2023-09-06 | End: 2023-09-06

## 2023-09-06 RX ORDER — ACETAMINOPHEN 500 MG
1000 TABLET ORAL ONCE
Status: COMPLETED | OUTPATIENT
Start: 2023-09-06 | End: 2023-09-06

## 2023-09-06 RX ADMIN — DARATUMUMAB AND HYALURONIDASE-FIHJ (HUMAN RECOMBINANT) 1800 MG: 1800; 30000 INJECTION SUBCUTANEOUS at 11:23

## 2023-09-06 RX ADMIN — DEXAMETHASONE 20 MG: 4 TABLET ORAL at 10:37

## 2023-09-06 RX ADMIN — HYDROXYZINE PAMOATE 25 MG: 25 CAPSULE ORAL at 10:38

## 2023-09-06 RX ADMIN — ACETAMINOPHEN 1000 MG: 500 TABLET, FILM COATED ORAL at 10:37

## 2023-09-07 LAB
ALBUMIN SERPL ELPH-MCNC: 3.8 G/DL (ref 2.9–4.4)
ALBUMIN/GLOB SERPL: 1.8 {RATIO} (ref 0.7–1.7)
ALPHA1 GLOB SERPL ELPH-MCNC: 0.2 G/DL (ref 0–0.4)
ALPHA2 GLOB SERPL ELPH-MCNC: 0.8 G/DL (ref 0.4–1)
B-GLOBULIN SERPL ELPH-MCNC: 0.9 G/DL (ref 0.7–1.3)
GAMMA GLOB SERPL ELPH-MCNC: 0.3 G/DL (ref 0.4–1.8)
GLOBULIN SER-MCNC: 2.2 G/DL (ref 2.2–3.9)
IGA SERPL-MCNC: <5 MG/DL (ref 87–352)
IGG SERPL-MCNC: 298 MG/DL (ref 586–1602)
IGM SERPL-MCNC: 19 MG/DL (ref 26–217)
INTERPRETATION SERPL IEP-IMP: ABNORMAL
KAPPA LC FREE SER-MCNC: 3.1 MG/L (ref 3.3–19.4)
KAPPA LC FREE/LAMBDA FREE SER: 0.89 {RATIO} (ref 0.26–1.65)
LABORATORY COMMENT REPORT: ABNORMAL
LAMBDA LC FREE SERPL-MCNC: 3.5 MG/L (ref 5.7–26.3)
M PROTEIN SERPL ELPH-MCNC: 0.1 G/DL
PROT SERPL-MCNC: 6 G/DL (ref 6–8.5)

## 2023-09-07 NOTE — PROGRESS NOTES
"[facility] GROUP OUTPATIENT FOLLOW UP CLINIC VISIT    REASON FOR FOLLOW-UP:    AL amyloidosis  Therapy with daratumumab, CyBorD initiated 8/16/2022    HISTORY OF PRESENT ILLNESS: Cynthia Flower is a 67 y.o. female with the above-mentioned history returning to the office today for follow-up.    She was seen by Dr. Lyles with cardio-oncology at [place]. Concern for pre-existing HCM as well as the possibility of cardiac amyloid. HCTZ was stopped and spironolactone was started.     She tolerated treatment last week very well.  No significant shortness of breath.  Diarrhea improved but now has worsened.  Imodium does help.  She did not complain of any lightheadedness today.    REVIEW OF SYSTEMS:  As per the HPI    PHYSICAL EXAMINATION:    Vitals:    09/06/22 1025   BP: 112/65   Pulse: 71   Resp: 16   Temp: 97.3 °F (36.3 °C)   TempSrc: Temporal   SpO2: 96%   Weight: 54.7 kg (120 lb 11.2 oz)   Height: 154.9 cm (60.98\")   PainSc: 0-No pain       General:  No acute distress, awake, alert and oriented  Skin:  Warm and dry, no visible rash  HEENT:  Normocephalic/atraumatic.  Wearing a face mask.  Chest:  Normal respiratory effort.  Lungs clear to auscultation bilaterally.  Heart: Regular rate and rhythm  Extremities:  No visible clubbing, cyanosis, or edema  Neuro/psych:  Grossly nonfocal.  Normal mood and affect.    DIAGNOSTIC DATA:  CBC and Differential (09/06/2022 09:58)  Comprehensive Metabolic Panel (09/06/2022 09:58)    IMAGING:    None reviewed    ASSESSMENT:  This is a 67 y.o. female with:    *AL amyloidosis  · She had a stress echocardiogram on 4/24/2020 showing a normal left ventricular ejection fraction of 60% with moderate concentric hypertrophy but no wall motion abnormalities of the left ventricle.  There was impaired relaxation noted.  She complained of chest pain during the examination.  There was some ST segment depression.  Cardiac catheterization was performed on the same day.  No " intervention was required.  · Follow-up echocardiogram on 4/15/2021 showed a left ventricular ejection fraction of 61 to 65% with normal LV cavity size.  Left ventricular wall thickness showed moderate concentric hypertrophy.  Diastolic function was impaired.  No pericardial effusion.  Small left pleural effusion.  · She had follow-up PYP imaging for cardiac amyloidosis that was not suggestive of ATTR amyloidosis  · Laboratory values on 2/24/2022 showed a high serum free light chain lambda of 121, normal kappa of 10.5, low ratio at 0.09.  Serum protein electrophoresis showed no evidence of an M spike.  Urine light chains were abnormal with an elevated lambda light chain of 33 and a low ratio of 0.48 with a 12.7 mg M spike on 24-hour urine protein electrophoresis.  · BNP was elevated at 2306 on 3/4/2022.  The troponin was normal at 0.03.  CK was 212 with a CK-MB of 10.87.  · Initial consult on 3/30/22. No M spike on serum protein electrophoresis. SIFE 'presence of monoclonal protein is unclear.'   · Bone marrow biopsy 4/13/22 normocellular, 12.1% plasma cells consistent with low volume plasma cell dyscrasia. FISH with low level t(11;14) fusion only. No amyloid noted as Congo red staining negative.   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 Buenrostro at [facility]. Intended to enroll on a clinical study but her troponin as tested by the study sponsor was not high enough  · Therapy with michael-CyBorD initiated 8/16/2022  · Patient seen in the office on 8/17/2022 for triage visit.  Patient with complaints of fatigue, dizziness and diarrhea.  She was mildly hypotensive.  She was given IV fluids.  · Subsequent admission at [facility] with shortness of breath and volume overload.  She was diuresed and treated with antibiotics.  · D8 therapy administered on 8/30  · 9/6/22: Proceed with her next cycle of therapy.    *Diarrhea  · She is going to see Dr. Hoyos with GI at [facility]  tomorrow  · Suspicion for amyloid involvement of the GI tract  · She will continue Lomotil.  I recommended alternating with Imodium.  · Also, prescription for cholestyramine previously electronically sent to her pharmacy    *Elevated liver enzymes  · 8/17/2022: AST 87, , bilirubin normal at 0.4  · Normalized    *Cardiomyopathy:  · Likely due to amyloid  · Most recent echocardiogram on 8/17/2022 showed left ventricular wall thickness consistent with moderate to severe concentric hypertrophy along with left ventricular septal wall measuring 2.2 cm and posterior wall thickness at 1.9 cm likely due to amyloidosis.  LVEF 61 to 65%.  Grade 1 impaired relaxation.  · Now followed by Dr. Lyles at [facility] with concerns for pre-existing HCM and AL cardiac amyloid.   · Catheterization and biopsy planned for Friday this week      PLAN:  1. Proceed with cycle 1 day 15 therapy with sissy-CyBorD today.  IV Cytoxan as she does not like taking pills.  We discussed this again today.  Dr. Lyles had suggested pursuing oral Cytoxan rather than IV.  Again, she does not believe that she will tolerate the pills.  Adjust the regimen accordingly related to adverse effects.  2. Spironolactone per Dr. Lyles at [facility]  3. No more IV fluid infusions and she will keep up with excess fluid losses with oral intake.  4. She will be seen by gastroenterology at [facility] tomorrow  5. Cardiac catheterization and cardiac biopsy on Friday  6. If possible, hopeful for autologous stem cell transplant at [facility] after induction therapy following 6 cycles.  Depending on response after 3 cycles consideration of stem cell collection at that point.  7. Continue acyclovir and Bactrim, which she started on 8/15  8. We will try to treat through peripheral IVs if possible.  9. Follow-up weekly as scheduled  10. Patient to notify us with any adverse effects, questions or concerns.    High risk therapy requiring intensive monitoring    Sissy-CyBorD  regimen     Daratumumab and hyaluronidase (Darzalex Faspro) as follows:  Cycles 1 & 2: 1800 mg SC once per day on days 1, 8, 15, 22  Cycles 3 to 6: 1800 mg SC once per day on days 1 & 15  Cycles 7 up to 24: 1800 mg SC once on day 1     Bortezomib (Velcade) as follows:  Cycles 1 to 6: 1.3 mg/m2 SC once per day on days 1, 8, 15, 22     Cyclophosphamide (Cytoxan) as follows:  Cycles 1 to 6: 300 mg/m2 (maximum dose of 500 mg) PO or IV once per day on days 1, 8, 15, 22    Plan IV Cytoxan as she has difficulty swallowing pills.     Dexamethasone (Decadron) as follows:  Cycles 1 to 6: 40 mg PO or IV once per day on days 1, 8, 15, 22 (see note)  28-day cycle for up to 24 cycles   Hemigard Postcare Instructions: The HEMIGARD strips are to remain completely dry for at least 5-7 days.

## 2023-09-14 DIAGNOSIS — F41.9 ANXIETY: Primary | ICD-10-CM

## 2023-09-14 NOTE — TELEPHONE ENCOUNTER
Rx Refill Note  Requested Prescriptions     Pending Prescriptions Disp Refills    ALPRAZolam (XANAX) 0.25 MG tablet [Pharmacy Med Name: ALPRAZOLAM 0.25MG TABLETS] 60 tablet      Sig: TAKE 1 TABLET BY MOUTH TWICE DAILY AS NEEDED FOR ANXIETY      Last office visit with prescribing clinician: 4/10/2023   Last telemedicine visit with prescribing clinician: Visit date not found   Next office visit with prescribing clinician: Visit date not found                         Would you like a call back once the refill request has been completed: [] Yes [] No    If the office needs to give you a call back, can they leave a voicemail: [] Yes [] No    Higinio Moeller MA  09/14/23, 16:45 EDT

## 2023-09-15 RX ORDER — ALPRAZOLAM 0.25 MG/1
TABLET ORAL
Qty: 60 TABLET | Refills: 0 | Status: SHIPPED | OUTPATIENT
Start: 2023-09-15

## 2023-09-15 NOTE — TELEPHONE ENCOUNTER
Called pt and informed her that patient that she should not take this AND zolpidem (Ambien) at the same time. Pt understood verbally.

## 2023-09-28 ENCOUNTER — TELEPHONE (OUTPATIENT)
Dept: OBSTETRICS AND GYNECOLOGY | Facility: CLINIC | Age: 68
End: 2023-09-28
Payer: MEDICARE

## 2023-09-28 ENCOUNTER — TRANSCRIBE ORDERS (OUTPATIENT)
Dept: ADMINISTRATIVE | Facility: HOSPITAL | Age: 68
End: 2023-09-28
Payer: MEDICARE

## 2023-09-28 DIAGNOSIS — Z12.31 VISIT FOR SCREENING MAMMOGRAM: Primary | ICD-10-CM

## 2023-09-28 PROCEDURE — 87510 GARDNER VAG DNA DIR PROBE: CPT | Performed by: FAMILY MEDICINE

## 2023-09-28 PROCEDURE — 87480 CANDIDA DNA DIR PROBE: CPT | Performed by: FAMILY MEDICINE

## 2023-09-28 PROCEDURE — 87660 TRICHOMONAS VAGIN DIR PROBE: CPT | Performed by: FAMILY MEDICINE

## 2023-09-28 NOTE — TELEPHONE ENCOUNTER
"    Caller: Cynthia Flower \"YOLETTE\"    Relationship to patient: Self    Best call back number: [phone]    PT STATES SHE HAS BEEN ITCHING FOR AWHILE AND HAS BEEN USING VAGASIL AND IT HELPS SOME AND IS WANTING TO KNOW IF THERE ARE ANY OTHER RECOMMENDATIONS ON WHAT SHE COULD USE TO HELP HER ITCHING . PLEASE ADVISE PT ON RECOMMENDATIONS .           "

## 2023-10-05 DIAGNOSIS — R19.7 DIARRHEA, UNSPECIFIED TYPE: ICD-10-CM

## 2023-10-05 DIAGNOSIS — L98.411 SKIN ULCER OF BUTTOCK, LIMITED TO BREAKDOWN OF SKIN: Primary | ICD-10-CM

## 2023-10-05 RX ORDER — DIPHENOXYLATE HYDROCHLORIDE AND ATROPINE SULFATE 2.5; .025 MG/1; MG/1
1 TABLET ORAL 4 TIMES DAILY PRN
Qty: 120 TABLET | Refills: 0 | Status: SHIPPED | OUTPATIENT
Start: 2023-10-05

## 2023-10-11 ENCOUNTER — INFUSION (OUTPATIENT)
Dept: ONCOLOGY | Facility: HOSPITAL | Age: 68
End: 2023-10-11
Payer: MEDICARE

## 2023-10-11 ENCOUNTER — LAB (OUTPATIENT)
Dept: LAB | Facility: HOSPITAL | Age: 68
End: 2023-10-11
Payer: MEDICARE

## 2023-10-11 ENCOUNTER — APPOINTMENT (OUTPATIENT)
Dept: ONCOLOGY | Facility: HOSPITAL | Age: 68
End: 2023-10-11
Payer: MEDICARE

## 2023-10-11 ENCOUNTER — OFFICE VISIT (OUTPATIENT)
Dept: ONCOLOGY | Facility: CLINIC | Age: 68
End: 2023-10-11
Payer: MEDICARE

## 2023-10-11 VITALS
SYSTOLIC BLOOD PRESSURE: 124 MMHG | TEMPERATURE: 98.6 F | HEART RATE: 73 BPM | OXYGEN SATURATION: 97 % | HEIGHT: 61 IN | DIASTOLIC BLOOD PRESSURE: 75 MMHG | WEIGHT: 129.3 LBS | BODY MASS INDEX: 24.41 KG/M2

## 2023-10-11 DIAGNOSIS — E85.81 LIGHT CHAIN (AL) AMYLOIDOSIS: ICD-10-CM

## 2023-10-11 DIAGNOSIS — E85.81 LIGHT CHAIN (AL) AMYLOIDOSIS: Primary | ICD-10-CM

## 2023-10-11 DIAGNOSIS — Z79.899 HIGH RISK MEDICATION USE: ICD-10-CM

## 2023-10-11 DIAGNOSIS — R19.7 DIARRHEA, UNSPECIFIED TYPE: ICD-10-CM

## 2023-10-11 DIAGNOSIS — S30.810A EXCORIATION OF BUTTOCK, INITIAL ENCOUNTER: ICD-10-CM

## 2023-10-11 LAB
BASOPHILS # BLD AUTO: 0.02 10*3/MM3 (ref 0–0.2)
BASOPHILS NFR BLD AUTO: 0.4 % (ref 0–1.5)
DEPRECATED RDW RBC AUTO: 47.5 FL (ref 37–54)
EOSINOPHIL # BLD AUTO: 0.22 10*3/MM3 (ref 0–0.4)
EOSINOPHIL NFR BLD AUTO: 4.2 % (ref 0.3–6.2)
ERYTHROCYTE [DISTWIDTH] IN BLOOD BY AUTOMATED COUNT: 14.1 % (ref 12.3–15.4)
HCT VFR BLD AUTO: 43.1 % (ref 34–46.6)
HGB BLD-MCNC: 14 G/DL (ref 12–15.9)
IMM GRANULOCYTES # BLD AUTO: 0.01 10*3/MM3 (ref 0–0.05)
IMM GRANULOCYTES NFR BLD AUTO: 0.2 % (ref 0–0.5)
LYMPHOCYTES # BLD AUTO: 2.05 10*3/MM3 (ref 0.7–3.1)
LYMPHOCYTES NFR BLD AUTO: 38.8 % (ref 19.6–45.3)
MCH RBC QN AUTO: 29.7 PG (ref 26.6–33)
MCHC RBC AUTO-ENTMCNC: 32.5 G/DL (ref 31.5–35.7)
MCV RBC AUTO: 91.5 FL (ref 79–97)
MONOCYTES # BLD AUTO: 0.47 10*3/MM3 (ref 0.1–0.9)
MONOCYTES NFR BLD AUTO: 8.9 % (ref 5–12)
NEUTROPHILS NFR BLD AUTO: 2.52 10*3/MM3 (ref 1.7–7)
NEUTROPHILS NFR BLD AUTO: 47.5 % (ref 42.7–76)
NRBC BLD AUTO-RTO: 0 /100 WBC (ref 0–0.2)
PLATELET # BLD AUTO: 188 10*3/MM3 (ref 140–450)
PMV BLD AUTO: 10.1 FL (ref 6–12)
RBC # BLD AUTO: 4.71 10*6/MM3 (ref 3.77–5.28)
WBC NRBC COR # BLD: 5.29 10*3/MM3 (ref 3.4–10.8)

## 2023-10-11 PROCEDURE — 85025 COMPLETE CBC W/AUTO DIFF WBC: CPT

## 2023-10-11 PROCEDURE — 25010000002 DARATUMUMAB-HYALURONIDASE-FIHJ 1800-30000 MG-UT/15ML SOLUTION: Performed by: NURSE PRACTITIONER

## 2023-10-11 PROCEDURE — 63710000001 DEXAMETHASONE PER 0.25 MG: Performed by: NURSE PRACTITIONER

## 2023-10-11 PROCEDURE — 96401 CHEMO ANTI-NEOPL SQ/IM: CPT

## 2023-10-11 PROCEDURE — 36415 COLL VENOUS BLD VENIPUNCTURE: CPT

## 2023-10-11 RX ORDER — ACETAMINOPHEN 500 MG
1000 TABLET ORAL ONCE
Status: COMPLETED | OUTPATIENT
Start: 2023-10-11 | End: 2023-10-11

## 2023-10-11 RX ORDER — HYDROXYZINE PAMOATE 25 MG/1
25 CAPSULE ORAL ONCE
Status: CANCELLED | OUTPATIENT
Start: 2023-10-11 | End: 2023-10-11

## 2023-10-11 RX ORDER — FAMOTIDINE 10 MG/ML
20 INJECTION, SOLUTION INTRAVENOUS AS NEEDED
Status: CANCELLED | OUTPATIENT
Start: 2023-10-11

## 2023-10-11 RX ORDER — DIPHENHYDRAMINE HYDROCHLORIDE 50 MG/ML
50 INJECTION INTRAMUSCULAR; INTRAVENOUS AS NEEDED
Status: CANCELLED | OUTPATIENT
Start: 2023-10-11

## 2023-10-11 RX ORDER — HYDROXYZINE PAMOATE 25 MG/1
25 CAPSULE ORAL ONCE
Status: COMPLETED | OUTPATIENT
Start: 2023-10-11 | End: 2023-10-11

## 2023-10-11 RX ORDER — DEXAMETHASONE 4 MG/1
20 TABLET ORAL ONCE
Status: COMPLETED | OUTPATIENT
Start: 2023-10-11 | End: 2023-10-11

## 2023-10-11 RX ORDER — ACETAMINOPHEN 500 MG
1000 TABLET ORAL ONCE
Status: CANCELLED | OUTPATIENT
Start: 2023-10-11

## 2023-10-11 RX ADMIN — DEXAMETHASONE 20 MG: 4 TABLET ORAL at 10:34

## 2023-10-11 RX ADMIN — DARATUMUMAB AND HYALURONIDASE-FIHJ (HUMAN RECOMBINANT) 1800 MG: 1800; 30000 INJECTION SUBCUTANEOUS at 11:25

## 2023-10-11 RX ADMIN — ACETAMINOPHEN 1000 MG: 500 TABLET ORAL at 10:33

## 2023-10-11 RX ADMIN — HYDROXYZINE PAMOATE 25 MG: 25 CAPSULE ORAL at 10:33

## 2023-10-11 NOTE — PROGRESS NOTES
"[facility] GROUP OUTPATIENT FOLLOW UP CLINIC VISIT    REASON FOR FOLLOW-UP:    AL amyloidosis  Therapy with michael-CyBorD initiated 8/16/2022    HISTORY OF PRESENT ILLNESS: Cynthia Flower is a 68 y.o. female with the above-mentioned history who is here today for lab review and evaluation.    She continues to feel about the same overall.  She is still having difficulties with swallowing.  She has followed up with her [place] team on 10/4/2023.      Cardiology has started her back on metoprolol and has had her move her evening midodrine dose up to late afternoon.  They plan to repeat her echo in 3 months when she follows up.      She continues to have diarrhea however her current regimen of Questran, Lomotil, Imodium, and rifampin has helped.  She is still having at least 5 episodes of diarrhea a day and can have up to 10 episodes on a bad day.  She has noticed that typically dairy products and sweets trigger her diarrhea.      Patient plans to follow-up with the wound care center at [facility] in approximately 2 weeks.  She is still having excoriation surrounding her rectum from frequent diarrhea.  Continues use of barrier creams and ointments, however she feels that she needs a thicker barrier than what she currently has. She also developed a blister on the left side of her nose that continues to heal. No other concerns noted at this time.     REVIEW OF SYSTEMS:  As per the HPI    PHYSICAL EXAMINATION:    Vitals:    10/11/23 0933   BP: 124/75   Pulse: 73   Temp: 98.6 øF (37 øC)   TempSrc: Temporal   SpO2: 97%   Weight: 58.7 kg (129 lb 4.8 oz)   Height: 154.9 cm (60.98\")   PainSc: 0-No pain       General:  No acute distress, awake, alert and oriented  Skin:  Warm and dry, no visible rash.  Scattered small hypopigmented patches.  Small 3 to 4 mm area of excoriation on the left side of her nose- healing. Mild excoriation surrounding rectum. No open wounds.  HEENT:  Normocephalic/atraumatic. "   Chest:  Normal respiratory effort.  Lungs clear to auscultation bilaterally  Heart: Regular rate and rhythm with a grade 2 out of 6 early systolic murmur, stable  Extremities:  No visible clubbing, cyanosis, or edema  Neuro/psych:  Grossly nonfocal.  Normal mood and affect.    DIAGNOSTIC DATA:  CBC and Differential (10/11/2023 09:20)       IMAGING:    None reviewed      ASSESSMENT:  This is a 68 y.o. female with:    *AL amyloidosis  She had a stress echocardiogram on 4/24/2020 showing a normal left ventricular ejection fraction of 60% with moderate concentric hypertrophy but no wall motion abnormalities of the left ventricle.  There was impaired relaxation noted.  She complained of chest pain during the examination.  There was some ST segment depression.  Cardiac catheterization was performed on the same day.  No intervention was required.  Follow-up echocardiogram on 4/15/2021 showed a left ventricular ejection fraction of 61 to 65% with normal LV cavity size.  Left ventricular wall thickness showed moderate concentric hypertrophy.  Diastolic function was impaired.  No pericardial effusion.  Small left pleural effusion.  She had follow-up PYP imaging for cardiac amyloidosis that was not suggestive of ATTR amyloidosis  Laboratory values on 2/24/2022 showed a high serum free light chain lambda of 121, normal kappa of 10.5, low ratio at 0.09.  Serum protein electrophoresis showed no evidence of an M spike.  Urine light chains were abnormal with an elevated lambda light chain of 33 and a low ratio of 0.48 with a 12.7 mg M spike on 24-hour urine protein electrophoresis.  BNP was elevated at 2306 on 3/4/2022.  The troponin was normal at 0.03.  CK was 212 with a CK-MB of 10.87.  Initial consult on 3/30/22. No M spike on serum protein electrophoresis. SIFE 'presence of monoclonal protein is unclear.'   Bone marrow biopsy 4/13/22 normocellular, 12.1% plasma cells consistent with low volume plasma cell dyscrasia. FISH with  low level t(11;14) fusion only. No amyloid noted as Congo red staining negative.   Fat pad biopsy 5/11/22 positive for amyloid, AL lambda  Referred to Dr. Buenrostro at [facility]. Intended to enroll on a clinical study but her troponin as tested by the study sponsor was not high enough  Therapy with sissy-CyBorD initiated 8/16/2022  Patient seen in the office on 8/17/2022 for triage visit.  Patient with complaints of fatigue, dizziness and diarrhea.  She was mildly hypotensive.  She was given IV fluids.  Subsequent admission at [facility] with shortness of breath and volume overload.  She was diuresed and treated with antibiotics.  D8 therapy administered on 8/30  Light chains normal at [facility] on 8/31/22 (lambda light chain 1.3, down from 12.44)  9/27/2022: Cycle 2-day 8 of therapy  10/4/2022 cycle 2-day 8 CyBorD.  Patient continues to push oral hydration.  She continues to have some neck stiffness but this has not worsened.  She will see [facility] next week for further cardiac work-up.  Glucose was low upon initial labs today however the patient was given something to eat by nursing prior to rechecking.  This was therefore rechecked prior to her leaving the office and was 87.  Patient reports she was feeling fine.  10/18/2020 to cycle 2-day 22 Sissy -CyBorD.  Velcade dose will be reduced to 1.0 mg/mý due to patient's persistent issues with orthostasis.  10/25/2022: Cycle 3-day 1  11/8/2022: Delay in cycle 3-day 15 therapy.  Day 8 omitted due to an upper respiratory infection.  11/22/2022: Cycle 3-day 22.   final planned treatment before she goes to [facility] for cardiac surgery.  Cardiac surgery performed at [facility] on 12/5/2022.  Pathology from the myectomy showed cardiac amyloidosis extensively involving the vessels and endocardium with myocyte hypertrophy and interstitial fibrosis.  Congo red stain was positive.  Reviewed back on 1/17/2023 with plans to resume Darzalex, Cytoxan, Velcade,  dexamethasone with cycle #4 on 1/24.  Patient seen 2/7/2023 (missed 1/31/2023 due to weather, which would have been day 8).  We will go ahead and proceed with as cycle 4 day 15, day 15.  2/14/2023: Cycle 4-day 22  2/21/2023 cycle 5-day 1 Darzalex, Cytoxan (IV Cytoxan given in the office), Velcade, dexamethasone (20 mg p.o. given in the office).  Repeat labs from 2/21/2023 showing M spike up to 0.8, free kappa light chain up to 85.8 (previously 5.3 on 1/17/2023), ratio 8.58 (was previously 0.76 on 1/17/2023).  We ended up repeating labs on 3/7/2023 M spike 0.1, free kappa light chain 3.4, ratio 0.83.  We will continue on with treatment.  She is scheduled to return to [facility] in April.  4/11/2023 cycle 6, day 22 Darzalex-CyBorD.  Final planned therapy.  She was seen at [facility] with recommendations for monthly Darzalex for 18 months. Bactrim stopped and she continues acyclovir. No immediate plans for ASCT  8/9/2023: Proceed with Darzalex  9/6/2023: Proceed with Darzalex  10/11/2023: Proceed with Darzalex    *Diarrhea  She saw Dr. Hoyos with GI at [facility] on 9/7.  Suspicion for amyloid involvement of the GI tract  Continue Lomotil and Imodium. Rifaximin prescribed by Dr. Hoyos which she continues to use intermittently with improvement but this is only prescribed for 1 week out of 4  Diarrhea waxes and wanes with what she eats and dairy products particularly worsen diarrhea  Continue Questran, Lomotil and Imodium.  These help but did not completely control the issue.  10/11/2023: Patient continues on rifaximin, Questran, Lomotil, and Imodium for diarrhea management.  Diarrhea still not completely controlled. Averages at least 5 episodes daily.     *Elevated liver enzymes  Follow intermittently    *Cardiomyopathy:  Most recent echocardiogram on 8/17/2022 showed left ventricular wall thickness consistent with moderate to severe concentric hypertrophy along with left ventricular septal wall measuring 2.2 cm and  posterior wall thickness at 1.9 cm likely due to amyloidosis.  LVEF 61 to 65%.  Grade 1 impaired relaxation.  Now followed by Dr. Lyles at [facility] with concerns for pre-existing HCM and AL cardiac amyloid.   Cardiac MRI results above.  Amyloidosis evident.  Catheterization performed at [facility].   Cardiac surgery performed at [facility] on 12/5/2022.  Pathology from the myectomy showed cardiac amyloidosis extensively involving the vessels and endocardium with myocyte hypertrophy and interstitial fibrosis.  Congo red stain was positive.  Symptoms improved significantly  10/11/2023: Seen by cardiology at [facility] on 10/4/2023. Patient was restarted on metoprolol XL.  Midodrine adjusted to morning and early afternoon.  ECHO to be repeated in 3 months with follow-up.    *History of periorbital hematomas: These have resolved.  Coagulation studies and a factor X level were all normal.    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   Continuing magic mouthwash.    *Dysphagia:  She saw Dr. Aranda at [facility] with Botox injections performed.  Symptoms unfortunately are not much better.  Recent esophagram confirmed esophageal dysmotility.  Follow-up at [facility].  10/11/2023: unchanged    *Skin changes with hypopigmentation and blister on the left side of her nose  Concerning for new manifestations of amyloidosis    *Subcutaneous nodules, also increasing in number and size    *Rectal irritation  10/11/2023: Patient has been using barrier creams and ointments to her bottom.  She is to follow-up with wound care in the next couple weeks.  Thankfully she does not have any open areas today on exam.  She is to continue barrier products.  I did send her in a prescription for a zinc oxide paste that will be thicker and hopefully provide more protection.  She has been encouraged to use her waffle cushion she has at home for offloading and comfort.    PLAN:  Monthly Darzalex  continues x 18 months, initiated in May 2023.  Proceed again today.  Continue Xifaxan, Imodium, Lomotil, and Questran for diarrhea control.   Continue barrier creams and ointments- Paste sent to pharmacy to trail for thicker barrier  Follow up with wound care  Continue Magic mouthwash as needed for glossitis.  Continue cardiac medications as managed by cardiology at [facility].  Continue acyclovir for prophylaxis.  No longer on Bactrim.  She will follow up with [facility] again in January 2024. Last seen on 10/4/2023. (Heme, GI, cardiology)  Return in 4 weeks for follow up with Dr. Sal, labs and scheduled Darzalex.       This patient remains on high risk drug therapy requiring intensive monitoring for toxicity.

## 2023-10-16 DIAGNOSIS — F51.01 PRIMARY INSOMNIA: ICD-10-CM

## 2023-10-16 NOTE — TELEPHONE ENCOUNTER
Rx Refill Note  Requested Prescriptions     Pending Prescriptions Disp Refills    zolpidem (AMBIEN) 10 MG tablet [Pharmacy Med Name: ZOLPIDEM 10MG TABLETS] 30 tablet      Sig: TAKE 1 TABLET BY MOUTH AT NIGHT AS NEEDED FOR SLEEP      Last office visit with prescribing clinician: 4/10/2023   Last telemedicine visit with prescribing clinician: Visit date not found   Next office visit with prescribing clinician: Visit date not found                         Would you like a call back once the refill request has been completed: [] Yes [] No    If the office needs to give you a call back, can they leave a voicemail: [] Yes [] No    Higinio Moeller MA  10/16/23, 12:09 EDT

## 2023-10-17 RX ORDER — ZOLPIDEM TARTRATE 10 MG/1
10 TABLET ORAL NIGHTLY PRN
Qty: 30 TABLET | Refills: 1 | Status: SHIPPED | OUTPATIENT
Start: 2023-10-17

## 2023-10-23 RX ORDER — PANTOPRAZOLE SODIUM 20 MG/1
TABLET, DELAYED RELEASE ORAL
Qty: 90 TABLET | Refills: 1 | Status: SHIPPED | OUTPATIENT
Start: 2023-10-23

## 2023-10-31 ENCOUNTER — APPOINTMENT (OUTPATIENT)
Dept: GENERAL RADIOLOGY | Facility: HOSPITAL | Age: 68
End: 2023-10-31
Payer: MEDICARE

## 2023-10-31 ENCOUNTER — OFFICE VISIT (OUTPATIENT)
Dept: WOUND CARE | Facility: HOSPITAL | Age: 68
End: 2023-10-31
Payer: MEDICARE

## 2023-10-31 ENCOUNTER — HOSPITAL ENCOUNTER (EMERGENCY)
Facility: HOSPITAL | Age: 68
Discharge: HOME | End: 2023-10-31
Attending: EMERGENCY MEDICINE | Admitting: EMERGENCY MEDICINE
Payer: MEDICARE

## 2023-10-31 ENCOUNTER — APPOINTMENT (OUTPATIENT)
Dept: CT IMAGING | Facility: HOSPITAL | Age: 68
End: 2023-10-31
Payer: MEDICARE

## 2023-10-31 VITALS
SYSTOLIC BLOOD PRESSURE: 134 MMHG | RESPIRATION RATE: 20 BRPM | OXYGEN SATURATION: 92 % | DIASTOLIC BLOOD PRESSURE: 67 MMHG | HEART RATE: 92 BPM | HEIGHT: 61 IN | TEMPERATURE: 99.2 F | BODY MASS INDEX: 24.6 KG/M2 | WEIGHT: 130.29 LBS

## 2023-10-31 VITALS
DIASTOLIC BLOOD PRESSURE: 80 MMHG | HEART RATE: 94 BPM | RESPIRATION RATE: 20 BRPM | TEMPERATURE: 100 F | SYSTOLIC BLOOD PRESSURE: 112 MMHG

## 2023-10-31 DIAGNOSIS — R07.89 RIGHT-SIDED CHEST WALL PAIN: Primary | ICD-10-CM

## 2023-10-31 DIAGNOSIS — Z98.890 HISTORY OF VENTRICULAR SEPTAL MYECTOMY: ICD-10-CM

## 2023-10-31 DIAGNOSIS — Z91.89 AT HIGH RISK FOR SKIN BREAKDOWN: ICD-10-CM

## 2023-10-31 DIAGNOSIS — R50.9 LOW GRADE FEVER: ICD-10-CM

## 2023-10-31 DIAGNOSIS — Z86.39 HISTORY OF AMYLOIDOSIS: ICD-10-CM

## 2023-10-31 DIAGNOSIS — E85.81 LIGHT CHAIN (AL) AMYLOIDOSIS: ICD-10-CM

## 2023-10-31 DIAGNOSIS — K59.1 FUNCTIONAL DIARRHEA: Chronic | ICD-10-CM

## 2023-10-31 DIAGNOSIS — I44.7 LEFT BUNDLE BRANCH BLOCK: ICD-10-CM

## 2023-10-31 DIAGNOSIS — R07.9 RIGHT-SIDED CHEST PAIN: Primary | ICD-10-CM

## 2023-10-31 LAB
ALBUMIN SERPL-MCNC: 4.5 G/DL (ref 3.5–5.2)
ALBUMIN/GLOB SERPL: 2 G/DL
ALP SERPL-CCNC: 92 U/L (ref 39–117)
ALT SERPL W P-5'-P-CCNC: 36 U/L (ref 1–33)
ANION GAP SERPL CALCULATED.3IONS-SCNC: 12.9 MMOL/L (ref 5–15)
AST SERPL-CCNC: 30 U/L (ref 1–32)
BASOPHILS # BLD AUTO: 0.03 10*3/MM3 (ref 0–0.2)
BASOPHILS NFR BLD AUTO: 0.3 % (ref 0–1.5)
BILIRUB SERPL-MCNC: 0.8 MG/DL (ref 0–1.2)
BUN SERPL-MCNC: 17 MG/DL (ref 8–23)
BUN/CREAT SERPL: 23 (ref 7–25)
CALCIUM SPEC-SCNC: 9.7 MG/DL (ref 8.6–10.5)
CHLORIDE SERPL-SCNC: 99 MMOL/L (ref 98–107)
CO2 SERPL-SCNC: 25.1 MMOL/L (ref 22–29)
CREAT SERPL-MCNC: 0.74 MG/DL (ref 0.57–1)
DEPRECATED RDW RBC AUTO: 45 FL (ref 37–54)
EGFRCR SERPLBLD CKD-EPI 2021: 88.3 ML/MIN/1.73
EOSINOPHIL # BLD AUTO: 0.19 10*3/MM3 (ref 0–0.4)
EOSINOPHIL NFR BLD AUTO: 2.1 % (ref 0.3–6.2)
ERYTHROCYTE [DISTWIDTH] IN BLOOD BY AUTOMATED COUNT: 13.9 % (ref 12.3–15.4)
FLUAV SUBTYP SPEC NAA+PROBE: NOT DETECTED
FLUBV RNA ISLT QL NAA+PROBE: NOT DETECTED
GEN 5 2HR TROPONIN T REFLEX: 44 NG/L
GLOBULIN UR ELPH-MCNC: 2.2 GM/DL
GLUCOSE SERPL-MCNC: 86 MG/DL (ref 65–99)
HCT VFR BLD AUTO: 41.2 % (ref 34–46.6)
HGB BLD-MCNC: 13.5 G/DL (ref 12–15.9)
HOLD SPECIMEN: NORMAL
HOLD SPECIMEN: NORMAL
IMM GRANULOCYTES # BLD AUTO: 0.03 10*3/MM3 (ref 0–0.05)
IMM GRANULOCYTES NFR BLD AUTO: 0.3 % (ref 0–0.5)
LIPASE SERPL-CCNC: 28 U/L (ref 13–60)
LYMPHOCYTES # BLD AUTO: 2.14 10*3/MM3 (ref 0.7–3.1)
LYMPHOCYTES NFR BLD AUTO: 23.3 % (ref 19.6–45.3)
MAGNESIUM SERPL-MCNC: 2 MG/DL (ref 1.6–2.4)
MCH RBC QN AUTO: 29 PG (ref 26.6–33)
MCHC RBC AUTO-ENTMCNC: 32.8 G/DL (ref 31.5–35.7)
MCV RBC AUTO: 88.6 FL (ref 79–97)
MONOCYTES # BLD AUTO: 0.62 10*3/MM3 (ref 0.1–0.9)
MONOCYTES NFR BLD AUTO: 6.8 % (ref 5–12)
NEUTROPHILS NFR BLD AUTO: 6.16 10*3/MM3 (ref 1.7–7)
NEUTROPHILS NFR BLD AUTO: 67.2 % (ref 42.7–76)
NRBC BLD AUTO-RTO: 0 /100 WBC (ref 0–0.2)
NT-PROBNP SERPL-MCNC: 607.5 PG/ML (ref 0–900)
PLATELET # BLD AUTO: 180 10*3/MM3 (ref 140–450)
PMV BLD AUTO: 10.8 FL (ref 6–12)
POTASSIUM SERPL-SCNC: 4.4 MMOL/L (ref 3.5–5.2)
PROT SERPL-MCNC: 6.7 G/DL (ref 6–8.5)
QT INTERVAL: 438 MS
QTC INTERVAL: 546 MS
RBC # BLD AUTO: 4.65 10*6/MM3 (ref 3.77–5.28)
RSV RNA NPH QL NAA+NON-PROBE: NOT DETECTED
SARS-COV-2 RNA RESP QL NAA+PROBE: NOT DETECTED
SODIUM SERPL-SCNC: 137 MMOL/L (ref 136–145)
TROPONIN T DELTA: 2 NG/L
TROPONIN T SERPL HS-MCNC: 42 NG/L
WBC NRBC COR # BLD: 9.17 10*3/MM3 (ref 3.4–10.8)
WHOLE BLOOD HOLD COAG: NORMAL
WHOLE BLOOD HOLD SPECIMEN: NORMAL

## 2023-10-31 PROCEDURE — 71045 X-RAY EXAM CHEST 1 VIEW: CPT

## 2023-10-31 PROCEDURE — 93005 ELECTROCARDIOGRAM TRACING: CPT

## 2023-10-31 PROCEDURE — 36415 COLL VENOUS BLD VENIPUNCTURE: CPT

## 2023-10-31 PROCEDURE — 1160F RVW MEDS BY RX/DR IN RCRD: CPT | Performed by: EMERGENCY MEDICINE

## 2023-10-31 PROCEDURE — 93005 ELECTROCARDIOGRAM TRACING: CPT | Performed by: EMERGENCY MEDICINE

## 2023-10-31 PROCEDURE — 83735 ASSAY OF MAGNESIUM: CPT | Performed by: EMERGENCY MEDICINE

## 2023-10-31 PROCEDURE — 83690 ASSAY OF LIPASE: CPT | Performed by: EMERGENCY MEDICINE

## 2023-10-31 PROCEDURE — G0463 HOSPITAL OUTPT CLINIC VISIT: HCPCS | Performed by: EMERGENCY MEDICINE

## 2023-10-31 PROCEDURE — 1159F MED LIST DOCD IN RCRD: CPT | Performed by: EMERGENCY MEDICINE

## 2023-10-31 PROCEDURE — 3079F DIAST BP 80-89 MM HG: CPT | Performed by: EMERGENCY MEDICINE

## 2023-10-31 PROCEDURE — 87637 SARSCOV2&INF A&B&RSV AMP PRB: CPT | Performed by: EMERGENCY MEDICINE

## 2023-10-31 PROCEDURE — 3074F SYST BP LT 130 MM HG: CPT | Performed by: EMERGENCY MEDICINE

## 2023-10-31 PROCEDURE — 99285 EMERGENCY DEPT VISIT HI MDM: CPT

## 2023-10-31 PROCEDURE — 71260 CT THORAX DX C+: CPT

## 2023-10-31 PROCEDURE — 84484 ASSAY OF TROPONIN QUANT: CPT | Performed by: EMERGENCY MEDICINE

## 2023-10-31 PROCEDURE — 25510000001 IOPAMIDOL PER 1 ML: Performed by: EMERGENCY MEDICINE

## 2023-10-31 PROCEDURE — 85025 COMPLETE CBC W/AUTO DIFF WBC: CPT | Performed by: EMERGENCY MEDICINE

## 2023-10-31 PROCEDURE — 83880 ASSAY OF NATRIURETIC PEPTIDE: CPT | Performed by: EMERGENCY MEDICINE

## 2023-10-31 PROCEDURE — 80053 COMPREHEN METABOLIC PANEL: CPT | Performed by: EMERGENCY MEDICINE

## 2023-10-31 RX ORDER — DOXYCYCLINE 100 MG/1
100 CAPSULE ORAL 2 TIMES DAILY
Qty: 14 CAPSULE | Refills: 0 | Status: SHIPPED | OUTPATIENT
Start: 2023-10-31

## 2023-10-31 RX ORDER — SODIUM CHLORIDE 0.9 % (FLUSH) 0.9 %
10 SYRINGE (ML) INJECTION AS NEEDED
Status: DISCONTINUED | OUTPATIENT
Start: 2023-10-31 | End: 2023-10-31 | Stop reason: HOSPADM

## 2023-10-31 RX ORDER — ACETAMINOPHEN 325 MG/1
975 TABLET ORAL ONCE
Status: COMPLETED | OUTPATIENT
Start: 2023-10-31 | End: 2023-10-31

## 2023-10-31 RX ORDER — ANORECTAL OINTMENT 15.7; .44; 24; 20.6 G/100G; G/100G; G/100G; G/100G
1 OINTMENT TOPICAL 2 TIMES DAILY
Qty: 20 G | Refills: 2 | Status: SHIPPED | OUTPATIENT
Start: 2023-10-31

## 2023-10-31 RX ORDER — ASPIRIN 81 MG/1
324 TABLET, CHEWABLE ORAL ONCE
Status: DISCONTINUED | OUTPATIENT
Start: 2023-10-31 | End: 2023-10-31 | Stop reason: HOSPADM

## 2023-10-31 RX ADMIN — IOPAMIDOL 100 ML: 755 INJECTION, SOLUTION INTRAVENOUS at 17:36

## 2023-10-31 RX ADMIN — ACETAMINOPHEN 975 MG: 325 TABLET ORAL at 17:10

## 2023-10-31 NOTE — DISCHARGE INSTRUCTIONS
Both sets your heart enzymes came back looking okay tonight and no signs of a heart attack were found.    Your CT scan of the chest showed no pneumonia or blood clots or anything concerning causing your pain.    It could just be some musculoskeletal chest wall pain like a muscle strain or pinched nerve possibly that will get better in a couple days.    If you keep having the fevers you may want to start taking the antibiotics and definitely call your primary doctor to follow-up with.

## 2023-10-31 NOTE — PROGRESS NOTES
"Chief Complaint  Wound Check (NEW PT, WOUNDS TO RECTAL AREA FROM \"DUMPING SYNDROME\" PT CURRENTLY TREATS AREA WITH MARGOT BARRIER OINTMENT AS NEEDED, PT COMPLAINS OF BURNING SENSATION.  (NON DIABETIC))    Subjective      History of Present Illness    Cynthia Her  is a 68 y.o. female with a history of amyloidosis.  Patient reportedly has chronic diarrhea and severe dumping syndrome.  She describes the diarrhea is just liquid which causes a lot of scalding and skin breakdown.  She says she has managed to get both under control at this point and generally uses a barrier cream such as Desitin.  She says when the symptoms start she gets severe burning of the perianal tissues.    Today the patient's main concern is some right-sided chest pain that started last night.  She describes it as an aching pain that feels like it could be a pulled muscle.  The pain is worse with deep inspiration.  Patient has a history of chronic dyspnea on exertion but says she is never had this pain before.  She says she had open heart surgery due to the amyloidosis and they had to \"clean out\" her ventricle so her heart could function better.  It appears from the records that she had a LV septal myectomy.  That was done in December 2022.  Patient says the pain started a couple hours after she was playing with her grandchildren.    Patient has multiple medical problems but is not a diabetic and has never been a smoker.      Allergies:  Azithromycin, Ezetimibe, and Pravastatin      Current Outpatient Medications:     acyclovir (Zovirax) 400 MG tablet, Take 1 tablet by mouth 2 (Two) Times a Day., Disp: 60 tablet, Rfl: 5    ALPRAZolam (XANAX) 0.25 MG tablet, TAKE 1 TABLET BY MOUTH TWICE DAILY AS NEEDED FOR ANXIETY, Disp: 60 tablet, Rfl: 0    aspirin 81 MG chewable tablet, CHEW AND SWALLOW 1 TABLET DAILY WITH BREAKFAST, Disp: , Rfl:     Cholecalciferol (Vitamin D3) 50 MCG (2000 UT) tablet, Take 1 tablet by mouth Daily., Disp: , Rfl:     " cholestyramine (QUESTRAN) 4 g packet, Take 1 packet by mouth 3 (Three) Times a Day With Meals. 1 packet  TID PRN diarrhea, Disp: 60 packet, Rfl: 4    ciclopirox (LOPROX) 1 % shampoo, APPLY TOPICALLY 3 TIMES EVERY WEEK, Disp: , Rfl:     diphenoxylate-atropine (LOMOTIL) 2.5-0.025 MG per tablet, Take 1 tablet by mouth 4 (Four) Times a Day As Needed for Diarrhea., Disp: 120 tablet, Rfl: 0    empagliflozin (JARDIANCE) 10 MG tablet tablet, Take 1 tablet by mouth Daily., Disp: , Rfl:     FLUoxetine (PROzac) 20 MG capsule, TAKE 1 CAPSULE BY MOUTH FOUR TIMES DAILY, Disp: 360 capsule, Rfl: 0    levothyroxine (SYNTHROID, LEVOTHROID) 75 MCG tablet, TAKE 1 TABLET BY MOUTH EVERY DAY, Disp: 90 tablet, Rfl: 3    lidocaine (LIDODERM) 5 %, APPLY 1 PATCH TOPICALLY EVERY DAY. REMOVE AND DISCARD AFTER 12 HOURS, Disp: , Rfl:     loratadine (CLARITIN) 10 MG tablet, Take 1 tablet by mouth Daily., Disp: 30 tablet, Rfl: 5    midodrine (PROAMATINE) 5 MG tablet, Take 1 tablet by mouth 2 (Two) Times a Day., Disp: , Rfl:     Multiple Vitamins-Minerals (V-C Forte) capsule, Take 1 capsule by mouth Daily., Disp: , Rfl:     olopatadine (PATANOL) 0.1 % ophthalmic solution, INSTILL 1 DROP IN BOTH EYES TWICE DAILY, Disp: 5 mL, Rfl: 5    pantoprazole (PROTONIX) 20 MG EC tablet, TAKE 1 TABLET BY MOUTH EVERY DAY. DO NOT TAKE WITHIN 2 HOURS OF THYROID MEDICATION, Disp: 90 tablet, Rfl: 1    Pediatric Multivitamins-Iron (multivitamin chewable) chewable tablet, Chew 1 tablet Daily., Disp: , Rfl:     rosuvastatin (Crestor) 20 MG tablet, Take 1 tablet by mouth Daily., Disp: 90 tablet, Rfl: 0    spironolactone (ALDACTONE) 25 MG tablet, Take 1 tablet by mouth 2 (Two) Times a Day., Disp: , Rfl:     zinc oxide (DESITIN) 40 % paste paste, Apply  topically to the appropriate area as directed Every 1 (One) Hour As Needed (Apply to irritated skin to create barrier) for up to 30 days., Disp: 453 g, Rfl: 2    zolpidem (AMBIEN) 10 MG tablet, TAKE 1 TABLET BY MOUTH AT  NIGHT AS NEEDED FOR SLEEP, Disp: 30 tablet, Rfl: 1    Menthol-Zinc Oxide (Calmoseptine) 0.44-20.6 % ointment, Apply 1 application  topically to the appropriate area as directed 2 (Two) Times a Day., Disp: 20 g, Rfl: 2    Sodium Fluoride 5000 Plus 1.1 % cream, , Disp: , Rfl:   No current facility-administered medications for this visit.    Facility-Administered Medications Ordered in Other Visits:     aspirin chewable tablet 324 mg, 324 mg, Oral, Once, Emergency, Triage Protocol, MD    sodium chloride 0.9 % flush 10 mL, 10 mL, Intravenous, PRN, Emergency, Triage Protocol, MD    Past Medical History:   Diagnosis Date    AL amyloidosis     Anxiety     Arthritis     COVID-19 07/2020 9/7/2021    Depression     Diverticulitis of colon     Diverticulosis     GERD (gastroesophageal reflux disease)     History of Clostridioides difficile infection 2008    HAS HAD SEVERAL TIMES IN PAST PER PT (SAW INFECTIOUS DISEASE MD FOR THIS S/P COLOSTOMY/EXTENSIVE ANBX THERAPY)    History of prediabetes     History of snoring     History of stress incontinence     Hypercholesterolemia     Hyperlipidemia     Hypertension     Left ventricular hypertrophy     FOLLOWED BY DR MARI. DENIES CHEST PAIN BUT STATES DOES GET SOA AT TIMES WITH EXERTION REPORTS THAT IT IS NOT A NEW ISSUE     Liver disease     Oral herpes 08/18/2020    Seasonal allergies     used to have allergy shots in the past    SOB (shortness of breath)     STATES WITH EXERTION HAS BEEN ONGOING ISSUE NOT A NEW ISSUE    Thyroid disease     Hypothyroid     Past Surgical History:   Procedure Laterality Date    ABDOMINAL SURGERY  2005, 2014    ex lap x 2,  diverticulitis    ABDOMINOPLASTY  2016    ADENOIDECTOMY      APPENDECTOMY      CARDIAC CATHETERIZATION N/A 04/24/2020    Procedure: Coronary angiography;  Surgeon: Roberto Briones MD;  Location: [facility] INVASIVE LOCATION;  Service: Cardiovascular;  Laterality: N/A;    CARDIAC CATHETERIZATION N/A 04/24/2020     Procedure: Left heart cath;  Surgeon: Roberto Briones MD;  Location: [facility] CATH INVASIVE LOCATION;  Service: Cardiovascular;  Laterality: N/A;    CARDIAC CATHETERIZATION N/A 04/24/2020    Procedure: Left ventriculography;  Surgeon: Roberto Briones MD;  Location:  [facility] CATH INVASIVE LOCATION;  Service: Cardiovascular;  Laterality: N/A;    CARDIAC SURGERY      open heart surgery,    COLONOSCOPY  approx 2018    normal per pt     COLOSTOMY  2005    had colostomy and then is was reversed    COLOSTOMY CLOSURE  2006    CORRECTION HAMMER TOE Right 2017    ECTOPIC PREGNANCY SURGERY      1979, 1980    ENDOSCOPY N/A 05/27/2020    Procedure: ESOPHAGOGASTRODUODENOSCOPY WITH BIOPSY AND BIOPSY POLYPECTOMY AND MACIEL DIALATION;  Surgeon: Preethi Beck MD;  Location:  [facility];  Service: Gastroenterology;  Laterality: N/A;  PRE: ESOPHAGEAL DYSPHAGIA  POST: Z LINE 46, ESOPHAGEAL SPASM, GASTRITIS, FUNDIC POLYP    ENDOSCOPY N/A 06/20/2023    ESOPHAGEAL DILATATION N/A 12/07/2021    Procedure: OR ESOPHAGEAL DILATATION with maciel dilators ;  Surgeon: Jamey Leslie MD;  Location:  [facility];  Service: ENT;  Laterality: N/A;    ESOPHAGEAL MOTILITY STUDY N/A 02/03/2022    Procedure: ESOPHAGEAL MOTILITY STUDY;  Surgeon: Harshil, Nurse Performed;  Location:  [facility];  Service: Gastroenterology;  Laterality: N/A;  dypshagia     FOOT SURGERY Right 12/2016    Second and third hammertoe corrections    KNEE ARTHROSCOPY Right 2009    KNEE SURGERY Right     REVISION / TAKEDOWN COLOSTOMY  2006    SHOULDER ARTHROSCOPY Right 2018    right shoulder arthroscopy with extensive rotator cuff, biceps and labral debridement, chondroplasty, & subacromial decompression with acromioplasty    SHOULDER SURGERY      HAS HAS COMPLETE SHOULDER ON RIGHT. LEFT SCOPED AND HAD 2ND SURGERY WITH HARDWARE PLACED    SHOULDER SURGERY Left     2012. 2013    TONSILLECTOMY      TOTAL SHOULDER REVERSE ARTHROPLASTY Right 2019     WISDOM TOOTH EXTRACTION       Social History     Socioeconomic History    Marital status:     Number of children: 2   Tobacco Use    Smoking status: Never    Smokeless tobacco: Never   Vaping Use    Vaping Use: Never used   Substance and Sexual Activity    Alcohol use: Not Currently     Alcohol/week: 1.0 standard drink of alcohol     Types: 1 Glasses of wine per week    Drug use: Never    Sexual activity: Defer           Objective     Vitals:    10/31/23 1348   BP: 112/80   BP Location: Left arm   Patient Position: Sitting   Pulse: 94   Resp: 20  Comment: 98% RA   Temp: 100 °F (37.8 °C)   TempSrc: Temporal   PainSc:   6   PainLoc: Chest     There is no height or weight on file to calculate BMI.    STEADI Fall Risk Assessment has not been completed.     Review of Systems     ROS:  Per HPI.     I have reviewed the HPI and ROS as documented by MA/RN. Savannah Summers MD    Physical Exam     NAD  No respiratory distress, no cough, lungs clear  RRR  AAOx3, pleasant, cooperative  Bilateral buttocks, perineal, and perianal tissues all pink and healthy without evidence of wounds or deep tissue pressure injury.      Result Review :  The following data was reviewed by: Savannah Summers MD on 10/31/2023:    Hematology-oncology note, CBC, CMP, cardiac echo, hemoglobin A1c             Assessment and Plan   Diagnoses and all orders for this visit:    1. Right-sided chest pain (Primary)    2. History of ventricular septal myectomy    3. Light chain (AL) amyloidosis    4. Low grade fever    5. Functional diarrhea    6. At high risk for skin breakdown  -     Menthol-Zinc Oxide (Calmoseptine) 0.44-20.6 % ointment; Apply 1 application  topically to the appropriate area as directed 2 (Two) Times a Day.  Dispense: 20 g; Refill: 2      Patient with chronic dumping syndrome and chronic diarrhea.  Currently she has no skin breakdown or wounds and her tissues appear healthy.  She is advised to continue using the barrier cream whenever  she starts having the diarrhea again so she can prevent breakdown rather than try to treat it once its happened.  We recommend Calmoseptine, prescription sent to her pharmacy and samples provided.    Given patient's complicated history, the greater concern today is her new onset of right-sided chest pain.  She is hemodynamically stable here but does have a low-grade temperature of 100.  I have spoken with the patient and recommended evaluation in the emergency department which she is agreeable to.  I called and spoke with Dr. Maykel Reaves to notify him of the patient's pending arrival to the ED.    Return to [facility] as needed.    Patient was given instructions and counseling regarding their condition or for health maintenance advice, as well as the wound care plan and recommendations. They understand and agree with the plan.  They will follow back up here in the clinic but are instructed to contact us in the interim should they have any new, returning, or worsening symptoms or concerns. Please see specific information pulled into the AVS if appropriate.     Dragon Dictation utilized for chart completion.    Follow Up   Return if symptoms worsen or fail to improve.      Savannah Summers MD

## 2023-10-31 NOTE — ED PROVIDER NOTES
Time: 3:14 PM EDT  Date of encounter:  10/31/2023  Independent Historian/Clinical History and Information was obtained by:   Patient    History is limited by: N/A    Chief Complaint   Patient presents with    Chest Pain    Shortness of Breath         History of Present Illness:  Patient is a 68 y.o. year old female who presents to the emergency department for evaluation of right-sided chest pain, shortness of air that began at about 2100 last night, also reports associated nausea.      Patient states that the pain is in the right side of the chest as well as her posterior neck and back.  Patient reports history of open heart surgery in December, history of amyloidosis.  Patient also reports recently lifting her grandchildren and believes she may have pulled something.      She was also noted at the wound care clinic earlier to have a low-grade fever of 100 °F and does report some feelings of malaise and subjective fevers and body aches and headaches and mild cough.    She reports possibly one of her grandchildren was sick.    Patient Care Team  Primary Care Provider: Arian Peterson MD    Past Medical History:     Allergies   Allergen Reactions    Azithromycin Rash and Other (See Comments)     Reaction unknown to patient (unable to remember)  Other reaction(s): Other: stomach issues, Stomach ache, Other: stomach issues, Stomach ache    Ezetimibe Myalgia, Other (See Comments) and Rash     cramps  cramps    Pravastatin Rash and Other (See Comments)     Past Medical History:   Diagnosis Date    AL amyloidosis     Anxiety     Arthritis     COVID-19 07/2020 9/7/2021    Depression     Diverticulitis of colon     Diverticulosis     GERD (gastroesophageal reflux disease)     History of Clostridioides difficile infection 2008    HAS HAD SEVERAL TIMES IN PAST PER PT (SAW INFECTIOUS DISEASE MD FOR THIS S/P COLOSTOMY/EXTENSIVE ANBX THERAPY)    History of prediabetes     History of snoring     History of stress incontinence      Hypercholesterolemia     Hyperlipidemia     Hypertension     Left ventricular hypertrophy     FOLLOWED BY DR MARI. DENIES CHEST PAIN BUT STATES DOES GET SOA AT TIMES WITH EXERTION REPORTS THAT IT IS NOT A NEW ISSUE     Liver disease     Oral herpes 08/18/2020    Seasonal allergies     used to have allergy shots in the past    SOB (shortness of breath)     STATES WITH EXERTION HAS BEEN ONGOING ISSUE NOT A NEW ISSUE    Thyroid disease     Hypothyroid     Past Surgical History:   Procedure Laterality Date    ABDOMINAL SURGERY  2005, 2014    ex lap x 2,  diverticulitis    ABDOMINOPLASTY  2016    ADENOIDECTOMY      APPENDECTOMY      CARDIAC CATHETERIZATION N/A 04/24/2020    Procedure: Coronary angiography;  Surgeon: Roberto Briones MD;  Location: [facility] CATH INVASIVE LOCATION;  Service: Cardiovascular;  Laterality: N/A;    CARDIAC CATHETERIZATION N/A 04/24/2020    Procedure: Left heart cath;  Surgeon: Roberto Briones MD;  Location[facility] C[facility] INVASIVE LOC[facility];  Service: Cardiovascular;  Laterality: N/A;    CARDIAC CATHETERIZATION N/A 04/24/2020    Procedure: Left ventriculography;  Surgeon: Roberto Briones MD;  Location: [facility] CATH INVASIVE LOCATION;  Service: Cardiovascular;  Laterality: N/A;    CARDIAC SURGERY      open heart surgery,    COLONOSCOPY  approx 2018    normal per pt     COLOSTOMY  2005    had colostomy and then is was reversed    COLOSTOMY CLOSURE  2006    CORRECTION HAMMER TOE Right 2017    ECTOPIC PREGNANCY SURGERY      1979, 1980    ENDOSCOPY N/A 05/27/2020    Procedure: ESOPHAGOGASTRODUODENOSCOPY WITH BIOPSY AND BIOPSY POLYPECTOMY AND MACIEL DIALATION;  Surgeon: Preethi Beck MD;  Location: [facility] ENDOSCOPY;  Service: Gastroenterology;  Laterality: N/A;  PRE: ESOPHAGEAL DYSPHAGIA  POST: Z LINE 46, ESOPHAGEAL SPASM, GASTRITIS, FUNDIC POLYP    ENDOSCOPY N/A 06/20/2023    ESOPHAGEAL DILATATION N/A 12/07/2021    Procedure: OR ESOPHAGEAL DILATATION with maciel  dilators ;  Surgeon: Jamey Leslie MD;  Location:  [facility];  Service: ENT;  Laterality: N/A;    ESOPHAGEAL MOTILITY STUDY N/A 02/03/2022    Procedure: ESOPHAGEAL MOTILITY STUDY;  Surgeon: Harshil, Nurse Performed;  Location:  [facility];  Service: Gastroenterology;  Laterality: N/A;  dypshagia     FOOT SURGERY Right 12/2016    Second and third hammertoe corrections    KNEE ARTHROSCOPY Right 2009    KNEE SURGERY Right     REVISION / TAKEDOWN COLOSTOMY  2006    SHOULDER ARTHROSCOPY Right 2018    right shoulder arthroscopy with extensive rotator cuff, biceps and labral debridement, chondroplasty, & subacromial decompression with acromioplasty    SHOULDER SURGERY      HAS HAS COMPLETE SHOULDER ON RIGHT. LEFT SCOPED AND HAD 2ND SURGERY WITH HARDWARE PLACED    SHOULDER SURGERY Left     2012. 2013    TONSILLECTOMY      TOTAL SHOULDER REVERSE ARTHROPLASTY Right 2019    WISDOM TOOTH EXTRACTION       Family History   Problem Relation Age of Onset    Hypertension Mother     Osteoporosis Mother     Skin cancer Mother     Heart disease Father     Hypertension Father     Breast cancer Maternal Grandmother     Ovarian cancer Neg Hx     Colon cancer Neg Hx     Deep vein thrombosis Neg Hx     Pulmonary embolism Neg Hx     Malig Hyperthermia Neg Hx        Home Medications:  Prior to Admission medications    Medication Sig Start Date End Date Taking? Authorizing Provider   acyclovir (Zovirax) 400 MG tablet Take 1 tablet by mouth 2 (Two) Times a Day. 6/6/23   Nav Sal MD   ALPRAZolam (XANAX) 0.25 MG tablet TAKE 1 TABLET BY MOUTH TWICE DAILY AS NEEDED FOR ANXIETY 9/15/23   Kentrell Canela MD   aspirin 81 MG chewable tablet CHEW AND SWALLOW 1 TABLET DAILY WITH BREAKFAST 1/12/23   ProviderEric MD   Cholecalciferol (Vitamin D3) 50 MCG (2000 UT) tablet Take 1 tablet by mouth Daily.    Eric Weston MD   cholestyramine (QUESTRAN) 4 g packet Take 1 packet by mouth 3 (Three) Times a Day With  Meals. 1 packet  TID PRN diarrhea 8/9/22   Nav Sal MD   ciclopirox (LOPROX) 1 % shampoo APPLY TOPICALLY 3 TIMES EVERY WEEK 10/20/22   Eric Weston MD   diphenoxylate-atropine (LOMOTIL) 2.5-0.025 MG per tablet Take 1 tablet by mouth 4 (Four) Times a Day As Needed for Diarrhea. 10/5/23   Nav Sal MD   empagliflozin (JARDIANCE) 10 MG tablet tablet Take 1 tablet by mouth Daily. 11/7/22   Eric Weston MD   FLUoxetine (PROzac) 20 MG capsule TAKE 1 CAPSULE BY MOUTH FOUR TIMES DAILY 8/7/23   Arian Peterson MD   levothyroxine (SYNTHROID, LEVOTHROID) 75 MCG tablet TAKE 1 TABLET BY MOUTH EVERY DAY 9/14/22   Arian Peterson MD   lidocaine (LIDODERM) 5 % APPLY 1 PATCH TOPICALLY EVERY DAY. REMOVE AND DISCARD AFTER 12 HOURS 1/2/23   Eric Weston MD   loratadine (CLARITIN) 10 MG tablet Take 1 tablet by mouth Daily. 6/8/23   Arian Peterson MD   Menthol-Zinc Oxide (Calmoseptine) 0.44-20.6 % ointment Apply 1 application  topically to the appropriate area as directed 2 (Two) Times a Day. 10/31/23   Savannah Summers MD   midodrine (PROAMATINE) 5 MG tablet Take 1 tablet by mouth 2 (Two) Times a Day. 6/26/23   Eric Weston MD   Multiple Vitamins-Minerals (V-C Forte) capsule Take 1 capsule by mouth Daily. 9/21/22   Eric Weston MD   olopatadine (PATANOL) 0.1 % ophthalmic solution INSTILL 1 DROP IN BOTH EYES TWICE DAILY 12/1/22   Arian Peterson MD   pantoprazole (PROTONIX) 20 MG EC tablet TAKE 1 TABLET BY MOUTH EVERY DAY. DO NOT TAKE WITHIN 2 HOURS OF THYROID MEDICATION 10/23/23   Arian Peterson MD   Pediatric Multivitamins-Iron (multivitamin chewable) chewable tablet Chew 1 tablet Daily. 12/20/22 12/21/23  Eric Weston MD   rosuvastatin (Crestor) 20 MG tablet Take 1 tablet by mouth Daily. 8/14/23   Arian Peterson MD   Sodium Fluoride 5000 Plus 1.1 % cream  7/19/23   Provider, MD Eric   spironolactone (ALDACTONE) 25 MG tablet Take 1 tablet by mouth 2  "(Two) Times a Day. 6/25/23   Provider, MD Eric   zinc oxide (DESITIN) 40 % paste paste Apply  topically to the appropriate area as directed Every 1 (One) Hour As Needed (Apply to irritated skin to create barrier) for up to 30 days. 10/11/23 11/10/23  Joslyn Cheung APRN   zolpidem (AMBIEN) 10 MG tablet TAKE 1 TABLET BY MOUTH AT NIGHT AS NEEDED FOR SLEEP 10/17/23   Arian Peterson MD        Social History:   Social History     Tobacco Use    Smoking status: Never    Smokeless tobacco: Never   Vaping Use    Vaping Use: Never used   Substance Use Topics    Alcohol use: Not Currently     Alcohol/week: 1.0 standard drink of alcohol     Types: 1 Glasses of wine per week    Drug use: Never         Review of Systems:  Review of Systems   Respiratory:  Positive for shortness of breath.    Cardiovascular:  Positive for chest pain.   Gastrointestinal:  Positive for nausea.        Physical Exam:  /67   Pulse 92   Temp 99.2 °F (37.3 °C) (Oral)   Resp 20   Ht 154.9 cm (60.98\")   Wt 59.1 kg (130 lb 4.7 oz)   SpO2 92%   BMI 24.63 kg/m²           General: Awake alert and in no obvious distress    HEENT: Head normocephalic atraumatic, eyes PERRLA EOMI, nose normal, oropharynx normal.    Neck: Supple full range of motion, no meningismus, no lymphadenopathy    Chest wall exam: There is reproducible tenderness to palpation of the right chest wall noted but normal visual inspection    Heart: Regular rate and rhythm, no murmurs or rubs, 2+ radial pulses bilaterally    Lungs: Clear to auscultation bilaterally without wheezes or crackles, no respiratory distress    Abdomen: Soft, nontender, nondistended, no rebound or guarding    Skin: Warm, dry, no rash    Musculoskeletal: Normal range of motion, no lower extremity edema or calf tenderness    Neurologic: Oriented x3, no motor deficits no sensory deficits    Psychiatric: Mood appears stable, no psychosis              Procedures:  Procedures      Medical Decision " Making:      Comorbidities that affect care:    Open heart surgery history, history of amyloidosis    External Notes reviewed:    Previous EKG: I reviewed her previous EKG showing incomplete bundle branch block.      The following orders were placed and all results were independently analyzed by me:  Orders Placed This Encounter   Procedures    COVID-19, FLU A/B, RSV PCR - Swab, Nasopharynx    XR Chest 1 View    CT Chest With Contrast Diagnostic    Barry Draw    High Sensitivity Troponin T    Comprehensive Metabolic Panel    Lipase    BNP    Magnesium    CBC Auto Differential    High Sensitivity Troponin T 2Hr    NPO Diet NPO Type: Strict NPO    Undress & Gown    Continuous Pulse Oximetry    Oxygen Therapy- Nasal Cannula; Titrate 1-6 LPM Per SpO2; 90 - 95%    ECG 12 Lead ED Triage Standing Order; Chest Pain    ECG 12 Lead ED Triage Standing Order; Chest Pain    Insert Peripheral IV    CBC & Differential    Green Top (Gel)    Lavender Top    Gold Top - SST    Light Blue Top       Medications Given in the Emergency Department:  Medications   sodium chloride 0.9 % flush 10 mL (has no administration in time range)   aspirin chewable tablet 324 mg (has no administration in time range)   acetaminophen (TYLENOL) tablet 975 mg (975 mg Oral Given 10/31/23 1710)   iopamidol (ISOVUE-370) 76 % injection 100 mL (100 mL Intravenous Given 10/31/23 1736)        ED Course:        ED Course as of 10/31/23 1959   Tue Oct 31, 2023   1630 .  EKG: I interpreted her twelve-lead EKG as normal sinus rhythm at 93 bpm with normal P waves, QRS showing left bundle branch block with some concave ST elevations due to bundle branch block but no acute ischemia noted. [VS]      ED Course User Index  [VS] Maykel Reaves MD       Labs:    Lab Results (last 24 hours)       Procedure Component Value Units Date/Time    High Sensitivity Troponin T [[id number]]  (Abnormal) Collected: 10/31/23 1635    Specimen: Blood Updated: 10/31/23 1711     HS Troponin  T 42 ng/L     Narrative:      High Sensitive Troponin T Reference Range:  <10.0 ng/L- Negative Female for AMI  <15.0 ng/L- Negative Male for AMI  >=10 - Abnormal Female indicating possible myocardial injury.  >=15 - Abnormal Male indicating possible myocardial injury.   Clinicians would have to utilize clinical acumen, EKG, Troponin, and serial changes to determine if it is an Acute Myocardial Infarction or myocardial injury due to an underlying chronic condition.         CBC & Differential [[id number]]  (Normal) Collected: 10/31/23 1635    Specimen: Blood Updated: 10/31/23 1646    Narrative:      The following orders were created for panel order CBC & Differential.  Procedure                               Abnormality         Status                     ---------                               -----------         ------                     CBC Auto Differential[[id number]]        Normal              Final result                 Please view results for these tests on the individual orders.    Comprehensive Metabolic Panel [[id number]]  (Abnormal) Collected: 10/31/23 1635    Specimen: Blood Updated: 10/31/23 1711     Glucose 86 mg/dL      BUN 17 mg/dL      Creatinine 0.74 mg/dL      Sodium 137 mmol/L      Potassium 4.4 mmol/L      Chloride 99 mmol/L      CO2 25.1 mmol/L      Calcium 9.7 mg/dL      Total Protein 6.7 g/dL      Albumin 4.5 g/dL      ALT (SGPT) 36 U/L      AST (SGOT) 30 U/L      Alkaline Phosphatase 92 U/L      Total Bilirubin 0.8 mg/dL      Globulin 2.2 gm/dL      A/G Ratio 2.0 g/dL      BUN/Creatinine Ratio 23.0     Anion Gap 12.9 mmol/L      eGFR 88.3 mL/min/1.73     Narrative:      GFR Normal >60  Chronic Kidney Disease <60  Kidney Failure <15      Lipase [[id number]]  (Normal) Collected: 10/31/23 1635    Specimen: Blood Updated: 10/31/23 1711     Lipase 28 U/L     BNP [[id number]]  (Normal) Collected: 10/31/23 1635    Specimen: Blood Updated: 10/31/23 1707     proBNP 607.5 pg/mL     Narrative:      This  assay is used as an aid in the diagnosis of individuals suspected of having heart failure. It can be used as an aid in the diagnosis of acute decompensated heart failure (ADHF) in patients presenting with signs and symptoms of ADHF to the emergency department (ED). In addition, NT-proBNP of <300 pg/mL indicates ADHF is not likely.    Age Range Result Interpretation  NT-proBNP Concentration (pg/mL:      <50             Positive            >450                   Gray                 300-450                    Negative             <300    50-75           Positive            >900                  Gray                300-900                  Negative            <300      >75             Positive            >1800                  Gray                300-1800                  Negative            <300    Magnesium [[id number]]  (Normal) Collected: 10/31/23 1635    Specimen: Blood Updated: 10/31/23 1711     Magnesium 2.0 mg/dL     CBC Auto Differential [[id number]]  (Normal) Collected: 10/31/23 1635    Specimen: Blood Updated: 10/31/23 1646     WBC 9.17 10*3/mm3      RBC 4.65 10*6/mm3      Hemoglobin 13.5 g/dL      Hematocrit 41.2 %      MCV 88.6 fL      MCH 29.0 pg      MCHC 32.8 g/dL      RDW 13.9 %      RDW-SD 45.0 fl      MPV 10.8 fL      Platelets 180 10*3/mm3      Neutrophil % 67.2 %      Lymphocyte % 23.3 %      Monocyte % 6.8 %      Eosinophil % 2.1 %      Basophil % 0.3 %      Immature Grans % 0.3 %      Neutrophils, Absolute 6.16 10*3/mm3      Lymphocytes, Absolute 2.14 10*3/mm3      Monocytes, Absolute 0.62 10*3/mm3      Eosinophils, Absolute 0.19 10*3/mm3      Basophils, Absolute 0.03 10*3/mm3      Immature Grans, Absolute 0.03 10*3/mm3      nRBC 0.0 /100 WBC     COVID-19, FLU A/B, RSV PCR - Swab, Nasopharynx [[id number]]  (Normal) Collected: 10/31/23 1638    Specimen: Swab from Nasopharynx Updated: 10/31/23 1732     COVID19 Not Detected     Influenza A PCR Not Detected     Influenza B PCR Not Detected     RSV, PCR  Not Detected    Narrative:      Fact sheet for providers: https://www.fda.gov/media/567715/download    Fact sheet for patients: https://www.fda.gov/media/600942/download    Test performed by PCR.    High Sensitivity Troponin T 2Hr [[id number]]  (Abnormal) Collected: 10/31/23 1846    Specimen: Blood Updated: 10/31/23 1926     HS Troponin T 44 ng/L      Troponin T Delta 2 ng/L     Narrative:      High Sensitive Troponin T Reference Range:  <10.0 ng/L- Negative Female for AMI  <15.0 ng/L- Negative Male for AMI  >=10 - Abnormal Female indicating possible myocardial injury.  >=15 - Abnormal Male indicating possible myocardial injury.   Clinicians would have to utilize clinical acumen, EKG, Troponin, and serial changes to determine if it is an Acute Myocardial Infarction or myocardial injury due to an underlying chronic condition.                  Imaging:    CT Chest With Contrast Diagnostic    Result Date: 10/31/2023  PROCEDURE: CT CHEST W CONTRAST DIAGNOSTIC  COMPARISON:  [facility], CT, CT CHEST W CONTRAST DIAGNOSTIC, 8/18/2022, 13:31. INDICATIONS: rt side chest pain  TECHNIQUE: After obtaining the patient's consent, CT images were obtained with non-ionic intravenous contrast material.   PROTOCOL:   Standard imaging protocol performed    RADIATION:   DLP: 278.6mGy*cm   Automated exposure control was utilized to minimize radiation dose. CONTRAST: 100cc Isovue 370 I.V.  FINDINGS:  There are no filling defects to indicate acute pulmonary embolic disease.  The heart is enlarged.  The patient has had previous cardiac surgery.  There are no enlarged hilar or mediastinal lymph nodes.  There are no layering pleural effusions.  There are few bandlike linear densities in the lower lobes felt to represent subsegmental atelectasis.  There is a 4 mm nodule in the posterior basilar segment of the left lower lobe on image 77 of series 404. There is a 3 mm nodule in the lateral basilar segment on the same image slice.   These were not seen well on the previous exam as the patient had interlobular septal thickening and hazy areas of ground-glass density.  Nodules of this size usually do not warrant further workup.  There are no abnormalities beneath the visualized hemidiaphragms.  There is scattered artifact from the patient's right shoulder arthroplasty.  There are no suspicious osteolytic or sclerotic lesions within the bony thorax.         1. No acute pulmonary embolic disease. 2. Cardiomegaly with previous surgery. 3. Bilateral lower lobe subsegmental atelectasis. 4. There are 4 mm and 3 mm nodules in the left lower lobe respectively.  Nodules of this size usually do not warrant follow-up.     ANDRE EDWARDS MD       Electronically Signed and Approved By: ANDRE EDWARDS MD on 10/31/2023 at 17:50             XR Chest 1 View    Result Date: 10/31/2023  PROCEDURE: XR CHEST 1 VW  COMPARISON: [facility], CR, XR CHEST 2 VW, 6/28/2023, 13:46.  INDICATIONS: Shortness of air with chest pains started last night  FINDINGS:  The lungs are clear bilaterally.  The cardiac and mediastinal silhouettes appear normal.  A left atrial occluded devices been placed.        1. No acute cardiopulmonary disease       Braden Erickson M.D.       Electronically Signed and Approved By: Braden Erickson M.D. on 10/31/2023 at 16:01                Differential Diagnosis and Discussion:      Chest Pain:  Based on the patient's signs and symptoms, I considered aortic dissection, myocardial infaction, pulmonary embolism, cardiac tamponade, pericarditis, pneumothorax, musculoskeletal chest pain and other differential diagnosis as an etiology of the patient's chest pain.   Fever: Based on the complaint of fever, differential diagnosis includes but is not limited to meningitis, pneumonia, pyelonephritis, acute uti,  systemic immune response syndrome, sepsis, viral syndrome, fungal infection, tick born illness and other bacterial infections.    All labs were reviewed and  interpreted by me.  All X-rays impressions were independently interpreted by me.  EKG was interpreted by me.  CT scan radiology impression was interpreted by me.    MDM     Amount and/or Complexity of Data Reviewed  Clinical lab tests: reviewed  Tests in the radiology section of CPT®: reviewed  Tests in the medicine section of CPT®: reviewed           This patient is a 68-year-old female with history of amyloidosis and previous open heart surgery for this, now presenting with right-sided chest pain and a low-grade fever and mild cough.    I considered possibility of pneumonia or bronchitis.    I considered possibility of PE.    She does have some reproducible tenderness on the chest wall exam.    Chest x-ray was unremarkable and vital signs are stable but I note she has a low-grade fever here.    She states it is somewhat worse with inspiration so I will get a CT of the chest with contrast to rule out PE versus infiltrate not seen on x-ray.    We are swabbing her for COVID-19 and flu as well.    COVID and flu came back negative and CT of the chest came back unremarkable today.  Both troponins are negative for acute MI, and patient looks to be in no distress and her vital signs look stable for outpatient follow-up with her PCP.          Patient Care Considerations:          Consultants/Shared Management Plan:        Social Determinants of Health:    Patient is independent, reliable, and has access to care.       Disposition and Care Coordination:    Discharged: I considered escalation of care by admitting this patient for observation, however the patient has improved and is suitable and  stable for discharge.    I have explained the patient´s condition, diagnoses and treatment plan based on the information available to me at this time. I have answered questions and addressed any concerns. The patient has a good  understanding of the patient´s diagnosis, condition, and treatment plan as can be expected at this point.  The vital signs have been stable. The patient´s condition is stable and appropriate for discharge from the emergency department.      The patient will pursue further outpatient evaluation with the primary care physician or other designated or consulting physician as outlined in the discharge instructions. They are agreeable to this plan of care and follow-up instructions have been explained in detail. The patient has received these instructions in written format and have expressed an understanding of the discharge instructions. The patient is aware that any significant change in condition or worsening of symptoms should prompt an immediate return to this or the closest emergency department or call to [facility].  I have explained discharge medications and the need for follow up with the patient/caretakers. This was also printed in the discharge instructions. Patient was discharged with the following medications and follow up:      Medication List        New Prescriptions      doxycycline 100 MG capsule  Commonly known as: MONODOX  Take 1 capsule by mouth 2 (Two) Times a Day.               Where to Get Your Medications        These medications were sent to [facility][id number] - [address] AT [facility] OF [address] [phone] [facility] [phone] [address] [address]      Phone: [phone]   doxycycline 100 MG capsule      Arian Peterson MD  [address] [facility] [address] [phone]    Call in 1 day  As needed, for a follow-up appointment       Final diagnoses:   Right-sided chest wall pain   History of amyloidosis   Left bundle branch block        ED Disposition       ED Disposition   Discharge    Condition   Stable    Comment   --               This medical record created using voice recognition software.             Maykel Reaves MD  10/31/23 1959

## 2023-11-01 ENCOUNTER — HOSPITAL ENCOUNTER (OUTPATIENT)
Dept: MAMMOGRAPHY | Facility: HOSPITAL | Age: 68
Discharge: HOME | End: 2023-11-01
Payer: MEDICARE

## 2023-11-07 NOTE — PROGRESS NOTES
"[facility] CBC GROUP OUTPATIENT FOLLOW UP CLINIC VISIT    REASON FOR FOLLOW-UP:    AL amyloidosis  Therapy with michael-CyBorD initiated 8/16/2022    HISTORY OF PRESENT ILLNESS: Cynthia Her is a 68 y.o. female with the above-mentioned history who is here today for lab review and evaluation.    She was in the ER on 10/31 no chest pain or shortness of breath.  Negative CT imaging.  Left bundle branch block was noted on EKG.  She followed up this result with cardiology at [place].    She did have a fever and a sore throat over the weekend particularly on Sunday.  This resolved and she is feeling back to normal.    She continues to have diarrhea associated with dumping syndrome.  She continues to have some difficulty swallowing.  In spite of this, she eats well and is gaining weight.  She will follow-up at [facility] [place] with multiple physicians in the near future including plans with Dr. Aranda for esophageal manometry.    REVIEW OF SYSTEMS:  As per the HPI    PHYSICAL EXAMINATION:    Vitals:    11/08/23 0925   BP: 148/80   Pulse: 80   Resp: 18   Temp: 98.2 °F (36.8 °C)   TempSrc: Temporal   SpO2: 97%   Weight: 58.6 kg (129 lb 3.2 oz)   Height: 154.9 cm (60.98\")   PainSc: 0-No pain     General:  No acute distress, awake, alert and oriented  Skin:  Warm and dry, no visible rash.   HEENT:  Normocephalic/atraumatic.   Chest:  Normal respiratory effort.  Lungs clear to auscultation bilaterally  Heart: Regular rate and rhythm with a grade 2 out of 6 early systolic murmur, stable  Extremities:  No visible clubbing, cyanosis, or edema  Neuro/psych:  Grossly nonfocal.  Normal mood and affect.    DIAGNOSTIC DATA:  CBC and Differential (11/08/2023 09:18)       IMAGING:    None reviewed      ASSESSMENT:  This is a 68 y.o. female with:    *AL amyloidosis  She had a stress echocardiogram on 4/24/2020 showing a normal left ventricular ejection fraction of 60% with moderate concentric hypertrophy but no wall motion " abnormalities of the left ventricle.  There was impaired relaxation noted.  She complained of chest pain during the examination.  There was some ST segment depression.  Cardiac catheterization was performed on the same day.  No intervention was required.  Follow-up echocardiogram on 4/15/2021 showed a left ventricular ejection fraction of 61 to 65% with normal LV cavity size.  Left ventricular wall thickness showed moderate concentric hypertrophy.  Diastolic function was impaired.  No pericardial effusion.  Small left pleural effusion.  She had follow-up PYP imaging for cardiac amyloidosis that was not suggestive of ATTR amyloidosis  Laboratory values on 2/24/2022 showed a high serum free light chain lambda of 121, normal kappa of 10.5, low ratio at 0.09.  Serum protein electrophoresis showed no evidence of an M spike.  Urine light chains were abnormal with an elevated lambda light chain of 33 and a low ratio of 0.48 with a 12.7 mg M spike on 24-hour urine protein electrophoresis.  BNP was elevated at 2306 on 3/4/2022.  The troponin was normal at 0.03.  CK was 212 with a CK-MB of 10.87.  Initial consult on 3/30/22. No M spike on serum protein electrophoresis. SIFE 'presence of monoclonal protein is unclear.'   Bone marrow biopsy 4/13/22 normocellular, 12.1% plasma cells consistent with low volume plasma cell dyscrasia. FISH with low level t(11;14) fusion only. No amyloid noted as Congo red staining negative.   Fat pad biopsy 5/11/22 positive for amyloid, AL lambda  Referred to Dr. Buenrostro at [facility]. Intended to enroll on a clinical study but her troponin as tested by the study sponsor was not high enough  Therapy with michael-CyBorD initiated 8/16/2022  Patient seen in the office on 8/17/2022 for triage visit.  Patient with complaints of fatigue, dizziness and diarrhea.  She was mildly hypotensive.  She was given IV fluids.  Subsequent admission at [facility] with shortness of breath and volume  overload.  She was diuresed and treated with antibiotics.  D8 therapy administered on 8/30  Light chains normal at [facility] on 8/31/22 (lambda light chain 1.3, down from 12.44)  9/27/2022: Cycle 2-day 8 of therapy  10/4/2022 cycle 2-day 8 CyBorD.  Patient continues to push oral hydration.  She continues to have some neck stiffness but this has not worsened.  She will see [facility] next week for further cardiac work-up.  Glucose was low upon initial labs today however the patient was given something to eat by nursing prior to rechecking.  This was therefore rechecked prior to her leaving the office and was 87.  Patient reports she was feeling fine.  10/18/2020 to cycle 2-day 22 Sissy -CyBorD.  Velcade dose will be reduced to 1.0 mg/m² due to patient's persistent issues with orthostasis.  10/25/2022: Cycle 3-day 1  11/8/2022: Delay in cycle 3-day 15 therapy.  Day 8 omitted due to an upper respiratory infection.  11/22/2022: Cycle 3-day 22.   final planned treatment before she goes to [place] for cardiac surgery.  Cardiac surgery performed at [place] on 12/5/2022.  Pathology from the myectomy showed cardiac amyloidosis extensively involving the vessels and endocardium with myocyte hypertrophy and interstitial fibrosis.  Congo red stain was positive.  Reviewed back on 1/17/2023 with plans to resume Darzalex, Cytoxan, Velcade, dexamethasone with cycle #4 on 1/24.  Patient seen 2/7/2023 (missed 1/31/2023 due to weather, which would have been day 8).  We will go ahead and proceed with as cycle 4 day 15, day 15.  2/14/2023: Cycle 4-day 22  2/21/2023 cycle 5-day 1 Darzalex, Cytoxan (IV Cytoxan given in the office), Velcade, dexamethasone (20 mg p.o. given in the office).  Repeat labs from 2/21/2023 showing M spike up to 0.8, free kappa light chain up to 85.8 (previously 5.3 on 1/17/2023), ratio 8.58 (was previously 0.76 on 1/17/2023).  We ended up repeating labs on 3/7/2023 M spike 0.1, free kappa light chain 3.4,  ratio 0.83.  We will continue on with treatment.  She is scheduled to return to [place] in April.  4/11/2023 cycle 6, day 22 Darzalex-CyBorD.  Final planned therapy.  She was seen at [place] with recommendations for monthly Darzalex for 18 months. Bactrim stopped and she continues acyclovir. No immediate plans for ASCT  11/8/2023: Proceed with Darzalex    *Diarrhea  She saw Dr. Hoyos with GI at [place] on 9/7.  Suspicion for amyloid involvement of the GI tract  Continue Lomotil and Imodium. Rifaximin prescribed by Dr. Hoyos which she continues to use intermittently with improvement but this is only prescribed for 1 week out of 4  Diarrhea waxes and wanes with what she eats and dairy products particularly worsen diarrhea  Continue Questran, Lomotil and Imodium.  These help but did not completely control the issue.  10/11/2023: Patient continues on rifaximin, Questran, Lomotil, and Imodium for diarrhea management.  Diarrhea still not completely controlled. Averages at least 5 episodes daily.   Stable issues at this time    *Elevated liver enzymes  Continue to monitor    *Cardiomyopathy:  Most recent echocardiogram on 8/17/2022 showed left ventricular wall thickness consistent with moderate to severe concentric hypertrophy along with left ventricular septal wall measuring 2.2 cm and posterior wall thickness at 1.9 cm likely due to amyloidosis.  LVEF 61 to 65%.  Grade 1 impaired relaxation.  Now followed by Dr. Lyles at [facility] with concerns for pre-existing HCM and AL cardiac amyloid.   Cardiac MRI results above.  Amyloidosis evident.  Catheterization performed at [place].   Cardiac surgery performed at [place] on 12/5/2022.  Pathology from the myectomy showed cardiac amyloidosis extensively involving the vessels and endocardium with myocyte hypertrophy and interstitial fibrosis.  Congo red stain was positive.  Symptoms improved significantly  10/11/2023: Seen by cardiology at [place] on 10/4/2023.  Patient was restarted on metoprolol XL.  Midodrine adjusted to morning and early afternoon.  ECHO to be repeated in 3 months with follow-up.  New LBBB    *History of periorbital hematomas: These have resolved.  Coagulation studies and a factor X level were all normal.    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   Continuing magic mouthwash.    *Dysphagia:  She saw Dr. Aranda at [place] with Botox injections performed.  Symptoms unfortunately are not much better.  Recent esophagram confirmed esophageal dysmotility.  Follow-up at [place].  11/8/2023: Remains stable.  Esophageal manometry in the near future.    *Skin changes with hypopigmentation and blister on the left side of her nose  Concerning for new manifestations of amyloidosis  Not discussed at this time    *Subcutaneous nodules, also increasing in number and size  Again not discussed today    *Rectal irritation  Saw wound care.  Continue current therapy.  No recent issues.    PLAN:  Monthly Darzalex continues x 18 months, initiated in May 2023.  Proceed again today.  Continue Xifaxan, Imodium, Lomotil, and Questran for diarrhea control.   Continue barrier creams and ointments  Follow up with wound care as needed  Continue Magic mouthwash as needed for glossitis.  Continue cardiac medications as managed by cardiology at [place].  Continue acyclovir for prophylaxis.  No longer on Bactrim.  She will follow up with [place] [facility] again in January 2024. (Heme, GI, cardiology)  Follow-up serum protein studies from today.  I will see her back in 4 weeks with labs and scheduled Darzalex.     This patient remains on high risk drug therapy requiring intensive monitoring for toxicity.

## 2023-11-08 ENCOUNTER — OFFICE VISIT (OUTPATIENT)
Dept: ONCOLOGY | Facility: CLINIC | Age: 68
End: 2023-11-08
Payer: MEDICARE

## 2023-11-08 ENCOUNTER — LAB (OUTPATIENT)
Dept: LAB | Facility: HOSPITAL | Age: 68
End: 2023-11-08
Payer: MEDICARE

## 2023-11-08 ENCOUNTER — APPOINTMENT (OUTPATIENT)
Dept: ONCOLOGY | Facility: HOSPITAL | Age: 68
End: 2023-11-08
Payer: MEDICARE

## 2023-11-08 ENCOUNTER — INFUSION (OUTPATIENT)
Dept: ONCOLOGY | Facility: HOSPITAL | Age: 68
End: 2023-11-08
Payer: MEDICARE

## 2023-11-08 VITALS
TEMPERATURE: 98.2 F | DIASTOLIC BLOOD PRESSURE: 80 MMHG | OXYGEN SATURATION: 97 % | SYSTOLIC BLOOD PRESSURE: 148 MMHG | HEART RATE: 80 BPM | BODY MASS INDEX: 24.39 KG/M2 | HEIGHT: 61 IN | WEIGHT: 129.2 LBS | RESPIRATION RATE: 18 BRPM

## 2023-11-08 DIAGNOSIS — E85.81 LIGHT CHAIN (AL) AMYLOIDOSIS: ICD-10-CM

## 2023-11-08 DIAGNOSIS — E85.81 LIGHT CHAIN (AL) AMYLOIDOSIS: Primary | ICD-10-CM

## 2023-11-08 LAB
BASOPHILS # BLD AUTO: 0.03 10*3/MM3 (ref 0–0.2)
BASOPHILS NFR BLD AUTO: 0.5 % (ref 0–1.5)
DEPRECATED RDW RBC AUTO: 45.2 FL (ref 37–54)
EOSINOPHIL # BLD AUTO: 0.19 10*3/MM3 (ref 0–0.4)
EOSINOPHIL NFR BLD AUTO: 3.4 % (ref 0.3–6.2)
ERYTHROCYTE [DISTWIDTH] IN BLOOD BY AUTOMATED COUNT: 13.4 % (ref 12.3–15.4)
HCT VFR BLD AUTO: 44.6 % (ref 34–46.6)
HGB BLD-MCNC: 14.3 G/DL (ref 12–15.9)
IMM GRANULOCYTES # BLD AUTO: 0.02 10*3/MM3 (ref 0–0.05)
IMM GRANULOCYTES NFR BLD AUTO: 0.4 % (ref 0–0.5)
LYMPHOCYTES # BLD AUTO: 1.78 10*3/MM3 (ref 0.7–3.1)
LYMPHOCYTES NFR BLD AUTO: 31.8 % (ref 19.6–45.3)
MCH RBC QN AUTO: 29.2 PG (ref 26.6–33)
MCHC RBC AUTO-ENTMCNC: 32.1 G/DL (ref 31.5–35.7)
MCV RBC AUTO: 91 FL (ref 79–97)
MONOCYTES # BLD AUTO: 0.35 10*3/MM3 (ref 0.1–0.9)
MONOCYTES NFR BLD AUTO: 6.3 % (ref 5–12)
NEUTROPHILS NFR BLD AUTO: 3.22 10*3/MM3 (ref 1.7–7)
NEUTROPHILS NFR BLD AUTO: 57.6 % (ref 42.7–76)
NRBC BLD AUTO-RTO: 0 /100 WBC (ref 0–0.2)
PLATELET # BLD AUTO: 260 10*3/MM3 (ref 140–450)
PMV BLD AUTO: 9.6 FL (ref 6–12)
RBC # BLD AUTO: 4.9 10*6/MM3 (ref 3.77–5.28)
WBC NRBC COR # BLD: 5.59 10*3/MM3 (ref 3.4–10.8)

## 2023-11-08 PROCEDURE — 1159F MED LIST DOCD IN RCRD: CPT | Performed by: INTERNAL MEDICINE

## 2023-11-08 PROCEDURE — 1126F AMNT PAIN NOTED NONE PRSNT: CPT | Performed by: INTERNAL MEDICINE

## 2023-11-08 PROCEDURE — 99214 OFFICE O/P EST MOD 30 MIN: CPT | Performed by: INTERNAL MEDICINE

## 2023-11-08 PROCEDURE — 25010000002 DARATUMUMAB-HYALURONIDASE-FIHJ 1800-30000 MG-UT/15ML SOLUTION: Performed by: INTERNAL MEDICINE

## 2023-11-08 PROCEDURE — 96401 CHEMO ANTI-NEOPL SQ/IM: CPT

## 2023-11-08 PROCEDURE — 1160F RVW MEDS BY RX/DR IN RCRD: CPT | Performed by: INTERNAL MEDICINE

## 2023-11-08 PROCEDURE — 63710000001 DEXAMETHASONE PER 0.25 MG: Performed by: INTERNAL MEDICINE

## 2023-11-08 PROCEDURE — 3079F DIAST BP 80-89 MM HG: CPT | Performed by: INTERNAL MEDICINE

## 2023-11-08 PROCEDURE — 85025 COMPLETE CBC W/AUTO DIFF WBC: CPT

## 2023-11-08 PROCEDURE — 3077F SYST BP >= 140 MM HG: CPT | Performed by: INTERNAL MEDICINE

## 2023-11-08 PROCEDURE — 36415 COLL VENOUS BLD VENIPUNCTURE: CPT

## 2023-11-08 RX ORDER — HYDROXYZINE PAMOATE 25 MG/1
25 CAPSULE ORAL ONCE
Status: COMPLETED | OUTPATIENT
Start: 2023-11-08 | End: 2023-11-08

## 2023-11-08 RX ORDER — FAMOTIDINE 10 MG/ML
20 INJECTION, SOLUTION INTRAVENOUS AS NEEDED
Status: CANCELLED | OUTPATIENT
Start: 2023-11-08

## 2023-11-08 RX ORDER — PREGABALIN 50 MG/1
50 CAPSULE ORAL DAILY
COMMUNITY
Start: 2023-10-09

## 2023-11-08 RX ORDER — PREDNISONE 20 MG/1
20 TABLET ORAL DAILY
COMMUNITY
Start: 2023-07-21

## 2023-11-08 RX ORDER — ACETAMINOPHEN 500 MG
1000 TABLET ORAL ONCE
Status: COMPLETED | OUTPATIENT
Start: 2023-11-08 | End: 2023-11-08

## 2023-11-08 RX ORDER — FAMOTIDINE 10 MG/ML
20 INJECTION, SOLUTION INTRAVENOUS AS NEEDED
OUTPATIENT
Start: 2023-12-06

## 2023-11-08 RX ORDER — MEPERIDINE HYDROCHLORIDE 25 MG/ML
25 INJECTION INTRAMUSCULAR; INTRAVENOUS; SUBCUTANEOUS
OUTPATIENT
Start: 2023-12-06

## 2023-11-08 RX ORDER — METOPROLOL SUCCINATE 25 MG/1
25 TABLET, EXTENDED RELEASE ORAL DAILY
COMMUNITY
Start: 2023-10-04 | End: 2024-10-04

## 2023-11-08 RX ORDER — MEPERIDINE HYDROCHLORIDE 25 MG/ML
25 INJECTION INTRAMUSCULAR; INTRAVENOUS; SUBCUTANEOUS
Status: CANCELLED | OUTPATIENT
Start: 2023-11-08

## 2023-11-08 RX ORDER — HYDROXYZINE PAMOATE 25 MG/1
25 CAPSULE ORAL ONCE
Status: CANCELLED | OUTPATIENT
Start: 2023-11-08 | End: 2023-11-08

## 2023-11-08 RX ORDER — HYDROXYZINE PAMOATE 25 MG/1
25 CAPSULE ORAL ONCE
OUTPATIENT
Start: 2023-12-06 | End: 2023-12-06

## 2023-11-08 RX ORDER — DIPHENHYDRAMINE HYDROCHLORIDE 50 MG/ML
50 INJECTION INTRAMUSCULAR; INTRAVENOUS AS NEEDED
Status: CANCELLED | OUTPATIENT
Start: 2023-11-08

## 2023-11-08 RX ORDER — ACETAMINOPHEN 500 MG
1000 TABLET ORAL ONCE
OUTPATIENT
Start: 2023-12-06

## 2023-11-08 RX ORDER — DIPHENHYDRAMINE HYDROCHLORIDE 50 MG/ML
50 INJECTION INTRAMUSCULAR; INTRAVENOUS AS NEEDED
OUTPATIENT
Start: 2023-12-06

## 2023-11-08 RX ORDER — ACETAMINOPHEN 500 MG
1000 TABLET ORAL ONCE
Status: CANCELLED | OUTPATIENT
Start: 2023-11-08

## 2023-11-08 RX ORDER — DEXAMETHASONE 4 MG/1
20 TABLET ORAL ONCE
Status: COMPLETED | OUTPATIENT
Start: 2023-11-08 | End: 2023-11-08

## 2023-11-08 RX ADMIN — DARATUMUMAB AND HYALURONIDASE-FIHJ (HUMAN RECOMBINANT) 1800 MG: 1800; 30000 INJECTION SUBCUTANEOUS at 10:25

## 2023-11-08 RX ADMIN — HYDROXYZINE PAMOATE 25 MG: 25 CAPSULE ORAL at 09:51

## 2023-11-08 RX ADMIN — ACETAMINOPHEN 1000 MG: 500 TABLET ORAL at 09:51

## 2023-11-08 RX ADMIN — DEXAMETHASONE 20 MG: 4 TABLET ORAL at 09:51

## 2023-11-09 LAB
ALBUMIN SERPL ELPH-MCNC: 3.8 G/DL (ref 2.9–4.4)
ALBUMIN/GLOB SERPL: 1.6 {RATIO} (ref 0.7–1.7)
ALPHA1 GLOB SERPL ELPH-MCNC: 0.2 G/DL (ref 0–0.4)
ALPHA2 GLOB SERPL ELPH-MCNC: 1 G/DL (ref 0.4–1)
B-GLOBULIN SERPL ELPH-MCNC: 0.9 G/DL (ref 0.7–1.3)
GAMMA GLOB SERPL ELPH-MCNC: 0.3 G/DL (ref 0.4–1.8)
GLOBULIN SER-MCNC: 2.5 G/DL (ref 2.2–3.9)
IGA SERPL-MCNC: 6 MG/DL (ref 87–352)
IGG SERPL-MCNC: 322 MG/DL (ref 586–1602)
IGM SERPL-MCNC: 26 MG/DL (ref 26–217)
INTERPRETATION SERPL IEP-IMP: ABNORMAL
KAPPA LC FREE SER-MCNC: 3.5 MG/L (ref 3.3–19.4)
KAPPA LC FREE/LAMBDA FREE SER: 1.03 {RATIO} (ref 0.26–1.65)
LABORATORY COMMENT REPORT: ABNORMAL
LAMBDA LC FREE SERPL-MCNC: 3.4 MG/L (ref 5.7–26.3)
M PROTEIN SERPL ELPH-MCNC: 0.1 G/DL
PROT SERPL-MCNC: 6.3 G/DL (ref 6–8.5)

## 2023-11-10 RX ORDER — FLUOXETINE HYDROCHLORIDE 20 MG/1
CAPSULE ORAL
Qty: 360 CAPSULE | Refills: 0 | Status: SHIPPED | OUTPATIENT
Start: 2023-11-10

## 2023-11-10 RX ORDER — ROSUVASTATIN CALCIUM 20 MG/1
20 TABLET, COATED ORAL DAILY
Qty: 90 TABLET | Refills: 0 | Status: SHIPPED | OUTPATIENT
Start: 2023-11-10

## 2023-11-10 RX ORDER — LEVOTHYROXINE SODIUM 0.07 MG/1
TABLET ORAL
Qty: 90 TABLET | Refills: 3 | Status: SHIPPED | OUTPATIENT
Start: 2023-11-10

## 2023-11-13 DIAGNOSIS — F41.9 ANXIETY: ICD-10-CM

## 2023-11-13 RX ORDER — ALPRAZOLAM 0.25 MG/1
TABLET ORAL
Qty: 60 TABLET | OUTPATIENT
Start: 2023-11-13

## 2023-11-13 RX ORDER — ROSUVASTATIN CALCIUM 20 MG/1
20 TABLET, COATED ORAL DAILY
Qty: 90 TABLET | Refills: 0 | OUTPATIENT
Start: 2023-11-13

## 2023-11-13 NOTE — TELEPHONE ENCOUNTER
Rx Refill Note  Requested Prescriptions     Pending Prescriptions Disp Refills    ALPRAZolam (XANAX) 0.25 MG tablet [Pharmacy Med Name: ALPRAZOLAM 0.25MG TABLETS] 60 tablet      Sig: TAKE 1 TABLET BY MOUTH TWICE DAILY AS NEEDED FOR ANXIETY      Last office visit with prescribing clinician: Visit date not found   Last telemedicine visit with prescribing clinician: Visit date not found   Next office visit with prescribing clinician: Visit date not found

## 2023-11-14 ENCOUNTER — HOSPITAL ENCOUNTER (OUTPATIENT)
Dept: MAMMOGRAPHY | Facility: HOSPITAL | Age: 68
Discharge: HOME | End: 2023-11-14
Admitting: INTERNAL MEDICINE
Payer: MEDICARE

## 2023-11-14 DIAGNOSIS — Z12.31 VISIT FOR SCREENING MAMMOGRAM: ICD-10-CM

## 2023-11-14 PROCEDURE — 77063 BREAST TOMOSYNTHESIS BI: CPT

## 2023-11-14 PROCEDURE — 77067 SCR MAMMO BI INCL CAD: CPT

## 2023-11-15 DIAGNOSIS — F41.9 ANXIETY: ICD-10-CM

## 2023-11-15 RX ORDER — ALPRAZOLAM 0.25 MG/1
0.25 TABLET ORAL 2 TIMES DAILY PRN
Qty: 60 TABLET | Refills: 0 | Status: SHIPPED | OUTPATIENT
Start: 2023-11-15

## 2023-11-15 NOTE — TELEPHONE ENCOUNTER
"  Caller: Cynthia Her \"YOLETTE\"    Relationship: Self    Best call back number: [phone]    Requested Prescriptions:   Requested Prescriptions     Pending Prescriptions Disp Refills    ALPRAZolam (XANAX) 0.25 MG tablet 60 tablet 0     Sig: Take 1 tablet by mouth 2 (Two) Times a Day As Needed. for anxiety        Pharmacy where request should be sent: [id number] - [place], [place] - [address] AT [address] /[facility] & [place] - [phone] [facility] [phone] FX     Last office visit with prescribing clinician: 4/10/2023   Last telemedicine visit with prescribing clinician: Visit date not found   Next office visit with prescribing clinician: 12/4/2023     Additional details provided by patient: HAS ABOUT 8 LEFT, WILL BE OUT BEFORE HER APPOINTMENT.     Does the patient have less than a 3 day supply:  [] Yes  [x] No    Would you like a call back once the refill request has been completed: [] Yes [x] No    If the office needs to give you a call back, can they leave a voicemail: [] Yes [x] No    Ellis Hand Rep   11/15/23 09:55 EST         "

## 2023-11-27 DIAGNOSIS — R19.7 DIARRHEA, UNSPECIFIED TYPE: ICD-10-CM

## 2023-11-27 DIAGNOSIS — Z91.89 AT HIGH RISK FOR SKIN BREAKDOWN: ICD-10-CM

## 2023-11-27 RX ORDER — DIPHENOXYLATE HYDROCHLORIDE AND ATROPINE SULFATE 2.5; .025 MG/1; MG/1
1 TABLET ORAL 4 TIMES DAILY PRN
Qty: 120 TABLET | Refills: 0 | Status: SHIPPED | OUTPATIENT
Start: 2023-11-27

## 2023-11-27 RX ORDER — ANORECTAL OINTMENT 15.7; .44; 24; 20.6 G/100G; G/100G; G/100G; G/100G
1 OINTMENT TOPICAL 2 TIMES DAILY
Qty: 20 G | Refills: 2 | Status: SHIPPED | OUTPATIENT
Start: 2023-11-27

## 2023-12-04 ENCOUNTER — TELEPHONE (OUTPATIENT)
Dept: PEDIATRICS | Facility: OTHER | Age: 68
End: 2023-12-04

## 2023-12-04 NOTE — TELEPHONE ENCOUNTER
"  Caller: Cynthia Her \"YOLETTE\"    Relationship: Self    Best call back number:[phone]  What medication are you requesting: VALACYCLOVIR 1GM     What are your current symptoms: HERPES OUTBREAK     Have you had these symptoms before:    [x] Yes  [] No    Have you been treated for these symptoms before:   [] Yes  [] No    If a prescription is needed, what is your preferred pharmacy and phone number: [facility] #[id number] - [place], [place] - [address] AT [facility] [address]/[facility] & [place] - [phone] [address] [phone] FX     "

## 2023-12-05 NOTE — PROGRESS NOTES
"[facility] GROUP OUTPATIENT FOLLOW UP CLINIC VISIT    REASON FOR FOLLOW-UP:    AL amyloidosis  Therapy with michael-CyBorD initiated 8/16/2022    HISTORY OF PRESENT ILLNESS: Cynthia Her is a 68 y.o. female with the above-mentioned history who is here today for lab review and evaluation.    Diarrhea persists.  Swallowing issues persist.  She otherwise is doing about the same with no new cardiovascular issues.  She follows up at [place] in January.      REVIEW OF SYSTEMS:  As per the HPI    PHYSICAL EXAMINATION:    Vitals:    12/06/23 1102   BP: 149/78   Pulse: 80   Resp: 18   Temp: 97.8 °F (36.6 °C)   TempSrc: Temporal   SpO2: 95%   Weight: 58.4 kg (128 lb 12.8 oz)   Height: 154.9 cm (60.98\")   PainSc: 0-No pain       General:  No acute distress, awake, alert and oriented  Skin:  Warm and dry, no visible rash.   HEENT:  Normocephalic/atraumatic.   Chest:  Normal respiratory effort.  Lungs clear to auscultation bilaterally  Heart: Regular rate and rhythm with a grade 2 out of 6 early systolic murmur, stable again today  Extremities:  No visible clubbing, cyanosis, or edema  Neuro/psych:  Grossly nonfocal.  Normal mood and affect.    DIAGNOSTIC DATA:  CBC and Differential (12/06/2023 10:52)       IMAGING:    None reviewed      ASSESSMENT:  This is a 68 y.o. female with:    *AL amyloidosis  She had a stress echocardiogram on 4/24/2020 showing a normal left ventricular ejection fraction of 60% with moderate concentric hypertrophy but no wall motion abnormalities of the left ventricle.  There was impaired relaxation noted.  She complained of chest pain during the examination.  There was some ST segment depression.  Cardiac catheterization was performed on the same day.  No intervention was required.  Follow-up echocardiogram on 4/15/2021 showed a left ventricular ejection fraction of 61 to 65% with normal LV cavity size.  Left ventricular wall thickness showed moderate concentric hypertrophy.  " Diastolic function was impaired.  No pericardial effusion.  Small left pleural effusion.  She had follow-up PYP imaging for cardiac amyloidosis that was not suggestive of ATTR amyloidosis  Laboratory values on 2/24/2022 showed a high serum free light chain lambda of 121, normal kappa of 10.5, low ratio at 0.09.  Serum protein electrophoresis showed no evidence of an M spike.  Urine light chains were abnormal with an elevated lambda light chain of 33 and a low ratio of 0.48 with a 12.7 mg M spike on 24-hour urine protein electrophoresis.  BNP was elevated at 2306 on 3/4/2022.  The troponin was normal at 0.03.  CK was 212 with a CK-MB of 10.87.  Initial consult on 3/30/22. No M spike on serum protein electrophoresis. SIFE 'presence of monoclonal protein is unclear.'   Bone marrow biopsy 4/13/22 normocellular, 12.1% plasma cells consistent with low volume plasma cell dyscrasia. FISH with low level t(11;14) fusion only. No amyloid noted as Congo red staining negative.   Fat pad biopsy 5/11/22 positive for amyloid, AL lambda  Referred to Dr. Buenrostro at [facility]. Intended to enroll on a clinical study but her troponin as tested by the study sponsor was not high enough  Therapy with michael-CyBorD initiated 8/16/2022  Patient seen in the office on 8/17/2022 for triage visit.  Patient with complaints of fatigue, dizziness and diarrhea.  She was mildly hypotensive.  She was given IV fluids.  Subsequent admission at [facility] with shortness of breath and volume overload.  She was diuresed and treated with antibiotics.  D8 therapy administered on 8/30  Light chains normal at [facility] on 8/31/22 (lambda light chain 1.3, down from 12.44)  9/27/2022: Cycle 2-day 8 of therapy  10/4/2022 cycle 2-day 8 CyBorD.  Patient continues to push oral hydration.  She continues to have some neck stiffness but this has not worsened.  She will see [facility] next week for further cardiac work-up.  Glucose was low upon initial labs  today however the patient was given something to eat by nursing prior to rechecking.  This was therefore rechecked prior to her leaving the office and was 87.  Patient reports she was feeling fine.  10/18/2020 to cycle 2-day 22 Sissy -CyBorD.  Velcade dose will be reduced to 1.0 mg/m² due to patient's persistent issues with orthostasis.  10/25/2022: Cycle 3-day 1  11/8/2022: Delay in cycle 3-day 15 therapy.  Day 8 omitted due to an upper respiratory infection.  11/22/2022: Cycle 3-day 22.   final planned treatment before she goes to [place] for cardiac surgery.  Cardiac surgery performed at [facility] on 12/5/2022.  Pathology from the myectomy showed cardiac amyloidosis extensively involving the vessels and endocardium with myocyte hypertrophy and interstitial fibrosis.  Congo red stain was positive.  Reviewed back on 1/17/2023 with plans to resume Darzalex, Cytoxan, Velcade, dexamethasone with cycle #4 on 1/24.  Patient seen 2/7/2023 (missed 1/31/2023 due to weather, which would have been day 8).  We will go ahead and proceed with as cycle 4 day 15, day 15.  2/14/2023: Cycle 4-day 22  2/21/2023 cycle 5-day 1 Darzalex, Cytoxan (IV Cytoxan given in the office), Velcade, dexamethasone (20 mg p.o. given in the office).  Repeat labs from 2/21/2023 showing M spike up to 0.8, free kappa light chain up to 85.8 (previously 5.3 on 1/17/2023), ratio 8.58 (was previously 0.76 on 1/17/2023).  We ended up repeating labs on 3/7/2023 M spike 0.1, free kappa light chain 3.4, ratio 0.83.  We will continue on with treatment.  She is scheduled to return to [place] in April.  4/11/2023 cycle 6, day 22 Darzalex-CyBorD.  Final planned therapy.  She was seen at [facility] with recommendations for monthly Darzalex for 18 months. Bactrim stopped and she continues acyclovir. No immediate plans for ASCT  12/6/2023: Proceed with Darzalex    *Diarrhea  She saw Dr. Hoyos with GI at [facility] on 9/7.  Suspicion for amyloid involvement of  the GI tract  Continue Lomotil and Imodium. Rifaximin prescribed by Dr. Hoyos which she continues to use intermittently with improvement but this is only prescribed for 1 week out of 4  Diarrhea waxes and wanes with what she eats and dairy products particularly worsen diarrhea  Continue Questran, Lomotil and Imodium.  These help but did not completely control the issue.  10/11/2023: Patient continues on rifaximin, Questran, Lomotil, and Imodium for diarrhea management.  Diarrhea still not completely controlled. Averages at least 5 episodes daily.   Stable issues at this time.  She cannot continue rifaximin at this time due to insurance issues. On Flagyl. Resume rifaximin in a couple of months.     *Elevated liver enzymes  Continue to monitor intermittently    *Cardiomyopathy:  Most recent echocardiogram on 8/17/2022 showed left ventricular wall thickness consistent with moderate to severe concentric hypertrophy along with left ventricular septal wall measuring 2.2 cm and posterior wall thickness at 1.9 cm likely due to amyloidosis.  LVEF 61 to 65%.  Grade 1 impaired relaxation.  Now followed by Dr. Lyles at [facility] with concerns for pre-existing HCM and AL cardiac amyloid.   Cardiac MRI results above.  Amyloidosis evident.  Catheterization performed at [facility].   Cardiac surgery performed at [facility] on 12/5/2022.  Pathology from the myectomy showed cardiac amyloidosis extensively involving the vessels and endocardium with myocyte hypertrophy and interstitial fibrosis.  Congo red stain was positive.  Symptoms improved significantly  10/11/2023: Seen by cardiology at [facility] on 10/4/2023. Patient was restarted on metoprolol XL.  Midodrine adjusted to morning and early afternoon.  ECHO to be repeated in 3 months with follow-up.  New LBBB    *History of periorbital hematomas: These have resolved.  Coagulation studies and a factor X level were all normal.    *History of myalgias, resolved    *Glossitis with evidence  for amyloid involvement of her tongue.   Continuing magic mouthwash.    *Dysphagia:  She saw Dr. Aranda at [facility] with Botox injections performed.  Symptoms unfortunately are not much better.  Recent esophagram confirmed esophageal dysmotility.  Follow-up at [facility].  11/8/2023: Remains stable.  Esophageal manometry in the near future.    *Skin changes with hypopigmentation and blister on the left side of her nose  Concerning for new manifestations of amyloidosis  Not discussed at this time    *Subcutaneous nodules, also previously increasing in number and size  Again not discussed today    *Rectal irritation  Saw wound care.  Continue current therapy.  No recent issues.    PLAN:  Monthly Darzalex continues x 18 months, initiated in May 2023. Proceed today  Continue Imodium, Lomotil, and Questran for diarrhea control. Per GI at [facility].  Continue barrier creams and ointments  Follow up with wound care as needed  Continue Magic mouthwash as needed for glossitis.  Continue cardiac medications as managed by cardiology at [facility].  Continue acyclovir for prophylaxis.  No longer on Bactrim.  She will follow up with [facility] again in January. (Heme, GI, cardiology)  I will see her back in 4 weeks with labs and scheduled Darzalex.     This patient remains on high risk drug therapy requiring intensive monitoring for toxicity.

## 2023-12-06 ENCOUNTER — LAB (OUTPATIENT)
Dept: LAB | Facility: HOSPITAL | Age: 68
End: 2023-12-06
Payer: MEDICARE

## 2023-12-06 ENCOUNTER — OFFICE VISIT (OUTPATIENT)
Dept: FAMILY MEDICINE CLINIC | Facility: CLINIC | Age: 68
End: 2023-12-06
Payer: MEDICARE

## 2023-12-06 ENCOUNTER — OFFICE VISIT (OUTPATIENT)
Dept: ONCOLOGY | Facility: CLINIC | Age: 68
End: 2023-12-06
Payer: MEDICARE

## 2023-12-06 ENCOUNTER — INFUSION (OUTPATIENT)
Dept: ONCOLOGY | Facility: HOSPITAL | Age: 68
End: 2023-12-06
Payer: MEDICARE

## 2023-12-06 VITALS
OXYGEN SATURATION: 95 % | DIASTOLIC BLOOD PRESSURE: 78 MMHG | SYSTOLIC BLOOD PRESSURE: 149 MMHG | TEMPERATURE: 97.8 F | HEART RATE: 80 BPM | BODY MASS INDEX: 24.32 KG/M2 | RESPIRATION RATE: 18 BRPM | HEIGHT: 61 IN | WEIGHT: 128.8 LBS

## 2023-12-06 VITALS
HEIGHT: 61 IN | OXYGEN SATURATION: 95 % | DIASTOLIC BLOOD PRESSURE: 58 MMHG | HEART RATE: 76 BPM | WEIGHT: 129.2 LBS | RESPIRATION RATE: 14 BRPM | BODY MASS INDEX: 24.39 KG/M2 | TEMPERATURE: 97.8 F | SYSTOLIC BLOOD PRESSURE: 110 MMHG

## 2023-12-06 DIAGNOSIS — E85.81 LIGHT CHAIN (AL) AMYLOIDOSIS: Primary | ICD-10-CM

## 2023-12-06 DIAGNOSIS — B00.9 HERPES INFECTION: ICD-10-CM

## 2023-12-06 DIAGNOSIS — E78.5 HYPERLIPIDEMIA, UNSPECIFIED HYPERLIPIDEMIA TYPE: ICD-10-CM

## 2023-12-06 DIAGNOSIS — F41.9 ANXIETY: Primary | ICD-10-CM

## 2023-12-06 DIAGNOSIS — I10 HYPERTENSION, UNSPECIFIED TYPE: ICD-10-CM

## 2023-12-06 DIAGNOSIS — E85.81 LIGHT CHAIN (AL) AMYLOIDOSIS: ICD-10-CM

## 2023-12-06 DIAGNOSIS — F51.01 PRIMARY INSOMNIA: ICD-10-CM

## 2023-12-06 LAB
BASOPHILS # BLD AUTO: 0.02 10*3/MM3 (ref 0–0.2)
BASOPHILS NFR BLD AUTO: 0.3 % (ref 0–1.5)
DEPRECATED RDW RBC AUTO: 42.8 FL (ref 37–54)
EOSINOPHIL # BLD AUTO: 0.18 10*3/MM3 (ref 0–0.4)
EOSINOPHIL NFR BLD AUTO: 2.4 % (ref 0.3–6.2)
ERYTHROCYTE [DISTWIDTH] IN BLOOD BY AUTOMATED COUNT: 13.2 % (ref 12.3–15.4)
HCT VFR BLD AUTO: 42.7 % (ref 34–46.6)
HGB BLD-MCNC: 14.5 G/DL (ref 12–15.9)
IMM GRANULOCYTES # BLD AUTO: 0.01 10*3/MM3 (ref 0–0.05)
IMM GRANULOCYTES NFR BLD AUTO: 0.1 % (ref 0–0.5)
LYMPHOCYTES # BLD AUTO: 1.84 10*3/MM3 (ref 0.7–3.1)
LYMPHOCYTES NFR BLD AUTO: 24.7 % (ref 19.6–45.3)
MCH RBC QN AUTO: 30.1 PG (ref 26.6–33)
MCHC RBC AUTO-ENTMCNC: 34 G/DL (ref 31.5–35.7)
MCV RBC AUTO: 88.6 FL (ref 79–97)
MONOCYTES # BLD AUTO: 0.52 10*3/MM3 (ref 0.1–0.9)
MONOCYTES NFR BLD AUTO: 7 % (ref 5–12)
NEUTROPHILS NFR BLD AUTO: 4.89 10*3/MM3 (ref 1.7–7)
NEUTROPHILS NFR BLD AUTO: 65.5 % (ref 42.7–76)
NRBC BLD AUTO-RTO: 0 /100 WBC (ref 0–0.2)
PLATELET # BLD AUTO: 195 10*3/MM3 (ref 140–450)
PMV BLD AUTO: 10.3 FL (ref 6–12)
RBC # BLD AUTO: 4.82 10*6/MM3 (ref 3.77–5.28)
WBC NRBC COR # BLD AUTO: 7.46 10*3/MM3 (ref 3.4–10.8)

## 2023-12-06 PROCEDURE — 1160F RVW MEDS BY RX/DR IN RCRD: CPT | Performed by: STUDENT IN AN ORGANIZED HEALTH CARE EDUCATION/TRAINING PROGRAM

## 2023-12-06 PROCEDURE — 99214 OFFICE O/P EST MOD 30 MIN: CPT | Performed by: INTERNAL MEDICINE

## 2023-12-06 PROCEDURE — 3074F SYST BP LT 130 MM HG: CPT | Performed by: STUDENT IN AN ORGANIZED HEALTH CARE EDUCATION/TRAINING PROGRAM

## 2023-12-06 PROCEDURE — 3078F DIAST BP <80 MM HG: CPT | Performed by: STUDENT IN AN ORGANIZED HEALTH CARE EDUCATION/TRAINING PROGRAM

## 2023-12-06 PROCEDURE — 3078F DIAST BP <80 MM HG: CPT | Performed by: INTERNAL MEDICINE

## 2023-12-06 PROCEDURE — 3077F SYST BP >= 140 MM HG: CPT | Performed by: INTERNAL MEDICINE

## 2023-12-06 PROCEDURE — 96401 CHEMO ANTI-NEOPL SQ/IM: CPT

## 2023-12-06 PROCEDURE — 36415 COLL VENOUS BLD VENIPUNCTURE: CPT

## 2023-12-06 PROCEDURE — 85025 COMPLETE CBC W/AUTO DIFF WBC: CPT

## 2023-12-06 PROCEDURE — 1159F MED LIST DOCD IN RCRD: CPT | Performed by: INTERNAL MEDICINE

## 2023-12-06 PROCEDURE — 63710000001 DEXAMETHASONE PER 0.25 MG: Performed by: INTERNAL MEDICINE

## 2023-12-06 PROCEDURE — 1160F RVW MEDS BY RX/DR IN RCRD: CPT | Performed by: INTERNAL MEDICINE

## 2023-12-06 PROCEDURE — 99214 OFFICE O/P EST MOD 30 MIN: CPT | Performed by: STUDENT IN AN ORGANIZED HEALTH CARE EDUCATION/TRAINING PROGRAM

## 2023-12-06 PROCEDURE — 1126F AMNT PAIN NOTED NONE PRSNT: CPT | Performed by: INTERNAL MEDICINE

## 2023-12-06 PROCEDURE — 25010000002 DARATUMUMAB-HYALURONIDASE-FIHJ 1800-30000 MG-UT/15ML SOLUTION: Performed by: INTERNAL MEDICINE

## 2023-12-06 PROCEDURE — 1159F MED LIST DOCD IN RCRD: CPT | Performed by: STUDENT IN AN ORGANIZED HEALTH CARE EDUCATION/TRAINING PROGRAM

## 2023-12-06 RX ORDER — MEPERIDINE HYDROCHLORIDE 25 MG/ML
25 INJECTION INTRAMUSCULAR; INTRAVENOUS; SUBCUTANEOUS
OUTPATIENT
Start: 2024-01-03

## 2023-12-06 RX ORDER — HYDROXYZINE PAMOATE 25 MG/1
25 CAPSULE ORAL ONCE
OUTPATIENT
Start: 2024-01-03 | End: 2024-01-03

## 2023-12-06 RX ORDER — ACETAMINOPHEN 500 MG
1000 TABLET ORAL ONCE
OUTPATIENT
Start: 2024-01-03

## 2023-12-06 RX ORDER — HYDROXYZINE PAMOATE 25 MG/1
25 CAPSULE ORAL ONCE
Status: COMPLETED | OUTPATIENT
Start: 2023-12-06 | End: 2023-12-06

## 2023-12-06 RX ORDER — ACYCLOVIR 400 MG/1
400 TABLET ORAL 2 TIMES DAILY
Qty: 60 TABLET | Refills: 5 | Status: SHIPPED | OUTPATIENT
Start: 2023-12-06

## 2023-12-06 RX ORDER — ALPRAZOLAM 0.25 MG/1
0.25 TABLET ORAL 2 TIMES DAILY PRN
Qty: 60 TABLET | Refills: 0 | Status: SHIPPED | OUTPATIENT
Start: 2023-12-12

## 2023-12-06 RX ORDER — DIPHENHYDRAMINE HYDROCHLORIDE 50 MG/ML
50 INJECTION INTRAMUSCULAR; INTRAVENOUS AS NEEDED
OUTPATIENT
Start: 2024-01-03

## 2023-12-06 RX ORDER — DEXAMETHASONE 4 MG/1
20 TABLET ORAL ONCE
Status: COMPLETED | OUTPATIENT
Start: 2023-12-06 | End: 2023-12-06

## 2023-12-06 RX ORDER — ACETAMINOPHEN 500 MG
1000 TABLET ORAL ONCE
Status: COMPLETED | OUTPATIENT
Start: 2023-12-06 | End: 2023-12-06

## 2023-12-06 RX ORDER — FAMOTIDINE 10 MG/ML
20 INJECTION, SOLUTION INTRAVENOUS AS NEEDED
OUTPATIENT
Start: 2024-01-03

## 2023-12-06 RX ORDER — METRONIDAZOLE 500 MG/1
500 TABLET ORAL
COMMUNITY
Start: 2023-11-21 | End: 2023-12-06

## 2023-12-06 RX ADMIN — HYDROXYZINE PAMOATE 25 MG: 25 CAPSULE ORAL at 12:04

## 2023-12-06 RX ADMIN — DARATUMUMAB AND HYALURONIDASE-FIHJ (HUMAN RECOMBINANT) 1800 MG: 1800; 30000 INJECTION SUBCUTANEOUS at 12:46

## 2023-12-06 RX ADMIN — ACETAMINOPHEN 1000 MG: 500 TABLET ORAL at 12:04

## 2023-12-06 RX ADMIN — DEXAMETHASONE 20 MG: 4 TABLET ORAL at 12:04

## 2023-12-06 NOTE — PROGRESS NOTES
"Chief Complaint  Rash (Needs HSV medication filled for outbreak. Pt also needs xanax refilled but needed a )    Subjective        Cynthia Her presents to [facility]  History of Present Illness  For follow-up on anxiety.  Patient stated she is following all his doctor at [facility] as she is having a amyloidosis and she is getting monthly infusion.  Patient stated her anxiety is well-controlled with alprazolam as needed.  Patient stated her anxiety is not very bad that she needs to take every day medication but alprazolam is helping her with her symptoms.  Patient stated she is aware about the side effects which include CNS depression, respiratory depression, if taken more than prescribed can be life-threatening.  Also aware about the side effects of dependence and withdrawal side effects if suddenly stopped.  Also complains of having breakouts around her mouth.  Objective   Vital Signs:  /58 (BP Location: Right arm, Patient Position: Sitting, Cuff Size: Adult)   Pulse 76   Temp 97.8 °F (36.6 °C) (Oral)   Resp 14   Ht 154.9 cm (60.98\")   Wt 58.6 kg (129 lb 3.2 oz)   SpO2 95%   BMI 24.42 kg/m²   Estimated body mass index is 24.42 kg/m² as calculated from the following:    Height as of this encounter: 154.9 cm (60.98\").    Weight as of this encounter: 58.6 kg (129 lb 3.2 oz).       BMI is within normal parameters. No other follow-up for BMI required.      Physical Exam  HENT:      Head: Normocephalic and atraumatic.      Mouth/Throat:      Mouth: Mucous membranes are moist.      Pharynx: Oropharynx is clear.   Eyes:      Extraocular Movements: Extraocular movements intact.      Conjunctiva/sclera: Conjunctivae normal.      Pupils: Pupils are equal, round, and reactive to light.   Cardiovascular:      Rate and Rhythm: Normal rate.   Pulmonary:      Effort: Pulmonary effort is normal.      Breath sounds: Normal breath sounds.   Abdominal:      General: " Bowel sounds are normal.      Palpations: Abdomen is soft.   Musculoskeletal:         General: Normal range of motion.      Cervical back: Neck supple.   Skin:     General: Skin is warm.      Capillary Refill: Capillary refill takes less than 2 seconds.   Neurological:      General: No focal deficit present.      Mental Status: She is alert and oriented to person, place, and time. Mental status is at baseline.   Psychiatric:         Mood and Affect: Mood normal.        Result Review :                   Assessment and Plan   Diagnoses and all orders for this visit:    1. Anxiety (Primary)  -     Lipid Panel With / Chol / HDL Ratio; Future  -     TSH; Future  -     ALPRAZolam (XANAX) 0.25 MG tablet; Take 1 tablet by mouth 2 (Two) Times a Day As Needed for Anxiety. for anxiety  Dispense: 60 tablet; Refill: 0    2. Primary insomnia  -     Lipid Panel With / Chol / HDL Ratio; Future  -     TSH; Future    3. Hypertension, unspecified type  -     Lipid Panel With / Chol / HDL Ratio; Future  -     TSH; Future    4. Hyperlipidemia, unspecified hyperlipidemia type  -     Lipid Panel With / Chol / HDL Ratio; Future  -     TSH; Future    5. Light chain (AL) amyloidosis    6. Herpes infection  -     acyclovir (Zovirax) 400 MG tablet; Take 1 tablet by mouth 2 (Two) Times a Day.  Dispense: 60 tablet; Refill: 5    # Anxiety  Beau reviewed, on Xanax 0.5 mg tablet as needed  Refill given today  Patient is aware of all the side effects and is currently taking medication as prescribed    # For recurrent oral herpes  Patient is prescribed acyclovir 400 mg twice a day    Future orders for lipid checkup and thyroid checkup placed.  Patient stated she has to travel 1 hour to come to the office so she prefers to do labs on the same day of office visit.  Patient is advised to have wellness checkup appointment set up with Dr. Peterson         Follow Up   No follow-ups on file.  Patient was given instructions and counseling regarding her  condition or for health maintenance advice. Please see specific information pulled into the AVS if appropriate.

## 2023-12-09 DIAGNOSIS — J30.9 ALLERGIC RHINITIS, UNSPECIFIED SEASONALITY, UNSPECIFIED TRIGGER: ICD-10-CM

## 2023-12-13 NOTE — TELEPHONE ENCOUNTER
"  Caller: Cynthia Her \"YOLETTE\"    Relationship: Self    Best call back number: [phone]*    What was the call regarding: PATIENT CALLING STATING THAT SHE IS OUT OF THE loratadine (CLARITIN) 10 MG tablet , AND WILL BE LEAVING TO GO OUT OF TOWN ON 12/22/23, FOR THE HOLIDAY, AND WILL NEED THIS MEDICATION.    PHARMACY CONFIRMED:  [facility][id number] - [place], [place] - [address] [facility] AT [facility] OF [facility] [address] /[facility] [place] & [place] - [phone] [facility] [phone]  [phone]     "

## 2023-12-14 ENCOUNTER — TELEPHONE (OUTPATIENT)
Dept: FAMILY MEDICINE CLINIC | Facility: CLINIC | Age: 68
End: 2023-12-14

## 2023-12-14 NOTE — TELEPHONE ENCOUNTER
"  Caller: TrentMaryjaneCynthia Portillo ASHANTI \"YOLETTE\"    Relationship: Self    Best call back number: [phone]*    What medication are you requesting: VALACYCLOVIR 1 GM    What are your current symptoms: HERPES SIMPLEX. ONCOLOGIST, DR. JENNY VILLANUEVA, WANTS PATIENT TO BE ON THIS MEDICATION WHILE ON CHEMO. THE PATIENT STATES THAT SHE TAKES VALACYCLOVIR 1 GM, TWICE A DAY, WHILE ON CHEMO.     If a prescription is needed, what is your preferred pharmacy and phone number: [id number] - [place], [place] - [address] [facility] AT [facility] OF [address] & [place] - [phone] [facility] [phone] FX     Additional notes: THE PATIENT IS REQUESTING THAT DR. OLIVEIRA TAKE OVER MANAGEMENT OF THE VALACYCLOVIR 1 GM. THE PATIENT STATES THAT SHE SAW DR. SALCEDO ON 12/6/23, AND DR. SALCEDO PRESCRIBED HER ACYCLOVIR 400 MG TABLET, AND THE PATIENT STATES THAT SHE THOUGHT SHE WAS BEING PRESCRIBED THE VALACYCLOVIR, UNTIL SHE PICKED UP THE PRESCRIPTION FROM THE PHARMACY. THE PATIENT STATES THESE ARE TWO DIFFERENT MEDICATIONS, AND ONLY NEEDS THE VALACYCLOVIR 1 GM.    PATIENT REQUEST A CALL BACK IF THERE ARE ANY QUESTIONS.        "

## 2023-12-18 DIAGNOSIS — F51.01 PRIMARY INSOMNIA: ICD-10-CM

## 2023-12-18 RX ORDER — ZOLPIDEM TARTRATE 10 MG/1
10 TABLET ORAL NIGHTLY PRN
Qty: 30 TABLET | Refills: 1 | OUTPATIENT
Start: 2023-12-18

## 2023-12-18 NOTE — TELEPHONE ENCOUNTER
"  Caller: Cynthia Her \"YOLETTE\"    Relationship: Self    Best call back number: [phone]     Requested Prescriptions:   Requested Prescriptions     Pending Prescriptions Disp Refills    zolpidem (AMBIEN) 10 MG tablet 30 tablet 1     Sig: Take 1 tablet by mouth At Night As Needed for Sleep.        Pharmacy where request should be sent: [facility] #[id number] - [place], [place] - [address] [facility] AT [facility] OF [address] [phone] [facility] [phone] FX     Last office visit with prescribing clinician: 4/10/2023   Last telemedicine visit with prescribing clinician: Visit date not found   Next office visit with prescribing clinician: 6/10/2024     Does the patient have less than a 3 day supply:  [x] Yes  [] No    Would you like a call back once the refill request has been completed: [] Yes [x] No    If the office needs to give you a call back, can they leave a voicemail: [] Yes [x] No    Ellis Dye Rep   12/18/23 13:29 EST           "

## 2023-12-19 RX ORDER — LORATADINE 10 MG/1
10 TABLET ORAL DAILY
Qty: 30 TABLET | Refills: 5 | Status: SHIPPED | OUTPATIENT
Start: 2023-12-19

## 2023-12-20 DIAGNOSIS — F51.01 PRIMARY INSOMNIA: ICD-10-CM

## 2023-12-20 NOTE — TELEPHONE ENCOUNTER
"  Caller: Cynthia Her \"YOLETTE\"    Relationship to patient: Self    Best call back number: [phone]    Patient is needing: SHE IS COMPLETELY OUT OF MEDICATION AND NEEDS IT ASAP.        "

## 2023-12-21 RX ORDER — ZOLPIDEM TARTRATE 10 MG/1
10 TABLET ORAL NIGHTLY PRN
Qty: 30 TABLET | Refills: 1 | Status: SHIPPED | OUTPATIENT
Start: 2023-12-21

## 2023-12-22 RX ORDER — VALACYCLOVIR HYDROCHLORIDE 1 G/1
1000 TABLET, FILM COATED ORAL 2 TIMES DAILY
Qty: 60 TABLET | Refills: 0 | Status: SHIPPED | OUTPATIENT
Start: 2023-12-22

## 2023-12-22 RX ORDER — VALACYCLOVIR HYDROCHLORIDE 1 G/1
1000 TABLET, FILM COATED ORAL 2 TIMES DAILY
Qty: 60 TABLET | Refills: 0 | Status: SHIPPED | OUTPATIENT
Start: 2023-12-22 | End: 2023-12-22 | Stop reason: SDUPTHER

## 2024-01-01 NOTE — TELEPHONE ENCOUNTER
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       Active .       Name Placement date Placement time Site Days    NG/OG/Feeding Tube (NICU) 5 Fr Right nostril 09/25/24  0300  Right nostril  4                Nutrition:  Dietary Orders (From admission, onward)       Start     Ordered    09/29/24 1300  Breast Milk - NICU patients ONLY  (Infant Feeding Orders)  4 times daily      Comments: 160mL/kg/day; 4 breastmilk feeds/day, minimum 4 formula feeds/day   Question Answer Comment   Feeding route: PO/NG (by mouth/nasogastric tube)    Rate: 48    Select: mL per feed        09/29/24 1009    09/29/24 1300  Infant formula  (Infant Feeding Orders)  4 times daily      Comments: 160mL/kg/day; 4 breastmilk feeds/day, minimum 4 formula feeds/day   Question Answer Comment   Formula: Enfacare    Feeding route: PO/NG (by mouth/nasogastric tube)    Infant Formula bolus volume (mL/feed) 48    Rate of (mL/hr): -    Over (minutes): -    Bolus frequency: -    Concentrate to: 22 calories/ounce        09/29/24 1009    09/15/24 1707  Mom[employer]  Once        Question:  .  Answer:  Yes    09/15/24 1706                  Medications:  Scheduled medications  cholecalciferol, 400 Units, oral, Daily  ferrous sulfate (as mg of FE), 2 mg/kg of iron (Dosing Weight), oral, q24h GAEL      Continuous medications     PRN medications  PRN medications: zinc oxide    Lab Results   Component Value Date    WBC 16.6 2024    HGB 15.1 2024    HCT 42.8 2024     2024     (H) 2024     Lab Results   Component Value Date    CREATININE 0.53 (H) 2024    BUN 7 2024     2024    K 5.4 2024      2024    CO2 28 (H) 2024     Lab Results   Component Value Date    ALT 8 2024    AST 25 2024    ALKPHOS 345 2024    BILITOT 7.5 (H) 2024     Lab Results   Component Value Date    RETICCTPCT 1.3 2024     Lab Results   Component Value Date    CALCIUM 10.3 2024    PHOS 7.5 2024     Physical Exam  Constitutional:       General: She is sleeping.      Appearance: Normal appearance.      Comments: Supine, comfortable at rest in isolette, no distress, active with exam then back to sleep   HENT:      Head: Normocephalic. Anterior fontanelle is flat.      Comments: Sutures open     Right Ear: External ear normal.      Left Ear: External ear normal.      Nose: Nose normal.      Mouth/Throat:      Mouth: Mucous membranes are moist.      Pharynx: Oropharynx is clear.   Eyes:      Conjunctiva/sclera: Conjunctivae normal.      Comments: Mild periorbital edema, eyes closed   Cardiovascular:      Rate and Rhythm: Normal rate and regular rhythm.      Pulses: Normal pulses.      Heart sounds: Normal heart sounds.   Pulmonary:      Effort: Pulmonary effort is normal.      Breath sounds: Normal breath sounds.   Abdominal:      General: Abdomen is flat. Bowel sounds are normal.      Palpations: Abdomen is soft.      Comments: Tiny white shiny umbilical granuloma, no erythema/drainage   Genitourinary:     General: Normal vulva.   Musculoskeletal:         General: Normal range of motion.      Cervical back: Normal range of motion.   Skin:     General: Skin is warm and dry.      Capillary Refill: Capillary refill takes less than 2 seconds.      Turgor: Normal.      Comments: 2 flat blanching capillary nevi ~5mm upper mid back   Neurological:      General: No focal deficit present.      Primitive Reflexes: Suck normal. Symmetric Fountain.      Comments: Mild generalized hypotonia     Events/Changes Over Past 24hrs:  Uneventful    Weight: 2400g, up 50g    Isolette: 28.5    Respiratory Support:  Room air  Masimo: [id number]    Events:  Apnea: 0  Bradycardia: 0  Desaturation: 0    FEN/GI:  Intake: 376mL  Output: 258mL  Enteral: MBM x 4, Enfacare 22 x 4; 47mL q3h  Total Fluid Goal (ordered): 160mL/kg/day  Intake: 157mL/kg/day  IDF: 1-4  % Oral Intake: 40%  Urine output: 4.5mL/kg/hr  Stool: 1  Emesis: 0  A.5-26cm    Family: Not present with rounds, NNP called mom for update in afternoon    Suri MARIANO NNP-BC

## 2024-01-02 NOTE — PROGRESS NOTES
"[facility] OUTPATIENT FOLLOW UP CLINIC VISIT    REASON FOR FOLLOW-UP:    AL amyloidosis  Therapy with michael-CyBorD initiated 8/16/2022  Now on maintenance daratumumab every 4 weeks initiated May 2023 with plans for 18 months of therapy    HISTORY OF PRESENT ILLNESS: Cynthia Her is a 68 y.o. female with the above-mentioned history who is here today for lab review and evaluation.    She is doing about the same.  She travels to [place] in a couple of weeks.  Swallowing remains difficult.  Diarrhea persists.  She has some dysuria and increasing urinary frequency and is concerned about a urinary tract infection.  Symptoms started a couple of weeks ago, improved and now have returned..    REVIEW OF SYSTEMS:  As per the HPI    PHYSICAL EXAMINATION:    Vitals:    01/03/24 0914   BP: 160/70   Pulse: 75   Resp: 18   Temp: 97.9 °F (36.6 °C)   TempSrc: Temporal   SpO2: 96%   Weight: 57.9 kg (127 lb 9.6 oz)   Height: 154.9 cm (60.98\")   PainSc: 0-No pain     General:  No acute distress, awake, alert and oriented  Skin:  Warm and dry, no visible rash.   HEENT:  Normocephalic/atraumatic.   Chest:  Normal respiratory effort.  Lungs clear to auscultation bilaterally  Heart: Regular rate and rhythm with a grade 2 out of 6 early systolic murmur, stable again today  No CVA tenderness  Extremities:  No visible clubbing, cyanosis, or edema  Neuro/psych:  Grossly nonfocal.  Normal mood and affect.    DIAGNOSTIC DATA:  CBC and Differential (01/03/2024 09:03)   Urinalysis, Microscopic Only - Urine, Clean Catch (01/03/2024 09:36)   Comprehensive Metabolic Panel (01/03/2024 09:49)     IMAGING:    None reviewed      ASSESSMENT:  This is a 68 y.o. female with:    *AL amyloidosis  She had a stress echocardiogram on 4/24/2020 showing a normal left ventricular ejection fraction of 60% with moderate concentric hypertrophy but no wall motion abnormalities of the left ventricle.  There was impaired relaxation noted.  She " complained of chest pain during the examination.  There was some ST segment depression.  Cardiac catheterization was performed on the same day.  No intervention was required.  Follow-up echocardiogram on 4/15/2021 showed a left ventricular ejection fraction of 61 to 65% with normal LV cavity size.  Left ventricular wall thickness showed moderate concentric hypertrophy.  Diastolic function was impaired.  No pericardial effusion.  Small left pleural effusion.  She had follow-up PYP imaging for cardiac amyloidosis that was not suggestive of ATTR amyloidosis  Laboratory values on 2/24/2022 showed a high serum free light chain lambda of 121, normal kappa of 10.5, low ratio at 0.09.  Serum protein electrophoresis showed no evidence of an M spike.  Urine light chains were abnormal with an elevated lambda light chain of 33 and a low ratio of 0.48 with a 12.7 mg M spike on 24-hour urine protein electrophoresis.  BNP was elevated at 2306 on 3/4/2022.  The troponin was normal at 0.03.  CK was 212 with a CK-MB of 10.87.  Initial consult on 3/30/22. No M spike on serum protein electrophoresis. SIFE 'presence of monoclonal protein is unclear.'   Bone marrow biopsy 4/13/22 normocellular, 12.1% plasma cells consistent with low volume plasma cell dyscrasia. FISH with low level t(11;14) fusion only. No amyloid noted as Congo red staining negative.   Fat pad biopsy 5/11/22 positive for amyloid, AL lambda  Referred to Dr. Buenrostro at [facility]. Intended to enroll on a clinical study but her troponin as tested by the study sponsor was not high enough  Therapy with michael-CyBorD initiated 8/16/2022  Patient seen in the office on 8/17/2022 for triage visit.  Patient with complaints of fatigue, dizziness and diarrhea.  She was mildly hypotensive.  She was given IV fluids.  Subsequent admission at [facility] with shortness of breath and volume overload.  She was diuresed and treated with antibiotics.  D8 therapy administered on  8/30  Light chains normal at [facility] on 8/31/22 (lambda light chain 1.3, down from 12.44)  9/27/2022: Cycle 2-day 8 of therapy  10/4/2022 cycle 2-day 8 CyBorD.  Patient continues to push oral hydration.  She continues to have some neck stiffness but this has not worsened.  She will see [facility] next week for further cardiac work-up.  Glucose was low upon initial labs today however the patient was given something to eat by nursing prior to rechecking.  This was therefore rechecked prior to her leaving the office and was 87.  Patient reports she was feeling fine.  10/18/2020 to cycle 2-day 22 Sissy -CyBorD.  Velcade dose will be reduced to 1.0 mg/m² due to patient's persistent issues with orthostasis.  10/25/2022: Cycle 3-day 1  11/8/2022: Delay in cycle 3-day 15 therapy.  Day 8 omitted due to an upper respiratory infection.  11/22/2022: Cycle 3-day 22.   final planned treatment before she goes to [facility] for cardiac surgery.  Cardiac surgery performed at [facility] on 12/5/2022.  Pathology from the myectomy showed cardiac amyloidosis extensively involving the vessels and endocardium with myocyte hypertrophy and interstitial fibrosis.  Congo red stain was positive.  Reviewed back on 1/17/2023 with plans to resume Darzalex, Cytoxan, Velcade, dexamethasone with cycle #4 on 1/24.  Patient seen 2/7/2023 (missed 1/31/2023 due to weather, which would have been day 8).  We will go ahead and proceed with as cycle 4 day 15, day 15.  2/14/2023: Cycle 4-day 22  2/21/2023 cycle 5-day 1 Darzalex, Cytoxan (IV Cytoxan given in the office), Velcade, dexamethasone (20 mg p.o. given in the office).  Repeat labs from 2/21/2023 showing M spike up to 0.8, free kappa light chain up to 85.8 (previously 5.3 on 1/17/2023), ratio 8.58 (was previously 0.76 on 1/17/2023).  We ended up repeating labs on 3/7/2023 M spike 0.1, free kappa light chain 3.4, ratio 0.83.  We will continue on with treatment.  She is scheduled to return to  [place] in April.  4/11/2023 cycle 6, day 22 Darzalex-CyBorD.  Final planned therapy.  She was seen at [place] with recommendations for monthly Darzalex for 18 months. Bactrim stopped and she continues acyclovir. No immediate plans for ASCT  12/6/2023: Proceed with Darzalex    *Diarrhea  She saw Dr. Hoyos with GI at [place] on 9/7.  Suspicion for amyloid involvement of the GI tract  Continue Lomotil and Imodium. Rifaximin prescribed by Dr. Hoyos which she continues to use intermittently with improvement but this is only prescribed for 1 week out of 4  Diarrhea waxes and wanes with what she eats and dairy products particularly worsen diarrhea  Continue Questran, Lomotil and Imodium.  These help but did not completely control the issue.  10/11/2023: Patient continues on rifaximin, Questran, Lomotil, and Imodium for diarrhea management.  Diarrhea still not completely controlled. Averages at least 5 episodes daily.   Stable issues at this time.  She cannot continue rifaximin at this time due to insurance issues. On Flagyl. Resume rifaximin per GI at [place] when possible    *Elevated liver enzymes  Continue to monitor intermittently    *Cardiomyopathy:  Most recent echocardiogram on 8/17/2022 showed left ventricular wall thickness consistent with moderate to severe concentric hypertrophy along with left ventricular septal wall measuring 2.2 cm and posterior wall thickness at 1.9 cm likely due to amyloidosis.  LVEF 61 to 65%.  Grade 1 impaired relaxation.  Now followed by Dr. Lyles at [facility] with concerns for pre-existing HCM and AL cardiac amyloid.   Cardiac MRI results above.  Amyloidosis evident.  Catheterization performed at [place].   Cardiac surgery performed at [place] on 12/5/2022.  Pathology from the myectomy showed cardiac amyloidosis extensively involving the vessels and endocardium with myocyte hypertrophy and interstitial fibrosis.  Congo red stain was positive.  Symptoms improved  significantly  10/11/2023: Seen by cardiology at [facility] on 10/4/2023. Patient was restarted on metoprolol XL.  Midodrine adjusted to morning and early afternoon.  ECHO to be repeated in 3 months with follow-up.  New LBBB    *History of periorbital hematomas: These have resolved.  Coagulation studies and a factor X level were all normal.    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   Continuing magic mouthwash.    *Dysphagia:  She saw Dr. Aranda at [place] with Botox injections performed.  Symptoms unfortunately are not much better.  Recent esophagram confirmed esophageal dysmotility.  Follow-up at [place] with Dr. Aranda in a couple of weeks.    *Skin changes with hypopigmentation and blister on the left side of her nose  Concerning for new manifestations of amyloidosis  Not discussed at this time    *Subcutaneous nodules, also previously increasing in number and size  Again not discussed today    *Rectal irritation  Saw wound care.  Continue current therapy.  No recent issues.    *Urinary symptoms: Urinalysis suggestive of urinary tract infection.  Treat with Bactrim.  Urine culture pending.    PLAN:  Monthly Darzalex continues x 18 months, initiated in May 2023. Proceed again today  Continue Imodium, Lomotil, and Questran for diarrhea control. Per GI at [facility].  Continue barrier creams and ointments  Follow up with wound care as needed  Continue Magic mouthwash as needed for glossitis.  Continue cardiac medications as managed by cardiology at [facility].  Continue acyclovir for prophylaxis.  No longer on prophylactic Bactrim.  She will follow up with [facility] again in a couple of weeks  Prescription for Bactrim electronically sent to her pharmacy.  Follow-up culture result.  Return in 4 weeks for treatment and I will see her back in 8 weeks.    This patient remains on high risk drug therapy requiring intensive monitoring for toxicity.

## 2024-01-03 ENCOUNTER — INFUSION (OUTPATIENT)
Dept: ONCOLOGY | Facility: HOSPITAL | Age: 69
End: 2024-01-03
Payer: MEDICARE

## 2024-01-03 ENCOUNTER — OFFICE VISIT (OUTPATIENT)
Dept: ONCOLOGY | Facility: CLINIC | Age: 69
End: 2024-01-03
Payer: MEDICARE

## 2024-01-03 ENCOUNTER — LAB (OUTPATIENT)
Dept: LAB | Facility: HOSPITAL | Age: 69
End: 2024-01-03
Payer: MEDICARE

## 2024-01-03 VITALS
TEMPERATURE: 97.9 F | WEIGHT: 127.6 LBS | BODY MASS INDEX: 24.09 KG/M2 | SYSTOLIC BLOOD PRESSURE: 160 MMHG | HEIGHT: 61 IN | RESPIRATION RATE: 18 BRPM | DIASTOLIC BLOOD PRESSURE: 70 MMHG | HEART RATE: 75 BPM | OXYGEN SATURATION: 96 %

## 2024-01-03 DIAGNOSIS — N39.41 URGENCY INCONTINENCE: ICD-10-CM

## 2024-01-03 DIAGNOSIS — E85.81 LIGHT CHAIN (AL) AMYLOIDOSIS: Primary | ICD-10-CM

## 2024-01-03 DIAGNOSIS — N39.41 URGENCY INCONTINENCE: Primary | ICD-10-CM

## 2024-01-03 DIAGNOSIS — E85.81 LIGHT CHAIN (AL) AMYLOIDOSIS: ICD-10-CM

## 2024-01-03 LAB
ALBUMIN SERPL-MCNC: 4.3 G/DL (ref 3.5–5.2)
ALBUMIN/GLOB SERPL: 2.2 G/DL
ALP SERPL-CCNC: 84 U/L (ref 39–117)
ALT SERPL W P-5'-P-CCNC: 39 U/L (ref 1–33)
ANION GAP SERPL CALCULATED.3IONS-SCNC: 8.7 MMOL/L (ref 5–15)
AST SERPL-CCNC: 34 U/L (ref 1–32)
BACTERIA UR QL AUTO: ABNORMAL /HPF
BASOPHILS # BLD AUTO: 0.02 10*3/MM3 (ref 0–0.2)
BASOPHILS NFR BLD AUTO: 0.3 % (ref 0–1.5)
BILIRUB SERPL-MCNC: 0.6 MG/DL (ref 0–1.2)
BILIRUB UR QL STRIP: NEGATIVE
BUN SERPL-MCNC: 16 MG/DL (ref 8–23)
BUN/CREAT SERPL: 18.6 (ref 7–25)
CALCIUM SPEC-SCNC: 9.5 MG/DL (ref 8.6–10.5)
CHLORIDE SERPL-SCNC: 104 MMOL/L (ref 98–107)
CLARITY UR: ABNORMAL
CO2 SERPL-SCNC: 27.3 MMOL/L (ref 22–29)
COLOR UR: YELLOW
CREAT SERPL-MCNC: 0.86 MG/DL (ref 0.57–1)
DEPRECATED RDW RBC AUTO: 44 FL (ref 37–54)
EGFRCR SERPLBLD CKD-EPI 2021: 73.7 ML/MIN/1.73
EOSINOPHIL # BLD AUTO: 0.26 10*3/MM3 (ref 0–0.4)
EOSINOPHIL NFR BLD AUTO: 4.5 % (ref 0.3–6.2)
ERYTHROCYTE [DISTWIDTH] IN BLOOD BY AUTOMATED COUNT: 13.1 % (ref 12.3–15.4)
GLOBULIN UR ELPH-MCNC: 2 GM/DL
GLUCOSE SERPL-MCNC: 81 MG/DL (ref 65–99)
GLUCOSE UR STRIP-MCNC: ABNORMAL MG/DL
HCT VFR BLD AUTO: 43.5 % (ref 34–46.6)
HGB BLD-MCNC: 14.2 G/DL (ref 12–15.9)
HGB UR QL STRIP.AUTO: NEGATIVE
HYALINE CASTS UR QL AUTO: ABNORMAL /LPF
IMM GRANULOCYTES # BLD AUTO: 0.02 10*3/MM3 (ref 0–0.05)
IMM GRANULOCYTES NFR BLD AUTO: 0.3 % (ref 0–0.5)
KETONES UR QL STRIP: NEGATIVE
LEUKOCYTE ESTERASE UR QL STRIP.AUTO: ABNORMAL
LYMPHOCYTES # BLD AUTO: 1.83 10*3/MM3 (ref 0.7–3.1)
LYMPHOCYTES NFR BLD AUTO: 31.4 % (ref 19.6–45.3)
MCH RBC QN AUTO: 29.6 PG (ref 26.6–33)
MCHC RBC AUTO-ENTMCNC: 32.6 G/DL (ref 31.5–35.7)
MCV RBC AUTO: 90.6 FL (ref 79–97)
MONOCYTES # BLD AUTO: 0.47 10*3/MM3 (ref 0.1–0.9)
MONOCYTES NFR BLD AUTO: 8.1 % (ref 5–12)
NEUTROPHILS NFR BLD AUTO: 3.22 10*3/MM3 (ref 1.7–7)
NEUTROPHILS NFR BLD AUTO: 55.4 % (ref 42.7–76)
NITRITE UR QL STRIP: NEGATIVE
NRBC BLD AUTO-RTO: 0 /100 WBC (ref 0–0.2)
PH UR STRIP.AUTO: 7.5 [PH] (ref 4.5–8)
PLATELET # BLD AUTO: 215 10*3/MM3 (ref 140–450)
PMV BLD AUTO: 10 FL (ref 6–12)
POTASSIUM SERPL-SCNC: 4.4 MMOL/L (ref 3.5–5.2)
PROT SERPL-MCNC: 6.3 G/DL (ref 6–8.5)
PROT UR QL STRIP: ABNORMAL
RBC # BLD AUTO: 4.8 10*6/MM3 (ref 3.77–5.28)
RBC # UR STRIP: ABNORMAL /HPF
REF LAB TEST METHOD: ABNORMAL
SODIUM SERPL-SCNC: 140 MMOL/L (ref 136–145)
SP GR UR STRIP: 1.02 (ref 1–1.03)
SQUAMOUS #/AREA URNS HPF: ABNORMAL /HPF
UROBILINOGEN UR QL STRIP: ABNORMAL
WBC # UR STRIP: ABNORMAL /HPF
WBC NRBC COR # BLD AUTO: 5.82 10*3/MM3 (ref 3.4–10.8)

## 2024-01-03 PROCEDURE — 99214 OFFICE O/P EST MOD 30 MIN: CPT | Performed by: INTERNAL MEDICINE

## 2024-01-03 PROCEDURE — 87077 CULTURE AEROBIC IDENTIFY: CPT

## 2024-01-03 PROCEDURE — 85025 COMPLETE CBC W/AUTO DIFF WBC: CPT

## 2024-01-03 PROCEDURE — 87186 SC STD MICRODIL/AGAR DIL: CPT

## 2024-01-03 PROCEDURE — 25010000002 DARATUMUMAB-HYALURONIDASE-FIHJ 1800-30000 MG-UT/15ML SOLUTION: Performed by: INTERNAL MEDICINE

## 2024-01-03 PROCEDURE — 3078F DIAST BP <80 MM HG: CPT | Performed by: INTERNAL MEDICINE

## 2024-01-03 PROCEDURE — 1160F RVW MEDS BY RX/DR IN RCRD: CPT | Performed by: INTERNAL MEDICINE

## 2024-01-03 PROCEDURE — 36415 COLL VENOUS BLD VENIPUNCTURE: CPT

## 2024-01-03 PROCEDURE — 80053 COMPREHEN METABOLIC PANEL: CPT | Performed by: INTERNAL MEDICINE

## 2024-01-03 PROCEDURE — 81001 URINALYSIS AUTO W/SCOPE: CPT

## 2024-01-03 PROCEDURE — 3077F SYST BP >= 140 MM HG: CPT | Performed by: INTERNAL MEDICINE

## 2024-01-03 PROCEDURE — 1126F AMNT PAIN NOTED NONE PRSNT: CPT | Performed by: INTERNAL MEDICINE

## 2024-01-03 PROCEDURE — 63710000001 DEXAMETHASONE PER 0.25 MG: Performed by: INTERNAL MEDICINE

## 2024-01-03 PROCEDURE — 87086 URINE CULTURE/COLONY COUNT: CPT

## 2024-01-03 PROCEDURE — 96401 CHEMO ANTI-NEOPL SQ/IM: CPT

## 2024-01-03 PROCEDURE — 1159F MED LIST DOCD IN RCRD: CPT | Performed by: INTERNAL MEDICINE

## 2024-01-03 RX ORDER — DIPHENHYDRAMINE HYDROCHLORIDE 50 MG/ML
50 INJECTION INTRAMUSCULAR; INTRAVENOUS AS NEEDED
OUTPATIENT
Start: 2024-01-31

## 2024-01-03 RX ORDER — DIPHENHYDRAMINE HYDROCHLORIDE 50 MG/ML
50 INJECTION INTRAMUSCULAR; INTRAVENOUS AS NEEDED
OUTPATIENT
Start: 2024-02-28

## 2024-01-03 RX ORDER — HYDROXYZINE PAMOATE 25 MG/1
25 CAPSULE ORAL ONCE
OUTPATIENT
Start: 2024-02-28 | End: 2024-02-28

## 2024-01-03 RX ORDER — ACETAMINOPHEN 500 MG
1000 TABLET ORAL ONCE
OUTPATIENT
Start: 2024-02-28

## 2024-01-03 RX ORDER — MEPERIDINE HYDROCHLORIDE 25 MG/ML
25 INJECTION INTRAMUSCULAR; INTRAVENOUS; SUBCUTANEOUS
OUTPATIENT
Start: 2024-01-31

## 2024-01-03 RX ORDER — MEPERIDINE HYDROCHLORIDE 25 MG/ML
25 INJECTION INTRAMUSCULAR; INTRAVENOUS; SUBCUTANEOUS
OUTPATIENT
Start: 2024-02-28

## 2024-01-03 RX ORDER — FAMOTIDINE 10 MG/ML
20 INJECTION, SOLUTION INTRAVENOUS AS NEEDED
OUTPATIENT
Start: 2024-02-28

## 2024-01-03 RX ORDER — HYDROXYZINE PAMOATE 25 MG/1
25 CAPSULE ORAL ONCE
OUTPATIENT
Start: 2024-01-31 | End: 2024-01-31

## 2024-01-03 RX ORDER — FAMOTIDINE 10 MG/ML
20 INJECTION, SOLUTION INTRAVENOUS AS NEEDED
OUTPATIENT
Start: 2024-01-31

## 2024-01-03 RX ORDER — ACETAMINOPHEN 500 MG
1000 TABLET ORAL ONCE
OUTPATIENT
Start: 2024-01-31

## 2024-01-03 RX ORDER — SULFAMETHOXAZOLE AND TRIMETHOPRIM 800; 160 MG/1; MG/1
1 TABLET ORAL 2 TIMES DAILY
Qty: 6 TABLET | Refills: 0 | Status: SHIPPED | OUTPATIENT
Start: 2024-01-03

## 2024-01-03 RX ORDER — HYDROXYZINE PAMOATE 25 MG/1
25 CAPSULE ORAL ONCE
Status: COMPLETED | OUTPATIENT
Start: 2024-01-03 | End: 2024-01-03

## 2024-01-03 RX ORDER — DEXAMETHASONE 4 MG/1
20 TABLET ORAL ONCE
Status: COMPLETED | OUTPATIENT
Start: 2024-01-03 | End: 2024-01-03

## 2024-01-03 RX ORDER — ACETAMINOPHEN 500 MG
1000 TABLET ORAL ONCE
Status: COMPLETED | OUTPATIENT
Start: 2024-01-03 | End: 2024-01-03

## 2024-01-03 RX ADMIN — DEXAMETHASONE 20 MG: 4 TABLET ORAL at 09:56

## 2024-01-03 RX ADMIN — HYDROXYZINE PAMOATE 25 MG: 25 CAPSULE ORAL at 09:56

## 2024-01-03 RX ADMIN — DARATUMUMAB AND HYALURONIDASE-FIHJ (HUMAN RECOMBINANT) 1800 MG: 1800; 30000 INJECTION SUBCUTANEOUS at 10:43

## 2024-01-03 RX ADMIN — ACETAMINOPHEN 1000 MG: 500 TABLET ORAL at 09:56

## 2024-01-04 LAB
ALBUMIN SERPL ELPH-MCNC: 3.8 G/DL (ref 2.9–4.4)
ALBUMIN/GLOB SERPL: 1.8 {RATIO} (ref 0.7–1.7)
ALPHA1 GLOB SERPL ELPH-MCNC: 0.2 G/DL (ref 0–0.4)
ALPHA2 GLOB SERPL ELPH-MCNC: 0.8 G/DL (ref 0.4–1)
B-GLOBULIN SERPL ELPH-MCNC: 0.9 G/DL (ref 0.7–1.3)
GAMMA GLOB SERPL ELPH-MCNC: 0.3 G/DL (ref 0.4–1.8)
GLOBULIN SER-MCNC: 2.2 G/DL (ref 2.2–3.9)
IGA SERPL-MCNC: 6 MG/DL (ref 87–352)
IGG SERPL-MCNC: 315 MG/DL (ref 586–1602)
IGM SERPL-MCNC: 23 MG/DL (ref 26–217)
INTERPRETATION SERPL IEP-IMP: ABNORMAL
KAPPA LC FREE SER-MCNC: 3.8 MG/L (ref 3.3–19.4)
KAPPA LC FREE/LAMBDA FREE SER: 1 {RATIO} (ref 0.26–1.65)
LABORATORY COMMENT REPORT: ABNORMAL
LAMBDA LC FREE SERPL-MCNC: 3.8 MG/L (ref 5.7–26.3)
M PROTEIN SERPL ELPH-MCNC: 0.1 G/DL
PROT SERPL-MCNC: 6 G/DL (ref 6–8.5)

## 2024-01-05 ENCOUNTER — TELEPHONE (OUTPATIENT)
Dept: ONCOLOGY | Facility: CLINIC | Age: 69
End: 2024-01-05
Payer: MEDICARE

## 2024-01-05 LAB — BACTERIA SPEC AEROBE CULT: ABNORMAL

## 2024-01-05 NOTE — PROGRESS NOTES
The bacteria should be sensitive to the antibiotic we started. Can you call her and see how she's doing? Thanks! LEWIS

## 2024-01-05 NOTE — TELEPHONE ENCOUNTER
LVM letting the the patient know she is on the correct ABX for her URI. I asked that she call me back to let me know how she is doing. Direct line number provided.

## 2024-01-05 NOTE — TELEPHONE ENCOUNTER
----- Message from Nav Sal MD sent at 1/5/2024 10:32 AM EST -----  The bacteria should be sensitive to the antibiotic we started. Can you call her and see how she's doing? Thanks! LEWIS

## 2024-01-11 DIAGNOSIS — R19.7 DIARRHEA, UNSPECIFIED TYPE: ICD-10-CM

## 2024-01-11 RX ORDER — DIPHENOXYLATE HYDROCHLORIDE AND ATROPINE SULFATE 2.5; .025 MG/1; MG/1
1 TABLET ORAL 4 TIMES DAILY PRN
Qty: 120 TABLET | Refills: 0 | Status: SHIPPED | OUTPATIENT
Start: 2024-01-11

## 2024-01-15 RX ORDER — PANTOPRAZOLE SODIUM 20 MG/1
TABLET, DELAYED RELEASE ORAL
Qty: 90 TABLET | Refills: 1 | Status: SHIPPED | OUTPATIENT
Start: 2024-01-15

## 2024-01-28 ENCOUNTER — APPOINTMENT (OUTPATIENT)
Dept: GENERAL RADIOLOGY | Facility: HOSPITAL | Age: 69
End: 2024-01-28
Payer: MEDICARE

## 2024-01-28 ENCOUNTER — HOSPITAL ENCOUNTER (EMERGENCY)
Facility: HOSPITAL | Age: 69
Discharge: HOME | End: 2024-01-28
Attending: EMERGENCY MEDICINE | Admitting: EMERGENCY MEDICINE
Payer: MEDICARE

## 2024-01-28 VITALS
BODY MASS INDEX: 25.1 KG/M2 | WEIGHT: 132.94 LBS | TEMPERATURE: 98.2 F | SYSTOLIC BLOOD PRESSURE: 116 MMHG | OXYGEN SATURATION: 97 % | RESPIRATION RATE: 22 BRPM | HEIGHT: 61 IN | HEART RATE: 77 BPM | DIASTOLIC BLOOD PRESSURE: 57 MMHG

## 2024-01-28 DIAGNOSIS — M54.2 NECK PAIN: ICD-10-CM

## 2024-01-28 DIAGNOSIS — R07.9 CHEST PAIN, UNSPECIFIED TYPE: Primary | ICD-10-CM

## 2024-01-28 LAB
ALBUMIN SERPL-MCNC: 4.3 G/DL (ref 3.5–5.2)
ALBUMIN/GLOB SERPL: 2 G/DL
ALP SERPL-CCNC: 87 U/L (ref 39–117)
ALT SERPL W P-5'-P-CCNC: 35 U/L (ref 1–33)
ANION GAP SERPL CALCULATED.3IONS-SCNC: 11.8 MMOL/L (ref 5–15)
AST SERPL-CCNC: 35 U/L (ref 1–32)
BASOPHILS # BLD AUTO: 0.04 10*3/MM3 (ref 0–0.2)
BASOPHILS NFR BLD AUTO: 0.7 % (ref 0–1.5)
BILIRUB SERPL-MCNC: 0.4 MG/DL (ref 0–1.2)
BUN SERPL-MCNC: 21 MG/DL (ref 8–23)
BUN/CREAT SERPL: 24.4 (ref 7–25)
CALCIUM SPEC-SCNC: 9.5 MG/DL (ref 8.6–10.5)
CHLORIDE SERPL-SCNC: 103 MMOL/L (ref 98–107)
CO2 SERPL-SCNC: 24.2 MMOL/L (ref 22–29)
CREAT SERPL-MCNC: 0.86 MG/DL (ref 0.57–1)
DEPRECATED RDW RBC AUTO: 43.5 FL (ref 37–54)
EGFRCR SERPLBLD CKD-EPI 2021: 73.7 ML/MIN/1.73
EOSINOPHIL # BLD AUTO: 0.29 10*3/MM3 (ref 0–0.4)
EOSINOPHIL NFR BLD AUTO: 5.3 % (ref 0.3–6.2)
ERYTHROCYTE [DISTWIDTH] IN BLOOD BY AUTOMATED COUNT: 13.3 % (ref 12.3–15.4)
GEN 5 2HR TROPONIN T REFLEX: 38 NG/L
GLOBULIN UR ELPH-MCNC: 2.1 GM/DL
GLUCOSE SERPL-MCNC: 123 MG/DL (ref 65–99)
HCT VFR BLD AUTO: 42.6 % (ref 34–46.6)
HGB BLD-MCNC: 13.8 G/DL (ref 12–15.9)
HOLD SPECIMEN: NORMAL
HOLD SPECIMEN: NORMAL
IMM GRANULOCYTES # BLD AUTO: 0.01 10*3/MM3 (ref 0–0.05)
IMM GRANULOCYTES NFR BLD AUTO: 0.2 % (ref 0–0.5)
LIPASE SERPL-CCNC: 30 U/L (ref 13–60)
LYMPHOCYTES # BLD AUTO: 2.29 10*3/MM3 (ref 0.7–3.1)
LYMPHOCYTES NFR BLD AUTO: 42 % (ref 19.6–45.3)
MAGNESIUM SERPL-MCNC: 2.2 MG/DL (ref 1.6–2.4)
MCH RBC QN AUTO: 28.8 PG (ref 26.6–33)
MCHC RBC AUTO-ENTMCNC: 32.4 G/DL (ref 31.5–35.7)
MCV RBC AUTO: 88.8 FL (ref 79–97)
MONOCYTES # BLD AUTO: 0.38 10*3/MM3 (ref 0.1–0.9)
MONOCYTES NFR BLD AUTO: 7 % (ref 5–12)
NEUTROPHILS NFR BLD AUTO: 2.44 10*3/MM3 (ref 1.7–7)
NEUTROPHILS NFR BLD AUTO: 44.8 % (ref 42.7–76)
NRBC BLD AUTO-RTO: 0 /100 WBC (ref 0–0.2)
NT-PROBNP SERPL-MCNC: 414.3 PG/ML (ref 0–900)
PLATELET # BLD AUTO: 197 10*3/MM3 (ref 140–450)
PMV BLD AUTO: 10.4 FL (ref 6–12)
POTASSIUM SERPL-SCNC: 4.2 MMOL/L (ref 3.5–5.2)
PROT SERPL-MCNC: 6.4 G/DL (ref 6–8.5)
RBC # BLD AUTO: 4.8 10*6/MM3 (ref 3.77–5.28)
SODIUM SERPL-SCNC: 139 MMOL/L (ref 136–145)
TROPONIN T DELTA: -4 NG/L
TROPONIN T SERPL HS-MCNC: 42 NG/L
WBC NRBC COR # BLD AUTO: 5.45 10*3/MM3 (ref 3.4–10.8)
WHOLE BLOOD HOLD COAG: NORMAL
WHOLE BLOOD HOLD SPECIMEN: NORMAL

## 2024-01-28 PROCEDURE — 83735 ASSAY OF MAGNESIUM: CPT | Performed by: EMERGENCY MEDICINE

## 2024-01-28 PROCEDURE — 71045 X-RAY EXAM CHEST 1 VIEW: CPT

## 2024-01-28 PROCEDURE — 84484 ASSAY OF TROPONIN QUANT: CPT | Performed by: EMERGENCY MEDICINE

## 2024-01-28 PROCEDURE — 83690 ASSAY OF LIPASE: CPT | Performed by: EMERGENCY MEDICINE

## 2024-01-28 PROCEDURE — 99284 EMERGENCY DEPT VISIT MOD MDM: CPT

## 2024-01-28 PROCEDURE — 93005 ELECTROCARDIOGRAM TRACING: CPT

## 2024-01-28 PROCEDURE — 83880 ASSAY OF NATRIURETIC PEPTIDE: CPT | Performed by: EMERGENCY MEDICINE

## 2024-01-28 PROCEDURE — 36415 COLL VENOUS BLD VENIPUNCTURE: CPT

## 2024-01-28 PROCEDURE — 93005 ELECTROCARDIOGRAM TRACING: CPT | Performed by: EMERGENCY MEDICINE

## 2024-01-28 PROCEDURE — 80053 COMPREHEN METABOLIC PANEL: CPT | Performed by: EMERGENCY MEDICINE

## 2024-01-28 PROCEDURE — 85025 COMPLETE CBC W/AUTO DIFF WBC: CPT | Performed by: EMERGENCY MEDICINE

## 2024-01-28 RX ORDER — METHOCARBAMOL 750 MG/1
1500 TABLET, FILM COATED ORAL 3 TIMES DAILY PRN
Qty: 30 TABLET | Refills: 0 | Status: SHIPPED | OUTPATIENT
Start: 2024-01-28

## 2024-01-28 RX ORDER — ASPIRIN 81 MG/1
324 TABLET, CHEWABLE ORAL ONCE
Status: COMPLETED | OUTPATIENT
Start: 2024-01-28 | End: 2024-01-28

## 2024-01-28 RX ORDER — SODIUM CHLORIDE 0.9 % (FLUSH) 0.9 %
10 SYRINGE (ML) INJECTION AS NEEDED
Status: DISCONTINUED | OUTPATIENT
Start: 2024-01-28 | End: 2024-01-28 | Stop reason: HOSPADM

## 2024-01-28 RX ADMIN — ASPIRIN 324 MG: 81 TABLET, CHEWABLE ORAL at 17:07

## 2024-01-28 NOTE — ED PROVIDER NOTES
Time: 5:34 PM EST  Date of encounter:  1/28/2024  Independent Historian/Clinical History and Information was obtained by:   Patient    History is limited by: N/A    Chief Complaint: Chest pain      History of Present Illness:  Patient is a 68 y.o. year old female who presents to the emergency department for evaluation of chest pain.  Patient reports to the emergency department for evaluation of chest pain for 4 days.  She also notes right upper back muscle pain and jaw pain.  She does report a history of open heart surgery but not for coronary artery disease this was for amyloidosis.    HPI    Patient Care Team  Primary Care Provider: Arian Peterson MD    Past Medical History:     Allergies   Allergen Reactions    Azithromycin Rash and Other (See Comments)     Reaction unknown to patient (unable to remember)  Other reaction(s): Other: stomach issues, Stomach ache, Other: stomach issues, Stomach ache    Ezetimibe Myalgia, Other (See Comments) and Rash     cramps  cramps    Pravastatin Rash and Other (See Comments)     Past Medical History:   Diagnosis Date    AL amyloidosis     Anxiety     Arthritis     COVID-19 07/2020 9/7/2021    Depression     Diverticulitis of colon     Diverticulosis     GERD (gastroesophageal reflux disease)     History of Clostridioides difficile infection 2008    HAS HAD SEVERAL TIMES IN PAST PER PT (SAW INFECTIOUS DISEASE MD FOR THIS S/P COLOSTOMY/EXTENSIVE ANBX THERAPY)    History of prediabetes     History of snoring     History of stress incontinence     Hypercholesterolemia     Hyperlipidemia     Hypertension     Left ventricular hypertrophy     FOLLOWED BY DR MARI. DENIES CHEST PAIN BUT STATES DOES GET SOA AT TIMES WITH EXERTION REPORTS THAT IT IS NOT A NEW ISSUE     Liver disease     Oral herpes 08/18/2020    Seasonal allergies     used to have allergy shots in the past    SOB (shortness of breath)     STATES WITH EXERTION HAS BEEN ONGOING ISSUE NOT A NEW ISSUE    Thyroid  disease     Hypothyroid     Past Surgical History:   Procedure Laterality Date    ABDOMINAL SURGERY  2005, 2014    ex lap x 2,  diverticulitis    ABDOMINOPLASTY  2016    ADENOIDECTOMY      APPENDECTOMY      CARDIAC CATHETERIZATION N/A 04/24/2020    Procedure: Coronary angiography;  Surgeon: Roberto Briones MD;  Location: [facility] CATH [facility]AS[facility] LOC[facility];  Service: Cardiovascular;  Laterality: N/A;    CARDIAC CATHETERIZATION N/A 04/24/2020    Procedure: Left heart cath;  Surgeon: Roberto Briones MD;  Location: [facility] C[facility] LOC[facility];  Service: Cardiovascular;  Laterality: N/A;    CARDIAC CATHETERIZATION N/A 04/24/2020    Procedure: Left ventriculography;  Surgeon: Roberto Briones MD;  Location: [facility] CATH [facility]VASIVE LOCATION;  Service: Cardiovascular;  Laterality: N/A;    CARDIAC SURGERY      open heart surgery,    COLONOSCOPY  approx 2018    normal per pt     COLOSTOMY  2005    had colostomy and then is was reversed    COLOSTOMY CLOSURE  2006    CORRECTION HAMMER TOE Right 2017    ECTOPIC PREGNANCY SURGERY      1979, 1980    ENDOSCOPY N/A 05/27/2020    Procedure: ESOPHAGOGASTRODUODENOSCOPY WITH BIOPSY AND BIOPSY POLYPECTOMY AND MACIEL DIALATION;  Surgeon: Preethi Beck MD;  Location: [facility] ENDOSCOPY;  Service: Gastroenterology;  Laterality: N/A;  PRE: ESOPHAGEAL DYSPHAGIA  POST: Z LINE 46, ESOPHAGEAL SPASM, GASTRITIS, FUNDIC POLYP    ENDOSCOPY N/A 06/20/2023    ESOPHAGEAL DILATATION N/A 12/07/2021    Procedure: OR ESOPHAGEAL DILATATION with maciel dilators ;  Surgeon: Jamey Leslie MD;  Location: [facility] OR [facility];  Service: ENT;  Laterality: N/A;    ESOPHAGEAL MOTILITY STUDY N/A 02/03/2022    Procedure: ESOPHAGEAL MOTILITY STUDY;  Surgeon: Harshil, Nurse Performed;  Location: [facility] ENDOSCOPY;  Service: Gastroenterology;  Laterality: N/A;  dypshagia     FOOT SURGERY Right 12/2016    Second and third hammertoe corrections    KNEE ARTHROSCOPY Right 2009    KNEE SURGERY Right      REVISION / TAKEDOWN COLOSTOMY  2006    SHOULDER ARTHROSCOPY Right 2018    right shoulder arthroscopy with extensive rotator cuff, biceps and labral debridement, chondroplasty, & subacromial decompression with acromioplasty    SHOULDER SURGERY      HAS HAS COMPLETE SHOULDER ON RIGHT. LEFT SCOPED AND HAD 2ND SURGERY WITH HARDWARE PLACED    SHOULDER SURGERY Left     2012. 2013    TONSILLECTOMY      TOTAL SHOULDER REVERSE ARTHROPLASTY Right 2019    WISDOM TOOTH EXTRACTION       Family History   Problem Relation Age of Onset    Hypertension Mother     Osteoporosis Mother     Skin cancer Mother     Heart disease Father     Hypertension Father     Breast cancer Maternal Grandmother     Ovarian cancer Neg Hx     Colon cancer Neg Hx     Deep vein thrombosis Neg Hx     Pulmonary embolism Neg Hx     Malig Hyperthermia Neg Hx        Home Medications:  Prior to Admission medications    Medication Sig Start Date End Date Taking? Authorizing Provider   ALPRAZolam (XANAX) 0.25 MG tablet Take 1 tablet by mouth 2 (Two) Times a Day As Needed for Anxiety. for anxiety 12/12/23   Simon Witt MD   aspirin 81 MG chewable tablet CHEW AND SWALLOW 1 TABLET DAILY WITH BREAKFAST 1/12/23   Eric Weston MD   Cholecalciferol (Vitamin D3) 50 MCG (2000 UT) tablet Take 1 tablet by mouth Daily.    Eric Weston MD   cholestyramine (QUESTRAN) 4 g packet Take 1 packet by mouth 3 (Three) Times a Day With Meals. 1 packet  TID PRN diarrhea 8/9/22   Nav Sal MD   ciclopirox (LOPROX) 1 % shampoo APPLY TOPICALLY 3 TIMES EVERY WEEK 10/20/22   Eric Weston MD   diphenoxylate-atropine (LOMOTIL) 2.5-0.025 MG per tablet Take 1 tablet by mouth 4 (Four) Times a Day As Needed for Diarrhea. 1/11/24   Nav Sal MD   empagliflozin (JARDIANCE) 10 MG tablet tablet Take 1 tablet by mouth Daily. 11/7/22   Eric Weston MD   FLUoxetine (PROzac) 20 MG capsule TAKE 1 CAPSULE BY MOUTH FOUR TIMES DAILY 11/10/23   Arian Peterson  MD   levothyroxine (SYNTHROID, LEVOTHROID) 75 MCG tablet TAKE 1 TABLET BY MOUTH EVERY DAY 11/10/23   Arian Peterson MD   lidocaine (LIDODERM) 5 % APPLY 1 PATCH TOPICALLY EVERY DAY. REMOVE AND DISCARD AFTER 12 HOURS 1/2/23   Eric Weston MD   loratadine (CLARITIN) 10 MG tablet TAKE 1 TABLET BY MOUTH EVERY DAY 12/19/23   Arian Peterson MD   Menthol-Zinc Oxide (Calmoseptine) 0.44-20.6 % ointment Apply 1 application  topically to the appropriate area as directed 2 (Two) Times a Day. 11/27/23   Savannah Summers MD   metoprolol succinate XL (TOPROL-XL) 25 MG 24 hr tablet Take 1 tablet by mouth Daily. 10/4/23 10/4/24  Eric Weston MD   midodrine (PROAMATINE) 5 MG tablet Take 1 tablet by mouth 2 (Two) Times a Day. 6/26/23   Eric Weston MD   Multiple Vitamins-Minerals (V-C Forte) capsule Take 1 capsule by mouth Daily. 9/21/22   Eric Weston MD   olopatadine (PATANOL) 0.1 % ophthalmic solution INSTILL 1 DROP IN BOTH EYES TWICE DAILY 12/1/22   Arian Peterson MD   pantoprazole (PROTONIX) 20 MG EC tablet TAKE 1 TABLET BY MOUTH EVERY DAY. DO NOT TAKE WITHIN 2 HOURS OF THYROID MEDICATION 1/15/24   Arian Peterson MD   predniSONE (DELTASONE) 20 MG tablet Take 1 tablet by mouth Daily. 7/21/23   Eric Weston MD   pregabalin (LYRICA) 50 MG capsule Take 1 capsule by mouth Daily. 10/9/23   Eric Weston MD   rosuvastatin (CRESTOR) 20 MG tablet TAKE 1 TABLET BY MOUTH DAILY 11/10/23   Arian Peterson MD   Sodium Fluoride 5000 Plus 1.1 % cream  7/19/23   Eric Weston MD   spironolactone (ALDACTONE) 25 MG tablet Take 1 tablet by mouth 2 (Two) Times a Day. 6/25/23   Eric Weston MD   sulfamethoxazole-trimethoprim (BACTRIM DS,SEPTRA DS) 800-160 MG per tablet Take 1 tablet by mouth 2 (Two) Times a Day. 1/3/24   Nav Sal MD   valACYclovir (Valtrex) 1000 MG tablet Take 1 tablet by mouth 2 (Two) Times a Day. 2 tablets twice a day constitutes 1 complete  "treatment for an outbreak of oral herpes.    This medication should be taken in this way for 1 day for each herpes outbreak. 12/22/23   Arian Peterson MD   zolpidem (AMBIEN) 10 MG tablet TAKE 1 TABLET BY MOUTH AT NIGHT AS NEEDED FOR SLEEP 12/21/23   Arian Peterson MD        Social History:   Social History     Tobacco Use    Smoking status: Never    Smokeless tobacco: Never   Vaping Use    Vaping Use: Never used   Substance Use Topics    Alcohol use: Not Currently     Alcohol/week: 1.0 standard drink of alcohol     Types: 1 Glasses of wine per week    Drug use: Never         Review of Systems:  Review of Systems   Constitutional:  Negative for chills and fever.   HENT:  Negative for congestion, rhinorrhea and sore throat.    Eyes:  Negative for pain and visual disturbance.   Respiratory:  Negative for apnea, cough, chest tightness and shortness of breath.    Cardiovascular:  Positive for chest pain. Negative for palpitations.   Gastrointestinal:  Negative for abdominal pain, diarrhea, nausea and vomiting.   Genitourinary:  Negative for difficulty urinating and dysuria.   Musculoskeletal:  Negative for joint swelling and myalgias.   Skin:  Negative for color change.   Neurological:  Negative for seizures and headaches.   Psychiatric/Behavioral: Negative.     All other systems reviewed and are negative.       Physical Exam:  /57   Pulse 77   Temp 98.2 °F (36.8 °C) (Oral)   Resp 22   Ht 154.9 cm (61\")   Wt 60.3 kg (132 lb 15 oz)   SpO2 97%   BMI 25.12 kg/m²     Physical Exam  Vitals and nursing note reviewed.   Constitutional:       General: She is not in acute distress.     Appearance: Normal appearance. She is not toxic-appearing.   HENT:      Head: Normocephalic and atraumatic.      Jaw: There is normal jaw occlusion.   Eyes:      General: Lids are normal.      Extraocular Movements: Extraocular movements intact.      Conjunctiva/sclera: Conjunctivae normal.      Pupils: Pupils are equal, round, " and reactive to light.   Cardiovascular:      Rate and Rhythm: Normal rate and regular rhythm.      Pulses: Normal pulses.      Heart sounds: Normal heart sounds.   Pulmonary:      Effort: Pulmonary effort is normal. No respiratory distress.      Breath sounds: Normal breath sounds. No wheezing or rhonchi.   Abdominal:      General: Abdomen is flat.      Palpations: Abdomen is soft.      Tenderness: There is no abdominal tenderness. There is no guarding or rebound.   Musculoskeletal:         General: Normal range of motion.      Cervical back: Normal range of motion and neck supple.      Right lower leg: No edema.      Left lower leg: No edema.   Skin:     General: Skin is warm and dry.   Neurological:      Mental Status: She is alert and oriented to person, place, and time. Mental status is at baseline.   Psychiatric:         Mood and Affect: Mood normal.                  Procedures:  Procedures      Medical Decision Making:      Comorbidities that affect care:    Thyroid disease, hyperlipidemia, amyloidosis, anxiety    External Notes reviewed:    Previous Clinic Note: Family medicine office visit for general medical management      The following orders were placed and all results were independently analyzed by me:  Orders Placed This Encounter   Procedures    XR Chest 1 View    F[place]um Draw    High Sensitivity Troponin T    Comprehensive Metabolic Panel    Lipase    BNP    Magnesium    CBC Auto Differential    High Sensitivity Troponin T 2Hr    NPO Diet NPO Type: Strict NPO    Undress & Gown    Continuous Pulse Oximetry    Oxygen Therapy- Nasal Cannula; Titrate 1-6 LPM Per SpO2; 90 - 95%    ECG 12 Lead ED Triage Standing Order; Chest Pain    ECG 12 Lead ED Triage Standing Order; Chest Pain    Insert Peripheral IV    CBC & Differential    Green Top (Gel)    Lavender Top    Gold Top - SST    Light Blue Top       Medications Given in the Emergency Department:  Medications   sodium chloride 0.9 % flush 10 mL (has no  administration in time range)   aspirin chewable tablet 324 mg (324 mg Oral Given 1/28/24 1707)        ED Course:    ED Course as of 01/28/24 1958   Sun Jan 28, 2024   1736 My interpretation of EKG shows normal sinus rhythm, left bundle branch block, no acute ischemia, normal QT, unchanged from previous EKG. [TC]      ED Course User Index  [TC] Joni Salazar MD       Labs:    Lab Results (last 24 hours)       Procedure Component Value Units Date/Time    High Sensitivity Troponin T [[id number]]  (Abnormal) Collected: 01/28/24 1715    Specimen: Blood Updated: 01/28/24 1747     HS Troponin T 42 ng/L     Narrative:      High Sensitive Troponin T Reference Range:  <14.0 ng/L- Negative Female for AMI  <22.0 ng/L- Negative Male for AMI  >=14 - Abnormal Female indicating possible myocardial injury.  >=22 - Abnormal Male indicating possible myocardial injury.   Clinicians would have to utilize clinical acumen, EKG, Troponin, and serial changes to determine if it is an Acute Myocardial Infarction or myocardial injury due to an underlying chronic condition.         CBC & Differential [[id number]]  (Normal) Collected: 01/28/24 1715    Specimen: Blood Updated: 01/28/24 1726    Narrative:      The following orders were created for panel order CBC & Differential.  Procedure                               Abnormality         Status                     ---------                               -----------         ------                     CBC Auto Differential[[id number]]        Normal              Final result                 Please view results for these tests on the individual orders.    Comprehensive Metabolic Panel [[id number]]  (Abnormal) Collected: 01/28/24 1715    Specimen: Blood Updated: 01/28/24 1747     Glucose 123 mg/dL      BUN 21 mg/dL      Creatinine 0.86 mg/dL      Sodium 139 mmol/L      Potassium 4.2 mmol/L      Comment: Slight hemolysis detected by analyzer. Result may be falsely elevated.        Chloride 103 mmol/L       CO2 24.2 mmol/L      Calcium 9.5 mg/dL      Total Protein 6.4 g/dL      Albumin 4.3 g/dL      ALT (SGPT) 35 U/L      AST (SGOT) 35 U/L      Alkaline Phosphatase 87 U/L      Total Bilirubin 0.4 mg/dL      Globulin 2.1 gm/dL      A/G Ratio 2.0 g/dL      BUN/Creatinine Ratio 24.4     Anion Gap 11.8 mmol/L      eGFR 73.7 mL/min/1.73     Narrative:      GFR Normal >60  Chronic Kidney Disease <60  Kidney Failure <15      Lipase [[id number]]  (Normal) Collected: 01/28/24 1715    Specimen: Blood Updated: 01/28/24 1747     Lipase 30 U/L     BNP [[id number]]  (Normal) Collected: 01/28/24 1715    Specimen: Blood Updated: 01/28/24 1745     proBNP 414.3 pg/mL     Narrative:      This assay is used as an aid in the diagnosis of individuals suspected of having heart failure. It can be used as an aid in the diagnosis of acute decompensated heart failure (ADHF) in patients presenting with signs and symptoms of ADHF to the emergency department (ED). In addition, NT-proBNP of <300 pg/mL indicates ADHF is not likely.    Age Range Result Interpretation  NT-proBNP Concentration (pg/mL:      <50             Positive            >450                   Gray                 300-450                    Negative             <300    50-75           Positive            >900                  Gray                300-900                  Negative            <300      >75             Positive            >1800                  Gray                300-1800                  Negative            <300    Magnesium [[id number]]  (Normal) Collected: 01/28/24 1715    Specimen: Blood Updated: 01/28/24 1747     Magnesium 2.2 mg/dL     CBC Auto Differential [[id number]]  (Normal) Collected: 01/28/24 1715    Specimen: Blood Updated: 01/28/24 1726     WBC 5.45 10*3/mm3      RBC 4.80 10*6/mm3      Hemoglobin 13.8 g/dL      Hematocrit 42.6 %      MCV 88.8 fL      MCH 28.8 pg      MCHC 32.4 g/dL      RDW 13.3 %      RDW-SD 43.5 fl      MPV 10.4 fL      Platelets 197  10*3/mm3      Neutrophil % 44.8 %      Lymphocyte % 42.0 %      Monocyte % 7.0 %      Eosinophil % 5.3 %      Basophil % 0.7 %      Immature Grans % 0.2 %      Neutrophils, Absolute 2.44 10*3/mm3      Lymphocytes, Absolute 2.29 10*3/mm3      Monocytes, Absolute 0.38 10*3/mm3      Eosinophils, Absolute 0.29 10*3/mm3      Basophils, Absolute 0.04 10*3/mm3      Immature Grans, Absolute 0.01 10*3/mm3      nRBC 0.0 /100 WBC     High Sensitivity Troponin T 2Hr [[id number]]  (Abnormal) Collected: 01/28/24 1920    Specimen: Blood Updated: 01/28/24 1940     HS Troponin T 38 ng/L      Troponin T Delta -4 ng/L     Narrative:      High Sensitive Troponin T Reference Range:  <14.0 ng/L- Negative Female for AMI  <22.0 ng/L- Negative Male for AMI  >=14 - Abnormal Female indicating possible myocardial injury.  >=22 - Abnormal Male indicating possible myocardial injury.   Clinicians would have to utilize clinical acumen, EKG, Troponin, and serial changes to determine if it is an Acute Myocardial Infarction or myocardial injury due to an underlying chronic condition.                  Imaging:    XR Chest 1 View    Result Date: 1/28/2024  PROCEDURE: XR CHEST 1 VW  COMPARISON: [facility], CR, XR CHEST 1 VW, 10/31/2023, 15:39.  [facility], CT, CT CHEST W CONTRAST DIAGNOSTIC, 10/31/2023, 17:32.  INDICATIONS: Chest Pain; Jaw Pain; Neck Pain; Shortness of Breath  FINDINGS:  Heart is normal in size.  The lungs are well-expanded and free of infiltrates.  Changes of median sternotomy are again appreciated.  Left atrial clip is in unchanged position.  Reverse type right shoulder prosthesis is in place.       No active disease is seen.       LUI FLORES MD       Electronically Signed and Approved By: LUI FLORES MD on 1/28/2024 at 17:35                Differential Diagnosis and Discussion:    Chest Pain:  Based on the patient's signs and symptoms, I considered aortic dissection, myocardial infaction, pulmonary  embolism, cardiac tamponade, pericarditis, pneumothorax, musculoskeletal chest pain and other differential diagnosis as an etiology of the patient's chest pain.     All labs were reviewed and interpreted by me.  All X-rays impressions were independently interpreted by me.  EKG was interpreted by me.    MDM  Number of Diagnoses or Management Options  Chest pain, unspecified type  Neck pain  Diagnosis management comments: In summary this is a 68-year-old female with amyloidosis who presents to the emergency department for evaluation of chest pain.  She does report a history of requiring open heart surgery due to the amyloidosis to remove amyloid deposits from the heart.  CBC independently reviewed by me and shows no critical abnormalities.  CMP independently reviewed by me and shows no critical abnormalities.  High-sensitivity troponin x 2 unremarkable and unchanged from previous lab values for this patient.  EKG unremarkable no acute ischemia.  Chest x-ray unremarkable.  Patient does report having follow-up with her cardiologist and amyloid specialist in approximately 2 weeks.  Very strict return to ER and follow-up instructions have been provided to the patient.                   Patient Care Considerations:    CONSULT: I considered consulting cardiology, however no emergent indication      Consultants/Shared Management Plan:    None    Social Determinants of Health:    Patient is independent, reliable, and has access to care.       Disposition and Care Coordination:    Discharged: I considered escalation of care by admitting this patient to the hospital, however the patient has improved and is suitable and  stable for discharge.    I have explained the patient´s condition, diagnoses and treatment plan based on the information available to me at this time. I have answered questions and addressed any concerns. The patient has a good  understanding of the patient´s diagnosis, condition, and treatment plan as can be  expected at this point. The vital signs have been stable. The patient´s condition is stable and appropriate for discharge from the emergency department.      The patient will pursue further outpatient evaluation with the primary care physician or other designated or consulting physician as outlined in the discharge instructions. They are agreeable to this plan of care and follow-up instructions have been explained in detail. The patient has received these instructions in written format and have expressed an understanding of the discharge instructions. The patient is aware that any significant change in condition or worsening of symptoms should prompt an immediate return to this or the closest emergency department or call to 1.  I have explained discharge medications and the need for follow up with the patient/caretakers. This was also printed in the discharge instructions. Patient was discharged with the following medications and follow up:      Medication List        New Prescriptions      methocarbamol 750 MG tablet  Commonly known as: ROBAXIN  Take 2 tablets by mouth 3 (Three) Times a Day As Needed for Muscle Spasms.               Where to Get Your Medications        These medications were sent to [facility][id number] - [place] [id number] [facility] [address] [phone] [facility] [phone]   [address] [facility] [address]      Phone: [phone]   methocarbamol 750 MG tablet      Arian Peterson MD  [address] [facility] [address] [phone]    In 1 week         Final diagnoses:   Chest pain, unspecified type   Neck pain        ED Disposition       ED Disposition   Discharge    Condition   Stable    Comment   --               This medical record created using voice recognition software.             Joni Salazar MD  01/28/24 1959

## 2024-01-29 ENCOUNTER — PATIENT OUTREACH (OUTPATIENT)
Dept: CASE MANAGEMENT | Facility: OTHER | Age: 69
End: 2024-01-29
Payer: MEDICARE

## 2024-01-29 NOTE — OUTREACH NOTE
"AMBULATORY CASE MANAGEMENT NOTE    Name and Relationship of Patient/Support Person: Cynthia Her \"YOLETTE\" - Self    Patient Outreach  RN-ACM outreach with patient. Discussed 1/28/24 ED visit regarding chest and neck pain. Patient treated and discharged home.  Patient states to be compliant with ED recommendations; voiced intent to obtain Robaxin and states to have scheduled follow up with cardiology  and physicians at [facility]. Patient states improvement regarding chest; neck pain and SOB. Patient states no difficulty with appetite or sleeping. Patient states to be compliant with medications and medical appointments. Reviewed with patient ED AVS recommendations; education; role of RN-ACM and HRCM case managements services. Patient verbalized understanding. Patient states to appreciate outreach and declines needs for further outreach at this time. No further questions voiced at this time.   Adult Patient Profile  Questions/Answers      Flowsheet Row Most Recent Value   Symptoms/Conditions Managed at Home cardiovascular, musculoskeletal, respiratory   Cardiovascular Symptoms/Conditions chest pain   Cardiovascular Management Strategies other (see comments), medication therapy  [Physician follow up]   Musculoskeletal Symptoms/Conditions joint pain  [Patient states to have had neck,  jaw  pain]   Musculoskeletal Management Strategies other (see comments), medication therapy  [Physician follow up]   Respiratory Symptoms/Conditions shortness of breath   Respiratory Management Strategies other (see comments)  [Physician follow up]   Barriers to Taking Medication as Prescribed none   Name of Support/Comfort Primary Source Patient states to  have assistance from family as needed.   People in Home alone        Social Work Assessment  Questions/Answers      Flowsheet Row Most Recent Value   People in Home alone   Functional Status Comments Patient states to be independent with ADL's,  meal preparation,  " transportation and ambulating without assistive device.        Send Education  Questions/Answers      Flowsheet Row Most Recent Value   Other Patient Education/Resources  24/7 [facility] Nurse Call Line, [facility]   24/7 Nurse Call Line Education Method Verbal   [facility] Education Method Verbal        SDOH updated and reviewed with the patient during this program:  --     Food Insecurity: No Food Insecurity (1/29/2024)    Hunger Vital Sign     Worried About Running Out of Food in the Last Year: Never true     Ran Out of Food in the Last Year: Never true      --     Transportation Needs: No Transportation Needs (1/29/2024)    PRAPARE - Transportation     Lack of Transportation (Medical): No     Lack of Transportation (Non-Medical): No       Education Documentation  Unresolved/Worsening Symptoms, taught by Brianna Pearce, RN at 1/29/2024 12:54 PM.  Learner: Patient  Readiness: Acceptance  Method: Explanation  Response: Verbalizes Understanding    Energy Conservation, taught by Brianna Pearce, RN at 1/29/2024 12:54 PM.  Learner: Patient  Readiness: Acceptance  Method: Explanation  Response: Verbalizes Understanding    Unresolved/Worsening Symptoms, taught by Brianna Pearce, RN at 1/29/2024 12:54 PM.  Learner: Patient  Readiness: Acceptance  Method: Explanation  Response: Verbalizes Understanding    Provider Follow-Up, taught by Brianna Pearce, RN at 1/29/2024 12:54 PM.  Learner: Patient  Readiness: Acceptance  Method: Explanation  Response: Verbalizes Understanding          Brianna HORTON  Ambulatory Case Management    1/29/2024, 12:55 EST

## 2024-01-30 LAB
QT INTERVAL: 455 MS
QT INTERVAL: 474 MS
QTC INTERVAL: 523 MS
QTC INTERVAL: 530 MS

## 2024-01-31 ENCOUNTER — LAB (OUTPATIENT)
Dept: LAB | Facility: HOSPITAL | Age: 69
End: 2024-01-31
Payer: MEDICARE

## 2024-01-31 ENCOUNTER — INFUSION (OUTPATIENT)
Dept: ONCOLOGY | Facility: HOSPITAL | Age: 69
End: 2024-01-31
Payer: MEDICARE

## 2024-01-31 VITALS
WEIGHT: 130 LBS | TEMPERATURE: 98.4 F | BODY MASS INDEX: 24.56 KG/M2 | DIASTOLIC BLOOD PRESSURE: 74 MMHG | SYSTOLIC BLOOD PRESSURE: 125 MMHG | OXYGEN SATURATION: 95 % | RESPIRATION RATE: 16 BRPM | HEART RATE: 80 BPM

## 2024-01-31 DIAGNOSIS — E85.81 LIGHT CHAIN (AL) AMYLOIDOSIS: ICD-10-CM

## 2024-01-31 DIAGNOSIS — E85.81 LIGHT CHAIN (AL) AMYLOIDOSIS: Primary | ICD-10-CM

## 2024-01-31 LAB
ALBUMIN SERPL-MCNC: 4.4 G/DL (ref 3.5–5.2)
ALBUMIN/GLOB SERPL: 2.3 G/DL
ALP SERPL-CCNC: 90 U/L (ref 39–117)
ALT SERPL W P-5'-P-CCNC: 36 U/L (ref 1–33)
ANION GAP SERPL CALCULATED.3IONS-SCNC: 8.5 MMOL/L (ref 5–15)
AST SERPL-CCNC: 34 U/L (ref 1–32)
BASOPHILS # BLD AUTO: 0.02 10*3/MM3 (ref 0–0.2)
BASOPHILS NFR BLD AUTO: 0.4 % (ref 0–1.5)
BILIRUB SERPL-MCNC: 0.5 MG/DL (ref 0–1.2)
BUN SERPL-MCNC: 16 MG/DL (ref 8–23)
BUN/CREAT SERPL: 20.3 (ref 7–25)
CALCIUM SPEC-SCNC: 9.7 MG/DL (ref 8.6–10.5)
CHLORIDE SERPL-SCNC: 102 MMOL/L (ref 98–107)
CO2 SERPL-SCNC: 27.5 MMOL/L (ref 22–29)
CREAT SERPL-MCNC: 0.79 MG/DL (ref 0.57–1)
DEPRECATED RDW RBC AUTO: 45 FL (ref 37–54)
EGFRCR SERPLBLD CKD-EPI 2021: 81.6 ML/MIN/1.73
EOSINOPHIL # BLD AUTO: 0.2 10*3/MM3 (ref 0–0.4)
EOSINOPHIL NFR BLD AUTO: 3.6 % (ref 0.3–6.2)
ERYTHROCYTE [DISTWIDTH] IN BLOOD BY AUTOMATED COUNT: 13.2 % (ref 12.3–15.4)
GLOBULIN UR ELPH-MCNC: 1.9 GM/DL
GLUCOSE SERPL-MCNC: 80 MG/DL (ref 65–99)
HCT VFR BLD AUTO: 43.1 % (ref 34–46.6)
HGB BLD-MCNC: 14.3 G/DL (ref 12–15.9)
IMM GRANULOCYTES # BLD AUTO: 0.01 10*3/MM3 (ref 0–0.05)
IMM GRANULOCYTES NFR BLD AUTO: 0.2 % (ref 0–0.5)
LYMPHOCYTES # BLD AUTO: 2.21 10*3/MM3 (ref 0.7–3.1)
LYMPHOCYTES NFR BLD AUTO: 40.3 % (ref 19.6–45.3)
MAGNESIUM SERPL-MCNC: 2.3 MG/DL (ref 1.6–2.4)
MCH RBC QN AUTO: 30.2 PG (ref 26.6–33)
MCHC RBC AUTO-ENTMCNC: 33.2 G/DL (ref 31.5–35.7)
MCV RBC AUTO: 91.1 FL (ref 79–97)
MONOCYTES # BLD AUTO: 0.45 10*3/MM3 (ref 0.1–0.9)
MONOCYTES NFR BLD AUTO: 8.2 % (ref 5–12)
NEUTROPHILS NFR BLD AUTO: 2.6 10*3/MM3 (ref 1.7–7)
NEUTROPHILS NFR BLD AUTO: 47.3 % (ref 42.7–76)
NRBC BLD AUTO-RTO: 0 /100 WBC (ref 0–0.2)
PLATELET # BLD AUTO: 200 10*3/MM3 (ref 140–450)
PMV BLD AUTO: 10.1 FL (ref 6–12)
POTASSIUM SERPL-SCNC: 4.4 MMOL/L (ref 3.5–5.2)
PROT SERPL-MCNC: 6.3 G/DL (ref 6–8.5)
RBC # BLD AUTO: 4.73 10*6/MM3 (ref 3.77–5.28)
SODIUM SERPL-SCNC: 138 MMOL/L (ref 136–145)
WBC NRBC COR # BLD AUTO: 5.49 10*3/MM3 (ref 3.4–10.8)

## 2024-01-31 PROCEDURE — 63710000001 DEXAMETHASONE PER 0.25 MG: Performed by: INTERNAL MEDICINE

## 2024-01-31 PROCEDURE — 83735 ASSAY OF MAGNESIUM: CPT

## 2024-01-31 PROCEDURE — 36415 COLL VENOUS BLD VENIPUNCTURE: CPT

## 2024-01-31 PROCEDURE — 85025 COMPLETE CBC W/AUTO DIFF WBC: CPT

## 2024-01-31 PROCEDURE — 96401 CHEMO ANTI-NEOPL SQ/IM: CPT

## 2024-01-31 PROCEDURE — 25010000002 DARATUMUMAB-HYALURONIDASE-FIHJ 1800-30000 MG-UT/15ML SOLUTION: Performed by: INTERNAL MEDICINE

## 2024-01-31 RX ORDER — DEXAMETHASONE 4 MG/1
20 TABLET ORAL ONCE
Status: COMPLETED | OUTPATIENT
Start: 2024-01-31 | End: 2024-01-31

## 2024-01-31 RX ORDER — ACETAMINOPHEN 500 MG
1000 TABLET ORAL ONCE
Status: COMPLETED | OUTPATIENT
Start: 2024-01-31 | End: 2024-01-31

## 2024-01-31 RX ORDER — HYDROXYZINE PAMOATE 25 MG/1
25 CAPSULE ORAL ONCE
Status: COMPLETED | OUTPATIENT
Start: 2024-01-31 | End: 2024-01-31

## 2024-01-31 RX ORDER — ACYCLOVIR 400 MG/1
1 TABLET ORAL EVERY 12 HOURS SCHEDULED
COMMUNITY
Start: 2024-01-11

## 2024-01-31 RX ADMIN — HYDROXYZINE PAMOATE 25 MG: 25 CAPSULE ORAL at 13:17

## 2024-01-31 RX ADMIN — ACETAMINOPHEN 1000 MG: 500 TABLET ORAL at 13:17

## 2024-01-31 RX ADMIN — DEXAMETHASONE 20 MG: 4 TABLET ORAL at 13:17

## 2024-01-31 RX ADMIN — DARATUMUMAB AND HYALURONIDASE-FIHJ (HUMAN RECOMBINANT) 1800 MG: 1800; 30000 INJECTION SUBCUTANEOUS at 14:02

## 2024-01-31 NOTE — NURSING NOTE
The patient is here today for D1C16 of darzalex faspro and had a recent ER encounter with complaints of chest pain. Currently the patient has no complaints of chest pain or shortness of breath and is scheduled to follow-up with cardiology at [place] at the end of February.  Per Dr. Doron voss to proceed with treatment today. Instructed the patient to call with concerns and or go to ER if an emergency. Patient v/u

## 2024-02-09 RX ORDER — FLUOXETINE HYDROCHLORIDE 20 MG/1
CAPSULE ORAL
Qty: 360 CAPSULE | Refills: 0 | Status: SHIPPED | OUTPATIENT
Start: 2024-02-09

## 2024-02-09 RX ORDER — ROSUVASTATIN CALCIUM 20 MG/1
20 TABLET, COATED ORAL DAILY
Qty: 90 TABLET | Refills: 0 | Status: SHIPPED | OUTPATIENT
Start: 2024-02-09

## 2024-02-23 DIAGNOSIS — F51.01 PRIMARY INSOMNIA: ICD-10-CM

## 2024-02-23 NOTE — TELEPHONE ENCOUNTER
"Caller: Cynthia Her \"YOLETTE\"    Relationship to patient: Self    Best call back number: [phone]    Patient is needing: PATIENT WAS JUST CALLING TO INFORM SHE ONLY HAS 3 DAYS OF MEDICATION LEFT.      "

## 2024-02-23 NOTE — TELEPHONE ENCOUNTER
Rx Refill Note  Requested Prescriptions     Pending Prescriptions Disp Refills    zolpidem (AMBIEN) 10 MG tablet [Pharmacy Med Name: ZOLPIDEM 10MG TABLETS] 30 tablet      Sig: TAKE 1 TABLET BY MOUTH AT NIGHT AS NEEDED FOR SLEEP      Last office visit with prescribing clinician: 4/10/2023   Last telemedicine visit with prescribing clinician: Visit date not found   Next office visit with prescribing clinician: 6/10/2024                         Would you like a call back once the refill request has been completed: [] Yes [] No    If the office needs to give you a call back, can they leave a voicemail: [] Yes [] No    Ellis Houser Rep  02/23/24, 16:28 E[facility]x Refill Note  Requested Prescriptions     Pending Prescriptions Disp Refills    zolpidem (AMBIEN) 10 MG tablet [Pharmacy Med Name: ZOLPIDEM 10MG TABLETS] 30 tablet      Sig: TAKE 1 TABLET BY MOUTH AT NIGHT AS NEEDED FOR SLEEP      Last office visit with prescribing clinician: 4/10/2023   Last telemedicine visit with prescribing clinician: Visit date not found   Next office visit with prescribing clinician: 6/10/2024                         Would you like a call back once the refill request has been completed: [] Yes [] No    If the office needs to give you a call back, can they leave a voicemail: [] Yes [] No    Ellis Houser Rep  02/23/24, 16:28 EST

## 2024-02-24 RX ORDER — ZOLPIDEM TARTRATE 10 MG/1
10 TABLET ORAL NIGHTLY PRN
Qty: 30 TABLET | Refills: 1 | Status: SHIPPED | OUTPATIENT
Start: 2024-02-24

## 2024-02-28 ENCOUNTER — TELEPHONE (OUTPATIENT)
Dept: ONCOLOGY | Facility: CLINIC | Age: 69
End: 2024-02-28

## 2024-02-28 NOTE — TELEPHONE ENCOUNTER
"    Caller: Cynthia Her \"YOLETTE\"    Relationship to patient: Self    Best call back number: [phone]    Chief complaint: NOT FEELING WELL     Type of visit: LAB, F/U 1 & INFUSION     Requested date: CALL TO RS    If rescheduling, when is the original appointment: 2/28/2024    Additional notes:TRIED TO W/T TOLD TO SEND TE      "

## 2024-03-04 ENCOUNTER — TELEPHONE (OUTPATIENT)
Dept: ONCOLOGY | Facility: CLINIC | Age: 69
End: 2024-03-04
Payer: MEDICARE

## 2024-03-04 ENCOUNTER — TRANSCRIBE ORDERS (OUTPATIENT)
Dept: ADMINISTRATIVE | Facility: HOSPITAL | Age: 69
End: 2024-03-04
Payer: MEDICARE

## 2024-03-04 DIAGNOSIS — R41.3 MEMORY LOSS: Primary | ICD-10-CM

## 2024-03-04 NOTE — TELEPHONE ENCOUNTER
----- Message from Nav Sal MD sent at 3/4/2024  1:43 PM EST -----  Beats me. That appears to be the order, so maybe the patient can call and schedule it herself. Can you call her and see what the expectation is? Thanks! [facility]  .  ----- Message -----  From: Dagoberto Juarez RN  Sent: 3/4/2024   1:36 PM EST  To: Nav Sal MD    Are we expected to arrange this for the patient on behalf of [facility]?  ----- Message -----  From: Sharri Cevallos  Sent: 3/4/2024   1:30 PM EST  To: Dagoberto Juarez RN

## 2024-03-04 NOTE — TELEPHONE ENCOUNTER
Patient states [place] was going to have [facility] facilitate this. I told her I was not certain that anyone would call her on their own, so I provided her [facility]'s central scheduling phone number and encouraged her to ask to be transferred to [facility] if they are unable to assist her.

## 2024-03-05 NOTE — PROGRESS NOTES
"[facility] CBC GROUP OUTPATIENT FOLLOW UP CLINIC VISIT    REASON FOR FOLLOW-UP:    AL amyloidosis  Therapy with michael-CyBorD initiated 8/16/2022  Now on maintenance daratumumab every 4 weeks initiated May 2023 with plans for 18 months of therapy    HISTORY OF PRESENT ILLNESS: Cynthia Her is a 68 y.o. female with the above-mentioned history who is here today for lab review and evaluation.    She continues to have difficulty swallowing.  She was recently seen at [facility] for esophageal manometry.  She is waiting to hear the results.  She continues to have issues with diarrhea and \"dumping.\"  But this has improved.  She notes some mild cognitive decline and headaches.  An MRI of the brain is scheduled in the near future.    REVIEW OF SYSTEMS:  As per the HPI    PHYSICAL EXAMINATION:    Vitals:    03/06/24 1257   BP: 137/82   Pulse: 79   Resp: 16   Temp: 98 °F (36.7 °C)   TempSrc: Temporal   SpO2: 95%   Weight: 57.9 kg (127 lb 11.2 oz)   Height: 154.9 cm (60.98\")   PainSc: 0-No pain     General:  No acute distress, awake, alert and oriented  Skin:  Warm and dry, no visible rash.   HEENT:  Normocephalic/atraumatic.   Chest:  Normal respiratory effort.  Lungs clear to auscultation bilaterally  Heart: Regular rate and rhythm with a grade 2 out of 6 early systolic murmur  No CVA tenderness  Extremities:  No visible clubbing, cyanosis, or edema  Neuro/psych:  Grossly nonfocal.  Normal mood and affect.    DIAGNOSTIC DATA:  CBC and Differential (03/06/2024 12:29)     IMAGING:    None reviewed      ASSESSMENT:  This is a 68 y.o. female with:    *AL amyloidosis  She had a stress echocardiogram on 4/24/2020 showing a normal left ventricular ejection fraction of 60% with moderate concentric hypertrophy but no wall motion abnormalities of the left ventricle.  There was impaired relaxation noted.  She complained of chest pain during the examination.  There was some ST segment depression.  Cardiac catheterization was " performed on the same day.  No intervention was required.  Follow-up echocardiogram on 4/15/2021 showed a left ventricular ejection fraction of 61 to 65% with normal LV cavity size.  Left ventricular wall thickness showed moderate concentric hypertrophy.  Diastolic function was impaired.  No pericardial effusion.  Small left pleural effusion.  She had follow-up [facility] imaging for cardiac amyloidosis that was not suggestive of ATTR amyloidosis  Laboratory values on 2/24/2022 showed a high serum free light chain lambda of 121, normal kappa of 10.5, low ratio at 0.09.  Serum protein electrophoresis showed no evidence of an M spike.  Urine light chains were abnormal with an elevated lambda light chain of 33 and a low ratio of 0.48 with a 12.7 mg M spike on 24-hour urine protein electrophoresis.  BNP was elevated at 2306 on 3/4/2022.  The troponin was normal at 0.03.  CK was 212 with a CK-MB of 10.87.  Initial consult on 3/30/22. No M spike on serum protein electrophoresis. SIFE 'presence of monoclonal protein is unclear.'   Bone marrow biopsy 4/13/22 normocellular, 12.1% plasma cells consistent with low volume plasma cell dyscrasia. FISH with low level t(11;14) fusion only. No amyloid noted as Congo red staining negative.   Fat pad biopsy 5/11/22 positive for amyloid, AL lambda  Referred to Dr. Buenrostro at [place]. Intended to enroll on a clinical study but her troponin as tested by the study sponsor was not high enough  Therapy with michael-CyBorD initiated 8/16/2022  Patient seen in the office on 8/17/2022 for triage visit.  Patient with complaints of fatigue, dizziness and diarrhea.  She was mildly hypotensive.  She was given IV fluids.  Subsequent admission at [facility] with shortness of breath and volume overload.  She was diuresed and treated with antibiotics.  D8 therapy administered on 8/30  Light chains normal at [facility] on 8/31/22 (lambda light chain 1.3, down from 12.44)  9/27/2022: Cycle 2-day 8  of therapy  10/4/2022 cycle 2-day 8 CyBorD.  Patient continues to push oral hydration.  She continues to have some neck stiffness but this has not worsened.  She will see [facility] next week for further cardiac work-up.  Glucose was low upon initial labs today however the patient was given something to eat by nursing prior to rechecking.  This was therefore rechecked prior to her leaving the office and was 87.  Patient reports she was feeling fine.  10/18/2020 to cycle 2-day 22 Sissy -CyBorD.  Velcade dose will be reduced to 1.0 mg/m² due to patient's persistent issues with orthostasis.  10/25/2022: Cycle 3-day 1  11/8/2022: Delay in cycle 3-day 15 therapy.  Day 8 omitted due to an upper respiratory infection.  11/22/2022: Cycle 3-day 22.   final planned treatment before she goes to [facility] for cardiac surgery.  Cardiac surgery performed at [facility] on 12/5/2022.  Pathology from the myectomy showed cardiac amyloidosis extensively involving the vessels and endocardium with myocyte hypertrophy and interstitial fibrosis.  Congo red stain was positive.  Reviewed back on 1/17/2023 with plans to resume Darzalex, Cytoxan, Velcade, dexamethasone with cycle #4 on 1/24.  Patient seen 2/7/2023 (missed 1/31/2023 due to weather, which would have been day 8).  We will go ahead and proceed with as cycle 4 day 15, day 15.  2/14/2023: Cycle 4-day 22  2/21/2023 cycle 5-day 1 Darzalex, Cytoxan (IV Cytoxan given in the office), Velcade, dexamethasone (20 mg p.o. given in the office).  Repeat labs from 2/21/2023 showing M spike up to 0.8, free kappa light chain up to 85.8 (previously 5.3 on 1/17/2023), ratio 8.58 (was previously 0.76 on 1/17/2023).  We ended up repeating labs on 3/7/2023 M spike 0.1, free kappa light chain 3.4, ratio 0.83.  We will continue on with treatment.  She is scheduled to return to [facility] in April.  4/11/2023 cycle 6, day 22 Darzalex-CyBorD.  Final planned therapy.  She was seen at [facility] with  recommendations for monthly Darzalex for 18 months. Bactrim stopped and she continues acyclovir. No immediate plans for ASCT.  The last note from [facility] says to continue Darzalex for 24 months.  12/6/2023: Proceed with Darzalex    *Diarrhea  She saw Dr. Hoyos with GI at [facility] on 9/7.  Suspicion for amyloid involvement of the GI tract  Continue Lomotil and Imodium. Rifaximin prescribed by Dr. Hoyos which she continues to use intermittently with improvement but this is only prescribed for 1 week out of 4  Diarrhea waxes and wanes with what she eats and dairy products particularly worsen diarrhea  Continue Questran, Lomotil and Imodium.  These help but did not completely control the issue.  10/11/2023: Patient continues on rifaximin, Questran, Lomotil, and Imodium for diarrhea management.  Diarrhea still not completely controlled. Averages at least 5 episodes daily.   Stable issues at this time.  She cannot continue rifaximin at this time due to insurance issues. On Flagyl. Resume rifaximin per GI at [facility] when possible    *Elevated liver enzymes  Continue to monitor intermittently    *Cardiomyopathy:  Most recent echocardiogram on 8/17/2022 showed left ventricular wall thickness consistent with moderate to severe concentric hypertrophy along with left ventricular septal wall measuring 2.2 cm and posterior wall thickness at 1.9 cm likely due to amyloidosis.  LVEF 61 to 65%.  Grade 1 impaired relaxation.  Now followed by Dr. Lyles at [facility] with concerns for pre-existing HCM and AL cardiac amyloid.   Cardiac MRI results above.  Amyloidosis evident.  Catheterization performed at [facility].   Cardiac surgery performed at [facility] on 12/5/2022.  Pathology from the myectomy showed cardiac amyloidosis extensively involving the vessels and endocardium with myocyte hypertrophy and interstitial fibrosis.  Congo red stain was positive.  Symptoms improved significantly  10/11/2023: Seen by cardiology at [facility]  on 10/4/2023. Patient was restarted on metoprolol XL.  Midodrine adjusted to morning and early afternoon.  ECHO to be repeated in 3 months with follow-up.  New LBBB  Symptoms overall much better    *History of periorbital hematomas: These have resolved.  Coagulation studies and a factor X level were all normal.    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   Continuing magic mouthwash.    *Dysphagia:  She saw Dr. Aranda at [place] with Botox injections performed.  Symptoms unfortunately are not much better.  Recent esophagram confirmed esophageal dysmotility.  Manometry recently performed at [place].  Awaiting results.    *Skin changes with hypopigmentation and blister on the left side of her nose  Concerning for new manifestations of amyloidosis  Not discussed at this time    *Subcutaneous nodules, also previously increasing in number and size  Again not discussed today    *Rectal irritation  Saw wound care.  Continue current therapy.  No recent issues.    *Cognitive issues and headache  MRI brain scheduled in the near future    PLAN:  Monthly Darzalex continues x 18-14 months, initiated in May 2023. Proceed again today  Continue Imodium, Lomotil, and Questran for diarrhea control. Per GI at [facility].  Continue barrier creams and ointments  Follow up with wound care as needed  Continue Magic mouthwash as needed for glossitis.  Continue cardiac medications as managed by cardiology at [place].  Continue acyclovir for prophylaxis.  No longer on prophylactic Bactrim.  Follow-up pending manometry results  Schedule treatment every 4 weeks until she can see me.  Nurse practitioner visit in 8 weeks and I will see her back in 16 weeks.    This patient remains on high risk drug therapy requiring intensive monitoring for toxicity.

## 2024-03-06 ENCOUNTER — OFFICE VISIT (OUTPATIENT)
Dept: ONCOLOGY | Facility: CLINIC | Age: 69
End: 2024-03-06
Payer: MEDICARE

## 2024-03-06 ENCOUNTER — INFUSION (OUTPATIENT)
Dept: ONCOLOGY | Facility: HOSPITAL | Age: 69
End: 2024-03-06
Payer: MEDICARE

## 2024-03-06 ENCOUNTER — LAB (OUTPATIENT)
Dept: LAB | Facility: HOSPITAL | Age: 69
End: 2024-03-06
Payer: MEDICARE

## 2024-03-06 VITALS
HEIGHT: 61 IN | DIASTOLIC BLOOD PRESSURE: 82 MMHG | TEMPERATURE: 98 F | BODY MASS INDEX: 24.11 KG/M2 | RESPIRATION RATE: 16 BRPM | SYSTOLIC BLOOD PRESSURE: 137 MMHG | HEART RATE: 79 BPM | WEIGHT: 127.7 LBS | OXYGEN SATURATION: 95 %

## 2024-03-06 DIAGNOSIS — E85.81 LIGHT CHAIN (AL) AMYLOIDOSIS: ICD-10-CM

## 2024-03-06 DIAGNOSIS — E85.81 LIGHT CHAIN (AL) AMYLOIDOSIS: Primary | ICD-10-CM

## 2024-03-06 LAB
ALBUMIN SERPL-MCNC: 4.1 G/DL (ref 3.5–5.2)
ALBUMIN/GLOB SERPL: 1.7 G/DL
ALP SERPL-CCNC: 101 U/L (ref 39–117)
ALT SERPL W P-5'-P-CCNC: 22 U/L (ref 1–33)
ANION GAP SERPL CALCULATED.3IONS-SCNC: 11.6 MMOL/L (ref 5–15)
AST SERPL-CCNC: 31 U/L (ref 1–32)
BASOPHILS # BLD AUTO: 0.04 10*3/MM3 (ref 0–0.2)
BASOPHILS NFR BLD AUTO: 0.6 % (ref 0–1.5)
BILIRUB SERPL-MCNC: 0.5 MG/DL (ref 0–1.2)
BUN SERPL-MCNC: 17 MG/DL (ref 8–23)
BUN/CREAT SERPL: 23.3 (ref 7–25)
CALCIUM SPEC-SCNC: 9.9 MG/DL (ref 8.6–10.5)
CHLORIDE SERPL-SCNC: 100 MMOL/L (ref 98–107)
CO2 SERPL-SCNC: 26.4 MMOL/L (ref 22–29)
CREAT SERPL-MCNC: 0.73 MG/DL (ref 0.57–1)
DEPRECATED RDW RBC AUTO: 43.5 FL (ref 37–54)
EGFRCR SERPLBLD CKD-EPI 2021: 89.7 ML/MIN/1.73
EOSINOPHIL # BLD AUTO: 0.8 10*3/MM3 (ref 0–0.4)
EOSINOPHIL NFR BLD AUTO: 11.4 % (ref 0.3–6.2)
ERYTHROCYTE [DISTWIDTH] IN BLOOD BY AUTOMATED COUNT: 13.1 % (ref 12.3–15.4)
GLOBULIN UR ELPH-MCNC: 2.4 GM/DL
GLUCOSE SERPL-MCNC: 91 MG/DL (ref 65–99)
HCT VFR BLD AUTO: 43.8 % (ref 34–46.6)
HGB BLD-MCNC: 13.9 G/DL (ref 12–15.9)
IMM GRANULOCYTES # BLD AUTO: 0.01 10*3/MM3 (ref 0–0.05)
IMM GRANULOCYTES NFR BLD AUTO: 0.1 % (ref 0–0.5)
LYMPHOCYTES # BLD AUTO: 1.79 10*3/MM3 (ref 0.7–3.1)
LYMPHOCYTES NFR BLD AUTO: 25.5 % (ref 19.6–45.3)
MCH RBC QN AUTO: 28.5 PG (ref 26.6–33)
MCHC RBC AUTO-ENTMCNC: 31.7 G/DL (ref 31.5–35.7)
MCV RBC AUTO: 89.9 FL (ref 79–97)
MONOCYTES # BLD AUTO: 0.51 10*3/MM3 (ref 0.1–0.9)
MONOCYTES NFR BLD AUTO: 7.3 % (ref 5–12)
NEUTROPHILS NFR BLD AUTO: 3.86 10*3/MM3 (ref 1.7–7)
NEUTROPHILS NFR BLD AUTO: 55.1 % (ref 42.7–76)
NRBC BLD AUTO-RTO: 0 /100 WBC (ref 0–0.2)
PLATELET # BLD AUTO: 241 10*3/MM3 (ref 140–450)
PMV BLD AUTO: 9.7 FL (ref 6–12)
POTASSIUM SERPL-SCNC: 4.3 MMOL/L (ref 3.5–5.2)
PROT SERPL-MCNC: 6.5 G/DL (ref 6–8.5)
RBC # BLD AUTO: 4.87 10*6/MM3 (ref 3.77–5.28)
SODIUM SERPL-SCNC: 138 MMOL/L (ref 136–145)
WBC NRBC COR # BLD AUTO: 7.01 10*3/MM3 (ref 3.4–10.8)

## 2024-03-06 PROCEDURE — 3079F DIAST BP 80-89 MM HG: CPT | Performed by: INTERNAL MEDICINE

## 2024-03-06 PROCEDURE — 36415 COLL VENOUS BLD VENIPUNCTURE: CPT

## 2024-03-06 PROCEDURE — 1126F AMNT PAIN NOTED NONE PRSNT: CPT | Performed by: INTERNAL MEDICINE

## 2024-03-06 PROCEDURE — 3075F SYST BP GE 130 - 139MM HG: CPT | Performed by: INTERNAL MEDICINE

## 2024-03-06 PROCEDURE — 99214 OFFICE O/P EST MOD 30 MIN: CPT | Performed by: INTERNAL MEDICINE

## 2024-03-06 PROCEDURE — 63710000001 DEXAMETHASONE PER 0.25 MG: Performed by: INTERNAL MEDICINE

## 2024-03-06 PROCEDURE — 1160F RVW MEDS BY RX/DR IN RCRD: CPT | Performed by: INTERNAL MEDICINE

## 2024-03-06 PROCEDURE — 1159F MED LIST DOCD IN RCRD: CPT | Performed by: INTERNAL MEDICINE

## 2024-03-06 PROCEDURE — 25010000002 DARATUMUMAB-HYALURONIDASE-FIHJ 1800-30000 MG-UT/15ML SOLUTION: Performed by: INTERNAL MEDICINE

## 2024-03-06 PROCEDURE — 96401 CHEMO ANTI-NEOPL SQ/IM: CPT

## 2024-03-06 PROCEDURE — 85025 COMPLETE CBC W/AUTO DIFF WBC: CPT

## 2024-03-06 PROCEDURE — 80053 COMPREHEN METABOLIC PANEL: CPT

## 2024-03-06 RX ORDER — DIPHENHYDRAMINE HYDROCHLORIDE 50 MG/ML
50 INJECTION INTRAMUSCULAR; INTRAVENOUS AS NEEDED
OUTPATIENT
Start: 2024-05-01

## 2024-03-06 RX ORDER — DIPHENHYDRAMINE HYDROCHLORIDE 50 MG/ML
50 INJECTION INTRAMUSCULAR; INTRAVENOUS AS NEEDED
OUTPATIENT
Start: 2024-04-03

## 2024-03-06 RX ORDER — FAMOTIDINE 10 MG/ML
20 INJECTION, SOLUTION INTRAVENOUS AS NEEDED
OUTPATIENT
Start: 2024-05-01

## 2024-03-06 RX ORDER — FAMOTIDINE 10 MG/ML
20 INJECTION, SOLUTION INTRAVENOUS AS NEEDED
OUTPATIENT
Start: 2024-04-03

## 2024-03-06 RX ORDER — ACETAMINOPHEN 500 MG
1000 TABLET ORAL ONCE
Status: COMPLETED | OUTPATIENT
Start: 2024-03-06 | End: 2024-03-06

## 2024-03-06 RX ORDER — HYDROXYZINE PAMOATE 25 MG/1
25 CAPSULE ORAL ONCE
OUTPATIENT
Start: 2024-05-01 | End: 2024-05-01

## 2024-03-06 RX ORDER — HYDROXYZINE PAMOATE 25 MG/1
25 CAPSULE ORAL ONCE
Status: COMPLETED | OUTPATIENT
Start: 2024-03-06 | End: 2024-03-06

## 2024-03-06 RX ORDER — MEPERIDINE HYDROCHLORIDE 25 MG/ML
25 INJECTION INTRAMUSCULAR; INTRAVENOUS; SUBCUTANEOUS
OUTPATIENT
Start: 2024-05-01

## 2024-03-06 RX ORDER — HYDROXYZINE PAMOATE 25 MG/1
25 CAPSULE ORAL ONCE
OUTPATIENT
Start: 2024-04-03 | End: 2024-04-03

## 2024-03-06 RX ORDER — ACETAMINOPHEN 500 MG
1000 TABLET ORAL ONCE
OUTPATIENT
Start: 2024-05-01

## 2024-03-06 RX ORDER — MEPERIDINE HYDROCHLORIDE 25 MG/ML
25 INJECTION INTRAMUSCULAR; INTRAVENOUS; SUBCUTANEOUS
OUTPATIENT
Start: 2024-04-03

## 2024-03-06 RX ORDER — ACETAMINOPHEN 500 MG
1000 TABLET ORAL ONCE
OUTPATIENT
Start: 2024-04-03

## 2024-03-06 RX ORDER — DEXAMETHASONE 4 MG/1
20 TABLET ORAL ONCE
Status: COMPLETED | OUTPATIENT
Start: 2024-03-06 | End: 2024-03-06

## 2024-03-06 RX ADMIN — DARATUMUMAB AND HYALURONIDASE-FIHJ (HUMAN RECOMBINANT) 1800 MG: 1800; 30000 INJECTION SUBCUTANEOUS at 14:26

## 2024-03-06 RX ADMIN — DEXAMETHASONE 20 MG: 4 TABLET ORAL at 13:44

## 2024-03-06 RX ADMIN — ACETAMINOPHEN 1000 MG: 500 TABLET ORAL at 13:44

## 2024-03-06 RX ADMIN — HYDROXYZINE PAMOATE 25 MG: 25 CAPSULE ORAL at 13:44

## 2024-03-07 LAB
ALBUMIN SERPL ELPH-MCNC: 3.8 G/DL (ref 2.9–4.4)
ALBUMIN/GLOB SERPL: 1.6 {RATIO} (ref 0.7–1.7)
ALPHA1 GLOB SERPL ELPH-MCNC: 0.3 G/DL (ref 0–0.4)
ALPHA2 GLOB SERPL ELPH-MCNC: 1 G/DL (ref 0.4–1)
B-GLOBULIN SERPL ELPH-MCNC: 0.9 G/DL (ref 0.7–1.3)
GAMMA GLOB SERPL ELPH-MCNC: 0.3 G/DL (ref 0.4–1.8)
GLOBULIN SER-MCNC: 2.5 G/DL (ref 2.2–3.9)
IGA SERPL-MCNC: 5 MG/DL (ref 87–352)
IGG SERPL-MCNC: 265 MG/DL (ref 586–1602)
IGM SERPL-MCNC: 31 MG/DL (ref 26–217)
INTERPRETATION SERPL IEP-IMP: ABNORMAL
KAPPA LC FREE SER-MCNC: 5.4 MG/L (ref 3.3–19.4)
KAPPA LC FREE/LAMBDA FREE SER: 1.46 {RATIO} (ref 0.26–1.65)
LABORATORY COMMENT REPORT: ABNORMAL
LAMBDA LC FREE SERPL-MCNC: 3.7 MG/L (ref 5.7–26.3)
M PROTEIN SERPL ELPH-MCNC: 0.1 G/DL
PROT SERPL-MCNC: 6.3 G/DL (ref 6–8.5)

## 2024-03-13 DIAGNOSIS — R19.7 DIARRHEA, UNSPECIFIED TYPE: ICD-10-CM

## 2024-03-13 RX ORDER — DIPHENOXYLATE HYDROCHLORIDE AND ATROPINE SULFATE 2.5; .025 MG/1; MG/1
1 TABLET ORAL 4 TIMES DAILY PRN
Qty: 120 TABLET | Refills: 0 | Status: SHIPPED | OUTPATIENT
Start: 2024-03-13

## 2024-03-13 NOTE — TELEPHONE ENCOUNTER
"    Caller: Cynthia Her \"YOLETTE\"    Relationship: Self    Best call back number: [phone]    Requested Prescriptions:   Requested Prescriptions     Pending Prescriptions Disp Refills    diphenoxylate-atropine (LOMOTIL) 2.5-0.025 MG per tablet 120 tablet 0     Sig: Take 1 tablet by mouth 4 (Four) Times a Day As Needed for Diarrhea.        Pharmacy where request should be sent: [id number] - [place], [place] - [address] [facility] AT [address]/[facility] & [place] - [phone] [facility] [phone] FX     Last office visit with prescribing clinician: 3/6/2024   Last telemedicine visit with prescribing clinician: Visit date not found   Next office visit with prescribing clinician: 6/26/2024       Does the patient have less than a 3 day supply:  [x] Yes  [] No    Would you like a call back once the refill request has been completed: [] Yes [x] No    If the office needs to give you a call back, can they leave a voicemail: [] Yes [x] No      "

## 2024-04-03 ENCOUNTER — INFUSION (OUTPATIENT)
Dept: ONCOLOGY | Facility: HOSPITAL | Age: 69
End: 2024-04-03
Payer: MEDICARE

## 2024-04-03 ENCOUNTER — LAB (OUTPATIENT)
Dept: LAB | Facility: HOSPITAL | Age: 69
End: 2024-04-03
Payer: MEDICARE

## 2024-04-03 VITALS
OXYGEN SATURATION: 94 % | TEMPERATURE: 98.4 F | DIASTOLIC BLOOD PRESSURE: 76 MMHG | HEART RATE: 78 BPM | SYSTOLIC BLOOD PRESSURE: 142 MMHG | RESPIRATION RATE: 16 BRPM | BODY MASS INDEX: 24.12 KG/M2 | WEIGHT: 127.6 LBS

## 2024-04-03 DIAGNOSIS — E85.81 LIGHT CHAIN (AL) AMYLOIDOSIS: ICD-10-CM

## 2024-04-03 DIAGNOSIS — E85.81 LIGHT CHAIN (AL) AMYLOIDOSIS: Primary | ICD-10-CM

## 2024-04-03 LAB
BASOPHILS # BLD AUTO: 0.03 10*3/MM3 (ref 0–0.2)
BASOPHILS NFR BLD AUTO: 0.5 % (ref 0–1.5)
DEPRECATED RDW RBC AUTO: 41.1 FL (ref 37–54)
EOSINOPHIL # BLD AUTO: 0.46 10*3/MM3 (ref 0–0.4)
EOSINOPHIL NFR BLD AUTO: 7.3 % (ref 0.3–6.2)
ERYTHROCYTE [DISTWIDTH] IN BLOOD BY AUTOMATED COUNT: 13 % (ref 12.3–15.4)
HCT VFR BLD AUTO: 44.3 % (ref 34–46.6)
HGB BLD-MCNC: 14.5 G/DL (ref 12–15.9)
IMM GRANULOCYTES # BLD AUTO: 0.01 10*3/MM3 (ref 0–0.05)
IMM GRANULOCYTES NFR BLD AUTO: 0.2 % (ref 0–0.5)
LYMPHOCYTES # BLD AUTO: 2.33 10*3/MM3 (ref 0.7–3.1)
LYMPHOCYTES NFR BLD AUTO: 36.9 % (ref 19.6–45.3)
MCH RBC QN AUTO: 28.4 PG (ref 26.6–33)
MCHC RBC AUTO-ENTMCNC: 32.7 G/DL (ref 31.5–35.7)
MCV RBC AUTO: 86.9 FL (ref 79–97)
MONOCYTES # BLD AUTO: 0.51 10*3/MM3 (ref 0.1–0.9)
MONOCYTES NFR BLD AUTO: 8.1 % (ref 5–12)
NEUTROPHILS NFR BLD AUTO: 2.98 10*3/MM3 (ref 1.7–7)
NEUTROPHILS NFR BLD AUTO: 47 % (ref 42.7–76)
NRBC BLD AUTO-RTO: 0 /100 WBC (ref 0–0.2)
PLATELET # BLD AUTO: 232 10*3/MM3 (ref 140–450)
PMV BLD AUTO: 10.3 FL (ref 6–12)
RBC # BLD AUTO: 5.1 10*6/MM3 (ref 3.77–5.28)
WBC NRBC COR # BLD AUTO: 6.32 10*3/MM3 (ref 3.4–10.8)

## 2024-04-03 PROCEDURE — 85025 COMPLETE CBC W/AUTO DIFF WBC: CPT

## 2024-04-03 PROCEDURE — 36415 COLL VENOUS BLD VENIPUNCTURE: CPT

## 2024-04-03 PROCEDURE — 25010000002 DARATUMUMAB-HYALURONIDASE-FIHJ 1800-30000 MG-UT/15ML SOLUTION: Performed by: INTERNAL MEDICINE

## 2024-04-03 PROCEDURE — 96401 CHEMO ANTI-NEOPL SQ/IM: CPT

## 2024-04-03 PROCEDURE — 63710000001 DEXAMETHASONE PER 0.25 MG: Performed by: INTERNAL MEDICINE

## 2024-04-03 PROCEDURE — 96402 CHEMO HORMON ANTINEOPL SQ/IM: CPT

## 2024-04-03 RX ORDER — HYDROXYZINE PAMOATE 25 MG/1
25 CAPSULE ORAL ONCE
Status: COMPLETED | OUTPATIENT
Start: 2024-04-03 | End: 2024-04-03

## 2024-04-03 RX ORDER — ACETAMINOPHEN 500 MG
1000 TABLET ORAL ONCE
Status: COMPLETED | OUTPATIENT
Start: 2024-04-03 | End: 2024-04-03

## 2024-04-03 RX ORDER — DEXAMETHASONE 4 MG/1
20 TABLET ORAL ONCE
Status: COMPLETED | OUTPATIENT
Start: 2024-04-03 | End: 2024-04-03

## 2024-04-03 RX ADMIN — ACETAMINOPHEN 1000 MG: 500 TABLET ORAL at 12:13

## 2024-04-03 RX ADMIN — DARATUMUMAB AND HYALURONIDASE-FIHJ (HUMAN RECOMBINANT) 1800 MG: 1800; 30000 INJECTION SUBCUTANEOUS at 12:48

## 2024-04-03 RX ADMIN — DEXAMETHASONE 20 MG: 4 TABLET ORAL at 12:13

## 2024-04-03 RX ADMIN — HYDROXYZINE PAMOATE 25 MG: 25 CAPSULE ORAL at 12:14

## 2024-04-07 ENCOUNTER — HOSPITAL ENCOUNTER (OUTPATIENT)
Dept: MRI IMAGING | Facility: HOSPITAL | Age: 69
Discharge: HOME | End: 2024-04-07
Payer: MEDICARE

## 2024-04-07 DIAGNOSIS — R41.3 MEMORY LOSS: ICD-10-CM

## 2024-04-07 PROCEDURE — A9577 INJ MULTIHANCE: HCPCS

## 2024-04-07 PROCEDURE — 70553 MRI BRAIN STEM W/O & W/DYE: CPT

## 2024-04-07 PROCEDURE — 0 GADOBENATE DIMEGLUMINE 529 MG/ML SOLUTION

## 2024-04-07 RX ADMIN — GADOBENATE DIMEGLUMINE 10 ML: 529 INJECTION, SOLUTION INTRAVENOUS at 12:52

## 2024-04-30 ENCOUNTER — DOCUMENTATION (OUTPATIENT)
Dept: FAMILY MEDICINE CLINIC | Facility: CLINIC | Age: 69
End: 2024-04-30
Payer: MEDICARE

## 2024-05-01 ENCOUNTER — LAB (OUTPATIENT)
Dept: LAB | Facility: HOSPITAL | Age: 69
End: 2024-05-01
Payer: MEDICARE

## 2024-05-01 ENCOUNTER — INFUSION (OUTPATIENT)
Dept: ONCOLOGY | Facility: HOSPITAL | Age: 69
End: 2024-05-01
Payer: MEDICARE

## 2024-05-01 ENCOUNTER — OFFICE VISIT (OUTPATIENT)
Dept: ONCOLOGY | Facility: CLINIC | Age: 69
End: 2024-05-01
Payer: MEDICARE

## 2024-05-01 VITALS
HEART RATE: 73 BPM | TEMPERATURE: 98.4 F | DIASTOLIC BLOOD PRESSURE: 62 MMHG | WEIGHT: 127.8 LBS | BODY MASS INDEX: 24.13 KG/M2 | SYSTOLIC BLOOD PRESSURE: 117 MMHG | RESPIRATION RATE: 16 BRPM | HEIGHT: 61 IN | OXYGEN SATURATION: 97 %

## 2024-05-01 DIAGNOSIS — E85.81 LIGHT CHAIN (AL) AMYLOIDOSIS: ICD-10-CM

## 2024-05-01 DIAGNOSIS — Z79.899 HIGH RISK MEDICATION USE: ICD-10-CM

## 2024-05-01 DIAGNOSIS — E85.81 LIGHT CHAIN (AL) AMYLOIDOSIS: Primary | ICD-10-CM

## 2024-05-01 DIAGNOSIS — R19.7 DIARRHEA, UNSPECIFIED TYPE: ICD-10-CM

## 2024-05-01 LAB
ALBUMIN SERPL-MCNC: 4.3 G/DL (ref 3.5–5.2)
ALBUMIN/GLOB SERPL: 2.4 G/DL
ALP SERPL-CCNC: 79 U/L (ref 39–117)
ALT SERPL W P-5'-P-CCNC: 32 U/L (ref 1–33)
ANION GAP SERPL CALCULATED.3IONS-SCNC: 9.3 MMOL/L (ref 5–15)
AST SERPL-CCNC: 28 U/L (ref 1–32)
BASOPHILS # BLD AUTO: 0.03 10*3/MM3 (ref 0–0.2)
BASOPHILS NFR BLD AUTO: 0.5 % (ref 0–1.5)
BILIRUB SERPL-MCNC: 0.5 MG/DL (ref 0–1.2)
BUN SERPL-MCNC: 14 MG/DL (ref 8–23)
BUN/CREAT SERPL: 17.7 (ref 7–25)
CALCIUM SPEC-SCNC: 9.9 MG/DL (ref 8.6–10.5)
CHLORIDE SERPL-SCNC: 101 MMOL/L (ref 98–107)
CO2 SERPL-SCNC: 26.7 MMOL/L (ref 22–29)
CREAT SERPL-MCNC: 0.79 MG/DL (ref 0.57–1)
DEPRECATED RDW RBC AUTO: 44.8 FL (ref 37–54)
EGFRCR SERPLBLD CKD-EPI 2021: 81.1 ML/MIN/1.73
EOSINOPHIL # BLD AUTO: 0.37 10*3/MM3 (ref 0–0.4)
EOSINOPHIL NFR BLD AUTO: 6.5 % (ref 0.3–6.2)
ERYTHROCYTE [DISTWIDTH] IN BLOOD BY AUTOMATED COUNT: 13.8 % (ref 12.3–15.4)
GLOBULIN UR ELPH-MCNC: 1.8 GM/DL
GLUCOSE SERPL-MCNC: 93 MG/DL (ref 65–99)
HCT VFR BLD AUTO: 43.8 % (ref 34–46.6)
HGB BLD-MCNC: 14.1 G/DL (ref 12–15.9)
IMM GRANULOCYTES # BLD AUTO: 0.01 10*3/MM3 (ref 0–0.05)
IMM GRANULOCYTES NFR BLD AUTO: 0.2 % (ref 0–0.5)
LYMPHOCYTES # BLD AUTO: 2.3 10*3/MM3 (ref 0.7–3.1)
LYMPHOCYTES NFR BLD AUTO: 40.1 % (ref 19.6–45.3)
MCH RBC QN AUTO: 28.7 PG (ref 26.6–33)
MCHC RBC AUTO-ENTMCNC: 32.2 G/DL (ref 31.5–35.7)
MCV RBC AUTO: 89.2 FL (ref 79–97)
MONOCYTES # BLD AUTO: 0.38 10*3/MM3 (ref 0.1–0.9)
MONOCYTES NFR BLD AUTO: 6.6 % (ref 5–12)
NEUTROPHILS NFR BLD AUTO: 2.64 10*3/MM3 (ref 1.7–7)
NEUTROPHILS NFR BLD AUTO: 46.1 % (ref 42.7–76)
NRBC BLD AUTO-RTO: 0 /100 WBC (ref 0–0.2)
PLATELET # BLD AUTO: 197 10*3/MM3 (ref 140–450)
PMV BLD AUTO: 10.2 FL (ref 6–12)
POTASSIUM SERPL-SCNC: 4.2 MMOL/L (ref 3.5–5.2)
PROT SERPL-MCNC: 6.1 G/DL (ref 6–8.5)
RBC # BLD AUTO: 4.91 10*6/MM3 (ref 3.77–5.28)
SODIUM SERPL-SCNC: 137 MMOL/L (ref 136–145)
WBC NRBC COR # BLD AUTO: 5.73 10*3/MM3 (ref 3.4–10.8)

## 2024-05-01 PROCEDURE — 63710000001 DEXAMETHASONE PER 0.25 MG: Performed by: INTERNAL MEDICINE

## 2024-05-01 PROCEDURE — 85025 COMPLETE CBC W/AUTO DIFF WBC: CPT

## 2024-05-01 PROCEDURE — 25010000002 DARATUMUMAB-HYALURONIDASE-FIHJ 1800-30000 MG-UT/15ML SOLUTION: Performed by: INTERNAL MEDICINE

## 2024-05-01 PROCEDURE — 96401 CHEMO ANTI-NEOPL SQ/IM: CPT

## 2024-05-01 RX ORDER — RIFAXIMIN 550 MG/1
550 TABLET ORAL
COMMUNITY
Start: 2024-03-12

## 2024-05-01 RX ORDER — HYDROXYZINE PAMOATE 25 MG/1
25 CAPSULE ORAL ONCE
Status: COMPLETED | OUTPATIENT
Start: 2024-05-01 | End: 2024-05-01

## 2024-05-01 RX ORDER — DEXAMETHASONE 4 MG/1
20 TABLET ORAL ONCE
Status: COMPLETED | OUTPATIENT
Start: 2024-05-01 | End: 2024-05-01

## 2024-05-01 RX ORDER — ACETAMINOPHEN 500 MG
1000 TABLET ORAL ONCE
Status: COMPLETED | OUTPATIENT
Start: 2024-05-01 | End: 2024-05-01

## 2024-05-01 RX ADMIN — HYDROXYZINE PAMOATE 25 MG: 25 CAPSULE ORAL at 12:24

## 2024-05-01 RX ADMIN — DEXAMETHASONE 20 MG: 4 TABLET ORAL at 12:24

## 2024-05-01 RX ADMIN — ACETAMINOPHEN 1000 MG: 500 TABLET ORAL at 12:24

## 2024-05-01 RX ADMIN — DARATUMUMAB AND HYALURONIDASE-FIHJ (HUMAN RECOMBINANT) 1800 MG: 1800; 30000 INJECTION SUBCUTANEOUS at 12:55

## 2024-05-01 NOTE — PROGRESS NOTES
"[facility] CBC GROUP OUTPATIENT FOLLOW UP CLINIC VISIT    REASON FOR FOLLOW-UP:    AL amyloidosis  Therapy with michael-CyBorD initiated 8/16/2022  Now on maintenance daratumumab every 4 weeks initiated May 2023 with plans for 18 months of therapy    HISTORY OF PRESENT ILLNESS: Cynthia Her is a 69 y.o. female with the above-mentioned history who is here today for lab review and evaluation.    She continues to have difficulty swallowing.  She was recently seen at [place] for esophageal manometry.  She is waiting to hear the results.  She continues to have issues with diarrhea and \"dumping.\"  But this has improved.  She notes some mild cognitive decline and headaches and had MRI of the brain 4/7/2024 showing no acute intracranial process.  There is some mild chronic small vessel ischemic disease noted.    REVIEW OF SYSTEMS:  As per the HPI    PHYSICAL EXAMINATION:    Vitals:    05/01/24 1205   BP: 117/62   Pulse: 73   Resp: 16   Temp: 98.4 °F (36.9 °C)   TempSrc: Temporal   SpO2: 97%   Weight: 58 kg (127 lb 12.8 oz)   Height: 154.9 cm (60.98\")   PainSc: 0-No pain     PHYSICAL EXAM  General:  No acute distress, awake, alert and oriented  Skin:  Warm and dry, no visible rash.  Right upper thigh subcutaneous nodule approximately 1-1/2 cm and mobile.  Left upper arm with grainy nodularity noted.  HEENT:  Normocephalic/atraumatic.   Chest:  Normal respiratory effort.  Lungs clear to auscultation bilaterally  Heart: Regular rate and rhythm with a grade 2 out of 6 early systolic murmur  No CVA tenderness  Extremities:  No visible clubbing, cyanosis, or edema  Neuro/psych:  Grossly nonfocal.  Normal mood and affect.    DIAGNOSTIC DATA:.  CBC and Differential (05/01/2024 11:53)     IMAGING:    None reviewed      ASSESSMENT:  This is a 69 y.o. female with:    *AL amyloidosis  She had a stress echocardiogram on 4/24/2020 showing a normal left ventricular ejection fraction of 60% with moderate concentric " hypertrophy but no wall motion abnormalities of the left ventricle.  There was impaired relaxation noted.  She complained of chest pain during the examination.  There was some ST segment depression.  Cardiac catheterization was performed on the same day.  No intervention was required.  Follow-up echocardiogram on 4/15/2021 showed a left ventricular ejection fraction of 61 to 65% with normal LV cavity size.  Left ventricular wall thickness showed moderate concentric hypertrophy.  Diastolic function was impaired.  No pericardial effusion.  Small left pleural effusion.  She had follow-up [facility] imaging for cardiac amyloidosis that was not suggestive of ATTR amyloidosis  Laboratory values on 2/24/2022 showed a high serum free light chain lambda of 121, normal kappa of 10.5, low ratio at 0.09.  Serum protein electrophoresis showed no evidence of an M spike.  Urine light chains were abnormal with an elevated lambda light chain of 33 and a low ratio of 0.48 with a 12.7 mg M spike on 24-hour urine protein electrophoresis.  BNP was elevated at 2306 on 3/4/2022.  The troponin was normal at 0.03.  CK was 212 with a CK-MB of 10.87.  Initial consult on 3/30/22. No M spike on serum protein electrophoresis. SIFE 'presence of monoclonal protein is unclear.'   Bone marrow biopsy 4/13/22 normocellular, 12.1% plasma cells consistent with low volume plasma cell dyscrasia. FISH with low level t(11;14) fusion only. No amyloid noted as Congo red staining negative.   Fat pad biopsy 5/11/22 positive for amyloid, AL lambda  Referred to Dr. Buenrostro at [place]. Intended to enroll on a clinical study but her troponin as tested by the study sponsor was not high enough  Therapy with michael-CyBorD initiated 8/16/2022  Patient seen in the office on 8/17/2022 for triage visit.  Patient with complaints of fatigue, dizziness and diarrhea.  She was mildly hypotensive.  She was given IV fluids.  Subsequent admission at [facility] with  shortness of breath and volume overload.  She was diuresed and treated with antibiotics.  D8 therapy administered on 8/30  Light chains normal at [place] on 8/31/22 (lambda light chain 1.3, down from 12.44)  9/27/2022: Cycle 2-day 8 of therapy  10/4/2022 cycle 2-day 8 CyBorD.  Patient continues to push oral hydration.  She continues to have some neck stiffness but this has not worsened.  She will see [place] next week for further cardiac work-up.  Glucose was low upon initial labs today however the patient was given something to eat by nursing prior to rechecking.  This was therefore rechecked prior to her leaving the office and was 87.  Patient reports she was feeling fine.  10/18/2020 to cycle 2-day 22 Sissy -CyBorD.  Velcade dose will be reduced to 1.0 mg/m² due to patient's persistent issues with orthostasis.  10/25/2022: Cycle 3-day 1  11/8/2022: Delay in cycle 3-day 15 therapy.  Day 8 omitted due to an upper respiratory infection.  11/22/2022: Cycle 3-day 22.   final planned treatment before she goes to [place] for cardiac surgery.  Cardiac surgery performed at [place] on 12/5/2022.  Pathology from the myectomy showed cardiac amyloidosis extensively involving the vessels and endocardium with myocyte hypertrophy and interstitial fibrosis.  Congo red stain was positive.  Reviewed back on 1/17/2023 with plans to resume Darzalex, Cytoxan, Velcade, dexamethasone with cycle #4 on 1/24.  Patient seen 2/7/2023 (missed 1/31/2023 due to weather, which would have been day 8).  We will go ahead and proceed with as cycle 4 day 15, day 15.  2/14/2023: Cycle 4-day 22  2/21/2023 cycle 5-day 1 Darzalex, Cytoxan (IV Cytoxan given in the office), Velcade, dexamethasone (20 mg p.o. given in the office).  Repeat labs from 2/21/2023 showing M spike up to 0.8, free kappa light chain up to 85.8 (previously 5.3 on 1/17/2023), ratio 8.58 (was previously 0.76 on 1/17/2023).  We ended up repeating labs on 3/7/2023 M spike 0.1,  free kappa light chain 3.4, ratio 0.83.  We will continue on with treatment.  She is scheduled to return to [facility] in April.  4/11/2023 cycle 6, day 22 Darzalex-CyBorD.  Final planned therapy.  She was seen at [facility] with recommendations for monthly Darzalex for 18 months. Bactrim stopped and she continues acyclovir. No immediate plans for ASCT.  The last note from [facility] says to continue Darzalex for 24 months.  12/6/2023: Proceed with Darzalex  5/1/2024 patient returns we will proceed with Darzalex today.  Will discuss new skin nodules with Dr. Sal.  Patient does follow-up with [facility] next month and will discuss this with them at that time.  She is following up with primary care soon to discuss some medication changes as suggested by her team at [facility] to see if that helps with her esophageal spasms.  She will return in 1 month for labs and treatment in 2 months to see Dr. Sal with labs and treatment.    *Diarrhea  She saw Dr. Hoyos with GI at [facility] on 9/7.  Suspicion for amyloid involvement of the GI tract  Continue Lomotil and Imodium. Rifaximin prescribed by Dr. Hoyos which she continues to use intermittently with improvement but this is only prescribed for 1 week out of 4  Diarrhea waxes and wanes with what she eats and dairy products particularly worsen diarrhea  Continue Questran, Lomotil and Imodium.  These help but did not completely control the issue.  10/11/2023: Patient continues on rifaximin, Questran, Lomotil, and Imodium for diarrhea management.  Diarrhea still not completely controlled. Averages at least 5 episodes daily.   Stable issues at this time.  She cannot continue rifaximin at this time due to insurance issues. On Flagyl. Resume rifaximin per GI at [facility] when possible  5/1/2024 she reports her diarrhea is unchanged today.    *Elevated liver enzymes  Continue to monitor intermittently    *Cardiomyopathy:  Most recent echocardiogram on 8/17/2022 showed left  ventricular wall thickness consistent with moderate to severe concentric hypertrophy along with left ventricular septal wall measuring 2.2 cm and posterior wall thickness at 1.9 cm likely due to amyloidosis.  LVEF 61 to 65%.  Grade 1 impaired relaxation.  Now followed by Dr. Lyles at [facility] with concerns for pre-existing HCM and AL cardiac amyloid.   Cardiac MRI results above.  Amyloidosis evident.  Catheterization performed at [facility].   Cardiac surgery performed at [facility] on 12/5/2022.  Pathology from the myectomy showed cardiac amyloidosis extensively involving the vessels and endocardium with myocyte hypertrophy and interstitial fibrosis.  Congo red stain was positive.  Symptoms improved significantly  10/11/2023: Seen by cardiology at [facility] on 10/4/2023. Patient was restarted on metoprolol XL.  Midodrine adjusted to morning and early afternoon.  ECHO to be repeated in 3 months with follow-up.  New LBBB  Symptoms overall much better    *History of periorbital hematomas: These have resolved.  Coagulation studies and a factor X level were all normal.    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   Continuing magic mouthwash.    *Dysphagia:  She saw Dr. Aranda at [facility] with Botox injections performed.  Symptoms unfortunately are not much better.  Recent esophagram confirmed esophageal dysmotility.  Manometry recently performed at [facility].  Patient states she was told that she is having esophageal spasms and that this is an area that they are very limited in treatment options.  She is going to see primary care for some medication adjustments to see if this is helpful.    *Skin changes with hypopigmentation and blister on the left side of her nose  Concerning for new manifestations of amyloidosis  Not discussed at this time    *Subcutaneous nodules, also previously increasing in number and size  5/1/2024 patient reports these are increasing in  number and size once again, she reports a new area on her right upper thigh and left upper arm today.    *Rectal irritation  Saw wound care.  Continue current therapy.  No recent issues.    *Cognitive issues and headache  MRI brain 4/7/2024 with no acute intracranial process and findings suggestive of mild chronic small vessel ischemic disease.    PLAN:  Proceed with monthly Darzalex today, due for cycle 19, initiated in May 2023.  Plan was 18 to 24 months of Darzalex.  Continue Imodium, Lomotil, and Questran for diarrhea control. Per GI at [facility].  Continue barrier creams and ointments  Patient will see [facility] next month regarding her amyloidosis.  She will discuss the new nodules with them at that time.  We will reassess at her next visit and if any increase in size could consider biopsy.  Continue Magic mouthwash as needed for glossitis.  Continue cardiac medications as managed by cardiology at [facility].  Continue acyclovir for prophylaxis.  No longer on prophylactic Bactrim.  Return for labs and Darzalex in 1 month and in 2 months labs, Darzalex, and Dr. Sal.    This patient remains on high risk drug therapy requiring intensive monitoring for toxicity.

## 2024-05-02 DIAGNOSIS — F51.01 PRIMARY INSOMNIA: ICD-10-CM

## 2024-05-02 LAB
ALBUMIN SERPL ELPH-MCNC: 3.8 G/DL (ref 2.9–4.4)
ALBUMIN/GLOB SERPL: 2.1 {RATIO} (ref 0.7–1.7)
ALPHA1 GLOB SERPL ELPH-MCNC: 0.2 G/DL (ref 0–0.4)
ALPHA2 GLOB SERPL ELPH-MCNC: 0.7 G/DL (ref 0.4–1)
B-GLOBULIN SERPL ELPH-MCNC: 0.7 G/DL (ref 0.7–1.3)
GAMMA GLOB SERPL ELPH-MCNC: 0.3 G/DL (ref 0.4–1.8)
GLOBULIN SER-MCNC: 1.9 G/DL (ref 2.2–3.9)
IGA SERPL-MCNC: <5 MG/DL (ref 87–352)
IGG SERPL-MCNC: 250 MG/DL (ref 586–1602)
IGM SERPL-MCNC: 28 MG/DL (ref 26–217)
INTERPRETATION SERPL IEP-IMP: ABNORMAL
KAPPA LC FREE SER-MCNC: 2.3 MG/L (ref 3.3–19.4)
KAPPA LC FREE/LAMBDA FREE SER: 0.7 {RATIO} (ref 0.26–1.65)
LABORATORY COMMENT REPORT: ABNORMAL
LAMBDA LC FREE SERPL-MCNC: 3.3 MG/L (ref 5.7–26.3)
M PROTEIN SERPL ELPH-MCNC: 0.1 G/DL
PROT SERPL-MCNC: 5.7 G/DL (ref 6–8.5)

## 2024-05-02 RX ORDER — ZOLPIDEM TARTRATE 10 MG/1
10 TABLET ORAL NIGHTLY PRN
Qty: 30 TABLET | OUTPATIENT
Start: 2024-05-02

## 2024-05-02 NOTE — TELEPHONE ENCOUNTER
"  Name: Cynthia Her \"YOLETTE\"    Relationship: Self    Best Callback Number: [phone]    HUB PROVIDED THE RELAY MESSAGE FROM THE OFFICE   PATIENT HAS FURTHER QUESTIONS AND WOULD LIKE A CALL BACK AT THE FOLLOWING PHONE NUMBER [phone]    ADDITIONAL INFORMATION: SHE HAS AN APPOINTMENT BOOKED FOR THE FIRST AVAILABLE WITH DR. OLIVEIRA BUT SHE NEEDS HER MEDICATION BEFORE THEN.  IS THERE ANY WAY TO FIT HER IN SOONER?   " no

## 2024-05-02 NOTE — TELEPHONE ENCOUNTER
"  Caller: Cynthia Her \"YOLETTE\"    Relationship to patient: Self    Best call back number: [phone]    Patient is needing: SHE IS OUT OF THE MEDICATION        "

## 2024-05-02 NOTE — TELEPHONE ENCOUNTER
"Relay     \"LVM INFORMING PT SHE IS PAST DUE FOR APPT. HAS ONE COMING UP NEXT MONTH BUT IF SHE NEEDS RF, SHE NEEDS TO BE SEEN ASAP, HOPEFULLY TOMORROW.\"      Relay     \"IF SHE NEEDS REFILL ASAP, SHE NEEDS TO SEE  ONLY ASAP SINCE HE WRITES HER CONTROLLED MEDICATIONS\"                               "

## 2024-05-03 DIAGNOSIS — F51.01 PRIMARY INSOMNIA: ICD-10-CM

## 2024-05-03 RX ORDER — ZOLPIDEM TARTRATE 10 MG/1
10 TABLET ORAL NIGHTLY PRN
Qty: 30 TABLET | Refills: 1 | Status: SHIPPED | OUTPATIENT
Start: 2024-05-03

## 2024-05-04 DIAGNOSIS — R19.7 DIARRHEA, UNSPECIFIED TYPE: ICD-10-CM

## 2024-05-06 RX ORDER — DIPHENOXYLATE HYDROCHLORIDE AND ATROPINE SULFATE 2.5; .025 MG/1; MG/1
1 TABLET ORAL 4 TIMES DAILY PRN
Qty: 120 TABLET | Refills: 0 | Status: SHIPPED | OUTPATIENT
Start: 2024-05-06

## 2024-05-06 RX ORDER — ROSUVASTATIN CALCIUM 20 MG/1
20 TABLET, COATED ORAL DAILY
Qty: 30 TABLET | Refills: 0 | Status: SHIPPED | OUTPATIENT
Start: 2024-05-06

## 2024-05-13 RX ORDER — OLOPATADINE HYDROCHLORIDE 1 MG/ML
SOLUTION/ DROPS OPHTHALMIC
Qty: 5 ML | Refills: 5 | OUTPATIENT
Start: 2024-05-13

## 2024-05-15 RX ORDER — FLUOXETINE HYDROCHLORIDE 20 MG/1
CAPSULE ORAL
Qty: 360 CAPSULE | Refills: 0 | Status: SHIPPED | OUTPATIENT
Start: 2024-05-15

## 2024-05-16 DIAGNOSIS — F41.9 ANXIETY: ICD-10-CM

## 2024-05-17 RX ORDER — ALPRAZOLAM 0.25 MG/1
0.25 TABLET ORAL 2 TIMES DAILY PRN
Qty: 60 TABLET | OUTPATIENT
Start: 2024-05-17

## 2024-05-22 ENCOUNTER — HOSPITAL ENCOUNTER (EMERGENCY)
Facility: HOSPITAL | Age: 69
Discharge: HOME | End: 2024-05-22
Attending: EMERGENCY MEDICINE
Payer: MEDICARE

## 2024-05-22 ENCOUNTER — TELEPHONE (OUTPATIENT)
Dept: FAMILY MEDICINE CLINIC | Facility: CLINIC | Age: 69
End: 2024-05-22

## 2024-05-22 ENCOUNTER — APPOINTMENT (OUTPATIENT)
Dept: GENERAL RADIOLOGY | Facility: HOSPITAL | Age: 69
End: 2024-05-22
Payer: MEDICARE

## 2024-05-22 VITALS
HEIGHT: 60 IN | OXYGEN SATURATION: 95 % | BODY MASS INDEX: 25.62 KG/M2 | DIASTOLIC BLOOD PRESSURE: 58 MMHG | HEART RATE: 68 BPM | TEMPERATURE: 98 F | SYSTOLIC BLOOD PRESSURE: 118 MMHG | WEIGHT: 130.51 LBS | RESPIRATION RATE: 18 BRPM

## 2024-05-22 DIAGNOSIS — R53.1 WEAKNESS GENERALIZED: Primary | ICD-10-CM

## 2024-05-22 DIAGNOSIS — R06.02 SHORTNESS OF BREATH: ICD-10-CM

## 2024-05-22 LAB
ALBUMIN SERPL-MCNC: 4.2 G/DL (ref 3.5–5.2)
ALBUMIN/GLOB SERPL: 3 G/DL
ALP SERPL-CCNC: 72 U/L (ref 39–117)
ALT SERPL W P-5'-P-CCNC: 34 U/L (ref 1–33)
ANION GAP SERPL CALCULATED.3IONS-SCNC: 9.9 MMOL/L (ref 5–15)
AST SERPL-CCNC: 27 U/L (ref 1–32)
BACTERIA UR QL AUTO: ABNORMAL /HPF
BASOPHILS # BLD AUTO: 0.02 10*3/MM3 (ref 0–0.2)
BASOPHILS NFR BLD AUTO: 0.4 % (ref 0–1.5)
BILIRUB SERPL-MCNC: 0.4 MG/DL (ref 0–1.2)
BILIRUB UR QL STRIP: NEGATIVE
BUN SERPL-MCNC: 23 MG/DL (ref 8–23)
BUN/CREAT SERPL: 24 (ref 7–25)
CALCIUM SPEC-SCNC: 9.5 MG/DL (ref 8.6–10.5)
CHLORIDE SERPL-SCNC: 103 MMOL/L (ref 98–107)
CLARITY UR: CLEAR
CO2 SERPL-SCNC: 24.1 MMOL/L (ref 22–29)
COLOR UR: ABNORMAL
CREAT SERPL-MCNC: 0.96 MG/DL (ref 0.57–1)
DEPRECATED RDW RBC AUTO: 46 FL (ref 37–54)
EGFRCR SERPLBLD CKD-EPI 2021: 64.2 ML/MIN/1.73
EOSINOPHIL # BLD AUTO: 0.23 10*3/MM3 (ref 0–0.4)
EOSINOPHIL NFR BLD AUTO: 4.7 % (ref 0.3–6.2)
ERYTHROCYTE [DISTWIDTH] IN BLOOD BY AUTOMATED COUNT: 14.2 % (ref 12.3–15.4)
GLOBULIN UR ELPH-MCNC: 1.4 GM/DL
GLUCOSE SERPL-MCNC: 107 MG/DL (ref 65–99)
GLUCOSE UR STRIP-MCNC: ABNORMAL MG/DL
HCT VFR BLD AUTO: 40.7 % (ref 34–46.6)
HGB BLD-MCNC: 13.2 G/DL (ref 12–15.9)
HGB UR QL STRIP.AUTO: NEGATIVE
HOLD SPECIMEN: NORMAL
HOLD SPECIMEN: NORMAL
HYALINE CASTS UR QL AUTO: ABNORMAL /LPF
IMM GRANULOCYTES # BLD AUTO: 0.01 10*3/MM3 (ref 0–0.05)
IMM GRANULOCYTES NFR BLD AUTO: 0.2 % (ref 0–0.5)
KETONES UR QL STRIP: ABNORMAL
LEUKOCYTE ESTERASE UR QL STRIP.AUTO: ABNORMAL
LYMPHOCYTES # BLD AUTO: 2.2 10*3/MM3 (ref 0.7–3.1)
LYMPHOCYTES NFR BLD AUTO: 45.2 % (ref 19.6–45.3)
MCH RBC QN AUTO: 28.4 PG (ref 26.6–33)
MCHC RBC AUTO-ENTMCNC: 32.4 G/DL (ref 31.5–35.7)
MCV RBC AUTO: 87.5 FL (ref 79–97)
MONOCYTES # BLD AUTO: 0.34 10*3/MM3 (ref 0.1–0.9)
MONOCYTES NFR BLD AUTO: 7 % (ref 5–12)
NEUTROPHILS NFR BLD AUTO: 2.07 10*3/MM3 (ref 1.7–7)
NEUTROPHILS NFR BLD AUTO: 42.5 % (ref 42.7–76)
NITRITE UR QL STRIP: NEGATIVE
NRBC BLD AUTO-RTO: 0 /100 WBC (ref 0–0.2)
NT-PROBNP SERPL-MCNC: 278.1 PG/ML (ref 0–900)
PH UR STRIP.AUTO: 7 [PH] (ref 5–8)
PLATELET # BLD AUTO: 183 10*3/MM3 (ref 140–450)
PMV BLD AUTO: 11 FL (ref 6–12)
POTASSIUM SERPL-SCNC: 4.5 MMOL/L (ref 3.5–5.2)
PROT SERPL-MCNC: 5.6 G/DL (ref 6–8.5)
PROT UR QL STRIP: NEGATIVE
RBC # BLD AUTO: 4.65 10*6/MM3 (ref 3.77–5.28)
RBC # UR STRIP: ABNORMAL /HPF
REF LAB TEST METHOD: ABNORMAL
SODIUM SERPL-SCNC: 137 MMOL/L (ref 136–145)
SP GR UR STRIP: >1.03 (ref 1–1.03)
SQUAMOUS #/AREA URNS HPF: ABNORMAL /HPF
TROPONIN T SERPL HS-MCNC: 28 NG/L
UROBILINOGEN UR QL STRIP: ABNORMAL
WBC # UR STRIP: ABNORMAL /HPF
WBC NRBC COR # BLD AUTO: 4.87 10*3/MM3 (ref 3.4–10.8)
WHOLE BLOOD HOLD COAG: NORMAL
WHOLE BLOOD HOLD SPECIMEN: NORMAL

## 2024-05-22 PROCEDURE — 36415 COLL VENOUS BLD VENIPUNCTURE: CPT

## 2024-05-22 PROCEDURE — 93005 ELECTROCARDIOGRAM TRACING: CPT | Performed by: EMERGENCY MEDICINE

## 2024-05-22 PROCEDURE — 99284 EMERGENCY DEPT VISIT MOD MDM: CPT

## 2024-05-22 PROCEDURE — 93005 ELECTROCARDIOGRAM TRACING: CPT

## 2024-05-22 PROCEDURE — 84484 ASSAY OF TROPONIN QUANT: CPT | Performed by: EMERGENCY MEDICINE

## 2024-05-22 PROCEDURE — 80053 COMPREHEN METABOLIC PANEL: CPT | Performed by: EMERGENCY MEDICINE

## 2024-05-22 PROCEDURE — 71045 X-RAY EXAM CHEST 1 VIEW: CPT

## 2024-05-22 PROCEDURE — 93010 ELECTROCARDIOGRAM REPORT: CPT | Performed by: INTERNAL MEDICINE

## 2024-05-22 PROCEDURE — 83880 ASSAY OF NATRIURETIC PEPTIDE: CPT | Performed by: EMERGENCY MEDICINE

## 2024-05-22 PROCEDURE — 85025 COMPLETE CBC W/AUTO DIFF WBC: CPT | Performed by: EMERGENCY MEDICINE

## 2024-05-22 PROCEDURE — 81001 URINALYSIS AUTO W/SCOPE: CPT

## 2024-05-22 RX ORDER — SODIUM CHLORIDE 0.9 % (FLUSH) 0.9 %
10 SYRINGE (ML) INJECTION AS NEEDED
Status: DISCONTINUED | OUTPATIENT
Start: 2024-05-22 | End: 2024-05-22 | Stop reason: HOSPADM

## 2024-05-22 NOTE — ED PROVIDER NOTES
Time: 5:15 PM EDT  Date of encounter:  5/22/2024  Independent Historian/Clinical History and Information was obtained by:   Patient    History is limited by: N/A    Chief Complaint: Shortness of breath      History of Present Illness:  Patient is a 69 y.o. year old female who presents to the emergency department for evaluation of shortness of breath, dizziness, weakness that started this morning.  Patient reports she is undergoing cancer treatments and has frequent diarrhea history of episodes also worse at this time.  Denies any fevers, chills, body aches, productive cough.  Denies chest pain.    HPI    Patient Care Team  Primary Care Provider: Arian Peterson MD    Past Medical History:     Allergies   Allergen Reactions    Azithromycin Rash and Other (See Comments)     Reaction unknown to patient (unable to remember)  Other reaction(s): Other: stomach issues, Stomach ache, Other: stomach issues, Stomach ache    Ezetimibe Myalgia, Other (See Comments) and Rash     cramps  cramps    Pravastatin Rash and Other (See Comments)     Past Medical History:   Diagnosis Date    AL amyloidosis     Anxiety     Arthritis     COVID-19 07/2020 9/7/2021    Depression     Diverticulitis of colon     Diverticulosis     GERD (gastroesophageal reflux disease)     History of Clostridioides difficile infection 2008    HAS HAD SEVERAL TIMES IN PAST PER PT (SAW INFECTIOUS DISEASE MD FOR THIS S/P COLOSTOMY/EXTENSIVE ANBX THERAPY)    History of prediabetes     History of snoring     History of stress incontinence     Hypercholesterolemia     Hyperlipidemia     Hypertension     Left ventricular hypertrophy     FOLLOWED BY DR MARI. DENIES CHEST PAIN BUT STATES DOES GET SOA AT TIMES WITH EXERTION REPORTS THAT IT IS NOT A NEW ISSUE     Liver disease     Oral herpes 08/18/2020    Seasonal allergies     used to have allergy shots in the past    SOB (shortness of breath)     STATES WITH EXERTION HAS BEEN ONGOING ISSUE NOT A NEW ISSUE     Thyroid disease     Hypothyroid     Past Surgical History:   Procedure Laterality Date    ABDOMINAL SURGERY  2005, 2014    ex lap x 2,  diverticulitis    ABDOMINOPLASTY  2016    ADENOIDECTOMY      APPENDECTOMY      CARDIAC CATHETERIZATION N/A 04/24/2020    Procedure: Coronary angiography;  Surgeon: Roberto Briones MD;  [facility];  Service: Cardiovascular;  Laterality: N/A;    CARDIAC CATHETERIZATION N/A 04/24/2020    Procedure: Left heart cath;  Surgeon: Roberto Briones MD;  [facility];  Service: Cardiovascular;  Laterality: N/A;    CARDIAC CATHETERIZATION N/A 04/24/2020    Procedure: Left ventriculography;  Surgeon: Roberto Briones MD;  [facility];  Service: Cardiovascular;  Laterality: N/A;    CARDIAC SURGERY      open heart surgery,    COLONOSCOPY  approx 2018    normal per pt     COLOSTOMY  2005    had colostomy and then is was reversed    COLOSTOMY CLOSURE  2006    CORRECTION HAMMER TOE Right 2017    ECTOPIC PREGNANCY SURGERY      1979, 1980    ENDOSCOPY N/A 05/27/2020    Procedure: ESOPHAGOGASTRODUODENOSCOPY WITH BIOPSY AND BIOPSY POLYPECTOMY AND MACIEL DIALATION;  Surgeon: Preethi Beck MD;  Location: [facility] ENDOSCOPY;  Service: Gastroenterology;  Laterality: N/A;  PRE: ESOPHAGEAL DYSPHAGIA  POST: Z LINE 46, ESOPHAGEAL SPASM, GASTRITIS, FUNDIC POLYP    ENDOSCOPY N/A 06/20/2023    ESOPHAGEAL DILATATION N/A 12/07/2021    Procedure: OR ESOPHAGEAL DILATATION with maciel dilators ;  Surgeon: Jamey Leslie MD;  Location: [facility] OR [facility];  Service: ENT;  Laterality: N/A;    ESOPHAGEAL MOTILITY STUDY N/A 02/03/2022    Procedure: ESOPHAGEAL MOTILITY STUDY;  Surgeon: Harshil, Nurse Performed;  Location: [facility] ENDOSCOPY;  Service: Gastroenterology;  Laterality: N/A;  dypshagia     FOOT SURGERY Right 12/2016    Second and third hammertoe corrections    KNEE ARTHROSCOPY Right 2009    KNEE SURGERY  Right     REVISION / TAKEDOWN COLOSTOMY  2006    SHOULDER ARTHROSCOPY Right 2018    right shoulder arthroscopy with extensive rotator cuff, biceps and labral debridement, chondroplasty, & subacromial decompression with acromioplasty    SHOULDER SURGERY      HAS HAS COMPLETE SHOULDER ON RIGHT. LEFT SCOPED AND HAD 2ND SURGERY WITH HARDWARE PLACED    SHOULDER SURGERY Left     2012. 2013    TONSILLECTOMY      TOTAL SHOULDER REVERSE ARTHROPLASTY Right 2019    WISDOM TOOTH EXTRACTION       Family History   Problem Relation Age of Onset    Hypertension Mother     Osteoporosis Mother     Skin cancer Mother     Heart disease Father     Hypertension Father     Breast cancer Maternal Grandmother     Ovarian cancer Neg Hx     Colon cancer Neg Hx     Deep vein thrombosis Neg Hx     Pulmonary embolism Neg Hx     Malig Hyperthermia Neg Hx        Home Medications:  Prior to Admission medications    Medication Sig Start Date End Date Taking? Authorizing Provider   acyclovir (ZOVIRAX) 400 MG tablet Take 1 tablet by mouth Every 12 (Twelve) Hours. 1/11/24   Eric Weston MD   ALPRAZolam (XANAX) 0.25 MG tablet Take 1 tablet by mouth 2 (Two) Times a Day As Needed for Anxiety. for anxiety 12/12/23   Simon Witt MD   aspirin 81 MG chewable tablet CHEW AND SWALLOW 1 TABLET DAILY WITH BREAKFAST 1/12/23   Eric Weston MD   Cholecalciferol (Vitamin D3) 50 MCG (2000 UT) tablet Take 1 tablet by mouth Daily.    Eric Weston MD   cholestyramine (QUESTRAN) 4 g packet Take 1 packet by mouth 3 (Three) Times a Day With Meals. 1 packet  TID PRN diarrhea 8/9/22   Nav Sal MD   ciclopirox (LOPROX) 1 % shampoo APPLY TOPICALLY 3 TIMES EVERY WEEK 10/20/22   Eric Weston MD   diphenoxylate-atropine (LOMOTIL) 2.5-0.025 MG per tablet TAKE 1 TABLET BY MOUTH FOUR TIMES DAILY AS NEEDED FOR DIARRHEA 5/6/24   Nav Sal MD   empagliflozin (JARDIANCE) 10 MG tablet tablet Take 1 tablet by mouth Daily. 11/7/22    Eric Weston MD   FLUoxetine (PROzac) 20 MG capsule TAKE 1 CAPSULE BY MOUTH FOUR TIMES DAILY 5/15/24   Arian Peterson MD   levothyroxine (SYNTHROID, LEVOTHROID) 75 MCG tablet TAKE 1 TABLET BY MOUTH EVERY DAY 11/10/23   Arian Peterson MD   lidocaine (LIDODERM) 5 % APPLY 1 PATCH TOPICALLY EVERY DAY. REMOVE AND DISCARD AFTER 12 HOURS 1/2/23   Eric Weston MD   loratadine (CLARITIN) 10 MG tablet TAKE 1 TABLET BY MOUTH EVERY DAY 12/19/23   Arian Peterson MD   Menthol-Zinc Oxide (Calmoseptine) 0.44-20.6 % ointment Apply 1 application  topically to the appropriate area as directed 2 (Two) Times a Day. 11/27/23   Savannah Summers MD   methocarbamol (ROBAXIN) 750 MG tablet Take 2 tablets by mouth 3 (Three) Times a Day As Needed for Muscle Spasms. 1/28/24   Joni Salazar MD   metoprolol succinate XL (TOPROL-XL) 25 MG 24 hr tablet Take 1 tablet by mouth Daily. 10/4/23 10/4/24  Eric Weston MD   midodrine (PROAMATINE) 5 MG tablet Take 1 tablet by mouth 2 (Two) Times a Day. 6/26/23   Eric Weston MD   Multiple Vitamins-Minerals (V-C Forte) capsule Take 1 capsule by mouth Daily. 9/21/22   Eric Weston MD   olopatadine (PATANOL) 0.1 % ophthalmic solution INSTILL 1 DROP IN BOTH EYES TWICE DAILY 12/1/22   Arian Peterson MD   pantoprazole (PROTONIX) 20 MG EC tablet TAKE 1 TABLET BY MOUTH EVERY DAY. DO NOT TAKE WITHIN 2 HOURS OF THYROID MEDICATION 1/15/24   Arian Peterson MD   predniSONE (DELTASONE) 20 MG tablet Take 1 tablet by mouth Daily. 7/21/23   Eric Weston MD   pregabalin (LYRICA) 50 MG capsule Take 1 capsule by mouth Daily. 10/9/23   Eric Weston MD   rosuvastatin (CRESTOR) 20 MG tablet Take 1 tablet by mouth Daily. *PAST DUE FOR APPT* 5/6/24   Arian Peterson MD   Sodium Fluoride 5000 Plus 1.1 % cream  7/19/23   Eric Weston MD   spironolactone (ALDACTONE) 25 MG tablet Take 1 tablet by mouth 2 (Two) Times a Day. 6/25/23   Provider,  "MD Eric   sulfamethoxazole-trimethoprim (BACTRIM DS,SEPTRA DS) 800-160 MG per tablet Take 1 tablet by mouth 2 (Two) Times a Day. 1/3/24   Nav Sal MD   valACYclovir (Valtrex) 1000 MG tablet Take 1 tablet by mouth 2 (Two) Times a Day. 2 tablets twice a day constitutes 1 complete treatment for an outbreak of oral herpes.    This medication should be taken in this way for 1 day for each herpes outbreak. 12/22/23   Arian Peterson MD   Xifaxan 550 MG tablet Take 1 tablet by mouth. 3/12/24   Provider, MD Eric   zolpidem (AMBIEN) 10 MG tablet Take 1 tablet by mouth At Night As Needed for Sleep. 5/3/24   Arian Peterson MD        Social History:   Social History     Tobacco Use    Smoking status: Never    Smokeless tobacco: Never   Vaping Use    Vaping status: Never Used   Substance Use Topics    Alcohol use: Not Currently     Alcohol/week: 1.0 standard drink of alcohol     Types: 1 Glasses of wine per week    Drug use: Never         Review of Systems:  Review of Systems   Constitutional:  Negative for chills, fatigue and fever.   HENT:  Negative for ear pain, rhinorrhea and sore throat.    Eyes:  Negative for visual disturbance.   Respiratory:  Positive for shortness of breath. Negative for cough.    Cardiovascular:  Positive for palpitations. Negative for chest pain.   Gastrointestinal:  Negative for abdominal pain, diarrhea and vomiting.   Genitourinary:  Negative for difficulty urinating.   Musculoskeletal:  Negative for arthralgias, back pain and myalgias.   Skin:  Negative for rash.   Neurological:  Positive for dizziness. Negative for light-headedness and headaches.   Hematological:  Negative for adenopathy.   Psychiatric/Behavioral: Negative.          Physical Exam:  /64   Pulse 61   Temp 98.3 °F (36.8 °C)   Resp 20   Ht 152.4 cm (60\")   Wt 59.2 kg (130 lb 8.2 oz)   SpO2 94%   BMI 25.49 kg/m²     Physical Exam  Vitals and nursing note reviewed.   Constitutional:       General: " She is not in acute distress.     Appearance: Normal appearance. She is not toxic-appearing.   HENT:      Head: Normocephalic and atraumatic.      Nose: Nose normal.      Mouth/Throat:      Mouth: Mucous membranes are moist.   Eyes:      Conjunctiva/sclera: Conjunctivae normal.      Pupils: Pupils are equal, round, and reactive to light.   Cardiovascular:      Rate and Rhythm: Normal rate and regular rhythm.      Pulses: Normal pulses.      Heart sounds: Normal heart sounds.   Pulmonary:      Effort: Pulmonary effort is normal.      Breath sounds: Normal breath sounds.   Abdominal:      General: Bowel sounds are normal. There is no distension.      Palpations: Abdomen is soft.      Tenderness: There is no abdominal tenderness.   Musculoskeletal:         General: Normal range of motion.      Cervical back: Normal range of motion and neck supple.   Skin:     General: Skin is warm and dry.   Neurological:      General: No focal deficit present.      Mental Status: She is alert and oriented to person, place, and time.   Psychiatric:         Mood and Affect: Mood normal.         Behavior: Behavior normal.         Thought Content: Thought content normal.         Judgment: Judgment normal.                  Procedures:  Procedures      Medical Decision Making:      Comorbidities that affect care:    Cancer    External Notes reviewed:    None      The following orders were placed and all results were independently analyzed by me:  Orders Placed This Encounter   Procedures    COVID-19, FLU A/B, RSV PCR 1 HR TAT - Swab, Nasopharynx    XR Chest 1 View    [place] Draw    Comprehensive Metabolic Panel    BNP    Single High Sensitivity Troponin T    CBC Auto Differential    Urinalysis With Microscopic If Indicated (No Culture) - Urine, Clean Catch    Urinalysis, Microscopic Only - Urine, Clean Catch    NPO Diet NPO Type: Strict NPO    Undress & Gown    Continuous Pulse Oximetry    Vital Signs    Oxygen Therapy- Nasal Cannula;  Titrate 1-6 LPM Per SpO2; 90 - 95%    ECG 12 Lead ED Triage Standing Order; SOA    Insert Peripheral IV    CBC & Differential    Green Top (Gel)    Lavender Top    Gold Top - SST    Light Blue Top       Medications Given in the Emergency Department:  Medications   sodium chloride 0.9 % flush 10 mL (has no administration in time range)        ED Course:    ED Course as of 05/22/24 2011   Wed May 22, 2024   1716 --- PROVIDER IN TRIAGE NOTE ---    The patient was evaluated by Kody estrada in triage. Orders were placed and the patient is currently awaiting disposition.    [AJ]      ED Course User Index  [AJ] Kody Rosales, SELINA       Labs:    Lab Results (last 24 hours)       Procedure Component Value Units Date/Time    CBC & Differential [[id number]]  (Abnormal) Collected: 05/22/24 1703    Specimen: Blood from Arm, Right Updated: 05/22/24 1801    Narrative:      The following orders were created for panel order CBC & Differential.  Procedure                               Abnormality         Status                     ---------                               -----------         ------                     CBC Auto Differential[[id number]]        Abnormal            Final result                 Please view results for these tests on the individual orders.    Comprehensive Metabolic Panel [[id number]]  (Abnormal) Collected: 05/22/24 1703    Specimen: Blood from Arm, Right Updated: 05/22/24 1743     Glucose 107 mg/dL      BUN 23 mg/dL      Creatinine 0.96 mg/dL      Sodium 137 mmol/L      Potassium 4.5 mmol/L      Comment: Slight hemolysis detected by analyzer. Result may be falsely elevated.        Chloride 103 mmol/L      CO2 24.1 mmol/L      Calcium 9.5 mg/dL      Total Protein 5.6 g/dL      Albumin 4.2 g/dL      ALT (SGPT) 34 U/L      AST (SGOT) 27 U/L      Alkaline Phosphatase 72 U/L      Total Bilirubin 0.4 mg/dL      Globulin 1.4 gm/dL      A/G Ratio 3.0 g/dL      BUN/Creatinine Ratio 24.0     Anion Gap 9.9  mmol/L      eGFR 64.2 mL/min/1.73     Narrative:      GFR Normal >60  Chronic Kidney Disease <60  Kidney Failure <15      BNP [[id number]]  (Normal) Collected: 05/22/24 1703    Specimen: Blood from Arm, Right Updated: 05/22/24 1738     proBNP 278.1 pg/mL     Narrative:      This assay is used as an aid in the diagnosis of individuals suspected of having heart failure. It can be used as an aid in the diagnosis of acute decompensated heart failure (ADHF) in patients presenting with signs and symptoms of ADHF to the emergency department (ED). In addition, NT-proBNP of <300 pg/mL indicates ADHF is not likely.    Age Range Result Interpretation  NT-proBNP Concentration (pg/mL:      <50             Positive            >450                   Gray                 300-450                    Negative             <300    50-75           Positive            >900                  Gray                300-900                  Negative            <300      >75             Positive            >1800                  Gray                300-1800                  Negative            <300    Single High Sensitivity Troponin T [[id number]]  (Abnormal) Collected: 05/22/24 1703    Specimen: Blood from Arm, Right Updated: 05/22/24 1743     HS Troponin T 28 ng/L     Narrative:      High Sensitive Troponin T Reference Range:  <14.0 ng/L- Negative Female for AMI  <22.0 ng/L- Negative Male for AMI  >=14 - Abnormal Female indicating possible myocardial injury.  >=22 - Abnormal Male indicating possible myocardial injury.   Clinicians would have to utilize clinical acumen, EKG, Troponin, and serial changes to determine if it is an Acute Myocardial Infarction or myocardial injury due to an underlying chronic condition.         CBC Auto Differential [[id number]]  (Abnormal) Collected: 05/22/24 1703    Specimen: Blood from Arm, Right Updated: 05/22/24 1801     WBC 4.87 10*3/mm3      RBC 4.65 10*6/mm3      Hemoglobin 13.2 g/dL      Hematocrit 40.7 %       MCV 87.5 fL      MCH 28.4 pg      MCHC 32.4 g/dL      RDW 14.2 %      RDW-SD 46.0 fl      MPV 11.0 fL      Platelets 183 10*3/mm3      Neutrophil % 42.5 %      Lymphocyte % 45.2 %      Monocyte % 7.0 %      Eosinophil % 4.7 %      Basophil % 0.4 %      Immature Grans % 0.2 %      Neutrophils, Absolute 2.07 10*3/mm3      Lymphocytes, Absolute 2.20 10*3/mm3      Monocytes, Absolute 0.34 10*3/mm3      Eosinophils, Absolute 0.23 10*3/mm3      Basophils, Absolute 0.02 10*3/mm3      Immature Grans, Absolute 0.01 10*3/mm3      nRBC 0.0 /100 WBC     Urinalysis With Microscopic If Indicated (No Culture) - Urine, Clean Catch [[id number]]  (Abnormal) Collected: 05/22/24 1728    Specimen: Urine, Clean Catch Updated: 05/22/24 1758     Color, UA Dark Yellow     Appearance, UA Clear     pH, UA 7.0     Specific Gravity, UA >1.030     Glucose,  mg/dL (2+)     Ketones, UA Trace     Bilirubin, UA Negative     Blood, UA Negative     Protein, UA Negative     Leuk Esterase, UA Trace     Nitrite, UA Negative     Urobilinogen, UA 1.0 E.U./dL    Urinalysis, Microscopic Only - Urine, Clean Catch [[id number]]  (Abnormal) Collected: 05/22/24 1728    Specimen: Urine, Clean Catch Updated: 05/22/24 1758     RBC, UA 3-5 /HPF      WBC, UA 3-5 /HPF      Bacteria, UA None Seen /HPF      Squamous Epithelial Cells, UA 0-2 /HPF      Hyaline Casts, UA None Seen /LPF      Methodology Automated Microscopy             Imaging:    XR Chest 1 View    Result Date: 5/22/2024  XR CHEST 1 VW-  Date of Exam: 5/22/2024 5:27 PM  Indication: SOA Triage Protocol  Comparison: 1/28/2024  Findings: Patient is again noted be status post median sternotomy. Heart size and pulmonary vessels are within normal limits. The lungs are clear bilaterally. No pleural effusion. No pneumothorax. Patient is status post reverse total right shoulder arthroplasty.      Impression:  1. No acute cardiopulmonary disease.   Electronically Signed By-Balta Rivas MD On:5/22/2024 5:31 PM          Differential Diagnosis and Discussion:    Dyspnea: Differential diagnosis includes but is not limited to metabolic acidosis, neurological disorders, psychogenic, asthma, pneumothorax, upper airway obstruction, COPD, pneumonia, noncardiogenic pulmonary edema, interstitial lung disease, anemia, congestive heart failure, and pulmonary embolism    All labs were reviewed and interpreted by me.  All X-rays impressions were independently interpreted by me.  EKG was interpreted by supervising attending.    MDM  Number of Diagnoses or Management Options  Shortness of breath  Weakness generalized  Diagnosis management comments: I have explained the patient´s condition, diagnoses and treatment plan based on the information available to me at this time. I have answered questions and addressed any concerns. The patient has a good  understanding of the patient´s diagnosis, condition, and treatment plan as can be expected at this point. The vital signs have been stable. The patient´s condition is stable and appropriate for discharge from the emergency department.      The patient will pursue further outpatient evaluation with the primary care physician or other designated or consulting physician as outlined in the discharge instructions. They are agreeable to this plan of care and follow-up instructions have been explained in detail. The patient has received these instructions in written format and has expressed an understanding of the discharge instructions. The patient is aware that any significant change in condition or worsening of symptoms should prompt an immediate return to this or the closest emergency department or call to 911.         Amount and/or Complexity of Data Reviewed  Decide to obtain previous medical records or to obtain history from someone other than the patient: yes           Patient Care Considerations:    ANTIBIOTICS: I considered prescribing antibiotics as an outpatient however no bacterial focus of  infection was found.      Consultants/Shared Management Plan:    None    Social Determinants of Health:    Patient is independent, reliable, and has access to care.       Disposition and Care Coordination:    Discharged: The patient is suitable and stable for discharge with no need for consideration of admission.    I have explained the patient´s condition, diagnoses and treatment plan based on the information available to me at this time. I have answered questions and addressed any concerns. The patient has a good  understanding of the patient´s diagnosis, condition, and treatment plan as can be expected at this point. The vital signs have been stable. The patient´s condition is stable and appropriate for discharge from the emergency department.      The patient will pursue further outpatient evaluation with the primary care physician or other designated or consulting physician as outlined in the discharge instructions. They are agreeable to this plan of care and follow-up instructions have been explained in detail. The patient has received these instructions in written format and has expressed an understanding of the discharge instructions. The patient is aware that any significant change in condition or worsening of symptoms should prompt an immediate return to this or the closest emergency department or call to 911.  I have explained discharge medications and the need for follow up with the patient/caretakers. This was also printed in the discharge instructions. Patient was discharged with the following medications and follow up:      Medication List      No changes were made to your prescriptions during this visit.      Arian Peterson MD  [id number] [place], [place] [id number] [phone]    Go to   As needed       Final diagnoses:   Weakness generalized   Shortness of breath        ED Disposition       ED Disposition   Discharge    Condition   Stable    Comment   --               This medical record created  using voice recognition software.             Carolina Kelley, APRN  05/22/24 2011

## 2024-05-22 NOTE — TELEPHONE ENCOUNTER
"  Caller: Cynthia Her \"YOLETTE\"    Relationship: Self    Best call back number: [phone]*    What was the call regarding: PATIENT CALLING REGARDING THE MEDICATION DENIAL OF THE ALPRAZolam (XANAX) 0.25 MG tablet . THE PATIENT IS REQUESTING A REFILL OF THIS MEDICATION TO BRIDGE HER OVER UNTIL HER SCHEDULED APPOINTMENT WITH DR. OLIVEIRA, FOR 6/10/24. THE PATIENT STATES THAT WITH CHEMO APPOINTMENTS, AND THE STRESS OF CANCER, SHE ONLY TAKES THE ALPRAZOLAM AS NEEDED. THE PATIENT STATES THAT SHE HAS 5 TABLETS REMAINING.    PHARMACY CONFIRMED:  [id number] - [place], [place] - [address] [facility] OF [address] [facility] & [place] - [phone] [facility] [phone]  [phone]           "

## 2024-05-22 NOTE — ED TRIAGE NOTES
Left message for pt to call back.    Pt has multiple complaints -she is actively receiving chemo with her last one 3 weeks ago she sees doctors at [facility] for the cancer. She reports she has been having more frequent episodes of diarrhea, she reports shortness of air with activity, fatigue no appetite and muscle cramps.

## 2024-05-23 ENCOUNTER — PATIENT OUTREACH (OUTPATIENT)
Dept: CASE MANAGEMENT | Facility: OTHER | Age: 69
End: 2024-05-23
Payer: MEDICARE

## 2024-05-23 NOTE — DISCHARGE INSTRUCTIONS
Please follow-up with your primary care provider next week.  Return to the ED if you have increased shortness of breath, chest pain, fevers greater than 100.4 or any other new or worsening concerning symptoms.

## 2024-05-23 NOTE — OUTREACH NOTE
"AMBULATORY CASE MANAGEMENT NOTE    Names and Relationships of Patient/Support Persons: Caller: Cynthia Her \"YOLETTE\"; Relationship: Self  Caller: Cynthia Her \"YOLETTE\"; Relationship: Self -     Patient Outreach  RN-ACM outreach with patient. Discussed 5/22/24 ED visit regarding generalized weakness; shortness of breath. Patient treated and discharged.  Patient states to be compliant with ED recommendations; states to feel better and states symptoms have improved. Patient states to have 6/10/24 PCP appointment for ED follow up and recommendations.Patient states to be compliant with medications and medical appointments. Reviewed with patient  ED AVS recommendation; education; role of RN-ACM and HRCM case management services. Patient verbalized understanding. Patient states to appreciate outreach and declines needs for further outreach at this time. No further questions voiced at this time.     Education Documentation  Provider Follow-Up, taught by Brianna Pearce, RN at 5/23/2024  3:38 PM.  Learner: Patient  Readiness: Acceptance  Method: Explanation  Response: Verbalizes Understanding          Brianna HORTON  Ambulatory Case Management    5/23/2024, 15:38 EDT  "

## 2024-05-24 DIAGNOSIS — F41.9 ANXIETY: ICD-10-CM

## 2024-05-24 RX ORDER — ALPRAZOLAM 0.25 MG/1
0.25 TABLET ORAL 2 TIMES DAILY PRN
Qty: 60 TABLET | Refills: 0 | Status: SHIPPED | OUTPATIENT
Start: 2024-05-24

## 2024-05-27 LAB
QT INTERVAL: 483 MS
QTC INTERVAL: 509 MS

## 2024-05-29 ENCOUNTER — INFUSION (OUTPATIENT)
Dept: ONCOLOGY | Facility: HOSPITAL | Age: 69
End: 2024-05-29
Payer: MEDICARE

## 2024-05-29 ENCOUNTER — LAB (OUTPATIENT)
Dept: LAB | Facility: HOSPITAL | Age: 69
End: 2024-05-29
Payer: MEDICARE

## 2024-05-29 VITALS
TEMPERATURE: 98.6 F | OXYGEN SATURATION: 97 % | WEIGHT: 130.4 LBS | DIASTOLIC BLOOD PRESSURE: 74 MMHG | HEART RATE: 71 BPM | RESPIRATION RATE: 16 BRPM | BODY MASS INDEX: 25.47 KG/M2 | SYSTOLIC BLOOD PRESSURE: 135 MMHG

## 2024-05-29 DIAGNOSIS — E85.81 LIGHT CHAIN (AL) AMYLOIDOSIS: ICD-10-CM

## 2024-05-29 DIAGNOSIS — E85.81 LIGHT CHAIN (AL) AMYLOIDOSIS: Primary | ICD-10-CM

## 2024-05-29 LAB
BASOPHILS # BLD AUTO: 0.03 10*3/MM3 (ref 0–0.2)
BASOPHILS NFR BLD AUTO: 0.5 % (ref 0–1.5)
DEPRECATED RDW RBC AUTO: 45.2 FL (ref 37–54)
EOSINOPHIL # BLD AUTO: 0.26 10*3/MM3 (ref 0–0.4)
EOSINOPHIL NFR BLD AUTO: 4.8 % (ref 0.3–6.2)
ERYTHROCYTE [DISTWIDTH] IN BLOOD BY AUTOMATED COUNT: 14.4 % (ref 12.3–15.4)
HCT VFR BLD AUTO: 42.3 % (ref 34–46.6)
HGB BLD-MCNC: 14.1 G/DL (ref 12–15.9)
IMM GRANULOCYTES # BLD AUTO: 0.01 10*3/MM3 (ref 0–0.05)
IMM GRANULOCYTES NFR BLD AUTO: 0.2 % (ref 0–0.5)
LYMPHOCYTES # BLD AUTO: 2.24 10*3/MM3 (ref 0.7–3.1)
LYMPHOCYTES NFR BLD AUTO: 41 % (ref 19.6–45.3)
MCH RBC QN AUTO: 28.7 PG (ref 26.6–33)
MCHC RBC AUTO-ENTMCNC: 33.3 G/DL (ref 31.5–35.7)
MCV RBC AUTO: 86.2 FL (ref 79–97)
MONOCYTES # BLD AUTO: 0.29 10*3/MM3 (ref 0.1–0.9)
MONOCYTES NFR BLD AUTO: 5.3 % (ref 5–12)
NEUTROPHILS NFR BLD AUTO: 2.64 10*3/MM3 (ref 1.7–7)
NEUTROPHILS NFR BLD AUTO: 48.2 % (ref 42.7–76)
NRBC BLD AUTO-RTO: 0 /100 WBC (ref 0–0.2)
PLATELET # BLD AUTO: 189 10*3/MM3 (ref 140–450)
PMV BLD AUTO: 10.2 FL (ref 6–12)
RBC # BLD AUTO: 4.91 10*6/MM3 (ref 3.77–5.28)
WBC NRBC COR # BLD AUTO: 5.47 10*3/MM3 (ref 3.4–10.8)

## 2024-05-29 PROCEDURE — 36415 COLL VENOUS BLD VENIPUNCTURE: CPT

## 2024-05-29 PROCEDURE — 25010000002 DARATUMUMAB-HYALURONIDASE-FIHJ 1800-30000 MG-UT/15ML SOLUTION: Performed by: NURSE PRACTITIONER

## 2024-05-29 PROCEDURE — 63710000001 DEXAMETHASONE PER 0.25 MG: Performed by: NURSE PRACTITIONER

## 2024-05-29 PROCEDURE — 96401 CHEMO ANTI-NEOPL SQ/IM: CPT

## 2024-05-29 PROCEDURE — 85025 COMPLETE CBC W/AUTO DIFF WBC: CPT

## 2024-05-29 RX ORDER — FAMOTIDINE 10 MG/ML
20 INJECTION, SOLUTION INTRAVENOUS AS NEEDED
Status: CANCELLED | OUTPATIENT
Start: 2024-05-29

## 2024-05-29 RX ORDER — HYDROXYZINE PAMOATE 25 MG/1
25 CAPSULE ORAL ONCE
Status: COMPLETED | OUTPATIENT
Start: 2024-05-29 | End: 2024-05-29

## 2024-05-29 RX ORDER — HYDROXYZINE PAMOATE 25 MG/1
25 CAPSULE ORAL ONCE
Status: CANCELLED | OUTPATIENT
Start: 2024-05-29 | End: 2024-05-29

## 2024-05-29 RX ORDER — DIPHENHYDRAMINE HYDROCHLORIDE 50 MG/ML
50 INJECTION INTRAMUSCULAR; INTRAVENOUS AS NEEDED
Status: CANCELLED | OUTPATIENT
Start: 2024-05-29

## 2024-05-29 RX ORDER — ACETAMINOPHEN 500 MG
1000 TABLET ORAL ONCE
Status: CANCELLED | OUTPATIENT
Start: 2024-05-29

## 2024-05-29 RX ORDER — DEXAMETHASONE 4 MG/1
20 TABLET ORAL ONCE
Status: COMPLETED | OUTPATIENT
Start: 2024-05-29 | End: 2024-05-29

## 2024-05-29 RX ORDER — ACETAMINOPHEN 500 MG
1000 TABLET ORAL ONCE
Status: COMPLETED | OUTPATIENT
Start: 2024-05-29 | End: 2024-05-29

## 2024-05-29 RX ADMIN — DEXAMETHASONE 20 MG: 4 TABLET ORAL at 12:26

## 2024-05-29 RX ADMIN — DARATUMUMAB AND HYALURONIDASE-FIHJ (HUMAN RECOMBINANT) 1800 MG: 1800; 30000 INJECTION SUBCUTANEOUS at 13:11

## 2024-05-29 RX ADMIN — HYDROXYZINE PAMOATE 25 MG: 25 CAPSULE ORAL at 12:26

## 2024-05-29 RX ADMIN — ACETAMINOPHEN 1000 MG: 500 TABLET ORAL at 12:26

## 2024-06-03 RX ORDER — ROSUVASTATIN CALCIUM 20 MG/1
20 TABLET, COATED ORAL DAILY
Qty: 90 TABLET | Refills: 1 | Status: SHIPPED | OUTPATIENT
Start: 2024-06-03

## 2024-06-04 ENCOUNTER — LAB (OUTPATIENT)
Dept: LAB | Facility: HOSPITAL | Age: 69
End: 2024-06-04
Payer: MEDICARE

## 2024-06-04 ENCOUNTER — TELEPHONE (OUTPATIENT)
Dept: FAMILY MEDICINE CLINIC | Facility: CLINIC | Age: 69
End: 2024-06-04
Payer: MEDICARE

## 2024-06-04 ENCOUNTER — TELEPHONE (OUTPATIENT)
Dept: FAMILY MEDICINE CLINIC | Facility: CLINIC | Age: 69
End: 2024-06-04

## 2024-06-04 DIAGNOSIS — E78.1 HYPERTRIGLYCERIDEMIA: ICD-10-CM

## 2024-06-04 DIAGNOSIS — R73.03 PREDIABETES: ICD-10-CM

## 2024-06-04 DIAGNOSIS — E03.9 HYPOTHYROIDISM, UNSPECIFIED TYPE: Primary | ICD-10-CM

## 2024-06-04 DIAGNOSIS — R73.9 HYPERGLYCEMIA: ICD-10-CM

## 2024-06-04 DIAGNOSIS — E78.5 HYPERLIPIDEMIA, UNSPECIFIED HYPERLIPIDEMIA TYPE: ICD-10-CM

## 2024-06-04 DIAGNOSIS — I10 HYPERTENSION, UNSPECIFIED TYPE: ICD-10-CM

## 2024-06-04 DIAGNOSIS — Z11.59 NEED FOR HEPATITIS C SCREENING TEST: ICD-10-CM

## 2024-06-04 NOTE — TELEPHONE ENCOUNTER
PT IS CURRENTLY AT [facility] TO GET HER FASTING LABS DONE. PLEASE PUT LAB ORDER IN.    PT IS WAITING    THANKS

## 2024-06-04 NOTE — TELEPHONE ENCOUNTER
"    Caller: Cynthia Her \"YOLETTE\"    Relationship: Self    Best call back number: [phone]     What orders are you requesting (i.e. lab or imaging): LABS     In what timeframe would the patient need to come in: 06/04/24     Where will you receive your lab/imaging services: [facility]     Additional notes: PATIENT CALL IN SHE IS CURRENTLY AT [facility]  AND NEEDS HER LAB ORDER FAXED OVER TO THE OFFICE THERE. THERE FAX NUMBER IS [phone] PLEASE CALL OR FAX OF ORDERS.       "

## 2024-06-04 NOTE — TELEPHONE ENCOUNTER
"Relay     \"Lvm informing lab order placed. If they are unable to see, they will need to call us to give their fax number\"                 "

## 2024-06-04 NOTE — TELEPHONE ENCOUNTER
"  Hub staff attempted to follow warm transfer process and was unsuccessful     Caller: Cynthia Her \"YOLETTE\"    Relationship to patient: Self    Best call back number: [phone]     Patient is needing: PATIENT STILL WAITING TO HEAR BACK ABOUT LAB ORDERS. PLEASE CALL TO CONFIRM  "

## 2024-06-05 ENCOUNTER — LAB (OUTPATIENT)
Dept: LAB | Facility: HOSPITAL | Age: 69
End: 2024-06-05
Payer: MEDICARE

## 2024-06-05 PROCEDURE — 36415 COLL VENOUS BLD VENIPUNCTURE: CPT | Performed by: INTERNAL MEDICINE

## 2024-06-05 PROCEDURE — 86803 HEPATITIS C AB TEST: CPT | Performed by: INTERNAL MEDICINE

## 2024-06-05 PROCEDURE — 80061 LIPID PANEL: CPT | Performed by: INTERNAL MEDICINE

## 2024-06-05 PROCEDURE — 82043 UR ALBUMIN QUANTITATIVE: CPT | Performed by: INTERNAL MEDICINE

## 2024-06-05 PROCEDURE — 83036 HEMOGLOBIN GLYCOSYLATED A1C: CPT | Performed by: INTERNAL MEDICINE

## 2024-06-05 PROCEDURE — 84443 ASSAY THYROID STIM HORMONE: CPT | Performed by: INTERNAL MEDICINE

## 2024-06-05 PROCEDURE — 82570 ASSAY OF URINE CREATININE: CPT | Performed by: INTERNAL MEDICINE

## 2024-06-06 LAB
ALBUMIN/CREAT UR: 7 MG/G CREAT (ref 0–29)
CHOLEST SERPL-MCNC: 223 MG/DL (ref 100–199)
CREAT UR-MCNC: 107 MG/DL
HBA1C MFR BLD: 6.3 % (ref 4.8–5.6)
HCV IGG SERPL QL IA: NON REACTIVE
HDLC SERPL-MCNC: 40 MG/DL
LDLC SERPL CALC-MCNC: 115 MG/DL (ref 0–99)
LDLC/HDLC SERPL: 2.9 RATIO (ref 0–3.2)
MICROALBUMIN UR-MCNC: 7 UG/ML
TRIGL SERPL-MCNC: 393 MG/DL (ref 0–149)
TSH SERPL DL<=0.005 MIU/L-ACNC: 2.94 UIU/ML (ref 0.45–4.5)
VLDLC SERPL CALC-MCNC: 68 MG/DL (ref 5–40)

## 2024-06-10 ENCOUNTER — OFFICE VISIT (OUTPATIENT)
Dept: FAMILY MEDICINE CLINIC | Facility: CLINIC | Age: 69
End: 2024-06-10
Payer: MEDICARE

## 2024-06-10 VITALS
TEMPERATURE: 96 F | OXYGEN SATURATION: 96 % | RESPIRATION RATE: 16 BRPM | DIASTOLIC BLOOD PRESSURE: 80 MMHG | HEIGHT: 60 IN | BODY MASS INDEX: 25.74 KG/M2 | SYSTOLIC BLOOD PRESSURE: 122 MMHG | HEART RATE: 74 BPM | WEIGHT: 131.1 LBS

## 2024-06-10 DIAGNOSIS — Z78.0 POSTMENOPAUSAL: Primary | ICD-10-CM

## 2024-06-10 DIAGNOSIS — E03.9 HYPOTHYROIDISM, UNSPECIFIED TYPE: ICD-10-CM

## 2024-06-10 DIAGNOSIS — F51.01 PRIMARY INSOMNIA: ICD-10-CM

## 2024-06-10 DIAGNOSIS — F41.9 ANXIETY: ICD-10-CM

## 2024-06-10 DIAGNOSIS — F32.5 MAJOR DEPRESSIVE DISORDER WITH SINGLE EPISODE, IN FULL REMISSION: ICD-10-CM

## 2024-06-10 DIAGNOSIS — E85.81 LIGHT CHAIN (AL) AMYLOIDOSIS: ICD-10-CM

## 2024-06-10 DIAGNOSIS — E78.00 HIGH CHOLESTEROL: ICD-10-CM

## 2024-06-10 DIAGNOSIS — I10 HYPERTENSION, UNSPECIFIED TYPE: ICD-10-CM

## 2024-06-10 DIAGNOSIS — Z00.00 MEDICARE ANNUAL WELLNESS VISIT, SUBSEQUENT: ICD-10-CM

## 2024-06-10 PROCEDURE — 3079F DIAST BP 80-89 MM HG: CPT | Performed by: INTERNAL MEDICINE

## 2024-06-10 PROCEDURE — G0439 PPPS, SUBSEQ VISIT: HCPCS | Performed by: INTERNAL MEDICINE

## 2024-06-10 PROCEDURE — 1170F FXNL STATUS ASSESSED: CPT | Performed by: INTERNAL MEDICINE

## 2024-06-10 PROCEDURE — 3044F HG A1C LEVEL LT 7.0%: CPT | Performed by: INTERNAL MEDICINE

## 2024-06-10 PROCEDURE — 1126F AMNT PAIN NOTED NONE PRSNT: CPT | Performed by: INTERNAL MEDICINE

## 2024-06-10 PROCEDURE — 3074F SYST BP LT 130 MM HG: CPT | Performed by: INTERNAL MEDICINE

## 2024-06-10 NOTE — LETTER
Controlled Substance Prescribing Agreement          I, Cynthia Her [PATIENT],  1955 [] a patient of  Arian Peterson MD   [PROVIDER] at [facility] [PRACTICE], have been informed that  individuals who are prescribed certain Controlled Substances including, but not limited to, narcotic pain medicines, stimulants, benzodiazepine tranquilizers, and barbiturate sedatives, can abuse those substances or may allow abuse by others, and have some risk of developing an addictive disorder or suffering a relapse of a prior addiction. Therefore, I have been informed that it is necessary to observe strict rules pertaining to their use, and I agree to follow the terms and procedures described in this Agreement as consideration for, and as a condition of, the willingness of the physician whose signature appears below to consider prescribing or to continue prescribing Controlled Substances to treat my pain.     1. I will inform my physician of any current or past substance abuse, or any current or past substance abuse of any immediate member of my immediate family.     2. I agree that I may be subject to a voluntary evaluation by psychologists and/or psychiatrists, possibly at my own expense, before any Controlled Substances will be prescribed to me. I agree that the need to be evaluated by psychologists and/or psychiatrists may be revisited every three (3) to six (6) months thereafter while taking the medication.     3. All Controlled Substances must come from a provider in the PROVIDER’S PRACTICE. My Controlled Substances will come from the PROVIDER whose signature appears below, or during his or her absence, by the covering provider, unless specific written authorization is obtained from the office for an exception.     4. I will obtain all Controlled Substances from the same pharmacy. Should the need arise to change pharmacies, I will inform the PROVIDER’S office.     5. I  will inform the PROVIDER’S office of any new medications or medical conditions, and of any adverse effects I experience from any of the medications that I take.     6. I will inform my other health care providers that I am taking the Controlled Substances listed above, and of the existence of this Agreement. In the event of an emergency, I will provide the foregoing information to emergency department providers.     7. I agree that my prescribing PROVIDER has permission to discuss all diagnostic and treatment details with other health care providers, pharmacists, or other professionals who provide my health care regarding my use of Controlled Substances for purposes of maintaining accountability.     8. I will not allow anyone else to have, use sell, or otherwise have access to these medications. The sharing of medications with anyone is absolutely forbidden and is against the law.         9. I understand that Controlled Substances may be hazardous or lethal to a person who is not tolerant to their effects, especially a child, and that I must keep them out of reach of such people for their own safety.     10. I understand that tampering with a written prescription is a felony and I will not change or tamper with the PROVIDER’S written prescription.     11. I am aware that attempting to obtain a Controlled Substance under false pretenses is illegal.     12. I agree not to alter my medication in any way, and I will take my medication whole, and it will not be broken, chewed, crushed, injected, or snorted.     13. I will take my medication as instructed and prescribed, and I will not exceed the maximum prescribed dose. Any change in dosage must be approved by the PROVIDER or a physician within the PRACTICE.     14. I understand that these drugs should not be stopped abruptly, as withdrawal syndromes may develop.     15. I will cooperate with unannounced urine or serum toxicology screenings as may be requested, as well  as any random pill counts of medication by the PROVIDER. Failure to comply may result in termination of the PROVIDER-patient relationship.     16. I understand that the presence of unauthorized and/or illegal substances in the screenings described in the paragraph above may prompt referral for assessment for a substance abuse disorder or termination of the PROVIDER-patient relationship.     17. I understand that medications may not be replaced if they are lost, damaged, or stolen. If any of these situations arise that cause me to request an early refill of my medication, a copy of a filed police report or a statement from me explaining the circumstances may be required before additional prescriptions are considered. If I request an early refill secondary to lost, damaged, or stolen prescriptions twice within a year, I may be discharged from the practice.     18. I understand that a prescription may be given early if the PROVIDER or the patient will be out of town when the refill is due. These prescriptions will contain instructions to the pharmacist that the prescriptions(s) may not be filled prior to the appropriate date.     19. If the responsible legal authorities have questions concerning my treatment, as may occur, for example, if I obtained medication at several pharmacies, all confidentiality is waived, and these authorities may be given full access to my full records of Controlled Substances administration.     20. I will keep my scheduled appointments in order to receive medication renewals. If I need to cancel my appointment, I will do so a minimum of twenty-four (24) hours before it is scheduled.     21. I understand that I may be asked to bring my medications in their original container to the PROVIDER’s office while I am on controlled medication.     22. Refills generally will not be given over the phone, after office hours, during the weekends, and on holidays.     23. I understand that any medical  treatment is initially a trial, with the goal of treatment being to improve the quality of life and ability to function and/or work. These parameters will be assessed periodically to determine the benefits of continued therapy, and continued prescription is contingent on whether my physician believes that the medication usage benefits me. I will comply with all treatments as outlined by the PROVIDER.     24. I have been explained the risks and potential benefits of these therapies, including, but not limited to, psychological addiction, physical dependence, withdrawal and over dosage.     25. I understand that failure to adhere to these policies and/or failure to comply with the PROVIDER’S treatment plan may result in cessation of therapy with Controlled Substance prescribing by the PROVIDER or referral for further specialty assessment, as well as possible discharge from the PRACTICE.     26. I, the undersigned patient, attest that the foregoing was discussed with me, and that I have read, fully understand, and agree to all of the above requirements and instructions. I affirm that I have the full right and power to sign and be bound by this  Agreement.         Date:  __________________________________________    Time:  __________________________________________    Patient Printed Name:  _____________________________    Patient Signature:  ________________________________           Date:  __________________________________________    Time:  __________________________________________    Provider Signature:  _______________________________

## 2024-06-10 NOTE — PROGRESS NOTES
The ABCs of the Annual Wellness Visit  Subsequent Medicare Wellness Visit    Subjective    Cynthia Her is a 69 y.o. female who presents for a Subsequent Medicare Wellness Visit.    The following portions of the patient's history were reviewed and   updated as appropriate: allergies, current medications, past family history, past medical history, past social history, past surgical history, and problem list.    Compared to one year ago, the patient feels her physical   health is the same.    Compared to one year ago, the patient feels her mental   health is the same.    Recent Hospitalizations:  She was not admitted to the hospital during the last year.       Current Medical Providers:  Patient Care Team:  Arian Peterson MD as PCP - General (Internal Medicine)  Nav Sal MD as Consulting Physician (Hematology and Oncology)  Darren Pruitt MD as Referring Physician (Cardiology)  Brianna Pearce RN as Ambulatory  ([facility])    Outpatient Medications Prior to Visit   Medication Sig Dispense Refill    acyclovir (ZOVIRAX) 400 MG tablet Take 1 tablet by mouth Every 12 (Twelve) Hours.      aspirin 81 MG chewable tablet CHEW AND SWALLOW 1 TABLET DAILY WITH BREAKFAST      Cholecalciferol (Vitamin D3) 50 MCG (2000 UT) tablet Take 1 tablet by mouth Daily.      cholestyramine (QUESTRAN) 4 g packet Take 1 packet by mouth 3 (Three) Times a Day With Meals. 1 packet  TID PRN diarrhea 60 packet 4    ciclopirox (LOPROX) 1 % shampoo APPLY TOPICALLY 3 TIMES EVERY WEEK      diphenoxylate-atropine (LOMOTIL) 2.5-0.025 MG per tablet TAKE 1 TABLET BY MOUTH FOUR TIMES DAILY AS NEEDED FOR DIARRHEA 120 tablet 0    empagliflozin (JARDIANCE) 10 MG tablet tablet Take 1 tablet by mouth Daily.      FLUoxetine (PROzac) 20 MG capsule TAKE 1 CAPSULE BY MOUTH FOUR TIMES DAILY 360 capsule 0    levothyroxine (SYNTHROID, LEVOTHROID) 75 MCG tablet TAKE 1 TABLET BY MOUTH EVERY DAY 90 tablet 3    lidocaine  (LIDODERM) 5 % APPLY 1 PATCH TOPICALLY EVERY DAY. REMOVE AND DISCARD AFTER 12 HOURS      loratadine (CLARITIN) 10 MG tablet TAKE 1 TABLET BY MOUTH EVERY DAY 30 tablet 5    Menthol-Zinc Oxide (Calmoseptine) 0.44-20.6 % ointment Apply 1 application  topically to the appropriate area as directed 2 (Two) Times a Day. 20 g 2    midodrine (PROAMATINE) 5 MG tablet Take 1 tablet by mouth 2 (Two) Times a Day.      Multiple Vitamins-Minerals (V-C Forte) capsule Take 1 capsule by mouth Daily.      olopatadine (PATANOL) 0.1 % ophthalmic solution INSTILL 1 DROP IN BOTH EYES TWICE DAILY 5 mL 5    pantoprazole (PROTONIX) 20 MG EC tablet TAKE 1 TABLET BY MOUTH EVERY DAY. DO NOT TAKE WITHIN 2 HOURS OF THYROID MEDICATION 90 tablet 1    pregabalin (LYRICA) 50 MG capsule Take 1 capsule by mouth Daily.      Sodium Fluoride 5000 Plus 1.1 % cream       spironolactone (ALDACTONE) 25 MG tablet Take 1 tablet by mouth 2 (Two) Times a Day.      Xifaxan 550 MG tablet Take 1 tablet by mouth.      zolpidem (AMBIEN) 10 MG tablet Take 1 tablet by mouth At Night As Needed for Sleep. 30 tablet 1    ALPRAZolam (XANAX) 0.25 MG tablet Take 1 tablet by mouth 2 (Two) Times a Day As Needed for Anxiety. for anxiety 60 tablet 0    methocarbamol (ROBAXIN) 750 MG tablet Take 2 tablets by mouth 3 (Three) Times a Day As Needed for Muscle Spasms. (Patient not taking: Reported on 6/10/2024) 30 tablet 0    metoprolol succinate XL (TOPROL-XL) 25 MG 24 hr tablet Take 1 tablet by mouth Daily. (Patient not taking: Reported on 6/10/2024)      predniSONE (DELTASONE) 20 MG tablet Take 1 tablet by mouth Daily. (Patient not taking: Reported on 6/10/2024)      rosuvastatin (CRESTOR) 20 MG tablet TAKE 1 TABLET BY MOUTH DAILY 90 tablet 1    sulfamethoxazole-trimethoprim (BACTRIM DS,SEPTRA DS) 800-160 MG per tablet Take 1 tablet by mouth 2 (Two) Times a Day. (Patient not taking: Reported on 6/10/2024) 6 tablet 0    valACYclovir (Valtrex) 1000 MG tablet Take 1 tablet by mouth 2  (Two) Times a Day. 2 tablets twice a day constitutes 1 complete treatment for an outbreak of oral herpes.    This medication should be taken in this way for 1 day for each herpes outbreak. (Patient not taking: Reported on 6/10/2024) 60 tablet 0     No facility-administered medications prior to visit.       No opioid medication identified on active medication list. I have reviewed chart for other potential  high risk medication/s and harmful drug interactions in the elderly.        Aspirin is on active medication list. Aspirin use is indicated based on review of current medical condition/s. Pros and cons of this therapy have been discussed today. Benefits of this medication outweigh potential harm.  Patient has been encouraged to continue taking this medication.  .      Patient Active Problem List   Diagnosis    S/P arthroscopy of shoulder    Dyspareunia, female    Rhinosinusitis    Hypothyroid    High cholesterol    Pelvic cramping    Vaginal atrophy    Major depressive disorder with single episode, in full remission    Attention deficit disorder (ADD) without hyperactivity    Hepatomegaly    NAFLD (nonalcoholic fatty liver disease)    Fecal smearing    Elevated transaminase level    Chronic right shoulder pain    HTN (hypertension)    Arthropathy, transient, shoulder, right    Status post reverse arthroplasty of right shoulder    Unstable angina    Esophageal dysphagia    Precordial chest pain    Hypertriglyceridemia    Primary insomnia    Oral herpes    Memory loss    Chronic tension-type headache, not intractable    Hyperglycemia    Acute cystitis without hematuria    Esophagitis, eosinophilic    Functional diarrhea    Dysphagia, cricopharyngeal    Elevated serum immunoglobulin free light chain level    Amyloidosis    Arthritis    Status post recent immunosuppressive therapy    Pneumonia of both lungs due to infectious organism    Benign neoplasm of left breast    FH: breast cancer    Medicare annual wellness  "visit, subsequent     Advance Care Planning   Advance Care Planning     Advance Directive is not on file.  ACP discussion was held with the patient during this visit. Patient does not have an advance directive, information provided.     Objective    Vitals:    06/10/24 1302   BP: 122/80   BP Location: Right arm   Patient Position: Sitting   Cuff Size: Adult   Pulse: 74   Resp: 16   Temp: 96 °F (35.6 °C)   TempSrc: Temporal   SpO2: 96%   Weight: 59.5 kg (131 lb 1.6 oz)   Height: 152.4 cm (60\")     Estimated body mass index is 25.6 kg/m² as calculated from the following:    Height as of this encounter: 152.4 cm (60\").    Weight as of this encounter: 59.5 kg (131 lb 1.6 oz).    BMI is >= 25 and <30. (Overweight) The following options were offered after discussion;: exercise counseling/recommendations and nutrition counseling/recommendations      Does the patient have evidence of cognitive impairment? No    Lab Results   Component Value Date    CHLPL 223 (H) 2024    TRIG 393 (H) 2024    HDL 40 2024     (H) 2024    VLDL 68 (H) 2024    HGBA1C 6.3 (H) 2024        HEALTH RISK ASSESSMENT    Smoking Status:  Social History     Tobacco Use   Smoking Status Never   Smokeless Tobacco Never     Alcohol Consumption:  Social History     Substance and Sexual Activity   Alcohol Use Not Currently    Alcohol/week: 1.0 standard drink of alcohol    Types: 1 Glasses of wine per week     Fall Risk Screen:    STEADI Fall Risk Assessment was completed, and patient is at MODERATE risk for falls. Assessment completed on:6/10/2024    Depression Screenin/10/2024     1:00 PM   PHQ-2/PHQ-9 Depression Screening   Little Interest or Pleasure in Doing Things 1-->several days   Feeling Down, Depressed or Hopeless 1-->several days   Trouble Falling or Staying Asleep, or Sleeping Too Much 0-->not at all   Feeling Tired or Having Little Energy 2-->more than half the days   Poor Appetite or Overeating " 0-->not at all   Feeling Bad about Yourself - or that You are a Failure or Have Let Yourself or Your Family Down 0-->not at all   Trouble Concentrating on Things, Such as Reading the Newspaper or Watching Television 0-->not at all   Moving or Speaking So Slowly that Other People Could Have Noticed? Or the Opposite - Being So Fidgety 0-->not at all   Thoughts that You Would be Better Off Dead or of Hurting Yourself in Some Way 0-->not at all   PHQ-9: Brief Depression Severity Measure Score 4   If You Checked Off Any Problems, How Difficult Have These Problems Made It For You to Do Your Work, Take Care of Things at Home, or Get Along with Other People? not difficult at all       Health Habits and Functional and Cognitive Screenin/10/2024     1:00 PM   Functional & Cognitive Status   Do you have difficulty preparing food and eating? No   Do you have difficulty bathing yourself, getting dressed or grooming yourself? No   Do you have difficulty using the toilet? No   Do you have difficulty moving around from place to place? No   Do you have trouble with steps or getting out of a bed or a chair? No   Do you need help using the phone?  No   Are you deaf or do you have serious difficulty hearing?  No   Do you need help to go to places out of walking distance? No   Do you need help shopping? No   Do you need help preparing meals?  No   Do you need help with housework?  No   Do you need help with laundry? No   Do you need help taking your medications? No   Do you need help managing money? No   Do you ever drive or ride in a car without wearing a seat belt? No   Have you felt unusual stress, anger or loneliness in the last month? Yes   Who do you live with? Alone   If you need help, do you have trouble finding someone available to you? No   Have you been bothered in the last four weeks by sexual problems? No   Do you have difficulty concentrating, remembering or making decisions? Yes       Age-appropriate Screening  Schedule:  Refer to the list below for future screening recommendations based on patient's age, sex and/or medical conditions. Orders for these recommended tests are listed in the plan section. The patient has been provided with a written plan.    Health Maintenance   Topic Date Due    RSV Vaccine - Adults (1 - 1-dose 60+ series) Never done    ANNUAL WELLNESS VISIT  Never done    DIABETIC FOOT EXAM  Never done    ZOSTER VACCINE (2 of 2) 03/19/2021    COVID-19 Vaccine (4 - 2023-24 season) 09/01/2023    DXA SCAN  12/15/2023    PAP SMEAR  02/25/2024    INFLUENZA VACCINE  08/01/2024    DIABETIC EYE EXAM  09/11/2024    HEMOGLOBIN A1C  12/05/2024    LIPID PANEL  06/05/2025    URINE MICROALBUMIN  06/05/2025    BMI FOLLOWUP  06/10/2025    MAMMOGRAM  11/14/2025    COLORECTAL CANCER SCREENING  07/09/2028    TDAP/TD VACCINES (2 - Tdap) 05/07/2030    HEPATITIS C SCREENING  Completed    Pneumococcal Vaccine 65+  Completed                  CMS Preventative Services Quick Reference  Risk Factors Identified During Encounter  None Identified  The above risks/problems have been discussed with the patient.  Pertinent information has been shared with the patient in the After Visit Summary.  An After Visit Summary and PPPS were made available to the patient.    Follow Up:   Next Medicare Wellness visit to be scheduled in 1 year.       Additional E&M Note during same encounter follows:  Patient has multiple medical problems which are significant and separately identifiable that require additional work above and beyond the Medicare Wellness Visit.      Chief Complaint  Medicare Wellness-subsequent    Subjective        This patient is here for a Medicare annual wellness visit.    She has amyloidosis and is being followed by Dr. Sal of oncology and she also sees [facility] physicians specialty and amyloidosis.    She feels that she is doing pretty well.  It has been a year and a couple months since I saw her last.    She follows  "up with [place] [facility] as well.      Cynthia NewmanHariniLindsey is also being seen today for     Review of Systems   Constitutional: Negative.    HENT: Negative.     Respiratory: Negative.     Cardiovascular: Negative.    Musculoskeletal: Negative.    Psychiatric/Behavioral: Negative.         Objective   Vital Signs:  /80 (BP Location: Right arm, Patient Position: Sitting, Cuff Size: Adult)   Pulse 74   Temp 96 °F (35.6 °C) (Temporal)   Resp 16   Ht 152.4 cm (60\")   Wt 59.5 kg (131 lb 1.6 oz)   SpO2 96%   BMI 25.60 kg/m²     Physical Exam  Vitals and nursing note reviewed.   Constitutional:       General: She is not in acute distress.     Appearance: Normal appearance. She is not ill-appearing, toxic-appearing or diaphoretic.      Comments: Pleasant, neatly groomed, no distress.  Weight 131 pounds, BMI 25.   HENT:      Head: Normocephalic.   Cardiovascular:      Rate and Rhythm: Regular rhythm.      Heart sounds: Normal heart sounds. No murmur heard.     No gallop.   Pulmonary:      Effort: No respiratory distress.      Breath sounds: Normal breath sounds. No wheezing or rales.   Neurological:      Mental Status: She is alert and oriented to person, place, and time.   Psychiatric:         Mood and Affect: Mood normal.         Behavior: Behavior normal.         Thought Content: Thought content normal.                         Assessment and Plan   Diagnoses and all orders for this visit:    1. Postmenopausal (Primary)  -     DEXA Bone Density Axial    2. Anxiety  -     ALPRAZolam (XANAX) 0.25 MG tablet; Take 1 tablet by mouth 2 (Two) Times a Day As Needed for Anxiety. for anxiety  Dispense: 60 tablet; Refill: 0    3. Major depressive disorder with single episode, in full remission    4. Primary insomnia    5. Light chain (AL) amyloidosis    6. Hypothyroidism, unspecified type    7. Hypertension, unspecified type    8. High cholesterol    9. Medicare annual wellness visit, subsequent    Other orders  - "     rosuvastatin (CRESTOR) 40 MG tablet; Take 0.5 tablets by mouth Daily.  Dispense: 90 tablet; Refill: 3      She is due for a bone density and I put that order in for her.    She has depression with anxiety and she takes alprazolam infrequently she did request a refill for this medication and I sent 1 out for her.    She feels like her depression is well-controlled on fluoxetine.    She has primary insomnia.  She takes zolpidem for that which she finds to be effective.    She has amyloidosis and follows up with cardiology and hematology at [facility].    She has hypothyroidism with appropriate replacement.    Her hypertension is well-controlled.    Her cholesterol is a bit high.  I like to see her LDL cholesterol less than 70 if possible so I increased her rosuvastatin to 40 mg once daily.    I will have her back in about 3 months to recheck a lipid and comprehensive metabolic panel about a week before that visit.       Follow Up   No follow-ups on file.  Patient was given instructions and counseling regarding her condition or for health maintenance advice. Please see specific information pulled into the AVS if appropriate.

## 2024-06-11 ENCOUNTER — TELEPHONE (OUTPATIENT)
Dept: FAMILY MEDICINE CLINIC | Facility: CLINIC | Age: 69
End: 2024-06-11

## 2024-06-11 ENCOUNTER — HOSPITAL ENCOUNTER (EMERGENCY)
Facility: HOSPITAL | Age: 69
Discharge: HOME | End: 2024-06-11
Attending: EMERGENCY MEDICINE | Admitting: EMERGENCY MEDICINE
Payer: MEDICARE

## 2024-06-11 VITALS
WEIGHT: 128.75 LBS | TEMPERATURE: 98.1 F | BODY MASS INDEX: 25.28 KG/M2 | HEART RATE: 79 BPM | SYSTOLIC BLOOD PRESSURE: 150 MMHG | HEIGHT: 60 IN | DIASTOLIC BLOOD PRESSURE: 71 MMHG | OXYGEN SATURATION: 99 % | RESPIRATION RATE: 18 BRPM

## 2024-06-11 DIAGNOSIS — S09.93XA INJURY OF LIP, INITIAL ENCOUNTER: ICD-10-CM

## 2024-06-11 DIAGNOSIS — Z78.9: ICD-10-CM

## 2024-06-11 DIAGNOSIS — T14.8XXA ANIMAL BITE: Primary | ICD-10-CM
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
## 2024-06-11 PROBLEM — Z00.00 MEDICARE ANNUAL WELLNESS VISIT, SUBSEQUENT: Status: ACTIVE | Noted: 2024-06-11

## 2024-06-11 PROCEDURE — 96372 THER/PROPH/DIAG INJ SC/IM: CPT

## 2024-06-11 PROCEDURE — 99283 EMERGENCY DEPT VISIT LOW MDM: CPT

## 2024-06-11 PROCEDURE — 25010000002 KETOROLAC TROMETHAMINE PER 15 MG

## 2024-06-11 RX ORDER — KETOROLAC TROMETHAMINE 30 MG/ML
30 INJECTION, SOLUTION INTRAMUSCULAR; INTRAVENOUS ONCE
Status: COMPLETED | OUTPATIENT
Start: 2024-06-11 | End: 2024-06-11

## 2024-06-11 RX ORDER — AMOXICILLIN AND CLAVULANATE POTASSIUM 875; 125 MG/1; MG/1
1 TABLET, FILM COATED ORAL ONCE
Status: COMPLETED | OUTPATIENT
Start: 2024-06-11 | End: 2024-06-11

## 2024-06-11 RX ORDER — AMOXICILLIN AND CLAVULANATE POTASSIUM 875; 125 MG/1; MG/1
1 TABLET, FILM COATED ORAL 2 TIMES DAILY
Qty: 19 TABLET | Refills: 0 | Status: SHIPPED | OUTPATIENT
Start: 2024-06-11 | End: 2024-06-21

## 2024-06-11 RX ORDER — ROSUVASTATIN CALCIUM 40 MG/1
20 TABLET, COATED ORAL DAILY
Qty: 90 TABLET | Refills: 3 | Status: SHIPPED | OUTPATIENT
Start: 2024-06-11

## 2024-06-11 RX ORDER — ALPRAZOLAM 0.25 MG/1
0.25 TABLET ORAL 2 TIMES DAILY PRN
Qty: 60 TABLET | Refills: 0 | Status: SHIPPED | OUTPATIENT
Start: 2024-06-11

## 2024-06-11 RX ADMIN — KETOROLAC TROMETHAMINE 30 MG: 30 INJECTION, SOLUTION INTRAMUSCULAR; INTRAVENOUS at 20:28

## 2024-06-11 RX ADMIN — AMOXICILLIN AND CLAVULANATE POTASSIUM 1 TABLET: 875; 125 TABLET, FILM COATED ORAL at 20:29

## 2024-06-11 NOTE — TELEPHONE ENCOUNTER
"Caller: Cynthia Her \"YOLETTE\"    Relationship: Self    Best call back number: [phone]     Which medication are you concerned about: rosuvastatin (CRESTOR) 20 MG tablet    Who prescribed you this medication: DR OLIVEIRA    When did you start taking this medication: HAS BEEN ON THIS MEDICATION FOR SOME TIME     What are your concerns: DR OLIVEIRA INCREASED HER DOSAGE TO 40 MG AND THE MEDICATION HAS NOT BEEN SENT TO HER PHARMACY    How long have you had these concerns: 1 DAY      "

## 2024-06-12 NOTE — DISCHARGE INSTRUCTIONS
Follow-up with your primary care provider.  Return to ER symptoms worsen or fail to improve.    You are being prescribed an antibiotic, Augmentin, you are getting your first dose in the ER.  Continue to take for the next 10 days.    Keep area clean.  Apply ice as needed for pain and swelling.  Alternate Tylenol and ibuprofen as needed for pain.

## 2024-06-12 NOTE — ED NOTES
Fax sent to Health department. Attempted fax to [facility], however it will not go through and says no answer after multiple attempts.

## 2024-06-12 NOTE — ED PROVIDER NOTES
Time: 8:09 PM EDT  Date of encounter:  6/11/2024  Independent Historian/Clinical History and Information was obtained by:   Patient    History is limited by: N/A    Chief Complaint: Animal bite      History of Present Illness:  Patient is a 69 y.o. year old female who presents to the emergency department for evaluation of animal bite.  Patient she was holding her 17-year-old When the cat got agitated and bit her on the right lower lip.  Patient states that the cat is up-to-date on vaccines.  Patient stated that she had tetanus and rabies treatment a little over 5 years ago for cat scratch.    HPI    Patient Care Team  Primary Care Provider: Arian Peterson MD    Past Medical History:     Allergies   Allergen Reactions    Azithromycin Rash and Other (See Comments)     Reaction unknown to patient (unable to remember)  Other reaction(s): Other: stomach issues, Stomach ache, Other: stomach issues, Stomach ache    Ezetimibe Myalgia, Other (See Comments) and Rash     cramps  cramps    Pravastatin Rash and Other (See Comments)     Past Medical History:   Diagnosis Date    AL amyloidosis     Anxiety     Arthritis     COVID-19 07/2020 9/7/2021    Depression     Diverticulitis of colon     Diverticulosis     GERD (gastroesophageal reflux disease)     History of Clostridioides difficile infection 2008    HAS HAD SEVERAL TIMES IN PAST PER PT (SAW INFECTIOUS DISEASE MD FOR THIS S/P COLOSTOMY/EXTENSIVE ANBX THERAPY)    History of prediabetes     History of snoring     History of stress incontinence     Hypercholesterolemia     Hyperlipidemia     Hypertension     Left ventricular hypertrophy     FOLLOWED BY DR MARI. DENIES CHEST PAIN BUT STATES DOES GET SOA AT TIMES WITH EXERTION REPORTS THAT IT IS NOT A NEW ISSUE     Liver disease     Oral herpes 08/18/2020    Seasonal allergies     used to have allergy shots in the past    SOB (shortness of breath)     STATES WITH EXERTION HAS BEEN ONGOING ISSUE NOT A NEW ISSUE     Thyroid disease     Hypothyroid     Past Surgical History:   Procedure Laterality Date    ABDOMINAL SURGERY  2005, 2014    ex lap x 2,  diverticulitis    ABDOMINOPLASTY  2016    ADENOIDECTOMY      APPENDECTOMY      CARDIAC CATHETERIZATION N/A 04/24/2020    Procedure: Coronary angiography;  Surgeon: Roberto Briones MD;  Location[facility];  Service: Cardiovascular;  Laterality: N/A;    CARDIAC CATHETERIZATION N/A 04/24/2020    Procedure: Left heart cath;  Surgeon: Roberto Briones MD;  Location[facility];  Service: Cardiovascular;  Laterality: N/A;    CARDIAC CATHETERIZATION N/A 04/24/2020    Procedure: Left ventriculography;  Surgeon: Roberto Briones MD;  Location: [facility] LOC[facility];  Service: Cardiovascular;  Laterality: N/A;    CARDIAC SURGERY      open heart surgery,    COLONOSCOPY  approx 2018    normal per pt     COLOSTOMY  2005    had colostomy and then is was reversed    COLOSTOMY CLOSURE  2006    CORRECTION HAMMER TOE Right 2017    ECTOPIC PREGNANCY SURGERY      1979, 1980    ENDOSCOPY N/A 05/27/2020    Procedure: ESOPHAGOGASTRODUODENOSCOPY WITH BIOPSY AND BIOPSY POLYPECTOMY AND MACIEL DIALATION;  Surgeon: Preethi Beck MD;  Location: [facility] ENDOSCOPY;  Service: Gastroenterology;  Laterality: N/A;  PRE: ESOPHAGEAL DYSPHAGIA  POST: Z LINE 46, ESOPHAGEAL SPASM, GASTRITIS, FUNDIC POLYP    ENDOSCOPY N/A 06/20/2023    ESOPHAGEAL DILATATION N/A 12/07/2021    Procedure: OR ESOPHAGEAL DILATATION with maciel dilators ;  Surgeon: Jamey Leslie MD;  Location: [facility] OR [facility];  Service: ENT;  Laterality: N/A;    ESOPHAGEAL MOTILITY STUDY N/A 02/03/2022    Procedure: ESOPHAGEAL MOTILITY STUDY;  Surgeon: Harshil, Nurse Performed;  Location: [facility] ENDOSCOPY;  Service: Gastroenterology;  Laterality: N/A;  dypshagia     FOOT SURGERY Right 12/2016    Second and third hammertoe corrections    KNEE ARTHROSCOPY Right 2009    KNEE SURGERY  Right     REVISION / TAKEDOWN COLOSTOMY  2006    SHOULDER ARTHROSCOPY Right 2018    right shoulder arthroscopy with extensive rotator cuff, biceps and labral debridement, chondroplasty, & subacromial decompression with acromioplasty    SHOULDER SURGERY      HAS HAS COMPLETE SHOULDER ON RIGHT. LEFT SCOPED AND HAD 2ND SURGERY WITH HARDWARE PLACED    SHOULDER SURGERY Left     2012. 2013    TONSILLECTOMY      TOTAL SHOULDER REVERSE ARTHROPLASTY Right 2019    WISDOM TOOTH EXTRACTION       Family History   Problem Relation Age of Onset    Hypertension Mother     Osteoporosis Mother     Skin cancer Mother     Heart disease Father     Hypertension Father     Breast cancer Maternal Grandmother     Ovarian cancer Neg Hx     Colon cancer Neg Hx     Deep vein thrombosis Neg Hx     Pulmonary embolism Neg Hx     Malig Hyperthermia Neg Hx        Home Medications:  Prior to Admission medications    Medication Sig Start Date End Date Taking? Authorizing Provider   acyclovir (ZOVIRAX) 400 MG tablet Take 1 tablet by mouth Every 12 (Twelve) Hours. 1/11/24   Eric Weston MD   ALPRAZolam (XANAX) 0.25 MG tablet Take 1 tablet by mouth 2 (Two) Times a Day As Needed for Anxiety. for anxiety 6/11/24   Arian Peterson MD   aspirin 81 MG chewable tablet CHEW AND SWALLOW 1 TABLET DAILY WITH BREAKFAST 1/12/23   Eric Weston MD   Cholecalciferol (Vitamin D3) 50 MCG (2000 UT) tablet Take 1 tablet by mouth Daily.    Eric Weston MD   cholestyramine (QUESTRAN) 4 g packet Take 1 packet by mouth 3 (Three) Times a Day With Meals. 1 packet  TID PRN diarrhea 8/9/22   Nav Sal MD   ciclopirox (LOPROX) 1 % shampoo APPLY TOPICALLY 3 TIMES EVERY WEEK 10/20/22   Eric Weston MD   diphenoxylate-atropine (LOMOTIL) 2.5-0.025 MG per tablet TAKE 1 TABLET BY MOUTH FOUR TIMES DAILY AS NEEDED FOR DIARRHEA 5/6/24   Nav Sal MD   empagliflozin (JARDIANCE) 10 MG tablet tablet Take 1 tablet by mouth Daily. 11/7/22    Eric Weston MD   FLUoxetine (PROzac) 20 MG capsule TAKE 1 CAPSULE BY MOUTH FOUR TIMES DAILY 5/15/24   Arian Peterson MD   levothyroxine (SYNTHROID, LEVOTHROID) 75 MCG tablet TAKE 1 TABLET BY MOUTH EVERY DAY 11/10/23   Arian Peterson MD   lidocaine (LIDODERM) 5 % APPLY 1 PATCH TOPICALLY EVERY DAY. REMOVE AND DISCARD AFTER 12 HOURS 1/2/23   rEic Weston MD   loratadine (CLARITIN) 10 MG tablet TAKE 1 TABLET BY MOUTH EVERY DAY 12/19/23   Arian Peterson MD   Menthol-Zinc Oxide (Calmoseptine) 0.44-20.6 % ointment Apply 1 application  topically to the appropriate area as directed 2 (Two) Times a Day. 11/27/23   Savannah Summers MD   midodrine (PROAMATINE) 5 MG tablet Take 1 tablet by mouth 2 (Two) Times a Day. 6/26/23   Eric Weston MD   Multiple Vitamins-Minerals (V-C Forte) capsule Take 1 capsule by mouth Daily. 9/21/22   Eric Weston MD   olopatadine (PATANOL) 0.1 % ophthalmic solution INSTILL 1 DROP IN BOTH EYES TWICE DAILY 12/1/22   Arian Peterson MD   pantoprazole (PROTONIX) 20 MG EC tablet TAKE 1 TABLET BY MOUTH EVERY DAY. DO NOT TAKE WITHIN 2 HOURS OF THYROID MEDICATION 1/15/24   Arian Peterson MD   pregabalin (LYRICA) 50 MG capsule Take 1 capsule by mouth Daily. 10/9/23   Eric Weston MD   rosuvastatin (CRESTOR) 40 MG tablet Take 0.5 tablets by mouth Daily. 6/11/24   Arian Peterson MD   Sodium Fluoride 5000 Plus 1.1 % cream  7/19/23   Eric Weston MD   spironolactone (ALDACTONE) 25 MG tablet Take 1 tablet by mouth 2 (Two) Times a Day. 6/25/23   Eric Weston MD   Xifaxan 550 MG tablet Take 1 tablet by mouth. 3/12/24   Eric Weston MD   zolpidem (AMBIEN) 10 MG tablet Take 1 tablet by mouth At Night As Needed for Sleep. 5/3/24   Arian Peterson MD   methocarbamol (ROBAXIN) 750 MG tablet Take 2 tablets by mouth 3 (Three) Times a Day As Needed for Muscle Spasms.  Patient not taking: Reported on 6/10/2024 1/28/24 6/11/24  Martin  "Joni MARCANO MD   metoprolol succinate XL (TOPROL-XL) 25 MG 24 hr tablet Take 1 tablet by mouth Daily.  Patient not taking: Reported on 6/10/2024 10/4/23 6/11/24  Eric Weston MD   predniSONE (DELTASONE) 20 MG tablet Take 1 tablet by mouth Daily.  Patient not taking: Reported on 6/10/2024 7/21/23 6/11/24  Eric Weston MD   sulfamethoxazole-trimethoprim (BACTRIM DS,SEPTRA DS) 800-160 MG per tablet Take 1 tablet by mouth 2 (Two) Times a Day.  Patient not taking: Reported on 6/10/2024 1/3/24 6/11/24  Nav Sal MD   valACYclovir (Valtrex) 1000 MG tablet Take 1 tablet by mouth 2 (Two) Times a Day. 2 tablets twice a day constitutes 1 complete treatment for an outbreak of oral herpes.    This medication should be taken in this way for 1 day for each herpes outbreak.  Patient not taking: Reported on 6/10/2024 12/22/23 6/11/24  Arian Peterson MD        Social History:   Social History     Tobacco Use    Smoking status: Never    Smokeless tobacco: Never   Vaping Use    Vaping status: Never Used   Substance Use Topics    Alcohol use: Not Currently     Alcohol/week: 1.0 standard drink of alcohol     Types: 1 Glasses of wine per week    Drug use: Never         Review of Systems:  Review of Systems   Constitutional:  Negative for fatigue and fever.   Respiratory:  Negative for shortness of breath.    Cardiovascular:  Negative for chest pain.   Skin:  Positive for wound.   Neurological:  Negative for headaches.        Physical Exam:  /71 (BP Location: Left arm, Patient Position: Lying)   Pulse 79   Temp 98.1 °F (36.7 °C) (Oral)   Resp 18   Ht 152.4 cm (60\")   Wt 58.4 kg (128 lb 12 oz)   SpO2 99%   BMI 25.14 kg/m²     Physical Exam  Vitals reviewed.   HENT:      Mouth/Throat:      Lips: Lesions present.      Mouth: Mucous membranes are moist.      Tongue: No lesions.     Cardiovascular:      Rate and Rhythm: Normal rate and regular rhythm.      Heart sounds: Normal heart sounds.   Pulmonary:      " Effort: Pulmonary effort is normal.      Breath sounds: Normal breath sounds.                  Procedures:  Procedures      Medical Decision Making:      Comorbidities that affect care:    Immunocompromised due to chemo status     External Notes reviewed:    None      The following orders were placed and all results were independently analyzed by me:  No orders of the defined types were placed in this encounter.      Medications Given in the Emergency Department:  Medications   amoxicillin-clavulanate (AUGMENTIN) 875-125 MG per tablet 1 tablet (1 tablet Oral Given 6/11/24 2029)   ketorolac (TORADOL) injection 30 mg (30 mg Intramuscular Given 6/11/24 2028)        ED Course:         Labs:    Lab Results (last 24 hours)       ** No results found for the last 24 hours. **             Imaging:    No Radiology Exams Resulted Within Past 24 Hours      Differential Diagnosis and Discussion:    Laceration: Laceration was evaluated for arterial injury, ligamentous damage, and other neurovascular injury.        MDM  Number of Diagnoses or Management Options  Animal bite  Cat bite, initial encounter  Injury of lip, initial encounter  Tetanus toxoid vaccination administered 5-10 years ago  Diagnosis management comments: The patient presented with a laceration. The wound was cleansed with NS. Tetanus was not given due to prior immunization status. Acute bleeding has ceased and the wound was approximated in the emergency department. Patient was counseled to keep the wound clean, dry, and out of the sun. Patient was advised to return to the ED for worsening erythema, pain, swelling, fever, excessive drainage or signs of infection. Patient verbalizes understanding and agrees to follow up as instructed.             Patient Care Considerations:    SEPSIS was considered but is NOT present in the emergency department as SIRS criteria is not present.      Consultants/Shared Management Plan:    None    Social Determinants of  Health:    Patient is independent, reliable, and has access to care.       Disposition and Care Coordination:    Discharged: The patient is suitable and stable for discharge with no need for consideration of admission.    I have explained the patient´s condition, diagnoses and treatment plan based on the information available to me at this time. I have answered questions and addressed any concerns. The patient has a good  understanding of the patient´s diagnosis, condition, and treatment plan as can be expected at this point. The vital signs have been stable. The patient´s condition is stable and appropriate for discharge from the emergency department.      The patient will pursue further outpatient evaluation with the primary care physician or other designated or consulting physician as outlined in the discharge instructions. They are agreeable to this plan of care and follow-up instructions have been explained in detail. The patient has received these instructions in written format and has expressed an understanding of the discharge instructions. The patient is aware that any significant change in condition or worsening of symptoms should prompt an immediate return to this or the closest emergency department or call to 911.  I have explained discharge medications and the need for follow up with the patient/caretakers. This was also printed in the discharge instructions. Patient was discharged with the following medications and follow up:      Medication List        New Prescriptions      amoxicillin-clavulanate 875-125 MG per tablet  Commonly known as: AUGMENTIN  Take 1 tablet by mouth 2 (Two) Times a Day for 10 days.               Where to Get Your Medications        These medications were sent to [id number] - [place] - [address] AT [facility] OF [address] [phone] [facility] [phone]   [address] [place] [address]      Phone: [phone]    amoxicillin-clavulanate 875-125 MG per tablet      Arian Peterson MD  [address] [facility] [address]  [phone]             Final diagnoses:   Animal bite   Cat bite, initial encounter   Injury of lip, initial encounter   Tetanus toxoid vaccination administered 5-10 years ago        ED Disposition       ED Disposition   Discharge    Condition   Stable    Comment   --               This medical record created using voice recognition software.             Seaver, Alyce B, APRN  06/12/24 0125

## 2024-06-19 ENCOUNTER — PATIENT MESSAGE (OUTPATIENT)
Dept: FAMILY MEDICINE CLINIC | Facility: CLINIC | Age: 69
End: 2024-06-19
Payer: MEDICARE

## 2024-06-25 ENCOUNTER — HOSPITAL ENCOUNTER (OUTPATIENT)
Dept: BONE DENSITY | Facility: HOSPITAL | Age: 69
Discharge: HOME | End: 2024-06-25
Admitting: INTERNAL MEDICINE
Payer: MEDICARE

## 2024-06-25 DIAGNOSIS — R19.7 DIARRHEA, UNSPECIFIED TYPE: ICD-10-CM

## 2024-06-25 PROCEDURE — 77080 DXA BONE DENSITY AXIAL: CPT

## 2024-06-25 RX ORDER — DIPHENOXYLATE HYDROCHLORIDE AND ATROPINE SULFATE 2.5; .025 MG/1; MG/1
1 TABLET ORAL 4 TIMES DAILY PRN
Qty: 120 TABLET | Refills: 0 | Status: SHIPPED | OUTPATIENT
Start: 2024-06-25

## 2024-06-25 RX ORDER — ACYCLOVIR 400 MG/1
400 TABLET ORAL 2 TIMES DAILY
Qty: 60 TABLET | Refills: 1 | Status: SHIPPED | OUTPATIENT
Start: 2024-06-25

## 2024-06-25 NOTE — PROGRESS NOTES
[facility] CBC GROUP OUTPATIENT FOLLOW UP CLINIC VISIT    REASON FOR FOLLOW-UP:    AL amyloidosis  Therapy with michael-CyBorD initiated 8/16/2022  Now on maintenance daratumumab every 4 weeks initiated May 2023 with plans for 18 months of therapy    HISTORY OF PRESENT ILLNESS: Cynthia Her is a 69 y.o. female with the above-mentioned history who is here today for lab review and evaluation.    Gastrointestinal issues are stable.  Swallowing issues stable.  Diarrhea is stable.  She continues follow-up at [place] for these issues.  Yesterday she was moving some furniture and today she woke up with left-sided chest pain and tenderness along the left chest wall.    REVIEW OF SYSTEMS:  As per the HPI    PHYSICAL EXAMINATION:    Vitals:    06/26/24 1146   BP: 153/76   Pulse: 83   Temp: 97.3 °F (36.3 °C)   SpO2: 97%   Weight: 58.5 kg (128 lb 14.4 oz)   PainSc: 0-No pain       PHYSICAL EXAM  General:  No acute distress, awake, alert and oriented  Skin:  Warm and dry, no visible rash.   HEENT:  Normocephalic/atraumatic.   Chest:  Normal respiratory effort.  Lungs clear to auscultation bilaterally.  There is some tenderness along the left costochondral area extending down to over the left breast  Heart: Regular rate and rhythm with a grade 2 out of 6 early systolic murmur  No CVA tenderness  Extremities:  No visible clubbing, cyanosis, or edema  Neuro/psych:  Grossly nonfocal.  Normal mood and affect.    DIAGNOSTIC DATA:.  CBC and Differential (06/26/2024 11:32)     IMAGING:    None reviewed      ASSESSMENT:  This is a 69 y.o. female with:    *AL amyloidosis  She had a stress echocardiogram on 4/24/2020 showing a normal left ventricular ejection fraction of 60% with moderate concentric hypertrophy but no wall motion abnormalities of the left ventricle.  There was impaired relaxation noted.  She complained of chest pain during the examination.  There was some ST segment depression.  Cardiac catheterization was  performed on the same day.  No intervention was required.  Follow-up echocardiogram on 4/15/2021 showed a left ventricular ejection fraction of 61 to 65% with normal LV cavity size.  Left ventricular wall thickness showed moderate concentric hypertrophy.  Diastolic function was impaired.  No pericardial effusion.  Small left pleural effusion.  She had follow-up [facility] imaging for cardiac amyloidosis that was not suggestive of ATTR amyloidosis  Laboratory values on 2/24/2022 showed a high serum free light chain lambda of 121, normal kappa of 10.5, low ratio at 0.09.  Serum protein electrophoresis showed no evidence of an M spike.  Urine light chains were abnormal with an elevated lambda light chain of 33 and a low ratio of 0.48 with a 12.7 mg M spike on 24-hour urine protein electrophoresis.  BNP was elevated at 2306 on 3/4/2022.  The troponin was normal at 0.03.  CK was 212 with a CK-MB of 10.87.  Initial consult on 3/30/22. No M spike on serum protein electrophoresis. SIFE 'presence of monoclonal protein is unclear.'   Bone marrow biopsy 4/13/22 normocellular, 12.1% plasma cells consistent with low volume plasma cell dyscrasia. FISH with low level t(11;14) fusion only. No amyloid noted as Congo red staining negative.   Fat pad biopsy 5/11/22 positive for amyloid, AL lambda  Referred to Dr. Buenrostro at [place]. Intended to enroll on a clinical study but her troponin as tested by the study sponsor was not high enough  Therapy with michael-CyBorD initiated 8/16/2022  Patient seen in the office on 8/17/2022 for triage visit.  Patient with complaints of fatigue, dizziness and diarrhea.  She was mildly hypotensive.  She was given IV fluids.  Subsequent admission at [facility] with shortness of breath and volume overload.  She was diuresed and treated with antibiotics.  D8 therapy administered on 8/30  Light chains normal at [facility] [place] on 8/31/22 (lambda light chain 1.3, down from 12.44)  9/27/2022: Cycle 2-day 8  of therapy  10/4/2022 cycle 2-day 8 CyBorD.  Patient continues to push oral hydration.  She continues to have some neck stiffness but this has not worsened.  She will see [facility] next week for further cardiac work-up.  Glucose was low upon initial labs today however the patient was given something to eat by nursing prior to rechecking.  This was therefore rechecked prior to her leaving the office and was 87.  Patient reports she was feeling fine.  10/18/2020 to cycle 2-day 22 Sissy -CyBorD.  Velcade dose will be reduced to 1.0 mg/m² due to patient's persistent issues with orthostasis.  10/25/2022: Cycle 3-day 1  11/8/2022: Delay in cycle 3-day 15 therapy.  Day 8 omitted due to an upper respiratory infection.  11/22/2022: Cycle 3-day 22.   final planned treatment before she goes to [facility] for cardiac surgery.  Cardiac surgery performed at [facility] on 12/5/2022.  Pathology from the myectomy showed cardiac amyloidosis extensively involving the vessels and endocardium with myocyte hypertrophy and interstitial fibrosis.  Congo red stain was positive.  Reviewed back on 1/17/2023 with plans to resume Darzalex, Cytoxan, Velcade, dexamethasone with cycle #4 on 1/24.  Patient seen 2/7/2023 (missed 1/31/2023 due to weather, which would have been day 8).  We will go ahead and proceed with as cycle 4 day 15, day 15.  2/14/2023: Cycle 4-day 22  2/21/2023 cycle 5-day 1 Darzalex, Cytoxan (IV Cytoxan given in the office), Velcade, dexamethasone (20 mg p.o. given in the office).  Repeat labs from 2/21/2023 showing M spike up to 0.8, free kappa light chain up to 85.8 (previously 5.3 on 1/17/2023), ratio 8.58 (was previously 0.76 on 1/17/2023).  We ended up repeating labs on 3/7/2023 M spike 0.1, free kappa light chain 3.4, ratio 0.83.  We will continue on with treatment.  She is scheduled to return to [facility] in April.  4/11/2023 cycle 6, day 22 Darzalex-CyBorD.  Final planned therapy.  She was seen at [facility] with  recommendations for monthly Darzalex for 18 months. Bactrim stopped and she continues acyclovir. No immediate plans for ASCT.  The last note from [facility] says to continue Darzalex for 24 months.  12/6/2023: Proceed with Darzalex  5/1/2024 patient returns we will proceed with Darzalex today.  Will discuss new skin nodules with Dr. Sal.  Patient does follow-up with [facility] next month and will discuss this with them at that time.  She is following up with primary care soon to discuss some medication changes as suggested by her team at [facility] to see if that helps with her esophageal spasms.  She will return in 1 month for labs and treatment in 2 months to see Dr. Sal with labs and treatment.  6/26/2024: Overall stable.  Bone marrow biopsy planned at [place] in August.    *Diarrhea  She saw Dr. Hoyos with GI at [facility] on 9/7.  Suspicion for amyloid involvement of the GI tract  Continue Lomotil and Imodium. Rifaximin prescribed by Dr. Hoyos which she continues to use intermittently with improvement but this is only prescribed for 1 week out of 4  Diarrhea waxes and wanes with what she eats and dairy products particularly worsen diarrhea  Continue Questran, Lomotil and Imodium.  These help but did not completely control the issue.  10/11/2023: Patient continues on rifaximin, Questran, Lomotil, and Imodium for diarrhea management.  Diarrhea still not completely controlled. Averages at least 5 episodes daily.   Stable issues at this time.  She cannot continue rifaximin at this time due to insurance issues. On Flagyl. Resume rifaximin per GI at [place] when possible  6/26/2024 she reports her diarrhea is unchanged today.  Xifaxan really helps.    *Elevated liver enzymes  Continue to monitor intermittently    *Cardiomyopathy:  Most recent echocardiogram on 8/17/2022 showed left ventricular wall thickness consistent with moderate to severe concentric hypertrophy along with left ventricular septal wall  measuring 2.2 cm and posterior wall thickness at 1.9 cm likely due to amyloidosis.  LVEF 61 to 65%.  Grade 1 impaired relaxation.  Now followed by Dr. Lyles at [facility] with concerns for pre-existing HCM and AL cardiac amyloid.   Cardiac MRI results above.  Amyloidosis evident.  Catheterization performed at [facility].   Cardiac surgery performed at [facility] on 12/5/2022.  Pathology from the myectomy showed cardiac amyloidosis extensively involving the vessels and endocardium with myocyte hypertrophy and interstitial fibrosis.  Congo red stain was positive.  Symptoms improved significantly  10/11/2023: Seen by cardiology at [facility] on 10/4/2023. Patient was restarted on metoprolol XL.  Midodrine adjusted to morning and early afternoon.  ECHO to be repeated in 3 months with follow-up.  New LBBB  Symptoms overall much better    *History of periorbital hematomas: These have resolved.  Coagulation studies and a factor X level were all normal.    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   Continuing magic mouthwash.    *Dysphagia:  She saw Dr. Aranda at [facility] with Botox injections performed.  Symptoms unfortunately are not much better.  Recent esophagram confirmed esophageal dysmotility.  Manometry recently performed at [facility].  Patient states she was told that she is having esophageal spasms and that this is an area that they are very limited in treatment options.  She is going to see primary care for some medication adjustments to see if this is helpful.    *Skin changes with hypopigmentation and blister on the left side of her nose  Concerning for new manifestations of amyloidosis  Not discussed at this time    *Subcutaneous nodules, also previously increasing in number and size  5/1/2024 patient reports these are increasing in number and size once again, she reports a new area on her right upper thigh and left upper arm today.  Did not complain of this  as of 6/26/2024    *Rectal irritation  Saw wound care.  Continue current therapy.  No recent issues.    *Cognitive issues and headache  MRI brain 4/7/2024 with no acute intracranial process and findings suggestive of mild chronic small vessel ischemic disease.    *Left-sided chest pain  She notes this today after moving some furniture yesterday.  There is tenderness to the left of the sternum.  I believe this is musculoskeletal pain.  I prescribed Flexeril and advised her to take ibuprofen over the next couple of days.  I also advised cold over the next day or so and then heat.    PLAN:  Plan per Dr. Buenrostro is to repeat a bone marrow biopsy after completing 2 years of therapy.  This is scheduled in August.  Proceed with monthly Darzalex today.  We may be able to stop this in the next few months.  Ice, Flexeril, short course of ibuprofen or naproxen at home for chest pain.  I advised her to notify us of this process.  Continue Imodium, Lomotil, and Questran for diarrhea control. Per GI at [facility].  Continue barrier creams and ointments  Continue Magic mouthwash as needed for glossitis.  Continue cardiac medications as managed by cardiology at [place].  Continue acyclovir for prophylaxis.  No longer on prophylactic Bactrim.  Return for labs and Darzalex in 1 and 2 months.  I will see her back in 3 months.  This will be after she is seen at [place] with a bone marrow biopsy.    This patient remains on high risk drug therapy requiring intensive monitoring for toxicity.

## 2024-06-26 ENCOUNTER — LAB (OUTPATIENT)
Dept: LAB | Facility: HOSPITAL | Age: 69
End: 2024-06-26
Payer: MEDICARE

## 2024-06-26 ENCOUNTER — INFUSION (OUTPATIENT)
Dept: ONCOLOGY | Facility: HOSPITAL | Age: 69
End: 2024-06-26
Payer: MEDICARE

## 2024-06-26 ENCOUNTER — OFFICE VISIT (OUTPATIENT)
Dept: ONCOLOGY | Facility: CLINIC | Age: 69
End: 2024-06-26
Payer: MEDICARE

## 2024-06-26 VITALS
WEIGHT: 128.9 LBS | OXYGEN SATURATION: 97 % | DIASTOLIC BLOOD PRESSURE: 76 MMHG | TEMPERATURE: 97.3 F | BODY MASS INDEX: 25.17 KG/M2 | SYSTOLIC BLOOD PRESSURE: 153 MMHG | HEART RATE: 83 BPM

## 2024-06-26 DIAGNOSIS — E85.81 LIGHT CHAIN (AL) AMYLOIDOSIS: Primary | ICD-10-CM

## 2024-06-26 DIAGNOSIS — E85.81 LIGHT CHAIN (AL) AMYLOIDOSIS: ICD-10-CM

## 2024-06-26 LAB
ALBUMIN SERPL-MCNC: 4.5 G/DL (ref 3.5–5.2)
ALBUMIN/GLOB SERPL: 2.3 G/DL
ALP SERPL-CCNC: 75 U/L (ref 39–117)
ALT SERPL W P-5'-P-CCNC: 33 U/L (ref 1–33)
ANION GAP SERPL CALCULATED.3IONS-SCNC: 13.5 MMOL/L (ref 5–15)
AST SERPL-CCNC: 29 U/L (ref 1–32)
BASOPHILS # BLD AUTO: 0.02 10*3/MM3 (ref 0–0.2)
BASOPHILS NFR BLD AUTO: 0.2 % (ref 0–1.5)
BILIRUB SERPL-MCNC: 0.5 MG/DL (ref 0–1.2)
BUN SERPL-MCNC: 12 MG/DL (ref 8–23)
BUN/CREAT SERPL: 16.4 (ref 7–25)
CALCIUM SPEC-SCNC: 10.1 MG/DL (ref 8.6–10.5)
CHLORIDE SERPL-SCNC: 105 MMOL/L (ref 98–107)
CO2 SERPL-SCNC: 24.5 MMOL/L (ref 22–29)
CREAT SERPL-MCNC: 0.73 MG/DL (ref 0.57–1)
DEPRECATED RDW RBC AUTO: 49.6 FL (ref 37–54)
EGFRCR SERPLBLD CKD-EPI 2021: 89.2 ML/MIN/1.73
EOSINOPHIL # BLD AUTO: 0.23 10*3/MM3 (ref 0–0.4)
EOSINOPHIL NFR BLD AUTO: 2.7 % (ref 0.3–6.2)
ERYTHROCYTE [DISTWIDTH] IN BLOOD BY AUTOMATED COUNT: 15 % (ref 12.3–15.4)
GLOBULIN UR ELPH-MCNC: 2 GM/DL
GLUCOSE SERPL-MCNC: 88 MG/DL (ref 65–99)
HCT VFR BLD AUTO: 44.4 % (ref 34–46.6)
HGB BLD-MCNC: 14.4 G/DL (ref 12–15.9)
IMM GRANULOCYTES # BLD AUTO: 0.01 10*3/MM3 (ref 0–0.05)
IMM GRANULOCYTES NFR BLD AUTO: 0.1 % (ref 0–0.5)
LYMPHOCYTES # BLD AUTO: 2.31 10*3/MM3 (ref 0.7–3.1)
LYMPHOCYTES NFR BLD AUTO: 27 % (ref 19.6–45.3)
MCH RBC QN AUTO: 29 PG (ref 26.6–33)
MCHC RBC AUTO-ENTMCNC: 32.4 G/DL (ref 31.5–35.7)
MCV RBC AUTO: 89.5 FL (ref 79–97)
MONOCYTES # BLD AUTO: 0.65 10*3/MM3 (ref 0.1–0.9)
MONOCYTES NFR BLD AUTO: 7.6 % (ref 5–12)
NEUTROPHILS NFR BLD AUTO: 5.32 10*3/MM3 (ref 1.7–7)
NEUTROPHILS NFR BLD AUTO: 62.4 % (ref 42.7–76)
NRBC BLD AUTO-RTO: 0 /100 WBC (ref 0–0.2)
PLATELET # BLD AUTO: 191 10*3/MM3 (ref 140–450)
PMV BLD AUTO: 9.9 FL (ref 6–12)
POTASSIUM SERPL-SCNC: 4.6 MMOL/L (ref 3.5–5.2)
PROT SERPL-MCNC: 6.5 G/DL (ref 6–8.5)
RBC # BLD AUTO: 4.96 10*6/MM3 (ref 3.77–5.28)
SODIUM SERPL-SCNC: 143 MMOL/L (ref 136–145)
WBC NRBC COR # BLD AUTO: 8.54 10*3/MM3 (ref 3.4–10.8)

## 2024-06-26 PROCEDURE — 25010000002 DARATUMUMAB-HYALURONIDASE-FIHJ 1800-30000 MG-UT/15ML SOLUTION: Performed by: INTERNAL MEDICINE

## 2024-06-26 PROCEDURE — 1159F MED LIST DOCD IN RCRD: CPT | Performed by: INTERNAL MEDICINE

## 2024-06-26 PROCEDURE — 85025 COMPLETE CBC W/AUTO DIFF WBC: CPT

## 2024-06-26 PROCEDURE — 1126F AMNT PAIN NOTED NONE PRSNT: CPT | Performed by: INTERNAL MEDICINE

## 2024-06-26 PROCEDURE — 63710000001 DEXAMETHASONE PER 0.25 MG: Performed by: INTERNAL MEDICINE

## 2024-06-26 PROCEDURE — 96401 CHEMO ANTI-NEOPL SQ/IM: CPT

## 2024-06-26 PROCEDURE — 3077F SYST BP >= 140 MM HG: CPT | Performed by: INTERNAL MEDICINE

## 2024-06-26 PROCEDURE — 3078F DIAST BP <80 MM HG: CPT | Performed by: INTERNAL MEDICINE

## 2024-06-26 PROCEDURE — 1160F RVW MEDS BY RX/DR IN RCRD: CPT | Performed by: INTERNAL MEDICINE

## 2024-06-26 PROCEDURE — 99214 OFFICE O/P EST MOD 30 MIN: CPT | Performed by: INTERNAL MEDICINE

## 2024-06-26 RX ORDER — DIPHENHYDRAMINE HYDROCHLORIDE 50 MG/ML
50 INJECTION INTRAMUSCULAR; INTRAVENOUS AS NEEDED
OUTPATIENT
Start: 2024-07-24

## 2024-06-26 RX ORDER — HYDROXYZINE PAMOATE 25 MG/1
25 CAPSULE ORAL ONCE
Status: COMPLETED | OUTPATIENT
Start: 2024-06-26 | End: 2024-06-26

## 2024-06-26 RX ORDER — DIPHENHYDRAMINE HYDROCHLORIDE 50 MG/ML
50 INJECTION INTRAMUSCULAR; INTRAVENOUS AS NEEDED
OUTPATIENT
Start: 2024-08-21

## 2024-06-26 RX ORDER — DIPHENHYDRAMINE HYDROCHLORIDE 50 MG/ML
50 INJECTION INTRAMUSCULAR; INTRAVENOUS AS NEEDED
Status: CANCELLED | OUTPATIENT
Start: 2024-06-26

## 2024-06-26 RX ORDER — DEXAMETHASONE 4 MG/1
20 TABLET ORAL ONCE
Status: COMPLETED | OUTPATIENT
Start: 2024-06-26 | End: 2024-06-26

## 2024-06-26 RX ORDER — MEPERIDINE HYDROCHLORIDE 25 MG/ML
25 INJECTION INTRAMUSCULAR; INTRAVENOUS; SUBCUTANEOUS
OUTPATIENT
Start: 2024-08-21

## 2024-06-26 RX ORDER — FAMOTIDINE 10 MG/ML
20 INJECTION, SOLUTION INTRAVENOUS AS NEEDED
OUTPATIENT
Start: 2024-08-21

## 2024-06-26 RX ORDER — MEPERIDINE HYDROCHLORIDE 25 MG/ML
25 INJECTION INTRAMUSCULAR; INTRAVENOUS; SUBCUTANEOUS
OUTPATIENT
Start: 2024-09-18

## 2024-06-26 RX ORDER — FAMOTIDINE 10 MG/ML
20 INJECTION, SOLUTION INTRAVENOUS AS NEEDED
Status: CANCELLED | OUTPATIENT
Start: 2024-06-26

## 2024-06-26 RX ORDER — HYDROXYZINE PAMOATE 25 MG/1
25 CAPSULE ORAL ONCE
OUTPATIENT
Start: 2024-09-18 | End: 2024-09-18

## 2024-06-26 RX ORDER — ACETAMINOPHEN 500 MG
1000 TABLET ORAL ONCE
OUTPATIENT
Start: 2024-07-24

## 2024-06-26 RX ORDER — ACETAMINOPHEN 500 MG
1000 TABLET ORAL ONCE
Status: COMPLETED | OUTPATIENT
Start: 2024-06-26 | End: 2024-06-26

## 2024-06-26 RX ORDER — CYCLOBENZAPRINE HCL 10 MG
10 TABLET ORAL 3 TIMES DAILY PRN
Qty: 30 TABLET | Refills: 0 | Status: SHIPPED | OUTPATIENT
Start: 2024-06-26

## 2024-06-26 RX ORDER — FAMOTIDINE 10 MG/ML
20 INJECTION, SOLUTION INTRAVENOUS AS NEEDED
OUTPATIENT
Start: 2024-09-18

## 2024-06-26 RX ORDER — HYDROXYZINE PAMOATE 25 MG/1
25 CAPSULE ORAL ONCE
OUTPATIENT
Start: 2024-08-21 | End: 2024-08-21

## 2024-06-26 RX ORDER — ACETAMINOPHEN 500 MG
1000 TABLET ORAL ONCE
OUTPATIENT
Start: 2024-09-18

## 2024-06-26 RX ORDER — ACETAMINOPHEN 500 MG
1000 TABLET ORAL ONCE
Status: CANCELLED | OUTPATIENT
Start: 2024-06-26

## 2024-06-26 RX ORDER — MEPERIDINE HYDROCHLORIDE 25 MG/ML
25 INJECTION INTRAMUSCULAR; INTRAVENOUS; SUBCUTANEOUS
Status: CANCELLED | OUTPATIENT
Start: 2024-06-26

## 2024-06-26 RX ORDER — ACETAMINOPHEN 500 MG
1000 TABLET ORAL ONCE
OUTPATIENT
Start: 2024-08-21

## 2024-06-26 RX ORDER — HYDROXYZINE PAMOATE 25 MG/1
25 CAPSULE ORAL ONCE
OUTPATIENT
Start: 2024-07-24 | End: 2024-07-24

## 2024-06-26 RX ORDER — TRAZODONE HYDROCHLORIDE 50 MG/1
50 TABLET ORAL NIGHTLY
COMMUNITY
Start: 2024-05-16

## 2024-06-26 RX ORDER — HYDROXYZINE PAMOATE 25 MG/1
25 CAPSULE ORAL ONCE
Status: CANCELLED | OUTPATIENT
Start: 2024-06-26 | End: 2024-06-26

## 2024-06-26 RX ORDER — DIPHENHYDRAMINE HYDROCHLORIDE 50 MG/ML
50 INJECTION INTRAMUSCULAR; INTRAVENOUS AS NEEDED
OUTPATIENT
Start: 2024-09-18

## 2024-06-26 RX ORDER — FAMOTIDINE 10 MG/ML
20 INJECTION, SOLUTION INTRAVENOUS AS NEEDED
OUTPATIENT
Start: 2024-07-24

## 2024-06-26 RX ORDER — MEPERIDINE HYDROCHLORIDE 25 MG/ML
25 INJECTION INTRAMUSCULAR; INTRAVENOUS; SUBCUTANEOUS
OUTPATIENT
Start: 2024-07-24

## 2024-06-26 RX ADMIN — HYDROXYZINE PAMOATE 25 MG: 25 CAPSULE ORAL at 12:34

## 2024-06-26 RX ADMIN — DEXAMETHASONE 20 MG: 4 TABLET ORAL at 12:34

## 2024-06-26 RX ADMIN — ACETAMINOPHEN 1000 MG: 500 TABLET ORAL at 12:34

## 2024-06-26 RX ADMIN — DARATUMUMAB AND HYALURONIDASE-FIHJ (HUMAN RECOMBINANT) 1800 MG: 1800; 30000 INJECTION SUBCUTANEOUS at 13:12

## 2024-06-27 LAB
ALBUMIN SERPL ELPH-MCNC: 4.1 G/DL (ref 2.9–4.4)
ALBUMIN/GLOB SERPL: 1.9 {RATIO} (ref 0.7–1.7)
ALPHA1 GLOB SERPL ELPH-MCNC: 0.2 G/DL (ref 0–0.4)
ALPHA2 GLOB SERPL ELPH-MCNC: 0.8 G/DL (ref 0.4–1)
B-GLOBULIN SERPL ELPH-MCNC: 0.8 G/DL (ref 0.7–1.3)
GAMMA GLOB SERPL ELPH-MCNC: 0.3 G/DL (ref 0.4–1.8)
GLOBULIN SER-MCNC: 2.2 G/DL (ref 2.2–3.9)
IGA SERPL-MCNC: <5 MG/DL (ref 87–352)
IGG SERPL-MCNC: 278 MG/DL (ref 586–1602)
IGM SERPL-MCNC: 28 MG/DL (ref 26–217)
INTERPRETATION SERPL IEP-IMP: ABNORMAL
KAPPA LC FREE SER-MCNC: 3.7 MG/L (ref 3.3–19.4)
KAPPA LC FREE/LAMBDA FREE SER: 1.16 {RATIO} (ref 0.26–1.65)
LABORATORY COMMENT REPORT: ABNORMAL
LAMBDA LC FREE SERPL-MCNC: 3.2 MG/L (ref 5.7–26.3)
M PROTEIN SERPL ELPH-MCNC: 0.1 G/DL
PROT SERPL-MCNC: 6.3 G/DL (ref 6–8.5)

## 2024-07-11 RX ORDER — OLOPATADINE HYDROCHLORIDE 1 MG/ML
1 SOLUTION/ DROPS OPHTHALMIC 2 TIMES DAILY
Qty: 5 ML | Refills: 5 | Status: SHIPPED | OUTPATIENT
Start: 2024-07-11

## 2024-07-16 DIAGNOSIS — F51.01 PRIMARY INSOMNIA: ICD-10-CM

## 2024-07-16 DIAGNOSIS — F41.9 ANXIETY: ICD-10-CM

## 2024-07-16 DIAGNOSIS — J30.9 ALLERGIC RHINITIS, UNSPECIFIED SEASONALITY, UNSPECIFIED TRIGGER: ICD-10-CM

## 2024-07-16 RX ORDER — LORATADINE 10 MG/1
10 TABLET ORAL DAILY
Qty: 30 TABLET | Refills: 5 | Status: SHIPPED | OUTPATIENT
Start: 2024-07-16

## 2024-07-16 RX ORDER — ROSUVASTATIN CALCIUM 40 MG/1
40 TABLET, COATED ORAL DAILY
Qty: 90 TABLET | Refills: 0 | Status: SHIPPED | OUTPATIENT
Start: 2024-07-16

## 2024-07-16 NOTE — TELEPHONE ENCOUNTER
"    Caller: Cynthia Her \"YOLETTE\"    Relationship: Self    Best call back number: [phone]    Requested Prescriptions:   Requested Prescriptions     Pending Prescriptions Disp Refills    rosuvastatin (CRESTOR) 40 MG tablet 90 tablet 3     Sig: Take 0.5 tablets by mouth Daily.        Pharmacy where request should be sent: [id number] - [place], [place] - [address] [facility] AT [facility] OF [address] & [place] - [phone] [facility] [phone] FX     Last office visit with prescribing clinician: 6/10/2024   Last telemedicine visit with prescribing clinician: Visit date not found   Next office visit with prescribing clinician: Visit date not found     Additional details provided by patient: PATIENT STATED THAT SHE HAS BEE TAKING THE WHOLE TABLET AND IS NOW LOW ON MEDICATION AND PHARMACY WILL NOT PRESCRIBE WITH OUT NEW PRESCRIPTION STATING THAT PATIENT IS TAKING WHOLE 40 MG AND NOT A HALF  OF TABLET.     Does the patient have less than a 3 day supply:  [] Yes  [] No    Would you like a call back once the refill request has been completed: [] Yes [] No    If the office needs to give you a call back, can they leave a voicemail: [] Yes [] No    Ellis Leyva   07/16/24 14:14 EDT         "

## 2024-07-16 NOTE — TELEPHONE ENCOUNTER
"  Caller: Cynthia Her \"YOLETTE\"    Relationship: Self    Best call back number: [phone]     Requested Prescriptions:   Requested Prescriptions     Pending Prescriptions Disp Refills    loratadine (CLARITIN) 10 MG tablet 30 tablet 5     Sig: Take 1 tablet by mouth Daily.    zolpidem (AMBIEN) 10 MG tablet 30 tablet 1     Sig: Take 1 tablet by mouth At Night As Needed for Sleep.    ALPRAZolam (XANAX) 0.25 MG tablet 60 tablet 0     Sig: Take 1 tablet by mouth 2 (Two) Times a Day As Needed for Anxiety. for anxiety        Pharmacy where request should be sent: [facility] #[id number] - [place], [place] - [address] [facility] AT [facility] OF [address] & [place] - [phone] [facility] [phone] FX     Last office visit with prescribing clinician: 6/10/2024   Last telemedicine visit with prescribing clinician: Visit date not found   Next office visit with prescribing clinician: Visit date not found     Does the patient have less than a 3 day supply:  [x] Yes  [] No    Would you like a call back once the refill request has been completed: [] Yes [x] No    If the office needs to give you a call back, can they leave a voicemail: [] Yes [x] No    Ellis Dye   07/16/24 13:53 EDT           "

## 2024-07-17 RX ORDER — ZOLPIDEM TARTRATE 10 MG/1
10 TABLET ORAL NIGHTLY PRN
Qty: 30 TABLET | Refills: 1 | Status: SHIPPED | OUTPATIENT
Start: 2024-07-17

## 2024-07-17 RX ORDER — ALPRAZOLAM 0.25 MG/1
0.25 TABLET ORAL 2 TIMES DAILY PRN
Qty: 60 TABLET | Refills: 0 | Status: SHIPPED | OUTPATIENT
Start: 2024-07-17

## 2024-07-24 ENCOUNTER — INFUSION (OUTPATIENT)
Dept: ONCOLOGY | Facility: HOSPITAL | Age: 69
End: 2024-07-24
Payer: MEDICARE

## 2024-07-24 ENCOUNTER — LAB (OUTPATIENT)
Dept: LAB | Facility: HOSPITAL | Age: 69
End: 2024-07-24
Payer: MEDICARE

## 2024-07-24 VITALS
HEART RATE: 80 BPM | DIASTOLIC BLOOD PRESSURE: 80 MMHG | BODY MASS INDEX: 25.82 KG/M2 | WEIGHT: 132.2 LBS | TEMPERATURE: 98.2 F | OXYGEN SATURATION: 97 % | SYSTOLIC BLOOD PRESSURE: 130 MMHG | RESPIRATION RATE: 16 BRPM

## 2024-07-24 DIAGNOSIS — E85.81 LIGHT CHAIN (AL) AMYLOIDOSIS: Primary | ICD-10-CM

## 2024-07-24 DIAGNOSIS — E85.81 LIGHT CHAIN (AL) AMYLOIDOSIS: ICD-10-CM

## 2024-07-24 LAB
BASOPHILS # BLD AUTO: 0.03 10*3/MM3 (ref 0–0.2)
BASOPHILS NFR BLD AUTO: 0.5 % (ref 0–1.5)
DEPRECATED RDW RBC AUTO: 45.6 FL (ref 37–54)
EOSINOPHIL # BLD AUTO: 0.34 10*3/MM3 (ref 0–0.4)
EOSINOPHIL NFR BLD AUTO: 5.4 % (ref 0.3–6.2)
ERYTHROCYTE [DISTWIDTH] IN BLOOD BY AUTOMATED COUNT: 14.2 % (ref 12.3–15.4)
HCT VFR BLD AUTO: 43.6 % (ref 34–46.6)
HGB BLD-MCNC: 14.6 G/DL (ref 12–15.9)
IMM GRANULOCYTES # BLD AUTO: 0.02 10*3/MM3 (ref 0–0.05)
IMM GRANULOCYTES NFR BLD AUTO: 0.3 % (ref 0–0.5)
LYMPHOCYTES # BLD AUTO: 2.65 10*3/MM3 (ref 0.7–3.1)
LYMPHOCYTES NFR BLD AUTO: 41.8 % (ref 19.6–45.3)
MCH RBC QN AUTO: 29.2 PG (ref 26.6–33)
MCHC RBC AUTO-ENTMCNC: 33.5 G/DL (ref 31.5–35.7)
MCV RBC AUTO: 87.2 FL (ref 79–97)
MONOCYTES # BLD AUTO: 0.41 10*3/MM3 (ref 0.1–0.9)
MONOCYTES NFR BLD AUTO: 6.5 % (ref 5–12)
NEUTROPHILS NFR BLD AUTO: 2.89 10*3/MM3 (ref 1.7–7)
NEUTROPHILS NFR BLD AUTO: 45.5 % (ref 42.7–76)
NRBC BLD AUTO-RTO: 0 /100 WBC (ref 0–0.2)
PLATELET # BLD AUTO: 193 10*3/MM3 (ref 140–450)
PMV BLD AUTO: 10.3 FL (ref 6–12)
RBC # BLD AUTO: 5 10*6/MM3 (ref 3.77–5.28)
WBC NRBC COR # BLD AUTO: 6.34 10*3/MM3 (ref 3.4–10.8)

## 2024-07-24 PROCEDURE — 63710000001 DEXAMETHASONE PER 0.25 MG: Performed by: INTERNAL MEDICINE

## 2024-07-24 PROCEDURE — 25010000002 DARATUMUMAB-HYALURONIDASE-FIHJ 1800-30000 MG-UT/15ML SOLUTION: Performed by: INTERNAL MEDICINE

## 2024-07-24 PROCEDURE — 85025 COMPLETE CBC W/AUTO DIFF WBC: CPT

## 2024-07-24 PROCEDURE — 96401 CHEMO ANTI-NEOPL SQ/IM: CPT

## 2024-07-24 RX ORDER — ACETAMINOPHEN 500 MG
1000 TABLET ORAL ONCE
Status: COMPLETED | OUTPATIENT
Start: 2024-07-24 | End: 2024-07-24

## 2024-07-24 RX ORDER — DEXAMETHASONE 4 MG/1
20 TABLET ORAL ONCE
Status: COMPLETED | OUTPATIENT
Start: 2024-07-24 | End: 2024-07-24

## 2024-07-24 RX ORDER — HYDROXYZINE PAMOATE 25 MG/1
25 CAPSULE ORAL ONCE
Status: COMPLETED | OUTPATIENT
Start: 2024-07-24 | End: 2024-07-24

## 2024-07-24 RX ADMIN — HYDROXYZINE PAMOATE 25 MG: 25 CAPSULE ORAL at 13:00

## 2024-07-24 RX ADMIN — ACETAMINOPHEN 1000 MG: 500 TABLET ORAL at 13:00

## 2024-07-24 RX ADMIN — DEXAMETHASONE 20 MG: 4 TABLET ORAL at 13:00

## 2024-07-24 RX ADMIN — DARATUMUMAB AND HYALURONIDASE-FIHJ (HUMAN RECOMBINANT) 1800 MG: 1800; 30000 INJECTION SUBCUTANEOUS at 13:32

## 2024-08-05 ENCOUNTER — TELEPHONE (OUTPATIENT)
Dept: FAMILY MEDICINE CLINIC | Facility: CLINIC | Age: 69
End: 2024-08-05
Payer: MEDICARE

## 2024-08-05 DIAGNOSIS — Z51.81 ENCOUNTER FOR THERAPEUTIC DRUG MONITORING: ICD-10-CM

## 2024-08-05 DIAGNOSIS — E78.1 HYPERTRIGLYCERIDEMIA: Primary | ICD-10-CM

## 2024-08-05 DIAGNOSIS — Z00.00 ROUTINE GENERAL MEDICAL EXAMINATION AT A HEALTH CARE FACILITY: ICD-10-CM

## 2024-08-05 NOTE — TELEPHONE ENCOUNTER
"    Caller: Cynthia Her \"YOLETTE\"    Relationship: Self    Best call back number: [phone]     What orders are you requesting (i.e. lab or imaging): LAB WORK    In what timeframe would the patient need to come in: BEFORE HER 10/3/24    Where will you receive your lab/imaging services: [facility]     Additional notes: PLEASE CALL PATIENT WHEN THESE ARE IN SO SHE WILL KNOW WHEN TO GO GET THESE DONE.        "

## 2024-08-10 DIAGNOSIS — R19.7 DIARRHEA, UNSPECIFIED TYPE: ICD-10-CM

## 2024-08-12 ENCOUNTER — TELEPHONE (OUTPATIENT)
Dept: ONCOLOGY | Facility: CLINIC | Age: 69
End: 2024-08-12
Payer: MEDICARE

## 2024-08-12 RX ORDER — DIPHENOXYLATE HYDROCHLORIDE AND ATROPINE SULFATE 2.5; .025 MG/1; MG/1
1 TABLET ORAL 4 TIMES DAILY PRN
Qty: 120 TABLET | Refills: 0 | Status: SHIPPED | OUTPATIENT
Start: 2024-08-12

## 2024-08-12 RX ORDER — FLUOXETINE HYDROCHLORIDE 20 MG/1
CAPSULE ORAL
Qty: 360 CAPSULE | Refills: 0 | Status: SHIPPED | OUTPATIENT
Start: 2024-08-12

## 2024-08-12 NOTE — TELEPHONE ENCOUNTER
Provider: Doron  Caller: patient  Relationship to Patient: self  Call Back Phone Number: [phone]  Reason for Call: patient is still having chest discomfort and also needs something for diarrhea sent to the pharmacy

## 2024-08-12 NOTE — TELEPHONE ENCOUNTER
Called back patient, let her know per NP Adame that she should try OTC cold/flu medication to see if that would help and if thinks she needs an antibiotic she should go to urgent care to get checked out. Pt V/U

## 2024-08-12 NOTE — TELEPHONE ENCOUNTER
Called patient, chest discomfort started 3 days, chest feels tight, says breathing and cough really hurts. Patient states she has taken flexaril 1 2 days ago, 1 yesterday and non today. Patient also states she is having a horrible headache. States blood pressures 100/70 and 97/68.

## 2024-08-19 ENCOUNTER — LAB (OUTPATIENT)
Dept: LAB | Facility: HOSPITAL | Age: 69
End: 2024-08-19
Payer: MEDICARE

## 2024-08-19 DIAGNOSIS — E85.81 LIGHT CHAIN (AL) AMYLOIDOSIS: ICD-10-CM

## 2024-08-19 LAB
BASOPHILS # BLD AUTO: 0.02 10*3/MM3 (ref 0–0.2)
BASOPHILS NFR BLD AUTO: 0.4 % (ref 0–1.5)
DEPRECATED RDW RBC AUTO: 44.1 FL (ref 37–54)
EOSINOPHIL # BLD AUTO: 0.2 10*3/MM3 (ref 0–0.4)
EOSINOPHIL NFR BLD AUTO: 3.8 % (ref 0.3–6.2)
ERYTHROCYTE [DISTWIDTH] IN BLOOD BY AUTOMATED COUNT: 13.8 % (ref 12.3–15.4)
HCT VFR BLD AUTO: 44 % (ref 34–46.6)
HGB BLD-MCNC: 14.6 G/DL (ref 12–15.9)
IMM GRANULOCYTES # BLD AUTO: 0.01 10*3/MM3 (ref 0–0.05)
IMM GRANULOCYTES NFR BLD AUTO: 0.2 % (ref 0–0.5)
LYMPHOCYTES # BLD AUTO: 2.12 10*3/MM3 (ref 0.7–3.1)
LYMPHOCYTES NFR BLD AUTO: 40.2 % (ref 19.6–45.3)
MCH RBC QN AUTO: 29.4 PG (ref 26.6–33)
MCHC RBC AUTO-ENTMCNC: 33.2 G/DL (ref 31.5–35.7)
MCV RBC AUTO: 88.7 FL (ref 79–97)
MONOCYTES # BLD AUTO: 0.42 10*3/MM3 (ref 0.1–0.9)
MONOCYTES NFR BLD AUTO: 8 % (ref 5–12)
NEUTROPHILS NFR BLD AUTO: 2.51 10*3/MM3 (ref 1.7–7)
NEUTROPHILS NFR BLD AUTO: 47.4 % (ref 42.7–76)
NRBC BLD AUTO-RTO: 0 /100 WBC (ref 0–0.2)
PLATELET # BLD AUTO: 203 10*3/MM3 (ref 140–450)
PMV BLD AUTO: 10.9 FL (ref 6–12)
RBC # BLD AUTO: 4.96 10*6/MM3 (ref 3.77–5.28)
WBC NRBC COR # BLD AUTO: 5.28 10*3/MM3 (ref 3.4–10.8)

## 2024-08-19 PROCEDURE — 85025 COMPLETE CBC W/AUTO DIFF WBC: CPT

## 2024-08-19 PROCEDURE — 82784 ASSAY IGA/IGD/IGG/IGM EACH: CPT

## 2024-08-19 PROCEDURE — 84165 PROTEIN E-PHORESIS SERUM: CPT

## 2024-08-19 PROCEDURE — 36415 COLL VENOUS BLD VENIPUNCTURE: CPT

## 2024-08-19 PROCEDURE — 84155 ASSAY OF PROTEIN SERUM: CPT

## 2024-08-19 PROCEDURE — 83521 IG LIGHT CHAINS FREE EACH: CPT

## 2024-08-19 PROCEDURE — 86334 IMMUNOFIX E-PHORESIS SERUM: CPT

## 2024-08-20 DIAGNOSIS — J30.9 ALLERGIC RHINITIS, UNSPECIFIED SEASONALITY, UNSPECIFIED TRIGGER: ICD-10-CM

## 2024-08-20 LAB
ALBUMIN SERPL ELPH-MCNC: 4.1 G/DL (ref 2.9–4.4)
ALBUMIN/GLOB SERPL: 1.9 {RATIO} (ref 0.7–1.7)
ALPHA1 GLOB SERPL ELPH-MCNC: 0.2 G/DL (ref 0–0.4)
ALPHA2 GLOB SERPL ELPH-MCNC: 0.9 G/DL (ref 0.4–1)
B-GLOBULIN SERPL ELPH-MCNC: 0.9 G/DL (ref 0.7–1.3)
GAMMA GLOB SERPL ELPH-MCNC: 0.2 G/DL (ref 0.4–1.8)
GLOBULIN SER-MCNC: 2.2 G/DL (ref 2.2–3.9)
IGA SERPL-MCNC: 5 MG/DL (ref 87–352)
IGG SERPL-MCNC: 279 MG/DL (ref 586–1602)
IGM SERPL-MCNC: 28 MG/DL (ref 26–217)
INTERPRETATION SERPL IEP-IMP: ABNORMAL
KAPPA LC FREE SER-MCNC: 3.1 MG/L (ref 3.3–19.4)
KAPPA LC FREE/LAMBDA FREE SER: 0.82 {RATIO} (ref 0.26–1.65)
LABORATORY COMMENT REPORT: ABNORMAL
LAMBDA LC FREE SERPL-MCNC: 3.8 MG/L (ref 5.7–26.3)
M PROTEIN SERPL ELPH-MCNC: 0.1 G/DL
PROT SERPL-MCNC: 6.3 G/DL (ref 6–8.5)

## 2024-08-20 RX ORDER — ACYCLOVIR 400 MG/1
400 TABLET ORAL 2 TIMES DAILY
Qty: 60 TABLET | Refills: 1 | Status: SHIPPED | OUTPATIENT
Start: 2024-08-20

## 2024-08-20 RX ORDER — LORATADINE 10 MG/1
10 TABLET ORAL DAILY
Qty: 30 TABLET | Refills: 5 | Status: SHIPPED | OUTPATIENT
Start: 2024-08-20

## 2024-08-20 NOTE — TELEPHONE ENCOUNTER
"Caller: Cynthia Her \"YOLETTE\"    Relationship: Self    Best call back number: [phone]    Requested Prescriptions:   Requested Prescriptions     Pending Prescriptions Disp Refills    loratadine (CLARITIN) 10 MG tablet 30 tablet 5     Sig: Take 1 tablet by mouth Daily.        Pharmacy where request should be sent: [facility] #[id number] - [place], [place] - [address] [facility] AT [facility] OF [facility] [address] [phone] [facility] [phone] FX     Last office visit with prescribing clinician: 6/10/2024   Last telemedicine visit with prescribing clinician: Visit date not found   Next office visit with prescribing clinician: 9/4/2024     Additional details provided by patient: 1 TABLET LEFT    Does the patient have less than a 3 day supply:  [x] Yes  [] No    Would you like a call back once the refill request has been completed: [] Yes [x] No    If the office needs to give you a call back, can they leave a voicemail: [] Yes [x] No    Ellis Howe Rep   08/20/24 11:33 EDT       "

## 2024-08-21 ENCOUNTER — LAB (OUTPATIENT)
Dept: LAB | Facility: HOSPITAL | Age: 69
End: 2024-08-21
Payer: MEDICARE

## 2024-08-21 ENCOUNTER — INFUSION (OUTPATIENT)
Dept: ONCOLOGY | Facility: HOSPITAL | Age: 69
End: 2024-08-21
Payer: MEDICARE

## 2024-08-21 VITALS
BODY MASS INDEX: 25.13 KG/M2 | WEIGHT: 133 LBS | DIASTOLIC BLOOD PRESSURE: 73 MMHG | RESPIRATION RATE: 16 BRPM | OXYGEN SATURATION: 96 % | HEART RATE: 84 BPM | TEMPERATURE: 98.4 F | SYSTOLIC BLOOD PRESSURE: 127 MMHG

## 2024-08-21 DIAGNOSIS — E85.81 LIGHT CHAIN (AL) AMYLOIDOSIS: Primary | ICD-10-CM

## 2024-08-21 DIAGNOSIS — Z79.899 HIGH RISK MEDICATION USE: ICD-10-CM

## 2024-08-21 DIAGNOSIS — E85.81 LIGHT CHAIN (AL) AMYLOIDOSIS: ICD-10-CM

## 2024-08-21 LAB
ALBUMIN SERPL-MCNC: 4.4 G/DL (ref 3.5–5.2)
ALBUMIN/GLOB SERPL: 2.8 G/DL
ALP SERPL-CCNC: 83 U/L (ref 39–117)
ALT SERPL W P-5'-P-CCNC: 46 U/L (ref 1–33)
ANION GAP SERPL CALCULATED.3IONS-SCNC: 10.4 MMOL/L (ref 5–15)
AST SERPL-CCNC: 37 U/L (ref 1–32)
BACTERIA UR QL AUTO: NEGATIVE /HPF
BASOPHILS # BLD AUTO: 0.02 10*3/MM3 (ref 0–0.2)
BASOPHILS NFR BLD AUTO: 0.3 % (ref 0–1.5)
BILIRUB SERPL-MCNC: 0.6 MG/DL (ref 0–1.2)
BILIRUB UR QL STRIP: NEGATIVE
BUN SERPL-MCNC: 18 MG/DL (ref 8–23)
BUN/CREAT SERPL: 21.4 (ref 7–25)
CALCIUM SPEC-SCNC: 9.4 MG/DL (ref 8.6–10.5)
CHLORIDE SERPL-SCNC: 102 MMOL/L (ref 98–107)
CLARITY UR: CLEAR
CO2 SERPL-SCNC: 27.6 MMOL/L (ref 22–29)
COLOR UR: YELLOW
CREAT SERPL-MCNC: 0.84 MG/DL (ref 0.57–1)
DEPRECATED RDW RBC AUTO: 45.7 FL (ref 37–54)
EGFRCR SERPLBLD CKD-EPI 2021: 75.3 ML/MIN/1.73
EOSINOPHIL # BLD AUTO: 0.25 10*3/MM3 (ref 0–0.4)
EOSINOPHIL NFR BLD AUTO: 3.6 % (ref 0.3–6.2)
ERYTHROCYTE [DISTWIDTH] IN BLOOD BY AUTOMATED COUNT: 13.5 % (ref 12.3–15.4)
GLOBULIN UR ELPH-MCNC: 1.6 GM/DL
GLUCOSE SERPL-MCNC: 94 MG/DL (ref 65–99)
GLUCOSE UR STRIP-MCNC: ABNORMAL MG/DL
HCT VFR BLD AUTO: 44.2 % (ref 34–46.6)
HGB BLD-MCNC: 14.5 G/DL (ref 12–15.9)
HGB UR QL STRIP.AUTO: NEGATIVE
HYALINE CASTS UR QL AUTO: NORMAL /LPF
IMM GRANULOCYTES # BLD AUTO: 0.02 10*3/MM3 (ref 0–0.05)
IMM GRANULOCYTES NFR BLD AUTO: 0.3 % (ref 0–0.5)
KETONES UR QL STRIP: NEGATIVE
LEUKOCYTE ESTERASE UR QL STRIP.AUTO: ABNORMAL
LYMPHOCYTES # BLD AUTO: 2.19 10*3/MM3 (ref 0.7–3.1)
LYMPHOCYTES NFR BLD AUTO: 31.7 % (ref 19.6–45.3)
MCH RBC QN AUTO: 30 PG (ref 26.6–33)
MCHC RBC AUTO-ENTMCNC: 32.8 G/DL (ref 31.5–35.7)
MCV RBC AUTO: 91.3 FL (ref 79–97)
MONOCYTES # BLD AUTO: 0.48 10*3/MM3 (ref 0.1–0.9)
MONOCYTES NFR BLD AUTO: 6.9 % (ref 5–12)
NEUTROPHILS NFR BLD AUTO: 3.95 10*3/MM3 (ref 1.7–7)
NEUTROPHILS NFR BLD AUTO: 57.2 % (ref 42.7–76)
NITRITE UR QL STRIP: NEGATIVE
NRBC BLD AUTO-RTO: 0 /100 WBC (ref 0–0.2)
PH UR STRIP.AUTO: 7.5 [PH] (ref 4.5–8)
PLATELET # BLD AUTO: 198 10*3/MM3 (ref 140–450)
PMV BLD AUTO: 10.1 FL (ref 6–12)
POTASSIUM SERPL-SCNC: 4.4 MMOL/L (ref 3.5–5.2)
PROT SERPL-MCNC: 6 G/DL (ref 6–8.5)
PROT UR QL STRIP: NEGATIVE
RBC # BLD AUTO: 4.84 10*6/MM3 (ref 3.77–5.28)
RBC # UR STRIP: NORMAL /HPF
REF LAB TEST METHOD: NORMAL
SODIUM SERPL-SCNC: 140 MMOL/L (ref 136–145)
SP GR UR STRIP: 1.01 (ref 1–1.03)
SQUAMOUS #/AREA URNS HPF: NORMAL /HPF
UROBILINOGEN UR QL STRIP: ABNORMAL
WBC # UR STRIP: NORMAL /HPF
WBC NRBC COR # BLD AUTO: 6.91 10*3/MM3 (ref 3.4–10.8)

## 2024-08-21 PROCEDURE — 36415 COLL VENOUS BLD VENIPUNCTURE: CPT

## 2024-08-21 PROCEDURE — 96401 CHEMO ANTI-NEOPL SQ/IM: CPT

## 2024-08-21 PROCEDURE — 85025 COMPLETE CBC W/AUTO DIFF WBC: CPT

## 2024-08-21 PROCEDURE — 63710000001 DEXAMETHASONE PER 0.25 MG: Performed by: INTERNAL MEDICINE

## 2024-08-21 PROCEDURE — 81001 URINALYSIS AUTO W/SCOPE: CPT

## 2024-08-21 PROCEDURE — 25010000002 DARATUMUMAB-HYALURONIDASE-FIHJ 1800-30000 MG-UT/15ML SOLUTION: Performed by: INTERNAL MEDICINE

## 2024-08-21 PROCEDURE — 80053 COMPREHEN METABOLIC PANEL: CPT

## 2024-08-21 RX ORDER — DEXAMETHASONE 4 MG/1
20 TABLET ORAL ONCE
Status: COMPLETED | OUTPATIENT
Start: 2024-08-21 | End: 2024-08-21

## 2024-08-21 RX ORDER — ACETAMINOPHEN 500 MG
1000 TABLET ORAL ONCE
Status: COMPLETED | OUTPATIENT
Start: 2024-08-21 | End: 2024-08-21

## 2024-08-21 RX ORDER — HYDROXYZINE PAMOATE 25 MG/1
25 CAPSULE ORAL ONCE
Status: COMPLETED | OUTPATIENT
Start: 2024-08-21 | End: 2024-08-21

## 2024-08-21 RX ADMIN — ACETAMINOPHEN 1000 MG: 500 TABLET ORAL at 12:57

## 2024-08-21 RX ADMIN — HYDROXYZINE PAMOATE 25 MG: 25 CAPSULE ORAL at 12:57

## 2024-08-21 RX ADMIN — DEXAMETHASONE 20 MG: 4 TABLET ORAL at 12:57

## 2024-08-21 RX ADMIN — DARATUMUMAB AND HYALURONIDASE-FIHJ (HUMAN RECOMBINANT) 1800 MG: 1800; 30000 INJECTION SUBCUTANEOUS at 13:35

## 2024-09-12 ENCOUNTER — TELEMEDICINE (OUTPATIENT)
Dept: FAMILY MEDICINE CLINIC | Facility: CLINIC | Age: 69
End: 2024-09-12
Payer: MEDICARE

## 2024-09-12 ENCOUNTER — TELEPHONE (OUTPATIENT)
Dept: ONCOLOGY | Facility: CLINIC | Age: 69
End: 2024-09-12
Payer: MEDICARE

## 2024-09-12 VITALS — WEIGHT: 133 LBS | HEIGHT: 61 IN | BODY MASS INDEX: 25.11 KG/M2

## 2024-09-12 DIAGNOSIS — E78.00 HIGH CHOLESTEROL: Primary | ICD-10-CM

## 2024-09-12 DIAGNOSIS — R74.01 ELEVATED TRANSAMINASE LEVEL: ICD-10-CM

## 2024-09-12 DIAGNOSIS — E85.81 LIGHT CHAIN (AL) AMYLOIDOSIS: Primary | ICD-10-CM

## 2024-09-12 DIAGNOSIS — M85.80 OSTEOPENIA, UNSPECIFIED LOCATION: ICD-10-CM

## 2024-09-12 PROCEDURE — 3044F HG A1C LEVEL LT 7.0%: CPT | Performed by: NURSE PRACTITIONER

## 2024-09-12 PROCEDURE — 1126F AMNT PAIN NOTED NONE PRSNT: CPT | Performed by: NURSE PRACTITIONER

## 2024-09-12 PROCEDURE — 99214 OFFICE O/P EST MOD 30 MIN: CPT | Performed by: NURSE PRACTITIONER

## 2024-09-12 NOTE — PATIENT INSTRUCTIONS
Hyperlipidemia: ordering lipid panel for re-eval., no lipid panel results noted on computer. Patient to continue rosuvastatin 40 mg 1 tab daily, recommend continue to eat a heart healthy low fat diet and continue to drink plenty of water throughout the day.    Elevated liver enzymes: will rechck cmp as well.     Osteopenia: Recommend otc Vitamin D/Calcium 1200 mg a day, adequate daily protein intake, daily weight bearing activities/ecercises, strengthening exercises/activities, Recheck Dexa scan in 2-3 years.

## 2024-09-12 NOTE — TELEPHONE ENCOUNTER
Called patient to relay message we will cancel her appt on 9/18. Pt v/u. Message sent to scheduling. Nakita Sinclair RN

## 2024-09-12 NOTE — PROGRESS NOTES
Jessica Her is a 69 y.o.. female.     Mode of Visit: Video  Location of patient: home  Location of provider: home  You have chosen to receive care through a telehealth visit.  Does the patient consent to use a video/audio connection their medical care today? yes  The visit included audio and video interaction. No technical issues occurred during this visit.      Patient's visit today for follow up on hyperlipidemia. Patient stating she had labs done for lipid panel about one month ago at [facility].  Patient stating her rosuvastatin was increased to 40 mg daily and labs were to evaluate if doing better on higher dose. Patient stating she is eating healthy and drinking about 4-5 bottles of water a day. Patient stating she tries to do weight bearing activities as able throughout the day.  Patient also stating she needs to go over dexa scan results today.    Patient stating she was seen by her [facility] providers yesterday and cardiologist is planning an MRI chest in 6 months; and Dr. Buenrostro hem/onc is stopping chemo/stopping darcilex injections and moving to every 3 months lab evaluation for her amyloidosis.        The following portions of the patient's history were reviewed and updated as appropriate: allergies, current medications, past family history, past medical history, past social history, past surgical history and problem list.    Past Medical History:   Diagnosis Date    AL amyloidosis     Anxiety     Arthritis     COVID-19 07/2020 9/7/2021    Depression     Diverticulitis of colon     Diverticulosis     GERD (gastroesophageal reflux disease)     History of Clostridioides difficile infection 2008    HAS HAD SEVERAL TIMES IN PAST PER PT (SAW INFECTIOUS DISEASE MD FOR THIS S/P COLOSTOMY/EXTENSIVE ANBX THERAPY)    History of prediabetes     History of snoring     History of stress incontinence     Hypercholesterolemia     Hyperlipidemia     Hypertension     Left ventricular  hypertrophy     FOLLOWED BY DR MARI. DENIES CHEST PAIN BUT STATES DOES GET SOA AT TIMES WITH EXERTION REPORTS THAT IT IS NOT A NEW ISSUE     Liver disease     Oral herpes 08/18/2020    Seasonal allergies     used to have allergy shots in the past    SOB (shortness of breath)     STATES WITH EXERTION HAS BEEN ONGOING ISSUE NOT A NEW ISSUE    Thyroid disease     Hypothyroid       Past Surgical History:   Procedure Laterality Date    ABDOMINAL SURGERY  2005, 2014    ex lap x 2,  diverticulitis    ABDOMINOPLASTY  2016    ADENOIDECTOMY      APPENDECTOMY      CARDIAC CATHETERIZATION N/A 04/24/2020    Procedure: Coronary angiography;  Surgeon: Roberto Briones MD;  Location: [facility] CATH INVASIVE LOCATION;  Service: Cardiovascular;  Laterality: N/A;    CARDIAC CATHETERIZATION N/A 04/24/2020    Procedure: Left heart cath;  Surgeon: Roberto Briones MD;  Location: [facility] CATH INVASIVE LOCATION;  Service: Cardiovascular;  Laterality: N/A;    CARDIAC CATHETERIZATION N/A 04/24/2020    Procedure: Left ventriculography;  Surgeon: Roberto Briones MD;  Location: [facility] CATH INVASIVE LOCATION;  Service: Cardiovascular;  Laterality: N/A;    CARDIAC SURGERY      open heart surgery,    COLONOSCOPY  approx 2018    normal per pt     COLOSTOMY  2005    had colostomy and then is was reversed    COLOSTOMY CLOSURE  2006    CORRECTION HAMMER TOE Right 2017    ECTOPIC PREGNANCY SURGERY      1979, 1980    ENDOSCOPY N/A 05/27/2020    Procedure: ESOPHAGOGASTRODUODENOSCOPY WITH BIOPSY AND BIOPSY POLYPECTOMY AND MACIEL DIALATION;  Surgeon: Preethi Beck MD;  Location: [facility] ENDOSCOPY;  Service: Gastroenterology;  Laterality: N/A;  PRE: ESOPHAGEAL DYSPHAGIA  POST: Z LINE 46, ESOPHAGEAL SPASM, GASTRITIS, FUNDIC POLYP    ENDOSCOPY N/A 06/20/2023    ESOPHAGEAL DILATATION N/A 12/07/2021    Procedure: OR ESOPHAGEAL DILATATION with maciel dilators ;  Surgeon: Jamey Leslie MD;  [facility] [facility] [place] [facility]  Service:  "ENT;  Laterality: N/A;    ESOPHAGEAL MOTILITY STUDY N/A 02/03/2022    Procedure: ESOPHAGEAL MOTILITY STUDY;  Surgeon: Endo, Nurse Performed;  Location: [facility] ENDOSCOPY;  Service: Gastroenterology;  Laterality: N/A;  dypshagia     FOOT SURGERY Right 12/2016    Second and third hammertoe corrections    KNEE ARTHROSCOPY Right 2009    KNEE SURGERY Right     REVISION / TAKEDOWN COLOSTOMY  2006    SHOULDER ARTHROSCOPY Right 2018    right shoulder arthroscopy with extensive rotator cuff, biceps and labral debridement, chondroplasty, & subacromial decompression with acromioplasty    SHOULDER SURGERY      HAS HAS COMPLETE SHOULDER ON RIGHT. LEFT SCOPED AND HAD 2ND SURGERY WITH HARDWARE PLACED    SHOULDER SURGERY Left     2012. 2013    TONSILLECTOMY      TOTAL SHOULDER REVERSE ARTHROPLASTY Right 2019    WISDOM TOOTH EXTRACTION         Review of Systems   Constitutional: Negative.    Respiratory: Negative.     Cardiovascular: Negative.    Gastrointestinal:         Has dumping syndrome       Allergies   Allergen Reactions    Azithromycin Rash and Other (See Comments)     Reaction unknown to patient (unable to remember)  Other reaction(s): Other: stomach issues, Stomach ache, Other: stomach issues, Stomach ache    Ezetimibe Myalgia, Other (See Comments) and Rash     cramps  cramps    Pravastatin Rash and Other (See Comments)       Objective     Vitals:    09/12/24 1048   Weight: 60.3 kg (133 lb)   Height: 154.9 cm (60.98\")     Body mass index is 25.14 kg/m².    Physical Exam  Constitutional:       Appearance: Normal appearance.   HENT:      Head: Normocephalic and atraumatic.   Pulmonary:      Effort: Pulmonary effort is normal. No respiratory distress.   Neurological:      General: No focal deficit present.      Mental Status: She is alert.   Psychiatric:         Mood and Affect: Mood normal.         Behavior: Behavior normal.           Current Outpatient Medications:     acyclovir (ZOVIRAX) 400 MG tablet, TAKE 1 TABLET BY " MOUTH TWICE DAILY, Disp: 60 tablet, Rfl: 1    ALPRAZolam (XANAX) 0.25 MG tablet, Take 1 tablet by mouth 2 (Two) Times a Day As Needed for Anxiety. for anxiety, Disp: 60 tablet, Rfl: 0    aspirin 81 MG chewable tablet, CHEW AND SWALLOW 1 TABLET DAILY WITH BREAKFAST, Disp: , Rfl:     Cholecalciferol (Vitamin D3) 50 MCG (2000 UT) tablet, Take 1 tablet by mouth Daily., Disp: , Rfl:     cholestyramine (QUESTRAN) 4 g packet, Take 1 packet by mouth 3 (Three) Times a Day With Meals. 1 packet  TID PRN diarrhea, Disp: 60 packet, Rfl: 4    cyclobenzaprine (FLEXERIL) 10 MG tablet, Take 1 tablet by mouth 3 (Three) Times a Day As Needed for Muscle Spasms., Disp: 30 tablet, Rfl: 0    diphenoxylate-atropine (LOMOTIL) 2.5-0.025 MG per tablet, TAKE 1 TABLET BY MOUTH FOUR TIMES DAILY AS NEEDED FOR DIARRHEA, Disp: 120 tablet, Rfl: 0    empagliflozin (JARDIANCE) 10 MG tablet tablet, Take 1 tablet by mouth Daily., Disp: , Rfl:     FLUoxetine (PROzac) 20 MG capsule, TAKE 1 CAPSULE BY MOUTH FOUR TIMES DAILY, Disp: 360 capsule, Rfl: 0    levothyroxine (SYNTHROID, LEVOTHROID) 75 MCG tablet, TAKE 1 TABLET BY MOUTH EVERY DAY, Disp: 90 tablet, Rfl: 3    loratadine (CLARITIN) 10 MG tablet, Take 1 tablet by mouth Daily., Disp: 30 tablet, Rfl: 5    Menthol-Zinc Oxide (Calmoseptine) 0.44-20.6 % ointment, Apply 1 application  topically to the appropriate area as directed 2 (Two) Times a Day., Disp: 20 g, Rfl: 2    midodrine (PROAMATINE) 5 MG tablet, Take 1 tablet by mouth 2 (Two) Times a Day., Disp: , Rfl:     Multiple Vitamins-Minerals (V-C Forte) capsule, Take 1 capsule by mouth Daily., Disp: , Rfl:     olopatadine (PATANOL) 0.1 % ophthalmic solution, Administer 1 drop to both eyes 2 (Two) Times a Day., Disp: 5 mL, Rfl: 5    pantoprazole (PROTONIX) 20 MG EC tablet, TAKE 1 TABLET BY MOUTH EVERY DAY. DO NOT TAKE WITHIN 2 HOURS OF THYROID MEDICATION, Disp: 90 tablet, Rfl: 1    pregabalin (LYRICA) 50 MG capsule, Take 1 capsule by mouth Daily., Disp: ,  Rfl:     rosuvastatin (CRESTOR) 40 MG tablet, Take 1 tablet by mouth Daily., Disp: 90 tablet, Rfl: 0    Sodium Fluoride 5000 Plus 1.1 % cream, , Disp: , Rfl:     spironolactone (ALDACTONE) 25 MG tablet, Take 1 tablet by mouth 2 (Two) Times a Day., Disp: , Rfl:     Xifaxan 550 MG tablet, Take 1 tablet by mouth., Disp: , Rfl:     zolpidem (AMBIEN) 10 MG tablet, Take 1 tablet by mouth At Night As Needed for Sleep., Disp: 30 tablet, Rfl: 1    Recent Results (from the past 672 hour(s))   JUSTIN,PE and FLC, Serum    Collection Time: 08/19/24 10:58 AM    Specimen: Arm, Right; Blood   Result Value Ref Range    IgG 279 (L) 586 - 1602 mg/dL    IgA 5 (L) 87 - 352 mg/dL    IgM 28 26 - 217 mg/dL    Total Protein 6.3 6.0 - 8.5 g/dL    Albumin 4.1 2.9 - 4.4 g/dL    Alpha-1-Globulin 0.2 0.0 - 0.4 g/dL    Alpha-2-Globulin 0.9 0.4 - 1.0 g/dL    Beta Globulin 0.9 0.7 - 1.3 g/dL    Gamma Globulin 0.2 (L) 0.4 - 1.8 g/dL    M-Anthony 0.1 (H) Not Observed g/dL    Globulin 2.2 2.2 - 3.9 g/dL    A/G Ratio 1.9 (H) 0.7 - 1.7    Immunofixation Reflex, Serum Comment (A)     Please note Comment     Free Light Chain, Kappa 3.1 (L) 3.3 - 19.4 mg/L    Free Lambda Light Chains 3.8 (L) 5.7 - 26.3 mg/L    Kappa/Lambda Ratio 0.82 0.26 - 1.65   CBC Auto Differential    Collection Time: 08/19/24 10:58 AM    Specimen: Arm, Right; Blood   Result Value Ref Range    WBC 5.28 3.40 - 10.80 10*3/mm3    RBC 4.96 3.77 - 5.28 10*6/mm3    Hemoglobin 14.6 12.0 - 15.9 g/dL    Hematocrit 44.0 34.0 - 46.6 %    MCV 88.7 79.0 - 97.0 fL    MCH 29.4 26.6 - 33.0 pg    MCHC 33.2 31.5 - 35.7 g/dL    RDW 13.8 12.3 - 15.4 %    RDW-SD 44.1 37.0 - 54.0 fl    MPV 10.9 6.0 - 12.0 fL    Platelets 203 140 - 450 10*3/mm3    Neutrophil % 47.4 42.7 - 76.0 %    Lymphocyte % 40.2 19.6 - 45.3 %    Monocyte % 8.0 5.0 - 12.0 %    Eosinophil % 3.8 0.3 - 6.2 %    Basophil % 0.4 0.0 - 1.5 %    Immature Grans % 0.2 0.0 - 0.5 %    Neutrophils, Absolute 2.51 1.70 - 7.00 10*3/mm3    Lymphocytes,  Absolute 2.12 0.70 - 3.10 10*3/mm3    Monocytes, Absolute 0.42 0.10 - 0.90 10*3/mm3    Eosinophils, Absolute 0.20 0.00 - 0.40 10*3/mm3    Basophils, Absolute 0.02 0.00 - 0.20 10*3/mm3    Immature Grans, Absolute 0.01 0.00 - 0.05 10*3/mm3    nRBC 0.0 0.0 - 0.2 /100 WBC   Comprehensive Metabolic Panel    Collection Time: 08/21/24 12:27 PM    Specimen: Blood   Result Value Ref Range    Glucose 94 65 - 99 mg/dL    BUN 18 8 - 23 mg/dL    Creatinine 0.84 0.57 - 1.00 mg/dL    Sodium 140 136 - 145 mmol/L    Potassium 4.4 3.5 - 5.2 mmol/L    Chloride 102 98 - 107 mmol/L    CO2 27.6 22.0 - 29.0 mmol/L    Calcium 9.4 8.6 - 10.5 mg/dL    Total Protein 6.0 6.0 - 8.5 g/dL    Albumin 4.4 3.5 - 5.2 g/dL    ALT (SGPT) 46 (H) 1 - 33 U/L    AST (SGOT) 37 (H) 1 - 32 U/L    Alkaline Phosphatase 83 39 - 117 U/L    Total Bilirubin 0.6 0.0 - 1.2 mg/dL    Globulin 1.6 gm/dL    A/G Ratio 2.8 g/dL    BUN/Creatinine Ratio 21.4 7.0 - 25.0    Anion Gap 10.4 5.0 - 15.0 mmol/L    eGFR 75.3 >60.0 mL/min/1.73   CBC Auto Differential    Collection Time: 08/21/24 12:27 PM    Specimen: Blood   Result Value Ref Range    WBC 6.91 3.40 - 10.80 10*3/mm3    RBC 4.84 3.77 - 5.28 10*6/mm3    Hemoglobin 14.5 12.0 - 15.9 g/dL    Hematocrit 44.2 34.0 - 46.6 %    MCV 91.3 79.0 - 97.0 fL    MCH 30.0 26.6 - 33.0 pg    MCHC 32.8 31.5 - 35.7 g/dL    RDW 13.5 12.3 - 15.4 %    RDW-SD 45.7 37.0 - 54.0 fl    MPV 10.1 6.0 - 12.0 fL    Platelets 198 140 - 450 10*3/mm3    Neutrophil % 57.2 42.7 - 76.0 %    Lymphocyte % 31.7 19.6 - 45.3 %    Monocyte % 6.9 5.0 - 12.0 %    Eosinophil % 3.6 0.3 - 6.2 %    Basophil % 0.3 0.0 - 1.5 %    Immature Grans % 0.3 0.0 - 0.5 %    Neutrophils, Absolute 3.95 1.70 - 7.00 10*3/mm3    Lymphocytes, Absolute 2.19 0.70 - 3.10 10*3/mm3    Monocytes, Absolute 0.48 0.10 - 0.90 10*3/mm3    Eosinophils, Absolute 0.25 0.00 - 0.40 10*3/mm3    Basophils, Absolute 0.02 0.00 - 0.20 10*3/mm3    Immature Grans, Absolute 0.02 0.00 - 0.05 10*3/mm3    nRBC  0.0 0.0 - 0.2 /100 WBC   Urinalysis without microscopic (no culture) - Urine, Clean Catch    Collection Time: 08/21/24 12:43 PM    Specimen: Urine, Clean Catch   Result Value Ref Range    Color, UA Yellow Yellow, Straw    Appearance, UA Clear Clear    pH, UA 7.5 4.5 - 8.0    Specific Gravity, UA 1.015 1.002 - 1.030    Glucose,  mg/dL (2+) (A) Negative    Ketones, UA Negative Negative    Bilirubin, UA Negative Negative    Blood, UA Negative Negative    Protein, UA Negative Negative    Leuk Esterase, UA Trace (A) Negative    Nitrite, UA Negative Negative    Urobilinogen, UA 0.2 E.U./dL 0.2 - 1.0 E.U./dL   Urinalysis, Microscopic Only - Urine, Clean Catch    Collection Time: 08/21/24 12:43 PM    Specimen: Urine, Clean Catch   Result Value Ref Range    RBC, UA None Seen None Seen, 0-2 /HPF    WBC, UA 0-2 None Seen, 0-2 /HPF    Bacteria, UA Negative Negative /HPF    Squamous Epithelial Cells, UA 0-3 0 - 3 /HPF    Hyaline Casts, UA None Seen 0 - 2 /LPF    Methodology Manual Light Microscopy    TROPONIN I REFERENCE    Collection Time: 09/11/24  4:56 PM    Specimen: Blood   Result Value Ref Range    Troponin I 0.02 <=0.03 ng/mL   BNP (IN-HOUSE)    Collection Time: 09/11/24  4:56 PM    Specimen: Blood   Result Value Ref Range    BNP 36 10 - 100 pg/mL   BASIC METABOLIC PANEL    Collection Time: 09/11/24  4:56 PM    Specimen: Blood   Result Value Ref Range    Sodium 139 136 - 145 mmol/L    Potassium 3.8 3.3 - 4.8 mmol/L    Chloride 108 (H) 98 - 107 mmol/L    Total CO2 24 23 - 31 mmol/L    Glucose 81 70 - 99 mg/dL    BUN 16 8 - 26 mg/dL    Creatinine 0.70 0.57 - 1.11 mg/dL    Calcium 10.0 8.4 - 10.5 mg/dL    Anion Gap 7 mmol/L     Dexa scan done 6/25/24: Findings:  Lumbar Spine The BMD measured in the L1-L4 region is 1.175 g/cm2 with a T-score of -0.2. This patient is considered normal according to World Health Organization (WHO) criteria. When compared to the previous examination dated 12/15/2021, the bone mineral  density of the L1-L4 spine has apparently decreased 1%.     Femoral Neck  The BMD measured at the left femoral neck is 0.781 g/cm2 with a T-score of -1.9. The BMD measured at the right femoral neck is 0.706 g/cm2 with a T-score of -2.4.  The BMD of the mean femoral neck is 0.743 g/cm2 with a T score of -2.1. This patient is considered osteopenic according to World Health Organization (WHO) criteria.  When compared to the previous examination dated 12/15/2021, the bone mineral density of the femoral neck mean has apparently decreased 9.4%.     Total Hip  The BMD of the total left hip is 0.800 g/cm2 with a T-score of -1.6. The BMD of the total right hip is 0.778 g/cm2 with a T-score of 1.8. The BMD of the mean total hip is 0.789 g/cm2 with a T-score of -1.7. This patient is considered osteopenic according to World Health Organization (WHO) criteria.     FRAX Score: The estimated 10 year risk of a major osteoporotic fracture is calculated to be 19.9% and a hip fracture of 4%.     IMPRESSION: Osteopenia - Based upon the FRAX score, this patient does meet criteria for initiation of  pharmacological treatment recommendations for prevention of osteoporosis per the National Osteoporosis Foundation (NOF) guidelines.    Diagnoses and all orders for this visit:    1. High cholesterol (Primary)  -     Lipid Panel; Future    2. Elevated transaminase level  -     Comprehensive metabolic panel; Future    3. Osteopenia, unspecified location        Patient Instructions   Hyperlipidemia: ordering lipid panel for re-eval., no lipid panel results noted on computer. Patient to continue rosuvastatin 40 mg 1 tab daily, recommend continue to eat a heart healthy low fat diet and continue to drink plenty of water throughout the day.    Elevated liver enzymes: will rechck cmp as well.     Osteopenia: Recommend otc Vitamin D/Calcium 1200 mg a day, adequate daily protein intake, daily weight bearing activities/ecercises, strengthening  exercises/activities, Recheck Dexa scan in 2-3 years.     Return for Dr. Peterson as needed/as recommended.

## 2024-09-12 NOTE — TELEPHONE ENCOUNTER
"  Caller: Cynthia Her \"YOLETTE\"    Relationship: Self    Best call back number: [phone]     What is the best time to reach you: ASAP    Who are you requesting to speak with (clinical staff, provider,  specific staff member): CLINICAL      What was the call regarding: PT SAW HER DR AT [place] YESTERDAY, HE SAID NO MORE CHEMO, BUT WANTS HER TO SEE DR VILLANUEVA EVERY 3 MOS FOR REPEATED LABS.  PLEASE CALL PT TO ADVISE IF SHE NEEDS TO KEEP CURRENT APPT WITH DR VILLANUEVA ON 9/18 OR IF THAT WILL BE CHANGED TO A DIFFERENT DATE BASED ON THIS, AND TO VERIFY THAT CHEMO APPT IS BEING CANCELED.        "

## 2024-09-12 NOTE — TELEPHONE ENCOUNTER
Called patient, said that after her conversation with Dr. Buenrostro at [place], they have decided to not continue chemotherapy. Said that Dr. Buenrostro will continue to follow up with the patient and they want the patient to get blood work drawn her every 3 months. Pt states that Dr. Dela Cruz should be contacting Dr. Sal regarding this decision. Will contact scheduling to cancel infusion appt.

## 2024-09-13 ENCOUNTER — LAB (OUTPATIENT)
Dept: LAB | Facility: HOSPITAL | Age: 69
End: 2024-09-13
Payer: MEDICARE

## 2024-09-13 DIAGNOSIS — E85.81 LIGHT CHAIN (AL) AMYLOIDOSIS: ICD-10-CM

## 2024-09-13 LAB
ALBUMIN SERPL-MCNC: 4.3 G/DL (ref 3.5–5.2)
ALBUMIN/GLOB SERPL: 2 G/DL
ALP SERPL-CCNC: 82 U/L (ref 39–117)
ALT SERPL W P-5'-P-CCNC: 41 U/L (ref 1–33)
ANION GAP SERPL CALCULATED.3IONS-SCNC: 11.5 MMOL/L (ref 5–15)
AST SERPL-CCNC: 34 U/L (ref 1–32)
BASOPHILS # BLD AUTO: 0.02 10*3/MM3 (ref 0–0.2)
BASOPHILS NFR BLD AUTO: 0.4 % (ref 0–1.5)
BILIRUB SERPL-MCNC: 0.6 MG/DL (ref 0–1.2)
BUN SERPL-MCNC: 16 MG/DL (ref 8–23)
BUN/CREAT SERPL: 21.1 (ref 7–25)
CALCIUM SPEC-SCNC: 9.7 MG/DL (ref 8.6–10.5)
CHLORIDE SERPL-SCNC: 107 MMOL/L (ref 98–107)
CO2 SERPL-SCNC: 22.5 MMOL/L (ref 22–29)
CREAT SERPL-MCNC: 0.76 MG/DL (ref 0.57–1)
DEPRECATED RDW RBC AUTO: 45.4 FL (ref 37–54)
EGFRCR SERPLBLD CKD-EPI 2021: 84.9 ML/MIN/1.73
EOSINOPHIL # BLD AUTO: 0.2 10*3/MM3 (ref 0–0.4)
EOSINOPHIL NFR BLD AUTO: 4.1 % (ref 0.3–6.2)
ERYTHROCYTE [DISTWIDTH] IN BLOOD BY AUTOMATED COUNT: 13.6 % (ref 12.3–15.4)
GLOBULIN UR ELPH-MCNC: 2.1 GM/DL
GLUCOSE SERPL-MCNC: 99 MG/DL (ref 65–99)
HCT VFR BLD AUTO: 43.2 % (ref 34–46.6)
HGB BLD-MCNC: 14.1 G/DL (ref 12–15.9)
IMM GRANULOCYTES # BLD AUTO: 0.03 10*3/MM3 (ref 0–0.05)
IMM GRANULOCYTES NFR BLD AUTO: 0.6 % (ref 0–0.5)
LYMPHOCYTES # BLD AUTO: 1.82 10*3/MM3 (ref 0.7–3.1)
LYMPHOCYTES NFR BLD AUTO: 37.1 % (ref 19.6–45.3)
MCH RBC QN AUTO: 29.4 PG (ref 26.6–33)
MCHC RBC AUTO-ENTMCNC: 32.6 G/DL (ref 31.5–35.7)
MCV RBC AUTO: 90 FL (ref 79–97)
MONOCYTES # BLD AUTO: 0.35 10*3/MM3 (ref 0.1–0.9)
MONOCYTES NFR BLD AUTO: 7.1 % (ref 5–12)
NEUTROPHILS NFR BLD AUTO: 2.49 10*3/MM3 (ref 1.7–7)
NEUTROPHILS NFR BLD AUTO: 50.7 % (ref 42.7–76)
NRBC BLD AUTO-RTO: 0 /100 WBC (ref 0–0.2)
PLATELET # BLD AUTO: 209 10*3/MM3 (ref 140–450)
PMV BLD AUTO: 10.5 FL (ref 6–12)
POTASSIUM SERPL-SCNC: 4 MMOL/L (ref 3.5–5.2)
PROT SERPL-MCNC: 6.4 G/DL (ref 6–8.5)
RBC # BLD AUTO: 4.8 10*6/MM3 (ref 3.77–5.28)
SODIUM SERPL-SCNC: 141 MMOL/L (ref 136–145)
WBC NRBC COR # BLD AUTO: 4.91 10*3/MM3 (ref 3.4–10.8)

## 2024-09-13 PROCEDURE — 81003 URINALYSIS AUTO W/O SCOPE: CPT | Performed by: INTERNAL MEDICINE

## 2024-09-13 PROCEDURE — 80307 DRUG TEST PRSMV CHEM ANLYZR: CPT | Performed by: INTERNAL MEDICINE

## 2024-09-13 PROCEDURE — 80061 LIPID PANEL: CPT | Performed by: INTERNAL MEDICINE

## 2024-09-13 PROCEDURE — 85025 COMPLETE CBC W/AUTO DIFF WBC: CPT

## 2024-09-13 PROCEDURE — 80061 LIPID PANEL: CPT | Performed by: NURSE PRACTITIONER

## 2024-09-14 LAB
AMPHETAMINES UR QL SCN: NEGATIVE NG/ML
APPEARANCE UR: CLEAR
BACTERIA #/AREA URNS HPF: NORMAL /[HPF]
BARBITURATES UR QL SCN: NEGATIVE NG/ML
BENZODIAZ UR QL SCN: NEGATIVE NG/ML
BILIRUB UR QL STRIP: NEGATIVE
BZE UR QL SCN: NEGATIVE NG/ML
CANNABINOIDS UR QL SCN: NEGATIVE NG/ML
CASTS URNS QL MICRO: NORMAL /LPF
CHOLEST SERPL-MCNC: 131 MG/DL (ref 100–199)
COLOR UR: YELLOW
CREAT UR-MCNC: 119.7 MG/DL (ref 20–300)
EPI CELLS #/AREA URNS HPF: NORMAL /HPF (ref 0–10)
GLUCOSE UR QL STRIP: ABNORMAL
HDLC SERPL-MCNC: 41 MG/DL
HGB UR QL STRIP: NEGATIVE
KETONES UR QL STRIP: NEGATIVE
LABORATORY COMMENT REPORT: NORMAL
LDLC SERPL CALC-MCNC: 58 MG/DL (ref 0–99)
LDLC/HDLC SERPL: 1.4 RATIO (ref 0–3.2)
LEUKOCYTE ESTERASE UR QL STRIP: ABNORMAL
METHADONE UR QL SCN: NEGATIVE NG/ML
MICRO URNS: ABNORMAL
NITRITE UR QL STRIP: NEGATIVE
OPIATES UR QL SCN: NEGATIVE NG/ML
OXYCODONE+OXYMORPHONE UR QL SCN: NEGATIVE NG/ML
PCP UR QL: NEGATIVE NG/ML
PH UR STRIP: 5.5 [PH] (ref 5–7.5)
PH UR: 5.4 [PH] (ref 4.5–8.9)
PROPOXYPH UR QL SCN: NEGATIVE NG/ML
PROT UR QL STRIP: NEGATIVE
RBC #/AREA URNS HPF: NORMAL /HPF (ref 0–2)
SP GR UR STRIP: 1.02 (ref 1–1.03)
TRIGL SERPL-MCNC: 196 MG/DL (ref 0–149)
UROBILINOGEN UR STRIP-MCNC: 0.2 MG/DL (ref 0.2–1)
VLDLC SERPL CALC-MCNC: 32 MG/DL (ref 5–40)
WBC #/AREA URNS HPF: NORMAL /HPF (ref 0–5)

## 2024-09-16 ENCOUNTER — TELEPHONE (OUTPATIENT)
Dept: ONCOLOGY | Facility: CLINIC | Age: 69
End: 2024-09-16
Payer: MEDICARE

## 2024-09-16 DIAGNOSIS — E78.1 HYPERTRIGLYCERIDEMIA: Primary | ICD-10-CM

## 2024-09-16 RX ORDER — ICOSAPENT ETHYL 1 G/1
2 CAPSULE ORAL DAILY
Qty: 60 CAPSULE | Refills: 2 | Status: SHIPPED | OUTPATIENT
Start: 2024-09-16

## 2024-09-20 DIAGNOSIS — F51.01 PRIMARY INSOMNIA: ICD-10-CM

## 2024-09-23 RX ORDER — ZOLPIDEM TARTRATE 10 MG/1
10 TABLET ORAL NIGHTLY PRN
Qty: 30 TABLET | Refills: 1 | Status: SHIPPED | OUTPATIENT
Start: 2024-09-23

## 2024-09-25 DIAGNOSIS — F41.9 ANXIETY: ICD-10-CM

## 2024-09-25 DIAGNOSIS — R19.7 DIARRHEA, UNSPECIFIED TYPE: ICD-10-CM

## 2024-09-25 RX ORDER — ALPRAZOLAM 0.25 MG
0.25 TABLET ORAL 2 TIMES DAILY PRN
Qty: 60 TABLET | Refills: 0 | Status: SHIPPED | OUTPATIENT
Start: 2024-09-25

## 2024-09-25 RX ORDER — DIPHENOXYLATE HCL/ATROPINE 2.5-.025MG
1 TABLET ORAL 4 TIMES DAILY PRN
Qty: 120 TABLET | Refills: 0 | Status: SHIPPED | OUTPATIENT
Start: 2024-09-25

## 2024-10-03 ENCOUNTER — OFFICE VISIT (OUTPATIENT)
Dept: FAMILY MEDICINE CLINIC | Facility: CLINIC | Age: 69
End: 2024-10-03
Payer: MEDICARE

## 2024-10-03 VITALS
RESPIRATION RATE: 16 BRPM | DIASTOLIC BLOOD PRESSURE: 72 MMHG | HEIGHT: 61 IN | SYSTOLIC BLOOD PRESSURE: 120 MMHG | OXYGEN SATURATION: 98 % | HEART RATE: 78 BPM | TEMPERATURE: 98.2 F | WEIGHT: 128 LBS | BODY MASS INDEX: 24.17 KG/M2

## 2024-10-03 DIAGNOSIS — I10 HYPERTENSION, UNSPECIFIED TYPE: ICD-10-CM

## 2024-10-03 DIAGNOSIS — F51.01 PRIMARY INSOMNIA: ICD-10-CM

## 2024-10-03 DIAGNOSIS — E03.9 HYPOTHYROIDISM, UNSPECIFIED TYPE: ICD-10-CM

## 2024-10-03 DIAGNOSIS — E78.00 HIGH CHOLESTEROL: Primary | ICD-10-CM

## 2024-10-03 DIAGNOSIS — M25.561 CHRONIC PAIN OF RIGHT KNEE: ICD-10-CM

## 2024-10-03 DIAGNOSIS — G89.29 CHRONIC PAIN OF RIGHT KNEE: ICD-10-CM

## 2024-10-03 PROCEDURE — 3074F SYST BP LT 130 MM HG: CPT | Performed by: INTERNAL MEDICINE

## 2024-10-03 PROCEDURE — 3078F DIAST BP <80 MM HG: CPT | Performed by: INTERNAL MEDICINE

## 2024-10-03 PROCEDURE — 3044F HG A1C LEVEL LT 7.0%: CPT | Performed by: INTERNAL MEDICINE

## 2024-10-03 PROCEDURE — 1126F AMNT PAIN NOTED NONE PRSNT: CPT | Performed by: INTERNAL MEDICINE

## 2024-10-03 PROCEDURE — 99214 OFFICE O/P EST MOD 30 MIN: CPT | Performed by: INTERNAL MEDICINE

## 2024-10-03 NOTE — PROGRESS NOTES
Jessica Her is a 69 y.o. female. Patient is here today for   Chief Complaint   Patient presents with    Follow-up    Results     Fup labs    Med Management    Hyperlipidemia    Anxiety        Follow-up  Conditions present:  Anxiety and Hyperlipidemia    Hyperlipidemia    Anxiety      History of Present Illness  The patient presents for evaluation of multiple medical concerns.    She reports that her cholesterol levels are within the normal range, but her triglycerides are elevated. She is currently taking rosuvastatin 20 mg daily, which was prescribed in 07/2024. However, she mentions that the prescription indicates a dosage of 0.5 mg, which she believes to be incorrect.    She has a pin in her right knee, which was inserted in 2007. Recently, she has been experiencing discomfort from this. She expresses a desire to consult with the orthopedic surgeon who previously treated her shoulder.    She has been diagnosed with amyloidosis of the heart and is scheduled for an MRI. She underwent open heart surgery and required several blood transfusions. She is currently on [employer], as per Dr. Yu's recommendation.    She also had a shoulder replacement performed by Dr. Pete Teixeira at [facility] in 2019. She visited her ophthalmologist two weeks ago and was informed that her prescription remains unchanged. She is also taking calcium and vitamin D12 100, as advised by her nurse practitioner.    She had COVID-19 twice, once in 2019 and then again in 2021.    IMMUNIZATIONS  She had all the COVID-19 vaccines.      Vitals:    10/03/24 1304   BP: 120/72   Pulse: 78   Resp: 16   Temp: 98.2 °F (36.8 °C)   SpO2: 98%     Body mass index is 24.2 kg/m².    Past Medical History:   Diagnosis Date    AL amyloidosis     Anxiety     Arthritis     COVID-19 07/2020 9/7/2021    Depression     Diverticulitis of colon     Diverticulosis     GERD (gastroesophageal reflux disease)     History of  Clostridioides difficile infection 2008    HAS HAD SEVERAL TIMES IN PAST PER PT (SAW INFECTIOUS DISEASE MD FOR THIS S/P COLOSTOMY/EXTENSIVE ANBX THERAPY)    History of prediabetes     History of snoring     History of stress incontinence     Hypercholesterolemia     Hyperlipidemia     Hypertension     Left ventricular hypertrophy     FOLLOWED BY DR MARI. DENIES CHEST PAIN BUT STATES DOES GET SOA AT TIMES WITH EXERTION REPORTS THAT IT IS NOT A NEW ISSUE     Liver disease     Oral herpes 08/18/2020    Seasonal allergies     used to have allergy shots in the past    SOB (shortness of breath)     STATES WITH EXERTION HAS BEEN ONGOING ISSUE NOT A NEW ISSUE    Thyroid disease     Hypothyroid      Allergies   Allergen Reactions    Azithromycin Rash and Other (See Comments)     Reaction unknown to patient (unable to remember)  Other reaction(s): Other: stomach issues, Stomach ache, Other: stomach issues, Stomach ache    Ezetimibe Myalgia, Other (See Comments) and Rash     cramps  cramps    Pravastatin Rash and Other (See Comments)      Social History     Socioeconomic History    Marital status:     Number of children: 2   Tobacco Use    Smoking status: Never     Passive exposure: Never    Smokeless tobacco: Never   Vaping Use    Vaping status: Never Used   Substance and Sexual Activity    Alcohol use: Not Currently     Alcohol/week: 1.0 standard drink of alcohol     Types: 1 Glasses of wine per week    Drug use: Never    Sexual activity: Defer        Current Outpatient Medications:     acyclovir (ZOVIRAX) 400 MG tablet, TAKE 1 TABLET BY MOUTH TWICE DAILY, Disp: 60 tablet, Rfl: 1    ALPRAZolam (XANAX) 0.25 MG tablet, Take 1 tablet by mouth 2 (Two) Times a Day As Needed for Anxiety. for anxiety, Disp: 60 tablet, Rfl: 0    aspirin 81 MG chewable tablet, CHEW AND SWALLOW 1 TABLET DAILY WITH BREAKFAST, Disp: , Rfl:     Cholecalciferol (Vitamin D3) 50 MCG (2000 UT) tablet, Take 1 tablet by mouth Daily., Disp: ,  Rfl:     cholestyramine (QUESTRAN) 4 g packet, Take 1 packet by mouth 3 (Three) Times a Day With Meals. 1 packet  TID PRN diarrhea, Disp: 60 packet, Rfl: 4    diphenoxylate-atropine (LOMOTIL) 2.5-0.025 MG per tablet, Take 1 tablet by mouth 4 (Four) Times a Day As Needed for Diarrhea., Disp: 120 tablet, Rfl: 0    empagliflozin (JARDIANCE) 10 MG tablet tablet, Take 1 tablet by mouth Daily., Disp: , Rfl:     FLUoxetine (PROzac) 20 MG capsule, TAKE 1 CAPSULE BY MOUTH FOUR TIMES DAILY, Disp: 360 capsule, Rfl: 0    icosapent ethyl (Vascepa) 1 g capsule capsule, Take 2 g by mouth Daily., Disp: 60 capsule, Rfl: 2    levothyroxine (SYNTHROID, LEVOTHROID) 75 MCG tablet, TAKE 1 TABLET BY MOUTH EVERY DAY, Disp: 90 tablet, Rfl: 3    loratadine (CLARITIN) 10 MG tablet, Take 1 tablet by mouth Daily., Disp: 30 tablet, Rfl: 5    Menthol-Zinc Oxide (Calmoseptine) 0.44-20.6 % ointment, Apply 1 application  topically to the appropriate area as directed 2 (Two) Times a Day., Disp: 20 g, Rfl: 2    midodrine (PROAMATINE) 5 MG tablet, Take 1 tablet by mouth 2 (Two) Times a Day., Disp: , Rfl:     Multiple Vitamins-Minerals (V-C Forte) capsule, Take 1 capsule by mouth Daily., Disp: , Rfl:     olopatadine (PATANOL) 0.1 % ophthalmic solution, Administer 1 drop to both eyes 2 (Two) Times a Day., Disp: 5 mL, Rfl: 5    pantoprazole (PROTONIX) 20 MG EC tablet, TAKE 1 TABLET BY MOUTH EVERY DAY. DO NOT TAKE WITHIN 2 HOURS OF THYROID MEDICATION, Disp: 90 tablet, Rfl: 1    pregabalin (LYRICA) 50 MG capsule, Take 1 capsule by mouth Daily., Disp: , Rfl:     Sodium Fluoride 5000 Plus 1.1 % cream, , Disp: , Rfl:     spironolactone (ALDACTONE) 25 MG tablet, Take 1 tablet by mouth 2 (Two) Times a Day., Disp: , Rfl:     Xifaxan 550 MG tablet, Take 1 tablet by mouth., Disp: , Rfl:     zolpidem (AMBIEN) 10 MG tablet, Take 1 tablet by mouth At Night As Needed for Sleep., Disp: 30 tablet, Rfl: 1    cyclobenzaprine (FLEXERIL) 10 MG tablet, Take 1 tablet by mouth  3 (Three) Times a Day As Needed for Muscle Spasms. (Patient not taking: Reported on 10/3/2024), Disp: 30 tablet, Rfl: 0    Rosuvastatin Calcium 20 MG capsule sprinkle, Take 20 mg by mouth Daily., Disp: 90 capsule, Rfl: 3     Objective     Review of Systems   Constitutional: Negative.    HENT: Negative.     Respiratory: Negative.     Musculoskeletal: Negative.         She complains of right knee pain.   Psychiatric/Behavioral: Negative.         Physical Exam  Vitals and nursing note reviewed.   Constitutional:       General: She is not in acute distress.     Appearance: Normal appearance. She is not ill-appearing, toxic-appearing or diaphoretic.      Comments: Pleasant, neatly groomed, no distress.   Cardiovascular:      Rate and Rhythm: Regular rhythm.      Heart sounds: Normal heart sounds. No murmur heard.     No gallop.   Pulmonary:      Effort: No respiratory distress.      Breath sounds: Normal breath sounds. No wheezing or rales.   Neurological:      Mental Status: She is alert and oriented to person, place, and time.   Psychiatric:         Mood and Affect: Mood normal.         Behavior: Behavior normal.         Thought Content: Thought content normal.       Physical Exam      Results  Laboratory Studies  Cholesterol levels are good. Triglycerides are high. Glucose found in urine.    Assessment & Plan    Problems Addressed this Visit          Cardiac and Vasculature    High cholesterol - Primary    Relevant Medications    Rosuvastatin Calcium 20 MG capsule sprinkle    HTN (hypertension)       Endocrine and Metabolic    Hypothyroid       Sleep    Primary insomnia     Other Visit Diagnoses       Chronic pain of right knee        Relevant Orders    Ambulatory Referral to Orthopedic Surgery          Diagnoses         Codes Comments    High cholesterol    -  Primary ICD-10-CM: E78.00  ICD-9-CM: 272.0     Hypertension, unspecified type     ICD-10-CM: I10  ICD-9-CM: 401.9     Chronic pain of right knee     ICD-10-CM:  M25.561, G89.29  ICD-9-CM: 719.46, 338.29     Primary insomnia     ICD-10-CM: F51.01  ICD-9-CM: 307.42     Hypothyroidism, unspecified type     ICD-10-CM: E03.9  ICD-9-CM: 244.9           Assessment & Plan  1. Hypertriglyceridemia.  Her cholesterol levels are within the normal range, but her triglycerides are significantly elevated. She has been advised to take fish oil supplements, two large tablets daily. The prescription for rosuvastatin 20 mg daily will be corrected and sent to the pharmacy.    2. Right knee pain.  She reports that her right knee, which had a pin inserted in 2007, is starting to bother her. A referral to an orthopedic specialist in [place] will be made for further evaluation and management.    3. Medication Management.  A refill for fluticasone propionate (Flonase) will be provided.    4. Health Maintenance.  An influenza vaccine will be administered today.    Follow-up  Return in 6 months for follow-up.      No follow-ups on file.    Patient or patient representative verbalized consent for the use of Ambient Listening during the visit with  Arian Peterson MD for chart documentation. 10/3/2024  17:24 EDT

## 2024-10-04 PROCEDURE — G0008 ADMIN INFLUENZA VIRUS VAC: HCPCS | Performed by: INTERNAL MEDICINE

## 2024-10-04 PROCEDURE — 90662 IIV NO PRSV INCREASED AG IM: CPT | Performed by: INTERNAL MEDICINE

## 2024-10-06 ENCOUNTER — HOSPITAL ENCOUNTER (EMERGENCY)
Facility: HOSPITAL | Age: 69
Discharge: HOME | End: 2024-10-07
Attending: EMERGENCY MEDICINE | Admitting: EMERGENCY MEDICINE
Payer: MEDICARE

## 2024-10-06 ENCOUNTER — APPOINTMENT (OUTPATIENT)
Dept: GENERAL RADIOLOGY | Facility: HOSPITAL | Age: 69
End: 2024-10-06
Payer: MEDICARE

## 2024-10-06 VITALS
SYSTOLIC BLOOD PRESSURE: 135 MMHG | HEIGHT: 61 IN | RESPIRATION RATE: 16 BRPM | TEMPERATURE: 99.2 F | DIASTOLIC BLOOD PRESSURE: 73 MMHG | HEART RATE: 94 BPM | BODY MASS INDEX: 24.22 KG/M2 | OXYGEN SATURATION: 91 % | WEIGHT: 128.31 LBS

## 2024-10-06 DIAGNOSIS — R07.9 CHEST PAIN, UNSPECIFIED TYPE: Primary | ICD-10-CM

## 2024-10-06 DIAGNOSIS — M94.0 COSTOCHONDRITIS: ICD-10-CM

## 2024-10-06 LAB
ALBUMIN SERPL-MCNC: 4.3 G/DL (ref 3.5–5.2)
ALBUMIN/GLOB SERPL: 1.9 G/DL
ALP SERPL-CCNC: 81 U/L (ref 39–117)
ALT SERPL W P-5'-P-CCNC: 26 U/L (ref 1–33)
ANION GAP SERPL CALCULATED.3IONS-SCNC: 12.1 MMOL/L (ref 5–15)
AST SERPL-CCNC: 22 U/L (ref 1–32)
BASOPHILS # BLD AUTO: 0.03 10*3/MM3 (ref 0–0.2)
BASOPHILS NFR BLD AUTO: 0.3 % (ref 0–1.5)
BILIRUB SERPL-MCNC: 0.6 MG/DL (ref 0–1.2)
BUN SERPL-MCNC: 15 MG/DL (ref 8–23)
BUN/CREAT SERPL: 18.1 (ref 7–25)
CALCIUM SPEC-SCNC: 9.8 MG/DL (ref 8.6–10.5)
CHLORIDE SERPL-SCNC: 100 MMOL/L (ref 98–107)
CO2 SERPL-SCNC: 25.9 MMOL/L (ref 22–29)
CREAT SERPL-MCNC: 0.83 MG/DL (ref 0.57–1)
DEPRECATED RDW RBC AUTO: 43.3 FL (ref 37–54)
EGFRCR SERPLBLD CKD-EPI 2021: 76.4 ML/MIN/1.73
EOSINOPHIL # BLD AUTO: 0.37 10*3/MM3 (ref 0–0.4)
EOSINOPHIL NFR BLD AUTO: 3.1 % (ref 0.3–6.2)
ERYTHROCYTE [DISTWIDTH] IN BLOOD BY AUTOMATED COUNT: 13.2 % (ref 12.3–15.4)
GEN 5 2HR TROPONIN T REFLEX: 28 NG/L
GLOBULIN UR ELPH-MCNC: 2.3 GM/DL
GLUCOSE SERPL-MCNC: 97 MG/DL (ref 65–99)
HCT VFR BLD AUTO: 42.3 % (ref 34–46.6)
HGB BLD-MCNC: 13.9 G/DL (ref 12–15.9)
HOLD SPECIMEN: NORMAL
HOLD SPECIMEN: NORMAL
IMM GRANULOCYTES # BLD AUTO: 0.04 10*3/MM3 (ref 0–0.05)
IMM GRANULOCYTES NFR BLD AUTO: 0.3 % (ref 0–0.5)
LIPASE SERPL-CCNC: 25 U/L (ref 13–60)
LYMPHOCYTES # BLD AUTO: 2.68 10*3/MM3 (ref 0.7–3.1)
LYMPHOCYTES NFR BLD AUTO: 22.7 % (ref 19.6–45.3)
MAGNESIUM SERPL-MCNC: 2 MG/DL (ref 1.6–2.4)
MCH RBC QN AUTO: 29.6 PG (ref 26.6–33)
MCHC RBC AUTO-ENTMCNC: 32.9 G/DL (ref 31.5–35.7)
MCV RBC AUTO: 90.2 FL (ref 79–97)
MONOCYTES # BLD AUTO: 0.87 10*3/MM3 (ref 0.1–0.9)
MONOCYTES NFR BLD AUTO: 7.4 % (ref 5–12)
NEUTROPHILS NFR BLD AUTO: 66.2 % (ref 42.7–76)
NEUTROPHILS NFR BLD AUTO: 7.84 10*3/MM3 (ref 1.7–7)
NRBC BLD AUTO-RTO: 0 /100 WBC (ref 0–0.2)
NT-PROBNP SERPL-MCNC: 268.8 PG/ML (ref 0–900)
PLATELET # BLD AUTO: 180 10*3/MM3 (ref 140–450)
PMV BLD AUTO: 10.6 FL (ref 6–12)
POTASSIUM SERPL-SCNC: 4.5 MMOL/L (ref 3.5–5.2)
PROT SERPL-MCNC: 6.6 G/DL (ref 6–8.5)
QT INTERVAL: 401 MS
QT INTERVAL: 412 MS
QTC INTERVAL: 513 MS
QTC INTERVAL: 522 MS
RBC # BLD AUTO: 4.69 10*6/MM3 (ref 3.77–5.28)
SODIUM SERPL-SCNC: 138 MMOL/L (ref 136–145)
TROPONIN T DELTA: 2 NG/L
TROPONIN T SERPL HS-MCNC: 26 NG/L
WBC NRBC COR # BLD AUTO: 11.83 10*3/MM3 (ref 3.4–10.8)
WHOLE BLOOD HOLD COAG: NORMAL
WHOLE BLOOD HOLD SPECIMEN: NORMAL

## 2024-10-06 PROCEDURE — 71045 X-RAY EXAM CHEST 1 VIEW: CPT

## 2024-10-06 PROCEDURE — 93005 ELECTROCARDIOGRAM TRACING: CPT

## 2024-10-06 PROCEDURE — 83735 ASSAY OF MAGNESIUM: CPT | Performed by: EMERGENCY MEDICINE

## 2024-10-06 PROCEDURE — 25010000002 KETOROLAC TROMETHAMINE PER 15 MG: Performed by: EMERGENCY MEDICINE

## 2024-10-06 PROCEDURE — 99284 EMERGENCY DEPT VISIT MOD MDM: CPT

## 2024-10-06 PROCEDURE — 84484 ASSAY OF TROPONIN QUANT: CPT | Performed by: EMERGENCY MEDICINE

## 2024-10-06 PROCEDURE — 93005 ELECTROCARDIOGRAM TRACING: CPT | Performed by: EMERGENCY MEDICINE

## 2024-10-06 PROCEDURE — 83690 ASSAY OF LIPASE: CPT | Performed by: EMERGENCY MEDICINE

## 2024-10-06 PROCEDURE — 85025 COMPLETE CBC W/AUTO DIFF WBC: CPT

## 2024-10-06 PROCEDURE — 96374 THER/PROPH/DIAG INJ IV PUSH: CPT

## 2024-10-06 PROCEDURE — 36415 COLL VENOUS BLD VENIPUNCTURE: CPT

## 2024-10-06 PROCEDURE — 83880 ASSAY OF NATRIURETIC PEPTIDE: CPT | Performed by: EMERGENCY MEDICINE

## 2024-10-06 PROCEDURE — 80053 COMPREHEN METABOLIC PANEL: CPT | Performed by: EMERGENCY MEDICINE

## 2024-10-06 RX ORDER — ASPIRIN 81 MG/1
324 TABLET, CHEWABLE ORAL ONCE
Status: DISCONTINUED | OUTPATIENT
Start: 2024-10-06 | End: 2024-10-07 | Stop reason: HOSPADM

## 2024-10-06 RX ORDER — SODIUM CHLORIDE 0.9 % (FLUSH) 0.9 %
10 SYRINGE (ML) INJECTION AS NEEDED
Status: DISCONTINUED | OUTPATIENT
Start: 2024-10-06 | End: 2024-10-07 | Stop reason: HOSPADM

## 2024-10-06 RX ORDER — KETOROLAC TROMETHAMINE 15 MG/ML
15 INJECTION, SOLUTION INTRAMUSCULAR; INTRAVENOUS ONCE
Status: COMPLETED | OUTPATIENT
Start: 2024-10-06 | End: 2024-10-06

## 2024-10-06 RX ADMIN — KETOROLAC TROMETHAMINE 15 MG: 15 INJECTION, SOLUTION INTRAMUSCULAR; INTRAVENOUS at 20:49

## 2024-10-07 ENCOUNTER — PATIENT OUTREACH (OUTPATIENT)
Dept: CASE MANAGEMENT | Facility: OTHER | Age: 69
End: 2024-10-07
Payer: MEDICARE

## 2024-10-07 NOTE — DISCHARGE INSTRUCTIONS
Avoid strenuous activities.  Take the prescription as prescribed for pain.  Continue the other home medications as directed.  Follow your primary care provider in 1 week if symptoms are not improving.  Return to the ER for change or worsening pain, fever greater than 101, shortness of breath, or any other concerns issues that may arise.

## 2024-10-07 NOTE — ED PROVIDER NOTES
Time: 11:25 PM EDT  Date of encounter:  10/6/2024  Independent Historian/Clinical History and Information was obtained by:   Patient  Chief Complaint: Chest pain    History is limited by: N/A    History of Present Illness:  Patient is a 69 y.o. year old female who presents to the emergency department for evaluation of chest pain.  Patient reports symptoms began 4 days ago.  The pain has been constant.  Patient describes as a fist pounding in her chest.  Patient reports pain goes into her back.  Patient reports symptoms are exacerbated by taking a deep breath.  Patient has taken Flexeril without any improvement.  She does admit to a slight cough.  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    HPI    Patient Care Team  Primary Care Provider: Arian Peterson MD    Past Medical History:     Allergies   Allergen Reactions    Azithromycin Rash and Other (See Comments)     Reaction unknown to patient (unable to remember)  Other reaction(s): Other: stomach issues, Stomach ache, Other: stomach issues, Stomach ache    Ezetimibe Myalgia, Other (See Comments) and Rash     cramps  cramps    Pravastatin Rash and Other (See Comments)     Past Medical History:   Diagnosis Date    AL amyloidosis     Anxiety     Arthritis     COVID-19 07/2020 9/7/2021    Depression     Diverticulitis of colon     Diverticulosis     GERD (gastroesophageal reflux disease)     History of Clostridioides difficile infection 2008    HAS HAD SEVERAL TIMES IN PAST PER PT (SAW INFECTIOUS DISEASE MD FOR THIS S/P COLOSTOMY/EXTENSIVE ANBX THERAPY)    History of prediabetes     History of snoring     History of stress incontinence     Hypercholesterolemia     Hyperlipidemia     Hypertension     Left ventricular hypertrophy     FOLLOWED BY DR MARI. DENIES CHEST PAIN BUT STATES DOES GET SOA AT TIMES WITH EXERTION REPORTS THAT IT IS NOT A NEW ISSUE     Liver disease     Oral herpes 08/18/2020    Seasonal allergies     used  to have allergy shots in the past    SOB (shortness of breath)     STATES WITH EXERTION HAS BEEN ONGOING ISSUE NOT A NEW ISSUE    Thyroid disease     Hypothyroid     Past Surgical History:   Procedure Laterality Date    ABDOMINAL SURGERY  2005, 2014    ex lap x 2,  diverticulitis    ABDOMINOPLASTY  2016    ADENOIDECTOMY      APPENDECTOMY      CARDIAC CATHETERIZATION N/A 04/24/2020    Procedure: Coronary angiography;  Surgeon: Roberto Briones MD;  Location: [facility] CATH INVASIVE LOCATION;  Service: Cardiovascular;  Laterality: N/A;    CARDIAC CATHETERIZATION N/A 04/24/2020    Procedure: Left heart cath;  Surgeon: Roberto Briones MD;  Location: [facility] CATH INVASIVE LOCATION;  Service: Cardiovascular;  Laterality: N/A;    CARDIAC CATHETERIZATION N/A 04/24/2020    Procedure: Left ventriculography;  Surgeon: Roberto Briones MD;  Location: [facility] CATH INVASIVE LOCATION;  Service: Cardiovascular;  Laterality: N/A;    CARDIAC SURGERY      open heart surgery,    COLONOSCOPY  approx 2018    normal per pt     COLOSTOMY  2005    had colostomy and then is was reversed    COLOSTOMY CLOSURE  2006    CORRECTION HAMMER TOE Right 2017    ECTOPIC PREGNANCY SURGERY      1979, 1980    ENDOSCOPY N/A 05/27/2020    Procedure: ESOPHAGOGASTRODUODENOSCOPY WITH BIOPSY AND BIOPSY POLYPECTOMY AND MACIEL DIALATION;  Surgeon: Preethi Beck MD;  Location: [facility] ENDOSCOPY;  Service: Gastroenterology;  Laterality: N/A;  PRE: ESOPHAGEAL DYSPHAGIA  POST: Z LINE 46, ESOPHAGEAL SPASM, GASTRITIS, FUNDIC POLYP    ENDOSCOPY N/A 06/20/2023    ESOPHAGEAL DILATATION N/A 12/07/2021    Procedure: OR ESOPHAGEAL DILATATION with maciel dilators ;  Surgeon: Jamey Leslie MD;  [facility];  Service: ENT;  Laterality: N/A;    ESOPHAGEAL MOTILITY STUDY N/A 02/03/2022    Procedure: ESOPHAGEAL MOTILITY STUDY;  Surgeon: Endo, Nurse Performed;  Location: [facility] ENDOSCOPY;  Service: Gastroenterology;  Laterality: N/A;   dypshagia     FOOT SURGERY Right 12/2016    Second and third hammertoe corrections    KNEE ARTHROSCOPY Right 2009    KNEE SURGERY Right     REVISION / TAKEDOWN COLOSTOMY  2006    SHOULDER ARTHROSCOPY Right 2018    right shoulder arthroscopy with extensive rotator cuff, biceps and labral debridement, chondroplasty, & subacromial decompression with acromioplasty    SHOULDER SURGERY      HAS HAS COMPLETE SHOULDER ON RIGHT. LEFT SCOPED AND HAD 2ND SURGERY WITH HARDWARE PLACED    SHOULDER SURGERY Left     2012. 2013    TONSILLECTOMY      TOTAL SHOULDER REVERSE ARTHROPLASTY Right 2019    WISDOM TOOTH EXTRACTION       Family History   Problem Relation Age of Onset    Hypertension Mother     Osteoporosis Mother     Skin cancer Mother     Heart disease Father     Hypertension Father     Breast cancer Maternal Grandmother     Ovarian cancer Neg Hx     Colon cancer Neg Hx     Deep vein thrombosis Neg Hx     Pulmonary embolism Neg Hx     Malig Hyperthermia Neg Hx        Home Medications:  Prior to Admission medications    Medication Sig Start Date End Date Taking? Authorizing Provider   acyclovir (ZOVIRAX) 400 MG tablet TAKE 1 TABLET BY MOUTH TWICE DAILY 8/20/24   Nav Sal MD   ALPRAZolam (XANAX) 0.25 MG tablet Take 1 tablet by mouth 2 (Two) Times a Day As Needed for Anxiety. for anxiety 9/25/24   Arian Peterson MD   aspirin 81 MG chewable tablet CHEW AND SWALLOW 1 TABLET DAILY WITH BREAKFAST 1/12/23   ProviderEric MD   Cholecalciferol (Vitamin D3) 50 MCG (2000 UT) tablet Take 1 tablet by mouth Daily.    ProviderEric MD   cholestyramine (QUESTRAN) 4 g packet Take 1 packet by mouth 3 (Three) Times a Day With Meals. 1 packet  TID PRN diarrhea 8/9/22   Nav Sal MD   cyclobenzaprine (FLEXERIL) 10 MG tablet Take 1 tablet by mouth 3 (Three) Times a Day As Needed for Muscle Spasms.  Patient not taking: Reported on 10/3/2024 6/26/24   Nav Sal MD   diphenoxylate-atropine (LOMOTIL) 2.5-0.025 MG per  tablet Take 1 tablet by mouth 4 (Four) Times a Day As Needed for Diarrhea. 9/25/24   Nav Sal MD   empagliflozin (JARDIANCE) 10 MG tablet tablet Take 1 tablet by mouth Daily. 11/7/22   Eric Weston MD   FLUoxetine (PROzac) 20 MG capsule TAKE 1 CAPSULE BY MOUTH FOUR TIMES DAILY 8/12/24   Arian Peterson MD   icosapent ethyl (Vascepa) 1 g capsule capsule Take 2 g by mouth Daily. 9/16/24   Roxie Wilkinson APRN   levothyroxine (SYNTHROID, LEVOTHROID) 75 MCG tablet TAKE 1 TABLET BY MOUTH EVERY DAY 11/10/23   Arian Peterson MD   loratadine (CLARITIN) 10 MG tablet Take 1 tablet by mouth Daily. 8/20/24   Arian Peterson MD   Menthol-Zinc Oxide (Calmoseptine) 0.44-20.6 % ointment Apply 1 application  topically to the appropriate area as directed 2 (Two) Times a Day. 11/27/23   Savannah Summers MD   midodrine (PROAMATINE) 5 MG tablet Take 1 tablet by mouth 2 (Two) Times a Day. 6/26/23   Eric Weston MD   Multiple Vitamins-Minerals (V-C Forte) capsule Take 1 capsule by mouth Daily. 9/21/22   Eric Weston MD   olopatadine (PATANOL) 0.1 % ophthalmic solution Administer 1 drop to both eyes 2 (Two) Times a Day. 7/11/24   Arian Peterson MD   pantoprazole (PROTONIX) 20 MG EC tablet TAKE 1 TABLET BY MOUTH EVERY DAY. DO NOT TAKE WITHIN 2 HOURS OF THYROID MEDICATION 1/15/24   Arian Peterson MD   pregabalin (LYRICA) 50 MG capsule Take 1 capsule by mouth Daily. 10/9/23   Eric Weston MD   Rosuvastatin Calcium 20 MG capsule sprinkle Take 20 mg by mouth Daily. 10/3/24   Arian Peterson MD   Sodium Fluoride 5000 Plus 1.1 % cream  7/19/23   Eric Weston MD   spironolactone (ALDACTONE) 25 MG tablet Take 1 tablet by mouth 2 (Two) Times a Day. 6/25/23   Eric Weston MD   Xifaxan 550 MG tablet Take 1 tablet by mouth. 3/12/24   Enrico MD Eric   zolpidem (AMBIEN) 10 MG tablet Take 1 tablet by mouth At Night As Needed for Sleep. 9/23/24   Arian Peterson MD     "    Social History:   Social History     Tobacco Use    Smoking status: Never     Passive exposure: Never    Smokeless tobacco: Never   Vaping Use    Vaping status: Never Used   Substance Use Topics    Alcohol use: Not Currently     Alcohol/week: 1.0 standard drink of alcohol     Types: 1 Glasses of wine per week    Drug use: Never         Review of Systems:  Review of Systems   Constitutional:  Negative for chills and fever.   HENT:  Negative for congestion, ear pain and sore throat.    Eyes:  Negative for pain.   Respiratory:  Negative for cough, chest tightness and shortness of breath.    Cardiovascular:  Positive for chest pain.   Gastrointestinal:  Negative for abdominal pain, diarrhea, nausea and vomiting.   Genitourinary:  Negative for flank pain and hematuria.   Musculoskeletal:  Negative for joint swelling.   Skin:  Negative for pallor.   Neurological:  Negative for seizures and headaches.   All other systems reviewed and are negative.       Physical Exam:  /73   Pulse 94   Temp 99.2 °F (37.3 °C) (Oral)   Resp 16   Ht 154.9 cm (60.98\")   Wt 58.2 kg (128 lb 4.9 oz)   SpO2 91%   BMI 24.26 kg/m²     Physical Exam    Vital signs were reviewed under triage note.  General appearance - Patient appears well-developed and well-nourished.  Patient is in no acute distress.  Head - Normocephalic, atraumatic.  Pupils - Equal, round, reactive to light.  Extraocular muscles are intact.  Conjunctiva is clear.  Nasal - Normal inspection.  No evidence of trauma or epistaxis.  Tympanic membranes - Gray, intact without erythema or retractions.  Oral mucosa - Pink and moist without lesions or erythema.  Uvula is midline.  Chest wall - Atraumatic.  Chest wall has reproducible tenderness with palpation along the left lower sternal border.  There are no vesicular rashes noted.  Neck - Supple.  Trachea was midline.  There is no palpable lymphadenopathy or thyromegaly.  There are no meningeal signs  Lungs - Clear to " auscultation and percussion bilaterally.  Heart - Regular rate and rhythm without any murmurs, clicks, or gallops.  Abdomen - Soft.  Bowel sounds are present.  There is no palpable tenderness.  There is no rebound, guarding, or rigidity.  There are no palpable masses.  There are no pulsatile masses.  Back - Spine is straight and midline.  There is no CVA tenderness.  Extremities - Intact x4 with full range of motion.  There is no palpable edema.  Pulses are intact x4 and equal.  Neurologic - Patient is awake, alert, and oriented x3.  Cranial nerves II through XII are grossly intact.  Motor and sensory functions grossly intact.  Cerebellar function was normal.  Integument - There are no rashes.  There are no petechia or purpura lesions noted.  There are no vesicular lesions noted.          Procedures:  Procedures      Medical Decision Making:      Comorbidities that affect care:    Depression, hyperlipidemia, hypertension, thyroid disease, GERD, amyloidosis of the heart and GI tract    External Notes reviewed:    Previous Clinic Note: Office visit with Dr. OLIVEIRA on 10/3/2024 was reviewed by me.      The following orders were placed and all results were independently analyzed by me:  Orders Placed This Encounter   Procedures    XR Chest 1 View    [place] Draw    High Sensitivity Troponin T    Comprehensive Metabolic Panel    Lipase    BNP    Magnesium    CBC Auto Differential    High Sensitivity Troponin T 2Hr    NPO Diet NPO Type: Strict NPO    Undress & Gown    Continuous Pulse Oximetry    Oxygen Therapy- Nasal Cannula; Titrate 1-6 LPM Per SpO2; 90 - 95%    ECG 12 Lead ED Triage Standing Order; Chest Pain    ECG 12 Lead ED Triage Standing Order; Chest Pain    Insert Peripheral IV    CBC & Differential    Green Top (Gel)    Lavender Top    Gold Top - SST    Light Blue Top       Medications Given in the Emergency Department:  Medications   sodium chloride 0.9 % flush 10 mL (has no administration in time range)    aspirin chewable tablet 324 mg (324 mg Oral Not Given 10/6/24 1852)   ketorolac (TORADOL) injection 15 mg (15 mg Intravenous Given 10/6/24 2049)        ED Course:    ED Course as of 10/06/24 2330   Sun Oct 06, 2024   2326 EKG performed at 1845 was interpreted by me to show a normal sinus rhythm with a ventricular of 96 bpm.  The GA interval is 192 ms.  P waves are normal.  QRS interval is 104 ms.  Patient's left bundle branch block.  Axis is leftward -37 degrees.  There is no acute ischemic ST or T wave change identified.  QT corrected is 420 ms. [TB]   2326 EKG performed at 2054 was interpreted by me to show a normal sinus rhythm with a ventricular rate of 98 bpm.  The GA interval was 187 ms.  P waves are normal.  QRS interval is 156 ms.  Patient with left bundle branch block.  Axis is leftward at -37 degrees.  There is no acute ischemic ST or T wave change identified.  QT corrected is 411 ms.  This EKG is unchanged from the EKG performed earlier at 1845. [TB]      ED Course User Index  [TB] Lam Bhagat DO   The patient was seen and evaluated in the ED by me.  The above history and physical examination was performed as documented.  Diagnostic data was obtained.  Results reviewed.  Findings were discussed with the patient.  The patient has an unremarkable cardiac workup as her troponin had a normal delta change.  Patient's physical findings are consistent with most likely muscloskeletal chest pain.  Patient will be given a trial of NSAID therapy.  Patient is stable for discharge home with outpatient treatment and follow-up.    Labs:    Lab Results (last 24 hours)       Procedure Component Value Units Date/Time    High Sensitivity Troponin T [[id number]]  (Abnormal) Collected: 10/06/24 1845    Specimen: Blood Updated: 10/06/24 1915     HS Troponin T 26 ng/L     Narrative:      High Sensitive Troponin T Reference Range:  <14.0 ng/L- Negative Female for AMI  <22.0 ng/L- Negative Male for AMI  >=14 - Abnormal Female  indicating possible myocardial injury.  >=22 - Abnormal Male indicating possible myocardial injury.   Clinicians would have to utilize clinical acumen, EKG, Troponin, and serial changes to determine if it is an Acute Myocardial Infarction or myocardial injury due to an underlying chronic condition.         CBC & Differential [[id number]]  (Abnormal) Collected: 10/06/24 1845    Specimen: Blood Updated: 10/06/24 1853    Narrative:      The following orders were created for panel order CBC & Differential.  Procedure                               Abnormality         Status                     ---------                               -----------         ------                     CBC Auto Differential[[id number]]        Abnormal            Final result                 Please view results for these tests on the individual orders.    Comprehensive Metabolic Panel [[id number]] Collected: 10/06/24 1845    Specimen: Blood Updated: 10/06/24 1915     Glucose 97 mg/dL      BUN 15 mg/dL      Creatinine 0.83 mg/dL      Sodium 138 mmol/L      Potassium 4.5 mmol/L      Comment: Slight hemolysis detected by analyzer. Result may be falsely elevated.        Chloride 100 mmol/L      CO2 25.9 mmol/L      Calcium 9.8 mg/dL      Total Protein 6.6 g/dL      Albumin 4.3 g/dL      ALT (SGPT) 26 U/L      AST (SGOT) 22 U/L      Alkaline Phosphatase 81 U/L      Total Bilirubin 0.6 mg/dL      Globulin 2.3 gm/dL      A/G Ratio 1.9 g/dL      BUN/Creatinine Ratio 18.1     Anion Gap 12.1 mmol/L      eGFR 76.4 mL/min/1.73     Narrative:      GFR Normal >60  Chronic Kidney Disease <60  Kidney Failure <15      Lipase [[id number]]  (Normal) Collected: 10/06/24 1845    Specimen: Blood Updated: 10/06/24 1915     Lipase 25 U/L     BNP [[id number]]  (Normal) Collected: 10/06/24 1845    Specimen: Blood Updated: 10/06/24 1913     proBNP 268.8 pg/mL     Narrative:      This assay is used as an aid in the diagnosis of individuals suspected of having heart failure. It  can be used as an aid in the diagnosis of acute decompensated heart failure (ADHF) in patients presenting with signs and symptoms of ADHF to the emergency department (ED). In addition, NT-proBNP of <300 pg/mL indicates ADHF is not likely.    Age Range Result Interpretation  NT-proBNP Concentration (pg/mL:      <50             Positive            >450                   Gray                 300-450                    Negative             <300    50-75           Positive            >900                  Gray                300-900                  Negative            <300      >75             Positive            >1800                  Gray                300-1800                  Negative            <300    Magnesium [[id number]]  (Normal) Collected: 10/06/24 1845    Specimen: Blood Updated: 10/06/24 1915     Magnesium 2.0 mg/dL     CBC Auto Differential [[id number]]  (Abnormal) Collected: 10/06/24 1845    Specimen: Blood Updated: 10/06/24 1853     WBC 11.83 10*3/mm3      RBC 4.69 10*6/mm3      Hemoglobin 13.9 g/dL      Hematocrit 42.3 %      MCV 90.2 fL      MCH 29.6 pg      MCHC 32.9 g/dL      RDW 13.2 %      RDW-SD 43.3 fl      MPV 10.6 fL      Platelets 180 10*3/mm3      Neutrophil % 66.2 %      Lymphocyte % 22.7 %      Monocyte % 7.4 %      Eosinophil % 3.1 %      Basophil % 0.3 %      Immature Grans % 0.3 %      Neutrophils, Absolute 7.84 10*3/mm3      Lymphocytes, Absolute 2.68 10*3/mm3      Monocytes, Absolute 0.87 10*3/mm3      Eosinophils, Absolute 0.37 10*3/mm3      Basophils, Absolute 0.03 10*3/mm3      Immature Grans, Absolute 0.04 10*3/mm3      nRBC 0.0 /100 WBC     High Sensitivity Troponin T 2Hr [[id number]]  (Abnormal) Collected: 10/06/24 2051    Specimen: Blood Updated: 10/06/24 2116     HS Troponin T 28 ng/L      Troponin T Delta 2 ng/L     Narrative:      High Sensitive Troponin T Reference Range:  <14.0 ng/L- Negative Female for AMI  <22.0 ng/L- Negative Male for AMI  >=14 - Abnormal Female indicating  possible myocardial injury.  >=22 - Abnormal Male indicating possible myocardial injury.   Clinicians would have to utilize clinical acumen, EKG, Troponin, and serial changes to determine if it is an Acute Myocardial Infarction or myocardial injury due to an underlying chronic condition.                  Imaging:    XR Chest 1 View    Result Date: 10/6/2024  XR CHEST 1 VW Date of Exam: 10/6/2024 6:45 PM EDT Indication: Chest Pain Triage Protocol Comparison: 8/12/2020 Findings: No focal consolidation. No pneumothorax or pleural effusion. Right shoulder replacement cardiac size is normal. The visualized clavicles appear intact. No displaced rib fractures. The visualized upper abdomen is normal.     Impression: No acute cardiopulmonary disease. Electronically Signed: Gordon Goldstein MD  10/6/2024 6:55 PM EDT  Workstation ID: [id number]       Differential Diagnosis and Discussion:    Chest Pain:  Based on the patient's signs and symptoms, I considered aortic dissection, myocardial infaction, pulmonary embolism, cardiac tamponade, pericarditis, pneumothorax, musculoskeletal chest pain and other differential diagnosis as an etiology of the patient's chest pain.     All labs were reviewed and interpreted by me.  All X-rays impressions were independently interpreted by me.  EKG was interpreted by me.    MDM     Amount and/or Complexity of Data Reviewed  Clinical lab tests: reviewed  Tests in the radiology section of CPT®: reviewed  Tests in the medicine section of CPT®: reviewed             Patient Care Considerations:    CT CHEST: I considered ordering a CT scan of the chest, however the patient did not have any tachycardia nor hypoxia.  Patient also had a low risk Wells score.      Consultants/Shared Management Plan:    None    Social Determinants of Health:    Patient is independent, reliable, and has access to care.       Disposition and Care Coordination:    Discharged: I considered escalation of care by admitting this  patient to the hospital, however there were no acute findings on the workup to warrant inpatient admission.    I have explained the patient´s condition, diagnoses and treatment plan based on the information available to me at this time. I have answered questions and addressed any concerns. The patient has a good  understanding of the patient´s diagnosis, condition, and treatment plan as can be expected at this point. The vital signs have been stable. The patient´s condition is stable and appropriate for discharge from the emergency department.      The patient will pursue further outpatient evaluation with the primary care physician or other designated or consulting physician as outlined in the discharge instructions. They are agreeable to this plan of care and follow-up instructions have been explained in detail. The patient has received these instructions in written format and has expressed an understanding of the discharge instructions. The patient is aware that any significant change in condition or worsening of symptoms should prompt an immediate return to this or the closest emergency department or call to 911.  I have explained discharge medications and the need for follow up with the patient/caretakers. This was also printed in the discharge instructions. Patient was discharged with the following medications and follow up:      Medication List        New Prescriptions      diclofenac 50 MG EC tablet  Commonly known as: VOLTAREN  Take 1 tablet by mouth 2 (Two) Times a Day As Needed (Pain).               Where to Get Your Medications        These medications were sent to [id number] - [place]  [facility] [address] AT [facility] OF [address]/[facility] & [place] - [phone] [facility] [phone]   [address] [place] [address]      Phone: [phone]   diclofenac 50 MG EC tablet      Arian Peterson MD  [address] [facility] [address] [phone]    In 1 week  If symptoms fail to  resolve or improve      Final diagnoses:   Chest pain, unspecified type   Costochondritis        ED Disposition       ED Disposition   Discharge    Condition   Stable    Comment   --               This medical record created using voice recognition software.             Lam Bhagat DO  10/09/24 5222

## 2024-10-07 NOTE — OUTREACH NOTE
"AMBULATORY CASE MANAGEMENT NOTE    Names and Relationships of Patient/Support Persons: Contact: Arden Cynthia ASHANTI \"YOLETTE\"; Relationship: Self -     Patient Outreach  RN-ACM outreach with patient. Discussed 10/6/24 ED visit regarding chest pain and costochondritis.  Patient treated and discharged. Patient states to be compliant with ED recommendations; has obtained diclofenac but has not taken dose; states to continue with episodes of chest pain  that last especially when taking a deep breath. RN-ACM encouraged patient the need of physician evaluation for recommendations. Patient verbalized understanding and voiced intent to contact physician for recommendations. Patient voiced intent to contact cardiology at [facility] [place] as she is established patient. Patient states no difficulty with SOB; states to be ambulating without assistive device with steady gait. Patient states to be compliant with medications. Reviewed with patient ED AVS recommendations; benefit of contact physician/ PCP for follow up and recomemndations; education; role of RN-ACM and HRCM case management services. Patient verbalized understanding. Patient states to appreciate outreach. No further questions voiced at this time.   Adult Patient Profile  Questions/Answers      Flowsheet Row Most Recent Value   Symptoms/Conditions Managed at Home cardiovascular   Cardiovascular Symptoms/Conditions chest pain   Cardiovascular Management Strategies medication therapy, other (see comments)  [Physician follow up]   Barriers to Taking Medication as Prescribed none   People in Home alone        Social Work Assessment  Questions/Answers      Flowsheet Row Most Recent Value   People in Home alone        Send Education  Questions/Answers      Flowsheet Row Most Recent Value   Annual Wellness Visit:  Patient Has Completed   Other Patient Education/Resources  24/7 [facility] Nurse Call Line, [facility]   24/7 Nurse Call Line Education Method Verbal "          Education Documentation  Energy Conservation, taught by Brianna Pearce, RN at 10/7/2024 11:03 AM.  Learner: Patient  Readiness: Acceptance  Method: Explanation  Response: Verbalizes Understanding    Unresolved/Worsening Symptoms, taught by Brianna Pearce, RN at 10/7/2024 11:03 AM.  Learner: Patient  Readiness: Acceptance  Method: Explanation  Response: Verbalizes Understanding    Provider Follow-Up, taught by Brianna Pearce, RN at 10/7/2024 11:03 AM.  Learner: Patient  Readiness: Acceptance  Method: Explanation  Response: Verbalizes Understanding          Brianna HORTON  Ambulatory Case Management    10/7/2024, 11:07 EDT

## 2024-10-11 ENCOUNTER — APPOINTMENT (OUTPATIENT)
Dept: GENERAL RADIOLOGY | Facility: HOSPITAL | Age: 69
End: 2024-10-11
Payer: MEDICARE

## 2024-10-11 ENCOUNTER — TELEPHONE (OUTPATIENT)
Dept: ONCOLOGY | Facility: CLINIC | Age: 69
End: 2024-10-11
Payer: MEDICARE

## 2024-10-11 ENCOUNTER — HOSPITAL ENCOUNTER (INPATIENT)
Facility: HOSPITAL | Age: 69
LOS: 3 days | Discharge: HOME | End: 2024-10-16
Attending: EMERGENCY MEDICINE | Admitting: STUDENT IN AN ORGANIZED HEALTH CARE EDUCATION/TRAINING PROGRAM
Payer: MEDICARE

## 2024-10-11 ENCOUNTER — APPOINTMENT (OUTPATIENT)
Dept: CT IMAGING | Facility: HOSPITAL | Age: 69
End: 2024-10-11
Payer: MEDICARE

## 2024-10-11 DIAGNOSIS — R09.1 PLEURITIS: ICD-10-CM

## 2024-10-11 DIAGNOSIS — J90 SMALL PLEURAL EFFUSION: Primary | ICD-10-CM

## 2024-10-11 DIAGNOSIS — R06.02 SHORTNESS OF BREATH: ICD-10-CM

## 2024-10-11 PROBLEM — R07.89 ATYPICAL CHEST PAIN: Status: ACTIVE | Noted: 2024-10-11

## 2024-10-11 LAB
ALBUMIN SERPL-MCNC: 4.1 G/DL (ref 3.5–5.2)
ALBUMIN/GLOB SERPL: 1.4 G/DL
ALP SERPL-CCNC: 86 U/L (ref 39–117)
ALT SERPL W P-5'-P-CCNC: 21 U/L (ref 1–33)
ANION GAP SERPL CALCULATED.3IONS-SCNC: 10.7 MMOL/L (ref 5–15)
ANION GAP SERPL CALCULATED.3IONS-SCNC: 12 MMOL/L (ref 5–15)
AST SERPL-CCNC: 19 U/L (ref 1–32)
BASOPHILS # BLD AUTO: 0.02 10*3/MM3 (ref 0–0.2)
BASOPHILS # BLD AUTO: 0.03 10*3/MM3 (ref 0–0.2)
BASOPHILS NFR BLD AUTO: 0.2 % (ref 0–1.5)
BASOPHILS NFR BLD AUTO: 0.3 % (ref 0–1.5)
BILIRUB SERPL-MCNC: 0.5 MG/DL (ref 0–1.2)
BILIRUB UR QL STRIP: NEGATIVE
BUN SERPL-MCNC: 19 MG/DL (ref 8–23)
BUN SERPL-MCNC: 23 MG/DL (ref 8–23)
BUN/CREAT SERPL: 24.7 (ref 7–25)
BUN/CREAT SERPL: 31.9 (ref 7–25)
CALCIUM SPEC-SCNC: 9.7 MG/DL (ref 8.6–10.5)
CALCIUM SPEC-SCNC: 9.9 MG/DL (ref 8.6–10.5)
CHLORIDE SERPL-SCNC: 100 MMOL/L (ref 98–107)
CHLORIDE SERPL-SCNC: 102 MMOL/L (ref 98–107)
CLARITY UR: CLEAR
CO2 SERPL-SCNC: 23 MMOL/L (ref 22–29)
CO2 SERPL-SCNC: 26.3 MMOL/L (ref 22–29)
COLOR UR: ABNORMAL
CREAT SERPL-MCNC: 0.72 MG/DL (ref 0.57–1)
CREAT SERPL-MCNC: 0.77 MG/DL (ref 0.57–1)
CRP SERPL-MCNC: 14.72 MG/DL (ref 0–0.5)
DEPRECATED RDW RBC AUTO: 42.6 FL (ref 37–54)
DEPRECATED RDW RBC AUTO: 43.5 FL (ref 37–54)
EGFRCR SERPLBLD CKD-EPI 2021: 83.6 ML/MIN/1.73
EGFRCR SERPLBLD CKD-EPI 2021: 90.6 ML/MIN/1.73
EOSINOPHIL # BLD AUTO: 0.19 10*3/MM3 (ref 0–0.4)
EOSINOPHIL # BLD AUTO: 0.24 10*3/MM3 (ref 0–0.4)
EOSINOPHIL NFR BLD AUTO: 2 % (ref 0.3–6.2)
EOSINOPHIL NFR BLD AUTO: 2.5 % (ref 0.3–6.2)
ERYTHROCYTE [DISTWIDTH] IN BLOOD BY AUTOMATED COUNT: 13.1 % (ref 12.3–15.4)
ERYTHROCYTE [DISTWIDTH] IN BLOOD BY AUTOMATED COUNT: 13.2 % (ref 12.3–15.4)
ERYTHROCYTE [SEDIMENTATION RATE] IN BLOOD: 74 MM/HR (ref 0–30)
FLUAV SUBTYP SPEC NAA+PROBE: NOT DETECTED
FLUBV RNA ISLT QL NAA+PROBE: NOT DETECTED
GEN 5 2HR TROPONIN T REFLEX: 32 NG/L
GLOBULIN UR ELPH-MCNC: 3 GM/DL
GLUCOSE SERPL-MCNC: 103 MG/DL (ref 65–99)
GLUCOSE SERPL-MCNC: 132 MG/DL (ref 65–99)
GLUCOSE UR STRIP-MCNC: ABNORMAL MG/DL
HCT VFR BLD AUTO: 39.6 % (ref 34–46.6)
HCT VFR BLD AUTO: 43 % (ref 34–46.6)
HGB BLD-MCNC: 13 G/DL (ref 12–15.9)
HGB BLD-MCNC: 14.1 G/DL (ref 12–15.9)
HGB UR QL STRIP.AUTO: NEGATIVE
HOLD SPECIMEN: NORMAL
HOLD SPECIMEN: NORMAL
IMM GRANULOCYTES # BLD AUTO: 0.03 10*3/MM3 (ref 0–0.05)
IMM GRANULOCYTES # BLD AUTO: 0.04 10*3/MM3 (ref 0–0.05)
IMM GRANULOCYTES NFR BLD AUTO: 0.3 % (ref 0–0.5)
IMM GRANULOCYTES NFR BLD AUTO: 0.4 % (ref 0–0.5)
KETONES UR QL STRIP: ABNORMAL
LEUKOCYTE ESTERASE UR QL STRIP.AUTO: NEGATIVE
LIPASE SERPL-CCNC: 24 U/L (ref 13–60)
LYMPHOCYTES # BLD AUTO: 1.34 10*3/MM3 (ref 0.7–3.1)
LYMPHOCYTES # BLD AUTO: 1.87 10*3/MM3 (ref 0.7–3.1)
LYMPHOCYTES NFR BLD AUTO: 13.9 % (ref 19.6–45.3)
LYMPHOCYTES NFR BLD AUTO: 19.5 % (ref 19.6–45.3)
MAGNESIUM SERPL-MCNC: 2.2 MG/DL (ref 1.6–2.4)
MCH RBC QN AUTO: 29 PG (ref 26.6–33)
MCH RBC QN AUTO: 29.6 PG (ref 26.6–33)
MCHC RBC AUTO-ENTMCNC: 32.8 G/DL (ref 31.5–35.7)
MCHC RBC AUTO-ENTMCNC: 32.8 G/DL (ref 31.5–35.7)
MCV RBC AUTO: 88.4 FL (ref 79–97)
MCV RBC AUTO: 90.3 FL (ref 79–97)
MONOCYTES # BLD AUTO: 0.48 10*3/MM3 (ref 0.1–0.9)
MONOCYTES # BLD AUTO: 0.65 10*3/MM3 (ref 0.1–0.9)
MONOCYTES NFR BLD AUTO: 5 % (ref 5–12)
MONOCYTES NFR BLD AUTO: 6.8 % (ref 5–12)
NEUTROPHILS NFR BLD AUTO: 6.83 10*3/MM3 (ref 1.7–7)
NEUTROPHILS NFR BLD AUTO: 7.51 10*3/MM3 (ref 1.7–7)
NEUTROPHILS NFR BLD AUTO: 71.2 % (ref 42.7–76)
NEUTROPHILS NFR BLD AUTO: 77.9 % (ref 42.7–76)
NITRITE UR QL STRIP: NEGATIVE
NRBC BLD AUTO-RTO: 0 /100 WBC (ref 0–0.2)
NRBC BLD AUTO-RTO: 0 /100 WBC (ref 0–0.2)
NT-PROBNP SERPL-MCNC: 294.8 PG/ML (ref 0–900)
PH UR STRIP.AUTO: 6 [PH] (ref 5–8)
PLATELET # BLD AUTO: 224 10*3/MM3 (ref 140–450)
PLATELET # BLD AUTO: 230 10*3/MM3 (ref 140–450)
PMV BLD AUTO: 10 FL (ref 6–12)
PMV BLD AUTO: 10.2 FL (ref 6–12)
POTASSIUM SERPL-SCNC: 4.4 MMOL/L (ref 3.5–5.2)
POTASSIUM SERPL-SCNC: 4.6 MMOL/L (ref 3.5–5.2)
PROCALCITONIN SERPL-MCNC: 0.14 NG/ML (ref 0–0.25)
PROT SERPL-MCNC: 7.1 G/DL (ref 6–8.5)
PROT UR QL STRIP: NEGATIVE
QT INTERVAL: 398 MS
QT INTERVAL: 421 MS
QTC INTERVAL: 524 MS
QTC INTERVAL: 531 MS
RBC # BLD AUTO: 4.48 10*6/MM3 (ref 3.77–5.28)
RBC # BLD AUTO: 4.76 10*6/MM3 (ref 3.77–5.28)
RSV RNA NPH QL NAA+NON-PROBE: NOT DETECTED
SARS-COV-2 RNA RESP QL NAA+PROBE: NOT DETECTED
SODIUM SERPL-SCNC: 135 MMOL/L (ref 136–145)
SODIUM SERPL-SCNC: 139 MMOL/L (ref 136–145)
SP GR UR STRIP: >1.03 (ref 1–1.03)
TROPONIN T DELTA: -3 NG/L
TROPONIN T SERPL HS-MCNC: 35 NG/L
TROPONIN T SERPL HS-MCNC: 40 NG/L
UROBILINOGEN UR QL STRIP: ABNORMAL
WBC NRBC COR # BLD AUTO: 9.59 10*3/MM3 (ref 3.4–10.8)
WBC NRBC COR # BLD AUTO: 9.64 10*3/MM3 (ref 3.4–10.8)
WHOLE BLOOD HOLD COAG: NORMAL
WHOLE BLOOD HOLD SPECIMEN: NORMAL

## 2024-10-11 PROCEDURE — 87637 SARSCOV2&INF A&B&RSV AMP PRB: CPT

## 2024-10-11 PROCEDURE — 85652 RBC SED RATE AUTOMATED: CPT | Performed by: STUDENT IN AN ORGANIZED HEALTH CARE EDUCATION/TRAINING PROGRAM

## 2024-10-11 PROCEDURE — 83735 ASSAY OF MAGNESIUM: CPT | Performed by: EMERGENCY MEDICINE

## 2024-10-11 PROCEDURE — G0378 HOSPITAL OBSERVATION PER HR: HCPCS

## 2024-10-11 PROCEDURE — 99223 1ST HOSP IP/OBS HIGH 75: CPT | Performed by: STUDENT IN AN ORGANIZED HEALTH CARE EDUCATION/TRAINING PROGRAM

## 2024-10-11 PROCEDURE — 71260 CT THORAX DX C+: CPT

## 2024-10-11 PROCEDURE — 36415 COLL VENOUS BLD VENIPUNCTURE: CPT

## 2024-10-11 PROCEDURE — 94640 AIRWAY INHALATION TREATMENT: CPT

## 2024-10-11 PROCEDURE — 83690 ASSAY OF LIPASE: CPT | Performed by: EMERGENCY MEDICINE

## 2024-10-11 PROCEDURE — 84145 PROCALCITONIN (PCT): CPT | Performed by: STUDENT IN AN ORGANIZED HEALTH CARE EDUCATION/TRAINING PROGRAM

## 2024-10-11 PROCEDURE — 25010000002 MORPHINE PER 10 MG: Performed by: EMERGENCY MEDICINE

## 2024-10-11 PROCEDURE — 94799 UNLISTED PULMONARY SVC/PX: CPT

## 2024-10-11 PROCEDURE — 25010000002 KETOROLAC TROMETHAMINE PER 15 MG

## 2024-10-11 PROCEDURE — 84484 ASSAY OF TROPONIN QUANT: CPT | Performed by: EMERGENCY MEDICINE

## 2024-10-11 PROCEDURE — 84484 ASSAY OF TROPONIN QUANT: CPT | Performed by: STUDENT IN AN ORGANIZED HEALTH CARE EDUCATION/TRAINING PROGRAM

## 2024-10-11 PROCEDURE — 80053 COMPREHEN METABOLIC PANEL: CPT | Performed by: EMERGENCY MEDICINE

## 2024-10-11 PROCEDURE — 87040 BLOOD CULTURE FOR BACTERIA: CPT | Performed by: STUDENT IN AN ORGANIZED HEALTH CARE EDUCATION/TRAINING PROGRAM

## 2024-10-11 PROCEDURE — 93005 ELECTROCARDIOGRAM TRACING: CPT

## 2024-10-11 PROCEDURE — 93005 ELECTROCARDIOGRAM TRACING: CPT | Performed by: EMERGENCY MEDICINE

## 2024-10-11 PROCEDURE — 85025 COMPLETE CBC W/AUTO DIFF WBC: CPT | Performed by: STUDENT IN AN ORGANIZED HEALTH CARE EDUCATION/TRAINING PROGRAM

## 2024-10-11 PROCEDURE — 83880 ASSAY OF NATRIURETIC PEPTIDE: CPT | Performed by: EMERGENCY MEDICINE

## 2024-10-11 PROCEDURE — 25510000001 IOPAMIDOL PER 1 ML: Performed by: EMERGENCY MEDICINE

## 2024-10-11 PROCEDURE — 85025 COMPLETE CBC W/AUTO DIFF WBC: CPT

## 2024-10-11 PROCEDURE — 71045 X-RAY EXAM CHEST 1 VIEW: CPT

## 2024-10-11 PROCEDURE — 25010000002 LORAZEPAM PER 2 MG: Performed by: EMERGENCY MEDICINE

## 2024-10-11 PROCEDURE — 81003 URINALYSIS AUTO W/O SCOPE: CPT

## 2024-10-11 PROCEDURE — 25010000002 MORPHINE PER 10 MG: Performed by: STUDENT IN AN ORGANIZED HEALTH CARE EDUCATION/TRAINING PROGRAM

## 2024-10-11 PROCEDURE — 86140 C-REACTIVE PROTEIN: CPT | Performed by: STUDENT IN AN ORGANIZED HEALTH CARE EDUCATION/TRAINING PROGRAM

## 2024-10-11 PROCEDURE — 99285 EMERGENCY DEPT VISIT HI MDM: CPT

## 2024-10-11 RX ORDER — ROSUVASTATIN CALCIUM 20 MG/1
20 TABLET, COATED ORAL NIGHTLY
Status: DISCONTINUED | OUTPATIENT
Start: 2024-10-12 | End: 2024-10-16 | Stop reason: HOSPADM

## 2024-10-11 RX ORDER — LORAZEPAM 2 MG/ML
1 INJECTION INTRAMUSCULAR ONCE
Status: COMPLETED | OUTPATIENT
Start: 2024-10-11 | End: 2024-10-11

## 2024-10-11 RX ORDER — PANTOPRAZOLE SODIUM 40 MG/1
40 TABLET, DELAYED RELEASE ORAL
Status: DISCONTINUED | OUTPATIENT
Start: 2024-10-12 | End: 2024-10-16 | Stop reason: HOSPADM

## 2024-10-11 RX ORDER — SODIUM CHLORIDE 0.9 % (FLUSH) 0.9 %
10 SYRINGE (ML) INJECTION AS NEEDED
Status: DISCONTINUED | OUTPATIENT
Start: 2024-10-11 | End: 2024-10-16 | Stop reason: HOSPADM

## 2024-10-11 RX ORDER — HYDRALAZINE HYDROCHLORIDE 20 MG/ML
10 INJECTION INTRAMUSCULAR; INTRAVENOUS EVERY 6 HOURS PRN
Status: DISCONTINUED | OUTPATIENT
Start: 2024-10-11 | End: 2024-10-16 | Stop reason: HOSPADM

## 2024-10-11 RX ORDER — KETOROLAC TROMETHAMINE 15 MG/ML
15 INJECTION, SOLUTION INTRAMUSCULAR; INTRAVENOUS ONCE
Status: COMPLETED | OUTPATIENT
Start: 2024-10-11 | End: 2024-10-11

## 2024-10-11 RX ORDER — NITROGLYCERIN 0.4 MG/1
0.4 TABLET SUBLINGUAL
Status: DISCONTINUED | OUTPATIENT
Start: 2024-10-11 | End: 2024-10-14

## 2024-10-11 RX ORDER — IPRATROPIUM BROMIDE AND ALBUTEROL SULFATE 2.5; .5 MG/3ML; MG/3ML
3 SOLUTION RESPIRATORY (INHALATION) EVERY 6 HOURS PRN
Status: DISCONTINUED | OUTPATIENT
Start: 2024-10-11 | End: 2024-10-16 | Stop reason: HOSPADM

## 2024-10-11 RX ORDER — LEVOTHYROXINE SODIUM 75 UG/1
75 TABLET ORAL
Status: DISCONTINUED | OUTPATIENT
Start: 2024-10-12 | End: 2024-10-16 | Stop reason: HOSPADM

## 2024-10-11 RX ORDER — LEVOTHYROXINE SODIUM 75 UG/1
TABLET ORAL
Qty: 90 TABLET | Refills: 0 | Status: ON HOLD | OUTPATIENT
Start: 2024-10-11

## 2024-10-11 RX ORDER — BISACODYL 5 MG/1
5 TABLET, DELAYED RELEASE ORAL DAILY PRN
Status: DISCONTINUED | OUTPATIENT
Start: 2024-10-11 | End: 2024-10-16 | Stop reason: HOSPADM

## 2024-10-11 RX ORDER — ASPIRIN 81 MG/1
324 TABLET, CHEWABLE ORAL ONCE
Status: DISCONTINUED | OUTPATIENT
Start: 2024-10-11 | End: 2024-10-11

## 2024-10-11 RX ORDER — AMOXICILLIN 250 MG
2 CAPSULE ORAL 2 TIMES DAILY PRN
Status: DISCONTINUED | OUTPATIENT
Start: 2024-10-11 | End: 2024-10-16 | Stop reason: HOSPADM

## 2024-10-11 RX ORDER — BISACODYL 10 MG
10 SUPPOSITORY, RECTAL RECTAL DAILY PRN
Status: DISCONTINUED | OUTPATIENT
Start: 2024-10-11 | End: 2024-10-16 | Stop reason: HOSPADM

## 2024-10-11 RX ORDER — MIDODRINE HYDROCHLORIDE 5 MG/1
5 TABLET ORAL
Status: DISCONTINUED | OUTPATIENT
Start: 2024-10-12 | End: 2024-10-16 | Stop reason: HOSPADM

## 2024-10-11 RX ORDER — IPRATROPIUM BROMIDE AND ALBUTEROL SULFATE 2.5; .5 MG/3ML; MG/3ML
3 SOLUTION RESPIRATORY (INHALATION) ONCE
Status: COMPLETED | OUTPATIENT
Start: 2024-10-11 | End: 2024-10-11

## 2024-10-11 RX ORDER — POLYETHYLENE GLYCOL 3350 17 G/17G
17 POWDER, FOR SOLUTION ORAL DAILY PRN
Status: DISCONTINUED | OUTPATIENT
Start: 2024-10-11 | End: 2024-10-16 | Stop reason: HOSPADM

## 2024-10-11 RX ORDER — ACETAMINOPHEN 325 MG/1
650 TABLET ORAL EVERY 6 HOURS PRN
Status: DISCONTINUED | OUTPATIENT
Start: 2024-10-11 | End: 2024-10-16 | Stop reason: HOSPADM

## 2024-10-11 RX ORDER — ONDANSETRON 2 MG/ML
4 INJECTION INTRAMUSCULAR; INTRAVENOUS EVERY 6 HOURS PRN
Status: DISCONTINUED | OUTPATIENT
Start: 2024-10-11 | End: 2024-10-16 | Stop reason: HOSPADM

## 2024-10-11 RX ORDER — PREGABALIN 25 MG/1
50 CAPSULE ORAL DAILY
Status: DISCONTINUED | OUTPATIENT
Start: 2024-10-12 | End: 2024-10-16 | Stop reason: HOSPADM

## 2024-10-11 RX ORDER — IOPAMIDOL 755 MG/ML
100 INJECTION, SOLUTION INTRAVASCULAR
Status: COMPLETED | OUTPATIENT
Start: 2024-10-11 | End: 2024-10-11

## 2024-10-11 RX ORDER — SODIUM CHLORIDE 0.9 % (FLUSH) 0.9 %
10 SYRINGE (ML) INJECTION EVERY 12 HOURS SCHEDULED
Status: DISCONTINUED | OUTPATIENT
Start: 2024-10-12 | End: 2024-10-16 | Stop reason: HOSPADM

## 2024-10-11 RX ORDER — SPIRONOLACTONE 25 MG/1
25 TABLET ORAL 2 TIMES DAILY
Status: DISCONTINUED | OUTPATIENT
Start: 2024-10-12 | End: 2024-10-16 | Stop reason: HOSPADM

## 2024-10-11 RX ORDER — ASPIRIN 81 MG/1
81 TABLET, CHEWABLE ORAL DAILY
Status: DISCONTINUED | OUTPATIENT
Start: 2024-10-12 | End: 2024-10-16 | Stop reason: HOSPADM

## 2024-10-11 RX ORDER — MORPHINE SULFATE 2 MG/ML
2 INJECTION, SOLUTION INTRAMUSCULAR; INTRAVENOUS EVERY 4 HOURS PRN
Status: DISCONTINUED | OUTPATIENT
Start: 2024-10-11 | End: 2024-10-16 | Stop reason: HOSPADM

## 2024-10-11 RX ADMIN — MORPHINE SULFATE 2 MG: 2 INJECTION, SOLUTION INTRAMUSCULAR; INTRAVENOUS at 22:52

## 2024-10-11 RX ADMIN — IOPAMIDOL 58 ML: 755 INJECTION, SOLUTION INTRAVENOUS at 18:36

## 2024-10-11 RX ADMIN — LORAZEPAM 1 MG: 2 INJECTION INTRAMUSCULAR; INTRAVENOUS at 20:59

## 2024-10-11 RX ADMIN — KETOROLAC TROMETHAMINE 15 MG: 15 INJECTION, SOLUTION INTRAMUSCULAR; INTRAVENOUS at 17:54

## 2024-10-11 RX ADMIN — IPRATROPIUM BROMIDE AND ALBUTEROL SULFATE 3 ML: .5; 3 SOLUTION RESPIRATORY (INHALATION) at 16:59

## 2024-10-11 RX ADMIN — MORPHINE SULFATE 4 MG: 4 INJECTION, SOLUTION INTRAMUSCULAR; INTRAVENOUS at 19:31

## 2024-10-11 NOTE — TELEPHONE ENCOUNTER
Called patient, she wanted to let me know she is in no condition to drive. And states that she is going to go back to [place]. I v/u

## 2024-10-11 NOTE — ED PROVIDER NOTES
Time: 4:26 PM EDT  Date of encounter:  10/11/2024  Independent Historian/Clinical History and Information was obtained by:   Patient    History is limited by: N/A    Chief Complaint   Patient presents with    Chest Pain    Shortness of Breath    Cough    Fever         History of Present Illness:  Patient is a 69 y.o. year old female who presents to the emergency department for evaluation of fever, shortness of breath and cough.  Patient states she is also having chest pain on the left side of her ribs that is been ongoing since Thursday 10/3/24).  Patient was seen here this past Sunday due to it.  She states that it is worse with deep inspiration.  Was diagnosed with costochondritis taking diclofenac which helped resolve her pain.  Patient states her fever last night was 101.3.  She called her oncologist in [place] who told her to come however she did not want to drive that far and decided come back to the ED.  Her last chemo and radiation was 2 weeks ago.  She does admit to a cough.  She recently traveled to [place] in September.  Denies history of a PE or recent surgery.    Patient has a history of amyloidosis of the heart.     Patient Care Team  Primary Care Provider: Arian Peterson MD    Past Medical History:     Allergies   Allergen Reactions    Azithromycin Rash and Other (See Comments)     Reaction unknown to patient (unable to remember)  Other reaction(s): Other: stomach issues, Stomach ache, Other: stomach issues, Stomach ache    Ezetimibe Myalgia, Other (See Comments) and Rash     cramps  cramps    Pravastatin Rash and Other (See Comments)     Past Medical History:   Diagnosis Date    AL amyloidosis     Anxiety     Arthritis     COVID-19 07/2020 9/7/2021    Depression     Diverticulitis of colon     Diverticulosis     GERD (gastroesophageal reflux disease)     History of Clostridioides difficile infection 2008    HAS HAD SEVERAL TIMES IN PAST PER PT (SAW INFECTIOUS DISEASE MD FOR THIS S/P  COLOSTOMY/EXTENSIVE ANBX THERAPY)    History of prediabetes     History of snoring     History of stress incontinence     Hypercholesterolemia     Hyperlipidemia     Hypertension     Left ventricular hypertrophy     FOLLOWED BY DR MARI. DENIES CHEST PAIN BUT STATES DOES GET SOA AT TIMES WITH EXERTION REPORTS THAT IT IS NOT A NEW ISSUE     Liver disease     Oral herpes 08/18/2020    Seasonal allergies     used to have allergy shots in the past    SOB (shortness of breath)     STATES WITH EXERTION HAS BEEN ONGOING ISSUE NOT A NEW ISSUE    Thyroid disease     Hypothyroid     Past Surgical History:   Procedure Laterality Date    ABDOMINAL SURGERY  2005, 2014    ex lap x 2,  diverticulitis    ABDOMINOPLASTY  2016    ADENOIDECTOMY      APPENDECTOMY      CARDIAC CATHETERIZATION N/A 04/24/2020    Procedure: Coronary angiography;  Surgeon: Roberto Briones MD;  Location:  [facility];  Service: Cardiovascular;  Laterality: N/A;    CARDIAC CATHETERIZATION N/A 04/24/2020    Procedure: Left heart cath;  Surgeon: Roberto Briones MD;  Location:  [facility];  Service: Cardiovascular;  Laterality: N/A;    CARDIAC CATHETERIZATION N/A 04/24/2020    Procedure: Left ventriculography;  Surgeon: Roberto Briones MD;  Location:  [facility];  Service: Cardiovascular;  Laterality: N/A;    CARDIAC SURGERY      open heart surgery,    COLONOSCOPY  approx 2018    normal per pt     COLOSTOMY  2005    had colostomy and then is was reversed    COLOSTOMY CLOSURE  2006    CORRECTION HAMMER TOE Right 2017    ECTOPIC PREGNANCY SURGERY      1979, 1980    ENDOSCOPY N/A 05/27/2020    Procedure: ESOPHAGOGASTRODUODENOSCOPY WITH BIOPSY AND BIOPSY POLYPECTOMY AND MACIEL DIALATION;  Surgeon: Preethi Beck MD;  Location: [facility] ENDOSCOPY;  Service: Gastroenterology;  Laterality: N/A;  PRE: ESOPHAGEAL DYSPHAGIA  POST: Z LINE 46, ESOPHAGEAL SPASM, GASTRITIS, FUNDIC POLYP     ENDOSCOPY N/A 06/20/2023    ESOPHAGEAL DILATATION N/A 12/07/2021    Procedure: OR ESOPHAGEAL DILATATION with pacheco dilators ;  Surgeon: Jamey Leslie MD;  Location:  [facility];  Service: ENT;  Laterality: N/A;    ESOPHAGEAL MOTILITY STUDY N/A 02/03/2022    Procedure: ESOPHAGEAL MOTILITY STUDY;  Surgeon: Harshil, Nurse Performed;  Location:  [facility] ENDOSCOPY;  Service: Gastroenterology;  Laterality: N/A;  dypshagia     FOOT SURGERY Right 12/2016    Second and third hammertoe corrections    KNEE ARTHROSCOPY Right 2009    KNEE SURGERY Right     REVISION / TAKEDOWN COLOSTOMY  2006    SHOULDER ARTHROSCOPY Right 2018    right shoulder arthroscopy with extensive rotator cuff, biceps and labral debridement, chondroplasty, & subacromial decompression with acromioplasty    SHOULDER SURGERY      HAS HAS COMPLETE SHOULDER ON RIGHT. LEFT SCOPED AND HAD 2ND SURGERY WITH HARDWARE PLACED    SHOULDER SURGERY Left     2012. 2013    TONSILLECTOMY      TOTAL SHOULDER REVERSE ARTHROPLASTY Right 2019    WISDOM TOOTH EXTRACTION       Family History   Problem Relation Age of Onset    Hypertension Mother     Osteoporosis Mother     Skin cancer Mother     Heart disease Father     Hypertension Father     Breast cancer Maternal Grandmother     Ovarian cancer Neg Hx     Colon cancer Neg Hx     Deep vein thrombosis Neg Hx     Pulmonary embolism Neg Hx     Malig Hyperthermia Neg Hx        Home Medications:  Prior to Admission medications    Medication Sig Start Date End Date Taking? Authorizing Provider   acyclovir (ZOVIRAX) 400 MG tablet TAKE 1 TABLET BY MOUTH TWICE DAILY 8/20/24   Nav Sal MD   ALPRAZolam (XANAX) 0.25 MG tablet Take 1 tablet by mouth 2 (Two) Times a Day As Needed for Anxiety. for anxiety 9/25/24   Arian Peterson MD   aspirin 81 MG chewable tablet CHEW AND SWALLOW 1 TABLET DAILY WITH BREAKFAST 1/12/23   Provider, MD Eric   Cholecalciferol (Vitamin D3) 50 MCG (2000 UT) tablet Take 1 tablet by mouth  Daily.    Eric Weston MD   cholestyramine (QUESTRAN) 4 g packet Take 1 packet by mouth 3 (Three) Times a Day With Meals. 1 packet  TID PRN diarrhea 8/9/22   Nav Sal MD   cyclobenzaprine (FLEXERIL) 10 MG tablet Take 1 tablet by mouth 3 (Three) Times a Day As Needed for Muscle Spasms.  Patient not taking: Reported on 10/3/2024 6/26/24   Nav Sal MD   diclofenac (VOLTAREN) 50 MG EC tablet Take 1 tablet by mouth 2 (Two) Times a Day As Needed (Pain). 10/6/24   Lam Bhagat DO   diphenoxylate-atropine (LOMOTIL) 2.5-0.025 MG per tablet Take 1 tablet by mouth 4 (Four) Times a Day As Needed for Diarrhea. 9/25/24   Nav Sal MD   empagliflozin (JARDIANCE) 10 MG tablet tablet Take 1 tablet by mouth Daily. 11/7/22   Eric Weston MD   FLUoxetine (PROzac) 20 MG capsule TAKE 1 CAPSULE BY MOUTH FOUR TIMES DAILY 8/12/24   Arian Peterson MD   icosapent ethyl (Vascepa) 1 g capsule capsule Take 2 g by mouth Daily. 9/16/24   Roxie Wilkinson APRN   levothyroxine (SYNTHROID, LEVOTHROID) 75 MCG tablet TAKE 1 TABLET BY MOUTH EVERY DAY 10/11/24   Arian Peterson MD   loratadine (CLARITIN) 10 MG tablet Take 1 tablet by mouth Daily. 8/20/24   Arian Peterson MD   Menthol-Zinc Oxide (Calmoseptine) 0.44-20.6 % ointment Apply 1 application  topically to the appropriate area as directed 2 (Two) Times a Day. 11/27/23   Savannah Summers MD   midodrine (PROAMATINE) 5 MG tablet Take 1 tablet by mouth 2 (Two) Times a Day. 6/26/23   Eric Weston MD   Multiple Vitamins-Minerals (V-C Forte) capsule Take 1 capsule by mouth Daily. 9/21/22   Eric Weston MD   olopatadine (PATANOL) 0.1 % ophthalmic solution Administer 1 drop to both eyes 2 (Two) Times a Day. 7/11/24   Arian Peterson MD   pantoprazole (PROTONIX) 20 MG EC tablet TAKE 1 TABLET BY MOUTH EVERY DAY. DO NOT TAKE WITHIN 2 HOURS OF THYROID MEDICATION 1/15/24   Arian Peterson MD   pregabalin (LYRICA) 50 MG capsule Take 1 capsule by mouth  "Daily. 10/9/23   Eric Weston MD   Rosuvastatin Calcium 20 MG capsule sprinkle Take 20 mg by mouth Daily. 10/3/24   Arian Peterson MD   Sodium Fluoride 5000 Plus 1.1 % cream  7/19/23   Eric Weston MD   spironolactone (ALDACTONE) 25 MG tablet Take 1 tablet by mouth 2 (Two) Times a Day. 6/25/23   Eric Weston MD   Xifaxan 550 MG tablet Take 1 tablet by mouth. 3/12/24   Eric Weston MD   zolpidem (AMBIEN) 10 MG tablet Take 1 tablet by mouth At Night As Needed for Sleep. 9/23/24   Arian Peterson MD   levothyroxine (SYNTHROID, LEVOTHROID) 75 MCG tablet TAKE 1 TABLET BY MOUTH EVERY DAY 11/10/23 10/11/24  Airan Peterson MD        Social History:   Social History     Tobacco Use    Smoking status: Never     Passive exposure: Never    Smokeless tobacco: Never   Vaping Use    Vaping status: Never Used   Substance Use Topics    Alcohol use: Not Currently     Alcohol/week: 1.0 standard drink of alcohol     Types: 1 Glasses of wine per week    Drug use: Never         Review of Systems:  Review of Systems   Constitutional: Negative.  Positive for fever.   HENT: Negative.     Eyes: Negative.    Respiratory: Negative.  Positive for cough and shortness of breath.    Cardiovascular: Negative.  Positive for chest pain.   Gastrointestinal: Negative.    Endocrine: Negative.    Genitourinary: Negative.    Musculoskeletal: Negative.  Positive for arthralgias.   Skin: Negative.    Allergic/Immunologic: Negative.    Neurological: Negative.    Hematological: Negative.    Psychiatric/Behavioral: Negative.          Physical Exam:  /65   Pulse 101   Temp 100.1 °F (37.8 °C) (Oral)   Resp 18   Ht 152.4 cm (60\")   Wt 60.1 kg (132 lb 7.9 oz)   SpO2 90%   BMI 25.88 kg/m²         Physical Exam  Vitals and nursing note reviewed.   Constitutional:       Appearance: Normal appearance.   HENT:      Head: Normocephalic and atraumatic.      Nose: Nose normal.      Mouth/Throat:      Mouth: Mucous " membranes are moist.   Eyes:      Extraocular Movements: Extraocular movements intact.      Conjunctiva/sclera: Conjunctivae normal.      Pupils: Pupils are equal, round, and reactive to light.   Cardiovascular:      Rate and Rhythm: Normal rate and regular rhythm.      Heart sounds: Murmur heard.   Pulmonary:      Effort: Pulmonary effort is normal.      Breath sounds: Rhonchi (mild) present. No decreased breath sounds or wheezing.   Chest:      Chest wall: Tenderness present.   Musculoskeletal:         General: Normal range of motion.      Cervical back: Normal range of motion and neck supple.   Skin:     General: Skin is warm and dry.   Neurological:      General: No focal deficit present.      Mental Status: She is alert and oriented to person, place, and time.   Psychiatric:         Mood and Affect: Mood normal.         Behavior: Behavior normal.                  Procedures:  Procedures      Medical Decision Making:      Comorbidities that affect care:    Cancer, Hypertension    External Notes reviewed:    Previous Clinic Note: Office visit with PCP 10/3/2024, Previous ED Note: [facility] ED visit 10/6/2024 for chest pain, and Previous Radiological Studies:  patient chest x-ray completed 10/6/2024 that was negative      The following orders were placed and all results were independently analyzed by me:  Orders Placed This Encounter   Procedures    COVID-19, FLU A/B, RSV PCR 1 HR TAT - Swab, Nasopharynx    XR Chest 1 View    CT Chest With Contrast Diagnostic    [place] Draw    High Sensitivity Troponin T    Comprehensive Metabolic Panel    Lipase    BNP    Magnesium    CBC Auto Differential    High Sensitivity Troponin T 2Hr    Urinalysis With Microscopic If Indicated (No Culture) - Urine, Clean Catch    NPO Diet NPO Type: Strict NPO    Undress & Gown    Continuous Pulse Oximetry    Inpatient Hospitalist Consult    Oxygen Therapy- Nasal Cannula; Titrate 1-6 LPM Per SpO2; 90 - 95%    ECG 12 Lead ED Triage Standing  Order; Chest Pain    ECG 12 Lead ED Triage Standing Order; Chest Pain    Insert Peripheral IV    CBC & Differential    Green Top (Gel)    Lavender Top    Gold Top - SST    Light Blue Top       Medications Given in the Emergency Department:  Medications   sodium chloride 0.9 % flush 10 mL (has no administration in time range)   ipratropium-albuterol (DUO-NEB) nebulizer solution 3 mL (3 mL Nebulization Given 10/11/24 1659)   ketorolac (TORADOL) injection 15 mg (15 mg Intravenous Given 10/11/24 1754)   iopamidol (ISOVUE-370) 76 % injection 100 mL (58 mL Intravenous Given 10/11/24 1836)   morphine injection 4 mg (4 mg Intravenous Given 10/11/24 1931)   LORazepam (ATIVAN) injection 1 mg (1 mg Intravenous Given 10/11/24 2059)        ED Course:    The patient was initially evaluated in the triage area where orders were placed. The patient was later dispositioned by Kody Rosales PA-C.      The patient was advised to stay for completion of workup which includes but is not limited to communication of labs and radiological results, reassessment and plan. The patient was advised that leaving prior to disposition by a provider could result in critical findings that are not communicated to the patient.     ED Course as of 10/11/24 2201   Fri Oct 11, 2024   2131 Patient feels a lot better after having Ativan and is able to stop rolling around and riding in pain.  Her oxygen and staying between 91 to 93% on room air.  She still has complaints of left-sided rib and chest pain.  Patient states that she does not feel comfortable going home.  I discussed with patient that I will call the hospitalist for possible admission. [AJ]      ED Course User Index  [AJ] Kody Rosales PA-C       Labs:    Lab Results (last 24 hours)       Procedure Component Value Units Date/Time    High Sensitivity Troponin T [[id number]]  (Abnormal) Collected: 10/11/24 1546    Specimen: Blood Updated: 10/11/24 1624     HS Troponin T 35 ng/L      Narrative:      High Sensitive Troponin T Reference Range:  <14.0 ng/L- Negative Female for AMI  <22.0 ng/L- Negative Male for AMI  >=14 - Abnormal Female indicating possible myocardial injury.  >=22 - Abnormal Male indicating possible myocardial injury.   Clinicians would have to utilize clinical acumen, EKG, Troponin, and serial changes to determine if it is an Acute Myocardial Infarction or myocardial injury due to an underlying chronic condition.         CBC & Differential [[id number]]  (Abnormal) Collected: 10/11/24 1546    Specimen: Blood Updated: 10/11/24 1600    Narrative:      The following orders were created for panel order CBC & Differential.  Procedure                               Abnormality         Status                     ---------                               -----------         ------                     CBC Auto Differential[[id number]]        Abnormal            Final result                 Please view results for these tests on the individual orders.    Comprehensive Metabolic Panel [[id number]]  (Abnormal) Collected: 10/11/24 1546    Specimen: Blood Updated: 10/11/24 1624     Glucose 132 mg/dL      BUN 19 mg/dL      Creatinine 0.77 mg/dL      Sodium 139 mmol/L      Potassium 4.6 mmol/L      Chloride 102 mmol/L      CO2 26.3 mmol/L      Calcium 9.9 mg/dL      Total Protein 7.1 g/dL      Albumin 4.1 g/dL      ALT (SGPT) 21 U/L      AST (SGOT) 19 U/L      Alkaline Phosphatase 86 U/L      Total Bilirubin 0.5 mg/dL      Globulin 3.0 gm/dL      A/G Ratio 1.4 g/dL      BUN/Creatinine Ratio 24.7     Anion Gap 10.7 mmol/L      eGFR 83.6 mL/min/1.73     Narrative:      GFR Normal >60  Chronic Kidney Disease <60  Kidney Failure <15      Lipase [[id number]]  (Normal) Collected: 10/11/24 1546    Specimen: Blood Updated: 10/11/24 1624     Lipase 24 U/L     BNP [[id number]]  (Normal) Collected: 10/11/24 1546    Specimen: Blood Updated: 10/11/24 1620     proBNP 294.8 pg/mL     Narrative:      This assay is used  as an aid in the diagnosis of individuals suspected of having heart failure. It can be used as an aid in the diagnosis of acute decompensated heart failure (ADHF) in patients presenting with signs and symptoms of ADHF to the emergency department (ED). In addition, NT-proBNP of <300 pg/mL indicates ADHF is not likely.    Age Range Result Interpretation  NT-proBNP Concentration (pg/mL:      <50             Positive            >450                   Gray                 300-450                    Negative             <300    50-75           Positive            >900                  Gray                300-900                  Negative            <300      >75             Positive            >1800                  Gray                300-1800                  Negative            <300    Magnesium [[id number]]  (Normal) Collected: 10/11/24 1546    Specimen: Blood Updated: 10/11/24 1624     Magnesium 2.2 mg/dL     CBC Auto Differential [[id number]]  (Abnormal) Collected: 10/11/24 1546    Specimen: Blood Updated: 10/11/24 1600     WBC 9.64 10*3/mm3      RBC 4.76 10*6/mm3      Hemoglobin 14.1 g/dL      Hematocrit 43.0 %      MCV 90.3 fL      MCH 29.6 pg      MCHC 32.8 g/dL      RDW 13.1 %      RDW-SD 43.5 fl      MPV 10.2 fL      Platelets 230 10*3/mm3      Neutrophil % 77.9 %      Lymphocyte % 13.9 %      Monocyte % 5.0 %      Eosinophil % 2.5 %      Basophil % 0.3 %      Immature Grans % 0.4 %      Neutrophils, Absolute 7.51 10*3/mm3      Lymphocytes, Absolute 1.34 10*3/mm3      Monocytes, Absolute 0.48 10*3/mm3      Eosinophils, Absolute 0.24 10*3/mm3      Basophils, Absolute 0.03 10*3/mm3      Immature Grans, Absolute 0.04 10*3/mm3      nRBC 0.0 /100 WBC     COVID-19, FLU A/B, RSV PCR 1 HR TAT - Swab, Nasopharynx [[id number]]  (Normal) Collected: 10/11/24 1613    Specimen: Swab from Nasopharynx Updated: 10/11/24 1722     COVID19 Not Detected     Influenza A PCR Not Detected     Influenza B PCR Not Detected     RSV, PCR Not  Detected    Narrative:      Fact sheet for providers: https://www.fda.gov/media/442696/download    Fact sheet for patients: https://www.fda.gov/media/377112/download    Test performed by PCR.    High Sensitivity Troponin T 2Hr [[id number]]  (Abnormal) Collected: 10/11/24 1821    Specimen: Blood from Arm, Right Updated: 10/11/24 1843     HS Troponin T 32 ng/L      Troponin T Delta -3 ng/L     Narrative:      High Sensitive Troponin T Reference Range:  <14.0 ng/L- Negative Female for AMI  <22.0 ng/L- Negative Male for AMI  >=14 - Abnormal Female indicating possible myocardial injury.  >=22 - Abnormal Male indicating possible myocardial injury.   Clinicians would have to utilize clinical acumen, EKG, Troponin, and serial changes to determine if it is an Acute Myocardial Infarction or myocardial injury due to an underlying chronic condition.         Urinalysis With Microscopic If Indicated (No Culture) - Urine, Clean Catch [[id number]]  (Abnormal) Collected: 10/11/24 2101    Specimen: Urine, Clean Catch Updated: 10/11/24 2115     Color, UA Dark Yellow     Appearance, UA Clear     pH, UA 6.0     Specific Gravity, UA >1.030     Glucose, UA >=1000 mg/dL (3+)     Ketones, UA Trace     Bilirubin, UA Negative     Blood, UA Negative     Protein, UA Negative     Leuk Esterase, UA Negative     Nitrite, UA Negative     Urobilinogen, UA 1.0 E.U./dL    Narrative:      Urine microscopic not indicated.             Imaging:    CT Chest With Contrast Diagnostic    Result Date: 10/11/2024  CT CHEST W CONTRAST DIAGNOSTIC Date of Exam: 10/11/2024 6:34 PM EDT Indication: shortness of breath, chest pain. Comparison: Chest radiograph 10/11/2024 Technique: Axial CT images were obtained of the chest after the uneventful intravenous administration of iodinated contrast.  Reconstructed coronal and sagittal images were also obtained. Automated exposure control and iterative construction methods were  used. Findings: Thyroid and thoracic inlet:  Normal. Lymph nodes: No pathologic appearing lymph nodes by imaging criteria. Cardiovascular: Normal appearing heart size. Small pericardial effusion. Aorta and main pulmonary artery diameters are within normal range.No coronary artery calcifications. Left atrial appendage occlusion device. Aortic atherosclerotic calcifications. No evidence of pulmonary embolism. Esophagus: Normal. Lung parenchyma: Scattered atelectasis including at the lung bases. No suspicious appearing pulmonary nodule, mass, or infiltrate. Airways: Patent trachea and mainstem bronchi. Pleura: Small left pleural effusion. No pneumothorax. Chest wall and osseous structures: Median sternotomy. Mild degenerative changes of the imaged spine. No acute osseous abnormality. Right shoulder arthroplasty. Included upper abdomen: No significant abnormality.     Impression: No evidence of pulmonary embolism. Small left pleural effusion with adjacent atelectasis. Electronically Signed: Al Green  10/11/2024 7:29 PM EDT  Workstation ID: [id number]    XR Chest 1 View    Result Date: 10/11/2024  XR CHEST 1 VW Date of Exam: 10/11/2024 3:55 PM EDT Indication: Chest Pain Triage Protocol Comparison: 10/6/2024 Findings: There are postoperative changes of sternotomy with left atrial ligation. Heart size is within normal limits. The lungs are expanded the chest wall and they are clear. There are postop changes of reverse right shoulder arthroplasty.     Impression: 1. Postop changes of sternotomy and left atrial ligation. 2. No active disease. Electronically Signed: Angel Good MD  10/11/2024 4:02 PM EDT  Workstation ID: [id number]       Differential Diagnosis and Discussion:      Dyspnea: Differential diagnosis includes but is not limited to metabolic acidosis, neurological disorders, psychogenic, asthma, pneumothorax, upper airway obstruction, COPD, pneumonia, noncardiogenic pulmonary edema, interstitial lung disease, anemia, congestive heart failure, and  pulmonary embolism    All labs were reviewed and interpreted by me.  All X-rays impressions were independently interpreted by me.  EKG was interpreted by me.  EKG was interpreted by supervising attending.  CT scan radiology impression was interpreted by me.    MDM     Amount and/or Complexity of Data Reviewed  Clinical lab tests: reviewed  Tests in the radiology section of CPT®: reviewed  Tests in the medicine section of CPT®: reviewed  Decide to obtain previous medical records or to obtain history from someone other than the patient: yes                     Patient Care Considerations:    SEPSIS was considered but is NOT present in the emergency department as SIRS criteria is not present.      Consultants/Shared Management Plan:    Hospitalist: I have discussed the case with Dr. Reyna who agrees to accept the patient for admission.  SHARED VISIT: I have discussed the case with my supervising physician, mckenna Ashton who states agrees with my plan for admission for ops due to patient's dyspnea and pain. The substantive portion of the medical decision was made by the attesting physician who made or approve the management plan and will take responsibility for the patient.  Clinical findings were discussed and ultimate disposition was made in consult with supervising physician.    Social Determinants of Health:    Patient is independent, reliable, and has access to care.       Disposition and Care Coordination:    Admit:   Through independent evaluation of the patient's history, physical, and imperical data, the patient meets criteria for inpatient admission to the hospital.        Final diagnoses:   Small pleural effusion   Shortness of breath        ED Disposition       ED Disposition   Decision to Admit    Condition   --    Comment   --               This medical record created using voice recognition software.             Kody Rosales PA-C  10/11/24 [id number]

## 2024-10-11 NOTE — TELEPHONE ENCOUNTER
Patient called, said  Dr. Sal gave her flexaril for some chest pains she was having after moving a piece of furniture, that worked for a while but had stopped. Said she went to the ER at [place] on Thaddeus 10/6 for  the same chest pain and low grade fever of 99.8.  [place] ER did a work up and deemed the patient did not have a heart attack,  prescribed her (VOLTAREN) 50 MG EC tablet and sent her home. She is calling in today to let us know that she has the same chest pain that has not gotten any better and has been running a low grade fever all week, highest was 100.1. Today fever Temp is 101.3. Patient is wanting to know what she should do.

## 2024-10-11 NOTE — TELEPHONE ENCOUNTER
"  Caller: Cynthia Her \"YOLETTE\"    Relationship: Self    Best call back number: [phone]    What is the best time to reach you: ANYTIME    Who are you requesting to speak with (clinical staff, provider,  specific staff member): JAY      What was the call regarding: PT REQUESTING CALLBACK FROM JAY - SHE FORGOT TO MENTION SOMETHING IN THEIR PREVIOUS CONVERSATION    "

## 2024-10-11 NOTE — TELEPHONE ENCOUNTER
Called patient back, let her know per Dr. Downs, the patient should go to the ER to get a work up of Pneumonia. Pt states she doesn't want to go to [place] and would rather come to [facility]. I told that would be appropriate.

## 2024-10-12 ENCOUNTER — APPOINTMENT (OUTPATIENT)
Dept: CARDIOLOGY | Facility: HOSPITAL | Age: 69
End: 2024-10-12
Payer: MEDICARE

## 2024-10-12 LAB
B PARAPERT DNA SPEC QL NAA+PROBE: NOT DETECTED
B PERT DNA SPEC QL NAA+PROBE: NOT DETECTED
C PNEUM DNA NPH QL NAA+NON-PROBE: NOT DETECTED
FLUAV SUBTYP SPEC NAA+PROBE: NOT DETECTED
FLUBV RNA ISLT QL NAA+PROBE: NOT DETECTED
HADV DNA SPEC NAA+PROBE: NOT DETECTED
HCOV 229E RNA SPEC QL NAA+PROBE: NOT DETECTED
HCOV HKU1 RNA SPEC QL NAA+PROBE: NOT DETECTED
HCOV NL63 RNA SPEC QL NAA+PROBE: NOT DETECTED
HCOV OC43 RNA SPEC QL NAA+PROBE: NOT DETECTED
HMPV RNA NPH QL NAA+NON-PROBE: NOT DETECTED
HPIV1 RNA ISLT QL NAA+PROBE: NOT DETECTED
HPIV2 RNA SPEC QL NAA+PROBE: NOT DETECTED
HPIV3 RNA NPH QL NAA+PROBE: NOT DETECTED
HPIV4 P GENE NPH QL NAA+PROBE: NOT DETECTED
M PNEUMO IGG SER IA-ACNC: NOT DETECTED
RHINOVIRUS RNA SPEC NAA+PROBE: NOT DETECTED
RSV RNA NPH QL NAA+NON-PROBE: NOT DETECTED
SARS-COV-2 RNA RESP QL NAA+PROBE: NOT DETECTED

## 2024-10-12 PROCEDURE — 94761 N-INVAS EAR/PLS OXIMETRY MLT: CPT

## 2024-10-12 PROCEDURE — 94799 UNLISTED PULMONARY SVC/PX: CPT

## 2024-10-12 PROCEDURE — G0378 HOSPITAL OBSERVATION PER HR: HCPCS

## 2024-10-12 PROCEDURE — 0202U NFCT DS 22 TRGT SARS-COV-2: CPT | Performed by: INTERNAL MEDICINE

## 2024-10-12 PROCEDURE — 99233 SBSQ HOSP IP/OBS HIGH 50: CPT | Performed by: INTERNAL MEDICINE

## 2024-10-12 PROCEDURE — 93306 TTE W/DOPPLER COMPLETE: CPT | Performed by: INTERNAL MEDICINE

## 2024-10-12 PROCEDURE — 93306 TTE W/DOPPLER COMPLETE: CPT

## 2024-10-12 PROCEDURE — 25010000002 MORPHINE PER 10 MG: Performed by: STUDENT IN AN ORGANIZED HEALTH CARE EDUCATION/TRAINING PROGRAM

## 2024-10-12 PROCEDURE — 25010000002 PIPERACILLIN SOD-TAZOBACTAM PER 1 G: Performed by: STUDENT IN AN ORGANIZED HEALTH CARE EDUCATION/TRAINING PROGRAM

## 2024-10-12 RX ORDER — OXYCODONE HYDROCHLORIDE 5 MG/1
5 TABLET ORAL EVERY 8 HOURS PRN
Status: DISCONTINUED | OUTPATIENT
Start: 2024-10-12 | End: 2024-10-16 | Stop reason: HOSPADM

## 2024-10-12 RX ORDER — NAPROXEN 250 MG/1
500 TABLET ORAL 2 TIMES DAILY PRN
Status: DISCONTINUED | OUTPATIENT
Start: 2024-10-12 | End: 2024-10-16 | Stop reason: HOSPADM

## 2024-10-12 RX ORDER — CYCLOBENZAPRINE HCL 5 MG
10 TABLET ORAL 3 TIMES DAILY PRN
Status: DISCONTINUED | OUTPATIENT
Start: 2024-10-12 | End: 2024-10-16 | Stop reason: HOSPADM

## 2024-10-12 RX ORDER — CHOLESTYRAMINE 4 G/9G
1 POWDER, FOR SUSPENSION ORAL 3 TIMES DAILY PRN
Status: DISCONTINUED | OUTPATIENT
Start: 2024-10-12 | End: 2024-10-15

## 2024-10-12 RX ORDER — CHOLECALCIFEROL (VITAMIN D3) 25 MCG
2000 TABLET ORAL DAILY
Status: DISCONTINUED | OUTPATIENT
Start: 2024-10-12 | End: 2024-10-16 | Stop reason: HOSPADM

## 2024-10-12 RX ORDER — ACYCLOVIR 200 MG/1
400 CAPSULE ORAL 2 TIMES DAILY
Status: DISCONTINUED | OUTPATIENT
Start: 2024-10-12 | End: 2024-10-16 | Stop reason: HOSPADM

## 2024-10-12 RX ORDER — OXYCODONE HYDROCHLORIDE 5 MG/1
10 TABLET ORAL EVERY 8 HOURS PRN
Status: DISCONTINUED | OUTPATIENT
Start: 2024-10-12 | End: 2024-10-16 | Stop reason: HOSPADM

## 2024-10-12 RX ORDER — MULTIPLE VITAMINS W/ MINERALS TAB 9MG-400MCG
1 TAB ORAL DAILY
Status: DISCONTINUED | OUTPATIENT
Start: 2024-10-12 | End: 2024-10-16 | Stop reason: HOSPADM

## 2024-10-12 RX ORDER — ZOLPIDEM TARTRATE 5 MG/1
5 TABLET ORAL NIGHTLY PRN
Status: DISCONTINUED | OUTPATIENT
Start: 2024-10-12 | End: 2024-10-16 | Stop reason: HOSPADM

## 2024-10-12 RX ORDER — ACYCLOVIR 800 MG/1
400 TABLET ORAL 2 TIMES DAILY
Status: DISCONTINUED | OUTPATIENT
Start: 2024-10-12 | End: 2024-10-12

## 2024-10-12 RX ORDER — ALPRAZOLAM 0.25 MG
0.25 TABLET ORAL 2 TIMES DAILY PRN
Status: DISCONTINUED | OUTPATIENT
Start: 2024-10-12 | End: 2024-10-16 | Stop reason: HOSPADM

## 2024-10-12 RX ORDER — KETOROLAC TROMETHAMINE 15 MG/ML
15 INJECTION, SOLUTION INTRAMUSCULAR; INTRAVENOUS EVERY 6 HOURS PRN
Status: DISCONTINUED | OUTPATIENT
Start: 2024-10-12 | End: 2024-10-16 | Stop reason: HOSPADM

## 2024-10-12 RX ADMIN — Medication 10 ML: at 09:42

## 2024-10-12 RX ADMIN — Medication 2000 UNITS: at 12:02

## 2024-10-12 RX ADMIN — SPIRONOLACTONE 25 MG: 25 TABLET ORAL at 00:05

## 2024-10-12 RX ADMIN — PANTOPRAZOLE SODIUM 40 MG: 40 TABLET, DELAYED RELEASE ORAL at 05:53

## 2024-10-12 RX ADMIN — MIDODRINE HYDROCHLORIDE 5 MG: 5 TABLET ORAL at 17:45

## 2024-10-12 RX ADMIN — ACYCLOVIR 400 MG: 200 CAPSULE ORAL at 20:18

## 2024-10-12 RX ADMIN — Medication 10 ML: at 00:13

## 2024-10-12 RX ADMIN — Medication 1 TABLET: at 12:02

## 2024-10-12 RX ADMIN — FLUOXETINE HYDROCHLORIDE 20 MG: 20 CAPSULE ORAL at 20:19

## 2024-10-12 RX ADMIN — FLUOXETINE HYDROCHLORIDE 20 MG: 20 CAPSULE ORAL at 17:45

## 2024-10-12 RX ADMIN — MORPHINE SULFATE 2 MG: 2 INJECTION, SOLUTION INTRAMUSCULAR; INTRAVENOUS at 14:59

## 2024-10-12 RX ADMIN — SPIRONOLACTONE 25 MG: 25 TABLET ORAL at 20:19

## 2024-10-12 RX ADMIN — FLUOXETINE HYDROCHLORIDE 20 MG: 20 CAPSULE ORAL at 09:06

## 2024-10-12 RX ADMIN — MORPHINE SULFATE 2 MG: 2 INJECTION, SOLUTION INTRAMUSCULAR; INTRAVENOUS at 04:41

## 2024-10-12 RX ADMIN — ACYCLOVIR 400 MG: 200 CAPSULE ORAL at 12:07

## 2024-10-12 RX ADMIN — SPIRONOLACTONE 25 MG: 25 TABLET ORAL at 09:14

## 2024-10-12 RX ADMIN — MORPHINE SULFATE 2 MG: 2 INJECTION, SOLUTION INTRAMUSCULAR; INTRAVENOUS at 09:15

## 2024-10-12 RX ADMIN — EMPAGLIFLOZIN 10 MG: 10 TABLET, FILM COATED ORAL at 12:02

## 2024-10-12 RX ADMIN — MIDODRINE HYDROCHLORIDE 5 MG: 5 TABLET ORAL at 09:06

## 2024-10-12 RX ADMIN — PIPERACILLIN AND TAZOBACTAM 3.38 G: 3; .375 INJECTION, POWDER, FOR SOLUTION INTRAVENOUS at 00:04

## 2024-10-12 RX ADMIN — MORPHINE SULFATE 2 MG: 2 INJECTION, SOLUTION INTRAMUSCULAR; INTRAVENOUS at 20:19

## 2024-10-12 RX ADMIN — LEVOTHYROXINE SODIUM 75 MCG: 75 TABLET ORAL at 05:53

## 2024-10-12 RX ADMIN — Medication 10 ML: at 20:19

## 2024-10-12 RX ADMIN — FLUOXETINE HYDROCHLORIDE 20 MG: 20 CAPSULE ORAL at 00:05

## 2024-10-12 RX ADMIN — ASPIRIN 81 MG: 81 TABLET, CHEWABLE ORAL at 09:06

## 2024-10-12 RX ADMIN — PIPERACILLIN AND TAZOBACTAM 3.38 G: 3; .375 INJECTION, POWDER, FOR SOLUTION INTRAVENOUS at 05:53

## 2024-10-12 RX ADMIN — FLUOXETINE HYDROCHLORIDE 20 MG: 20 CAPSULE ORAL at 12:07

## 2024-10-12 RX ADMIN — ROSUVASTATIN 20 MG: 20 TABLET, FILM COATED ORAL at 00:04

## 2024-10-12 RX ADMIN — PREGABALIN 50 MG: 25 CAPSULE ORAL at 09:06

## 2024-10-12 RX ADMIN — ALPRAZOLAM 0.25 MG: 0.25 TABLET ORAL at 21:35

## 2024-10-12 NOTE — H&P
[facility]  Date: 10/11/2024   Patient Name: Cynthia Her  : 1955  MRN: [id number]  Primary Care Physician:  Arian Peterson MD  Date of admission: 10/11/2024    Subjective   Subjective     Chief Complaint: Chest pain    HPI:    Cynthia Her is a 69 y.o. female with past medical history of amyloidosis, hypertension hypothyroidism presents to the ED due to chest pain.  Patient states has been dealing with chest pain for the past 5 days.  Patient describes the pain as stabbing like feeling that is random and intermittent.  In addition patient's been having intermittent fevers.  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  Denies abdominal pain, nausea, vomiting, diarrhea, shortness of breath, headache, dysuria or palpitations.  In the ED, patient's vitals showed temperature 100.1, heart rate 1 1 and satting 91% on room air.  Troponins 30 5 repeat 32, sodium 139, potassium 4.6, creatinine 0.77, white blood cell 9.64, platelets 230.  CT chest shows small left pleural effusion with atelectasis.  Patient admitted to floors for further management.    Personal History     Past Medical History:  Past Medical History:   Diagnosis Date    AL amyloidosis     Anxiety     Arthritis     COVID-19 2021    Depression     Diverticulitis of colon     Diverticulosis     GERD (gastroesophageal reflux disease)     History of Clostridioides difficile infection 2008    HAS HAD SEVERAL TIMES IN PAST PER PT (SAW INFECTIOUS DISEASE MD FOR THIS S/P COLOSTOMY/EXTENSIVE ANBX THERAPY)    History of prediabetes     History of snoring     History of stress incontinence     Hypercholesterolemia     Hyperlipidemia     Hypertension     Left ventricular hypertrophy     FOLLOWED BY DR MARI. DENIES CHEST PAIN BUT STATES DOES GET SOA AT TIMES WITH EXERTION REPORTS THAT IT IS NOT A NEW ISSUE     Liver disease     Oral herpes 2020     Seasonal allergies     used to have allergy shots in the past    SOB (shortness of breath)     STATES WITH EXERTION HAS BEEN ONGOING ISSUE NOT A NEW ISSUE    Thyroid disease     Hypothyroid       Past Surgical History:  Past Surgical History:   Procedure Laterality Date    ABDOMINAL SURGERY  2005, 2014    ex lap x 2,  diverticulitis    ABDOMINOPLASTY  2016    ADENOIDECTOMY      APPENDECTOMY      CARDIAC CATHETERIZATION N/A 04/24/2020    Procedure: Coronary angiography;  Surgeon: Robreto Briones MD;  Location: [facility] CATH INVASIVE LOCATION;  Service: Cardiovascular;  Laterality: N/A;    CARDIAC CATHETERIZATION N/A 04/24/2020    Procedure: Left heart cath;  Surgeon: Roberto Briones MD;  Location: [facility] CATH INVASIVE LOCATION;  Service: Cardiovascular;  Laterality: N/A;    CARDIAC CATHETERIZATION N/A 04/24/2020    Procedure: Left ventriculography;  Surgeon: Roberto Briones MD;  Location: [facility] CATH INVASIVE LOCATION;  Service: Cardiovascular;  Laterality: N/A;    CARDIAC SURGERY      open heart surgery,    COLONOSCOPY  approx 2018    normal per pt     COLOSTOMY  2005    had colostomy and then is was reversed    COLOSTOMY CLOSURE  2006    CORRECTION HAMMER TOE Right 2017    ECTOPIC PREGNANCY SURGERY      1979, 1980    ENDOSCOPY N/A 05/27/2020    Procedure: ESOPHAGOGASTRODUODENOSCOPY WITH BIOPSY AND BIOPSY POLYPECTOMY AND MACIEL DIALATION;  Surgeon: Preethi Beck MD;  Location: [facility] ENDOSCOPY;  Service: Gastroenterology;  Laterality: N/A;  PRE: ESOPHAGEAL DYSPHAGIA  POST: Z LINE 46, ESOPHAGEAL SPASM, GASTRITIS, FUNDIC POLYP    ENDOSCOPY N/A 06/20/2023    ESOPHAGEAL DILATATION N/A 12/07/2021    Procedure: OR ESOPHAGEAL DILATATION with maciel dilators ;  Surgeon: Jamey Leslie MD;  Location: [facility] OR OSC;  Service: ENT;  Laterality: N/A;    ESOPHAGEAL MOTILITY STUDY N/A 02/03/2022    Procedure: ESOPHAGEAL MOTILITY STUDY;  Surgeon: Harshil, Nurse Performed;  Location: [facility]  ENDOSCOPY;  Service: Gastroenterology;  Laterality: N/A;  dypshagia     FOOT SURGERY Right 12/2016    Second and third hammertoe corrections    KNEE ARTHROSCOPY Right 2009    KNEE SURGERY Right     REVISION / TAKEDOWN COLOSTOMY  2006    SHOULDER ARTHROSCOPY Right 2018    right shoulder arthroscopy with extensive rotator cuff, biceps and labral debridement, chondroplasty, & subacromial decompression with acromioplasty    SHOULDER SURGERY      HAS HAS COMPLETE SHOULDER ON RIGHT. LEFT SCOPED AND HAD 2ND SURGERY WITH HARDWARE PLACED    SHOULDER SURGERY Left     2012. 2013    TONSILLECTOMY      TOTAL SHOULDER REVERSE ARTHROPLASTY Right 2019    WISDOM TOOTH EXTRACTION         Family History:   Family History   Problem Relation Age of Onset    Hypertension Mother     Osteoporosis Mother     Skin cancer Mother     Heart disease Father     Hypertension Father     Breast cancer Maternal Grandmother     Ovarian cancer Neg Hx     Colon cancer Neg Hx     Deep vein thrombosis Neg Hx     Pulmonary embolism Neg Hx     Malig Hyperthermia Neg Hx        Social History:   Social History     Socioeconomic History    Marital status:     Number of children: 2   Tobacco Use    Smoking status: Never     Passive exposure: Never    Smokeless tobacco: Never   Vaping Use    Vaping status: Never Used   Substance and Sexual Activity    Alcohol use: Not Currently     Alcohol/week: 1.0 standard drink of alcohol     Types: 1 Glasses of wine per week    Drug use: Never    Sexual activity: Defer       Home Medications:  ALPRAZolam, FLUoxetine, Menthol-Zinc Oxide, Rosuvastatin Calcium, Sodium Fluoride, V-C Forte, Vitamin D3, acyclovir, aspirin, cholestyramine, cyclobenzaprine, diclofenac, diphenoxylate-atropine, empagliflozin, icosapent ethyl, levothyroxine, loratadine, midodrine, olopatadine, pantoprazole, pregabalin, riFAXIMin, spironolactone, and zolpidem    Allergies:  Allergies   Allergen Reactions    Azithromycin Rash and Other (See  Comments)     Reaction unknown to patient (unable to remember)  Other reaction(s): Other: stomach issues, Stomach ache, Other: stomach issues, Stomach ache    Ezetimibe Myalgia, Other (See Comments) and Rash     cramps  cramps    Pravastatin Rash and Other (See Comments)       Review of Systems   All systems were reviewed and negative except for: Above    Objective   Objective     Vitals:   Temp:  [98.2 °F (36.8 °C)-100.1 °F (37.8 °C)] 100.1 °F (37.8 °C)  Heart Rate:  [] 101  Resp:  [12-20] 18  BP: (111-131)/(61-85) 121/65    Physical Exam    Constitutional: Awake, alert, no acute distress   Eyes: Pupils equal, sclerae anicteric, no conjunctival injection   HENT: NCAT, mucous membranes moist   Neck: Supple, no thyromegaly, no lymphadenopathy, trachea midline   Respiratory: Clear to auscultation bilaterally, nonlabored respirations    Cardiovascular: RRR, no murmurs, rubs, or gallops, palpable pedal pulses bilaterally   Gastrointestinal: Positive bowel sounds, soft, nontender, nondistended   Musculoskeletal: No bilateral ankle edema, no clubbing or cyanosis to extremities   Psychiatric: Appropriate affect, cooperative   Neurologic: Oriented x 3, strength symmetric in all extremities, Cranial Nerves grossly intact to confrontation, speech clear   Skin: No rashes     Result Review    Result Review:  I have personally reviewed the results from the time of this admission to 10/11/2024 22:22 EDT and agree with these findings:  [x]  Laboratory  []  Microbiology  [x]  Radiology  []  EKG/Telemetry   []  Cardiology/Vascular   []  Pathology  []  Old records  []  Other:      Assessment & Plan   Assessment / Plan     Assessment/Plan:     Assessment  Sepsis  Atypical chest pain   Lambda AL amyloidosis status post chemotherapy  LVH/HOCM status post myomectomy 2022  Essential hypertension  Hypothyroidism    Plan  Admit to [facility]  Telemetry   Monitor vitals  CBC BMP  Troponin trend  ESR CRP ordered  Blood cultures  ordered  Echo ordered  Chest x-ray reviewed  CT chest reviewed IV Zosyn  Start home meds  Consider cardiology consult pending echo tomorrow    VTE Prophylaxis:  Mechanical VTE prophylaxis orders are signed & held.          CODE STATUS:    Code Status (Patient has no pulse and is not breathing): CPR (Attempt to Resuscitate)  Medical Interventions (Patient has pulse or is breathing): Full Support      Admission Status:  I believe this patient meets obs status.    Electronically signed by Gaetano Reyna MD, 10/11/24, 10:22 PM EDT.

## 2024-10-12 NOTE — PLAN OF CARE
Goal Outcome Evaluation:      Patient still complains of chest pain right under her left breast. Patient states that it started on Thursday and it is an 8/10 sharp pain. Lab work and imaging all normal. Cards to possibly follow and ECHO ordered. Patient a standby to the bathroom. NSR on the monitor.

## 2024-10-12 NOTE — PROGRESS NOTES
[facility]   Hospitalist Progress Note  Date: 10/12/2024  Patient Name: Cynthia Her  : 1955  MRN: [id number]  Date of admission: 10/11/2024      Subjective   Subjective     Chief Complaint: Sharp left-sided chest pain under breast    Summary:   Cynthia Her is a 69 y.o. female with past medical history of amyloidosis, hypertension hypothyroidism presents to the ED due to chest pain.  Patient states has been dealing with chest pain for the past 5 days.  Patient describes the pain as stabbing like feeling that is random and intermittent.  In addition patient's been having intermittent fevers.  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  Denies abdominal pain, nausea, vomiting, diarrhea, shortness of breath, headache, dysuria or palpitations.  In the ED, patient's vitals showed temperature 100.1, heart rate 1 1 and satting 91% on room air.  Troponins 30 5 repeat 32, sodium 139, potassium 4.6, creatinine 0.77, white blood cell 9.64, platelets 230.  CT chest shows small left pleural effusion with atelectasis and small pericardial effusion.  Patient admitted to floors for further management.  Patient follows with [place] and local oncology for her amyloidosis.  Interval Followup:   Tmax 100.1.  Other vital signs stable   Patient continues to have a constant sharp chest pain with deep breathing only.  Not related to exertion or posture.  Pain medication helps for couple hours.  It hurts with coughing also.  Positive cough for few days  No mucus.  No shortness of air.    Review of Systems   All systems were reviewed and negative except for: Summary and interval follow-up    Objective   Objective     Vitals:   Temp:  [98.2 °F (36.8 °C)-100.1 °F (37.8 °C)] 98.6 °F (37 °C)  Heart Rate:  [] 87  Resp:  [12-18] 18  BP: (111-129)/(50-85) 129/66  Flow (L/min) (Oxygen Therapy):  [0] 0  Physical Exam    Constitutional: Awake, alert, no acute distress   Eyes: Pupils  equal, sclerae anicteric, no conjunctival injection   HENT: NCAT, mucous membranes moist   Neck: Supple, no thyromegaly, no lymphadenopathy, trachea midline   Respiratory: Clear to auscultation bilaterally, nonlabored respirations    Cardiovascular: Tender left anterolateral rib under the breast, no lesions or rash, RRR, no murmurs, rubs, or gallops, palpable pedal pulses bilaterally   Gastrointestinal: Positive bowel sounds, soft, nontender, nondistended   Musculoskeletal: No bilateral ankle edema, no clubbing or cyanosis to extremities   Psychiatric: Appropriate affect, cooperative   Neurologic: Oriented x 3, strength symmetric in all extremities, Cranial Nerves grossly intact to confrontation, speech clear   Skin: No rashes     Result Review    Result Review:  I have personally reviewed the results for the past 24 hours and agree with these findings:  [x]  Laboratory  [x]  Microbiology  [x]  Radiology  [x]  EKG/Telemetry sinus tachycardia 104, LVH, IVCD, QT 0.5  []  Cardiology/Vascular   []  Pathology  [x]  Old records  [x]  Other: Medications    Assessment & Plan   Assessment / Plan     Assessment:  Pleuritic chest pain.  Likely viral.  Small pericardial and left 2 effusion.  Lambda AL amyloidosis s/p chemotherapy.  LVH/hokum status post myomectomy 2022.  Status post Watchman procedure left atrial appendage.  Hypertension.  Hypothyroidism.  Chronically elevated troponin due to amyloidosis and hypertrophy.       Plan:  NSAIDs IV and oral as needed.  IV and oral narcotics for pain control as needed.  Await 2D echo.  CT chest noted no evidence of PE, small left pleural effusion and pericardial effusion.  Check viral PCR.  COVID to RSV negative.  Blood culture pending.  DC IV Zosyn.  No evidence of bacterial infection procalcitonin is negative white cell count within normal range UA and CT chest negative for infection.  Inflammatory markers elevated noted.  Continue telemetry.      Discussed plan with RN.    VTE  Prophylaxis:  Mechanical VTE prophylaxis orders are present.        CODE STATUS:   Code Status (Patient has no pulse and is not breathing): CPR (Attempt to Resuscitate)  Medical Interventions (Patient has pulse or is breathing): Full Support      Part of this note may be an electronic transcription/translation of spoken language to printed text using the Dragon Dictation System.     Electronically signed by Quan Davis MD, 10/12/24, 4:03 PM EDT.

## 2024-10-12 NOTE — PLAN OF CARE
Goal Outcome Evaluation:  Plan of Care Reviewed With: patient        Progress: improving  Outcome Evaluation: Alert & oriented x4 this shift; vital signs remain stable for patient. Medicated for pain with PRN medications per MAR. Reports no additionnal needs at this time, plan of care ongoing.

## 2024-10-13 PROBLEM — R09.1 PLEURITIS: Status: ACTIVE | Noted: 2024-10-13

## 2024-10-13 LAB
BH CV ECHO MEAS - AO MAX PG: 6.9 MMHG
BH CV ECHO MEAS - AO MEAN PG: 4 MMHG
BH CV ECHO MEAS - AO ROOT DIAM: 3.4 CM
BH CV ECHO MEAS - AO V2 MAX: 131 CM/SEC
BH CV ECHO MEAS - AO V2 VTI: 24.8 CM
BH CV ECHO MEAS - AVA(I,D): 1.89 CM2
BH CV ECHO MEAS - EDV(CUBED): 54.9 ML
BH CV ECHO MEAS - EDV(MOD-SP2): 46.1 ML
BH CV ECHO MEAS - EDV(MOD-SP4): 62.3 ML
BH CV ECHO MEAS - EF(MOD-BP): 58.8 %
BH CV ECHO MEAS - EF(MOD-SP2): 57.5 %
BH CV ECHO MEAS - EF(MOD-SP4): 60.7 %
BH CV ECHO MEAS - ESV(CUBED): 22 ML
BH CV ECHO MEAS - ESV(MOD-SP2): 19.6 ML
BH CV ECHO MEAS - ESV(MOD-SP4): 24.5 ML
BH CV ECHO MEAS - FS: 26.3 %
BH CV ECHO MEAS - IVS/LVPW: 1.2 CM
BH CV ECHO MEAS - IVSD: 1.2 CM
BH CV ECHO MEAS - LA DIMENSION: 3.7 CM
BH CV ECHO MEAS - LAT PEAK E' VEL: 7.9 CM/SEC
BH CV ECHO MEAS - LV DIASTOLIC VOL/BSA (35-75): 39.8 CM2
BH CV ECHO MEAS - LV MASS(C)D: 134.7 GRAMS
BH CV ECHO MEAS - LV MAX PG: 3.1 MMHG
BH CV ECHO MEAS - LV MEAN PG: 2 MMHG
BH CV ECHO MEAS - LV SYSTOLIC VOL/BSA (12-30): 15.7 CM2
BH CV ECHO MEAS - LV V1 MAX: 88 CM/SEC
BH CV ECHO MEAS - LV V1 VTI: 14.9 CM
BH CV ECHO MEAS - LVIDD: 3.8 CM
BH CV ECHO MEAS - LVIDS: 2.8 CM
BH CV ECHO MEAS - LVOT AREA: 3.1 CM2
BH CV ECHO MEAS - LVOT DIAM: 2 CM
BH CV ECHO MEAS - LVPWD: 1 CM
BH CV ECHO MEAS - MED PEAK E' VEL: 5.3 CM/SEC
BH CV ECHO MEAS - MV A MAX VEL: 105 CM/SEC
BH CV ECHO MEAS - MV DEC SLOPE: 459 CM/SEC2
BH CV ECHO MEAS - MV E MAX VEL: 81.2 CM/SEC
BH CV ECHO MEAS - MV E/A: 0.77
BH CV ECHO MEAS - MV MEAN PG: 3 MMHG
BH CV ECHO MEAS - MV P1/2T: 52.5 MSEC
BH CV ECHO MEAS - MV V2 VTI: 23.3 CM
BH CV ECHO MEAS - MVA(P1/2T): 4.2 CM2
BH CV ECHO MEAS - MVA(VTI): 2.01 CM2
BH CV ECHO MEAS - RVDD: 3.1 CM
BH CV ECHO MEAS - SV(LVOT): 46.8 ML
BH CV ECHO MEAS - SV(MOD-SP2): 26.5 ML
BH CV ECHO MEAS - SV(MOD-SP4): 37.8 ML
BH CV ECHO MEAS - SVI(LVOT): 29.9 ML/M2
BH CV ECHO MEAS - SVI(MOD-SP2): 16.9 ML/M2
BH CV ECHO MEAS - SVI(MOD-SP4): 24.2 ML/M2
BH CV ECHO MEAS - TAPSE (>1.6): 1.83 CM
BH CV ECHO MEASUREMENTS AVERAGE E/E' RATIO: 12.3
LEFT ATRIUM VOLUME INDEX: 13.3 ML/M2

## 2024-10-13 PROCEDURE — 25010000002 MORPHINE PER 10 MG: Performed by: STUDENT IN AN ORGANIZED HEALTH CARE EDUCATION/TRAINING PROGRAM

## 2024-10-13 PROCEDURE — 94799 UNLISTED PULMONARY SVC/PX: CPT

## 2024-10-13 PROCEDURE — 99232 SBSQ HOSP IP/OBS MODERATE 35: CPT | Performed by: INTERNAL MEDICINE

## 2024-10-13 PROCEDURE — 25010000002 KETOROLAC TROMETHAMINE PER 15 MG: Performed by: INTERNAL MEDICINE

## 2024-10-13 PROCEDURE — 94761 N-INVAS EAR/PLS OXIMETRY MLT: CPT

## 2024-10-13 RX ADMIN — FLUOXETINE HYDROCHLORIDE 20 MG: 20 CAPSULE ORAL at 11:09

## 2024-10-13 RX ADMIN — Medication 10 ML: at 20:48

## 2024-10-13 RX ADMIN — ZOLPIDEM TARTRATE 5 MG: 5 TABLET ORAL at 23:42

## 2024-10-13 RX ADMIN — Medication 2000 UNITS: at 09:10

## 2024-10-13 RX ADMIN — FLUOXETINE HYDROCHLORIDE 20 MG: 20 CAPSULE ORAL at 07:41

## 2024-10-13 RX ADMIN — FLUOXETINE HYDROCHLORIDE 20 MG: 20 CAPSULE ORAL at 17:52

## 2024-10-13 RX ADMIN — MORPHINE SULFATE 2 MG: 2 INJECTION, SOLUTION INTRAMUSCULAR; INTRAVENOUS at 05:44

## 2024-10-13 RX ADMIN — KETOROLAC TROMETHAMINE 15 MG: 15 INJECTION, SOLUTION INTRAMUSCULAR; INTRAVENOUS at 22:25

## 2024-10-13 RX ADMIN — MIDODRINE HYDROCHLORIDE 5 MG: 5 TABLET ORAL at 07:41

## 2024-10-13 RX ADMIN — Medication 10 ML: at 09:10

## 2024-10-13 RX ADMIN — MIDODRINE HYDROCHLORIDE 5 MG: 5 TABLET ORAL at 17:52

## 2024-10-13 RX ADMIN — FLUOXETINE HYDROCHLORIDE 20 MG: 20 CAPSULE ORAL at 20:47

## 2024-10-13 RX ADMIN — EMPAGLIFLOZIN 10 MG: 10 TABLET, FILM COATED ORAL at 09:10

## 2024-10-13 RX ADMIN — ASPIRIN 81 MG: 81 TABLET, CHEWABLE ORAL at 09:09

## 2024-10-13 RX ADMIN — SPIRONOLACTONE 25 MG: 25 TABLET ORAL at 20:47

## 2024-10-13 RX ADMIN — LEVOTHYROXINE SODIUM 75 MCG: 75 TABLET ORAL at 05:44

## 2024-10-13 RX ADMIN — Medication 1 TABLET: at 09:10

## 2024-10-13 RX ADMIN — ACYCLOVIR 400 MG: 200 CAPSULE ORAL at 09:10

## 2024-10-13 RX ADMIN — ACYCLOVIR 400 MG: 200 CAPSULE ORAL at 20:47

## 2024-10-13 RX ADMIN — Medication 10 ML: at 12:51

## 2024-10-13 RX ADMIN — MORPHINE SULFATE 2 MG: 2 INJECTION, SOLUTION INTRAMUSCULAR; INTRAVENOUS at 12:47

## 2024-10-13 RX ADMIN — ROSUVASTATIN 20 MG: 20 TABLET, FILM COATED ORAL at 20:47

## 2024-10-13 RX ADMIN — PREGABALIN 50 MG: 25 CAPSULE ORAL at 09:10

## 2024-10-13 RX ADMIN — PANTOPRAZOLE SODIUM 40 MG: 40 TABLET, DELAYED RELEASE ORAL at 05:45

## 2024-10-13 NOTE — PROGRESS NOTES
[facility]   Hospitalist Progress Note  Date: 10/13/2024  Patient Name: Cynthia Her  : 1955  MRN: [id number]  Date of admission: 10/11/2024      Subjective   Subjective     Chief Complaint: Sharp left-sided chest pain under breast    Summary:   Cynthia Her is a 69 y.o. female with past medical history of amyloidosis, hypertension hypothyroidism presents to the ED due to chest pain.  Patient states has been dealing with chest pain for the past 5 days.  Patient describes the pain as stabbing like feeling that is random and intermittent.  In addition patient's been having intermittent fevers.  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  Denies abdominal pain, nausea, vomiting, diarrhea, shortness of breath, headache, dysuria or palpitations.  In the ED, patient's vitals showed temperature 100.1, heart rate 1 1 and satting 91% on room air.  Troponins 30 5 repeat 32, sodium 139, potassium 4.6, creatinine 0.77, white blood cell 9.64, platelets 230.  CT chest shows small left pleural effusion with atelectasis and small pericardial effusion.  Patient admitted to floors for further management.  Patient follows with [place] and local oncology for her amyloidosis.  Interval Followup:   No more fever  Other vital signs stable   Patient continues to have a constant sharp chest pain with deep breathing only.  Not related to exertion or posture.  It has moved more laterally and downwards in the chest.  Pain medication helps for couple hours.  It hurts with coughing also.  Positive cough for few days  No mucus.  No shortness of air.    Review of Systems   All systems were reviewed and negative except for: Summary and interval follow-up    Objective   Objective     Vitals:   Temp:  [97.9 °F (36.6 °C)-99.1 °F (37.3 °C)] 97.9 °F (36.6 °C)  Heart Rate:  [] 79  Resp:  [18-28] 18  BP: ()/(50-82) 110/59  Flow (L/min) (Oxygen Therapy):  [0] 0  Physical  Exam    Constitutional: Awake, alert, no acute distress   Eyes: Pupils equal, sclerae anicteric, no conjunctival injection   HENT: NCAT, mucous membranes moist   Neck: Supple, no thyromegaly, no lymphadenopathy, trachea midline   Respiratory: Clear to auscultation bilaterally, nonlabored respirations    Cardiovascular: Tender left anterolateral rib under the breast, no lesions or rash, RRR, no murmurs, rubs, or gallops, palpable pedal pulses bilaterally   Gastrointestinal: Positive bowel sounds, soft, nontender, nondistended   Musculoskeletal: No bilateral ankle edema, no clubbing or cyanosis to extremities   Psychiatric: Appropriate affect, cooperative   Neurologic: Oriented x 3, strength symmetric in all extremities, Cranial Nerves grossly intact to confrontation, speech clear   Skin: No rashes     Result Review    Result Review:  I have personally reviewed the results for the past 24 hours and agree with these findings:  [x]  Laboratory  [x]  Microbiology  [x]  Radiology  [x]  EKG/Telemetry sinus tachycardia 104, LVH, IVCD, QT 0.5  []  Cardiology/Vascular   []  Pathology  [x]  Old records  [x]  Other: Medications    Assessment & Plan   Assessment / Plan     Assessment:  Pleuritic chest pain.  Likely viral.  Small pericardial and left pleural effusion.  Lambda AL amyloidosis s/p chemotherapy.  LVH/hokum status post myomectomy 2022.  Status post Watchman procedure left atrial appendage.  Hypertension.  Hypothyroidism.  Chronically elevated troponin due to amyloidosis and hypertrophy.       Plan:  NSAIDs IV and oral as needed.  IV and oral narcotics for pain control as needed.  Await 2D echo.  Pending  CT chest noted no evidence of PE, small left pleural effusion and pericardial effusion.  Noted viral PCR.  Negative  Will consult pulmonary if no improvement  Blood culture pending.  No need of antibiotics  No evidence of bacterial infection procalcitonin is negative white cell count within normal range UA and CT  chest negative for infection.  Inflammatory markers elevated noted.  Trend in a.m.  Continue telemetry.    Discussed plan with RN.    VTE Prophylaxis:  Mechanical VTE prophylaxis orders are present.        CODE STATUS:   Code Status (Patient has no pulse and is not breathing): CPR (Attempt to Resuscitate)  Medical Interventions (Patient has pulse or is breathing): Full Support      Part of this note may be an electronic transcription/translation of spoken language to printed text using the Dragon Dictation System.       Electronically signed by Quan Davis MD, 10/13/24, 2:54 PM EDT.

## 2024-10-13 NOTE — PLAN OF CARE
Goal Outcome Evaluation:  Plan of Care Reviewed With: patient        Progress: improving  Outcome Evaluation: Alert & oriented x4; diastolic blood pressure soft this shift, vital signs remain stable otherwise. Medicated x1 this shift for pain per MAR. Reports no additional needs at this time, plan of care ongoing.

## 2024-10-13 NOTE — PLAN OF CARE
Goal Outcome Evaluation:  Plan of Care Reviewed With: patient        Progress: no change  Outcome Evaluation: patient alert oriented vitals stable progressing toward goals

## 2024-10-14 ENCOUNTER — APPOINTMENT (OUTPATIENT)
Dept: GENERAL RADIOLOGY | Facility: HOSPITAL | Age: 69
End: 2024-10-14
Payer: MEDICARE

## 2024-10-14 LAB
ALBUMIN FLD-MCNC: 3.1 G/DL
APPEARANCE FLD: ABNORMAL
COLOR FLD: ABNORMAL
CRP SERPL-MCNC: 5.96 MG/DL (ref 0–0.5)
ERYTHROCYTE [SEDIMENTATION RATE] IN BLOOD: 51 MM/HR (ref 0–30)
GLUCOSE FLD-MCNC: 134 MG/DL
LDH FLD-CCNC: 250 U/L
LYMPHOCYTES NFR FLD MANUAL: 33 %
MACROPHAGE FLUID: 7 %
MESOTHL CELL NFR FLD MANUAL: 1 %
MONOCYTES NFR FLD: 2 %
NEUTROPHILS NFR FLD MANUAL: 57 %
NUC CELL # FLD: 2010 /MM3
PH FLD: 8 [PH]
PROCALCITONIN SERPL-MCNC: 0.08 NG/ML (ref 0–0.25)
PROT FLD-MCNC: 4.1 G/DL
RBC # FLD AUTO: ABNORMAL /MM3

## 2024-10-14 PROCEDURE — 83615 LACTATE (LD) (LDH) ENZYME: CPT | Performed by: INTERNAL MEDICINE

## 2024-10-14 PROCEDURE — 86225 DNA ANTIBODY NATIVE: CPT | Performed by: INTERNAL MEDICINE

## 2024-10-14 PROCEDURE — 80076 HEPATIC FUNCTION PANEL: CPT | Performed by: INTERNAL MEDICINE

## 2024-10-14 PROCEDURE — 86140 C-REACTIVE PROTEIN: CPT | Performed by: INTERNAL MEDICINE

## 2024-10-14 PROCEDURE — 89051 BODY FLUID CELL COUNT: CPT | Performed by: INTERNAL MEDICINE

## 2024-10-14 PROCEDURE — 83516 IMMUNOASSAY NONANTIBODY: CPT | Performed by: INTERNAL MEDICINE

## 2024-10-14 PROCEDURE — 94761 N-INVAS EAR/PLS OXIMETRY MLT: CPT

## 2024-10-14 PROCEDURE — 25010000002 MORPHINE PER 10 MG: Performed by: STUDENT IN AN ORGANIZED HEALTH CARE EDUCATION/TRAINING PROGRAM

## 2024-10-14 PROCEDURE — 87075 CULTR BACTERIA EXCEPT BLOOD: CPT | Performed by: INTERNAL MEDICINE

## 2024-10-14 PROCEDURE — 88305 TISSUE EXAM BY PATHOLOGIST: CPT | Performed by: INTERNAL MEDICINE

## 2024-10-14 PROCEDURE — 99223 1ST HOSP IP/OBS HIGH 75: CPT | Performed by: INTERNAL MEDICINE

## 2024-10-14 PROCEDURE — 82945 GLUCOSE OTHER FLUID: CPT | Performed by: INTERNAL MEDICINE

## 2024-10-14 PROCEDURE — 86037 ANCA TITER EACH ANTIBODY: CPT | Performed by: INTERNAL MEDICINE

## 2024-10-14 PROCEDURE — 88341 IMHCHEM/IMCYTCHM EA ADD ANTB: CPT | Performed by: INTERNAL MEDICINE

## 2024-10-14 PROCEDURE — 86038 ANTINUCLEAR ANTIBODIES: CPT | Performed by: INTERNAL MEDICINE

## 2024-10-14 PROCEDURE — 84157 ASSAY OF PROTEIN OTHER: CPT | Performed by: INTERNAL MEDICINE

## 2024-10-14 PROCEDURE — 71045 X-RAY EXAM CHEST 1 VIEW: CPT

## 2024-10-14 PROCEDURE — 94799 UNLISTED PULMONARY SVC/PX: CPT

## 2024-10-14 PROCEDURE — 85652 RBC SED RATE AUTOMATED: CPT | Performed by: INTERNAL MEDICINE

## 2024-10-14 PROCEDURE — 84145 PROCALCITONIN (PCT): CPT | Performed by: INTERNAL MEDICINE

## 2024-10-14 PROCEDURE — 83986 ASSAY PH BODY FLUID NOS: CPT | Performed by: INTERNAL MEDICINE

## 2024-10-14 PROCEDURE — 99233 SBSQ HOSP IP/OBS HIGH 50: CPT | Performed by: INTERNAL MEDICINE

## 2024-10-14 PROCEDURE — 88342 IMHCHEM/IMCYTCHM 1ST ANTB: CPT | Performed by: INTERNAL MEDICINE

## 2024-10-14 PROCEDURE — 87070 CULTURE OTHR SPECIMN AEROBIC: CPT | Performed by: INTERNAL MEDICINE

## 2024-10-14 PROCEDURE — 88108 CYTOPATH CONCENTRATE TECH: CPT | Performed by: INTERNAL MEDICINE

## 2024-10-14 PROCEDURE — 87205 SMEAR GRAM STAIN: CPT | Performed by: INTERNAL MEDICINE

## 2024-10-14 PROCEDURE — 82042 OTHER SOURCE ALBUMIN QUAN EA: CPT | Performed by: INTERNAL MEDICINE

## 2024-10-14 PROCEDURE — 32555 ASPIRATE PLEURA W/ IMAGING: CPT | Performed by: INTERNAL MEDICINE

## 2024-10-14 PROCEDURE — 0W9B3ZZ DRAINAGE OF LEFT PLEURAL CAVITY, PERCUTANEOUS APPROACH: ICD-10-PCS | Performed by: INTERNAL MEDICINE

## 2024-10-14 RX ORDER — ROSUVASTATIN CALCIUM 40 MG/1
40 TABLET, COATED ORAL DAILY
Qty: 90 TABLET | Refills: 0 | OUTPATIENT
Start: 2024-10-14

## 2024-10-14 RX ORDER — PREDNISONE 20 MG/1
20 TABLET ORAL
Status: DISCONTINUED | OUTPATIENT
Start: 2024-10-15 | End: 2024-10-15

## 2024-10-14 RX ADMIN — Medication 1 TABLET: at 08:25

## 2024-10-14 RX ADMIN — MIDODRINE HYDROCHLORIDE 5 MG: 5 TABLET ORAL at 08:25

## 2024-10-14 RX ADMIN — Medication 10 ML: at 20:10

## 2024-10-14 RX ADMIN — Medication 2000 UNITS: at 08:25

## 2024-10-14 RX ADMIN — FLUOXETINE HYDROCHLORIDE 20 MG: 20 CAPSULE ORAL at 08:24

## 2024-10-14 RX ADMIN — SPIRONOLACTONE 25 MG: 25 TABLET ORAL at 08:25

## 2024-10-14 RX ADMIN — PANTOPRAZOLE SODIUM 40 MG: 40 TABLET, DELAYED RELEASE ORAL at 05:53

## 2024-10-14 RX ADMIN — MORPHINE SULFATE 2 MG: 2 INJECTION, SOLUTION INTRAMUSCULAR; INTRAVENOUS at 20:10

## 2024-10-14 RX ADMIN — AMOXICILLIN AND CLAVULANATE POTASSIUM 1 TABLET: 875; 125 TABLET, FILM COATED ORAL at 12:38

## 2024-10-14 RX ADMIN — ROSUVASTATIN 20 MG: 20 TABLET, FILM COATED ORAL at 20:05

## 2024-10-14 RX ADMIN — MIDODRINE HYDROCHLORIDE 5 MG: 5 TABLET ORAL at 17:09

## 2024-10-14 RX ADMIN — SPIRONOLACTONE 25 MG: 25 TABLET ORAL at 20:05

## 2024-10-14 RX ADMIN — MORPHINE SULFATE 2 MG: 2 INJECTION, SOLUTION INTRAMUSCULAR; INTRAVENOUS at 12:08

## 2024-10-14 RX ADMIN — Medication 10 ML: at 08:25

## 2024-10-14 RX ADMIN — FLUOXETINE HYDROCHLORIDE 20 MG: 20 CAPSULE ORAL at 12:38

## 2024-10-14 RX ADMIN — AMOXICILLIN AND CLAVULANATE POTASSIUM 1 TABLET: 875; 125 TABLET, FILM COATED ORAL at 20:05

## 2024-10-14 RX ADMIN — ACYCLOVIR 400 MG: 200 CAPSULE ORAL at 20:05

## 2024-10-14 RX ADMIN — ASPIRIN 81 MG: 81 TABLET, CHEWABLE ORAL at 08:24

## 2024-10-14 RX ADMIN — FLUOXETINE HYDROCHLORIDE 20 MG: 20 CAPSULE ORAL at 17:09

## 2024-10-14 RX ADMIN — LEVOTHYROXINE SODIUM 75 MCG: 75 TABLET ORAL at 05:53

## 2024-10-14 RX ADMIN — PREGABALIN 50 MG: 25 CAPSULE ORAL at 08:24

## 2024-10-14 RX ADMIN — OXYCODONE 10 MG: 5 TABLET ORAL at 17:26

## 2024-10-14 RX ADMIN — CYCLOBENZAPRINE HYDROCHLORIDE 10 MG: 5 TABLET, FILM COATED ORAL at 12:08

## 2024-10-14 RX ADMIN — Medication 10 ML: at 12:08

## 2024-10-14 RX ADMIN — FLUOXETINE HYDROCHLORIDE 20 MG: 20 CAPSULE ORAL at 20:05

## 2024-10-14 RX ADMIN — ACYCLOVIR 400 MG: 200 CAPSULE ORAL at 08:24

## 2024-10-14 RX ADMIN — EMPAGLIFLOZIN 10 MG: 10 TABLET, FILM COATED ORAL at 08:25

## 2024-10-14 NOTE — PROCEDURES
"Thoracentesis at Bedside    Date/Time: 10/14/2024 4:46 PM    Performed by: Spencer Cunningham DO  Authorized by: Spencer Cunningham DO  Consent: Verbal consent obtained. Written consent obtained.  Risks and benefits: risks, benefits and alternatives were discussed  Consent given by: patient  Patient understanding: patient states understanding of the procedure being performed  Patient consent: the patient's understanding of the procedure matches consent given  Procedure consent: procedure consent matches procedure scheduled  Relevant documents: relevant documents present and verified  Test results: test results available and properly labeled  Site marked: the operative site was marked  Imaging studies: imaging studies available  Patient identity confirmed: verbally with patient  Time out: Immediately prior to procedure a \"time out\" was called to verify the correct patient, procedure, equipment, support staff and site/side marked as required.  Procedure purpose: diagnostic and therapeutic  Preparation: Patient was prepped and draped in the usual sterile fashion.  Local anesthesia used: yes  Anesthesia: local infiltration    Anesthesia:  Local anesthesia used: yes  Local Anesthetic: lidocaine 1% without epinephrine  Anesthetic total: 10 mL    Sedation:  Patient sedated: no    Preparation: skin prepped with ChloraPrep and sterile field established  Patient position: sitting  Ultrasound guidance: yes  Location: left posterior  Puncture method: over-the-needle catheter  Number of attempts: 1  Drainage amount: 100 ml  Drainage characteristics: cloudy  Patient tolerance: patient tolerated the procedure well with no immediate complications  Chest x-ray performed: yes  Chest x-ray interpreted by me.        "

## 2024-10-14 NOTE — CONSULTS
"[facility]   Consult Note    Patient Name: Cynthia Her  : 1955  MRN: [id number]  Primary Care Physician:  Arian Peterson MD  Referring Physician: Quan Davis MD  Date of admission: 10/11/2024    Inpatient Pulmonology Consult  Consult performed by: Spencer Cunningham DO  Consult ordered by: Quan Davis MD        Subjective   Subjective     Reason for Consult/ Chief Complaint: Reason for consultation pleuritic chest pain    History of Present Illness  Cynthia Her is a 69 y.o. female pleasant female admitted to the hospital with chest discomfort  Has been having chest discomfort last several days  Her pain is located just under her left breast including sharp in nature  Feels that she has a \"catch\"  No cough  Does have low-grade fevers    Review of Systems   Positive for chest pain  Personal History     Past Medical History:   Diagnosis Date   • AL amyloidosis    • Anxiety    • Arthritis    • Bowel perforation    • COVID-19 2021   • Depression    • Diverticulitis of colon    • Diverticulosis    • GERD (gastroesophageal reflux disease)    • History of Clostridioides difficile infection 2008    HAS HAD SEVERAL TIMES IN PAST PER PT (SAW INFECTIOUS DISEASE MD FOR THIS S/P COLOSTOMY/EXTENSIVE ANBX THERAPY)   • History of prediabetes    • History of snoring    • History of stress incontinence    • Hypercholesterolemia    • Hyperlipidemia    • Hypertension    • Left ventricular hypertrophy     FOLLOWED BY DR MARI. DENIES CHEST PAIN BUT STATES DOES GET SOA AT TIMES WITH EXERTION REPORTS THAT IT IS NOT A NEW ISSUE    • Liver disease    • Oral herpes 2020   • Seasonal allergies     used to have allergy shots in the past   • SOB (shortness of breath)     STATES WITH EXERTION HAS BEEN ONGOING ISSUE NOT A NEW ISSUE   • Status post colostomy     reversed   • Thyroid disease     Hypothyroid       Past Surgical History:   Procedure Laterality Date   • " ABDOMINAL SURGERY  2005, 2014    ex lap x 2,  diverticulitis   • ABDOMINOPLASTY  2016   • ADENOIDECTOMY     • APPENDECTOMY     • CARDIAC CATHETERIZATION N/A 04/24/2020    Procedure: Coronary angiography;  Surgeon: Roberto Briones MD;  Location: [facility] CATH INVASIVE LOCATION;  Service: Cardiovascular;  Laterality: N/A;   • CARDIAC CATHETERIZATION N/A 04/24/2020    Procedure: Left heart cath;  Surgeon: Roberto Briones MD;  Location: [facility] CATH INVASIVE LOCATION;  Service: Cardiovascular;  Laterality: N/A;   • CARDIAC CATHETERIZATION N/A 04/24/2020    Procedure: Left ventriculography;  Surgeon: Roberto Briones MD;  Location: [facility] CATH INVASIVE LOCATION;  Service: Cardiovascular;  Laterality: N/A;   • CARDIAC SURGERY      open heart surgery,   • COLONOSCOPY  approx 2018    normal per pt    • COLOSTOMY  2005    had colostomy and then is was reversed   • COLOSTOMY CLOSURE  2006   • CORRECTION HAMMER TOE Right 2017   • ECTOPIC PREGNANCY SURGERY      1979, 1980   • ENDOSCOPY N/A 05/27/2020    Procedure: ESOPHAGOGASTRODUODENOSCOPY WITH BIOPSY AND BIOPSY POLYPECTOMY AND MACIEL DIALATION;  Surgeon: Preethi Beck MD;  Location: [facility] ENDOSCOPY;  Service: Gastroenterology;  Laterality: N/A;  PRE: ESOPHAGEAL DYSPHAGIA  POST: Z LINE 46, ESOPHAGEAL SPASM, GASTRITIS, FUNDIC POLYP   • ENDOSCOPY N/A 06/20/2023   • ESOPHAGEAL DILATATION N/A 12/07/2021    Procedure: OR ESOPHAGEAL DILATATION with maciel dilators ;  Surgeon: Jamey Leslie MD;  [place] [facility] [place] [facility];  Service: ENT;  Laterality: N/A;   • ESOPHAGEAL MOTILITY STUDY N/A 02/03/2022    Procedure: ESOPHAGEAL MOTILITY STUDY;  Surgeon: Harshil, Nurse Performed;  Location: [facility] ENDOSCOPY;  Service: Gastroenterology;  Laterality: N/A;  dypshagia    • FOOT SURGERY Right 12/2016    Second and third hammertoe corrections   • KNEE ARTHROSCOPY Right 2009   • KNEE SURGERY Right    • REVISION / TAKEDOWN COLOSTOMY  2006   • SHOULDER ARTHROSCOPY  Right 2018    right shoulder arthroscopy with extensive rotator cuff, biceps and labral debridement, chondroplasty, & subacromial decompression with acromioplasty   • SHOULDER SURGERY      HAS HAS COMPLETE SHOULDER ON RIGHT. LEFT SCOPED AND HAD 2ND SURGERY WITH HARDWARE PLACED   • SHOULDER SURGERY Left     2012. 2013   • TONSILLECTOMY     • TOTAL SHOULDER REVERSE ARTHROPLASTY Right 2019   • WISDOM TOOTH EXTRACTION         Family History: family history includes Breast cancer in her maternal grandmother; Heart disease in her father; Hypertension in her father and mother; Osteoporosis in her mother; Skin cancer in her mother. Otherwise pertinent FHx was reviewed and not pertinent to current issue.    Social History:  reports that she has never smoked. She has never been exposed to tobacco smoke. She has never used smokeless tobacco. She reports that she does not currently use alcohol after a past usage of about 1.0 standard drink of alcohol per week. She reports that she does not use drugs.    Home Medications:   ALPRAZolam, FLUoxetine, Menthol-Zinc Oxide, Rosuvastatin Calcium, Sodium Fluoride, V-C Forte, Vitamin D3, acyclovir, aspirin, cholestyramine, cyclobenzaprine, diclofenac, diphenoxylate-atropine, empagliflozin, icosapent ethyl, levothyroxine, loratadine, midodrine, olopatadine, pantoprazole, pregabalin, spironolactone, and zolpidem    Allergies:  Allergies   Allergen Reactions   • Azithromycin Rash and Other (See Comments)     Reaction unknown to patient (unable to remember)  Other reaction(s): Other: stomach issues, Stomach ache, Other: stomach issues, Stomach ache   • Ezetimibe Myalgia, Other (See Comments) and Rash     cramps  cramps   • Pravastatin Rash and Other (See Comments)       Objective    Objective     Vitals:  Temp:  [98.1 °F (36.7 °C)-98.8 °F (37.1 °C)] 98.7 °F (37.1 °C)  Heart Rate:  [77-87] 84  Resp:  [19-20] 20  BP: (111-138)/(61-94) 129/94  Flow (L/min) (Oxygen Therapy):  [0] 0    Physical  Exam  Increased breath sounds left greater than right  Mild crackles/pleural function rub on the left compared to right  Result Review    Result Review:  I have personally reviewed the results from the time of this admission to 10/14/2024 16:48 EDT and agree with these findings:  [x]  Laboratory  [x]  Microbiology  [x]  Radiology  [x]  EKG/Telemetry   [x]  Cardiology/Vascular   []  Pathology  []  Old records  []  Other:    Most notable findings include: CT scan showing small left-sided pleural effusion    Assessment & Plan   Assessment / Plan     Brief Patient Summary:  Cynthia Her is a 69 y.o. female who to the hospital with some atypical left-sided pleuritic chest pain    Active Hospital Problems:  Active Hospital Problems    Diagnosis    • **Atypical chest pain    • Pleuritis    Small left-sided pleural effusion    Plan:   Given the patient's significant symptoms at bedside thoracentesis been performed  The pleural fluid collection was very small only 100 cc was removed and the chest was completely drained  Fluid was somewhat cloudy in appearance  Will send for chemistry, cell count, cytology as well as micro  This time we will start patient on low-dose prednisone  Patient has been having low-grade fevers  Given the cloudy peritoneal fluid would recommend starting patient on Augmentin as this could be infectious  Will also start patient on low-dose prednisone  Check ANCA  Check CHRISTO    I personally reviewed all imaging, laboratory data, and I spoke with respiratory therapy, and nursing regarding the patient's care, have also spoken with the patient's primary admitting physician regarding her plan of care.    Electronically signed by Spencer Cunningham DO, 10/14/24, 4:48 PM EDT.    complains of pain/discomfort no

## 2024-10-14 NOTE — PLAN OF CARE
Goal Outcome Evaluation:  Plan of Care Reviewed With: patient        Progress: improving  Outcome Evaluation: Pt a/o x4, VSS. Thoracentisis this shift with minimal fluid removed and sent to lab. Medicated with prn pain medications x 3 after procedure. Care ongoing.

## 2024-10-14 NOTE — PLAN OF CARE
Goal Outcome Evaluation:  Plan of Care Reviewed With: patient        Progress: no change  Outcome Evaluation: Patient is A&Ox4. VSS. Medicated x1 for pain. No significant changes this shift. Plan of care ongoing.

## 2024-10-14 NOTE — PROGRESS NOTES
[facility]   Hospitalist Progress Note  Date: 10/14/2024  Patient Name: Cynthia Her  : 1955  MRN: [id number]  Date of admission: 10/11/2024      Subjective   Subjective     Chief Complaint: Sharp left-sided chest pain under breast    Summary:   Cynthia Her is a 69 y.o. female with past medical history of amyloidosis, hypertension hypothyroidism presents to the ED due to chest pain.  Patient states has been dealing with chest pain for the past 5 days.  Patient describes the pain as stabbing like feeling that is random and intermittent.  In addition patient's been having intermittent fevers.  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  Denies abdominal pain, nausea, vomiting, diarrhea, shortness of breath, headache, dysuria or palpitations.  In the ED, patient's vitals showed temperature 100.1, heart rate 1 1 and satting 91% on room air.  Troponins 30 5 repeat 32, sodium 139, potassium 4.6, creatinine 0.77, white blood cell 9.64, platelets 230.  CT chest shows small left pleural effusion with atelectasis and small pericardial effusion.  Patient admitted to floors for further management.  Patient follows with [place] and local oncology for her amyloidosis.  Patient evaluated by pulmonary and had 100 cc of cloudy left thoracentesis on .  Started on trial of prednisone and Augmentin.  2D echo without any pericardial effusion with EF of 51%.  Inflammatory markers trending down  Interval Followup:   No more fever.  Seen after thoracentesis still having chest pain  Other vital signs stable   Patient continues to have a constant sharp chest pain with deep breathing only.  Not related to exertion or posture.  It has moved more laterally and downwards in the chest.  Pain medication helps for couple hours.  It hurts with coughing also.  Positive cough for few days  No mucus.  No shortness of air.    Review of Systems   All systems were reviewed and negative  except for: Summary and interval follow-up    Objective   Objective     Vitals:   Temp:  [98.1 °F (36.7 °C)-98.8 °F (37.1 °C)] 98.7 °F (37.1 °C)  Heart Rate:  [77-87] 84  Resp:  [19-20] 20  BP: (111-138)/(61-94) 129/94  Flow (L/min) (Oxygen Therapy):  [0] 0  Physical Exam    Constitutional: Awake, alert, no acute distress   Eyes: Pupils equal, sclerae anicteric, no conjunctival injection   HENT: NCAT, mucous membranes moist   Neck: Supple, no thyromegaly, no lymphadenopathy, trachea midline   Respiratory: Clear to auscultation bilaterally, nonlabored respirations    Cardiovascular: Tender left anterolateral rib under the breast, no lesions or rash, RRR, no murmurs, rubs, or gallops, palpable pedal pulses bilaterally   Gastrointestinal: Positive bowel sounds, soft, nontender, nondistended   Musculoskeletal: No bilateral ankle edema, no clubbing or cyanosis to extremities   Psychiatric: Appropriate affect, cooperative   Neurologic: Oriented x 3, strength symmetric in all extremities, Cranial Nerves grossly intact to confrontation, speech clear   Skin: No rashes     Result Review    Result Review:  I have personally reviewed the results for the past 24 hours and agree with these findings:  [x]  Laboratory  [x]  Microbiology  [x]  Radiology  [x]  EKG/Telemetry sinus tachycardia 104, LVH, IVCD, QT 0.5  []  Cardiology/Vascular   []  Pathology  [x]  Old records  [x]  Other: Medications    Assessment & Plan   Assessment / Plan     Assessment:  Pleuritic chest pain.  Likely viral.  ?  Small pericardial.  2D echo negative  left pleural effusion.  S/p 100 mL thoracentesis on October 14  Lambda AL amyloidosis s/p chemotherapy.  LVH/hokum status post myomectomy 2022.  Status post Watchman procedure left atrial appendage.  Hypertension.  Hypothyroidism.  Chronically elevated troponin due to amyloidosis and hypertrophy.       Plan:  NSAIDs IV and oral as needed.  IV and oral narcotics for pain control as needed.  Discussed with  pulmonary.  Patient improved.  Postthoracentesis x-ray without pneumothorax.  Checking CHRISTO and ANCA  Noted 2D echo.  Discussed with patient  CT chest noted no evidence of PE, small left pleural effusion and pericardial effusion.  Noted viral PCR.  Negative  Agree with Augmentin and trial of prednisone as per pulmonary  Blood culture pending.  Inflammatory markers elevated noted.  Trend down  DC telemetry.    Discussed plan with RN.    VTE Prophylaxis:  Mechanical VTE prophylaxis orders are present.        CODE STATUS:   Code Status (Patient has no pulse and is not breathing): CPR (Attempt to Resuscitate)  Medical Interventions (Patient has pulse or is breathing): Full Support      Part of this note may be an electronic transcription/translation of spoken language to printed text using the Dragon Dictation System.       Electronically signed by Quan Davis MD, 10/14/24, 5:21 PM EDT.

## 2024-10-15 LAB
ALBUMIN SERPL-MCNC: 3.6 G/DL (ref 3.5–5.2)
ALP SERPL-CCNC: 76 U/L (ref 39–117)
ALT SERPL W P-5'-P-CCNC: 26 U/L (ref 1–33)
ANA SER QL: POSITIVE
AST SERPL-CCNC: 32 U/L (ref 1–32)
BILIRUB CONJ SERPL-MCNC: 0.1 MG/DL (ref 0–0.3)
BILIRUB INDIRECT SERPL-MCNC: NORMAL MG/DL
BILIRUB SERPL-MCNC: <0.2 MG/DL (ref 0–1.2)
DSDNA AB SER-ACNC: <1 IU/ML (ref 0–9)
LDH SERPL-CCNC: 253 U/L (ref 135–214)
Lab: NORMAL
PROT SERPL-MCNC: 6.2 G/DL (ref 6–8.5)

## 2024-10-15 PROCEDURE — 63710000001 PREDNISONE PER 1 MG: Performed by: INTERNAL MEDICINE

## 2024-10-15 PROCEDURE — 25010000002 MORPHINE PER 10 MG: Performed by: STUDENT IN AN ORGANIZED HEALTH CARE EDUCATION/TRAINING PROGRAM

## 2024-10-15 PROCEDURE — 94799 UNLISTED PULMONARY SVC/PX: CPT

## 2024-10-15 PROCEDURE — 99233 SBSQ HOSP IP/OBS HIGH 50: CPT | Performed by: INTERNAL MEDICINE

## 2024-10-15 PROCEDURE — 99232 SBSQ HOSP IP/OBS MODERATE 35: CPT | Performed by: INTERNAL MEDICINE

## 2024-10-15 PROCEDURE — 25010000002 METHYLPREDNISOLONE PER 40 MG

## 2024-10-15 RX ORDER — METHYLPREDNISOLONE SODIUM SUCCINATE 40 MG/ML
40 INJECTION, POWDER, LYOPHILIZED, FOR SOLUTION INTRAMUSCULAR; INTRAVENOUS DAILY
Status: DISCONTINUED | OUTPATIENT
Start: 2024-10-15 | End: 2024-10-16 | Stop reason: HOSPADM

## 2024-10-15 RX ORDER — DIPHENOXYLATE HCL/ATROPINE 2.5-.025MG
1 TABLET ORAL 4 TIMES DAILY PRN
Status: DISCONTINUED | OUTPATIENT
Start: 2024-10-15 | End: 2024-10-16 | Stop reason: HOSPADM

## 2024-10-15 RX ADMIN — Medication 10 ML: at 08:23

## 2024-10-15 RX ADMIN — SPIRONOLACTONE 25 MG: 25 TABLET ORAL at 08:21

## 2024-10-15 RX ADMIN — MIDODRINE HYDROCHLORIDE 5 MG: 5 TABLET ORAL at 08:23

## 2024-10-15 RX ADMIN — FLUOXETINE HYDROCHLORIDE 20 MG: 20 CAPSULE ORAL at 12:17

## 2024-10-15 RX ADMIN — ACYCLOVIR 400 MG: 200 CAPSULE ORAL at 08:22

## 2024-10-15 RX ADMIN — FLUOXETINE HYDROCHLORIDE 20 MG: 20 CAPSULE ORAL at 21:33

## 2024-10-15 RX ADMIN — Medication 10 ML: at 21:34

## 2024-10-15 RX ADMIN — PREDNISONE 20 MG: 20 TABLET ORAL at 08:22

## 2024-10-15 RX ADMIN — Medication 2000 UNITS: at 08:21

## 2024-10-15 RX ADMIN — PREGABALIN 50 MG: 25 CAPSULE ORAL at 08:22

## 2024-10-15 RX ADMIN — DIPHENOXYLATE HYDROCHLORIDE AND ATROPINE SULFATE 1 TABLET: 2.5; .025 TABLET ORAL at 21:32

## 2024-10-15 RX ADMIN — MORPHINE SULFATE 2 MG: 2 INJECTION, SOLUTION INTRAMUSCULAR; INTRAVENOUS at 08:23

## 2024-10-15 RX ADMIN — AMOXICILLIN AND CLAVULANATE POTASSIUM 1 TABLET: 875; 125 TABLET, FILM COATED ORAL at 08:22

## 2024-10-15 RX ADMIN — SPIRONOLACTONE 25 MG: 25 TABLET ORAL at 21:33

## 2024-10-15 RX ADMIN — FLUOXETINE HYDROCHLORIDE 20 MG: 20 CAPSULE ORAL at 08:22

## 2024-10-15 RX ADMIN — MIDODRINE HYDROCHLORIDE 5 MG: 5 TABLET ORAL at 17:26

## 2024-10-15 RX ADMIN — ACYCLOVIR 400 MG: 200 CAPSULE ORAL at 21:33

## 2024-10-15 RX ADMIN — Medication 1 TABLET: at 08:22

## 2024-10-15 RX ADMIN — METHYLPREDNISOLONE SODIUM SUCCINATE 40 MG: 40 INJECTION, POWDER, FOR SOLUTION INTRAMUSCULAR; INTRAVENOUS at 12:17

## 2024-10-15 RX ADMIN — ZOLPIDEM TARTRATE 5 MG: 5 TABLET ORAL at 00:44

## 2024-10-15 RX ADMIN — AMOXICILLIN AND CLAVULANATE POTASSIUM 1 TABLET: 875; 125 TABLET, FILM COATED ORAL at 21:34

## 2024-10-15 RX ADMIN — ASPIRIN 81 MG: 81 TABLET, CHEWABLE ORAL at 08:21

## 2024-10-15 RX ADMIN — LEVOTHYROXINE SODIUM 75 MCG: 75 TABLET ORAL at 05:51

## 2024-10-15 RX ADMIN — FLUOXETINE HYDROCHLORIDE 20 MG: 20 CAPSULE ORAL at 17:26

## 2024-10-15 RX ADMIN — ZOLPIDEM TARTRATE 5 MG: 5 TABLET ORAL at 22:53

## 2024-10-15 RX ADMIN — EMPAGLIFLOZIN 10 MG: 10 TABLET, FILM COATED ORAL at 08:23

## 2024-10-15 RX ADMIN — PANTOPRAZOLE SODIUM 40 MG: 40 TABLET, DELAYED RELEASE ORAL at 05:51

## 2024-10-15 RX ADMIN — ALPRAZOLAM 0.25 MG: 0.25 TABLET ORAL at 17:34

## 2024-10-15 RX ADMIN — ROSUVASTATIN 20 MG: 20 TABLET, FILM COATED ORAL at 21:33

## 2024-10-15 NOTE — PLAN OF CARE
Goal Outcome Evaluation:  Plan of Care Reviewed With: patient           Outcome Evaluation: Patient alert and orient X4. Vital signs stable. Medication received per MAR. Reported some anxiety for which medication was received per MAR. Some pain reported for which medication was received per MAR. Would like to have home health once discharged. Will continue with plan of care.

## 2024-10-15 NOTE — PROGRESS NOTES
[facility]     Progress Note    Patient Name: Cynthia Her  : 1955  MRN: [id number]  Primary Care Physician:  Arian Peterson MD  Date of admission: 10/11/2024    Subjective   Subjective     Chief Complaint: Consultation pleural effusion    History of Present Illness  Patient Reports still with pleuritic chest pain  No significant change after having thoracentesis  Review of Systems  Left-sided pleuritic chest pain  Objective   Objective     Vitals:   Temp:  [97.5 °F (36.4 °C)-98.7 °F (37.1 °C)] 97.8 °F (36.6 °C)  Heart Rate:  [80-94] 94  Resp:  [16-20] 18  BP: (104-129)/(54-94) 124/56  Flow (L/min) (Oxygen Therapy):  [0] 0    Physical Exam   Pleasant female  Resting comfortably  In no acute distress  Result Review    Result Review:  I have personally reviewed the results from the time of this admission to 10/15/2024 15:35 EDT and agree with these findings:  []  Laboratory list / accordion  []  Microbiology  []  Radiology  []  EKG/Telemetry   []  Cardiology/Vascular   []  Pathology  []  Old records  []  Other:  Oral fluid studies consistent with exudative effusion      Assessment & Plan   Assessment / Plan     Brief Patient Summary:  Cynthia Her is a 69 y.o. female who with AL amyloidosis found to have exudative left-sided pleural effusion    Active Hospital Problems:  Active Hospital Problems    Diagnosis    • **Atypical chest pain    • Pleuritis    Exudative left-sided pleural effusion      Plan:   Patient does have positive CHRISTO  Question if this could be lupus related  Anti-double-stranded was negative so this is much less likely  ANCA pending  Will start IV steroids  Continue with oral antibiotics  This could be related to her known amyloidosis however this is a very difficult diagnosis to make may need pleural biopsy if this effusion does not improve this can be done at [facility] as she follows at [facility] for her amyloidosis    VTE Prophylaxis:  Mechanical VTE  prophylaxis orders are present.        CODE STATUS:    Code Status (Patient has no pulse and is not breathing): CPR (Attempt to Resuscitate)  Medical Interventions (Patient has pulse or is breathing): Full Support      Spencer Cunningham DO    Electronically signed by Spencer Cunningham DO, 10/15/24, 3:40 PM EDT.

## 2024-10-15 NOTE — PLAN OF CARE
Goal Outcome Evaluation:  Plan of Care Reviewed With: patient        Progress: improving  Outcome Evaluation: Alert and oriented x4, VSS. Medicated for pain per MAR. Sleep aid given per MAR. No additional needs at this time. Plan of care ongoing.

## 2024-10-15 NOTE — PROGRESS NOTES
[facility]   Hospitalist Progress Note  Date: 10/15/2024  Patient Name: Cynthia Her  : 1955  MRN: [id number]  Date of admission: 10/11/2024      Subjective   Subjective     Chief Complaint: Sharp left-sided chest pain under breast    Summary:   Cynthia Her is a 69 y.o. female with past medical history of amyloidosis, hypertension hypothyroidism presents to the ED due to chest pain.  Patient states has been dealing with chest pain for the past 5 days.  Patient describes the pain as stabbing like feeling that is random and intermittent.  In addition patient's been having intermittent fevers.  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  Denies abdominal pain, nausea, vomiting, diarrhea, shortness of breath, headache, dysuria or palpitations.  In the ED, patient's vitals showed temperature 100.1, heart rate 1 1 and satting 91% on room air.  Troponins 30 5 repeat 32, sodium 139, potassium 4.6, creatinine 0.77, white blood cell 9.64, platelets 230.  CT chest shows small left pleural effusion with atelectasis and small pericardial effusion.  Patient admitted to floors for further management.  Patient follows with [place] and local oncology for her amyloidosis.  Patient evaluated by pulmonary and had 100 cc of cloudy left thoracentesis on .  Started on trial of prednisone and Augmentin.  2D echo without any pericardial effusion with EF of 51%.  Inflammatory markers trending down.  Pleural fluid consistent with exudate.  Started on IV steroids as per pulmonary  Interval Followup:     On room air, vital signs stable   Patient continues to have a constant sharp chest pain with deep breathing only.  Not related to exertion or posture.  It has moved more laterally and downwards in the chest.  Pain medication helps for couple hours.  It hurts with coughing also.  Positive cough for few days  No mucus.  No shortness of air.    Review of Systems   All systems  were reviewed and negative except for: Summary and interval follow-up    Objective   Objective     Vitals:   Temp:  [97.5 °F (36.4 °C)-98.7 °F (37.1 °C)] 98.4 °F (36.9 °C)  Heart Rate:  [80-86] 85  Resp:  [16-20] 16  BP: (104-129)/(54-94) 109/71  Flow (L/min) (Oxygen Therapy):  [0] 0  Physical Exam    Constitutional: Awake, alert, no acute distress   Eyes: Pupils equal, sclerae anicteric, no conjunctival injection   HENT: NCAT, mucous membranes moist   Neck: Supple, no thyromegaly, no lymphadenopathy, trachea midline   Respiratory: Clear to auscultation bilaterally, nonlabored respirations    Cardiovascular: Tender left anterolateral rib under the breast, no lesions or rash, RRR, no murmurs, rubs, or gallops, palpable pedal pulses bilaterally   Gastrointestinal: Positive bowel sounds, soft, nontender, nondistended   Musculoskeletal: No bilateral ankle edema, no clubbing or cyanosis to extremities   Psychiatric: Appropriate affect, cooperative   Neurologic: Oriented x 3, strength symmetric in all extremities, Cranial Nerves grossly intact to confrontation, speech clear   Skin: No rashes     Result Review    Result Review:  I have personally reviewed the results for the past 24 hours and agree with these findings:  [x]  Laboratory  [x]  Microbiology  [x]  Radiology  [x]  EKG/Telemetry sinus tachycardia 104, LVH, IVCD, QT 0.5  []  Cardiology/Vascular   []  Pathology  [x]  Old records  [x]  Other: Medications    Assessment & Plan   Assessment / Plan     Assessment:  Pleuritic chest pain.  Likely viral.  ?  Small pericardial.  2D echo negative  left pleural effusion.  Exudative.  S/p 100 mL thoracentesis on October 14.?  Etiology pleural amyloidosis.  Lambda AL amyloidosis s/p chemotherapy.  LVH/hokum status post myomectomy 2022.  Status post Watchman procedure left atrial appendage.  Hypertension.  Hypothyroidism.  Chronically elevated troponin due to amyloidosis and hypertrophy.       Plan:  NSAIDs IV and oral as  needed.  IV and oral narcotics for pain control as needed.  Discussed with pulmonary.  Patient improved.  Postthoracentesis x-ray without pneumothorax.  Checking CHRISTO and ANCA  Noted 2D echo.  Discussed with patient  CT chest noted no evidence of PE, small left pleural effusion and pericardial effusion.  Noted viral PCR.  Negative  Agree with Augmentin, started on IV Solu-Medrol.  P.o. prednisone DC'd as per pulmonary   blood culture pending.  Pleural fluid culture pending shows leukocytosis  Inflammatory markers elevated noted.  Trending down    Discussed plan with RN.  Discharge home in a.m. if cleared by pulmonary    VTE Prophylaxis:  Mechanical VTE prophylaxis orders are present.        CODE STATUS:   Code Status (Patient has no pulse and is not breathing): CPR (Attempt to Resuscitate)  Medical Interventions (Patient has pulse or is breathing): Full Support      Part of this note may be an electronic transcription/translation of spoken language to printed text using the Dragon Dictation System.         Electronically signed by Quan Davis MD, 10/15/24, 2:38 PM EDT.

## 2024-10-16 ENCOUNTER — READMISSION MANAGEMENT (OUTPATIENT)
Dept: CALL CENTER | Facility: HOSPITAL | Age: 69
End: 2024-10-16
Payer: MEDICARE

## 2024-10-16 ENCOUNTER — TELEPHONE (OUTPATIENT)
Dept: ONCOLOGY | Facility: CLINIC | Age: 69
End: 2024-10-16
Payer: MEDICARE

## 2024-10-16 VITALS
OXYGEN SATURATION: 93 % | SYSTOLIC BLOOD PRESSURE: 107 MMHG | WEIGHT: 135.14 LBS | RESPIRATION RATE: 16 BRPM | DIASTOLIC BLOOD PRESSURE: 68 MMHG | HEIGHT: 60 IN | HEART RATE: 82 BPM | BODY MASS INDEX: 26.53 KG/M2 | TEMPERATURE: 97.7 F

## 2024-10-16 LAB
BACTERIA SPEC AEROBE CULT: NORMAL
BACTERIA SPEC AEROBE CULT: NORMAL
C-ANCA TITR SER IF: NORMAL TITER
MYELOPEROXIDASE AB SER IA-ACNC: <0.2 UNITS (ref 0–0.9)
P-ANCA ATYPICAL TITR SER IF: NORMAL TITER
P-ANCA TITR SER IF: NORMAL TITER
PROTEINASE3 AB SER IA-ACNC: <0.2 UNITS (ref 0–0.9)

## 2024-10-16 PROCEDURE — 99239 HOSP IP/OBS DSCHRG MGMT >30: CPT | Performed by: INTERNAL MEDICINE

## 2024-10-16 PROCEDURE — 94761 N-INVAS EAR/PLS OXIMETRY MLT: CPT

## 2024-10-16 PROCEDURE — 94799 UNLISTED PULMONARY SVC/PX: CPT

## 2024-10-16 PROCEDURE — 25010000002 METHYLPREDNISOLONE PER 40 MG

## 2024-10-16 RX ORDER — PREDNISONE 10 MG/1
TABLET ORAL
Qty: 48 TABLET | Refills: 0 | Status: SHIPPED | OUTPATIENT
Start: 2024-10-16

## 2024-10-16 RX ADMIN — METHYLPREDNISOLONE SODIUM SUCCINATE 40 MG: 40 INJECTION, POWDER, FOR SOLUTION INTRAMUSCULAR; INTRAVENOUS at 08:09

## 2024-10-16 RX ADMIN — Medication 10 ML: at 08:09

## 2024-10-16 RX ADMIN — LEVOTHYROXINE SODIUM 75 MCG: 75 TABLET ORAL at 05:30

## 2024-10-16 RX ADMIN — ALPRAZOLAM 0.25 MG: 0.25 TABLET ORAL at 08:28

## 2024-10-16 RX ADMIN — SPIRONOLACTONE 25 MG: 25 TABLET ORAL at 08:09

## 2024-10-16 RX ADMIN — Medication 1 TABLET: at 08:08

## 2024-10-16 RX ADMIN — ACYCLOVIR 400 MG: 200 CAPSULE ORAL at 08:09

## 2024-10-16 RX ADMIN — DIPHENOXYLATE HYDROCHLORIDE AND ATROPINE SULFATE 1 TABLET: 2.5; .025 TABLET ORAL at 08:28

## 2024-10-16 RX ADMIN — Medication 2000 UNITS: at 08:08

## 2024-10-16 RX ADMIN — ASPIRIN 81 MG: 81 TABLET, CHEWABLE ORAL at 08:09

## 2024-10-16 RX ADMIN — PREGABALIN 50 MG: 25 CAPSULE ORAL at 08:09

## 2024-10-16 RX ADMIN — FLUOXETINE HYDROCHLORIDE 20 MG: 20 CAPSULE ORAL at 08:09

## 2024-10-16 RX ADMIN — AMOXICILLIN AND CLAVULANATE POTASSIUM 1 TABLET: 875; 125 TABLET, FILM COATED ORAL at 08:08

## 2024-10-16 RX ADMIN — EMPAGLIFLOZIN 10 MG: 10 TABLET, FILM COATED ORAL at 08:09

## 2024-10-16 RX ADMIN — MIDODRINE HYDROCHLORIDE 5 MG: 5 TABLET ORAL at 08:08

## 2024-10-16 RX ADMIN — PANTOPRAZOLE SODIUM 40 MG: 40 TABLET, DELAYED RELEASE ORAL at 05:30

## 2024-10-16 NOTE — TELEPHONE ENCOUNTER
"  Caller: Cynthia Her \"YOLETTE\"    Relationship: Self    Best call back number: [phone]     What is the best time to reach you: ASAP    Who are you requesting to speak with (clinical staff, provider,  specific staff member): CLINICAL    What was the call regarding: PT IS ADMITTED AT [facility], THEY WERE GOING TO HAVE HER FOLLOW UP WITH ERON BLAIR FOR LUNG ISSUES, BUT SHE WANTS DR VILLANUEVA TO RECOMMEND SOMEONE FOR HER AT [facility] IF POSSIBLE .  PLEASE CALL TO ADVISE.       "

## 2024-10-16 NOTE — TELEPHONE ENCOUNTER
Called patient, let her know per Dr. Sal for the patient to keep appt with Dr. Pascual and we will try to move up her appt up a few weeks. Pt v/u

## 2024-10-16 NOTE — PROGRESS NOTES
[facility]   Hospitalist Progress Note  Date: 10/16/2024  Patient Name: Cynthia Her  : 1955  MRN: [id number]  Date of admission: 10/11/2024      Subjective   Subjective     Chief Complaint: Sharp left-sided chest pain under breast    Summary:   Cynthia Her is a 69 y.o. female with past medical history of amyloidosis, hypertension hypothyroidism presents to the ED due to chest pain.  Patient states has been dealing with chest pain for the past 5 days.  Patient describes the pain as stabbing like feeling that is random and intermittent.  In addition patient's been having intermittent fevers.  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  Denies abdominal pain, nausea, vomiting, diarrhea, shortness of breath, headache, dysuria or palpitations.  In the ED, patient's vitals showed temperature 100.1, heart rate 1 1 and satting 91% on room air.  Troponins 30 5 repeat 32, sodium 139, potassium 4.6, creatinine 0.77, white blood cell 9.64, platelets 230.  CT chest shows small left pleural effusion with atelectasis and small pericardial effusion.  Patient admitted to floors for further management.  Patient follows with [place] and local oncology for her amyloidosis.  Patient evaluated by pulmonary and had 100 cc of cloudy left thoracentesis on .  Started on trial of prednisone and Augmentin.  2D echo without any pericardial effusion with EF of 51%.  Inflammatory markers trending down.  Pleural fluid consistent with exudate.  Started on IV steroids as per pulmonary  Interval Followup:     States chest pain has gotten much better. No fevers. Not requiring O2.     Review of Systems   All systems were reviewed and negative except for: Summary and interval follow-up    Objective   Objective     Vitals:   Temp:  [97.4 °F (36.3 °C)-98.4 °F (36.9 °C)] 97.7 °F (36.5 °C)  Heart Rate:  [78-94] 84  Resp:  [16-18] 18  BP: (107-128)/(56-77) 107/68  Physical  Exam    Constitutional: Awake, alert, no acute distress   Eyes: Pupils equal, sclerae anicteric, no conjunctival injection   HENT: NCAT, mucous membranes moist   Neck: Supple, no thyromegaly, no lymphadenopathy, trachea midline   Respiratory: Clear to auscultation bilaterally, nonlabored respirations    Cardiovascular: Tender left anterolateral rib under the breast, no lesions or rash, RRR, no murmurs, rubs, or gallops, palpable pedal pulses bilaterally   Gastrointestinal: Positive bowel sounds, soft, nontender, nondistended   Musculoskeletal: No bilateral ankle edema, no clubbing or cyanosis to extremities   Psychiatric: Appropriate affect, cooperative   Neurologic: Oriented x 3, strength symmetric in all extremities, Cranial Nerves grossly intact to confrontation, speech clear   Skin: No rashes     Result Review    Result Review:  I have personally reviewed the results for the past 24 hours and agree with these findings:  [x]  Laboratory  [x]  Microbiology  [x]  Radiology  [x]  EKG/Telemetry sinus tachycardia 104, LVH, IVCD, QT 0.5  []  Cardiology/Vascular   []  Pathology  [x]  Old records  [x]  Other: Medications    Assessment & Plan   Assessment / Plan     Assessment:  Pleuritic chest pain.  Likely viral.  ?  Small pericardial.  2D echo negative  left pleural effusion.  Exudative.  S/p 100 mL thoracentesis on October 14.?  Etiology pleural amyloidosis.  Lambda AL amyloidosis s/p chemotherapy.  LVH/hocm status post myomectomy 2022.  Status post Watchman procedure left atrial appendage.  Hypertension.  Hypothyroidism.  Chronically elevated troponin due to amyloidosis and hypertrophy.       Plan:  NSAIDs IV and oral as needed.  IV and oral narcotics for pain control as needed.  Discussed with pulmonary.  Patient improved.  S/p thoracentesis -- exudative  x-ray without pneumothorax.  CHRISTO elevated (unclear titer) but neg ds-DNA  Noted 2D echo.  Discussed with patient  CT chest noted no evidence of PE, small left  pleural effusion and pericardial effusion.  Noted viral PCR.  Negative  Continues on Augmentin (day 3) and IV Solu-Medrol (day 2)  blood culture NGTD.  Pleural fluid culture no growth  Inflammatory markers trending down  D/w pulmonology -- could be related to amyloidosis but may need pleural biopsy to confirm -- follows at [place] for management of her amyloid so will defer to them    Discussed plan with RN.  Discharge home today if ok with pulm    VTE Prophylaxis:  Mechanical VTE prophylaxis orders are present.        CODE STATUS:   Code Status (Patient has no pulse and is not breathing): CPR (Attempt to Resuscitate)  Medical Interventions (Patient has pulse or is breathing): Full Support      Electronically signed by Roberto Solorzano MD, 10/16/24, 9:11 AM EDT.

## 2024-10-16 NOTE — PLAN OF CARE
Goal Outcome Evaluation:  Plan of Care Reviewed With: patient        Progress: no change  Outcome Evaluation: aox4, vss. had medication per mar. requested sleep medication and change in diarrhea medication. no complaints of pain this shift.

## 2024-10-16 NOTE — DISCHARGE SUMMARY
Physician Discharge Summary  Patient Identification  PATIENT IDENTIFICATION    Name: Cynthia Her  :  1955  MRN: [id number]    Admit date: 10/11/2024    Discharge date: 10/16/2024     Admitting Physician: Gaetano Reyna MD     Discharge Physician: Roberto Solorzano MD     Admission Diagnoses: Shortness of breath [R06.02]  Atypical chest pain [R07.89]  Small pleural effusion [J90]  Pleuritis [R09.1]    Hospital Problems:   Principal Problem:  Atypical chest pain   Active Problems:  Problems Addressed this Visit          Pulmonary and Pneumonias    Pleuritis    Relevant Medications    ipratropium-albuterol (DUO-NEB) nebulizer solution 3 mL    Other Relevant Orders    Thoracentesis at Bedside (Completed)     Other Visit Diagnoses       Small pleural effusion    -  Primary    Relevant Medications    ipratropium-albuterol (DUO-NEB) nebulizer solution 3 mL (Completed)    ipratropium-albuterol (DUO-NEB) nebulizer solution 3 mL    Shortness of breath              Diagnoses         Codes Comments    Small pleural effusion    -  Primary ICD-10-CM: J90  ICD-9-CM: 511.9     Shortness of breath     ICD-10-CM: R06.02  ICD-9-CM: 786.05     Pleuritis     ICD-10-CM: R09.1  ICD-9-CM: 511.0              Discharged Condition: stable    Consults: IP CONSULT TO HOSPITALIST  IP CONSULT TO PULMONOLOGY    Imaging:   Imaging Results (Last 24 Hours)       ** No results found for the last 24 hours. **            Labs:   Lab Results (last 24 hours)       Procedure Component Value Units Date/Time    Body Fluid Culture - Body Fluid, Pleural Cavity [[id number]] Collected: 10/14/24 1214    Specimen: Body Fluid from Pleural Cavity Updated: 10/16/24 0822     Body Fluid Culture No growth at 2 days     Gram Stain Few (2+) WBCs seen      No organisms seen    Blood Culture - Blood, Arm, Right [[id number]]  (Normal) Collected: 10/11/24 2311    Specimen: Blood from Arm, Right Updated: 10/15/24 2330     Blood Culture No growth at 4  days    Blood Culture - Blood, Arm, Left [[id number]]  (Normal) Collected: 10/11/24 2311    Specimen: Blood from Arm, Left Updated: 10/15/24 2330     Blood Culture No growth at 4 days    REFLEXED DNA/DS [[id number]] Collected: 10/14/24 1748    Specimen: Blood from Arm, Left Updated: 10/15/24 1409     Anti-DNA (DS) Ab Qn <1 IU/mL      Comment:                                    Negative      <5                                     Equivocal  5 - 9                                     Positive      >9        See below: Comment     Comment: Autoantibody                       Disease Association  ------------------------------------------------------------                          Condition                  Frequency  ---------------------   ------------------------   ---------  Antinuclear Antibody,    SLE, mixed connective  Direct (CHRISTO-D)           tissue diseases  ---------------------   ------------------------   ---------  dsDNA                    SLE                        40 - 60%  ---------------------   ------------------------   ---------  Chromatin                Drug induced SLE                90%                           SLE                        48 - 97%  ---------------------   ------------------------   ---------  SSA (Ro)                 SLE                        25 - 35%                           Sjogren's Syndrome         40 - 70%                            Lupus                 100%  ---------------------   ------------------------   ---------  SSB (La)                 SLE                             10%                           Sjogren's Syndrome              30%  ---------------------   -----------------------    ---------  Sm (anti-Smith)          SLE                        15 - 30%  ---------------------   -----------------------    ---------  RNP                      Mixed Connective Tissue                           Disease                         95%  (U1 nRNP,                SLE                         30 - 50%  anti-ribonucleoprotein)  Polymyositis and/or                           Dermatomyositis                 20%  ---------------------   ------------------------   ---------  Scl-70 (antiDNA          Scleroderma (diffuse)      20 - 35%  topoisomerase)           Crest                           13%  ---------------------   ------------------------   ---------  [place]-1                     Polymyositis and/or                           Dermatomyositis            20 - 40%  ---------------------   ------------------------   ---------  Centromere B             Scleroderma - Crest                           variant                         80%       Narrative:      Performed at:  [address] [place] [address]  [id number]  : Maykel Andrew PhD, Phone:  [phone]    CHRISTO [[id number]]  (Abnormal) Collected: 10/14/24 1748    Specimen: Blood from Arm, Left Updated: 10/15/24 1409     CHRISTO Direct Positive    Narrative:      Performed at:  [address] [place] [address]  [id number]  : Maykel Andrew PhD, Phone:  [phone]              Hospital Course:   69 y.o. female with past medical history of amyloidosis, hypertension, hypothyroidism presents to the ED due to L sided pleuritic chest pain. Patient states has been dealing with chest pain for the past 5 days. Patient describes the pain as stabbing like feeling that is random and intermittent. In addition patient's been having intermittent fevers. 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 In the ED, patient's vitals showed temperature 100.1 and satting 91% on room air. Troponins 30 5 repeat 32, sodium 139, potassium 4.6, creatinine 0.77, white blood cell 9.64, platelets 230. CT chest shows small left pleural effusion with atelectasis and small pericardial effusion. Patient admitted to floors for further management. Patient follows with [place] and local oncology for her  amyloidosis. Patient evaluated by pulmonary and had 100 cc of cloudy left thoracentesis on October 14 that was c/w exudative effusion. No growth from fluid culture. She had positive CHRISTO (titer pending) but neg ds-DNA. She was started on augmentin and trial of solumedrol. She improved rapidly with addition of steroids so concern is for amyloidosis as possible cause. 2D echo without any pericardial effusion with EF of 51%. Inflammatory markers trending down. She will be sent out on 2 week prednisone taper and 1 week of augmentin. She was advised to make follow up with her primary amyloid specialist at [place] to determine if she needs pleural biopsy.     Discharge Exam:  Gen: up in bed, in NAD  CV:  RRR, no m/r/g, no peripheral edema  Resp: CTAB, no increase work of breathing  Abd: soft, NT, ND, bs present  Neuro: moves all 4 ext, following commands  Ext: no clubbing, cyanosis or edema  Psych: AAOx3, pleasant affect    Disposition: Home    Patient Discharge Medications:      Discharge Medications        New Medications        Instructions Start Date   amoxicillin-clavulanate 875-125 MG per tablet  Commonly known as: AUGMENTIN   1 tablet, Oral, 2 Times Daily      predniSONE 10 MG tablet  Commonly known as: DELTASONE   Take 6 tabs daily 10/17-10/19, then 4 tabs daily 10/20-10/22, then 3 tabs daily 10/23-10/25, then 2 tabs daily 10/26-10/28 then 1 tab daily 10/26-10/28             Continue These Medications        Instructions Start Date   acyclovir 400 MG tablet  Commonly known as: ZOVIRAX   400 mg, Oral, 2 Times Daily      ALPRAZolam 0.25 MG tablet  Commonly known as: XANAX   0.25 mg, Oral, 2 Times Daily PRN, for anxiety      aspirin 81 MG chewable tablet   CHEW AND SWALLOW 1 TABLET DAILY WITH BREAKFAST      Calmoseptine 0.44-20.6 % ointment  Generic drug: Menthol-Zinc Oxide   1 application , Topical, 2 Times Daily      cholestyramine 4 g packet  Commonly known as: QUESTRAN   1 packet, Oral, 3 Times Daily With  Meals, 1 packet  TID PRN diarrhea      cyclobenzaprine 10 MG tablet  Commonly known as: FLEXERIL   10 mg, Oral, 3 Times Daily PRN      diclofenac 50 MG EC tablet  Commonly known as: VOLTAREN   50 mg, Oral, 2 Times Daily PRN      diphenoxylate-atropine 2.5-0.025 MG per tablet  Commonly known as: LOMOTIL   1 tablet, Oral, 4 Times Daily PRN      empagliflozin 10 MG tablet tablet  Commonly known as: JARDIANCE   10 mg, Oral, Daily      FLUoxetine 20 MG capsule  Commonly known as: PROzac   TAKE 1 CAPSULE BY MOUTH FOUR TIMES DAILY      icosapent ethyl 1 g capsule capsule  Commonly known as: Vascepa   2 g, Oral, Daily      levothyroxine 75 MCG tablet  Commonly known as: SYNTHROID, LEVOTHROID   TAKE 1 TABLET BY MOUTH EVERY DAY      loratadine 10 MG tablet  Commonly known as: CLARITIN   10 mg, Oral, Daily      midodrine 5 MG tablet  Commonly known as: PROAMATINE   5 mg, Oral, 2 Times Daily      olopatadine 0.1 % ophthalmic solution  Commonly known as: PATANOL   1 drop, Both Eyes, 2 Times Daily      pantoprazole 20 MG EC tablet  Commonly known as: PROTONIX   TAKE 1 TABLET BY MOUTH EVERY DAY. DO NOT TAKE WITHIN 2 HOURS OF THYROID MEDICATION      pregabalin 50 MG capsule  Commonly known as: LYRICA   50 mg, Oral, Daily PRN      Rosuvastatin Calcium 20 MG capsule sprinkle   20 mg, Oral, Daily      Sodium Fluoride 5000 Plus 1.1 % cream  Generic drug: Sodium Fluoride       spironolactone 25 MG tablet  Commonly known as: ALDACTONE   25 mg, Oral, Daily      V-C Forte capsule   1 capsule, Oral, Daily      Vitamin D3 50 MCG (2000 UT) tablet   1 tablet, Oral, Daily      zolpidem 10 MG tablet  Commonly known as: AMBIEN   10 mg, Oral, Nightly PRN              Follow-up Information       Arian Peterson MD .    Specialty: Internal Medicine  Contact information:  [address] [id number] [phone]                                 Signed:  Brendan Solorzano MD  Hospitalist Group  10/16/2024  09:32 EDT      I spent 32 minutes  arranging and coordinating discharge and reviewing medications with majority of time spent counseling patient

## 2024-10-17 ENCOUNTER — TRANSITIONAL CARE MANAGEMENT TELEPHONE ENCOUNTER (OUTPATIENT)
Dept: CALL CENTER | Facility: HOSPITAL | Age: 69
End: 2024-10-17
Payer: MEDICARE

## 2024-10-17 LAB
BACTERIA FLD CULT: NORMAL
CYTO UR: NORMAL
GRAM STN SPEC: NORMAL
GRAM STN SPEC: NORMAL
LAB AP CASE REPORT: NORMAL
LAB AP CLINICAL INFORMATION: NORMAL
LAB AP SPECIAL STAINS: NORMAL
PATH REPORT.FINAL DX SPEC: NORMAL
PATH REPORT.GROSS SPEC: NORMAL

## 2024-10-17 RX ORDER — LEVOTHYROXINE SODIUM 75 UG/1
TABLET ORAL
Qty: 90 TABLET | Refills: 0 | Status: SHIPPED | OUTPATIENT
Start: 2024-10-17

## 2024-10-17 NOTE — OUTREACH NOTE
Call Center TCM Note      Flowsheet Row Responses   [facility] patient discharged from? [facility]   Does the patient have one of the following disease processes/diagnoses(primary or secondary)? Other   TCM attempt successful? Yes   Call start time 1331   Call end time 1335   Discharge diagnosis Atypical chest pain   Meds reviewed with patient/caregiver? Yes   Is the patient having any side effects they believe may be caused by any medication additions or changes? No   Does the patient have all medications ordered at discharge? Yes   Is the patient taking all medications as directed (includes completed medication regime)? Yes   Does the patient have an appointment with their PCP within 7-14 days of discharge? Yes   What is the Home health agency?  [facility]    Has home health visited the patient within 72 hours of discharge? Call prior to 72 hours   Psychosocial issues? No   Did the patient receive a copy of their discharge instructions? Yes   Nursing interventions Reviewed instructions with patient   What is the patient's perception of their health status since discharge? Improving   Is the patient/caregiver able to teach back signs and symptoms related to disease process for when to call PCP? Yes   Is the patient/caregiver able to teach back signs and symptoms related to disease process for when to call 911? Yes   Is the patient/caregiver able to teach back the hierarchy of who to call/visit for symptoms/problems? PCP, Specialist, Home health nurse, Urgent Care, ED, 911 Yes   If the patient is a current smoker, are they able to teach back resources for cessation? Not a smoker   TCM call completed? Yes   Call end time 1335            Shelia Cox LPN    10/17/2024, 13:39 EDT

## 2024-10-19 LAB — BACTERIA SPEC ANAEROBE CULT: NORMAL

## 2024-10-21 ENCOUNTER — OFFICE VISIT (OUTPATIENT)
Dept: FAMILY MEDICINE CLINIC | Facility: CLINIC | Age: 69
End: 2024-10-21
Payer: MEDICARE

## 2024-10-21 ENCOUNTER — TELEPHONE (OUTPATIENT)
Dept: FAMILY MEDICINE CLINIC | Facility: CLINIC | Age: 69
End: 2024-10-21
Payer: MEDICARE

## 2024-10-21 VITALS
RESPIRATION RATE: 15 BRPM | TEMPERATURE: 96.4 F | OXYGEN SATURATION: 96 % | BODY MASS INDEX: 25.21 KG/M2 | SYSTOLIC BLOOD PRESSURE: 160 MMHG | HEIGHT: 60 IN | DIASTOLIC BLOOD PRESSURE: 78 MMHG | WEIGHT: 128.4 LBS | HEART RATE: 90 BPM

## 2024-10-21 DIAGNOSIS — K52.9 CHRONIC DIARRHEA: ICD-10-CM

## 2024-10-21 DIAGNOSIS — E78.00 HIGH CHOLESTEROL: ICD-10-CM

## 2024-10-21 DIAGNOSIS — R09.1 PLEURITIS: Primary | ICD-10-CM

## 2024-10-21 DIAGNOSIS — M85.80 OSTEOPENIA, UNSPECIFIED LOCATION: ICD-10-CM

## 2024-10-21 DIAGNOSIS — E85.81 LIGHT CHAIN (AL) AMYLOIDOSIS: ICD-10-CM

## 2024-10-21 DIAGNOSIS — F32.5 MAJOR DEPRESSIVE DISORDER WITH SINGLE EPISODE, IN FULL REMISSION: ICD-10-CM

## 2024-10-21 DIAGNOSIS — K52.1 ANTIBIOTIC-ASSOCIATED DIARRHEA: ICD-10-CM

## 2024-10-21 DIAGNOSIS — T36.95XA ANTIBIOTIC-ASSOCIATED DIARRHEA: ICD-10-CM

## 2024-10-21 DIAGNOSIS — E03.9 HYPOTHYROIDISM, UNSPECIFIED TYPE: ICD-10-CM

## 2024-10-21 PROCEDURE — 1160F RVW MEDS BY RX/DR IN RCRD: CPT

## 2024-10-21 PROCEDURE — 1111F DSCHRG MED/CURRENT MED MERGE: CPT

## 2024-10-21 PROCEDURE — 3044F HG A1C LEVEL LT 7.0%: CPT

## 2024-10-21 PROCEDURE — 3078F DIAST BP <80 MM HG: CPT

## 2024-10-21 PROCEDURE — 3077F SYST BP >= 140 MM HG: CPT

## 2024-10-21 PROCEDURE — 99495 TRANSJ CARE MGMT MOD F2F 14D: CPT

## 2024-10-21 PROCEDURE — 1126F AMNT PAIN NOTED NONE PRSNT: CPT

## 2024-10-21 PROCEDURE — 1159F MED LIST DOCD IN RCRD: CPT

## 2024-10-21 NOTE — LETTER
Controlled Substance Prescribing Agreement          I, Cynthia Her [PATIENT],  1955 [] a patient of  Julian Pascual DO   [PROVIDER] at [facility] [PRACTICE], have been informed that  individuals who are prescribed certain Controlled Substances including, but not limited to, narcotic pain medicines, stimulants, benzodiazepine tranquilizers, and barbiturate sedatives, can abuse those substances or may allow abuse by others, and have some risk of developing an addictive disorder or suffering a relapse of a prior addiction. Therefore, I have been informed that it is necessary to observe strict rules pertaining to their use, and I agree to follow the terms and procedures described in this Agreement as consideration for, and as a condition of, the willingness of the physician whose signature appears below to consider prescribing or to continue prescribing Controlled Substances to treat my pain.     1. I will inform my physician of any current or past substance abuse, or any current or past substance abuse of any immediate member of my immediate family.     2. I agree that I may be subject to a voluntary evaluation by psychologists and/or psychiatrists, possibly at my own expense, before any Controlled Substances will be prescribed to me. I agree that the need to be evaluated by psychologists and/or psychiatrists may be revisited every three (3) to six (6) months thereafter while taking the medication.     3. All Controlled Substances must come from a provider in the PROVIDER’S PRACTICE. My Controlled Substances will come from the PROVIDER whose signature appears below, or during his or her absence, by the covering provider, unless specific written authorization is obtained from the office for an exception.     4. I will obtain all Controlled Substances from the same pharmacy. Should the need arise to change pharmacies, I will inform the PROVIDER’S office.     5. I  will inform the PROVIDER’S office of any new medications or medical conditions, and of any adverse effects I experience from any of the medications that I take.     6. I will inform my other health care providers that I am taking the Controlled Substances listed above, and of the existence of this Agreement. In the event of an emergency, I will provide the foregoing information to emergency department providers.     7. I agree that my prescribing PROVIDER has permission to discuss all diagnostic and treatment details with other health care providers, pharmacists, or other professionals who provide my health care regarding my use of Controlled Substances for purposes of maintaining accountability.     8. I will not allow anyone else to have, use sell, or otherwise have access to these medications. The sharing of medications with anyone is absolutely forbidden and is against the law.         9. I understand that Controlled Substances may be hazardous or lethal to a person who is not tolerant to their effects, especially a child, and that I must keep them out of reach of such people for their own safety.     10. I understand that tampering with a written prescription is a felony and I will not change or tamper with the PROVIDER’S written prescription.     11. I am aware that attempting to obtain a Controlled Substance under false pretenses is illegal.     12. I agree not to alter my medication in any way, and I will take my medication whole, and it will not be broken, chewed, crushed, injected, or snorted.     13. I will take my medication as instructed and prescribed, and I will not exceed the maximum prescribed dose. Any change in dosage must be approved by the PROVIDER or a physician within the PRACTICE.     14. I understand that these drugs should not be stopped abruptly, as withdrawal syndromes may develop.     15. I will cooperate with unannounced urine or serum toxicology screenings as may be requested, as well  as any random pill counts of medication by the PROVIDER. Failure to comply may result in termination of the PROVIDER-patient relationship.     16. I understand that the presence of unauthorized and/or illegal substances in the screenings described in the paragraph above may prompt referral for assessment for a substance abuse disorder or termination of the PROVIDER-patient relationship.     17. I understand that medications may not be replaced if they are lost, damaged, or stolen. If any of these situations arise that cause me to request an early refill of my medication, a copy of a filed police report or a statement from me explaining the circumstances may be required before additional prescriptions are considered. If I request an early refill secondary to lost, damaged, or stolen prescriptions twice within a year, I may be discharged from the practice.     18. I understand that a prescription may be given early if the PROVIDER or the patient will be out of town when the refill is due. These prescriptions will contain instructions to the pharmacist that the prescriptions(s) may not be filled prior to the appropriate date.     19. If the responsible legal authorities have questions concerning my treatment, as may occur, for example, if I obtained medication at several pharmacies, all confidentiality is waived, and these authorities may be given full access to my full records of Controlled Substances administration.     20. I will keep my scheduled appointments in order to receive medication renewals. If I need to cancel my appointment, I will do so a minimum of twenty-four (24) hours before it is scheduled.     21. I understand that I may be asked to bring my medications in their original container to the PROVIDER’s office while I am on controlled medication.     22. Refills generally will not be given over the phone, after office hours, during the weekends, and on holidays.     23. I understand that any medical  treatment is initially a trial, with the goal of treatment being to improve the quality of life and ability to function and/or work. These parameters will be assessed periodically to determine the benefits of continued therapy, and continued prescription is contingent on whether my physician believes that the medication usage benefits me. I will comply with all treatments as outlined by the PROVIDER.     24. I have been explained the risks and potential benefits of these therapies, including, but not limited to, psychological addiction, physical dependence, withdrawal and over dosage.     25. I understand that failure to adhere to these policies and/or failure to comply with the PROVIDER’S treatment plan may result in cessation of therapy with Controlled Substance prescribing by the PROVIDER or referral for further specialty assessment, as well as possible discharge from the PRACTICE.     26. I, the undersigned patient, attest that the foregoing was discussed with me, and that I have read, fully understand, and agree to all of the above requirements and instructions. I affirm that I have the full right and power to sign and be bound by this  Agreement.         Date:  __________________________________________    Time:  __________________________________________    Patient Printed Name:  _____________________________    Patient Signature:  ________________________________           Date:  __________________________________________    Time:  __________________________________________    Provider Signature:  _______________________________

## 2024-10-21 NOTE — TELEPHONE ENCOUNTER
[facility] IS NEEDING VERBAL ORDER CONFIRMATION FOR FREQUENCY. THEY HAVE HER DOWN FOR NURSING 1X A WEEK FOR 4 WEEKS. INFORMED THEM DR OLIVEIRA IS OUT SO I WILL HAVE TO SEND A MSSG REGARDING THIS. PT IS SEEING DR BLAIR TODAY.    THANK YOU

## 2024-10-21 NOTE — PROGRESS NOTES
Transitional Care Follow Up Visit  Subjective     Cynthia Her is a 69 y.o. female who presents for a transitional care management visit.    Within 48 business hours after discharge our office contacted her via telephone to coordinate her care and needs.      I reviewed and discussed the details of that call along with the discharge summary, hospital problems, inpatient lab results, inpatient diagnostic studies, and consultation reports with Cynthia.     Current outpatient and discharge medications have been reconciled for the patient.  Reviewed by: Julian Pascual DO          10/16/2024     5:47 PM   Date of TCM Phone Call   [facility]   Date of Admission 10/11/2024   Date of Discharge 10/16/2024   Discharge Disposition Home-Health Care [facility]     Risk for Readmission (LACE) Score: 12 (10/16/2024  6:00 AM)      History of Present Illness     69-year-old female with a past medical history of lambda AL amyloidosis stage III, HTN, hypothyroidism, and has a history of hocm myomectomy in 2022 thought to be secondary to amyloidosis.  She follows with an amyloid specialist at [place]  Course During Hospital Stay: The patient presented to the ED 10/11/2024 for atypical chest pain and shortness of breath.  She was noted to have a small pericardial effusion on her CT chest and troponins were normal.  She underwent left-sided thoracentesis which was notable for 100 cc of cloudy fluid.  She has somewhat elevated CRP white count was normal.  She was CHRISTO positive however anti-dsDNA was negative and ANCA titers were unremarkable there does not appear to to have been an CHRISTO titer MPO and IN-3 antibodies were also unremarkable    The pleural fluid was noted to be brown hazy notable RBC content and exudative in nature.  Cytology was generally unremarkable and cultures are negative.Immunohistochemical stains are performed to further characterize the cells of interest.  Cells of interest are positive  "for calretinin and WT1 and are negative for MOC-31 and Asif-EP4, consistent with reactive mesothelial cells.     All immunohistochemical/cytochemical stains (IHC) are performed on separate slides per different antibody unless otherwise specified in the documentation that a cocktail (multiple stain) was performed.  Controls are appropriate.     The following portions of the patient's history were reviewed and updated as appropriate: allergies, current medications, past family history, past medical history, past social history, past surgical history, and problem list.      Objective   /78 (BP Location: Left arm, Patient Position: Sitting, Cuff Size: Adult)   Pulse 90   Temp 96.4 °F (35.8 °C) (Temporal)   Resp 15   Ht 152.4 cm (60\")   Wt 58.2 kg (128 lb 6.4 oz)   SpO2 96%   BMI 25.08 kg/m²   Physical Exam  Constitutional:       Appearance: Normal appearance. She is not ill-appearing, toxic-appearing or diaphoretic.   HENT:      Head: Normocephalic. No raccoon eyes, contusion, masses or laceration.      Nose: Nose normal.   Eyes:      General: No scleral icterus.        Right eye: No discharge.         Left eye: No discharge.      Extraocular Movements: Extraocular movements intact.      Pupils: Pupils are equal, round, and reactive to light.   Neck:      Thyroid: No thyromegaly.      Vascular: No JVD.   Cardiovascular:      Rate and Rhythm: Normal rate and regular rhythm.      Pulses: Normal pulses.   Pulmonary:      Effort: No accessory muscle usage or respiratory distress.      Breath sounds: Normal breath sounds. No stridor or decreased air movement. No wheezing, rhonchi or rales.   Chest:      Chest wall: No tenderness.   Abdominal:      General: There is no distension.      Palpations: Abdomen is soft. There is no fluid wave or pulsatile mass.      Tenderness: There is no abdominal tenderness. There is no guarding.   Musculoskeletal:         General: No swelling, tenderness or deformity.      Cervical " back: Neck supple. No rigidity.   Skin:     General: Skin is warm and dry.      Coloration: Skin is not jaundiced.   Neurological:      General: No focal deficit present.      Mental Status: She is alert and oriented to person, place, and time.   Psychiatric:         Mood and Affect: Mood normal.         Thought Content: Thought content normal.         Judgment: Judgment normal.         Assessment & Plan   Diagnoses and all orders for this visit:    1. Pleuritis (Primary)    2. Hypothyroidism, unspecified type    3. Major depressive disorder with single episode, in full remission    4. Light chain (AL) amyloidosis    5. Chronic diarrhea    6. High cholesterol    7. Osteopenia, unspecified location    8. Antibiotic-associated diarrhea      Patient was recently hospitalized for sharp chest pain thought to be secondary to pleuritis.  She was noted to have pleural effusion which was tapped and found to be exudative.  Otherwise a clear trigger for this event is not entirely obvious it appears an appropriate fluid now analysis was completed.  At this point her symptoms have largely resolved, she has no chest pain shortness of breath or exertional dyspnea. She is currently on a steroid taper which I think is appropriate.  She reports she is felt fatigue and malaise and has had worsening diarrhea from her chronic baseline diarrhea.  She was recently on Augmentin, recommended she continue her antidiarrheal regimen.   Is unclear whether this is secondary to her amyloidosis.  She follows at [facility] for this, her next appointment is for December.   She does report she had a flu shot prior to her pruritus.  At this point considering the stability of her vitals I recommended watchful waiting, we discussed all of her medications she reports that she is still on the Jardiance and Aldactone following her HCM.  She is also notably on midodrine and her blood pressure was elevated today however she has a steroid taper and we can  continue to monitor this, as she is decreasing her doses and due to the polypharmacy that she already has I would rather avoid making significant changes and cross tapering her midodrine down particular she may need it in the future.  The patient was counseled that she may return at any time for concerns that she would warrant close monitoring.

## 2024-10-28 ENCOUNTER — TELEPHONE (OUTPATIENT)
Dept: ONCOLOGY | Facility: CLINIC | Age: 69
End: 2024-10-28
Payer: MEDICARE

## 2024-10-28 DIAGNOSIS — G89.3 CANCER ASSOCIATED PAIN: Primary | ICD-10-CM

## 2024-10-28 LAB
QT INTERVAL: 398 MS
QT INTERVAL: 421 MS
QTC INTERVAL: 524 MS
QTC INTERVAL: 531 MS

## 2024-10-28 RX ORDER — HYDROCODONE BITARTRATE AND ACETAMINOPHEN 5; 325 MG/1; MG/1
1 TABLET ORAL EVERY 6 HOURS PRN
Qty: 60 TABLET | Refills: 0 | Status: SHIPPED | OUTPATIENT
Start: 2024-10-28

## 2024-10-28 NOTE — TELEPHONE ENCOUNTER
Please send rx for MMM and hydrocodone 5/325 1 po every 6 hours prn #60 if that is OK with her. If she continues to not do well she needs to be seen sooner if we're going to be managing her symptoms. LEWIS

## 2024-10-28 NOTE — TELEPHONE ENCOUNTER
Called patient, states that she has been having low grade fevers intermittently since she got out of [place] for here original issue of chest pains and fever. She states that she has a little bit of the chest pains still and has some SOB but it isn't like it was. Highest temp has been 100. She says that she is also has a really swollen, cracked with deep crevasses and painful tongue that she believes is the amyloidosis. States that the pain is a 6 of out 10. Says the [employer] magic mouthwash helps but only for a short time. Requesting a refill and something else to help with he pain.

## 2024-10-28 NOTE — TELEPHONE ENCOUNTER
"Called patient back, let them know per Dr. Sal , \"Please send rx for MMM and hydrocodone 5/325 1 po every 6 hours prn #60 if that is OK with her. If she continues to not do well she needs to be seen sooner if we're going to be managing her symptoms \" Patient v/u. I told patient to call and let us know how she is doing later this week and he can go from there. Pt v/u  "

## 2024-10-28 NOTE — TELEPHONE ENCOUNTER
Provider: Doron  Caller: patient  Relationship to Patient: self  Call Back Phone Number: [phone]  Reason for Call: patient says she is still having fevers since Oct. 10 and her tongue is still very swollen, sore and red.

## 2024-10-29 ENCOUNTER — PATIENT OUTREACH (OUTPATIENT)
Dept: CASE MANAGEMENT | Facility: OTHER | Age: 69
End: 2024-10-29
Payer: MEDICARE

## 2024-10-29 NOTE — OUTREACH NOTE
"AMBULATORY CASE MANAGEMENT NOTE    Names and Relationships of Patient/Support Persons: Contact: Cynthia Her \"YOLETTE\"; Relationship: Self -     Patient Outreach  RN-ACM outreach with patient. Discussed 10/11/24 to 10/16/24 hospitalization regarding small pleural effusion and atypical chest pain. Patient discharged and currently has [facility]. Patient states to have had episodes of fever; nausea; taking medications  and drinking fluids. Patient states to have discomfort to tongue; feeling swollen ; irritated by food and difficulty with eating. Patient states to have contacted physician and received recommendations. Patient states to have episodes of chest pain; SOB; difficulty eating and no difficulty with sleeping. Patient states to be compliant with medications and medical appointments. Patient states no barriers regarding medications; food and transportation. Reviewed with patient education ; 24/7 Nurse Line Telephone number and encouraged patient to contact physician for continued or worsening symptoms. Patient verbalized understanding. Patient states to appreciate outreach. No further questions voiced at this time.   Care Coordination  Care Coordination with [facility] [phone] stating patient currently receiving [facility] services with start of care 10/18/24 and voiced intent to follow up with patient.   Education Documentation  Unresolved/Worsening Symptoms, taught by Brianna Pearce RN at 10/29/2024 10:45 AM.  Learner: Patient  Readiness: Acceptance  Method: Explanation  Response: Verbalizes Understanding    Provider Follow-Up, taught by Brianna Pearce, RN at 10/29/2024 10:45 AM.  Learner: Patient  Readiness: Acceptance  Method: Explanation  Response: Verbalizes Understanding    Fluid Intake, taught by Brianna Pearce RN at 10/29/2024 10:45 AM.  Learner: Patient  Readiness: Acceptance  Method: Explanation  Response: Verbalizes Understanding    Fever Reduction " Measures, taught by Brianna Pearce, RN at 10/29/2024 10:45 AM.  Learner: Patient  Readiness: Acceptance  Method: Explanation  Response: Verbalizes Understanding          Brianna HORTON  Ambulatory Case Management    10/29/2024, 10:45 EDT

## 2024-10-31 ENCOUNTER — TELEPHONE (OUTPATIENT)
Dept: ONCOLOGY | Facility: CLINIC | Age: 69
End: 2024-10-31
Payer: MEDICARE

## 2024-10-31 NOTE — TELEPHONE ENCOUNTER
Called patient, let her know per Dr. Sal that Dr. Sal suggests she go to the ER . Patient v/u and said she will go to [facility].

## 2024-10-31 NOTE — TELEPHONE ENCOUNTER
Provider: Doron  Caller: patient  Relationship to Patient: self  Call Back Phone Number: [phone]  Reason for Call: patient says she is not doing any better and has been vomiting all morning and still running a low grade fever

## 2024-10-31 NOTE — TELEPHONE ENCOUNTER
Called patient, said that her tongue has gotten better, but today she has projectile vomiting 5 or 6 times this morning, temp off 99.1, she is having some SOB and some chest aches that have been going on for the past coming weeks.

## 2024-11-04 ENCOUNTER — APPOINTMENT (OUTPATIENT)
Dept: CT IMAGING | Facility: HOSPITAL | Age: 69
DRG: 313 | End: 2024-11-04
Payer: MEDICARE

## 2024-11-04 ENCOUNTER — APPOINTMENT (OUTPATIENT)
Dept: GENERAL RADIOLOGY | Facility: HOSPITAL | Age: 69
DRG: 313 | End: 2024-11-04
Payer: MEDICARE

## 2024-11-04 ENCOUNTER — HOSPITAL ENCOUNTER (EMERGENCY)
Facility: HOSPITAL | Age: 69
Discharge: HOME | DRG: 313 | End: 2024-11-04
Attending: EMERGENCY MEDICINE | Admitting: EMERGENCY MEDICINE
Payer: MEDICARE

## 2024-11-04 VITALS
SYSTOLIC BLOOD PRESSURE: 121 MMHG | DIASTOLIC BLOOD PRESSURE: 65 MMHG | HEART RATE: 84 BPM | WEIGHT: 128.53 LBS | HEIGHT: 60 IN | BODY MASS INDEX: 25.23 KG/M2 | RESPIRATION RATE: 16 BRPM | TEMPERATURE: 98.3 F | OXYGEN SATURATION: 88 %

## 2024-11-04 DIAGNOSIS — R07.89 ATYPICAL CHEST PAIN: Primary | ICD-10-CM

## 2024-11-04 LAB
ALBUMIN SERPL-MCNC: 4.1 G/DL (ref 3.5–5.2)
ALBUMIN/GLOB SERPL: 2.1 G/DL
ALP SERPL-CCNC: 72 U/L (ref 39–117)
ALT SERPL W P-5'-P-CCNC: 52 U/L (ref 1–33)
ANION GAP SERPL CALCULATED.3IONS-SCNC: 9.9 MMOL/L (ref 5–15)
AST SERPL-CCNC: 24 U/L (ref 1–32)
BASOPHILS # BLD AUTO: 0.02 10*3/MM3 (ref 0–0.2)
BASOPHILS NFR BLD AUTO: 0.3 % (ref 0–1.5)
BILIRUB SERPL-MCNC: 0.6 MG/DL (ref 0–1.2)
BUN SERPL-MCNC: 10 MG/DL (ref 8–23)
BUN/CREAT SERPL: 13.5 (ref 7–25)
CALCIUM SPEC-SCNC: 9 MG/DL (ref 8.6–10.5)
CHLORIDE SERPL-SCNC: 103 MMOL/L (ref 98–107)
CO2 SERPL-SCNC: 26.1 MMOL/L (ref 22–29)
CREAT SERPL-MCNC: 0.74 MG/DL (ref 0.57–1)
DEPRECATED RDW RBC AUTO: 48 FL (ref 37–54)
EGFRCR SERPLBLD CKD-EPI 2021: 87.7 ML/MIN/1.73
EOSINOPHIL # BLD AUTO: 0.23 10*3/MM3 (ref 0–0.4)
EOSINOPHIL NFR BLD AUTO: 3.1 % (ref 0.3–6.2)
ERYTHROCYTE [DISTWIDTH] IN BLOOD BY AUTOMATED COUNT: 14.1 % (ref 12.3–15.4)
GEN 5 2HR TROPONIN T REFLEX: 19 NG/L
GLOBULIN UR ELPH-MCNC: 2 GM/DL
GLUCOSE SERPL-MCNC: 103 MG/DL (ref 65–99)
HCT VFR BLD AUTO: 45.7 % (ref 34–46.6)
HGB BLD-MCNC: 14.3 G/DL (ref 12–15.9)
HOLD SPECIMEN: NORMAL
HOLD SPECIMEN: NORMAL
IMM GRANULOCYTES # BLD AUTO: 0.03 10*3/MM3 (ref 0–0.05)
IMM GRANULOCYTES NFR BLD AUTO: 0.4 % (ref 0–0.5)
LIPASE SERPL-CCNC: 26 U/L (ref 13–60)
LYMPHOCYTES # BLD AUTO: 1.84 10*3/MM3 (ref 0.7–3.1)
LYMPHOCYTES NFR BLD AUTO: 24.8 % (ref 19.6–45.3)
MAGNESIUM SERPL-MCNC: 2.1 MG/DL (ref 1.6–2.4)
MCH RBC QN AUTO: 29.2 PG (ref 26.6–33)
MCHC RBC AUTO-ENTMCNC: 31.3 G/DL (ref 31.5–35.7)
MCV RBC AUTO: 93.3 FL (ref 79–97)
MONOCYTES # BLD AUTO: 0.39 10*3/MM3 (ref 0.1–0.9)
MONOCYTES NFR BLD AUTO: 5.2 % (ref 5–12)
NEUTROPHILS NFR BLD AUTO: 4.92 10*3/MM3 (ref 1.7–7)
NEUTROPHILS NFR BLD AUTO: 66.2 % (ref 42.7–76)
NRBC BLD AUTO-RTO: 0 /100 WBC (ref 0–0.2)
NT-PROBNP SERPL-MCNC: 409.9 PG/ML (ref 0–900)
PLATELET # BLD AUTO: 156 10*3/MM3 (ref 140–450)
PMV BLD AUTO: 10.1 FL (ref 6–12)
POTASSIUM SERPL-SCNC: 4.2 MMOL/L (ref 3.5–5.2)
PROT SERPL-MCNC: 6.1 G/DL (ref 6–8.5)
QT INTERVAL: 419 MS
QT INTERVAL: 439 MS
QTC INTERVAL: 513 MS
QTC INTERVAL: 528 MS
RBC # BLD AUTO: 4.9 10*6/MM3 (ref 3.77–5.28)
SODIUM SERPL-SCNC: 139 MMOL/L (ref 136–145)
TROPONIN T DELTA: -12 NG/L
TROPONIN T SERPL HS-MCNC: 31 NG/L
WBC NRBC COR # BLD AUTO: 7.43 10*3/MM3 (ref 3.4–10.8)
WHOLE BLOOD HOLD COAG: NORMAL
WHOLE BLOOD HOLD SPECIMEN: NORMAL

## 2024-11-04 PROCEDURE — 85025 COMPLETE CBC W/AUTO DIFF WBC: CPT

## 2024-11-04 PROCEDURE — 84484 ASSAY OF TROPONIN QUANT: CPT | Performed by: EMERGENCY MEDICINE

## 2024-11-04 PROCEDURE — 83735 ASSAY OF MAGNESIUM: CPT | Performed by: EMERGENCY MEDICINE

## 2024-11-04 PROCEDURE — 93005 ELECTROCARDIOGRAM TRACING: CPT

## 2024-11-04 PROCEDURE — 83690 ASSAY OF LIPASE: CPT | Performed by: EMERGENCY MEDICINE

## 2024-11-04 PROCEDURE — 71260 CT THORAX DX C+: CPT

## 2024-11-04 PROCEDURE — 25510000001 IOPAMIDOL PER 1 ML: Performed by: EMERGENCY MEDICINE

## 2024-11-04 PROCEDURE — 71045 X-RAY EXAM CHEST 1 VIEW: CPT

## 2024-11-04 PROCEDURE — 36415 COLL VENOUS BLD VENIPUNCTURE: CPT

## 2024-11-04 PROCEDURE — 93005 ELECTROCARDIOGRAM TRACING: CPT | Performed by: EMERGENCY MEDICINE

## 2024-11-04 PROCEDURE — 25010000002 ONDANSETRON PER 1 MG: Performed by: EMERGENCY MEDICINE

## 2024-11-04 PROCEDURE — 96375 TX/PRO/DX INJ NEW DRUG ADDON: CPT

## 2024-11-04 PROCEDURE — 96374 THER/PROPH/DIAG INJ IV PUSH: CPT

## 2024-11-04 PROCEDURE — 99285 EMERGENCY DEPT VISIT HI MDM: CPT

## 2024-11-04 PROCEDURE — 83880 ASSAY OF NATRIURETIC PEPTIDE: CPT | Performed by: EMERGENCY MEDICINE

## 2024-11-04 PROCEDURE — 25010000002 HYDROMORPHONE 1 MG/ML SOLUTION: Performed by: EMERGENCY MEDICINE

## 2024-11-04 PROCEDURE — 80053 COMPREHEN METABOLIC PANEL: CPT | Performed by: EMERGENCY MEDICINE

## 2024-11-04 RX ORDER — IOPAMIDOL 755 MG/ML
100 INJECTION, SOLUTION INTRAVASCULAR
Status: COMPLETED | OUTPATIENT
Start: 2024-11-04 | End: 2024-11-04

## 2024-11-04 RX ORDER — OXYCODONE AND ACETAMINOPHEN 5; 325 MG/1; MG/1
1 TABLET ORAL EVERY 6 HOURS PRN
Qty: 12 TABLET | Refills: 0 | Status: ON HOLD | OUTPATIENT
Start: 2024-11-04 | End: 2024-11-07

## 2024-11-04 RX ORDER — SODIUM CHLORIDE 0.9 % (FLUSH) 0.9 %
10 SYRINGE (ML) INJECTION AS NEEDED
Status: DISCONTINUED | OUTPATIENT
Start: 2024-11-04 | End: 2024-11-04 | Stop reason: HOSPADM

## 2024-11-04 RX ORDER — ASPIRIN 81 MG/1
324 TABLET, CHEWABLE ORAL ONCE
Status: DISCONTINUED | OUTPATIENT
Start: 2024-11-04 | End: 2024-11-04 | Stop reason: HOSPADM

## 2024-11-04 RX ORDER — ONDANSETRON 2 MG/ML
4 INJECTION INTRAMUSCULAR; INTRAVENOUS ONCE
Status: COMPLETED | OUTPATIENT
Start: 2024-11-04 | End: 2024-11-04

## 2024-11-04 RX ORDER — PREDNISONE 50 MG/1
50 TABLET ORAL DAILY
Qty: 5 TABLET | Refills: 0 | Status: ON HOLD | OUTPATIENT
Start: 2024-11-04 | End: 2024-11-07

## 2024-11-04 RX ADMIN — ONDANSETRON 4 MG: 2 INJECTION INTRAMUSCULAR; INTRAVENOUS at 10:03

## 2024-11-04 RX ADMIN — HYDROMORPHONE HYDROCHLORIDE 1 MG: 1 INJECTION, SOLUTION INTRAMUSCULAR; INTRAVENOUS; SUBCUTANEOUS at 10:02

## 2024-11-04 RX ADMIN — IOPAMIDOL 100 ML: 755 INJECTION, SOLUTION INTRAVENOUS at 11:44

## 2024-11-04 NOTE — DISCHARGE INSTRUCTIONS
Follow-up with your primary care provider.  Consider obtaining an outpatient cardiac stress test for further evaluation of your heart.

## 2024-11-04 NOTE — ED PROVIDER NOTES
Time: 9:31 AM EST  Date of encounter:  11/4/2024  Independent Historian/Clinical History and Information was obtained by:   Patient    History is limited by: N/A    Chief Complaint: Chest pain      History of Present Illness:  Patient is a 69 y.o. year old female who presents to the emergency department for evaluation of right-sided chest pain that radiates between the shoulder blades.  Patient states the pain is 9 out of 10 and constant and feels like a crushing pain making it difficult to breathe.  Patient reports the pain started yesterday and was intermittent at first and then last night became constant.  Patient had a very similar episode but with left-sided chest pain and was admitted to the hospital for 5 days and discharged on 10/16/2024.  He was diagnosed with atypical chest pain, small left pleural effusion and pleuritis.  She had 100 mL fluid drained by thoracentesis by Dr. Cunningham and put on pain medication and steroids.  Patient has history of amyloidosis and is normally treated at [facility].  Patient had cardiac cath in April 2020 which did not show any significant stenosis of the coronary vessels.  She more recently to 24 had a 2D echocardiogram at [place] which showed normal EF function.      Patient Care Team  Primary Care Provider: Arian Peterson MD    Past Medical History:     Allergies   Allergen Reactions    Azithromycin Rash and Other (See Comments)     Reaction unknown to patient (unable to remember)  Other reaction(s): Other: stomach issues, Stomach ache, Other: stomach issues, Stomach ache    Ezetimibe Myalgia, Other (See Comments) and Rash     cramps  cramps    Pravastatin Rash and Other (See Comments)     Past Medical History:   Diagnosis Date    AL amyloidosis     Anxiety     Arthritis     Bowel perforation     COVID-19 07/2020 9/7/2021    Depression     Diverticulitis of colon     Diverticulosis     GERD (gastroesophageal reflux disease)     History of Clostridioides  difficile infection 2008    HAS HAD SEVERAL TIMES IN PAST PER PT (SAW INFECTIOUS DISEASE MD FOR THIS S/P COLOSTOMY/EXTENSIVE ANBX THERAPY)    History of prediabetes     History of snoring     History of stress incontinence     Hypercholesterolemia     Hyperlipidemia     Hypertension     Left ventricular hypertrophy     FOLLOWED BY DR MARI. DENIES CHEST PAIN BUT STATES DOES GET SOA AT TIMES WITH EXERTION REPORTS THAT IT IS NOT A NEW ISSUE     Liver disease     Oral herpes 08/18/2020    Seasonal allergies     used to have allergy shots in the past    SOB (shortness of breath)     STATES WITH EXERTION HAS BEEN ONGOING ISSUE NOT A NEW ISSUE    Status post colostomy     reversed    Thyroid disease     Hypothyroid     Past Surgical History:   Procedure Laterality Date    ABDOMINAL SURGERY  2005, 2014    ex lap x 2,  diverticulitis    ABDOMINOPLASTY  2016    ADENOIDECTOMY      APPENDECTOMY      CARDIAC CATHETERIZATION N/A 04/24/2020    Procedure: Coronary angiography;  Surgeon: Roberto Briones MD;  Location: [facility] CATH INVASIVE LOCATION;  Service: Cardiovascular;  Laterality: N/A;    CARDIAC CATHETERIZATION N/A 04/24/2020    Procedure: Left heart cath;  Surgeon: Roberto Briones MD;  Location: [facility] CATH INVASIVE LOCATION;  Service: Cardiovascular;  Laterality: N/A;    CARDIAC CATHETERIZATION N/A 04/24/2020    Procedure: Left ventriculography;  Surgeon: Roberto Briones MD;  Location: [facility] CATH INVASIVE LOCATION;  Service: Cardiovascular;  Laterality: N/A;    CARDIAC SURGERY      open heart surgery,    COLONOSCOPY  approx 2018    normal per pt     COLOSTOMY  2005    had colostomy and then is was reversed    COLOSTOMY CLOSURE  2006    CORRECTION HAMMER TOE Right 2017    ECTOPIC PREGNANCY SURGERY      1979, 1980    ENDOSCOPY N/A 05/27/2020    Procedure: ESOPHAGOGASTRODUODENOSCOPY WITH BIOPSY AND BIOPSY POLYPECTOMY AND MACIEL DIALATION;  Surgeon: Perethi Beck MD;  Location: [facility] ENDOSCOPY;   Service: Gastroenterology;  Laterality: N/A;  PRE: ESOPHAGEAL DYSPHAGIA  POST: Z LINE 46, ESOPHAGEAL SPASM, GASTRITIS, FUNDIC POLYP    ENDOSCOPY N/A 06/20/2023    ESOPHAGEAL DILATATION N/A 12/07/2021    Procedure: OR ESOPHAGEAL DILATATION with pacheco dilators ;  Surgeon: Jamey Leslie MD;  Location:  [facility];  Service: ENT;  Laterality: N/A;    ESOPHAGEAL MOTILITY STUDY N/A 02/03/2022    Procedure: ESOPHAGEAL MOTILITY STUDY;  Surgeon: Endo, Nurse Performed;  Location:  [facility];  Service: Gastroenterology;  Laterality: N/A;  dypshagia     FOOT SURGERY Right 12/2016    Second and third hammertoe corrections    KNEE ARTHROSCOPY Right 2009    KNEE SURGERY Right     REVISION / TAKEDOWN COLOSTOMY  2006    SHOULDER ARTHROSCOPY Right 2018    right shoulder arthroscopy with extensive rotator cuff, biceps and labral debridement, chondroplasty, & subacromial decompression with acromioplasty    SHOULDER SURGERY      HAS HAS COMPLETE SHOULDER ON RIGHT. LEFT SCOPED AND HAD 2ND SURGERY WITH HARDWARE PLACED    SHOULDER SURGERY Left     2012. 2013    TONSILLECTOMY      TOTAL SHOULDER REVERSE ARTHROPLASTY Right 2019    WISDOM TOOTH EXTRACTION       Family History   Problem Relation Age of Onset    Hypertension Mother     Osteoporosis Mother     Skin cancer Mother     Heart disease Father     Hypertension Father     Breast cancer Maternal Grandmother     Ovarian cancer Neg Hx     Colon cancer Neg Hx     Deep vein thrombosis Neg Hx     Pulmonary embolism Neg Hx     Malig Hyperthermia Neg Hx        Home Medications:  Prior to Admission medications    Medication Sig Start Date End Date Taking? Authorizing Provider   acyclovir (ZOVIRAX) 400 MG tablet TAKE 1 TABLET BY MOUTH TWICE DAILY 8/20/24   Nav Sal MD   ALPRAZolam (XANAX) 0.25 MG tablet Take 1 tablet by mouth 2 (Two) Times a Day As Needed for Anxiety. for anxiety 9/25/24   Arian Peterson MD   aspirin 81 MG chewable tablet CHEW AND SWALLOW 1 TABLET  DAILY WITH BREAKFAST 1/12/23   Eric Weston MD   Cholecalciferol (Vitamin D3) 50 MCG (2000 UT) tablet Take 1 tablet by mouth Daily.    Eric Weston MD   cholestyramine (QUESTRAN) 4 g packet Take 1 packet by mouth 3 (Three) Times a Day With Meals. 1 packet  TID PRN diarrhea 8/9/22   Nav Sal MD   cyclobenzaprine (FLEXERIL) 10 MG tablet Take 1 tablet by mouth 3 (Three) Times a Day As Needed for Muscle Spasms. 6/26/24   Nav Sal MD   diclofenac (VOLTAREN) 50 MG EC tablet Take 1 tablet by mouth 2 (Two) Times a Day As Needed (Pain). 10/6/24   Lam Bhagat DO   Diphenhydramine-Aluminum-Magnesium-Simethicone-Lidocaine-Nystatin Swish and spit 5 mL Every 4 (Four) Hours As Needed (mucositis). Recipe: Benadryl Elixir 60cc, Maalox 200 cc, Viscous Lidocaine 30cc, Nystatin 120cc 10/28/24   Nav Sal MD   diphenoxylate-atropine (LOMOTIL) 2.5-0.025 MG per tablet Take 1 tablet by mouth 4 (Four) Times a Day As Needed for Diarrhea. 9/25/24   Nav Sal MD   empagliflozin (JARDIANCE) 10 MG tablet tablet Take 1 tablet by mouth Daily. 11/7/22   Eric Weston MD   FLUoxetine (PROzac) 20 MG capsule TAKE 1 CAPSULE BY MOUTH FOUR TIMES DAILY 8/12/24   Arian Peterson MD   HYDROcodone-acetaminophen (NORCO) 5-325 MG per tablet Take 1 tablet by mouth Every 6 (Six) Hours As Needed for Moderate Pain. 10/28/24   Nav Sal MD   icosapent ethyl (Vascepa) 1 g capsule capsule Take 2 g by mouth Daily. 9/16/24   Roxie Wilkinson APRN   levothyroxine (SYNTHROID, LEVOTHROID) 75 MCG tablet TAKE 1 TABLET BY MOUTH EVERY DAY 10/17/24   Arian Peterson MD   loratadine (CLARITIN) 10 MG tablet Take 1 tablet by mouth Daily. 8/20/24   Arian Peterson MD   Menthol-Zinc Oxide (Calmoseptine) 0.44-20.6 % ointment Apply 1 application  topically to the appropriate area as directed 2 (Two) Times a Day. 11/27/23   Savannah Summers MD   midodrine (PROAMATINE) 5 MG tablet Take 1 tablet by mouth 2 (Two) Times a Day. 6/26/23   " Eric Weston MD   Multiple Vitamins-Minerals (V-C Forte) capsule Take 1 capsule by mouth Daily. 9/21/22   Eric Weston MD   olopatadine (PATANOL) 0.1 % ophthalmic solution Administer 1 drop to both eyes 2 (Two) Times a Day. 7/11/24   Arian Peterson MD   pantoprazole (PROTONIX) 20 MG EC tablet TAKE 1 TABLET BY MOUTH EVERY DAY. DO NOT TAKE WITHIN 2 HOURS OF THYROID MEDICATION 1/15/24   Arian Peterson MD   predniSONE (DELTASONE) 10 MG tablet Take 6 tabs daily 10/17-10/19, then 4 tabs daily 10/20-10/22, then 3 tabs daily 10/23-10/25, then 2 tabs daily 10/26-10/28 then 1 tab daily 10/26-10/28 10/16/24   Roberto Solorzano MD   pregabalin (LYRICA) 50 MG capsule Take 1 capsule by mouth Daily As Needed (nerve pain). 10/9/23   Eric Weston MD   Rosuvastatin Calcium 20 MG capsule sprinkle Take 20 mg by mouth Daily. 10/3/24   Arian Peterson MD   Sodium Fluoride 5000 Plus 1.1 % cream  7/19/23   Eric Weston MD   spironolactone (ALDACTONE) 25 MG tablet Take 1 tablet by mouth Daily. 6/25/23   Eric Weston MD   zolpidem (AMBIEN) 10 MG tablet Take 1 tablet by mouth At Night As Needed for Sleep. 9/23/24   Arian Peterson MD        Social History:   Social History     Tobacco Use    Smoking status: Never     Passive exposure: Never    Smokeless tobacco: Never   Vaping Use    Vaping status: Never Used   Substance Use Topics    Alcohol use: Not Currently     Alcohol/week: 1.0 standard drink of alcohol     Types: 1 Glasses of wine per week    Drug use: Never         Review of Systems:  Review of Systems   Respiratory:  Positive for shortness of breath.    Cardiovascular:  Positive for chest pain.        Physical Exam:  /65   Pulse 84   Temp 98.3 °F (36.8 °C) (Oral)   Resp 16   Ht 152.4 cm (60\")   Wt 58.3 kg (128 lb 8.5 oz)   SpO2 (!) 88%   BMI 25.10 kg/m²     Physical Exam  Vitals and nursing note reviewed.   Constitutional:       General: She is not in acute " distress.  HENT:      Head: Atraumatic.   Cardiovascular:      Rate and Rhythm: Normal rate and regular rhythm.      Heart sounds:      Systolic murmur is present.   Pulmonary:      Effort: Pulmonary effort is normal. No respiratory distress.      Breath sounds: Normal breath sounds.   Abdominal:      General: Abdomen is flat.      Palpations: Abdomen is soft.      Tenderness: There is no abdominal tenderness.   Musculoskeletal:         General: Normal range of motion.      Cervical back: Normal range of motion.   Skin:     General: Skin is warm and dry.   Neurological:      Mental Status: She is alert and oriented to person, place, and time. Mental status is at baseline.                  Procedures:  Procedures      Medical Decision Making:      Comorbidities that affect care:    Amyloidosis    External Notes reviewed:    Previous Clinic Note: Patient seen 10/21/2024 by her PCP for for follow-up after hospital discharge for her pleuritis      The following orders were placed and all results were independently analyzed by me:  Orders Placed This Encounter   Procedures    XR Chest 1 View    CT Chest With Contrast Diagnostic    Baker Draw    High Sensitivity Troponin T    Comprehensive Metabolic Panel    Lipase    BNP    Magnesium    CBC Auto Differential    High Sensitivity Troponin T 2Hr    Undress & Gown    Continuous Pulse Oximetry    ECG 12 Lead ED Triage Standing Order; Chest Pain    CBC & Differential    Green Top (Gel)    Lavender Top    Gold Top - SST    Light Blue Top       Medications Given in the Emergency Department:  Medications   HYDROmorphone (DILAUDID) injection 1 mg (1 mg Intravenous Given 11/4/24 1002)   ondansetron (ZOFRAN) injection 4 mg (4 mg Intravenous Given 11/4/24 1003)   iopamidol (ISOVUE-370) 76 % injection 100 mL (100 mL Intravenous Given 11/4/24 1144)        ED Course:         Labs:    Lab Results (last 24 hours)       ** No results found for the last 24 hours. **              Imaging:    No Radiology Exams Resulted Within Past 24 Hours      Differential Diagnosis and Discussion:    Chest Pain:  Based on the patient's signs and symptoms, I considered aortic dissection, myocardial infaction, pulmonary embolism, cardiac tamponade, pericarditis, pneumothorax, musculoskeletal chest pain and other differential diagnosis as an etiology of the patient's chest pain.   Dyspnea: Differential diagnosis includes but is not limited to metabolic acidosis, neurological disorders, psychogenic, asthma, pneumothorax, upper airway obstruction, COPD, pneumonia, noncardiogenic pulmonary edema, interstitial lung disease, anemia, congestive heart failure, and pulmonary embolism    All labs were reviewed and interpreted by me.  All X-rays impressions were independently interpreted by me.  EKG was interpreted by me.  CT scan radiology impression was interpreted by me.    MDM  The patient is resting comfortably and feels better, is alert and in no distress. The repeat examination is unremarkable and benign. Electrocardiogram shows no signs of acute ischemia and the history, exam, diagnostic testing and current condition did not suggest that this patient is having an acute myocardial infarction, significant arrhythmia, unstable angina, esophageal perforation, pulmonary embolism, aortic dissection, severe pneumonia, sepsis for other significant pathology that would warrant further testing, continued ED treatment, admission, cardiology or other specialist consultation at this point. The vital signs have been stable. The patient's condition is stable and appropriate for discharge. The patient will pursue further outpatient evaluation with the primary care physician, or designated physician or cardiologist. The patient has expressed a clear and thorough understanding and agreed to follow-up as instructed.      Patient Care Considerations:    ANTIBIOTICS: I considered prescribing antibiotics as an outpatient however  no bacterial focus of infection was found.      Consultants/Shared Management Plan:  Patient discussed with Dr. Cunningham, pulmonology, who is familiar with the patient.  He reviewed the patient's CT scan of the chest x-ray was pulmonary in nature.      Social Determinants of Health:    Patient is independent, reliable, and has access to care.       Disposition and Care Coordination:    Discharged: The patient is suitable and stable for discharge with no need for consideration of admission.    I have explained the patient´s condition, diagnoses and treatment plan based on the information available to me at this time. I have answered questions and addressed any concerns. The patient has a good  understanding of the patient´s diagnosis, condition, and treatment plan as can be expected at this point. The vital signs have been stable. The patient´s condition is stable and appropriate for discharge from the emergency department.      The patient will pursue further outpatient evaluation with the primary care physician or other designated or consulting physician as outlined in the discharge instructions. They are agreeable to this plan of care and follow-up instructions have been explained in detail. The patient has received these instructions in written format and has expressed an understanding of the discharge instructions. The patient is aware that any significant change in condition or worsening of symptoms should prompt an immediate return to this or the closest emergency department or call to 911.  I have explained discharge medications and the need for follow up with the patient/caretakers. This was also printed in the discharge instructions. Patient was discharged with the following medications and follow up:      Medication List        New Prescriptions      oxyCODONE-acetaminophen 5-325 MG per tablet  Commonly known as: PERCOCET  Take 1 tablet by mouth Every 6 (Six) Hours As Needed for Severe Pain.             Changed      * predniSONE 10 MG tablet  Commonly known as: DELTASONE  Take 6 tabs daily 10/17-10/19, then 4 tabs daily 10/20-10/22, then 3 tabs daily 10/23-10/25, then 2 tabs daily 10/26-10/28 then 1 tab daily 10/26-10/28  What changed: Another medication with the same name was added. Make sure you understand how and when to take each.     * predniSONE 50 MG tablet  Commonly known as: DELTASONE  Take 1 tablet by mouth Daily.  What changed: You were already taking a medication with the same name, and this prescription was added. Make sure you understand how and when to take each.           * This list has 2 medication(s) that are the same as other medications prescribed for you. Read the directions carefully, and ask your doctor or other care provider to review them with you.                   Where to Get Your Medications        These medications were sent to [id number] - [address] AT [facility] OF [address] [phone] [facility] [phone] [address] [address]      Phone: [phone]   oxyCODONE-acetaminophen 5-325 MG per tablet  predniSONE 50 MG tablet      Arian Peterson MD  [address] [facility] [address] [phone]    Schedule an appointment as soon as possible for a visit          Final diagnoses:   Atypical chest pain        ED Disposition       ED Disposition   Discharge    Condition   Stable    Comment   --               This medical record created using voice recognition software.             Alec Carey DO  11/05/24 9575

## 2024-11-04 NOTE — ED TRIAGE NOTES
Patient to ED from home with right sided chest pain that radiates between shoulders. Patient describes pain as a 9/10 constant, crushing pain that makes it difficult to breathe. Positive cardiac history.

## 2024-11-06 ENCOUNTER — TELEPHONE (OUTPATIENT)
Dept: ONCOLOGY | Facility: CLINIC | Age: 69
End: 2024-11-06
Payer: MEDICARE

## 2024-11-06 ENCOUNTER — HOSPITAL ENCOUNTER (INPATIENT)
Facility: HOSPITAL | Age: 69
LOS: 3 days | Discharge: HOME | DRG: 313 | End: 2024-11-10
Attending: EMERGENCY MEDICINE | Admitting: INTERNAL MEDICINE
Payer: MEDICARE

## 2024-11-06 ENCOUNTER — APPOINTMENT (OUTPATIENT)
Dept: GENERAL RADIOLOGY | Facility: HOSPITAL | Age: 69
DRG: 313 | End: 2024-11-06
Payer: MEDICARE

## 2024-11-06 DIAGNOSIS — E85.9 AMYLOIDOSIS, UNSPECIFIED TYPE: ICD-10-CM

## 2024-11-06 DIAGNOSIS — R06.02 SHORTNESS OF BREATH: ICD-10-CM

## 2024-11-06 DIAGNOSIS — R50.9 FEVER, UNSPECIFIED FEVER CAUSE: Primary | ICD-10-CM

## 2024-11-06 DIAGNOSIS — R07.9 CHEST PAIN, UNSPECIFIED TYPE: ICD-10-CM

## 2024-11-06 LAB
ALBUMIN SERPL-MCNC: 4.1 G/DL (ref 3.5–5.2)
ALBUMIN/GLOB SERPL: 2 G/DL
ALP SERPL-CCNC: 69 U/L (ref 39–117)
ALT SERPL W P-5'-P-CCNC: 34 U/L (ref 1–33)
ANION GAP SERPL CALCULATED.3IONS-SCNC: 9 MMOL/L (ref 5–15)
AST SERPL-CCNC: 21 U/L (ref 1–32)
B PARAPERT DNA SPEC QL NAA+PROBE: NOT DETECTED
B PERT DNA SPEC QL NAA+PROBE: NOT DETECTED
BASOPHILS # BLD AUTO: 0.02 10*3/MM3 (ref 0–0.2)
BASOPHILS NFR BLD AUTO: 0.3 % (ref 0–1.5)
BILIRUB SERPL-MCNC: 0.5 MG/DL (ref 0–1.2)
BILIRUB UR QL STRIP: NEGATIVE
BUN SERPL-MCNC: 9 MG/DL (ref 8–23)
BUN/CREAT SERPL: 13.4 (ref 7–25)
C PNEUM DNA NPH QL NAA+NON-PROBE: NOT DETECTED
CALCIUM SPEC-SCNC: 8.9 MG/DL (ref 8.6–10.5)
CHLORIDE SERPL-SCNC: 104 MMOL/L (ref 98–107)
CLARITY UR: CLEAR
CO2 SERPL-SCNC: 26 MMOL/L (ref 22–29)
COLOR UR: YELLOW
CREAT SERPL-MCNC: 0.67 MG/DL (ref 0.57–1)
D-LACTATE SERPL-SCNC: 0.9 MMOL/L (ref 0.5–2)
DEPRECATED RDW RBC AUTO: 45.6 FL (ref 37–54)
EGFRCR SERPLBLD CKD-EPI 2021: 94.7 ML/MIN/1.73
EOSINOPHIL # BLD AUTO: 0.16 10*3/MM3 (ref 0–0.4)
EOSINOPHIL NFR BLD AUTO: 2.5 % (ref 0.3–6.2)
ERYTHROCYTE [DISTWIDTH] IN BLOOD BY AUTOMATED COUNT: 13.6 % (ref 12.3–15.4)
FLUAV SUBTYP SPEC NAA+PROBE: NOT DETECTED
FLUBV RNA ISLT QL NAA+PROBE: NOT DETECTED
GEN 5 2HR TROPONIN T REFLEX: 31 NG/L
GLOBULIN UR ELPH-MCNC: 2.1 GM/DL
GLUCOSE SERPL-MCNC: 88 MG/DL (ref 65–99)
GLUCOSE UR STRIP-MCNC: ABNORMAL MG/DL
HADV DNA SPEC NAA+PROBE: NOT DETECTED
HCOV 229E RNA SPEC QL NAA+PROBE: NOT DETECTED
HCOV HKU1 RNA SPEC QL NAA+PROBE: NOT DETECTED
HCOV NL63 RNA SPEC QL NAA+PROBE: NOT DETECTED
HCOV OC43 RNA SPEC QL NAA+PROBE: NOT DETECTED
HCT VFR BLD AUTO: 40.6 % (ref 34–46.6)
HGB BLD-MCNC: 13.2 G/DL (ref 12–15.9)
HGB UR QL STRIP.AUTO: NEGATIVE
HMPV RNA NPH QL NAA+NON-PROBE: NOT DETECTED
HOLD SPECIMEN: NORMAL
HOLD SPECIMEN: NORMAL
HPIV1 RNA ISLT QL NAA+PROBE: NOT DETECTED
HPIV2 RNA SPEC QL NAA+PROBE: NOT DETECTED
HPIV3 RNA NPH QL NAA+PROBE: NOT DETECTED
HPIV4 P GENE NPH QL NAA+PROBE: NOT DETECTED
IMM GRANULOCYTES # BLD AUTO: 0.02 10*3/MM3 (ref 0–0.05)
IMM GRANULOCYTES NFR BLD AUTO: 0.3 % (ref 0–0.5)
KETONES UR QL STRIP: NEGATIVE
LEUKOCYTE ESTERASE UR QL STRIP.AUTO: NEGATIVE
LYMPHOCYTES # BLD AUTO: 1.54 10*3/MM3 (ref 0.7–3.1)
LYMPHOCYTES NFR BLD AUTO: 23.9 % (ref 19.6–45.3)
M PNEUMO IGG SER IA-ACNC: NOT DETECTED
MCH RBC QN AUTO: 29.7 PG (ref 26.6–33)
MCHC RBC AUTO-ENTMCNC: 32.5 G/DL (ref 31.5–35.7)
MCV RBC AUTO: 91.2 FL (ref 79–97)
MONOCYTES # BLD AUTO: 0.39 10*3/MM3 (ref 0.1–0.9)
MONOCYTES NFR BLD AUTO: 6 % (ref 5–12)
NEUTROPHILS NFR BLD AUTO: 4.32 10*3/MM3 (ref 1.7–7)
NEUTROPHILS NFR BLD AUTO: 67 % (ref 42.7–76)
NITRITE UR QL STRIP: NEGATIVE
NRBC BLD AUTO-RTO: 0 /100 WBC (ref 0–0.2)
PH UR STRIP.AUTO: 6.5 [PH] (ref 5–8)
PLATELET # BLD AUTO: 142 10*3/MM3 (ref 140–450)
PMV BLD AUTO: 10 FL (ref 6–12)
POTASSIUM SERPL-SCNC: 4.1 MMOL/L (ref 3.5–5.2)
PROT SERPL-MCNC: 6.2 G/DL (ref 6–8.5)
PROT UR QL STRIP: NEGATIVE
QT INTERVAL: 401 MS
QT INTERVAL: 412 MS
QTC INTERVAL: 513 MS
QTC INTERVAL: 522 MS
RBC # BLD AUTO: 4.45 10*6/MM3 (ref 3.77–5.28)
RHINOVIRUS RNA SPEC NAA+PROBE: NOT DETECTED
RSV RNA NPH QL NAA+NON-PROBE: NOT DETECTED
SARS-COV-2 RNA NPH QL NAA+NON-PROBE: NOT DETECTED
SODIUM SERPL-SCNC: 139 MMOL/L (ref 136–145)
SP GR UR STRIP: >1.03 (ref 1–1.03)
TROPONIN T DELTA: 0 NG/L
TROPONIN T SERPL HS-MCNC: 31 NG/L
UROBILINOGEN UR QL STRIP: ABNORMAL
WBC NRBC COR # BLD AUTO: 6.45 10*3/MM3 (ref 3.4–10.8)
WHOLE BLOOD HOLD COAG: NORMAL
WHOLE BLOOD HOLD SPECIMEN: NORMAL

## 2024-11-06 PROCEDURE — 36415 COLL VENOUS BLD VENIPUNCTURE: CPT | Performed by: EMERGENCY MEDICINE

## 2024-11-06 PROCEDURE — 83605 ASSAY OF LACTIC ACID: CPT | Performed by: PHYSICIAN ASSISTANT

## 2024-11-06 PROCEDURE — 84484 ASSAY OF TROPONIN QUANT: CPT | Performed by: EMERGENCY MEDICINE

## 2024-11-06 PROCEDURE — 87040 BLOOD CULTURE FOR BACTERIA: CPT | Performed by: PHYSICIAN ASSISTANT

## 2024-11-06 PROCEDURE — 93005 ELECTROCARDIOGRAM TRACING: CPT | Performed by: EMERGENCY MEDICINE

## 2024-11-06 PROCEDURE — 80053 COMPREHEN METABOLIC PANEL: CPT | Performed by: EMERGENCY MEDICINE

## 2024-11-06 PROCEDURE — 85025 COMPLETE CBC W/AUTO DIFF WBC: CPT | Performed by: EMERGENCY MEDICINE

## 2024-11-06 PROCEDURE — 93005 ELECTROCARDIOGRAM TRACING: CPT

## 2024-11-06 PROCEDURE — 0202U NFCT DS 22 TRGT SARS-COV-2: CPT | Performed by: PHYSICIAN ASSISTANT

## 2024-11-06 PROCEDURE — 99285 EMERGENCY DEPT VISIT HI MDM: CPT

## 2024-11-06 PROCEDURE — 71045 X-RAY EXAM CHEST 1 VIEW: CPT

## 2024-11-06 PROCEDURE — 93010 ELECTROCARDIOGRAM REPORT: CPT | Performed by: INTERNAL MEDICINE

## 2024-11-06 PROCEDURE — 81003 URINALYSIS AUTO W/O SCOPE: CPT | Performed by: PHYSICIAN ASSISTANT

## 2024-11-06 PROCEDURE — 83036 HEMOGLOBIN GLYCOSYLATED A1C: CPT

## 2024-11-06 RX ORDER — SODIUM CHLORIDE 0.9 % (FLUSH) 0.9 %
10 SYRINGE (ML) INJECTION AS NEEDED
Status: DISCONTINUED | OUTPATIENT
Start: 2024-11-06 | End: 2024-11-10 | Stop reason: HOSPADM

## 2024-11-06 RX ORDER — ASPIRIN 325 MG
325 TABLET ORAL ONCE
Status: COMPLETED | OUTPATIENT
Start: 2024-11-06 | End: 2024-11-06

## 2024-11-06 RX ADMIN — ASPIRIN 325 MG: 325 TABLET ORAL at 20:24

## 2024-11-06 NOTE — TELEPHONE ENCOUNTER
Called patient in response to [email or web address] message. No answer. Left my direct line and a request that she call me back. [phone]. Nakita Sinclair RN

## 2024-11-06 NOTE — TELEPHONE ENCOUNTER
Patient returned phone call. Patient continues to have intermittent fevers with pain. See previous calls and messages. [facility] staff is aware and obtaining records from [facility] before giving any other instructions. Relayed message to go to [facility] ER for uncontrolled fevers and chest pain per Dr. Sal if she cannot wait to be seen until next week 11/13. Pt v/u. Nakita Sinclair RN

## 2024-11-07 ENCOUNTER — APPOINTMENT (OUTPATIENT)
Dept: CARDIOLOGY | Facility: HOSPITAL | Age: 69
DRG: 313 | End: 2024-11-07
Payer: MEDICARE

## 2024-11-07 ENCOUNTER — APPOINTMENT (OUTPATIENT)
Dept: CT IMAGING | Facility: HOSPITAL | Age: 69
DRG: 313 | End: 2024-11-07
Payer: MEDICARE

## 2024-11-07 PROBLEM — R50.9 FEVER: Status: ACTIVE | Noted: 2024-11-07

## 2024-11-07 PROBLEM — I95.89 CHRONIC HYPOTENSION: Status: ACTIVE | Noted: 2019-11-13

## 2024-11-07 PROBLEM — E11.9 TYPE 2 DIABETES MELLITUS, WITHOUT LONG-TERM CURRENT USE OF INSULIN: Status: ACTIVE | Noted: 2024-11-07

## 2024-11-07 LAB
ANION GAP SERPL CALCULATED.3IONS-SCNC: 9.4 MMOL/L (ref 5–15)
BH CV ECHO LEFT VENTRICLE GLOBAL LONGITUDINAL STRAIN: -13.9 %
BH CV ECHO MEAS - EDV(CUBED): 70.4 ML
BH CV ECHO MEAS - EDV(MOD-SP2): 86 ML
BH CV ECHO MEAS - EDV(MOD-SP4): 86 ML
BH CV ECHO MEAS - EF(MOD-BP): 49.8 %
BH CV ECHO MEAS - EF(MOD-SP2): 51.2 %
BH CV ECHO MEAS - EF(MOD-SP4): 48.8 %
BH CV ECHO MEAS - ESV(CUBED): 28.6 ML
BH CV ECHO MEAS - ESV(MOD-SP2): 42 ML
BH CV ECHO MEAS - ESV(MOD-SP4): 44 ML
BH CV ECHO MEAS - FS: 26 %
BH CV ECHO MEAS - IVS/LVPW: 1.02 CM
BH CV ECHO MEAS - IVSD: 0.97 CM
BH CV ECHO MEAS - LAT PEAK E' VEL: 6.2 CM/SEC
BH CV ECHO MEAS - LV MASS(C)D: 126.8 GRAMS
BH CV ECHO MEAS - LVIDD: 4.1 CM
BH CV ECHO MEAS - LVIDS: 3.1 CM
BH CV ECHO MEAS - LVPWD: 0.95 CM
BH CV ECHO MEAS - MED PEAK E' VEL: 10.7 CM/SEC
BH CV ECHO MEAS - MV DEC TIME: 0.16 SEC
BH CV ECHO MEAS - MV E MAX VEL: 110 CM/SEC
BH CV ECHO MEAS - RAP SYSTOLE: 3 MMHG
BH CV ECHO MEAS - SV(MOD-SP2): 44 ML
BH CV ECHO MEAS - SV(MOD-SP4): 42 ML
BH CV ECHO MEASUREMENTS AVERAGE E/E' RATIO: 13.02
BH CV XLRA - TDI S': 8.6 CM/SEC
BUN SERPL-MCNC: 10 MG/DL (ref 8–23)
BUN/CREAT SERPL: 13.5 (ref 7–25)
CALCIUM SPEC-SCNC: 9.3 MG/DL (ref 8.6–10.5)
CHLORIDE SERPL-SCNC: 107 MMOL/L (ref 98–107)
CO2 SERPL-SCNC: 24.6 MMOL/L (ref 22–29)
CREAT SERPL-MCNC: 0.74 MG/DL (ref 0.57–1)
D DIMER PPP FEU-MCNC: 0.49 MCGFEU/ML (ref 0–0.69)
DEPRECATED RDW RBC AUTO: 45.9 FL (ref 37–54)
EGFRCR SERPLBLD CKD-EPI 2021: 87.7 ML/MIN/1.73
ERYTHROCYTE [DISTWIDTH] IN BLOOD BY AUTOMATED COUNT: 13.7 % (ref 12.3–15.4)
GLUCOSE BLDC GLUCOMTR-MCNC: 111 MG/DL (ref 70–130)
GLUCOSE BLDC GLUCOMTR-MCNC: 83 MG/DL (ref 70–130)
GLUCOSE BLDC GLUCOMTR-MCNC: 95 MG/DL (ref 70–130)
GLUCOSE SERPL-MCNC: 93 MG/DL (ref 65–99)
HBA1C MFR BLD: 6.7 % (ref 4.8–5.6)
HCT VFR BLD AUTO: 38.7 % (ref 34–46.6)
HGB BLD-MCNC: 12.4 G/DL (ref 12–15.9)
MCH RBC QN AUTO: 29.5 PG (ref 26.6–33)
MCHC RBC AUTO-ENTMCNC: 32 G/DL (ref 31.5–35.7)
MCV RBC AUTO: 91.9 FL (ref 79–97)
NT-PROBNP SERPL-MCNC: 351 PG/ML (ref 0–900)
PLATELET # BLD AUTO: 142 10*3/MM3 (ref 140–450)
PMV BLD AUTO: 10.8 FL (ref 6–12)
POTASSIUM SERPL-SCNC: 3.9 MMOL/L (ref 3.5–5.2)
QT INTERVAL: 399 MS
QTC INTERVAL: 502 MS
RBC # BLD AUTO: 4.21 10*6/MM3 (ref 3.77–5.28)
SODIUM SERPL-SCNC: 141 MMOL/L (ref 136–145)
TSH SERPL DL<=0.05 MIU/L-ACNC: 3.4 UIU/ML (ref 0.27–4.2)
WBC NRBC COR # BLD AUTO: 4.85 10*3/MM3 (ref 3.4–10.8)

## 2024-11-07 PROCEDURE — 93356 MYOCRD STRAIN IMG SPCKL TRCK: CPT

## 2024-11-07 PROCEDURE — 82948 REAGENT STRIP/BLOOD GLUCOSE: CPT

## 2024-11-07 PROCEDURE — 84443 ASSAY THYROID STIM HORMONE: CPT | Performed by: STUDENT IN AN ORGANIZED HEALTH CARE EDUCATION/TRAINING PROGRAM

## 2024-11-07 PROCEDURE — 85027 COMPLETE CBC AUTOMATED: CPT

## 2024-11-07 PROCEDURE — 84155 ASSAY OF PROTEIN SERUM: CPT | Performed by: INTERNAL MEDICINE

## 2024-11-07 PROCEDURE — 93308 TTE F-UP OR LMTD: CPT

## 2024-11-07 PROCEDURE — 93325 DOPPLER ECHO COLOR FLOW MAPG: CPT

## 2024-11-07 PROCEDURE — 82784 ASSAY IGA/IGD/IGG/IGM EACH: CPT | Performed by: INTERNAL MEDICINE

## 2024-11-07 PROCEDURE — 85379 FIBRIN DEGRADATION QUANT: CPT

## 2024-11-07 PROCEDURE — 93321 DOPPLER ECHO F-UP/LMTD STD: CPT | Performed by: INTERNAL MEDICINE

## 2024-11-07 PROCEDURE — 93308 TTE F-UP OR LMTD: CPT | Performed by: INTERNAL MEDICINE

## 2024-11-07 PROCEDURE — 86334 IMMUNOFIX E-PHORESIS SERUM: CPT | Performed by: INTERNAL MEDICINE

## 2024-11-07 PROCEDURE — 71275 CT ANGIOGRAPHY CHEST: CPT

## 2024-11-07 PROCEDURE — 25510000001 IOPAMIDOL PER 1 ML: Performed by: INTERNAL MEDICINE

## 2024-11-07 PROCEDURE — 25010000002 ENOXAPARIN PER 10 MG: Performed by: INTERNAL MEDICINE

## 2024-11-07 PROCEDURE — 25810000003 SODIUM CHLORIDE 0.9 % SOLUTION: Performed by: INTERNAL MEDICINE

## 2024-11-07 PROCEDURE — 93356 MYOCRD STRAIN IMG SPCKL TRCK: CPT | Performed by: INTERNAL MEDICINE

## 2024-11-07 PROCEDURE — 84165 PROTEIN E-PHORESIS SERUM: CPT | Performed by: INTERNAL MEDICINE

## 2024-11-07 PROCEDURE — 99222 1ST HOSP IP/OBS MODERATE 55: CPT | Performed by: INTERNAL MEDICINE

## 2024-11-07 PROCEDURE — 93325 DOPPLER ECHO COLOR FLOW MAPG: CPT | Performed by: INTERNAL MEDICINE

## 2024-11-07 PROCEDURE — 80048 BASIC METABOLIC PNL TOTAL CA: CPT

## 2024-11-07 PROCEDURE — 99222 1ST HOSP IP/OBS MODERATE 55: CPT | Performed by: STUDENT IN AN ORGANIZED HEALTH CARE EDUCATION/TRAINING PROGRAM

## 2024-11-07 PROCEDURE — 83521 IG LIGHT CHAINS FREE EACH: CPT | Performed by: INTERNAL MEDICINE

## 2024-11-07 PROCEDURE — 83880 ASSAY OF NATRIURETIC PEPTIDE: CPT | Performed by: INTERNAL MEDICINE

## 2024-11-07 PROCEDURE — 25510000001 PERFLUTREN 6.52 MG/ML SUSPENSION 2 ML VIAL: Performed by: INTERNAL MEDICINE

## 2024-11-07 PROCEDURE — 93321 DOPPLER ECHO F-UP/LMTD STD: CPT

## 2024-11-07 RX ORDER — POLYETHYLENE GLYCOL 3350 17 G/17G
17 POWDER, FOR SOLUTION ORAL DAILY PRN
Status: DISCONTINUED | OUTPATIENT
Start: 2024-11-07 | End: 2024-11-10 | Stop reason: HOSPADM

## 2024-11-07 RX ORDER — ROSUVASTATIN CALCIUM 20 MG/1
20 TABLET, COATED ORAL DAILY
Status: DISCONTINUED | OUTPATIENT
Start: 2024-11-07 | End: 2024-11-10 | Stop reason: HOSPADM

## 2024-11-07 RX ORDER — SODIUM CHLORIDE 9 MG/ML
125 INJECTION, SOLUTION INTRAVENOUS CONTINUOUS
Status: ACTIVE | OUTPATIENT
Start: 2024-11-07 | End: 2024-11-07

## 2024-11-07 RX ORDER — CETIRIZINE HYDROCHLORIDE 10 MG/1
10 TABLET ORAL DAILY
Status: DISCONTINUED | OUTPATIENT
Start: 2024-11-07 | End: 2024-11-10 | Stop reason: HOSPADM

## 2024-11-07 RX ORDER — ASPIRIN 81 MG/1
81 TABLET, CHEWABLE ORAL DAILY
Status: DISCONTINUED | OUTPATIENT
Start: 2024-11-07 | End: 2024-11-10 | Stop reason: HOSPADM

## 2024-11-07 RX ORDER — NICOTINE POLACRILEX 4 MG
15 LOZENGE BUCCAL
Status: DISCONTINUED | OUTPATIENT
Start: 2024-11-07 | End: 2024-11-10 | Stop reason: HOSPADM

## 2024-11-07 RX ORDER — HYDROCODONE BITARTRATE AND ACETAMINOPHEN 5; 325 MG/1; MG/1
1 TABLET ORAL EVERY 6 HOURS PRN
Status: DISCONTINUED | OUTPATIENT
Start: 2024-11-07 | End: 2024-11-10 | Stop reason: HOSPADM

## 2024-11-07 RX ORDER — BISACODYL 5 MG/1
5 TABLET, DELAYED RELEASE ORAL DAILY PRN
Status: DISCONTINUED | OUTPATIENT
Start: 2024-11-07 | End: 2024-11-10 | Stop reason: HOSPADM

## 2024-11-07 RX ORDER — PREGABALIN 50 MG/1
50 CAPSULE ORAL DAILY PRN
Status: DISCONTINUED | OUTPATIENT
Start: 2024-11-07 | End: 2024-11-10 | Stop reason: HOSPADM

## 2024-11-07 RX ORDER — CHOLESTYRAMINE 4 G/9G
1 POWDER, FOR SUSPENSION ORAL 3 TIMES DAILY PRN
Status: DISCONTINUED | OUTPATIENT
Start: 2024-11-07 | End: 2024-11-10 | Stop reason: HOSPADM

## 2024-11-07 RX ORDER — CYCLOBENZAPRINE HCL 10 MG
10 TABLET ORAL 3 TIMES DAILY PRN
Status: DISCONTINUED | OUTPATIENT
Start: 2024-11-07 | End: 2024-11-10 | Stop reason: HOSPADM

## 2024-11-07 RX ORDER — DEXTROSE MONOHYDRATE 25 G/50ML
25 INJECTION, SOLUTION INTRAVENOUS
Status: DISCONTINUED | OUTPATIENT
Start: 2024-11-07 | End: 2024-11-10 | Stop reason: HOSPADM

## 2024-11-07 RX ORDER — AMOXICILLIN 250 MG
2 CAPSULE ORAL 2 TIMES DAILY PRN
Status: DISCONTINUED | OUTPATIENT
Start: 2024-11-07 | End: 2024-11-10 | Stop reason: HOSPADM

## 2024-11-07 RX ORDER — IBUPROFEN 600 MG/1
1 TABLET ORAL
Status: DISCONTINUED | OUTPATIENT
Start: 2024-11-07 | End: 2024-11-10 | Stop reason: HOSPADM

## 2024-11-07 RX ORDER — ZOLPIDEM TARTRATE 5 MG/1
10 TABLET ORAL NIGHTLY
Status: DISCONTINUED | OUTPATIENT
Start: 2024-11-07 | End: 2024-11-10 | Stop reason: HOSPADM

## 2024-11-07 RX ORDER — BISACODYL 10 MG
10 SUPPOSITORY, RECTAL RECTAL DAILY PRN
Status: DISCONTINUED | OUTPATIENT
Start: 2024-11-07 | End: 2024-11-10 | Stop reason: HOSPADM

## 2024-11-07 RX ORDER — ONDANSETRON 2 MG/ML
4 INJECTION INTRAMUSCULAR; INTRAVENOUS EVERY 6 HOURS PRN
Status: DISCONTINUED | OUTPATIENT
Start: 2024-11-07 | End: 2024-11-10 | Stop reason: HOSPADM

## 2024-11-07 RX ORDER — ONDANSETRON 4 MG/1
4 TABLET, ORALLY DISINTEGRATING ORAL EVERY 6 HOURS PRN
Status: DISCONTINUED | OUTPATIENT
Start: 2024-11-07 | End: 2024-11-10 | Stop reason: HOSPADM

## 2024-11-07 RX ORDER — NITROGLYCERIN 0.4 MG/1
0.4 TABLET SUBLINGUAL
Status: DISCONTINUED | OUTPATIENT
Start: 2024-11-07 | End: 2024-11-10 | Stop reason: HOSPADM

## 2024-11-07 RX ORDER — INSULIN LISPRO 100 [IU]/ML
2-7 INJECTION, SOLUTION INTRAVENOUS; SUBCUTANEOUS
Status: DISCONTINUED | OUTPATIENT
Start: 2024-11-07 | End: 2024-11-10 | Stop reason: HOSPADM

## 2024-11-07 RX ORDER — MIDODRINE HYDROCHLORIDE 5 MG/1
5 TABLET ORAL
Status: DISCONTINUED | OUTPATIENT
Start: 2024-11-07 | End: 2024-11-10 | Stop reason: HOSPADM

## 2024-11-07 RX ORDER — ENOXAPARIN SODIUM 100 MG/ML
40 INJECTION SUBCUTANEOUS EVERY 24 HOURS
Status: DISCONTINUED | OUTPATIENT
Start: 2024-11-07 | End: 2024-11-10 | Stop reason: HOSPADM

## 2024-11-07 RX ORDER — ALPRAZOLAM 0.25 MG/1
0.25 TABLET ORAL 2 TIMES DAILY PRN
Status: DISCONTINUED | OUTPATIENT
Start: 2024-11-07 | End: 2024-11-10 | Stop reason: HOSPADM

## 2024-11-07 RX ORDER — CHOLECALCIFEROL (VITAMIN D3) 25 MCG
2000 TABLET ORAL DAILY
Status: DISCONTINUED | OUTPATIENT
Start: 2024-11-07 | End: 2024-11-10 | Stop reason: HOSPADM

## 2024-11-07 RX ORDER — IOPAMIDOL 755 MG/ML
100 INJECTION, SOLUTION INTRAVASCULAR
Status: COMPLETED | OUTPATIENT
Start: 2024-11-07 | End: 2024-11-07

## 2024-11-07 RX ORDER — SPIRONOLACTONE 25 MG/1
25 TABLET ORAL DAILY
Status: DISCONTINUED | OUTPATIENT
Start: 2024-11-07 | End: 2024-11-10 | Stop reason: HOSPADM

## 2024-11-07 RX ORDER — LEVOTHYROXINE SODIUM 75 UG/1
75 TABLET ORAL DAILY
Status: DISCONTINUED | OUTPATIENT
Start: 2024-11-07 | End: 2024-11-10 | Stop reason: HOSPADM

## 2024-11-07 RX ORDER — PANTOPRAZOLE SODIUM 40 MG/1
40 TABLET, DELAYED RELEASE ORAL
Status: DISCONTINUED | OUTPATIENT
Start: 2024-11-07 | End: 2024-11-10 | Stop reason: HOSPADM

## 2024-11-07 RX ORDER — ACETAMINOPHEN 325 MG/1
650 TABLET ORAL EVERY 4 HOURS PRN
Status: DISCONTINUED | OUTPATIENT
Start: 2024-11-07 | End: 2024-11-10 | Stop reason: HOSPADM

## 2024-11-07 RX ADMIN — ENOXAPARIN SODIUM 40 MG: 100 INJECTION SUBCUTANEOUS at 12:27

## 2024-11-07 RX ADMIN — PERFLUTREN 3 ML: 6.52 INJECTION, SUSPENSION INTRAVENOUS at 11:21

## 2024-11-07 RX ADMIN — HYDROCODONE BITARTRATE AND ACETAMINOPHEN 1 TABLET: 5; 325 TABLET ORAL at 20:09

## 2024-11-07 RX ADMIN — FLUOXETINE HYDROCHLORIDE 20 MG: 20 CAPSULE ORAL at 16:54

## 2024-11-07 RX ADMIN — SPIRONOLACTONE 25 MG: 25 TABLET, FILM COATED ORAL at 08:19

## 2024-11-07 RX ADMIN — FLUOXETINE HYDROCHLORIDE 20 MG: 20 CAPSULE ORAL at 08:19

## 2024-11-07 RX ADMIN — Medication 2000 UNITS: at 08:19

## 2024-11-07 RX ADMIN — CETIRIZINE HYDROCHLORIDE 10 MG: 10 TABLET, FILM COATED ORAL at 08:19

## 2024-11-07 RX ADMIN — MIDODRINE HYDROCHLORIDE 5 MG: 5 TABLET ORAL at 16:54

## 2024-11-07 RX ADMIN — FLUOXETINE HYDROCHLORIDE 20 MG: 20 CAPSULE ORAL at 20:02

## 2024-11-07 RX ADMIN — MIDODRINE HYDROCHLORIDE 5 MG: 5 TABLET ORAL at 06:35

## 2024-11-07 RX ADMIN — IOPAMIDOL 95 ML: 755 INJECTION, SOLUTION INTRAVENOUS at 13:20

## 2024-11-07 RX ADMIN — LEVOTHYROXINE SODIUM 75 MCG: 75 TABLET ORAL at 08:19

## 2024-11-07 RX ADMIN — ROSUVASTATIN CALCIUM 20 MG: 20 TABLET, FILM COATED ORAL at 08:19

## 2024-11-07 RX ADMIN — ZOLPIDEM TARTRATE 10 MG: 5 TABLET ORAL at 20:02

## 2024-11-07 RX ADMIN — ASPIRIN 81 MG: 81 TABLET, CHEWABLE ORAL at 08:19

## 2024-11-07 RX ADMIN — SODIUM CHLORIDE 125 ML/HR: 9 INJECTION, SOLUTION INTRAVENOUS at 13:43

## 2024-11-07 RX ADMIN — FLUOXETINE HYDROCHLORIDE 20 MG: 20 CAPSULE ORAL at 12:27

## 2024-11-07 RX ADMIN — PANTOPRAZOLE SODIUM 40 MG: 40 TABLET, DELAYED RELEASE ORAL at 06:35

## 2024-11-07 RX ADMIN — ACETAMINOPHEN 325MG 650 MG: 325 TABLET ORAL at 01:39

## 2024-11-07 NOTE — ED PROVIDER NOTES
"SHARED VISIT: This visit was performed by BOTH a physician and an APC. The substantive portion of the medical decision making was performed by this attesting physician who made or approved the management plan and takes responsibility for patient management. All studies in the APC note (if performed) were independently interpreted by me.     The GRAHAM and I have discussed this patient's history, physical exam, and treatment plan.  I have reviewed the documentation and personally had a face to face interaction with the patient. I affirm the documentation and agree with the treatment and plan.  The attached note describes my personal findings.      I provided a substantive portion of the care of the patient.  I personally performed the physical exam in its entirety, and below are my findings.     Brief history of present illness: 69-year-old female with amyloidosis reports ongoing chest discomfort for more than a month with associated dyspnea and daily fevers.    EMR reviewed: Patient had CT angiography of the chest 11/4/2024 at [facility] which revealed no evidence of PE or other acute process    Physical exam:    /70 (BP Location: Right arm)   Pulse 103   Temp 99.4 °F (37.4 °C) (Tympanic)   Resp 18   Ht 152.4 cm (60\")   Wt 57.2 kg (126 lb)   SpO2 97%   BMI 24.61 kg/m²       Physical Exam   Constitutional: No distress.  Nontoxic but uncomfortable appearing  HENT:  Head: Normocephalic and atraumatic.   Oropharynx: Mucous membranes are moist.   Eyes: . No scleral icterus.   Neck: Normal range of motion. Neck supple.   Cardiovascular: Pink warm and well perfused throughout.  Tachycardic  Pulmonary/Chest: No respiratory distress.  Clear to auscultation.  No tachypnea or increased work of breathing noted  Abdominal: Soft. There is no rebound or rigidity  Musculoskeletal: Moves all extremities equally.    Neurological: Alert and oriented.  Speech fluent and easily intelligible  Skin: Skin is pink, warm, and " dry.   Psychiatric: Mood and affect normal.   Nursing note and vitals reviewed.        MDM:    My differential diagnosis for chest pain includes but is not limited to:  Muscle strain, costochondritis, myositis, pleurisy, rib fracture, intercostal neuritis, herpes zoster, tumor, myocardial infarction, coronary syndrome, unstable angina, angina, aortic dissection, mitral valve prolapse, pericarditis, palpitations, pulmonary embolus, pneumonia, pneumothorax, lung cancer, GERD, esophagitis, esophageal spasm    RADIOLOGY      Study: Single view chest  Findings: No pneumothorax or large focal infiltrate noted  I independently viewed and interpreted these images contemporaneously with treatment.       I have personally reviewed and interpreted the EKG obtained today in the emergency department at 2010 concomitant with treatment.  EKG reveals rhythm/rate -sinus, 95. MS-borderline first-degree AV block.  QRS-left bundle branch block ST/T-wave -ST/T wave repolarization abnormality.  Comparison 11/4/2024-similar left bundle branch block noted      Please note that portions of this were completed with a voice recognition program.       Note Disclaimer: At [facility], we believe that sharing information builds trust and better relationships. You are receiving this note because you are receiving care at [facility] or recently visited. It is possible you will see health information before a provider has talked with you about it. This kind of information can be easy to misunderstand. To help you fully understand what it means for your health, we urge you to discuss this note with your provider.     Pete Leslie MD  11/06/24 9628

## 2024-11-07 NOTE — ED PROVIDER NOTES
EMERGENCY DEPARTMENT ENCOUNTER    Room Number:  08/08  Date of encounter:  11/6/2024  PCP: Arian Peterson MD  Historian: Patient, family  Chronic or social conditions impacting care (social determinants of health): Full code from home    HPI:  Chief Complaint: Chest pain, shortness of breath, fever  A complete HPI/ROS/PMH/PSH/SH/FH are unobtainable due to: Nothing    Context: Cynthia Portillo is a 69 y.o. female with a history of amyloidosis, who presents to the ED c/o acute shortness of breath, fever (max 102.2), chest pain x 3-4 weeks.  Patient states his symptoms have continued despite treatment.  Today she describes a sharp pain under her left breast on the left.  She has had worsening shortness of breath and fever.  She has a history of amyloidosis and follows with Dr. Sal here in [facility] as well as [facility].  She denies any abdominal pain, vomiting, dysuria.  She denies any overt cough.  Patient had called her oncologist and was told to come in for further evaluation.  Patient was most recently seen at [facility] 2 days ago without any definitive treatment.    Patient most recently had Darzalex in September.    Review of prior external notes (non-ED):   Reviewed last admission from 10/11/2024.  Patient admitted for chest pain.    Review of prior external test results outside of this encounter:  I reviewed a CT of the chest from 11/4/2024.  This showed no pneumonia or PE.    PAST MEDICAL HISTORY  Active Ambulatory Problems     Diagnosis Date Noted   • S/P arthroscopy of shoulder 06/22/2017   • Dyspareunia, female 02/13/2019   • Rhinosinusitis 02/13/2019   • Hypothyroid 02/13/2019   • High cholesterol 02/13/2019   • Pelvic cramping 04/20/2019   • Vaginal atrophy 04/20/2019   • Major depressive disorder with single episode, in full remission 05/12/2019   • Attention deficit disorder (ADD) without hyperactivity 05/12/2019   • Hepatomegaly 06/07/2019   • NAFLD (nonalcoholic fatty liver  disease) 06/07/2019   • Fecal smearing 09/11/2019   • Elevated transaminase level 09/11/2019   • Chronic right shoulder pain 09/19/2018   • HTN (hypertension) 11/13/2019   • Arthropathy, transient, shoulder, right 11/28/2018   • Status post reverse arthroplasty of right shoulder 01/22/2019   • Unstable angina 04/24/2020   • Esophageal dysphagia 05/06/2020   • Precordial chest pain 05/06/2020   • Hypertriglyceridemia 07/05/2020   • Primary insomnia 07/05/2020   • Oral herpes 08/18/2020   • Memory loss 09/02/2020   • Chronic tension-type headache, not intractable 09/02/2020   • Hyperglycemia 02/16/2021   • Acute cystitis without hematuria 02/16/2021   • Esophagitis, eosinophilic 02/18/2021   • Functional diarrhea 02/18/2021   • Dysphagia, cricopharyngeal 10/15/2021   • Elevated serum immunoglobulin free light chain level 04/21/2022   • Amyloidosis 05/04/2022   • Arthritis    • Status post recent immunosuppressive therapy 08/09/2022   • Pneumonia of both lungs due to infectious organism 08/18/2022   • Benign neoplasm of left breast 09/19/2022   • FH: breast cancer 09/19/2022   • Medicare annual wellness visit, subsequent 06/11/2024   • Osteopenia 09/12/2024   • Atypical chest pain 10/11/2024   • Pleuritis 10/13/2024     Resolved Ambulatory Problems     Diagnosis Date Noted   • Acute respiratory failure with hypoxia 08/18/2022   • Acute pulmonary edema 08/18/2022     Past Medical History:   Diagnosis Date   • AL amyloidosis    • Anxiety    • Bowel perforation    • COVID-19 07/2020   • Depression    • Diverticulitis of colon    • Diverticulosis    • GERD (gastroesophageal reflux disease)    • History of Clostridioides difficile infection 2008   • History of prediabetes    • History of snoring    • History of stress incontinence    • Hypercholesterolemia    • Hyperlipidemia    • Hypertension    • Left ventricular hypertrophy    • Liver disease    • Seasonal allergies    • SOB (shortness of breath)    • Status post  colostomy    • Thyroid disease          PAST SURGICAL HISTORY  Past Surgical History:   Procedure Laterality Date   • ABDOMINAL SURGERY  2005, 2014    ex lap x 2,  diverticulitis   • ABDOMINOPLASTY  2016   • ADENOIDECTOMY     • APPENDECTOMY     • CARDIAC CATHETERIZATION N/A 04/24/2020    Procedure: Coronary angiography;  Surgeon: Roberto Briones MD;  Location:  [facility] CATH INVASIVE LOCATION;  Service: Cardiovascular;  Laterality: N/A;   • CARDIAC CATHETERIZATION N/A 04/24/2020    Procedure: Left heart cath;  Surgeon: Roberto Briones MD;  [facility];  Service: Cardiovascular;  Laterality: N/A;   • CARDIAC CATHETERIZATION N/A 04/24/2020    Procedure: Left ventriculography;  Surgeon: Roberto Briones MD;  [facility];  Service: Cardiovascular;  Laterality: N/A;   • CARDIAC SURGERY      open heart surgery,   • COLONOSCOPY  approx 2018    normal per pt    • COLOSTOMY  2005    had colostomy and then is was reversed   • COLOSTOMY CLOSURE  2006   • CORRECTION HAMMER TOE Right 2017   • ECTOPIC PREGNANCY SURGERY      1979, 1980   • ENDOSCOPY N/A 05/27/2020    Procedure: ESOPHAGOGASTRODUODENOSCOPY WITH BIOPSY AND BIOPSY POLYPECTOMY AND MACIEL DIALATION;  Surgeon: Preethi Beck MD;  Location: [facility] ENDOSCOPY;  Service: Gastroenterology;  Laterality: N/A;  PRE: ESOPHAGEAL DYSPHAGIA  POST: Z LINE 46, ESOPHAGEAL SPASM, GASTRITIS, FUNDIC POLYP   • ENDOSCOPY N/A 06/20/2023   • ESOPHAGEAL DILATATION N/A 12/07/2021    Procedure: OR ESOPHAGEAL DILATATION with maciel dilators ;  Surgeon: Jamey Leslie MD;  [place] [facility]  Service: ENT;  Laterality: N/A;   • ESOPHAGEAL MOTILITY STUDY N/A 02/03/2022    Procedure: ESOPHAGEAL MOTILITY STUDY;  Surgeon: Harshil, Nurse Performed;  Location: [facility] ENDOSCOPY;  Service: Gastroenterology;  Laterality: N/A;  dypshagia    • FOOT SURGERY Right 12/2016    Second and third hammertoe corrections   •  KNEE ARTHROSCOPY Right 2009   • KNEE SURGERY Right    • REVISION / TAKEDOWN COLOSTOMY  2006   • SHOULDER ARTHROSCOPY Right 2018    right shoulder arthroscopy with extensive rotator cuff, biceps and labral debridement, chondroplasty, & subacromial decompression with acromioplasty   • SHOULDER SURGERY      HAS HAS COMPLETE SHOULDER ON RIGHT. LEFT SCOPED AND HAD 2ND SURGERY WITH HARDWARE PLACED   • SHOULDER SURGERY Left     2012. 2013   • TONSILLECTOMY     • TOTAL SHOULDER REVERSE ARTHROPLASTY Right 2019   • WISDOM TOOTH EXTRACTION           FAMILY HISTORY  Family History   Problem Relation Age of Onset   • Hypertension Mother    • Osteoporosis Mother    • Skin cancer Mother    • Heart disease Father    • Hypertension Father    • Breast cancer Maternal Grandmother    • Ovarian cancer Neg Hx    • Colon cancer Neg Hx    • Deep vein thrombosis Neg Hx    • Pulmonary embolism Neg Hx    • Malig Hyperthermia Neg Hx          SOCIAL HISTORY  Social History     Socioeconomic History   • Marital status: Single   • Number of children: 2   Tobacco Use   • Smoking status: Never     Passive exposure: Never   • Smokeless tobacco: Never   Vaping Use   • Vaping status: Never Used   Substance and Sexual Activity   • Alcohol use: Not Currently     Alcohol/week: 1.0 standard drink of alcohol     Types: 1 Glasses of wine per week   • Drug use: Never   • Sexual activity: Defer         ALLERGIES  Azithromycin, Ezetimibe, and Pravastatin        REVIEW OF SYSTEMS  All systems reviewed and negative except for those discussed in HPI.       PHYSICAL EXAM    I have reviewed the triage vital signs and nursing notes.    ED Triage Vitals   Temp Heart Rate Resp BP SpO2   11/06/24 2001 11/06/24 2001 11/06/24 2001 11/06/24 2012 11/06/24 2001   99.4 °F (37.4 °C) 104 18 135/70 91 %      Temp src Heart Rate Source Patient Position BP Location FiO2 (%)   11/06/24 2001 11/06/24 2001 -- 11/06/24 2012 --   Tympanic Monitor  Right arm        Physical  Exam  GENERAL: Alert, oriented, nontoxic-appearing, not distressed  HENT: head atraumatic, no nuchal rigidity  EYES: no scleral icterus, EOMI  CV: regular rhythm, regular rate, no murmur  RESPIRATORY: normal effort, CTA.  Mild left anterior chest wall tenderness.  ABDOMEN: soft, nontender  MUSCULOSKELETAL: no deformity, FROM, no calf swelling or tenderness  NEURO: alert, moves all extremities, follows commands  SKIN: warm, dry, no rash        LAB RESULTS  Recent Results (from the past 24 hours)   ECG 12 Lead ED Triage Standing Order; Chest Pain    Collection Time: 11/06/24  8:10 PM   Result Value Ref Range    QT Interval 399 ms    QTC Interval 502 ms   Respiratory Panel PCR w/COVID-19(SARS-CoV-2) [facility] In-House, NP Swab in UTM/VTM, 2 HR TAT - Swab, Nasopharynx    Collection Time: 11/06/24  8:28 PM    Specimen: Nasopharynx; Swab   Result Value Ref Range    ADENOVIRUS, PCR Not Detected Not Detected    Coronavirus 229E Not Detected Not Detected    Coronavirus HKU1 Not Detected Not Detected    Coronavirus NL63 Not Detected Not Detected    Coronavirus OC43 Not Detected Not Detected    COVID19 Not Detected Not Detected - Ref. Range    Human Metapneumovirus Not Detected Not Detected    Human Rhinovirus/Enterovirus Not Detected Not Detected    Influenza A PCR Not Detected Not Detected    Influenza B PCR Not Detected Not Detected    Parainfluenza Virus 1 Not Detected Not Detected    Parainfluenza Virus 2 Not Detected Not Detected    Parainfluenza Virus 3 Not Detected Not Detected    Parainfluenza Virus 4 Not Detected Not Detected    RSV, PCR Not Detected Not Detected    Bordetella pertussis pcr Not Detected Not Detected    Bordetella parapertussis PCR Not Detected Not Detected    Chlamydophila pneumoniae PCR Not Detected Not Detected    Mycoplasma pneumo by PCR Not Detected Not Detected   Comprehensive Metabolic Panel    Collection Time: 11/06/24  8:36 PM    Specimen: Arm, Left; Blood   Result Value Ref  Range    Glucose 88 65 - 99 mg/dL    BUN 9 8 - 23 mg/dL    Creatinine 0.67 0.57 - 1.00 mg/dL    Sodium 139 136 - 145 mmol/L    Potassium 4.1 3.5 - 5.2 mmol/L    Chloride 104 98 - 107 mmol/L    CO2 26.0 22.0 - 29.0 mmol/L    Calcium 8.9 8.6 - 10.5 mg/dL    Total Protein 6.2 6.0 - 8.5 g/dL    Albumin 4.1 3.5 - 5.2 g/dL    ALT (SGPT) 34 (H) 1 - 33 U/L    AST (SGOT) 21 1 - 32 U/L    Alkaline Phosphatase 69 39 - 117 U/L    Total Bilirubin 0.5 0.0 - 1.2 mg/dL    Globulin 2.1 gm/dL    A/G Ratio 2.0 g/dL    BUN/Creatinine Ratio 13.4 7.0 - 25.0    Anion Gap 9.0 5.0 - 15.0 mmol/L    eGFR 94.7 >60.0 mL/min/1.73   High Sensitivity Troponin T    Collection Time: 11/06/24  8:36 PM    Specimen: Arm, Left; Blood   Result Value Ref Range    HS Troponin T 31 (H) <14 ng/L   Green Top (Gel)    Collection Time: 11/06/24  8:36 PM   Result Value Ref Range    Extra Tube Hold for add-ons.    Lavender Top    Collection Time: 11/06/24  8:36 PM   Result Value Ref Range    Extra Tube hold for add-on    Gold Top - SST    Collection Time: 11/06/24  8:36 PM   Result Value Ref Range    Extra Tube Hold for add-ons.    Light Blue Top    Collection Time: 11/06/24  8:36 PM   Result Value Ref Range    Extra Tube Hold for add-ons.    CBC Auto Differential    Collection Time: 11/06/24  8:36 PM    Specimen: Arm, Left; Blood   Result Value Ref Range    WBC 6.45 3.40 - 10.80 10*3/mm3    RBC 4.45 3.77 - 5.28 10*6/mm3    Hemoglobin 13.2 12.0 - 15.9 g/dL    Hematocrit 40.6 34.0 - 46.6 %    MCV 91.2 79.0 - 97.0 fL    MCH 29.7 26.6 - 33.0 pg    MCHC 32.5 31.5 - 35.7 g/dL    RDW 13.6 12.3 - 15.4 %    RDW-SD 45.6 37.0 - 54.0 fl    MPV 10.0 6.0 - 12.0 fL    Platelets 142 140 - 450 10*3/mm3    Neutrophil % 67.0 42.7 - 76.0 %    Lymphocyte % 23.9 19.6 - 45.3 %    Monocyte % 6.0 5.0 - 12.0 %    Eosinophil % 2.5 0.3 - 6.2 %    Basophil % 0.3 0.0 - 1.5 %    Immature Grans % 0.3 0.0 - 0.5 %    Neutrophils, Absolute 4.32 1.70 - 7.00 10*3/mm3    Lymphocytes, Absolute 1.54  0.70 - 3.10 10*3/mm3    Monocytes, Absolute 0.39 0.10 - 0.90 10*3/mm3    Eosinophils, Absolute 0.16 0.00 - 0.40 10*3/mm3    Basophils, Absolute 0.02 0.00 - 0.20 10*3/mm3    Immature Grans, Absolute 0.02 0.00 - 0.05 10*3/mm3    nRBC 0.0 0.0 - 0.2 /100 WBC   Lactic Acid, Plasma    Collection Time: 11/06/24  8:36 PM    Specimen: Arm, Left; Blood   Result Value Ref Range    Lactate 0.9 0.5 - 2.0 mmol/L   High Sensitivity Troponin T 2Hr    Collection Time: 11/06/24 10:28 PM    Specimen: Blood   Result Value Ref Range    HS Troponin T 31 (H) <14 ng/L    Troponin T Delta 0 >=-4 - <+4 ng/L       Ordered the above labs and independently reviewed the results.        RADIOLOGY  XR Chest 1 View    Result Date: 11/6/2024  CHEST SINGLE VIEW  HISTORY: 69 years of age, Female.  Chest Pain Triage Protocol  COMPARISON: AP chest 11/04/2024  FINDINGS: Heart size appears normal. Sternotomy wires are present. Mild linear scarring right lung base. No evidence for fracture or acute abnormality. Atrial appendage clip is noted. There is a reverse right shoulder arthroplasty.      No evidence for active disease in the chest.  This report was finalized on 11/6/2024 8:39 PM by Mars Hankins M.D on Workstation: [id number]       I ordered the above noted radiological studies. Reviewed by me and discussed with radiologist.  See dictation for official radiology interpretation.      MEDICATIONS GIVEN IN ER    Medications   sodium chloride 0.9 % flush 10 mL (has no administration in time range)   aspirin tablet 325 mg (325 mg Oral Given 11/6/24 2024)         ADDITIONAL ORDERS CONSIDERED BUT NOT ORDERED:  Admission was considered but after careful review of the patient's presentation, physical examination, diagnostic results, and response to treatment the patient may be safely discharged with outpatient follow-up.       PROGRESS, DATA ANALYSIS, CONSULTS, AND MEDICAL DECISION MAKING    All labs have been independently interpreted by myself.  All  radiology studies have been independently interpreted by myself and discussed with radiologist dictating the report.   EKGs independently interpreted by myself.  Discussion below represents my analysis of pertinent findings related to patient's condition, differential diagnosis, treatment plan and final disposition.    I have discussed case with Dr. Leslie, emergency room physician.  He has performed his own bedside examination and agrees with treatment plan.    ED Course as of 11/07/24 0017   Wed Nov 06, 2024 2035 Patient presents with a 3-week history of progressive shortness of breath, migratory chest pain, fever.  Patient currently being treated for amyloidosis.  Not on immunotherapy at this time.  Slightly tachycardic, stable blood pressure. [EE]   2048 Chest x-ray independently interpreted myself shows no evidence of infiltrate, effusion    EKG independently interpreted myself.  Time 2010.  Sinus rhythm, 95 bpm.  Normal P/CHARLY.  QRS shows a left-sided branch block with left axis deviation.  Similar to previous EKG from 11/4/2024. [EE]   2105 WBC: 6.45 [EE]   2142 Creatinine: 0.67 [EE]   2142 Lactate: 0.9 [EE]   2307 Troponin T Delta: 0 [EE]   2315 No clear etiology to patient's recurrent fever, shortness of breath.  Plan to admit for further evaluation. [EE]   2352 Nitrite, UA: Negative [EE]   2352 Leukocytes, UA: Negative [EE]   Thu Nov 07, 2024 0016 I discussed the case with Liya, nurse practitioner with [facility].  She agrees to admit to Dr. Kim. [EE]      ED Course User Index  [EE] Clement Boyce PA       AS OF 23:44 EST VITALS:    BP - 108/65  HR - 94  TEMP - 99.4 °F (37.4 °C) (Tympanic)  O2 SATS - 95%        DIAGNOSIS  Final diagnoses:   Fever, unspecified fever cause   Shortness of breath   Chest pain, unspecified type   Amyloidosis, unspecified type         DISPOSITION  Admitted    Dictated utilizing Dragon dictation     Clement Boyce PA  11/07/24 0017

## 2024-11-07 NOTE — H&P
Patient Name:  Cynthia Portillo  YOB: 1955  MRN:  [id number]  Admit Date:  11/6/2024  Patient Care Team:  Arian Peterson MD as PCP - General (Internal Medicine)  Nav Sal MD as Consulting Physician (Hematology and Oncology)  Darren Pruitt MD as Referring Physician (Cardiology)  Brianna Pearce, MANNY as Ambulatory  ([facility])      Subjective   History Present Illness     Chief Complaint   Patient presents with    Chest Pain    Shortness of Breath       Ms. Trent Portillo is a 69 y.o. non-smoker with a history of amyloidosis with cardiac involvement previously treated with chemotherapy and monthly Darzalex which she has since completed and hospitalization last month at [facility] for a right pleural effusion that presents to [facility] complaining of 3-4 weeks or worsening chest pan, shortness of breath, and fevers up to 102. She denies significant cough and has no new GI or urinary symptoms. During her last admission she  had a thoracentesis which showed an exudate with negative cytology and culture. She was discharged on empiric antibiotics and a steroid taper. She is unsure of any temporal relationship to completing her steroid taper and the resurgence of her symptoms.     Review of Systems   All other systems reviewed and are negative.       Personal History     Past Medical History:   Diagnosis Date    AL amyloidosis     Anxiety     Arthritis     Bowel perforation     COVID-19 07/2020 9/7/2021    Depression     Diverticulitis of colon     Diverticulosis     GERD (gastroesophageal reflux disease)     History of Clostridioides difficile infection 2008    HAS HAD SEVERAL TIMES IN PAST PER PT (SAW INFECTIOUS DISEASE MD FOR THIS S/P COLOSTOMY/EXTENSIVE ANBX THERAPY)    History of prediabetes     History of snoring     History of stress incontinence     Hypercholesterolemia     Hyperlipidemia     Hypertension     Left ventricular hypertrophy      FOLLOWED BY DR MARI. DENIES CHEST PAIN BUT STATES DOES GET SOA AT TIMES WITH EXERTION REPORTS THAT IT IS NOT A NEW ISSUE     Liver disease     Oral herpes 08/18/2020    Seasonal allergies     used to have allergy shots in the past    SOB (shortness of breath)     STATES WITH EXERTION HAS BEEN ONGOING ISSUE NOT A NEW ISSUE    Status post colostomy     reversed    Thyroid disease     Hypothyroid     Past Surgical History:   Procedure Laterality Date    ABDOMINAL SURGERY  2005, 2014    ex lap x 2,  diverticulitis    ABDOMINOPLASTY  2016    ADENOIDECTOMY      APPENDECTOMY      CARDIAC CATHETERIZATION N/A 04/24/2020    Procedure: Coronary angiography;  Surgeon: Roberto Briones MD;  Location: [facility] CATH INVASIVE LOCATION;  Service: Cardiovascular;  Laterality: N/A;    CARDIAC CATHETERIZATION N/A 04/24/2020    Procedure: Left heart cath;  Surgeon: Roberto Briones MD;  Location: [facility] CATH INVASIVE LOCATION;  Service: Cardiovascular;  Laterality: N/A;    CARDIAC CATHETERIZATION N/A 04/24/2020    Procedure: Left ventriculography;  Surgeon: Roberto Briones MD;  Location: [facility] CATH INVASIVE LOCATION;  Service: Cardiovascular;  Laterality: N/A;    CARDIAC SURGERY      open heart surgery,    COLONOSCOPY  approx 2018    normal per pt     COLOSTOMY  2005    had colostomy and then is was reversed    COLOSTOMY CLOSURE  2006    CORRECTION HAMMER TOE Right 2017    ECTOPIC PREGNANCY SURGERY      1979, 1980    ENDOSCOPY N/A 05/27/2020    Procedure: ESOPHAGOGASTRODUODENOSCOPY WITH BIOPSY AND BIOPSY POLYPECTOMY AND MACIEL DIALATION;  Surgeon: Preethi Beck MD;  Location: [facility] ENDOSCOPY;  Service: Gastroenterology;  Laterality: N/A;  PRE: ESOPHAGEAL DYSPHAGIA  POST: Z LINE 46, ESOPHAGEAL SPASM, GASTRITIS, FUNDIC POLYP    ENDOSCOPY N/A 06/20/2023    ESOPHAGEAL DILATATION N/A 12/07/2021    Procedure: OR ESOPHAGEAL DILATATION with maciel dilators ;  Surgeon: Jamey Leslie MD;  Location:   [facility];  Service: ENT;  Laterality: N/A;    ESOPHAGEAL MOTILITY STUDY N/A 02/03/2022    Procedure: ESOPHAGEAL MOTILITY STUDY;  Surgeon: Endo, Nurse Performed;  Location:  [facility];  Service: Gastroenterology;  Laterality: N/A;  dypshagia     FOOT SURGERY Right 12/2016    Second and third hammertoe corrections    KNEE ARTHROSCOPY Right 2009    KNEE SURGERY Right     REVISION / TAKEDOWN COLOSTOMY  2006    SHOULDER ARTHROSCOPY Right 2018    right shoulder arthroscopy with extensive rotator cuff, biceps and labral debridement, chondroplasty, & subacromial decompression with acromioplasty    SHOULDER SURGERY      HAS HAS COMPLETE SHOULDER ON RIGHT. LEFT SCOPED AND HAD 2ND SURGERY WITH HARDWARE PLACED    SHOULDER SURGERY Left     2012. 2013    TONSILLECTOMY      TOTAL SHOULDER REVERSE ARTHROPLASTY Right 2019    WISDOM TOOTH EXTRACTION       Family History   Problem Relation Age of Onset    Hypertension Mother     Osteoporosis Mother     Skin cancer Mother     Heart disease Father     Hypertension Father     Breast cancer Maternal Grandmother     Ovarian cancer Neg Hx     Colon cancer Neg Hx     Deep vein thrombosis Neg Hx     Pulmonary embolism Neg Hx     Malig Hyperthermia Neg Hx      Social History     Tobacco Use    Smoking status: Never     Passive exposure: Never    Smokeless tobacco: Never   Vaping Use    Vaping status: Never Used   Substance Use Topics    Alcohol use: Not Currently     Alcohol/week: 1.0 standard drink of alcohol     Types: 1 Glasses of wine per week    Drug use: Never     No current facility-administered medications on file prior to encounter.     Current Outpatient Medications on File Prior to Encounter   Medication Sig Dispense Refill    acyclovir (ZOVIRAX) 400 MG tablet TAKE 1 TABLET BY MOUTH TWICE DAILY 60 tablet 1    ALPRAZolam (XANAX) 0.25 MG tablet Take 1 tablet by mouth 2 (Two) Times a Day As Needed for Anxiety. for anxiety 60 tablet 0    aspirin 81 MG chewable tablet CHEW AND  SWALLOW 1 TABLET DAILY WITH BREAKFAST      Cholecalciferol (Vitamin D3) 50 MCG (2000 UT) tablet Take 1 tablet by mouth Daily.      cholestyramine (QUESTRAN) 4 g packet Take 1 packet by mouth 3 (Three) Times a Day With Meals. 1 packet  TID PRN diarrhea (Patient taking differently: Take 1 packet by mouth 3 (Three) Times a Day As Needed (diarrhea). 1 packet  TID PRN diarrhea) 60 packet 4    cyclobenzaprine (FLEXERIL) 10 MG tablet Take 1 tablet by mouth 3 (Three) Times a Day As Needed for Muscle Spasms. 30 tablet 0    Diphenhydramine-Aluminum-Magnesium-Simethicone-Lidocaine-Nystatin Swish and spit 5 mL Every 4 (Four) Hours As Needed (mucositis). Recipe: Benadryl Elixir 60cc, Maalox 200 cc, Viscous Lidocaine 30cc, Nystatin 120cc 410 mL 1    diphenoxylate-atropine (LOMOTIL) 2.5-0.025 MG per tablet Take 1 tablet by mouth 4 (Four) Times a Day As Needed for Diarrhea. 120 tablet 0    empagliflozin (JARDIANCE) 10 MG tablet tablet Take 1 tablet by mouth Daily.      FLUoxetine (PROzac) 20 MG capsule TAKE 1 CAPSULE BY MOUTH FOUR TIMES DAILY 360 capsule 0    HYDROcodone-acetaminophen (NORCO) 5-325 MG per tablet Take 1 tablet by mouth Every 6 (Six) Hours As Needed for Moderate Pain. 60 tablet 0    levothyroxine (SYNTHROID, LEVOTHROID) 75 MCG tablet TAKE 1 TABLET BY MOUTH EVERY DAY 90 tablet 0    loratadine (CLARITIN) 10 MG tablet Take 1 tablet by mouth Daily. 30 tablet 5    midodrine (PROAMATINE) 5 MG tablet Take 1 tablet by mouth 2 (Two) Times a Day.      Multiple Vitamins-Minerals (V-C Forte) capsule Take 1 capsule by mouth Daily.      olopatadine (PATANOL) 0.1 % ophthalmic solution Administer 1 drop to both eyes 2 (Two) Times a Day. 5 mL 5    pantoprazole (PROTONIX) 20 MG EC tablet TAKE 1 TABLET BY MOUTH EVERY DAY. DO NOT TAKE WITHIN 2 HOURS OF THYROID MEDICATION 90 tablet 1    pregabalin (LYRICA) 50 MG capsule Take 1 capsule by mouth Daily As Needed (nerve pain).      Rosuvastatin Calcium 20 MG capsule sprinkle Take 20 mg by  mouth Daily. 90 capsule 3    spironolactone (ALDACTONE) 25 MG tablet Take 1 tablet by mouth Daily.      zolpidem (AMBIEN) 10 MG tablet Take 1 tablet by mouth At Night As Needed for Sleep. 30 tablet 1    [DISCONTINUED] diclofenac (VOLTAREN) 50 MG EC tablet Take 1 tablet by mouth 2 (Two) Times a Day As Needed (Pain). 20 tablet 0    [DISCONTINUED] icosapent ethyl (Vascepa) 1 g capsule capsule Take 2 g by mouth Daily. 60 capsule 2    [DISCONTINUED] Menthol-Zinc Oxide (Calmoseptine) 0.44-20.6 % ointment Apply 1 application  topically to the appropriate area as directed 2 (Two) Times a Day. 20 g 2    [DISCONTINUED] oxyCODONE-acetaminophen (PERCOCET) 5-325 MG per tablet Take 1 tablet by mouth Every 6 (Six) Hours As Needed for Severe Pain. 12 tablet 0    [DISCONTINUED] predniSONE (DELTASONE) 10 MG tablet Take 6 tabs daily 10/17-10/19, then 4 tabs daily 10/20-10/22, then 3 tabs daily 10/23-10/25, then 2 tabs daily 10/26-10/28 then 1 tab daily 10/26-10/28 48 tablet 0    [DISCONTINUED] predniSONE (DELTASONE) 50 MG tablet Take 1 tablet by mouth Daily. 5 tablet 0    [DISCONTINUED] Sodium Fluoride 5000 Plus 1.1 % cream        Allergies   Allergen Reactions    Azithromycin Rash and Other (See Comments)     Reaction unknown to patient (unable to remember)  Other reaction(s): Other: stomach issues, Stomach ache, Other: stomach issues, Stomach ache    Ezetimibe Myalgia, Other (See Comments) and Rash     cramps  cramps    Pravastatin Rash and Other (See Comments)       Objective    Objective     Vital Signs  Temp:  [97.9 °F (36.6 °C)-99.4 °F (37.4 °C)] 97.9 °F (36.6 °C)  Heart Rate:  [] 80  Resp:  [18] 18  BP: (107-135)/(56-70) 109/63  SpO2:  [91 %-98 %] 97 %  on   ;   Device (Oxygen Therapy): room air  Body mass index is 25.88 kg/m².    Physical Exam  Vitals and nursing note reviewed.   Constitutional:       General: She is not in acute distress.     Appearance: She is not toxic-appearing or diaphoretic.   HENT:      Head:  Normocephalic and atraumatic.      Nose: Nose normal.      Mouth/Throat:      Mouth: Mucous membranes are moist.      Pharynx: Oropharynx is clear.   Eyes:      Conjunctiva/sclera: Conjunctivae normal.      Pupils: Pupils are equal, round, and reactive to light.   Cardiovascular:      Rate and Rhythm: Normal rate and regular rhythm.      Pulses: Normal pulses.   Pulmonary:      Effort: Pulmonary effort is normal.      Breath sounds: Normal breath sounds.   Abdominal:      General: Bowel sounds are normal. There is no distension.      Palpations: Abdomen is soft.      Tenderness: There is no abdominal tenderness.   Musculoskeletal:         General: No swelling or tenderness.      Cervical back: Neck supple.   Skin:     General: Skin is warm and dry.      Capillary Refill: Capillary refill takes less than 2 seconds.   Neurological:      General: No focal deficit present.      Mental Status: She is alert and oriented to person, place, and time.   Psychiatric:         Mood and Affect: Mood normal.         Behavior: Behavior normal.         Results Review:  I reviewed the patient's new clinical results.  I reviewed the patient's new imaging results and agree with the interpretation.  I reviewed the patient's other test results and agree with the interpretation  I personally viewed and interpreted the patient's EKG/Telemetry data  Discussed with ED provider.    Lab Results (last 24 hours)       Procedure Component Value Units Date/Time    Respiratory Panel PCR w/COVID-19(SARS-CoV-2) [facility] In-House, NP Swab in UTM/VTM, 2 HR TAT - Swab, Nasopharynx [[id number]]  (Normal) Collected: 11/06/24 2028    Specimen: Swab from Nasopharynx Updated: 11/06/24 2144     ADENOVIRUS, PCR Not Detected     Coronavirus 229E Not Detected     Coronavirus HKU1 Not Detected     Coronavirus NL63 Not Detected     Coronavirus OC43 Not Detected     COVID19 Not Detected     Human Metapneumovirus Not Detected     Human  Rhinovirus/Enterovirus Not Detected     Influenza A PCR Not Detected     Influenza B PCR Not Detected     Parainfluenza Virus 1 Not Detected     Parainfluenza Virus 2 Not Detected     Parainfluenza Virus 3 Not Detected     Parainfluenza Virus 4 Not Detected     RSV, PCR Not Detected     Bordetella pertussis pcr Not Detected     Bordetella parapertussis PCR Not Detected     Chlamydophila pneumoniae PCR Not Detected     Mycoplasma pneumo by PCR Not Detected    Narrative:      In the setting of a positive respiratory panel with a viral infection PLUS a negative procalcitonin without other underlying concern for bacterial infection, consider observing off antibiotics or discontinuation of antibiotics and continue supportive care. If the respiratory panel is positive for atypical bacterial infection (Bordetella pertussis, Chlamydophila pneumoniae, or Mycoplasma pneumoniae), consider antibiotic de-escalation to target atypical bacterial infection.    CBC & Differential [[id number]]  (Normal) Collected: 11/06/24 2036    Specimen: Blood from Arm, Left Updated: 11/06/24 2055    Narrative:      The following orders were created for panel order CBC & Differential.  Procedure                               Abnormality         Status                     ---------                               -----------         ------                     CBC Auto Differential[[id number]]        Normal              Final result                 Please view results for these tests on the individual orders.    Comprehensive Metabolic Panel [[id number]]  (Abnormal) Collected: 11/06/24 2036    Specimen: Blood from Arm, Left Updated: 11/06/24 2116     Glucose 88 mg/dL      BUN 9 mg/dL      Creatinine 0.67 mg/dL      Sodium 139 mmol/L      Potassium 4.1 mmol/L      Chloride 104 mmol/L      CO2 26.0 mmol/L      Calcium 8.9 mg/dL      Total Protein 6.2 g/dL      Albumin 4.1 g/dL      ALT (SGPT) 34 U/L      AST (SGOT) 21 U/L      Alkaline Phosphatase 69 U/L       Total Bilirubin 0.5 mg/dL      Globulin 2.1 gm/dL      A/G Ratio 2.0 g/dL      BUN/Creatinine Ratio 13.4     Anion Gap 9.0 mmol/L      eGFR 94.7 mL/min/1.73     Narrative:      GFR Normal >60  Chronic Kidney Disease <60  Kidney Failure <15      High Sensitivity Troponin T [[id number]]  (Abnormal) Collected: 11/06/24 2036    Specimen: Blood from Arm, Left Updated: 11/06/24 2116     HS Troponin T 31 ng/L     Narrative:      High Sensitive Troponin T Reference Range:  <14.0 ng/L- Negative Female for AMI  <22.0 ng/L- Negative Male for AMI  >=14 - Abnormal Female indicating possible myocardial injury.  >=22 - Abnormal Male indicating possible myocardial injury.   Clinicians would have to utilize clinical acumen, EKG, Troponin, and serial changes to determine if it is an Acute Myocardial Infarction or myocardial injury due to an underlying chronic condition.         CBC Auto Differential [[id number]]  (Normal) Collected: 11/06/24 2036    Specimen: Blood from Arm, Left Updated: 11/06/24 2055     WBC 6.45 10*3/mm3      RBC 4.45 10*6/mm3      Hemoglobin 13.2 g/dL      Hematocrit 40.6 %      MCV 91.2 fL      MCH 29.7 pg      MCHC 32.5 g/dL      RDW 13.6 %      RDW-SD 45.6 fl      MPV 10.0 fL      Platelets 142 10*3/mm3      Neutrophil % 67.0 %      Lymphocyte % 23.9 %      Monocyte % 6.0 %      Eosinophil % 2.5 %      Basophil % 0.3 %      Immature Grans % 0.3 %      Neutrophils, Absolute 4.32 10*3/mm3      Lymphocytes, Absolute 1.54 10*3/mm3      Monocytes, Absolute 0.39 10*3/mm3      Eosinophils, Absolute 0.16 10*3/mm3      Basophils, Absolute 0.02 10*3/mm3      Immature Grans, Absolute 0.02 10*3/mm3      nRBC 0.0 /100 WBC     Lactic Acid, Plasma [[id number]]  (Normal) Collected: 11/06/24 2036    Specimen: Blood from Arm, Left Updated: 11/06/24 2111     Lactate 0.9 mmol/L     Hemoglobin A1c [[id number]]  (Abnormal) Collected: 11/06/24 2036    Specimen: Blood from Arm, Left Updated: 11/07/24 0133     Hemoglobin A1C 6.70 %      Narrative:      Hemoglobin A1C Ranges:    Increased Risk for Diabetes  5.7% to 6.4%  Diabetes                     >= 6.5%  Diabetic Goal                < 7.0%    Blood Culture - Blood, Arm, Left [[id number]] Collected: 11/06/24 2038    Specimen: Blood from Arm, Left Updated: 11/06/24 2053    Blood Culture - Blood, Arm, Right [[id number]] Collected: 11/06/24 2045    Specimen: Blood from Arm, Right Updated: 11/06/24 2053    High Sensitivity Troponin T 2Hr [[id number]]  (Abnormal) Collected: 11/06/24 2228    Specimen: Blood Updated: 11/06/24 2306     HS Troponin T 31 ng/L      Troponin T Delta 0 ng/L     Narrative:      High Sensitive Troponin T Reference Range:  <14.0 ng/L- Negative Female for AMI  <22.0 ng/L- Negative Male for AMI  >=14 - Abnormal Female indicating possible myocardial injury.  >=22 - Abnormal Male indicating possible myocardial injury.   Clinicians would have to utilize clinical acumen, EKG, Troponin, and serial changes to determine if it is an Acute Myocardial Infarction or myocardial injury due to an underlying chronic condition.         Urinalysis With Microscopic If Indicated (No Culture) - Urine, Clean Catch [[id number]]  (Abnormal) Collected: 11/06/24 2330    Specimen: Urine, Clean Catch Updated: 11/06/24 2351     Color, UA Yellow     Appearance, UA Clear     pH, UA 6.5     Specific Gravity, UA >1.030     Glucose, UA >=1000 mg/dL (3+)     Ketones, UA Negative     Bilirubin, UA Negative     Blood, UA Negative     Protein, UA Negative     Leuk Esterase, UA Negative     Nitrite, UA Negative     Urobilinogen, UA 1.0 E.U./dL    Narrative:      Urine microscopic not indicated.    Basic Metabolic Panel [[id number]]  (Normal) Collected: 11/07/24 0519    Specimen: Blood Updated: 11/07/24 0652     Glucose 93 mg/dL      BUN 10 mg/dL      Creatinine 0.74 mg/dL      Sodium 141 mmol/L      Potassium 3.9 mmol/L      Chloride 107 mmol/L      CO2 24.6 mmol/L      Calcium 9.3 mg/dL      BUN/Creatinine Ratio 13.5  "    Anion Gap 9.4 mmol/L      eGFR 87.7 mL/min/1.73     Narrative:      GFR Normal >60  Chronic Kidney Disease <60  Kidney Failure <15      CBC (No Diff) [[id number]]  (Normal) Collected: 11/07/24 0519    Specimen: Blood Updated: 11/07/24 0631     WBC 4.85 10*3/mm3      RBC 4.21 10*6/mm3      Hemoglobin 12.4 g/dL      Hematocrit 38.7 %      MCV 91.9 fL      MCH 29.5 pg      MCHC 32.0 g/dL      RDW 13.7 %      RDW-SD 45.9 fl      MPV 10.8 fL      Platelets 142 10*3/mm3     D-dimer, Quantitative [[id number]]  (Normal) Collected: 11/07/24 0519    Specimen: Blood Updated: 11/07/24 0707     D-Dimer, Quantitative 0.49 MCGFEU/mL     Narrative:      According to the assay 's published package insert, a normal (<0.50 MCGFEU/mL) D-dimer result in conjunction with a non-high clinical probability assessment, excludes deep vein thrombosis (DVT) and pulmonary embolism (PE) with high sensitivity.    D-dimer values increase with age and this can make VTE exclusion of an older population difficult. To address this, the [employer] College [employer], based on best available evidence and recent guidelines, recommends that clinicians use age-adjusted D-dimer thresholds in patients greater than 50 years of age with: a) a low probability of PE who do not meet all Pulmonary Embolism Rule Out Criteria, or b) in those with intermediate probability of PE.   The formula for an age-adjusted D-dimer cut-off is \"age/100\".  For example, a 60 year old patient would have an age-adjusted cut-off of 0.60 MCGFEU/mL and an 80 year old 0.80 MCGFEU/mL.    BNP [[id number]]  (Normal) Collected: 11/07/24 0519    Specimen: Blood Updated: 11/07/24 0801     proBNP 351.0 pg/mL     Narrative:      This assay is used as an aid in the diagnosis of individuals suspected of having heart failure. It can be used as an aid in the diagnosis of acute decompensated heart failure (ADHF) in patients presenting with signs and symptoms of ADHF to the emergency " department (ED). In addition, NT-proBNP of <300 pg/mL indicates ADHF is not likely.    Age Range Result Interpretation  NT-proBNP Concentration (pg/mL:      <50             Positive            >450                   Gray                 300-450                    Negative             <300    50-75           Positive            >900                  Gray                300-900                  Negative            <300      >75             Positive            >1800                  Gray                300-1800                  Negative            <300    TSH Rfx On Abnormal To Free T4 [[id number]]  (Normal) Collected: 11/07/24 0519    Specimen: Blood Updated: 11/07/24 0910     TSH 3.400 uIU/mL     POC Glucose Once [[id number]]  (Normal) Collected: 11/07/24 1024    Specimen: Blood Updated: 11/07/24 1026     Glucose 83 mg/dL             Imaging Results (Last 24 Hours)       Procedure Component Value Units Date/Time    XR Chest 1 View [[id number]] Collected: 11/06/24 2034     Updated: 11/06/24 2042    Narrative:      CHEST SINGLE VIEW     HISTORY: 69 years of age, Female.  Chest Pain Triage Protocol     COMPARISON: AP chest 11/04/2024     FINDINGS: Heart size appears normal. Sternotomy wires are present. Mild  linear scarring right lung base. No evidence for fracture or acute  abnormality. Atrial appendage clip is noted. There is a reverse right  shoulder arthroplasty.       Impression:      No evidence for active disease in the chest.     This report was finalized on 11/6/2024 8:39 PM by Mars Hankins M.D  on Workstation: [id number]               Results for orders placed during the hospital encounter of 10/11/24    Adult Transthoracic Echo Complete W/ Cont if Necessary Per Protocol    Interpretation Summary    Left ventricular ejection fraction appears to be 51 - 55%.  Inappropriate septal motion noted.    Left ventricular wall thickness is consistent with septal asymmetric hypertrophy.    Left ventricular diastolic  function is consistent with (grade I) impaired relaxation and age.    No significant valvular disease.      ECG 12 Lead ED Triage Standing Order; Chest Pain   Final Result   HEART RATE=95  bpm   RR Nynhsmpv=537  ms   VT Xvowmwir=562  ms   P Horizontal Axis=0  deg   P Front Axis=54  deg   QRSD Valnvopk=663  ms   QT Gmotyeno=543  ms   OXjS=511  ms   QRS Axis=-27  deg   T Wave Axis=150  deg   - ABNORMAL ECG -   Sinus rhythm   Probable  left atrial enlargement   Left bundle branch block   When compared with ECG of 04-Nov-2024 11:07:49,   No significant change   Electronically Signed By: Paco Garcia ([facility]) 2024-11-07 07:36:20   Date and Time of Study:2024-11-06 20:10:01      Telemetry Scan   Final Result           Assessment/Plan     Active Hospital Problems    Diagnosis  POA    **Fever [R50.9]  Yes    Type 2 diabetes mellitus, without long-term current use of insulin [E11.9]  Yes    Amyloidosis [E85.9]  Yes    HTN (hypertension) [I10]  Yes    Hypothyroid [E03.9]  Yes      Resolved Hospital Problems   No resolved problems to display.   Chest Pain/Fever  - no evidence of ACS, had CT chest with contrast on 11/4 which showed nothing acute, will proceed with CT angiogram of the chest  - blood cultures sent, no evidence of local infection and we will hold off on antibiotics for now  - check echocardiogram  - heme/onc, cardiology, and pulmonology consulted    Type 2 DM  - BG acceptable  - continue ssi/hypoglycemia protocol coverage    Chronic Hypotension  - continue midodrine    Hypothyroidism  - continue levothyroxine       I discussed the patient's findings and my recommendations with patient and nursing staff.    VTE Prophylaxis - SCDs.  Code Status - Full code.       Jensen Driver MD  [facility]  11/07/24  11:19 EST

## 2024-11-07 NOTE — ED NOTES
Patient ambulatory to ED c/o chest pain and shortness of breath x3 weeks. Patient was told by chemotherapy doctor to be seen today.

## 2024-11-07 NOTE — CONSULTS
Patient Name: Cynthia Portillo  :1955  69 y.o.    Date of Admission: 2024  Date of Consultation:  24  Encounter Provider: Gigi Garay MD  Place of Service: [facility] CARDIOLOGY  Referring Provider: Constantine Kim MD  Patient Care Team:  Arian Peterson MD as PCP - General (Internal Medicine)  Nav Sal MD as Consulting Physician (Hematology and Oncology)  Darren Pruitt MD as Referring Physician (Cardiology)  Brianna Pearce RN as Ambulatory  (Population Health)      Chief complaint: Chest pain, shortness of breath and fever    History of Present Illness: Cynthia Newman is a 69 year old pt with a history of amyloidosis.  Patient follows with Dr. Sal at [facility] for amyloidosis.    She has been following with [facility] for several years.  Recent cardiac history includes a cardiac MRI in  that showed cardiac amyloid.  She had left heart cath that time that showed normal coronary arteries.  2022 she went for septal myectomy due to LVOT obstruction.  She was last seen at [facility] for follow-up 2024.  She has been on midodrine for hypotension.  Previously was on spironolactone but had to reduce due to the hypotension.  Over the last month or so she has had chest discomfort.  She describes chest discomfort as under her left breast and on her right side.  Nonradiating.  Dull pain that last for several hours at a time.  No exacerbating or alleviating factors.  It is not related to exertion.  In October she noticed some shortness of breath as well this prompted a trip to the ER.  She is noted to have a right-sided pleural effusion requiring thoracentesis.  She was given steroids and antibiotics and she said she felt improved.  She then began developing similar chest discomfort.  She also reports she had fevers at home up to 102 °F.  Her shortness of breath did not return however.  No orthopnea, PND, lower  "extremity edema.  She is not currently on any treatment for her amyloidosis.  Previously was on monthly Darzalex.  I have been consulted to the chest pain and history of cardiac amyloidosis.      ECHO 10/1/24      Left ventricular ejection fraction appears to be 51 - 55%.  Inappropriate septal motion noted.    Left ventricular wall thickness is consistent with septal asymmetric hypertrophy.    Left ventricular diastolic function is consistent with (grade I) impaired relaxation and age.    No significant valvular disease.        CATH 4/24/20      Conclusions:   1. Left main: Normal  2. LAD: Mild Intramyocardial bridge mid segment.  Medium caliber septal  with evidence of \"milking\"  3. LCX: Tortuous.  No stenosis.  4. RCA: Normal.  5.  Normal size left ventricle.  Hyperdynamic left ventricular systolic function.     Continue medical management    Past Medical History:   Diagnosis Date    AL amyloidosis     Anxiety     Arthritis     Bowel perforation     COVID-19 07/2020 9/7/2021    Depression     Diverticulitis of colon     Diverticulosis     GERD (gastroesophageal reflux disease)     History of Clostridioides difficile infection 2008    HAS HAD SEVERAL TIMES IN PAST PER PT (SAW INFECTIOUS DISEASE MD FOR THIS S/P COLOSTOMY/EXTENSIVE ANBX THERAPY)    History of prediabetes     History of snoring     History of stress incontinence     Hypercholesterolemia     Hyperlipidemia     Hypertension     Left ventricular hypertrophy     FOLLOWED BY DR MARI. DENIES CHEST PAIN BUT STATES DOES GET SOA AT TIMES WITH EXERTION REPORTS THAT IT IS NOT A NEW ISSUE     Liver disease     Oral herpes 08/18/2020    Seasonal allergies     used to have allergy shots in the past    SOB (shortness of breath)     STATES WITH EXERTION HAS BEEN ONGOING ISSUE NOT A NEW ISSUE    Status post colostomy     reversed    Thyroid disease     Hypothyroid       Past Surgical History:   Procedure Laterality Date    ABDOMINAL SURGERY  2005, " 2014    ex lap x 2,  diverticulitis    ABDOMINOPLASTY  2016    ADENOIDECTOMY      APPENDECTOMY      CARDIAC CATHETERIZATION N/A 04/24/2020    Procedure: Coronary angiography;  Surgeon: Roberto Briones MD;  Location: [facility] CATH INVASIVE LOCATION;  Service: Cardiovascular;  Laterality: N/A;    CARDIAC CATHETERIZATION N/A 04/24/2020    Procedure: Left heart cath;  Surgeon: Roberto Briones MD;  Location:  [facility] CATH INVASIVE LOCATION;  Service: Cardiovascular;  Laterality: N/A;    CARDIAC CATHETERIZATION N/A 04/24/2020    Procedure: Left ventriculography;  Surgeon: Roberto Briones MD;  Location: [facility] CATH INVASIVE LOCATION;  Service: Cardiovascular;  Laterality: N/A;    CARDIAC SURGERY      open heart surgery,    COLONOSCOPY  approx 2018    normal per pt     COLOSTOMY  2005    had colostomy and then is was reversed    COLOSTOMY CLOSURE  2006    CORRECTION HAMMER TOE Right 2017    ECTOPIC PREGNANCY SURGERY      1979, 1980    ENDOSCOPY N/A 05/27/2020    Procedure: ESOPHAGOGASTRODUODENOSCOPY WITH BIOPSY AND BIOPSY POLYPECTOMY AND MACIEL DIALATION;  Surgeon: Preethi Beck MD;  Location: [facility] ENDOSCOPY;  Service: Gastroenterology;  Laterality: N/A;  PRE: ESOPHAGEAL DYSPHAGIA  POST: Z LINE 46, ESOPHAGEAL SPASM, GASTRITIS, FUNDIC POLYP    ENDOSCOPY N/A 06/20/2023    ESOPHAGEAL DILATATION N/A 12/07/2021    Procedure: OR ESOPHAGEAL DILATATION with maciel dilators ;  Surgeon: Jamey Leslie MD;  Location: [facility] OR [facility];  Service: ENT;  Laterality: N/A;    ESOPHAGEAL MOTILITY STUDY N/A 02/03/2022    Procedure: ESOPHAGEAL MOTILITY STUDY;  Surgeon: Harshil, Nurse Performed;  Location: [facility] ENDOSCOPY;  Service: Gastroenterology;  Laterality: N/A;  dypshagia     FOOT SURGERY Right 12/2016    Second and third hammertoe corrections    KNEE ARTHROSCOPY Right 2009    KNEE SURGERY Right     REVISION / TAKEDOWN COLOSTOMY  2006    SHOULDER ARTHROSCOPY Right 2018    right shoulder arthroscopy with  extensive rotator cuff, biceps and labral debridement, chondroplasty, & subacromial decompression with acromioplasty    SHOULDER SURGERY      HAS HAS COMPLETE SHOULDER ON RIGHT. LEFT SCOPED AND HAD 2ND SURGERY WITH HARDWARE PLACED    SHOULDER SURGERY Left     2012. 2013    TONSILLECTOMY      TOTAL SHOULDER REVERSE ARTHROPLASTY Right 2019    WISDOM TOOTH EXTRACTION           Prior to Admission medications    Medication Sig Start Date End Date Taking? Authorizing Provider   acyclovir (ZOVIRAX) 400 MG tablet TAKE 1 TABLET BY MOUTH TWICE DAILY 8/20/24  Yes Nav Sal MD   ALPRAZolam (XANAX) 0.25 MG tablet Take 1 tablet by mouth 2 (Two) Times a Day As Needed for Anxiety. for anxiety 9/25/24  Yes Arian Peterson MD   aspirin 81 MG chewable tablet CHEW AND SWALLOW 1 TABLET DAILY WITH BREAKFAST 1/12/23  Yes ProviderEric MD   Cholecalciferol (Vitamin D3) 50 MCG (2000 UT) tablet Take 1 tablet by mouth Daily.   Yes ProviderEric MD   cholestyramine (QUESTRAN) 4 g packet Take 1 packet by mouth 3 (Three) Times a Day With Meals. 1 packet  TID PRN diarrhea  Patient taking differently: Take 1 packet by mouth 3 (Three) Times a Day As Needed (diarrhea). 1 packet  TID PRN diarrhea 8/9/22  Yes Nav Sal MD   cyclobenzaprine (FLEXERIL) 10 MG tablet Take 1 tablet by mouth 3 (Three) Times a Day As Needed for Muscle Spasms. 6/26/24  Yes Nav Sal MD   Diphenhydramine-Aluminum-Magnesium-Simethicone-Lidocaine-Nystatin Swish and spit 5 mL Every 4 (Four) Hours As Needed (mucositis). Recipe: Benadryl Elixir 60cc, Maalox 200 cc, Viscous Lidocaine 30cc, Nystatin 120cc 10/28/24  Yes Nav Sal MD   diphenoxylate-atropine (LOMOTIL) 2.5-0.025 MG per tablet Take 1 tablet by mouth 4 (Four) Times a Day As Needed for Diarrhea. 9/25/24  Yes Nav Sal MD   empagliflozin (JARDIANCE) 10 MG tablet tablet Take 1 tablet by mouth Daily. 11/7/22  Yes ProviderEric MD   FLUoxetine (PROzac) 20 MG capsule TAKE 1 CAPSULE  BY MOUTH FOUR TIMES DAILY 8/12/24  Yes Arian Peterson MD   HYDROcodone-acetaminophen (NORCO) 5-325 MG per tablet Take 1 tablet by mouth Every 6 (Six) Hours As Needed for Moderate Pain. 10/28/24  Yes Nav Sal MD   levothyroxine (SYNTHROID, LEVOTHROID) 75 MCG tablet TAKE 1 TABLET BY MOUTH EVERY DAY 10/17/24  Yes Arian Peterson MD   loratadine (CLARITIN) 10 MG tablet Take 1 tablet by mouth Daily. 8/20/24  Yes Arian Peterson MD   midodrine (PROAMATINE) 5 MG tablet Take 1 tablet by mouth 2 (Two) Times a Day. 6/26/23  Yes Eric Weston MD   Multiple Vitamins-Minerals (V-C Forte) capsule Take 1 capsule by mouth Daily. 9/21/22  Yes Eric Weston MD   olopatadine (PATANOL) 0.1 % ophthalmic solution Administer 1 drop to both eyes 2 (Two) Times a Day. 7/11/24  Yes Arian Peterson MD   pantoprazole (PROTONIX) 20 MG EC tablet TAKE 1 TABLET BY MOUTH EVERY DAY. DO NOT TAKE WITHIN 2 HOURS OF THYROID MEDICATION 1/15/24  Yes Arian Peterson MD   pregabalin (LYRICA) 50 MG capsule Take 1 capsule by mouth Daily As Needed (nerve pain). 10/9/23  Yes Eric Weston MD   Rosuvastatin Calcium 20 MG capsule sprinkle Take 20 mg by mouth Daily. 10/3/24  Yes Arian Peterson MD   spironolactone (ALDACTONE) 25 MG tablet Take 1 tablet by mouth Daily. 6/25/23  Yes ProviderEric MD   zolpidem (AMBIEN) 10 MG tablet Take 1 tablet by mouth At Night As Needed for Sleep. 9/23/24  Yes Arian Peterson MD   diclofenac (VOLTAREN) 50 MG EC tablet Take 1 tablet by mouth 2 (Two) Times a Day As Needed (Pain). 10/6/24 11/7/24  Lam Bhagat,    icosapent ethyl (Vascepa) 1 g capsule capsule Take 2 g by mouth Daily. 9/16/24 11/7/24  Roxie Wilkinson APRN   Menthol-Zinc Oxide (Calmoseptine) 0.44-20.6 % ointment Apply 1 application  topically to the appropriate area as directed 2 (Two) Times a Day. 11/27/23 11/7/24  Savannah Summers MD   oxyCODONE-acetaminophen (PERCOCET) 5-325 MG per tablet Take 1 tablet by mouth  Every 6 (Six) Hours As Needed for Severe Pain. 11/4/24 11/7/24  Alec Carey DO   predniSONE (DELTASONE) 10 MG tablet Take 6 tabs daily 10/17-10/19, then 4 tabs daily 10/20-10/22, then 3 tabs daily 10/23-10/25, then 2 tabs daily 10/26-10/28 then 1 tab daily 10/26-10/28 10/16/24 11/7/24  Roberto Solorzano MD   predniSONE (DELTASONE) 50 MG tablet Take 1 tablet by mouth Daily. 11/4/24 11/7/24  Alec Carey DO   Sodium Fluoride 5000 Plus 1.1 % cream  7/19/23 11/7/24  Provider, MD Eric       Allergies   Allergen Reactions    Azithromycin Rash and Other (See Comments)     Reaction unknown to patient (unable to remember)  Other reaction(s): Other: stomach issues, Stomach ache, Other: stomach issues, Stomach ache    Ezetimibe Myalgia, Other (See Comments) and Rash     cramps  cramps    Pravastatin Rash and Other (See Comments)       Social History     Socioeconomic History    Marital status: Single    Number of children: 2   Tobacco Use    Smoking status: Never     Passive exposure: Never    Smokeless tobacco: Never   Vaping Use    Vaping status: Never Used   Substance and Sexual Activity    Alcohol use: Not Currently     Alcohol/week: 1.0 standard drink of alcohol     Types: 1 Glasses of wine per week    Drug use: Never    Sexual activity: Defer       Family History   Problem Relation Age of Onset    Hypertension Mother     Osteoporosis Mother     Skin cancer Mother     Heart disease Father     Hypertension Father     Breast cancer Maternal Grandmother     Ovarian cancer Neg Hx     Colon cancer Neg Hx     Deep vein thrombosis Neg Hx     Pulmonary embolism Neg Hx     Malig Hyperthermia Neg Hx        REVIEW OF SYSTEMS:   All systems reviewed.  Pertinent positives identified in HPI.  All other systems are negative.      Objective:     Vitals:    11/07/24 0635 11/07/24 0716 11/07/24 1120 11/07/24 1255   BP: 107/60 109/63 113/71 105/57   BP Location:  Right arm  Left arm   Patient Position:  Lying  Lying   Pulse:  "79 80  83   Resp:       Temp:  97.9 °F (36.6 °C)  98 °F (36.7 °C)   TempSrc:  Oral  Oral   SpO2:  97%  95%   Weight:   59.9 kg (132 lb)    Height:   152.4 cm (60\")      Body mass index is 25.78 kg/m².              Constitutional: Awake, alert              Eyes: PERRLA, sclerae anicteric              HENT: NCAT, mucous membranes moist              Neck: Supple,, trachea midline              Respiratory: Clear to auscultation bilaterally, nonlabored respirations               Cardiovascular: RRR, faint systolic ejection murmur left upper sternal border, rubs, or gallops, no edema, no jvd               Gastrointestinal: soft, nt, nd               Psychiatric: Appropriate affect, cooperative              Neurologic: Oriented x 3, no focal deficits    Lab Review:     Results from last 7 days   Lab Units 11/07/24 0519 11/06/24 2036   SODIUM mmol/L 141 139   POTASSIUM mmol/L 3.9 4.1   CHLORIDE mmol/L 107 104   CO2 mmol/L 24.6 26.0   BUN mg/dL 10 9   CREATININE mg/dL 0.74 0.67   CALCIUM mg/dL 9.3 8.9   BILIRUBIN mg/dL  --  0.5   ALK PHOS U/L  --  69   ALT (SGPT) U/L  --  34*   AST (SGOT) U/L  --  21   GLUCOSE mg/dL 93 88     Results from last 7 days   Lab Units 11/06/24 2228 11/06/24 2036 11/04/24  1129   HSTROP T ng/L 31* 31* 19*     Results from last 7 days   Lab Units 11/07/24  0519   WBC 10*3/mm3 4.85   HEMOGLOBIN g/dL 12.4   HEMATOCRIT % 38.7   PLATELETS 10*3/mm3 142         Results from last 7 days   Lab Units 11/04/24  0910   MAGNESIUM mg/dL 2.1                                     I personally viewed and interpreted the patient's EKG/Telemetry data.        Assessment and Plan:     AL amyloidosis  Cardiac amyloidosis with LVOT obstruction status post septal myectomy  Hypotension requiring midodrine  Hyperlipidemia  Chest pain    This is a 69-year-old female with a history of AL amyloidosis complicated by LVOT obstruction status post septal myectomy 12/2022.  She presents with atypical chest pain.  Troponin mildly " elevated and flat and stable from prior.  BNP normal.  EKG with a left bundle branch block which has been present since her myectomy.  Echocardiogram shows a low normal EF similar to last exam 10/2024.  Expected wall motion abnormalities consistent with prior myectomy.  She is not currently on therapy for amyloidosis.    Her troponin is mildly elevated but her presentation is not consistent with ACS and she had normal coronaries reported on left heart catheterization 12/2022.  EF is low normal has been stable for the last month but overall down from previous imaging, likely due to worsening cardiac amyloidosis.  Medical therapy is limited by hypotension and she is requiring midodrine.  CTA is pending for further evaluation of fever and chest discomfort. I would not change therapy at this time.    We will continue to follow       Gigi Garay MD  11/07/24  13:17 EST

## 2024-11-07 NOTE — CASE MANAGEMENT/SOCIAL WORK
Case Management Readmission Assessment Note    Case Management Readmission Assessment (all recorded)       Readmission Interview       Row Name 11/07/24 1430             Readmission Indications    Is the patient and/or family able to complete the readmission assessment questions? Yes      Is this hospitalization related to the prior hospital diagnosis? No        Row Name 11/07/24 1430             Recommendation for rehospitalization    Did you speak with your physician prior to coming to the hospital No        Row Name 11/07/24 1430             Follow-up Appointments    Do you have a PCP? Yes      Did you have an appointment with PCP after your hospitalization? No      Did you have an appointment with a Specialist? Yes      When was your appointment scheduled? 11/13/24      Did you go to appointment? --  n/a isn't until 11/13      Are you current with the Pulmonary Clinic? No      Are you current with the CHF Clinic? No        Row Name 11/07/24 1430             Medications    Did you have newly prescribed medications at discharge? No      Did you understand the reasons for your medications at discharge and how to take them? Yes      Did you understand the side effects of your medications? Yes      Are you taking all of you prescribed medications? Yes      What pharmacy was used to fill prescription(s)? [facility]      Were medications picked up? Yes        Row Name 11/07/24 1430             Discharge Instructions    Did you understand your discharge instructions? Yes      Did your family/caregiver hear your instructions? --  unsure      Were you told to eat a special diet? No      Did you adhere to the diet? Yes      Were you given a number of someone to call if you had questions or concerns? --  unsure        Row Name 11/07/24 1430             Index discharge location/services    Where did you go upon discharge? Home      Do you have supportive family or friends in the home? No        Row Name 11/07/24 1430              Discharge Readiness    On a scale of 1-5 (5 being well prepared), how ready were you for discharge --  didnt' know        Row Name 11/07/24 1430             Palliative Care/Hospice    Are you current with Palliative Care? No      Are you current with Hospice Care? No        Row Name 11/07/24 1430 11/07/24 0311          Advance Directives (For Healthcare)    Pre-existing AND/MOST/POLST Order No No     Advance Directive Status -- Patient does not have advance directive     Have you reviewed your Advance Directive and is it valid for this stay? Not applicable Not applicable     Literature Provided on Advance Directives No No     Patient Requests Assistance on Advance Directives Patient Declined Patient Declined

## 2024-11-07 NOTE — CASE MANAGEMENT/SOCIAL WORK
Discharge Planning Assessment  [facility]     Patient Name: Cynthia Portillo  MRN: [id number]  Today's Date: 11/7/2024    Admit Date: 11/6/2024    Plan: home with family support. CCP to follow for d/c needs   Discharge Needs Assessment       Row Name 11/07/24 1153       Living Environment    People in Home alone    Current Living Arrangements apartment    Potentially Unsafe Housing Conditions none    In the past 12 months has the electric, gas, oil, or water company threatened to shut off services in your home? No    Primary Care Provided by self    Provides Primary Care For no one    Family Caregiver if Needed child(tej), adult    Quality of Family Relationships helpful;involved    Able to Return to Prior Arrangements yes       Transition Planning    Patient/Family Anticipates Transition to home    Patient/Family Anticipated Services at Transition none    Transportation Anticipated family or friend will provide       Discharge Needs Assessment    Readmission Within the Last 30 Days unable to assess    Equipment Currently Used at Home none    Concerns to be Addressed no discharge needs identified;denies needs/concerns at this time    Anticipated Changes Related to Illness none    Equipment Needed After Discharge none    Provided Post Acute Provider List? Refused    Refused Provider List Comment declined                   Discharge Plan       Row Name 11/07/24 1154       Plan    Plan home with family support. CCP to follow for d/c needs    Patient/Family in Agreement with Plan yes    Plan Comments Spoke with pt bedside. Confirmed facesheet. Pt reports she lives alone in an apartment. She is IADLs no DME used. She reports good family support with children and friends. She has used [facility] in past, no SNF. Her PCP is Dr. Peterson and uses [facility] no issues. She reports she plans home at d/c. Denies needs. CCP to follow.                  Continued Care and Services - Admitted Since 11/6/2024    No active  coordination exists for this encounter.       Selected Continued Care - Episodes Includes continued care and service providers with selected services from the active episodes listed below      High Risk Care Management Episode start date: 10/7/2024 (Paused)   There are no active outsourced providers for this episode.                    Demographic Summary    No documentation.                  Functional Status    No documentation.                  Psychosocial    No documentation.                  Abuse/Neglect    No documentation.                  Legal    No documentation.                  Substance Abuse    No documentation.                  Patient Forms    No documentation.                     NELLI Brown

## 2024-11-07 NOTE — CONSULTS
REASON FOR CONSULTATION:    AL amyloidosis  Therapy with michael-CyBorD initiated 8/16/2022  Now on maintenance daratumumab every 4 weeks initiated May 2023 with plans for 18 months of therapy                              HISTORY OF PRESENT ILLNESS:  The patient is a 69 y.o. year old female who is here for an opinion about the above issue.    She had last been seen in June 2026 with plans to continue monthly Darzalex with records indicating this continued through 8/21/2024.  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    She later started michael-CyBorD on 8/23/22 and then proceeded to monthly Darzalex.  She follows with cardiology, has issues with diarrhea and esophageal dysmotility and possibly some memory dysfunction.    Her visit at [facility] 9/11/2024 included a CHR and observation was initiated.  She is having further GI symptoms, deferred ASCHD as to no need due to deep response.  Additional issues included chronic leg numbness, diarrhea, elevated BNP troponin and memory dysfunction.    She presented 11/6/2024 with acute shortness of breath and fever, chest pain x 3 to 4 weeks.  Recent studies include a CT chest with contrast that was essentially unremarkable, chest x-ray 11/6 without active disease, negative respiratory panel, and normal CBC, normal CMP, at bedtime troponin 31, lactate of 0.9.    Plans were made for admission and observation.    Interval history:  11/7/2024  T99.2, pulse 74, respiration 16, /73, SpO2 91%  Patient's record and recent assessment event about discussed with her at bedside.  She describes left lower chest discomfort-sharp in nature-taking her breath away-just after washing windows in her house both inside and out.  She was reviewed at local hospital and describes additional radiologic studies and subsequent thoracentesis.  She was  discharged antibiotic therapy and p.o. steroids.  Her symptoms, unfortunately, worsened over the last several days leading to her now readmission.  H&H 13.9 and 42.3 with white count 11,000 over 30, platelet count of 1 80,000, BUN/creatinine of 15/0.83, normal LFTs.    Past Medical History:   Diagnosis Date    AL amyloidosis     Anxiety     Arthritis     Bowel perforation     COVID-19 07/2020 9/7/2021    Depression     Diverticulitis of colon     Diverticulosis     GERD (gastroesophageal reflux disease)     History of Clostridioides difficile infection 2008    HAS HAD SEVERAL TIMES IN PAST PER PT (SAW INFECTIOUS DISEASE MD FOR THIS S/P COLOSTOMY/EXTENSIVE ANBX THERAPY)    History of prediabetes     History of snoring     History of stress incontinence     Hypercholesterolemia     Hyperlipidemia     Hypertension     Left ventricular hypertrophy     FOLLOWED BY DR MARI. DENIES CHEST PAIN BUT STATES DOES GET SOA AT TIMES WITH EXERTION REPORTS THAT IT IS NOT A NEW ISSUE     Liver disease     Oral herpes 08/18/2020    Seasonal allergies     used to have allergy shots in the past    SOB (shortness of breath)     STATES WITH EXERTION HAS BEEN ONGOING ISSUE NOT A NEW ISSUE    Status post colostomy     reversed    Thyroid disease     Hypothyroid        Past Surgical History:   Procedure Laterality Date    ABDOMINAL SURGERY  2005, 2014    ex lap x 2,  diverticulitis    ABDOMINOPLASTY  2016    ADENOIDECTOMY      APPENDECTOMY      CARDIAC CATHETERIZATION N/A 04/24/2020    Procedure: Coronary angiography;  Surgeon: Roberto Briones MD;  Location:  [facility] CATH INVASIVE LOCATION;  Service: Cardiovascular;  Laterality: N/A;    CARDIAC CATHETERIZATION N/A 04/24/2020    Procedure: Left heart cath;  Surgeon: Roberto Briones MD;  Location:  [facility]VASIVE [facility]ATION;  Service: Cardiovascular;  Laterality: N/A;    CARDIAC CATHETERIZATION N/A 04/24/2020    Procedure: Left ventriculography;  Surgeon: Anselmo  Roberto BURRELL MD;  Location: [facility] CATH INVASIVE LOCATION;  Service: Cardiovascular;  Laterality: N/A;    CARDIAC SURGERY      open heart surgery,    COLONOSCOPY  approx 2018    normal per pt     COLOSTOMY  2005    had colostomy and then is was reversed    COLOSTOMY CLOSURE  2006    CORRECTION HAMMER TOE Right 2017    ECTOPIC PREGNANCY SURGERY      1979, 1980    ENDOSCOPY N/A 05/27/2020    Procedure: ESOPHAGOGASTRODUODENOSCOPY WITH BIOPSY AND BIOPSY POLYPECTOMY AND MACIEL DIALATION;  Surgeon: Preethi Beck MD;  Location: [facility] ENDOSCOPY;  Service: Gastroenterology;  Laterality: N/A;  PRE: ESOPHAGEAL DYSPHAGIA  POST: Z LINE 46, ESOPHAGEAL SPASM, GASTRITIS, FUNDIC POLYP    ENDOSCOPY N/A 06/20/2023    ESOPHAGEAL DILATATION N/A 12/07/2021    Procedure: OR ESOPHAGEAL DILATATION with maciel dilators ;  Surgeon: Jamey Leslie MD;  [facility];  Service: ENT;  Laterality: N/A;    ESOPHAGEAL MOTILITY STUDY N/A 02/03/2022    Procedure: ESOPHAGEAL MOTILITY STUDY;  Surgeon: Harshil, Nurse Performed;  Location: [facility] ENDOSCOPY;  Service: Gastroenterology;  Laterality: N/A;  dypshagia     FOOT SURGERY Right 12/2016    Second and third hammertoe corrections    KNEE ARTHROSCOPY Right 2009    KNEE SURGERY Right     REVISION / TAKEDOWN COLOSTOMY  2006    SHOULDER ARTHROSCOPY Right 2018    right shoulder arthroscopy with extensive rotator cuff, biceps and labral debridement, chondroplasty, & subacromial decompression with acromioplasty    SHOULDER SURGERY      HAS HAS COMPLETE SHOULDER ON RIGHT. LEFT SCOPED AND HAD 2ND SURGERY WITH HARDWARE PLACED    SHOULDER SURGERY Left     2012. 2013    TONSILLECTOMY      TOTAL SHOULDER REVERSE ARTHROPLASTY Right 2019    WISDOM TOOTH EXTRACTION          No current facility-administered medications on file prior to encounter.     Current Outpatient Medications on File Prior to Encounter   Medication Sig Dispense Refill    acyclovir (ZOVIRAX) 400 MG tablet TAKE 1 TABLET  BY MOUTH TWICE DAILY 60 tablet 1    ALPRAZolam (XANAX) 0.25 MG tablet Take 1 tablet by mouth 2 (Two) Times a Day As Needed for Anxiety. for anxiety 60 tablet 0    aspirin 81 MG chewable tablet CHEW AND SWALLOW 1 TABLET DAILY WITH BREAKFAST      Cholecalciferol (Vitamin D3) 50 MCG (2000 UT) tablet Take 1 tablet by mouth Daily.      cholestyramine (QUESTRAN) 4 g packet Take 1 packet by mouth 3 (Three) Times a Day With Meals. 1 packet  TID PRN diarrhea (Patient taking differently: Take 1 packet by mouth 3 (Three) Times a Day As Needed (diarrhea). 1 packet  TID PRN diarrhea) 60 packet 4    cyclobenzaprine (FLEXERIL) 10 MG tablet Take 1 tablet by mouth 3 (Three) Times a Day As Needed for Muscle Spasms. 30 tablet 0    Diphenhydramine-Aluminum-Magnesium-Simethicone-Lidocaine-Nystatin Swish and spit 5 mL Every 4 (Four) Hours As Needed (mucositis). Recipe: Benadryl Elixir 60cc, Maalox 200 cc, Viscous Lidocaine 30cc, Nystatin 120cc 410 mL 1    diphenoxylate-atropine (LOMOTIL) 2.5-0.025 MG per tablet Take 1 tablet by mouth 4 (Four) Times a Day As Needed for Diarrhea. 120 tablet 0    empagliflozin (JARDIANCE) 10 MG tablet tablet Take 1 tablet by mouth Daily.      FLUoxetine (PROzac) 20 MG capsule TAKE 1 CAPSULE BY MOUTH FOUR TIMES DAILY 360 capsule 0    HYDROcodone-acetaminophen (NORCO) 5-325 MG per tablet Take 1 tablet by mouth Every 6 (Six) Hours As Needed for Moderate Pain. 60 tablet 0    levothyroxine (SYNTHROID, LEVOTHROID) 75 MCG tablet TAKE 1 TABLET BY MOUTH EVERY DAY 90 tablet 0    loratadine (CLARITIN) 10 MG tablet Take 1 tablet by mouth Daily. 30 tablet 5    midodrine (PROAMATINE) 5 MG tablet Take 1 tablet by mouth 2 (Two) Times a Day.      Multiple Vitamins-Minerals (V-C Forte) capsule Take 1 capsule by mouth Daily.      olopatadine (PATANOL) 0.1 % ophthalmic solution Administer 1 drop to both eyes 2 (Two) Times a Day. 5 mL 5    pantoprazole (PROTONIX) 20 MG EC tablet TAKE 1 TABLET BY MOUTH EVERY DAY. DO NOT TAKE  WITHIN 2 HOURS OF THYROID MEDICATION 90 tablet 1    pregabalin (LYRICA) 50 MG capsule Take 1 capsule by mouth Daily As Needed (nerve pain).      Rosuvastatin Calcium 20 MG capsule sprinkle Take 20 mg by mouth Daily. 90 capsule 3    spironolactone (ALDACTONE) 25 MG tablet Take 1 tablet by mouth Daily.      zolpidem (AMBIEN) 10 MG tablet Take 1 tablet by mouth At Night As Needed for Sleep. 30 tablet 1    [DISCONTINUED] diclofenac (VOLTAREN) 50 MG EC tablet Take 1 tablet by mouth 2 (Two) Times a Day As Needed (Pain). 20 tablet 0    [DISCONTINUED] icosapent ethyl (Vascepa) 1 g capsule capsule Take 2 g by mouth Daily. 60 capsule 2    [DISCONTINUED] Menthol-Zinc Oxide (Calmoseptine) 0.44-20.6 % ointment Apply 1 application  topically to the appropriate area as directed 2 (Two) Times a Day. 20 g 2    [DISCONTINUED] oxyCODONE-acetaminophen (PERCOCET) 5-325 MG per tablet Take 1 tablet by mouth Every 6 (Six) Hours As Needed for Severe Pain. 12 tablet 0    [DISCONTINUED] predniSONE (DELTASONE) 10 MG tablet Take 6 tabs daily 10/17-10/19, then 4 tabs daily 10/20-10/22, then 3 tabs daily 10/23-10/25, then 2 tabs daily 10/26-10/28 then 1 tab daily 10/26-10/28 48 tablet 0    [DISCONTINUED] predniSONE (DELTASONE) 50 MG tablet Take 1 tablet by mouth Daily. 5 tablet 0    [DISCONTINUED] Sodium Fluoride 5000 Plus 1.1 % cream           ALLERGIES:    Allergies   Allergen Reactions    Azithromycin Rash and Other (See Comments)     Reaction unknown to patient (unable to remember)  Other reaction(s): Other: stomach issues, Stomach ache, Other: stomach issues, Stomach ache    Ezetimibe Myalgia, Other (See Comments) and Rash     cramps  cramps    Pravastatin Rash and Other (See Comments)        Social History     Socioeconomic History    Marital status: Single    Number of children: 2   Tobacco Use    Smoking status: Never     Passive exposure: Never    Smokeless tobacco: Never   Vaping Use    Vaping status: Never Used   Substance and Sexual  Activity    Alcohol use: Not Currently     Alcohol/week: 1.0 standard drink of alcohol     Types: 1 Glasses of wine per week    Drug use: Never    Sexual activity: Defer        Family History   Problem Relation Age of Onset    Hypertension Mother     Osteoporosis Mother     Skin cancer Mother     Heart disease Father     Hypertension Father     Breast cancer Maternal Grandmother     Ovarian cancer Neg Hx     Colon cancer Neg Hx     Deep vein thrombosis Neg Hx     Pulmonary embolism Neg Hx     Malig Hyperthermia Neg Hx         Review of Systems   Constitutional: Negative.    Eyes: Negative.    Respiratory:  Positive for chest tightness and shortness of breath.    Cardiovascular:  Positive for chest pain.   Gastrointestinal: Negative.    Endocrine: Negative.    Genitourinary: Negative.    Musculoskeletal: Negative.    Neurological: Negative.    Psychiatric/Behavioral: Negative.          Objective     Vitals:    11/07/24 0254 11/07/24 0540 11/07/24 0635 11/07/24 0716   BP: 117/67  107/60 109/63   BP Location: Right arm   Right arm   Patient Position: Lying   Lying   Pulse: 80  79 80   Resp: 18      Temp: 98.1 °F (36.7 °C)   97.9 °F (36.6 °C)   TempSrc: Oral   Oral   SpO2: 96%   97%   Weight:  60.1 kg (132 lb 7.9 oz)     Height:             8/21/2024    12:41 PM   Current Status   ECOG score 0       Physical Exam  Constitutional:       Appearance: Normal appearance. She is normal weight.   HENT:      Head: Normocephalic and atraumatic.      Nose: Nose normal.      Mouth/Throat:      Mouth: Mucous membranes are moist.      Pharynx: Oropharynx is clear.   Eyes:      Extraocular Movements: Extraocular movements intact.      Conjunctiva/sclera: Conjunctivae normal.      Pupils: Pupils are equal, round, and reactive to light.   Cardiovascular:      Rate and Rhythm: Normal rate and regular rhythm.      Pulses: Normal pulses.      Heart sounds: Normal heart sounds.   Pulmonary:      Effort: Pulmonary effort is normal.       Breath sounds: Normal breath sounds.      Comments: No pleural rub detected bilateral bases  Abdominal:      General: Bowel sounds are normal.      Palpations: Abdomen is soft.   Musculoskeletal:         General: Normal range of motion.      Cervical back: Normal range of motion and neck supple.   Skin:     General: Skin is warm.   Neurological:      General: No focal deficit present.      Mental Status: She is oriented to person, place, and time.   Psychiatric:         Mood and Affect: Mood normal.         Behavior: Behavior normal.           RECENT LABS:  Hematology WBC   Date Value Ref Range Status   11/07/2024 4.85 3.40 - 10.80 10*3/mm3 Final     RBC   Date Value Ref Range Status   11/07/2024 4.21 3.77 - 5.28 10*6/mm3 Final     Hemoglobin   Date Value Ref Range Status   11/07/2024 12.4 12.0 - 15.9 g/dL Final     Hematocrit   Date Value Ref Range Status   11/07/2024 38.7 34.0 - 46.6 % Final     Platelets   Date Value Ref Range Status   11/07/2024 142 140 - 450 10*3/mm3 Final          Assessment & Plan     *AL amyloidosis  She had a stress echocardiogram on 4/24/2020 showing a normal left ventricular ejection fraction of 60% with moderate concentric hypertrophy but no wall motion abnormalities of the left ventricle.  There was impaired relaxation noted.  She complained of chest pain during the examination.  There was some ST segment depression.  Cardiac catheterization was performed on the same day.  No intervention was required.  Follow-up echocardiogram on 4/15/2021 showed a left ventricular ejection fraction of 61 to 65% with normal LV cavity size.  Left ventricular wall thickness showed moderate concentric hypertrophy.  Diastolic function was impaired.  No pericardial effusion.  Small left pleural effusion.  She had follow-up [facility] imaging for cardiac amyloidosis that was not suggestive of ATTR amyloidosis  Laboratory values on 2/24/2022 showed a high serum free light chain lambda of 121, normal kappa of 10.5,  low ratio at 0.09.  Serum protein electrophoresis showed no evidence of an M spike.  Urine light chains were abnormal with an elevated lambda light chain of 33 and a low ratio of 0.48 with a 12.7 mg M spike on 24-hour urine protein electrophoresis.  BNP was elevated at 2306 on 3/4/2022.  The troponin was normal at 0.03.  CK was 212 with a CK-MB of 10.87.  Initial consult on 3/30/22. No M spike on serum protein electrophoresis. SIFE 'presence of monoclonal protein is unclear.'   Bone marrow biopsy 4/13/22 normocellular, 12.1% plasma cells consistent with low volume plasma cell dyscrasia. FISH with low level t(11;14) fusion only. No amyloid noted as Congo red staining negative.   Fat pad biopsy 5/11/22 positive for amyloid, AL lambda  Referred to Dr. Buenrostro at [facility]. Intended to enroll on a clinical study but her troponin as tested by the study sponsor was not high enough  Therapy with sissy-CyBorD initiated 8/16/2022  Patient seen in the office on 8/17/2022 for triage visit.  Patient with complaints of fatigue, dizziness and diarrhea.  She was mildly hypotensive.  She was given IV fluids.  Subsequent admission at [facility] with shortness of breath and volume overload.  She was diuresed and treated with antibiotics.  D8 therapy administered on 8/30  Light chains normal at [facility] on 8/31/22 (lambda light chain 1.3, down from 12.44)  9/27/2022: Cycle 2-day 8 of therapy  10/4/2022 cycle 2-day 8 CyBorD.  Patient continues to push oral hydration.  She continues to have some neck stiffness but this has not worsened.  She will see [facility] next week for further cardiac work-up.  Glucose was low upon initial labs today however the patient was given something to eat by nursing prior to rechecking.  This was therefore rechecked prior to her leaving the office and was 87.  Patient reports she was feeling fine.  10/18/2020 to cycle 2-day 22 Sissy -CyBorD.  Velcade dose will be reduced to 1.0 mg/m² due to  patient's persistent issues with orthostasis.  10/25/2022: Cycle 3-day 1  11/8/2022: Delay in cycle 3-day 15 therapy.  Day 8 omitted due to an upper respiratory infection.  11/22/2022: Cycle 3-day 22.   final planned treatment before she goes to [facility] for cardiac surgery.  Cardiac surgery performed at [facility] on 12/5/2022.  Pathology from the myectomy showed cardiac amyloidosis extensively involving the vessels and endocardium with myocyte hypertrophy and interstitial fibrosis.  Congo red stain was positive.  Reviewed back on 1/17/2023 with plans to resume Darzalex, Cytoxan, Velcade, dexamethasone with cycle #4 on 1/24.  Patient seen 2/7/2023 (missed 1/31/2023 due to weather, which would have been day 8).  We will go ahead and proceed with as cycle 4 day 15, day 15.  2/14/2023: Cycle 4-day 22  2/21/2023 cycle 5-day 1 Darzalex, Cytoxan (IV Cytoxan given in the office), Velcade, dexamethasone (20 mg p.o. given in the office).  Repeat labs from 2/21/2023 showing M spike up to 0.8, free kappa light chain up to 85.8 (previously 5.3 on 1/17/2023), ratio 8.58 (was previously 0.76 on 1/17/2023).  We ended up repeating labs on 3/7/2023 M spike 0.1, free kappa light chain 3.4, ratio 0.83.  We will continue on with treatment.  She is scheduled to return to [facility] in April.  4/11/2023 cycle 6, day 22 Darzalex-CyBorD.  Final planned therapy.  She was seen at [facility] with recommendations for monthly Darzalex for 18 months. Bactrim stopped and she continues acyclovir. No immediate plans for ASCT.  The last note from [facility] says to continue Darzalex for 24 months.  12/6/2023: Proceed with Darzalex  5/1/2024 patient returns we will proceed with Darzalex today.  Will discuss new skin nodules with Dr. Sal.  Patient does follow-up with [facility] next month and will discuss this with them at that time.  She is following up with primary care soon to discuss some medication changes as suggested by her team at  [facility] to see if that helps with her esophageal spasms.  She will return in 1 month for labs and treatment in 2 months to see Dr. Sal with labs and treatment.  6/26/2024: Overall stable.  Bone marrow biopsy planned at [facility] in August.  She had last been seen in June 2026 with plans to continue monthly Darzalex with records indicating this continued through 8/21/2024.  Her visit at [facility] 9/11/2024 included a CHR and observation was initiated.  She is having further GI symptoms, deferred ASCHD as to no need due to deep response.  Additional issues included chronic leg numbness, diarrhea, elevated BNP troponin and memory dysfunction.  She presented 11/6/2024 with acute shortness of breath and fever, chest pain x 3 to 4 weeks.  Recent studies include a CT chest with contrast that was essentially unremarkable, chest x-ray 11/6 without active disease, negative respiratory panel, and normal CBC, normal CMP, at bedtime troponin 31, lactate of 0.9.     *Diarrhea  She saw Dr. Hoyos with GI at [facility] on 9/7.  Suspicion for amyloid involvement of the GI tract  Continue Lomotil and Imodium. Rifaximin prescribed by Dr. Hoyos which she continues to use intermittently with improvement but this is only prescribed for 1 week out of 4  Diarrhea waxes and wanes with what she eats and dairy products particularly worsen diarrhea  Continue Questran, Lomotil and Imodium.  These help but did not completely control the issue.  10/11/2023: Patient continues on rifaximin, Questran, Lomotil, and Imodium for diarrhea management.  Diarrhea still not completely controlled. Averages at least 5 episodes daily.   Stable issues at this time.  She cannot continue rifaximin at this time due to insurance issues. On Flagyl. Resume rifaximin per GI at [facility] when possible  6/26/2024 she reports her diarrhea is unchanged today.  Xifaxan really helps.     *Elevated liver enzymes  Continue to monitor intermittently      *Cardiomyopathy:  Most recent echocardiogram on 8/17/2022 showed left ventricular wall thickness consistent with moderate to severe concentric hypertrophy along with left ventricular septal wall measuring 2.2 cm and posterior wall thickness at 1.9 cm likely due to amyloidosis.  LVEF 61 to 65%.  Grade 1 impaired relaxation.  Now followed by Dr. Lyles at [facility] with concerns for pre-existing HCM and AL cardiac amyloid.   Cardiac MRI results above.  Amyloidosis evident.  Catheterization performed at [facility].   Cardiac surgery performed at [facility] on 12/5/2022.  Pathology from the myectomy showed cardiac amyloidosis extensively involving the vessels and endocardium with myocyte hypertrophy and interstitial fibrosis.  Congo red stain was positive.  Symptoms improved significantly  10/11/2023: Seen by cardiology at [facility] on 10/4/2023. Patient was restarted on metoprolol XL.  Midodrine adjusted to morning and early afternoon.  ECHO to be repeated in 3 months with follow-up.  New LBBB  Symptoms overall much better until patient now admitted 11/6/2024 with worsening shortness of breath.  Repeat echocardiogram planned     *History of periorbital hematomas: These have resolved.  Coagulation studies and a factor X level were all normal.     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   Continuing magic mouthwash.     *Dysphagia:  She saw Dr. Aranda at [facility] with Botox injections performed.  Symptoms unfortunately are not much better.  Recent esophagram confirmed esophageal dysmotility.  Manometry recently performed at [facility].  Patient states she was told that she is having esophageal spasms and that this is an area that they are very limited in treatment options.  She is going to see primary care for some medication adjustments to see if this is helpful.     *Skin changes with hypopigmentation and blister on the left side of her nose  Concerning for new  manifestations of amyloidosis  Not discussed at this time     *Subcutaneous nodules, also previously increasing in number and size  5/1/2024 patient reports these are increasing in number and size once again, she reports a new area on her right upper thigh and left upper arm today.  Did not complain of this as of 6/26/2024     *Rectal irritation  Saw wound care.  Continue current therapy.  No recent issues.     *Cognitive issues and headache  MRI brain 4/7/2024 with no acute intracranial process and findings suggestive of mild chronic small vessel ischemic disease.     *Left-sided chest pain  She notes this today after moving some furniture yesterday.  There is tenderness to the left of the sternum.  I believe this is musculoskeletal pain.  I prescribed Flexeril and advised her to take ibuprofen over the next couple of days.  I also advised cold over the next day or so and then heat.     PLAN:  Again review of 9/11/2024 office visit with presentation of Stage IIIa Lambda AL Amyloidosis- cardiac involvement confirmed on biopsy 12/5/2022  - Lambda LC mediated, fib type confirmed on mass spec from fat pad  - skeletal survey negative.  Patient status post therapy up until recent Darzalex described as above and completed.  Restaging demonstrates a complete hematologic response and she is currently on observation.  Additional issues include paresthesias, diarrhea, memory dysfunction and cardiac follow-up with assessment also at [facility] 9/11/2024 by cardiology with cardiac MRI planned but thought to be quite stable/improved-initial BNP at 1533 early in diagnosis.  Now with unexplained chest pain?  Musculoskeletal in nature, possible pleuritic considering recent history.  Plan repeat echocardiogram, cardiology assessment.  Repeat paraproteins will also be drawn.

## 2024-11-07 NOTE — CONSULTS
[facility]  [phone]  Dr. Mati Shahid      Subjective   LOS: 0 days     69-year-old female with a very complex past medical history that largely surrounds her AL amyloidosis.  She follows both here in [place] and at [place].  She recently went to [facility] at the beginning of October due to significant pain in her chest on the left side that had started the day prior.  Also was having significant shortness of breath.  She was found to have some pleural effusion that was drained due to the immense amount of pain that she had.  This fluid was exudative and the cause was unclear.  However, plan was if there was a recurrence she was to get a pleural biopsy.  But there has been no recurrence since this time.  The pain has continued since that time.  The pain is intense at times and mostly dull on the left side.  It is reproducible upon palpation.  States that taking deep breaths also aggravates the pain.  Also has continued to have shortness of breath throughout this time.  She states that the status of her AL amyloidosis is currently in a state of remission it seems.  She has recently finished therapies and is due for recheck labs in a few months she states.    Cynthia Portillo  reports that she does not currently use alcohol after a past usage of about 1.0 standard drink of alcohol per week.,  reports that she has never smoked. She has never been exposed to tobacco smoke. She has never used smokeless tobacco.     Past Hx:  has a past medical history of AL amyloidosis, Anxiety, Arthritis, Bowel perforation, COVID-19 (07/2020), Depression, Diverticulitis of colon, Diverticulosis, GERD (gastroesophageal reflux disease), History of Clostridioides difficile infection (2008), History of prediabetes, History of snoring, History of stress incontinence, Hypercholesterolemia, Hyperlipidemia, Hypertension, Left ventricular hypertrophy, Liver disease, Oral herpes (08/18/2020), Seasonal  allergies, SOB (shortness of breath), Status post colostomy, and Thyroid disease.  Surg Hx:  has a past surgical history that includes Knee surgery (Right); Shoulder surgery; Foot surgery (Right, 12/2016); Appendectomy; [place] tooth extraction; Tonsillectomy; Colostomy (2005); Colonoscopy (approx 2018); Revision / takedown colostomy (2006); Cardiac catheterization (N/A, 04/24/2020); Cardiac catheterization (N/A, 04/24/2020); Cardiac catheterization (N/A, 04/24/2020); Esophagogastroduodenoscopy (N/A, 05/27/2020); Abdominal surgery (2005, 2014); Esophageal dilation (N/A, 12/07/2021); Esophageal motility study (N/A, 02/03/2022); Colostomy closure (2006); Adenoidectomy; Ectopic pregnancy surgery; Knee arthroscopy (Right, 2009); Abdominoplasty (2016); Correction hammer toe (Right, 2017); Shoulder arthroscopy (Right, 2018); Shoulder surgery (Left); Reverse total shoulder arthroplasty (Right, 2019); Cardiac surgery; and Esophagogastroduodenoscopy (N/A, 06/20/2023).  FH: family history includes Breast cancer in her maternal grandmother; Heart disease in her father; Hypertension in her father and mother; Osteoporosis in her mother; Skin cancer in her mother.  SH:  reports that she has never smoked. She has never been exposed to tobacco smoke. She has never used smokeless tobacco. She reports that she does not currently use alcohol after a past usage of about 1.0 standard drink of alcohol per week. She reports that she does not use drugs.    Medications Prior to Admission   Medication Sig Dispense Refill Last Dose/Taking    acyclovir (ZOVIRAX) 400 MG tablet TAKE 1 TABLET BY MOUTH TWICE DAILY 60 tablet 1 Taking    ALPRAZolam (XANAX) 0.25 MG tablet Take 1 tablet by mouth 2 (Two) Times a Day As Needed for Anxiety. for anxiety 60 tablet 0 Taking As Needed    aspirin 81 MG chewable tablet CHEW AND SWALLOW 1 TABLET DAILY WITH BREAKFAST   Taking    Cholecalciferol (Vitamin D3) 50 MCG (2000 UT) tablet Take 1 tablet by mouth Daily.    Taking    cholestyramine (QUESTRAN) 4 g packet Take 1 packet by mouth 3 (Three) Times a Day With Meals. 1 packet  TID PRN diarrhea (Patient taking differently: Take 1 packet by mouth 3 (Three) Times a Day As Needed (diarrhea). 1 packet  TID PRN diarrhea) 60 packet 4 Taking Differently    cyclobenzaprine (FLEXERIL) 10 MG tablet Take 1 tablet by mouth 3 (Three) Times a Day As Needed for Muscle Spasms. 30 tablet 0 Taking As Needed    Diphenhydramine-Aluminum-Magnesium-Simethicone-Lidocaine-Nystatin Swish and spit 5 mL Every 4 (Four) Hours As Needed (mucositis). Recipe: Benadryl Elixir 60cc, Maalox 200 cc, Viscous Lidocaine 30cc, Nystatin 120cc 410 mL 1 Taking As Needed    diphenoxylate-atropine (LOMOTIL) 2.5-0.025 MG per tablet Take 1 tablet by mouth 4 (Four) Times a Day As Needed for Diarrhea. 120 tablet 0 Taking As Needed    empagliflozin (JARDIANCE) 10 MG tablet tablet Take 1 tablet by mouth Daily.   Taking    FLUoxetine (PROzac) 20 MG capsule TAKE 1 CAPSULE BY MOUTH FOUR TIMES DAILY 360 capsule 0 Taking    HYDROcodone-acetaminophen (NORCO) 5-325 MG per tablet Take 1 tablet by mouth Every 6 (Six) Hours As Needed for Moderate Pain. 60 tablet 0 Taking As Needed    levothyroxine (SYNTHROID, LEVOTHROID) 75 MCG tablet TAKE 1 TABLET BY MOUTH EVERY DAY 90 tablet 0 Taking    loratadine (CLARITIN) 10 MG tablet Take 1 tablet by mouth Daily. 30 tablet 5 Taking    midodrine (PROAMATINE) 5 MG tablet Take 1 tablet by mouth 2 (Two) Times a Day.   Taking    Multiple Vitamins-Minerals (V-C Forte) capsule Take 1 capsule by mouth Daily.   Taking    olopatadine (PATANOL) 0.1 % ophthalmic solution Administer 1 drop to both eyes 2 (Two) Times a Day. 5 mL 5 Taking    pantoprazole (PROTONIX) 20 MG EC tablet TAKE 1 TABLET BY MOUTH EVERY DAY. DO NOT TAKE WITHIN 2 HOURS OF THYROID MEDICATION 90 tablet 1 Taking    pregabalin (LYRICA) 50 MG capsule Take 1 capsule by mouth Daily As Needed (nerve pain).   Taking As Needed    Rosuvastatin Calcium  20 MG capsule sprinkle Take 20 mg by mouth Daily. 90 capsule 3 Taking    spironolactone (ALDACTONE) 25 MG tablet Take 1 tablet by mouth Daily.   Taking    zolpidem (AMBIEN) 10 MG tablet Take 1 tablet by mouth At Night As Needed for Sleep. 30 tablet 1 Taking As Needed     Allergies   Allergen Reactions    Azithromycin Rash and Other (See Comments)     Reaction unknown to patient (unable to remember)  Other reaction(s): Other: stomach issues, Stomach ache, Other: stomach issues, Stomach ache    Ezetimibe Myalgia, Other (See Comments) and Rash     cramps  cramps    Pravastatin Rash and Other (See Comments)       Review of Systems   All other systems reviewed and are negative.    Vital Signs past 24hrs  BP range: BP: (105-135)/(56-71) 105/57  Pulse range: Heart Rate:  [] 83  Resp rate range: Resp:  [18] 18  Temp range: Temp (24hrs), Av.3 °F (36.8 °C), Min:97.9 °F (36.6 °C), Max:99.4 °F (37.4 °C)    Oxygen range: SpO2:  [91 %-98 %] 95 %;  ;   Device (Oxygen Therapy): room air  59.9 kg (132 lb); Body mass index is 25.78 kg/m².  Net IO Since Admission: No IO data has been entered for this period [24 1321]        Mechanical Ventilator:     Physical Exam  Vitals reviewed.   Constitutional:       General: She is not in acute distress.     Appearance: Normal appearance.   HENT:      Head: Normocephalic and atraumatic.   Eyes:      Extraocular Movements: Extraocular movements intact.      Conjunctiva/sclera: Conjunctivae normal.      Pupils: Pupils are equal, round, and reactive to light.   Cardiovascular:      Rate and Rhythm: Normal rate and regular rhythm.      Heart sounds: No murmur heard.     No friction rub. No gallop.   Pulmonary:      Effort: Pulmonary effort is normal. No respiratory distress.      Breath sounds: Normal breath sounds. No wheezing, rhonchi or rales.   Abdominal:      General: Abdomen is flat.      Palpations: Abdomen is soft.      Tenderness: There is no abdominal tenderness.    Musculoskeletal:         General: No swelling or tenderness. Normal range of motion.   Skin:     General: Skin is warm and dry.      Findings: No rash.   Neurological:      General: No focal deficit present.      Mental Status: She is alert and oriented to person, place, and time.   Psychiatric:         Mood and Affect: Mood normal.         Behavior: Behavior normal.         Results Review:    I have reviewed the laboratory and imaging data from current admission. My annotations are as noted in assessment and plan.  Result Review:  I have personally reviewed the results from the time of this admission to 11/7/2024 13:21 EST and agree with these findings:  [x]  Laboratory list / accordion  [x]  Microbiology  [x]  Radiology  [x]  EKG/Telemetry   [x]  Cardiology/Vascular   [x]  Pathology  [x]  Old records  []  Other:    Medication Review:  I have reviewed the current MAR. My annotations are as noted in assessment and plan.    sodium chloride, 125 mL/hr      Lines, Drains & Airways       Active LDAs       Name Placement date Placement time Site Days    Peripheral IV 11/06/24 2034 Right Antecubital 11/06/24 2034  Antecubital  less than 1                  Diet Orders (active) (From admission, onward)       Start     Ordered    11/07/24 0109  Diet: Cardiac; Healthy Heart (2-3 Na+); Fluid Consistency: Thin (IDDSI 0)  Diet Effective Now         11/07/24 0114                  No active isolations    Assessment  Dyspnea  Left Exudative Pleural Effusion   Left Chest Pain  AL Amyloidosis       Plan  -Patient presents with a roughly month-long history of left-sided chest pain and a recent left exudative effusion.  Extensive workup thus far has not revealed a cause for either dyspnea or her chest pain.  - CT chest with contrast (11/4/2024) showed no PE or any acute disease in the chest other than some atelectasis in the dependent portions of the lungs  - Repeat CTA chest (11/7/2024) showing essentially the same.  Radiology  reported some pulmonary consolidations however I feel that largely the findings are about the same  - D-dimer normal, BNP normal, CBC normal, BMP normal  - At this point it is difficult to say what is going on.  Although she has a complex history with AL amyloidosis it is felt that this disease is relatively quiescent at this point.  I believe that her pain in the left chest is leading to respiratory splinting which is what is causing the atelectasis seen on her chest imaging and this is probably a large contributor to her shortness of breath.  The cause of the pain is still unclear and it is also interesting that she had a small pleural effusion on that side that turned out to be exudative.  All of this points to some inflammatory condition.  Currently she has light chains pending and an echo pending.      Electronically signed by Mati Shahid DO, 11/07/24, 1:21 PM EST.      Part of this note may be an electronic transcription/translation of spoken language to printed text using the Dragon Dictation System.

## 2024-11-07 NOTE — ED NOTES
Nursing report ED to floor  Cynthia Portillo  69 y.o.  female    HPI :  HPI  Stated Reason for Visit: Patient ambulatory to ED c/o chest pain and shortness of breath x3 weeks. Patient was told by chemotherapy doctor to be seen today.    Chief Complaint  Chief Complaint   Patient presents with    Chest Pain    Shortness of Breath       Admitting doctor:   Constantine Kim MD    Admitting diagnosis:   The primary encounter diagnosis was Fever, unspecified fever cause. Diagnoses of Shortness of breath, Chest pain, unspecified type, and Amyloidosis, unspecified type were also pertinent to this visit.    Code status:   Current Code Status       Date Active Code Status Order ID Comments User Context       Prior            Allergies:   Azithromycin, Ezetimibe, and Pravastatin    Isolation:   No active isolations    Intake and Output  No intake or output data in the 24 hours ending 11/07/24 0033    Weight:       11/06/24 2001   Weight: 57.2 kg (126 lb)       Most recent vitals:   Vitals:    11/06/24 2211 11/06/24 2227 11/06/24 2230 11/06/24 2327   BP:   108/65    BP Location:       Pulse: 95 94  94   Resp:       Temp:       TempSrc:       SpO2: 97% 96%  95%   Weight:       Height:           Active LDAs/IV Access:   Lines, Drains & Airways       Active LDAs       Name Placement date Placement time Site Days    Peripheral IV 11/06/24 2034 Right Antecubital 11/06/24 2034  Antecubital  less than 1                    Labs (abnormal labs have a star):   Labs Reviewed   COMPREHENSIVE METABOLIC PANEL - Abnormal; Notable for the following components:       Result Value    ALT (SGPT) 34 (*)     All other components within normal limits    Narrative:     GFR Normal >60  Chronic Kidney Disease <60  Kidney Failure <15     TROPONIN - Abnormal; Notable for the following components:    HS Troponin T 31 (*)     All other components within normal limits    Narrative:     High Sensitive Troponin T Reference Range:  <14.0 ng/L-  Negative Female for AMI  <22.0 ng/L- Negative Male for AMI  >=14 - Abnormal Female indicating possible myocardial injury.  >=22 - Abnormal Male indicating possible myocardial injury.   Clinicians would have to utilize clinical acumen, EKG, Troponin, and serial changes to determine if it is an Acute Myocardial Infarction or myocardial injury due to an underlying chronic condition.        HIGH SENSITIVITIY TROPONIN T 2HR - Abnormal; Notable for the following components:    HS Troponin T 31 (*)     All other components within normal limits    Narrative:     High Sensitive Troponin T Reference Range:  <14.0 ng/L- Negative Female for AMI  <22.0 ng/L- Negative Male for AMI  >=14 - Abnormal Female indicating possible myocardial injury.  >=22 - Abnormal Male indicating possible myocardial injury.   Clinicians would have to utilize clinical acumen, EKG, Troponin, and serial changes to determine if it is an Acute Myocardial Infarction or myocardial injury due to an underlying chronic condition.        URINALYSIS W/ MICROSCOPIC IF INDICATED (NO CULTURE) - Abnormal; Notable for the following components:    Specific Gravity, UA >1.030 (*)     Glucose, UA >=1000 mg/dL (3+) (*)     All other components within normal limits    Narrative:     Urine microscopic not indicated.   RESPIRATORY PANEL PCR W/ COVID-19 (SARS-COV-2), NP SWAB IN UTM/VTP, 2 HR TAT - Normal    Narrative:     In the setting of a positive respiratory panel with a viral infection PLUS a negative procalcitonin without other underlying concern for bacterial infection, consider observing off antibiotics or discontinuation of antibiotics and continue supportive care. If the respiratory panel is positive for atypical bacterial infection (Bordetella pertussis, Chlamydophila pneumoniae, or Mycoplasma pneumoniae), consider antibiotic de-escalation to target atypical bacterial infection.   CBC WITH AUTO DIFFERENTIAL - Normal   LACTIC ACID, PLASMA - Normal   BLOOD CULTURE    BLOOD CULTURE   RAINBOW DRAW    Narrative:     The following orders were created for panel order [place] Draw.  Procedure                               Abnormality         Status                     ---------                               -----------         ------                     Green Top (Gel)[[id number]]                                  Final result               Lavender Top[[id number]]                                     Final result               Gold Top - SST[[id number]]                                   Final result               Light Blue Top[[id number]]                                   Final result                 Please view results for these tests on the individual orders.   CBC AND DIFFERENTIAL    Narrative:     The following orders were created for panel order CBC & Differential.  Procedure                               Abnormality         Status                     ---------                               -----------         ------                     CBC Auto Differential[[id number]]        Normal              Final result                 Please view results for these tests on the individual orders.   GREEN TOP   LAVENDER TOP   GOLD TOP - SST   LIGHT BLUE TOP       EKG:   ECG 12 Lead ED Triage Standing Order; Chest Pain   Preliminary Result   HEART RATE=95  bpm   RR Xhbkjqtv=517  ms   UT Qujxciwx=932  ms   P Horizontal Axis=0  deg   P Front Axis=54  deg   QRSD Kkxqjmog=817  ms   QT Sffwapku=761  ms   NBmA=287  ms   QRS Axis=-27  deg   T Wave Axis=150  deg   - ABNORMAL ECG -   Sinus rhythm   Probable left atrial enlargement   Left bundle branch block   Date and Time of Study:2024-11-06 20:10:01          Meds given in ED:   Medications   sodium chloride 0.9 % flush 10 mL (has no administration in time range)   aspirin tablet 325 mg (325 mg Oral Given 11/6/24 2024)       Imaging results:  XR Chest 1 View    Result Date: 11/6/2024  No evidence for active disease in the chest.  This report was  finalized on 11/6/2024 8:39 PM by Mars Hankins M.D on Workstation: [id number]       Ambulatory status:   - up ad gini    Social issues:   Social History     Socioeconomic History    Marital status: Single    Number of children: 2   Tobacco Use    Smoking status: Never     Passive exposure: Never    Smokeless tobacco: Never   Vaping Use    Vaping status: Never Used   Substance and Sexual Activity    Alcohol use: Not Currently     Alcohol/week: 1.0 standard drink of alcohol     Types: 1 Glasses of wine per week    Drug use: Never    Sexual activity: Defer       Peripheral Neurovascular  Peripheral Neurovascular (Adult)  Peripheral Neurovascular WDL: WDL    Neuro Cognitive  Neuro Cognitive (Adult)  Cognitive/Neuro/Behavioral WDL: WDL, level of consciousness, orientation  Level of Consciousness: Alert  Orientation: oriented x 4  Additional Documentation: Kerri Coma Scale (Group)  Kerri Coma Scale  Best Eye Response: 4-->(E4) spontaneous  Best Motor Response: 6-->(M6) obeys commands  Best Verbal Response: 5-->(V5) oriented  Kerri Coma Scale Score: 15    Learning  Learning Assessment  Learning Readiness and Ability: no barriers identified  Education Provided  Person Taught: patient    Respiratory  Respiratory WDL  Respiratory WDL: .WDL except, rhythm/pattern  Rhythm/Pattern, Respiratory: shortness of breath  Breath Sounds  All Lung Fields Breath Sounds: All Fields  All Lung Fields Breath Sounds: clear, equal bilaterally    Abdominal Pain       Pain Assessments  Pain (Adult)  (0-10) Pain Rating: Rest: 4  (0-10) Pain Rating: Activity: 4  Response to Pain Interventions: intervention not effective per patient    NIH Stroke Scale       Cesia Rich RN  11/07/24 00:33 EST

## 2024-11-08 LAB
ANION GAP SERPL CALCULATED.3IONS-SCNC: 8 MMOL/L (ref 5–15)
BASOPHILS # BLD AUTO: 0.01 10*3/MM3 (ref 0–0.2)
BASOPHILS NFR BLD AUTO: 0.3 % (ref 0–1.5)
BUN SERPL-MCNC: 11 MG/DL (ref 8–23)
BUN/CREAT SERPL: 15.7 (ref 7–25)
CALCIUM SPEC-SCNC: 7.9 MG/DL (ref 8.6–10.5)
CHLORIDE SERPL-SCNC: 110 MMOL/L (ref 98–107)
CO2 SERPL-SCNC: 25 MMOL/L (ref 22–29)
CREAT SERPL-MCNC: 0.7 MG/DL (ref 0.57–1)
DEPRECATED RDW RBC AUTO: 44.2 FL (ref 37–54)
EGFRCR SERPLBLD CKD-EPI 2021: 93.8 ML/MIN/1.73
EOSINOPHIL # BLD AUTO: 0.17 10*3/MM3 (ref 0–0.4)
EOSINOPHIL NFR BLD AUTO: 5.3 % (ref 0.3–6.2)
ERYTHROCYTE [DISTWIDTH] IN BLOOD BY AUTOMATED COUNT: 13.5 % (ref 12.3–15.4)
GLUCOSE BLDC GLUCOMTR-MCNC: 102 MG/DL (ref 70–130)
GLUCOSE BLDC GLUCOMTR-MCNC: 105 MG/DL (ref 70–130)
GLUCOSE BLDC GLUCOMTR-MCNC: 115 MG/DL (ref 70–130)
GLUCOSE BLDC GLUCOMTR-MCNC: 82 MG/DL (ref 70–130)
GLUCOSE SERPL-MCNC: 88 MG/DL (ref 65–99)
HCT VFR BLD AUTO: 35.3 % (ref 34–46.6)
HGB BLD-MCNC: 11.7 G/DL (ref 12–15.9)
IMM GRANULOCYTES # BLD AUTO: 0.01 10*3/MM3 (ref 0–0.05)
IMM GRANULOCYTES NFR BLD AUTO: 0.3 % (ref 0–0.5)
LYMPHOCYTES # BLD AUTO: 1.11 10*3/MM3 (ref 0.7–3.1)
LYMPHOCYTES NFR BLD AUTO: 34.6 % (ref 19.6–45.3)
MCH RBC QN AUTO: 30 PG (ref 26.6–33)
MCHC RBC AUTO-ENTMCNC: 33.1 G/DL (ref 31.5–35.7)
MCV RBC AUTO: 90.5 FL (ref 79–97)
MONOCYTES # BLD AUTO: 0.21 10*3/MM3 (ref 0.1–0.9)
MONOCYTES NFR BLD AUTO: 6.5 % (ref 5–12)
NEUTROPHILS NFR BLD AUTO: 1.7 10*3/MM3 (ref 1.7–7)
NEUTROPHILS NFR BLD AUTO: 53 % (ref 42.7–76)
NRBC BLD AUTO-RTO: 0 /100 WBC (ref 0–0.2)
PLATELET # BLD AUTO: 123 10*3/MM3 (ref 140–450)
PMV BLD AUTO: 9.9 FL (ref 6–12)
POTASSIUM SERPL-SCNC: 3.9 MMOL/L (ref 3.5–5.2)
RBC # BLD AUTO: 3.9 10*6/MM3 (ref 3.77–5.28)
SODIUM SERPL-SCNC: 143 MMOL/L (ref 136–145)
WBC NRBC COR # BLD AUTO: 3.21 10*3/MM3 (ref 3.4–10.8)

## 2024-11-08 PROCEDURE — 25010000002 ENOXAPARIN PER 10 MG: Performed by: INTERNAL MEDICINE

## 2024-11-08 PROCEDURE — 99232 SBSQ HOSP IP/OBS MODERATE 35: CPT | Performed by: STUDENT IN AN ORGANIZED HEALTH CARE EDUCATION/TRAINING PROGRAM

## 2024-11-08 PROCEDURE — 85025 COMPLETE CBC W/AUTO DIFF WBC: CPT | Performed by: INTERNAL MEDICINE

## 2024-11-08 PROCEDURE — 80048 BASIC METABOLIC PNL TOTAL CA: CPT | Performed by: INTERNAL MEDICINE

## 2024-11-08 PROCEDURE — 82948 REAGENT STRIP/BLOOD GLUCOSE: CPT

## 2024-11-08 PROCEDURE — 99232 SBSQ HOSP IP/OBS MODERATE 35: CPT | Performed by: INTERNAL MEDICINE

## 2024-11-08 RX ORDER — PREGABALIN 25 MG/1
25 CAPSULE ORAL EVERY 8 HOURS SCHEDULED
Status: DISCONTINUED | OUTPATIENT
Start: 2024-11-08 | End: 2024-11-10 | Stop reason: HOSPADM

## 2024-11-08 RX ORDER — COLCHICINE 0.6 MG/1
0.6 TABLET ORAL EVERY 12 HOURS SCHEDULED
Status: DISCONTINUED | OUTPATIENT
Start: 2024-11-08 | End: 2024-11-09

## 2024-11-08 RX ORDER — DIPHENOXYLATE HYDROCHLORIDE AND ATROPINE SULFATE 2.5; .025 MG/1; MG/1
1 TABLET ORAL 4 TIMES DAILY PRN
Status: DISCONTINUED | OUTPATIENT
Start: 2024-11-08 | End: 2024-11-10 | Stop reason: HOSPADM

## 2024-11-08 RX ADMIN — COLCHICINE 0.6 MG: 0.6 TABLET ORAL at 14:43

## 2024-11-08 RX ADMIN — ENOXAPARIN SODIUM 40 MG: 100 INJECTION SUBCUTANEOUS at 12:47

## 2024-11-08 RX ADMIN — Medication 10 ML: at 09:00

## 2024-11-08 RX ADMIN — ASPIRIN 81 MG: 81 TABLET, CHEWABLE ORAL at 08:54

## 2024-11-08 RX ADMIN — PREGABALIN 25 MG: 25 CAPSULE ORAL at 21:02

## 2024-11-08 RX ADMIN — PANTOPRAZOLE SODIUM 40 MG: 40 TABLET, DELAYED RELEASE ORAL at 08:57

## 2024-11-08 RX ADMIN — DIPHENOXYLATE HYDROCHLORIDE AND ATROPINE SULFATE 1 TABLET: 2.5; .025 TABLET ORAL at 21:02

## 2024-11-08 RX ADMIN — Medication 2000 UNITS: at 08:54

## 2024-11-08 RX ADMIN — LEVOTHYROXINE SODIUM 75 MCG: 75 TABLET ORAL at 08:54

## 2024-11-08 RX ADMIN — MIDODRINE HYDROCHLORIDE 5 MG: 5 TABLET ORAL at 16:54

## 2024-11-08 RX ADMIN — FLUOXETINE HYDROCHLORIDE 20 MG: 20 CAPSULE ORAL at 16:54

## 2024-11-08 RX ADMIN — DIPHENOXYLATE HYDROCHLORIDE AND ATROPINE SULFATE 1 TABLET: 2.5; .025 TABLET ORAL at 16:54

## 2024-11-08 RX ADMIN — ZOLPIDEM TARTRATE 10 MG: 5 TABLET ORAL at 20:48

## 2024-11-08 RX ADMIN — FLUOXETINE HYDROCHLORIDE 20 MG: 20 CAPSULE ORAL at 12:47

## 2024-11-08 RX ADMIN — CETIRIZINE HYDROCHLORIDE 10 MG: 10 TABLET, FILM COATED ORAL at 09:04

## 2024-11-08 RX ADMIN — SPIRONOLACTONE 25 MG: 25 TABLET, FILM COATED ORAL at 08:54

## 2024-11-08 RX ADMIN — PREGABALIN 25 MG: 25 CAPSULE ORAL at 14:43

## 2024-11-08 RX ADMIN — FLUOXETINE HYDROCHLORIDE 20 MG: 20 CAPSULE ORAL at 08:54

## 2024-11-08 RX ADMIN — FLUOXETINE HYDROCHLORIDE 20 MG: 20 CAPSULE ORAL at 20:48

## 2024-11-08 RX ADMIN — ROSUVASTATIN CALCIUM 20 MG: 20 TABLET, FILM COATED ORAL at 08:54

## 2024-11-08 RX ADMIN — MIDODRINE HYDROCHLORIDE 5 MG: 5 TABLET ORAL at 08:54

## 2024-11-08 NOTE — PLAN OF CARE
Goal Outcome Evaluation:      Patient is resting well at this time and did have some c/o pain and low grade fever of 99.2 last night. PRN pain tablet administered and was effective. VSS, patient is up ad gini and has been toileting without difficulty.

## 2024-11-08 NOTE — PROGRESS NOTES
[facility]  [phone]  Dr. Mati Shahid     Subjective:  LOS: 1    Chief Complaint:  worsening chest pain, shortness of breath, and fevers up to 102     Patient doing same today.  I discussed results of the echocardiogram and that we will be working with the various other specialist to try to figure out best plan of care moving forward.    Objective   Vital Signs past 24hrs  Temp range: Temp (24hrs), Av.3 °F (36.8 °C), Min:98 °F (36.7 °C), Max:99.2 °F (37.3 °C)    BP range: BP: (105-115)/(57-99) 113/99  Pulse range: Heart Rate:  [82-89] 82  Resp rate range: Resp:  [16-18] 16  Device (Oxygen Therapy): room air   Oxygen range:SpO2:  [93 %-100 %] 100 %     Physical Exam  Vitals reviewed.   Constitutional:       General: She is not in acute distress.     Appearance: Normal appearance.   HENT:      Head: Normocephalic and atraumatic.   Eyes:      Extraocular Movements: Extraocular movements intact.      Pupils: Pupils are equal, round, and reactive to light.   Cardiovascular:      Rate and Rhythm: Normal rate and regular rhythm.      Heart sounds: No murmur heard.     No friction rub. No gallop.   Pulmonary:      Effort: Pulmonary effort is normal. No respiratory distress.      Breath sounds: No wheezing, rhonchi or rales.   Skin:     General: Skin is warm and dry.      Findings: No rash.   Neurological:      General: No focal deficit present.      Mental Status: She is alert and oriented to person, place, and time.       Results Review:    I have reviewed the laboratory and imaging data since the last note by [facility] physician.  My annotations are noted in assessment and plan.      Result Review:  I have personally reviewed the results from last note by [facility] physician to 2024 12:36 EST and agree with these findings:  [x]  Laboratory list / accordion  [x]  Microbiology  [x]  Radiology  [x]  EKG/Telemetry   [x]  Cardiology/Vascular   [x]  Pathology  [x]  Old records  []  Other:    Medication  Review:  I have reviewed the current MAR.  My annotations are noted in assessment and plan.    aspirin, 81 mg, Oral, Daily  cetirizine, 10 mg, Oral, Daily  cholecalciferol, 2,000 Units, Oral, Daily  enoxaparin, 40 mg, Subcutaneous, Q24H  FLUoxetine, 20 mg, Oral, 4x Daily  insulin lispro, 2-7 Units, Subcutaneous, 4x Daily AC & at Bedtime  levothyroxine, 75 mcg, Oral, Daily  midodrine, 5 mg, Oral, BID AC  pantoprazole, 40 mg, Oral, Q AM  pregabalin, 25 mg, Oral, Q8H  rosuvastatin, 20 mg, Oral, Daily  spironolactone, 25 mg, Oral, Daily  zolpidem, 10 mg, Oral, Nightly           Lines, Drains & Airways       Active LDAs       Name Placement date Placement time Site Days    Peripheral IV 11/06/24 2034 Right Antecubital 11/06/24 2034  Antecubital  1                  No active isolations  Diet Orders (active) (From admission, onward)       Start     Ordered    11/07/24 0109  Diet: Cardiac; Healthy Heart (2-3 Na+); Fluid Consistency: Thin (IDDSI 0)  Diet Effective Now         11/07/24 0114                      Assessment  Dyspnea  Left Exudative Pleural Effusion   Left Chest Pain  AL Amyloidosis         Plan  -Patient presents with a roughly month-long history of left-sided chest pain and a recent left exudative effusion.  Extensive workup thus far has not revealed a cause for either dyspnea or her chest pain. Has history of AL amyloidosis with cardiac involvement   -Left thoracentesis (10/14/2024) showing exudative effusion with cytology negative for malignant cells.  - CT chest with contrast (11/4/2024) showed no PE or any acute disease in the chest other than some atelectasis in the dependent portions of the lungs  - Repeat CTA chest (11/7/2024) showing essentially the same.  Radiology reported some pulmonary consolidations however I feel that largely the findings are about the same  - D-dimer normal, BNP normal, CBC normal, BMP normal  -Echocardiogram (11/7/2024) showing low normal LV systolic function with EF 50%, LV  hypertrophy, sigmoid shaped ventricular septum multiple hypokinetic left ventricular wall segments.  Indeterminate diastolic function  - At this point it is difficult to say what is going on.  Although she has a complex history with AL amyloidosis it is felt that this disease is relatively quiescent at this point. However, if active can certainly infiltrate the pleural space and MSK.  I believe that her pain in the left chest is leading to respiratory splinting which is what is causing the atelectasis seen on her chest imaging and this is probably a large contributor to her shortness of breath.  The cause of the pain is still unclear and it is also interesting that she had a small pleural effusion on that side that turned out to be exudative. Seems pleuritic or MSK related. All of this points to some inflammatory condition.  Currently she has light chains pending. If light chains are elevated or her amyloid is active then would consider additional imaging with possible PET scan to assess the chest area and pleura with +/- pleural biopsy is active. If not then would manage symptomatically and repeat chest imaging in a few months.       Mati Shahid DO   11/08/24  12:36 EST      Part of this note may be an electronic transcription/translation of spoken language to printed text using the Dragon Dictation System.

## 2024-11-08 NOTE — PROGRESS NOTES
Hospital Follow Up    LOS:  LOS: 1 day   Patient Name: Cynthia Portillo  Age/Sex: 69 y.o. female  : 1955  MRN: [id number]    Day of Service: 24   Length of Stay: 1  Encounter Provider: Gigi Garay MD  Place of Service: [facility] CARDIOLOGY  Patient Care Team:  Arian Peterson MD as PCP - General (Internal Medicine)  Nav Sal MD as Consulting Physician (Hematology and Oncology)  Darren Pruitt MD as Referring Physician (Cardiology)  Brianna Pearce RN as Ambulatory  ([facility], [facility])    Subjective:     Chief Complaint: Chest discomfort, fever    Interval History: Continues to have intermittent chest discomfort.  Worse with certain movements and deep breaths.  Otherwise no change.    Objective:     Objective:  Temp:  [98 °F (36.7 °C)-99.2 °F (37.3 °C)] 98 °F (36.7 °C)  Heart Rate:  [82-89] 82  Resp:  [16-18] 16  BP: (106-115)/(61-99) 113/99     Intake/Output Summary (Last 24 hours) at 2024 1303  Last data filed at 2024 0804  Gross per 24 hour   Intake 200 ml   Output --   Net 200 ml     Body mass index is 26.13 kg/m².      24  0540 24  1120 24  0650   Weight: 60.1 kg (132 lb 7.9 oz) 59.9 kg (132 lb) 60.7 kg (133 lb 12.8 oz)     Weight change: 2.722 kg (6 lb)      Physical Exam:   General : Alert, cooperative, in no acute distress.  Neuro: Alert,cooperative and oriented.  Lungs: CTAB. Normal respiratory effort and rate.  CV: Regular rate and rhythm, normal S1 and S2, no murmurs, gallops or rubs.  ABD: Soft, nontender, nondistended. Positive bowel sounds.  Extr: No edema or cyanosis, moves all extremities.    Lab Review:   Results from last 7 days   Lab Units 24  0639 24  0519 24  0910   SODIUM mmol/L 143 141 139 139   POTASSIUM mmol/L 3.9 3.9 4.1 4.2   CHLORIDE mmol/L 110* 107 104 103   CO2 mmol/L 25.0 24.6 26.0 26.1   BUN mg/dL 11 10 9 10   CREATININE mg/dL 0.70 0.74  "0.67 0.74   GLUCOSE mg/dL 88 93 88 103*   CALCIUM mg/dL 7.9* 9.3 8.9 9.0   AST (SGOT) U/L  --   --  21 24   ALT (SGPT) U/L  --   --  34* 52*     Results from last 7 days   Lab Units 11/06/24 2228 11/06/24  2036 11/04/24  1129 11/04/24  0910   HSTROP T ng/L 31* 31* 19* 31*     Results from last 7 days   Lab Units 11/08/24  0639 11/07/24  0519   WBC 10*3/mm3 3.21* 4.85   HEMOGLOBIN g/dL 11.7* 12.4   HEMATOCRIT % 35.3 38.7   PLATELETS 10*3/mm3 123* 142         Results from last 7 days   Lab Units 11/04/24  0910   MAGNESIUM mg/dL 2.1           Invalid input(s): \"LDLCALC\"  Results from last 7 days   Lab Units 11/07/24  0519 11/04/24  0910   PROBNP pg/mL 351.0 409.9     Results from last 7 days   Lab Units 11/07/24  0519   TSH uIU/mL 3.400     I reviewed the patient's new clinical results.  I personally viewed and interpreted the patient's EKG  Current Medications:   Scheduled Meds:aspirin, 81 mg, Oral, Daily  cetirizine, 10 mg, Oral, Daily  cholecalciferol, 2,000 Units, Oral, Daily  enoxaparin, 40 mg, Subcutaneous, Q24H  FLUoxetine, 20 mg, Oral, 4x Daily  insulin lispro, 2-7 Units, Subcutaneous, 4x Daily AC & at Bedtime  levothyroxine, 75 mcg, Oral, Daily  midodrine, 5 mg, Oral, BID AC  pantoprazole, 40 mg, Oral, Q AM  pregabalin, 25 mg, Oral, Q8H  rosuvastatin, 20 mg, Oral, Daily  spironolactone, 25 mg, Oral, Daily  zolpidem, 10 mg, Oral, Nightly      Continuous Infusions:     Allergies:  Allergies   Allergen Reactions    Azithromycin Rash and Other (See Comments)     Reaction unknown to patient (unable to remember)  Other reaction(s): Other: stomach issues, Stomach ache, Other: stomach issues, Stomach ache    Ezetimibe Myalgia, Other (See Comments) and Rash     cramps  cramps    Pravastatin Rash and Other (See Comments)       Assessment/Plan:   AL amyloidosis  Cardiac amyloidosis with LVOT obstruction status post septal myectomy  Hypotension requiring midodrine  Hyperlipidemia  Chest pain     This is a 69-year-old " female with a history of AL amyloidosis complicated by LVOT obstruction status post septal myectomy 12/2022.  She presents with atypical chest pain.  Troponin mildly elevated and flat and stable from prior.  BNP normal.  EKG with a left bundle branch block which has been present since her myectomy.  Echocardiogram shows a low normal EF similar to last exam 10/2024.  Expected wall motion abnormalities consistent with prior myectomy.  She is not currently on therapy for amyloidosis.     Her troponin is mildly elevated but her presentation is not consistent with ACS and she had normal coronaries reported on left heart catheterization 12/2022.  EF is low normal has been stable for the last month but overall down from previous imaging, likely due to worsening cardiac amyloidosis.  Medical therapy is limited by hypotension and she is requiring midodrine.  CTA negative for pulmonary embolism did note some pericardial thickening.    Her pain seems pleuritic or musculoskeletal in nature.  She notes that it got significantly better when she got high-dose steroids suggesting an inflammatory response.  I do not think this is related to pericarditis but she has had cardiac surgery in the past does place her at high risk.  Her echocardiogram is not consistent with constriction.  May be worth considering trial of colchicine/NSAIDs if pending workup from oncology and pulmonology is negative     We will continue to follow     Plan:           Gigi Garay MD  11/08/24  13:03 EST

## 2024-11-08 NOTE — PLAN OF CARE
Goal Outcome Evaluation:  Plan of Care Reviewed With: patient           Outcome Evaluation: Denies pain at rest. States left chest pain is a 3-4 when takes a deep breath. c/o diarrhea x 3 after taking Colchicine. Amb in hallway. Telemetry SR.

## 2024-11-08 NOTE — PROGRESS NOTES
Name: Cynthia Portillo ADMIT: 2024   : 1955  PCP: Arian Peterson MD    MRN: [id number] LOS: 1 days   AGE/SEX: 69 y.o. female  ROOM: [facility]     Subjective   Subjective   No acute events. Patient denies new complaints. She states she overall feels about the same. No family at bedside.    Objective   Objective   Vital Signs  Temp:  [98 °F (36.7 °C)-99.2 °F (37.3 °C)] 98 °F (36.7 °C)  Heart Rate:  [82-89] 82  Resp:  [16-18] 16  BP: (105-115)/(57-99) 113/99  SpO2:  [93 %-100 %] 100 %  on   ;   Device (Oxygen Therapy): room air  Body mass index is 26.13 kg/m².  Physical Exam  Vitals and nursing note reviewed.   Constitutional:       General: She is not in acute distress.     Appearance: She is not toxic-appearing or diaphoretic.   HENT:      Head: Normocephalic and atraumatic.      Nose: Nose normal.      Mouth/Throat:      Mouth: Mucous membranes are moist.      Pharynx: Oropharynx is clear.   Eyes:      Conjunctiva/sclera: Conjunctivae normal.      Pupils: Pupils are equal, round, and reactive to light.   Cardiovascular:      Rate and Rhythm: Normal rate and regular rhythm.      Pulses: Normal pulses.   Pulmonary:      Effort: Pulmonary effort is normal.   Abdominal:      General: Bowel sounds are normal.      Palpations: Abdomen is soft.   Musculoskeletal:         General: No swelling or tenderness.      Cervical back: Neck supple.   Skin:     General: Skin is warm and dry.      Capillary Refill: Capillary refill takes less than 2 seconds.   Neurological:      General: No focal deficit present.      Mental Status: She is alert and oriented to person, place, and time.   Psychiatric:         Mood and Affect: Mood normal.         Behavior: Behavior normal.       Results Review     I reviewed the patient's new clinical results.  Results from last 7 days   Lab Units 24  0639 24  0519 24  0910   WBC 10*3/mm3 3.21* 4.85 6.45 7.43   HEMOGLOBIN g/dL 11.7* 12.4 13.2  14.3   PLATELETS 10*3/mm3 123* 142 142 156     Results from last 7 days   Lab Units 11/08/24 0639 11/07/24 0519 11/06/24 2036 11/04/24  0910   SODIUM mmol/L 143 141 139 139   POTASSIUM mmol/L 3.9 3.9 4.1 4.2   CHLORIDE mmol/L 110* 107 104 103   CO2 mmol/L 25.0 24.6 26.0 26.1   BUN mg/dL 11 10 9 10   CREATININE mg/dL 0.70 0.74 0.67 0.74   GLUCOSE mg/dL 88 93 88 103*   EGFR mL/min/1.73 93.8 87.7 94.7 87.7     Results from last 7 days   Lab Units 11/06/24 2036 11/04/24  0910   ALBUMIN g/dL 4.1 4.1   BILIRUBIN mg/dL 0.5 0.6   ALK PHOS U/L 69 72   AST (SGOT) U/L 21 24   ALT (SGPT) U/L 34* 52*     Results from last 7 days   Lab Units 11/08/24 0639 11/07/24 0519 11/06/24 2036 11/04/24  0910   CALCIUM mg/dL 7.9* 9.3 8.9 9.0   ALBUMIN g/dL  --   --  4.1 4.1   MAGNESIUM mg/dL  --   --   --  2.1     Results from last 7 days   Lab Units 11/06/24 2036   LACTATE mmol/L 0.9     Hemoglobin A1C   Date/Time Value Ref Range Status   11/06/2024 2036 6.70 (H) 4.80 - 5.60 % Final     Glucose   Date/Time Value Ref Range Status   11/08/2024 1059 102 70 - 130 mg/dL Final   11/08/2024 0608 82 70 - 130 mg/dL Final   11/07/2024 2004 95 70 - 130 mg/dL Final   11/07/2024 1556 111 70 - 130 mg/dL Final   11/07/2024 1024 83 70 - 130 mg/dL Final       CT Angiogram Chest    Result Date: 11/7/2024  1.  No findings of pulmonary arterial embolism. 2.  A few scattered areas of nodular pulmonary consolidation and opacification within the right lower lobe, as before. While findings are favored to represent multifocal pneumonia, neoplasm cannot be excluded and continued close attention on follow-up with CT chest in 8 weeks recommended to ensure resolution. 3.  Small pericardial effusion and/or mild diffuse pericardial thickening, as seen on multiple prior CTs. 4.  Other findings as above.  This report was finalized on 11/7/2024 4:01 PM by Dr. Robinson Garay M.D on Workstation: [id number]      XR Chest 1 View    Result Date: 11/6/2024  No evidence  for active disease in the chest.  This report was finalized on 11/6/2024 8:39 PM by Mars Hankins M.D on Workstation: [id number]       I have personally reviewed all medications:  Scheduled Medications  aspirin, 81 mg, Oral, Daily  cetirizine, 10 mg, Oral, Daily  cholecalciferol, 2,000 Units, Oral, Daily  enoxaparin, 40 mg, Subcutaneous, Q24H  FLUoxetine, 20 mg, Oral, 4x Daily  insulin lispro, 2-7 Units, Subcutaneous, 4x Daily AC & at Bedtime  levothyroxine, 75 mcg, Oral, Daily  midodrine, 5 mg, Oral, BID AC  pantoprazole, 40 mg, Oral, Q AM  pregabalin, 25 mg, Oral, Q8H  rosuvastatin, 20 mg, Oral, Daily  spironolactone, 25 mg, Oral, Daily  zolpidem, 10 mg, Oral, Nightly    Infusions   Diet  Diet: Cardiac; Healthy Heart (2-3 Na+); Fluid Consistency: Thin (IDDSI 0)    I have personally reviewed:  [x]  Laboratory   [x]  Microbiology   [x]  Radiology   [x]  EKG/Telemetry  [x]  Cardiology/Vascular   []  Pathology    [x]  Records    Assessment/Plan     Active Hospital Problems    Diagnosis  POA    **Fever [R50.9]  Yes    Type 2 diabetes mellitus, without long-term current use of insulin [E11.9]  Yes    Amyloidosis [E85.9]  Yes    Chronic hypotension [I95.89]  Yes    Hypothyroid [E03.9]  Yes      Resolved Hospital Problems   No resolved problems to display.   Chest Pain/Fever  - hx of treated amyloidosis, JUSTIN, PE, and FLC pending  - no evidence of ACS, no fevers documented since admission; pain is likely musculoskeletal/inflammatory in nature  - blood cultures NGTD, no evidence of local infection and we will hold off on antibiotics for now  - echocardiogram result noted  - lyrica started, consider empiric steroid course based on above tests  - heme/onc, cardiology, and pulmonology following, appreciate recs     Type 2 DM  - BG acceptable  - continue ssi/hypoglycemia protocol coverage     Chronic Hypotension  - continue midodrine     Hypothyroidism  - continue levothyroxine     Lovenox 40 mg SC daily for DVT  prophylaxis.  Full code.  Discussed with patient and nursing staff.  Anticipate discharge home in 2-3 days.  Expected Discharge Date: 11/11/2024; Expected Discharge Time:       Jensen Driver MD  [facility]  11/08/24  12:34 EST    Portions of this text have been copied and I have reviewed them. They are accurate as of 11/8/2024

## 2024-11-08 NOTE — PROGRESS NOTES
REASON FOR CONSULTATION:    AL amyloidosis  Therapy with michael-CyBorD initiated 8/16/2022  Now on maintenance daratumumab every 4 weeks initiated May 2023 with plans for 18 months of therapy                              HISTORY OF PRESENT ILLNESS:  The patient is a 69 y.o. year old female who is here for an opinion about the above issue.     She had last been seen in June 2026 with plans to continue monthly Darzalex with records indicating this continued through 8/21/2024.  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     She later started michael-CyBorD on 8/23/22 and then proceeded to monthly Darzalex.  She follows with cardiology, has issues with diarrhea and esophageal dysmotility and possibly some memory dysfunction.     Her visit at [facility] 9/11/2024 included a CHR and observation was initiated.  She is having further GI symptoms, deferred ASCHD as to no need due to deep response.  Additional issues included chronic leg numbness, diarrhea, elevated BNP troponin and memory dysfunction.     She presented 11/6/2024 with acute shortness of breath and fever, chest pain x 3 to 4 weeks.  Recent studies include a CT chest with contrast that was essentially unremarkable, chest x-ray 11/6 without active disease, negative respiratory panel, and normal CBC, normal CMP, at bedtime troponin 31, lactate of 0.9.     Plans were made for admission and observation.     Interval history:  11/7/2024  T99.2, pulse 74, respiration 16, /73, SpO2 91%  Patient's record and recent assessment event about discussed with her at bedside.  She describes left lower chest discomfort-sharp in nature-taking her breath away-just after washing windows in her house both inside and out.  She was reviewed at local hospital and describes additional radiologic studies and subsequent thoracentesis.  She was  discharged antibiotic therapy and p.o. steroids.  Her symptoms, unfortunately, worsened over the last several days leading to her now readmission.  H&H 13.9 and 42.3 with white count 11,000 over 30, platelet count of 1 80,000, BUN/creatinine of 15/0.83, normal LFTs.    11/8/2024  T 98 degrees, pulse 82, respiration 16, /99  Patient continues to have her chest discomfort, discussed her history of neuropathy that has a similar kind of pain distribution in terms of character and severity.  Case also discussed with pulmonary medicine.  H&H 11.7 and 35.3 with white count of 3210, platelet count of 123,000, being creatinine of 11 and 0.7      Past Medical History, Past Surgical History, Social History, Family History have been reviewed and are without significant changes except as mentioned.    Review of Systems   A comprehensive 14 point review of systems was performed and was negative except as mentioned.    Medications:  The current medication list was reviewed in the EMR    ALLERGIES:    Allergies   Allergen Reactions    Azithromycin Rash and Other (See Comments)     Reaction unknown to patient (unable to remember)  Other reaction(s): Other: stomach issues, Stomach ache, Other: stomach issues, Stomach ache    Ezetimibe Myalgia, Other (See Comments) and Rash     cramps  cramps    Pravastatin Rash and Other (See Comments)       Objective      Vitals:    11/07/24 1932 11/07/24 2231 11/08/24 0650 11/08/24 0723   BP: 115/61 106/61  113/99   BP Location: Left arm Left arm  Right arm   Patient Position: Lying Lying  Lying   Pulse: 87 89  82   Resp: 18 18  16   Temp: 98 °F (36.7 °C) 99.2 °F (37.3 °C)  98 °F (36.7 °C)   TempSrc: Oral Oral  Oral   SpO2: 97% 93%  100%   Weight:   60.7 kg (133 lb 12.8 oz)    Height:             8/21/2024    12:41 PM   Current Status   ECOG score 0       Physical Exam    Constitutional:       Appearance: Normal appearance. She is normal weight.   HENT:      Head: Normocephalic and  atraumatic.      Nose: Nose normal.      Mouth/Throat:      Mouth: Mucous membranes are moist.      Pharynx: Oropharynx is clear.   Eyes:      Extraocular Movements: Extraocular movements intact.      Conjunctiva/sclera: Conjunctivae normal.      Pupils: Pupils are equal, round, and reactive to light.   Cardiovascular:      Rate and Rhythm: Normal rate and regular rhythm.      Pulses: Normal pulses.      Heart sounds: Normal heart sounds.   Pulmonary:      Effort: Pulmonary effort is normal.      Breath sounds: Normal breath sounds.      Comments: No pleural rub detected bilateral bases  Abdominal:      General: Bowel sounds are normal.      Palpations: Abdomen is soft.   Musculoskeletal:         General: Normal range of motion.      Cervical back: Normal range of motion and neck supple.   Skin:     General: Skin is warm.   Neurological:      General: No focal deficit present.      Mental Status: She is oriented to person, place, and time.   Psychiatric:         Mood and Affect: Mood normal.         Behavior: Behavior normal.      RECENT LABS:  Hematology WBC   Date Value Ref Range Status   11/08/2024 3.21 (L) 3.40 - 10.80 10*3/mm3 Final     RBC   Date Value Ref Range Status   11/08/2024 3.90 3.77 - 5.28 10*6/mm3 Final     Hemoglobin   Date Value Ref Range Status   11/08/2024 11.7 (L) 12.0 - 15.9 g/dL Final     Hematocrit   Date Value Ref Range Status   11/08/2024 35.3 34.0 - 46.6 % Final     Platelets   Date Value Ref Range Status   11/08/2024 123 (L) 140 - 450 10*3/mm3 Final              Assessment & Plan     *AL amyloidosis  She had a stress echocardiogram on 4/24/2020 showing a normal left ventricular ejection fraction of 60% with moderate concentric hypertrophy but no wall motion abnormalities of the left ventricle.  There was impaired relaxation noted.  She complained of chest pain during the examination.  There was some ST segment depression.  Cardiac catheterization was performed on the same day.  No  intervention was required.  Follow-up echocardiogram on 4/15/2021 showed a left ventricular ejection fraction of 61 to 65% with normal LV cavity size.  Left ventricular wall thickness showed moderate concentric hypertrophy.  Diastolic function was impaired.  No pericardial effusion.  Small left pleural effusion.  She had follow-up [facility] imaging for cardiac amyloidosis that was not suggestive of ATTR amyloidosis  Laboratory values on 2/24/2022 showed a high serum free light chain lambda of 121, normal kappa of 10.5, low ratio at 0.09.  Serum protein electrophoresis showed no evidence of an M spike.  Urine light chains were abnormal with an elevated lambda light chain of 33 and a low ratio of 0.48 with a 12.7 mg M spike on 24-hour urine protein electrophoresis.  BNP was elevated at 2306 on 3/4/2022.  The troponin was normal at 0.03.  CK was 212 with a CK-MB of 10.87.  Initial consult on 3/30/22. No M spike on serum protein electrophoresis. SIFE 'presence of monoclonal protein is unclear.'   Bone marrow biopsy 4/13/22 normocellular, 12.1% plasma cells consistent with low volume plasma cell dyscrasia. FISH with low level t(11;14) fusion only. No amyloid noted as Congo red staining negative.   Fat pad biopsy 5/11/22 positive for amyloid, AL lambda  Referred to Dr. Buenrostro at [facility]. Intended to enroll on a clinical study but her troponin as tested by the study sponsor was not high enough  Therapy with michael-CyBorD initiated 8/16/2022  Patient seen in the office on 8/17/2022 for triage visit.  Patient with complaints of fatigue, dizziness and diarrhea.  She was mildly hypotensive.  She was given IV fluids.  Subsequent admission at [facility] with shortness of breath and volume overload.  She was diuresed and treated with antibiotics.  D8 therapy administered on 8/30  Light chains normal at [facility] on 8/31/22 (lambda light chain 1.3, down from 12.44)  9/27/2022: Cycle 2-day 8 of therapy  10/4/2022 cycle  2-day 8 CyBorD.  Patient continues to push oral hydration.  She continues to have some neck stiffness but this has not worsened.  She will see [facility] next week for further cardiac work-up.  Glucose was low upon initial labs today however the patient was given something to eat by nursing prior to rechecking.  This was therefore rechecked prior to her leaving the office and was 87.  Patient reports she was feeling fine.  10/18/2020 to cycle 2-day 22 Sissy -CyBorD.  Velcade dose will be reduced to 1.0 mg/m² due to patient's persistent issues with orthostasis.  10/25/2022: Cycle 3-day 1  11/8/2022: Delay in cycle 3-day 15 therapy.  Day 8 omitted due to an upper respiratory infection.  11/22/2022: Cycle 3-day 22.   final planned treatment before she goes to [facility] for cardiac surgery.  Cardiac surgery performed at [facility] on 12/5/2022.  Pathology from the myectomy showed cardiac amyloidosis extensively involving the vessels and endocardium with myocyte hypertrophy and interstitial fibrosis.  Congo red stain was positive.  Reviewed back on 1/17/2023 with plans to resume Darzalex, Cytoxan, Velcade, dexamethasone with cycle #4 on 1/24.  Patient seen 2/7/2023 (missed 1/31/2023 due to weather, which would have been day 8).  We will go ahead and proceed with as cycle 4 day 15, day 15.  2/14/2023: Cycle 4-day 22  2/21/2023 cycle 5-day 1 Darzalex, Cytoxan (IV Cytoxan given in the office), Velcade, dexamethasone (20 mg p.o. given in the office).  Repeat labs from 2/21/2023 showing M spike up to 0.8, free kappa light chain up to 85.8 (previously 5.3 on 1/17/2023), ratio 8.58 (was previously 0.76 on 1/17/2023).  We ended up repeating labs on 3/7/2023 M spike 0.1, free kappa light chain 3.4, ratio 0.83.  We will continue on with treatment.  She is scheduled to return to [facility] in April.  4/11/2023 cycle 6, day 22 Darzalex-CyBorD.  Final planned therapy.  She was seen at [facility] with recommendations for monthly  Darzalex for 18 months. Bactrim stopped and she continues acyclovir. No immediate plans for ASCT.  The last note from [facility] says to continue Darzalex for 24 months.  12/6/2023: Proceed with Darzalex  5/1/2024 patient returns we will proceed with Darzalex today.  Will discuss new skin nodules with Dr. Sal.  Patient does follow-up with [facility] next month and will discuss this with them at that time.  She is following up with primary care soon to discuss some medication changes as suggested by her team at [facility] to see if that helps with her esophageal spasms.  She will return in 1 month for labs and treatment in 2 months to see Dr. Sal with labs and treatment.  6/26/2024: Overall stable.  Bone marrow biopsy planned at [facility] in August.  She had last been seen in June 2026 with plans to continue monthly Darzalex with records indicating this continued through 8/21/2024.  Her visit at [facility] 9/11/2024 included a CHR and observation was initiated.  She is having further GI symptoms, deferred ASCHD as to no need due to deep response.  Additional issues included chronic leg numbness, diarrhea, elevated BNP troponin and memory dysfunction.  She presented 11/6/2024 with acute shortness of breath and fever, chest pain x 3 to 4 weeks.  Recent studies include a CT chest with contrast that was essentially unremarkable, chest x-ray 11/6 without active disease, negative respiratory panel, and normal CBC, normal CMP, at bedtime troponin 31, lactate of 0.9.  Repeat paraproteins pending  Patient assessed 11/8/2024-?  Neuropathic pain.  We will start her Lyrica back at lower dose on a more frequent schedule since previously 50 mg would produce sedation at a once a day dosing.     *Diarrhea  She saw Dr. Hoyos with GI at [facility] on 9/7.  Suspicion for amyloid involvement of the GI tract  Continue Lomotil and Imodium. Rifaximin prescribed by Dr. Hoyos which she continues to use intermittently with improvement but  this is only prescribed for 1 week out of 4  Diarrhea waxes and wanes with what she eats and dairy products particularly worsen diarrhea  Continue Questran, Lomotil and Imodium.  These help but did not completely control the issue.  10/11/2023: Patient continues on rifaximin, Questran, Lomotil, and Imodium for diarrhea management.  Diarrhea still not completely controlled. Averages at least 5 episodes daily.   Stable issues at this time.  She cannot continue rifaximin at this time due to insurance issues. On Flagyl. Resume rifaximin per GI at [facility] when possible  6/26/2024 she reports her diarrhea is unchanged today.  Xifaxan really helps.     *Elevated liver enzymes  Continue to monitor intermittently     *Cardiomyopathy:  Most recent echocardiogram on 8/17/2022 showed left ventricular wall thickness consistent with moderate to severe concentric hypertrophy along with left ventricular septal wall measuring 2.2 cm and posterior wall thickness at 1.9 cm likely due to amyloidosis.  LVEF 61 to 65%.  Grade 1 impaired relaxation.  Now followed by Dr. Lyles at [facility] with concerns for pre-existing HCM and AL cardiac amyloid.   Cardiac MRI results above.  Amyloidosis evident.  Catheterization performed at [facility].   Cardiac surgery performed at [facility] on 12/5/2022.  Pathology from the myectomy showed cardiac amyloidosis extensively involving the vessels and endocardium with myocyte hypertrophy and interstitial fibrosis.  Congo red stain was positive.  Symptoms improved significantly  10/11/2023: Seen by cardiology at [facility] on 10/4/2023. Patient was restarted on metoprolol XL.  Midodrine adjusted to morning and early afternoon.  ECHO to be repeated in 3 months with follow-up.  New LBBB  Symptoms overall much better until patient now admitted 11/6/2024 with worsening shortness of breath.  Repeat echocardiogram with EF of 49.8%, left ventricular wall thickness with hypertrophy, left ventricular wall segments  hypokinetic, diastolic function indeterminate, GLS -13.9% with strain pattern not consistent with amyloidosis     *History of periorbital hematomas: These have resolved.  Coagulation studies and a factor X level were all normal.     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   Continuing magic mouthwash.     *Dysphagia:  She saw Dr. Aranda at [place] [facility] with Botox injections performed.  Symptoms unfortunately are not much better.  Recent esophagram confirmed esophageal dysmotility.  Manometry recently performed at [place].  Patient states she was told that she is having esophageal spasms and that this is an area that they are very limited in treatment options.  She is going to see primary care for some medication adjustments to see if this is helpful.     *Skin changes with hypopigmentation and blister on the left side of her nose  Concerning for new manifestations of amyloidosis  Not discussed at this time     *Subcutaneous nodules, also previously increasing in number and size  5/1/2024 patient reports these are increasing in number and size once again, she reports a new area on her right upper thigh and left upper arm today.  Did not complain of this as of 6/26/2024  No clear nodularity 11/8/2024     *Rectal irritation  Saw wound care.  Continue current therapy.  No recent issues.     *Cognitive issues and headache  MRI brain 4/7/2024 with no acute intracranial process and findings suggestive of mild chronic small vessel ischemic disease.     *Left-sided chest pain  She notes this today after moving some furniture yesterday.  There is tenderness to the left of the sternum.  I believe this is musculoskeletal pain.  I prescribed Flexeril and advised her to take ibuprofen over the next couple of days.  I also advised cold over the next day or so and then heat.     PLAN:  Again review of 9/11/2024 office visit with presentation of Stage IIIa Lambda AL  Amyloidosis- cardiac involvement confirmed on biopsy 12/5/2022  - Lambda LC mediated, fib type confirmed on mass spec from fat pad  - skeletal survey negative.  Patient status post therapy up until recent Darzalex described as above and completed.  Restaging demonstrates a complete hematologic response and she is currently on observation.  Additional issues include paresthesias, diarrhea, memory dysfunction and cardiac follow-up with assessment also at [facility] 9/11/2024 by cardiology with cardiac MRI planned but thought to be quite stable/improved-initial BNP at 1533 early in diagnosis.  Now with unexplained chest pain?  Musculoskeletal in nature, possible pleuritic or neuropathic in nature after discussion with involved physicians and review of studies.  Protein studies pending, plan low-dose Lyrica at 25 mg p.o. 3 times daily.                               11/8/2024      CC:

## 2024-11-09 LAB
ANION GAP SERPL CALCULATED.3IONS-SCNC: 8.5 MMOL/L (ref 5–15)
BASOPHILS # BLD AUTO: 0.01 10*3/MM3 (ref 0–0.2)
BASOPHILS NFR BLD AUTO: 0.2 % (ref 0–1.5)
BUN SERPL-MCNC: 12 MG/DL (ref 8–23)
BUN/CREAT SERPL: 16.4 (ref 7–25)
CALCIUM SPEC-SCNC: 8.8 MG/DL (ref 8.6–10.5)
CHLORIDE SERPL-SCNC: 109 MMOL/L (ref 98–107)
CO2 SERPL-SCNC: 24.5 MMOL/L (ref 22–29)
CREAT SERPL-MCNC: 0.73 MG/DL (ref 0.57–1)
DEPRECATED RDW RBC AUTO: 44 FL (ref 37–54)
EGFRCR SERPLBLD CKD-EPI 2021: 89.2 ML/MIN/1.73
EOSINOPHIL # BLD AUTO: 0.17 10*3/MM3 (ref 0–0.4)
EOSINOPHIL NFR BLD AUTO: 4.2 % (ref 0.3–6.2)
ERYTHROCYTE [DISTWIDTH] IN BLOOD BY AUTOMATED COUNT: 13.3 % (ref 12.3–15.4)
GLUCOSE BLDC GLUCOMTR-MCNC: 199 MG/DL (ref 70–130)
GLUCOSE BLDC GLUCOMTR-MCNC: 201 MG/DL (ref 70–130)
GLUCOSE BLDC GLUCOMTR-MCNC: 71 MG/DL (ref 70–130)
GLUCOSE BLDC GLUCOMTR-MCNC: 98 MG/DL (ref 70–130)
GLUCOSE SERPL-MCNC: 88 MG/DL (ref 65–99)
HCT VFR BLD AUTO: 38.2 % (ref 34–46.6)
HGB BLD-MCNC: 12.5 G/DL (ref 12–15.9)
IMM GRANULOCYTES # BLD AUTO: 0.01 10*3/MM3 (ref 0–0.05)
IMM GRANULOCYTES NFR BLD AUTO: 0.2 % (ref 0–0.5)
LYMPHOCYTES # BLD AUTO: 1.81 10*3/MM3 (ref 0.7–3.1)
LYMPHOCYTES NFR BLD AUTO: 44.5 % (ref 19.6–45.3)
MCH RBC QN AUTO: 29.6 PG (ref 26.6–33)
MCHC RBC AUTO-ENTMCNC: 32.7 G/DL (ref 31.5–35.7)
MCV RBC AUTO: 90.3 FL (ref 79–97)
MONOCYTES # BLD AUTO: 0.27 10*3/MM3 (ref 0.1–0.9)
MONOCYTES NFR BLD AUTO: 6.6 % (ref 5–12)
NEUTROPHILS NFR BLD AUTO: 1.8 10*3/MM3 (ref 1.7–7)
NEUTROPHILS NFR BLD AUTO: 44.3 % (ref 42.7–76)
NRBC BLD AUTO-RTO: 0 /100 WBC (ref 0–0.2)
PLATELET # BLD AUTO: 143 10*3/MM3 (ref 140–450)
PMV BLD AUTO: 9.7 FL (ref 6–12)
POTASSIUM SERPL-SCNC: 4.5 MMOL/L (ref 3.5–5.2)
RBC # BLD AUTO: 4.23 10*6/MM3 (ref 3.77–5.28)
SODIUM SERPL-SCNC: 142 MMOL/L (ref 136–145)
WBC NRBC COR # BLD AUTO: 4.07 10*3/MM3 (ref 3.4–10.8)

## 2024-11-09 PROCEDURE — 82948 REAGENT STRIP/BLOOD GLUCOSE: CPT

## 2024-11-09 PROCEDURE — 99232 SBSQ HOSP IP/OBS MODERATE 35: CPT | Performed by: INTERNAL MEDICINE

## 2024-11-09 PROCEDURE — 63710000001 PREDNISONE PER 1 MG: Performed by: STUDENT IN AN ORGANIZED HEALTH CARE EDUCATION/TRAINING PROGRAM

## 2024-11-09 PROCEDURE — 85025 COMPLETE CBC W/AUTO DIFF WBC: CPT | Performed by: INTERNAL MEDICINE

## 2024-11-09 PROCEDURE — 63710000001 INSULIN LISPRO (HUMAN) PER 5 UNITS

## 2024-11-09 PROCEDURE — 80048 BASIC METABOLIC PNL TOTAL CA: CPT | Performed by: INTERNAL MEDICINE

## 2024-11-09 PROCEDURE — 25010000002 ENOXAPARIN PER 10 MG: Performed by: INTERNAL MEDICINE

## 2024-11-09 PROCEDURE — 99232 SBSQ HOSP IP/OBS MODERATE 35: CPT | Performed by: STUDENT IN AN ORGANIZED HEALTH CARE EDUCATION/TRAINING PROGRAM

## 2024-11-09 RX ORDER — PREDNISONE 20 MG/1
40 TABLET ORAL DAILY
Status: DISCONTINUED | OUTPATIENT
Start: 2024-11-09 | End: 2024-11-10 | Stop reason: HOSPADM

## 2024-11-09 RX ADMIN — FLUOXETINE HYDROCHLORIDE 20 MG: 20 CAPSULE ORAL at 08:47

## 2024-11-09 RX ADMIN — ALPRAZOLAM 0.25 MG: 0.25 TABLET ORAL at 18:05

## 2024-11-09 RX ADMIN — PANTOPRAZOLE SODIUM 40 MG: 40 TABLET, DELAYED RELEASE ORAL at 06:12

## 2024-11-09 RX ADMIN — FLUOXETINE HYDROCHLORIDE 20 MG: 20 CAPSULE ORAL at 20:11

## 2024-11-09 RX ADMIN — PREGABALIN 25 MG: 25 CAPSULE ORAL at 22:00

## 2024-11-09 RX ADMIN — FLUOXETINE HYDROCHLORIDE 20 MG: 20 CAPSULE ORAL at 18:05

## 2024-11-09 RX ADMIN — ENOXAPARIN SODIUM 40 MG: 100 INJECTION SUBCUTANEOUS at 12:15

## 2024-11-09 RX ADMIN — INSULIN LISPRO 2 UNITS: 100 INJECTION, SOLUTION INTRAVENOUS; SUBCUTANEOUS at 18:06

## 2024-11-09 RX ADMIN — PREGABALIN 25 MG: 25 CAPSULE ORAL at 14:58

## 2024-11-09 RX ADMIN — ZOLPIDEM TARTRATE 10 MG: 5 TABLET ORAL at 20:11

## 2024-11-09 RX ADMIN — SPIRONOLACTONE 25 MG: 25 TABLET, FILM COATED ORAL at 08:47

## 2024-11-09 RX ADMIN — PREDNISONE 40 MG: 20 TABLET ORAL at 12:15

## 2024-11-09 RX ADMIN — MIDODRINE HYDROCHLORIDE 5 MG: 5 TABLET ORAL at 18:05

## 2024-11-09 RX ADMIN — CETIRIZINE HYDROCHLORIDE 10 MG: 10 TABLET, FILM COATED ORAL at 08:47

## 2024-11-09 RX ADMIN — LEVOTHYROXINE SODIUM 75 MCG: 75 TABLET ORAL at 08:47

## 2024-11-09 RX ADMIN — ASPIRIN 81 MG: 81 TABLET, CHEWABLE ORAL at 08:47

## 2024-11-09 RX ADMIN — MIDODRINE HYDROCHLORIDE 5 MG: 5 TABLET ORAL at 07:46

## 2024-11-09 RX ADMIN — Medication 2000 UNITS: at 08:47

## 2024-11-09 RX ADMIN — FLUOXETINE HYDROCHLORIDE 20 MG: 20 CAPSULE ORAL at 12:15

## 2024-11-09 RX ADMIN — PREGABALIN 25 MG: 25 CAPSULE ORAL at 06:12

## 2024-11-09 RX ADMIN — ROSUVASTATIN CALCIUM 20 MG: 20 TABLET, FILM COATED ORAL at 08:47

## 2024-11-09 NOTE — PROGRESS NOTES
[facility] GROUP INPATIENT PROGRESS NOTE    Length of Stay:  2 days    CHIEF COMPLAINT/REASON FOR VISIT:  AL amyloidosis  Therapy with michael-CyBorD initiated 8/16/2022  Benita completed maintenance daratumumab every 4 weeks initiated May 2023 with plans for 18 months of therapy    SUBJECTIVE: Emitted with anterior left chest pain under the left breast.  Thankfully, with steroids this has improved significantly.  Ongoing diarrhea.  Tongue is feeling better with mouth rinse.    ROS:  14 systems reviewed with pertinent positives and negatives in the HPI. Reviewed today.    OBJECTIVE:  Vitals:    11/08/24 2322 11/09/24 0313 11/09/24 0724 11/09/24 1359   BP: 120/64  114/62 104/80   BP Location: Right arm  Left arm Left arm   Patient Position: Lying  Sitting Sitting   Pulse: 75  79 86   Resp: 16  16 16   Temp: 98.4 °F (36.9 °C)  97.9 °F (36.6 °C) 99.4 °F (37.4 °C)   TempSrc: Oral  Oral Oral   SpO2: 97%   96%   Weight:  59.3 kg (130 lb 11.2 oz)     Height:             PHYSICAL EXAMINATION:   General: No acute distress, a little anxious appearing.  HEENT: Tongue deviates to the left but no ulceration or erythema  Chest/Lungs: Lungs clear to auscultation bilaterally  Heart: Regular rate and rhythm  Abdomen/GI: Soft and nontender  Extremities: Warm and well-perfused without edema    DIAGNOSTIC DATA:  Results Review:     I reviewed the patient's new clinical results.    Results from last 7 days   Lab Units 11/09/24  0456   WBC 10*3/mm3 4.07   HEMOGLOBIN g/dL 12.5   HEMATOCRIT % 38.2   PLATELETS 10*3/mm3 143     Lab Results   Component Value Date    NEUTROABS 1.80 11/09/2024     Results from last 7 days   Lab Units 11/09/24  0456   SODIUM mmol/L 142   POTASSIUM mmol/L 4.5   CHLORIDE mmol/L 109*   CO2 mmol/L 24.5   BUN mg/dL 12   CREATININE mg/dL 0.73   GLUCOSE mg/dL 88   CALCIUM mg/dL 8.8         Results from last 7 days   Lab Units 11/04/24  0910   MAGNESIUM mg/dL 2.1         IMAGING:    CT Angiogram Chest (11/07/2024  13:18)     CT angiogram chest 11/7/2024 images reviewed.  Some nodular pulmonary consolidation in the right lower lobe.  Small pericardial effusion versus pericardial thickening, stable.  No lymphadenopathy.  No pulmonary embolism.    ASSESSMENT:  This is a 69 y.o. female with:     *AL amyloidosis  She had a stress echocardiogram on 4/24/2020 showing a normal left ventricular ejection fraction of 60% with moderate concentric hypertrophy but no wall motion abnormalities of the left ventricle.  There was impaired relaxation noted.  She complained of chest pain during the examination.  There was some ST segment depression.  Cardiac catheterization was performed on the same day.  No intervention was required.  Follow-up echocardiogram on 4/15/2021 showed a left ventricular ejection fraction of 61 to 65% with normal LV cavity size.  Left ventricular wall thickness showed moderate concentric hypertrophy.  Diastolic function was impaired.  No pericardial effusion.  Small left pleural effusion.  She had follow-up [facility] imaging for cardiac amyloidosis that was not suggestive of ATTR amyloidosis  Laboratory values on 2/24/2022 showed a high serum free light chain lambda of 121, normal kappa of 10.5, low ratio at 0.09.  Serum protein electrophoresis showed no evidence of an M spike.  Urine light chains were abnormal with an elevated lambda light chain of 33 and a low ratio of 0.48 with a 12.7 mg M spike on 24-hour urine protein electrophoresis.  BNP was elevated at 2306 on 3/4/2022.  The troponin was normal at 0.03.  CK was 212 with a CK-MB of 10.87.  Initial consult on 3/30/22. No M spike on serum protein electrophoresis. SIFE 'presence of monoclonal protein is unclear.'   Bone marrow biopsy 4/13/22 normocellular, 12.1% plasma cells consistent with low volume plasma cell dyscrasia. FISH with low level t(11;14) fusion only. No amyloid noted as Congo red staining negative.   Fat pad biopsy 5/11/22 positive for amyloid, AL  lambda  Referred to Dr. Buenrostro at [place]. Intended to enroll on a clinical study but her troponin as tested by the study sponsor was not high enough  Therapy with sissy-CyBorD initiated 8/16/2022  Patient seen in the office on 8/17/2022 for triage visit.  Patient with complaints of fatigue, dizziness and diarrhea.  She was mildly hypotensive.  She was given IV fluids.  Subsequent admission at [facility] with shortness of breath and volume overload.  She was diuresed and treated with antibiotics.  D8 therapy administered on 8/30  Light chains normal at [place] on 8/31/22 (lambda light chain 1.3, down from 12.44)  9/27/2022: Cycle 2-day 8 of therapy  10/4/2022 cycle 2-day 8 CyBorD.  Patient continues to push oral hydration.  She continues to have some neck stiffness but this has not worsened.  She will see [place] next week for further cardiac work-up.  Glucose was low upon initial labs today however the patient was given something to eat by nursing prior to rechecking.  This was therefore rechecked prior to her leaving the office and was 87.  Patient reports she was feeling fine.  10/18/2020 to cycle 2-day 22 Sissy -CyBorD.  Velcade dose will be reduced to 1.0 mg/m² due to patient's persistent issues with orthostasis.  10/25/2022: Cycle 3-day 1  11/8/2022: Delay in cycle 3-day 15 therapy.  Day 8 omitted due to an upper respiratory infection.  11/22/2022: Cycle 3-day 22.   final planned treatment before she goes to [place] for cardiac surgery.  Cardiac surgery performed at [place] on 12/5/2022.  Pathology from the myectomy showed cardiac amyloidosis extensively involving the vessels and endocardium with myocyte hypertrophy and interstitial fibrosis.  Congo red stain was positive.  Reviewed back on 1/17/2023 with plans to resume Darzalex, Cytoxan, Velcade, dexamethasone with cycle #4 on 1/24.  Patient seen 2/7/2023 (missed 1/31/2023 due to weather, which would have been day 8).  We will go  ahead and proceed with as cycle 4 day 15, day 15.  2/14/2023: Cycle 4-day 22  2/21/2023 cycle 5-day 1 Darzalex, Cytoxan (IV Cytoxan given in the office), Velcade, dexamethasone (20 mg p.o. given in the office).  Repeat labs from 2/21/2023 showing M spike up to 0.8, free kappa light chain up to 85.8 (previously 5.3 on 1/17/2023), ratio 8.58 (was previously 0.76 on 1/17/2023).  We ended up repeating labs on 3/7/2023 M spike 0.1, free kappa light chain 3.4, ratio 0.83.  We will continue on with treatment.  She is scheduled to return to [place] in April.  4/11/2023 cycle 6, day 22 Darzalex-CyBorD.  Final planned therapy.  She was seen at [place] with recommendations for monthly Darzalex for 18 months. Bactrim stopped and she continues acyclovir. No immediate plans for ASCT.  The last note from [place] says to continue Darzalex for 24 months.  12/6/2023: Proceed with Darzalex  5/1/2024 patient returns we will proceed with Darzalex today.  Will discuss new skin nodules with Dr. Sal.  Patient does follow-up with [place] next month and will discuss this with them at that time.  She is following up with primary care soon to discuss some medication changes as suggested by her team at [place] to see if that helps with her esophageal spasms.  She will return in 1 month for labs and treatment in 2 months to see Dr. Sal with labs and treatment.  6/26/2024: Overall stable.  Bone marrow biopsy planned at [place] in August.  She had last been seen in June 2026 with plans to continue monthly Darzalex with records indicating this continued through 8/21/2024.  Her visit at [place] 9/11/2024 included a CHR and observation was initiated.  She is having further GI symptoms, deferred ASCHD as to no need due to deep response.  Additional issues included chronic leg numbness, diarrhea, elevated BNP troponin and memory dysfunction.  She presented 11/6/2024 with acute shortness of breath and fever, chest pain x 3 to 4  weeks.  Recent studies include a CT chest with contrast that was essentially unremarkable, chest x-ray 11/6 without active disease, negative respiratory panel, and normal CBC, normal CMP, at bedtime troponin 31, lactate of 0.9.  Repeat paraproteins pending  Patient assessed 11/8/2024-?  Neuropathic pain.  We will start her Lyrica back at lower dose on a more frequent schedule since previously 50 mg would produce sedation at a once a day dosing.     *Diarrhea  She saw Dr. Hoyos with GI at [place] on 9/7.  Suspicion for amyloid involvement of the GI tract  Continue Lomotil and Imodium. Rifaximin prescribed by Dr. Hoyos which she continues to use intermittently with improvement but this is only prescribed for 1 week out of 4  Diarrhea waxes and wanes with what she eats and dairy products particularly worsen diarrhea  Continue Questran, Lomotil and Imodium.  These help but did not completely control the issue.  10/11/2023: Patient continues on rifaximin, Questran, Lomotil, and Imodium for diarrhea management.  Diarrhea still not completely controlled. Averages at least 5 episodes daily.   Stable issues at this time.  She cannot continue rifaximin at this time due to insurance issues. On Flagyl. Resume rifaximin per GI at [place] when possible  6/26/2024 she reports her diarrhea is unchanged today.  Xifaxan really helps.     *Elevated liver enzymes  Continue to monitor intermittently     *Cardiomyopathy:  Most recent echocardiogram on 8/17/2022 showed left ventricular wall thickness consistent with moderate to severe concentric hypertrophy along with left ventricular septal wall measuring 2.2 cm and posterior wall thickness at 1.9 cm likely due to amyloidosis.  LVEF 61 to 65%.  Grade 1 impaired relaxation.  Now followed by Dr. Lyles at [facility] with concerns for pre-existing HCM and AL cardiac amyloid.   Cardiac MRI results above.  Amyloidosis evident.  Catheterization performed at [place].   Cardiac surgery  performed at [place] on 12/5/2022.  Pathology from the myectomy showed cardiac amyloidosis extensively involving the vessels and endocardium with myocyte hypertrophy and interstitial fibrosis.  Congo red stain was positive.  Symptoms improved significantly  10/11/2023: Seen by cardiology at [place] on 10/4/2023. Patient was restarted on metoprolol XL.  Midodrine adjusted to morning and early afternoon.  ECHO to be repeated in 3 months with follow-up.  New LBBB  Symptoms overall much better until patient now admitted 11/6/2024 with worsening shortness of breath.  Repeat echocardiogram with EF of 49.8%, left ventricular wall thickness with hypertrophy, left ventricular wall segments hypokinetic, diastolic function indeterminate, GLS -13.9% with strain pattern not consistent with amyloidosis     *History of periorbital hematomas: These have resolved.  Coagulation studies and a factor X level were all normal.     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   Continuing magic mouthwash.     *Dysphagia:  She saw Dr. Aranda at [place] with Botox injections performed.  Symptoms unfortunately are not much better.  Recent esophagram confirmed esophageal dysmotility.  Manometry recently performed at [place].  Patient states she was told that she is having esophageal spasms and that this is an area that they are very limited in treatment options.  She is going to see primary care for some medication adjustments to see if this is helpful.     *Skin changes with hypopigmentation and blister on the left side of her nose  Concerning for new manifestations of amyloidosis  Not discussed at this time     *Subcutaneous nodules, also previously increasing in number and size  5/1/2024 patient reports these are increasing in number and size once again, she reports a new area on her right upper thigh and left upper arm today.  Did not complain of this as of 6/26/2024  No  clear nodularity 11/8/2024     *Rectal irritation  Saw wound care.  Continue current therapy.  No recent issues.     *Cognitive issues and headache  MRI brain 4/7/2024 with no acute intracranial process and findings suggestive of mild chronic small vessel ischemic disease.     *Left-sided chest pain  She did have a left thoracentesis on 10/14/2024, exudative, cytology negative  Recent CT chest 11/4/2024 without pulmonary embolism or acute disease in the chest.  Repeat CTA chest 11/7/2024 with the same.    Treated with colchicine for presumed pericarditis.  However, this caused significant diarrhea so she prefers not to take this any further.  Prednisone started.  Significant improvement with steroids.  Followed by cardiology not likely to be related to a coronary etiology    RECOMMENDATIONS:  Serum protein studies from 11/7/2024 pending  Lovenox 40 mg daily for DVT prophylaxis   On prednisone 40 mg daily with significant improvement in chest pain  Hopeful for discharge soon  She has an appointment with me on 11/13 which she can keep if she is discharged soon.  I will see her tomorrow.    Nav Sal MD

## 2024-11-09 NOTE — PROGRESS NOTES
[facility]  [phone]  Dr. Gordon Pelletier    Subjective:  LOS: 2        Objective:  Vital Signs past 24hrs    Temp range: Temp (24hrs), Av.1 °F (36.7 °C), Min:97.9 °F (36.6 °C), Max:98.4 °F (36.9 °C)    BP range: BP: (114-123)/(49-65) 114/62  Pulse range: Heart Rate:  [75-83] 79  Resp rate range: Resp:  [16] 16  59.3 kg (130 lb 11.2 oz); Body mass index is 25.53 kg/m².    Device (Oxygen Therapy): room air     Oxygen range:SpO2:  [93 %-97 %] 97 %            Intake/Output Summary (Last 24 hours) at 2024 1226  Last data filed at 2024 1245  Gross per 24 hour   Intake 240 ml   Output --   Net 240 ml       Physical Exam       Result Review:  I have reviewed the results from last note by [facility] physician and agree with these findings:  []  Laboratory accordion  []  Microbiology  []  Radiology  []  EKG/Telemetry   []  Cardiology/Vascular   []  Pathology  []  Old records  []  Other:    Medication Review:  I have reviewed the current MAR.  Antibiotics  acyclovir - 400 MG     Scheduled Medications  aspirin, 81 mg, Oral, Daily  cetirizine, 10 mg, Oral, Daily  cholecalciferol, 2,000 Units, Oral, Daily  enoxaparin, 40 mg, Subcutaneous, Q24H  FLUoxetine, 20 mg, Oral, 4x Daily  insulin lispro, 2-7 Units, Subcutaneous, 4x Daily AC & at Bedtime  levothyroxine, 75 mcg, Oral, Daily  midodrine, 5 mg, Oral, BID AC  pantoprazole, 40 mg, Oral, Q AM  predniSONE, 40 mg, Oral, Daily  pregabalin, 25 mg, Oral, Q8H  rosuvastatin, 20 mg, Oral, Daily  spironolactone, 25 mg, Oral, Daily  zolpidem, 10 mg, Oral, Nightly      ICU Drips     PRN Medications    acetaminophen    ALPRAZolam    senna-docusate sodium **AND** polyethylene glycol **AND** bisacodyl **AND** bisacodyl    cholestyramine    cyclobenzaprine    dextrose    dextrose    diphenoxylate-atropine    glucagon (human recombinant)    HYDROcodone-acetaminophen    nitroglycerin    ondansetron ODT **OR** ondansetron    pregabalin    sodium chloride    Lines, Drains  & Airways       Active LDAs       Name Placement date Placement time Site Days    Peripheral IV 11/06/24 2034 Right Antecubital 11/06/24 2034  Antecubital  2                    Diet Orders (active) (From admission, onward)       Start     Ordered    11/07/24 0109  Diet: Cardiac; Healthy Heart (2-3 Na+); Fluid Consistency: Thin (IDDSI 0)  Diet Effective Now         11/07/24 0114                      Assessment:  Dyspnea  Left Exudative Pleural Effusion   Left Chest Pain  AL Amyloidosis       THESE ARE NEW MEDICAL PROBLEMS TO ME.    Plan of Treatment  -Patient presents with a roughly month-long history of left-sided chest pain and a recent left exudative effusion.  Extensive workup thus far has not revealed a cause for either dyspnea or her chest pain. Has history of AL amyloidosis with cardiac involvement   -Left thoracentesis (10/14/2024) showing exudative effusion with cytology negative for malignant cells.  - CT chest with contrast (11/4/2024) showed no PE or any acute disease in the chest other than some atelectasis in the dependent portions of the lungs  - Repeat CTA chest (11/7/2024) showing essentially the same.  Radiology reported some pulmonary consolidations however I feel that largely the findings are about the same  - D-dimer normal, BNP normal, CBC normal, BMP normal  -Echocardiogram (11/7/2024) showing low normal LV systolic function with EF 50%, LV hypertrophy, sigmoid shaped ventricular septum multiple hypokinetic left ventricular wall segments.  Indeterminate diastolic function    Treatment was started with colchicine for presumed pericarditis and inflammation.  Of note, the patient took 1 dose of colchicine yesterday and about 15 to 20 minutes afterwards, had lots of diarrhea so she does not want to take anymore.  She could be having some pleuritis, I agree with consultants from previous days.  That she does not want to take the colchicine anymore, I will go ahead and treat her with prednisone 40  mg daily to see if the pain resolves.        oGrdon Pelletier MD  [facility]  Pulmonary and Critical Care Medicine    Electronically signed by Gordon Pelletier MD, 11/09/24, 12:26 PM EST.  Part of this note may be an electronic transcription/translation of spoken language to printed text using the Dragon Dictation System.

## 2024-11-09 NOTE — PROGRESS NOTES
Patient Name: Cynthia Portillo  Age/Sex: 69 y.o. female  : 1955  MRN: [id number]    Date of Admission: 2024  Date of Encounter Visit: 24  Encounter Provider: Aramis De La O MD   Place of Service: [facility] CARDIOLOGY    Admit Diagnosis: Shortness of breath [R06.02]  Fever [R50.9]  Fever, unspecified fever cause [R50.9]  Amyloidosis, unspecified type [E85.9]  Chest pain, unspecified type [R07.9]    Subjective:     No acute events overnight.  No acute complaints.    69-year-old lady with past medical history of LVOT obstruction s/p myectomy 2022, amyloidosis with cardiac involvement, previously treated chemotherapy and monthly Darzalex which she has since completed and hospitalized last month at [facility] for right pleural effusion that presents to [facility] on 2024 for concerns of a 3 to 4 weeks of progressing chest pain, shortness of breath and fevers up to 102.    Pulmonary noted that she had a small pleural effusion that turned out to be exudative felt to be pleuritic or MSK related.  It is noted she has light chains pendin with consideration of additional imaging with possible PET scan depending on the light chain results.    Chest pain was not felt to be coronary in etiology-she had a normal left heart cath in 2022.  She also has noted to have a stable EF for the past month.  EF is low normal.    Current Medications:    Current Facility-Administered Medications:     acetaminophen (TYLENOL) tablet 650 mg, 650 mg, Oral, Q4H PRN, Liya Hernandez APRN, 650 mg at 24 0139    ALPRAZolam (XANAX) tablet 0.25 mg, 0.25 mg, Oral, BID PRN, Milagros Wolf APRN    aspirin chewable tablet 81 mg, 81 mg, Oral, Daily, Milagros Wolf APRN, 81 mg at 24 0847    sennosides-docusate (PERICOLACE) 8.6-50 MG per tablet 2 tablet, 2 tablet, Oral, BID PRN **AND** polyethylene glycol (MIRALAX) packet 17 g, 17 g,  Oral, Daily PRN **AND** bisacodyl (DULCOLAX) EC tablet 5 mg, 5 mg, Oral, Daily PRN **AND** bisacodyl (DULCOLAX) suppository 10 mg, 10 mg, Rectal, Daily PRN, Liya Hernandez APRN    cetirizine (zyrTEC) tablet 10 mg, 10 mg, Oral, Daily, Milagros Wolf, APRN, 10 mg at 11/09/24 0847    cholecalciferol (VITAMIN D3) tablet 2,000 Units, 2,000 Units, Oral, Daily, Milagros Wolf, APRN, 2,000 Units at 11/09/24 0847    cholestyramine (QUESTRAN) packet 1 packet, 1 packet, Oral, TID PRN, Milagros Wolf APRN    colchicine tablet 0.6 mg, 0.6 mg, Oral, Q12H, Gigi Garay MD, 0.6 mg at 11/08/24 1443    cyclobenzaprine (FLEXERIL) tablet 10 mg, 10 mg, Oral, TID PRN, Milagros Wolf APRN    dextrose (D50W) (25 g/50 mL) IV injection 25 g, 25 g, Intravenous, Q15 Min PRN, Liya Hernandez APRN    dextrose (GLUTOSE) oral gel 15 g, 15 g, Oral, Q15 Min PRN, Liya Hernandez APRN    diphenoxylate-atropine (LOMOTIL) 2.5-0.025 MG per tablet 1 tablet, 1 tablet, Oral, 4x Daily PRN, Jensen Driver MD, 1 tablet at 11/08/24 2102    Enoxaparin Sodium (LOVENOX) syringe 40 mg, 40 mg, Subcutaneous, Q24H, Jensen Driver MD, 40 mg at 11/08/24 1247    FLUoxetine (PROzac) capsule 20 mg, 20 mg, Oral, 4x Daily, Milagros Wolf, APRN, 20 mg at 11/09/24 0847    glucagon (GLUCAGEN) injection 1 mg, 1 mg, Intramuscular, Q15 Min PRN, Friddell, Liya D, APRN    HYDROcodone-acetaminophen (NORCO) 5-325 MG per tablet 1 tablet, 1 tablet, Oral, Q6H PRN, Milagros Wolf APRN, 1 tablet at 11/07/24 2009    insulin lispro (HUMALOG/ADMELOG) injection 2-7 Units, 2-7 Units, Subcutaneous, 4x Daily AC & at Bedtime, Liya Hernandez, APRN    levothyroxine (SYNTHROID, LEVOTHROID) tablet 75 mcg, 75 mcg, Oral, Daily, Milagros Wolf APRN, 75 mcg at 11/09/24 0847    midodrine (PROAMATINE) tablet 5 mg, 5 mg, Oral, BID AC, Mliagros Wolf APRN, 5 mg at 11/09/24 0746    nitroglycerin (NITROSTAT) SL tablet 0.4 mg, 0.4 mg,  Sublingual, Q5 Min PRN, Liya Hernandez, APRN    ondansetron ODT (ZOFRAN-ODT) disintegrating tablet 4 mg, 4 mg, Oral, Q6H PRN **OR** ondansetron (ZOFRAN) injection 4 mg, 4 mg, Intravenous, Q6H PRN, Liya Hernandez, APRN    pantoprazole (PROTONIX) EC tablet 40 mg, 40 mg, Oral, Q AM, Milagros Wolf APRN, 40 mg at 11/09/24 0612    pregabalin (LYRICA) capsule 25 mg, 25 mg, Oral, Q8H, Aramis Downs MD, 25 mg at 11/09/24 0612    pregabalin (LYRICA) capsule 50 mg, 50 mg, Oral, Daily PRN, Milagros Wolf APRN    rosuvastatin (CRESTOR) tablet 20 mg, 20 mg, Oral, Daily, Milagros Wolf APRN, 20 mg at 11/09/24 0847    sodium chloride 0.9 % flush 10 mL, 10 mL, Intravenous, PRN, Pete Leslie MD, 10 mL at 11/08/24 0900    spironolactone (ALDACTONE) tablet 25 mg, 25 mg, Oral, Daily, Milagros Wolf APRN, 25 mg at 11/09/24 0847    zolpidem (AMBIEN) tablet 10 mg, 10 mg, Oral, Nightly, Jensen Driver MD, 10 mg at 11/08/24 2048    Prior to Admission Medications:  Medications Prior to Admission   Medication Sig Dispense Refill Last Dose/Taking    acyclovir (ZOVIRAX) 400 MG tablet TAKE 1 TABLET BY MOUTH TWICE DAILY 60 tablet 1 Taking    ALPRAZolam (XANAX) 0.25 MG tablet Take 1 tablet by mouth 2 (Two) Times a Day As Needed for Anxiety. for anxiety 60 tablet 0 Taking As Needed    aspirin 81 MG chewable tablet CHEW AND SWALLOW 1 TABLET DAILY WITH BREAKFAST   Taking    Cholecalciferol (Vitamin D3) 50 MCG (2000 UT) tablet Take 1 tablet by mouth Daily.   Taking    cholestyramine (QUESTRAN) 4 g packet Take 1 packet by mouth 3 (Three) Times a Day With Meals. 1 packet  TID PRN diarrhea (Patient taking differently: Take 1 packet by mouth 3 (Three) Times a Day As Needed (diarrhea). 1 packet  TID PRN diarrhea) 60 packet 4 Taking Differently    cyclobenzaprine (FLEXERIL) 10 MG tablet Take 1 tablet by mouth 3 (Three) Times a Day As Needed for Muscle Spasms. 30 tablet 0 Taking As Needed     Diphenhydramine-Aluminum-Magnesium-Simethicone-Lidocaine-Nystatin Swish and spit 5 mL Every 4 (Four) Hours As Needed (mucositis). Recipe: Benadryl Elixir 60cc, Maalox 200 cc, Viscous Lidocaine 30cc, Nystatin 120cc 410 mL 1 Taking As Needed    diphenoxylate-atropine (LOMOTIL) 2.5-0.025 MG per tablet Take 1 tablet by mouth 4 (Four) Times a Day As Needed for Diarrhea. 120 tablet 0 Taking As Needed    empagliflozin (JARDIANCE) 10 MG tablet tablet Take 1 tablet by mouth Daily.   Taking    FLUoxetine (PROzac) 20 MG capsule TAKE 1 CAPSULE BY MOUTH FOUR TIMES DAILY 360 capsule 0 Taking    HYDROcodone-acetaminophen (NORCO) 5-325 MG per tablet Take 1 tablet by mouth Every 6 (Six) Hours As Needed for Moderate Pain. 60 tablet 0 Taking As Needed    levothyroxine (SYNTHROID, LEVOTHROID) 75 MCG tablet TAKE 1 TABLET BY MOUTH EVERY DAY 90 tablet 0 Taking    loratadine (CLARITIN) 10 MG tablet Take 1 tablet by mouth Daily. 30 tablet 5 Taking    midodrine (PROAMATINE) 5 MG tablet Take 1 tablet by mouth 2 (Two) Times a Day.   Taking    Multiple Vitamins-Minerals (V-C Forte) capsule Take 1 capsule by mouth Daily.   Taking    olopatadine (PATANOL) 0.1 % ophthalmic solution Administer 1 drop to both eyes 2 (Two) Times a Day. 5 mL 5 Taking    pantoprazole (PROTONIX) 20 MG EC tablet TAKE 1 TABLET BY MOUTH EVERY DAY. DO NOT TAKE WITHIN 2 HOURS OF THYROID MEDICATION 90 tablet 1 Taking    pregabalin (LYRICA) 50 MG capsule Take 1 capsule by mouth Daily As Needed (nerve pain).   Taking As Needed    Rosuvastatin Calcium 20 MG capsule sprinkle Take 20 mg by mouth Daily. 90 capsule 3 Taking    spironolactone (ALDACTONE) 25 MG tablet Take 1 tablet by mouth Daily.   Taking    zolpidem (AMBIEN) 10 MG tablet Take 1 tablet by mouth At Night As Needed for Sleep. 30 tablet 1 Taking As Needed       Past Medical History     Past Medical History:   Diagnosis Date    AL amyloidosis     Anxiety     Arthritis     Bowel perforation     COVID-19 07/2020     9/7/2021    Depression     Diverticulitis of colon     Diverticulosis     GERD (gastroesophageal reflux disease)     History of Clostridioides difficile infection 2008    HAS HAD SEVERAL TIMES IN PAST PER PT (SAW INFECTIOUS DISEASE MD FOR THIS S/P COLOSTOMY/EXTENSIVE ANBX THERAPY)    History of prediabetes     History of snoring     History of stress incontinence     Hypercholesterolemia     Hyperlipidemia     Hypertension     Left ventricular hypertrophy     FOLLOWED BY DR MARI. DENIES CHEST PAIN BUT STATES DOES GET SOA AT TIMES WITH EXERTION REPORTS THAT IT IS NOT A NEW ISSUE     Liver disease     Oral herpes 08/18/2020    Seasonal allergies     used to have allergy shots in the past    SOB (shortness of breath)     STATES WITH EXERTION HAS BEEN ONGOING ISSUE NOT A NEW ISSUE    Status post colostomy     reversed    Thyroid disease     Hypothyroid     Past Surgical History:   Procedure Laterality Date    ABDOMINAL SURGERY  2005, 2014    ex lap x 2,  diverticulitis    ABDOMINOPLASTY  2016    ADENOIDECTOMY      APPENDECTOMY      CARDIAC CATHETERIZATION N/A 04/24/2020    Procedure: Coronary angiography;  Surgeon: Roberto Briones MD;  Location:  [facility];  Service: Cardiovascular;  Laterality: N/A;    CARDIAC CATHETERIZATION N/A 04/24/2020    Procedure: Left heart cath;  Surgeon: Roberto Briones MD;  Location:  [facility];  Service: Cardiovascular;  Laterality: N/A;    CARDIAC CATHETERIZATION N/A 04/24/2020    Procedure: Left ventriculography;  Surgeon: Roberto Briones MD;  Location:  [facility];  Service: Cardiovascular;  Laterality: N/A;    CARDIAC SURGERY      open heart surgery,    COLONOSCOPY  approx 2018    normal per pt     COLOSTOMY  2005    had colostomy and then is was reversed    COLOSTOMY CLOSURE  2006    CORRECTION HAMMER TOE Right 2017    ECTOPIC PREGNANCY SURGERY      1979, 1980    ENDOSCOPY N/A 05/27/2020    Procedure:  ESOPHAGOGASTRODUODENOSCOPY WITH BIOPSY AND BIOPSY POLYPECTOMY AND MACIEL DIALATION;  Surgeon: Preethi Beck MD;  Location:  [facility];  Service: Gastroenterology;  Laterality: N/A;  PRE: ESOPHAGEAL DYSPHAGIA  POST: Z LINE 46, ESOPHAGEAL SPASM, GASTRITIS, FUNDIC POLYP    ENDOSCOPY N/A 06/20/2023    ESOPHAGEAL DILATATION N/A 12/07/2021    Procedure: OR ESOPHAGEAL DILATATION with maciel dilators ;  Surgeon: Jamey Leslie MD;  Location:  [facility];  Service: ENT;  Laterality: N/A;    ESOPHAGEAL MOTILITY STUDY N/A 02/03/2022    Procedure: ESOPHAGEAL MOTILITY STUDY;  Surgeon: Harshil, Nurse Performed;  Location:  [facility];  Service: Gastroenterology;  Laterality: N/A;  dypshagia     FOOT SURGERY Right 12/2016    Second and third hammertoe corrections    KNEE ARTHROSCOPY Right 2009    KNEE SURGERY Right     REVISION / TAKEDOWN COLOSTOMY  2006    SHOULDER ARTHROSCOPY Right 2018    right shoulder arthroscopy with extensive rotator cuff, biceps and labral debridement, chondroplasty, & subacromial decompression with acromioplasty    SHOULDER SURGERY      HAS HAS COMPLETE SHOULDER ON RIGHT. LEFT SCOPED AND HAD 2ND SURGERY WITH HARDWARE PLACED    SHOULDER SURGERY Left     2012. 2013    TONSILLECTOMY      TOTAL SHOULDER REVERSE ARTHROPLASTY Right 2019    WISDOM TOOTH EXTRACTION         Family History     Family History   Problem Relation Age of Onset    Hypertension Mother     Osteoporosis Mother     Skin cancer Mother     Heart disease Father     Hypertension Father     Breast cancer Maternal Grandmother     Ovarian cancer Neg Hx     Colon cancer Neg Hx     Deep vein thrombosis Neg Hx     Pulmonary embolism Neg Hx     Malig Hyperthermia Neg Hx          Social History     Social History     Socioeconomic History    Marital status: Single    Number of children: 2   Tobacco Use    Smoking status: Never     Passive exposure: Never    Smokeless tobacco: Never   Vaping Use    Vaping status: Never Used    Substance and Sexual Activity    Alcohol use: Not Currently     Alcohol/week: 1.0 standard drink of alcohol     Types: 1 Glasses of wine per week    Drug use: Never    Sexual activity: Defer       Allergies:  Allergies   Allergen Reactions    Azithromycin Rash and Other (See Comments)     Reaction unknown to patient (unable to remember)  Other reaction(s): Other: stomach issues, Stomach ache, Other: stomach issues, Stomach ache    Ezetimibe Myalgia, Other (See Comments) and Rash     cramps  cramps    Pravastatin Rash and Other (See Comments)            Objective:     Objective:  Temp:  [97.9 °F (36.6 °C)-98.4 °F (36.9 °C)] 97.9 °F (36.6 °C)  Heart Rate:  [75-83] 79  Resp:  [16] 16  BP: (114-123)/(49-65) 114/62    Intake/Output Summary (Last 24 hours) at 11/9/2024 0940  Last data filed at 11/8/2024 1245  Gross per 24 hour   Intake 240 ml   Output --   Net 240 ml     Body mass index is 25.53 kg/m².      11/07/24  1120 11/08/24  0650 11/09/24  0313   Weight: 59.9 kg (132 lb) 60.7 kg (133 lb 12.8 oz) 59.3 kg (130 lb 11.2 oz)           Physical Exam:   Gen: Well nourished; Alert  Skin: no visible rashes and no abnormalities with palpation  Eyes: no evidence of visual defects and normal sclera  Ears: no abnormalities.  Mouth: normal teeth and appropriately moist mucous membranes  Chest: clear to auscultation. There is normal respiratory effort and expansion.  CV: examination showed a regular rhythm. S1 and S2 were normal.   Abd: no visible distension.   Neurologic:Cranial nerves appear intact; Sensation normal; no localizing signs    Lab Review:     Results from last 7 days   Lab Units 11/09/24  0456 11/08/24  0639 11/07/24  0519   SODIUM mmol/L 142 143 141   POTASSIUM mmol/L 4.5 3.9 3.9   CHLORIDE mmol/L 109* 110* 107   CO2 mmol/L 24.5 25.0 24.6   BUN mg/dL 12 11 10   CREATININE mg/dL 0.73 0.70 0.74   GLUCOSE mg/dL 88 88 93   CALCIUM mg/dL 8.8 7.9* 9.3       Results from last 7 days   Lab Units 11/06/24  3530  11/06/24 2036 11/04/24  1129   HSTROP T ng/L 31* 31* 19*     Results from last 7 days   Lab Units 11/09/24  0456   WBC 10*3/mm3 4.07   HEMOGLOBIN g/dL 12.5   HEMATOCRIT % 38.2   PLATELETS 10*3/mm3 143             Results from last 7 days   Lab Units 11/04/24  0910   MAGNESIUM mg/dL 2.1         Results from last 7 days   Lab Units 11/07/24  0519   PROBNP pg/mL 351.0         Results from last 7 days   Lab Units 11/07/24  0519   TSH uIU/mL 3.400       Imaging:    chest X-ray    Results for orders placed during the hospital encounter of 11/06/24    Adult Transthoracic Echo Limited W/ Cont if Necessary Per Protocol    Interpretation Summary    Left ventricular systolic function is low normal. Calculated left ventricular EF = 49.8%    Left ventricular wall thickness is consistent with hypertrophy. Sigmoid-shaped ventricular septum is present.    The following left ventricular wall segments are hypokinetic: mid anterior, apical anterior, basal anterolateral, mid anterolateral, apical lateral, basal inferolateral, mid inferolateral, apical inferior, mid inferior, apical septal, apex hypokinetic, basal anterior and basal inferior. The following left ventricular wall segments are dyskinetic: basal inferoseptal, mid inferoseptal, basal anteroseptal and mid anteroseptal.    Left ventricular diastolic function was indeterminate.    Abnormal global longitudinal LV strain (GLS) = -13.9%.  The strain pattern is not consistent with amyloidosis.        I personally viewed and interpreted the patient's EKG/Telemetry data.    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  - Normal left heart cath in December 2022.  - Chest pain unlikely to be  from coronary etiology.  - Agree with pulmonary and oncology evaluation, follow light chain results.  Agree with pulmonary evaluation that if light chains were elevated, could consider PET scanning to assess the chest area with consideration of pleural biopsy with pulmonary.      Aramis De La O MD  [facility]  11/09/24  09:40 EST

## 2024-11-09 NOTE — PLAN OF CARE
Pt oriented vs stable NAD bs mgmt      Problem: Adult Inpatient Plan of Care  Goal: Plan of Care Review  Outcome: Progressing  Goal: Patient-Specific Goal (Individualized)  Outcome: Progressing  Goal: Absence of Hospital-Acquired Illness or Injury  Outcome: Progressing  Intervention: Identify and Manage Fall Risk  Recent Flowsheet Documentation  Taken 11/9/2024 1400 by Concetta Mota RN  Safety Promotion/Fall Prevention:   activity supervised   assistive device/personal items within reach   clutter free environment maintained   fall prevention program maintained   nonskid shoes/slippers when out of bed   room organization consistent   safety round/check completed  Taken 11/9/2024 0847 by Concetta Mota RN  Safety Promotion/Fall Prevention:   activity supervised   assistive device/personal items within reach   clutter free environment maintained   fall prevention program maintained   nonskid shoes/slippers when out of bed   room organization consistent   safety round/check completed  Intervention: Prevent Skin Injury  Recent Flowsheet Documentation  Taken 11/9/2024 1600 by Concetta Mota RN  Body Position: position changed independently  Taken 11/9/2024 1400 by Concetta Mota RN  Body Position: position changed independently  Taken 11/9/2024 1000 by Concetta Mota RN  Body Position: position changed independently  Taken 11/9/2024 0847 by Concetta Mota RN  Body Position: position changed independently  Intervention: Prevent and Manage VTE (Venous Thromboembolism) Risk  Recent Flowsheet Documentation  Taken 11/9/2024 0847 by Concetta Mota RN  VTE Prevention/Management: (Lovenox) other (see comments)  Intervention: Prevent Infection  Recent Flowsheet Documentation  Taken 11/9/2024 0847 by Concetta Mota RN  Infection Prevention:   rest/sleep promoted   single patient room provided  Goal: Optimal Comfort and Wellbeing  Outcome: Progressing  Intervention: Provide Person-Centered Care  Recent Flowsheet  Documentation  Taken 11/9/2024 1400 by Concetta Mota RN  Trust Relationship/Rapport: care explained  Taken 11/9/2024 0847 by Concetta Mota RN  Trust Relationship/Rapport:   care explained   thoughts/feelings acknowledged   reassurance provided   questions encouraged   questions answered   emotional support provided  Goal: Readiness for Transition of Care  Outcome: Progressing     Problem: Comorbidity Management  Goal: Maintenance of Behavioral Health Symptom Control  Outcome: Progressing  Intervention: Maintain Behavioral Health Symptom Control  Recent Flowsheet Documentation  Taken 11/9/2024 1400 by Concetta Mota RN  Medication Review/Management: medications reviewed  Taken 11/9/2024 0847 by Concetta Mota RN  Medication Review/Management: medications reviewed  Goal: Blood Glucose Level Within Target Range  Outcome: Progressing  Intervention: Monitor and Manage Glycemia  Recent Flowsheet Documentation  Taken 11/9/2024 1400 by Concetta Mota RN  Medication Review/Management: medications reviewed  Taken 11/9/2024 0847 by Concetta Mota RN  Medication Review/Management: medications reviewed     Problem: Fever  Goal: Body Temperature in Desired Range  Outcome: Progressing     Problem: Fall Injury Risk  Goal: Absence of Fall and Fall-Related Injury  Outcome: Progressing  Intervention: Identify and Manage Contributors  Recent Flowsheet Documentation  Taken 11/9/2024 1400 by Concetta Mota RN  Medication Review/Management: medications reviewed  Self-Care Promotion:   independence encouraged   BADL personal objects within reach   BADL personal routines maintained  Taken 11/9/2024 0847 by Concetta Mota RN  Medication Review/Management: medications reviewed  Self-Care Promotion:   independence encouraged   BADL personal objects within reach   BADL personal routines maintained  Intervention: Promote Injury-Free Environment  Recent Flowsheet Documentation  Taken 11/9/2024 1400 by Concetta Mota RN  Safety  Promotion/Fall Prevention:   activity supervised   assistive device/personal items within reach   clutter free environment maintained   fall prevention program maintained   nonskid shoes/slippers when out of bed   room organization consistent   safety round/check completed  Taken 11/9/2024 0847 by Concetta Mota, RN  Safety Promotion/Fall Prevention:   activity supervised   assistive device/personal items within reach   clutter free environment maintained   fall prevention program maintained   nonskid shoes/slippers when out of bed   room organization consistent   safety round/check completed   Goal Outcome Evaluation:

## 2024-11-09 NOTE — PROGRESS NOTES
Name: Cynthia Portillo ADMIT: 2024   : 1955  PCP: Arian Peterson MD    MRN: [id number] LOS: 2 days   AGE/SEX: 69 y.o. female  ROOM: [facility]     Subjective   Subjective   No acute events. Patient denies new complaints. Colchicine was started yesterday and the patient had some diarrhea and now does not want to take it anymore. No family at bedside.    Objective   Objective   Vital Signs  Temp:  [97.9 °F (36.6 °C)-98.4 °F (36.9 °C)] 97.9 °F (36.6 °C)  Heart Rate:  [75-82] 79  Resp:  [16] 16  BP: (114-123)/(49-64) 114/62  SpO2:  [93 %-97 %] 97 %  on   ;   Device (Oxygen Therapy): room air  Body mass index is 25.53 kg/m².  Physical Exam  Vitals and nursing note reviewed.   Constitutional:       General: She is not in acute distress.     Appearance: She is not toxic-appearing or diaphoretic.   HENT:      Head: Normocephalic and atraumatic.      Nose: Nose normal.      Mouth/Throat:      Mouth: Mucous membranes are moist.      Pharynx: Oropharynx is clear.   Eyes:      Conjunctiva/sclera: Conjunctivae normal.      Pupils: Pupils are equal, round, and reactive to light.   Cardiovascular:      Rate and Rhythm: Normal rate and regular rhythm.      Pulses: Normal pulses.   Pulmonary:      Effort: Pulmonary effort is normal.   Abdominal:      General: Bowel sounds are normal.      Palpations: Abdomen is soft.   Musculoskeletal:         General: No swelling or tenderness.      Cervical back: Neck supple.   Skin:     General: Skin is warm and dry.      Capillary Refill: Capillary refill takes less than 2 seconds.   Neurological:      General: No focal deficit present.      Mental Status: She is alert and oriented to person, place, and time.   Psychiatric:         Mood and Affect: Mood normal.         Behavior: Behavior normal.       Results Review     I reviewed the patient's new clinical results.  Results from last 7 days   Lab Units 24  0456 24  0639 24  0519 24   WBC  10*3/mm3 4.07 3.21* 4.85 6.45   HEMOGLOBIN g/dL 12.5 11.7* 12.4 13.2   PLATELETS 10*3/mm3 143 123* 142 142     Results from last 7 days   Lab Units 11/09/24 0456 11/08/24 0639 11/07/24 0519 11/06/24 2036   SODIUM mmol/L 142 143 141 139   POTASSIUM mmol/L 4.5 3.9 3.9 4.1   CHLORIDE mmol/L 109* 110* 107 104   CO2 mmol/L 24.5 25.0 24.6 26.0   BUN mg/dL 12 11 10 9   CREATININE mg/dL 0.73 0.70 0.74 0.67   GLUCOSE mg/dL 88 88 93 88   EGFR mL/min/1.73 89.2 93.8 87.7 94.7     Results from last 7 days   Lab Units 11/06/24 2036 11/04/24  0910   ALBUMIN g/dL 4.1 4.1   BILIRUBIN mg/dL 0.5 0.6   ALK PHOS U/L 69 72   AST (SGOT) U/L 21 24   ALT (SGPT) U/L 34* 52*     Results from last 7 days   Lab Units 11/09/24 0456 11/08/24 0639 11/07/24 0519 11/06/24 2036 11/04/24  0910   CALCIUM mg/dL 8.8 7.9* 9.3 8.9 9.0   ALBUMIN g/dL  --   --   --  4.1 4.1   MAGNESIUM mg/dL  --   --   --   --  2.1     Results from last 7 days   Lab Units 11/06/24 2036   LACTATE mmol/L 0.9     Hemoglobin A1C   Date/Time Value Ref Range Status   11/06/2024 2036 6.70 (H) 4.80 - 5.60 % Final     Glucose   Date/Time Value Ref Range Status   11/09/2024 1121 71 70 - 130 mg/dL Final   11/09/2024 0615 98 70 - 130 mg/dL Final   11/08/2024 2018 115 70 - 130 mg/dL Final   11/08/2024 1538 105 70 - 130 mg/dL Final   11/08/2024 1059 102 70 - 130 mg/dL Final   11/08/2024 0608 82 70 - 130 mg/dL Final   11/07/2024 2004 95 70 - 130 mg/dL Final       No radiology results for the last day    I have personally reviewed all medications:  Scheduled Medications  aspirin, 81 mg, Oral, Daily  cetirizine, 10 mg, Oral, Daily  cholecalciferol, 2,000 Units, Oral, Daily  enoxaparin, 40 mg, Subcutaneous, Q24H  FLUoxetine, 20 mg, Oral, 4x Daily  insulin lispro, 2-7 Units, Subcutaneous, 4x Daily AC & at Bedtime  levothyroxine, 75 mcg, Oral, Daily  midodrine, 5 mg, Oral, BID AC  pantoprazole, 40 mg, Oral, Q AM  predniSONE, 40 mg, Oral, Daily  pregabalin, 25 mg, Oral,  Q8H  rosuvastatin, 20 mg, Oral, Daily  spironolactone, 25 mg, Oral, Daily  zolpidem, 10 mg, Oral, Nightly    Infusions   Diet  Diet: Cardiac; Healthy Heart (2-3 Na+); Fluid Consistency: Thin (IDDSI 0)    I have personally reviewed:  [x]  Laboratory   [x]  Microbiology   []  Radiology   [x]  EKG/Telemetry  []  Cardiology/Vascular   []  Pathology    []  Records    Assessment/Plan     Active Hospital Problems    Diagnosis  POA    **Fever [R50.9]  Yes    Type 2 diabetes mellitus, without long-term current use of insulin [E11.9]  Yes    Amyloidosis [E85.9]  Yes    Chronic hypotension [I95.89]  Yes    Hypothyroid [E03.9]  Yes      Resolved Hospital Problems   No resolved problems to display.   Chest Pain/Fever  - hx of treated amyloidosis, JUSTIN, PE, and FLC pending  - no evidence of ACS, no fevers documented since admission; pain is likely musculoskeletal/inflammatory in nature  - blood cultures NGTD, no evidence of local infection and we will hold off on antibiotics for now  - echocardiogram result noted  - colchicine started 11/8 but patient does not want to take it due to diarrhea-prednisone started  - heme/onc, cardiology, and pulmonology following, appreciate recs     Type 2 DM  - BG acceptable  - continue ssi/hypoglycemia protocol coverage     Chronic Hypotension  - continue midodrine     Hypothyroidism  - continue levothyroxine     Lovenox 40 mg SC daily for DVT prophylaxis.  Full code.  Discussed with patient and nursing staff.  Anticipate discharge home when cleared by consultants.  Expected Discharge Date: 11/11/2024; Expected Discharge Time:       Jensen Driver MD  [facility]  11/09/24  13:25 EST    Portions of this text have been copied and I have reviewed them. They are accurate as of 11/9/2024

## 2024-11-10 ENCOUNTER — READMISSION MANAGEMENT (OUTPATIENT)
Dept: CALL CENTER | Facility: HOSPITAL | Age: 69
End: 2024-11-10
Payer: MEDICARE

## 2024-11-10 VITALS
HEIGHT: 60 IN | BODY MASS INDEX: 25.25 KG/M2 | SYSTOLIC BLOOD PRESSURE: 138 MMHG | TEMPERATURE: 97.9 F | DIASTOLIC BLOOD PRESSURE: 66 MMHG | HEART RATE: 92 BPM | OXYGEN SATURATION: 96 % | WEIGHT: 128.6 LBS | RESPIRATION RATE: 16 BRPM

## 2024-11-10 LAB
ANION GAP SERPL CALCULATED.3IONS-SCNC: 11 MMOL/L (ref 5–15)
BASOPHILS # BLD AUTO: 0.01 10*3/MM3 (ref 0–0.2)
BASOPHILS NFR BLD AUTO: 0.2 % (ref 0–1.5)
BUN SERPL-MCNC: 15 MG/DL (ref 8–23)
BUN/CREAT SERPL: 22.7 (ref 7–25)
CALCIUM SPEC-SCNC: 8.8 MG/DL (ref 8.6–10.5)
CHLORIDE SERPL-SCNC: 106 MMOL/L (ref 98–107)
CO2 SERPL-SCNC: 23 MMOL/L (ref 22–29)
CREAT SERPL-MCNC: 0.66 MG/DL (ref 0.57–1)
DEPRECATED RDW RBC AUTO: 44.9 FL (ref 37–54)
EGFRCR SERPLBLD CKD-EPI 2021: 95.1 ML/MIN/1.73
EOSINOPHIL # BLD AUTO: 0.01 10*3/MM3 (ref 0–0.4)
EOSINOPHIL NFR BLD AUTO: 0.2 % (ref 0.3–6.2)
ERYTHROCYTE [DISTWIDTH] IN BLOOD BY AUTOMATED COUNT: 13.4 % (ref 12.3–15.4)
GLUCOSE BLDC GLUCOMTR-MCNC: 145 MG/DL (ref 70–130)
GLUCOSE BLDC GLUCOMTR-MCNC: 167 MG/DL (ref 70–130)
GLUCOSE SERPL-MCNC: 175 MG/DL (ref 65–99)
HCT VFR BLD AUTO: 40.8 % (ref 34–46.6)
HGB BLD-MCNC: 13 G/DL (ref 12–15.9)
IMM GRANULOCYTES # BLD AUTO: 0.04 10*3/MM3 (ref 0–0.05)
IMM GRANULOCYTES NFR BLD AUTO: 0.6 % (ref 0–0.5)
LYMPHOCYTES # BLD AUTO: 1.15 10*3/MM3 (ref 0.7–3.1)
LYMPHOCYTES NFR BLD AUTO: 17.4 % (ref 19.6–45.3)
MCH RBC QN AUTO: 29.1 PG (ref 26.6–33)
MCHC RBC AUTO-ENTMCNC: 31.9 G/DL (ref 31.5–35.7)
MCV RBC AUTO: 91.3 FL (ref 79–97)
MONOCYTES # BLD AUTO: 0.36 10*3/MM3 (ref 0.1–0.9)
MONOCYTES NFR BLD AUTO: 5.5 % (ref 5–12)
NEUTROPHILS NFR BLD AUTO: 5.03 10*3/MM3 (ref 1.7–7)
NEUTROPHILS NFR BLD AUTO: 76.1 % (ref 42.7–76)
NRBC BLD AUTO-RTO: 0 /100 WBC (ref 0–0.2)
PLATELET # BLD AUTO: 173 10*3/MM3 (ref 140–450)
PMV BLD AUTO: 10.1 FL (ref 6–12)
POTASSIUM SERPL-SCNC: 4.1 MMOL/L (ref 3.5–5.2)
RBC # BLD AUTO: 4.47 10*6/MM3 (ref 3.77–5.28)
SODIUM SERPL-SCNC: 140 MMOL/L (ref 136–145)
WBC NRBC COR # BLD AUTO: 6.6 10*3/MM3 (ref 3.4–10.8)

## 2024-11-10 PROCEDURE — 99232 SBSQ HOSP IP/OBS MODERATE 35: CPT | Performed by: STUDENT IN AN ORGANIZED HEALTH CARE EDUCATION/TRAINING PROGRAM

## 2024-11-10 PROCEDURE — 63710000001 INSULIN LISPRO (HUMAN) PER 5 UNITS

## 2024-11-10 PROCEDURE — 80048 BASIC METABOLIC PNL TOTAL CA: CPT | Performed by: INTERNAL MEDICINE

## 2024-11-10 PROCEDURE — 85025 COMPLETE CBC W/AUTO DIFF WBC: CPT | Performed by: INTERNAL MEDICINE

## 2024-11-10 PROCEDURE — 82948 REAGENT STRIP/BLOOD GLUCOSE: CPT

## 2024-11-10 PROCEDURE — 99232 SBSQ HOSP IP/OBS MODERATE 35: CPT | Performed by: INTERNAL MEDICINE

## 2024-11-10 PROCEDURE — 63710000001 PREDNISONE PER 1 MG: Performed by: STUDENT IN AN ORGANIZED HEALTH CARE EDUCATION/TRAINING PROGRAM

## 2024-11-10 RX ORDER — PREDNISONE 10 MG/1
TABLET ORAL
Qty: 43 TABLET | Refills: 0 | Status: SHIPPED | OUTPATIENT
Start: 2024-11-10 | End: 2024-11-27

## 2024-11-10 RX ADMIN — FLUOXETINE HYDROCHLORIDE 20 MG: 20 CAPSULE ORAL at 09:01

## 2024-11-10 RX ADMIN — PREDNISONE 40 MG: 20 TABLET ORAL at 09:01

## 2024-11-10 RX ADMIN — PREGABALIN 25 MG: 25 CAPSULE ORAL at 07:17

## 2024-11-10 RX ADMIN — MIDODRINE HYDROCHLORIDE 5 MG: 5 TABLET ORAL at 07:17

## 2024-11-10 RX ADMIN — FLUOXETINE HYDROCHLORIDE 20 MG: 20 CAPSULE ORAL at 11:50

## 2024-11-10 RX ADMIN — INSULIN LISPRO 2 UNITS: 100 INJECTION, SOLUTION INTRAVENOUS; SUBCUTANEOUS at 11:50

## 2024-11-10 RX ADMIN — ROSUVASTATIN CALCIUM 20 MG: 20 TABLET, FILM COATED ORAL at 09:01

## 2024-11-10 RX ADMIN — SPIRONOLACTONE 25 MG: 25 TABLET, FILM COATED ORAL at 09:01

## 2024-11-10 RX ADMIN — CETIRIZINE HYDROCHLORIDE 10 MG: 10 TABLET, FILM COATED ORAL at 09:01

## 2024-11-10 RX ADMIN — ASPIRIN 81 MG: 81 TABLET, CHEWABLE ORAL at 09:01

## 2024-11-10 RX ADMIN — LEVOTHYROXINE SODIUM 75 MCG: 75 TABLET ORAL at 07:17

## 2024-11-10 RX ADMIN — PANTOPRAZOLE SODIUM 40 MG: 40 TABLET, DELAYED RELEASE ORAL at 07:17

## 2024-11-10 RX ADMIN — DIPHENOXYLATE HYDROCHLORIDE AND ATROPINE SULFATE 1 TABLET: 2.5; .025 TABLET ORAL at 01:35

## 2024-11-10 RX ADMIN — Medication 2000 UNITS: at 09:01

## 2024-11-10 RX ADMIN — PREGABALIN 25 MG: 25 CAPSULE ORAL at 14:34

## 2024-11-10 NOTE — PLAN OF CARE
Pt oriented, vs stable, NAD, bs mgmt. Friend at bedside for transport      Problem: Adult Inpatient Plan of Care  Goal: Plan of Care Review  Outcome: Met  Goal: Patient-Specific Goal (Individualized)  Outcome: Met  Goal: Absence of Hospital-Acquired Illness or Injury  Outcome: Met  Intervention: Identify and Manage Fall Risk  Recent Flowsheet Documentation  Taken 11/10/2024 0901 by Concetta Moat RN  Safety Promotion/Fall Prevention:   activity supervised   assistive device/personal items within reach   clutter free environment maintained   fall prevention program maintained   nonskid shoes/slippers when out of bed   room organization consistent   safety round/check completed  Intervention: Prevent Skin Injury  Recent Flowsheet Documentation  Taken 11/10/2024 1200 by Concetta Mota RN  Body Position: position changed independently  Taken 11/10/2024 0901 by Concetta Mota RN  Body Position: position changed independently  Intervention: Prevent Infection  Recent Flowsheet Documentation  Taken 11/10/2024 0901 by Concetta Mota RN  Infection Prevention:   single patient room provided   rest/sleep promoted  Goal: Optimal Comfort and Wellbeing  Outcome: Met  Intervention: Provide Person-Centered Care  Recent Flowsheet Documentation  Taken 11/10/2024 0901 by Concetta Mota RN  Trust Relationship/Rapport:   care explained   thoughts/feelings acknowledged   reassurance provided   questions encouraged   questions answered   emotional support provided  Goal: Readiness for Transition of Care  Outcome: Met     Problem: Comorbidity Management  Goal: Maintenance of Behavioral Health Symptom Control  Outcome: Met  Intervention: Maintain Behavioral Health Symptom Control  Recent Flowsheet Documentation  Taken 11/10/2024 0901 by Concetta Mota RN  Medication Review/Management: medications reviewed  Goal: Blood Glucose Level Within Target Range  Outcome: Met  Intervention: Monitor and Manage Glycemia  Recent Flowsheet  Documentation  Taken 11/10/2024 0901 by Concetta Mota, RN  Medication Review/Management: medications reviewed     Problem: Fever  Goal: Body Temperature in Desired Range  Outcome: Met     Problem: Fall Injury Risk  Goal: Absence of Fall and Fall-Related Injury  Outcome: Met  Intervention: Identify and Manage Contributors  Recent Flowsheet Documentation  Taken 11/10/2024 0901 by Concetta Mota, RN  Medication Review/Management: medications reviewed  Self-Care Promotion:   independence encouraged   BADL personal objects within reach   BADL personal routines maintained  Intervention: Promote Injury-Free Environment  Recent Flowsheet Documentation  Taken 11/10/2024 0901 by Concetta Mota, RN  Safety Promotion/Fall Prevention:   activity supervised   assistive device/personal items within reach   clutter free environment maintained   fall prevention program maintained   nonskid shoes/slippers when out of bed   room organization consistent   safety round/check completed   Goal Outcome Evaluation:

## 2024-11-10 NOTE — PLAN OF CARE
Goal Outcome Evaluation:  Patient resting well at this time and has been alert and oriented x4 with some intermittent anxiousness. She continues to c/o diarrhea episodes and gets PRN medication that has been effective so far. VSS, and she is able to ambulate ad gini.

## 2024-11-10 NOTE — PROGRESS NOTES
[facility] INPATIENT PROGRESS NOTE    Length of Stay:  3 days    CHIEF COMPLAINT/REASON FOR VISIT:  AL amyloidosis  Therapy with michael-CyBorD initiated 8/16/2022  Benita completed maintenance daratumumab every 4 weeks initiated May 2023 with plans for 18 months of therapy    SUBJECTIVE: She continues to have some left chest discomfort but overall this is much better.  She notes some sweats and flushing on the prednisone.    ROS:  14 systems reviewed with pertinent positives and negatives in the HPI. Reviewed today.    OBJECTIVE:  Vitals:    11/09/24 1911 11/09/24 2327 11/10/24 0607 11/10/24 0725   BP: 127/64 107/69  127/61   BP Location: Left arm Left arm  Left arm   Patient Position: Lying Lying  Sitting   Pulse: 88 76  81   Resp: 16 16  16   Temp: 98.6 °F (37 °C) 97.2 °F (36.2 °C)  98.3 °F (36.8 °C)   TempSrc: Oral Oral  Oral   SpO2: 97% 91%  96%   Weight:   58.3 kg (128 lb 9.6 oz)    Height:             PHYSICAL EXAMINATION:   General: No acute distress, a little anxious appearing again today.  HEENT: Tongue deviates to the left but no ulceration or erythema  Chest/Lungs: Lungs clear to auscultation bilaterally  Heart: Regular rate and rhythm  Abdomen/GI: Soft and nontender  Extremities: Warm and well-perfused without edema    DIAGNOSTIC DATA:  Results Review:     I reviewed the patient's new clinical results.    Results from last 7 days   Lab Units 11/10/24  0455   WBC 10*3/mm3 6.60   HEMOGLOBIN g/dL 13.0   HEMATOCRIT % 40.8   PLATELETS 10*3/mm3 173     Lab Results   Component Value Date    NEUTROABS 5.03 11/10/2024     Results from last 7 days   Lab Units 11/10/24  0455   SODIUM mmol/L 140   POTASSIUM mmol/L 4.1   CHLORIDE mmol/L 106   CO2 mmol/L 23.0   BUN mg/dL 15   CREATININE mg/dL 0.66   GLUCOSE mg/dL 175*   CALCIUM mg/dL 8.8         Results from last 7 days   Lab Units 11/04/24  0910   MAGNESIUM mg/dL 2.1         IMAGING:    CT Angiogram Chest (11/07/2024 13:18)       ASSESSMENT:  This is a  69 y.o. female with:     *AL amyloidosis  She had a stress echocardiogram on 4/24/2020 showing a normal left ventricular ejection fraction of 60% with moderate concentric hypertrophy but no wall motion abnormalities of the left ventricle.  There was impaired relaxation noted.  She complained of chest pain during the examination.  There was some ST segment depression.  Cardiac catheterization was performed on the same day.  No intervention was required.  Follow-up echocardiogram on 4/15/2021 showed a left ventricular ejection fraction of 61 to 65% with normal LV cavity size.  Left ventricular wall thickness showed moderate concentric hypertrophy.  Diastolic function was impaired.  No pericardial effusion.  Small left pleural effusion.  She had follow-up [facility] imaging for cardiac amyloidosis that was not suggestive of ATTR amyloidosis  Laboratory values on 2/24/2022 showed a high serum free light chain lambda of 121, normal kappa of 10.5, low ratio at 0.09.  Serum protein electrophoresis showed no evidence of an M spike.  Urine light chains were abnormal with an elevated lambda light chain of 33 and a low ratio of 0.48 with a 12.7 mg M spike on 24-hour urine protein electrophoresis.  BNP was elevated at 2306 on 3/4/2022.  The troponin was normal at 0.03.  CK was 212 with a CK-MB of 10.87.  Initial consult on 3/30/22. No M spike on serum protein electrophoresis. SIFE 'presence of monoclonal protein is unclear.'   Bone marrow biopsy 4/13/22 normocellular, 12.1% plasma cells consistent with low volume plasma cell dyscrasia. FISH with low level t(11;14) fusion only. No amyloid noted as Congo red staining negative.   Fat pad biopsy 5/11/22 positive for amyloid, AL lambda  Referred to Dr. Buenrostro at [place]. Intended to enroll on a clinical study but her troponin as tested by the study sponsor was not high enough  Therapy with michael-CyBorD initiated 8/16/2022  Patient seen in the office on 8/17/2022 for triage visit.   Patient with complaints of fatigue, dizziness and diarrhea.  She was mildly hypotensive.  She was given IV fluids.  Subsequent admission at [facility] with shortness of breath and volume overload.  She was diuresed and treated with antibiotics.  D8 therapy administered on 8/30  Light chains normal at [facility] on 8/31/22 (lambda light chain 1.3, down from 12.44)  9/27/2022: Cycle 2-day 8 of therapy  10/4/2022 cycle 2-day 8 CyBorD.  Patient continues to push oral hydration.  She continues to have some neck stiffness but this has not worsened.  She will see [facility] next week for further cardiac work-up.  Glucose was low upon initial labs today however the patient was given something to eat by nursing prior to rechecking.  This was therefore rechecked prior to her leaving the office and was 87.  Patient reports she was feeling fine.  10/18/2020 to cycle 2-day 22 Sissy -CyBorD.  Velcade dose will be reduced to 1.0 mg/m² due to patient's persistent issues with orthostasis.  10/25/2022: Cycle 3-day 1  11/8/2022: Delay in cycle 3-day 15 therapy.  Day 8 omitted due to an upper respiratory infection.  11/22/2022: Cycle 3-day 22.   final planned treatment before she goes to [facility] for cardiac surgery.  Cardiac surgery performed at [facility] on 12/5/2022.  Pathology from the myectomy showed cardiac amyloidosis extensively involving the vessels and endocardium with myocyte hypertrophy and interstitial fibrosis.  Congo red stain was positive.  Reviewed back on 1/17/2023 with plans to resume Darzalex, Cytoxan, Velcade, dexamethasone with cycle #4 on 1/24.  Patient seen 2/7/2023 (missed 1/31/2023 due to weather, which would have been day 8).  We will go ahead and proceed with as cycle 4 day 15, day 15.  2/14/2023: Cycle 4-day 22  2/21/2023 cycle 5-day 1 Darzalex, Cytoxan (IV Cytoxan given in the office), Velcade, dexamethasone (20 mg p.o. given in the office).  Repeat labs from 2/21/2023 showing M spike up to  0.8, free kappa light chain up to 85.8 (previously 5.3 on 1/17/2023), ratio 8.58 (was previously 0.76 on 1/17/2023).  We ended up repeating labs on 3/7/2023 M spike 0.1, free kappa light chain 3.4, ratio 0.83.  We will continue on with treatment.  She is scheduled to return to [facility] in April.  4/11/2023 cycle 6, day 22 Darzalex-CyBorD.  Final planned therapy.  She was seen at [facility] with recommendations for monthly Darzalex for 18 months. Bactrim stopped and she continues acyclovir. No immediate plans for ASCT.  The last note from [facility] says to continue Darzalex for 24 months.  12/6/2023: Proceed with Darzalex  5/1/2024 patient returns we will proceed with Darzalex today.  Will discuss new skin nodules with Dr. Sal.  Patient does follow-up with [facility] next month and will discuss this with them at that time.  She is following up with primary care soon to discuss some medication changes as suggested by her team at [facility] to see if that helps with her esophageal spasms.  She will return in 1 month for labs and treatment in 2 months to see Dr. Sal with labs and treatment.  6/26/2024: Overall stable.  Bone marrow biopsy planned at [facility] in August.  She had last been seen in June 2026 with plans to continue monthly Darzalex with records indicating this continued through 8/21/2024.  Her visit at [facility] 9/11/2024 included a CHR and observation was initiated.  She is having further GI symptoms, deferred ASCHD as to no need due to deep response.  Additional issues included chronic leg numbness, diarrhea, elevated BNP troponin and memory dysfunction.  She presented 11/6/2024 with acute shortness of breath and fever, chest pain x 3 to 4 weeks.  Recent studies include a CT chest with contrast that was essentially unremarkable, chest x-ray 11/6 without active disease, negative respiratory panel, and normal CBC, normal CMP, at bedtime troponin 31, lactate of 0.9.  Repeat paraproteins  pending  Patient assessed 11/8/2024-?  Neuropathic pain.  We will start her Lyrica back at lower dose on a more frequent schedule since previously 50 mg would produce sedation at a once a day dosing.     *Diarrhea  She saw Dr. Hoyos with GI at [facility] on 9/7.  Suspicion for amyloid involvement of the GI tract  Continue Lomotil and Imodium. Rifaximin prescribed by Dr. Hoyos which she continues to use intermittently with improvement but this is only prescribed for 1 week out of 4  Diarrhea waxes and wanes with what she eats and dairy products particularly worsen diarrhea  Continue Questran, Lomotil and Imodium.  These help but did not completely control the issue.  10/11/2023: Patient continues on rifaximin, Questran, Lomotil, and Imodium for diarrhea management.  Diarrhea still not completely controlled. Averages at least 5 episodes daily.   Stable issues at this time.  She cannot continue rifaximin at this time due to insurance issues. On Flagyl. Resume rifaximin per GI at [facility] when possible  6/26/2024 she reports her diarrhea is unchanged today.  Xifaxan really helps.     *Elevated liver enzymes  Continue to monitor intermittently     *Cardiomyopathy:  Most recent echocardiogram on 8/17/2022 showed left ventricular wall thickness consistent with moderate to severe concentric hypertrophy along with left ventricular septal wall measuring 2.2 cm and posterior wall thickness at 1.9 cm likely due to amyloidosis.  LVEF 61 to 65%.  Grade 1 impaired relaxation.  Now followed by Dr. Lyles at [facility] with concerns for pre-existing HCM and AL cardiac amyloid.   Cardiac MRI results above.  Amyloidosis evident.  Catheterization performed at [facility].   Cardiac surgery performed at [facility] on 12/5/2022.  Pathology from the myectomy showed cardiac amyloidosis extensively involving the vessels and endocardium with myocyte hypertrophy and interstitial fibrosis.  Congo red stain was positive.  Symptoms improved  significantly  10/11/2023: Seen by cardiology at [facility] on 10/4/2023. Patient was restarted on metoprolol XL.  Midodrine adjusted to morning and early afternoon.  ECHO to be repeated in 3 months with follow-up.  New LBBB  Symptoms overall much better until patient now admitted 11/6/2024 with worsening shortness of breath.  Repeat echocardiogram with EF of 49.8%, left ventricular wall thickness with hypertrophy, left ventricular wall segments hypokinetic, diastolic function indeterminate, GLS -13.9% with strain pattern not consistent with amyloidosis     *History of periorbital hematomas: These have resolved.  Coagulation studies and a factor X level were all normal.     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   Continuing magic mouthwash.     *Dysphagia:  She saw Dr. Aranda at [facility] with Botox injections performed.  Symptoms unfortunately are not much better.  Recent esophagram confirmed esophageal dysmotility.  Manometry recently performed at [facility].  Patient states she was told that she is having esophageal spasms and that this is an area that they are very limited in treatment options.  She is going to see primary care for some medication adjustments to see if this is helpful.     *Skin changes with hypopigmentation and blister on the left side of her nose  Concerning for new manifestations of amyloidosis  Not discussed at this time     *Subcutaneous nodules, also previously increasing in number and size  5/1/2024 patient reports these are increasing in number and size once again, she reports a new area on her right upper thigh and left upper arm today.  Did not complain of this as of 6/26/2024  No clear nodularity 11/8/2024     *Rectal irritation  Saw wound care.  Continue current therapy.  No recent issues.     *Cognitive issues and headache  MRI brain 4/7/2024 with no acute intracranial process and findings suggestive of mild chronic small  vessel ischemic disease.     *Left-sided chest pain  She did have a left thoracentesis on 10/14/2024, exudative, cytology negative  Recent CT chest 11/4/2024 without pulmonary embolism or acute disease in the chest.  Repeat CTA chest 11/7/2024 with the same.    Treated with colchicine for presumed pericarditis.  However, this caused significant diarrhea so she prefers not to take this any further.  Prednisone started.  Significant improvement with steroids.  Followed by cardiology not likely to be related to a coronary etiology    RECOMMENDATIONS:  Serum protein studies from 11/7/2024 remain pending  Continue prednisone 40 mg daily at this time.  She has an appointment with me on 11/13.  We will have her keep that appointment and discuss a prednisone taper at that time.  No objection to discharge  Discussed with Dr. Jaylyn Sal MD

## 2024-11-10 NOTE — PROGRESS NOTES
Patient Name: Cynthia Portillo  Age/Sex: 69 y.o. female  : 1955  MRN: [id number]    Date of Admission: 2024  Date of Encounter Visit: 11/10/24  Encounter Provider: Aramis De La O MD   Place of Service: [facility] CARDIOLOGY    Admit Diagnosis: Shortness of breath [R06.02]  Fever [R50.9]  Fever, unspecified fever cause [R50.9]  Amyloidosis, unspecified type [E85.9]  Chest pain, unspecified type [R07.9]    Subjective:     No acute events overnight.  No acute complaints.          Current Medications:    Current Facility-Administered Medications:     acetaminophen (TYLENOL) tablet 650 mg, 650 mg, Oral, Q4H PRN, Liya Hernandez, APRN, 650 mg at 24 0139    ALPRAZolam (XANAX) tablet 0.25 mg, 0.25 mg, Oral, BID PRN, Milagros Wolf APRN, 0.25 mg at 24 1805    aspirin chewable tablet 81 mg, 81 mg, Oral, Daily, Milagros Wolf, APRN, 81 mg at 11/10/24 0901    sennosides-docusate (PERICOLACE) 8.6-50 MG per tablet 2 tablet, 2 tablet, Oral, BID PRN **AND** polyethylene glycol (MIRALAX) packet 17 g, 17 g, Oral, Daily PRN **AND** bisacodyl (DULCOLAX) EC tablet 5 mg, 5 mg, Oral, Daily PRN **AND** bisacodyl (DULCOLAX) suppository 10 mg, 10 mg, Rectal, Daily PRN, Liya Hernandez, APRN    cetirizine (zyrTEC) tablet 10 mg, 10 mg, Oral, Daily, Milagros Wolf, APRN, 10 mg at 11/10/24 0901    cholecalciferol (VITAMIN D3) tablet 2,000 Units, 2,000 Units, Oral, Daily, Milagros Wolf, APRN, 2,000 Units at 11/10/24 0901    cholestyramine (QUESTRAN) packet 1 packet, 1 packet, Oral, TID PRN, Milagros Wolf APRN    cyclobenzaprine (FLEXERIL) tablet 10 mg, 10 mg, Oral, TID PRN, Milagros Wolf APRN    dextrose (D50W) (25 g/50 mL) IV injection 25 g, 25 g, Intravenous, Q15 Min PRN, Liya Hernandez APRN    dextrose (GLUTOSE) oral gel 15 g, 15 g, Oral, Q15 Min PRN, Liya Hernandez APRN    diphenoxylate-atropine (LOMOTIL) 2.5-0.025 MG per  tablet 1 tablet, 1 tablet, Oral, 4x Daily PRN, Jensen Driver MD, 1 tablet at 11/10/24 0135    Enoxaparin Sodium (LOVENOX) syringe 40 mg, 40 mg, Subcutaneous, Q24H, Jensen Driver MD, 40 mg at 11/09/24 1215    FLUoxetine (PROzac) capsule 20 mg, 20 mg, Oral, 4x Daily, Milagros Wolf APRN, 20 mg at 11/10/24 0901    glucagon (GLUCAGEN) injection 1 mg, 1 mg, Intramuscular, Q15 Min PRN, Liya Hernandez APRN    HYDROcodone-acetaminophen (NORCO) 5-325 MG per tablet 1 tablet, 1 tablet, Oral, Q6H PRN, Milagros Wolf APRN, 1 tablet at 11/07/24 2009    insulin lispro (HUMALOG/ADMELOG) injection 2-7 Units, 2-7 Units, Subcutaneous, 4x Daily AC & at Bedtime, Liya Hernandez APRN, 2 Units at 11/09/24 1806    levothyroxine (SYNTHROID, LEVOTHROID) tablet 75 mcg, 75 mcg, Oral, Daily, Milagros Wolf APRN, 75 mcg at 11/10/24 0717    midodrine (PROAMATINE) tablet 5 mg, 5 mg, Oral, BID AC, Milagros Wolf APRN, 5 mg at 11/10/24 0717    nitroglycerin (NITROSTAT) SL tablet 0.4 mg, 0.4 mg, Sublingual, Q5 Min PRN, Liya Hernandez APRN    ondansetron ODT (ZOFRAN-ODT) disintegrating tablet 4 mg, 4 mg, Oral, Q6H PRN **OR** ondansetron (ZOFRAN) injection 4 mg, 4 mg, Intravenous, Q6H PRN, Liya Hernandez APRN    pantoprazole (PROTONIX) EC tablet 40 mg, 40 mg, Oral, Q AM, Milagros Wolf APRN, 40 mg at 11/10/24 0717    predniSONE (DELTASONE) tablet 40 mg, 40 mg, Oral, Daily, Gordon Pelletier MD, 40 mg at 11/10/24 0901    pregabalin (LYRICA) capsule 25 mg, 25 mg, Oral, Q8H, Aramis Downs MD, 25 mg at 11/10/24 0717    pregabalin (LYRICA) capsule 50 mg, 50 mg, Oral, Daily PRN, Milagros Wolf, PIYUSH    rosuvastatin (CRESTOR) tablet 20 mg, 20 mg, Oral, Daily, Milagros Wolf APRN, 20 mg at 11/10/24 0901    sodium chloride 0.9 % flush 10 mL, 10 mL, Intravenous, PRN, Pete Leslie MD, 10 mL at 11/08/24 0900    spironolactone (ALDACTONE) tablet 25 mg, 25 mg, Oral, Daily, Luciano,  PIYUSH Arroyo, 25 mg at 11/10/24 0901    zolpidem (AMBIEN) tablet 10 mg, 10 mg, Oral, Nightly, Jensen Driver MD, 10 mg at 11/09/24 2011    Prior to Admission Medications:  Medications Prior to Admission   Medication Sig Dispense Refill Last Dose/Taking    acyclovir (ZOVIRAX) 400 MG tablet TAKE 1 TABLET BY MOUTH TWICE DAILY 60 tablet 1 Taking    ALPRAZolam (XANAX) 0.25 MG tablet Take 1 tablet by mouth 2 (Two) Times a Day As Needed for Anxiety. for anxiety 60 tablet 0 Taking As Needed    aspirin 81 MG chewable tablet CHEW AND SWALLOW 1 TABLET DAILY WITH BREAKFAST   Taking    Cholecalciferol (Vitamin D3) 50 MCG (2000 UT) tablet Take 1 tablet by mouth Daily.   Taking    cholestyramine (QUESTRAN) 4 g packet Take 1 packet by mouth 3 (Three) Times a Day With Meals. 1 packet  TID PRN diarrhea (Patient taking differently: Take 1 packet by mouth 3 (Three) Times a Day As Needed (diarrhea). 1 packet  TID PRN diarrhea) 60 packet 4 Taking Differently    cyclobenzaprine (FLEXERIL) 10 MG tablet Take 1 tablet by mouth 3 (Three) Times a Day As Needed for Muscle Spasms. 30 tablet 0 Taking As Needed    Diphenhydramine-Aluminum-Magnesium-Simethicone-Lidocaine-Nystatin Swish and spit 5 mL Every 4 (Four) Hours As Needed (mucositis). Recipe: Benadryl Elixir 60cc, Maalox 200 cc, Viscous Lidocaine 30cc, Nystatin 120cc 410 mL 1 Taking As Needed    diphenoxylate-atropine (LOMOTIL) 2.5-0.025 MG per tablet Take 1 tablet by mouth 4 (Four) Times a Day As Needed for Diarrhea. 120 tablet 0 Taking As Needed    empagliflozin (JARDIANCE) 10 MG tablet tablet Take 1 tablet by mouth Daily.   Taking    FLUoxetine (PROzac) 20 MG capsule TAKE 1 CAPSULE BY MOUTH FOUR TIMES DAILY 360 capsule 0 Taking    HYDROcodone-acetaminophen (NORCO) 5-325 MG per tablet Take 1 tablet by mouth Every 6 (Six) Hours As Needed for Moderate Pain. 60 tablet 0 Taking As Needed    levothyroxine (SYNTHROID, LEVOTHROID) 75 MCG tablet TAKE 1 TABLET BY MOUTH EVERY DAY 90 tablet  0 Taking    loratadine (CLARITIN) 10 MG tablet Take 1 tablet by mouth Daily. 30 tablet 5 Taking    midodrine (PROAMATINE) 5 MG tablet Take 1 tablet by mouth 2 (Two) Times a Day.   Taking    Multiple Vitamins-Minerals (V-C Forte) capsule Take 1 capsule by mouth Daily.   Taking    olopatadine (PATANOL) 0.1 % ophthalmic solution Administer 1 drop to both eyes 2 (Two) Times a Day. 5 mL 5 Taking    pantoprazole (PROTONIX) 20 MG EC tablet TAKE 1 TABLET BY MOUTH EVERY DAY. DO NOT TAKE WITHIN 2 HOURS OF THYROID MEDICATION 90 tablet 1 Taking    pregabalin (LYRICA) 50 MG capsule Take 1 capsule by mouth Daily As Needed (nerve pain).   Taking As Needed    Rosuvastatin Calcium 20 MG capsule sprinkle Take 20 mg by mouth Daily. 90 capsule 3 Taking    spironolactone (ALDACTONE) 25 MG tablet Take 1 tablet by mouth Daily.   Taking    zolpidem (AMBIEN) 10 MG tablet Take 1 tablet by mouth At Night As Needed for Sleep. 30 tablet 1 Taking As Needed       Past Medical History     Past Medical History:   Diagnosis Date    AL amyloidosis     Anxiety     Arthritis     Bowel perforation     COVID-19 07/2020 9/7/2021    Depression     Diverticulitis of colon     Diverticulosis     GERD (gastroesophageal reflux disease)     History of Clostridioides difficile infection 2008    HAS HAD SEVERAL TIMES IN PAST PER PT (SAW INFECTIOUS DISEASE MD FOR THIS S/P COLOSTOMY/EXTENSIVE ANBX THERAPY)    History of prediabetes     History of snoring     History of stress incontinence     Hypercholesterolemia     Hyperlipidemia     Hypertension     Left ventricular hypertrophy     FOLLOWED BY DR MARI. DENIES CHEST PAIN BUT STATES DOES GET SOA AT TIMES WITH EXERTION REPORTS THAT IT IS NOT A NEW ISSUE     Liver disease     Oral herpes 08/18/2020    Seasonal allergies     used to have allergy shots in the past    SOB (shortness of breath)     STATES WITH EXERTION HAS BEEN ONGOING ISSUE NOT A NEW ISSUE    Status post colostomy     reversed    Thyroid  disease     Hypothyroid     Past Surgical History:   Procedure Laterality Date    ABDOMINAL SURGERY  2005, 2014    ex lap x 2,  diverticulitis    ABDOMINOPLASTY  2016    ADENOIDECTOMY      APPENDECTOMY      CARDIAC CATHETERIZATION N/A 04/24/2020    Procedure: Coronary angiography;  Surgeon: Roberto Briones MD;  Location: [facility] CATH INVASIVE LOCATION;  Service: Cardiovascular;  Laterality: N/A;    CARDIAC CATHETERIZATION N/A 04/24/2020    Procedure: Left heart cath;  Surgeon: Roberto Briones MD;  Location: [facility] CATH INVASIVE LOCATION;  Service: Cardiovascular;  Laterality: N/A;    CARDIAC CATHETERIZATION N/A 04/24/2020    Procedure: Left ventriculography;  Surgeon: Roberto Briones MD;  Location: [facility] CATH INVASIVE LOCATION;  Service: Cardiovascular;  Laterality: N/A;    CARDIAC SURGERY      open heart surgery,    COLONOSCOPY  approx 2018    normal per pt     COLOSTOMY  2005    had colostomy and then is was reversed    COLOSTOMY CLOSURE  2006    CORRECTION HAMMER TOE Right 2017    ECTOPIC PREGNANCY SURGERY      1979, 1980    ENDOSCOPY N/A 05/27/2020    Procedure: ESOPHAGOGASTRODUODENOSCOPY WITH BIOPSY AND BIOPSY POLYPECTOMY AND MACIEL DIALATION;  Surgeon: Preethi Beck MD;  Location: [facility] ENDOSCOPY;  Service: Gastroenterology;  Laterality: N/A;  PRE: ESOPHAGEAL DYSPHAGIA  POST: Z LINE 46, ESOPHAGEAL SPASM, GASTRITIS, FUNDIC POLYP    ENDOSCOPY N/A 06/20/2023    ESOPHAGEAL DILATATION N/A 12/07/2021    Procedure: OR ESOPHAGEAL DILATATION with maciel dilators ;  Surgeon: Jamey Leslie MD;  Location[facility];  Service: ENT;  Laterality: N/A;    ESOPHAGEAL MOTILITY STUDY N/A 02/03/2022    Procedure: ESOPHAGEAL MOTILITY STUDY;  Surgeon: Harshil, Nurse Performed;  Location: [facility] ENDOSCOPY;  Service: Gastroenterology;  Laterality: N/A;  dypshagia     FOOT SURGERY Right 12/2016    Second and third hammertoe corrections    KNEE ARTHROSCOPY Right 2009    KNEE SURGERY Right      REVISION / TAKEDOWN COLOSTOMY  2006    SHOULDER ARTHROSCOPY Right 2018    right shoulder arthroscopy with extensive rotator cuff, biceps and labral debridement, chondroplasty, & subacromial decompression with acromioplasty    SHOULDER SURGERY      HAS HAS COMPLETE SHOULDER ON RIGHT. LEFT SCOPED AND HAD 2ND SURGERY WITH HARDWARE PLACED    SHOULDER SURGERY Left     2012. 2013    TONSILLECTOMY      TOTAL SHOULDER REVERSE ARTHROPLASTY Right 2019    WISDOM TOOTH EXTRACTION         Family History     Family History   Problem Relation Age of Onset    Hypertension Mother     Osteoporosis Mother     Skin cancer Mother     Heart disease Father     Hypertension Father     Breast cancer Maternal Grandmother     Ovarian cancer Neg Hx     Colon cancer Neg Hx     Deep vein thrombosis Neg Hx     Pulmonary embolism Neg Hx     Malig Hyperthermia Neg Hx          Social History     Social History     Socioeconomic History    Marital status: Single    Number of children: 2   Tobacco Use    Smoking status: Never     Passive exposure: Never    Smokeless tobacco: Never   Vaping Use    Vaping status: Never Used   Substance and Sexual Activity    Alcohol use: Not Currently     Alcohol/week: 1.0 standard drink of alcohol     Types: 1 Glasses of wine per week    Drug use: Never    Sexual activity: Defer       Allergies:  Allergies   Allergen Reactions    Azithromycin Rash and Other (See Comments)     Reaction unknown to patient (unable to remember)  Other reaction(s): Other: stomach issues, Stomach ache, Other: stomach issues, Stomach ache    Ezetimibe Myalgia, Other (See Comments) and Rash     cramps  cramps    Pravastatin Rash and Other (See Comments)            Objective:     Objective:  Temp:  [97.2 °F (36.2 °C)-99.4 °F (37.4 °C)] 98.3 °F (36.8 °C)  Heart Rate:  [76-88] 81  Resp:  [16] 16  BP: (104-127)/(61-80) 127/61    Intake/Output Summary (Last 24 hours) at 11/10/2024 0923  Last data filed at 11/9/2024 1717  Gross per 24 hour   Intake  480 ml   Output --   Net 480 ml     Body mass index is 25.12 kg/m².      11/08/24  0650 11/09/24  0313 11/10/24  0607   Weight: 60.7 kg (133 lb 12.8 oz) 59.3 kg (130 lb 11.2 oz) 58.3 kg (128 lb 9.6 oz)                 Physical Exam:   Gen: Well nourished; Alert  Skin: no visible rashes and no abnormalities with palpation  Eyes: no evidence of visual defects and normal sclera  Ears: no abnormalities.  Mouth: normal teeth and appropriately moist mucous membranes  Chest: clear to auscultation. There is normal respiratory effort and expansion.  CV: examination showed a regular rhythm. S1 and S2 were normal.   Abd: no visible distension.   Neurologic:Cranial nerves appear intact; Sensation normal; no localizing signs    Lab Review:     Results from last 7 days   Lab Units 11/10/24  0455 11/09/24  0456 11/08/24  0639   SODIUM mmol/L 140 142 143   POTASSIUM mmol/L 4.1 4.5 3.9   CHLORIDE mmol/L 106 109* 110*   CO2 mmol/L 23.0 24.5 25.0   BUN mg/dL 15 12 11   CREATININE mg/dL 0.66 0.73 0.70   GLUCOSE mg/dL 175* 88 88   CALCIUM mg/dL 8.8 8.8 7.9*       Results from last 7 days   Lab Units 11/06/24  2228 11/06/24  2036 11/04/24  1129   HSTROP T ng/L 31* 31* 19*     Results from last 7 days   Lab Units 11/10/24  0455   WBC 10*3/mm3 6.60   HEMOGLOBIN g/dL 13.0   HEMATOCRIT % 40.8   PLATELETS 10*3/mm3 173             Results from last 7 days   Lab Units 11/04/24  0910   MAGNESIUM mg/dL 2.1         Results from last 7 days   Lab Units 11/07/24  0519   PROBNP pg/mL 351.0         Results from last 7 days   Lab Units 11/07/24  0519   TSH uIU/mL 3.400       Imaging:    chest X-ray    Results for orders placed during the hospital encounter of 11/06/24    Adult Transthoracic Echo Limited W/ Cont if Necessary Per Protocol    Interpretation Summary    Left ventricular systolic function is low normal. Calculated left ventricular EF = 49.8%    Left ventricular wall thickness is consistent with hypertrophy. Sigmoid-shaped ventricular septum  is present.    The following left ventricular wall segments are hypokinetic: mid anterior, apical anterior, basal anterolateral, mid anterolateral, apical lateral, basal inferolateral, mid inferolateral, apical inferior, mid inferior, apical septal, apex hypokinetic, basal anterior and basal inferior. The following left ventricular wall segments are dyskinetic: basal inferoseptal, mid inferoseptal, basal anteroseptal and mid anteroseptal.    Left ventricular diastolic function was indeterminate.    Abnormal global longitudinal LV strain (GLS) = -13.9%.  The strain pattern is not consistent with amyloidosis.        I personally viewed and interpreted the patient's EKG/Telemetry data.    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  - Normal left heart cath in December 2022.  - Chest pain unlikely to be from coronary etiology.  - Agree with pulmonary and oncology evaluation  - Serum protein studies 11/7/2024 pending.  - Tried on colchicine but patient deferred due to diarrhea.  Patient now on prednisone 40 mg daily with improvement in chest pain.  -Continue midodrine  - Continue aspirin/statin.  - Okay to continue spironolactone.  - No limitations for discharge with close outpatient follow-up from cardiology standpoint    Aramis De La O MD  [facility]  11/10/24  09:23 EST

## 2024-11-10 NOTE — OUTREACH NOTE
Prep Survey      Flowsheet Row Responses   [facility] patient discharged from? Echo   Is LACE score < 7 ? No   Eligibility [facility]   Date of Admission 11/06/24   Date of Discharge 11/10/24   Discharge Disposition Home or Self Care   Discharge diagnosis Fever   Does the patient have one of the following disease processes/diagnoses(primary or secondary)? Other   Does the patient have Home health ordered? No   Is there a DME ordered? No   Prep survey completed? Yes            Gladis ARROYO - Registered Nurse

## 2024-11-10 NOTE — DISCHARGE SUMMARY
Date of Admission: 11/6/2024  Date of Discharge:  11/10/2024  Primary Care Physician: Arian Peterson MD     Discharge Diagnosis:  Active Hospital Problems    Diagnosis  POA    **Fever [R50.9]  Yes    Type 2 diabetes mellitus, without long-term current use of insulin [E11.9]  Yes    Amyloidosis [E85.9]  Yes    Chronic hypotension [I95.89]  Yes    Hypothyroid [E03.9]  Yes      Resolved Hospital Problems   No resolved problems to display.       Presenting Problem/History of Present Illness:  Shortness of breath [R06.02]  Fever [R50.9]  Fever, unspecified fever cause [R50.9]  Amyloidosis, unspecified type [E85.9]  Chest pain, unspecified type [R07.9]     Hospital Course:  The patient is a 69 y.o. female with a history of amyloidosis with cardiac involvement previously treated with chemotherapy and monthly Darzalex which she has since completed and hospitalization last month at [facility] for a right pleural effusion who presented with 3-4 weeks or worsening chest pan, shortness of breath, and fevers up to 102. Please see admission H&P for further details. She was evaluated by oncology, cardiology, and pulmonology. She had no evidence of ACS. CT angiogram of the chest showed no PE or dissection. She did have multiple areas of nodular consolidation in the right lower lobe which were largely unchanged. She had no evidence of infection and did not have any more fevers since admission. Her primary issues was thought to be inflammatory and she was initially started on colchicine empirically but she did not tolerate this due to her GI issues so this was switched to prednisone. She has done reasonably well with this and will be discharged on a taper. She has JUSTIN, PE, and FLC pending and this can be followed up at her oncology appointment in the next few days. She is medically stable and will be discharged home.      Exam Today:  Blood pressure 127/61, pulse 81, temperature 98.3 °F (36.8 °C), temperature source Oral, resp.  "rate 16, height 152.4 cm (60\"), weight 58.3 kg (128 lb 9.6 oz), SpO2 96%, not currently breastfeeding.  Vitals and nursing note reviewed.   Constitutional:       General: She is not in acute distress.     Appearance: She is not toxic-appearing or diaphoretic.   HENT:      Head: Normocephalic and atraumatic.      Nose: Nose normal.      Mouth/Throat:      Mouth: Mucous membranes are moist.      Pharynx: Oropharynx is clear.   Eyes:      Conjunctiva/sclera: Conjunctivae normal.      Pupils: Pupils are equal, round, and reactive to light.   Cardiovascular:      Rate and Rhythm: Normal rate and regular rhythm.      Pulses: Normal pulses.   Pulmonary:      Effort: Pulmonary effort is normal.   Abdominal:      General: Bowel sounds are normal.      Palpations: Abdomen is soft.   Musculoskeletal:         General: No swelling or tenderness.      Cervical back: Neck supple.   Skin:     General: Skin is warm and dry.      Capillary Refill: Capillary refill takes less than 2 seconds.   Neurological:      General: No focal deficit present.      Mental Status: She is alert and oriented to person, place, and time.   Psychiatric:         Mood and Affect: Mood normal.         Behavior: Behavior normal.         Consults:   Consults       Date and Time Order Name Status Description    11/7/2024  7:43 AM Inpatient Cardiology Consult Completed     11/7/2024  1:14 AM Inpatient Pulmonology Consult Completed     11/7/2024  1:14 AM Inpatient Hematology & Oncology Consult Completed     11/6/2024 11:37 PM LHASHANTI (on-call MD unless specified) Details      10/14/2024  8:30 AM Inpatient Pulmonology Consult Completed              Discharge Disposition:  Home or Self Care    Discharge Medications:     Discharge Medications        New Medications        Instructions Start Date   predniSONE 10 MG tablet  Commonly known as: DELTASONE   Take 4 tablets by mouth Daily for 5 days, THEN 3 tablets Daily for 4 days, THEN 2 tablets Daily for 4 days, THEN 1 " tablet Daily for 2 days, THEN 0.5 tablets Daily for 2 days.   Start Date: November 10, 2024            Continue These Medications        Instructions Start Date   acyclovir 400 MG tablet  Commonly known as: ZOVIRAX   400 mg, Oral, 2 Times Daily      ALPRAZolam 0.25 MG tablet  Commonly known as: XANAX   0.25 mg, Oral, 2 Times Daily PRN, for anxiety      aspirin 81 MG chewable tablet   CHEW AND SWALLOW 1 TABLET DAILY WITH BREAKFAST      cholestyramine 4 g packet  Commonly known as: QUESTRAN   1 packet, Oral, 3 Times Daily With Meals, 1 packet  TID PRN diarrhea      cyclobenzaprine 10 MG tablet  Commonly known as: FLEXERIL   10 mg, Oral, 3 Times Daily PRN      Diphenhydramine-Aluminum-Magnesium-Simethicone-Lidocaine-Nystatin   5 mL, Swish & Spit, Every 4 Hours PRN, Recipe: Benadryl Elixir 60cc, Maalox 200 cc, Viscous Lidocaine 30cc, Nystatin 120cc      diphenoxylate-atropine 2.5-0.025 MG per tablet  Commonly known as: LOMOTIL   1 tablet, Oral, 4 Times Daily PRN      empagliflozin 10 MG tablet tablet  Commonly known as: JARDIANCE   10 mg, Daily      FLUoxetine 20 MG capsule  Commonly known as: PROzac   TAKE 1 CAPSULE BY MOUTH FOUR TIMES DAILY      HYDROcodone-acetaminophen 5-325 MG per tablet  Commonly known as: NORCO   1 tablet, Oral, Every 6 Hours PRN      levothyroxine 75 MCG tablet  Commonly known as: SYNTHROID, LEVOTHROID   TAKE 1 TABLET BY MOUTH EVERY DAY      loratadine 10 MG tablet  Commonly known as: CLARITIN   10 mg, Oral, Daily      midodrine 5 MG tablet  Commonly known as: PROAMATINE   5 mg, 2 Times Daily      olopatadine 0.1 % ophthalmic solution  Commonly known as: PATANOL   1 drop, Both Eyes, 2 Times Daily      pantoprazole 20 MG EC tablet  Commonly known as: PROTONIX   TAKE 1 TABLET BY MOUTH EVERY DAY. DO NOT TAKE WITHIN 2 HOURS OF THYROID MEDICATION      pregabalin 50 MG capsule  Commonly known as: LYRICA   50 mg, Daily PRN      Rosuvastatin Calcium 20 MG capsule sprinkle   20 mg, Oral, Daily       spironolactone 25 MG tablet  Commonly known as: ALDACTONE   25 mg, Daily      V-C Forte capsule   1 capsule, Daily      Vitamin D3 50 MCG (2000 UT) tablet   1 tablet, Daily      zolpidem 10 MG tablet  Commonly known as: AMBIEN   10 mg, Oral, Nightly PRN               Discharge Diet:   Diet Instructions       Diet: Cardiac Diets; Healthy Heart (2-3 Na+); Regular (IDDSI 7); Thin (IDDSI 0)      Discharge Diet: Cardiac Diets    Cardiac Diet: Healthy Heart (2-3 Na+)    Texture: Regular (IDDSI 7)    Fluid Consistency: Thin (IDDSI 0)            Activity at Discharge:   Activity Instructions       Activity as Tolerated              Follow-up Appointments:  Future Appointments   Date Time Provider Department Center   11/13/2024  8:00 AM LAB CHAIR 2 [facility]  [facility]   11/13/2024  8:20 AM Nav Sal MD [facility]   4/7/2025  1:00 PM Arian Peterson MD [facility]         Test Results Pending at Discharge:  Pending Labs       Order Current Status    Immunofixation (JUSTIN), Protein Electrophoresis (PE), and Quantitative Free Kappa and Lambda Light Chains (FLC), Serum In process    Blood Culture - Blood, Arm, Left Preliminary result    Blood Culture - Blood, Arm, Right Preliminary result             Jensen Driver MD  11/10/24  11:54 EST    Time Spent on Discharge Activities: Greater than 30 minutes.

## 2024-11-11 ENCOUNTER — TRANSITIONAL CARE MANAGEMENT TELEPHONE ENCOUNTER (OUTPATIENT)
Dept: CALL CENTER | Facility: HOSPITAL | Age: 69
End: 2024-11-11
Payer: MEDICARE

## 2024-11-11 LAB
BACTERIA SPEC AEROBE CULT: NORMAL
BACTERIA SPEC AEROBE CULT: NORMAL

## 2024-11-11 NOTE — PROGRESS NOTES
Case Management Discharge Note      Final Note: Discharged to home. SANJUANA Grady RN, CCP    Provided Post Acute Provider List?: Refused  Refused Provider List Comment: declined    Selected Continued Care - Discharged on 11/10/2024 Admission date: 11/6/2024 - Discharge disposition: Home or Self Care      Destination    No services have been selected for the patient.                Durable Medical Equipment    No services have been selected for the patient.                Dialysis/Infusion    No services have been selected for the patient.                Home Medical Care    No services have been selected for the patient.                Therapy    No services have been selected for the patient.                Community Resources    No services have been selected for the patient.                Community & DME    No services have been selected for the patient.                    Selected Continued Care - Episodes Includes continued care and service providers with selected services from the active episodes listed below      High Risk Care Management Episode start date: 10/7/2024 (Paused)   There are no active outsourced providers for this episode.                 Transportation Services  Private: Car    Final Discharge Disposition Code: 01 - home or self-care

## 2024-11-11 NOTE — OUTREACH NOTE
Call Center TCM Note      Flowsheet Row Responses   [facility], [place] patient discharged from? [place]   Does the patient have one of the following disease processes/diagnoses(primary or secondary)? Other   TCM attempt successful? Yes   Call start time 1303   Call end time 1304   Discharge diagnosis Fever   Is patient permission given to speak with other caregiver? Yes   List who call center can speak with Eddie maradiaga   Person spoke with today (if not patient) and relationship Eddie son   Medication comments Unable to reach pt,  son states he did not discuss pt's med list with her   Comments Unable to reach pt,  Pt's son states pt schedules her own FU apts - will route message to group   Does the patient have an appointment with their PCP within 7-14 days of discharge? No   Nursing Interventions Routed TCM call to PCP office, PCP office requested to make appointment - message sent   Psychosocial issues? No   Did the patient receive a copy of their discharge instructions? Yes   Nursing interventions Reviewed instructions with patient   What is the patient's perception of their health status since discharge? Improving   Is the patient/caregiver able to teach back signs and symptoms related to disease process for when to call PCP? Yes   Is the patient/caregiver able to teach back signs and symptoms related to disease process for when to call 911? Yes   Is the patient/caregiver able to teach back the hierarchy of who to call/visit for symptoms/problems? PCP, Specialist, Home health nurse, Urgent Care, ED, 911 Yes   If the patient is a current smoker, are they able to teach back resources for cessation? Not a smoker   TCM call completed? Yes   Call end time 1304            Enedelia Jaramillo RN    11/11/2024, 13:06 EST

## 2024-11-12 LAB
ALBUMIN SERPL ELPH-MCNC: 3.2 G/DL (ref 2.9–4.4)
ALBUMIN/GLOB SERPL: 1.5 {RATIO} (ref 0.7–1.7)
ALPHA1 GLOB SERPL ELPH-MCNC: 0.3 G/DL (ref 0–0.4)
ALPHA2 GLOB SERPL ELPH-MCNC: 1 G/DL (ref 0.4–1)
B-GLOBULIN SERPL ELPH-MCNC: 0.7 G/DL (ref 0.7–1.3)
GAMMA GLOB SERPL ELPH-MCNC: 0.2 G/DL (ref 0.4–1.8)
GLOBULIN SER-MCNC: 2.2 G/DL (ref 2.2–3.9)
IGA SERPL-MCNC: 14 MG/DL (ref 87–352)
IGG SERPL-MCNC: 192 MG/DL (ref 586–1602)
IGM SERPL-MCNC: 25 MG/DL (ref 26–217)
INTERPRETATION SERPL IEP-IMP: ABNORMAL
KAPPA LC FREE SER-MCNC: 5 MG/L (ref 3.3–19.4)
KAPPA LC FREE/LAMBDA FREE SER: 0.93 {RATIO} (ref 0.26–1.65)
LABORATORY COMMENT REPORT: ABNORMAL
LAMBDA LC FREE SERPL-MCNC: 5.4 MG/L (ref 5.7–26.3)
M PROTEIN SERPL ELPH-MCNC: ABNORMAL G/DL
PROT SERPL-MCNC: 5.4 G/DL (ref 6–8.5)

## 2024-11-12 NOTE — PROGRESS NOTES
"[facility] CBC GROUP OUTPATIENT FOLLOW UP CLINIC VISIT    REASON FOR FOLLOW-UP:    AL amyloidosis  Therapy with michael-CyBorD initiated 8/16/2022  Now on maintenance daratumumab every 4 weeks initiated May 2023 with plans for 18 months of therapy, completed    HISTORY OF PRESENT ILLNESS: Cynthia Portillo is a 69 y.o. female with the above-mentioned history who is here today for lab review and evaluation.    Recent admission with persistent left-sided chest pain under the left breast.  She had an extensive evaluation that was unremarkable.  Ultimately she was started on prednisone with improvement in symptoms.  She continues prednisone 40 mg daily with a quick taper over the next couple of weeks anticipated.  Today she states that her pain has resolved and she is doing well aside from persistent and perhaps worsening dumping from her GI tract which she blames on the prednisone.    REVIEW OF SYSTEMS:  As per the HPI    PHYSICAL EXAMINATION:    Vitals:    11/13/24 0802   Pulse: 81   Temp: 97.9 °F (36.6 °C)   SpO2: 98%   Weight: 58.5 kg (129 lb)   Height: 152.4 cm (60\")   PainSc: 0-No pain     PHYSICAL EXAM  General:  No acute distress, awake, alert and oriented  Skin:  Warm and dry, no visible rash.   HEENT:  Normocephalic/atraumatic.   Chest:  Normal respiratory effort.  Lungs clear to auscultation bilaterally.   Heart: Regular rate and rhythm with a grade 2 out of 6 early systolic murmur  No CVA tenderness  Extremities:  No visible clubbing, cyanosis, or edema  Neuro/psych:  Grossly nonfocal.  Normal mood and affect.    DIAGNOSTIC DATA:.  CBC & Differential (11/13/2024 07:57)     IMAGING:    None reviewed      ASSESSMENT:  This is a 69 y.o. female with:    *AL amyloidosis  She had a stress echocardiogram on 4/24/2020 showing a normal left ventricular ejection fraction of 60% with moderate concentric hypertrophy but no wall motion abnormalities of the left ventricle.  There was impaired relaxation noted.  She " complained of chest pain during the examination.  There was some ST segment depression.  Cardiac catheterization was performed on the same day.  No intervention was required.  Follow-up echocardiogram on 4/15/2021 showed a left ventricular ejection fraction of 61 to 65% with normal LV cavity size.  Left ventricular wall thickness showed moderate concentric hypertrophy.  Diastolic function was impaired.  No pericardial effusion.  Small left pleural effusion.  She had follow-up [facility] imaging for cardiac amyloidosis that was not suggestive of ATTR amyloidosis  Laboratory values on 2/24/2022 showed a high serum free light chain lambda of 121, normal kappa of 10.5, low ratio at 0.09.  Serum protein electrophoresis showed no evidence of an M spike.  Urine light chains were abnormal with an elevated lambda light chain of 33 and a low ratio of 0.48 with a 12.7 mg M spike on 24-hour urine protein electrophoresis.  BNP was elevated at 2306 on 3/4/2022.  The troponin was normal at 0.03.  CK was 212 with a CK-MB of 10.87.  Initial consult on 3/30/22. No M spike on serum protein electrophoresis. SIFE 'presence of monoclonal protein is unclear.'   Bone marrow biopsy 4/13/22 normocellular, 12.1% plasma cells consistent with low volume plasma cell dyscrasia. FISH with low level t(11;14) fusion only. No amyloid noted as Congo red staining negative.   Fat pad biopsy 5/11/22 positive for amyloid, AL lambda  Referred to Dr. Buenrostro at [facility]. Intended to enroll on a clinical study but her troponin as tested by the study sponsor was not high enough  Therapy with michael-CyBorD initiated 8/16/2022  Patient seen in the office on 8/17/2022 for triage visit.  Patient with complaints of fatigue, dizziness and diarrhea.  She was mildly hypotensive.  She was given IV fluids.  Subsequent admission at [facility] with shortness of breath and volume overload.  She was diuresed and treated with antibiotics.  D8 therapy administered on  8/30  Light chains normal at [facility] on 8/31/22 (lambda light chain 1.3, down from 12.44)  9/27/2022: Cycle 2-day 8 of therapy  10/4/2022 cycle 2-day 8 CyBorD.  Patient continues to push oral hydration.  She continues to have some neck stiffness but this has not worsened.  She will see [facility] next week for further cardiac work-up.  Glucose was low upon initial labs today however the patient was given something to eat by nursing prior to rechecking.  This was therefore rechecked prior to her leaving the office and was 87.  Patient reports she was feeling fine.  10/18/2020 to cycle 2-day 22 Sissy -CyBorD.  Velcade dose will be reduced to 1.0 mg/m² due to patient's persistent issues with orthostasis.  10/25/2022: Cycle 3-day 1  11/8/2022: Delay in cycle 3-day 15 therapy.  Day 8 omitted due to an upper respiratory infection.  11/22/2022: Cycle 3-day 22.   final planned treatment before she goes to [place] for cardiac surgery.  Cardiac surgery performed at [place] on 12/5/2022.  Pathology from the myectomy showed cardiac amyloidosis extensively involving the vessels and endocardium with myocyte hypertrophy and interstitial fibrosis.  Congo red stain was positive.  Reviewed back on 1/17/2023 with plans to resume Darzalex, Cytoxan, Velcade, dexamethasone with cycle #4 on 1/24.  Patient seen 2/7/2023 (missed 1/31/2023 due to weather, which would have been day 8).  We will go ahead and proceed with as cycle 4 day 15, day 15.  2/14/2023: Cycle 4-day 22  2/21/2023 cycle 5-day 1 Darzalex, Cytoxan (IV Cytoxan given in the office), Velcade, dexamethasone (20 mg p.o. given in the office).  Repeat labs from 2/21/2023 showing M spike up to 0.8, free kappa light chain up to 85.8 (previously 5.3 on 1/17/2023), ratio 8.58 (was previously 0.76 on 1/17/2023).  We ended up repeating labs on 3/7/2023 M spike 0.1, free kappa light chain 3.4, ratio 0.83.  We will continue on with treatment.  She is scheduled to return to  [facility] in April.  4/11/2023 cycle 6, day 22 Darzalex-CyBorD.  Final planned therapy.  She was seen at [facility] with recommendations for monthly Darzalex for 18 months. Bactrim stopped and she continues acyclovir. No immediate plans for ASCT.  The last note from [facility] says to continue Darzalex for 24 months.  12/6/2023: Proceed with Darzalex  5/1/2024 patient returns we will proceed with Darzalex today.  Will discuss new skin nodules with Dr. Sal.  Patient does follow-up with [facility] next month and will discuss this with them at that time.  She is following up with primary care soon to discuss some medication changes as suggested by her team at [facility] to see if that helps with her esophageal spasms.  She will return in 1 month for labs and treatment in 2 months to see Dr. Sal with labs and treatment.  6/26/2024: Overall stable.  Bone marrow biopsy planned at [facility] in August.  She had last been seen in June 2026 with plans to continue monthly Darzalex with records indicating this continued through 8/21/2024.  Her visit at [facility] 9/11/2024 included a CHR and observation was initiated.  She is having further GI symptoms, deferred ASCHD as to no need due to deep response.  Additional issues included chronic leg numbness, diarrhea, elevated BNP troponin and memory dysfunction.  She presented 11/6/2024 with acute shortness of breath and fever, chest pain x 3 to 4 weeks.  Recent studies include a CT chest with contrast that was essentially unremarkable, chest x-ray 11/6 without active disease, negative respiratory panel, and normal CBC, normal CMP, at bedtime troponin 31, lactate of 0.9.  Repeat paraproteins pending  Patient assessed 11/8/2024-?  Neuropathic pain.  We will start her Lyrica back at lower dose on a more frequent schedule since previously 50 mg would produce sedation at a once a day dosing.     *Diarrhea  She saw Dr. Hoyos with GI at [facility] on 9/7.  Suspicion for amyloid  involvement of the GI tract  Continue Lomotil and Imodium. Rifaximin prescribed by Dr. Hoyos which she continues to use intermittently with improvement but this is only prescribed for 1 week out of 4  Diarrhea waxes and wanes with what she eats and dairy products particularly worsen diarrhea  Continue Questran, Lomotil and Imodium.  These help but did not completely control the issue.  10/11/2023: Patient continues on rifaximin, Questran, Lomotil, and Imodium for diarrhea management.  Diarrhea still not completely controlled. Averages at least 5 episodes daily.   Stable issues at this time.  She cannot continue rifaximin at this time due to insurance issues. On Flagyl. Resume rifaximin per GI at [facility] when possible  11/13/2024: Dumping a little bit worse on prednisone.  Xifaxan really helps.  Questran really helps.     *Elevated liver enzymes  Continue to monitor intermittently.  Recently normal.     *Cardiomyopathy:  Most recent echocardiogram on 8/17/2022 showed left ventricular wall thickness consistent with moderate to severe concentric hypertrophy along with left ventricular septal wall measuring 2.2 cm and posterior wall thickness at 1.9 cm likely due to amyloidosis.  LVEF 61 to 65%.  Grade 1 impaired relaxation.  Now followed by Dr. Lyles at [facility] with concerns for pre-existing HCM and AL cardiac amyloid.   Cardiac MRI results above.  Amyloidosis evident.  Catheterization performed at [facility].   Cardiac surgery performed at [facility] on 12/5/2022.  Pathology from the myectomy showed cardiac amyloidosis extensively involving the vessels and endocardium with myocyte hypertrophy and interstitial fibrosis.  Congo red stain was positive.  Symptoms improved significantly  10/11/2023: Seen by cardiology at [facility] on 10/4/2023. Patient was restarted on metoprolol XL.  Midodrine adjusted to morning and early afternoon.  ECHO to be repeated in 3 months with follow-up.  New LBBB  Symptoms overall much better  until patient now admitted 11/6/2024 with worsening shortness of breath.  Repeat echocardiogram with EF of 49.8%, left ventricular wall thickness with hypertrophy, left ventricular wall segments hypokinetic, diastolic function indeterminate, GLS -13.9% with strain pattern not consistent with amyloidosis     *History of periorbital hematomas: These have resolved.  Coagulation studies and a factor X level were all normal.     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   Continuing magic mouthwash.     *Dysphagia:  She saw Dr. Aranda at [place] with Botox injections performed.  Symptoms unfortunately are not much better.  Recent esophagram confirmed esophageal dysmotility.  Manometry recently performed at [place].  Patient states she was told that she is having esophageal spasms and that this is an area that they are very limited in treatment options.  She is going to see primary care for some medication adjustments to see if this is helpful.     *Skin changes with hypopigmentation and blister on the left side of her nose  Concerning for new manifestations of amyloidosis  Not discussed at this time     *Subcutaneous nodules, also previously increasing in number and size  5/1/2024 patient reports these are increasing in number and size once again, she reports a new area on her right upper thigh and left upper arm today.  Did not complain of this as of 6/26/2024  No clear nodularity 11/8/2024     *Rectal irritation  Saw wound care.  Continue current therapy.  No recent issues.     *Cognitive issues and headache  MRI brain 4/7/2024 with no acute intracranial process and findings suggestive of mild chronic small vessel ischemic disease.     *Left-sided chest pain  She did have a left thoracentesis on 10/14/2024, exudative, cytology negative  Recent CT chest 11/4/2024 without pulmonary embolism or acute disease in the chest.  Repeat CTA chest 11/7/2024 with the  same.    Treated with colchicine for presumed pericarditis.  However, this caused significant diarrhea so she prefers not to take this any further.  Prednisone started.  Significant improvement with steroids.  Resolved with prednisone     PLAN:  Continue prednisone taper as planned with prednisone to be complete before the end of November  I encouraged her to notify us with any recurrent chest pain  She will follow-up with multiple specialists at [facility] in March  I will plan to see her back in 6 months for follow-up with labs

## 2024-11-13 ENCOUNTER — LAB (OUTPATIENT)
Dept: LAB | Facility: HOSPITAL | Age: 69
End: 2024-11-13
Payer: MEDICARE

## 2024-11-13 ENCOUNTER — OFFICE VISIT (OUTPATIENT)
Dept: ONCOLOGY | Facility: CLINIC | Age: 69
End: 2024-11-13
Payer: MEDICARE

## 2024-11-13 VITALS
HEART RATE: 81 BPM | OXYGEN SATURATION: 98 % | WEIGHT: 129 LBS | HEIGHT: 60 IN | TEMPERATURE: 97.9 F | BODY MASS INDEX: 25.32 KG/M2

## 2024-11-13 DIAGNOSIS — E85.81 LIGHT CHAIN (AL) AMYLOIDOSIS: Primary | ICD-10-CM

## 2024-11-13 DIAGNOSIS — E85.81 LIGHT CHAIN (AL) AMYLOIDOSIS: ICD-10-CM

## 2024-11-13 DIAGNOSIS — R19.7 DIARRHEA, UNSPECIFIED TYPE: ICD-10-CM

## 2024-11-13 DIAGNOSIS — R07.89 ATYPICAL CHEST PAIN: ICD-10-CM

## 2024-11-13 LAB
ALBUMIN SERPL-MCNC: 4.6 G/DL (ref 3.5–5.2)
ALBUMIN/GLOB SERPL: 2 G/DL
ALP SERPL-CCNC: 103 U/L (ref 39–117)
ALT SERPL W P-5'-P-CCNC: 91 U/L (ref 1–33)
ANION GAP SERPL CALCULATED.3IONS-SCNC: 13.3 MMOL/L (ref 5–15)
AST SERPL-CCNC: 37 U/L (ref 1–32)
BASOPHILS # BLD AUTO: 0.02 10*3/MM3 (ref 0–0.2)
BASOPHILS NFR BLD AUTO: 0.2 % (ref 0–1.5)
BILIRUB SERPL-MCNC: 0.4 MG/DL (ref 0–1.2)
BUN SERPL-MCNC: 19 MG/DL (ref 8–23)
BUN/CREAT SERPL: 25.7 (ref 7–25)
CALCIUM SPEC-SCNC: 10.3 MG/DL (ref 8.6–10.5)
CHLORIDE SERPL-SCNC: 102 MMOL/L (ref 98–107)
CO2 SERPL-SCNC: 24.7 MMOL/L (ref 22–29)
CREAT SERPL-MCNC: 0.74 MG/DL (ref 0.57–1)
DEPRECATED RDW RBC AUTO: 46.4 FL (ref 37–54)
EGFRCR SERPLBLD CKD-EPI 2021: 87.7 ML/MIN/1.73
EOSINOPHIL # BLD AUTO: 0.02 10*3/MM3 (ref 0–0.4)
EOSINOPHIL NFR BLD AUTO: 0.2 % (ref 0.3–6.2)
ERYTHROCYTE [DISTWIDTH] IN BLOOD BY AUTOMATED COUNT: 13.7 % (ref 12.3–15.4)
GLOBULIN UR ELPH-MCNC: 2.3 GM/DL
GLUCOSE SERPL-MCNC: 136 MG/DL (ref 65–99)
HCT VFR BLD AUTO: 44.3 % (ref 34–46.6)
HGB BLD-MCNC: 14.3 G/DL (ref 12–15.9)
IMM GRANULOCYTES # BLD AUTO: 0.06 10*3/MM3 (ref 0–0.05)
IMM GRANULOCYTES NFR BLD AUTO: 0.7 % (ref 0–0.5)
LYMPHOCYTES # BLD AUTO: 1.93 10*3/MM3 (ref 0.7–3.1)
LYMPHOCYTES NFR BLD AUTO: 23.7 % (ref 19.6–45.3)
MCH RBC QN AUTO: 29.4 PG (ref 26.6–33)
MCHC RBC AUTO-ENTMCNC: 32.3 G/DL (ref 31.5–35.7)
MCV RBC AUTO: 91.2 FL (ref 79–97)
MONOCYTES # BLD AUTO: 0.59 10*3/MM3 (ref 0.1–0.9)
MONOCYTES NFR BLD AUTO: 7.2 % (ref 5–12)
NEUTROPHILS NFR BLD AUTO: 5.52 10*3/MM3 (ref 1.7–7)
NEUTROPHILS NFR BLD AUTO: 68 % (ref 42.7–76)
NRBC BLD AUTO-RTO: 0 /100 WBC (ref 0–0.2)
PLATELET # BLD AUTO: 266 10*3/MM3 (ref 140–450)
PMV BLD AUTO: 9.9 FL (ref 6–12)
POTASSIUM SERPL-SCNC: 3.9 MMOL/L (ref 3.5–5.2)
PROT SERPL-MCNC: 6.9 G/DL (ref 6–8.5)
RBC # BLD AUTO: 4.86 10*6/MM3 (ref 3.77–5.28)
SODIUM SERPL-SCNC: 140 MMOL/L (ref 136–145)
WBC NRBC COR # BLD AUTO: 8.14 10*3/MM3 (ref 3.4–10.8)

## 2024-11-13 PROCEDURE — 85025 COMPLETE CBC W/AUTO DIFF WBC: CPT

## 2024-11-13 PROCEDURE — 36415 COLL VENOUS BLD VENIPUNCTURE: CPT

## 2024-11-13 PROCEDURE — 80053 COMPREHEN METABOLIC PANEL: CPT

## 2024-11-13 RX ORDER — CHOLESTYRAMINE 4 G/9G
1 POWDER, FOR SUSPENSION ORAL
Qty: 60 PACKET | Refills: 4 | Status: SHIPPED | OUTPATIENT
Start: 2024-11-13

## 2024-11-13 RX ORDER — DIPHENOXYLATE HYDROCHLORIDE AND ATROPINE SULFATE 2.5; .025 MG/1; MG/1
1 TABLET ORAL 4 TIMES DAILY PRN
Qty: 120 TABLET | Refills: 0 | Status: SHIPPED | OUTPATIENT
Start: 2024-11-13

## 2024-11-14 ENCOUNTER — TELEPHONE (OUTPATIENT)
Dept: ONCOLOGY | Facility: CLINIC | Age: 69
End: 2024-11-14
Payer: MEDICARE

## 2024-11-14 DIAGNOSIS — R74.8 ELEVATED LIVER ENZYMES: Primary | ICD-10-CM

## 2024-11-14 NOTE — TELEPHONE ENCOUNTER
Called patient and relayed message regarding elevated liver enzymes. Pt v/u. She will have them rechecked at [facility] in two weeks. CMP order placed. Nakita Sinclair RN

## 2024-11-14 NOTE — PROGRESS NOTES
AST and ALT are up for an unclear reason. Let's repeat a CMP in about two weeks. If she wants to have it done closer to home that is OK as long as we get the result. LEWIS

## 2024-11-14 NOTE — TELEPHONE ENCOUNTER
----- Message from Nav Sal sent at 11/13/2024  9:03 PM EST -----  AST and ALT are up for an unclear reason. Let's repeat a CMP in about two weeks. If she wants to have it done closer to home that is OK as long as we get the result. LEWIS

## 2024-11-14 NOTE — TELEPHONE ENCOUNTER
"Hub staff attempted to follow warm transfer process and was unsuccessful     Caller: Cynthia Mensah \"YOLETTE\"    Relationship to patient: Self    Best call back number: [phone]    Patient is needing: RETURNING SAMANTHA'S CALL, SEE NOTE IN CHART          "

## 2024-11-15 ENCOUNTER — PATIENT OUTREACH (OUTPATIENT)
Dept: CASE MANAGEMENT | Facility: OTHER | Age: 69
End: 2024-11-15
Payer: MEDICARE

## 2024-11-15 ENCOUNTER — TELEPHONE (OUTPATIENT)
Dept: FAMILY MEDICINE CLINIC | Facility: CLINIC | Age: 69
End: 2024-11-15
Payer: MEDICARE

## 2024-11-15 NOTE — OUTREACH NOTE
"AMBULATORY CASE MANAGEMENT NOTE    Names and Relationships of Patient/Support Persons: Contact: Trent Williamsonklin Cynthia ASHANTI \"YOLETTE\"; Relationship: Self -     Patient Outreach  RN-ACM outreach with patient. Discussed 11/4/24 ED visit regarding chest pain and 11/6/24 to 11/10/24 regarding fever. Patient states to have discharged home and will be receiving home health services from [facility] on 11/18/24. Patient states improvement regarding chest pain; states no difficulty with SOB or sleeping. Patient states to have had episodes of diarrhea and monitoring diet intake regarding diarrhea. Patient states to be compliant with medications and medical appointments. Patient states to be monitoring blood work as directed and continues with medical appointment to [facility] in March 2025. Reviewed with patient education and verbalized understanding. Patient states to appreciate outreach and declines needs for further outreach at this time. No further questions voiced at this time.     Education Documentation  When to Seek Medical Attention, taught by Brianna Pearce RN at 11/15/2024 12:25 PM.  Learner: Patient  Readiness: Acceptance  Method: Explanation  Response: Verbalizes Understanding    Participation with Disease Management Plan, taught by Brianna Pearce RN at 11/15/2024 12:25 PM.  Learner: Patient  Readiness: Acceptance  Method: Explanation  Response: Verbalizes Understanding    Unresolved/Worsening Symptoms, taught by Brianna Pearce RN at 11/15/2024 12:25 PM.  Learner: Patient  Readiness: Acceptance  Method: Explanation  Response: Verbalizes Understanding    Provider Follow-Up, taught by Brianna Pearce RN at 11/15/2024 12:25 PM.  Learner: Patient  Readiness: Acceptance  Method: Explanation  Response: Verbalizes Understanding    Signs/Symptoms, taught by Brianna Pearce RN at 11/15/2024 12:25 PM.  Learner: Patient  Readiness: Acceptance  Method: Explanation  Response: Verbalizes " Understanding    Unresolved/Worsening Symptoms, taught by Brianna Pearce, RN at 11/15/2024 12:25 PM.  Learner: Patient  Readiness: Acceptance  Method: Explanation  Response: Verbalizes Understanding    Provider Follow-Up, taught by Brianna Pearce, RN at 11/15/2024 12:25 PM.  Learner: Patient  Readiness: Acceptance  Method: Explanation  Response: Verbalizes Understanding          Brianna HORTON  Ambulatory Case Management    11/15/2024, 12:25 EST

## 2024-11-15 NOTE — TELEPHONE ENCOUNTER
RONALD MCCABE/ [facility] CALLED. THEY HAVE BEEN TRYING TO GET WITH PATIENT SINCE SHE GOT RELEASED FROM HOSPITAL  BUT PT HAS BEEN BUSY. DR OLIVEIRA SIGNED AN ORDER 10/18 FOR THE HOME HEALTH ORDER BUT THEY NEED TO DO A NEW ONE AND ARE NEEDING A VERBAL ORDER BY MONDAY MORNING SINCE THEY PLAN TO SEE PATIENT THAT DAY. PT HAS NOT BEEN SEEN IN OFFICE SINCE 10/21 BY DR BLAIR. I INFORMED RONALD THAT WE NEED TO CHECK WITH DR OLIVEIRA WHEN HE RETURNS AS DR OLIVEIRA MAY WANT TO SEE PATIENT PRIOR TO SIGNING OFF ON THIS AGAIN SINCE PT HAS NOT BEEN SEEN SINCE THEN BUT WE WILL HAVE TO CHECK AND SEE PRIOR TO DOING THIS.    PLEASE CALL RONALD BACK WITH ANSWER # [phone].    THANK YOU

## 2024-11-18 RX ORDER — ACYCLOVIR 400 MG/1
400 TABLET ORAL 2 TIMES DAILY
Qty: 60 TABLET | Refills: 2 | Status: SHIPPED | OUTPATIENT
Start: 2024-11-18

## 2024-11-29 ENCOUNTER — LAB (OUTPATIENT)
Facility: HOSPITAL | Age: 69
End: 2024-11-29
Payer: MEDICARE

## 2024-11-29 DIAGNOSIS — R07.89 ATYPICAL CHEST PAIN: ICD-10-CM

## 2024-11-29 DIAGNOSIS — R74.8 ELEVATED LIVER ENZYMES: ICD-10-CM

## 2024-11-29 DIAGNOSIS — E85.81 LIGHT CHAIN (AL) AMYLOIDOSIS: ICD-10-CM

## 2024-11-29 LAB
ALBUMIN SERPL-MCNC: 4.3 G/DL (ref 3.5–5.2)
ALBUMIN/GLOB SERPL: 2.2 G/DL
ALP SERPL-CCNC: 71 U/L (ref 39–117)
ALT SERPL W P-5'-P-CCNC: 61 U/L (ref 1–33)
ANION GAP SERPL CALCULATED.3IONS-SCNC: 9.1 MMOL/L (ref 5–15)
AST SERPL-CCNC: 37 U/L (ref 1–32)
BASOPHILS # BLD AUTO: 0.02 10*3/MM3 (ref 0–0.2)
BASOPHILS NFR BLD AUTO: 0.3 % (ref 0–1.5)
BILIRUB SERPL-MCNC: 0.6 MG/DL (ref 0–1.2)
BUN SERPL-MCNC: 21 MG/DL (ref 8–23)
BUN/CREAT SERPL: 25.9 (ref 7–25)
CALCIUM SPEC-SCNC: 9 MG/DL (ref 8.6–10.5)
CHLORIDE SERPL-SCNC: 105 MMOL/L (ref 98–107)
CO2 SERPL-SCNC: 27.9 MMOL/L (ref 22–29)
CREAT SERPL-MCNC: 0.81 MG/DL (ref 0.57–1)
DEPRECATED RDW RBC AUTO: 47.4 FL (ref 37–54)
EGFRCR SERPLBLD CKD-EPI 2021: 78.7 ML/MIN/1.73
EOSINOPHIL # BLD AUTO: 0.12 10*3/MM3 (ref 0–0.4)
EOSINOPHIL NFR BLD AUTO: 2 % (ref 0.3–6.2)
ERYTHROCYTE [DISTWIDTH] IN BLOOD BY AUTOMATED COUNT: 14.1 % (ref 12.3–15.4)
GLOBULIN UR ELPH-MCNC: 2 GM/DL
GLUCOSE SERPL-MCNC: 83 MG/DL (ref 65–99)
HCT VFR BLD AUTO: 45 % (ref 34–46.6)
HGB BLD-MCNC: 14.1 G/DL (ref 12–15.9)
IMM GRANULOCYTES # BLD AUTO: 0.03 10*3/MM3 (ref 0–0.05)
IMM GRANULOCYTES NFR BLD AUTO: 0.5 % (ref 0–0.5)
LYMPHOCYTES # BLD AUTO: 2.25 10*3/MM3 (ref 0.7–3.1)
LYMPHOCYTES NFR BLD AUTO: 36.6 % (ref 19.6–45.3)
MCH RBC QN AUTO: 29 PG (ref 26.6–33)
MCHC RBC AUTO-ENTMCNC: 31.3 G/DL (ref 31.5–35.7)
MCV RBC AUTO: 92.4 FL (ref 79–97)
MONOCYTES # BLD AUTO: 0.43 10*3/MM3 (ref 0.1–0.9)
MONOCYTES NFR BLD AUTO: 7 % (ref 5–12)
NEUTROPHILS NFR BLD AUTO: 3.3 10*3/MM3 (ref 1.7–7)
NEUTROPHILS NFR BLD AUTO: 53.6 % (ref 42.7–76)
NRBC BLD AUTO-RTO: 0 /100 WBC (ref 0–0.2)
PLATELET # BLD AUTO: 191 10*3/MM3 (ref 140–450)
PMV BLD AUTO: 10.3 FL (ref 6–12)
POTASSIUM SERPL-SCNC: 4.2 MMOL/L (ref 3.5–5.2)
PROT SERPL-MCNC: 6.3 G/DL (ref 6–8.5)
RBC # BLD AUTO: 4.87 10*6/MM3 (ref 3.77–5.28)
SODIUM SERPL-SCNC: 142 MMOL/L (ref 136–145)
WBC NRBC COR # BLD AUTO: 6.15 10*3/MM3 (ref 3.4–10.8)

## 2024-11-29 PROCEDURE — 80053 COMPREHEN METABOLIC PANEL: CPT

## 2024-11-29 PROCEDURE — 85025 COMPLETE CBC W/AUTO DIFF WBC: CPT

## 2024-11-29 PROCEDURE — 36415 COLL VENOUS BLD VENIPUNCTURE: CPT

## 2024-12-10 DIAGNOSIS — F51.01 PRIMARY INSOMNIA: ICD-10-CM

## 2024-12-10 RX ORDER — ROSUVASTATIN CALCIUM 20 MG/1
20 TABLET, COATED ORAL DAILY
Qty: 30 TABLET | Refills: 0 | OUTPATIENT
Start: 2024-12-10

## 2024-12-10 RX ORDER — ZOLPIDEM TARTRATE 10 MG/1
10 TABLET ORAL NIGHTLY PRN
Qty: 30 TABLET | Refills: 1 | Status: SHIPPED | OUTPATIENT
Start: 2024-12-10

## 2024-12-13 DIAGNOSIS — E78.00 HIGH CHOLESTEROL: Primary | ICD-10-CM

## 2024-12-13 RX ORDER — ROSUVASTATIN CALCIUM 20 MG/1
20 TABLET, COATED ORAL NIGHTLY
Qty: 90 TABLET | Refills: 1 | Status: SHIPPED | OUTPATIENT
Start: 2024-12-13 | End: 2024-12-16 | Stop reason: CLARIF

## 2024-12-14 DIAGNOSIS — E78.1 HYPERTRIGLYCERIDEMIA: ICD-10-CM

## 2024-12-16 ENCOUNTER — TELEPHONE (OUTPATIENT)
Dept: FAMILY MEDICINE CLINIC | Facility: CLINIC | Age: 69
End: 2024-12-16
Payer: MEDICARE

## 2024-12-16 ENCOUNTER — DOCUMENTATION (OUTPATIENT)
Dept: FAMILY MEDICINE CLINIC | Facility: CLINIC | Age: 69
End: 2024-12-16
Payer: MEDICARE

## 2024-12-16 DIAGNOSIS — E78.00 HIGH CHOLESTEROL: Primary | ICD-10-CM

## 2024-12-16 DIAGNOSIS — E78.1 HYPERTRIGLYCERIDEMIA: ICD-10-CM

## 2024-12-16 RX ORDER — ICOSAPENT ETHYL 1 G/1
2 CAPSULE ORAL DAILY
Qty: 60 CAPSULE | Refills: 2 | Status: SHIPPED | OUTPATIENT
Start: 2024-12-16

## 2024-12-16 RX ORDER — ROSUVASTATIN CALCIUM 40 MG/1
40 TABLET, COATED ORAL DAILY
Qty: 90 TABLET | Refills: 0 | Status: SHIPPED | OUTPATIENT
Start: 2024-12-16

## 2024-12-16 RX ORDER — ROSUVASTATIN CALCIUM 40 MG/1
40 TABLET, COATED ORAL DAILY
COMMUNITY
End: 2024-12-16 | Stop reason: SDUPTHER

## 2024-12-16 NOTE — TELEPHONE ENCOUNTER
Rx Refill Note  Requested Prescriptions     Pending Prescriptions Disp Refills    icosapent ethyl (VASCEPA) 1 g capsule capsule [Pharmacy Med Name: ICOSAPENT ETHYL 1GM CAPSULES] 60 capsule 2     Sig: TAKE 2 CAPSULES BY MOUTH DAILY      Last office visit with prescribing clinician: Visit date not found   Last telemedicine visit with prescribing clinician: 9/12/2024   Next office visit with prescribing clinician: Visit date not found                         Would you like a call back once the refill request has been completed: [] Yes [] No    If the office needs to give you a call back, can they leave a voicemail: [] Yes [] No    Fco English MA  12/16/24, 09:43 EST

## 2024-12-16 NOTE — TELEPHONE ENCOUNTER
"    Caller: Cynthia Mensah \"YOLETTE\"    Relationship: Self    Best call back number: [phone]     What medication are you requesting: ROSUVASTATIN 40MG     If a prescription is needed, what is your preferred pharmacy and phone number: [id number] - [place], [place] - [address] [facility] AT [facility] OF [address] & [place] - [phone] [facility] [phone] FX     Additional notes:    PATIENT STATES THIS MEDICATION WAS INCREASED A FEW MONTHS AGO AND SHE IS NOW NEEDING A REFILL     PLEASE CALL PATIENT   "
Rx sent to [facility]   
Spontaneous, unlabored and symmetrical

## 2024-12-18 DIAGNOSIS — F41.9 ANXIETY: ICD-10-CM

## 2024-12-20 RX ORDER — ALPRAZOLAM 0.25 MG/1
0.25 TABLET ORAL 2 TIMES DAILY PRN
Qty: 60 TABLET | Refills: 1 | Status: SHIPPED | OUTPATIENT
Start: 2024-12-20

## 2024-12-20 NOTE — TELEPHONE ENCOUNTER
CCP is negative.  No evidence of rheumatoid arthritis OK PER DR. OLIVEIRA COMPLETED    ----- Message from Martha Clinton sent at 5/20/2019  3:43 PM EDT -----  PT NEEDS A REFILL ON CETRIZINE 10MG 1QD #90  HCTZ 25MG 1QD #90    OLOPATADINEHCL DROPS 0.1% 5ML 1 DROP EACH EYE TWICE A DAY    LOPERAMIDE 2MG AFTER EACH LOOSE STOOL #30  [facility] ON [address]   ALL NEED REFILLS PLEASE    [phone]

## 2025-01-03 ENCOUNTER — TELEPHONE (OUTPATIENT)
Dept: ONCOLOGY | Facility: CLINIC | Age: 70
End: 2025-01-03
Payer: MEDICARE

## 2025-01-15 ENCOUNTER — TRANSCRIBE ORDERS (OUTPATIENT)
Dept: ADMINISTRATIVE | Facility: HOSPITAL | Age: 70
End: 2025-01-15
Payer: MEDICARE

## 2025-01-15 DIAGNOSIS — R93.89 ABNORMAL CT OF THE CHEST: Primary | ICD-10-CM

## 2025-01-15 DIAGNOSIS — R07.9 CHEST PAIN, UNSPECIFIED TYPE: ICD-10-CM

## 2025-01-17 ENCOUNTER — APPOINTMENT (OUTPATIENT)
Dept: GENERAL RADIOLOGY | Facility: HOSPITAL | Age: 70
DRG: 194 | End: 2025-01-17
Payer: MEDICARE

## 2025-01-17 ENCOUNTER — HOSPITAL ENCOUNTER (INPATIENT)
Facility: HOSPITAL | Age: 70
LOS: 6 days | Discharge: HOME | DRG: 194 | End: 2025-01-23
Attending: EMERGENCY MEDICINE | Admitting: HOSPITALIST
Payer: MEDICARE

## 2025-01-17 ENCOUNTER — APPOINTMENT (OUTPATIENT)
Dept: CT IMAGING | Facility: HOSPITAL | Age: 70
DRG: 194 | End: 2025-01-17
Payer: MEDICARE

## 2025-01-17 ENCOUNTER — TELEPHONE (OUTPATIENT)
Dept: ONCOLOGY | Facility: CLINIC | Age: 70
End: 2025-01-17
Payer: MEDICARE

## 2025-01-17 DIAGNOSIS — R76.8 SCL-70 ANTIBODY POSITIVE: ICD-10-CM

## 2025-01-17 DIAGNOSIS — R93.89 ABNORMAL CT SCAN: ICD-10-CM

## 2025-01-17 DIAGNOSIS — J18.9 COMMUNITY ACQUIRED PNEUMONIA, UNSPECIFIED LATERALITY: Primary | ICD-10-CM

## 2025-01-17 DIAGNOSIS — R09.02 HYPOXIA: ICD-10-CM

## 2025-01-17 DIAGNOSIS — R07.89 ATYPICAL CHEST PAIN: ICD-10-CM

## 2025-01-17 DIAGNOSIS — R09.1 PLEURITIS: ICD-10-CM

## 2025-01-17 LAB
ALBUMIN SERPL-MCNC: 4.1 G/DL (ref 3.5–5.2)
ALBUMIN/GLOB SERPL: 1.5 G/DL
ALP SERPL-CCNC: 72 U/L (ref 39–117)
ALT SERPL W P-5'-P-CCNC: 22 U/L (ref 1–33)
ANION GAP SERPL CALCULATED.3IONS-SCNC: 9.2 MMOL/L (ref 5–15)
AST SERPL-CCNC: 21 U/L (ref 1–32)
BASOPHILS # BLD AUTO: 0.02 10*3/MM3 (ref 0–0.2)
BASOPHILS NFR BLD AUTO: 0.2 % (ref 0–1.5)
BILIRUB SERPL-MCNC: 0.7 MG/DL (ref 0–1.2)
BUN SERPL-MCNC: 12 MG/DL (ref 8–23)
BUN/CREAT SERPL: 14.6 (ref 7–25)
CALCIUM SPEC-SCNC: 9.6 MG/DL (ref 8.6–10.5)
CHLORIDE SERPL-SCNC: 102 MMOL/L (ref 98–107)
CO2 SERPL-SCNC: 25.8 MMOL/L (ref 22–29)
CREAT SERPL-MCNC: 0.82 MG/DL (ref 0.57–1)
D-LACTATE SERPL-SCNC: 0.7 MMOL/L (ref 0.5–2)
DEPRECATED RDW RBC AUTO: 43.3 FL (ref 37–54)
EGFRCR SERPLBLD CKD-EPI 2021: 77.5 ML/MIN/1.73
EOSINOPHIL # BLD AUTO: 0.29 10*3/MM3 (ref 0–0.4)
EOSINOPHIL NFR BLD AUTO: 2.8 % (ref 0.3–6.2)
ERYTHROCYTE [DISTWIDTH] IN BLOOD BY AUTOMATED COUNT: 13.7 % (ref 12.3–15.4)
GEN 5 1HR TROPONIN T REFLEX: 31 NG/L
GLOBULIN UR ELPH-MCNC: 2.7 GM/DL
GLUCOSE SERPL-MCNC: 88 MG/DL (ref 65–99)
HCT VFR BLD AUTO: 42.1 % (ref 34–46.6)
HGB BLD-MCNC: 13.9 G/DL (ref 12–15.9)
HOLD SPECIMEN: NORMAL
HOLD SPECIMEN: NORMAL
IMM GRANULOCYTES # BLD AUTO: 0.02 10*3/MM3 (ref 0–0.05)
IMM GRANULOCYTES NFR BLD AUTO: 0.2 % (ref 0–0.5)
LYMPHOCYTES # BLD AUTO: 2.04 10*3/MM3 (ref 0.7–3.1)
LYMPHOCYTES NFR BLD AUTO: 19.8 % (ref 19.6–45.3)
MAGNESIUM SERPL-MCNC: 2.3 MG/DL (ref 1.6–2.4)
MCH RBC QN AUTO: 28.8 PG (ref 26.6–33)
MCHC RBC AUTO-ENTMCNC: 33 G/DL (ref 31.5–35.7)
MCV RBC AUTO: 87.2 FL (ref 79–97)
MONOCYTES # BLD AUTO: 0.9 10*3/MM3 (ref 0.1–0.9)
MONOCYTES NFR BLD AUTO: 8.7 % (ref 5–12)
NEUTROPHILS NFR BLD AUTO: 68.3 % (ref 42.7–76)
NEUTROPHILS NFR BLD AUTO: 7.02 10*3/MM3 (ref 1.7–7)
NRBC BLD AUTO-RTO: 0 /100 WBC (ref 0–0.2)
NT-PROBNP SERPL-MCNC: 320 PG/ML (ref 0–900)
PHOSPHATE SERPL-MCNC: 2.6 MG/DL (ref 2.5–4.5)
PLATELET # BLD AUTO: 238 10*3/MM3 (ref 140–450)
PMV BLD AUTO: 10.3 FL (ref 6–12)
POTASSIUM SERPL-SCNC: 3.9 MMOL/L (ref 3.5–5.2)
PROT SERPL-MCNC: 6.8 G/DL (ref 6–8.5)
QT INTERVAL: 409 MS
QTC INTERVAL: 507 MS
RBC # BLD AUTO: 4.83 10*6/MM3 (ref 3.77–5.28)
SODIUM SERPL-SCNC: 137 MMOL/L (ref 136–145)
TROPONIN T % DELTA: -9
TROPONIN T NUMERIC DELTA: -3 NG/L
TROPONIN T SERPL HS-MCNC: 34 NG/L
WBC NRBC COR # BLD AUTO: 10.29 10*3/MM3 (ref 3.4–10.8)
WHOLE BLOOD HOLD COAG: NORMAL
WHOLE BLOOD HOLD SPECIMEN: NORMAL

## 2025-01-17 PROCEDURE — 25010000002 MORPHINE PER 10 MG: Performed by: EMERGENCY MEDICINE

## 2025-01-17 PROCEDURE — 83735 ASSAY OF MAGNESIUM: CPT | Performed by: EMERGENCY MEDICINE

## 2025-01-17 PROCEDURE — 87449 NOS EACH ORGANISM AG IA: CPT | Performed by: NURSE PRACTITIONER

## 2025-01-17 PROCEDURE — 84484 ASSAY OF TROPONIN QUANT: CPT | Performed by: EMERGENCY MEDICINE

## 2025-01-17 PROCEDURE — 84100 ASSAY OF PHOSPHORUS: CPT | Performed by: EMERGENCY MEDICINE

## 2025-01-17 PROCEDURE — 25010000002 KETOROLAC TROMETHAMINE PER 15 MG: Performed by: PHYSICIAN ASSISTANT

## 2025-01-17 PROCEDURE — 25510000001 IOPAMIDOL PER 1 ML: Performed by: EMERGENCY MEDICINE

## 2025-01-17 PROCEDURE — 80053 COMPREHEN METABOLIC PANEL: CPT

## 2025-01-17 PROCEDURE — 71045 X-RAY EXAM CHEST 1 VIEW: CPT

## 2025-01-17 PROCEDURE — 84484 ASSAY OF TROPONIN QUANT: CPT

## 2025-01-17 PROCEDURE — 93005 ELECTROCARDIOGRAM TRACING: CPT | Performed by: EMERGENCY MEDICINE

## 2025-01-17 PROCEDURE — 85025 COMPLETE CBC W/AUTO DIFF WBC: CPT

## 2025-01-17 PROCEDURE — 25010000002 PIPERACILLIN SOD-TAZOBACTAM PER 1 G: Performed by: PHYSICIAN ASSISTANT

## 2025-01-17 PROCEDURE — 99285 EMERGENCY DEPT VISIT HI MDM: CPT

## 2025-01-17 PROCEDURE — 93005 ELECTROCARDIOGRAM TRACING: CPT

## 2025-01-17 PROCEDURE — 25010000002 ONDANSETRON PER 1 MG: Performed by: NURSE PRACTITIONER

## 2025-01-17 PROCEDURE — 25010000002 DIPHENHYDRAMINE PER 50 MG: Performed by: PHYSICIAN ASSISTANT

## 2025-01-17 PROCEDURE — 93010 ELECTROCARDIOGRAM REPORT: CPT | Performed by: INTERNAL MEDICINE

## 2025-01-17 PROCEDURE — 87899 AGENT NOS ASSAY W/OPTIC: CPT | Performed by: NURSE PRACTITIONER

## 2025-01-17 PROCEDURE — 25010000002 ONDANSETRON PER 1 MG: Performed by: EMERGENCY MEDICINE

## 2025-01-17 PROCEDURE — 83605 ASSAY OF LACTIC ACID: CPT | Performed by: PHYSICIAN ASSISTANT

## 2025-01-17 PROCEDURE — 87040 BLOOD CULTURE FOR BACTERIA: CPT | Performed by: PHYSICIAN ASSISTANT

## 2025-01-17 PROCEDURE — 83880 ASSAY OF NATRIURETIC PEPTIDE: CPT | Performed by: EMERGENCY MEDICINE

## 2025-01-17 PROCEDURE — 25810000003 SODIUM CHLORIDE 0.9 % SOLUTION: Performed by: EMERGENCY MEDICINE

## 2025-01-17 PROCEDURE — 25010000002 MORPHINE PER 10 MG: Performed by: NURSE PRACTITIONER

## 2025-01-17 PROCEDURE — 71275 CT ANGIOGRAPHY CHEST: CPT

## 2025-01-17 PROCEDURE — 36415 COLL VENOUS BLD VENIPUNCTURE: CPT | Performed by: EMERGENCY MEDICINE

## 2025-01-17 RX ORDER — KETOROLAC TROMETHAMINE 30 MG/ML
30 INJECTION, SOLUTION INTRAMUSCULAR; INTRAVENOUS ONCE
Status: COMPLETED | OUTPATIENT
Start: 2025-01-17 | End: 2025-01-17

## 2025-01-17 RX ORDER — SODIUM CHLORIDE 0.9 % (FLUSH) 0.9 %
10 SYRINGE (ML) INJECTION AS NEEDED
Status: DISCONTINUED | OUTPATIENT
Start: 2025-01-17 | End: 2025-01-23 | Stop reason: HOSPADM

## 2025-01-17 RX ORDER — MORPHINE SULFATE 2 MG/ML
2 INJECTION, SOLUTION INTRAMUSCULAR; INTRAVENOUS EVERY 4 HOURS PRN
Status: DISCONTINUED | OUTPATIENT
Start: 2025-01-17 | End: 2025-01-18

## 2025-01-17 RX ORDER — SODIUM CHLORIDE 0.9 % (FLUSH) 0.9 %
10 SYRINGE (ML) INJECTION EVERY 12 HOURS SCHEDULED
Status: DISCONTINUED | OUTPATIENT
Start: 2025-01-18 | End: 2025-01-23 | Stop reason: HOSPADM

## 2025-01-17 RX ORDER — NITROGLYCERIN 0.4 MG/1
0.4 TABLET SUBLINGUAL
Status: DISCONTINUED | OUTPATIENT
Start: 2025-01-17 | End: 2025-01-17

## 2025-01-17 RX ORDER — DOXYCYCLINE 100 MG/1
100 CAPSULE ORAL ONCE
Status: DISCONTINUED | OUTPATIENT
Start: 2025-01-17 | End: 2025-01-17

## 2025-01-17 RX ORDER — FAMOTIDINE 10 MG/ML
20 INJECTION, SOLUTION INTRAVENOUS ONCE
Status: COMPLETED | OUTPATIENT
Start: 2025-01-17 | End: 2025-01-17

## 2025-01-17 RX ORDER — NICOTINE POLACRILEX 4 MG
15 LOZENGE BUCCAL
Status: DISCONTINUED | OUTPATIENT
Start: 2025-01-17 | End: 2025-01-18

## 2025-01-17 RX ORDER — IOPAMIDOL 755 MG/ML
100 INJECTION, SOLUTION INTRAVASCULAR
Status: COMPLETED | OUTPATIENT
Start: 2025-01-17 | End: 2025-01-17

## 2025-01-17 RX ORDER — ACETAMINOPHEN 160 MG/5ML
650 SOLUTION ORAL EVERY 4 HOURS PRN
Status: DISCONTINUED | OUTPATIENT
Start: 2025-01-17 | End: 2025-01-23 | Stop reason: HOSPADM

## 2025-01-17 RX ORDER — SODIUM CHLORIDE 9 MG/ML
40 INJECTION, SOLUTION INTRAVENOUS AS NEEDED
Status: DISCONTINUED | OUTPATIENT
Start: 2025-01-17 | End: 2025-01-23 | Stop reason: HOSPADM

## 2025-01-17 RX ORDER — NAPROXEN 500 MG/1
500 TABLET ORAL 2 TIMES DAILY PRN
Qty: 15 TABLET | Refills: 0 | Status: SHIPPED | OUTPATIENT
Start: 2025-01-17

## 2025-01-17 RX ORDER — DIPHENHYDRAMINE HYDROCHLORIDE 50 MG/ML
25 INJECTION INTRAMUSCULAR; INTRAVENOUS ONCE
Status: COMPLETED | OUTPATIENT
Start: 2025-01-17 | End: 2025-01-17

## 2025-01-17 RX ORDER — AMOXICILLIN 250 MG
2 CAPSULE ORAL 2 TIMES DAILY PRN
Status: DISCONTINUED | OUTPATIENT
Start: 2025-01-17 | End: 2025-01-23 | Stop reason: HOSPADM

## 2025-01-17 RX ORDER — ONDANSETRON 2 MG/ML
4 INJECTION INTRAMUSCULAR; INTRAVENOUS ONCE
Status: COMPLETED | OUTPATIENT
Start: 2025-01-17 | End: 2025-01-17

## 2025-01-17 RX ORDER — IBUPROFEN 600 MG/1
1 TABLET ORAL
Status: DISCONTINUED | OUTPATIENT
Start: 2025-01-17 | End: 2025-01-18

## 2025-01-17 RX ORDER — ACETAMINOPHEN 650 MG/1
650 SUPPOSITORY RECTAL EVERY 4 HOURS PRN
Status: DISCONTINUED | OUTPATIENT
Start: 2025-01-17 | End: 2025-01-23 | Stop reason: HOSPADM

## 2025-01-17 RX ORDER — BISACODYL 5 MG/1
5 TABLET, DELAYED RELEASE ORAL DAILY PRN
Status: DISCONTINUED | OUTPATIENT
Start: 2025-01-17 | End: 2025-01-23 | Stop reason: HOSPADM

## 2025-01-17 RX ORDER — DEXTROSE MONOHYDRATE 25 G/50ML
25 INJECTION, SOLUTION INTRAVENOUS
Status: DISCONTINUED | OUTPATIENT
Start: 2025-01-17 | End: 2025-01-18

## 2025-01-17 RX ORDER — POLYETHYLENE GLYCOL 3350 17 G/17G
17 POWDER, FOR SOLUTION ORAL DAILY PRN
Status: DISCONTINUED | OUTPATIENT
Start: 2025-01-17 | End: 2025-01-23 | Stop reason: HOSPADM

## 2025-01-17 RX ORDER — ACETAMINOPHEN 325 MG/1
650 TABLET ORAL EVERY 4 HOURS PRN
Status: DISCONTINUED | OUTPATIENT
Start: 2025-01-17 | End: 2025-01-23 | Stop reason: HOSPADM

## 2025-01-17 RX ORDER — FAMOTIDINE 20 MG/1
20 TABLET, FILM COATED ORAL 2 TIMES DAILY PRN
Status: DISCONTINUED | OUTPATIENT
Start: 2025-01-17 | End: 2025-01-23 | Stop reason: HOSPADM

## 2025-01-17 RX ORDER — DOXYCYCLINE 100 MG/1
100 CAPSULE ORAL 2 TIMES DAILY
Qty: 14 CAPSULE | Refills: 0 | Status: SHIPPED | OUTPATIENT
Start: 2025-01-17

## 2025-01-17 RX ORDER — BISACODYL 10 MG
10 SUPPOSITORY, RECTAL RECTAL DAILY PRN
Status: DISCONTINUED | OUTPATIENT
Start: 2025-01-17 | End: 2025-01-23 | Stop reason: HOSPADM

## 2025-01-17 RX ORDER — ONDANSETRON 2 MG/ML
4 INJECTION INTRAMUSCULAR; INTRAVENOUS EVERY 6 HOURS PRN
Status: DISCONTINUED | OUTPATIENT
Start: 2025-01-17 | End: 2025-01-23 | Stop reason: HOSPADM

## 2025-01-17 RX ORDER — MORPHINE SULFATE 2 MG/ML
4 INJECTION, SOLUTION INTRAMUSCULAR; INTRAVENOUS ONCE
Status: COMPLETED | OUTPATIENT
Start: 2025-01-17 | End: 2025-01-17

## 2025-01-17 RX ORDER — MORPHINE SULFATE 2 MG/ML
2 INJECTION, SOLUTION INTRAMUSCULAR; INTRAVENOUS ONCE
Status: COMPLETED | OUTPATIENT
Start: 2025-01-17 | End: 2025-01-17

## 2025-01-17 RX ORDER — INSULIN LISPRO 100 [IU]/ML
2-7 INJECTION, SOLUTION INTRAVENOUS; SUBCUTANEOUS
Status: DISCONTINUED | OUTPATIENT
Start: 2025-01-18 | End: 2025-01-18

## 2025-01-17 RX ADMIN — ONDANSETRON 4 MG: 2 INJECTION, SOLUTION INTRAMUSCULAR; INTRAVENOUS at 15:02

## 2025-01-17 RX ADMIN — IOPAMIDOL 71 ML: 755 INJECTION, SOLUTION INTRAVENOUS at 17:40

## 2025-01-17 RX ADMIN — MORPHINE SULFATE 2 MG: 2 INJECTION, SOLUTION INTRAMUSCULAR; INTRAVENOUS at 15:02

## 2025-01-17 RX ADMIN — ONDANSETRON 4 MG: 2 INJECTION, SOLUTION INTRAMUSCULAR; INTRAVENOUS at 23:48

## 2025-01-17 RX ADMIN — MORPHINE SULFATE 2 MG: 2 INJECTION, SOLUTION INTRAMUSCULAR; INTRAVENOUS at 23:49

## 2025-01-17 RX ADMIN — KETOROLAC TROMETHAMINE 30 MG: 30 INJECTION, SOLUTION INTRAMUSCULAR at 19:36

## 2025-01-17 RX ADMIN — MORPHINE SULFATE 4 MG: 2 INJECTION, SOLUTION INTRAMUSCULAR; INTRAVENOUS at 16:22

## 2025-01-17 RX ADMIN — DIPHENHYDRAMINE HYDROCHLORIDE 25 MG: 50 INJECTION, SOLUTION INTRAMUSCULAR; INTRAVENOUS at 16:46

## 2025-01-17 RX ADMIN — PIPERACILLIN AND TAZOBACTAM 4.5 G: 4; .5 INJECTION, POWDER, FOR SOLUTION INTRAVENOUS at 19:40

## 2025-01-17 RX ADMIN — FAMOTIDINE 20 MG: 10 INJECTION INTRAVENOUS at 16:45

## 2025-01-17 RX ADMIN — SODIUM CHLORIDE 1000 ML: 9 INJECTION, SOLUTION INTRAVENOUS at 15:01

## 2025-01-17 RX ADMIN — Medication 10 ML: at 23:49

## 2025-01-17 NOTE — TELEPHONE ENCOUNTER
Called patient in response to [employer] message. Reporting the same pain she had 2 months ago. At that time she was worked up in the hospital at [facility] and diagnosed with a R pleural effusion. Patient states she in the car now on the way to the ER. Affirmed this decision and let her know I would be watching her chart closely. Pt v/u. Nakita Wilkins RN

## 2025-01-17 NOTE — ED PROVIDER NOTES
EMERGENCY DEPARTMENT ENCOUNTER  Room Number:  P681/1  PCP: Arian Peterson MD  Independent Historians: Patient  Date of encounter:  1/18/2025  Patient Care Team:  Arian Peterson MD as PCP - General (Internal Medicine)  Nav Sal MD as Consulting Physician (Hematology and Oncology)  Darren Pruitt MD as Referring Physician (Cardiology)     HPI:  Chief Complaint: had concerns including Chest Pain.     A complete HPI/ROS/PMH/PSH/SH/FH are unobtainable due to: None    Chronic or social conditions impacting patient care (Social Determinants of Health): None      Context: Cynthia Portillo is a 69 y.o. female with a medical history of amyloidosis, hypercholesterolemia, diabetes, hypertension who presents to the ED c/o acute chest pain.  Patient began having left lower chest discomfort earlier today.  She states she has had this issue in the past and had a pleural effusion at that time that had to be drained.  She has had a minor nonproductive cough.  No fevers or chills.  No lower extremity swelling.  No history of DVT or PE.  Patient has a prior history of lung surgery for removal of a amyloidosis plaque.  She follows at [facility].  She is on multiple medications for her amyloidosis.    Review of prior external notes (non-ED) -and- Review of prior external test results outside of this encounter:  Discharge summary from 11/10/2024 reviewed.  Patient was admitted for fever and shortness of breath.  No evidence of ACS during that hospitalization.  Patient symptoms were felt to be secondary to inflammation and she was started on prednisone.    PAST MEDICAL HISTORY  Active Ambulatory Problems     Diagnosis Date Noted    S/P arthroscopy of shoulder 06/22/2017    Dyspareunia, female 02/13/2019    Rhinosinusitis 02/13/2019    Hypothyroid 02/13/2019    High cholesterol 02/13/2019    Pelvic cramping 04/20/2019    Vaginal atrophy 04/20/2019    Major depressive disorder with single episode, in full remission  05/12/2019    Attention deficit disorder (ADD) without hyperactivity 05/12/2019    Hepatomegaly 06/07/2019    NAFLD (nonalcoholic fatty liver disease) 06/07/2019    Fecal smearing 09/11/2019    Elevated transaminase level 09/11/2019    Chronic right shoulder pain 09/19/2018    Chronic hypotension 11/13/2019    Arthropathy, transient, shoulder, right 11/28/2018    Status post reverse arthroplasty of right shoulder 01/22/2019    Unstable angina 04/24/2020    Esophageal dysphagia 05/06/2020    Precordial chest pain 05/06/2020    Hypertriglyceridemia 07/05/2020    Primary insomnia 07/05/2020    Oral herpes 08/18/2020    Memory loss 09/02/2020    Chronic tension-type headache, not intractable 09/02/2020    Hyperglycemia 02/16/2021    Acute cystitis without hematuria 02/16/2021    Esophagitis, eosinophilic 02/18/2021    Functional diarrhea 02/18/2021    Dysphagia, cricopharyngeal 10/15/2021    Elevated serum immunoglobulin free light chain level 04/21/2022    Amyloidosis 05/04/2022    Arthritis     Status post recent immunosuppressive therapy 08/09/2022    Pneumonia of both lungs due to infectious organism 08/18/2022    Benign neoplasm of left breast 09/19/2022    FH: breast cancer 09/19/2022    Medicare annual wellness visit, subsequent 06/11/2024    Osteopenia 09/12/2024    Atypical chest pain 10/11/2024    Pleuritis 10/13/2024    Fever 11/07/2024    Type 2 diabetes mellitus, without long-term current use of insulin 11/07/2024     Resolved Ambulatory Problems     Diagnosis Date Noted    Acute respiratory failure with hypoxia 08/18/2022    Acute pulmonary edema 08/18/2022     Past Medical History:   Diagnosis Date    AL amyloidosis     Anxiety     Bowel perforation     COVID-19 07/2020    Depression     Diverticulitis of colon     Diverticulosis     GERD (gastroesophageal reflux disease)     History of Clostridioides difficile infection 2008    History of prediabetes     History of snoring     History of stress  incontinence     Hypercholesterolemia     Hyperlipidemia     Hypertension     Left ventricular hypertrophy     Liver disease     Seasonal allergies     SOB (shortness of breath)     Status post colostomy     Thyroid disease        PAST SURGICAL HISTORY  Past Surgical History:   Procedure Laterality Date    ABDOMINAL SURGERY  2005, 2014    ex lap x 2,  diverticulitis    ABDOMINOPLASTY  2016    ADENOIDECTOMY      APPENDECTOMY      CARDIAC CATHETERIZATION N/A 04/24/2020    Procedure: Coronary angiography;  Surgeon: Roberto Briones MD;  Location: [facility] CATH INVASIVE LOCATION;  Service: Cardiovascular;  Laterality: N/A;    CARDIAC CATHETERIZATION N/A 04/24/2020    Procedure: Left heart cath;  Surgeon: Roberto Briones MD;  [facility];  Service: Cardiovascular;  Laterality: N/A;    CARDIAC CATHETERIZATION N/A 04/24/2020    Procedure: Left ventriculography;  Surgeon: Roberto Briones MD;  Location: [facility] CATH INVASIVE LOCATION;  Service: Cardiovascular;  Laterality: N/A;    CARDIAC SURGERY      open heart surgery,    COLONOSCOPY  approx 2018    normal per pt     COLOSTOMY  2005    had colostomy and then is was reversed    COLOSTOMY CLOSURE  2006    CORRECTION HAMMER TOE Right 2017    ECTOPIC PREGNANCY SURGERY      1979, 1980    ENDOSCOPY N/A 05/27/2020    Procedure: ESOPHAGOGASTRODUODENOSCOPY WITH BIOPSY AND BIOPSY POLYPECTOMY AND MACIEL DIALATION;  Surgeon: Preethi Beck MD;  Location: [facility] ENDOSCOPY;  Service: Gastroenterology;  Laterality: N/A;  PRE: ESOPHAGEAL DYSPHAGIA  POST: Z LINE 46, ESOPHAGEAL SPASM, GASTRITIS, FUNDIC POLYP    ENDOSCOPY N/A 06/20/2023    ESOPHAGEAL DILATATION N/A 12/07/2021    Procedure: OR ESOPHAGEAL DILATATION with maciel dilators ;  Surgeon: Jamey Leslie MD;  Location: [facility] OR [facility];  Service: ENT;  Laterality: N/A;    ESOPHAGEAL MOTILITY STUDY N/A 02/03/2022    Procedure: ESOPHAGEAL MOTILITY STUDY;  Surgeon: Endo, Nurse  Performed;  Location: [facility] ENDOSCOPY;  Service: Gastroenterology;  Laterality: N/A;  dypshagia     FOOT SURGERY Right 12/2016    Second and third hammertoe corrections    KNEE ARTHROSCOPY Right 2009    KNEE SURGERY Right     REVISION / TAKEDOWN COLOSTOMY  2006    SHOULDER ARTHROSCOPY Right 2018    right shoulder arthroscopy with extensive rotator cuff, biceps and labral debridement, chondroplasty, & subacromial decompression with acromioplasty    SHOULDER SURGERY      HAS HAS COMPLETE SHOULDER ON RIGHT. LEFT SCOPED AND HAD 2ND SURGERY WITH HARDWARE PLACED    SHOULDER SURGERY Left     2012. 2013    TONSILLECTOMY      TOTAL SHOULDER REVERSE ARTHROPLASTY Right 2019    WISDOM TOOTH EXTRACTION         FAMILY HISTORY  Family History   Problem Relation Age of Onset    Hypertension Mother     Osteoporosis Mother     Skin cancer Mother     Heart disease Father     Hypertension Father     Breast cancer Maternal Grandmother     Ovarian cancer Neg Hx     Colon cancer Neg Hx     Deep vein thrombosis Neg Hx     Pulmonary embolism Neg Hx     Malig Hyperthermia Neg Hx        SOCIAL HISTORY  Social History     Socioeconomic History    Marital status: Single    Number of children: 2   Tobacco Use    Smoking status: Never     Passive exposure: Never    Smokeless tobacco: Never   Vaping Use    Vaping status: Never Used   Substance and Sexual Activity    Alcohol use: Not Currently     Alcohol/week: 1.0 standard drink of alcohol     Types: 1 Glasses of wine per week    Drug use: Never    Sexual activity: Defer       ALLERGIES  Azithromycin, Ezetimibe, Pravastatin, and Sulfa antibiotics    REVIEW OF SYSTEMS  Review of Systems  Included in HPI  All systems reviewed and negative except for those discussed in HPI.    PHYSICAL EXAM    I have reviewed the triage vital signs and nursing notes.    ED Triage Vitals   Temp Heart Rate Resp BP SpO2   01/17/25 1306 01/17/25 1306 01/17/25 1306 01/17/25 1313 01/17/25 1306   99.5 °F (37.5 °C) 95 18  119/94 98 %      Temp src Heart Rate Source Patient Position BP Location FiO2 (%)   01/17/25 1306 01/17/25 1306 -- -- --   Tympanic Monitor          Physical Exam  Constitutional:       General: She is not in acute distress.     Appearance: Normal appearance. She is not ill-appearing or toxic-appearing.   HENT:      Head: Normocephalic and atraumatic.      Nose: Nose normal.      Mouth/Throat:      Mouth: Mucous membranes are moist.      Pharynx: Oropharynx is clear.   Eyes:      Extraocular Movements: Extraocular movements intact.      Conjunctiva/sclera: Conjunctivae normal.      Pupils: Pupils are equal, round, and reactive to light.   Cardiovascular:      Rate and Rhythm: Normal rate and regular rhythm.      Pulses: Normal pulses.      Heart sounds: Normal heart sounds. No murmur heard.     No friction rub. No gallop.   Pulmonary:      Effort: Pulmonary effort is normal. No respiratory distress.      Breath sounds: Normal breath sounds. No stridor. No wheezing, rhonchi or rales.   Abdominal:      General: Abdomen is flat. There is no distension.      Palpations: Abdomen is soft.      Tenderness: There is no abdominal tenderness. There is no guarding or rebound.   Musculoskeletal:      Cervical back: Normal range of motion and neck supple.   Skin:     General: Skin is warm and dry.   Neurological:      General: No focal deficit present.      Mental Status: She is alert and oriented to person, place, and time. Mental status is at baseline.   Psychiatric:         Mood and Affect: Mood normal.         Behavior: Behavior normal.         Thought Content: Thought content normal.         Judgment: Judgment normal.         LAB RESULTS  Recent Results (from the past 24 hours)   ECG 12 Lead ED Triage Standing Order; Chest Pain    Collection Time: 01/17/25  1:09 PM   Result Value Ref Range    QT Interval 409 ms    QTC Interval 507 ms   Comprehensive Metabolic Panel    Collection Time: 01/17/25  1:18 PM    Specimen: Arm,  Right; Blood   Result Value Ref Range    Glucose 88 65 - 99 mg/dL    BUN 12 8 - 23 mg/dL    Creatinine 0.82 0.57 - 1.00 mg/dL    Sodium 137 136 - 145 mmol/L    Potassium 3.9 3.5 - 5.2 mmol/L    Chloride 102 98 - 107 mmol/L    CO2 25.8 22.0 - 29.0 mmol/L    Calcium 9.6 8.6 - 10.5 mg/dL    Total Protein 6.8 6.0 - 8.5 g/dL    Albumin 4.1 3.5 - 5.2 g/dL    ALT (SGPT) 22 1 - 33 U/L    AST (SGOT) 21 1 - 32 U/L    Alkaline Phosphatase 72 39 - 117 U/L    Total Bilirubin 0.7 0.0 - 1.2 mg/dL    Globulin 2.7 gm/dL    A/G Ratio 1.5 g/dL    BUN/Creatinine Ratio 14.6 7.0 - 25.0    Anion Gap 9.2 5.0 - 15.0 mmol/L    eGFR 77.5 >60.0 mL/min/1.73   High Sensitivity Troponin T    Collection Time: 01/17/25  1:18 PM    Specimen: Arm, Right; Blood   Result Value Ref Range    HS Troponin T 34 (H) <14 ng/L   Green Top (Gel)    Collection Time: 01/17/25  1:18 PM   Result Value Ref Range    Extra Tube Hold for add-ons.    Lavender Top    Collection Time: 01/17/25  1:18 PM   Result Value Ref Range    Extra Tube hold for add-on    Gold Top - SST    Collection Time: 01/17/25  1:18 PM   Result Value Ref Range    Extra Tube Hold for add-ons.    Light Blue Top    Collection Time: 01/17/25  1:18 PM   Result Value Ref Range    Extra Tube Hold for add-ons.    CBC Auto Differential    Collection Time: 01/17/25  1:18 PM    Specimen: Arm, Right; Blood   Result Value Ref Range    WBC 10.29 3.40 - 10.80 10*3/mm3    RBC 4.83 3.77 - 5.28 10*6/mm3    Hemoglobin 13.9 12.0 - 15.9 g/dL    Hematocrit 42.1 34.0 - 46.6 %    MCV 87.2 79.0 - 97.0 fL    MCH 28.8 26.6 - 33.0 pg    MCHC 33.0 31.5 - 35.7 g/dL    RDW 13.7 12.3 - 15.4 %    RDW-SD 43.3 37.0 - 54.0 fl    MPV 10.3 6.0 - 12.0 fL    Platelets 238 140 - 450 10*3/mm3    Neutrophil % 68.3 42.7 - 76.0 %    Lymphocyte % 19.8 19.6 - 45.3 %    Monocyte % 8.7 5.0 - 12.0 %    Eosinophil % 2.8 0.3 - 6.2 %    Basophil % 0.2 0.0 - 1.5 %    Immature Grans % 0.2 0.0 - 0.5 %    Neutrophils, Absolute 7.02 (H) 1.70 - 7.00  10*3/mm3    Lymphocytes, Absolute 2.04 0.70 - 3.10 10*3/mm3    Monocytes, Absolute 0.90 0.10 - 0.90 10*3/mm3    Eosinophils, Absolute 0.29 0.00 - 0.40 10*3/mm3    Basophils, Absolute 0.02 0.00 - 0.20 10*3/mm3    Immature Grans, Absolute 0.02 0.00 - 0.05 10*3/mm3    nRBC 0.0 0.0 - 0.2 /100 WBC   High Sensitivity Troponin T 1Hr    Collection Time: 01/17/25  2:18 PM    Specimen: Arm, Right; Blood   Result Value Ref Range    HS Troponin T 31 (H) <14 ng/L    Troponin T Numeric Delta -3 ng/L    Troponin T % Delta -9 Abnormal if >/= 20%   BNP    Collection Time: 01/17/25  2:18 PM    Specimen: Arm, Right; Blood   Result Value Ref Range    proBNP 320.0 0.0 - 900.0 pg/mL   Magnesium    Collection Time: 01/17/25  2:18 PM    Specimen: Arm, Right; Blood   Result Value Ref Range    Magnesium 2.3 1.6 - 2.4 mg/dL   Phosphorus    Collection Time: 01/17/25  2:18 PM    Specimen: Arm, Right; Blood   Result Value Ref Range    Phosphorus 2.6 2.5 - 4.5 mg/dL   Lactic Acid, Plasma    Collection Time: 01/17/25  7:05 PM    Specimen: Hand, Left; Blood   Result Value Ref Range    Lactate 0.7 0.5 - 2.0 mmol/L   Legionella Antigen, Urine - Urine, Urine, Clean Catch    Collection Time: 01/17/25 11:48 PM    Specimen: Urine, Clean Catch   Result Value Ref Range    LEGIONELLA ANTIGEN, URINE Negative Negative   S. Pneumo Ag Urine or CSF - Urine, Urine, Clean Catch    Collection Time: 01/17/25 11:48 PM    Specimen: Urine, Clean Catch   Result Value Ref Range    Strep Pneumo Ag Negative Negative   CBC (No Diff)    Collection Time: 01/18/25  5:25 AM    Specimen: Blood   Result Value Ref Range    WBC 5.76 3.40 - 10.80 10*3/mm3    RBC 4.06 3.77 - 5.28 10*6/mm3    Hemoglobin 11.4 (L) 12.0 - 15.9 g/dL    Hematocrit 36.2 34.0 - 46.6 %    MCV 89.2 79.0 - 97.0 fL    MCH 28.1 26.6 - 33.0 pg    MCHC 31.5 31.5 - 35.7 g/dL    RDW 13.6 12.3 - 15.4 %    RDW-SD 44.5 37.0 - 54.0 fl    MPV 10.7 6.0 - 12.0 fL    Platelets 168 140 - 450 10*3/mm3   Comprehensive Metabolic  Panel    Collection Time: 01/18/25  5:25 AM    Specimen: Blood   Result Value Ref Range    Glucose 86 65 - 99 mg/dL    BUN 13 8 - 23 mg/dL    Creatinine 0.76 0.57 - 1.00 mg/dL    Sodium 135 (L) 136 - 145 mmol/L    Potassium 3.9 3.5 - 5.2 mmol/L    Chloride 101 98 - 107 mmol/L    CO2 24.3 22.0 - 29.0 mmol/L    Calcium 8.9 8.6 - 10.5 mg/dL    Total Protein 5.5 (L) 6.0 - 8.5 g/dL    Albumin 3.5 3.5 - 5.2 g/dL    ALT (SGPT) 17 1 - 33 U/L    AST (SGOT) 15 1 - 32 U/L    Alkaline Phosphatase 54 39 - 117 U/L    Total Bilirubin 0.6 0.0 - 1.2 mg/dL    Globulin 2.0 gm/dL    A/G Ratio 1.8 g/dL    BUN/Creatinine Ratio 17.1 7.0 - 25.0    Anion Gap 9.7 5.0 - 15.0 mmol/L    eGFR 84.9 >60.0 mL/min/1.73   Magnesium    Collection Time: 01/18/25  5:25 AM    Specimen: Blood   Result Value Ref Range    Magnesium 2.3 1.6 - 2.4 mg/dL       RADIOLOGY  CT Angiogram Chest    Result Date: 1/17/2025  CT ANGIOGRAM CHEST-  INDICATION: Left lower lung pain. HISTORY of amyloidosis. Pleural effusion.  COMPARISON: CTA chest November 7, 2024  TECHNIQUE: CTA chest with IV contrast. Coronal and sagittal reformats. Three dimensional reconstructions. Radiation dose reduction techniques were utilized, including automated exposure control and exposure modulation based on body size.  FINDINGS:  Pulmonary arteries: No pulmonary embolism.  Chest wall: No lymphadenopathy.  Mediastinum: Left atrial appendage clip. Heart is normal in size. Aortic valve calcification. Small amount of pericardial fluid. No mediastinal or hilar lymphadenopathy.  Lungs/pleura: Small left pleural effusion. Patent central airways. Mild cylindrical bronchiolectasis seen at the bases. Posterior dependent and bibasilar subsegmental atelectasis or scarring. Subpleural nodular opacity seen in the medial segment right middle lobe, with some surrounding groundglass opacity, series 5, axial mage 76, with the nodule measuring 7 mm, new. Subpleural solid pulmonary nodule in the lateral segment  right middle lobe, series 5, axial mage 85, measures 7 mm, new. Subpleural solid pulmonary nodule in the lateral basilar right lower lobe, series 5, axial mage 97, measures 5 mm, new. Small heterogeneous opacity in the posterior basilar left lower lobe base, series 5, axial mage 103.  Upper abdomen: Hepatic steatosis.  Osseous structures: Total right shoulder arthroplasty. Median sternotomy.       1. No pulmonary embolism. 2. Small left pleural effusion. 3. New subpleural nodular opacity in the medial segment right middle lobe, new subpleural nodule in the lateral segment right middle lobe and new small subpleural nodule seen in the lateral basilar segment right lower lobe. In the absence of a known malignancy, infectious etiology favored. Recommend 3-month follow-up chest CT to reevaluate. 4. Heterogeneous opacity in the left lower lobe base. Could be atelectasis or early pneumonia. No pulmonary embolism seen leading to this opacity. 5. Hepatic steatosis  This report was finalized on 1/17/2025 6:17 PM by Dr. Aramis Barakat M.D on Workstation: [id number]      XR Chest 1 View    Result Date: 1/17/2025  XR CHEST 1 VW-  INDICATION: Left-sided chest pain  COMPARISON: CT chest angiogram 11/7/2024 and chest radiographs dating back to 10/31/2019      No focal consolidation. No pleural effusion or pneumothorax. Normal size cardiomediastinal silhouette with postsurgical change of CABG and left atrial appendage clipping. Partially visualized right reverse shoulder arthroplasty.  This report was finalized on 1/17/2025 2:31 PM by Dr. Jensen Hodge M.D on Workstation: [id number]       MEDICATIONS GIVEN IN ER  Medications   sodium chloride 0.9 % flush 10 mL (has no administration in time range)   Pharmacy to Dose Zosyn (has no administration in time range)   sodium chloride 0.9 % flush 10 mL (10 mL Intravenous Not Given 1/18/25 0950)   sodium chloride 0.9 % flush 10 mL (has no administration in time range)   sodium chloride  0.9 % infusion 40 mL (has no administration in time range)   acetaminophen (TYLENOL) tablet 650 mg (650 mg Oral Given 1/18/25 0637)     Or   acetaminophen (TYLENOL) 160 MG/5ML oral solution 650 mg ( Oral Not Given:  See Alt 1/18/25 0637)     Or   acetaminophen (TYLENOL) suppository 650 mg ( Rectal Not Given:  See Alt 1/18/25 0637)   famotidine (PEPCID) tablet 20 mg (has no administration in time range)   sennosides-docusate (PERICOLACE) 8.6-50 MG per tablet 2 tablet (has no administration in time range)     And   polyethylene glycol (MIRALAX) packet 17 g (has no administration in time range)     And   bisacodyl (DULCOLAX) EC tablet 5 mg (has no administration in time range)     And   bisacodyl (DULCOLAX) suppository 10 mg (has no administration in time range)   ondansetron (ZOFRAN) injection 4 mg (4 mg Intravenous Given 1/17/25 2348)   melatonin tablet 5 mg (has no administration in time range)   piperacillin-tazobactam (ZOSYN) 3.375 g IVPB in 100 mL NS MBP (CD) (3.375 g Intravenous New Bag 1/18/25 0946)   acyclovir (ZOVIRAX) tablet 400 mg (400 mg Oral Given 1/18/25 0945)   albuterol (PROVENTIL) nebulizer solution 0.083% 2.5 mg/3mL (has no administration in time range)   ALPRAZolam (XANAX) tablet 0.25 mg (0.25 mg Oral Given 1/18/25 0945)   aspirin EC tablet 81 mg (81 mg Oral Given 1/18/25 0900)   benzonatate (TESSALON) capsule 200 mg (has no administration in time range)   cholestyramine (QUESTRAN) packet 1 packet (has no administration in time range)   diphenoxylate-atropine (LOMOTIL) 2.5-0.025 MG per tablet 1 tablet (has no administration in time range)   HYDROcodone-acetaminophen (NORCO) 5-325 MG per tablet 1 tablet (has no administration in time range)   levothyroxine (SYNTHROID, LEVOTHROID) tablet 75 mcg (75 mcg Oral Given 1/18/25 0900)   midodrine (PROAMATINE) tablet 5 mg (5 mg Oral Given 1/18/25 0901)   pantoprazole (PROTONIX) EC tablet 40 mg (40 mg Oral Given 1/18/25 0900)   rosuvastatin (CRESTOR) tablet 40  mg (40 mg Oral Not Given 1/18/25 0901)   spironolactone (ALDACTONE) tablet 25 mg (has no administration in time range)   zolpidem (AMBIEN) tablet 10 mg (has no administration in time range)   FLUoxetine (PROzac) capsule 80 mg (has no administration in time range)   HYDROmorphone (DILAUDID) injection 0.5 mg (has no administration in time range)   ketorolac (TORADOL) injection 30 mg (30 mg Intravenous Given 1/18/25 0946)   pregabalin (LYRICA) capsule 25 mg (has no administration in time range)   sodium chloride 0.9 % bolus 1,000 mL (0 mL Intravenous Stopped 1/17/25 1653)   morphine injection 2 mg (2 mg Intravenous Given 1/17/25 1502)   ondansetron (ZOFRAN) injection 4 mg (4 mg Intravenous Given 1/17/25 1502)   morphine injection 4 mg (4 mg Intravenous Given 1/17/25 1622)   diphenhydrAMINE (BENADRYL) injection 25 mg (25 mg Intravenous Given 1/17/25 1646)   famotidine (PEPCID) injection 20 mg (20 mg Intravenous Given 1/17/25 1645)   iopamidol (ISOVUE-370) 76 % injection 100 mL (71 mL Intravenous Given by Other 1/17/25 1740)   ketorolac (TORADOL) injection 30 mg (30 mg Intravenous Given 1/17/25 1936)   piperacillin-tazobactam (ZOSYN) 4.5 g IVPB in 100 mL NS MBP (CD) (0 g Intravenous Stopped 1/17/25 2021)       ORDERS PLACED DURING THIS VISIT:  Orders Placed This Encounter   Procedures    Blood Culture - Blood,    Blood Culture - Blood,    Legionella Antigen, Urine - Urine, Urine, Clean Catch    S. Pneumo Ag Urine or CSF - Urine, Urine, Clean Catch    XR Chest 1 View    CT Angiogram Chest    [place] Draw    Comprehensive Metabolic Panel    High Sensitivity Troponin T    CBC Auto Differential    High Sensitivity Troponin T 1Hr    BNP    Magnesium    Phosphorus    Lactic Acid, Plasma    CBC (No Diff)    Comprehensive Metabolic Panel    Magnesium    Diet: Cardiac; Healthy Heart (2-3 Na+); Fluid Consistency: Thin (IDDSI 0)    Undress & Gown    Vital Signs    Notify Provider (With Default Parameters)    Up With Assistance     Intake & Output    Weigh Patient    Oral Care    Place Sequential Compression Device    Maintain Sequential Compression Device    Saline Lock & Maintain IV Access    Continuous Pulse Oximetry    Trial toradol now plz  Nursing Communication    Code Status and Medical Interventions: CPR (Attempt to Resuscitate); Full Support    [facility] (on-call MD unless specified) Details    Hematology & Oncology Inpatient Consult    Oxygen Therapy- Nasal Cannula; Titrate 1-6 LPM Per SpO2; 90 - 95%    Oxygen Therapy- Nasal Cannula; Titrate 1-6 LPM Per SpO2; 90 - 95%    ECG 12 Lead ED Triage Standing Order; Chest Pain    ECG 12 Lead ED Triage Standing Order; Chest Pain    Adult Transthoracic Echo Complete W/ Cont if Necessary Per Protocol    Insert Peripheral IV    Insert Peripheral IV    Inpatient Admission    Fall Precautions    CBC & Differential    Green Top (Gel)    Lavender Top    Gold Top - SST    Light Blue Top       OUTPATIENT MEDICATION MANAGEMENT:  Current Facility-Administered Medications Ordered in Epic   Medication Dose Route Frequency Provider Last Rate Last Admin    acetaminophen (TYLENOL) tablet 650 mg  650 mg Oral Q4H PRN Milagros Wolf APRN   650 mg at 01/18/25 0637    Or    acetaminophen (TYLENOL) 160 MG/5ML oral solution 650 mg  650 mg Oral Q4H PRN Milagros Wolf APRN        Or    acetaminophen (TYLENOL) suppository 650 mg  650 mg Rectal Q4H PRN Milagros Wolf APRN        acyclovir (ZOVIRAX) tablet 400 mg  400 mg Oral BID Oscar العراقي MD   400 mg at 01/18/25 0945    albuterol (PROVENTIL) nebulizer solution 0.083% 2.5 mg/3mL  2.5 mg Nebulization Q6H PRN Oscar العراقي MD        ALPRAZolam (XANAX) tablet 0.25 mg  0.25 mg Oral BID PRN Oscar العراقي MD   0.25 mg at 01/18/25 0945    aspirin EC tablet 81 mg  81 mg Oral Daily Oscar العراقي MD   81 mg at 01/18/25 0900    benzonatate (TESSALON) capsule 200 mg  200 mg Oral TID PRN Oscar العراقي MD        sennosides-docusate  (PERICOLACE) 8.6-50 MG per tablet 2 tablet  2 tablet Oral BID PRN Milagros Wolf APRN        And    polyethylene glycol (MIRALAX) packet 17 g  17 g Oral Daily PRN Milagros Wolf APRN        And    bisacodyl (DULCOLAX) EC tablet 5 mg  5 mg Oral Daily PRN Milagros Wolf APRN        And    bisacodyl (DULCOLAX) suppository 10 mg  10 mg Rectal Daily PRN Milagros Wolf APRN        cholestyramine (QUESTRAN) packet 1 packet  1 packet Oral TID PRN Oscar العراقي MD        diphenoxylate-atropine (LOMOTIL) 2.5-0.025 MG per tablet 1 tablet  1 tablet Oral 4x Daily PRN Oscar العراقي MD        famotidine (PEPCID) tablet 20 mg  20 mg Oral BID PRN Milagros Wolf APRN        [START ON 1/19/2025] FLUoxetine (PROzac) capsule 80 mg  80 mg Oral Daily Oscar العراقي MD        HYDROcodone-acetaminophen (NORCO) 5-325 MG per tablet 1 tablet  1 tablet Oral Q6H PRN Oscar العراقي MD        HYDROmorphone (DILAUDID) injection 0.5 mg  0.5 mg Intravenous Q2H PRN Oscar العراقي MD        ketorolac (TORADOL) injection 30 mg  30 mg Intravenous Q6H PRN Oscar العراقي MD   30 mg at 01/18/25 0946    levothyroxine (SYNTHROID, LEVOTHROID) tablet 75 mcg  75 mcg Oral Daily Oscar العراقي MD   75 mcg at 01/18/25 0900    melatonin tablet 5 mg  5 mg Oral Nightly PRN Milagros Wolf APRN        midodrine (PROAMATINE) tablet 5 mg  5 mg Oral BID AC Oscar العراقي MD   5 mg at 01/18/25 0901    ondansetron (ZOFRAN) injection 4 mg  4 mg Intravenous Q6H PRN Milagros Wolf APRN   4 mg at 01/17/25 2348    pantoprazole (PROTONIX) EC tablet 40 mg  40 mg Oral Daily Oscar العراقي MD   40 mg at 01/18/25 0900    Pharmacy to Dose Zosyn   Not Applicable Continuous PRN Milagros Wolf APRN        piperacillin-tazobactam (ZOSYN) 3.375 g IVPB in 100 mL NS MBP (CD)  3.375 g Intravenous Q8H Milagros Wolf APRN   3.375 g at 01/18/25 0946    pregabalin (LYRICA) capsule 25 mg   25 mg Oral Daily Doron Ruff MD        rosuvastatin (CRESTOR) tablet 40 mg  40 mg Oral Daily Oscar العراقي MD        sodium chloride 0.9 % flush 10 mL  10 mL Intravenous PRN Ellis Dickerson MD        sodium chloride 0.9 % flush 10 mL  10 mL Intravenous Q12H Milagros Wolf APRN   10 mL at 01/17/25 2349    sodium chloride 0.9 % flush 10 mL  10 mL Intravenous PRN Milagros Wolf APRN        sodium chloride 0.9 % infusion 40 mL  40 mL Intravenous PRN Milagros Wolf APRN        spironolactone (ALDACTONE) tablet 25 mg  25 mg Oral Daily Oscar العراقي MD        zolpidem (AMBIEN) tablet 10 mg  10 mg Oral Nightly PRN Oscar العراقي MD         Current Outpatient Medications Ordered in Epic   Medication Sig Dispense Refill    doxycycline (MONODOX) 100 MG capsule Take 1 capsule by mouth 2 (Two) Times a Day. 14 capsule 0    naproxen (NAPROSYN) 500 MG tablet Take 1 tablet by mouth 2 (Two) Times a Day As Needed for Mild Pain. 15 tablet 0       PROCEDURES  Procedures    PROGRESS, DATA ANALYSIS, CONSULTS, AND MEDICAL DECISION MAKING  All labs have been independently interpreted by me.  All radiology studies have been reviewed by me. All EKG's have been independently viewed and interpreted by me.  Discussion below represents my analysis of pertinent findings related to patient's condition, differential diagnosis, treatment plan and final disposition.    Differential diagnosis includes but is not limited to pleural effusion, pneumonia, pulmonary embolism, ACS.    Clinical Scores:                   ED Course as of 01/18/25 1200   Fri Jan 17, 2025   1427 XR Chest 1 View  My independent interpretation of the imaging study is no lobar infiltrate or pneumothorax [AB]   1427 HS Troponin T(!): 34  Similar to prior [AB]   1427 WBC: 10.29 [AB]   1427 Hemoglobin: 13.9 [AB]   1427 Glucose: 88 [AB]   1427 Creatinine: 0.82 [AB]   1516 HS Troponin T(!): 31 [AB]   1516 Troponin T Numeric Delta: -3 [AB]    1640 Patient has developed a localized reaction to the right sided anterior shoulder and upper arm consistent with medication reaction.  Concerned that morphine may be the culprit.  Benadryl and Pepcid ordered at this time.  No signs of anaphylaxis or systemic reaction. [DC]   1843 Patient reassessed at this time.  She continues with some mild chest discomfort and has a CT scan with some abnormalities reflecting possible inflammatory infiltrate.  She does have a cough which is slightly increased from her baseline.  Due to her new discomfort I plan to treat her with antibiotics and encourage close follow-up with her pulmonology group.  We spoke about the option for admission but the patient does prefer to go home.  She is not hypoxic and symptoms have improved throughout ED course.  Reassuring laboratory evaluation.  She tolerates p.o. intake.  Close return precautions given at this time and all questions answered. [DC]   1851 After my recheck on the patient, nursing staff noted that the patient's oxygenation was dropping into the 80s.  After several reassessments, she has been persistently at 88-89 percent on room air at rest.  With exertion she drops to the mid 80s.  We spoke about a change in plans at this time the patient is agreeable to admit to the hospital for community-acquired pneumonia and hypoxia for further management.  [DC]   1910 I discussed the case with GRAHAM Decker with [facility] at this time regarding the patient.  I discussed work-up, results, concerns.  I discussed the consulting provider's desire for tele admit to [facility]   [DC]      ED Course User Index  [AB] Ellis Dickerson MD  [DC] Hung Pardo PA             AS OF 12:00 EST VITALS:    BP - 118/61  HR - 69  TEMP - 97.7 °F (36.5 °C) (Oral)  O2 SATS - 94%    COMPLEXITY OF CARE  The patient requires admission.      DIAGNOSIS  Final diagnoses:   Community acquired pneumonia, unspecified laterality   Abnormal CT scan   Atypical chest pain    Hypoxia         DISPOSITION  ED Disposition       ED Disposition   Decision to Admit    Condition   --    Comment   Level of Care: Med/Surg [1]   Diagnosis: CAP (community acquired pneumonia) [374321]   Admitting Physician: MARGO MAYFIELD [[id number]]   Attending Physician: MARGO MAYFIELD [[id number]]   Certification: I Certify That Inpatient Hospital Services Are Medically Necessary For Greater Than 2 Midnights                  Please note that portions of this document were completed with a voice recognition program.    Note Disclaimer: At [facility], we believe that sharing information builds trust and better relationships. You are receiving this note because you recently visited [facility]. It is possible you will see health information before a provider has talked with you about it. This kind of information can be easy to misunderstand. To help you fully understand what it means for your health, we urge you to discuss this note with your provider.         Ellis Dickerson MD  01/18/25 1200

## 2025-01-17 NOTE — ED TRIAGE NOTES
Pt reports pain in her left lung and SOA that feels similar to when she had her lung drained a few months ago.  She has AL amlydosis

## 2025-01-17 NOTE — ED PROVIDER NOTES
ED Course as of 01/17/25 1910 Fri Jan 17, 2025   1427 XR Chest 1 View  My independent interpretation of the imaging study is no lobar infiltrate or pneumothorax [AB]   1427 HS Troponin T(!): 34  Similar to prior [AB]   1427 WBC: 10.29 [AB]   1427 Hemoglobin: 13.9 [AB]   1427 Glucose: 88 [AB]   1427 Creatinine: 0.82 [AB]   1516 HS Troponin T(!): 31 [AB]   1516 Troponin T Numeric Delta: -3 [AB]   1640 Patient has developed a localized reaction to the right sided anterior shoulder and upper arm consistent with medication reaction.  Concerned that morphine may be the culprit.  Benadryl and Pepcid ordered at this time.  No signs of anaphylaxis or systemic reaction. [DC]   1843 Patient reassessed at this time.  She continues with some mild chest discomfort and has a CT scan with some abnormalities reflecting possible inflammatory infiltrate.  She does have a cough which is slightly increased from her baseline.  Due to her new discomfort I plan to treat her with antibiotics and encourage close follow-up with her pulmonology group.  We spoke about the option for admission but the patient does prefer to go home.  She is not hypoxic and symptoms have improved throughout ED course.  Reassuring laboratory evaluation.  She tolerates p.o. intake.  Close return precautions given at this time and all questions answered. [DC]   1851 After my recheck on the patient, nursing staff noted that the patient's oxygenation was dropping into the 80s.  After several reassessments, she has been persistently at 88-89 percent on room air at rest.  With exertion she drops to the mid 80s.  We spoke about a change in plans at this time the patient is agreeable to admit to the hospital for community-acquired pneumonia and hypoxia for further management.  [DC]   1910 I discussed the case with GRAHAM Decker with [facility] at this time regarding the patient.  I discussed work-up, results, concerns.  I discussed the consulting provider's desire for tele  admit to [facility]   [DC]      ED Course User Index  [AB] Ellis Dickerson MD  [DC] Hung Pardo, PA          Hung Pardo, PA  01/20/25 0947

## 2025-01-18 LAB
ALBUMIN SERPL-MCNC: 3.5 G/DL (ref 3.5–5.2)
ALBUMIN/GLOB SERPL: 1.8 G/DL
ALP SERPL-CCNC: 54 U/L (ref 39–117)
ALT SERPL W P-5'-P-CCNC: 17 U/L (ref 1–33)
ANION GAP SERPL CALCULATED.3IONS-SCNC: 9.7 MMOL/L (ref 5–15)
AST SERPL-CCNC: 15 U/L (ref 1–32)
BILIRUB SERPL-MCNC: 0.6 MG/DL (ref 0–1.2)
BUN SERPL-MCNC: 13 MG/DL (ref 8–23)
BUN/CREAT SERPL: 17.1 (ref 7–25)
CALCIUM SPEC-SCNC: 8.9 MG/DL (ref 8.6–10.5)
CHLORIDE SERPL-SCNC: 101 MMOL/L (ref 98–107)
CO2 SERPL-SCNC: 24.3 MMOL/L (ref 22–29)
CREAT SERPL-MCNC: 0.76 MG/DL (ref 0.57–1)
DEPRECATED RDW RBC AUTO: 44.5 FL (ref 37–54)
EGFRCR SERPLBLD CKD-EPI 2021: 84.9 ML/MIN/1.73
ERYTHROCYTE [DISTWIDTH] IN BLOOD BY AUTOMATED COUNT: 13.6 % (ref 12.3–15.4)
GLOBULIN UR ELPH-MCNC: 2 GM/DL
GLUCOSE SERPL-MCNC: 86 MG/DL (ref 65–99)
HCT VFR BLD AUTO: 36.2 % (ref 34–46.6)
HGB BLD-MCNC: 11.4 G/DL (ref 12–15.9)
L PNEUMO1 AG UR QL IA: NEGATIVE
MAGNESIUM SERPL-MCNC: 2.3 MG/DL (ref 1.6–2.4)
MCH RBC QN AUTO: 28.1 PG (ref 26.6–33)
MCHC RBC AUTO-ENTMCNC: 31.5 G/DL (ref 31.5–35.7)
MCV RBC AUTO: 89.2 FL (ref 79–97)
PLATELET # BLD AUTO: 168 10*3/MM3 (ref 140–450)
PMV BLD AUTO: 10.7 FL (ref 6–12)
POTASSIUM SERPL-SCNC: 3.9 MMOL/L (ref 3.5–5.2)
PROT SERPL-MCNC: 5.5 G/DL (ref 6–8.5)
RBC # BLD AUTO: 4.06 10*6/MM3 (ref 3.77–5.28)
S PNEUM AG SPEC QL LA: NEGATIVE
SODIUM SERPL-SCNC: 135 MMOL/L (ref 136–145)
WBC NRBC COR # BLD AUTO: 5.76 10*3/MM3 (ref 3.4–10.8)

## 2025-01-18 PROCEDURE — 99222 1ST HOSP IP/OBS MODERATE 55: CPT | Performed by: INTERNAL MEDICINE

## 2025-01-18 PROCEDURE — 25010000002 PIPERACILLIN SOD-TAZOBACTAM PER 1 G: Performed by: NURSE PRACTITIONER

## 2025-01-18 PROCEDURE — 25010000002 KETOROLAC TROMETHAMINE PER 15 MG: Performed by: HOSPITALIST

## 2025-01-18 PROCEDURE — 83735 ASSAY OF MAGNESIUM: CPT | Performed by: NURSE PRACTITIONER

## 2025-01-18 PROCEDURE — 80053 COMPREHEN METABOLIC PANEL: CPT | Performed by: NURSE PRACTITIONER

## 2025-01-18 PROCEDURE — 85027 COMPLETE CBC AUTOMATED: CPT | Performed by: NURSE PRACTITIONER

## 2025-01-18 RX ORDER — ROSUVASTATIN CALCIUM 20 MG/1
40 TABLET, COATED ORAL DAILY
Status: DISCONTINUED | OUTPATIENT
Start: 2025-01-18 | End: 2025-01-23 | Stop reason: HOSPADM

## 2025-01-18 RX ORDER — DIPHENOXYLATE HYDROCHLORIDE AND ATROPINE SULFATE 2.5; .025 MG/1; MG/1
1 TABLET ORAL 4 TIMES DAILY PRN
Status: DISCONTINUED | OUTPATIENT
Start: 2025-01-18 | End: 2025-01-23 | Stop reason: HOSPADM

## 2025-01-18 RX ORDER — ACYCLOVIR 400 MG/1
400 TABLET ORAL 2 TIMES DAILY
Status: DISCONTINUED | OUTPATIENT
Start: 2025-01-18 | End: 2025-01-23 | Stop reason: HOSPADM

## 2025-01-18 RX ORDER — ASPIRIN 81 MG/1
81 TABLET ORAL DAILY
Status: DISCONTINUED | OUTPATIENT
Start: 2025-01-18 | End: 2025-01-23 | Stop reason: HOSPADM

## 2025-01-18 RX ORDER — LEVOTHYROXINE SODIUM 75 UG/1
75 TABLET ORAL DAILY
Status: DISCONTINUED | OUTPATIENT
Start: 2025-01-18 | End: 2025-01-23 | Stop reason: HOSPADM

## 2025-01-18 RX ORDER — ZOLPIDEM TARTRATE 5 MG/1
10 TABLET ORAL NIGHTLY PRN
Status: DISCONTINUED | OUTPATIENT
Start: 2025-01-18 | End: 2025-01-23 | Stop reason: HOSPADM

## 2025-01-18 RX ORDER — SPIRONOLACTONE 25 MG/1
25 TABLET ORAL DAILY
Status: DISCONTINUED | OUTPATIENT
Start: 2025-01-18 | End: 2025-01-23 | Stop reason: HOSPADM

## 2025-01-18 RX ORDER — CHOLESTYRAMINE 4 G/9G
1 POWDER, FOR SUSPENSION ORAL 3 TIMES DAILY PRN
Status: DISCONTINUED | OUTPATIENT
Start: 2025-01-18 | End: 2025-01-23 | Stop reason: HOSPADM

## 2025-01-18 RX ORDER — HYDROMORPHONE HYDROCHLORIDE 1 MG/ML
0.5 INJECTION, SOLUTION INTRAMUSCULAR; INTRAVENOUS; SUBCUTANEOUS
Status: DISPENSED | OUTPATIENT
Start: 2025-01-18 | End: 2025-01-23

## 2025-01-18 RX ORDER — BENZONATATE 100 MG/1
200 CAPSULE ORAL 3 TIMES DAILY PRN
Status: DISCONTINUED | OUTPATIENT
Start: 2025-01-18 | End: 2025-01-23 | Stop reason: HOSPADM

## 2025-01-18 RX ORDER — MIDODRINE HYDROCHLORIDE 5 MG/1
5 TABLET ORAL
Status: DISCONTINUED | OUTPATIENT
Start: 2025-01-18 | End: 2025-01-23 | Stop reason: HOSPADM

## 2025-01-18 RX ORDER — KETOROLAC TROMETHAMINE 30 MG/ML
30 INJECTION, SOLUTION INTRAMUSCULAR; INTRAVENOUS EVERY 6 HOURS PRN
Status: DISPENSED | OUTPATIENT
Start: 2025-01-18 | End: 2025-01-23

## 2025-01-18 RX ORDER — PREGABALIN 25 MG/1
25 CAPSULE ORAL DAILY
Status: DISCONTINUED | OUTPATIENT
Start: 2025-01-18 | End: 2025-01-19

## 2025-01-18 RX ORDER — KETOROLAC TROMETHAMINE 30 MG/ML
30 INJECTION, SOLUTION INTRAMUSCULAR; INTRAVENOUS EVERY 6 HOURS PRN
Status: DISCONTINUED | OUTPATIENT
Start: 2025-01-18 | End: 2025-01-18

## 2025-01-18 RX ORDER — ALPRAZOLAM 0.25 MG/1
0.25 TABLET ORAL 2 TIMES DAILY PRN
Status: DISCONTINUED | OUTPATIENT
Start: 2025-01-18 | End: 2025-01-23 | Stop reason: HOSPADM

## 2025-01-18 RX ORDER — PANTOPRAZOLE SODIUM 40 MG/1
40 TABLET, DELAYED RELEASE ORAL DAILY
Status: DISCONTINUED | OUTPATIENT
Start: 2025-01-18 | End: 2025-01-23 | Stop reason: HOSPADM

## 2025-01-18 RX ORDER — HYDROCODONE BITARTRATE AND ACETAMINOPHEN 5; 325 MG/1; MG/1
1 TABLET ORAL EVERY 6 HOURS PRN
Status: DISCONTINUED | OUTPATIENT
Start: 2025-01-18 | End: 2025-01-23 | Stop reason: HOSPADM

## 2025-01-18 RX ORDER — ALBUTEROL SULFATE 0.83 MG/ML
2.5 SOLUTION RESPIRATORY (INHALATION) EVERY 6 HOURS PRN
Status: DISCONTINUED | OUTPATIENT
Start: 2025-01-18 | End: 2025-01-23 | Stop reason: HOSPADM

## 2025-01-18 RX ADMIN — PREGABALIN 25 MG: 25 CAPSULE ORAL at 13:32

## 2025-01-18 RX ADMIN — KETOROLAC TROMETHAMINE 30 MG: 30 INJECTION, SOLUTION INTRAMUSCULAR at 19:53

## 2025-01-18 RX ADMIN — ACYCLOVIR 400 MG: 400 TABLET ORAL at 09:45

## 2025-01-18 RX ADMIN — MIDODRINE HYDROCHLORIDE 5 MG: 5 TABLET ORAL at 17:47

## 2025-01-18 RX ADMIN — MIDODRINE HYDROCHLORIDE 5 MG: 5 TABLET ORAL at 09:01

## 2025-01-18 RX ADMIN — PIPERACILLIN AND TAZOBACTAM 3.38 G: 3; .375 INJECTION, POWDER, FOR SOLUTION INTRAVENOUS at 01:59

## 2025-01-18 RX ADMIN — PANTOPRAZOLE SODIUM 40 MG: 40 TABLET, DELAYED RELEASE ORAL at 09:00

## 2025-01-18 RX ADMIN — PIPERACILLIN AND TAZOBACTAM 3.38 G: 3; .375 INJECTION, POWDER, FOR SOLUTION INTRAVENOUS at 09:46

## 2025-01-18 RX ADMIN — LEVOTHYROXINE SODIUM 75 MCG: 0.07 TABLET ORAL at 09:00

## 2025-01-18 RX ADMIN — ASPIRIN 81 MG: 81 TABLET, COATED ORAL at 09:00

## 2025-01-18 RX ADMIN — HYDROCODONE BITARTRATE AND ACETAMINOPHEN 1 TABLET: 5; 325 TABLET ORAL at 16:11

## 2025-01-18 RX ADMIN — KETOROLAC TROMETHAMINE 30 MG: 30 INJECTION, SOLUTION INTRAMUSCULAR at 09:46

## 2025-01-18 RX ADMIN — ACETAMINOPHEN 650 MG: 325 TABLET, FILM COATED ORAL at 06:37

## 2025-01-18 RX ADMIN — ALPRAZOLAM 0.25 MG: 0.25 TABLET ORAL at 09:45

## 2025-01-18 RX ADMIN — Medication 10 ML: at 20:00

## 2025-01-18 RX ADMIN — ALPRAZOLAM 0.25 MG: 0.25 TABLET ORAL at 19:53

## 2025-01-18 RX ADMIN — ACYCLOVIR 400 MG: 400 TABLET ORAL at 20:00

## 2025-01-18 RX ADMIN — PIPERACILLIN AND TAZOBACTAM 3.38 G: 3; .375 INJECTION, POWDER, FOR SOLUTION INTRAVENOUS at 17:47

## 2025-01-18 NOTE — ED NOTES
Nursing report ED to floor  Cynthia Portillo  69 y.o.  female    HPI :  HPI  Stated Reason for Visit: chest pain since this am    Chief Complaint  Chief Complaint   Patient presents with    Chest Pain       Admitting doctor:   Preethi Porras MD    Admitting diagnosis:   The primary encounter diagnosis was Community acquired pneumonia, unspecified laterality. Diagnoses of Abnormal CT scan, Atypical chest pain, and Hypoxia were also pertinent to this visit.    Code status:   Current Code Status       Date Active Code Status Order ID Comments User Context       Prior            Allergies:   Azithromycin, Ezetimibe, and Pravastatin    Isolation:   No active isolations    Intake and Output    Intake/Output Summary (Last 24 hours) at 1/17/2025 1924  Last data filed at 1/17/2025 1653  Gross per 24 hour   Intake 1000 ml   Output --   Net 1000 ml       Weight:       01/17/25  1307   Weight: 57.2 kg (126 lb)       Most recent vitals:   Vitals:    01/17/25 1850 01/17/25 1851 01/17/25 1853 01/17/25 1919   BP:       BP Location:       Patient Position:       Pulse: 84 82 80 85   Resp:       Temp:       TempSrc:       SpO2: 90% 92% 93% 95%   Weight:       Height:           Active LDAs/IV Access:   Lines, Drains & Airways       Active LDAs       Name Placement date Placement time Site Days    Peripheral IV 01/17/25 1450 Right Antecubital 01/17/25  1450  Antecubital  less than 1                    Labs (abnormal labs have a star):   Labs Reviewed   TROPONIN - Abnormal; Notable for the following components:       Result Value    HS Troponin T 34 (*)     All other components within normal limits    Narrative:     High Sensitive Troponin T Reference Range:  <14.0 ng/L- Negative Female for AMI  <22.0 ng/L- Negative Male for AMI  >=14 - Abnormal Female indicating possible myocardial injury.  >=22 - Abnormal Male indicating possible myocardial injury.   Clinicians would have to utilize clinical acumen, EKG, Troponin, and serial  changes to determine if it is an Acute Myocardial Infarction or myocardial injury due to an underlying chronic condition.        CBC WITH AUTO DIFFERENTIAL - Abnormal; Notable for the following components:    Neutrophils, Absolute 7.02 (*)     All other components within normal limits   HIGH SENSITIVITIY TROPONIN T 1HR - Abnormal; Notable for the following components:    HS Troponin T 31 (*)     All other components within normal limits    Narrative:     High Sensitive Troponin T Reference Range:  <14.0 ng/L- Negative Female for AMI  <22.0 ng/L- Negative Male for AMI  >=14 - Abnormal Female indicating possible myocardial injury.  >=22 - Abnormal Male indicating possible myocardial injury.   Clinicians would have to utilize clinical acumen, EKG, Troponin, and serial changes to determine if it is an Acute Myocardial Infarction or myocardial injury due to an underlying chronic condition.        BNP (IN-HOUSE) - Normal    Narrative:     This assay is used as an aid in the diagnosis of individuals suspected of having heart failure. It can be used as an aid in the diagnosis of acute decompensated heart failure (ADHF) in patients presenting with signs and symptoms of ADHF to the emergency department (ED). In addition, NT-proBNP of <300 pg/mL indicates ADHF is not likely.    Age Range Result Interpretation  NT-proBNP Concentration (pg/mL:      <50             Positive            >450                   Gray                 300-450                    Negative             <300    50-75           Positive            >900                  Gray                300-900                  Negative            <300      >75             Positive            >1800                  Gray                300-1800                  Negative            <300   MAGNESIUM - Normal   PHOSPHORUS - Normal   BLOOD CULTURE   BLOOD CULTURE   RAINBOW DRAW    Narrative:     The following orders were created for panel order [place] Draw.  Procedure                                Abnormality         Status                     ---------                               -----------         ------                     Green Top (Gel)[[id number]]                                  Final result               Lavender Top[[id number]]                                     Final result               Gold Top - SST[[id number]]                                   Final result               Light Blue Top[[id number]]                                   Final result                 Please view results for these tests on the individual orders.   COMPREHENSIVE METABOLIC PANEL    Narrative:     GFR Categories in Chronic Kidney Disease (CKD)      GFR Category          GFR (mL/min/1.73)    Interpretation  G1                     90 or greater         Normal or high (1)  G2                      60-89                Mild decrease (1)  G3a                   45-59                Mild to moderate decrease  G3b                   30-44                Moderate to severe decrease  G4                    15-29                Severe decrease  G5                    14 or less           Kidney failure          (1)In the absence of evidence of kidney disease, neither GFR category G1 or G2 fulfill the criteria for CKD.    eGFR calculation 2021 CKD-EPI creatinine equation, which does not include race as a factor   LACTIC ACID, PLASMA   CBC AND DIFFERENTIAL    Narrative:     The following orders were created for panel order CBC & Differential.  Procedure                               Abnormality         Status                     ---------                               -----------         ------                     CBC Auto Differential[[id number]]        Abnormal            Final result                 Please view results for these tests on the individual orders.   GREEN TOP   LAVENDER TOP   GOLD TOP - SST   LIGHT BLUE TOP       EKG:   ECG 12 Lead ED Triage Standing Order; Chest Pain   Final Result   HEART RATE=92  bpm    RR Jebngdka=024  ms   NJ Cntjnzeg=365  ms   P Horizontal Axis=-21  deg   P Front Axis=36  deg   QRSD Jsgnmxnm=179  ms   QT Kajidlzi=456  ms   QClK=000  ms   QRS Axis=-34  deg   T Wave Axis=137  deg   - ABNORMAL ECG -   Sinus rhythm   Atrial premature complex - new   Probable  left atrial enlargement   Left bundle branch block   Electronically Signed By: Angela Holt ([facility]) 2025-01-17 16:35:56   Date and Time of Study:2025-01-17 13:09:35          Meds given in ED:   Medications   sodium chloride 0.9 % flush 10 mL (has no administration in time range)   ketorolac (TORADOL) injection 30 mg (has no administration in time range)   piperacillin-tazobactam (ZOSYN) 4.5 g IVPB in 100 mL NS MBP (CD) (has no administration in time range)   sodium chloride 0.9 % bolus 1,000 mL (0 mL Intravenous Stopped 1/17/25 1653)   morphine injection 2 mg (2 mg Intravenous Given 1/17/25 1502)   ondansetron (ZOFRAN) injection 4 mg (4 mg Intravenous Given 1/17/25 1502)   morphine injection 4 mg (4 mg Intravenous Given 1/17/25 1622)   diphenhydrAMINE (BENADRYL) injection 25 mg (25 mg Intravenous Given 1/17/25 1646)   famotidine (PEPCID) injection 20 mg (20 mg Intravenous Given 1/17/25 1645)   iopamidol (ISOVUE-370) 76 % injection 100 mL (71 mL Intravenous Given by Other 1/17/25 1740)       Imaging results:  CT Angiogram Chest    Result Date: 1/17/2025   1. No pulmonary embolism. 2. Small left pleural effusion. 3. New subpleural nodular opacity in the medial segment right middle lobe, new subpleural nodule in the lateral segment right middle lobe and new small subpleural nodule seen in the lateral basilar segment right lower lobe. In the absence of a known malignancy, infectious etiology favored. Recommend 3-month follow-up chest CT to reevaluate. 4. Heterogeneous opacity in the left lower lobe base. Could be atelectasis or early pneumonia. No pulmonary embolism seen leading to this opacity. 5. Hepatic steatosis  This report was finalized on  1/17/2025 6:17 PM by Dr. Aramis Barakat M.D on Workstation: [id number]      XR Chest 1 View    Result Date: 1/17/2025  No focal consolidation. No pleural effusion or pneumothorax. Normal size cardiomediastinal silhouette with postsurgical change of CABG and left atrial appendage clipping. Partially visualized right reverse shoulder arthroplasty.  This report was finalized on 1/17/2025 2:31 PM by Dr. Jensen Hodge M.D on Workstation: [id number]       Ambulatory status:   - ad gini    Social issues:   Social History     Socioeconomic History    Marital status: Single    Number of children: 2   Tobacco Use    Smoking status: Never     Passive exposure: Never    Smokeless tobacco: Never   Vaping Use    Vaping status: Never Used   Substance and Sexual Activity    Alcohol use: Not Currently     Alcohol/week: 1.0 standard drink of alcohol     Types: 1 Glasses of wine per week    Drug use: Never    Sexual activity: Defer       Peripheral Neurovascular  Peripheral Neurovascular (Adult)  Peripheral Neurovascular WDL: WDL    Neuro Cognitive  Neuro Cognitive (Adult)  Cognitive/Neuro/Behavioral WDL: WDL    Learning  Learning Assessment  Learning Readiness and Ability: no barriers identified  Education Provided  Person Taught: patient  Teaching Method: verbal instruction  Teaching Focus: symptom/problem overview, diagnostic test, medication administration  Education Outcome Evaluation: verbalizes understanding    Respiratory  Respiratory WDL  Respiratory WDL: .WDL except, rhythm/pattern  Rhythm/Pattern, Respiratory: shortness of breath, depth regular, unlabored, pattern regular (SOA today, worse w/ exertion and speaking)    Abdominal Pain       Pain Assessments  Pain (Adult)  (0-10) Pain Rating: Rest: 4  Pain Location: chest  Pain Side/Orientation: left  Pain Description: pleuritic  Response to Pain Interventions: nonverbal indicators absent/decreased    NIH Stroke Scale       Sid Kirkland RN  01/17/25 19:24 EST

## 2025-01-18 NOTE — PROGRESS NOTES
Clinical Pharmacy Services: Medication History    Cynthia Portillo is a 69 y.o. female presenting to [facility] for   Chief Complaint   Patient presents with    Chest Pain       She  has a past medical history of AL amyloidosis, Anxiety, Arthritis, Bowel perforation, COVID-19 (07/2020), Depression, Diverticulitis of colon, Diverticulosis, GERD (gastroesophageal reflux disease), History of Clostridioides difficile infection (2008), History of prediabetes, History of snoring, History of stress incontinence, Hypercholesterolemia, Hyperlipidemia, Hypertension, Left ventricular hypertrophy, Liver disease, Oral herpes (08/18/2020), Seasonal allergies, SOB (shortness of breath), Status post colostomy, and Thyroid disease.    Allergies as of 01/17/2025 - Reviewed 01/17/2025   Allergen Reaction Noted    Azithromycin Rash and Other (See Comments) 12/27/2018    Ezetimibe Myalgia, Other (See Comments), and Rash 07/12/2016    Pravastatin Rash and Other (See Comments) 07/12/2016    Sulfa antibiotics Rash 01/17/2025       Medication information was obtained from: Patient   Pharmacy and Phone Number:     Prior to Admission Medications       Prescriptions Last Dose Informant Patient Reported? Taking?    acyclovir (ZOVIRAX) 400 MG tablet  Self No Yes    Take 1 tablet by mouth 2 (Two) Times a Day.    albuterol sulfate  (90 Base) MCG/ACT inhaler  Self No Yes    Inhale 2 puffs Every 4 (Four) Hours As Needed for Wheezing.    ALPRAZolam (XANAX) 0.25 MG tablet  Self No Yes    TAKE 1 TABLET BY MOUTH TWICE DAILY AS NEEDED FOR ANXIETY    Patient taking differently:  Take 1 tablet by mouth 2 (Two) Times a Day As Needed for Anxiety.    aspirin 81 MG EC tablet  Self Yes Yes    Take 1 tablet by mouth Daily.    benzonatate (TESSALON) 200 MG capsule  Self No Yes    Take 1 capsule by mouth 3 (Three) Times a Day As Needed for Cough.    Cholecalciferol (Vitamin D3) 50 MCG (2000 UT) tablet  Self Yes Yes    Take 1 tablet by  mouth Daily.    cholestyramine (QUESTRAN) 4 g packet  Self No Yes    Take 1 packet by mouth 3 (Three) Times a Day With Meals. 1 packet  TID PRN diarrhea    Patient taking differently:  Take 1 packet by mouth 3 (Three) Times a Day As Needed.    diphenoxylate-atropine (LOMOTIL) 2.5-0.025 MG per tablet  Self No Yes    Take 1 tablet by mouth 4 (Four) Times a Day As Needed for Diarrhea.    empagliflozin (JARDIANCE) 10 MG tablet tablet  Self Yes Yes    Take 1 tablet by mouth Daily.    FLUoxetine (PROzac) 20 MG capsule  Self No Yes    TAKE 1 CAPSULE BY MOUTH FOUR TIMES DAILY    Patient taking differently:  Take 1 capsule by mouth 4 (Four) Times a Day.    HYDROcodone-acetaminophen (NORCO) 5-325 MG per tablet  Self No Yes    Take 1 tablet by mouth Every 6 (Six) Hours As Needed for Moderate Pain.    icosapent ethyl (VASCEPA) 1 g capsule capsule  Self No Yes    TAKE 2 CAPSULES BY MOUTH DAILY    levothyroxine (SYNTHROID, LEVOTHROID) 75 MCG tablet  Self No Yes    TAKE 1 TABLET BY MOUTH EVERY DAY    Patient taking differently:  Take 1 tablet by mouth Daily.    loratadine (CLARITIN) 10 MG tablet  Self No Yes    Take 1 tablet by mouth Daily.    midodrine (PROAMATINE) 5 MG tablet  Self Yes Yes    Take 1 tablet by mouth 2 (Two) Times a Day.    Multiple Vitamins-Minerals (V-C Forte) capsule  Self Yes Yes    Take 1 capsule by mouth Daily.    olopatadine (PATANOL) 0.1 % ophthalmic solution  Self No Yes    Administer 1 drop to both eyes 2 (Two) Times a Day.    pantoprazole (PROTONIX) 20 MG EC tablet  Self No Yes    TAKE 1 TABLET BY MOUTH EVERY DAY. DO NOT TAKE WITHIN 2 HOURS OF THYROID MEDICATION    Patient taking differently:  Take 1 tablet by mouth Daily.    rosuvastatin (CRESTOR) 40 MG tablet  Self No Yes    Take 1 tablet by mouth Daily. Indications: High Amount of Fats in the Blood    spironolactone (ALDACTONE) 25 MG tablet  Self Yes Yes    Take 1 tablet by mouth Daily.    zolpidem (AMBIEN) 10 MG tablet  Self No Yes    TAKE 1 TABLET  BY MOUTH AT NIGHT AS NEEDED FOR SLEEP              Medication notes:     This medication list is complete to the best of my knowledge as of 1/17/2025    Please call if questions.    Tita Morris  Medication History Technician   [phone]    1/17/2025 19:43 EST

## 2025-01-18 NOTE — PLAN OF CARE
Goal Outcome Evaluation:  Plan of Care Reviewed With: patient        Progress: no change  Outcome Evaluation: C/o upper and lower left chest pain, given Toradol iv, and Norco with some relief, started Lyrica today, patient states she doesn't like how it makes her feel and prefers not to take it, on 2LO2, IS up to 1500, scds on, receiving Zosyn iv, BP low but stable, afebrile, oncology consulted, wating to have Echo done.

## 2025-01-18 NOTE — H&P
HISTORY AND PHYSICAL   [facility]        Date of Admission: 2025  Patient Identification:  Name: Cynthia Portillo  Age: 69 y.o.  Sex: female  :  1955  MRN: [id number]                     Primary Care Physician: Arian Peterson MD    Chief Complaint: Chest pain    History of Present Illness:   Mrs. Newman is a wonderfully pleasant 69-year-old female who seems like she is currently uncomfortable at this time due to her complaints of chest pain.  It actually awoke her from the sleep in the middle of the night.  It was a sharp stabbing pain confined to her left lower lobe.  She denies any recent fever chills or night sweats.  She is not having any type of productive cough though she does cough at times.  She denies any known acute illnesses.  She has no past history of thrombosis but she does have a past history of amyloidosis and its cardiac and she is normally followed by [facility] though Dr. Pruitt here with [place] [facility] helped diagnose this finding.  She received her chemotherapy from Dr. Sal with a [facility] group here and I believe her next follow-up at [facility] is not for couple months.  She notes previous cardiac removal of a amyloid plaque and has no known knowledge of any pulmonary nodules his current CTA does not demonstrate any pulmonary embolism but does show new findings of nodules on her right middle and lower lobe measuring well less than a centimeter.  She denies any hemoptysis.  Denies any trauma.  She was started on Zosyn for presumed pneumonia and A was called for further admittance.    Past Medical History:  Past Medical History:   Diagnosis Date    AL amyloidosis     Anxiety     Arthritis     Bowel perforation     COVID-19 2021    Depression     Diverticulitis of colon     Diverticulosis     GERD (gastroesophageal reflux disease)     History of Clostridioides difficile infection 2008    HAS HAD SEVERAL TIMES IN PAST PER PT  (SAW INFECTIOUS DISEASE MD FOR THIS S/P COLOSTOMY/EXTENSIVE ANBX THERAPY)    History of prediabetes     History of snoring     History of stress incontinence     Hypercholesterolemia     Hyperlipidemia     Hypertension     Left ventricular hypertrophy     FOLLOWED BY DR MARI. DENIES CHEST PAIN BUT STATES DOES GET SOA AT TIMES WITH EXERTION REPORTS THAT IT IS NOT A NEW ISSUE     Liver disease     Oral herpes 08/18/2020    Seasonal allergies     used to have allergy shots in the past    SOB (shortness of breath)     STATES WITH EXERTION HAS BEEN ONGOING ISSUE NOT A NEW ISSUE    Status post colostomy     reversed    Thyroid disease     Hypothyroid     Past Surgical History:  Past Surgical History:   Procedure Laterality Date    ABDOMINAL SURGERY  2005, 2014    ex lap x 2,  diverticulitis    ABDOMINOPLASTY  2016    ADENOIDECTOMY      APPENDECTOMY      CARDIAC CATHETERIZATION N/A 04/24/2020    Procedure: Coronary angiography;  Surgeon: Roberto Briones MD;  Location: [facility] CATH INVASIVE LOCATION;  Service: Cardiovascular;  Laterality: N/A;    CARDIAC CATHETERIZATION N/A 04/24/2020    Procedure: Left heart cath;  Surgeon: Roberto Briones MD;  Location: [facility] CATH INVASIVE LOCATION;  Service: Cardiovascular;  Laterality: N/A;    CARDIAC CATHETERIZATION N/A 04/24/2020    Procedure: Left ventriculography;  Surgeon: Roberto Briones MD;  Location: [facility] CATH INVASIVE LOCATION;  Service: Cardiovascular;  Laterality: N/A;    CARDIAC SURGERY      open heart surgery,    COLONOSCOPY  approx 2018    normal per pt     COLOSTOMY  2005    had colostomy and then is was reversed    COLOSTOMY CLOSURE  2006    CORRECTION HAMMER TOE Right 2017    ECTOPIC PREGNANCY SURGERY      1979, 1980    ENDOSCOPY N/A 05/27/2020    Procedure: ESOPHAGOGASTRODUODENOSCOPY WITH BIOPSY AND BIOPSY POLYPECTOMY AND MACIEL DIALATION;  Surgeon: Preethi Beck MD;  Location: [facility] ENDOSCOPY;  Service: Gastroenterology;  Laterality:  N/A;  PRE: ESOPHAGEAL DYSPHAGIA  POST: Z LINE 46, ESOPHAGEAL SPASM, GASTRITIS, FUNDIC POLYP    ENDOSCOPY N/A 06/20/2023    ESOPHAGEAL DILATATION N/A 12/07/2021    Procedure: OR ESOPHAGEAL DILATATION with pacheco dilators ;  Surgeon: Jamey Leslie MD;  Location:  [facility];  Service: ENT;  Laterality: N/A;    ESOPHAGEAL MOTILITY STUDY N/A 02/03/2022    Procedure: ESOPHAGEAL MOTILITY STUDY;  Surgeon: Endo, Nurse Performed;  Location:  [facility];  Service: Gastroenterology;  Laterality: N/A;  dypshagia     FOOT SURGERY Right 12/2016    Second and third hammertoe corrections    KNEE ARTHROSCOPY Right 2009    KNEE SURGERY Right     REVISION / TAKEDOWN COLOSTOMY  2006    SHOULDER ARTHROSCOPY Right 2018    right shoulder arthroscopy with extensive rotator cuff, biceps and labral debridement, chondroplasty, & subacromial decompression with acromioplasty    SHOULDER SURGERY      HAS HAS COMPLETE SHOULDER ON RIGHT. LEFT SCOPED AND HAD 2ND SURGERY WITH HARDWARE PLACED    SHOULDER SURGERY Left     2012. 2013    TONSILLECTOMY      TOTAL SHOULDER REVERSE ARTHROPLASTY Right 2019    WISDOM TOOTH EXTRACTION        Home Meds:  Medications Prior to Admission   Medication Sig Dispense Refill Last Dose/Taking    acyclovir (ZOVIRAX) 400 MG tablet Take 1 tablet by mouth 2 (Two) Times a Day. 60 tablet 2 Taking    albuterol sulfate  (90 Base) MCG/ACT inhaler Inhale 2 puffs Every 4 (Four) Hours As Needed for Wheezing. 18 g 0 Taking As Needed    ALPRAZolam (XANAX) 0.25 MG tablet TAKE 1 TABLET BY MOUTH TWICE DAILY AS NEEDED FOR ANXIETY (Patient taking differently: Take 1 tablet by mouth 2 (Two) Times a Day As Needed for Anxiety.) 60 tablet 1 Taking Differently    aspirin 81 MG EC tablet Take 1 tablet by mouth Daily.   Taking    benzonatate (TESSALON) 200 MG capsule Take 1 capsule by mouth 3 (Three) Times a Day As Needed for Cough. 30 capsule 0 Taking As Needed    Cholecalciferol (Vitamin D3) 50 MCG (2000 UT) tablet  Take 1 tablet by mouth Daily.   Taking    cholestyramine (QUESTRAN) 4 g packet Take 1 packet by mouth 3 (Three) Times a Day With Meals. 1 packet  TID PRN diarrhea (Patient taking differently: Take 1 packet by mouth 3 (Three) Times a Day As Needed.) 60 packet 4 Taking Differently    diphenoxylate-atropine (LOMOTIL) 2.5-0.025 MG per tablet Take 1 tablet by mouth 4 (Four) Times a Day As Needed for Diarrhea. 120 tablet 0 Taking As Needed    empagliflozin (JARDIANCE) 10 MG tablet tablet Take 1 tablet by mouth Daily.   Taking    FLUoxetine (PROzac) 20 MG capsule TAKE 1 CAPSULE BY MOUTH FOUR TIMES DAILY (Patient taking differently: Take 1 capsule by mouth 4 (Four) Times a Day.) 360 capsule 0 Taking Differently    HYDROcodone-acetaminophen (NORCO) 5-325 MG per tablet Take 1 tablet by mouth Every 6 (Six) Hours As Needed for Moderate Pain. 60 tablet 0 Taking As Needed    icosapent ethyl (VASCEPA) 1 g capsule capsule TAKE 2 CAPSULES BY MOUTH DAILY 60 capsule 2 Taking    levothyroxine (SYNTHROID, LEVOTHROID) 75 MCG tablet TAKE 1 TABLET BY MOUTH EVERY DAY (Patient taking differently: Take 1 tablet by mouth Daily.) 90 tablet 0 Taking Differently    loratadine (CLARITIN) 10 MG tablet Take 1 tablet by mouth Daily. 30 tablet 5 Taking    midodrine (PROAMATINE) 5 MG tablet Take 1 tablet by mouth 2 (Two) Times a Day.   Taking    Multiple Vitamins-Minerals (V-C Forte) capsule Take 1 capsule by mouth Daily.   Taking    olopatadine (PATANOL) 0.1 % ophthalmic solution Administer 1 drop to both eyes 2 (Two) Times a Day. 5 mL 5 Taking    pantoprazole (PROTONIX) 20 MG EC tablet TAKE 1 TABLET BY MOUTH EVERY DAY. DO NOT TAKE WITHIN 2 HOURS OF THYROID MEDICATION (Patient taking differently: Take 1 tablet by mouth Daily.) 90 tablet 1 Taking Differently    rosuvastatin (CRESTOR) 40 MG tablet Take 1 tablet by mouth Daily. Indications: High Amount of Fats in the Blood 90 tablet 0 Taking    spironolactone (ALDACTONE) 25 MG tablet Take 1 tablet by  mouth Daily.   Taking    zolpidem (AMBIEN) 10 MG tablet TAKE 1 TABLET BY MOUTH AT NIGHT AS NEEDED FOR SLEEP 30 tablet 1 Taking As Needed       Allergies:  Allergies   Allergen Reactions    Azithromycin Rash and Other (See Comments)     Reaction unknown to patient (unable to remember)  Other reaction(s): Other: stomach issues, Stomach ache, Other: stomach issues, Stomach ache    Ezetimibe Myalgia, Other (See Comments) and Rash     cramps  cramps    Pravastatin Rash and Other (See Comments)    Sulfa Antibiotics Rash     Immunizations:  Immunization History   Administered Date(s) Administered    COVID-19 (PFIZER) Purple Cap Monovalent 04/24/2021, 05/21/2021, 12/14/2021    Flu Vaccine Split Quad 11/08/2019    Fluad Quad 65+ 10/18/2023    Fluzone High-Dose 65+YRS 10/04/2024    Fluzone High-Dose 65+yrs 11/06/2022    Influenza, Unspecified 11/02/2018    Pneumococcal Conjugate 20-Valent (PCV20) 10/18/2023    Shingrix 01/22/2021    TD Preservative Free (Tenivac) 05/07/2020     Social History:   Social History     Social History Narrative    Not on file     Social History     Socioeconomic History    Marital status: Single    Number of children: 2   Tobacco Use    Smoking status: Never     Passive exposure: Never    Smokeless tobacco: Never   Vaping Use    Vaping status: Never Used   Substance and Sexual Activity    Alcohol use: Not Currently     Alcohol/week: 1.0 standard drink of alcohol     Types: 1 Glasses of wine per week    Drug use: Never    Sexual activity: Defer       Family History:  Family History   Problem Relation Age of Onset    Hypertension Mother     Osteoporosis Mother     Skin cancer Mother     Heart disease Father     Hypertension Father     Breast cancer Maternal Grandmother     Ovarian cancer Neg Hx     Colon cancer Neg Hx     Deep vein thrombosis Neg Hx     Pulmonary embolism Neg Hx     Malig Hyperthermia Neg Hx         Review of Systems  See history of present illness and past medical history.  Patient  "denies fever chills night sweats.  Denies any nausea vomiting abdominal pain diarrhea.  Admitted to the acute onset of chest pain as well as some dry cough and shortness of breath.  Pain is persistent.  Denies any dysuria bruising bleeding loss of consciousness or focal loss of function.  Remainder of ROS is negative.    Objective:  T Max 24 hrs: Temp (24hrs), Av.2 °F (36.8 °C), Min:97.5 °F (36.4 °C), Max:99.5 °F (37.5 °C)    Vitals Ranges:   Temp:  [97.5 °F (36.4 °C)-99.5 °F (37.5 °C)] 97.5 °F (36.4 °C)  Heart Rate:  [66-95] 70  Resp:  [16-18] 16  BP: ()/(52-94) 108/55      Exam:  /55 (BP Location: Left arm, Patient Position: Lying)   Pulse 70   Temp 97.5 °F (36.4 °C) (Oral)   Resp 16   Ht 154.9 cm (60.98\")   Wt 59.5 kg (131 lb 2.8 oz)   SpO2 95%   BMI 24.80 kg/m²     General Appearance:    Alert, cooperative, nontoxic though seems uncomfortable from discomfort, alert and oriented x 3   Head:    Normocephalic, without obvious abnormality, atraumatic   Eyes:    PERRLA/EOM's intact, both eyes   Ears:    Normal external ear canals, both ears   Nose:   Nares normal, septum midline, mucosa normal, no drainage    or sinus tenderness   Throat: MMM   Neck:   Supple, no JVD   Back:     Symmetric, no curvature, no visual lesions   Lungs:     Clear to auscultation bilaterally though winces in discomfort with deep breathing, respirations unlabored with conversation        Heart:    Regular rate and rhythm, S1 and S2 normal     Abdomen:     Soft, nontender, bowel sounds active all four quadrants,     no masses, no rebound or guarding   Extremities: Moving all with baseline strength, no cyanosis or edema   Pulses:   2+ and symmetric all extremities           Neurologic:   CNII-XII intact, no FD      .    Data Review:  Labs in chart were reviewed.             Imaging Results (All)       Procedure Component Value Units Date/Time    CT Angiogram Chest [[id number]] Collected: 25 1800     Updated: 25 " 1821    Narrative:      CT ANGIOGRAM CHEST-     INDICATION: Left lower lung pain. HISTORY of amyloidosis. Pleural  effusion.     COMPARISON: CTA chest November 7, 2024     TECHNIQUE:  CTA chest with IV contrast. Coronal and sagittal reformats. Three  dimensional reconstructions. Radiation dose reduction techniques were  utilized, including automated exposure control and exposure modulation  based on body size.     FINDINGS:      Pulmonary arteries: No pulmonary embolism.     Chest wall: No lymphadenopathy.     Mediastinum: Left atrial appendage clip. Heart is normal in size. Aortic  valve calcification. Small amount of pericardial fluid. No mediastinal  or hilar lymphadenopathy.     Lungs/pleura: Small left pleural effusion. Patent central airways. Mild  cylindrical bronchiolectasis seen at the bases. Posterior dependent and  bibasilar subsegmental atelectasis or scarring. Subpleural nodular  opacity seen in the medial segment right middle lobe, with some  surrounding groundglass opacity, series 5, axial mage 76, with the  nodule measuring 7 mm, new. Subpleural solid pulmonary nodule in the  lateral segment right middle lobe, series 5, axial mage 85, measures 7  mm, new. Subpleural solid pulmonary nodule in the lateral basilar right  lower lobe, series 5, axial mage 97, measures 5 mm, new. Small  heterogeneous opacity in the posterior basilar left lower lobe base,  series 5, axial mage 103.     Upper abdomen: Hepatic steatosis.     Osseous structures: Total right shoulder arthroplasty. Median  sternotomy.       Impression:         1. No pulmonary embolism.  2. Small left pleural effusion.  3. New subpleural nodular opacity in the medial segment right middle  lobe, new subpleural nodule in the lateral segment right middle lobe and  new small subpleural nodule seen in the lateral basilar segment right  lower lobe. In the absence of a known malignancy, infectious etiology  favored. Recommend 3-month follow-up chest CT  to reevaluate.  4. Heterogeneous opacity in the left lower lobe base. Could be  atelectasis or early pneumonia. No pulmonary embolism seen leading to  this opacity.  5. Hepatic steatosis     This report was finalized on 1/17/2025 6:17 PM by Dr. Aramis Barakat M.D on Workstation: [id number]       XR Chest 1 View [[id number]] Collected: 01/17/25 1429     Updated: 01/17/25 1434    Narrative:      XR CHEST 1 VW-     INDICATION: Left-sided chest pain     COMPARISON: CT chest angiogram 11/7/2024 and chest radiographs dating  back to 10/31/2019       Impression:      No focal consolidation. No pleural effusion or pneumothorax.  Normal size cardiomediastinal silhouette with postsurgical change of  CABG and left atrial appendage clipping. Partially visualized right  reverse shoulder arthroplasty.     This report was finalized on 1/17/2025 2:31 PM by Dr. Jensen Hodge M.D on Workstation: [id number]                 Assessment:  Active Hospital Problems    Diagnosis  POA    **CAP (community acquired pneumonia) [J18.9]  Yes    Type 2 diabetes mellitus, without long-term current use of insulin [E11.9]  Yes    Amyloidosis [E85.9]  Yes    Chronic hypotension [I95.89]  Yes    NAFLD (nonalcoholic fatty liver disease) [K76.0]  Yes    Hypothyroid [E03.9]  Yes      Resolved Hospital Problems   No resolved problems to display.       Plan:    Chest pain and dyspnea with past history of pleural effusion requiring thoracentesis compounded by amyloidosis and prior surgery for removal of plaque from her heart as she is followed at [facility] but no known history of pulmonary involvement   -CTA does not demonstrate any PE as history sounded most consistent with this but does show changes of new nodular lesions on the right middle and lower lobes as well as left lower lobe aspects of atelectasis versus pneumonia   -Labs not consistent with sepsis with normal white count lactate and urine negative for strep pneumo and Legionella  with blood cultures x 2 pending   -Currently on Zosyn, 2 L O2   -Provide pain control.  I think Toradol could give her the most relief and counseled her about this but also have Dilaudid available if need be   -Normally followed by Dr. Sal who has helped administer chemotherapy in the past and consult has been placed      Elevated troponin with negative delta.  EKG without acute ST changes and LBBB is old   -Echo from 11/24 reviewed with findings involving wall hypokinesis with slightly depressed low normal EF   -Repeat echocardiogram today -consider consultation with Dr. Pruitt/[facility]      Chronic hypotension on midodrine    Hypothyroidism on levothyroxine    HLD on statin    GERD on PPI    Depression anxiety and Prozac dosing clarified    Nondiabetic-Jardiance is for cardiac.  Sliding scale DC'd    Hepatic steatosis noted on CT    No need PT or OT as patient states ambulating independently    Home MAR provided reviewed and reconciled    Further recommendations to follow as clinical course unfolds    Oscar العراقي MD  1/18/2025  06:58 EST

## 2025-01-18 NOTE — PLAN OF CARE
Goal Outcome Evaluation:  Plan of Care Reviewed With: patient           Outcome Evaluation: New admission from ED. Alert and oriented. Afebrile. BP ranges low. O2 at 2L/min. SOA at times with exertion. Occasional  dry cough, sputum sample to send.  Up with standby assist. Voided, urine specimen sent for Legionella and S. Pneumo Ag. IV Fluids at KVO rate. Due IV Zosyn given.  IV Morphine given for left lower chest. On regular NCS,  diet, snacks taken. Slept on and off.

## 2025-01-18 NOTE — PROGRESS NOTES
[facility] Clinical Pharmacy Services: Piperacillin-Tazobactam Consult    Pt Name: Cynthia Portillo   : 1955    Indication: Pneumonia    Relevant clinical data and objective history reviewed:    Past Medical History:   Diagnosis Date    AL amyloidosis     Anxiety     Arthritis     Bowel perforation     COVID-19 2021    Depression     Diverticulitis of colon     Diverticulosis     GERD (gastroesophageal reflux disease)     History of Clostridioides difficile infection 2008    HAS HAD SEVERAL TIMES IN PAST PER PT (SAW INFECTIOUS DISEASE MD FOR THIS S/P COLOSTOMY/EXTENSIVE ANBX THERAPY)    History of prediabetes     History of snoring     History of stress incontinence     Hypercholesterolemia     Hyperlipidemia     Hypertension     Left ventricular hypertrophy     FOLLOWED BY DR MARI. DENIES CHEST PAIN BUT STATES DOES GET SOA AT TIMES WITH EXERTION REPORTS THAT IT IS NOT A NEW ISSUE     Liver disease     Oral herpes 2020    Seasonal allergies     used to have allergy shots in the past    SOB (shortness of breath)     STATES WITH EXERTION HAS BEEN ONGOING ISSUE NOT A NEW ISSUE    Status post colostomy     reversed    Thyroid disease     Hypothyroid     Creatinine   Date Value Ref Range Status   2025 0.82 0.57 - 1.00 mg/dL Final   2024 0.81 0.57 - 1.00 mg/dL Final   2024 0.74 0.57 - 1.00 mg/dL Final   2024 0.70 0.57 - 1.11 mg/dL Final   2023 0.77 0.57 - 1.11 mg/dL Final   2022 0.63 0.57 - 1.11 mg/dL Final   2019 0.5 (L) 0.8 - 1.4 mg/dL Final   2019 0.6 (L) 0.7 - 1.5 mg/dL Final   2018 0.7 0.7 - 1.5 mg/dL Final   10/03/2018 0.6 (L) 0.7 - 1.5 mg/dL Final     BUN   Date Value Ref Range Status   2025 12 8 - 23 mg/dL Final   2024 16 8 - 26 mg/dL Final   2019 12 6 - 22 mg/dL Final     Estimated Creatinine Clearance: 53.7 mL/min (by C-G formula based on SCr of 0.82 mg/dL).    Lab Results   Component Value Date     WBC 10.29 01/17/2025     Temp Readings from Last 3 Encounters:   01/17/25 98 °F (36.7 °C) (Oral)   12/23/24 97.3 °F (36.3 °C) (Temporal)   12/20/24 98.5 °F (36.9 °C) (Temporal)      Assessment/Plan  Estimated CrCl >20 mL/min at this time; BMI 24.80 kg/m2  Will start piperacillin-tazobactam 3.375 g IV every 8 hours     Pharmacy will continue to follow daily while on piperacillin-tazobactam and adjust as needed. Thank you for this consult.    Beth Crandall, PharmD  Clinical Pharmacist

## 2025-01-18 NOTE — CONSULTS
Subjective     REASON FOR CONSULTATION:    Evaluation and management for AL amyloidosis and left lower chest pain                             REQUESTING PHYSICIAN:  Dr. Malcom MD    RECORDS OBTAINED:  Records of the patients history including those obtained from the referring provider were reviewed and summarized in detail.    HISTORY OF PRESENT ILLNESS:  The patient is a 69 y.o. year old female with medical history significant for AL amyloidosis with cardiac involvement, hypothyroidism and GERD had presented to [facility] ER on 1/17/2025 with acute worsening of left lower chest wall pain.    Patient states she has had on and off left-sided chest discomfort since cardiac surgery performed in December 2022.  Patient was hospitalized for similar symptoms in November 2024 as well.  She had left thoracentesis performed on 10/14/2024, exudative, cytology negative.  She received treatment with colchicine and and prednisone with no to minimal relief.    Patient denies any fever, productive cough or hemoptysis.  She reports of being diagnosed with COVID-19 infection in December 2024 for which she received Paxlovid.  Recovered now.    Repeat CTA chest performed this admission on 1/17/2025 noted no PE, however new subpleural nodular opacities bilaterally (RML, RLL, LLL), 5 to 7 mm in size.  Hepatic steatosis.    Given these findings, hematology/oncology has been consulted for further evaluation and management.  Patient is well-known to our service and follows up with Dr. Sal along with [facility] oncology team.    Past Medical History:   Diagnosis Date    AL amyloidosis     Anxiety     Arthritis     Bowel perforation     COVID-19 07/2020 9/7/2021    Depression     Diverticulitis of colon     Diverticulosis     GERD (gastroesophageal reflux disease)     History of Clostridioides difficile infection 2008    HAS HAD SEVERAL TIMES IN PAST PER PT (SAW INFECTIOUS DISEASE MD FOR THIS S/P COLOSTOMY/EXTENSIVE  ANBX THERAPY)    History of prediabetes     History of snoring     History of stress incontinence     Hypercholesterolemia     Hyperlipidemia     Hypertension     Left ventricular hypertrophy     FOLLOWED BY DR MARI. DENIES CHEST PAIN BUT STATES DOES GET SOA AT TIMES WITH EXERTION REPORTS THAT IT IS NOT A NEW ISSUE     Liver disease     Oral herpes 08/18/2020    Seasonal allergies     used to have allergy shots in the past    SOB (shortness of breath)     STATES WITH EXERTION HAS BEEN ONGOING ISSUE NOT A NEW ISSUE    Status post colostomy     reversed    Thyroid disease     Hypothyroid        Past Surgical History:   Procedure Laterality Date    ABDOMINAL SURGERY  2005, 2014    ex lap x 2,  diverticulitis    ABDOMINOPLASTY  2016    ADENOIDECTOMY      APPENDECTOMY      CARDIAC CATHETERIZATION N/A 04/24/2020    Procedure: Coronary angiography;  Surgeon: Roberto Briones MD;  Location:  [facility];  Service: Cardiovascular;  Laterality: N/A;    CARDIAC CATHETERIZATION N/A 04/24/2020    Procedure: Left heart cath;  Surgeon: Roberto Briones MD;  Location:  [facility];  Service: Cardiovascular;  Laterality: N/A;    CARDIAC CATHETERIZATION N/A 04/24/2020    Procedure: Left ventriculography;  Surgeon: Roberto Briones MD;  Location:  [facility];  Service: Cardiovascular;  Laterality: N/A;    CARDIAC SURGERY      open heart surgery,    COLONOSCOPY  approx 2018    normal per pt     COLOSTOMY  2005    had colostomy and then is was reversed    COLOSTOMY CLOSURE  2006    CORRECTION HAMMER TOE Right 2017    ECTOPIC PREGNANCY SURGERY      1979, 1980    ENDOSCOPY N/A 05/27/2020    Procedure: ESOPHAGOGASTRODUODENOSCOPY WITH BIOPSY AND BIOPSY POLYPECTOMY AND MACIEL DIALATION;  Surgeon: Preethi Beck MD;  Location: [facility] ENDOSCOPY;  Service: Gastroenterology;  Laterality: N/A;  PRE: ESOPHAGEAL DYSPHAGIA  POST: Z LINE 46, ESOPHAGEAL SPASM, GASTRITIS,  FUNDIC POLYP    ENDOSCOPY N/A 06/20/2023    ESOPHAGEAL DILATATION N/A 12/07/2021    Procedure: OR ESOPHAGEAL DILATATION with pacheco dilators ;  Surgeon: Jamey Leslie MD;  Location:  [facility];  Service: ENT;  Laterality: N/A;    ESOPHAGEAL MOTILITY STUDY N/A 02/03/2022    Procedure: ESOPHAGEAL MOTILITY STUDY;  Surgeon: Harshil, Nurse Performed;  Location:  [facility]OS[facility];  Service: Gastroenterology;  Laterality: N/A;  dypshagia     FOOT SURGERY Right 12/2016    Second and third hammertoe corrections    KNEE ARTHROSCOPY Right 2009    KNEE SURGERY Right     REVISION / TAKEDOWN COLOSTOMY  2006    SHOULDER ARTHROSCOPY Right 2018    right shoulder arthroscopy with extensive rotator cuff, biceps and labral debridement, chondroplasty, & subacromial decompression with acromioplasty    SHOULDER SURGERY      HAS HAS COMPLETE SHOULDER ON RIGHT. LEFT SCOPED AND HAD 2ND SURGERY WITH HARDWARE PLACED    SHOULDER SURGERY Left     2012. 2013    TONSILLECTOMY      TOTAL SHOULDER REVERSE ARTHROPLASTY Right 2019    WISDOM TOOTH EXTRACTION          No current facility-administered medications on file prior to encounter.     Current Outpatient Medications on File Prior to Encounter   Medication Sig Dispense Refill    acyclovir (ZOVIRAX) 400 MG tablet Take 1 tablet by mouth 2 (Two) Times a Day. 60 tablet 2    albuterol sulfate  (90 Base) MCG/ACT inhaler Inhale 2 puffs Every 4 (Four) Hours As Needed for Wheezing. 18 g 0    ALPRAZolam (XANAX) 0.25 MG tablet TAKE 1 TABLET BY MOUTH TWICE DAILY AS NEEDED FOR ANXIETY (Patient taking differently: Take 1 tablet by mouth 2 (Two) Times a Day As Needed for Anxiety.) 60 tablet 1    aspirin 81 MG EC tablet Take 1 tablet by mouth Daily.      benzonatate (TESSALON) 200 MG capsule Take 1 capsule by mouth 3 (Three) Times a Day As Needed for Cough. 30 capsule 0    Cholecalciferol (Vitamin D3) 50 MCG (2000 UT) tablet Take 1 tablet by mouth Daily.      cholestyramine (QUESTRAN) 4 g  packet Take 1 packet by mouth 3 (Three) Times a Day With Meals. 1 packet  TID PRN diarrhea (Patient taking differently: Take 1 packet by mouth 3 (Three) Times a Day As Needed.) 60 packet 4    diphenoxylate-atropine (LOMOTIL) 2.5-0.025 MG per tablet Take 1 tablet by mouth 4 (Four) Times a Day As Needed for Diarrhea. 120 tablet 0    empagliflozin (JARDIANCE) 10 MG tablet tablet Take 1 tablet by mouth Daily.      FLUoxetine (PROzac) 20 MG capsule TAKE 1 CAPSULE BY MOUTH FOUR TIMES DAILY (Patient taking differently: Take 1 capsule by mouth 4 (Four) Times a Day.) 360 capsule 0    HYDROcodone-acetaminophen (NORCO) 5-325 MG per tablet Take 1 tablet by mouth Every 6 (Six) Hours As Needed for Moderate Pain. 60 tablet 0    icosapent ethyl (VASCEPA) 1 g capsule capsule TAKE 2 CAPSULES BY MOUTH DAILY 60 capsule 2    levothyroxine (SYNTHROID, LEVOTHROID) 75 MCG tablet TAKE 1 TABLET BY MOUTH EVERY DAY (Patient taking differently: Take 1 tablet by mouth Daily.) 90 tablet 0    loratadine (CLARITIN) 10 MG tablet Take 1 tablet by mouth Daily. 30 tablet 5    midodrine (PROAMATINE) 5 MG tablet Take 1 tablet by mouth 2 (Two) Times a Day.      Multiple Vitamins-Minerals (V-C Forte) capsule Take 1 capsule by mouth Daily.      olopatadine (PATANOL) 0.1 % ophthalmic solution Administer 1 drop to both eyes 2 (Two) Times a Day. 5 mL 5    pantoprazole (PROTONIX) 20 MG EC tablet TAKE 1 TABLET BY MOUTH EVERY DAY. DO NOT TAKE WITHIN 2 HOURS OF THYROID MEDICATION (Patient taking differently: Take 1 tablet by mouth Daily.) 90 tablet 1    rosuvastatin (CRESTOR) 40 MG tablet Take 1 tablet by mouth Daily. Indications: High Amount of Fats in the Blood 90 tablet 0    spironolactone (ALDACTONE) 25 MG tablet Take 1 tablet by mouth Daily.      zolpidem (AMBIEN) 10 MG tablet TAKE 1 TABLET BY MOUTH AT NIGHT AS NEEDED FOR SLEEP 30 tablet 1        ALLERGIES:    Allergies   Allergen Reactions    Azithromycin Rash and Other (See Comments)     Reaction unknown to  "patient (unable to remember)  Other reaction(s): Other: stomach issues, Stomach ache, Other: stomach issues, Stomach ache    Ezetimibe Myalgia, Other (See Comments) and Rash     cramps  cramps    Pravastatin Rash and Other (See Comments)    Sulfa Antibiotics Rash        Social History     Socioeconomic History    Marital status: Single    Number of children: 2   Tobacco Use    Smoking status: Never     Passive exposure: Never    Smokeless tobacco: Never   Vaping Use    Vaping status: Never Used   Substance and Sexual Activity    Alcohol use: Not Currently     Alcohol/week: 1.0 standard drink of alcohol     Types: 1 Glasses of wine per week    Drug use: Never    Sexual activity: Defer        Family History   Problem Relation Age of Onset    Hypertension Mother     Osteoporosis Mother     Skin cancer Mother     Heart disease Father     Hypertension Father     Breast cancer Maternal Grandmother     Ovarian cancer Neg Hx     Colon cancer Neg Hx     Deep vein thrombosis Neg Hx     Pulmonary embolism Neg Hx     Malig Hyperthermia Neg Hx         Review of Systems   As per HPI    Objective     Vitals:    01/17/25 2137 01/17/25 2155 01/18/25 0110 01/18/25 0628   BP:  110/59 95/52 108/55   BP Location:  Left arm Left arm Left arm   Patient Position:  Lying Lying Lying   Pulse: 72 69 66 70   Resp: 16 16 16 16   Temp:  98 °F (36.7 °C) 97.7 °F (36.5 °C) 97.5 °F (36.4 °C)   TempSrc:  Oral Oral Oral   SpO2: 96% 96% 96% 95%   Weight: 59.5 kg (131 lb 2.8 oz)      Height: 154.9 cm (60.98\")            11/13/2024     8:02 AM   Current Status   ECOG score 0       Physical Exam    CONSTITUTIONAL:  Vital signs reviewed.  No distress, looks comfortable.  EYES:  Conjunctivae and lids unremarkable.    EARS,NOSE,MOUTH,THROAT:  Ears and nose appear unremarkable.    RESPIRATORY:  Normal respiratory effort.  Lungs clear to auscultation bilaterally.  CARDIOVASCULAR:  Normal S1, S2.  No murmurs rubs or gallops.  No significant lower extremity " edema.  GASTROINTESTINAL: Abdomen appears unremarkable.  Nondistended   LYMPHATIC:  No cervical, supraclavicular lymphadenopathy.  SKIN:  Warm.  No rashes.  PSYCHIATRIC:  Normal judgment and insight.  Normal mood and affect.  NEURO: AAOx3, no obvious focal deficits.      RECENT LABS:  Hematology WBC   Date Value Ref Range Status   01/18/2025 5.76 3.40 - 10.80 10*3/mm3 Final     RBC   Date Value Ref Range Status   01/18/2025 4.06 3.77 - 5.28 10*6/mm3 Final     Hemoglobin   Date Value Ref Range Status   01/18/2025 11.4 (L) 12.0 - 15.9 g/dL Final     Hematocrit   Date Value Ref Range Status   01/18/2025 36.2 34.0 - 46.6 % Final     Platelets   Date Value Ref Range Status   01/18/2025 168 140 - 450 10*3/mm3 Final          Assessment & Plan   This is a 69-year-old female with:    # Acute on chronic left lower chest wall pain:  Patient states she has had on and off left-sided chest discomfort since cardiac surgery performed in December 2022.  Patient was hospitalized for similar symptoms in November 2024 as well.  She had left thoracentesis performed on 10/14/2024, exudative, cytology negative.    She received treatment with colchicine and and prednisone with no to minimal relief.  Patient denies any fever, productive cough or hemoptysis.  She reports of being diagnosed with COVID-19 infection in December 2024 for which she received Paxlovid.  Recovered now.  Repeat CTA chest performed this admission on 1/17/2025 noted no PE, however new subpleural nodular opacities bilaterally (RML, RLL, LLL), 5 to 7 mm in size.  Hepatic steatosis.  Informed patient that her pain appears to be musculoskeletal/neuropathic in nature.  She has been started on IV Zosyn for suspected pneumonia by the primary team.  I offered trial of prednisone again-however patient is concerned about potential side effects and minimal relief in the past.  Patient's pain is currently being managed with Toradol and IV Dilaudid as needed's.  Patient previously  reported of excessive sedation with Lyrica 50 mg daily.  Will start her on 25 mg daily.    # Bilateral subpleural opacities:  Infectious/inflammatory vs amyloidosis related.  Plan to monitor for now  Patient had COVID-19 infection back in December 2024.  She is scheduled for repeat CT chest in end of February through her pulmonologist, Dr. Ferraro.  Advised to maintain close follow-up with him to monitor resolution    # AL amyloidosis with cardiac involvement:  Diagnosed February 2022.  Previously treated with Darzalex-CyBorD  Last dose of Darzalex in August 2024.  Follows up with Dr. Sal and [place]    Recommendations:  -Bilateral subpleural opacities likely infectious/inflammatory.   -Continue IV Zosyn per primary  -Will need repeat CT chest to document resolution.  Patient states she is scheduled for repeat CT chest through her pulmonologist in end of February.  Advised to keep that appointment  -Start low-dose Lyrica 25 mg daily to help with left chest wall pain.  -Advised to continue Toradol and Percocet/Dilaudid as needed for pain relief  -Will follow        I spent 82 minutes on this encounter, before, during & after the visit evaluating the patient, reviewing records and writing orders.

## 2025-01-19 ENCOUNTER — APPOINTMENT (OUTPATIENT)
Dept: CARDIOLOGY | Facility: HOSPITAL | Age: 70
DRG: 194 | End: 2025-01-19
Payer: MEDICARE

## 2025-01-19 LAB
AORTIC DIMENSIONLESS INDEX: 0.66 (DI)
ASCENDING AORTA: 3.3 CM
AV MEAN PRESS GRAD SYS DOP V1V2: 7 MMHG
AV VMAX SYS DOP: 174 CM/SEC
BH CV ECHO MEAS - ACS: 1.97 CM
BH CV ECHO MEAS - AO MAX PG: 12.1 MMHG
BH CV ECHO MEAS - AO V2 VTI: 37.3 CM
BH CV ECHO MEAS - AVA(I,D): 2.25 CM2
BH CV ECHO MEAS - EDV(CUBED): 81.4 ML
BH CV ECHO MEAS - EDV(MOD-SP2): 76 ML
BH CV ECHO MEAS - EDV(MOD-SP4): 75 ML
BH CV ECHO MEAS - EF(MOD-SP2): 61.8 %
BH CV ECHO MEAS - EF(MOD-SP4): 61.3 %
BH CV ECHO MEAS - ESV(CUBED): 16.4 ML
BH CV ECHO MEAS - ESV(MOD-SP2): 29 ML
BH CV ECHO MEAS - ESV(MOD-SP4): 29 ML
BH CV ECHO MEAS - FS: 41.4 %
BH CV ECHO MEAS - IVS/LVPW: 0.94 CM
BH CV ECHO MEAS - IVSD: 0.88 CM
BH CV ECHO MEAS - LAT PEAK E' VEL: 6.6 CM/SEC
BH CV ECHO MEAS - LV DIASTOLIC VOL/BSA (35-75): 48.1 CM2
BH CV ECHO MEAS - LV MASS(C)D: 127.5 GRAMS
BH CV ECHO MEAS - LV MAX PG: 4.8 MMHG
BH CV ECHO MEAS - LV MEAN PG: 3 MMHG
BH CV ECHO MEAS - LV SYSTOLIC VOL/BSA (12-30): 18.6 CM2
BH CV ECHO MEAS - LV V1 MAX: 109 CM/SEC
BH CV ECHO MEAS - LV V1 VTI: 24.6 CM
BH CV ECHO MEAS - LVIDD: 4.3 CM
BH CV ECHO MEAS - LVIDS: 2.5 CM
BH CV ECHO MEAS - LVOT AREA: 3.4 CM2
BH CV ECHO MEAS - LVOT DIAM: 2.08 CM
BH CV ECHO MEAS - LVPWD: 0.94 CM
BH CV ECHO MEAS - MED PEAK E' VEL: 4.7 CM/SEC
BH CV ECHO MEAS - MR MAX PG: 77 MMHG
BH CV ECHO MEAS - MR MAX VEL: 438.7 CM/SEC
BH CV ECHO MEAS - MV A DUR: 0.14 SEC
BH CV ECHO MEAS - MV A MAX VEL: 93.4 CM/SEC
BH CV ECHO MEAS - MV DEC SLOPE: 869.6 CM/SEC2
BH CV ECHO MEAS - MV DEC TIME: 0.2 SEC
BH CV ECHO MEAS - MV E MAX VEL: 111.8 CM/SEC
BH CV ECHO MEAS - MV E/A: 1.2
BH CV ECHO MEAS - MV MAX PG: 7.4 MMHG
BH CV ECHO MEAS - MV MEAN PG: 3.1 MMHG
BH CV ECHO MEAS - MV P1/2T: 44.8 MSEC
BH CV ECHO MEAS - MV V2 VTI: 39.3 CM
BH CV ECHO MEAS - MVA(P1/2T): 4.9 CM2
BH CV ECHO MEAS - MVA(VTI): 2.13 CM2
BH CV ECHO MEAS - PA ACC TIME: 0.1 SEC
BH CV ECHO MEAS - PA V2 MAX: 105.9 CM/SEC
BH CV ECHO MEAS - PULM A REVS DUR: 0.11 SEC
BH CV ECHO MEAS - PULM A REVS VEL: 23.6 CM/SEC
BH CV ECHO MEAS - PULM DIAS VEL: 44.1 CM/SEC
BH CV ECHO MEAS - PULM S/D: 1.33
BH CV ECHO MEAS - PULM SYS VEL: 58.7 CM/SEC
BH CV ECHO MEAS - QP/QS: 0.35
BH CV ECHO MEAS - RAP SYSTOLE: 3 MMHG
BH CV ECHO MEAS - RV MAX PG: 1.01 MMHG
BH CV ECHO MEAS - RV V1 MAX: 50.1 CM/SEC
BH CV ECHO MEAS - RV V1 VTI: 9 CM
BH CV ECHO MEAS - RVOT DIAM: 2.02 CM
BH CV ECHO MEAS - RVSP: 20 MMHG
BH CV ECHO MEAS - SV(LVOT): 83.8 ML
BH CV ECHO MEAS - SV(MOD-SP2): 47 ML
BH CV ECHO MEAS - SV(MOD-SP4): 46 ML
BH CV ECHO MEAS - SV(RVOT): 29 ML
BH CV ECHO MEAS - SVI(LVOT): 53.8 ML/M2
BH CV ECHO MEAS - SVI(MOD-SP2): 30.1 ML/M2
BH CV ECHO MEAS - SVI(MOD-SP4): 29.5 ML/M2
BH CV ECHO MEAS - TAPSE (>1.6): 1.4 CM
BH CV ECHO MEAS - TR MAX PG: 17 MMHG
BH CV ECHO MEAS - TR MAX VEL: 206.3 CM/SEC
BH CV ECHO MEASUREMENTS AVERAGE E/E' RATIO: 19.79
BH CV XLRA - RV BASE: 2.39 CM
BH CV XLRA - RV LENGTH: 7.2 CM
BH CV XLRA - RV MID: 1.78 CM
BH CV XLRA - TDI S': 11.1 CM/SEC
LV EF BIPLANE MOD: 62.7 %
SINUS: 3.4 CM
STJ: 2.41 CM

## 2025-01-19 PROCEDURE — 25010000002 HYDROMORPHONE PER 4 MG: Performed by: HOSPITALIST

## 2025-01-19 PROCEDURE — 25010000002 KETOROLAC TROMETHAMINE PER 15 MG: Performed by: HOSPITALIST

## 2025-01-19 PROCEDURE — 93306 TTE W/DOPPLER COMPLETE: CPT | Performed by: INTERNAL MEDICINE

## 2025-01-19 PROCEDURE — 25010000002 PIPERACILLIN SOD-TAZOBACTAM PER 1 G: Performed by: NURSE PRACTITIONER

## 2025-01-19 PROCEDURE — 99232 SBSQ HOSP IP/OBS MODERATE 35: CPT | Performed by: INTERNAL MEDICINE

## 2025-01-19 PROCEDURE — 93306 TTE W/DOPPLER COMPLETE: CPT

## 2025-01-19 RX ORDER — LIDOCAINE 4 G/G
1 PATCH TOPICAL
Status: DISCONTINUED | OUTPATIENT
Start: 2025-01-19 | End: 2025-01-23 | Stop reason: HOSPADM

## 2025-01-19 RX ADMIN — ACYCLOVIR 400 MG: 400 TABLET ORAL at 20:37

## 2025-01-19 RX ADMIN — PANTOPRAZOLE SODIUM 40 MG: 40 TABLET, DELAYED RELEASE ORAL at 11:06

## 2025-01-19 RX ADMIN — PIPERACILLIN AND TAZOBACTAM 3.38 G: 3; .375 INJECTION, POWDER, FOR SOLUTION INTRAVENOUS at 02:11

## 2025-01-19 RX ADMIN — LEVOTHYROXINE SODIUM 75 MCG: 0.07 TABLET ORAL at 06:10

## 2025-01-19 RX ADMIN — ACYCLOVIR 400 MG: 400 TABLET ORAL at 11:11

## 2025-01-19 RX ADMIN — KETOROLAC TROMETHAMINE 30 MG: 30 INJECTION, SOLUTION INTRAMUSCULAR at 14:32

## 2025-01-19 RX ADMIN — ALPRAZOLAM 0.25 MG: 0.25 TABLET ORAL at 20:42

## 2025-01-19 RX ADMIN — LIDOCAINE 1 PATCH: 4 PATCH TOPICAL at 17:35

## 2025-01-19 RX ADMIN — ASPIRIN 81 MG: 81 TABLET, COATED ORAL at 11:11

## 2025-01-19 RX ADMIN — Medication 10 ML: at 20:37

## 2025-01-19 RX ADMIN — HYDROMORPHONE HYDROCHLORIDE 0.5 MG: 1 INJECTION, SOLUTION INTRAMUSCULAR; INTRAVENOUS; SUBCUTANEOUS at 05:07

## 2025-01-19 RX ADMIN — MIDODRINE HYDROCHLORIDE 5 MG: 5 TABLET ORAL at 17:31

## 2025-01-19 RX ADMIN — ALPRAZOLAM 0.25 MG: 0.25 TABLET ORAL at 06:10

## 2025-01-19 RX ADMIN — PIPERACILLIN AND TAZOBACTAM 3.38 G: 3; .375 INJECTION, POWDER, FOR SOLUTION INTRAVENOUS at 17:31

## 2025-01-19 RX ADMIN — FLUOXETINE HYDROCHLORIDE 80 MG: 20 CAPSULE ORAL at 11:06

## 2025-01-19 RX ADMIN — LEVOTHYROXINE SODIUM 75 MCG: 0.07 TABLET ORAL at 11:06

## 2025-01-19 RX ADMIN — KETOROLAC TROMETHAMINE 30 MG: 30 INJECTION, SOLUTION INTRAMUSCULAR at 06:10

## 2025-01-19 RX ADMIN — HYDROCODONE BITARTRATE AND ACETAMINOPHEN 1 TABLET: 5; 325 TABLET ORAL at 20:37

## 2025-01-19 RX ADMIN — HYDROCODONE BITARTRATE AND ACETAMINOPHEN 1 TABLET: 5; 325 TABLET ORAL at 11:06

## 2025-01-19 RX ADMIN — PIPERACILLIN AND TAZOBACTAM 3.38 G: 3; .375 INJECTION, POWDER, FOR SOLUTION INTRAVENOUS at 11:06

## 2025-01-19 RX ADMIN — ROSUVASTATIN CALCIUM 40 MG: 20 TABLET, FILM COATED ORAL at 20:37

## 2025-01-19 RX ADMIN — MIDODRINE HYDROCHLORIDE 5 MG: 5 TABLET ORAL at 11:06

## 2025-01-19 NOTE — PLAN OF CARE
Goal Outcome Evaluation:  Plan of Care Reviewed With: patient        Progress: improving  Outcome Evaluation: Alert and oriented. VSS. Humidified O2 @ 2L/min. Occasional cough. No signs of respiratory distress.  Anxious early in the night, Xanax given, IV Toradol  for Left chest pain with good relief. IV line reinserted , IV Fluids at KVO rate, due IV Zosyn given. Up ad gini, voided freely.  Rested well in between care.

## 2025-01-19 NOTE — PROGRESS NOTES
"  Subjective     HISTORY OF PRESENT ILLNESS:   Patient reports of persistent pain in her left lower chest wall.  Afebrile.  Vital stable.  She reports of being nauseous after taking Lyrica.  She did not like the way she had made her feel.  Does not want to try it again.      Past Medical History, Past Surgical History, Social History, Family History have been reviewed and are without significant changes except as mentioned.    Review of Systems   A comprehensive 14 point review of systems was performed and was negative except as mentioned.    Medications:  The current medication list was reviewed in the EMR    ALLERGIES:    Allergies   Allergen Reactions    Azithromycin Rash and Other (See Comments)     Reaction unknown to patient (unable to remember)  Other reaction(s): Other: stomach issues, Stomach ache, Other: stomach issues, Stomach ache    Ezetimibe Myalgia, Other (See Comments) and Rash     cramps  cramps    Pravastatin Rash and Other (See Comments)    Sulfa Antibiotics Rash       Objective      Vitals:    01/18/25 1653 01/18/25 2223 01/19/25 0511 01/19/25 0856   BP: 110/59 103/52 111/64    BP Location: Left arm Right arm Right arm    Patient Position: Lying Lying Sitting    Pulse: 80 76 95    Resp: 18 16 20    Temp: 98.8 °F (37.1 °C) 99 °F (37.2 °C) 98.2 °F (36.8 °C)    TempSrc: Oral Oral Oral    SpO2: 96% 95% 96%    Weight:    59.4 kg (131 lb)   Height:    152.4 cm (60\")         11/13/2024     8:02 AM   Current Status   ECOG score 0       Physical Exam    CONSTITUTIONAL:  Vital signs reviewed.  No distress, looks comfortable.  EYES:  Conjunctivae and lids unremarkable.    EARS,NOSE,MOUTH,THROAT:  Ears and nose appear unremarkable.    RESPIRATORY:  Normal respiratory effort.    CARDIOVASCULAR:  Normal heart rate  GASTROINTESTINAL: Abdomen appears unremarkable.  Nondistended   LYMPHATIC:  No cervical, supraclavicular lymphadenopathy.  SKIN:  Warm.  No rashes.  PSYCHIATRIC:  Normal judgment and insight.  " Normal mood and affect.  NEURO: AAOx3, no obvious focal deficits.      RECENT LABS:  Hematology WBC   Date Value Ref Range Status   01/18/2025 5.76 3.40 - 10.80 10*3/mm3 Final     RBC   Date Value Ref Range Status   01/18/2025 4.06 3.77 - 5.28 10*6/mm3 Final     Hemoglobin   Date Value Ref Range Status   01/18/2025 11.4 (L) 12.0 - 15.9 g/dL Final     Hematocrit   Date Value Ref Range Status   01/18/2025 36.2 34.0 - 46.6 % Final     Platelets   Date Value Ref Range Status   01/18/2025 168 140 - 450 10*3/mm3 Final              Assessment & Plan   This is a 69-year-old female with:     # Acute on chronic left lower chest wall pain:  Patient states she has had on and off left-sided chest discomfort since cardiac surgery performed in December 2022.  Patient was hospitalized for similar symptoms in November 2024 as well.  She had left thoracentesis performed on 10/14/2024, exudative, cytology negative.    She received treatment with colchicine and and prednisone with no to minimal relief.  Patient denies any fever, productive cough or hemoptysis.  She reports of being diagnosed with COVID-19 infection in December 2024 for which she received Paxlovid.  Recovered now.  Repeat CTA chest performed this admission on 1/17/2025 noted no PE, however new subpleural nodular opacities bilaterally (RML, RLL, LLL), 5 to 7 mm in size.  Hepatic steatosis.  Informed patient that her pain appears to be musculoskeletal/neuropathic in nature.  She has been started on IV Zosyn for suspected pneumonia by the primary team.  I offered trial of prednisone again-however patient is concerned about potential side effects and minimal relief in the past.  Patient's pain is currently being managed with Toradol and IV Dilaudid as needed's.  Patient previously reported of excessive sedation with Lyrica 50 mg daily.  Will start her on 25 mg daily.  1/19/2025: Patient did not tolerate Lyrica well.  Does not want to try it again.  Will start her on  lidocaine patches and see if it helps.  Continue Toradol and IV Dilaudid as needed     # Bilateral subpleural opacities:  Infectious/inflammatory vs amyloidosis related.  Plan to monitor for now  Patient had COVID-19 infection back in December 2024.  She is scheduled for repeat CT chest in end of February through her pulmonologist, Dr. Ferraro.  Advised to maintain close follow-up with him to monitor resolution     # AL amyloidosis with cardiac involvement:  Diagnosed February 2022.  Previously treated with Darzalex-CyBorD  Last dose of Darzalex in August 2024.  Follows up with Dr. Sal and [place]     Recommendations:  -Bilateral subpleural opacities likely infectious/inflammatory.   -Continue IV Zosyn per primary  -Will need repeat CT chest to document resolution.  Patient states she is scheduled for repeat CT chest through her pulmonologist in end of February.  Advised to keep that appointment  -Start lidocaine patches  -Advised to continue Toradol and Percocet/Dilaudid as needed for pain relief  -Will follow

## 2025-01-19 NOTE — PLAN OF CARE
Goal Outcome Evaluation:  Plan of Care Reviewed With: patient        Progress: improving  Outcome Evaluation: VSS, on 2L humidified O2, norco and toradol given for pain, up ad gini, pt refusing alarms, IV zosyn given per order, voiding freely, using I/S

## 2025-01-19 NOTE — PROGRESS NOTES
"    DAILY PROGRESS NOTE  [facility]    Patient Identification:  Name: Cynthia Portillo  Age: 69 y.o.  Sex: female  :  1955  MRN: [id number]         Primary Care Physician: Arian Peterson MD    Subjective:  Interval History: Feeling better overall.  She did have some acute chest pain that required medication this morning.  Her pain is actually worse at times with movement and it makes me feel that this is much less likely due to any cardiac issues and probably influenced by concern for pneumonia as well as musculoskeletal.  No new fevers no nausea no vomiting    Objective: Clinically looking a little better.  Conversational and pleasant and appreciative.  Nontoxic.  No family present and patient has not asked me to discuss case with anyone additionally    Scheduled Meds:acyclovir, 400 mg, Oral, BID  aspirin, 81 mg, Oral, Daily  FLUoxetine, 80 mg, Oral, Daily  levothyroxine, 75 mcg, Oral, Daily  midodrine, 5 mg, Oral, BID AC  pantoprazole, 40 mg, Oral, Daily  piperacillin-tazobactam, 3.375 g, Intravenous, Q8H  rosuvastatin, 40 mg, Oral, Daily  sodium chloride, 10 mL, Intravenous, Q12H  spironolactone, 25 mg, Oral, Daily      Continuous Infusions:Pharmacy to Dose Zosyn,         Vital signs in last 24 hours:  Temp:  [97.7 °F (36.5 °C)-99 °F (37.2 °C)] 98.2 °F (36.8 °C)  Heart Rate:  [69-95] 95  Resp:  [16-20] 20  BP: (103-118)/(52-64) 111/64    Intake/Output:    Intake/Output Summary (Last 24 hours) at 2025 0952  Last data filed at 2025 0507  Gross per 24 hour   Intake 750 ml   Output 1450 ml   Net -700 ml       Exam:  /64 (BP Location: Right arm, Patient Position: Sitting)   Pulse 95   Temp 98.2 °F (36.8 °C) (Oral)   Resp 20   Ht 152.4 cm (60\")   Wt 59.4 kg (131 lb)   SpO2 96%   BMI 25.58 kg/m²     General Appearance:    Alert, cooperative, AOx3                         Throat:   Oral mucosa pink and moist                           Neck:   Supple, symmetrical, " trachea midline, no JVD                         Lungs:    Clear to auscultation bilaterally, respirations unlabored                          Heart:    Regular rate and rhythm, S1 and S2 normal                  Abdomen:     Soft, nontender, bowel sounds active                 Extremities: Moving all, no cyanosis or edema                        Pulses:   Pulses palpable in all extremities                            Skin:   Skin is warm and dry                  Neurologic:   CNII-XII intact     Data Review:  Labs in chart were reviewed.    Assessment:  Active Hospital Problems    Diagnosis  POA    **CAP (community acquired pneumonia) [J18.9]  Yes    Type 2 diabetes mellitus, without long-term current use of insulin [E11.9]  Yes    Amyloidosis [E85.9]  Yes    Chronic hypotension [I95.89]  Yes    NAFLD (nonalcoholic fatty liver disease) [K76.0]  Yes    Hypothyroid [E03.9]  Yes      Resolved Hospital Problems   No resolved problems to display.       Plan:    Chest pain likely pleuritic due to concern for pneumonia left lower lobe   -Appreciate oncological recommendations -treat pneumonia is currently doing and patient will follow-up with [place] pulmonologist in near future given new lesions found on the right side that are too small for biopsy at this time   -No PE per CTA   -O2 requirements minimal at 2 L-wean accordingly   -Continue as needed pain control -low-dose Lyrica added      Elevated troponin without EKG acute changes and a negative delta with chronic old LBBB and echo pending.    Chronic hypotension on midodrine    Hypothyroidism on levothyroxine    HLD on statin    GERD on PPI    Depression/anxiety on Prozac    Nondiabetic on Jardiance due to past cardiac history    SCDs for prophylaxis      Disposition -anticipate clinical improvement and hopefully patient ready in the next couple days    Oscar العراقي MD  1/19/2025  09:52 EST

## 2025-01-20 LAB
B PARAPERT DNA SPEC QL NAA+PROBE: NOT DETECTED
B PERT DNA SPEC QL NAA+PROBE: NOT DETECTED
C PNEUM DNA NPH QL NAA+NON-PROBE: NOT DETECTED
CRP SERPL-MCNC: 15.49 MG/DL (ref 0–0.5)
ERYTHROCYTE [SEDIMENTATION RATE] IN BLOOD: 46 MM/HR (ref 0–30)
FLUAV SUBTYP SPEC NAA+PROBE: NOT DETECTED
FLUBV RNA ISLT QL NAA+PROBE: NOT DETECTED
HADV DNA SPEC NAA+PROBE: NOT DETECTED
HCOV 229E RNA SPEC QL NAA+PROBE: NOT DETECTED
HCOV HKU1 RNA SPEC QL NAA+PROBE: NOT DETECTED
HCOV NL63 RNA SPEC QL NAA+PROBE: NOT DETECTED
HCOV OC43 RNA SPEC QL NAA+PROBE: NOT DETECTED
HMPV RNA NPH QL NAA+NON-PROBE: NOT DETECTED
HPIV1 RNA ISLT QL NAA+PROBE: NOT DETECTED
HPIV2 RNA SPEC QL NAA+PROBE: NOT DETECTED
HPIV3 RNA NPH QL NAA+PROBE: NOT DETECTED
HPIV4 P GENE NPH QL NAA+PROBE: NOT DETECTED
M PNEUMO IGG SER IA-ACNC: NOT DETECTED
RHINOVIRUS RNA SPEC NAA+PROBE: NOT DETECTED
RSV RNA NPH QL NAA+NON-PROBE: NOT DETECTED
SARS-COV-2 RNA NPH QL NAA+NON-PROBE: NOT DETECTED

## 2025-01-20 PROCEDURE — 99222 1ST HOSP IP/OBS MODERATE 55: CPT | Performed by: INTERNAL MEDICINE

## 2025-01-20 PROCEDURE — 25010000002 METHYLPREDNISOLONE PER 40 MG: Performed by: INTERNAL MEDICINE

## 2025-01-20 PROCEDURE — 86235 NUCLEAR ANTIGEN ANTIBODY: CPT | Performed by: INTERNAL MEDICINE

## 2025-01-20 PROCEDURE — 86225 DNA ANTIBODY NATIVE: CPT | Performed by: INTERNAL MEDICINE

## 2025-01-20 PROCEDURE — 99231 SBSQ HOSP IP/OBS SF/LOW 25: CPT | Performed by: INTERNAL MEDICINE

## 2025-01-20 PROCEDURE — 25010000002 PIPERACILLIN SOD-TAZOBACTAM PER 1 G: Performed by: NURSE PRACTITIONER

## 2025-01-20 PROCEDURE — 25010000002 KETOROLAC TROMETHAMINE PER 15 MG: Performed by: HOSPITALIST

## 2025-01-20 PROCEDURE — 85652 RBC SED RATE AUTOMATED: CPT | Performed by: INTERNAL MEDICINE

## 2025-01-20 PROCEDURE — 0202U NFCT DS 22 TRGT SARS-COV-2: CPT | Performed by: INTERNAL MEDICINE

## 2025-01-20 PROCEDURE — 86140 C-REACTIVE PROTEIN: CPT | Performed by: INTERNAL MEDICINE

## 2025-01-20 RX ORDER — GABAPENTIN 100 MG/1
100 CAPSULE ORAL 3 TIMES DAILY
Status: DISCONTINUED | OUTPATIENT
Start: 2025-01-20 | End: 2025-01-23 | Stop reason: HOSPADM

## 2025-01-20 RX ORDER — METHYLPREDNISOLONE SODIUM SUCCINATE 40 MG/ML
40 INJECTION, POWDER, LYOPHILIZED, FOR SOLUTION INTRAMUSCULAR; INTRAVENOUS EVERY 8 HOURS
Status: DISCONTINUED | OUTPATIENT
Start: 2025-01-20 | End: 2025-01-21

## 2025-01-20 RX ADMIN — ZOLPIDEM TARTRATE 10 MG: 5 TABLET, FILM COATED ORAL at 23:46

## 2025-01-20 RX ADMIN — PIPERACILLIN AND TAZOBACTAM 3.38 G: 3; .375 INJECTION, POWDER, FOR SOLUTION INTRAVENOUS at 09:29

## 2025-01-20 RX ADMIN — ROSUVASTATIN CALCIUM 40 MG: 20 TABLET, FILM COATED ORAL at 20:04

## 2025-01-20 RX ADMIN — ACYCLOVIR 400 MG: 400 TABLET ORAL at 10:53

## 2025-01-20 RX ADMIN — SENNOSIDES AND DOCUSATE SODIUM 2 TABLET: 50; 8.6 TABLET ORAL at 20:04

## 2025-01-20 RX ADMIN — LIDOCAINE 1 PATCH: 4 PATCH TOPICAL at 09:30

## 2025-01-20 RX ADMIN — FLUOXETINE HYDROCHLORIDE 80 MG: 20 CAPSULE ORAL at 09:29

## 2025-01-20 RX ADMIN — Medication 10 ML: at 20:19

## 2025-01-20 RX ADMIN — LEVOTHYROXINE SODIUM 75 MCG: 0.07 TABLET ORAL at 09:29

## 2025-01-20 RX ADMIN — ASPIRIN 81 MG: 81 TABLET, COATED ORAL at 10:52

## 2025-01-20 RX ADMIN — MIDODRINE HYDROCHLORIDE 5 MG: 5 TABLET ORAL at 17:49

## 2025-01-20 RX ADMIN — PIPERACILLIN AND TAZOBACTAM 3.38 G: 3; .375 INJECTION, POWDER, FOR SOLUTION INTRAVENOUS at 02:08

## 2025-01-20 RX ADMIN — GABAPENTIN 100 MG: 100 CAPSULE ORAL at 20:04

## 2025-01-20 RX ADMIN — PANTOPRAZOLE SODIUM 40 MG: 40 TABLET, DELAYED RELEASE ORAL at 09:29

## 2025-01-20 RX ADMIN — MIDODRINE HYDROCHLORIDE 5 MG: 5 TABLET ORAL at 06:30

## 2025-01-20 RX ADMIN — METHYLPREDNISOLONE SODIUM SUCCINATE 40 MG: 40 INJECTION, POWDER, FOR SOLUTION INTRAMUSCULAR; INTRAVENOUS at 20:16

## 2025-01-20 RX ADMIN — HYDROCODONE BITARTRATE AND ACETAMINOPHEN 1 TABLET: 5; 325 TABLET ORAL at 04:39

## 2025-01-20 RX ADMIN — ACYCLOVIR 400 MG: 400 TABLET ORAL at 20:04

## 2025-01-20 RX ADMIN — KETOROLAC TROMETHAMINE 30 MG: 30 INJECTION, SOLUTION INTRAMUSCULAR at 13:15

## 2025-01-20 RX ADMIN — PIPERACILLIN AND TAZOBACTAM 3.38 G: 3; .375 INJECTION, POWDER, FOR SOLUTION INTRAVENOUS at 17:49

## 2025-01-20 NOTE — PLAN OF CARE
Goal Outcome Evaluation:  Plan of Care Reviewed With: patient        Progress: improving  Outcome Evaluation: VSS, on 2L humidified O2, heart healthy diet, c/o left chest pain, toradol given, gabapentin ordered 3X/day, voiding freely, falls precautions maintained

## 2025-01-20 NOTE — PLAN OF CARE
Goal Outcome Evaluation:  Plan of Care Reviewed With: patient           Outcome Evaluation: VSS.  On 2L humudified oxygen.  c/o left chest/rib pain.  prn Lortab given.  occasional nonproductive cough.  IS use up to 500.    Temp of 100.8 F last night but was down to WML after.    IV zosyn given.  Falls precaution maintained

## 2025-01-20 NOTE — CONSULTS
Referring Provider: Dr. العراقي  Reason for Consultation: Abnormal chest imaging    Patient Care Team:  Arian Peterson MD as PCP - General (Internal Medicine)  Nav Sal MD as Consulting Physician (Hematology and Oncology)  Darren Pruitt MD as Referring Physician (Cardiology)    Chief complaint:   Chest pain    History of present illness:    Subjective   This is a 69-year-old female patient with history of AL amyloidosis with cardiac involvement.    She presented to the hospital on 1/17 with left-sided chest pain.  This is similar to her prior events.  Pain is located on the left and is pleuritic in nature, worse with deep breathing.  It is moderate to severe in intensity reaching 8/10.  No radiation.  It is partially alleviated by Toradol.    She recently had COVID-19 infection in December 2024 for which she received Paxlovid.  She was also hospitalized in November 2024 for similar symptoms and at that time she had fever and small left pleural effusion which was seen on CT chest 10/11/2024 and small pericardial effusion which was seen on CT chest 11/7/2024.  She was diagnosed with pleuritis and treated with colchicine but did not tolerate due to GI side effects and therefore she was treated with prednisone..  Her workup was negative for infection.    On this admission, Tmax was up to 100.8.  Abimael SpO2 was 85% but she is currently in the mid 90% on 2 L oxygen.  WBC normal.  No left shift.      I reviewed the prior imaging since 2023.  She had recurrent mild pulmonary infiltrates/opacities, usually in the dependent region of the lungs posteriorly and at the bases.  Sometimes on the right side or on the left.  There is also mild pleural effusion minimal left pleural effusion since October 2024 which appears to be smaller on the latest CT chest compared to October.  There is a new pulmonary nodule/small opacity located in the RML which is new on the latest CT chest and also mixed opacity  (minimal groundglass and consolidations) in the LLL on the latest CT.    Patient is considered now in remission concerning her amyloidosis and she received the last dose of Darzalex in August 2024.    Labs: Urine Legionella and strep antigen are negative.  RVP not collected.      Data: Echo 1/19/24: EF normal.  Mild functional mitral stenosis.  Mild MR.    Review of Systems  Constitutional: No fever or chills.   ENMT: No sinus congestion  Cardiovascular: See above.  Respiratory: Mild dyspnea due to chest pain.  No cough.  Gastrointestinal: No constipation, diarrhea or abdominal pain.  Difficulty swallowing attributed to amyloidosis.  Neurology: No headache, weakness, numbness or dizziness.   Musculoskeletal: No joints pain, stiffness or swelling.   Psychiatry: No depression.  Genitourinary: No dysuria or frequent urination  Endo: No weight changes. No cold or warm intolerance.  Lymphatic: No swollen glands.  Integumentary: No rash.    History  Past Medical History:   Diagnosis Date    AL amyloidosis     Anxiety     Arthritis     Bowel perforation     COVID-19 07/2020 9/7/2021    Depression     Diverticulitis of colon     Diverticulosis     GERD (gastroesophageal reflux disease)     History of Clostridioides difficile infection 2008    HAS HAD SEVERAL TIMES IN PAST PER PT (SAW INFECTIOUS DISEASE MD FOR THIS S/P COLOSTOMY/EXTENSIVE ANBX THERAPY)    History of prediabetes     History of snoring     History of stress incontinence     Hypercholesterolemia     Hyperlipidemia     Hypertension     Left ventricular hypertrophy     FOLLOWED BY DR MARI. DENIES CHEST PAIN BUT STATES DOES GET SOA AT TIMES WITH EXERTION REPORTS THAT IT IS NOT A NEW ISSUE     Liver disease     Oral herpes 08/18/2020    Seasonal allergies     used to have allergy shots in the past    SOB (shortness of breath)     STATES WITH EXERTION HAS BEEN ONGOING ISSUE NOT A NEW ISSUE    Status post colostomy     reversed    Thyroid disease      Hypothyroid   ,   Past Surgical History:   Procedure Laterality Date    ABDOMINAL SURGERY  2005, 2014    ex lap x 2,  diverticulitis    ABDOMINOPLASTY  2016    ADENOIDECTOMY      APPENDECTOMY      CARDIAC CATHETERIZATION N/A 04/24/2020    Procedure: Coronary angiography;  Surgeon: Roberto Briones MD;  [facility];  Service: Cardiovascular;  Laterality: N/A;    CARDIAC CATHETERIZATION N/A 04/24/2020    Procedure: Left heart cath;  Surgeon: Roberto Briones MD;  [facility];  Service: Cardiovascular;  Laterality: N/A;    CARDIAC CATHETERIZATION N/A 04/24/2020    Procedure: Left ventriculography;  Surgeon: Roberto Briones MD;  [facility];  Service: Cardiovascular;  Laterality: N/A;    CARDIAC SURGERY      open heart surgery,    COLONOSCOPY  approx 2018    normal per pt     COLOSTOMY  2005    had colostomy and then is was reversed    COLOSTOMY CLOSURE  2006    CORRECTION HAMMER TOE Right 2017    ECTOPIC PREGNANCY SURGERY      1979, 1980    ENDOSCOPY N/A 05/27/2020    Procedure: ESOPHAGOGASTRODUODENOSCOPY WITH BIOPSY AND BIOPSY POLYPECTOMY AND MACIEL DIALATION;  Surgeon: Preethi Beck MD;  Location: [facility] ENDOSCOPY;  Service: Gastroenterology;  Laterality: N/A;  PRE: ESOPHAGEAL DYSPHAGIA  POST: Z LINE 46, ESOPHAGEAL SPASM, GASTRITIS, FUNDIC POLYP    ENDOSCOPY N/A 06/20/2023    ESOPHAGEAL DILATATION N/A 12/07/2021    Procedure: OR ESOPHAGEAL DILATATION with maciel dilators ;  Surgeon: Jamey Leslie MD;  Location: [facility] OR [facility] [place];  Service: ENT;  Laterality: N/A;    ESOPHAGEAL MOTILITY STUDY N/A 02/03/2022    Procedure: ESOPHAGEAL MOTILITY STUDY;  Surgeon: Harshil, Nurse Performed;  Location: [facility] ENDOSCOPY;  Service: Gastroenterology;  Laterality: N/A;  dypshagia     FOOT SURGERY Right 12/2016    Second and third hammertoe corrections    KNEE ARTHROSCOPY Right 2009    KNEE SURGERY Right     REVISION /  TAKEDOWN COLOSTOMY  2006    SHOULDER ARTHROSCOPY Right 2018    right shoulder arthroscopy with extensive rotator cuff, biceps and labral debridement, chondroplasty, & subacromial decompression with acromioplasty    SHOULDER SURGERY      HAS HAS COMPLETE SHOULDER ON RIGHT. LEFT SCOPED AND HAD 2ND SURGERY WITH HARDWARE PLACED    SHOULDER SURGERY Left     2012. 2013    TONSILLECTOMY      TOTAL SHOULDER REVERSE ARTHROPLASTY Right 2019    WISDOM TOOTH EXTRACTION     ,   Family History   Problem Relation Age of Onset    Hypertension Mother     Osteoporosis Mother     Skin cancer Mother     Heart disease Father     Hypertension Father     Breast cancer Maternal Grandmother     Ovarian cancer Neg Hx     Colon cancer Neg Hx     Deep vein thrombosis Neg Hx     Pulmonary embolism Neg Hx     Malig Hyperthermia Neg Hx    ,   Social History     Socioeconomic History    Marital status: Single    Number of children: 2   Tobacco Use    Smoking status: Never     Passive exposure: Never    Smokeless tobacco: Never   Vaping Use    Vaping status: Never Used   Substance and Sexual Activity    Alcohol use: Not Currently     Alcohol/week: 1.0 standard drink of alcohol     Types: 1 Glasses of wine per week    Drug use: Never    Sexual activity: Defer     E-cigarette/Vaping    E-cigarette/Vaping Use Never User     Passive Exposure No     Counseling Given Yes      E-cigarette/Vaping Substances    Nicotine No     THC No     CBD No     Flavoring No      E-cigarette/Vaping Devices    Disposable No     Pre-filled or Refillable Cartridge No     Refillable Tank No     Pre-filled Pod No          ,   Medications Prior to Admission   Medication Sig Dispense Refill Last Dose/Taking    acyclovir (ZOVIRAX) 400 MG tablet Take 1 tablet by mouth 2 (Two) Times a Day. 60 tablet 2 Taking    albuterol sulfate  (90 Base) MCG/ACT inhaler Inhale 2 puffs Every 4 (Four) Hours As Needed for Wheezing. 18 g 0 Taking As Needed    ALPRAZolam (XANAX) 0.25 MG tablet  TAKE 1 TABLET BY MOUTH TWICE DAILY AS NEEDED FOR ANXIETY (Patient taking differently: Take 1 tablet by mouth 2 (Two) Times a Day As Needed for Anxiety.) 60 tablet 1 Taking Differently    aspirin 81 MG EC tablet Take 1 tablet by mouth Daily.   Taking    benzonatate (TESSALON) 200 MG capsule Take 1 capsule by mouth 3 (Three) Times a Day As Needed for Cough. 30 capsule 0 Taking As Needed    Cholecalciferol (Vitamin D3) 50 MCG (2000 UT) tablet Take 1 tablet by mouth Daily.   Taking    cholestyramine (QUESTRAN) 4 g packet Take 1 packet by mouth 3 (Three) Times a Day With Meals. 1 packet  TID PRN diarrhea (Patient taking differently: Take 1 packet by mouth 3 (Three) Times a Day As Needed.) 60 packet 4 Taking Differently    diphenoxylate-atropine (LOMOTIL) 2.5-0.025 MG per tablet Take 1 tablet by mouth 4 (Four) Times a Day As Needed for Diarrhea. 120 tablet 0 Taking As Needed    empagliflozin (JARDIANCE) 10 MG tablet tablet Take 1 tablet by mouth Daily.   Taking    FLUoxetine (PROzac) 20 MG capsule TAKE 1 CAPSULE BY MOUTH FOUR TIMES DAILY (Patient taking differently: Take 1 capsule by mouth 4 (Four) Times a Day.) 360 capsule 0 Taking Differently    HYDROcodone-acetaminophen (NORCO) 5-325 MG per tablet Take 1 tablet by mouth Every 6 (Six) Hours As Needed for Moderate Pain. 60 tablet 0 Taking As Needed    icosapent ethyl (VASCEPA) 1 g capsule capsule TAKE 2 CAPSULES BY MOUTH DAILY 60 capsule 2 Taking    levothyroxine (SYNTHROID, LEVOTHROID) 75 MCG tablet TAKE 1 TABLET BY MOUTH EVERY DAY (Patient taking differently: Take 1 tablet by mouth Daily.) 90 tablet 0 Taking Differently    loratadine (CLARITIN) 10 MG tablet Take 1 tablet by mouth Daily. 30 tablet 5 Taking    midodrine (PROAMATINE) 5 MG tablet Take 1 tablet by mouth 2 (Two) Times a Day.   Taking    Multiple Vitamins-Minerals (V-C Forte) capsule Take 1 capsule by mouth Daily.   Taking    olopatadine (PATANOL) 0.1 % ophthalmic solution Administer 1 drop to both eyes 2  (Two) Times a Day. 5 mL 5 Taking    pantoprazole (PROTONIX) 20 MG EC tablet TAKE 1 TABLET BY MOUTH EVERY DAY. DO NOT TAKE WITHIN 2 HOURS OF THYROID MEDICATION (Patient taking differently: Take 1 tablet by mouth Daily.) 90 tablet 1 Taking Differently    rosuvastatin (CRESTOR) 40 MG tablet Take 1 tablet by mouth Daily. Indications: High Amount of Fats in the Blood 90 tablet 0 Taking    spironolactone (ALDACTONE) 25 MG tablet Take 1 tablet by mouth Daily.   Taking    zolpidem (AMBIEN) 10 MG tablet TAKE 1 TABLET BY MOUTH AT NIGHT AS NEEDED FOR SLEEP 30 tablet 1 Taking As Needed   , Scheduled Meds:  acyclovir, 400 mg, Oral, BID  aspirin, 81 mg, Oral, Daily  FLUoxetine, 80 mg, Oral, Daily  gabapentin, 100 mg, Oral, TID  levothyroxine, 75 mcg, Oral, Daily  Lidocaine, 1 patch, Transdermal, Q24H  midodrine, 5 mg, Oral, BID AC  pantoprazole, 40 mg, Oral, Daily  piperacillin-tazobactam, 3.375 g, Intravenous, Q8H  rosuvastatin, 40 mg, Oral, Daily  sodium chloride, 10 mL, Intravenous, Q12H  spironolactone, 25 mg, Oral, Daily   , Continuous Infusions:   , PRN Meds:    acetaminophen **OR** acetaminophen **OR** acetaminophen    albuterol    ALPRAZolam    benzonatate    senna-docusate sodium **AND** polyethylene glycol **AND** bisacodyl **AND** bisacodyl    cholestyramine    diphenoxylate-atropine    famotidine    HYDROcodone-acetaminophen    HYDROmorphone    ketorolac    melatonin    ondansetron    sodium chloride    sodium chloride    sodium chloride    zolpidem, and Allergies:  Azithromycin, Ezetimibe, Pravastatin, and Sulfa antibiotics    Objective     Vital Signs   Temp:  [97.3 °F (36.3 °C)-100.8 °F (38.2 °C)] 97.3 °F (36.3 °C)  Heart Rate:  [69-91] 69  Resp:  [16] 16  BP: ()/(55-69) 143/63    PPE used per hospital policy    Physical Exam:  Constitutional: Not in acute distress.  Eyes: Injected conjunctivae, EOMI. pupils equal reactive to light.  ENMT: Puente 3..  Moist tongue.  External ears normal.  Neck: Trachea  midline. No thyromegaly  Heart: RRR, no murmur  Lungs: Equal but slightly diminished air entry.  No crackles or wheezing..  Non labored breathing.   Abdomen: Obese. Soft. No tenderness or dullness. No HSM.  Extremities: No cyanosis, clubbing or pitting edema.  Warm extremities and well-perfused.  Neuro: Conscious, alert, oriented x3.  Strength 5/5 in arms.  Psych: Appropriate mood and affect.    Integumentary: No rash.  Normal skin turgor  Lymphatic: No palpable cervical or supraclavicular lymph nodes.      Diagnostic imaging:  I personally and independently reviewed the following images:   CT chest 1/17/2025: See above    Laboratory workup:    Results from last 7 days   Lab Units 01/18/25  0525 01/17/25  1318   SODIUM mmol/L 135* 137   POTASSIUM mmol/L 3.9 3.9   CHLORIDE mmol/L 101 102   CO2 mmol/L 24.3 25.8   BUN mg/dL 13 12   CREATININE mg/dL 0.76 0.82   GLUCOSE mg/dL 86 88   CALCIUM mg/dL 8.9 9.6     Results from last 7 days   Lab Units 01/17/25  1418 01/17/25  1318   HSTROP T ng/L 31* 34*     Results from last 7 days   Lab Units 01/18/25  0525 01/17/25  1318   WBC 10*3/mm3 5.76 10.29   HEMOGLOBIN g/dL 11.4* 13.9   HEMATOCRIT % 36.2 42.1   PLATELETS 10*3/mm3 168 238         Results from last 7 days   Lab Units 01/17/25  1418   PROBNP pg/mL 320.0       Assessment     Recurrent left pleuritis, etiology probably amyloidosis  Trace left pleural effusion, secondary #1  New pulmonary nodule in the RML.  Could also represent amyloidosis.  Pulmonary opacity in the LLL, probably atelectasis due to splinting and hypoventilation.    Recommendations:    Check RVP.  Check ESR and CRP.  CHRISTO panel with reflex to evaluate for autoimmune disease as this could lead to pruritus.  Start empiric Solu-Medrol then switch to oral prednisone.  Would eventually require follow-up CT images for both pulmonary nodule but again suspect inflammatory process (amyloidosis or others)      Romeo Pineda MD  01/20/25  15:27 EST

## 2025-01-20 NOTE — PROGRESS NOTES
Discharge Planning Assessment  [facility]     Patient Name: Cynthia Portillo  MRN: [id number]  Today's Date: 1/20/2025    Admit Date: 1/17/2025    Plan: Home no needs   Discharge Needs Assessment       Row Name 01/20/25 0934       Living Environment    People in Home alone    Current Living Arrangements home  Duplex    Primary Care Provided by self    Provides Primary Care For no one    Family Caregiver if Needed friend(s);child(tej), adult    Quality of Family Relationships helpful;involved;supportive    Able to Return to Prior Arrangements yes       Resource/Environmental Concerns    Resource/Environmental Concerns none       Transition Planning    Patient/Family Anticipates Transition to home    Patient/Family Anticipated Services at Transition none    Transportation Anticipated family or friend will provide       Discharge Needs Assessment    Equipment Currently Used at Home none    Concerns to be Addressed no discharge needs identified    Equipment Needed After Discharge none    Provided Post Acute Provider List? N/A    N/A Provider List Comment Denies needs. Plan is home                   Discharge Plan       Row Name 01/20/25 0936       Plan    Plan Home no needs    Plan Comments IMM noted. Introduced self and role of CCP. Facesheet verified. Patient lives alone in a duplex. Stated there are no steps to enter and no steps once inside. Prior to admission, patient was independent with ADL's and continues to drive. Uses no DMEs. Has used [facility] in the past. Has never been to a rehab facility, AR plan is to return home. Stated her friend will assist as needed and will provide transportation at AR. Denies any needs/equipment.                  Continued Care and Services - Admitted Since 1/17/2025    No active coordination exists for this encounter.          Demographic Summary       Row Name 01/20/25 0933       General Information    Admission Type inpatient    Arrived From home    Required Notices Provided  Important Message from Medicare    Reason for Consult discharge planning    Preferred Language English                   Functional Status       Row Name 01/20/25 0933       Functional Status    Usual Activity Tolerance good    Current Activity Tolerance good       Functional Status, IADL    Medications independent    Meal Preparation independent    Housekeeping independent    Laundry independent    Shopping independent       Mental Status    General Appearance WDL WDL       Employment/    Employment Status retired                   Psychosocial       Row Name 01/20/25 0934       Values/Beliefs    Spiritual, Cultural Beliefs, Uatsdin Practices, Values that Affect Care no       Behavior WDL    Behavior WDL WDL       Emotion Mood WDL    Emotion/Mood/Affect WDL WDL       Speech WDL    Speech WDL WDL       Perceptual State WDL    Perceptual State WDL WDL       Thought Process WDL    Thought Process WDL WDL       Coping/Stress    Sources of Support adult child(tej);friend(s)    Reaction to Health Status adjusting    Understanding of Condition and Treatment adequate understanding of treatment       Developmental Stage (Eriksson's Stages of Development)    Developmental Stage Stage 8 (65 years-death/Late Adulthood) Integrity vs. Despair                   Abuse/Neglect       Row Name 01/20/25 0934       Personal Safety    Feels Unsafe at Home or Work/School no    Feels Threatened by Someone no    Does Anyone Try to Keep You From Having Contact with Others or Doing Things Outside Your Home? no    Physical Signs of Abuse Present no           Yovana Acharya, RN

## 2025-01-20 NOTE — PROGRESS NOTES
HISTORY OF PRESENT ILLNESS:      The patient is a 69 y.o. year old female with medical history significant for AL amyloidosis with cardiac involvement, hypothyroidism and GERD had presented to [facility] ER on 1/17/2025 with acute worsening of left lower chest wall pain.     Patient states she has had on and off left-sided chest discomfort since cardiac surgery performed in December 2022.  Patient was hospitalized for similar symptoms in November 2024 as well.  She had left thoracentesis performed on 10/14/2024, exudative, cytology negative.  She received treatment with colchicine and and prednisone with no to minimal relief.     Patient denies any fever, productive cough or hemoptysis.  She reports of being diagnosed with COVID-19 infection in December 2024 for which she received Paxlovid.  Recovered now.     Repeat CTA chest performed this admission on 1/17/2025 noted no PE, however new subpleural nodular opacities bilaterally (RML, RLL, LLL), 5 to 7 mm in size.  Hepatic steatosis.     Given these findings, hematology/oncology has been consulted for further evaluation and management.  Patient is well-known to our service and follows up with Dr. Sal along with [facility] oncology team.     Interval history:  1/19/2025  Patient reports of persistent pain in her left lower chest wall.  Afebrile.  Vital stable.  She reports of being nauseous after taking Lyrica.  She did not like the way she had made her feel.  Does not want to try it again.  We have discussed Neurontin as a trial she is agreeable.      1/20/2025  T97.3, pulse 70, respiration 16, /63  Patient still noting left-sided chest wall pain-?  Pain control low-dose Lyrica added with plans to be reviewed by cardiology and pulmonary  Patient decayed she would not take Lyrica, Neurontin trial anticipated.    Past Medical History, Past Surgical History, Social History, Family History have been reviewed and are without significant  changes except as mentioned.    Review of Systems   A comprehensive 14 point review of systems was performed and was negative except as mentioned.    Medications:  The current medication list was reviewed in the EMR    ALLERGIES:    Allergies   Allergen Reactions    Azithromycin Rash and Other (See Comments)     Reaction unknown to patient (unable to remember)  Other reaction(s): Other: stomach issues, Stomach ache, Other: stomach issues, Stomach ache    Ezetimibe Myalgia, Other (See Comments) and Rash     cramps  cramps    Pravastatin Rash and Other (See Comments)    Sulfa Antibiotics Rash       Objective      Vitals:    01/19/25 2026 01/20/25 0103 01/20/25 0433 01/20/25 0930   BP:  91/55 101/58 143/63   BP Location:  Right arm Right arm Right arm   Patient Position:  Lying Lying Lying   Pulse:  78 88 69   Resp:  16 16 16   Temp:  98.7 °F (37.1 °C) 100.3 °F (37.9 °C) 97.3 °F (36.3 °C)   TempSrc:  Oral Oral Oral   SpO2: 95% 100% 94%    Weight:       Height:             11/13/2024     8:02 AM   Current Status   ECOG score 0       Physical Exam    CONSTITUTIONAL:  Vital signs reviewed.  No distress, looks comfortable.  EYES:  Conjunctivae and lids unremarkable.    EARS,NOSE,MOUTH,THROAT:  Ears and nose appear unremarkable.    RESPIRATORY:  Normal respiratory effort.    CARDIOVASCULAR:  Normal heart rate  GASTROINTESTINAL: Abdomen appears unremarkable.  Nondistended   LYMPHATIC:  No cervical, supraclavicular lymphadenopathy.  SKIN:  Warm.  No rashes.  PSYCHIATRIC:  Normal judgment and insight.  Normal mood and affect.  NEURO: AAOx3, no obvious focal deficits.      RECENT LABS:  Hematology WBC   Date Value Ref Range Status   01/18/2025 5.76 3.40 - 10.80 10*3/mm3 Final     RBC   Date Value Ref Range Status   01/18/2025 4.06 3.77 - 5.28 10*6/mm3 Final     Hemoglobin   Date Value Ref Range Status   01/18/2025 11.4 (L) 12.0 - 15.9 g/dL Final     Hematocrit   Date Value Ref Range Status   01/18/2025 36.2 34.0 - 46.6 % Final      Platelets   Date Value Ref Range Status   01/18/2025 168 140 - 450 10*3/mm3 Final              Assessment & Plan   This is a 69-year-old female with:     # Acute on chronic left lower chest wall pain:  Patient states she has had on and off left-sided chest discomfort since cardiac surgery performed in December 2022.  Patient was hospitalized for similar symptoms in November 2024 as well.  She had left thoracentesis performed on 10/14/2024, exudative, cytology negative.    She received treatment with colchicine and and prednisone with no to minimal relief.  Patient denies any fever, productive cough or hemoptysis.  She reports of being diagnosed with COVID-19 infection in December 2024 for which she received Paxlovid.  Recovered now.  Repeat CTA chest performed this admission on 1/17/2025 noted no PE, however new subpleural nodular opacities bilaterally (RML, RLL, LLL), 5 to 7 mm in size.  Hepatic steatosis.  Informed patient that her pain appears to be musculoskeletal/neuropathic in nature.  She has been started on IV Zosyn for suspected pneumonia by the primary team.  I offered trial of prednisone again-however patient is concerned about potential side effects and minimal relief in the past.  Patient's pain is currently being managed with Toradol and IV Dilaudid as needed's.  Patient previously reported of excessive sedation with Lyrica 50 mg daily.  Will start her on 25 mg daily.  1/19/2025: Patient did not tolerate Lyrica well.  Does not want to try it again.  Will start her on lidocaine patches and see if it helps.  Continue Toradol and IV Dilaudid as needed  Neurontin trial initiated 1/20/2025     # Bilateral subpleural opacities:  Infectious/inflammatory vs amyloidosis related.  Plan to monitor for now  Patient had COVID-19 infection back in December 2024.  She is scheduled for repeat CT chest in end of February through her pulmonologist, Dr. Ferraro.  Advised to maintain close follow-up with him to monitor  resolution     # AL amyloidosis with cardiac involvement:  Diagnosed February 2022.  Previously treated with Darzalex-CyBorD  Last dose of Darzalex in August 2024.  Follows up with Dr. Sal and [place]

## 2025-01-20 NOTE — CONSULTS
Date of Hospital Visit: 25  Encounter Provider: Nica Sethi MD  Place of Service: [facility] CARDIOLOGY  Patient Name: Cynthia Portillo  :1955  Referral Provider: Preethi Porras MD    Chief complaint: Chest pain     History of Present Illness:    Cynthia Portillo is a 69 y.o. female pt with a history of amyloidosis, hypertrophic obstructive cardiomyopathy, HLD, diabetes, and HTN.  She follows with Dr. Sal at [facility].      She has been following with Dr. Sal for several years.  She had a cardiac MRI in  that showed cardiac amyloid.  She had a left heart cath that showed normal coronaries.  2022 she had septal myectomy due to LVOT obstruction.     Patient was last seen at [place] on 2024.  She has been on midodrine for hypotension.  Previously on spironolactone but had to reduce due to hypotension.    Patient presented to the ER in November of last year with complaints of chest discomfort under her left breast on her right side.  She reported it was nonradiating.  She reported the pain was dull and would last for several hours at a time. She denied any exacerbating or alleviating factors.  Her pain was not exertional. In October, she reported some shortness of breath and came to the emergency room.  She was noted to have right-sided pleural effusions requiring thoracentesis.  She was prescribed steroids and antibiotics and she said reported improvement.  She did report developing similar chest discomfort then  and also reports she had fevers of 102 at home.  She denied her shortness of breath returning after being treated.  She denied orthopnea, PND, lower extremity edema.  She reported being on treatment for her amyloidosis.  She was previously on monthly Darzalex.  We were consulted for chest pain and history of cardiac amyloidosis.  Her troponin was mildly elevated but flat.  BNP normal. EKG showed left bundle branch  block which have been present since her myectomy.  Echo showed a low normal EF.  Expected wall motion abnormalities consistent with prior myectomy.  Felt her mildly elevated troponin was not consistent with ACS and she had a normal coronaries on cath in 10/2022 done at [place].  EF was low normal but stable from the last month but overall down from previous imaging likely due to worsening cardiac amyloidosis.  CTA was ordered for evaluation of fever and chest discomfort.  CTA of the chest showed no PE or dissection.  He did have multiple areas of nodular consolidation in the right lower lobe which were largely unchanged.  No more evidence of infection and no more fevers.  It was felt her issues were thought to be inflammatory and she was initially started on cholichine but did not tolerate due to GI issues and she was switched to prednisone.  She was discharged on a taper dose of prednisone.  She was discharged on 11/10/2024    Patient presented to the ER on 1/17/2025 with complaints of left lower chest pain.  Patient reported she has had this pain in the past and she was diagnosed with a pleural effusion that had to be drained.  She reported a minor nonproductive cough.  She denied fevers, chills or lower extremity swelling.  She reported no history of DVT.  She reported prior history of lung surgery for removal of amyloidosis plaque.  In ER, troponin T 34/31, proBNP 320, lactate 0.7, hemoglobin 11.4, sodium 135.  Patient admitted for CP.     High sensitive troponin is chronically elevated.  Labs are otherwise normal.  Vitals are stable except for temperature of 100.8 1/19/2025 at 2026.  ECG shows sinus rhythm with PAC, left bundle branch block.    CTA chest 1/17/2025 shows probably patent coronary arteries but they are not well-visualized, no pulmonary embolism and new subpleural nodular opacity in the right middle lobe, right lower lobe favoring infectious etiology but 3-month CT recommended.  I did review the  "images.    She has mild short windedness chest pain with movement.  She denies heart racing or skipping.  She has no fevers, chills, vomiting.      ECHO 1/19/25        Left ventricular systolic function is normal. Calculated left ventricular EF = 62.7%.  Abnormal septal bounce in the setting of LBBB.    Left ventricular wall thickness is consistent with mild concentric hypertrophy. Sigmoid-shaped ventricular septum is present.    Left ventricular diastolic function was indeterminate.    Mild mitral annular calcification (MAC) resulting in mild functional mitral stenosis. The mitral valve mean gradient is 3 mmHg at HR 70.  Mild-moderate mitral regurgitation.    Grossly normal biatrial and IVC size.            Stress test 8/31/22       SUMMARY:  non-achievement of target HR, which decreases   the sensitivity of this study for ischemia.  Modest resting LVOT gradient   that does not appreciably increase with Valsalva or exercise.     Stress Findings   Phil protocol stress test. The patient reached stage 5. Total exercise time was 08:19 minutes. Non-limiting angina. The test was stopped because the patient reached the end of the protocol.           CATH 4/24/20      Conclusions:   1. Left main: Normal  2. LAD: Mild Intramyocardial bridge mid segment.  Medium caliber septal  with evidence of \"milking\"  3. LCX: Tortuous.  No stenosis.  4. RCA: Normal.  5.  Normal size left ventricle.  Hyperdynamic left ventricular systolic function.     Continue medical management    Past Medical History:   Diagnosis Date    AL amyloidosis     Anxiety     Arthritis     Bowel perforation     COVID-19 07/2020 9/7/2021    Depression     Diverticulitis of colon     Diverticulosis     GERD (gastroesophageal reflux disease)     History of Clostridioides difficile infection 2008    HAS HAD SEVERAL TIMES IN PAST PER PT (SAW INFECTIOUS DISEASE MD FOR THIS S/P COLOSTOMY/EXTENSIVE ANBX THERAPY)    History of prediabetes     History " of snoring     History of stress incontinence     Hypercholesterolemia     Hyperlipidemia     Hypertension     Left ventricular hypertrophy     FOLLOWED BY DR MARI. DENIES CHEST PAIN BUT STATES DOES GET SOA AT TIMES WITH EXERTION REPORTS THAT IT IS NOT A NEW ISSUE     Liver disease     Oral herpes 08/18/2020    Seasonal allergies     used to have allergy shots in the past    SOB (shortness of breath)     STATES WITH EXERTION HAS BEEN ONGOING ISSUE NOT A NEW ISSUE    Status post colostomy     reversed    Thyroid disease     Hypothyroid       Past Surgical History:   Procedure Laterality Date    ABDOMINAL SURGERY  2005, 2014    ex lap x 2,  diverticulitis    ABDOMINOPLASTY  2016    ADENOIDECTOMY      APPENDECTOMY      CARDIAC CATHETERIZATION N/A 04/24/2020    Procedure: Coronary angiography;  Surgeon: Roberto Briones MD;  Location:  [facility];  Service: Cardiovascular;  Laterality: N/A;    CARDIAC CATHETERIZATION N/A 04/24/2020    Procedure: Left heart cath;  Surgeon: Roberto Briones MD;  Location:  [facility];  Service: Cardiovascular;  Laterality: N/A;    CARDIAC CATHETERIZATION N/A 04/24/2020    Procedure: Left ventriculography;  Surgeon: Roberto Briones MD;  Location:  [facility];  Service: Cardiovascular;  Laterality: N/A;    CARDIAC SURGERY      open heart surgery,    COLONOSCOPY  approx 2018    normal per pt     COLOSTOMY  2005    had colostomy and then is was reversed    COLOSTOMY CLOSURE  2006    CORRECTION HAMMER TOE Right 2017    ECTOPIC PREGNANCY SURGERY      1979, 1980    ENDOSCOPY N/A 05/27/2020    Procedure: ESOPHAGOGASTRODUODENOSCOPY WITH BIOPSY AND BIOPSY POLYPECTOMY AND MACIEL DIALATION;  Surgeon: Preethi Beck MD;  Location: [facility] ENDOSCOPY;  Service: Gastroenterology;  Laterality: N/A;  PRE: ESOPHAGEAL DYSPHAGIA  POST: Z LINE 46, ESOPHAGEAL SPASM, GASTRITIS, FUNDIC POLYP    ENDOSCOPY N/A 06/20/2023    ESOPHAGEAL  DILATATION N/A 12/07/2021    Procedure: OR ESOPHAGEAL DILATATION with pacheco dilators ;  Surgeon: Jamey Leslie MD;  Location:  [facility];  Service: ENT;  Laterality: N/A;    ESOPHAGEAL MOTILITY STUDY N/A 02/03/2022    Procedure: ESOPHAGEAL MOTILITY STUDY;  Surgeon: Harshil, Nurse Performed;  Location:  [facility] ENDOSCOPY;  Service: Gastroenterology;  Laterality: N/A;  dypshagia     FOOT SURGERY Right 12/2016    Second and third hammertoe corrections    KNEE ARTHROSCOPY Right 2009    KNEE SURGERY Right     REVISION / TAKEDOWN COLOSTOMY  2006    SHOULDER ARTHROSCOPY Right 2018    right shoulder arthroscopy with extensive rotator cuff, biceps and labral debridement, chondroplasty, & subacromial decompression with acromioplasty    SHOULDER SURGERY      HAS HAS COMPLETE SHOULDER ON RIGHT. LEFT SCOPED AND HAD 2ND SURGERY WITH HARDWARE PLACED    SHOULDER SURGERY Left     2012. 2013    TONSILLECTOMY      TOTAL SHOULDER REVERSE ARTHROPLASTY Right 2019    WISDOM TOOTH EXTRACTION         Medications Prior to Admission   Medication Sig Dispense Refill Last Dose/Taking    acyclovir (ZOVIRAX) 400 MG tablet Take 1 tablet by mouth 2 (Two) Times a Day. 60 tablet 2 Taking    albuterol sulfate  (90 Base) MCG/ACT inhaler Inhale 2 puffs Every 4 (Four) Hours As Needed for Wheezing. 18 g 0 Taking As Needed    ALPRAZolam (XANAX) 0.25 MG tablet TAKE 1 TABLET BY MOUTH TWICE DAILY AS NEEDED FOR ANXIETY (Patient taking differently: Take 1 tablet by mouth 2 (Two) Times a Day As Needed for Anxiety.) 60 tablet 1 Taking Differently    aspirin 81 MG EC tablet Take 1 tablet by mouth Daily.   Taking    benzonatate (TESSALON) 200 MG capsule Take 1 capsule by mouth 3 (Three) Times a Day As Needed for Cough. 30 capsule 0 Taking As Needed    Cholecalciferol (Vitamin D3) 50 MCG (2000 UT) tablet Take 1 tablet by mouth Daily.   Taking    cholestyramine (QUESTRAN) 4 g packet Take 1 packet by mouth 3 (Three) Times a Day With Meals. 1 packet   TID PRN diarrhea (Patient taking differently: Take 1 packet by mouth 3 (Three) Times a Day As Needed.) 60 packet 4 Taking Differently    diphenoxylate-atropine (LOMOTIL) 2.5-0.025 MG per tablet Take 1 tablet by mouth 4 (Four) Times a Day As Needed for Diarrhea. 120 tablet 0 Taking As Needed    empagliflozin (JARDIANCE) 10 MG tablet tablet Take 1 tablet by mouth Daily.   Taking    FLUoxetine (PROzac) 20 MG capsule TAKE 1 CAPSULE BY MOUTH FOUR TIMES DAILY (Patient taking differently: Take 1 capsule by mouth 4 (Four) Times a Day.) 360 capsule 0 Taking Differently    HYDROcodone-acetaminophen (NORCO) 5-325 MG per tablet Take 1 tablet by mouth Every 6 (Six) Hours As Needed for Moderate Pain. 60 tablet 0 Taking As Needed    icosapent ethyl (VASCEPA) 1 g capsule capsule TAKE 2 CAPSULES BY MOUTH DAILY 60 capsule 2 Taking    levothyroxine (SYNTHROID, LEVOTHROID) 75 MCG tablet TAKE 1 TABLET BY MOUTH EVERY DAY (Patient taking differently: Take 1 tablet by mouth Daily.) 90 tablet 0 Taking Differently    loratadine (CLARITIN) 10 MG tablet Take 1 tablet by mouth Daily. 30 tablet 5 Taking    midodrine (PROAMATINE) 5 MG tablet Take 1 tablet by mouth 2 (Two) Times a Day.   Taking    Multiple Vitamins-Minerals (V-C Forte) capsule Take 1 capsule by mouth Daily.   Taking    olopatadine (PATANOL) 0.1 % ophthalmic solution Administer 1 drop to both eyes 2 (Two) Times a Day. 5 mL 5 Taking    pantoprazole (PROTONIX) 20 MG EC tablet TAKE 1 TABLET BY MOUTH EVERY DAY. DO NOT TAKE WITHIN 2 HOURS OF THYROID MEDICATION (Patient taking differently: Take 1 tablet by mouth Daily.) 90 tablet 1 Taking Differently    rosuvastatin (CRESTOR) 40 MG tablet Take 1 tablet by mouth Daily. Indications: High Amount of Fats in the Blood 90 tablet 0 Taking    spironolactone (ALDACTONE) 25 MG tablet Take 1 tablet by mouth Daily.   Taking    zolpidem (AMBIEN) 10 MG tablet TAKE 1 TABLET BY MOUTH AT NIGHT AS NEEDED FOR SLEEP 30 tablet 1 Taking As Needed        Current Meds  Scheduled Meds:acyclovir, 400 mg, Oral, BID  aspirin, 81 mg, Oral, Daily  FLUoxetine, 80 mg, Oral, Daily  levothyroxine, 75 mcg, Oral, Daily  Lidocaine, 1 patch, Transdermal, Q24H  midodrine, 5 mg, Oral, BID AC  pantoprazole, 40 mg, Oral, Daily  piperacillin-tazobactam, 3.375 g, Intravenous, Q8H  rosuvastatin, 40 mg, Oral, Daily  sodium chloride, 10 mL, Intravenous, Q12H  spironolactone, 25 mg, Oral, Daily      Continuous Infusions:Pharmacy to Dose Zosyn,       PRN Meds:.  acetaminophen **OR** acetaminophen **OR** acetaminophen    albuterol    ALPRAZolam    benzonatate    senna-docusate sodium **AND** polyethylene glycol **AND** bisacodyl **AND** bisacodyl    cholestyramine    diphenoxylate-atropine    famotidine    HYDROcodone-acetaminophen    HYDROmorphone    ketorolac    melatonin    ondansetron    Pharmacy to Dose Zosyn    sodium chloride    sodium chloride    sodium chloride    zolpidem    Allergies as of 01/17/2025 - Reviewed 01/17/2025   Allergen Reaction Noted    Azithromycin Rash and Other (See Comments) 12/27/2018    Ezetimibe Myalgia, Other (See Comments), and Rash 07/12/2016    Pravastatin Rash and Other (See Comments) 07/12/2016    Sulfa antibiotics Rash 01/17/2025       Social History     Socioeconomic History    Marital status: Single    Number of children: 2   Tobacco Use    Smoking status: Never     Passive exposure: Never    Smokeless tobacco: Never   Vaping Use    Vaping status: Never Used   Substance and Sexual Activity    Alcohol use: Not Currently     Alcohol/week: 1.0 standard drink of alcohol     Types: 1 Glasses of wine per week    Drug use: Never    Sexual activity: Defer       Family History   Problem Relation Age of Onset    Hypertension Mother     Osteoporosis Mother     Skin cancer Mother     Heart disease Father     Hypertension Father     Breast cancer Maternal Grandmother     Ovarian cancer Neg Hx     Colon cancer Neg Hx     Deep vein thrombosis Neg Hx      "Pulmonary embolism Neg Hx     Malig Hyperthermia Neg Hx        REVIEW OF SYSTEMS:   12 point ROS was performed and is negative except as outlined in HPI       Objective:   Temp:  [97.9 °F (36.6 °C)-100.8 °F (38.2 °C)] 100.3 °F (37.9 °C)  Heart Rate:  [75-96] 88  Resp:  [16-18] 16  BP: ()/(55-69) 101/58  Body mass index is 25.58 kg/m².  Flowsheet Rows      Flowsheet Row First Filed Value   Admission Height 154.9 cm (61\") Documented at 01/17/2025 1307   Admission Weight 57.2 kg (126 lb) Documented at 01/17/2025 1307          Vitals:    01/20/25 0433   BP: 101/58   Pulse: 88   Resp: 16   Temp: 100.3 °F (37.9 °C)   SpO2: 94%       General Appearance:    Alert, cooperative, in no acute distress   Head:    Normocephalic, without obvious abnormality, atraumatic   Throat:   oral mucosa moist   Lungs:     Clear to auscultation,respirations regular, even and unlabored    Heart:    Regular rhythm and normal rate, normal S1 and S2, no murmur, no gallop, no rub, no click   Extremities:   Moves all extremities well, no edema, no cyanosis, no redness   Pulses:   Pulses palpable and equal bilaterally. Normal radial, carotid,  dorsalis pedis and posterior tibial pulses bilaterally.      Lab Review:      Results from last 7 days   Lab Units 01/18/25  0525   SODIUM mmol/L 135*   POTASSIUM mmol/L 3.9   CHLORIDE mmol/L 101   CO2 mmol/L 24.3   BUN mg/dL 13   CREATININE mg/dL 0.76   CALCIUM mg/dL 8.9   BILIRUBIN mg/dL 0.6   ALK PHOS U/L 54   ALT (SGPT) U/L 17   AST (SGOT) U/L 15   GLUCOSE mg/dL 86     Results from last 7 days   Lab Units 01/17/25  1418 01/17/25  1318   HSTROP T ng/L 31* 34*     @LABRCNT(bnp)@  Results from last 7 days   Lab Units 01/18/25  0525 01/17/25  1318   WBC 10*3/mm3 5.76 10.29   HEMOGLOBIN g/dL 11.4* 13.9   HEMATOCRIT % 36.2 42.1   PLATELETS 10*3/mm3 168 238         Results from last 7 days   Lab Units 01/18/25  0525   MAGNESIUM mg/dL 2.3     Lipid Panel          6/5/2024    11:22 9/13/2024    09:14   Lipid " Panel   Total Cholesterol 223  131     132    Triglycerides 393  196     195    HDL Cholesterol 40  41     42    VLDL Cholesterol 68  32     32    LDL Cholesterol  115  58     58    LDL/HDL Ratio 2.9  1.4          I personally viewed and interpreted the patient's EKG/Telemetry data                              CAP (community acquired pneumonia)    Hypothyroid    NAFLD (nonalcoholic fatty liver disease)    Chronic hypotension    Amyloidosis    Type 2 diabetes mellitus, without long-term current use of insulin      Assessment and Plan:    Chest pain with chronically elevated troponin and chronically abnormal ECG, noncardiac chest pain.  Keep follow-up with her normal cardiologist.  Pneumonia, noted on CT chest   Chronic hypotension on midodrine  history of septal myectomy for hypertrophic obstructive cardiomyopathy  Cardiac amyloidosis with stable LVEF, treated at [place]  History of pleural effusion causing chest pain  Diabetes mellitus type 2 without insulin.    No further cardiac workup needed.  Her pain is not cardiac.  It sounds pleuritic and skeletal muscular.  We will see as needed.    Nica Sethi MD  01/20/25  10:08 EST.  Time spent in reviewing chart, discussion and examination:

## 2025-01-20 NOTE — PROGRESS NOTES
"    DAILY PROGRESS NOTE  [facility]    Patient Identification:  Name: Cynthia Portillo  Age: 69 y.o.  Sex: female  :  1955  MRN: [id number]         Primary Care Physician: Arian Peterson MD    Subjective:  Interval History: Patient still stating that she is having no change in her left-sided chest wall pain.  I would have anticipated better and clinical improvement to this point.  Low-grade temperatures noted around the range of 100 and I think that could be more so atelectasis given her inactivity.  No major changes as her vitals are oxygenation.  No nausea vomiting diarrhea    Objective: Elderly and frail.  Nontoxic but sickly in appearance.  No family present    Scheduled Meds:acyclovir, 400 mg, Oral, BID  aspirin, 81 mg, Oral, Daily  FLUoxetine, 80 mg, Oral, Daily  levothyroxine, 75 mcg, Oral, Daily  Lidocaine, 1 patch, Transdermal, Q24H  midodrine, 5 mg, Oral, BID AC  pantoprazole, 40 mg, Oral, Daily  piperacillin-tazobactam, 3.375 g, Intravenous, Q8H  rosuvastatin, 40 mg, Oral, Daily  sodium chloride, 10 mL, Intravenous, Q12H  spironolactone, 25 mg, Oral, Daily      Continuous Infusions:Pharmacy to Dose Zosyn,         Vital signs in last 24 hours:  Temp:  [97.9 °F (36.6 °C)-100.8 °F (38.2 °C)] 100.3 °F (37.9 °C)  Heart Rate:  [72-96] 88  Resp:  [16-18] 16  BP: ()/(35-69) 101/58    Intake/Output:    Intake/Output Summary (Last 24 hours) at 2025 0752  Last data filed at 2025 0528  Gross per 24 hour   Intake 340 ml   Output 1750 ml   Net -1410 ml       Exam:  /58 (BP Location: Right arm, Patient Position: Lying)   Pulse 88   Temp 100.3 °F (37.9 °C) (Oral)   Resp 16   Ht 152.4 cm (60\")   Wt 59.4 kg (131 lb)   SpO2 94%   BMI 25.58 kg/m²     General Appearance:    Alert, cooperative, AAOx3                         Throat:   Lips, tongue, gums normal; oral mucosa pink and moist                           Neck:   Supple, symmetrical, trachea midline, no JVD   "                       Lungs:    Overall clear to auscultation bilaterally, respirations unlabored                          Heart:    Regular rate and rhythm, S1 and S2 normal                  Abdomen:     Soft, nontender, bowel sounds active                 Extremities: Moving all, no cyanosis or edema                        Pulses:   Pulses palpable in all extremities                            Skin:   Skin is warm and dry                  Neurologic:   CNII-XII intact     Data Review:  Labs in chart were reviewed.    Assessment:  Active Hospital Problems    Diagnosis  POA    **CAP (community acquired pneumonia) [J18.9]  Yes    Type 2 diabetes mellitus, without long-term current use of insulin [E11.9]  Yes    Amyloidosis [E85.9]  Yes    Chronic hypotension [I95.89]  Yes    NAFLD (nonalcoholic fatty liver disease) [K76.0]  Yes    Hypothyroid [E03.9]  Yes      Resolved Hospital Problems   No resolved problems to display.       Plan:    Chest pain was felt to be likely pleuritic due to concern for pneumonia left lower lobe              -Appreciate oncological recommendations -treat pneumonia as currently doing and patient will follow-up with [place] pulmonologist in near future given new lesions found on the right side that are too small for biopsy at this time              -No PE per CTA              -O2 requirements minimal at 2 L-wean accordingly              -Continue as needed pain control -low-dose Lyrica added   -I would have anticipated better clinical improvement to this point but she still is complaining of this chest pain which is very atypical and also influence by movement.  Secondary to her lack of improvement to this point, I will ask cardiology and pulmonology to see in consultation.          Elevated troponin without EKG acute changes and a negative delta with chronic old LBBB and echo reviewed with normal EF and septal changes due to LBBB     Chronic hypotension on midodrine     Hypothyroidism on  levothyroxine     HLD on statin     GERD on PPI     Depression/anxiety on Prozac     Nondiabetic on Jardiance due to past cardiac history     SCDs for prophylaxis        Disposition -anticipated clinical improvement has not occurred as anticipated to this point.  Additional consultations placed    Oscar العراقي MD  1/20/2025  07:52 EST

## 2025-01-21 LAB
ANION GAP SERPL CALCULATED.3IONS-SCNC: 12 MMOL/L (ref 5–15)
BUN SERPL-MCNC: 12 MG/DL (ref 8–23)
BUN/CREAT SERPL: 19 (ref 7–25)
CALCIUM SPEC-SCNC: 9.1 MG/DL (ref 8.6–10.5)
CENTROMERE B AB SER-ACNC: <0.2 AI (ref 0–0.9)
CHLORIDE SERPL-SCNC: 103 MMOL/L (ref 98–107)
CHROMATIN AB SERPL-ACNC: <0.2 AI (ref 0–0.9)
CO2 SERPL-SCNC: 23 MMOL/L (ref 22–29)
CREAT SERPL-MCNC: 0.63 MG/DL (ref 0.57–1)
DEPRECATED RDW RBC AUTO: 45.3 FL (ref 37–54)
DSDNA AB SER-ACNC: <1 IU/ML (ref 0–9)
EGFRCR SERPLBLD CKD-EPI 2021: 96.2 ML/MIN/1.73
ENA JO1 AB SER-ACNC: <0.2 AI (ref 0–0.9)
ENA RNP AB SER-ACNC: <0.2 AI (ref 0–0.9)
ENA SCL70 AB SER-ACNC: 1.9 AI (ref 0–0.9)
ENA SM AB SER-ACNC: <0.2 AI (ref 0–0.9)
ENA SS-A AB SER-ACNC: <0.2 AI (ref 0–0.9)
ENA SS-B AB SER-ACNC: <0.2 AI (ref 0–0.9)
ERYTHROCYTE [DISTWIDTH] IN BLOOD BY AUTOMATED COUNT: 13.8 % (ref 12.3–15.4)
GLUCOSE SERPL-MCNC: 156 MG/DL (ref 65–99)
HCT VFR BLD AUTO: 39.5 % (ref 34–46.6)
HGB BLD-MCNC: 12.2 G/DL (ref 12–15.9)
Lab: ABNORMAL
MCH RBC QN AUTO: 27.6 PG (ref 26.6–33)
MCHC RBC AUTO-ENTMCNC: 30.9 G/DL (ref 31.5–35.7)
MCV RBC AUTO: 89.4 FL (ref 79–97)
PLATELET # BLD AUTO: 229 10*3/MM3 (ref 140–450)
PMV BLD AUTO: 9.9 FL (ref 6–12)
POTASSIUM SERPL-SCNC: 3.9 MMOL/L (ref 3.5–5.2)
RBC # BLD AUTO: 4.42 10*6/MM3 (ref 3.77–5.28)
SODIUM SERPL-SCNC: 138 MMOL/L (ref 136–145)
WBC NRBC COR # BLD AUTO: 7.61 10*3/MM3 (ref 3.4–10.8)

## 2025-01-21 PROCEDURE — 85027 COMPLETE CBC AUTOMATED: CPT | Performed by: HOSPITALIST

## 2025-01-21 PROCEDURE — 25010000002 PIPERACILLIN SOD-TAZOBACTAM PER 1 G: Performed by: NURSE PRACTITIONER

## 2025-01-21 PROCEDURE — 99232 SBSQ HOSP IP/OBS MODERATE 35: CPT | Performed by: INTERNAL MEDICINE

## 2025-01-21 PROCEDURE — 25010000002 METHYLPREDNISOLONE PER 40 MG: Performed by: INTERNAL MEDICINE

## 2025-01-21 PROCEDURE — 80048 BASIC METABOLIC PNL TOTAL CA: CPT | Performed by: HOSPITALIST

## 2025-01-21 PROCEDURE — 63710000001 PREDNISONE PER 1 MG: Performed by: INTERNAL MEDICINE

## 2025-01-21 RX ORDER — PREDNISONE 20 MG/1
40 TABLET ORAL
Status: DISCONTINUED | OUTPATIENT
Start: 2025-01-21 | End: 2025-01-22

## 2025-01-21 RX ADMIN — GABAPENTIN 100 MG: 100 CAPSULE ORAL at 08:23

## 2025-01-21 RX ADMIN — ASPIRIN 81 MG: 81 TABLET, COATED ORAL at 08:23

## 2025-01-21 RX ADMIN — METHYLPREDNISOLONE SODIUM SUCCINATE 40 MG: 40 INJECTION, POWDER, FOR SOLUTION INTRAMUSCULAR; INTRAVENOUS at 08:23

## 2025-01-21 RX ADMIN — ACYCLOVIR 400 MG: 400 TABLET ORAL at 22:37

## 2025-01-21 RX ADMIN — ALPRAZOLAM 0.25 MG: 0.25 TABLET ORAL at 13:34

## 2025-01-21 RX ADMIN — GABAPENTIN 100 MG: 100 CAPSULE ORAL at 22:37

## 2025-01-21 RX ADMIN — GABAPENTIN 100 MG: 100 CAPSULE ORAL at 17:30

## 2025-01-21 RX ADMIN — LIDOCAINE 1 PATCH: 4 PATCH TOPICAL at 08:23

## 2025-01-21 RX ADMIN — PANTOPRAZOLE SODIUM 40 MG: 40 TABLET, DELAYED RELEASE ORAL at 08:23

## 2025-01-21 RX ADMIN — MIDODRINE HYDROCHLORIDE 5 MG: 5 TABLET ORAL at 17:30

## 2025-01-21 RX ADMIN — PIPERACILLIN AND TAZOBACTAM 3.38 G: 3; .375 INJECTION, POWDER, FOR SOLUTION INTRAVENOUS at 13:09

## 2025-01-21 RX ADMIN — Medication 10 ML: at 22:38

## 2025-01-21 RX ADMIN — LEVOTHYROXINE SODIUM 75 MCG: 0.07 TABLET ORAL at 08:24

## 2025-01-21 RX ADMIN — PREDNISONE 40 MG: 20 TABLET ORAL at 13:34

## 2025-01-21 RX ADMIN — MIDODRINE HYDROCHLORIDE 5 MG: 5 TABLET ORAL at 08:24

## 2025-01-21 RX ADMIN — ACYCLOVIR 400 MG: 400 TABLET ORAL at 13:09

## 2025-01-21 RX ADMIN — FLUOXETINE HYDROCHLORIDE 80 MG: 20 CAPSULE ORAL at 08:23

## 2025-01-21 RX ADMIN — PIPERACILLIN AND TAZOBACTAM 3.38 G: 3; .375 INJECTION, POWDER, FOR SOLUTION INTRAVENOUS at 17:31

## 2025-01-21 RX ADMIN — PIPERACILLIN AND TAZOBACTAM 3.38 G: 3; .375 INJECTION, POWDER, FOR SOLUTION INTRAVENOUS at 01:56

## 2025-01-21 RX ADMIN — ROSUVASTATIN CALCIUM 40 MG: 20 TABLET, FILM COATED ORAL at 22:37

## 2025-01-21 RX ADMIN — BISACODYL 5 MG: 5 TABLET, COATED ORAL at 19:53

## 2025-01-21 RX ADMIN — METHYLPREDNISOLONE SODIUM SUCCINATE 40 MG: 40 INJECTION, POWDER, FOR SOLUTION INTRAMUSCULAR; INTRAVENOUS at 01:56

## 2025-01-21 NOTE — PROGRESS NOTES
HISTORY OF PRESENT ILLNESS:      The patient is a 69 y.o. year old female with medical history significant for AL amyloidosis with cardiac involvement, hypothyroidism and GERD had presented to [facility] ER on 1/17/2025 with acute worsening of left lower chest wall pain.     Patient states she has had on and off left-sided chest discomfort since cardiac surgery performed in December 2022.  Patient was hospitalized for similar symptoms in November 2024 as well.  She had left thoracentesis performed on 10/14/2024, exudative, cytology negative.  She received treatment with colchicine and and prednisone with no to minimal relief.     Patient denies any fever, productive cough or hemoptysis.  She reports of being diagnosed with COVID-19 infection in December 2024 for which she received Paxlovid.  Recovered now.     Repeat CTA chest performed this admission on 1/17/2025 noted no PE, however new subpleural nodular opacities bilaterally (RML, RLL, LLL), 5 to 7 mm in size.  Hepatic steatosis.     Given these findings, hematology/oncology has been consulted for further evaluation and management.  Patient is well-known to our service and follows up with Dr. Sal along with [place] oncology team.     Interval history:  1/19/2025  Patient reports of persistent pain in her left lower chest wall.  Afebrile.  Vital stable.  She reports of being nauseous after taking Lyrica.  She did not like the way she had made her feel.  Does not want to try it again.  We have discussed Neurontin as a trial she is agreeable.      1/20/2025  T97.3, pulse 70, respiration 16, /63  Patient still noting left-sided chest wall pain-?  Pain control low-dose Lyrica added with plans to be reviewed by cardiology and pulmonary  Patient decayed she would not take Lyrica, Neurontin trial anticipated.  Patient seen by pulmonary medicine with steroid trial initially.    1/21/2025  T96.7, pulse 86, respiration 16, /70, SpO2  96%  Patient with significant pain related but now insomnia.  H&H 12.2 and 39.5, white count of 7610, platelet count of 209,000, being creatinine 12 and 0.63    Past Medical History, Past Surgical History, Social History, Family History have been reviewed and are without significant changes except as mentioned.    Review of Systems   A comprehensive 14 point review of systems was performed and was negative except as mentioned.    Medications:  The current medication list was reviewed in the EMR    ALLERGIES:    Allergies   Allergen Reactions    Azithromycin Rash and Other (See Comments)     Reaction unknown to patient (unable to remember)  Other reaction(s): Other: stomach issues, Stomach ache, Other: stomach issues, Stomach ache    Ezetimibe Myalgia, Other (See Comments) and Rash     cramps  cramps    Pravastatin Rash and Other (See Comments)    Sulfa Antibiotics Rash       Objective      Vitals:    01/21/25 0145 01/21/25 0152 01/21/25 0525 01/21/25 0933   BP:  108/61 110/59 128/71   BP Location:  Left arm Right arm Left arm   Patient Position:  Lying Lying Lying   Pulse:  85 68 86   Resp:  16 16 16   Temp:  96.8 °F (36 °C) 97.1 °F (36.2 °C) 96.7 °F (35.9 °C)   TempSrc:  Oral Oral Oral   SpO2: (!) 89% 93% 94% 96%   Weight:       Height:             11/13/2024     8:02 AM   Current Status   ECOG score 0       Physical Exam    CONSTITUTIONAL:  Vital signs reviewed.  No distress, looks comfortable.  EYES:  Conjunctivae and lids unremarkable.    EARS,NOSE,MOUTH,THROAT:  Ears and nose appear unremarkable.    RESPIRATORY:  Normal respiratory effort.    CARDIOVASCULAR:  Normal heart rate  GASTROINTESTINAL: Abdomen appears unremarkable.  Nondistended   LYMPHATIC:  No cervical, supraclavicular lymphadenopathy.  SKIN:  Warm.  No rashes.  PSYCHIATRIC:  Normal judgment and insight.  Normal mood and affect.  NEURO: AAOx3, no obvious focal deficits.      RECENT LABS:  Hematology WBC   Date Value Ref Range Status   01/21/2025  7.61 3.40 - 10.80 10*3/mm3 Final     RBC   Date Value Ref Range Status   01/21/2025 4.42 3.77 - 5.28 10*6/mm3 Final     Hemoglobin   Date Value Ref Range Status   01/21/2025 12.2 12.0 - 15.9 g/dL Final     Hematocrit   Date Value Ref Range Status   01/21/2025 39.5 34.0 - 46.6 % Final     Platelets   Date Value Ref Range Status   01/21/2025 229 140 - 450 10*3/mm3 Final              Assessment & Plan   This is a 69-year-old female with:     # Acute on chronic left lower chest wall pain:  Patient states she has had on and off left-sided chest discomfort since cardiac surgery performed in December 2022.  Patient was hospitalized for similar symptoms in November 2024 as well.  She had left thoracentesis performed on 10/14/2024, exudative, cytology negative.    She received treatment with colchicine and and prednisone with no to minimal relief.  Patient denies any fever, productive cough or hemoptysis.  She reports of being diagnosed with COVID-19 infection in December 2024 for which she received Paxlovid.  Recovered now.  Repeat CTA chest performed this admission on 1/17/2025 noted no PE, however new subpleural nodular opacities bilaterally (RML, RLL, LLL), 5 to 7 mm in size.  Hepatic steatosis.  Informed patient that her pain appears to be musculoskeletal/neuropathic in nature.  She has been started on IV Zosyn for suspected pneumonia by the primary team.  I offered trial of prednisone again-however patient is concerned about potential side effects and minimal relief in the past.  Patient's pain is currently being managed with Toradol and IV Dilaudid as needed's.  Patient previously reported of excessive sedation with Lyrica 50 mg daily.  Will start her on 25 mg daily.  1/19/2025: Patient did not tolerate Lyrica well.  Does not want to try it again.  Will start her on lidocaine patches and see if it helps.  Continue Toradol and IV Dilaudid as needed  Neurontin trial initiated 1/20/2025 as well as IV steroids.  Issues  discussed with pulmonary plan to continue Neurontin and reduce steroids to prednisone 40 mg p.o. daily     # Bilateral subpleural opacities:  Infectious/inflammatory vs amyloidosis related.  Plan to monitor for now  Patient had COVID-19 infection back in December 2024.  She is scheduled for repeat CT chest in end of February through her pulmonologist, Dr. Ferraro.  Advised to maintain close follow-up with him to monitor resolution     # AL amyloidosis with cardiac involvement:  Diagnosed February 2022.  Previously treated with Darzalex-CyBorD  Last dose of Darzalex in August 2024.  Follows up with Dr. Sal and [place]

## 2025-01-21 NOTE — PLAN OF CARE
Goal Outcome Evaluation:  Plan of Care Reviewed With: patient        Progress: improving  Outcome Evaluation: Pain is improving, pain managed with Gabapentin, Norco, and lidocaine patch, encouraged increased use of IS, vss,afebrile, abmulating in [place], initiated po steriod, falls precaution maintained.

## 2025-01-21 NOTE — PROGRESS NOTES
Name: Cynthia Portillo ADMIT: 2025   : 1955  PCP: Arian Peterson MD    MRN: [id number] LOS: 4 days   AGE/SEX: 69 y.o. female  ROOM: [facility]     Subjective   Subjective   Patient seen and examined this morning.  Hospital day 4.  She is awake and resting in bed, she endorses ongoing dyspnea and coughing, however, symptoms improving.  She denies chest pain at present.       Objective   Objective   Vital Signs  Temp:  [96.7 °F (35.9 °C)-99.2 °F (37.3 °C)] 96.7 °F (35.9 °C)  Heart Rate:  [68-87] 86  Resp:  [16] 16  BP: (102-128)/(46-71) 128/71  SpO2:  [89 %-96 %] 96 %  on  Flow (L/min) (Oxygen Therapy):  [2] 2;   Device (Oxygen Therapy): humidified;nasal cannula  Body mass index is 25.58 kg/m².  Physical Exam  Vitals and nursing note reviewed.   Constitutional:       General: She is not in acute distress.  Cardiovascular:      Rate and Rhythm: Normal rate.      Pulses: Normal pulses.   Pulmonary:      Effort: Pulmonary effort is normal. No respiratory distress.      Comments: Decreased breath sounds  Abdominal:      Palpations: Abdomen is soft.      Tenderness: There is no abdominal tenderness.   Skin:     General: Skin is warm and dry.   Neurological:      Mental Status: She is alert.       Results Review     I reviewed the patient's new clinical results.  Results from last 7 days   Lab Units 25  0521 25  0525  1318   WBC 10*3/mm3 7.61 5.76 10.29   HEMOGLOBIN g/dL 12.2 11.4* 13.9   PLATELETS 10*3/mm3 229 168 238     Results from last 7 days   Lab Units 25  0521 25  0525 25  1318   SODIUM mmol/L 138 135* 137   POTASSIUM mmol/L 3.9 3.9 3.9   CHLORIDE mmol/L 103 101 102   CO2 mmol/L 23.0 24.3 25.8   BUN mg/dL 12 13 12   CREATININE mg/dL 0.63 0.76 0.82   GLUCOSE mg/dL 156* 86 88   EGFR mL/min/1.73 96.2 84.9 77.5     Results from last 7 days   Lab Units 25  0525 25  1318   ALBUMIN g/dL 3.5 4.1   BILIRUBIN mg/dL 0.6 0.7   ALK PHOS U/L 54 72   AST  "(SGOT) U/L 15 21   ALT (SGPT) U/L 17 22     Results from last 7 days   Lab Units 01/21/25  0521 01/18/25  0525 01/17/25  1418 01/17/25  1318   CALCIUM mg/dL 9.1 8.9  --  9.6   ALBUMIN g/dL  --  3.5  --  4.1   MAGNESIUM mg/dL  --  2.3 2.3  --    PHOSPHORUS mg/dL  --   --  2.6  --      Results from last 7 days   Lab Units 01/17/25  1905   LACTATE mmol/L 0.7     No results found for: \"HGBA1C\", \"POCGLU\"    No radiology results for the last day    I have personally reviewed all medications:  Scheduled Medications  acyclovir, 400 mg, Oral, BID  aspirin, 81 mg, Oral, Daily  FLUoxetine, 80 mg, Oral, Daily  gabapentin, 100 mg, Oral, TID  levothyroxine, 75 mcg, Oral, Daily  Lidocaine, 1 patch, Transdermal, Q24H  methylPREDNISolone sodium succinate, 40 mg, Intravenous, Q8H  midodrine, 5 mg, Oral, BID AC  pantoprazole, 40 mg, Oral, Daily  piperacillin-tazobactam, 3.375 g, Intravenous, Q8H  rosuvastatin, 40 mg, Oral, Daily  sodium chloride, 10 mL, Intravenous, Q12H  spironolactone, 25 mg, Oral, Daily    Infusions   Diet  Diet: Cardiac; Healthy Heart (2-3 Na+); Fluid Consistency: Thin (IDDSI 0)    I have personally reviewed:  [x]  Laboratory   [x]  Microbiology   [x]  Radiology   [x]  EKG/Telemetry  [x]  Cardiology/Vascular   []  Pathology    []  Records       Assessment/Plan     Active Hospital Problems    Diagnosis  POA    **CAP (community acquired pneumonia) [J18.9]  Yes    Type 2 diabetes mellitus, without long-term current use of insulin [E11.9]  Yes    Amyloidosis [E85.9]  Yes    Chronic hypotension [I95.89]  Yes    NAFLD (nonalcoholic fatty liver disease) [K76.0]  Yes    Hypothyroid [E03.9]  Yes      Resolved Hospital Problems   No resolved problems to display.       69 y.o. female admitted with CAP (community acquired pneumonia).    Pneumonia  Chest pain/pleuritis  Pleural effusion  Elevated troponin  - Pulmonology and Cardiology have followed. Patient on Zosyn, also on corticosteroids, symptoms improved. Inflammatory " markers elevated, further workup has been ordered to assess for autoimmune disease. Continue treatments as prescribed, follow up consultants.    AL Amyloidosis with cardiac involvement  - Diagnosed February 2022. Previously treated with Darzalex-CyBorD. Follows with Dr. Sal and [place]. Heme Onc following here, notes reviewed. Will continue to follow their plans/recommendations. Greatly appreciate their help.    Type 2 diabetes mellitus with hyperglycemia  - A1c 6.70% (11/06/24), glucose elevated but acceptable, likely 2/2 steroids. Closely monitor.    Chronic Hypotension  - Continue Midodrine.    Hypothyroidism  - Continue Synthroid.    GERD  - Continue PPI.    Depression/anxiety  - Continue medication as prescribed.    All workup, problems and plans new to me today. First day taking care of patient.    SCDs for DVT prophylaxis.  Full code.  Discussed with patient and nursing staff.  Anticipate discharge home in 1-2 days.  Expected Discharge Date: 1/22/2025; Expected Discharge Time:       Kan Powell DO  [facility]  01/21/25

## 2025-01-22 ENCOUNTER — TELEPHONE (OUTPATIENT)
Dept: ONCOLOGY | Facility: CLINIC | Age: 70
End: 2025-01-22

## 2025-01-22 LAB
ANION GAP SERPL CALCULATED.3IONS-SCNC: 11.5 MMOL/L (ref 5–15)
BACTERIA SPEC AEROBE CULT: NORMAL
BACTERIA SPEC AEROBE CULT: NORMAL
BUN SERPL-MCNC: 16 MG/DL (ref 8–23)
BUN/CREAT SERPL: 26.2 (ref 7–25)
CALCIUM SPEC-SCNC: 9.2 MG/DL (ref 8.6–10.5)
CHLORIDE SERPL-SCNC: 107 MMOL/L (ref 98–107)
CO2 SERPL-SCNC: 24.5 MMOL/L (ref 22–29)
CREAT SERPL-MCNC: 0.61 MG/DL (ref 0.57–1)
DEPRECATED RDW RBC AUTO: 42.1 FL (ref 37–54)
EGFRCR SERPLBLD CKD-EPI 2021: 96.9 ML/MIN/1.73
ERYTHROCYTE [DISTWIDTH] IN BLOOD BY AUTOMATED COUNT: 13.4 % (ref 12.3–15.4)
GLUCOSE SERPL-MCNC: 144 MG/DL (ref 65–99)
HCT VFR BLD AUTO: 32.7 % (ref 34–46.6)
HGB BLD-MCNC: 10.9 G/DL (ref 12–15.9)
MCH RBC QN AUTO: 28.9 PG (ref 26.6–33)
MCHC RBC AUTO-ENTMCNC: 33.3 G/DL (ref 31.5–35.7)
MCV RBC AUTO: 86.7 FL (ref 79–97)
PLATELET # BLD AUTO: 236 10*3/MM3 (ref 140–450)
PMV BLD AUTO: 10.2 FL (ref 6–12)
POTASSIUM SERPL-SCNC: 3.8 MMOL/L (ref 3.5–5.2)
RBC # BLD AUTO: 3.77 10*6/MM3 (ref 3.77–5.28)
SODIUM SERPL-SCNC: 143 MMOL/L (ref 136–145)
WBC NRBC COR # BLD AUTO: 9.61 10*3/MM3 (ref 3.4–10.8)

## 2025-01-22 PROCEDURE — 85027 COMPLETE CBC AUTOMATED: CPT | Performed by: STUDENT IN AN ORGANIZED HEALTH CARE EDUCATION/TRAINING PROGRAM

## 2025-01-22 PROCEDURE — 80048 BASIC METABOLIC PNL TOTAL CA: CPT | Performed by: STUDENT IN AN ORGANIZED HEALTH CARE EDUCATION/TRAINING PROGRAM

## 2025-01-22 PROCEDURE — 99231 SBSQ HOSP IP/OBS SF/LOW 25: CPT | Performed by: INTERNAL MEDICINE

## 2025-01-22 PROCEDURE — 63710000001 PREDNISONE PER 1 MG: Performed by: INTERNAL MEDICINE

## 2025-01-22 PROCEDURE — 25010000002 PIPERACILLIN SOD-TAZOBACTAM PER 1 G: Performed by: NURSE PRACTITIONER

## 2025-01-22 RX ORDER — GABAPENTIN 100 MG/1
200 CAPSULE ORAL NIGHTLY
Status: DISCONTINUED | OUTPATIENT
Start: 2025-01-22 | End: 2025-01-23 | Stop reason: HOSPADM

## 2025-01-22 RX ORDER — GABAPENTIN 100 MG/1
100 CAPSULE ORAL 3 TIMES DAILY
Qty: 15 CAPSULE | Refills: 0 | Status: CANCELLED | OUTPATIENT
Start: 2025-01-22

## 2025-01-22 RX ADMIN — LEVOTHYROXINE SODIUM 75 MCG: 0.07 TABLET ORAL at 09:07

## 2025-01-22 RX ADMIN — SPIRONOLACTONE 25 MG: 25 TABLET ORAL at 09:07

## 2025-01-22 RX ADMIN — PREDNISONE 40 MG: 20 TABLET ORAL at 09:07

## 2025-01-22 RX ADMIN — PANTOPRAZOLE SODIUM 40 MG: 40 TABLET, DELAYED RELEASE ORAL at 09:07

## 2025-01-22 RX ADMIN — FLUOXETINE HYDROCHLORIDE 80 MG: 20 CAPSULE ORAL at 09:07

## 2025-01-22 RX ADMIN — PIPERACILLIN AND TAZOBACTAM 3.38 G: 3; .375 INJECTION, POWDER, FOR SOLUTION INTRAVENOUS at 10:11

## 2025-01-22 RX ADMIN — PIPERACILLIN AND TAZOBACTAM 3.38 G: 3; .375 INJECTION, POWDER, FOR SOLUTION INTRAVENOUS at 01:48

## 2025-01-22 RX ADMIN — LIDOCAINE 1 PATCH: 4 PATCH TOPICAL at 09:06

## 2025-01-22 RX ADMIN — ACETAMINOPHEN 650 MG: 325 TABLET, FILM COATED ORAL at 09:16

## 2025-01-22 RX ADMIN — Medication 10 ML: at 20:32

## 2025-01-22 RX ADMIN — ACYCLOVIR 400 MG: 400 TABLET ORAL at 09:07

## 2025-01-22 RX ADMIN — MIDODRINE HYDROCHLORIDE 5 MG: 5 TABLET ORAL at 18:15

## 2025-01-22 RX ADMIN — ACYCLOVIR 400 MG: 400 TABLET ORAL at 20:06

## 2025-01-22 RX ADMIN — PIPERACILLIN AND TAZOBACTAM 3.38 G: 3; .375 INJECTION, POWDER, FOR SOLUTION INTRAVENOUS at 18:14

## 2025-01-22 RX ADMIN — ALPRAZOLAM 0.25 MG: 0.25 TABLET ORAL at 23:32

## 2025-01-22 RX ADMIN — ALPRAZOLAM 0.25 MG: 0.25 TABLET ORAL at 01:52

## 2025-01-22 RX ADMIN — GABAPENTIN 200 MG: 100 CAPSULE ORAL at 20:06

## 2025-01-22 RX ADMIN — ASPIRIN 81 MG: 81 TABLET, COATED ORAL at 09:07

## 2025-01-22 RX ADMIN — ROSUVASTATIN CALCIUM 40 MG: 20 TABLET, FILM COATED ORAL at 20:06

## 2025-01-22 NOTE — PROGRESS NOTES
Name: Cynthia Portillo ADMIT: 2025   : 1955  PCP: Arian Peterson MD    MRN: [id number] LOS: 5 days   AGE/SEX: 69 y.o. female  ROOM: [facility]     Subjective   Subjective   Patient seen and examined this morning.  Hospital day 5, she is not feeling as well today, she endorses some ongoing cough/congestion, also had some pleuritic type pain earlier today. She had adverse reaction to Gabapentin and no longer wishes to take this.       Objective   Objective   Vital Signs  Temp:  [97 °F (36.1 °C)-97.4 °F (36.3 °C)] 97 °F (36.1 °C)  Heart Rate:  [68-88] 88  Resp:  [16] 16  BP: (113-141)/(56-67) 113/56  SpO2:  [92 %-97 %] 97 %  on  Flow (L/min) (Oxygen Therapy):  [2] 2;   Device (Oxygen Therapy): room air  Body mass index is 25.58 kg/m².  Physical Exam  Vitals and nursing note reviewed.   Constitutional:       General: She is not in acute distress.  Cardiovascular:      Rate and Rhythm: Normal rate.      Pulses: Normal pulses.   Pulmonary:      Effort: Pulmonary effort is normal. No respiratory distress.      Comments: Decreased breath sounds, coarse, no wheezes  Chest:      Chest wall: No tenderness.   Abdominal:      Palpations: Abdomen is soft.      Tenderness: There is no abdominal tenderness.   Skin:     General: Skin is warm and dry.   Neurological:      Mental Status: She is alert.       Results Review     I reviewed the patient's new clinical results.  Results from last 7 days   Lab Units 25  0525  1318   WBC 10*3/mm3 9.61 7.61 5.76 10.29   HEMOGLOBIN g/dL 10.9* 12.2 11.4* 13.9   PLATELETS 10*3/mm3 236 229 168 238     Results from last 7 days   Lab Units 25  04425  0525  0525  1318   SODIUM mmol/L 143 138 135* 137   POTASSIUM mmol/L 3.8 3.9 3.9 3.9   CHLORIDE mmol/L 107 103 101 102   CO2 mmol/L 24.5 23.0 24.3 25.8   BUN mg/dL 16 12 13 12   CREATININE mg/dL 0.61 0.63 0.76 0.82   GLUCOSE mg/dL 144* 156* 86 88   EGFR  "mL/min/1.73 96.9 96.2 84.9 77.5     Results from last 7 days   Lab Units 01/18/25  0525 01/17/25  1318   ALBUMIN g/dL 3.5 4.1   BILIRUBIN mg/dL 0.6 0.7   ALK PHOS U/L 54 72   AST (SGOT) U/L 15 21   ALT (SGPT) U/L 17 22     Results from last 7 days   Lab Units 01/22/25  0441 01/21/25  0521 01/18/25  0525 01/17/25  1418 01/17/25  1318   CALCIUM mg/dL 9.2 9.1 8.9  --  9.6   ALBUMIN g/dL  --   --  3.5  --  4.1   MAGNESIUM mg/dL  --   --  2.3 2.3  --    PHOSPHORUS mg/dL  --   --   --  2.6  --      Results from last 7 days   Lab Units 01/17/25  1905   LACTATE mmol/L 0.7     No results found for: \"HGBA1C\", \"POCGLU\"    No radiology results for the last day    I have personally reviewed all medications:  Scheduled Medications  acyclovir, 400 mg, Oral, BID  aspirin, 81 mg, Oral, Daily  FLUoxetine, 80 mg, Oral, Daily  gabapentin, 100 mg, Oral, TID  levothyroxine, 75 mcg, Oral, Daily  Lidocaine, 1 patch, Transdermal, Q24H  midodrine, 5 mg, Oral, BID AC  pantoprazole, 40 mg, Oral, Daily  piperacillin-tazobactam, 3.375 g, Intravenous, Q8H  predniSONE, 40 mg, Oral, Daily With Breakfast  rosuvastatin, 40 mg, Oral, Daily  sodium chloride, 10 mL, Intravenous, Q12H  spironolactone, 25 mg, Oral, Daily    Infusions   Diet  Diet: Cardiac; Healthy Heart (2-3 Na+); Fluid Consistency: Thin (IDDSI 0)    I have personally reviewed:  [x]  Laboratory   [x]  Microbiology   [x]  Radiology   []  EKG/Telemetry  []  Cardiology/Vascular   []  Pathology    []  Records       Assessment/Plan     Active Hospital Problems    Diagnosis  POA    **CAP (community acquired pneumonia) [J18.9]  Yes    Type 2 diabetes mellitus, without long-term current use of insulin [E11.9]  Yes    Amyloidosis [E85.9]  Yes    Chronic hypotension [I95.89]  Yes    NAFLD (nonalcoholic fatty liver disease) [K76.0]  Yes    Hypothyroid [E03.9]  Yes      Resolved Hospital Problems   No resolved problems to display.       69 y.o. female admitted with CAP (community acquired " pneumonia).    Pneumonia  Chest pain/pleuritis  Pleural effusion  Elevated troponin  - Pulmonology and Cardiology have followed. Patient on Zosyn, also on corticosteroids, symptoms waxing and waning. Inflammatory markers elevated, anti-scleroderma 70 antibodies elevated at 1.9. Discussed with Dr. Pineda (01/22), checked RA and anti-CCP, noting plans for outpatient rheumatology referral, reviewed labs back thus far, considering scleroderma. He wishes for her to stay on prednisone at fixed dose until close outpatient follow up with Dr. Shahid, going to reassess today. Will continue to follow Pulmonology plans/recommendations. Greatly appreciate their help.  - Unable to tolerate Gabapentin, so will hold this. Continue other treatments as ordered for pleuritic type pain and monitor response. Further management TBD based on clinical course.  - Continue Zosyn to complete course.    Pulmonary nodules  - Noted on CT angiogram chest: New subpleural nodular opacity in the medial segment right middle   lobe, new subpleural nodule in the lateral segment right middle lobe and new small subpleural nodule seen in the lateral basilar segment right lower lobe.   - Repeat CT chest in 3 months recommended, pulmonology going to plan for follow up evaluation.    AL Amyloidosis with cardiac involvement  - Diagnosed February 2022. Previously treated with Darzalex-CyBorD. Follows with Dr. Sal and [place]. Heme Onc following here, notes reviewed. Will continue to follow their plans/recommendations. Greatly appreciate their help.    Type 2 diabetes mellitus with hyperglycemia  - A1c 6.70% (11/06/24), glucose elevated but acceptable, likely 2/2 steroids. Closely monitor.    Anemia  - Hemoglobin slightly low, values fluctuating, monitor for now, repeat CBC in AM.    Chronic Hypotension  - Continue Midodrine.    Hypothyroidism  - Continue Synthroid.    GERD  - Continue PPI.    Depression/anxiety  - Continue medication as  prescribed.      SCDs for DVT prophylaxis.  Full code.  Discussed with patient, nursing staff, and consulting provider.  Anticipate discharge home tomorrow.  Expected Discharge Date: 1/22/2025; Expected Discharge Time:       Kan Powell DO  [facility]  01/22/25

## 2025-01-22 NOTE — PLAN OF CARE
Goal Outcome Evaluation:  Plan of Care Reviewed With: patient        Progress: improving  Outcome Evaluation: Room air. Abx given as ordered. Up ad gini in room. Gabapentin and steroid doses adjusted. Plan to go home in AM.

## 2025-01-22 NOTE — PROGRESS NOTES
HISTORY OF PRESENT ILLNESS:      The patient is a 69 y.o. year old female with medical history significant for AL amyloidosis with cardiac involvement, hypothyroidism and GERD had presented to [facility] ER on 1/17/2025 with acute worsening of left lower chest wall pain.     Patient states she has had on and off left-sided chest discomfort since cardiac surgery performed in December 2022.  Patient was hospitalized for similar symptoms in November 2024 as well.  She had left thoracentesis performed on 10/14/2024, exudative, cytology negative.  She received treatment with colchicine and and prednisone with no to minimal relief.     Patient denies any fever, productive cough or hemoptysis.  She reports of being diagnosed with COVID-19 infection in December 2024 for which she received Paxlovid.  Recovered now.     Repeat CTA chest performed this admission on 1/17/2025 noted no PE, however new subpleural nodular opacities bilaterally (RML, RLL, LLL), 5 to 7 mm in size.  Hepatic steatosis.     Given these findings, hematology/oncology has been consulted for further evaluation and management.  Patient is well-known to our service and follows up with Dr. Sal along with [place] oncology team.     Interval history:  1/19/2025  Patient reports of persistent pain in her left lower chest wall.  Afebrile.  Vital stable.  She reports of being nauseous after taking Lyrica.  She did not like the way she had made her feel.  Does not want to try it again.  We have discussed Neurontin as a trial she is agreeable.      1/20/2025  T97.3, pulse 70, respiration 16, /63  Patient still noting left-sided chest wall pain-?  Pain control low-dose Lyrica added with plans to be reviewed by cardiology and pulmonary  Patient decayed she would not take Lyrica, Neurontin trial anticipated.  Patient seen by pulmonary medicine with steroid trial initially.    1/21/2025  T96.7, pulse 86, respiration 16, /70, SpO2  96%  Patient with significant pain related but now insomnia.  H&H 12.2 and 39.5, white count of 7610, platelet count of 209,000, being creatinine 12 and 0.63    1/22/2025  T98 7 degrees, pulse 80, respirate 16, /56, SpO2 97%  Patient still having less chest discomfort, mild agitation insomnia from steroids  H&H 10.9 and 32.7, white count of 9610, platelet count of 20 36,000, BUN/creatinine of 16 and 0.61    Past Medical History, Past Surgical History, Social History, Family History have been reviewed and are without significant changes except as mentioned.    Review of Systems   A comprehensive 14 point review of systems was performed and was negative except as mentioned.    Medications:  The current medication list was reviewed in the EMR    ALLERGIES:    Allergies   Allergen Reactions    Azithromycin Rash and Other (See Comments)     Reaction unknown to patient (unable to remember)  Other reaction(s): Other: stomach issues, Stomach ache, Other: stomach issues, Stomach ache    Ezetimibe Myalgia, Other (See Comments) and Rash     cramps  cramps    Pravastatin Rash and Other (See Comments)    Sulfa Antibiotics Rash       Objective      Vitals:    01/21/25 2145 01/22/25 0206 01/22/25 0512 01/22/25 0915   BP: 141/65 124/63 134/61 113/56   BP Location: Left arm Left arm Left arm Left arm   Patient Position: Lying Lying Lying Sitting   Pulse: 72 68 75 88   Resp: 16 16 16 16   Temp: 97.4 °F (36.3 °C) 97.3 °F (36.3 °C) 97 °F (36.1 °C)    TempSrc: Oral Oral Oral Oral   SpO2: 93% 94% 96% 97%   Weight:       Height:             11/13/2024     8:02 AM   Current Status   ECOG score 0       Physical Exam    CONSTITUTIONAL:  Vital signs reviewed.  No distress, looks comfortable.  EYES:  Conjunctivae and lids unremarkable.    EARS,NOSE,MOUTH,THROAT:  Ears and nose appear unremarkable.    RESPIRATORY:  Normal respiratory effort.    CARDIOVASCULAR:  Normal heart rate  GASTROINTESTINAL: Abdomen appears unremarkable.   Nondistended   LYMPHATIC:  No cervical, supraclavicular lymphadenopathy.  SKIN:  Warm.  No rashes.  PSYCHIATRIC:  Normal judgment and insight.  Normal mood and affect.  NEURO: AAOx3, no obvious focal deficits.      RECENT LABS:  Hematology WBC   Date Value Ref Range Status   01/22/2025 9.61 3.40 - 10.80 10*3/mm3 Final     RBC   Date Value Ref Range Status   01/22/2025 3.77 3.77 - 5.28 10*6/mm3 Final     Hemoglobin   Date Value Ref Range Status   01/22/2025 10.9 (L) 12.0 - 15.9 g/dL Final     Hematocrit   Date Value Ref Range Status   01/22/2025 32.7 (L) 34.0 - 46.6 % Final     Platelets   Date Value Ref Range Status   01/22/2025 236 140 - 450 10*3/mm3 Final              Assessment & Plan   This is a 69-year-old female with:     # Acute on chronic left lower chest wall pain:  Patient states she has had on and off left-sided chest discomfort since cardiac surgery performed in December 2022.  Patient was hospitalized for similar symptoms in November 2024 as well.  She had left thoracentesis performed on 10/14/2024, exudative, cytology negative.    She received treatment with colchicine and and prednisone with no to minimal relief.  Patient denies any fever, productive cough or hemoptysis.  She reports of being diagnosed with COVID-19 infection in December 2024 for which she received Paxlovid.  Recovered now.  Repeat CTA chest performed this admission on 1/17/2025 noted no PE, however new subpleural nodular opacities bilaterally (RML, RLL, LLL), 5 to 7 mm in size.  Hepatic steatosis.  Informed patient that her pain appears to be musculoskeletal/neuropathic in nature.  She has been started on IV Zosyn for suspected pneumonia by the primary team.  I offered trial of prednisone again-however patient is concerned about potential side effects and minimal relief in the past.  Patient's pain is currently being managed with Toradol and IV Dilaudid as needed's.  Patient previously reported of excessive sedation with Lyrica 50 mg  daily.  Will start her on 25 mg daily.  1/19/2025: Patient did not tolerate Lyrica well.  Does not want to try it again.  Will start her on lidocaine patches and see if it helps.  Continue Toradol and IV Dilaudid as needed  Neurontin trial initiated 1/20/2025 as well as IV steroids.  Issues discussed with pulmonary plan to continue Neurontin and reduce steroids to prednisone 40 mg p.o. daily  Patient seen 1/22/2025, prednisone 40 mg p.o. daily continued, Neurontin increased to 200 milligrams p.o. nightly-single dose daily     # Bilateral subpleural opacities:  Infectious/inflammatory vs amyloidosis related.  Plan to monitor for now  Patient had COVID-19 infection back in December 2024.  She is scheduled for repeat CT chest in end of February through her pulmonologist, Dr. Ferraro.  Advised to maintain close follow-up with him to monitor resolution     # AL amyloidosis with cardiac involvement:  Diagnosed February 2022.  Previously treated with Darzalex-CyBorD  Last dose of Darzalex in August 2024.  Follows up with Dr. Sal and [place]

## 2025-01-22 NOTE — PROGRESS NOTES
"                                              LOS: 5 days   Patient Care Team:  Arian Peterson MD as PCP - General (Internal Medicine)  Nav Sal MD as Consulting Physician (Hematology and Oncology)  Darren Pruitt MD as Referring Physician (Cardiology)    Chief Complaint:  F/up recurrent pleuritis    Subjective   Interval History     Has difficulty sleeping at night and feeling also nervous and hyper.  She is anxious when she has to use the Xanax she said.  Those symptoms started when she was placed on steroid.  Chest pain improved overall but she continues to report intermittent mild chest discomfort/stabbing at times located on the left side below her ribs but occurring occasionally.    REVIEW OF SYSTEMS:   CARDIOVASCULAR: No chest pain, chest pressure or chest discomfort. No palpitations or edema.     GASTROINTESTINAL: No anorexia, nausea, vomiting or diarrhea. No abdominal pain.      Ventilator/Non-Invasive Ventilation Settings (From admission, onward)      None                  Physical Exam:     Vital Signs  Temp:  [97 °F (36.1 °C)-97.4 °F (36.3 °C)] 97 °F (36.1 °C)  Heart Rate:  [68-88] 88  Resp:  [16] 16  BP: (113-141)/(56-67) 113/56    Intake/Output Summary (Last 24 hours) at 1/22/2025 1440  Last data filed at 1/22/2025 1327  Gross per 24 hour   Intake 540 ml   Output --   Net 540 ml     Flowsheet Rows      Flowsheet Row First Filed Value   Admission Height 154.9 cm (61\") Documented at 01/17/2025 1307   Admission Weight 57.2 kg (126 lb) Documented at 01/17/2025 1307            PPE used per hospital policy    General Appearance:   Alert, cooperative, in no acute distress   ENMT:  Mallampati score 3, moist mucous membrane   Eyes:  Pupils equal and reactive to light. EOMI   Neck:    Trachea midline. No thyromegaly.   Lungs:    Mild crackles at the left base.  No change.  Good air entry overall.    Heart:   Regular rhythm and normal rate, normal S1 and S2, no         murmur   Skin:   No rash or " ecchymosis   Abdomen:    Soft. No tenderness. No HSM.   Neuro/psych:  Conscious, alert, oriented x3. Strength 5/5 in upper and lower  ext.  Appropriate mood and affect   Extremities:  No cyanosis, clubbing or edema.  Warm extremities and well-perfused          Results Review:        Results from last 7 days   Lab Units 01/22/25  0441 01/21/25  0521 01/18/25  0525   SODIUM mmol/L 143 138 135*   POTASSIUM mmol/L 3.8 3.9 3.9   CHLORIDE mmol/L 107 103 101   CO2 mmol/L 24.5 23.0 24.3   BUN mg/dL 16 12 13   CREATININE mg/dL 0.61 0.63 0.76   GLUCOSE mg/dL 144* 156* 86   CALCIUM mg/dL 9.2 9.1 8.9     Results from last 7 days   Lab Units 01/17/25  1418 01/17/25  1318   HSTROP T ng/L 31* 34*     Results from last 7 days   Lab Units 01/22/25  0441 01/21/25  0521 01/18/25  0525   WBC 10*3/mm3 9.61 7.61 5.76   HEMOGLOBIN g/dL 10.9* 12.2 11.4*   HEMATOCRIT % 32.7* 39.5 36.2   PLATELETS 10*3/mm3 236 229 168         Results from last 7 days   Lab Units 01/17/25  1418   PROBNP pg/mL 320.0             Results from last 7 days   Lab Units 01/20/25  1627   CRP mg/dL 15.49*               I reviewed the patient's new clinical results.        Medication Review:   acyclovir, 400 mg, Oral, BID  aspirin, 81 mg, Oral, Daily  FLUoxetine, 80 mg, Oral, Daily  [Held by provider] gabapentin, 100 mg, Oral, TID  gabapentin, 200 mg, Oral, Nightly  levothyroxine, 75 mcg, Oral, Daily  Lidocaine, 1 patch, Transdermal, Q24H  midodrine, 5 mg, Oral, BID AC  pantoprazole, 40 mg, Oral, Daily  piperacillin-tazobactam, 3.375 g, Intravenous, Q8H  predniSONE, 40 mg, Oral, Daily With Breakfast  rosuvastatin, 40 mg, Oral, Daily  sodium chloride, 10 mL, Intravenous, Q12H  spironolactone, 25 mg, Oral, Daily        Assessment     Recurrent left pleuritis, etiology possibly amyloidosis. Other differentials include autoimmune disease (see below).   Trace left pleural effusion, secondary #1  New pulmonary nodule in the RML.  Could also represent amyloidosis.  Pulmonary  opacity in the LLL, probably atelectasis due to splinting and hypoventilation.  Expected steroid induced hyperglycemia  Elevated Anti SCL 70 Ab, ESR and CRP- Could suggest scleroderma  Insomnia      Plan       Reduce prednisone to 30 mg daily due to side effects.  Had a long talk with the patient about purpose of using steroid and the potential side effects that could result from that.  Discussed with Dr. Mahoney increasing Neurontin is being used as well for pain.  Agree.  Dr. Downs is increasing the dose to 200 mg at night to help with sleep which is not great idea.  Avoid Ambien due to side effects.  Low salt and carb diet while on steroid.   Would require f/up CT chest at some point.   Check RA and anti CCP. Outpatient referral to Rheumatology    Discussed with Dr. Downs and Dr. Powell.    Ok to DC from pulm perspective. F/up with Dr. Markus Shahid at [facility] (will arrange for a short follow-up in 1-2 weeks at the most).    Romeo Pineda MD  01/22/25  14:40 EST          This note was dictated utilizing [employer] dictation

## 2025-01-22 NOTE — PLAN OF CARE
Goal Outcome Evaluation:  Plan of Care Reviewed With: patient        Progress: improving  Outcome Evaluation: C/o bloating and absdominal discomfort.  PO Dulcolax given with good results.  prn xanax given.   Steroids switched to po yesterday afternoon.

## 2025-01-22 NOTE — PROGRESS NOTES
"                                              LOS: 4 days   Patient Care Team:  Arian Peterson MD as PCP - General (Internal Medicine)  Nav Sal MD as Consulting Physician (Hematology and Oncology)  Darren Pruitt MD as Referring Physician (Cardiology)    Chief Complaint:  F/up recurrent pleuritis    Subjective   Interval History       Significantly improved left chest pain.  High Glucose level.   Insomnia    REVIEW OF SYSTEMS:   CARDIOVASCULAR: No chest pain, chest pressure or chest discomfort. No palpitations or edema.     GASTROINTESTINAL: No anorexia, nausea, vomiting or diarrhea. No abdominal pain.      Ventilator/Non-Invasive Ventilation Settings (From admission, onward)      None                  Physical Exam:     Vital Signs  Temp:  [96.7 °F (35.9 °C)-99.1 °F (37.3 °C)] 97 °F (36.1 °C)  Heart Rate:  [68-86] 81  Resp:  [16] 16  BP: (108-131)/(59-71) 131/67    Intake/Output Summary (Last 24 hours) at 1/21/2025 1909  Last data filed at 1/21/2025 1247  Gross per 24 hour   Intake 650 ml   Output 700 ml   Net -50 ml     Flowsheet Rows      Flowsheet Row First Filed Value   Admission Height 154.9 cm (61\") Documented at 01/17/2025 1307   Admission Weight 57.2 kg (126 lb) Documented at 01/17/2025 1307            PPE used per hospital policy    General Appearance:   Alert, cooperative, in no acute distress   ENMT:  Mallampati score 3, moist mucous membrane   Eyes:  Pupils equal and reactive to light. EOMI   Neck:    Trachea midline. No thyromegaly.   Lungs:    Mild crackles at the left base.     Heart:   Regular rhythm and normal rate, normal S1 and S2, no         murmur   Skin:   No rash or ecchymosis   Abdomen:    Soft. No tenderness. No HSM.   Neuro/psych:  Conscious, alert, oriented x3. Strength 5/5 in upper and lower  ext.  Appropriate mood and affect   Extremities:  No cyanosis, clubbing or edema.  Warm extremities and well-perfused          Results Review:        Results from last 7 days   Lab " Units 01/21/25  0521 01/18/25  0525 01/17/25  1318   SODIUM mmol/L 138 135* 137   POTASSIUM mmol/L 3.9 3.9 3.9   CHLORIDE mmol/L 103 101 102   CO2 mmol/L 23.0 24.3 25.8   BUN mg/dL 12 13 12   CREATININE mg/dL 0.63 0.76 0.82   GLUCOSE mg/dL 156* 86 88   CALCIUM mg/dL 9.1 8.9 9.6     Results from last 7 days   Lab Units 01/17/25  1418 01/17/25  1318   HSTROP T ng/L 31* 34*     Results from last 7 days   Lab Units 01/21/25  0521 01/18/25  0525 01/17/25  1318   WBC 10*3/mm3 7.61 5.76 10.29   HEMOGLOBIN g/dL 12.2 11.4* 13.9   HEMATOCRIT % 39.5 36.2 42.1   PLATELETS 10*3/mm3 229 168 238         Results from last 7 days   Lab Units 01/17/25  1418   PROBNP pg/mL 320.0             Results from last 7 days   Lab Units 01/20/25  1627   CRP mg/dL 15.49*               I reviewed the patient's new clinical results.        Medication Review:   acyclovir, 400 mg, Oral, BID  aspirin, 81 mg, Oral, Daily  FLUoxetine, 80 mg, Oral, Daily  gabapentin, 100 mg, Oral, TID  levothyroxine, 75 mcg, Oral, Daily  Lidocaine, 1 patch, Transdermal, Q24H  midodrine, 5 mg, Oral, BID AC  pantoprazole, 40 mg, Oral, Daily  piperacillin-tazobactam, 3.375 g, Intravenous, Q8H  predniSONE, 40 mg, Oral, Daily With Breakfast  rosuvastatin, 40 mg, Oral, Daily  sodium chloride, 10 mL, Intravenous, Q12H  spironolactone, 25 mg, Oral, Daily        Assessment     Recurrent left pleuritis, etiology possibly amyloidosis. Other differentials include autoimmune disease (see below).   Trace left pleural effusion, secondary #1  New pulmonary nodule in the RML.  Could also represent amyloidosis.  Pulmonary opacity in the LLL, probably atelectasis due to splinting and hypoventilation.  Expected steroid induced hyperglycemia  Elevated Anti SCL 70 Ab, ESR and CRP- Could suggest scleroderma      Plan       DC Solumedrol and start Prednisone 40 mg daily. This can be tapered down as outpatient according to her symptoms and lab (inflammatory markers)   Low salt and carb diet  while on steroid.   PRN Melatonin at night for insomnia.   Would require f/up CT chest at some point.   Check RA and anti CCP. Outpatient referral to Rheumatology    Discussed with Dr. Himanshu Cole to DC from pulm perspective. F/up with Dr. Markus Shahid at [facility]    Romeo Pineda MD  01/21/25  19:09 EST          This note was dictated utilizing [employer] dictation

## 2025-01-22 NOTE — SIGNIFICANT NOTE
01/22/25 0902   OTHER   Discipline physical therapist   Therapy Assessment/Plan (PT)   Criteria for Skilled Interventions Met (PT) no problems identified which require skilled intervention  (68 yo admitted with CAP.  Per adult pt profile, pt with no mobility issues PTA. Per adult PCS, pt is up sup 400'  and with ampac of 23. No indication for acute PT. Please cont to facilitate HLM per ampac of 23 (amb 25' or more).)

## 2025-01-23 ENCOUNTER — READMISSION MANAGEMENT (OUTPATIENT)
Dept: CALL CENTER | Facility: HOSPITAL | Age: 70
End: 2025-01-23
Payer: MEDICARE

## 2025-01-23 VITALS
OXYGEN SATURATION: 97 % | TEMPERATURE: 97.6 F | SYSTOLIC BLOOD PRESSURE: 130 MMHG | HEART RATE: 56 BPM | DIASTOLIC BLOOD PRESSURE: 68 MMHG | RESPIRATION RATE: 16 BRPM | BODY MASS INDEX: 25.72 KG/M2 | WEIGHT: 131 LBS | HEIGHT: 60 IN

## 2025-01-23 LAB
ANION GAP SERPL CALCULATED.3IONS-SCNC: 12 MMOL/L (ref 5–15)
BUN SERPL-MCNC: 17 MG/DL (ref 8–23)
BUN/CREAT SERPL: 27 (ref 7–25)
CALCIUM SPEC-SCNC: 9.1 MG/DL (ref 8.6–10.5)
CHLORIDE SERPL-SCNC: 106 MMOL/L (ref 98–107)
CO2 SERPL-SCNC: 25 MMOL/L (ref 22–29)
CREAT SERPL-MCNC: 0.63 MG/DL (ref 0.57–1)
DEPRECATED RDW RBC AUTO: 42.6 FL (ref 37–54)
EGFRCR SERPLBLD CKD-EPI 2021: 96.2 ML/MIN/1.73
ERYTHROCYTE [DISTWIDTH] IN BLOOD BY AUTOMATED COUNT: 13.6 % (ref 12.3–15.4)
GLUCOSE SERPL-MCNC: 93 MG/DL (ref 65–99)
HCT VFR BLD AUTO: 34.2 % (ref 34–46.6)
HGB BLD-MCNC: 11.3 G/DL (ref 12–15.9)
MCH RBC QN AUTO: 29 PG (ref 26.6–33)
MCHC RBC AUTO-ENTMCNC: 33 G/DL (ref 31.5–35.7)
MCV RBC AUTO: 87.7 FL (ref 79–97)
PLATELET # BLD AUTO: 266 10*3/MM3 (ref 140–450)
PMV BLD AUTO: 10 FL (ref 6–12)
POTASSIUM SERPL-SCNC: 3.5 MMOL/L (ref 3.5–5.2)
RBC # BLD AUTO: 3.9 10*6/MM3 (ref 3.77–5.28)
SODIUM SERPL-SCNC: 143 MMOL/L (ref 136–145)
WBC NRBC COR # BLD AUTO: 7.22 10*3/MM3 (ref 3.4–10.8)

## 2025-01-23 PROCEDURE — 63710000001 PREDNISONE PER 1 MG: Performed by: INTERNAL MEDICINE

## 2025-01-23 PROCEDURE — 63710000001 PREDNISONE PER 5 MG: Performed by: INTERNAL MEDICINE

## 2025-01-23 PROCEDURE — 86200 CCP ANTIBODY: CPT | Performed by: INTERNAL MEDICINE

## 2025-01-23 PROCEDURE — 85027 COMPLETE CBC AUTOMATED: CPT | Performed by: STUDENT IN AN ORGANIZED HEALTH CARE EDUCATION/TRAINING PROGRAM

## 2025-01-23 PROCEDURE — 86431 RHEUMATOID FACTOR QUANT: CPT | Performed by: INTERNAL MEDICINE

## 2025-01-23 PROCEDURE — 80048 BASIC METABOLIC PNL TOTAL CA: CPT | Performed by: STUDENT IN AN ORGANIZED HEALTH CARE EDUCATION/TRAINING PROGRAM

## 2025-01-23 RX ORDER — PREDNISONE 10 MG/1
30 TABLET ORAL
Qty: 42 TABLET | Refills: 0 | Status: SHIPPED | OUTPATIENT
Start: 2025-01-23 | End: 2025-02-06

## 2025-01-23 RX ORDER — GABAPENTIN 100 MG/1
200 CAPSULE ORAL NIGHTLY
Qty: 14 CAPSULE | Refills: 0 | Status: SHIPPED | OUTPATIENT
Start: 2025-01-23 | End: 2025-01-30

## 2025-01-23 RX ORDER — POLYETHYLENE GLYCOL 3350 17 G/17G
17 POWDER, FOR SOLUTION ORAL DAILY PRN
Qty: 510 G | Refills: 0 | Status: SHIPPED | OUTPATIENT
Start: 2025-01-23

## 2025-01-23 RX ORDER — LIDOCAINE 4 G/G
1 PATCH TOPICAL
Qty: 30 EACH | Refills: 0 | Status: SHIPPED | OUTPATIENT
Start: 2025-01-23

## 2025-01-23 RX ORDER — AMOXICILLIN 250 MG
2 CAPSULE ORAL 2 TIMES DAILY PRN
Qty: 30 TABLET | Refills: 0 | Status: SHIPPED | OUTPATIENT
Start: 2025-01-23

## 2025-01-23 RX ADMIN — LEVOTHYROXINE SODIUM 75 MCG: 0.07 TABLET ORAL at 08:00

## 2025-01-23 RX ADMIN — PANTOPRAZOLE SODIUM 40 MG: 40 TABLET, DELAYED RELEASE ORAL at 08:00

## 2025-01-23 RX ADMIN — ACYCLOVIR 400 MG: 400 TABLET ORAL at 08:00

## 2025-01-23 RX ADMIN — Medication 10 ML: at 08:00

## 2025-01-23 RX ADMIN — FLUOXETINE HYDROCHLORIDE 80 MG: 20 CAPSULE ORAL at 07:59

## 2025-01-23 RX ADMIN — SPIRONOLACTONE 25 MG: 25 TABLET ORAL at 07:59

## 2025-01-23 RX ADMIN — MIDODRINE HYDROCHLORIDE 5 MG: 5 TABLET ORAL at 06:40

## 2025-01-23 RX ADMIN — PREDNISONE 30 MG: 10 TABLET ORAL at 08:00

## 2025-01-23 RX ADMIN — ASPIRIN 81 MG: 81 TABLET, COATED ORAL at 08:00

## 2025-01-23 NOTE — DISCHARGE SUMMARY
Patient Name: Cynthia Portillo  : 1955  MRN: [id number]    Date of Admission: 2025  Date of Discharge:  2025  Primary Care Physician: Arian Peterson MD      Chief Complaint:   Chest Pain      Discharge Diagnoses     Active Hospital Problems    Diagnosis  POA    **CAP (community acquired pneumonia) [J18.9]  Yes    Type 2 diabetes mellitus, without long-term current use of insulin [E11.9]  Yes    Amyloidosis [E85.9]  Yes    Chronic hypotension [I95.89]  Yes    NAFLD (nonalcoholic fatty liver disease) [K76.0]  Yes    Hypothyroid [E03.9]  Yes      Resolved Hospital Problems   No resolved problems to display.        Hospital Course     Ms. Trent Portillo is a 69 y.o. female who presented to [facility] initially complaining of chest pain.  Please see the admitting history and physical for further details.  She was admitted to the hospital for further evaluation and treatment.     Pneumonia  Chest pain/pleuritis  Pleural effusion  Elevated troponin  - Pulmonology and Cardiology followed during hospital stay. She was treated with Zosyn and corticosteroids. Inflammatory markers elevated, anti-scleroderma 70 antibodies elevated at 1.9. Pulmonology recommending outpatient Rheumatology evaluation, which was ordered at discharge. Pulmonology recommending fixed steroid regimen at 30 mg daily until she can be seen in outpatient setting for close follow up appointment, which they plan to arrange. Otherwise, she was started on nighty Gabapentin for pleuritic pain, Oncology notes plans for this to be continued at discharge, they are going to see as outpatient for reassessment of symptoms.     Pulmonary nodules  - Noted on CT angiogram chest: New subpleural nodular opacity in the medial segment right middle lobe, new subpleural nodule in the lateral segment right middle lobe and new small subpleural nodule seen in the lateral basilar segment right lower lobe.  Repeat CT chest in 3 months  recommended, pulmonology going to plan for follow up evaluation.     AL Amyloidosis with cardiac involvement  - Diagnosed February 2022. Previously treated with Darzalex-CyBorD. Follows with Dr. Sal and Shyanne. Heme Onc followed during inpatient hospital stay. She was cleared for discharge, they are planning for outpatient follow up.     Type 2 diabetes mellitus with hyperglycemia  - A1c 6.70% (11/06/24). Resume home medication as previously prescribed. Recommend that patient check blood glucose 2-3 times daily and record those values in log book and take it to next PCP visit to allow for greater insight into treatment efficacy to guide further management decisions. Recommend consistent carbohydrate/diabetic diet.     Anemia  - Hemoglobin slightly low. Recommend repeat CBC with PCP within 1 week for reassessment.    Chronic Hypotension  - Continue Midodrine. Recommend that patient check blood pressure 2-3 times daily and record those values in log book and take it to next PCP visit to allow for greater insight into treatment efficacy to guide further management decisions.     Hypothyroidism  - Continue Synthroid.     GERD  - Continue PPI.     Depression/anxiety  - Continue medication as prescribed.    Day of Discharge     Subjective:  Patient awake and resting in bed, feeling better, she has been cleared for discharge.    Physical Exam:  Temp:  [97.2 °F (36.2 °C)-98 °F (36.7 °C)] 97.6 °F (36.4 °C)  Heart Rate:  [56-88] 56  Resp:  [16] 16  BP: (110-130)/(56-73) 130/68  Body mass index is 25.58 kg/m².  Physical Exam  Vitals and nursing note reviewed.   Constitutional:       General: She is not in acute distress.  Cardiovascular:      Rate and Rhythm: Normal rate.      Pulses: Normal pulses.   Pulmonary:      Effort: Pulmonary effort is normal. No respiratory distress.      Breath sounds: No wheezing.      Comments: Decreased breath sounds, coarse, no wheezes  Chest:      Chest wall: No tenderness.   Abdominal:       Palpations: Abdomen is soft.      Tenderness: There is no abdominal tenderness.   Skin:     General: Skin is warm and dry.   Neurological:      Mental Status: She is alert.         Consultants     Consult Orders (all) (From admission, onward)       Start     Ordered    01/20/25 0752  Inpatient Cardiology Consult  Once        Specialty:  Cardiology  Provider:  Darren Pruitt MD    01/20/25 0752    01/20/25 0752  Inpatient Pulmonology Consult  Once        Specialty:  Pulmonary Disease  Provider:  Mati Shahid DO    01/20/25 0752    01/18/25 0808  Hematology & Oncology Inpatient Consult  Once        Specialty:  Hematology and Oncology  Provider:  Nav Sal MD    01/18/25 0808    01/17/25 1851  [facility] (on-call MD unless specified) Details  Once        Specialty:  Hospitalist  Provider:  (Not yet assigned)    01/17/25 1851                  Procedures     * Surgery not found *    Imaging Results (All)       Procedure Component Value Units Date/Time    CT Angiogram Chest [[id number]] Collected: 01/17/25 1800     Updated: 01/17/25 1821    Narrative:      CT ANGIOGRAM CHEST-     INDICATION: Left lower lung pain. HISTORY of amyloidosis. Pleural  effusion.     COMPARISON: CTA chest November 7, 2024     TECHNIQUE:  CTA chest with IV contrast. Coronal and sagittal reformats. Three  dimensional reconstructions. Radiation dose reduction techniques were  utilized, including automated exposure control and exposure modulation  based on body size.     FINDINGS:      Pulmonary arteries: No pulmonary embolism.     Chest wall: No lymphadenopathy.     Mediastinum: Left atrial appendage clip. Heart is normal in size. Aortic  valve calcification. Small amount of pericardial fluid. No mediastinal  or hilar lymphadenopathy.     Lungs/pleura: Small left pleural effusion. Patent central airways. Mild  cylindrical bronchiolectasis seen at the bases. Posterior dependent and  bibasilar subsegmental atelectasis or scarring.  Subpleural nodular  opacity seen in the medial segment right middle lobe, with some  surrounding groundglass opacity, series 5, axial mage 76, with the  nodule measuring 7 mm, new. Subpleural solid pulmonary nodule in the  lateral segment right middle lobe, series 5, axial mage 85, measures 7  mm, new. Subpleural solid pulmonary nodule in the lateral basilar right  lower lobe, series 5, axial mage 97, measures 5 mm, new. Small  heterogeneous opacity in the posterior basilar left lower lobe base,  series 5, axial mage 103.     Upper abdomen: Hepatic steatosis.     Osseous structures: Total right shoulder arthroplasty. Median  sternotomy.       Impression:         1. No pulmonary embolism.  2. Small left pleural effusion.  3. New subpleural nodular opacity in the medial segment right middle  lobe, new subpleural nodule in the lateral segment right middle lobe and  new small subpleural nodule seen in the lateral basilar segment right  lower lobe. In the absence of a known malignancy, infectious etiology  favored. Recommend 3-month follow-up chest CT to reevaluate.  4. Heterogeneous opacity in the left lower lobe base. Could be  atelectasis or early pneumonia. No pulmonary embolism seen leading to  this opacity.  5. Hepatic steatosis     This report was finalized on 1/17/2025 6:17 PM by Dr. Aramis Barakat M.D on Workstation: [id number]       XR Chest 1 View [[id number]] Collected: 01/17/25 1429     Updated: 01/17/25 1434    Narrative:      XR CHEST 1 VW-     INDICATION: Left-sided chest pain     COMPARISON: CT chest angiogram 11/7/2024 and chest radiographs dating  back to 10/31/2019       Impression:      No focal consolidation. No pleural effusion or pneumothorax.  Normal size cardiomediastinal silhouette with postsurgical change of  CABG and left atrial appendage clipping. Partially visualized right  reverse shoulder arthroplasty.     This report was finalized on 1/17/2025 2:31 PM by Dr. Jensen Hodge M.D on  Workstation: [id number]             Results for orders placed during the hospital encounter of 04/15/21    Doppler Arterial Multi Level Lower Extremity - Bilateral CAR    Interpretation Summary  · Right Conclusion: The right RETA is normal.  · Left Conclusion: The left RETA is normal.    Results for orders placed during the hospital encounter of 01/17/25    Adult Transthoracic Echo Complete W/ Cont if Necessary Per Protocol    Interpretation Summary    Left ventricular systolic function is normal. Calculated left ventricular EF = 62.7%.  Abnormal septal bounce in the setting of LBBB.    Left ventricular wall thickness is consistent with mild concentric hypertrophy. Sigmoid-shaped ventricular septum is present.    Left ventricular diastolic function was indeterminate.    Mild mitral annular calcification (MAC) resulting in mild functional mitral stenosis. The mitral valve mean gradient is 3 mmHg at HR 70.  Mild-moderate mitral regurgitation.    Grossly normal biatrial and IVC size.    Pertinent Labs     Results from last 7 days   Lab Units 01/23/25 0441 01/22/25 0441 01/21/25  0521 01/18/25  0525   WBC 10*3/mm3 7.22 9.61 7.61 5.76   HEMOGLOBIN g/dL 11.3* 10.9* 12.2 11.4*   PLATELETS 10*3/mm3 266 236 229 168     Results from last 7 days   Lab Units 01/23/25 0441 01/22/25 0441 01/21/25  0521 01/18/25  0525   SODIUM mmol/L 143 143 138 135*   POTASSIUM mmol/L 3.5 3.8 3.9 3.9   CHLORIDE mmol/L 106 107 103 101   CO2 mmol/L 25.0 24.5 23.0 24.3   BUN mg/dL 17 16 12 13   CREATININE mg/dL 0.63 0.61 0.63 0.76   GLUCOSE mg/dL 93 144* 156* 86   EGFR mL/min/1.73 96.2 96.9 96.2 84.9     Results from last 7 days   Lab Units 01/18/25  0525 01/17/25  1318   ALBUMIN g/dL 3.5 4.1   BILIRUBIN mg/dL 0.6 0.7   ALK PHOS U/L 54 72   AST (SGOT) U/L 15 21   ALT (SGPT) U/L 17 22     Results from last 7 days   Lab Units 01/23/25 0441 01/22/25  0441 01/21/25  0521 01/18/25  0525 01/17/25  1418 01/17/25  1318   CALCIUM mg/dL 9.1 9.2 9.1 8.9  --   "9.6   ALBUMIN g/dL  --   --   --  3.5  --  4.1   MAGNESIUM mg/dL  --   --   --  2.3 2.3  --    PHOSPHORUS mg/dL  --   --   --   --  2.6  --        Results from last 7 days   Lab Units 01/17/25  1418 01/17/25  1318   HSTROP T ng/L 31* 34*   PROBNP pg/mL 320.0  --            Invalid input(s): \"LDLCALC\"  Results from last 7 days   Lab Units 01/17/25  1905 01/17/25  1901   BLOODCX  No growth at 5 days No growth at 5 days     Results from last 7 days   Lab Units 01/20/25  1643   COVID19  Not Detected       Test Results Pending at Discharge     Pending Results       Procedure [Order ID] Specimen - Date/Time    Rheumatoid Arthritis (RA) Profile [[id number]] Collected: 01/23/25 0441    Specimen: Blood Updated: 01/23/25 0509              Discharge Details        Discharge Medications        New Medications        Instructions Start Date   doxycycline 100 MG capsule  Commonly known as: MONODOX   100 mg, Oral, 2 Times Daily      gabapentin 100 MG capsule  Commonly known as: NEURONTIN   200 mg, Oral, Nightly      Lidocaine 4 %   1 patch, Transdermal, Every 24 Hours Scheduled, Remove & Discard patch within 12 hours or as directed by MD      naproxen 500 MG tablet  Commonly known as: NAPROSYN   500 mg, Oral, 2 Times Daily PRN      polyethylene glycol 17 g packet  Commonly known as: MIRALAX   17 g, Oral, Daily PRN      predniSONE 10 MG tablet  Commonly known as: DELTASONE   30 mg, Oral, Daily With Breakfast      sennosides-docusate 8.6-50 MG per tablet  Commonly known as: PERICOLACE   2 tablets, Oral, 2 Times Daily PRN             Changes to Medications        Instructions Start Date   ALPRAZolam 0.25 MG tablet  Commonly known as: XANAX  What changed:   reasons to take this  additional instructions   0.25 mg, Oral, 2 Times Daily PRN, for anxiety      cholestyramine 4 g packet  Commonly known as: QUESTRAN  What changed:   when to take this  reasons to take this  additional instructions   1 packet, Oral, 3 Times Daily With Meals, 1 " packet  TID PRN diarrhea      pantoprazole 20 MG EC tablet  Commonly known as: PROTONIX  What changed: See the new instructions.   TAKE 1 TABLET BY MOUTH EVERY DAY. DO NOT TAKE WITHIN 2 HOURS OF THYROID MEDICATION             Continue These Medications        Instructions Start Date   acyclovir 400 MG tablet  Commonly known as: ZOVIRAX   400 mg, Oral, 2 Times Daily      albuterol sulfate  (90 Base) MCG/ACT inhaler  Commonly known as: PROVENTIL HFA;VENTOLIN HFA;PROAIR HFA   2 puffs, Inhalation, Every 4 Hours PRN      aspirin 81 MG EC tablet   Take 1 tablet by mouth Daily.      benzonatate 200 MG capsule  Commonly known as: TESSALON   200 mg, Oral, 3 Times Daily PRN      diphenoxylate-atropine 2.5-0.025 MG per tablet  Commonly known as: LOMOTIL   1 tablet, Oral, 4 Times Daily PRN      empagliflozin 10 MG tablet tablet  Commonly known as: JARDIANCE   10 mg, Daily      FLUoxetine 20 MG capsule  Commonly known as: PROzac   TAKE 1 CAPSULE BY MOUTH FOUR TIMES DAILY      HYDROcodone-acetaminophen 5-325 MG per tablet  Commonly known as: NORCO   1 tablet, Oral, Every 6 Hours PRN      icosapent ethyl 1 g capsule capsule  Commonly known as: VASCEPA   2 g, Oral, Daily      levothyroxine 75 MCG tablet  Commonly known as: SYNTHROID, LEVOTHROID   TAKE 1 TABLET BY MOUTH EVERY DAY      loratadine 10 MG tablet  Commonly known as: CLARITIN   10 mg, Oral, Daily      midodrine 5 MG tablet  Commonly known as: PROAMATINE   5 mg, 2 Times Daily      olopatadine 0.1 % ophthalmic solution  Commonly known as: PATANOL   1 drop, Both Eyes, 2 Times Daily      rosuvastatin 40 MG tablet  Commonly known as: CRESTOR   40 mg, Oral, Daily      spironolactone 25 MG tablet  Commonly known as: ALDACTONE   25 mg, Daily      V-C Forte capsule   1 capsule, Daily      Vitamin D3 50 MCG (2000 UT) tablet   1 tablet, Daily      zolpidem 10 MG tablet  Commonly known as: AMBIEN   10 mg, Oral, Nightly PRN               Allergies   Allergen Reactions     Azithromycin Rash and Other (See Comments)     Reaction unknown to patient (unable to remember)  Other reaction(s): Other: stomach issues, Stomach ache, Other: stomach issues, Stomach ache    Ezetimibe Myalgia, Other (See Comments) and Rash     cramps  cramps    Pravastatin Rash and Other (See Comments)    Sulfa Antibiotics Rash       Discharge Disposition:  Home or Self Care      Discharge Diet:  Diet Order   Procedures    Diet: Cardiac; Healthy Heart (2-3 Na+); Fluid Consistency: Thin (IDDSI 0)       Discharge Activity:   Activity Instructions       Gradually Increase Activity Until at Pre-Hospitalization Level              CODE STATUS:    Code Status and Medical Interventions: CPR (Attempt to Resuscitate); Full Support   Ordered at: 01/17/25 2792     Code Status (Patient has no pulse and is not breathing):    CPR (Attempt to Resuscitate)     Medical Interventions (Patient has pulse or is breathing):    Full Support       Future Appointments   Date Time Provider Department Center   4/7/2025  1:00 PM Arian Peterson MD [facility]  [facility]   5/20/2025 10:20 AM LAB CHAIR 4 [facility]  [facility]   5/20/2025 10:40 AM Nav Sal MD [facility] CBC [facility]     Additional Instructions for the Follow-ups that You Need to Schedule       Discharge Follow-up with PCP   As directed       Currently Documented PCP:    Arian Peterson MD    PCP Phone Number:    [phone]     Follow Up Details: within 1 week for reassessment, medication and symptom review, BMP, CBC        Discharge Follow-up with Specialty: Pulmonology, Hematology-Oncology   As directed      Specialty: Pulmonology, Hematology-Oncology   Follow Up Details: within 1-2 weeks or as scheduled, please call to confirm outpatient appointment               Follow-up Information       Mati Shahid DO. Schedule an appointment as soon as possible for a visit in 2 week(s).    Specialties: Pulmonary Disease, Intensive Care  Contact information:  [address]  [address] [id number] [phone]               Arian Peterson MD .    Specialty: Internal Medicine  Why: within 1 week for reassessment, medication and symptom review, BMP, CBC  Contact information:  [address] [facility] [id number] [phone]                             Additional Instructions for the Follow-ups that You Need to Schedule       Discharge Follow-up with PCP   As directed       Currently Documented PCP:    Arian Peterson MD    PCP Phone Number:    [phone]     Follow Up Details: within 1 week for reassessment, medication and symptom review, BMP, CBC        Discharge Follow-up with Specialty: Pulmonology, Hematology-Oncology   As directed      Specialty: Pulmonology, Hematology-Oncology   Follow Up Details: within 1-2 weeks or as scheduled, please call to confirm outpatient appointment            Time Spent on Discharge:  Greater than 30 minutes      Kan Powell DO  [facility]  01/23/25  07:56 EST

## 2025-01-23 NOTE — PROGRESS NOTES
Case Management Discharge Note      Final Note: Discharged home. Yovana Acharya RN    Provided Post Acute Provider List?: N/A  N/A Provider List Comment: Denies needs. Plan is home    Selected Continued Care - Discharged on 1/23/2025 Admission date: 1/17/2025 - Discharge disposition: Home or Self Care         Transportation Services  Private: Car    Final Discharge Disposition Code: 01 - home or self-care

## 2025-01-23 NOTE — NURSING NOTE
Discharge instructions provided. Patient to follow up with Dr. Sal 2/19 and Dr. Shahid in 1-2 weeks. She received Rx prior to leaving.     Patient has some doxycycline at her pharmacy. She believes it was sent prior to her stay at the hospital for the pneumonia. Patient will follow up with her PCP to see if she needs to  RX since she completed her ABX here.     No other questions/concerns.

## 2025-01-23 NOTE — PLAN OF CARE
Goal Outcome Evaluation:  Plan of Care Reviewed With: patient        Progress: improving  Outcome Evaluation: vss, afebrile, room air, up ad gini, voiding freely, scheduled meds given whole w/ water, axox4, possible d/c this AM

## 2025-01-23 NOTE — OUTREACH NOTE
Prep Survey      Flowsheet Row Responses   [facility] patient discharged from? [place]   Is LACE score < 7 ? No   Eligibility [facility]   Date of Admission 01/17/25   Date of Discharge 01/23/25   Discharge Disposition Home or Self Care   Discharge diagnosis CAP (community acquired pneumonia)   Does the patient have one of the following disease processes/diagnoses(primary or secondary)? Pneumonia   Does the patient have Home health ordered? No   Is there a DME ordered? No   Prep survey completed? Yes            Gladis ARROYO - Registered Nurse

## 2025-01-23 NOTE — PROGRESS NOTES
Continued Stay Note  [facility]     Patient Name: Cynthia Portillo  MRN: [id number]  Today's Date: 1/23/2025    Admit Date: 1/17/2025    Plan: Home no needs   Discharge Plan       Row Name 01/23/25 0937       Plan    Plan Home no needs    Plan Comments Discharge order noted. DC plan is to return home. Stated her friend will assist as needed and will provide transportation at DC. Denies any needs/equipment.                   Discharge Codes    No documentation.                 Expected Discharge Date and Time       Expected Discharge Date Expected Discharge Time    Jan 23, 2025 11:39 AM              Yovana Acharya RN

## 2025-01-24 ENCOUNTER — TELEPHONE (OUTPATIENT)
Dept: ONCOLOGY | Facility: CLINIC | Age: 70
End: 2025-01-24

## 2025-01-24 ENCOUNTER — TRANSITIONAL CARE MANAGEMENT TELEPHONE ENCOUNTER (OUTPATIENT)
Dept: CALL CENTER | Facility: HOSPITAL | Age: 70
End: 2025-01-24
Payer: MEDICARE

## 2025-01-24 DIAGNOSIS — R19.7 DIARRHEA, UNSPECIFIED TYPE: ICD-10-CM

## 2025-01-24 LAB
CCP IGA+IGG SERPL IA-ACNC: 0 UNITS (ref 0–19)
RHEUMATOID FACT SERPL-ACNC: <10 IU/ML

## 2025-01-24 RX ORDER — DIPHENOXYLATE HYDROCHLORIDE AND ATROPINE SULFATE 2.5; .025 MG/1; MG/1
1 TABLET ORAL 4 TIMES DAILY PRN
Qty: 120 TABLET | Refills: 0 | Status: SHIPPED | OUTPATIENT
Start: 2025-01-24

## 2025-01-24 NOTE — TELEPHONE ENCOUNTER
"  Caller: Cynthia Mensah \"YOLETTE\"    Relationship: Self    Best call back number: [phone]      What was the call regarding: PATIENT WANTED TO HAVE HER HOSPITAL FOLLOW UP WITH DR VILLANUEVA, DR MCKENZIE SEEN HER IN THE HOSPITAL AND SHE WOULD LIKE TO COME ON 2-4-25 AT THE [facility] OFFICE    "

## 2025-01-24 NOTE — OUTREACH NOTE
Call Center TCM Note      Flowsheet Row Responses   [facility] patient discharged from? [place]   Does the patient have one of the following disease processes/diagnoses(primary or secondary)? Pneumonia   TCM attempt successful? Yes   Call start time 1312   Call end time 1316   Discharge diagnosis CAP (community acquired pneumonia)   Person spoke with today (if not patient) and relationship Patient   Meds reviewed with patient/caregiver? Yes   Does the patient have all medications ordered at discharge? No   What is keeping the patient from filling the prescriptions? Script on hold per patient  [patient reports that she elected not to get the pericolace, miralax, lidocaine patces. She also reports that MD told her that she did not need to take further antibiotic, so she is not taking the doxycycline that was ordered at discharge.]   Is the patient taking all medications as directed (includes completed medication regime)? No   What is preventing the patient from taking all medications as directed? Other  [see above]   Comments PCP Dr Peterson. Offered to schedule patient a Hospital follow up appt. She declined. She reports that she will be following up with the pulmonologist.   Does the patient have an appointment with their PCP within 7-14 days of discharge? No   Nursing Interventions Patient declined scheduling/rescheduling appointment at this time, Patient desires to follow up with specialty only, Routed TCM call to PCP office   Has home health visited the patient within 72 hours of discharge? N/A   Pulse Ox monitoring None   Psychosocial issues? No   Did the patient receive a copy of their discharge instructions? Yes   Nursing interventions Reviewed instructions with patient   What is the patient's perception of their health status since discharge? Improving   If the patient is a current smoker, are they able to teach back resources for cessation? Not a smoker   Is the patient/caregiver able to teach back the  hierarchy of who to call/visit for symptoms/problems? PCP, Specialist, Home health nurse, Urgent Care, ED, 911 Yes   Is the patient/caregiver able to teach back signs and symptoms of worsening condition: Fever/chills, Shortness of breath, Chest pain   Is the patient/caregiver able to teach back importance of completing antibiotic course of treatment? No  [Patient reports that MD told her she had received enough IV antibiotic during hospitalization and does not need to take the po ordered at dischareg.]   TCM call completed? Yes   Call end time 1316   Would this patient benefit from a Referral to [facility] Social Work? No   Is the patient interested in additional calls from an ambulatory ? No            Wendy Hutchison RN    1/24/2025, 13:20 EST

## 2025-01-24 NOTE — OUTREACH NOTE
Call Center TCM Note      Flowsheet Row Responses   [facility] patient discharged from? [place]   Does the patient have one of the following disease processes/diagnoses(primary or secondary)? Pneumonia   TCM attempt successful? No   Unsuccessful attempts Attempt 1            Wendy Hutchison RN    1/24/2025, 10:06 EST

## 2025-02-05 ENCOUNTER — TRANSCRIBE ORDERS (OUTPATIENT)
Dept: ADMINISTRATIVE | Facility: HOSPITAL | Age: 70
End: 2025-02-05
Payer: MEDICARE

## 2025-02-05 DIAGNOSIS — R93.89 ABNORMAL COMPUTED TOMOGRAPHY ANGIOGRAPHY (CTA): Primary | ICD-10-CM

## 2025-02-12 NOTE — ADDENDUM NOTE
Addended by: EMIL WINTER on: 2/28/2023 01:18 PM     Modules accepted: Orders    
How Severe Is This Condition?: mild
Additional History: Pt wants these growths removed

## 2025-02-21 DIAGNOSIS — J30.9 ALLERGIC RHINITIS, UNSPECIFIED SEASONALITY, UNSPECIFIED TRIGGER: ICD-10-CM

## 2025-02-24 RX ORDER — LEVOTHYROXINE SODIUM 75 UG/1
75 TABLET ORAL DAILY
Qty: 90 TABLET | Refills: 1 | Status: SHIPPED | OUTPATIENT
Start: 2025-02-24

## 2025-02-24 RX ORDER — LORATADINE 10 MG/1
10 TABLET ORAL DAILY
Qty: 30 TABLET | Refills: 5 | Status: SHIPPED | OUTPATIENT
Start: 2025-02-24

## 2025-02-26 ENCOUNTER — TELEPHONE (OUTPATIENT)
Dept: ONCOLOGY | Facility: CLINIC | Age: 70
End: 2025-02-26

## 2025-02-26 NOTE — TELEPHONE ENCOUNTER
"Caller: Cynthia Mensah \"YOLETTE\"    Relationship to patient: Self    Best call back number: [phone]    Chief complaint: [place]AVING [place] 3-16    Type of visit: LAB AND HOSPITAL FOLLOW UP    Requested date: AFTER 3-27     If rescheduling, when is the original appointment: 3-17     Additional notes:PLEASE ADVISE       "

## 2025-03-01 DIAGNOSIS — F51.01 PRIMARY INSOMNIA: ICD-10-CM

## 2025-03-02 ENCOUNTER — HOSPITAL ENCOUNTER (EMERGENCY)
Facility: HOSPITAL | Age: 70
Discharge: HOME | End: 2025-03-02
Attending: EMERGENCY MEDICINE | Admitting: EMERGENCY MEDICINE
Payer: MEDICARE

## 2025-03-02 ENCOUNTER — APPOINTMENT (OUTPATIENT)
Dept: CT IMAGING | Facility: HOSPITAL | Age: 70
End: 2025-03-02
Payer: MEDICARE

## 2025-03-02 ENCOUNTER — APPOINTMENT (OUTPATIENT)
Dept: GENERAL RADIOLOGY | Facility: HOSPITAL | Age: 70
End: 2025-03-02
Payer: MEDICARE

## 2025-03-02 VITALS
HEART RATE: 103 BPM | SYSTOLIC BLOOD PRESSURE: 143 MMHG | OXYGEN SATURATION: 95 % | TEMPERATURE: 98.3 F | DIASTOLIC BLOOD PRESSURE: 74 MMHG | RESPIRATION RATE: 18 BRPM

## 2025-03-02 DIAGNOSIS — R06.00 DYSPNEA, UNSPECIFIED TYPE: Primary | ICD-10-CM

## 2025-03-02 DIAGNOSIS — R07.89 ATYPICAL CHEST PAIN: ICD-10-CM

## 2025-03-02 LAB
ALBUMIN SERPL-MCNC: 4.5 G/DL (ref 3.5–5.2)
ALBUMIN/GLOB SERPL: 2.1 G/DL
ALP SERPL-CCNC: 83 U/L (ref 39–117)
ALT SERPL W P-5'-P-CCNC: 27 U/L (ref 1–33)
ANION GAP SERPL CALCULATED.3IONS-SCNC: 10.8 MMOL/L (ref 5–15)
AST SERPL-CCNC: 24 U/L (ref 1–32)
B PARAPERT DNA SPEC QL NAA+PROBE: NOT DETECTED
B PERT DNA SPEC QL NAA+PROBE: NOT DETECTED
BASOPHILS # BLD AUTO: 0.02 10*3/MM3 (ref 0–0.2)
BASOPHILS NFR BLD AUTO: 0.2 % (ref 0–1.5)
BILIRUB SERPL-MCNC: 0.5 MG/DL (ref 0–1.2)
BUN SERPL-MCNC: 12 MG/DL (ref 8–23)
BUN/CREAT SERPL: 13.2 (ref 7–25)
C PNEUM DNA NPH QL NAA+NON-PROBE: NOT DETECTED
CALCIUM SPEC-SCNC: 9.9 MG/DL (ref 8.6–10.5)
CHLORIDE SERPL-SCNC: 103 MMOL/L (ref 98–107)
CO2 SERPL-SCNC: 26.2 MMOL/L (ref 22–29)
CREAT SERPL-MCNC: 0.91 MG/DL (ref 0.57–1)
D-LACTATE SERPL-SCNC: 1 MMOL/L (ref 0.5–2)
DEPRECATED RDW RBC AUTO: 45 FL (ref 37–54)
EGFRCR SERPLBLD CKD-EPI 2021: 68.4 ML/MIN/1.73
EOSINOPHIL # BLD AUTO: 0.4 10*3/MM3 (ref 0–0.4)
EOSINOPHIL NFR BLD AUTO: 4.5 % (ref 0.3–6.2)
ERYTHROCYTE [DISTWIDTH] IN BLOOD BY AUTOMATED COUNT: 14 % (ref 12.3–15.4)
FLUAV SUBTYP SPEC NAA+PROBE: NOT DETECTED
FLUBV RNA ISLT QL NAA+PROBE: NOT DETECTED
GEN 5 1HR TROPONIN T REFLEX: 33 NG/L
GLOBULIN UR ELPH-MCNC: 2.1 GM/DL
GLUCOSE SERPL-MCNC: 116 MG/DL (ref 65–99)
HADV DNA SPEC NAA+PROBE: NOT DETECTED
HCOV 229E RNA SPEC QL NAA+PROBE: NOT DETECTED
HCOV HKU1 RNA SPEC QL NAA+PROBE: NOT DETECTED
HCOV NL63 RNA SPEC QL NAA+PROBE: NOT DETECTED
HCOV OC43 RNA SPEC QL NAA+PROBE: NOT DETECTED
HCT VFR BLD AUTO: 45.2 % (ref 34–46.6)
HGB BLD-MCNC: 14.5 G/DL (ref 12–15.9)
HMPV RNA NPH QL NAA+NON-PROBE: NOT DETECTED
HOLD SPECIMEN: NORMAL
HOLD SPECIMEN: NORMAL
HPIV1 RNA ISLT QL NAA+PROBE: NOT DETECTED
HPIV2 RNA SPEC QL NAA+PROBE: NOT DETECTED
HPIV3 RNA NPH QL NAA+PROBE: NOT DETECTED
HPIV4 P GENE NPH QL NAA+PROBE: NOT DETECTED
IMM GRANULOCYTES # BLD AUTO: 0.06 10*3/MM3 (ref 0–0.05)
IMM GRANULOCYTES NFR BLD AUTO: 0.7 % (ref 0–0.5)
LYMPHOCYTES # BLD AUTO: 1.92 10*3/MM3 (ref 0.7–3.1)
LYMPHOCYTES NFR BLD AUTO: 21.8 % (ref 19.6–45.3)
M PNEUMO IGG SER IA-ACNC: NOT DETECTED
MCH RBC QN AUTO: 28.5 PG (ref 26.6–33)
MCHC RBC AUTO-ENTMCNC: 32.1 G/DL (ref 31.5–35.7)
MCV RBC AUTO: 89 FL (ref 79–97)
MONOCYTES # BLD AUTO: 0.6 10*3/MM3 (ref 0.1–0.9)
MONOCYTES NFR BLD AUTO: 6.8 % (ref 5–12)
NEUTROPHILS NFR BLD AUTO: 5.82 10*3/MM3 (ref 1.7–7)
NEUTROPHILS NFR BLD AUTO: 66 % (ref 42.7–76)
NRBC BLD AUTO-RTO: 0 /100 WBC (ref 0–0.2)
NT-PROBNP SERPL-MCNC: 300 PG/ML (ref 0–900)
PLATELET # BLD AUTO: 235 10*3/MM3 (ref 140–450)
PMV BLD AUTO: 10 FL (ref 6–12)
POTASSIUM SERPL-SCNC: 4.1 MMOL/L (ref 3.5–5.2)
PROCALCITONIN SERPL-MCNC: 0.05 NG/ML (ref 0–0.25)
PROT SERPL-MCNC: 6.6 G/DL (ref 6–8.5)
RBC # BLD AUTO: 5.08 10*6/MM3 (ref 3.77–5.28)
RHINOVIRUS RNA SPEC NAA+PROBE: NOT DETECTED
RSV RNA NPH QL NAA+NON-PROBE: NOT DETECTED
SARS-COV-2 RNA NPH QL NAA+NON-PROBE: NOT DETECTED
SODIUM SERPL-SCNC: 140 MMOL/L (ref 136–145)
TROPONIN T % DELTA: 3
TROPONIN T NUMERIC DELTA: 1 NG/L
TROPONIN T SERPL HS-MCNC: 32 NG/L
WBC NRBC COR # BLD AUTO: 8.82 10*3/MM3 (ref 3.4–10.8)
WHOLE BLOOD HOLD COAG: NORMAL
WHOLE BLOOD HOLD SPECIMEN: NORMAL

## 2025-03-02 PROCEDURE — 85025 COMPLETE CBC W/AUTO DIFF WBC: CPT

## 2025-03-02 PROCEDURE — 99285 EMERGENCY DEPT VISIT HI MDM: CPT

## 2025-03-02 PROCEDURE — 93010 ELECTROCARDIOGRAM REPORT: CPT | Performed by: INTERNAL MEDICINE

## 2025-03-02 PROCEDURE — 84145 PROCALCITONIN (PCT): CPT | Performed by: PHYSICIAN ASSISTANT

## 2025-03-02 PROCEDURE — 25510000001 IOPAMIDOL PER 1 ML: Performed by: EMERGENCY MEDICINE

## 2025-03-02 PROCEDURE — 71275 CT ANGIOGRAPHY CHEST: CPT

## 2025-03-02 PROCEDURE — 93005 ELECTROCARDIOGRAM TRACING: CPT

## 2025-03-02 PROCEDURE — 36415 COLL VENOUS BLD VENIPUNCTURE: CPT

## 2025-03-02 PROCEDURE — 83880 ASSAY OF NATRIURETIC PEPTIDE: CPT

## 2025-03-02 PROCEDURE — 83605 ASSAY OF LACTIC ACID: CPT | Performed by: PHYSICIAN ASSISTANT

## 2025-03-02 PROCEDURE — 93005 ELECTROCARDIOGRAM TRACING: CPT | Performed by: EMERGENCY MEDICINE

## 2025-03-02 PROCEDURE — 0202U NFCT DS 22 TRGT SARS-COV-2: CPT | Performed by: PHYSICIAN ASSISTANT

## 2025-03-02 PROCEDURE — 84484 ASSAY OF TROPONIN QUANT: CPT

## 2025-03-02 PROCEDURE — 71045 X-RAY EXAM CHEST 1 VIEW: CPT

## 2025-03-02 PROCEDURE — 84484 ASSAY OF TROPONIN QUANT: CPT | Performed by: EMERGENCY MEDICINE

## 2025-03-02 PROCEDURE — 80053 COMPREHEN METABOLIC PANEL: CPT

## 2025-03-02 RX ORDER — SODIUM CHLORIDE 0.9 % (FLUSH) 0.9 %
10 SYRINGE (ML) INJECTION AS NEEDED
Status: DISCONTINUED | OUTPATIENT
Start: 2025-03-02 | End: 2025-03-03 | Stop reason: HOSPADM

## 2025-03-02 RX ORDER — IOPAMIDOL 755 MG/ML
100 INJECTION, SOLUTION INTRAVASCULAR
Status: COMPLETED | OUTPATIENT
Start: 2025-03-02 | End: 2025-03-02

## 2025-03-02 RX ADMIN — IOPAMIDOL 95 ML: 755 INJECTION, SOLUTION INTRAVENOUS at 21:07

## 2025-03-03 LAB
QT INTERVAL: 416 MS
QTC INTERVAL: 525 MS

## 2025-03-03 RX ORDER — ZOLPIDEM TARTRATE 10 MG/1
10 TABLET ORAL NIGHTLY PRN
Qty: 30 TABLET | Refills: 1 | Status: SHIPPED | OUTPATIENT
Start: 2025-03-03

## 2025-03-03 NOTE — ED PROVIDER NOTES
EMERGENCY DEPARTMENT ENCOUNTER    Room Number:  04/04  Date of encounter:  3/3/2025  PCP: Arian Peterson MD  Historian: Patient,   Chronic or social conditions impacting care (social determinants of health): None    HPI:  Chief Complaint: Pain, shortness of breath, fever  A complete HPI/ROS/PMH/PSH/SH/FH are unobtainable due to: Nothing    Context: Cynthia Portillo is a 69 y.o. female with a history of diabetes, recurrent pneumonia, amyloidosis, who presents to the ED c/o acute right sided chest pain, shortness of breath, fever starting yesterday.  Patient describes a sharp, stabbing sensation over the right breast with radiation through to the right intrascapular area.  She also complains of fever and shortness of breath.    Review of prior external notes (non-ED):   I reviewed admission from 1/17/2025.  Patient admitted for pneumonia.    Review of prior external test results outside of this encounter:  I reviewed his CT a of the chest dated 1/17/2025.  This showed multiple nodules on the right.    PAST MEDICAL HISTORY  Active Ambulatory Problems     Diagnosis Date Noted    S/P arthroscopy of shoulder 06/22/2017    Dyspareunia, female 02/13/2019    Rhinosinusitis 02/13/2019    Hypothyroid 02/13/2019    High cholesterol 02/13/2019    Pelvic cramping 04/20/2019    Vaginal atrophy 04/20/2019    Major depressive disorder with single episode, in full remission 05/12/2019    Attention deficit disorder (ADD) without hyperactivity 05/12/2019    Hepatomegaly 06/07/2019    NAFLD (nonalcoholic fatty liver disease) 06/07/2019    Fecal smearing 09/11/2019    Elevated transaminase level 09/11/2019    Chronic right shoulder pain 09/19/2018    Chronic hypotension 11/13/2019    Arthropathy, transient, shoulder, right 11/28/2018    Status post reverse arthroplasty of right shoulder 01/22/2019    Unstable angina 04/24/2020    Esophageal dysphagia 05/06/2020    Precordial chest pain 05/06/2020    Hypertriglyceridemia  07/05/2020    Primary insomnia 07/05/2020    Oral herpes 08/18/2020    Memory loss 09/02/2020    Chronic tension-type headache, not intractable 09/02/2020    Hyperglycemia 02/16/2021    Acute cystitis without hematuria 02/16/2021    Esophagitis, eosinophilic 02/18/2021    Functional diarrhea 02/18/2021    Dysphagia, cricopharyngeal 10/15/2021    Elevated serum immunoglobulin free light chain level 04/21/2022    Amyloidosis 05/04/2022    Arthritis     Status post recent immunosuppressive therapy 08/09/2022    Pneumonia of both lungs due to infectious organism 08/18/2022    Benign neoplasm of left breast 09/19/2022    FH: breast cancer 09/19/2022    Medicare annual wellness visit, subsequent 06/11/2024    Osteopenia 09/12/2024    Atypical chest pain 10/11/2024    Pleuritis 10/13/2024    Fever 11/07/2024    Type 2 diabetes mellitus, without long-term current use of insulin 11/07/2024    CAP (community acquired pneumonia) 01/17/2025     Resolved Ambulatory Problems     Diagnosis Date Noted    Acute respiratory failure with hypoxia 08/18/2022    Acute pulmonary edema 08/18/2022     Past Medical History:   Diagnosis Date    AL amyloidosis     Anxiety     Bowel perforation     COVID-19 07/2020    Depression     Diverticulitis of colon     Diverticulosis     GERD (gastroesophageal reflux disease)     History of Clostridioides difficile infection 2008    History of prediabetes     History of snoring     History of stress incontinence     Hypercholesterolemia     Hyperlipidemia     Hypertension     Left ventricular hypertrophy     Liver disease     Seasonal allergies     SOB (shortness of breath)     Status post colostomy     Thyroid disease          PAST SURGICAL HISTORY  Past Surgical History:   Procedure Laterality Date    ABDOMINAL SURGERY  2005, 2014    ex lap x 2,  diverticulitis    ABDOMINOPLASTY  2016    ADENOIDECTOMY      APPENDECTOMY      CARDIAC CATHETERIZATION N/A 04/24/2020    Procedure: Coronary angiography;   Surgeon: Roberto Briones MD;  Location: [facility] CATH INVASIVE LOCATION;  Service: Cardiovascular;  Laterality: N/A;    CARDIAC CATHETERIZATION N/A 04/24/2020    Procedure: Left heart cath;  Surgeon: Roberto Briones MD;  Location: [facility] CATH INVASIVE LOCATION;  Service: Cardiovascular;  Laterality: N/A;    CARDIAC CATHETERIZATION N/A 04/24/2020    Procedure: Left ventriculography;  Surgeon: Roberto Briones MD;  Location: [facility] CATH INVASIVE LOCATION;  Service: Cardiovascular;  Laterality: N/A;    CARDIAC SURGERY      open heart surgery,    COLONOSCOPY  approx 2018    normal per pt     COLOSTOMY  2005    had colostomy and then is was reversed    COLOSTOMY CLOSURE  2006    CORRECTION HAMMER TOE Right 2017    ECTOPIC PREGNANCY SURGERY      1979, 1980    ENDOSCOPY N/A 05/27/2020    Procedure: ESOPHAGOGASTRODUODENOSCOPY WITH BIOPSY AND BIOPSY POLYPECTOMY AND MACIEL DIALATION;  Surgeon: Preethi Beck MD;  Location: [facility] ENDOSCOPY;  Service: Gastroenterology;  Laterality: N/A;  PRE: ESOPHAGEAL DYSPHAGIA  POST: Z LINE 46, ESOPHAGEAL SPASM, GASTRITIS, FUNDIC POLYP    ENDOSCOPY N/A 06/20/2023    ESOPHAGEAL DILATATION N/A 12/07/2021    Procedure: OR ESOPHAGEAL DILATATION with maciel dilators ;  Surgeon: Jamey Leslie MD;  Location: [facility];  Service: ENT;  Laterality: N/A;    ESOPHAGEAL MOTILITY STUDY N/A 02/03/2022    Procedure: ESOPHAGEAL MOTILITY STUDY;  Surgeon: Harshil, Nurse Performed;  Location: [facility] ENDOSCOPY;  Service: Gastroenterology;  Laterality: N/A;  dypshagia     FOOT SURGERY Right 12/2016    Second and third hammertoe corrections    KNEE ARTHROSCOPY Right 2009    KNEE SURGERY Right     REVISION / TAKEDOWN COLOSTOMY  2006    SHOULDER ARTHROSCOPY Right 2018    right shoulder arthroscopy with extensive rotator cuff, biceps and labral debridement, chondroplasty, & subacromial decompression with acromioplasty    SHOULDER SURGERY      HAS HAS COMPLETE SHOULDER ON RIGHT.  LEFT SCOPED AND HAD 2ND SURGERY WITH HARDWARE PLACED    SHOULDER SURGERY Left     2012. 2013    TONSILLECTOMY      TOTAL SHOULDER REVERSE ARTHROPLASTY Right 2019    WISDOM TOOTH EXTRACTION           FAMILY HISTORY  Family History   Problem Relation Age of Onset    Hypertension Mother     Osteoporosis Mother     Skin cancer Mother     Heart disease Father     Hypertension Father     Breast cancer Maternal Grandmother     Ovarian cancer Neg Hx     Colon cancer Neg Hx     Deep vein thrombosis Neg Hx     Pulmonary embolism Neg Hx     Malig Hyperthermia Neg Hx          SOCIAL HISTORY  Social History     Socioeconomic History    Marital status: Single    Number of children: 2   Tobacco Use    Smoking status: Never     Passive exposure: Never    Smokeless tobacco: Never   Vaping Use    Vaping status: Never Used   Substance and Sexual Activity    Alcohol use: Not Currently     Alcohol/week: 1.0 standard drink of alcohol     Types: 1 Glasses of wine per week    Drug use: Never    Sexual activity: Defer         ALLERGIES  Azithromycin, Ezetimibe, Pravastatin, and Sulfa antibiotics        REVIEW OF SYSTEMS  All systems reviewed and negative except for those discussed in HPI.       PHYSICAL EXAM    I have reviewed the triage vital signs and nursing notes.    ED Triage Vitals   Temp Heart Rate Resp BP SpO2   03/02/25 1852 03/02/25 1852 03/02/25 1852 03/02/25 1855 03/02/25 1852   98.3 °F (36.8 °C) 103 18 143/74 95 %      Temp src Heart Rate Source Patient Position BP Location FiO2 (%)   03/02/25 1852 -- -- -- --   Tympanic           Physical Exam  GENERAL: Alert, oriented, not distressed  HENT: head atraumatic, no nuchal rigidity  EYES: no scleral icterus, EOMI  CV: regular rhythm, regular rate, no murmur  RESPIRATORY: normal effort, CTA  ABDOMEN: soft, nontender  MUSCULOSKELETAL: no deformity, FROM, no calf swelling or tenderness  NEURO: alert, moves all extremities, follows commands  SKIN: warm, dry, no rash        LAB  RESULTS  Recent Results (from the past 24 hours)   ECG 12 Lead ED Triage Standing Order; SOA    Collection Time: 03/02/25  7:01 PM   Result Value Ref Range    QT Interval 416 ms    QTC Interval 525 ms   Comprehensive Metabolic Panel    Collection Time: 03/02/25  7:12 PM    Specimen: Arm, Left; Blood   Result Value Ref Range    Glucose 116 (H) 65 - 99 mg/dL    BUN 12 8 - 23 mg/dL    Creatinine 0.91 0.57 - 1.00 mg/dL    Sodium 140 136 - 145 mmol/L    Potassium 4.1 3.5 - 5.2 mmol/L    Chloride 103 98 - 107 mmol/L    CO2 26.2 22.0 - 29.0 mmol/L    Calcium 9.9 8.6 - 10.5 mg/dL    Total Protein 6.6 6.0 - 8.5 g/dL    Albumin 4.5 3.5 - 5.2 g/dL    ALT (SGPT) 27 1 - 33 U/L    AST (SGOT) 24 1 - 32 U/L    Alkaline Phosphatase 83 39 - 117 U/L    Total Bilirubin 0.5 0.0 - 1.2 mg/dL    Globulin 2.1 gm/dL    A/G Ratio 2.1 g/dL    BUN/Creatinine Ratio 13.2 7.0 - 25.0    Anion Gap 10.8 5.0 - 15.0 mmol/L    eGFR 68.4 >60.0 mL/min/1.73   BNP    Collection Time: 03/02/25  7:12 PM    Specimen: Arm, Left; Blood   Result Value Ref Range    proBNP 300.0 0.0 - 900.0 pg/mL   High Sensitivity Troponin T    Collection Time: 03/02/25  7:12 PM    Specimen: Arm, Left; Blood   Result Value Ref Range    HS Troponin T 32 (H) <14 ng/L   Green Top (Gel)    Collection Time: 03/02/25  7:12 PM   Result Value Ref Range    Extra Tube Hold for add-ons.    Lavender Top    Collection Time: 03/02/25  7:12 PM   Result Value Ref Range    Extra Tube hold for add-on    Gold Top - SST    Collection Time: 03/02/25  7:12 PM   Result Value Ref Range    Extra Tube Hold for add-ons.    Light Blue Top    Collection Time: 03/02/25  7:12 PM   Result Value Ref Range    Extra Tube Hold for add-ons.    CBC Auto Differential    Collection Time: 03/02/25  7:12 PM    Specimen: Arm, Left; Blood   Result Value Ref Range    WBC 8.82 3.40 - 10.80 10*3/mm3    RBC 5.08 3.77 - 5.28 10*6/mm3    Hemoglobin 14.5 12.0 - 15.9 g/dL    Hematocrit 45.2 34.0 - 46.6 %    MCV 89.0 79.0 - 97.0 fL     MCH 28.5 26.6 - 33.0 pg    MCHC 32.1 31.5 - 35.7 g/dL    RDW 14.0 12.3 - 15.4 %    RDW-SD 45.0 37.0 - 54.0 fl    MPV 10.0 6.0 - 12.0 fL    Platelets 235 140 - 450 10*3/mm3    Neutrophil % 66.0 42.7 - 76.0 %    Lymphocyte % 21.8 19.6 - 45.3 %    Monocyte % 6.8 5.0 - 12.0 %    Eosinophil % 4.5 0.3 - 6.2 %    Basophil % 0.2 0.0 - 1.5 %    Immature Grans % 0.7 (H) 0.0 - 0.5 %    Neutrophils, Absolute 5.82 1.70 - 7.00 10*3/mm3    Lymphocytes, Absolute 1.92 0.70 - 3.10 10*3/mm3    Monocytes, Absolute 0.60 0.10 - 0.90 10*3/mm3    Eosinophils, Absolute 0.40 0.00 - 0.40 10*3/mm3    Basophils, Absolute 0.02 0.00 - 0.20 10*3/mm3    Immature Grans, Absolute 0.06 (H) 0.00 - 0.05 10*3/mm3    nRBC 0.0 0.0 - 0.2 /100 WBC   Procalcitonin    Collection Time: 03/02/25  7:12 PM    Specimen: Arm, Left; Blood   Result Value Ref Range    Procalcitonin 0.05 0.00 - 0.25 ng/mL   Lactic Acid, Plasma    Collection Time: 03/02/25  8:02 PM    Specimen: Blood   Result Value Ref Range    Lactate 1.0 0.5 - 2.0 mmol/L   Respiratory Panel PCR w/COVID-19(SARS-CoV-2) [facility]/NIMCO/[facility]/EDUARDO In-House, NP Swab in UTM/VTM, 2 HR TAT - Swab, Nasopharynx    Collection Time: 03/02/25  8:02 PM    Specimen: Nasopharynx; Swab   Result Value Ref Range    ADENOVIRUS, PCR Not Detected Not Detected    Coronavirus 229E Not Detected Not Detected    Coronavirus HKU1 Not Detected Not Detected    Coronavirus NL63 Not Detected Not Detected    Coronavirus OC43 Not Detected Not Detected    COVID19 Not Detected Not Detected - Ref. Range    Human Metapneumovirus Not Detected Not Detected    Human Rhinovirus/Enterovirus Not Detected Not Detected    Influenza A PCR Not Detected Not Detected    Influenza B PCR Not Detected Not Detected    Parainfluenza Virus 1 Not Detected Not Detected    Parainfluenza Virus 2 Not Detected Not Detected    Parainfluenza Virus 3 Not Detected Not Detected    Parainfluenza Virus 4 Not Detected Not Detected    RSV, PCR Not Detected Not Detected     Bordetella pertussis pcr Not Detected Not Detected    Bordetella parapertussis PCR Not Detected Not Detected    Chlamydophila pneumoniae PCR Not Detected Not Detected    Mycoplasma pneumo by PCR Not Detected Not Detected   High Sensitivity Troponin T 1Hr    Collection Time: 03/02/25  8:34 PM    Specimen: Blood   Result Value Ref Range    HS Troponin T 33 (H) <14 ng/L    Troponin T Numeric Delta 1 ng/L    Troponin T % Delta 3 Abnormal if >/= 20%       Ordered the above labs and independently reviewed the results.        RADIOLOGY  CT Angiogram Chest Pulmonary Embolism    Result Date: 3/2/2025  CT ANGIOGRAM OF THE CHEST  HISTORY: Right-sided chest pain  COMPARISON: January 17, 2025  TECHNIQUE: Axial CT imaging was obtained through the thorax. IV contrast was administered. Three-D reformatted images were obtained.  FINDINGS: No acute pulmonary thromboembolus is seen. Thoracic aorta is normal in caliber. No obvious dissection is identified. No definite coronary artery calcifications are seen.. Left atrial appendage clip is present. The thyroid gland is atrophic. Trachea is within normal limits. Esophagus also appears within normal limits. Mediastinal lymph nodes do not appear pathologically enlarged. There is some trace pericardial fluid. Patient status post right shoulder arthroplasty. There is some chest wall collaterals noted on the right. These may be related to right subclavian vein chronic stenosis. Aeration within the lungs is improved when compared to prior exam, including resolution of a subpleural nodule within the right middle lobe. No new infiltrates are seen. Images through the upper abdomen do not demonstrate any acute abnormalities. There is an apparent cyst arising from the right kidney, measuring 2.5 x 2.1 cm. It did not demonstrate FDG uptake on PET. No acute osseous abnormalities are seen.      No acute pulmonary thromboembolus identified.  Radiation dose reduction techniques were utilized, including  automated exposure control and exposure modulation based on body size.   This report was finalized on 3/2/2025 9:37 PM by Dr. Betina Sanchez M.D on Workstation: [id number]      XR Chest 1 View    Result Date: 3/2/2025  SINGLE VIEW OF THE CHEST  HISTORY: Shortness of air  COMPARISON: January 17, 2025  FINDINGS: There is mild cardiomegaly. There is no vascular congestion. Patient is status post median sternotomy. No pneumothorax, pleural effusion, or acute infiltrate is seen. The patient is status post right shoulder arthroplasty.      No acute findings.  This report was finalized on 3/2/2025 7:35 PM by Dr. Betina Sanchez M.D on Workstation: [id number]       I ordered the above noted radiological studies. Reviewed by me and discussed with radiologist.  See dictation for official radiology interpretation.      MEDICATIONS GIVEN IN ER    Medications   iopamidol (ISOVUE-370) 76 % injection 100 mL (95 mL Intravenous Given by Other 3/2/25 2107)         ADDITIONAL ORDERS CONSIDERED BUT NOT ORDERED:  Admission was considered but after careful review of the patient's presentation, physical examination, diagnostic results, and response to treatment the patient may be safely discharged with outpatient follow-up.       PROGRESS, DATA ANALYSIS, CONSULTS, AND MEDICAL DECISION MAKING    All labs have been independently interpreted by myself.  All radiology studies have been independently interpreted by myself and discussed with radiologist dictating the report.   EKGs independently interpreted by myself.  Discussion below represents my analysis of pertinent findings related to patient's condition, differential diagnosis, treatment plan and final disposition.    I have discussed case with Dr. Leslie, emergency room physician.  He has performed his own bedside examination and agrees with treatment plan.    ED Course as of 03/03/25 0408   Sun Mar 02, 2025   1930 Patient presents with right-sided chest wall pain, shortness of  breath, subjective fever since yesterday.  History of amyloidosis, recurrent  ACS, PE.  Pneumonia.  White following differential diagnoses include but not limited to pneumonia, viral URI, [EE]   1940 WBC: 8.82 [EE]   1940 Chest x-ray independently interpreted myself shows no acute infiltrate. [EE]   2026 HS Troponin T(!): 32  Baseline [EE]   2205 Recheck of patient.  She has a fairly benign workup here.  No evidence of respiratory failure, pneumonia.  She has appropriate pulmonary follow-up.  I believe she is safe for discharge. [EE]      ED Course User Index  [EE] Clement Boyce PA       AS OF 04:08 EST VITALS:    BP - 143/74  HR - 103  TEMP - 98.3 °F (36.8 °C) (Tympanic)  O2 SATS - 95%        DIAGNOSIS  Final diagnoses:   Dyspnea, unspecified type   Atypical chest pain         DISPOSITION  Discharged    Admission was considered but after careful review of the patient's presentation, physical examination, diagnostic results, and response to treatment the patient may be safely discharged with outpatient follow-up.         Dictated utilizing Dragon dictation     Clement Boyce PA  03/03/25 7919

## 2025-03-03 NOTE — ED PROVIDER NOTES
SHARED VISIT: This visit was performed by BOTH a physician and an APC. The substantive portion of the medical decision making was performed by this attesting physician who made or approved the management plan and takes responsibility for patient management. All studies in the APC note (if performed) were independently interpreted by me.     The GRAHAM and I have discussed this patient's history, physical exam, and treatment plan.  I have reviewed the documentation and personally had a face to face interaction with the patient. I affirm the documentation and agree with the treatment and plan.  The attached note describes my personal findings.      I provided a substantive portion of the care of the patient.  I personally performed the physical exam in its entirety, and below are my findings.     Brief history of present illness: 69-year-old female with history of amyloidosis has caused her complications with both heart and lung disease presents with right sided chest pain that radiates into her back that she reports happens when she comes off of her steroids.  Associated with some cough and mild dyspnea.  No clear exacerbating or relieving factors otherwise noted.    Physical exam:    /74   Pulse 103   Temp 98.3 °F (36.8 °C) (Tympanic)   Resp 18   SpO2 95%       Physical Exam   Constitutional: No distress.  Nontoxic  HENT:  Head: Normocephalic and atraumatic.   Oropharynx: Mucous membranes are moist.   Eyes: . No scleral icterus.   Neck: Normal range of motion. Neck supple.   Cardiovascular: Pink warm and well perfused throughout.  Regular  Pulmonary/Chest: No respiratory distress.  Clear to auscultation  Abdominal: Soft. There is no rebound or rigidity  Musculoskeletal: Moves all extremities equally.    Neurological: Alert and oriented.  Speech fluent and easily intelligible  Skin: Skin is pink, warm, and dry.   Psychiatric: Mood and affect normal.   Nursing note and vitals reviewed.        MDM:    My  differential diagnosis for dyspnea includes but is not limited to:  Asthma, COPD, pneumonia, pulmonary embolus, acute respiratory distress syndrome, pneumothorax, pleural effusion, pulmonary fibrosis, congestive heart failure, myocardial infarction, DKA, uremia, acidosis, sepsis, anemia, drug related, hyperventilation, CNS disease    RADIOLOGY      Study: Single view chest  Findings: Evidence of prior sternotomy.  No pneumothorax or large focal infiltrate noted  I independently viewed and interpreted these images contemporaneously with treatment.     I have personally reviewed and interpreted the EKG obtained today in the emergency department at 1901 concomitant with treatment.  EKG reveals rhythm/rate -sinus, 90. CT-CHARLY within normal limits.  QRS-left bundle branch block with suspected LVH ST/T-wave -ST/T wave repolarization abnormality not reaching STEMI criteria.  Comparison: 1/17/2025-previously noted left bundle branch block with similar ST/T wave repolarization abnormalities      Please note that portions of this were completed with a voice recognition program.       Note Disclaimer: At [facility], we believe that sharing information builds trust and better relationships. You are receiving this note because you are receiving care at [facility] or recently visited. It is possible you will see health information before a provider has talked with you about it. This kind of information can be easy to misunderstand. To help you fully understand what it means for your health, we urge you to discuss this note with your provider.     Pete Leslie MD  03/02/25 2049

## 2025-03-18 DIAGNOSIS — E78.1 HYPERTRIGLYCERIDEMIA: ICD-10-CM

## 2025-03-18 DIAGNOSIS — E78.00 HIGH CHOLESTEROL: ICD-10-CM

## 2025-03-18 RX ORDER — ROSUVASTATIN CALCIUM 40 MG/1
TABLET, COATED ORAL
Qty: 90 TABLET | Refills: 0 | Status: SHIPPED | OUTPATIENT
Start: 2025-03-18

## 2025-04-04 RX ORDER — ACYCLOVIR 400 MG/1
400 TABLET ORAL 2 TIMES DAILY
Qty: 60 TABLET | Refills: 2 | Status: SHIPPED | OUTPATIENT
Start: 2025-04-04

## 2025-04-07 DIAGNOSIS — E78.1 HYPERTRIGLYCERIDEMIA: ICD-10-CM

## 2025-04-07 RX ORDER — ICOSAPENT ETHYL 1 G/1
2 CAPSULE ORAL DAILY
Qty: 60 CAPSULE | Refills: 2 | Status: SHIPPED | OUTPATIENT
Start: 2025-04-07

## 2025-04-11 NOTE — PROGRESS NOTES
"[facility] CBC GROUP OUTPATIENT FOLLOW UP CLINIC VISIT    REASON FOR FOLLOW-UP:    AL amyloidosis  Therapy with michael-CyBorD initiated 8/16/2022  Completed maintenance daratumumab every 4 weeks initiated May 2023 with plans for 18 months of therapy  Currently in observation    HISTORY OF PRESENT ILLNESS: Cynthia Portillo is a 70 y.o. female with the above-mentioned history who is here today for lab review and evaluation.    Recent cardiac MRI 3/5/2025 consistent with hypertrophic cardiomyopathy and/or amyloid infiltration.  She saw Dr. Lyles at [place] who decreased spironolactone to every other day and recommended using midodrine only if she is symptomatic or hypotensive.  She suggested ibuprofen twice daily for 1 week for ongoing chest pain.    Diarrhea persists but managed very well with current medications including Questran and Lomotil.  She continues to have some issues with brain fog.  No further chest pain.  She recently returned from a trip to [place] to visit her son with only 1 episode of diarrhea while she was there      REVIEW OF SYSTEMS:  As per the HPI    PHYSICAL EXAMINATION:    Vitals:    04/14/25 0834   BP: 143/78   Pulse: 70   Resp: 16   Temp: 97.7 °F (36.5 °C)   TempSrc: Oral   SpO2: 99%   Weight: 58.6 kg (129 lb 3.2 oz)   Height: 152.4 cm (60\")   PainSc: 0-No pain     PHYSICAL EXAM  General:  No acute distress, awake, alert and oriented  Skin:  Warm and dry, no visible rash.   HEENT:  Normocephalic/atraumatic.   Chest:  Normal respiratory effort.  Lungs clear to auscultation bilaterally.   Heart: Regular rate and rhythm with a grade 2 out of 6 early systolic murmur, stable  Extremities:  No visible clubbing, cyanosis, or edema  Neuro/psych:  Grossly nonfocal.  Normal mood and affect.    DIAGNOSTIC DATA:.  CBC & Differential (04/14/2025 08:22)   Comprehensive Metabolic Panel (04/14/2025 08:22)     IMAGING:    None reviewed      ASSESSMENT:  This is a 70 y.o. female with:    *AL " amyloidosis  She had a stress echocardiogram on 4/24/2020 showing a normal left ventricular ejection fraction of 60% with moderate concentric hypertrophy but no wall motion abnormalities of the left ventricle.  There was impaired relaxation noted.  She complained of chest pain during the examination.  There was some ST segment depression.  Cardiac catheterization was performed on the same day.  No intervention was required.  Follow-up echocardiogram on 4/15/2021 showed a left ventricular ejection fraction of 61 to 65% with normal LV cavity size.  Left ventricular wall thickness showed moderate concentric hypertrophy.  Diastolic function was impaired.  No pericardial effusion.  Small left pleural effusion.  She had follow-up [facility] imaging for cardiac amyloidosis that was not suggestive of ATTR amyloidosis  Laboratory values on 2/24/2022 showed a high serum free light chain lambda of 121, normal kappa of 10.5, low ratio at 0.09.  Serum protein electrophoresis showed no evidence of an M spike.  Urine light chains were abnormal with an elevated lambda light chain of 33 and a low ratio of 0.48 with a 12.7 mg M spike on 24-hour urine protein electrophoresis.  BNP was elevated at 2306 on 3/4/2022.  The troponin was normal at 0.03.  CK was 212 with a CK-MB of 10.87.  Initial consult on 3/30/22. No M spike on serum protein electrophoresis. SIFE 'presence of monoclonal protein is unclear.'   Bone marrow biopsy 4/13/22 normocellular, 12.1% plasma cells consistent with low volume plasma cell dyscrasia. FISH with low level t(11;14) fusion only. No amyloid noted as Congo red staining negative.   Fat pad biopsy 5/11/22 positive for amyloid, AL lambda  Referred to Dr. Buenrostro at [place]. Intended to enroll on a clinical study but her troponin as tested by the study sponsor was not high enough  Therapy with michael-CyBorD initiated 8/16/2022  Patient seen in the office on 8/17/2022 for triage visit.  Patient with complaints of  fatigue, dizziness and diarrhea.  She was mildly hypotensive.  She was given IV fluids.  Subsequent admission at [facility] with shortness of breath and volume overload.  She was diuresed and treated with antibiotics.  D8 therapy administered on 8/30  Light chains normal at [facility] on 8/31/22 (lambda light chain 1.3, down from 12.44)  9/27/2022: Cycle 2-day 8 of therapy  10/4/2022 cycle 2-day 8 CyBorD.  Patient continues to push oral hydration.  She continues to have some neck stiffness but this has not worsened.  She will see [facility] next week for further cardiac work-up.  Glucose was low upon initial labs today however the patient was given something to eat by nursing prior to rechecking.  This was therefore rechecked prior to her leaving the office and was 87.  Patient reports she was feeling fine.  10/18/2020 to cycle 2-day 22 Sissy -CyBorD.  Velcade dose will be reduced to 1.0 mg/m² due to patient's persistent issues with orthostasis.  10/25/2022: Cycle 3-day 1  11/8/2022: Delay in cycle 3-day 15 therapy.  Day 8 omitted due to an upper respiratory infection.  11/22/2022: Cycle 3-day 22.   final planned treatment before she goes to [facility] for cardiac surgery.  Cardiac surgery performed at [facility] on 12/5/2022.  Pathology from the myectomy showed cardiac amyloidosis extensively involving the vessels and endocardium with myocyte hypertrophy and interstitial fibrosis.  Congo red stain was positive.  Reviewed back on 1/17/2023 with plans to resume Darzalex, Cytoxan, Velcade, dexamethasone with cycle #4 on 1/24.  Patient seen 2/7/2023 (missed 1/31/2023 due to weather, which would have been day 8).  We will go ahead and proceed with as cycle 4 day 15, day 15.  2/14/2023: Cycle 4-day 22  2/21/2023 cycle 5-day 1 Darzalex, Cytoxan (IV Cytoxan given in the office), Velcade, dexamethasone (20 mg p.o. given in the office).  Repeat labs from 2/21/2023 showing M spike up to 0.8, free kappa light chain  up to 85.8 (previously 5.3 on 1/17/2023), ratio 8.58 (was previously 0.76 on 1/17/2023).  We ended up repeating labs on 3/7/2023 M spike 0.1, free kappa light chain 3.4, ratio 0.83.  We will continue on with treatment.  She is scheduled to return to [facility] in April.  4/11/2023 cycle 6, day 22 Darzalex-CyBorD.  Final planned therapy.  She was seen at [facility] with recommendations for monthly Darzalex for 18 months. Bactrim stopped and she continues acyclovir. No immediate plans for ASCT.  The last note from [facility] says to continue Darzalex for 24 months.  12/6/2023: Proceed with Darzalex  5/1/2024 patient returns we will proceed with Darzalex today.  Will discuss new skin nodules with Dr. Sal.  Patient does follow-up with [facility] next month and will discuss this with them at that time.  She is following up with primary care soon to discuss some medication changes as suggested by her team at [facility] to see if that helps with her esophageal spasms.  She will return in 1 month for labs and treatment in 2 months to see Dr. Sal with labs and treatment.  6/26/2024: Overall stable.  Bone marrow biopsy planned at [facility] in August.  She had last been seen in June 2026 with plans to continue monthly Darzalex with records indicating this continued through 8/21/2024.  Her visit at [facility] 9/11/2024 included a CHR and observation was initiated.  She is having further GI symptoms, deferred ASCHD as to no need due to deep response.  Additional issues included chronic leg numbness, diarrhea, elevated BNP troponin and memory dysfunction.  She presented 11/6/2024 with acute shortness of breath and fever, chest pain x 3 to 4 weeks.  Recent studies include a CT chest with contrast that was essentially unremarkable, chest x-ray 11/6 without active disease, negative respiratory panel, and normal CBC, normal CMP, at bedtime troponin 31, lactate of 0.9.  Repeat paraproteins remain negative  4/14/2025: Clinically  stable.  Ongoing intermittent evaluations at [facility].       *Diarrhea  She saw Dr. Hoyos with GI at [facility] on 9/7.  Suspicion for amyloid involvement of the GI tract  Continue Lomotil and Imodium. Rifaximin prescribed by Dr. Hoyos which she continues to use intermittently with improvement but this is only prescribed for 1 week out of 4  Diarrhea waxes and wanes with what she eats and dairy products particularly worsen diarrhea  Continue Questran, Lomotil and Imodium.  These help but did not completely control the issue.  10/11/2023: Patient continues on rifaximin, Questran, Lomotil, and Imodium for diarrhea management.  Diarrhea still not completely controlled. Averages at least 5 episodes daily.   Stable issues at this time.  She cannot continue rifaximin at this time due to insurance issues. On Flagyl. Resume rifaximin per GI at [facility] when possible  11/13/2024: Dumping a little bit worse on prednisone.  Xifaxan really helps.  Questran really helps.  4/14/2025: Questran and Lomotil helping     *History of elevated liver enzymes  Remain normal     *Cardiomyopathy:  Most recent echocardiogram on 8/17/2022 showed left ventricular wall thickness consistent with moderate to severe concentric hypertrophy along with left ventricular septal wall measuring 2.2 cm and posterior wall thickness at 1.9 cm likely due to amyloidosis.  LVEF 61 to 65%.  Grade 1 impaired relaxation.  Now followed by Dr. Lyles at [facility] with concerns for pre-existing HCM and AL cardiac amyloid.   Cardiac MRI results above.  Amyloidosis evident.  Catheterization performed at [facility].   Cardiac surgery performed at [facility] on 12/5/2022.  Pathology from the myectomy showed cardiac amyloidosis extensively involving the vessels and endocardium with myocyte hypertrophy and interstitial fibrosis.  Congo red stain was positive.  Symptoms improved significantly  10/11/2023: Seen by cardiology at [facility] on 10/4/2023. Patient was restarted on  metoprolol XL.  Midodrine adjusted to morning and early afternoon.  ECHO to be repeated in 3 months with follow-up.  New LBBB  Symptoms overall much better until patient now admitted 11/6/2024 with worsening shortness of breath.  Repeat echocardiogram with EF of 49.8%, left ventricular wall thickness with hypertrophy, left ventricular wall segments hypokinetic, diastolic function indeterminate, GLS -13.9% with strain pattern not consistent with amyloidosis  Cardiac MRI 3/5/2025 consistent with hypertrophic cardiomyopathy and/or amyloid infiltration.  She saw Dr. Lyles at [place] who decreased spironolactone to every other day and recommended using midodrine only if she is symptomatic or hypotensive.  She suggested ibuprofen twice daily for 1 week for ongoing chest pain.     *History of periorbital hematomas: These have resolved.  Coagulation studies and a factor X level were all normal.     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   Continuing magic mouthwash.     *Dysphagia:  She saw Dr. Aranda at [facility] with Botox injections performed.  Symptoms unfortunately are not much better.  Recent esophagram confirmed esophageal dysmotility.  Manometry recently performed at [facility].  Patient states she was told that she is having esophageal spasms and that this is an area that they are very limited in treatment options.  She is going to see primary care for some medication adjustments to see if this is helpful.     *Skin changes with hypopigmentation and blister on the left side of her nose  Concerning for new manifestations of amyloidosis  Not discussed at this time     *Subcutaneous nodules, also previously increasing in number and size  5/1/2024 patient reports these are increasing in number and size once again, she reports a new area on her right upper thigh and left upper arm today.  Did not complain of this as of 6/26/2024  No clear nodularity  11/8/2024     *Rectal irritation  Saw wound care.  Continue current therapy.  No recent issues.     *Cognitive issues and headache  MRI brain 4/7/2024 with no acute intracranial process and findings suggestive of mild chronic small vessel ischemic disease.     *Left-sided chest pain  She did have a left thoracentesis on 10/14/2024, exudative, cytology negative  Recent CT chest 11/4/2024 without pulmonary embolism or acute disease in the chest.  Repeat CTA chest 11/7/2024 with the same.    Treated with colchicine for presumed pericarditis.  However, this caused significant diarrhea so she prefers not to take this any further.  Prednisone started.  Significant improvement with steroids.  Resolved with prednisone  Remains resolved     PLAN:  Follow-up serum protein studies from today  Continue follow-up at [facility] as scheduled  No further steroids at this time  Continue Lomotil and Questran for diarrhea  Follow-up with labs in 6 months.  We are certainly available sooner if needed.    I spent 35 minutes in this visit today reviewing her record, communicating with her, examining her, placing orders, documenting the encounter

## 2025-04-14 ENCOUNTER — OFFICE VISIT (OUTPATIENT)
Dept: ONCOLOGY | Facility: CLINIC | Age: 70
End: 2025-04-14
Payer: MEDICARE

## 2025-04-14 ENCOUNTER — LAB (OUTPATIENT)
Dept: LAB | Facility: HOSPITAL | Age: 70
End: 2025-04-14
Payer: MEDICARE

## 2025-04-14 VITALS
TEMPERATURE: 97.7 F | BODY MASS INDEX: 25.36 KG/M2 | WEIGHT: 129.2 LBS | OXYGEN SATURATION: 99 % | HEIGHT: 60 IN | RESPIRATION RATE: 16 BRPM | DIASTOLIC BLOOD PRESSURE: 78 MMHG | HEART RATE: 70 BPM | SYSTOLIC BLOOD PRESSURE: 143 MMHG

## 2025-04-14 DIAGNOSIS — E85.81 LIGHT CHAIN (AL) AMYLOIDOSIS: ICD-10-CM

## 2025-04-14 DIAGNOSIS — E85.81 LIGHT CHAIN (AL) AMYLOIDOSIS: Primary | ICD-10-CM

## 2025-04-14 DIAGNOSIS — R07.89 ATYPICAL CHEST PAIN: ICD-10-CM

## 2025-04-14 LAB
ALBUMIN SERPL-MCNC: 4.3 G/DL (ref 3.5–5.2)
ALBUMIN/GLOB SERPL: 2.2 G/DL
ALP SERPL-CCNC: 77 U/L (ref 39–117)
ALT SERPL W P-5'-P-CCNC: 36 U/L (ref 1–33)
ANION GAP SERPL CALCULATED.3IONS-SCNC: 10.9 MMOL/L (ref 5–15)
AST SERPL-CCNC: 26 U/L (ref 1–32)
BASOPHILS # BLD AUTO: 0.03 10*3/MM3 (ref 0–0.2)
BASOPHILS NFR BLD AUTO: 0.3 % (ref 0–1.5)
BILIRUB SERPL-MCNC: 0.3 MG/DL (ref 0–1.2)
BUN SERPL-MCNC: 16 MG/DL (ref 8–23)
BUN/CREAT SERPL: 23.9 (ref 7–25)
CALCIUM SPEC-SCNC: 10.1 MG/DL (ref 8.6–10.5)
CHLORIDE SERPL-SCNC: 104 MMOL/L (ref 98–107)
CO2 SERPL-SCNC: 25.1 MMOL/L (ref 22–29)
CREAT SERPL-MCNC: 0.67 MG/DL (ref 0.57–1)
DEPRECATED RDW RBC AUTO: 44.8 FL (ref 37–54)
EGFRCR SERPLBLD CKD-EPI 2021: 94.2 ML/MIN/1.73
EOSINOPHIL # BLD AUTO: 0.26 10*3/MM3 (ref 0–0.4)
EOSINOPHIL NFR BLD AUTO: 2.7 % (ref 0.3–6.2)
ERYTHROCYTE [DISTWIDTH] IN BLOOD BY AUTOMATED COUNT: 14 % (ref 12.3–15.4)
GLOBULIN UR ELPH-MCNC: 2 GM/DL
GLUCOSE SERPL-MCNC: 95 MG/DL (ref 65–99)
HCT VFR BLD AUTO: 45.7 % (ref 34–46.6)
HGB BLD-MCNC: 14.5 G/DL (ref 12–15.9)
IMM GRANULOCYTES # BLD AUTO: 0.06 10*3/MM3 (ref 0–0.05)
IMM GRANULOCYTES NFR BLD AUTO: 0.6 % (ref 0–0.5)
LYMPHOCYTES # BLD AUTO: 2.81 10*3/MM3 (ref 0.7–3.1)
LYMPHOCYTES NFR BLD AUTO: 29.6 % (ref 19.6–45.3)
MCH RBC QN AUTO: 27.6 PG (ref 26.6–33)
MCHC RBC AUTO-ENTMCNC: 31.7 G/DL (ref 31.5–35.7)
MCV RBC AUTO: 87 FL (ref 79–97)
MONOCYTES # BLD AUTO: 0.65 10*3/MM3 (ref 0.1–0.9)
MONOCYTES NFR BLD AUTO: 6.8 % (ref 5–12)
NEUTROPHILS NFR BLD AUTO: 5.68 10*3/MM3 (ref 1.7–7)
NEUTROPHILS NFR BLD AUTO: 60 % (ref 42.7–76)
NRBC BLD AUTO-RTO: 0 /100 WBC (ref 0–0.2)
PLATELET # BLD AUTO: 293 10*3/MM3 (ref 140–450)
PMV BLD AUTO: 9.2 FL (ref 6–12)
POTASSIUM SERPL-SCNC: 3.7 MMOL/L (ref 3.5–5.2)
PROT SERPL-MCNC: 6.3 G/DL (ref 6–8.5)
RBC # BLD AUTO: 5.25 10*6/MM3 (ref 3.77–5.28)
SODIUM SERPL-SCNC: 140 MMOL/L (ref 136–145)
WBC NRBC COR # BLD AUTO: 9.49 10*3/MM3 (ref 3.4–10.8)

## 2025-04-14 PROCEDURE — 1159F MED LIST DOCD IN RCRD: CPT | Performed by: INTERNAL MEDICINE

## 2025-04-14 PROCEDURE — 36415 COLL VENOUS BLD VENIPUNCTURE: CPT

## 2025-04-14 PROCEDURE — 85025 COMPLETE CBC W/AUTO DIFF WBC: CPT

## 2025-04-14 PROCEDURE — 99214 OFFICE O/P EST MOD 30 MIN: CPT | Performed by: INTERNAL MEDICINE

## 2025-04-14 PROCEDURE — G2211 COMPLEX E/M VISIT ADD ON: HCPCS | Performed by: INTERNAL MEDICINE

## 2025-04-14 PROCEDURE — 80053 COMPREHEN METABOLIC PANEL: CPT

## 2025-04-14 PROCEDURE — 1126F AMNT PAIN NOTED NONE PRSNT: CPT | Performed by: INTERNAL MEDICINE

## 2025-04-14 PROCEDURE — 1160F RVW MEDS BY RX/DR IN RCRD: CPT | Performed by: INTERNAL MEDICINE

## 2025-04-14 RX ORDER — CARBOXYMETHYLCELLULOSE SODIUM 10 MG/ML
1 GEL OPHTHALMIC DAILY PRN
COMMUNITY
Start: 2025-04-05

## 2025-04-14 RX ORDER — MOMETASONE FUROATE 1 MG/G
1 CREAM TOPICAL DAILY
COMMUNITY
Start: 2025-04-02

## 2025-04-14 RX ORDER — FLUOCINOLONE ACETONIDE 0.1 MG/ML
SOLUTION TOPICAL 2 TIMES DAILY
COMMUNITY
Start: 2025-04-01

## 2025-04-14 RX ORDER — CARBOXYMETHYLCELLULOSE SODIUM 5 MG/ML
1 SOLUTION/ DROPS OPHTHALMIC DAILY PRN
COMMUNITY
Start: 2025-01-31

## 2025-04-17 LAB
ALBUMIN SERPL ELPH-MCNC: 3.9 G/DL (ref 2.9–4.4)
ALBUMIN/GLOB SERPL: 1.7 {RATIO} (ref 0.7–1.7)
ALPHA1 GLOB SERPL ELPH-MCNC: 0.2 G/DL (ref 0–0.4)
ALPHA2 GLOB SERPL ELPH-MCNC: 0.9 G/DL (ref 0.4–1)
B-GLOBULIN SERPL ELPH-MCNC: 0.9 G/DL (ref 0.7–1.3)
GAMMA GLOB SERPL ELPH-MCNC: 0.3 G/DL (ref 0.4–1.8)
GLOBULIN SER-MCNC: 2.3 G/DL (ref 2.2–3.9)
IGA SERPL-MCNC: 9 MG/DL (ref 87–352)
IGG SERPL-MCNC: <30 MG/DL (ref 586–1602)
IGM SERPL-MCNC: 43 MG/DL (ref 26–217)
INTERPRETATION SERPL IEP-IMP: ABNORMAL
KAPPA LC FREE SER-MCNC: 4 MG/L (ref 3.3–19.4)
KAPPA LC FREE/LAMBDA FREE SER: 1.08 {RATIO} (ref 0.26–1.65)
LABORATORY COMMENT REPORT: ABNORMAL
LAMBDA LC FREE SERPL-MCNC: 3.7 MG/L (ref 5.7–26.3)
M PROTEIN SERPL ELPH-MCNC: ABNORMAL G/DL
PROT SERPL-MCNC: 6.2 G/DL (ref 6–8.5)

## 2025-04-22 DIAGNOSIS — R19.7 DIARRHEA, UNSPECIFIED TYPE: ICD-10-CM

## 2025-04-22 RX ORDER — DIPHENOXYLATE HYDROCHLORIDE AND ATROPINE SULFATE 2.5; .025 MG/1; MG/1
1 TABLET ORAL 4 TIMES DAILY PRN
Qty: 120 TABLET | Refills: 0 | Status: SHIPPED | OUTPATIENT
Start: 2025-04-22

## 2025-04-30 ENCOUNTER — RESULTS FOLLOW-UP (OUTPATIENT)
Dept: LAB | Facility: HOSPITAL | Age: 70
End: 2025-04-30
Payer: MEDICARE

## 2025-04-30 DIAGNOSIS — R77.1 HYPOGLOBULINEMIA: Primary | ICD-10-CM

## 2025-04-30 NOTE — PROGRESS NOTES
Her IgG is exceedingly low. Can you see if she's willing to go to a lab close to where she lives to get immunoglobulins repeated? I've never seen an IgG that low. Might cm to looking into IVIG. Thanks! LEWIS

## 2025-05-01 ENCOUNTER — LAB (OUTPATIENT)
Facility: HOSPITAL | Age: 70
End: 2025-05-01
Payer: MEDICARE

## 2025-05-01 DIAGNOSIS — R77.1 HYPOGLOBULINEMIA: ICD-10-CM

## 2025-05-01 LAB
IGA1 MFR SER: <50 MG/DL (ref 70–400)
IGG1 SER-MCNC: <300 MG/DL (ref 700–1600)
IGM SERPL-MCNC: 39 MG/DL (ref 40–230)

## 2025-05-01 PROCEDURE — 82784 ASSAY IGA/IGD/IGG/IGM EACH: CPT

## 2025-05-01 PROCEDURE — 36415 COLL VENOUS BLD VENIPUNCTURE: CPT

## 2025-05-02 ENCOUNTER — TELEPHONE (OUTPATIENT)
Dept: ONCOLOGY | Facility: CLINIC | Age: 70
End: 2025-05-02
Payer: MEDICARE

## 2025-05-02 NOTE — TELEPHONE ENCOUNTER
"    Hub staff attempted to follow warm transfer process and was unsuccessful     Caller: Cynthia Mensah \"YOLETTE\"    Relationship to patient: Self    Best call back number: [phone]    Patient is needing: TO RETURN CALL TO [facility] RN  HAD CALLED YESTERDAY LEFT MESSAGE , REGARDING LAB RESULTS         "

## 2025-05-02 NOTE — TELEPHONE ENCOUNTER
Called patient to let her know Dr. Sal would be in touch w/ Dr. Buenrostro at [facility] to discuss IVIG. Patient states she has been having chest pain (rated 5) on the right side x 4 days. Described as tight and worse on inspiration (rated 7). Radiates to right shoulder blade and back. Denies SOA. Denies being clammy or diaphoretic. States it feels very similar to when she had a small pleural effusion and pleuritis last October. She had a thoracentesis at that time (only drained 100cc). She got some relief from a left over Norco 5 that Dr. Sal had prescribed last fall after she had some musculoskeletal pain from an injury while moving furniture. She denies any injuries this time around. Afebrile. Advised if anything worsens or she develops a fever she should report to the ER. Advised she reach out to both her Pulmonogist, Dr. Shahid as well as Cardiologist. Will relay this information to Dr. Sal to see if he has anything to add. Nakita Wilkins RN

## 2025-05-02 NOTE — TELEPHONE ENCOUNTER
She's had extensive evaluation for chest pain that for the most part has been unrevealing. It's improved with steroids. I agree with letting cardiology and pulmonology know and to go to the ER if it worsens. Thanks! LEWIS

## 2025-05-04 ENCOUNTER — RESULTS FOLLOW-UP (OUTPATIENT)
Dept: LAB | Facility: HOSPITAL | Age: 70
End: 2025-05-04
Payer: MEDICARE

## 2025-05-04 NOTE — PROGRESS NOTES
Discussed with Dr. Buenrostro at [facility]. Plan observation unless she has fevers or other signs of infection. Please let her know. LEWIS

## 2025-05-13 DIAGNOSIS — F41.9 ANXIETY: ICD-10-CM

## 2025-05-15 RX ORDER — ALPRAZOLAM 0.25 MG
0.25 TABLET ORAL 2 TIMES DAILY PRN
Qty: 60 TABLET | Refills: 1 | Status: SHIPPED | OUTPATIENT
Start: 2025-05-15

## 2025-05-21 ENCOUNTER — APPOINTMENT (OUTPATIENT)
Dept: CT IMAGING | Facility: HOSPITAL | Age: 70
End: 2025-05-21
Payer: MEDICARE

## 2025-05-21 ENCOUNTER — APPOINTMENT (OUTPATIENT)
Dept: GENERAL RADIOLOGY | Facility: HOSPITAL | Age: 70
End: 2025-05-21
Payer: MEDICARE

## 2025-05-21 ENCOUNTER — HOSPITAL ENCOUNTER (OUTPATIENT)
Facility: HOSPITAL | Age: 70
Setting detail: OBSERVATION
Discharge: HOME | End: 2025-05-22
Attending: EMERGENCY MEDICINE | Admitting: HOSPITALIST
Payer: MEDICARE

## 2025-05-21 ENCOUNTER — TELEPHONE (OUTPATIENT)
Dept: ONCOLOGY | Facility: CLINIC | Age: 70
End: 2025-05-21
Payer: MEDICARE

## 2025-05-21 DIAGNOSIS — R07.89 CHEST DISCOMFORT: ICD-10-CM

## 2025-05-21 DIAGNOSIS — R09.02 HYPOXIA: Primary | ICD-10-CM

## 2025-05-21 DIAGNOSIS — Z86.39 HISTORY OF AMYLOIDOSIS: ICD-10-CM

## 2025-05-21 LAB
ALBUMIN SERPL-MCNC: 4.7 G/DL (ref 3.5–5.2)
ALBUMIN/GLOB SERPL: 2.4 G/DL
ALP SERPL-CCNC: 73 U/L (ref 39–117)
ALT SERPL W P-5'-P-CCNC: 37 U/L (ref 1–33)
ANION GAP SERPL CALCULATED.3IONS-SCNC: 14.1 MMOL/L (ref 5–15)
AST SERPL-CCNC: 37 U/L (ref 1–32)
B PARAPERT DNA SPEC QL NAA+PROBE: NOT DETECTED
B PERT DNA SPEC QL NAA+PROBE: NOT DETECTED
BASOPHILS # BLD AUTO: 0.03 10*3/MM3 (ref 0–0.2)
BASOPHILS NFR BLD AUTO: 0.4 % (ref 0–1.5)
BILIRUB SERPL-MCNC: 0.6 MG/DL (ref 0–1.2)
BUN SERPL-MCNC: 21 MG/DL (ref 8–23)
BUN/CREAT SERPL: 23.6 (ref 7–25)
C PNEUM DNA NPH QL NAA+NON-PROBE: NOT DETECTED
CALCIUM SPEC-SCNC: 9.9 MG/DL (ref 8.6–10.5)
CHLORIDE SERPL-SCNC: 103 MMOL/L (ref 98–107)
CO2 SERPL-SCNC: 23.9 MMOL/L (ref 22–29)
CREAT SERPL-MCNC: 0.89 MG/DL (ref 0.57–1)
DEPRECATED RDW RBC AUTO: 45.6 FL (ref 37–54)
EGFRCR SERPLBLD CKD-EPI 2021: 69.8 ML/MIN/1.73
EOSINOPHIL # BLD AUTO: 0.2 10*3/MM3 (ref 0–0.4)
EOSINOPHIL NFR BLD AUTO: 2.8 % (ref 0.3–6.2)
ERYTHROCYTE [DISTWIDTH] IN BLOOD BY AUTOMATED COUNT: 14.2 % (ref 12.3–15.4)
FLUAV SUBTYP SPEC NAA+PROBE: NOT DETECTED
FLUBV RNA ISLT QL NAA+PROBE: NOT DETECTED
GEN 5 1HR TROPONIN T REFLEX: 25 NG/L
GLOBULIN UR ELPH-MCNC: 2 GM/DL
GLUCOSE SERPL-MCNC: 112 MG/DL (ref 65–99)
HADV DNA SPEC NAA+PROBE: NOT DETECTED
HCOV 229E RNA SPEC QL NAA+PROBE: NOT DETECTED
HCOV HKU1 RNA SPEC QL NAA+PROBE: NOT DETECTED
HCOV NL63 RNA SPEC QL NAA+PROBE: NOT DETECTED
HCOV OC43 RNA SPEC QL NAA+PROBE: NOT DETECTED
HCT VFR BLD AUTO: 46 % (ref 34–46.6)
HGB BLD-MCNC: 14.6 G/DL (ref 12–15.9)
HMPV RNA NPH QL NAA+NON-PROBE: NOT DETECTED
HOLD SPECIMEN: NORMAL
HOLD SPECIMEN: NORMAL
HPIV1 RNA ISLT QL NAA+PROBE: NOT DETECTED
HPIV2 RNA SPEC QL NAA+PROBE: NOT DETECTED
HPIV3 RNA NPH QL NAA+PROBE: NOT DETECTED
HPIV4 P GENE NPH QL NAA+PROBE: NOT DETECTED
IMM GRANULOCYTES # BLD AUTO: 0.02 10*3/MM3 (ref 0–0.05)
IMM GRANULOCYTES NFR BLD AUTO: 0.3 % (ref 0–0.5)
LYMPHOCYTES # BLD AUTO: 2.4 10*3/MM3 (ref 0.7–3.1)
LYMPHOCYTES NFR BLD AUTO: 33.5 % (ref 19.6–45.3)
M PNEUMO IGG SER IA-ACNC: NOT DETECTED
MCH RBC QN AUTO: 27.8 PG (ref 26.6–33)
MCHC RBC AUTO-ENTMCNC: 31.7 G/DL (ref 31.5–35.7)
MCV RBC AUTO: 87.6 FL (ref 79–97)
MONOCYTES # BLD AUTO: 0.42 10*3/MM3 (ref 0.1–0.9)
MONOCYTES NFR BLD AUTO: 5.9 % (ref 5–12)
NEUTROPHILS NFR BLD AUTO: 4.09 10*3/MM3 (ref 1.7–7)
NEUTROPHILS NFR BLD AUTO: 57.1 % (ref 42.7–76)
NRBC BLD AUTO-RTO: 0 /100 WBC (ref 0–0.2)
NT-PROBNP SERPL-MCNC: 269 PG/ML (ref 0–900)
PLATELET # BLD AUTO: 232 10*3/MM3 (ref 140–450)
PMV BLD AUTO: 9.9 FL (ref 6–12)
POTASSIUM SERPL-SCNC: 4.3 MMOL/L (ref 3.5–5.2)
PROT SERPL-MCNC: 6.7 G/DL (ref 6–8.5)
QT INTERVAL: 424 MS
QTC INTERVAL: 505 MS
RBC # BLD AUTO: 5.25 10*6/MM3 (ref 3.77–5.28)
RHINOVIRUS RNA SPEC NAA+PROBE: NOT DETECTED
RSV RNA NPH QL NAA+NON-PROBE: NOT DETECTED
SARS-COV-2 RNA NPH QL NAA+NON-PROBE: NOT DETECTED
SODIUM SERPL-SCNC: 141 MMOL/L (ref 136–145)
TROPONIN T % DELTA: -11
TROPONIN T NUMERIC DELTA: -3 NG/L
TROPONIN T SERPL HS-MCNC: 28 NG/L
WBC NRBC COR # BLD AUTO: 7.16 10*3/MM3 (ref 3.4–10.8)
WHOLE BLOOD HOLD COAG: NORMAL
WHOLE BLOOD HOLD SPECIMEN: NORMAL

## 2025-05-21 PROCEDURE — 99285 EMERGENCY DEPT VISIT HI MDM: CPT

## 2025-05-21 PROCEDURE — G0378 HOSPITAL OBSERVATION PER HR: HCPCS

## 2025-05-21 PROCEDURE — 93010 ELECTROCARDIOGRAM REPORT: CPT | Performed by: INTERNAL MEDICINE

## 2025-05-21 PROCEDURE — 96375 TX/PRO/DX INJ NEW DRUG ADDON: CPT

## 2025-05-21 PROCEDURE — 25510000001 IOPAMIDOL PER 1 ML: Performed by: EMERGENCY MEDICINE

## 2025-05-21 PROCEDURE — 80053 COMPREHEN METABOLIC PANEL: CPT

## 2025-05-21 PROCEDURE — 71275 CT ANGIOGRAPHY CHEST: CPT

## 2025-05-21 PROCEDURE — 84484 ASSAY OF TROPONIN QUANT: CPT

## 2025-05-21 PROCEDURE — 85025 COMPLETE CBC W/AUTO DIFF WBC: CPT

## 2025-05-21 PROCEDURE — 36415 COLL VENOUS BLD VENIPUNCTURE: CPT

## 2025-05-21 PROCEDURE — 93005 ELECTROCARDIOGRAM TRACING: CPT

## 2025-05-21 PROCEDURE — 93005 ELECTROCARDIOGRAM TRACING: CPT | Performed by: EMERGENCY MEDICINE

## 2025-05-21 PROCEDURE — 25010000002 ONDANSETRON PER 1 MG: Performed by: EMERGENCY MEDICINE

## 2025-05-21 PROCEDURE — 71045 X-RAY EXAM CHEST 1 VIEW: CPT

## 2025-05-21 PROCEDURE — 25810000003 LACTATED RINGERS SOLUTION: Performed by: EMERGENCY MEDICINE

## 2025-05-21 PROCEDURE — 0202U NFCT DS 22 TRGT SARS-COV-2: CPT | Performed by: EMERGENCY MEDICINE

## 2025-05-21 PROCEDURE — 83880 ASSAY OF NATRIURETIC PEPTIDE: CPT | Performed by: EMERGENCY MEDICINE

## 2025-05-21 PROCEDURE — 25010000002 KETOROLAC TROMETHAMINE PER 15 MG: Performed by: EMERGENCY MEDICINE

## 2025-05-21 PROCEDURE — 96374 THER/PROPH/DIAG INJ IV PUSH: CPT

## 2025-05-21 RX ORDER — ASPIRIN 325 MG
325 TABLET ORAL ONCE
Status: DISCONTINUED | OUTPATIENT
Start: 2025-05-21 | End: 2025-05-21

## 2025-05-21 RX ORDER — BISACODYL 5 MG/1
5 TABLET, DELAYED RELEASE ORAL DAILY PRN
Status: DISCONTINUED | OUTPATIENT
Start: 2025-05-21 | End: 2025-05-22 | Stop reason: HOSPADM

## 2025-05-21 RX ORDER — ACETAMINOPHEN 160 MG/5ML
650 SOLUTION ORAL EVERY 4 HOURS PRN
Status: DISCONTINUED | OUTPATIENT
Start: 2025-05-21 | End: 2025-05-22 | Stop reason: HOSPADM

## 2025-05-21 RX ORDER — KETOROLAC TROMETHAMINE 15 MG/ML
15 INJECTION, SOLUTION INTRAMUSCULAR; INTRAVENOUS ONCE
Status: COMPLETED | OUTPATIENT
Start: 2025-05-21 | End: 2025-05-21

## 2025-05-21 RX ORDER — ACETAMINOPHEN 650 MG/1
650 SUPPOSITORY RECTAL EVERY 4 HOURS PRN
Status: DISCONTINUED | OUTPATIENT
Start: 2025-05-21 | End: 2025-05-22 | Stop reason: HOSPADM

## 2025-05-21 RX ORDER — ONDANSETRON 2 MG/ML
4 INJECTION INTRAMUSCULAR; INTRAVENOUS ONCE
Status: COMPLETED | OUTPATIENT
Start: 2025-05-21 | End: 2025-05-21

## 2025-05-21 RX ORDER — ALUMINA, MAGNESIA, AND SIMETHICONE 2400; 2400; 240 MG/30ML; MG/30ML; MG/30ML
15 SUSPENSION ORAL EVERY 6 HOURS PRN
Status: DISCONTINUED | OUTPATIENT
Start: 2025-05-21 | End: 2025-05-22 | Stop reason: HOSPADM

## 2025-05-21 RX ORDER — SODIUM CHLORIDE 0.9 % (FLUSH) 0.9 %
10 SYRINGE (ML) INJECTION AS NEEDED
Status: DISCONTINUED | OUTPATIENT
Start: 2025-05-21 | End: 2025-05-22 | Stop reason: HOSPADM

## 2025-05-21 RX ORDER — POLYETHYLENE GLYCOL 3350 17 G/17G
17 POWDER, FOR SOLUTION ORAL DAILY PRN
Status: DISCONTINUED | OUTPATIENT
Start: 2025-05-21 | End: 2025-05-22 | Stop reason: HOSPADM

## 2025-05-21 RX ORDER — ONDANSETRON 2 MG/ML
4 INJECTION INTRAMUSCULAR; INTRAVENOUS EVERY 6 HOURS PRN
Status: DISCONTINUED | OUTPATIENT
Start: 2025-05-21 | End: 2025-05-22 | Stop reason: HOSPADM

## 2025-05-21 RX ORDER — IOPAMIDOL 755 MG/ML
100 INJECTION, SOLUTION INTRAVASCULAR
Status: COMPLETED | OUTPATIENT
Start: 2025-05-21 | End: 2025-05-21

## 2025-05-21 RX ORDER — NITROGLYCERIN 0.4 MG/1
0.4 TABLET SUBLINGUAL
Status: DISCONTINUED | OUTPATIENT
Start: 2025-05-21 | End: 2025-05-22 | Stop reason: HOSPADM

## 2025-05-21 RX ORDER — ONDANSETRON 4 MG/1
4 TABLET, ORALLY DISINTEGRATING ORAL EVERY 6 HOURS PRN
Status: DISCONTINUED | OUTPATIENT
Start: 2025-05-21 | End: 2025-05-22 | Stop reason: HOSPADM

## 2025-05-21 RX ORDER — BISACODYL 10 MG
10 SUPPOSITORY, RECTAL RECTAL DAILY PRN
Status: DISCONTINUED | OUTPATIENT
Start: 2025-05-21 | End: 2025-05-22 | Stop reason: HOSPADM

## 2025-05-21 RX ORDER — AMOXICILLIN 250 MG
2 CAPSULE ORAL 2 TIMES DAILY PRN
Status: DISCONTINUED | OUTPATIENT
Start: 2025-05-21 | End: 2025-05-22 | Stop reason: HOSPADM

## 2025-05-21 RX ORDER — ACETAMINOPHEN 325 MG/1
650 TABLET ORAL EVERY 4 HOURS PRN
Status: DISCONTINUED | OUTPATIENT
Start: 2025-05-21 | End: 2025-05-22 | Stop reason: HOSPADM

## 2025-05-21 RX ADMIN — KETOROLAC TROMETHAMINE 15 MG: 15 INJECTION INTRAMUSCULAR at 18:57

## 2025-05-21 RX ADMIN — SODIUM CHLORIDE, POTASSIUM CHLORIDE, SODIUM LACTATE AND CALCIUM CHLORIDE 1000 ML: 600; 310; 30; 20 INJECTION, SOLUTION INTRAVENOUS at 18:52

## 2025-05-21 RX ADMIN — ONDANSETRON 4 MG: 2 INJECTION, SOLUTION INTRAMUSCULAR; INTRAVENOUS at 18:50

## 2025-05-21 RX ADMIN — IOPAMIDOL 77 ML: 755 INJECTION, SOLUTION INTRAVENOUS at 20:42

## 2025-05-21 NOTE — TELEPHONE ENCOUNTER
Returned call to patient who states she started feeling bad  yesterday morning. Reports neck pain, slightly more difficult to breathe with inhalation, jaw pain, lethargic, temp 99, mild back pain, and nausea. Denies vomiting. Diarrhea yesterday. Mild cough. States she did take a midodrine because she was feeling dizzy and that has been advised to her. Stated she feels like she needs to go the ER. Agreed and advised she find someone to drive her or call EMS. Pt v/u. No other needs identified at this time. Nakita Wilkins RN

## 2025-05-21 NOTE — TELEPHONE ENCOUNTER
"  Caller: Cynthia Mensah \"YOLETTE\"    Relationship: Self    Best call back number:   Telephone Information:   Mobile [phone]     What was the call regarding:   LAST COUPLE OF DAYS, NOT FEELING WELL, RUNNING FEVER, JAW AND NECK ARE SORE AND COUGHING.     CALL TO ADVISE    Is it okay if the provider responds through MyChart:N  "

## 2025-05-21 NOTE — ED PROVIDER NOTES
EMERGENCY DEPARTMENT ENCOUNTER    Room Number:  28/28  PCP: Arian Peterson MD  Historian: Patient      HPI:  Chief Complaint: Chest pain, neck discomfort, cough, body aches  A complete HPI/ROS/PMH/PSH/SH/FH are unobtainable due to: None    Context: Cynthia Portillo is a 70 y.o. female who presents to the ED via private vehicle c/o acute onset of chest discomfort, neck discomfort, body aches, fatigue.  Has had appropriate nausea, had some recent diarrhea but that has since resolved.  No vomiting.  History of amyloidosis, patient denies any fevers.       MEDICAL RECORD REVIEW    External (non-ED) record review: Chart reviewed epic shows an adult transthoracic echo January 19, 2025 with an ejection fraction of 62%              PAST MEDICAL HISTORY  Active Ambulatory Problems     Diagnosis Date Noted    S/P arthroscopy of shoulder 06/22/2017    Dyspareunia, female 02/13/2019    Rhinosinusitis 02/13/2019    Hypothyroid 02/13/2019    High cholesterol 02/13/2019    Pelvic cramping 04/20/2019    Vaginal atrophy 04/20/2019    Major depressive disorder with single episode, in full remission 05/12/2019    Attention deficit disorder (ADD) without hyperactivity 05/12/2019    Hepatomegaly 06/07/2019    NAFLD (nonalcoholic fatty liver disease) 06/07/2019    Fecal smearing 09/11/2019    Elevated transaminase level 09/11/2019    Chronic right shoulder pain 09/19/2018    Chronic hypotension 11/13/2019    Arthropathy, transient, shoulder, right 11/28/2018    Status post reverse arthroplasty of right shoulder 01/22/2019    Unstable angina 04/24/2020    Esophageal dysphagia 05/06/2020    Precordial chest pain 05/06/2020    Hypertriglyceridemia 07/05/2020    Primary insomnia 07/05/2020    Oral herpes 08/18/2020    Memory loss 09/02/2020    Chronic tension-type headache, not intractable 09/02/2020    Hyperglycemia 02/16/2021    Acute cystitis without hematuria 02/16/2021    Esophagitis, eosinophilic 02/18/2021    Functional  diarrhea 02/18/2021    Dysphagia, cricopharyngeal 10/15/2021    Elevated serum immunoglobulin free light chain level 04/21/2022    Amyloidosis 05/04/2022    Arthritis     Status post recent immunosuppressive therapy 08/09/2022    Pneumonia of both lungs due to infectious organism 08/18/2022    Benign neoplasm of left breast 09/19/2022    FH: breast cancer 09/19/2022    Medicare annual wellness visit, subsequent 06/11/2024    Osteopenia 09/12/2024    Atypical chest pain 10/11/2024    Pleuritis 10/13/2024    Fever 11/07/2024    Type 2 diabetes mellitus, without long-term current use of insulin 11/07/2024    CAP (community acquired pneumonia) 01/17/2025     Resolved Ambulatory Problems     Diagnosis Date Noted    Acute respiratory failure with hypoxia 08/18/2022    Acute pulmonary edema 08/18/2022     Past Medical History:   Diagnosis Date    AL amyloidosis     Anxiety     Bowel perforation     COVID-19 07/2020    Depression     Diverticulitis of colon     Diverticulosis     GERD (gastroesophageal reflux disease)     History of Clostridioides difficile infection 2008    History of prediabetes     History of snoring     History of stress incontinence     Hypercholesterolemia     Hyperlipidemia     Hypertension     Left ventricular hypertrophy     Liver disease     Seasonal allergies     SOB (shortness of breath)     Status post colostomy     Thyroid disease          PAST SURGICAL HISTORY  Past Surgical History:   Procedure Laterality Date    ABDOMINAL SURGERY  2005, 2014    ex lap x 2,  diverticulitis    ABDOMINOPLASTY  2016    ADENOIDECTOMY      APPENDECTOMY      CARDIAC CATHETERIZATION N/A 04/24/2020    Procedure: Coronary angiography;  Surgeon: Roberto Briones MD;  Location:  [facility] CATH [facility]VASIVE [facility]ATION;  Service: Cardiovascular;  Laterality: N/A;    CARDIAC CATHETERIZATION N/A 04/24/2020    Procedure: Left heart cath;  Surgeon: Roberto Briones MD;  Location:  [facility] CATH [facility]VAS[facility]ATION;  Service:  Cardiovascular;  Laterality: N/A;    CARDIAC CATHETERIZATION N/A 04/24/2020    Procedure: Left ventriculography;  Surgeon: Roberto Briones MD;  Location: [facility] CATH INVASIVE LOCATION;  Service: Cardiovascular;  Laterality: N/A;    CARDIAC SURGERY      open heart surgery,    COLONOSCOPY  approx 2018    normal per pt     COLOSTOMY  2005    had colostomy and then is was reversed    COLOSTOMY CLOSURE  2006    CORRECTION HAMMER TOE Right 2017    ECTOPIC PREGNANCY SURGERY      1979, 1980    ENDOSCOPY N/A 05/27/2020    Procedure: ESOPHAGOGASTRODUODENOSCOPY WITH BIOPSY AND BIOPSY POLYPECTOMY AND MACIEL DIALATION;  Surgeon: Preethi Beck MD;  Location: [facility] ENDOSCOPY;  Service: Gastroenterology;  Laterality: N/A;  PRE: ESOPHAGEAL DYSPHAGIA  POST: Z LINE 46, ESOPHAGEAL SPASM, GASTRITIS, FUNDIC POLYP    ENDOSCOPY N/A 06/20/2023    ESOPHAGEAL DILATATION N/A 12/07/2021    Procedure: OR ESOPHAGEAL DILATATION with maciel dilators ;  Surgeon: Jamey Leslie MD;  [facility]  Service: ENT;  Laterality: N/A;    ESOPHAGEAL MOTILITY STUDY N/A 02/03/2022    Procedure: ESOPHAGEAL MOTILITY STUDY;  Surgeon: Harshil, Nurse Performed;  Location: [facility] ENDOSCOPY;  Service: Gastroenterology;  Laterality: N/A;  dypshagia     FOOT SURGERY Right 12/2016    Second and third hammertoe corrections    KNEE ARTHROSCOPY Right 2009    KNEE SURGERY Right     REVISION / TAKEDOWN COLOSTOMY  2006    SHOULDER ARTHROSCOPY Right 2018    right shoulder arthroscopy with extensive rotator cuff, biceps and labral debridement, chondroplasty, & subacromial decompression with acromioplasty    SHOULDER SURGERY      HAS HAS COMPLETE SHOULDER ON RIGHT. LEFT SCOPED AND HAD 2ND SURGERY WITH HARDWARE PLACED    SHOULDER SURGERY Left     2012. 2013    TONSILLECTOMY      TOTAL SHOULDER REVERSE ARTHROPLASTY Right 2019    WISDOM TOOTH EXTRACTION           FAMILY HISTORY  Family History   Problem Relation Age of Onset    Hypertension Mother      Osteoporosis Mother     Skin cancer Mother     Heart disease Father     Hypertension Father     Breast cancer Maternal Grandmother     Ovarian cancer Neg Hx     Colon cancer Neg Hx     Deep vein thrombosis Neg Hx     Pulmonary embolism Neg Hx     Malig Hyperthermia Neg Hx          SOCIAL HISTORY  Social History     Socioeconomic History    Marital status: Single    Number of children: 2   Tobacco Use    Smoking status: Never     Passive exposure: Never    Smokeless tobacco: Never   Vaping Use    Vaping status: Never Used   Substance and Sexual Activity    Alcohol use: Not Currently     Alcohol/week: 1.0 standard drink of alcohol     Types: 1 Glasses of wine per week    Drug use: Never    Sexual activity: Defer         ALLERGIES  Azithromycin, Ezetimibe, Pravastatin, and Sulfa antibiotics        REVIEW OF SYSTEMS  Review of Systems     All systems reviewed and negative except for those discussed in HPI.       PHYSICAL EXAM    I have reviewed the triage vital signs and nursing notes.    ED Triage Vitals   Temp Heart Rate Resp BP SpO2   05/21/25 1455 05/21/25 1455 05/21/25 1455 05/21/25 1530 05/21/25 1455   97.8 °F (36.6 °C) 96 18 111/75 96 %      Temp src Heart Rate Source Patient Position BP Location FiO2 (%)   05/21/25 1455 -- 05/21/25 1530 05/21/25 1530 --   Oral  Sitting Left arm        Physical Exam  General: No acute distress, nontoxic  HEENT: Mucous membranes moist, atraumatic, EOMI  Neck: Full ROM  Pulm: Symmetric chest rise, nonlabored, lungs CTAB  Cardiovascular: Regular rate and rhythm, intact distal pulses  GI: Soft, nontender, nondistended, no rebound, no guarding, bowel sounds present  MSK: Full ROM, no deformity  Skin: Warm, dry  Neuro: Awake, alert, oriented x 4, GCS 15, moving all extremities, no focal deficits  Psych: Calm, cooperative        LAB RESULTS  Recent Results (from the past 24 hours)   ECG 12 Lead ED Triage Standing Order; Chest Pain    Collection Time: 05/21/25  3:01 PM   Result Value  Ref Range    QT Interval 424 ms    QTC Interval 505 ms   Comprehensive Metabolic Panel    Collection Time: 05/21/25  3:04 PM    Specimen: Arm, Right; Blood   Result Value Ref Range    Glucose 112 (H) 65 - 99 mg/dL    BUN 21 8 - 23 mg/dL    Creatinine 0.89 0.57 - 1.00 mg/dL    Sodium 141 136 - 145 mmol/L    Potassium 4.3 3.5 - 5.2 mmol/L    Chloride 103 98 - 107 mmol/L    CO2 23.9 22.0 - 29.0 mmol/L    Calcium 9.9 8.6 - 10.5 mg/dL    Total Protein 6.7 6.0 - 8.5 g/dL    Albumin 4.7 3.5 - 5.2 g/dL    ALT (SGPT) 37 (H) 1 - 33 U/L    AST (SGOT) 37 (H) 1 - 32 U/L    Alkaline Phosphatase 73 39 - 117 U/L    Total Bilirubin 0.6 0.0 - 1.2 mg/dL    Globulin 2.0 gm/dL    A/G Ratio 2.4 g/dL    BUN/Creatinine Ratio 23.6 7.0 - 25.0    Anion Gap 14.1 5.0 - 15.0 mmol/L    eGFR 69.8 >60.0 mL/min/1.73   High Sensitivity Troponin T    Collection Time: 05/21/25  3:04 PM    Specimen: Arm, Right; Blood   Result Value Ref Range    HS Troponin T 28 (H) <14 ng/L   Green Top (Gel)    Collection Time: 05/21/25  3:04 PM   Result Value Ref Range    Extra Tube Hold for add-ons.    Lavender Top    Collection Time: 05/21/25  3:04 PM   Result Value Ref Range    Extra Tube hold for add-on    Gold Top - SST    Collection Time: 05/21/25  3:04 PM   Result Value Ref Range    Extra Tube Hold for add-ons.    Light Blue Top    Collection Time: 05/21/25  3:04 PM   Result Value Ref Range    Extra Tube Hold for add-ons.    CBC Auto Differential    Collection Time: 05/21/25  3:04 PM    Specimen: Arm, Right; Blood   Result Value Ref Range    WBC 7.16 3.40 - 10.80 10*3/mm3    RBC 5.25 3.77 - 5.28 10*6/mm3    Hemoglobin 14.6 12.0 - 15.9 g/dL    Hematocrit 46.0 34.0 - 46.6 %    MCV 87.6 79.0 - 97.0 fL    MCH 27.8 26.6 - 33.0 pg    MCHC 31.7 31.5 - 35.7 g/dL    RDW 14.2 12.3 - 15.4 %    RDW-SD 45.6 37.0 - 54.0 fl    MPV 9.9 6.0 - 12.0 fL    Platelets 232 140 - 450 10*3/mm3    Neutrophil % 57.1 42.7 - 76.0 %    Lymphocyte % 33.5 19.6 - 45.3 %    Monocyte % 5.9 5.0 -  12.0 %    Eosinophil % 2.8 0.3 - 6.2 %    Basophil % 0.4 0.0 - 1.5 %    Immature Grans % 0.3 0.0 - 0.5 %    Neutrophils, Absolute 4.09 1.70 - 7.00 10*3/mm3    Lymphocytes, Absolute 2.40 0.70 - 3.10 10*3/mm3    Monocytes, Absolute 0.42 0.10 - 0.90 10*3/mm3    Eosinophils, Absolute 0.20 0.00 - 0.40 10*3/mm3    Basophils, Absolute 0.03 0.00 - 0.20 10*3/mm3    Immature Grans, Absolute 0.02 0.00 - 0.05 10*3/mm3    nRBC 0.0 0.0 - 0.2 /100 WBC   High Sensitivity Troponin T 1Hr    Collection Time: 05/21/25  5:00 PM    Specimen: Arm, Left; Blood   Result Value Ref Range    HS Troponin T 25 (H) <14 ng/L    Troponin T Numeric Delta -3 ng/L    Troponin T % Delta -11 Abnormal if >/= 20%   Respiratory Panel PCR w/COVID-19(SARS-CoV-2) [facility], NP Swab in UTM/VTM, 2 HR TAT - Swab, Nasopharynx    Collection Time: 05/21/25  6:23 PM    Specimen: Nasopharynx; Swab   Result Value Ref Range    ADENOVIRUS, PCR Not Detected Not Detected    Coronavirus 229E Not Detected Not Detected    Coronavirus HKU1 Not Detected Not Detected    Coronavirus NL63 Not Detected Not Detected    Coronavirus OC43 Not Detected Not Detected    COVID19 Not Detected Not Detected - Ref. Range    Human Metapneumovirus Not Detected Not Detected    Human Rhinovirus/Enterovirus Not Detected Not Detected    Influenza A PCR Not Detected Not Detected    Influenza B PCR Not Detected Not Detected    Parainfluenza Virus 1 Not Detected Not Detected    Parainfluenza Virus 2 Not Detected Not Detected    Parainfluenza Virus 3 Not Detected Not Detected    Parainfluenza Virus 4 Not Detected Not Detected    RSV, PCR Not Detected Not Detected    Bordetella pertussis pcr Not Detected Not Detected    Bordetella parapertussis PCR Not Detected Not Detected    Chlamydophila pneumoniae PCR Not Detected Not Detected    Mycoplasma pneumo by PCR Not Detected Not Detected       Ordered the above labs and independently interpreted results. My findings will be discussed  in the medical decision making section below        RADIOLOGY  CT Angiogram Chest Pulmonary Embolism  Result Date: 5/21/2025  CTA CHEST WITH IV CONTRAST  HISTORY: chest pain, dyspnea, h/o PE  COMPARISON: Prior chest CT exams, most recent 3/2/2025  TECHNIQUE: CT angiography was performed of the chest with axial images as well as coronal and sagittal reformatted MIP images provided following administration of IV contrast. 3-D surface rendered reformats were obtained of the pulmonary arteries and aorta. Radiation dose reduction techniques were utilized, including automated exposure control, and exposure modulation based on body size.  FINDINGS:  There is no pulmonary infiltrate, pleural effusion, pneumothorax or suspicious nodule. There is a 4 mm noncalcified posterior medial left lower lobe nodule, unchanged since 3/5/2022 and clearly benign.  Thoracic aorta is normal in caliber. There is no convincing evidence of coronary atherosclerotic vascular calcification. There is no suspicious mediastinal adenopathy or other mass.  Images of the upper abdomen show no acute abnormality.  There is no acute bony abnormality.  Bolus timing is good and there is no evidence of pulmonary embolism.       Pulmonary arteries are well-opacified, and there is no evidence of pulmonary embolism.  No acute pulmonary parenchymal abnormality is seen.    This report was finalized on 5/21/2025 9:34 PM by Dr. Giovanny Salgado M.D on Workstation: [id number]      XR Chest 1 View  Result Date: 5/21/2025  XR CHEST 1 VW-  HISTORY: Female who is 70 years-old, chest pain  TECHNIQUE: Frontal view of the chest  COMPARISON: 3/2/2025  FINDINGS: The heart size is normal. Sternotomy changes are present. Pulmonary vasculature is unremarkable. Minimal likely scarring or atelectasis at the lung bases. No focal pulmonary consolidation, pleural effusion, or pneumothorax. No acute osseous process.      Minimal likely scarring or atelectasis at the lung bases.  Follow-up as indications persist.  This report was finalized on 5/21/2025 4:02 PM by Dr. Bj Rodney M.D on Workstation: [id number]        Ordered the above noted radiological studies.  Independently interpreted by me and my independent review of findings can be found in the ED Course.  See dictation for official radiology interpretation.      PROCEDURES    Procedures      EKG - Per my independent interpretation:    EKG Time: 1501  Rhythm/Rate: Sinus rhythm with a rate of 85  Normal axis  Left bundle branch block  No acute ischemic changes  No STEMI       No overt emergent changes compared to March 2, 2025      MEDICATIONS GIVEN IN ER    Medications   sodium chloride 0.9 % flush 10 mL (has no administration in time range)   sodium chloride 0.9 % flush 10 mL (has no administration in time range)   nitroglycerin (NITROSTAT) SL tablet 0.4 mg (has no administration in time range)   acetaminophen (TYLENOL) tablet 650 mg (has no administration in time range)     Or   acetaminophen (TYLENOL) 160 MG/5ML oral solution 650 mg (has no administration in time range)     Or   acetaminophen (TYLENOL) suppository 650 mg (has no administration in time range)   sennosides-docusate (PERICOLACE) 8.6-50 MG per tablet 2 tablet (has no administration in time range)     And   polyethylene glycol (MIRALAX) packet 17 g (has no administration in time range)     And   bisacodyl (DULCOLAX) EC tablet 5 mg (has no administration in time range)     And   bisacodyl (DULCOLAX) suppository 10 mg (has no administration in time range)   ondansetron ODT (ZOFRAN-ODT) disintegrating tablet 4 mg (has no administration in time range)     Or   ondansetron (ZOFRAN) injection 4 mg (has no administration in time range)   aluminum-magnesium hydroxide-simethicone (MAALOX MAX) 400-400-40 MG/5ML suspension 15 mL (has no administration in time range)   lactated ringers bolus 1,000 mL (0 mL Intravenous Stopped 5/21/25 1922)   ondansetron (ZOFRAN) injection 4 mg  (4 mg Intravenous Given 5/21/25 1850)   ketorolac (TORADOL) injection 15 mg (15 mg Intravenous Given 5/21/25 1857)   iopamidol (ISOVUE-370) 76 % injection 100 mL (77 mL Intravenous Given by Other 5/21/25 2042)         PROGRESS, DATA ANALYSIS, CONSULTS, AND MEDICAL DECISION MAKING    Please note that this section constitutes my independent interpretation of clinical data including lab results, radiology, EKG's.  This constitutes my independent professional opinion regarding differential diagnosis and management of this patient.  It may include any factors such as history from outside sources, review of external records, social determinants of health, management of medications, response to those treatments, and discussions with other providers.    Differential Diagnosis and Plan: Initial concern for viral process, pneumonia, ACS, arrhythmia, dehydration, renal failure, electrolyte imbalance, anemia, among others.  Plan for labs, chest x-ray, EKG, supportive care, reevaluation with results.    Additional sources:  - Discussed/ obtained information from independent historians:       - (Social Determinants of Health): None     - Shared decision making:  Patient fully updated on and in agreement with the course and plan moving forward    ED Course as of 05/21/25 2158   Wed May 21, 2025   1740 WBC: 7.16 [DC]   1740 Hemoglobin: 14.6 [DC]   1740 HS Troponin T(!): 28 [DC]   1740 Glucose(!): 112 [DC]   1740 BUN: 21 [DC]   1740 Creatinine: 0.89 [DC]   1740 Sodium: 141 [DC]   1740 Potassium: 4.3 [DC]   1740 ALT (SGPT)(!): 37 [DC]   1740 AST (SGOT)(!): 37 [DC]   1740 Alkaline Phosphatase: 73 [DC]   1740 Total Bilirubin: 0.6 [DC]   1740 HS Troponin T(!): 25 [DC]   1740 Troponin T Numeric Delta: -3 [DC]   1949 Respiratory Panel PCR w/COVID-19(SARS-CoV-2) [facility], NP Swab in UTM/VTM, 2 HR TAT - Swab, Nasopharynx  Pan-negative [DC]   2018 Patient having some ongoing residual pain [DC]   2139 CT Angiogram  Chest Pulmonary Embolism  Radiology report reviewed, no evidence of PE, no evidence of pneumonia [DC]   2153 Discussed with PIYUSH Barker, LHA, discussed patient's clinical course and findings today, treatment modalities, and need for hospitalization. [DC]      ED Course User Index  [DC] Eddie Meyer MD       Hospitalization Considered?: yes    Orders Placed During This Visit:  Orders Placed This Encounter   Procedures    Respiratory Panel PCR w/COVID-19(SARS-CoV-2) [facility], NP Swab in UTM/VTM, 2 HR TAT - Swab, Nasopharynx    XR Chest 1 View    CT Angiogram Chest Pulmonary Embolism    [place] Draw    Comprehensive Metabolic Panel    High Sensitivity Troponin T    CBC Auto Differential    High Sensitivity Troponin T 1Hr    BNP    Comprehensive Metabolic Panel    CBC (No Diff)    High Sensitivity Troponin T    NPO Diet NPO Type: Strict NPO    Undress & Gown    Continuous Pulse Oximetry    Vital Signs    Intake & Output    Oral Care    Place Sequential Compression Device    Maintain Sequential Compression Device    Maintain IV Access    Telemetry - Place Orders & Notify Provider of Results When Patient Experiences Acute Chest Pain, Dysrhythmia or Respiratory Distress    May Be Off Telemetry for Tests    Up With Assistance    Code Status and Medical Interventions: CPR (Attempt to Resuscitate); Full Support    [facility] (on-call MD unless specified) Details    Inpatient Cardiology Consult    Oxygen Therapy- Nasal Cannula; Titrate 1-6 LPM Per SpO2; 90 - 95%    Incentive Spirometry    ECG 12 Lead ED Triage Standing Order; Chest Pain    ECG 12 Lead ED Triage Standing Order; Chest Pain    Insert Peripheral IV    Initiate Observation Status    CBC & Differential    Green Top (Gel)    Lavender Top    Gold Top - SST    Light Blue Top       Additional orders considered but not placed:      Independent interpretation of labs, radiology studies, and discussions with consultants: See ED  Course        AS OF 21:58 EDT VITALS:    BP - 102/43  HR - 70  TEMP - 97.8 °F (36.6 °C) (Oral)  02 SATS - (!) 88%          DIAGNOSIS  Final diagnoses:   Hypoxia   Chest discomfort   History of amyloidosis         DISPOSITION  ED Disposition       ED Disposition   Decision to Admit    Condition   --    Comment   Level of Care: Telemetry [5]   Diagnosis: Hypoxia [[id number]]   Admitting Physician: MONICA URIBE [[id number]]   Attending Physician: MONICA URIBE [[id number]]   Is patient appropriate for Inpatient Observation Unit?: No [0]                  Please note that portions of this document were completed with a voice recognition program.    Note Disclaimer: At [facility], we believe that sharing information builds trust and better relationships. You are receiving this note because you recently visited [facility]. It is possible you will see health information before a provider has talked with you about it. This kind of information can be easy to misunderstand. To help you fully understand what it means for your health, we urge you to discuss this note with your provider.                       Eddie Meyer MD  05/21/25 0519

## 2025-05-22 ENCOUNTER — APPOINTMENT (OUTPATIENT)
Dept: NUCLEAR MEDICINE | Facility: HOSPITAL | Age: 70
End: 2025-05-22
Payer: MEDICARE

## 2025-05-22 ENCOUNTER — APPOINTMENT (OUTPATIENT)
Dept: CARDIOLOGY | Facility: HOSPITAL | Age: 70
End: 2025-05-22
Payer: MEDICARE

## 2025-05-22 ENCOUNTER — READMISSION MANAGEMENT (OUTPATIENT)
Dept: CALL CENTER | Facility: HOSPITAL | Age: 70
End: 2025-05-22
Payer: MEDICARE

## 2025-05-22 VITALS
SYSTOLIC BLOOD PRESSURE: 121 MMHG | OXYGEN SATURATION: 95 % | TEMPERATURE: 97.7 F | DIASTOLIC BLOOD PRESSURE: 63 MMHG | RESPIRATION RATE: 16 BRPM | HEART RATE: 78 BPM | WEIGHT: 140 LBS | HEIGHT: 61 IN | BODY MASS INDEX: 26.43 KG/M2

## 2025-05-22 PROBLEM — I42.1 HYPERTROPHIC OBSTRUCTIVE CARDIOMYOPATHY: Status: ACTIVE | Noted: 2022-12-05

## 2025-05-22 LAB
ALBUMIN SERPL-MCNC: 4.2 G/DL (ref 3.5–5.2)
ALBUMIN/GLOB SERPL: 2.6 G/DL
ALP SERPL-CCNC: 64 U/L (ref 39–117)
ALT SERPL W P-5'-P-CCNC: 30 U/L (ref 1–33)
ANION GAP SERPL CALCULATED.3IONS-SCNC: 12 MMOL/L (ref 5–15)
AORTIC DIMENSIONLESS INDEX: 0.66 (DI)
ASCENDING AORTA: 3.2 CM
AST SERPL-CCNC: 32 U/L (ref 1–32)
AV MEAN PRESS GRAD SYS DOP V1V2: 4.4 MMHG
AV VMAX SYS DOP: 145 CM/SEC
BH CV ECHO LEFT VENTRICLE GLOBAL LONGITUDINAL STRAIN: -17.4 %
BH CV ECHO MEAS - AO MAX PG: 8.4 MMHG
BH CV ECHO MEAS - AO ROOT DIAM: 3.3 CM
BH CV ECHO MEAS - AO V2 VTI: 28.2 CM
BH CV ECHO MEAS - AVA(I,D): 1.99 CM2
BH CV ECHO MEAS - EDV(CUBED): 44.7 ML
BH CV ECHO MEAS - EDV(MOD-SP2): 83 ML
BH CV ECHO MEAS - EDV(MOD-SP4): 72 ML
BH CV ECHO MEAS - EF(MOD-SP2): 54.2 %
BH CV ECHO MEAS - EF(MOD-SP4): 51.4 %
BH CV ECHO MEAS - ESV(CUBED): 18.6 ML
BH CV ECHO MEAS - ESV(MOD-SP2): 38 ML
BH CV ECHO MEAS - ESV(MOD-SP4): 35 ML
BH CV ECHO MEAS - FS: 25.4 %
BH CV ECHO MEAS - IVS/LVPW: 1.08 CM
BH CV ECHO MEAS - IVSD: 1.07 CM
BH CV ECHO MEAS - LAT PEAK E' VEL: 6 CM/SEC
BH CV ECHO MEAS - LV DIASTOLIC VOL/BSA (35-75): 44.4 CM2
BH CV ECHO MEAS - LV MASS(C)D: 110 GRAMS
BH CV ECHO MEAS - LV MAX PG: 3.4 MMHG
BH CV ECHO MEAS - LV MEAN PG: 1.57 MMHG
BH CV ECHO MEAS - LV SYSTOLIC VOL/BSA (12-30): 21.6 CM2
BH CV ECHO MEAS - LV V1 MAX: 92.2 CM/SEC
BH CV ECHO MEAS - LV V1 VTI: 18.7 CM
BH CV ECHO MEAS - LVIDD: 3.5 CM
BH CV ECHO MEAS - LVIDS: 2.6 CM
BH CV ECHO MEAS - LVOT AREA: 3 CM2
BH CV ECHO MEAS - LVOT DIAM: 1.95 CM
BH CV ECHO MEAS - LVPWD: 0.99 CM
BH CV ECHO MEAS - MED PEAK E' VEL: 3.6 CM/SEC
BH CV ECHO MEAS - MV A DUR: 0.15 SEC
BH CV ECHO MEAS - MV A MAX VEL: 104.3 CM/SEC
BH CV ECHO MEAS - MV DEC SLOPE: 214.5 CM/SEC2
BH CV ECHO MEAS - MV DEC TIME: 0.32 SEC
BH CV ECHO MEAS - MV E MAX VEL: 80.5 CM/SEC
BH CV ECHO MEAS - MV E/A: 0.77
BH CV ECHO MEAS - MV MAX PG: 4.3 MMHG
BH CV ECHO MEAS - MV MEAN PG: 1.93 MMHG
BH CV ECHO MEAS - MV P1/2T: 99.6 MSEC
BH CV ECHO MEAS - MV V2 VTI: 25.4 CM
BH CV ECHO MEAS - MVA(P1/2T): 2.21 CM2
BH CV ECHO MEAS - MVA(VTI): 2.21 CM2
BH CV ECHO MEAS - PA ACC TIME: 0.11 SEC
BH CV ECHO MEAS - PA V2 MAX: 97.7 CM/SEC
BH CV ECHO MEAS - RV MAX PG: 1.9 MMHG
BH CV ECHO MEAS - RV V1 MAX: 69 CM/SEC
BH CV ECHO MEAS - RV V1 VTI: 15 CM
BH CV ECHO MEAS - SV(LVOT): 56.2 ML
BH CV ECHO MEAS - SV(MOD-SP2): 45 ML
BH CV ECHO MEAS - SV(MOD-SP4): 37 ML
BH CV ECHO MEAS - SVI(LVOT): 34.6 ML/M2
BH CV ECHO MEAS - SVI(MOD-SP2): 27.7 ML/M2
BH CV ECHO MEAS - SVI(MOD-SP4): 22.8 ML/M2
BH CV ECHO MEAS - TAPSE (>1.6): 1.59 CM
BH CV ECHO MEASUREMENTS AVERAGE E/E' RATIO: 16.77
BH CV REST NUCLEAR ISOTOPE DOSE: 9.6 MCI
BH CV STRESS COMMENTS STAGE 1: NORMAL
BH CV STRESS DOSE REGADENOSON STAGE 1: 0.4
BH CV STRESS DURATION MIN STAGE 1: 0
BH CV STRESS DURATION SEC STAGE 1: 10
BH CV STRESS NUCLEAR ISOTOPE DOSE: 30.6 MCI
BH CV STRESS PROTOCOL 1: NORMAL
BH CV STRESS RECOVERY BP: NORMAL MMHG
BH CV STRESS RECOVERY HR: 85 BPM
BH CV STRESS STAGE 1: 1
BH CV XLRA - RV BASE: 3 CM
BH CV XLRA - RV LENGTH: 5.8 CM
BH CV XLRA - RV MID: 2.36 CM
BH CV XLRA - TDI S': 9.2 CM/SEC
BILIRUB SERPL-MCNC: 0.5 MG/DL (ref 0–1.2)
BUN SERPL-MCNC: 22 MG/DL (ref 8–23)
BUN/CREAT SERPL: 31 (ref 7–25)
CALCIUM SPEC-SCNC: 9.1 MG/DL (ref 8.6–10.5)
CHLORIDE SERPL-SCNC: 106 MMOL/L (ref 98–107)
CO2 SERPL-SCNC: 22 MMOL/L (ref 22–29)
CREAT SERPL-MCNC: 0.71 MG/DL (ref 0.57–1)
DEPRECATED RDW RBC AUTO: 44.1 FL (ref 37–54)
EGFRCR SERPLBLD CKD-EPI 2021: 91.6 ML/MIN/1.73
ERYTHROCYTE [DISTWIDTH] IN BLOOD BY AUTOMATED COUNT: 14.1 % (ref 12.3–15.4)
GLOBULIN UR ELPH-MCNC: 1.6 GM/DL
GLUCOSE SERPL-MCNC: 91 MG/DL (ref 65–99)
HCT VFR BLD AUTO: 40.2 % (ref 34–46.6)
HGB BLD-MCNC: 13 G/DL (ref 12–15.9)
LEFT ATRIUM VOLUME INDEX: 21.6 ML/M2
LV EF BIPLANE MOD: 53.3 %
MAXIMAL PREDICTED HEART RATE: 150 BPM
MCH RBC QN AUTO: 27.8 PG (ref 26.6–33)
MCHC RBC AUTO-ENTMCNC: 32.3 G/DL (ref 31.5–35.7)
MCV RBC AUTO: 86.1 FL (ref 79–97)
PERCENT MAX PREDICTED HR: 62.67 %
PLATELET # BLD AUTO: 180 10*3/MM3 (ref 140–450)
PMV BLD AUTO: 9.9 FL (ref 6–12)
POTASSIUM SERPL-SCNC: 4.1 MMOL/L (ref 3.5–5.2)
PROT SERPL-MCNC: 5.8 G/DL (ref 6–8.5)
QT INTERVAL: 503 MS
QTC INTERVAL: 509 MS
RBC # BLD AUTO: 4.67 10*6/MM3 (ref 3.77–5.28)
SINUS: 3.1 CM
SODIUM SERPL-SCNC: 140 MMOL/L (ref 136–145)
SPECT HRT GATED+EF W RNC IV: 61 %
STJ: 2.7 CM
STRESS BASELINE BP: NORMAL MMHG
STRESS BASELINE HR: 74 BPM
STRESS PERCENT HR: 74 %
STRESS POST PEAK BP: NORMAL MMHG
STRESS POST PEAK HR: 94 BPM
STRESS TARGET HR: 128 BPM
TROPONIN T SERPL HS-MCNC: 31 NG/L
WBC NRBC COR # BLD AUTO: 4.87 10*3/MM3 (ref 3.4–10.8)

## 2025-05-22 PROCEDURE — 25510000001 PERFLUTREN 6.52 MG/ML SUSPENSION 2 ML VIAL: Performed by: INTERNAL MEDICINE

## 2025-05-22 PROCEDURE — 99222 1ST HOSP IP/OBS MODERATE 55: CPT | Performed by: INTERNAL MEDICINE

## 2025-05-22 PROCEDURE — 93356 MYOCRD STRAIN IMG SPCKL TRCK: CPT | Performed by: INTERNAL MEDICINE

## 2025-05-22 PROCEDURE — 25010000002 REGADENOSON 0.4 MG/5ML SOLUTION: Performed by: INTERNAL MEDICINE

## 2025-05-22 PROCEDURE — 78452 HT MUSCLE IMAGE SPECT MULT: CPT | Performed by: INTERNAL MEDICINE

## 2025-05-22 PROCEDURE — 93016 CV STRESS TEST SUPVJ ONLY: CPT | Performed by: INTERNAL MEDICINE

## 2025-05-22 PROCEDURE — 93306 TTE W/DOPPLER COMPLETE: CPT

## 2025-05-22 PROCEDURE — 93017 CV STRESS TEST TRACING ONLY: CPT

## 2025-05-22 PROCEDURE — 25010000002 PROCHLORPERAZINE 10 MG/2ML SOLUTION: Performed by: HOSPITALIST

## 2025-05-22 PROCEDURE — G0378 HOSPITAL OBSERVATION PER HR: HCPCS

## 2025-05-22 PROCEDURE — 93005 ELECTROCARDIOGRAM TRACING: CPT | Performed by: INTERNAL MEDICINE

## 2025-05-22 PROCEDURE — 34310000005 TECHNETIUM TETROFOSMIN KIT: Performed by: INTERNAL MEDICINE

## 2025-05-22 PROCEDURE — A9502 TC99M TETROFOSMIN: HCPCS | Performed by: INTERNAL MEDICINE

## 2025-05-22 PROCEDURE — 93356 MYOCRD STRAIN IMG SPCKL TRCK: CPT

## 2025-05-22 PROCEDURE — 85027 COMPLETE CBC AUTOMATED: CPT | Performed by: NURSE PRACTITIONER

## 2025-05-22 PROCEDURE — 80053 COMPREHEN METABOLIC PANEL: CPT | Performed by: NURSE PRACTITIONER

## 2025-05-22 PROCEDURE — 25010000002 KETOROLAC TROMETHAMINE PER 15 MG: Performed by: HOSPITALIST

## 2025-05-22 PROCEDURE — 93018 CV STRESS TEST I&R ONLY: CPT | Performed by: INTERNAL MEDICINE

## 2025-05-22 PROCEDURE — 96375 TX/PRO/DX INJ NEW DRUG ADDON: CPT

## 2025-05-22 PROCEDURE — 96376 TX/PRO/DX INJ SAME DRUG ADON: CPT

## 2025-05-22 PROCEDURE — 93306 TTE W/DOPPLER COMPLETE: CPT | Performed by: INTERNAL MEDICINE

## 2025-05-22 PROCEDURE — 93010 ELECTROCARDIOGRAM REPORT: CPT | Performed by: INTERNAL MEDICINE

## 2025-05-22 PROCEDURE — 78452 HT MUSCLE IMAGE SPECT MULT: CPT

## 2025-05-22 PROCEDURE — 84484 ASSAY OF TROPONIN QUANT: CPT | Performed by: NURSE PRACTITIONER

## 2025-05-22 RX ORDER — ROSUVASTATIN CALCIUM 20 MG/1
40 TABLET, COATED ORAL NIGHTLY
Status: DISCONTINUED | OUTPATIENT
Start: 2025-05-22 | End: 2025-05-22 | Stop reason: HOSPADM

## 2025-05-22 RX ORDER — ASPIRIN 81 MG/1
81 TABLET ORAL DAILY
Status: DISCONTINUED | OUTPATIENT
Start: 2025-05-22 | End: 2025-05-22 | Stop reason: HOSPADM

## 2025-05-22 RX ORDER — LEVOTHYROXINE SODIUM 75 UG/1
75 TABLET ORAL DAILY
Status: DISCONTINUED | OUTPATIENT
Start: 2025-05-22 | End: 2025-05-22 | Stop reason: HOSPADM

## 2025-05-22 RX ORDER — SPIRONOLACTONE 25 MG/1
25 TABLET ORAL DAILY
Status: DISCONTINUED | OUTPATIENT
Start: 2025-05-22 | End: 2025-05-22 | Stop reason: HOSPADM

## 2025-05-22 RX ORDER — REGADENOSON 0.08 MG/ML
0.4 INJECTION, SOLUTION INTRAVENOUS
Status: COMPLETED | OUTPATIENT
Start: 2025-05-22 | End: 2025-05-22

## 2025-05-22 RX ORDER — ICOSAPENT ETHYL 1 G/1
2 CAPSULE ORAL DAILY
Status: DISCONTINUED | OUTPATIENT
Start: 2025-05-22 | End: 2025-05-22

## 2025-05-22 RX ORDER — ALPRAZOLAM 0.25 MG
0.25 TABLET ORAL 2 TIMES DAILY PRN
Status: DISCONTINUED | OUTPATIENT
Start: 2025-05-22 | End: 2025-05-22 | Stop reason: HOSPADM

## 2025-05-22 RX ORDER — MIDODRINE HYDROCHLORIDE 5 MG/1
5 TABLET ORAL
Status: DISCONTINUED | OUTPATIENT
Start: 2025-05-22 | End: 2025-05-22 | Stop reason: HOSPADM

## 2025-05-22 RX ORDER — HYDROXYZINE HYDROCHLORIDE 25 MG/1
25 TABLET, FILM COATED ORAL ONCE
Status: COMPLETED | OUTPATIENT
Start: 2025-05-22 | End: 2025-05-22

## 2025-05-22 RX ORDER — ZOLPIDEM TARTRATE 5 MG/1
10 TABLET ORAL NIGHTLY PRN
Status: DISCONTINUED | OUTPATIENT
Start: 2025-05-22 | End: 2025-05-22 | Stop reason: HOSPADM

## 2025-05-22 RX ORDER — CHOLECALCIFEROL (VITAMIN D3) 25 MCG
2000 TABLET ORAL DAILY
Status: DISCONTINUED | OUTPATIENT
Start: 2025-05-22 | End: 2025-05-22 | Stop reason: HOSPADM

## 2025-05-22 RX ORDER — TRIAMCINOLONE ACETONIDE 1 MG/G
CREAM TOPICAL DAILY
Status: DISCONTINUED | OUTPATIENT
Start: 2025-05-22 | End: 2025-05-22 | Stop reason: HOSPADM

## 2025-05-22 RX ORDER — MULTIPLE VITAMINS W/ MINERALS TAB 9MG-400MCG
1 TAB ORAL DAILY
Status: DISCONTINUED | OUTPATIENT
Start: 2025-05-22 | End: 2025-05-22 | Stop reason: HOSPADM

## 2025-05-22 RX ORDER — KETOROLAC TROMETHAMINE 15 MG/ML
15 INJECTION, SOLUTION INTRAMUSCULAR; INTRAVENOUS ONCE
Status: COMPLETED | OUTPATIENT
Start: 2025-05-22 | End: 2025-05-22

## 2025-05-22 RX ORDER — PROCHLORPERAZINE EDISYLATE 5 MG/ML
10 INJECTION INTRAMUSCULAR; INTRAVENOUS ONCE
Status: COMPLETED | OUTPATIENT
Start: 2025-05-22 | End: 2025-05-22

## 2025-05-22 RX ORDER — CETIRIZINE HYDROCHLORIDE 10 MG/1
10 TABLET ORAL DAILY
Status: DISCONTINUED | OUTPATIENT
Start: 2025-05-22 | End: 2025-05-22 | Stop reason: HOSPADM

## 2025-05-22 RX ORDER — CHOLESTYRAMINE 4 G/9G
1 POWDER, FOR SUSPENSION ORAL
Status: DISCONTINUED | OUTPATIENT
Start: 2025-05-22 | End: 2025-05-22 | Stop reason: HOSPADM

## 2025-05-22 RX ORDER — KETOTIFEN FUMARATE 0.35 MG/ML
1 SOLUTION/ DROPS OPHTHALMIC 2 TIMES DAILY
Status: DISCONTINUED | OUTPATIENT
Start: 2025-05-22 | End: 2025-05-22 | Stop reason: HOSPADM

## 2025-05-22 RX ORDER — ACYCLOVIR 400 MG/1
400 TABLET ORAL 2 TIMES DAILY
Status: DISCONTINUED | OUTPATIENT
Start: 2025-05-22 | End: 2025-05-22 | Stop reason: HOSPADM

## 2025-05-22 RX ORDER — PANTOPRAZOLE SODIUM 40 MG/1
40 TABLET, DELAYED RELEASE ORAL
Status: DISCONTINUED | OUTPATIENT
Start: 2025-05-22 | End: 2025-05-22 | Stop reason: HOSPADM

## 2025-05-22 RX ADMIN — FLUOXETINE 20 MG: 20 CAPSULE ORAL at 14:14

## 2025-05-22 RX ADMIN — ACYCLOVIR 400 MG: 400 TABLET ORAL at 14:14

## 2025-05-22 RX ADMIN — ASPIRIN 81 MG: 81 TABLET, COATED ORAL at 14:15

## 2025-05-22 RX ADMIN — TETROFOSMIN 1 DOSE: 1.38 INJECTION, POWDER, LYOPHILIZED, FOR SOLUTION INTRAVENOUS at 11:18

## 2025-05-22 RX ADMIN — PROCHLORPERAZINE EDISYLATE 10 MG: 5 INJECTION INTRAMUSCULAR; INTRAVENOUS at 14:16

## 2025-05-22 RX ADMIN — REGADENOSON 0.4 MG: 0.08 INJECTION, SOLUTION INTRAVENOUS at 11:18

## 2025-05-22 RX ADMIN — Medication 1 TABLET: at 14:14

## 2025-05-22 RX ADMIN — SPIRONOLACTONE 25 MG: 25 TABLET ORAL at 14:15

## 2025-05-22 RX ADMIN — TETROFOSMIN 1 DOSE: 1.38 INJECTION, POWDER, LYOPHILIZED, FOR SOLUTION INTRAVENOUS at 09:45

## 2025-05-22 RX ADMIN — CETIRIZINE HYDROCHLORIDE 10 MG: 10 TABLET, FILM COATED ORAL at 14:15

## 2025-05-22 RX ADMIN — TRIAMCINOLONE ACETONIDE: 1 CREAM TOPICAL at 14:16

## 2025-05-22 RX ADMIN — Medication 2000 UNITS: at 14:14

## 2025-05-22 RX ADMIN — LEVOTHYROXINE SODIUM 75 MCG: 0.07 TABLET ORAL at 14:15

## 2025-05-22 RX ADMIN — KETOROLAC TROMETHAMINE 15 MG: 15 INJECTION, SOLUTION INTRAMUSCULAR; INTRAVENOUS at 14:16

## 2025-05-22 RX ADMIN — HYDROXYZINE HYDROCHLORIDE 25 MG: 25 TABLET, FILM COATED ORAL at 00:32

## 2025-05-22 RX ADMIN — PANTOPRAZOLE SODIUM 40 MG: 40 TABLET, DELAYED RELEASE ORAL at 14:15

## 2025-05-22 RX ADMIN — PERFLUTREN 2 ML: 6.52 INJECTION, SUSPENSION INTRAVENOUS at 13:16

## 2025-05-22 RX ADMIN — KETOTIFEN FUMARATE 1 DROP: 0.25 SOLUTION OPHTHALMIC at 14:16

## 2025-05-22 NOTE — ED NOTES
Nursing report ED to floor  Cynthiatraci Williamsonklin  70 y.o.  female    HPI :  HPI  Stated Reason for Visit: CP, SOA, neck pain since yesterday  History Obtained From: patient    Chief Complaint  Chief Complaint   Patient presents with    Chest Pain    Weakness - Generalized       Admitting doctor:   Deborah Hurd DO    Admitting diagnosis:   The primary encounter diagnosis was Hypoxia. Diagnoses of Chest discomfort and History of amyloidosis were also pertinent to this visit.    Code status:   Current Code Status       Date Active Code Status Order ID Comments User Context       5/21/2025 2153 CPR (Attempt to Resuscitate) [id number]  Elio Rolle, [facility]        Question Answer    Code Status (Patient has no pulse and is not breathing) CPR (Attempt to Resuscitate)    Medical Interventions (Patient has pulse or is breathing) Full Support                    Allergies:   Azithromycin, Ezetimibe, Pravastatin, and Sulfa antibiotics    Isolation:   No active isolations    Intake and Output    Intake/Output Summary (Last 24 hours) at 5/21/2025 2225  Last data filed at 5/21/2025 1922  Gross per 24 hour   Intake 1000 ml   Output --   Net 1000 ml       Weight:       05/21/25  1826   Weight: 59 kg (130 lb)       Most recent vitals:   Vitals:    05/21/25 2130 05/21/25 2131 05/21/25 2134 05/21/25 2200   BP:  102/43  111/73   BP Location:       Patient Position:       Pulse: 68 68 70 67   Resp:       Temp:       TempSrc:       SpO2: (!) 87% 90% (!) 88% 97%   Weight:       Height:           Active LDAs/IV Access:   Lines, Drains & Airways       Active LDAs       Name Placement date Placement time Site Days    Peripheral IV 05/21/25 1840 20 G Posterior;Right Forearm 05/21/25  1840  Forearm  less than 1                    Labs (abnormal labs have a star):   Labs Reviewed   COMPREHENSIVE METABOLIC PANEL - Abnormal; Notable for the following components:       Result Value    Glucose 112 (*)     ALT (SGPT) 37 (*)     AST  (SGOT) 37 (*)     All other components within normal limits    Narrative:     GFR Categories in Chronic Kidney Disease (CKD)              GFR Category          GFR (mL/min/1.73)    Interpretation  G1                    90 or greater        Normal or high (1)  G2                    60-89                Mild decrease (1)  G3a                   45-59                Mild to moderate decrease  G3b                   30-44                Moderate to severe decrease  G4                    15-29                Severe decrease  G5                    14 or less           Kidney failure    (1)In the absence of evidence of kidney disease, neither GFR category G1 or G2 fulfill the criteria for CKD.    eGFR calculation 2021 CKD-EPI creatinine equation, which does not include race as a factor   TROPONIN - Abnormal; Notable for the following components:    HS Troponin T 28 (*)     All other components within normal limits    Narrative:     High Sensitive Troponin T Reference Range:  <14.0 ng/L- Negative Female for AMI  <22.0 ng/L- Negative Male for AMI  >=14 - Abnormal Female indicating possible myocardial injury.  >=22 - Abnormal Male indicating possible myocardial injury.   Clinicians would have to utilize clinical acumen, EKG, Troponin, and serial changes to determine if it is an Acute Myocardial Infarction or myocardial injury due to an underlying chronic condition.        HIGH SENSITIVITIY TROPONIN T 1HR - Abnormal; Notable for the following components:    HS Troponin T 25 (*)     All other components within normal limits    Narrative:     High Sensitive Troponin T Reference Range:  <14.0 ng/L- Negative Female for AMI  <22.0 ng/L- Negative Male for AMI  >=14 - Abnormal Female indicating possible myocardial injury.  >=22 - Abnormal Male indicating possible myocardial injury.   Clinicians would have to utilize clinical acumen, EKG, Troponin, and serial changes to determine if it is an Acute Myocardial Infarction or myocardial  injury due to an underlying chronic condition.        RESPIRATORY PANEL PCR W/ COVID-19 (SARS-COV-2), NP SWAB IN UTM/VTP, 2 HR TAT - Normal    Narrative:     In the setting of a positive respiratory panel with a viral infection PLUS a negative procalcitonin without other underlying concern for bacterial infection, consider observing off antibiotics or discontinuation of antibiotics and continue supportive care. If the respiratory panel is positive for atypical bacterial infection (Bordetella pertussis, Chlamydophila pneumoniae, or Mycoplasma pneumoniae), consider antibiotic de-escalation to target atypical bacterial infection.   CBC WITH AUTO DIFFERENTIAL - Normal   RAINBOW DRAW    Narrative:     The following orders were created for panel order [place] Draw.  Procedure                               Abnormality         Status                     ---------                               -----------         ------                     Green Top (Gel)[[id number]]                                  Final result               Lavender Top[[id number]]                                     Final result               Gold Top - SST[[id number]]                                   Final result               Light Blue Top[[id number]]                                   Final result                 Please view results for these tests on the individual orders.   BNP (IN-HOUSE)   TROPONIN   COMPREHENSIVE METABOLIC PANEL   CBC (NO DIFF)   CBC AND DIFFERENTIAL    Narrative:     The following orders were created for panel order CBC & Differential.  Procedure                               Abnormality         Status                     ---------                               -----------         ------                     CBC Auto Differential[[id number]]        Normal              Final result                 Please view results for these tests on the individual orders.   GREEN TOP   LAVENDER TOP   GOLD TOP - SST   LIGHT BLUE TOP       EKG:   ECG  12 Lead ED Triage Standing Order; Chest Pain   Final Result   HEART RATE=85  bpm   RR Jrpckxek=559  ms   TX Xuhqqgdw=016  ms   P Horizontal Axis=39  deg   P Front Axis=10  deg   QRSD Suzlwztv=750  ms   QT Efmhxetd=253  ms   LRuV=420  ms   QRS Axis=-2  deg   T Wave Axis=184  deg   - ABNORMAL ECG -   Sinus rhythm   Borderline  prolonged TX interval   Left bundle branch block   No change from prior tracing   Electronically Signed By: Roberto Briones ([facility]) 2025-05-21 19:59:44   Date and Time of Study:2025-05-21 15:01:19          Meds given in ED:   Medications   sodium chloride 0.9 % flush 10 mL (has no administration in time range)   sodium chloride 0.9 % flush 10 mL (has no administration in time range)   nitroglycerin (NITROSTAT) SL tablet 0.4 mg (has no administration in time range)   acetaminophen (TYLENOL) tablet 650 mg (has no administration in time range)     Or   acetaminophen (TYLENOL) 160 MG/5ML oral solution 650 mg (has no administration in time range)     Or   acetaminophen (TYLENOL) suppository 650 mg (has no administration in time range)   sennosides-docusate (PERICOLACE) 8.6-50 MG per tablet 2 tablet (has no administration in time range)     And   polyethylene glycol (MIRALAX) packet 17 g (has no administration in time range)     And   bisacodyl (DULCOLAX) EC tablet 5 mg (has no administration in time range)     And   bisacodyl (DULCOLAX) suppository 10 mg (has no administration in time range)   ondansetron ODT (ZOFRAN-ODT) disintegrating tablet 4 mg (has no administration in time range)     Or   ondansetron (ZOFRAN) injection 4 mg (has no administration in time range)   aluminum-magnesium hydroxide-simethicone (MAALOX MAX) 400-400-40 MG/5ML suspension 15 mL (has no administration in time range)   lactated ringers bolus 1,000 mL (0 mL Intravenous Stopped 5/21/25 1922)   ondansetron (ZOFRAN) injection 4 mg (4 mg Intravenous Given 5/21/25 1850)   ketorolac (TORADOL) injection 15 mg (15 mg Intravenous  Given 5/21/25 1857)   iopamidol (ISOVUE-370) 76 % injection 100 mL (77 mL Intravenous Given by Other 5/21/25 2042)       Imaging results:  CT Angiogram Chest Pulmonary Embolism  Result Date: 5/21/2025   Pulmonary arteries are well-opacified, and there is no evidence of pulmonary embolism.  No acute pulmonary parenchymal abnormality is seen.    This report was finalized on 5/21/2025 9:34 PM by Dr. Giovanny Salgado M.D on Workstation: [id number]      XR Chest 1 View  Result Date: 5/21/2025  Minimal likely scarring or atelectasis at the lung bases. Follow-up as indications persist.  This report was finalized on 5/21/2025 4:02 PM by Dr. Bj Rodney M.D on Workstation: [id number]        Ambulatory status:   - ad gini    Social issues:   Social History     Socioeconomic History    Marital status: Single    Number of children: 2   Tobacco Use    Smoking status: Never     Passive exposure: Never    Smokeless tobacco: Never   Vaping Use    Vaping status: Never Used   Substance and Sexual Activity    Alcohol use: Not Currently     Alcohol/week: 1.0 standard drink of alcohol     Types: 1 Glasses of wine per week    Drug use: Never    Sexual activity: Defer       Peripheral Neurovascular  Peripheral Neurovascular (Adult)  Peripheral Neurovascular WDL: WDL    Neuro Cognitive  Neuro Cognitive (Adult)  Cognitive/Neuro/Behavioral WDL: WDL, level of consciousness, orientation  Level of Consciousness: Alert  Orientation: oriented x 4    Learning  Learning Assessment  Learning Readiness and Ability: no barriers identified    Respiratory  Respiratory  Airway WDL: .WDL except  Respiratory WDL  Respiratory WDL: .WDL except, rhythm/pattern  Rhythm/Pattern, Respiratory: shortness of breath    Abdominal Pain       Pain Assessments  Pain (Adult)  (0-10) Pain Rating: Rest: 5  Pain Location: chest  Pain Side/Orientation: right, anterior  Response to Pain Interventions: interventions effective per patient    NIH Stroke Scale       Janice WATTS  MANNY Banuelos  05/21/25 22:25 EDT

## 2025-05-22 NOTE — H&P
Patient Name:  Cynthia Portillo  YOB: 1955  MRN:  [id number]  Admit Date:  5/21/2025  Patient Care Team:  Arian Peterson MD as PCP - General (Internal Medicine)  Nav Sal MD as Consulting Physician (Hematology and Oncology)  Darren Pruitt MD as Referring Physician (Cardiology)  Olamide Euceda, MANNY as Ambulatory  ([facility])      Subjective   History Present Illness     Chief Complaint   Patient presents with    Chest Pain    Weakness - Generalized         History of Present Illness Ms. Portillo is a 70-year-old female with an extensive past medical history that includes amyloidosis with cardiac involvement, hypertrophic obstructive cardiomyopathy, type 2 diabetes mellitus, hepatomegaly, HLD, chronic hypotension on midodrine, and type 2 diabetes mellitus that presented to [facility] overnight with complaints of shortness of breath and chest pain that radiated into her back, neck and jaw, with associated nausea without emesis. She also reports a headache and generalized bodyaches and fatigue. She currently reports she is free of shortness of breath, pain, and all of her prior symptoms have resolved other than she reports a continued slight headache. She denies any other acute distress at this time.     Her initial evaluation in the emergency room-blood pressure 102/43, heart rate 65, RR 16, she had noted hypoxia with oxygen saturation of 87%, but improved to 100% with 2 L of oxygen via nasal cannula, she is afebrile with Tmax 97.7  RVP was negative for any acute viral illness, BC is unremarkable  Chest x-ray shows minimal scarring at lung bases, but no acute cardiopulmonary processes are identified.  CTA of the chest showed no pulmonary emboli pulmonary infiltrate, pleural effusion pneumothorax or suspicious nodules.  There is an unchanged 4 mm noncalcified posterior medial left lower lobe nodule.  Her troponins slightly elevated 28,25,41  EKG  showed sinus rhythm heart rate 61 there is a LBBB   Cardiology has been following, cardiac stress test and echocardiogram have been ordered and are pending     Ms Her has been admitted to the observation unit for further evaluation and treatment of shortness of breath, chest pain.    Review of Systems   HENT: Negative.     Eyes: Negative.    Respiratory:  Positive for chest tightness and shortness of breath.    Cardiovascular:  Positive for chest pain.   Gastrointestinal: Negative.    Endocrine: Negative.    Genitourinary: Negative.    Musculoskeletal:  Positive for neck pain.   Skin: Negative.    Allergic/Immunologic: Negative.    Neurological: Negative.    Hematological: Negative.    Psychiatric/Behavioral: Negative.     All other systems reviewed and are negative.       Personal History     Past Medical History:   Diagnosis Date    AL amyloidosis     Anxiety     Arthritis     Bowel perforation     COVID-19 07/2020 9/7/2021    Depression     Diverticulitis of colon     Diverticulosis     GERD (gastroesophageal reflux disease)     History of Clostridioides difficile infection 2008    HAS HAD SEVERAL TIMES IN PAST PER PT (SAW INFECTIOUS DISEASE MD FOR THIS S/P COLOSTOMY/EXTENSIVE ANBX THERAPY)    History of prediabetes     History of snoring     History of stress incontinence     Hypercholesterolemia     Hyperlipidemia     Hypertension     Left ventricular hypertrophy     FOLLOWED BY DR MARI. DENIES CHEST PAIN BUT STATES DOES GET SOA AT TIMES WITH EXERTION REPORTS THAT IT IS NOT A NEW ISSUE     Liver disease     Oral herpes 08/18/2020    Seasonal allergies     used to have allergy shots in the past    SOB (shortness of breath)     STATES WITH EXERTION HAS BEEN ONGOING ISSUE NOT A NEW ISSUE    Status post colostomy     reversed    Thyroid disease     Hypothyroid     Past Surgical History:   Procedure Laterality Date    ABDOMINAL SURGERY  2005, 2014    ex lap x 2,  diverticulitis    ABDOMINOPLASTY   2016    ADENOIDECTOMY      APPENDECTOMY      CARDIAC CATHETERIZATION N/A 04/24/2020    Procedure: Coronary angiography;  Surgeon: Roberto Briones MD;  Location: [facility] CATH INVASIVE LOCATION;  Service: Cardiovascular;  Laterality: N/A;    CARDIAC CATHETERIZATION N/A 04/24/2020    Procedure: Left heart cath;  Surgeon: Roberto Briones MD;  Location:  [facility] CATH INVASIVE LOCATION;  Service: Cardiovascular;  Laterality: N/A;    CARDIAC CATHETERIZATION N/A 04/24/2020    Procedure: Left ventriculography;  Surgeon: Roberto Briones MD;  Location: [facility] CATH INVASIVE LOCATION;  Service: Cardiovascular;  Laterality: N/A;    CARDIAC SURGERY      open heart surgery,    COLONOSCOPY  approx 2018    normal per pt     COLOSTOMY  2005    had colostomy and then is was reversed    COLOSTOMY CLOSURE  2006    CORRECTION HAMMER TOE Right 2017    ECTOPIC PREGNANCY SURGERY      1979, 1980    ENDOSCOPY N/A 05/27/2020    Procedure: ESOPHAGOGASTRODUODENOSCOPY WITH BIOPSY AND BIOPSY POLYPECTOMY AND MACIEL DIALATION;  Surgeon: Preethi Beck MD;  Location: [facility] ENDOSCOPY;  Service: Gastroenterology;  Laterality: N/A;  PRE: ESOPHAGEAL DYSPHAGIA  POST: Z LINE 46, ESOPHAGEAL SPASM, GASTRITIS, FUNDIC POLYP    ENDOSCOPY N/A 06/20/2023    ESOPHAGEAL DILATATION N/A 12/07/2021    Procedure: OR ESOPHAGEAL DILATATION with maciel dilators ;  Surgeon: Jamey Leslie MD;  [place] [facility] [place] [facility]  Service: ENT;  Laterality: N/A;    ESOPHAGEAL MOTILITY STUDY N/A 02/03/2022    Procedure: ESOPHAGEAL MOTILITY STUDY;  Surgeon: Harshil, Nurse Performed;  Location: [facility] ENDOSCOPY;  Service: Gastroenterology;  Laterality: N/A;  dypshagia     FOOT SURGERY Right 12/2016    Second and third hammertoe corrections    KNEE ARTHROSCOPY Right 2009    KNEE SURGERY Right     REVISION / TAKEDOWN COLOSTOMY  2006    SHOULDER ARTHROSCOPY Right 2018    right shoulder arthroscopy with extensive rotator cuff, biceps and labral debridement,  chondroplasty, & subacromial decompression with acromioplasty    SHOULDER SURGERY      HAS HAS COMPLETE SHOULDER ON RIGHT. LEFT SCOPED AND HAD 2ND SURGERY WITH HARDWARE PLACED    SHOULDER SURGERY Left     2012. 2013    TONSILLECTOMY      TOTAL SHOULDER REVERSE ARTHROPLASTY Right 2019    WISDOM TOOTH EXTRACTION       Family History   Problem Relation Age of Onset    Hypertension Mother     Osteoporosis Mother     Skin cancer Mother     Heart disease Father     Hypertension Father     Breast cancer Maternal Grandmother     Ovarian cancer Neg Hx     Colon cancer Neg Hx     Deep vein thrombosis Neg Hx     Pulmonary embolism Neg Hx     Malig Hyperthermia Neg Hx      Social History     Tobacco Use    Smoking status: Never     Passive exposure: Never    Smokeless tobacco: Never   Vaping Use    Vaping status: Never Used   Substance Use Topics    Alcohol use: Not Currently     Alcohol/week: 1.0 standard drink of alcohol     Types: 1 Glasses of wine per week    Drug use: Never     No current facility-administered medications on file prior to encounter.     Current Outpatient Medications on File Prior to Encounter   Medication Sig Dispense Refill    acyclovir (ZOVIRAX) 400 MG tablet TAKE 1 TABLET BY MOUTH TWICE DAILY 60 tablet 2    ALPRAZolam (XANAX) 0.25 MG tablet Take 1 tablet by mouth 2 (Two) Times a Day As Needed for Anxiety. 60 tablet 1    aspirin 81 MG EC tablet Take 1 tablet by mouth Daily.      Cholecalciferol (Vitamin D3) 50 MCG (2000 UT) tablet Take 1 tablet by mouth Daily.      cholestyramine (QUESTRAN) 4 g packet Take 1 packet by mouth 3 (Three) Times a Day With Meals. 1 packet  TID PRN diarrhea (Patient taking differently: Take 1 packet by mouth 3 (Three) Times a Day As Needed.) 60 packet 4    diphenoxylate-atropine (LOMOTIL) 2.5-0.025 MG per tablet TAKE 1 TABLET BY MOUTH FOUR TIMES DAILY AS NEEDED FOR DIARRHEA 120 tablet 0    empagliflozin (JARDIANCE) 10 MG tablet tablet Take 1 tablet by mouth Daily.       FLUoxetine (PROzac) 20 MG capsule TAKE 1 CAPSULE BY MOUTH FOUR TIMES DAILY 360 capsule 0    HYDROcodone-acetaminophen (NORCO) 5-325 MG per tablet Take 1 tablet by mouth Every 6 (Six) Hours As Needed for Moderate Pain. 60 tablet 0    icosapent ethyl (VASCEPA) 1 g capsule capsule TAKE 2 CAPSULES BY MOUTH DAILY 60 capsule 2    levothyroxine (SYNTHROID, LEVOTHROID) 75 MCG tablet Take 1 tablet by mouth Daily. 90 tablet 1    loratadine (CLARITIN) 10 MG tablet TAKE 1 TABLET BY MOUTH ONCE DAILY 30 tablet 5    midodrine (PROAMATINE) 5 MG tablet Take 1 tablet by mouth 2 (Two) Times a Day.      Multiple Vitamins-Minerals (V-C Forte) capsule Take 1 capsule by mouth Daily.      olopatadine (PATANOL) 0.1 % ophthalmic solution Administer 1 drop to both eyes 2 (Two) Times a Day. 5 mL 5    pantoprazole (PROTONIX) 20 MG EC tablet TAKE 1 TABLET BY MOUTH EVERY DAY. DO NOT TAKE WITHIN 2 HOURS OF THYROID MEDICATION (Patient taking differently: Take 1 tablet by mouth Daily.) 90 tablet 1    Refresh Celluvisc 1 % gel eye gel Administer 1 drop to both eyes Daily As Needed.      Refresh Plus 0.5 % solution Administer 1 drop to both eyes Daily As Needed for Dry Eyes.      rosuvastatin (CRESTOR) 40 MG tablet TAKE 1 TABLET BY MOUTH DAILY FOR HIGH AMOUNT OF FATS IN THE BLOOD 90 tablet 0    spironolactone (ALDACTONE) 25 MG tablet Take 1 tablet by mouth Daily.      zolpidem (AMBIEN) 10 MG tablet TAKE 1 TABLET BY MOUTH AT NIGHT AS NEEDED FOR SLEEP 30 tablet 1    benzonatate (TESSALON) 200 MG capsule Take 1 capsule by mouth 3 (Three) Times a Day As Needed for Cough. 30 capsule 0    doxycycline (MONODOX) 100 MG capsule Take 1 capsule by mouth 2 (Two) Times a Day. 14 capsule 0    fluocinolone (SYNALAR) 0.01 % external solution Apply  topically to the appropriate area as directed 2 (Two) Times a Day.      gabapentin (NEURONTIN) 100 MG capsule Take 2 capsules by mouth Every Night for 7 days. 14 capsule 0    mometasone (ELOCON) 0.1 % cream Apply 1  Application topically to the appropriate area as directed Daily.       Allergies   Allergen Reactions    Azithromycin Rash and Other (See Comments)     Reaction unknown to patient (unable to remember)  Other reaction(s): Other: stomach issues, Stomach ache, Other: stomach issues, Stomach ache    Ezetimibe Myalgia, Other (See Comments) and Rash     cramps  cramps    Pravastatin Rash and Other (See Comments)    Sulfa Antibiotics Rash       Objective    Objective     Vital Signs  Temp:  [97.7 °F (36.5 °C)-98.1 °F (36.7 °C)] 98.1 °F (36.7 °C)  Heart Rate:  [65-96] 69  Resp:  [16-20] 16  BP: (102-133)/(43-83) 106/67  SpO2:  [87 %-100 %] 95 %  on  Flow (L/min) (Oxygen Therapy):  [2] 2;   Device (Oxygen Therapy): nasal cannula  Body mass index is 26.62 kg/m².    Physical Exam  Vitals and nursing note reviewed.   Constitutional:       Appearance: She is ill-appearing.   HENT:      Head: Atraumatic.      Mouth/Throat:      Mouth: Mucous membranes are dry.   Eyes:      Conjunctiva/sclera: Conjunctivae normal.   Cardiovascular:      Rate and Rhythm: Normal rate and regular rhythm.      Pulses: Normal pulses.           Radial pulses are 2+ on the right side and 2+ on the left side.        Dorsalis pedis pulses are 2+ on the right side and 2+ on the left side.        Posterior tibial pulses are 2+ on the right side.      Heart sounds: Normal heart sounds.   Pulmonary:      Effort: Pulmonary effort is normal.      Breath sounds: Examination of the right-lower field reveals decreased breath sounds. Examination of the left-lower field reveals decreased breath sounds. Decreased breath sounds present.   Abdominal:      General: Bowel sounds are normal.      Palpations: Abdomen is soft.   Musculoskeletal:         General: Normal range of motion.      Right lower leg: No edema.      Left lower leg: No edema.   Skin:     General: Skin is warm and dry.      Capillary Refill: Capillary refill takes less than 2 seconds.      Coloration:  Skin is pale.   Neurological:      General: No focal deficit present.      Mental Status: She is alert and oriented to person, place, and time. Mental status is at baseline.   Psychiatric:         Mood and Affect: Mood normal.       Results Review:  I reviewed the patient's new clinical results.  I reviewed the patient's new imaging results and agree with the interpretation.  I reviewed the patient's other test results and agree with the interpretation  I personally viewed and interpreted the patient's EKG/Telemetry data  Discussed with ED provider.    Lab Results (last 24 hours)       Procedure Component Value Units Date/Time    CBC & Differential [[id number]]  (Normal) Collected: 05/21/25 1504    Specimen: Blood from Arm, Right Updated: 05/21/25 1531    Narrative:      The following orders were created for panel order CBC & Differential.  Procedure                               Abnormality         Status                     ---------                               -----------         ------                     CBC Auto Differential[[id number]]        Normal              Final result                 Please view results for these tests on the individual orders.    Comprehensive Metabolic Panel [[id number]]  (Abnormal) Collected: 05/21/25 1504    Specimen: Blood from Arm, Right Updated: 05/21/25 1545     Glucose 112 mg/dL      BUN 21 mg/dL      Creatinine 0.89 mg/dL      Sodium 141 mmol/L      Potassium 4.3 mmol/L      Chloride 103 mmol/L      CO2 23.9 mmol/L      Calcium 9.9 mg/dL      Total Protein 6.7 g/dL      Albumin 4.7 g/dL      ALT (SGPT) 37 U/L      AST (SGOT) 37 U/L      Alkaline Phosphatase 73 U/L      Total Bilirubin 0.6 mg/dL      Globulin 2.0 gm/dL      A/G Ratio 2.4 g/dL      BUN/Creatinine Ratio 23.6     Anion Gap 14.1 mmol/L      eGFR 69.8 mL/min/1.73     Narrative:      GFR Categories in Chronic Kidney Disease (CKD)              GFR Category          GFR (mL/min/1.73)    Interpretation  G1                     90 or greater        Normal or high (1)  G2                    60-89                Mild decrease (1)  G3a                   45-59                Mild to moderate decrease  G3b                   30-44                Moderate to severe decrease  G4                    15-29                Severe decrease  G5                    14 or less           Kidney failure    (1)In the absence of evidence of kidney disease, neither GFR category G1 or G2 fulfill the criteria for CKD.    eGFR calculation 2021 CKD-EPI creatinine equation, which does not include race as a factor    High Sensitivity Troponin T [[id number]]  (Abnormal) Collected: 05/21/25 1504    Specimen: Blood from Arm, Right Updated: 05/21/25 1545     HS Troponin T 28 ng/L     Narrative:      High Sensitive Troponin T Reference Range:  <14.0 ng/L- Negative Female for AMI  <22.0 ng/L- Negative Male for AMI  >=14 - Abnormal Female indicating possible myocardial injury.  >=22 - Abnormal Male indicating possible myocardial injury.   Clinicians would have to utilize clinical acumen, EKG, Troponin, and serial changes to determine if it is an Acute Myocardial Infarction or myocardial injury due to an underlying chronic condition.         CBC Auto Differential [[id number]]  (Normal) Collected: 05/21/25 1504    Specimen: Blood from Arm, Right Updated: 05/21/25 1531     WBC 7.16 10*3/mm3      RBC 5.25 10*6/mm3      Hemoglobin 14.6 g/dL      Hematocrit 46.0 %      MCV 87.6 fL      MCH 27.8 pg      MCHC 31.7 g/dL      RDW 14.2 %      RDW-SD 45.6 fl      MPV 9.9 fL      Platelets 232 10*3/mm3      Neutrophil % 57.1 %      Lymphocyte % 33.5 %      Monocyte % 5.9 %      Eosinophil % 2.8 %      Basophil % 0.4 %      Immature Grans % 0.3 %      Neutrophils, Absolute 4.09 10*3/mm3      Lymphocytes, Absolute 2.40 10*3/mm3      Monocytes, Absolute 0.42 10*3/mm3      Eosinophils, Absolute 0.20 10*3/mm3      Basophils, Absolute 0.03 10*3/mm3      Immature Grans, Absolute 0.02  10*3/mm3      nRBC 0.0 /100 WBC     High Sensitivity Troponin T 1Hr [[id number]]  (Abnormal) Collected: 05/21/25 1700    Specimen: Blood from Arm, Left Updated: 05/21/25 1732     HS Troponin T 25 ng/L      Troponin T Numeric Delta -3 ng/L      Troponin T % Delta -11    Narrative:      High Sensitive Troponin T Reference Range:  <14.0 ng/L- Negative Female for AMI  <22.0 ng/L- Negative Male for AMI  >=14 - Abnormal Female indicating possible myocardial injury.  >=22 - Abnormal Male indicating possible myocardial injury.   Clinicians would have to utilize clinical acumen, EKG, Troponin, and serial changes to determine if it is an Acute Myocardial Infarction or myocardial injury due to an underlying chronic condition.         BNP [[id number]]  (Normal) Collected: 05/21/25 1700    Specimen: Blood from Arm, Left Updated: 05/21/25 2241     proBNP 269.0 pg/mL     Narrative:      This assay is used as an aid in the diagnosis of individuals suspected of having heart failure. It can be used as an aid in the diagnosis of acute decompensated heart failure (ADHF) in patients presenting with signs and symptoms of ADHF to the emergency department (ED). In addition, NT-proBNP of <300 pg/mL indicates ADHF is not likely.    Age Range Result Interpretation  NT-proBNP Concentration (pg/mL:      <50             Positive            >450                   Gray                 300-450                    Negative             <300    50-75           Positive            >900                  Gray                300-900                  Negative            <300      >75             Positive            >1800                  Gray                300-1800                  Negative            <300    Respiratory Panel PCR w/COVID-19(SARS-CoV-2) [facility] In-House, NP Swab in [facility], 2 HR TAT - Swab, Nasopharynx [[id number]]  (Normal) Collected: 05/21/25 1823    Specimen: Swab from Nasopharynx Updated: 05/21/25 1946     ADENOVIRUS, PCR  Not Detected     Coronavirus 229E Not Detected     Coronavirus HKU1 Not Detected     Coronavirus NL63 Not Detected     Coronavirus OC43 Not Detected     COVID19 Not Detected     Human Metapneumovirus Not Detected     Human Rhinovirus/Enterovirus Not Detected     Influenza A PCR Not Detected     Influenza B PCR Not Detected     Parainfluenza Virus 1 Not Detected     Parainfluenza Virus 2 Not Detected     Parainfluenza Virus 3 Not Detected     Parainfluenza Virus 4 Not Detected     RSV, PCR Not Detected     Bordetella pertussis pcr Not Detected     Bordetella parapertussis PCR Not Detected     Chlamydophila pneumoniae PCR Not Detected     Mycoplasma pneumo by PCR Not Detected    Narrative:      In the setting of a positive respiratory panel with a viral infection PLUS a negative procalcitonin without other underlying concern for bacterial infection, consider observing off antibiotics or discontinuation of antibiotics and continue supportive care. If the respiratory panel is positive for atypical bacterial infection (Bordetella pertussis, Chlamydophila pneumoniae, or Mycoplasma pneumoniae), consider antibiotic de-escalation to target atypical bacterial infection.    High Sensitivity Troponin T [[id number]]  (Abnormal) Collected: 05/22/25 0038    Specimen: Blood from Arm, Left Updated: 05/22/25 0116     HS Troponin T 31 ng/L     Narrative:      High Sensitive Troponin T Reference Range:  <14.0 ng/L- Negative Female for AMI  <22.0 ng/L- Negative Male for AMI  >=14 - Abnormal Female indicating possible myocardial injury.  >=22 - Abnormal Male indicating possible myocardial injury.   Clinicians would have to utilize clinical acumen, EKG, Troponin, and serial changes to determine if it is an Acute Myocardial Infarction or myocardial injury due to an underlying chronic condition.         Comprehensive Metabolic Panel [[id number]]  (Abnormal) Collected: 05/22/25 0038    Specimen: Blood from Arm, Left Updated: 05/22/25 0116      Glucose 91 mg/dL      BUN 22 mg/dL      Creatinine 0.71 mg/dL      Sodium 140 mmol/L      Potassium 4.1 mmol/L      Chloride 106 mmol/L      CO2 22.0 mmol/L      Calcium 9.1 mg/dL      Total Protein 5.8 g/dL      Albumin 4.2 g/dL      ALT (SGPT) 30 U/L      AST (SGOT) 32 U/L      Alkaline Phosphatase 64 U/L      Total Bilirubin 0.5 mg/dL      Globulin 1.6 gm/dL      A/G Ratio 2.6 g/dL      BUN/Creatinine Ratio 31.0     Anion Gap 12.0 mmol/L      eGFR 91.6 mL/min/1.73     Narrative:      GFR Categories in Chronic Kidney Disease (CKD)              GFR Category          GFR (mL/min/1.73)    Interpretation  G1                    90 or greater        Normal or high (1)  G2                    60-89                Mild decrease (1)  G3a                   45-59                Mild to moderate decrease  G3b                   30-44                Moderate to severe decrease  G4                    15-29                Severe decrease  G5                    14 or less           Kidney failure    (1)In the absence of evidence of kidney disease, neither GFR category G1 or G2 fulfill the criteria for CKD.    eGFR calculation 2021 CKD-EPI creatinine equation, which does not include race as a factor    CBC (No Diff) [[id number]]  (Normal) Collected: 05/22/25 0038    Specimen: Blood Updated: 05/22/25 0058     WBC 4.87 10*3/mm3      RBC 4.67 10*6/mm3      Hemoglobin 13.0 g/dL      Hematocrit 40.2 %      MCV 86.1 fL      MCH 27.8 pg      MCHC 32.3 g/dL      RDW 14.1 %      RDW-SD 44.1 fl      MPV 9.9 fL      Platelets 180 10*3/mm3             Imaging Results (Last 24 Hours)       Procedure Component Value Units Date/Time    CT Angiogram Chest Pulmonary Embolism [[id number]] Collected: 05/21/25 2128     Updated: 05/21/25 2137    Narrative:      CTA CHEST WITH IV CONTRAST     HISTORY: chest pain, dyspnea, h/o PE     COMPARISON: Prior chest CT exams, most recent 3/2/2025     TECHNIQUE: CT angiography was performed of the chest with axial  images  as well as coronal and sagittal reformatted MIP images provided  following administration of IV contrast. 3-D surface rendered reformats  were obtained of the pulmonary arteries and aorta. Radiation dose  reduction techniques were utilized, including automated exposure  control, and exposure modulation based on body size.     FINDINGS:     There is no pulmonary infiltrate, pleural effusion, pneumothorax or  suspicious nodule. There is a 4 mm noncalcified posterior medial left  lower lobe nodule, unchanged since 3/5/2022 and clearly benign.     Thoracic aorta is normal in caliber. There is no convincing evidence of  coronary atherosclerotic vascular calcification. There is no suspicious  mediastinal adenopathy or other mass.     Images of the upper abdomen show no acute abnormality.  There is no  acute bony abnormality.     Bolus timing is good and there is no evidence of pulmonary embolism.       Impression:         Pulmonary arteries are well-opacified, and there is no evidence of  pulmonary embolism.     No acute pulmonary parenchymal abnormality is seen.           This report was finalized on 5/21/2025 9:34 PM by Dr. Giovanny Salgado M.D on Workstation: [id number]       XR Chest 1 View [[id number]] Collected: 05/21/25 1558     Updated: 05/21/25 1606    Narrative:      XR CHEST 1 VW-     HISTORY: Female who is 70 years-old, chest pain     TECHNIQUE: Frontal view of the chest     COMPARISON: 3/2/2025     FINDINGS: The heart size is normal. Sternotomy changes are present.  Pulmonary vasculature is unremarkable. Minimal likely scarring or  atelectasis at the lung bases. No focal pulmonary consolidation, pleural  effusion, or pneumothorax. No acute osseous process.       Impression:      Minimal likely scarring or atelectasis at the lung bases.  Follow-up as indications persist.     This report was finalized on 5/21/2025 4:02 PM by Dr. Bj Rodney M.D on Workstation: [id number]               Results for  orders placed during the hospital encounter of 01/17/25    Adult Transthoracic Echo Complete W/ Cont if Necessary Per Protocol    Interpretation Summary    Left ventricular systolic function is normal. Calculated left ventricular EF = 62.7%.  Abnormal septal bounce in the setting of LBBB.    Left ventricular wall thickness is consistent with mild concentric hypertrophy. Sigmoid-shaped ventricular septum is present.    Left ventricular diastolic function was indeterminate.    Mild mitral annular calcification (MAC) resulting in mild functional mitral stenosis. The mitral valve mean gradient is 3 mmHg at HR 70.  Mild-moderate mitral regurgitation.    Grossly normal biatrial and IVC size.      ECG 12 Lead Chest Pain   Final Result   HEART RATE=61  bpm   RR Lwuoecmb=004  ms   MA Zfdfluqi=524  ms   P Horizontal Axis=8  deg   P Front Axis=37  deg   QRSD Xlmtkyoj=550  ms   QT Rujkuoxv=742  ms   BNsY=657  ms   QRS Axis=-33  deg   T Wave Axis=158  deg   - ABNORMAL ECG -   Sinus rhythm   Borderline  prolonged MA interval   Left bundle branch block   No change from prior tracing   Electronically Signed By: Spencer PalafoxVIRAJ) ([facility]) 2025-05-22 06:24:49   Date and Time of Study:2025-05-22 04:34:42      ECG 12 Lead ED Triage Standing Order; Chest Pain   Final Result   HEART RATE=85  bpm   RR Dsnxiwqs=546  ms   MA Dhctkuvd=434  ms   P Horizontal Axis=39  deg   P Front Axis=10  deg   QRSD Ljimprtg=748  ms   QT Yzqvklqc=245  ms   AZhQ=073  ms   QRS Axis=-2  deg   T Wave Axis=184  deg   - ABNORMAL ECG -   Sinus rhythm   Borderline  prolonged MA interval   Left bundle branch block   No change from prior tracing   Electronically Signed By: Roberto Briones ([facility]) 2025-05-21 19:59:44   Date and Time of Study:2025-05-21 15:01:19      Telemetry Scan   Final Result           Assessment/Plan     Active Hospital Problems    Diagnosis  POA    **Hypoxia [R09.02]  Yes    Type 2 diabetes mellitus, without long-term current use of insulin  [E11.9]  Yes    Atypical chest pain [R07.89]  Yes    Hypertrophic obstructive cardiomyopathy [I42.1]  Yes    Amyloidosis [E85.9]  Yes    Chronic hypotension [I95.89]  Yes      Resolved Hospital Problems   No resolved problems to display.   Hypoxia-has since resolved  Oxygen saturations noted to be 85% while in the emergency room  She is currently on room air with oxygen saturations 100%  Chest x-ray shows no acute cardiopulmonary processes.  Noted scarring/atelectasis at bilateral lung bases.  Will continue to monitor for now,  Supplemental oxygen as needed for hypoxia/respiratory distress maintain oxygen saturation greater than percent.    Atypical chest pain  Acute reports her pain radiated in to her jaw, and neck, followed by generalized fatigue, body aches.  She also reports associated nausea.  Her symptoms have resolved at time of assessment  Noted high-sensitivity troponins are elevated but flat.  BNP within normal limits 269  Cardiology following -plans for cardiac stress, and echocardiogram.  EKG interpreted as SR with LBBB  PRN Nitro per protocol     Chronic obstructive cardiomyopathy  Chronic   Echocardiogram ordered per cardiology  Home ASA, Jardiance, spironolactone    Amyloidosis  Chronic   She follows Dr. Sal locally, and also hematology at [place]  She reports remission since September 2024      Chronic hypotension  Chronic  Midodrine       Type 2 diabetes mellitus, without long-term current use of insulin  Chronic  Accu Checks ACHS  Hold home Jardiance   Blood glucose is stable this time  Most recent hemoglobin A1c 6.70          I discussed the patient's findings and my recommendations with patient and nursing staff.    VTE Prophylaxis - SCDs.  Code Status - Full code.       PIYUSH Sims  [facility]  05/22/25  09:28 EDT

## 2025-05-22 NOTE — OUTREACH NOTE
Prep Survey      Flowsheet Row Responses   Alevism facility patient discharged from? [place]   Is LACE score < 7 ? Yes   Eligibility [facility]   Date of Admission 05/21/25   Date of Discharge 05/22/25   Discharge Disposition Home or Self Care   Discharge diagnosis Hypoxia   Does the patient have one of the following disease processes/diagnoses(primary or secondary)? Other   Does the patient have Home health ordered? No   Is there a DME ordered? No   Prep survey completed? Yes            MEMO BURRELL - Registered Nurse

## 2025-05-22 NOTE — CONSULTS
Date of Consultation: 25    Referral Provider: Deborah Hurd DO     Reason for Consultation: Chest pressure.    Encounter Provider: Darren Pruitt MD    Group of Service: [facility]     Patient Name: Cynthia Portillo    :1955    Chief complaint: Chest pressure.    History of Present Illness:      This is a very pleasant 70-year-old female, who is normally followed by Dr. Lyles at [facility].  She has a history of type AL amyloidosis and cardiac amyloidosis.  She had LVOT obstruction and underwent a septal myectomy in  at [place].  She had a preoperative heart catheterization on 10/11/2022 which showed angiographically normal coronary arteries.  She did develop a left bundle branch block postoperatively.      She presented to the emergency department with chest discomfort which she described as a pressure sensation centrally with some radiation into the upper back and neck area.  She has also felt more short of breath with exertion recently.  Her high-sensitivity troponin was nonspecifically elevated and flat, not consistent with a type I or type II NSTEMI.  Her EKG showed a baseline left bundle branch block.  She did have a CTA of the chest on admission which showed no evidence of pulmonary embolism or other suspicious entities.  I reviewed the CT and did not visualize any significant coronary artery calcifications.      Echocardiogram 2025    Left ventricular systolic function is normal. Calculated left ventricular EF = 62.7%.  Abnormal septal bounce in the setting of LBBB.    Left ventricular wall thickness is consistent with mild concentric hypertrophy. Sigmoid-shaped ventricular septum is present.    Left ventricular diastolic function was indeterminate.    Mild mitral annular calcification (MAC) resulting in mild functional mitral stenosis. The mitral valve mean gradient is 3 mmHg at HR 70.  Mild-moderate mitral regurgitation.     Grossly normal biatrial and IVC size.         Past Medical History:   Diagnosis Date    AL amyloidosis     Anxiety     Arthritis     Bowel perforation     COVID-19 07/2020 9/7/2021    Depression     Diverticulitis of colon     Diverticulosis     GERD (gastroesophageal reflux disease)     History of Clostridioides difficile infection 2008    HAS HAD SEVERAL TIMES IN PAST PER PT (SAW INFECTIOUS DISEASE MD FOR THIS S/P COLOSTOMY/EXTENSIVE ANBX THERAPY)    History of prediabetes     History of snoring     History of stress incontinence     Hypercholesterolemia     Hyperlipidemia     Hypertension     Left ventricular hypertrophy     FOLLOWED BY DR MARI. DENIES CHEST PAIN BUT STATES DOES GET SOA AT TIMES WITH EXERTION REPORTS THAT IT IS NOT A NEW ISSUE     Liver disease     Oral herpes 08/18/2020    Seasonal allergies     used to have allergy shots in the past    SOB (shortness of breath)     STATES WITH EXERTION HAS BEEN ONGOING ISSUE NOT A NEW ISSUE    Status post colostomy     reversed    Thyroid disease     Hypothyroid         Past Surgical History:   Procedure Laterality Date    ABDOMINAL SURGERY  2005, 2014    ex lap x 2,  diverticulitis    ABDOMINOPLASTY  2016    ADENOIDECTOMY      APPENDECTOMY      CARDIAC CATHETERIZATION N/A 04/24/2020    Procedure: Coronary angiography;  Surgeon: Roberto Briones MD;  Location:  [facility];  Service: Cardiovascular;  Laterality: N/A;    CARDIAC CATHETERIZATION N/A 04/24/2020    Procedure: Left heart cath;  Surgeon: Roberto Briones MD;  Location:  [facility];  Service: Cardiovascular;  Laterality: N/A;    CARDIAC CATHETERIZATION N/A 04/24/2020    Procedure: Left ventriculography;  Surgeon: Roberto Briones MD;  Location:  [facility];  Service: Cardiovascular;  Laterality: N/A;    CARDIAC SURGERY      open heart surgery,    COLONOSCOPY  approx 2018    normal per pt     COLOSTOMY  2005    had  colostomy and then is was reversed    COLOSTOMY CLOSURE  2006    CORRECTION HAMMER TOE Right 2017    ECTOPIC PREGNANCY SURGERY      1979, 1980    ENDOSCOPY N/A 05/27/2020    Procedure: ESOPHAGOGASTRODUODENOSCOPY WITH BIOPSY AND BIOPSY POLYPECTOMY AND MACIEL DIALATION;  Surgeon: Preethi Beck MD;  Location:  [facility];  Service: Gastroenterology;  Laterality: N/A;  PRE: ESOPHAGEAL DYSPHAGIA  POST: Z LINE 46, ESOPHAGEAL SPASM, GASTRITIS, FUNDIC POLYP    ENDOSCOPY N/A 06/20/2023    ESOPHAGEAL DILATATION N/A 12/07/2021    Procedure: OR ESOPHAGEAL DILATATION with maciel dilators ;  Surgeon: Jamey Leslie MD;  Location:  [facility];  Service: ENT;  Laterality: N/A;    ESOPHAGEAL MOTILITY STUDY N/A 02/03/2022    Procedure: ESOPHAGEAL MOTILITY STUDY;  Surgeon: Harshil, Nurse Performed;  Location:  [facility];  Service: Gastroenterology;  Laterality: N/A;  dypshagia     FOOT SURGERY Right 12/2016    Second and third hammertoe corrections    KNEE ARTHROSCOPY Right 2009    KNEE SURGERY Right     REVISION / TAKEDOWN COLOSTOMY  2006    SHOULDER ARTHROSCOPY Right 2018    right shoulder arthroscopy with extensive rotator cuff, biceps and labral debridement, chondroplasty, & subacromial decompression with acromioplasty    SHOULDER SURGERY      HAS HAS COMPLETE SHOULDER ON RIGHT. LEFT SCOPED AND HAD 2ND SURGERY WITH HARDWARE PLACED    SHOULDER SURGERY Left     2012. 2013    TONSILLECTOMY      TOTAL SHOULDER REVERSE ARTHROPLASTY Right 2019    WISDOM TOOTH EXTRACTION           Allergies   Allergen Reactions    Azithromycin Rash and Other (See Comments)     Reaction unknown to patient (unable to remember)  Other reaction(s): Other: stomach issues, Stomach ache, Other: stomach issues, Stomach ache    Ezetimibe Myalgia, Other (See Comments) and Rash     cramps  cramps    Pravastatin Rash and Other (See Comments)    Sulfa Antibiotics Rash         No current facility-administered medications on file prior to  encounter.     Current Outpatient Medications on File Prior to Encounter   Medication Sig Dispense Refill    acyclovir (ZOVIRAX) 400 MG tablet TAKE 1 TABLET BY MOUTH TWICE DAILY 60 tablet 2    ALPRAZolam (XANAX) 0.25 MG tablet Take 1 tablet by mouth 2 (Two) Times a Day As Needed for Anxiety. 60 tablet 1    aspirin 81 MG EC tablet Take 1 tablet by mouth Daily.      Cholecalciferol (Vitamin D3) 50 MCG (2000 UT) tablet Take 1 tablet by mouth Daily.      cholestyramine (QUESTRAN) 4 g packet Take 1 packet by mouth 3 (Three) Times a Day With Meals. 1 packet  TID PRN diarrhea 60 packet 4    diphenoxylate-atropine (LOMOTIL) 2.5-0.025 MG per tablet TAKE 1 TABLET BY MOUTH FOUR TIMES DAILY AS NEEDED FOR DIARRHEA 120 tablet 0    empagliflozin (JARDIANCE) 10 MG tablet tablet Take 1 tablet by mouth Daily.      FLUoxetine (PROzac) 20 MG capsule TAKE 1 CAPSULE BY MOUTH FOUR TIMES DAILY 360 capsule 0    HYDROcodone-acetaminophen (NORCO) 5-325 MG per tablet Take 1 tablet by mouth Every 6 (Six) Hours As Needed for Moderate Pain. 60 tablet 0    icosapent ethyl (VASCEPA) 1 g capsule capsule TAKE 2 CAPSULES BY MOUTH DAILY 60 capsule 2    levothyroxine (SYNTHROID, LEVOTHROID) 75 MCG tablet Take 1 tablet by mouth Daily. 90 tablet 1    loratadine (CLARITIN) 10 MG tablet TAKE 1 TABLET BY MOUTH ONCE DAILY 30 tablet 5    midodrine (PROAMATINE) 5 MG tablet Take 1 tablet by mouth 2 (Two) Times a Day.      Multiple Vitamins-Minerals (V-C Forte) capsule Take 1 capsule by mouth Daily.      olopatadine (PATANOL) 0.1 % ophthalmic solution Administer 1 drop to both eyes 2 (Two) Times a Day. 5 mL 5    pantoprazole (PROTONIX) 20 MG EC tablet TAKE 1 TABLET BY MOUTH EVERY DAY. DO NOT TAKE WITHIN 2 HOURS OF THYROID MEDICATION (Patient taking differently: Take 1 tablet by mouth Daily.) 90 tablet 1    Refresh Celluvisc 1 % gel eye gel Administer 1 drop to both eyes Daily As Needed.      Refresh Plus 0.5 % solution Administer 1 drop to both eyes Daily As  Needed for Dry Eyes.      rosuvastatin (CRESTOR) 40 MG tablet TAKE 1 TABLET BY MOUTH DAILY FOR HIGH AMOUNT OF FATS IN THE BLOOD 90 tablet 0    spironolactone (ALDACTONE) 25 MG tablet Take 1 tablet by mouth Daily.      zolpidem (AMBIEN) 10 MG tablet TAKE 1 TABLET BY MOUTH AT NIGHT AS NEEDED FOR SLEEP 30 tablet 1    benzonatate (TESSALON) 200 MG capsule Take 1 capsule by mouth 3 (Three) Times a Day As Needed for Cough. 30 capsule 0    doxycycline (MONODOX) 100 MG capsule Take 1 capsule by mouth 2 (Two) Times a Day. 14 capsule 0    fluocinolone (SYNALAR) 0.01 % external solution Apply  topically to the appropriate area as directed 2 (Two) Times a Day.      gabapentin (NEURONTIN) 100 MG capsule Take 2 capsules by mouth Every Night for 7 days. 14 capsule 0    mometasone (ELOCON) 0.1 % cream Apply 1 Application topically to the appropriate area as directed Daily.           Social History     Socioeconomic History    Marital status: Single    Number of children: 2   Tobacco Use    Smoking status: Never     Passive exposure: Never    Smokeless tobacco: Never   Vaping Use    Vaping status: Never Used   Substance and Sexual Activity    Alcohol use: Not Currently     Alcohol/week: 1.0 standard drink of alcohol     Types: 1 Glasses of wine per week    Drug use: Never    Sexual activity: Defer         Family History   Problem Relation Age of Onset    Hypertension Mother     Osteoporosis Mother     Skin cancer Mother     Heart disease Father     Hypertension Father     Breast cancer Maternal Grandmother     Ovarian cancer Neg Hx     Colon cancer Neg Hx     Deep vein thrombosis Neg Hx     Pulmonary embolism Neg Hx     Malig Hyperthermia Neg Hx        REVIEW OF SYSTEMS:   Pertinent positives are noted in the HPI above.  Otherwise, all other systems were reviewed, and are negative.     Objective:     Vitals:    05/22/25 0330 05/22/25 0715 05/22/25 1315 05/22/25 1517   BP: 110/55 106/67 106/67 121/63   BP Location: Right arm Right  "arm  Right arm   Patient Position: Lying Lying  Lying   Pulse: 65 69  78   Resp: 16 16  16   Temp: 97.7 °F (36.5 °C) 98.1 °F (36.7 °C)  97.7 °F (36.5 °C)   TempSrc: Oral Oral  Oral   SpO2: 98% 95%     Weight:   63.5 kg (140 lb)    Height:   154.9 cm (61\")      Body mass index is 26.45 kg/m².  Flowsheet Rows      Flowsheet Row First Filed Value   Admission Height 154.9 cm (61\") Documented at 05/21/2025 1826   Admission Weight 59 kg (130 lb) Documented at 05/21/2025 1826             General:    No acute distress, alert and oriented x4, pleasant                   Head:    Normocephalic, atraumatic.   Eyes:          Conjunctivae and sclerae normal, no icterus.   Throat:   No oral lesions, no thrush, oral mucosa moist.    Neck:   Supple, trachea midline.   Lungs:     Clear to auscultation bilaterally     Heart:    Regular rhythm and normal rate.  No murmurs, gallops, or rubs noted.   Abdomen:     Soft, non-tender, non-distended, positive bowel sounds.    Extremities:   No clubbing, cyanosis, or edema.     Pulses:   Pulses palpable and equal bilaterally.    Skin:   No bleeding or rash.   Neuro:   Non-focal.  Moves all extremities well.    Psychiatric:   Normal mood and affect.                 Lab Review:                Results from last 7 days   Lab Units 05/22/25  0038   SODIUM mmol/L 140   POTASSIUM mmol/L 4.1   CHLORIDE mmol/L 106   CO2 mmol/L 22.0   BUN mg/dL 22   CREATININE mg/dL 0.71   GLUCOSE mg/dL 91   CALCIUM mg/dL 9.1     Results from last 7 days   Lab Units 05/22/25  0038 05/21/25  1700 05/21/25  1504   HSTROP T ng/L 31* 25* 28*     Results from last 7 days   Lab Units 05/22/25  0038   WBC 10*3/mm3 4.87   HEMOGLOBIN g/dL 13.0   HEMATOCRIT % 40.2   PLATELETS 10*3/mm3 180                       EKG (reviewed by me personally):            Assessment:   1.  Chest pressure with radiation into the upper back, neck, and jaw  2.  Shortness of breath  3.  Type AL cardiac amyloidosis  4.  LVOT obstruction secondary to #3, " status post septal myectomy in 2022 at [facility]  5.  Angiographically normal coronary arteries by cath on 10/11/2022  6.  Left bundle branch block, developed after septal myectomy in 2022  7.  Chronic hypotension, on midodrine  8.  Type 2 diabetes    Plan:       The patient's troponin is not consistent with a type I or type II NSTEMI.  She has a chronic left bundle branch block which occurred after her septal myectomy in 2022.  Her preoperative heart catheterization on 10/11/2022 showed angiographically normal coronary arteries.  While not impossible, this would make coronary artery disease less likely.  She does have type 2 diabetes.  Her CT angiogram did not show any pulmonary embolism, and I did not appreciate any significant coronary calcifications.    I recommended a Lexiscan Myoview stress test today to evaluate for any potential ischemia.  I also ordered an echocardiogram to ensure that her ejection fraction has not changed in the setting of a left bundle branch block.  She does not appear to have pulmonary edema or to be in congestive heart failure on exam.  There were no lung issues on the CTA.    Discussed all of this with her earlier today.  Further plans will be made pending the results of the above tests.    Thank you very much for this consult.    Timothy Pruitt MD

## 2025-05-22 NOTE — CASE MANAGEMENT/SOCIAL WORK
Discharge Planning Assessment  [facility]     Patient Name: Cynthia Portillo  MRN: [id number]  Today's Date: 5/22/2025    Admit Date: 5/21/2025    Plan: Home   Discharge Needs Assessment       Row Name 05/22/25 1220       Living Environment    People in Home alone    Current Living Arrangements home    Potentially Unsafe Housing Conditions none    In the past 12 months has the electric, gas, oil, or water company threatened to shut off services in your home? No    Primary Care Provided by self    Provides Primary Care For no one    Family Caregiver if Needed child(tej), adult;friend(s)    Quality of Family Relationships supportive    Able to Return to Prior Arrangements yes       Resource/Environmental Concerns    Resource/Environmental Concerns none    Transportation Concerns none       Transportation Needs    In the past 12 months, has lack of transportation kept you from medical appointments or from getting medications? no    In the past 12 months, has lack of transportation kept you from meetings, work, or from getting things needed for daily living? No       Food Insecurity    Within the past 12 months, you worried that your food would run out before you got the money to buy more. Never true    Within the past 12 months, the food you bought just didn't last and you didn't have money to get more. Never true       Transition Planning    Patient/Family Anticipates Transition to home    Patient/Family Anticipated Services at Transition none    Transportation Anticipated family or friend will provide;car, drives self       Discharge Needs Assessment    Readmission Within the Last 30 Days no previous admission in last 30 days    Equipment Currently Used at Home none    Concerns to be Addressed denies needs/concerns at this time    Do you want help finding or keeping work or a job? I do not need or want help    Do you want help with school or training? For example, starting or completing job training or  getting a high school diploma, GED or equivalent No    Anticipated Changes Related to Illness none    Equipment Needed After Discharge none    Provided Post Acute Provider List? N/A    Provided Post Acute Provider Quality & Resource List? N/A                   Discharge Plan       Row Name 05/22/25 1221       Plan    Plan Home    Plan Comments S/W pt at bedside.  Facesheet info confirmed.  Pt lives alone in a duplex with no ARAMIS, is IADLs and can drive.  Her sons live out of town, but she has friends who live locally who can assist if needed.  Pt has used [facility] in the past and has never been to a rehab facility.  Pt denies difficulty affording meds or basic needs.  She plans to return home upon DC and denies any DC needs at this time.  CCP will continue to follow ............Yovana GALLARDO/ CRISTINA                  Selected Continued Care - Episodes Includes continued care and service providers with selected services from the active episodes listed below             Demographic Summary       Row Name 05/22/25 1219       General Information    Admission Type observation    Arrived From home    Referral Source admission list    Reason for Consult discharge planning    Preferred Language English                   Functional Status       Row Name 05/22/25 1219       Functional Status    Usual Activity Tolerance good       Functional Status, IADL    Medications independent    Meal Preparation independent    Housekeeping independent    Laundry independent    Shopping independent    If for any reason you need help with day-to-day activities such as bathing, preparing meals, shopping, managing finances, etc., do you get the help you need? I don't need any help       Mental Status    General Appearance WDL WDL       Mental Status Summary    Recent Changes in Mental Status/Cognitive Functioning no changes       Employment/    Employment Status retired                             Yovana Del Cid RN

## 2025-05-22 NOTE — DISCHARGE SUMMARY
Patient Name: Cynthia Portillo  : 1955  MRN: [id number]    Date of Admission: 2025  Date of Discharge:  2025  Primary Care Physician: Arian Peterson MD      Chief Complaint:   Chest Pain and Weakness - Generalized      Discharge Diagnoses     Active Hospital Problems    Diagnosis  POA    **Hypoxia [R09.02]  Yes    Type 2 diabetes mellitus, without long-term current use of insulin [E11.9]  Yes    Atypical chest pain [R07.89]  Yes    Hypertrophic obstructive cardiomyopathy [I42.1]  Yes    Amyloidosis [E85.9]  Yes    Chronic hypotension [I95.89]  Yes      Resolved Hospital Problems   No resolved problems to display.        Hospital Course     Ms. Trent Portillo is a 70 y.o. female with a history of amyloidosis with cardiac involvement, hypertrophic obstructive cardiomyopathy, chronic L BBB, type 2 diabetes mellitus, hypertension, hyperlipidemia, hypotension who presented to [facility] initially complaining of history of shortness of breath and chest pain has been progressively worse, and intermittent in nature.  Please see the admitting history and physical for further details.  She was found to have acute hypoxia with oxygen saturation of 85% on room air in the emergency room, that was immediately improved to 100% on 2 L of oxygen and was admitted to the hospital for further evaluation and treatment.    CTA of her chest was unremarkable for any acute cardiopulmonary processes, and ruled pulmonary emboli as source of her shortness of breath, chest pain.  She has been evaluated by cardiology, they have cleared her for discharge home today after cardiac stress test was consistent with a low risk study, and her echocardiogram showed no new changes, she does have a low to normal when compared to previous evaluation in 2025.   She has had no additional episodes of chest pain since admission overnight.  She has been weaned off of supplemental oxygen to room air with  adequate oxygenation.  She did have headache earlier today which she felt was consistent with not having anything to eat or drink since yesterday.  Once diet was restarted, and she received dose of Toradol and Compazine her headache resolved.   She is medically stable for discharge home this evening. She has been advised to continue taking her medications as they prescribed to her.  She has been advised to follow-up with her PCP in 1 week for posthospitalization follow-up.  She has been advised to follow up with her hematologist at [place] and her next scheduled appointment.  She was told to return to the emergency room if she was to experience any new or worsening symptoms.  Day of Discharge     Subjective:  She reports her headache has resolved, she denies any further episodes of chest pain, and is in agreement with discharging home this afternoon.  Verbalizes understanding of discharge instructions.    Physical Exam:  Temp:  [97.7 °F (36.5 °C)-98.1 °F (36.7 °C)] 97.7 °F (36.5 °C)  Heart Rate:  [65-78] 78  Resp:  [16-20] 16  BP: (102-133)/(43-83) 121/63  Body mass index is 26.45 kg/m².  Physical Exam  Vitals and nursing note reviewed.   Constitutional:       Appearance: She is ill-appearing.   HENT:      Head: Atraumatic.      Mouth/Throat:      Mouth: Mucous membranes are dry.   Eyes:      Conjunctiva/sclera: Conjunctivae normal.   Cardiovascular:      Rate and Rhythm: Normal rate and regular rhythm.      Pulses: Normal pulses.           Radial pulses are 2+ on the right side and 2+ on the left side.      Heart sounds: Normal heart sounds.   Pulmonary:      Effort: Pulmonary effort is normal.      Breath sounds: Examination of the right-lower field reveals decreased breath sounds. Examination of the left-lower field reveals decreased breath sounds. Decreased breath sounds present.   Abdominal:      General: Bowel sounds are normal.      Palpations: Abdomen is soft.   Musculoskeletal:      Right lower leg: No  edema.      Left lower leg: No edema.   Skin:     General: Skin is warm and dry.      Capillary Refill: Capillary refill takes less than 2 seconds.   Neurological:      General: No focal deficit present.      Mental Status: She is alert. Mental status is at baseline.   Psychiatric:         Mood and Affect: Mood normal.         Consultants     Consult Orders (all) (From admission, onward)       Start     Ordered    05/22/25 0702  Inpatient Cardiology Consult  IN AM        Specialty:  Cardiology  Provider:  Spencer Palafox MD    05/21/25 2153 05/21/25 2142  [facility] (on-call MD unless specified) Details  Once        Specialty:  Hospitalist  Provider:  (Not yet assigned)    05/21/25 2141                  Procedures     * Surgery not found *    Imaging Results (All)       Procedure Component Value Units Date/Time    CT Angiogram Chest Pulmonary Embolism [[id number]] Collected: 05/21/25 2128     Updated: 05/21/25 2137    Narrative:      CTA CHEST WITH IV CONTRAST     HISTORY: chest pain, dyspnea, h/o PE     COMPARISON: Prior chest CT exams, most recent 3/2/2025     TECHNIQUE: CT angiography was performed of the chest with axial images  as well as coronal and sagittal reformatted MIP images provided  following administration of IV contrast. 3-D surface rendered reformats  were obtained of the pulmonary arteries and aorta. Radiation dose  reduction techniques were utilized, including automated exposure  control, and exposure modulation based on body size.     FINDINGS:     There is no pulmonary infiltrate, pleural effusion, pneumothorax or  suspicious nodule. There is a 4 mm noncalcified posterior medial left  lower lobe nodule, unchanged since 3/5/2022 and clearly benign.     Thoracic aorta is normal in caliber. There is no convincing evidence of  coronary atherosclerotic vascular calcification. There is no suspicious  mediastinal adenopathy or other mass.     Images of the upper abdomen show no acute abnormality.  There is  no  acute bony abnormality.     Bolus timing is good and there is no evidence of pulmonary embolism.       Impression:         Pulmonary arteries are well-opacified, and there is no evidence of  pulmonary embolism.     No acute pulmonary parenchymal abnormality is seen.           This report was finalized on 5/21/2025 9:34 PM by Dr. Giovanny Salgado M.D on Workstation: [id number]       XR Chest 1 View [[id number]] Collected: 05/21/25 1558     Updated: 05/21/25 1606    Narrative:      XR CHEST 1 VW-     HISTORY: Female who is 70 years-old, chest pain     TECHNIQUE: Frontal view of the chest     COMPARISON: 3/2/2025     FINDINGS: The heart size is normal. Sternotomy changes are present.  Pulmonary vasculature is unremarkable. Minimal likely scarring or  atelectasis at the lung bases. No focal pulmonary consolidation, pleural  effusion, or pneumothorax. No acute osseous process.       Impression:      Minimal likely scarring or atelectasis at the lung bases.  Follow-up as indications persist.     This report was finalized on 5/21/2025 4:02 PM by Dr. Bj Rodney M.D on Workstation: [id number]               Results for orders placed during the hospital encounter of 05/21/25    Adult Transthoracic Echo Complete W/ Cont if Necessary Per Protocol    Interpretation Summary    Left ventricular systolic function is normal. Calculated left ventricular EF = 53.3%.  Abnormal septal motion consistent with a bundle branch block.    Left ventricular diastolic function is consistent with (grade Ia w/high LAP) impaired relaxation.    There is mild calcification of the aortic valve.    Normal global longitudinal LV strain (GLS) = -17.4%.  The strain pattern is concerning for amyloid.    Compared to prior echo January 2025, diastolic function cannot be determined.    Pertinent Labs     Results from last 7 days   Lab Units 05/22/25  0038 05/21/25  1504   WBC 10*3/mm3 4.87 7.16   HEMOGLOBIN g/dL 13.0 14.6   PLATELETS 10*3/mm3 180  "232     Results from last 7 days   Lab Units 05/22/25  0038 05/21/25  1504   SODIUM mmol/L 140 141   POTASSIUM mmol/L 4.1 4.3   CHLORIDE mmol/L 106 103   CO2 mmol/L 22.0 23.9   BUN mg/dL 22 21   CREATININE mg/dL 0.71 0.89   GLUCOSE mg/dL 91 112*   EGFR mL/min/1.73 91.6 69.8     Results from last 7 days   Lab Units 05/22/25  0038 05/21/25  1504   ALBUMIN g/dL 4.2 4.7   BILIRUBIN mg/dL 0.5 0.6   ALK PHOS U/L 64 73   AST (SGOT) U/L 32 37*   ALT (SGPT) U/L 30 37*     Results from last 7 days   Lab Units 05/22/25  0038 05/21/25  1504   CALCIUM mg/dL 9.1 9.9   ALBUMIN g/dL 4.2 4.7       Results from last 7 days   Lab Units 05/22/25  0038 05/21/25  1700 05/21/25  1504   HSTROP T ng/L 31* 25* 28*   PROBNP pg/mL  --  269.0  --            Invalid input(s): \"LDLCALC\"      Results from last 7 days   Lab Units 05/21/25  1823   COVID19  Not Detected       Test Results Pending at Discharge     Pending Results       None              Discharge Details        Discharge Medications        Changes to Medications        Instructions Start Date   cholestyramine 4 g packet  Commonly known as: QUESTRAN  What changed:   when to take this  reasons to take this  additional instructions   1 packet, Oral, 3 Times Daily With Meals, 1 packet  TID PRN diarrhea      pantoprazole 20 MG EC tablet  Commonly known as: PROTONIX  What changed: See the new instructions.   TAKE 1 TABLET BY MOUTH EVERY DAY. DO NOT TAKE WITHIN 2 HOURS OF THYROID MEDICATION             Continue These Medications        Instructions Start Date   acyclovir 400 MG tablet  Commonly known as: ZOVIRAX   400 mg, Oral, 2 Times Daily      ALPRAZolam 0.25 MG tablet  Commonly known as: XANAX   0.25 mg, Oral, 2 Times Daily PRN      aspirin 81 MG EC tablet   Take 1 tablet by mouth Daily.      benzonatate 200 MG capsule  Commonly known as: TESSALON   200 mg, Oral, 3 Times Daily PRN      diphenoxylate-atropine 2.5-0.025 MG per tablet  Commonly known as: LOMOTIL   1 tablet, Oral, 4 Times " Daily PRN      doxycycline 100 MG capsule  Commonly known as: MONODOX   100 mg, Oral, 2 Times Daily      empagliflozin 10 MG tablet tablet  Commonly known as: JARDIANCE   10 mg, Daily      fluocinolone 0.01 % external solution  Commonly known as: SYNALAR   2 Times Daily      FLUoxetine 20 MG capsule  Commonly known as: PROzac   TAKE 1 CAPSULE BY MOUTH FOUR TIMES DAILY      HYDROcodone-acetaminophen 5-325 MG per tablet  Commonly known as: NORCO   1 tablet, Oral, Every 6 Hours PRN      icosapent ethyl 1 g capsule capsule  Commonly known as: VASCEPA   2 g, Oral, Daily      levothyroxine 75 MCG tablet  Commonly known as: SYNTHROID, LEVOTHROID   75 mcg, Oral, Daily      loratadine 10 MG tablet  Commonly known as: CLARITIN   10 mg, Oral, Daily      midodrine 5 MG tablet  Commonly known as: PROAMATINE   5 mg, 2 Times Daily      mometasone 0.1 % cream  Commonly known as: ELOCON   1 Application, Daily      olopatadine 0.1 % ophthalmic solution  Commonly known as: PATANOL   1 drop, Both Eyes, 2 Times Daily      Refresh Plus 0.5 % solution  Generic drug: carboxymethylcellulose   1 drop, Daily PRN      Refresh Celluvisc 1 % gel eye gel  Generic drug: carboxymethylcellulose sod (PF)   1 drop, Daily PRN      rosuvastatin 40 MG tablet  Commonly known as: CRESTOR   TAKE 1 TABLET BY MOUTH DAILY FOR HIGH AMOUNT OF FATS IN THE BLOOD      spironolactone 25 MG tablet  Commonly known as: ALDACTONE   25 mg, Daily      V-C Forte capsule   1 capsule, Daily      Vitamin D3 50 MCG (2000 UT) tablet   1 tablet, Daily      zolpidem 10 MG tablet  Commonly known as: AMBIEN   10 mg, Oral, Nightly PRN             Stop These Medications      gabapentin 100 MG capsule  Commonly known as: NEURONTIN              Allergies   Allergen Reactions    Azithromycin Rash and Other (See Comments)     Reaction unknown to patient (unable to remember)  Other reaction(s): Other: stomach issues, Stomach ache, Other: stomach issues, Stomach ache    Ezetimibe Myalgia,  Other (See Comments) and Rash     cramps  cramps    Pravastatin Rash and Other (See Comments)    Sulfa Antibiotics Rash       Discharge Disposition:  Home or Self Care      Discharge Diet:  Diet Order   Procedures    Diet: Regular/House; Fluid Consistency: Thin (IDDSI 0)       Discharge Activity: As tolerated       CODE STATUS:    Code Status and Medical Interventions: CPR (Attempt to Resuscitate); Full Support   Ordered at: 05/21/25 2073     Code Status (Patient has no pulse and is not breathing):    CPR (Attempt to Resuscitate)     Medical Interventions (Patient has pulse or is breathing):    Full Support       Future Appointments   Date Time Provider Department Center   10/13/2025 10:40 AM LAB CHAIR 4 [facility]  [facility]   10/13/2025 11:00 AM Nav Sal MD [facility]      Follow-up Information       Arian Peterson MD Follow up in 1 week(s).    Specialty: Internal Medicine  Why: Post Hospitlizaiton follow up  Contact information:  [id number] [place] [id number] [phone]                             Time Spent on Discharge:  Greater than 30 minutes      PIYUSH Sims  [facility]  05/22/25  16:54 EDT

## 2025-05-23 ENCOUNTER — TRANSITIONAL CARE MANAGEMENT TELEPHONE ENCOUNTER (OUTPATIENT)
Dept: CALL CENTER | Facility: HOSPITAL | Age: 70
End: 2025-05-23
Payer: MEDICARE

## 2025-05-23 NOTE — OUTREACH NOTE
Call Center TCM Note      Flowsheet Row Responses   [facility] patient discharged from? [place]   Does the patient have one of the following disease processes/diagnoses(primary or secondary)? Other   TCM attempt successful? No   Unsuccessful attempts Attempt 2            Magalie GONZALEZ - Licensed Nurse    5/23/2025, 14:12 EDT

## 2025-05-23 NOTE — OUTREACH NOTE
Call Center TCM Note      Flowsheet Row Responses   [facility] [place] patient discharged from? [facility]   Does the patient have one of the following disease processes/diagnoses(primary or secondary)? Other   TCM attempt successful? No   Unsuccessful attempts Attempt 1            Magalie GONZALEZ - Licensed Nurse    5/23/2025, 11:04 EDT

## 2025-05-24 ENCOUNTER — TRANSITIONAL CARE MANAGEMENT TELEPHONE ENCOUNTER (OUTPATIENT)
Dept: CALL CENTER | Facility: HOSPITAL | Age: 70
End: 2025-05-24
Payer: MEDICARE

## 2025-05-24 NOTE — OUTREACH NOTE
Call Center TCM Note      Flowsheet Row Responses   [facility] patient discharged from? [place]   Does the patient have one of the following disease processes/diagnoses(primary or secondary)? Other   TCM attempt successful? No   Unsuccessful attempts Attempt 3  [Harini/Fede/Eddie on verbal release,  no answer]            Enedelia NOBLES - Registered Nurse    5/24/2025, 11:05 EDT

## 2025-05-28 ENCOUNTER — TELEPHONE (OUTPATIENT)
Dept: CASE MANAGEMENT | Facility: OTHER | Age: 70
End: 2025-05-28
Payer: MEDICARE

## 2025-05-30 ENCOUNTER — TELEPHONE (OUTPATIENT)
Dept: CASE MANAGEMENT | Facility: OTHER | Age: 70
End: 2025-05-30
Payer: MEDICARE

## 2025-05-30 NOTE — TELEPHONE ENCOUNTER
Second attempt to call patient, no answer. Left message to call back. Will also send patient a message via [employer]. Unable to reach, will close program.

## 2025-06-03 ENCOUNTER — PATIENT OUTREACH (OUTPATIENT)
Dept: CASE MANAGEMENT | Facility: OTHER | Age: 70
End: 2025-06-03
Payer: MEDICARE

## 2025-06-03 DIAGNOSIS — E78.00 HIGH CHOLESTEROL: ICD-10-CM

## 2025-06-03 DIAGNOSIS — E11.9 TYPE 2 DIABETES MELLITUS WITHOUT COMPLICATION, WITHOUT LONG-TERM CURRENT USE OF INSULIN: Primary | ICD-10-CM

## 2025-06-03 NOTE — OUTREACH NOTE
"AMBULATORY CASE MANAGEMENT NOTE    Names and Relationships of Patient/Support Persons: Contact: Cynthia Mensah \"YOLETTE\"; Relationship: Self -     Patient Outreach    Patient called ACM back. ACM introduced self and explained role. . Discussed the purpose, goals, and expectations of the program. Patient declined enrollment in the CCM program.     Patient asked ACM to check the date of her last colonoscopy. Advised that she had a colonoscopy July 2018 at [facility] and her chart indicates the test is due to be repeated in ten years. Discussed other care gaps. Advised she is due for diabetic foot and eye exams. Patient states she is not diabetic. Explained that her last A1c result was in the diabetic range. She is not on any diabetes medication. Advised her to limit her intake of concentrated sweets and carbohydrates, drink plenty of water, and get regular exercise. She voiced understanding.     Advised patient that ACM sent the CCM program pamphlet via [employer] message and to call ACM if she needs assistance in the future.         Olamide VU  Ambulatory Case Management    6/3/2025, 08:58 EDT  "

## 2025-06-11 ENCOUNTER — TELEMEDICINE (OUTPATIENT)
Dept: FAMILY MEDICINE CLINIC | Facility: CLINIC | Age: 70
End: 2025-06-11
Payer: MEDICARE

## 2025-06-11 DIAGNOSIS — E03.9 HYPOTHYROIDISM, UNSPECIFIED TYPE: ICD-10-CM

## 2025-06-11 DIAGNOSIS — I42.1 HYPERTROPHIC OBSTRUCTIVE CARDIOMYOPATHY: ICD-10-CM

## 2025-06-11 DIAGNOSIS — E78.00 HIGH CHOLESTEROL: Primary | ICD-10-CM

## 2025-06-11 PROBLEM — E11.9 TYPE 2 DIABETES MELLITUS, WITHOUT LONG-TERM CURRENT USE OF INSULIN: Status: RESOLVED | Noted: 2024-11-07 | Resolved: 2025-06-11

## 2025-06-11 NOTE — PROGRESS NOTES
Subjective   Cynthia Portillo is a 70 y.o. female. Patient is here today for No chief complaint on file.       History of Present Illness  This is documentation for a video visit.    The patient consented to the visit.    .  She was admitted to the hospital on May 21 and discharged the following day.  Her chief complaint at the time was chest pain and weakness which was generalized.    She has a history of amyloidosis with cardiac involvement, hypertrophic obstructive cardiomyopathy, chronic left bundle branch block, type 2 diabetes, hypertension, hypercholesterolemia.  She presented to [facility] initially complained of his shortness of breath and chest pain which had gotten progressively worse yet was intermittent.    She was found to be hypoxic with oxygen saturation of 85% on room air in the emergency room which improved to 100% on 2 L per nasal cannula.    CTA of her chest was unremarkable for any acute cardiopulmonary process.  She was evaluated by cardiology who cleared her for discharge home the day after admission after cardiac stress test was found to be consistent with a low risk study and her echocardiogram showed no new changes.    She was asked to follow-up with me on a video visit.  She lives in [place] and found it difficult to actually see me today.      History of Present Illness        There were no vitals filed for this visit.  There is no height or weight on file to calculate BMI.    Past Medical History:   Diagnosis Date    AL amyloidosis     Anxiety     Arthritis     Bowel perforation     COVID-19 07/2020 9/7/2021    Depression     Diverticulitis of colon     Diverticulosis     GERD (gastroesophageal reflux disease)     History of Clostridioides difficile infection 2008    HAS HAD SEVERAL TIMES IN PAST PER PT (SAW INFECTIOUS DISEASE MD FOR THIS S/P COLOSTOMY/EXTENSIVE ANBX THERAPY)    History of prediabetes     History of snoring     History of stress incontinence      Hypercholesterolemia     Hyperlipidemia     Hypertension     Left ventricular hypertrophy     FOLLOWED BY DR MARI. DENIES CHEST PAIN BUT STATES DOES GET SOA AT TIMES WITH EXERTION REPORTS THAT IT IS NOT A NEW ISSUE     Liver disease     Oral herpes 08/18/2020    Seasonal allergies     used to have allergy shots in the past    SOB (shortness of breath)     STATES WITH EXERTION HAS BEEN ONGOING ISSUE NOT A NEW ISSUE    Status post colostomy     reversed    Thyroid disease     Hypothyroid      Allergies   Allergen Reactions    Azithromycin Rash and Other (See Comments)     Reaction unknown to patient (unable to remember)  Other reaction(s): Other: stomach issues, Stomach ache, Other: stomach issues, Stomach ache    Ezetimibe Myalgia, Other (See Comments) and Rash     cramps  cramps    Pravastatin Rash and Other (See Comments)    Sulfa Antibiotics Rash      Social History     Socioeconomic History    Marital status: Single    Number of children: 2   Tobacco Use    Smoking status: Never     Passive exposure: Never    Smokeless tobacco: Never   Vaping Use    Vaping status: Never Used   Substance and Sexual Activity    Alcohol use: Not Currently     Alcohol/week: 1.0 standard drink of alcohol     Types: 1 Glasses of wine per week    Drug use: Never    Sexual activity: Defer        Current Outpatient Medications:     acyclovir (ZOVIRAX) 400 MG tablet, TAKE 1 TABLET BY MOUTH TWICE DAILY, Disp: 60 tablet, Rfl: 2    ALPRAZolam (XANAX) 0.25 MG tablet, Take 1 tablet by mouth 2 (Two) Times a Day As Needed for Anxiety., Disp: 60 tablet, Rfl: 1    aspirin 81 MG EC tablet, Take 1 tablet by mouth Daily., Disp: , Rfl:     benzonatate (TESSALON) 200 MG capsule, Take 1 capsule by mouth 3 (Three) Times a Day As Needed for Cough., Disp: 30 capsule, Rfl: 0    Cholecalciferol (Vitamin D3) 50 MCG (2000 UT) tablet, Take 1 tablet by mouth Daily., Disp: , Rfl:     cholestyramine (QUESTRAN) 4 g packet, Take 1 packet by mouth 3 (Three)  Times a Day With Meals. 1 packet  TID PRN diarrhea, Disp: 60 packet, Rfl: 4    diphenoxylate-atropine (LOMOTIL) 2.5-0.025 MG per tablet, TAKE 1 TABLET BY MOUTH FOUR TIMES DAILY AS NEEDED FOR DIARRHEA, Disp: 120 tablet, Rfl: 0    doxycycline (MONODOX) 100 MG capsule, Take 1 capsule by mouth 2 (Two) Times a Day., Disp: 14 capsule, Rfl: 0    empagliflozin (JARDIANCE) 10 MG tablet tablet, Take 1 tablet by mouth Daily., Disp: , Rfl:     fluocinolone (SYNALAR) 0.01 % external solution, Apply  topically to the appropriate area as directed 2 (Two) Times a Day., Disp: , Rfl:     FLUoxetine (PROzac) 20 MG capsule, TAKE 1 CAPSULE BY MOUTH FOUR TIMES DAILY, Disp: 360 capsule, Rfl: 0    HYDROcodone-acetaminophen (NORCO) 5-325 MG per tablet, Take 1 tablet by mouth Every 6 (Six) Hours As Needed for Moderate Pain., Disp: 60 tablet, Rfl: 0    icosapent ethyl (VASCEPA) 1 g capsule capsule, TAKE 2 CAPSULES BY MOUTH DAILY, Disp: 60 capsule, Rfl: 2    levothyroxine (SYNTHROID, LEVOTHROID) 75 MCG tablet, Take 1 tablet by mouth Daily., Disp: 90 tablet, Rfl: 1    loratadine (CLARITIN) 10 MG tablet, TAKE 1 TABLET BY MOUTH ONCE DAILY, Disp: 30 tablet, Rfl: 5    midodrine (PROAMATINE) 5 MG tablet, Take 1 tablet by mouth 2 (Two) Times a Day., Disp: , Rfl:     mometasone (ELOCON) 0.1 % cream, Apply 1 Application topically to the appropriate area as directed Daily., Disp: , Rfl:     Multiple Vitamins-Minerals (V-C Forte) capsule, Take 1 capsule by mouth Daily., Disp: , Rfl:     olopatadine (PATANOL) 0.1 % ophthalmic solution, Administer 1 drop to both eyes 2 (Two) Times a Day., Disp: 5 mL, Rfl: 5    pantoprazole (PROTONIX) 20 MG EC tablet, TAKE 1 TABLET BY MOUTH EVERY DAY. DO NOT TAKE WITHIN 2 HOURS OF THYROID MEDICATION (Patient taking differently: Take 1 tablet by mouth Daily.), Disp: 90 tablet, Rfl: 1    Refresh Celluvisc 1 % gel eye gel, Administer 1 drop to both eyes Daily As Needed., Disp: , Rfl:     Refresh Plus 0.5 % solution, Administer  1 drop to both eyes Daily As Needed for Dry Eyes., Disp: , Rfl:     rosuvastatin (CRESTOR) 40 MG tablet, TAKE 1 TABLET BY MOUTH DAILY FOR HIGH AMOUNT OF FATS IN THE BLOOD, Disp: 90 tablet, Rfl: 0    spironolactone (ALDACTONE) 25 MG tablet, Take 1 tablet by mouth Daily., Disp: , Rfl:     zolpidem (AMBIEN) 10 MG tablet, TAKE 1 TABLET BY MOUTH AT NIGHT AS NEEDED FOR SLEEP, Disp: 30 tablet, Rfl: 1     Objective     Review of Systems   Constitutional: Negative.    HENT: Negative.     Respiratory: Negative.     Cardiovascular: Negative.    Musculoskeletal: Negative.    Psychiatric/Behavioral: Negative.         Physical Exam  Vitals and nursing note reviewed.   Constitutional:       Comments: Pleasant, neatly groomed, no distress.   Psychiatric:         Mood and Affect: Mood normal.         Behavior: Behavior normal.         Thought Content: Thought content normal.       Physical Exam    Physical examination could not be done today.  This is a telephone visit video visit.  Results      Assessment & Plan    Problems Addressed this Visit          Cardiac and Vasculature    High cholesterol - Primary    Relevant Orders    Lipid Panel With LDL / HDL Ratio    Comprehensive Metabolic Panel       Endocrine and Metabolic    Hypothyroid    Relevant Orders    TSH     Diagnoses         Codes Comments      High cholesterol    -  Primary ICD-10-CM: E78.00  ICD-9-CM: 272.0       Hypothyroidism, unspecified type     ICD-10-CM: E03.9  ICD-9-CM: 244.9           Assessment & Plan    Her dyspnea has resolved.    I asked her to follow-up for a lipid profile TSH and free T4 to follow-up on hypercholesterolemia and hypothyroidism.    I asked her to let me know if her dyspnea should recur.    No follow-ups on file.    Patient or patient representative verbalized consent for the use of Ambient Listening during the visit with  Arian Peterson MD for chart documentation. 6/16/2025  12:45 EDT

## 2025-06-21 DIAGNOSIS — R19.7 DIARRHEA, UNSPECIFIED TYPE: ICD-10-CM

## 2025-06-23 RX ORDER — DIPHENOXYLATE HYDROCHLORIDE AND ATROPINE SULFATE 2.5; .025 MG/1; MG/1
1 TABLET ORAL 4 TIMES DAILY PRN
Qty: 120 TABLET | Refills: 0 | Status: SHIPPED | OUTPATIENT
Start: 2025-06-23

## 2025-06-23 RX ORDER — LEVOTHYROXINE SODIUM 75 UG/1
75 TABLET ORAL DAILY
Qty: 90 TABLET | Refills: 1 | Status: SHIPPED | OUTPATIENT
Start: 2025-06-23

## 2025-06-27 NOTE — ADDENDUM NOTE
Addended by: JAY FOOTE on: 10/28/2024 01:58 PM     Modules accepted: Orders     PA Closed d/t patient does not have insurance coverage. MLP checked with mom while in office the other day. Mom advised she is having it reinstated awaiting approval. Mom advised she has enough meds at this time. Checked medicaid website this morning medications still inactive. Mom to notify us once reinstated.

## 2025-06-28 DIAGNOSIS — E78.00 HIGH CHOLESTEROL: ICD-10-CM

## 2025-06-28 DIAGNOSIS — E78.1 HYPERTRIGLYCERIDEMIA: ICD-10-CM

## 2025-06-30 RX ORDER — ROSUVASTATIN CALCIUM 40 MG/1
TABLET, COATED ORAL
Qty: 90 TABLET | Refills: 0 | Status: SHIPPED | OUTPATIENT
Start: 2025-06-30

## 2025-07-08 DIAGNOSIS — F51.01 PRIMARY INSOMNIA: ICD-10-CM

## 2025-07-08 RX ORDER — ZOLPIDEM TARTRATE 10 MG/1
10 TABLET ORAL NIGHTLY PRN
Qty: 30 TABLET | Refills: 1 | Status: SHIPPED | OUTPATIENT
Start: 2025-07-08

## 2025-07-09 ENCOUNTER — LAB (OUTPATIENT)
Facility: HOSPITAL | Age: 70
End: 2025-07-09
Payer: MEDICARE

## 2025-07-09 ENCOUNTER — OFFICE VISIT (OUTPATIENT)
Age: 70
End: 2025-07-09
Payer: MEDICARE

## 2025-07-09 VITALS
SYSTOLIC BLOOD PRESSURE: 122 MMHG | TEMPERATURE: 98.2 F | OXYGEN SATURATION: 97 % | BODY MASS INDEX: 25.02 KG/M2 | DIASTOLIC BLOOD PRESSURE: 74 MMHG | HEART RATE: 80 BPM | WEIGHT: 132.4 LBS

## 2025-07-09 DIAGNOSIS — M19.041 OSTEOARTHRITIS OF BOTH HANDS, UNSPECIFIED OSTEOARTHRITIS TYPE: ICD-10-CM

## 2025-07-09 DIAGNOSIS — R76.8 POSITIVE ANA (ANTINUCLEAR ANTIBODY): Primary | ICD-10-CM

## 2025-07-09 DIAGNOSIS — M19.042 OSTEOARTHRITIS OF BOTH HANDS, UNSPECIFIED OSTEOARTHRITIS TYPE: ICD-10-CM

## 2025-07-09 DIAGNOSIS — R76.8 SCL-70 ANTIBODY POSITIVE: ICD-10-CM

## 2025-07-09 DIAGNOSIS — E85.81 LIGHT CHAIN (AL) AMYLOIDOSIS: ICD-10-CM

## 2025-07-09 LAB
C3 SERPL-MCNC: 146 MG/DL (ref 82–167)
C4 SERPL-MCNC: 22 MG/DL (ref 14–44)
CK SERPL-CCNC: 309 U/L (ref 20–180)
LDH SERPL-CCNC: 235 U/L (ref 135–214)

## 2025-07-09 PROCEDURE — 36415 COLL VENOUS BLD VENIPUNCTURE: CPT | Performed by: STUDENT IN AN ORGANIZED HEALTH CARE EDUCATION/TRAINING PROGRAM

## 2025-07-09 PROCEDURE — 83615 LACTATE (LD) (LDH) ENZYME: CPT | Performed by: STUDENT IN AN ORGANIZED HEALTH CARE EDUCATION/TRAINING PROGRAM

## 2025-07-09 PROCEDURE — 86160 COMPLEMENT ANTIGEN: CPT | Performed by: STUDENT IN AN ORGANIZED HEALTH CARE EDUCATION/TRAINING PROGRAM

## 2025-07-09 PROCEDURE — 82550 ASSAY OF CK (CPK): CPT | Performed by: STUDENT IN AN ORGANIZED HEALTH CARE EDUCATION/TRAINING PROGRAM

## 2025-07-09 RX ORDER — METHOCARBAMOL 500 MG/1
TABLET, FILM COATED ORAL
COMMUNITY

## 2025-07-09 RX ORDER — ACETAMINOPHEN 500 MG
TABLET ORAL EVERY 6 HOURS SCHEDULED
COMMUNITY

## 2025-07-09 NOTE — PROGRESS NOTES
RHEUMATOLOGY NEW PATIENT VISIT  2025  Patient Name: Cynthia Portillo : 1955 Medical Record: [id number]  PCP: Arian Peterson MD  Referring provider: Kan Powell    REASON FOR CONSULTATION  Evaluation for positive CHRISTO and elevated SCL 70 antibody    History of Present Illness  Cynthia Portillo is a 70 y.o. female who presents for evaluation positive CHRISTO and elevated SCL 70 antibody    She was referred due to abnormal lab results obtained during a hospital stay. She has a history of amyloidosis, which is currently stable after 2.5 years of chemotherapy under the care of a provider at [facility].   The amyloidosis has affected her liver and blood, leading to anemia. She undergoes blood work every 3 months.   She has experienced significant weight loss, dropping to 117 pounds, and continues to struggle with swallowing difficulties and dumping syndrome.   She also reports dry skin, which she describes as similar to alligator skin. She saw her dermatologist yesterday and received intralesional steroid injection on her scalp.     She has been hospitalized for fluid accumulation in her lungs, which was drained and found to be inflammatory, possibly related to her amyloidosis.   She has no history of clots in her legs or lungs, pulmonary embolism, or DVT. She denies Raynaud's, hand deformities, skin tightening, GI bleeding. She has a history of perforated colon and diverticulitis, for which she had a colostomy bag that was later reversed.     She has had recurrent pneumonia recently, which has resolved.  She is not sure whether these episodes were from aspiration given her long history of dysphagia.     She has AL amyloidosis and her light chains were significantly elevated.     She has undergone open heart surgery and rehabilitation.    She has had a complete shoulder reversal of the right arm and a rotator cuff repair on the left shoulder, but still experiences significant shoulder  pain.   She can climb stairs, get in and out of her car without assistance, and raise her hand above her head.    PAST SURGICAL HISTORY:  Open heart surgery  Complete shoulder reversal of the right arm  Rotator cuff repair on the left shoulder  Surgery on the second hammertoe    SOCIAL HISTORY  She does not smoke or drink alcohol. She lives alone. She has 2 boys. She is not  currently. 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    FAMILY HISTORY  Her father and his 4 brothers and parents all  very young from heart conditions, with her father passing away at age 42 from coronary thrombosis.    Results  Labs- 2025  IgG IgM and IgA low  2025 RF WNL, SCL 70 elevated 1.9  CRP 15.49, sed rate 46  10/14/2024  Reflex DNA DS-negative, ANCA panel, CHRISTO direct positive negative  2025-CTA chest:  IMPRESSION:  Pulmonary arteries are well-opacified, and there is no evidence of  pulmonary embolism.     No acute pulmonary parenchymal abnormality is seen.  Cardiac MRI-3/5/2025  Small left ventricular chamber size and mildly reduced LV systolic function (LVEF 46%).   Asymmetric septal thickening (17 mm max) without obstruction.   Small right ventricular chamber size and systolic function   No significant valvular abnormalities.   Faint diffuse nonischemic LGE, most prominent at the hypertrophied septum.   Significant native T1 elevation (1150 ms). Mild ECV elevation (31%).   The CMR findings are supportive of HCM and/or amyloid infiltration.  See discussion.   Compared to the  CMR, wall thickness is less and the ECV has declined (was 41%), possibly reflecting   effective therapy of AL amyloidosis.    CTA chest-3/2/2025:  FINDINGS:  No acute pulmonary thromboembolus is seen. Thoracic aorta is normal in  caliber. No obvious dissection is identified. No definite coronary  artery calcifications are seen.. Left atrial appendage clip is present.  The thyroid gland is atrophic. Trachea is  within normal limits.  Esophagus also appears within normal limits. Mediastinal lymph nodes do  not appear pathologically enlarged. There is some trace pericardial  fluid. Patient status post right shoulder arthroplasty. There is some  chest wall collaterals noted on the right. These may be related to right  subclavian vein chronic stenosis. Aeration within the lungs is improved  when compared to prior exam, including resolution of a subpleural nodule  within the right middle lobe. No new infiltrates are seen. Images  through the upper abdomen do not demonstrate any acute abnormalities.  There is an apparent cyst arising from the right kidney, measuring 2.5 x  2.1 cm. It did not demonstrate FDG uptake on PET. No acute osseous  abnormalities are seen.     IMPRESSION:  No acute pulmonary thromboembolus identified.     Current Outpatient Medications:     acetaminophen (TYLENOL) 500 MG tablet, Every 6 (Six) Hours., Disp: , Rfl:     acyclovir (ZOVIRAX) 400 MG tablet, TAKE 1 TABLET BY MOUTH TWICE DAILY, Disp: 60 tablet, Rfl: 2    ALPRAZolam (XANAX) 0.25 MG tablet, Take 1 tablet by mouth 2 (Two) Times a Day As Needed for Anxiety., Disp: 60 tablet, Rfl: 1    aspirin 81 MG EC tablet, Take 1 tablet by mouth Daily., Disp: , Rfl:     Cholecalciferol (Vitamin D3) 50 MCG (2000 UT) tablet, Take 1 tablet by mouth Daily., Disp: , Rfl:     cholestyramine (QUESTRAN) 4 g packet, Take 1 packet by mouth 3 (Three) Times a Day With Meals. 1 packet  TID PRN diarrhea, Disp: 60 packet, Rfl: 4    diphenoxylate-atropine (LOMOTIL) 2.5-0.025 MG per tablet, TAKE 1 TABLET BY MOUTH FOUR TIMES DAILY AS NEEDED FOR DIARRHEA, Disp: 120 tablet, Rfl: 0    empagliflozin (JARDIANCE) 10 MG tablet tablet, Take 1 tablet by mouth Daily., Disp: , Rfl:     fluocinolone (SYNALAR) 0.01 % external solution, Apply  topically to the appropriate area as directed 2 (Two) Times a Day., Disp: , Rfl:     FLUoxetine (PROzac) 20 MG capsule, TAKE 1 CAPSULE BY MOUTH FOUR TIMES  DAILY, Disp: 360 capsule, Rfl: 0    HYDROcodone-acetaminophen (NORCO) 5-325 MG per tablet, Take 1 tablet by mouth Every 6 (Six) Hours As Needed for Moderate Pain., Disp: 60 tablet, Rfl: 0    icosapent ethyl (VASCEPA) 1 g capsule capsule, TAKE 2 CAPSULES BY MOUTH DAILY, Disp: 60 capsule, Rfl: 2    levothyroxine (SYNTHROID, LEVOTHROID) 75 MCG tablet, TAKE 1 TABLET BY MOUTH DAILY, Disp: 90 tablet, Rfl: 1    loratadine (CLARITIN) 10 MG tablet, TAKE 1 TABLET BY MOUTH ONCE DAILY, Disp: 30 tablet, Rfl: 5    methocarbamol (ROBAXIN) 500 MG tablet, Every 4 (Four) Hours., Disp: , Rfl:     midodrine (PROAMATINE) 5 MG tablet, Take 1 tablet by mouth 2 (Two) Times a Day., Disp: , Rfl:     Multiple Vitamins-Minerals (V-C Forte) capsule, Take 1 capsule by mouth Daily., Disp: , Rfl:     olopatadine (PATANOL) 0.1 % ophthalmic solution, Administer 1 drop to both eyes 2 (Two) Times a Day., Disp: 5 mL, Rfl: 5    pantoprazole (PROTONIX) 20 MG EC tablet, TAKE 1 TABLET BY MOUTH EVERY DAY. DO NOT TAKE WITHIN 2 HOURS OF THYROID MEDICATION (Patient taking differently: Take 1 tablet by mouth Daily.), Disp: 90 tablet, Rfl: 1    Refresh Celluvisc 1 % gel eye gel, Administer 1 drop to both eyes Daily As Needed., Disp: , Rfl:     Refresh Plus 0.5 % solution, Administer 1 drop to both eyes Daily As Needed for Dry Eyes., Disp: , Rfl:     riFAXIMin (XIFAXAN) 550 MG tablet, Take 1 tablet by mouth Every 8 (Eight) Hours., Disp: , Rfl:     rosuvastatin (CRESTOR) 40 MG tablet, TAKE 1 TABLET BY MOUTH DAILY FOR HIGH AMOUNT OF FATS IN THE BLOOD, Disp: 90 tablet, Rfl: 0    spironolactone (ALDACTONE) 25 MG tablet, Take 1 tablet by mouth Daily., Disp: , Rfl:     zolpidem (AMBIEN) 10 MG tablet, TAKE 1 TABLET BY MOUTH AT NIGHT AS NEEDED FOR SLEEP, Disp: 30 tablet, Rfl: 1     Medications Discontinued During This Encounter   Medication Reason    benzonatate (TESSALON) 200 MG capsule *Therapy completed    doxycycline (MONODOX) 100 MG capsule *Therapy completed     mometasone (ELOCON) 0.1 % cream *Therapy completed        Past Medical History:   Diagnosis Date    AL amyloidosis     Anxiety     Arthritis     Bowel perforation     COVID-19 07/2020 9/7/2021    Depression     Diverticulitis of colon     Diverticulosis     GERD (gastroesophageal reflux disease)     History of Clostridioides difficile infection 2008    HAS HAD SEVERAL TIMES IN PAST PER PT (SAW INFECTIOUS DISEASE MD FOR THIS S/P COLOSTOMY/EXTENSIVE ANBX THERAPY)    History of prediabetes     History of snoring     History of stress incontinence     Hypercholesterolemia     Hyperlipidemia     Hypertension     Left ventricular hypertrophy     FOLLOWED BY DR MARI. DENIES CHEST PAIN BUT STATES DOES GET SOA AT TIMES WITH EXERTION REPORTS THAT IT IS NOT A NEW ISSUE     Liver disease     Oral herpes 08/18/2020    Seasonal allergies     used to have allergy shots in the past    SOB (shortness of breath)     STATES WITH EXERTION HAS BEEN ONGOING ISSUE NOT A NEW ISSUE    Status post colostomy     reversed    Thyroid disease     Hypothyroid     Past Surgical History:   Procedure Laterality Date    ABDOMINAL SURGERY  2005, 2014    ex lap x 2,  diverticulitis    ABDOMINOPLASTY  2016    ADENOIDECTOMY      APPENDECTOMY      CARDIAC CATHETERIZATION N/A 04/24/2020    Procedure: Coronary angiography;  Surgeon: Roberto Briones MD;  Location:  [facility];  Service: Cardiovascular;  Laterality: N/A;    CARDIAC CATHETERIZATION N/A 04/24/2020    Procedure: Left heart cath;  Surgeon: Roberto Briones MD;  Location:  [facility];  Service: Cardiovascular;  Laterality: N/A;    CARDIAC CATHETERIZATION N/A 04/24/2020    Procedure: Left ventriculography;  Surgeon: Roberto Briones MD;  Location:  [facility];  Service: Cardiovascular;  Laterality: N/A;    CARDIAC SURGERY      open heart surgery,    COLONOSCOPY  approx 2018    normal per pt     COLOSTOMY  2005    had  colostomy and then is was reversed    COLOSTOMY CLOSURE  2006    CORRECTION HAMMER TOE Right 2017    ECTOPIC PREGNANCY SURGERY      1979, 1980    ENDOSCOPY N/A 05/27/2020    Procedure: ESOPHAGOGASTRODUODENOSCOPY WITH BIOPSY AND BIOPSY POLYPECTOMY AND MACIEL DIALATION;  Surgeon: Preethi Beck MD;  Location:  [facility];  Service: Gastroenterology;  Laterality: N/A;  PRE: ESOPHAGEAL DYSPHAGIA  POST: Z LINE 46, ESOPHAGEAL SPASM, GASTRITIS, FUNDIC POLYP    ENDOSCOPY N/A 06/20/2023    ESOPHAGEAL DILATATION N/A 12/07/2021    Procedure: OR ESOPHAGEAL DILATATION with maciel dilators ;  Surgeon: Jamey Leslie MD;  Location:  [facility];  Service: ENT;  Laterality: N/A;    ESOPHAGEAL MOTILITY STUDY N/A 02/03/2022    Procedure: ESOPHAGEAL MOTILITY STUDY;  Surgeon: Harshil, Nurse Performed;  Location:  [facility];  Service: Gastroenterology;  Laterality: N/A;  dypshagia     FOOT SURGERY Right 12/2016    Second and third hammertoe corrections    KNEE ARTHROSCOPY Right 2009    KNEE SURGERY Right     REVISION / TAKEDOWN COLOSTOMY  2006    SHOULDER ARTHROSCOPY Right 2018    right shoulder arthroscopy with extensive rotator cuff, biceps and labral debridement, chondroplasty, & subacromial decompression with acromioplasty    SHOULDER SURGERY      HAS HAS COMPLETE SHOULDER ON RIGHT. LEFT SCOPED AND HAD 2ND SURGERY WITH HARDWARE PLACED    SHOULDER SURGERY Left     2012. 2013    TONSILLECTOMY      TOTAL SHOULDER REVERSE ARTHROPLASTY Right 2019    WISDOM TOOTH EXTRACTION         Allergies   Allergen Reactions    Azithromycin Rash and Other (See Comments)     Reaction unknown to patient (unable to remember)  Other reaction(s): Other: stomach issues, Stomach ache, Other: stomach issues, Stomach ache    Ezetimibe Myalgia, Other (See Comments) and Rash     cramps  cramps    Pravastatin Rash and Other (See Comments)    Sulfa Antibiotics Rash     Physical Exam       Vitals:    07/09/25 1347   BP: 122/74   BP  Location: Right arm   Pulse: 80   Temp: 98.2 °F (36.8 °C)   SpO2: 97%   Weight: 60.1 kg (132 lb 6.4 oz)     Gen: Well-developed, well-nourished adult in no apparent distress. Pleasant and cooperative. Alert and oriented.   HEENT: EOMI, MMM, oropharynx clear without oral ulcers, no lacrimal gland enlargement, normal salivary pooling, normal temporal arteries without tenderness or beading.Nail fold capillary within normal range.  Chest: Normal work of breathing. Lungs have good bilateral air entry, but there is evidence of harsher sounds more on the right than the left.   CV: RRR, normal S1/S2, no murmurs/rubs/gallops heard. ??  Extremities: Warm, 2+ distal pulses, no cyanosis, clubbing, or edema.  Neuro: Good comprehension/cognition. Muscle strength 5/5 in all extremities. Gait normal.??  ??Skin: No alopecia, nail change (including no nail pitting), bruising, petechiae, sclerodactyly, digital pits, telangiectasias, tophi, appreciable calcinosis, or nodules.?? No evidence of any thickening in the arms. Wrinkles are well maintained in the face. No evidence of any discolored rash in the areas. No psoriasis. No obvious lumps or bumps.  Others: Overall Rodnan skin score is zero.    Comprehensive Musculoskeletal Examination:?  - Jaw, neck without limited ROM.?  - Shoulders, elbows, wrists,  knees, ankles, feet/toes: No deformity, erythema, warmth, swelling, effusion, tenderness, or limited ROM.??  - Right hand: Wrist appears normal with no evidence of interdigital groove obliteration. Prominent CMC with positive grind test. Hypertrophy of the bone at the IP joints. Heberden's node in the second, third, fourth and fifth fingers. PIP is also prominent in the middle finger.  - Left hand: Skin is freely mobile with no evidence of any telangiectasias or digital ulceration.  No evidence of sclerodactyly.  Zoraida's node in the thumb, index finger, middle finger, and pinky. Heberden's node present in the middle finger.  -  Lumbosacral spine and hips without limited ROM.?? Negative TATYANA.    LABS AND IMAGING ll laboratory data was reviewed and relevant values are noted as below.    See pertinent labs in HPI    C-Reactive Protein (mg/dL)   Date Value   01/20/2025 15.49 (H)   10/14/2024 5.96 (H)       Adult Transthoracic Echo Complete W/ Cont if Necessary Per Protocol    Left ventricular systolic function is normal. Calculated left   ventricular EF = 53.3%.  Abnormal septal motion consistent with a bundle   branch block.    Left ventricular diastolic function is consistent with (grade Ia w/high   LAP) impaired relaxation.    There is mild calcification of the aortic valve.    Normal global longitudinal LV strain (GLS) = -17.4%.  The strain   pattern is concerning for amyloid.    Compared to prior echo January 2025, diastolic function cannot be   determined.  Stress Test With Myocardial Perfusion One Day    Breast attenuation artifact is present.    Myocardial perfusion imaging indicates a normal myocardial perfusion   study with no evidence of ischemia. Impressions are consistent with a low   risk study.    Left ventricular ejection fraction is normal (Calculated EF = 61%).     Assessment & Plan  Cynthia Portillo is a 70 y.o. female who presents for evaluation positive CHRISTO and elevated SCL 70 antibody    She has an CHRISTO direct positive and elevated SCL 70 of 1.9. These latter antibody is typically associated with scleroderma, an autoimmune disease that can cause skin stiffness and major organ involvement.  However, the method used for these tests was not standard, so their accuracy needs to be confirmed.     Clinical exam does not show evidence of sclerodactyly, telangiectasia, Raynaud's, and her Glady score is 0.  Given her history of amyloidosis, which can also cause swallowing difficulties, there is no strong clinical suspicion for scleroderma at this time. It is possible to have these antibodies without developing clinical  scleroderma, as it can take time for features to manifest.     However, it is crucial to confirm the test results first. If the repeat test shows anything subjective, a baseline pulmonary function, chest imaging and dedicated echo will be considered.      Plan:   -Repeat CHRISTO and SCL-70 tests using the standard method. If results are positive, consider a baseline pulmonary function test to rule out pulmonary hypertension or interstitial lung disease.  -Review previous echocardiogram and CT scan results for any signs of esophageal dilatation.    Regarding her amyloidosis-amyloidosis.  She has a history of amyloidosis, which is currently stable after 2.5 years of chemotherapy. She follows up every 3 months for blood work and check-ups  -Continue follow-up with oncology    Regarding her hand osteoarthritis osteoarthritis.  - She has osteoarthritis in her hands, with prominent CMC on the right hand, positive grind test, and scattered Zoraida's and Heberden's nodes in bilateral hands  - She can continue conservative pain management as needed, exercises of the hands, and topical analgesics as needed       Diagnoses and all orders for this visit:    1. Positive CHRISTO (antinuclear antibody) (Primary)  2. Scl-70 antibody positive  3. Osteoarthritis of both hands, unspecified osteoarthritis type  4. Light chain (AL) amyloidosis    Orders:  -     CHRISTO by IFA, Reflex to Titer and Pattern  -     Urinalysis With Microscopic - Urine, Clean Catch  -     Aldolase  -     Lactate Dehydrogenase  -     CK  -     JAEN Antibody Panel  -     Protein / Creatinine Ratio, Urine - Urine, Clean Catch  -     Scleroderma Comprehensive Plus Profile (RDL)  -     C3 Complement  -     C4 Complement  -     dsDNA Antibody by IFA, Crithidia luciliae, with Reflex to Titer       Patient or patient representative verbalized consent for the use of Ambient Listening during the visit with  Chela Casillas MD for chart documentation. 7/9/2025  21:44 EDT    I spent  61 minutes caring for Cynthia on this date of service. This time includes time spent by me in the following activities:preparing for the visit, reviewing tests, obtaining and/or reviewing a separately obtained history, performing a medically appropriate examination and/or evaluation , counseling and educating the patient/family/caregiver, ordering medications, tests, or procedures, documenting information in the medical record, and independently interpreting results and communicating that information with the patient/family/caregiver

## 2025-07-10 DIAGNOSIS — E78.1 HYPERTRIGLYCERIDEMIA: ICD-10-CM

## 2025-07-10 LAB
ALDOLASE SERPL-CCNC: 5.7 U/L (ref 3.3–10.3)
DSDNA AB SER QL CLIF: NEGATIVE
ENA RNP AB SER-ACNC: <0.2 AI (ref 0–0.9)
ENA SCL70 AB SER-ACNC: 3.2 AI (ref 0–0.9)
ENA SM AB SER-ACNC: <0.2 AI (ref 0–0.9)
ENA SS-A AB SER-ACNC: 0.3 AI (ref 0–0.9)
ENA SS-B AB SER-ACNC: <0.2 AI (ref 0–0.9)

## 2025-07-10 RX ORDER — ICOSAPENT ETHYL 1 G/1
2 CAPSULE ORAL DAILY
Qty: 60 CAPSULE | Refills: 2 | Status: SHIPPED | OUTPATIENT
Start: 2025-07-10

## 2025-07-11 LAB — ANA SER QL IF: NEGATIVE

## 2025-07-20 ENCOUNTER — RESULTS FOLLOW-UP (OUTPATIENT)
Age: 70
End: 2025-07-20
Payer: MEDICARE

## 2025-07-20 NOTE — PROGRESS NOTES
Please notify patient      Dear Ms. Portillo,  Thank you for having your labs done.  The scleroderma panel, which is more specific is pending.  Also urine collection to check urinalysis and urine protein creatinine ratio is still pending.  He has a summary of available labs thus far:  Your repeat CHRISTO testing is negative.  There is still isolated elevation of SCL 70.  The reliability of the latter elevation will be confirmed by the scleroderma panel which is pending.  Other autoimmune screening test-dsDNA antibodies is negative.  Muscle enzyme aldolase is negative.  We will await complete result before commenting on the plan of action.

## 2025-07-21 ENCOUNTER — LAB (OUTPATIENT)
Facility: HOSPITAL | Age: 70
End: 2025-07-21
Payer: MEDICARE

## 2025-07-21 LAB
BACTERIA UR QL AUTO: ABNORMAL /HPF
BILIRUB UR QL STRIP: NEGATIVE
CLARITY UR: CLEAR
COLOR UR: YELLOW
GLUCOSE UR STRIP-MCNC: ABNORMAL MG/DL
HGB UR QL STRIP.AUTO: NEGATIVE
HYALINE CASTS UR QL AUTO: ABNORMAL /LPF
KETONES UR QL STRIP: NEGATIVE
LEUKOCYTE ESTERASE UR QL STRIP.AUTO: ABNORMAL
NITRITE UR QL STRIP: NEGATIVE
PH UR STRIP.AUTO: 7.5 [PH] (ref 5–8)
PROT UR QL STRIP: NEGATIVE
RBC # UR STRIP: ABNORMAL /HPF
REF LAB TEST METHOD: ABNORMAL
SP GR UR STRIP: 1.02 (ref 1–1.03)
SQUAMOUS #/AREA URNS HPF: ABNORMAL /HPF
UROBILINOGEN UR QL STRIP: ABNORMAL
WBC # UR STRIP: ABNORMAL /HPF

## 2025-07-21 NOTE — TELEPHONE ENCOUNTER
Pt called in. Informed pt of lab results. Pt stated she did not leave a urine sample and wanted to know if she could go to [facility] to complete urine test. Informed pt she could complete urine test at [facility] and the labs have already been placed. Pt voiced understanding and had no further questions or concerns.

## 2025-07-22 LAB
ANA HOMOGEN TITR SER: ABNORMAL {TITER}
ANA SER QL IF: POSITIVE
CENTROMERE AB TITR SER IF: ABNORMAL {TITER}
ENA SCL70 AB SER IA-ACNC: 74 UNITS
ENA SCL70 AB SER QL IA: NEGATIVE
FIBRILLARIN AB SER QL: NEGATIVE
LABORATORY COMMENT REPORT: ABNORMAL
PM-SCL-100 EXTRACTABLE NUCLEAR AB [UNITS/VOLUME] IN SERUM BY IMMUNOASSAY: <20 UNITS
PM-SCL75 AB SER LINE BLOT-ACNC: <20 UNITS
RNAP III AB SER IA-ACNC: <20 UNITS
TH-TO AB SER QL LINE BLOT: NEGATIVE
U1 SNRNP AB SER IA-ACNC: 39 UNITS

## 2025-07-24 NOTE — PROGRESS NOTES
Please notify patient    Ms. Portillo,   I got your scleroderma panel back and it shows a positive SCL 70 (74) and anti-U1 RNP (39)  Your CHRISTO on the panel shows a borderline 1: 80) positive.  And thanks for collecting the urine, this mainly shows lots of glucose in the urine, but no protein or blood.    Given the results and also the fact that your clinical exam did not show evidence of skin scleroderma, there are 2 things that could be possible here:  - Either you are have just the scleroderma antibodies and no active disease or involvement of other organs, which usually means we monitor you closely  - Or you have a different variant of scleroderma which despite not involving the skin, we should investigate further to rule out any systemic organ involvement-such as cardiac, lungs, GI or kidney.  Management of this type will depend on an active organ involvement and which organ is involved.    Plan will be to have you back for follow-up so we can discuss further and agree on the management strategy.  The good thing is you are clinically stable at this time.  I will have one of our staff contact you to schedule a follow-up appointment.  In the interim, I will obtain testing for pulmonary function, review of your echo and CT chest to see if we need to recheck them and will encourage continued follow-up with GI for symptoms of dysphagia that you did mention during our visit.    Let me know if you have any questions.

## 2025-08-16 ENCOUNTER — APPOINTMENT (OUTPATIENT)
Dept: GENERAL RADIOLOGY | Facility: HOSPITAL | Age: 70
End: 2025-08-16
Payer: MEDICARE

## 2025-08-16 ENCOUNTER — HOSPITAL ENCOUNTER (EMERGENCY)
Facility: HOSPITAL | Age: 70
Discharge: HOME | End: 2025-08-16
Attending: EMERGENCY MEDICINE
Payer: MEDICARE

## 2025-08-16 VITALS
OXYGEN SATURATION: 95 % | SYSTOLIC BLOOD PRESSURE: 118 MMHG | DIASTOLIC BLOOD PRESSURE: 72 MMHG | RESPIRATION RATE: 16 BRPM | BODY MASS INDEX: 27.8 KG/M2 | WEIGHT: 147.27 LBS | HEART RATE: 65 BPM | TEMPERATURE: 98.5 F | HEIGHT: 61 IN

## 2025-08-16 DIAGNOSIS — R06.02 SHORTNESS OF BREATH: Primary | ICD-10-CM

## 2025-08-16 DIAGNOSIS — R10.84 GENERALIZED ABDOMINAL PAIN: ICD-10-CM

## 2025-08-16 LAB
ALBUMIN SERPL-MCNC: 4.6 G/DL (ref 3.5–5.2)
ALBUMIN/GLOB SERPL: 2.2 G/DL
ALP SERPL-CCNC: 66 U/L (ref 39–117)
ALT SERPL W P-5'-P-CCNC: 76 U/L (ref 1–33)
ANION GAP SERPL CALCULATED.3IONS-SCNC: 13.4 MMOL/L (ref 5–15)
AST SERPL-CCNC: 63 U/L (ref 1–32)
BASOPHILS # BLD AUTO: 0.02 10*3/MM3 (ref 0–0.2)
BASOPHILS NFR BLD AUTO: 0.4 % (ref 0–1.5)
BILIRUB SERPL-MCNC: 0.6 MG/DL (ref 0–1.2)
BUN SERPL-MCNC: 16.1 MG/DL (ref 8–23)
BUN/CREAT SERPL: 17.5 (ref 7–25)
CALCIUM SPEC-SCNC: 10 MG/DL (ref 8.6–10.5)
CHLORIDE SERPL-SCNC: 101 MMOL/L (ref 98–107)
CO2 SERPL-SCNC: 22.6 MMOL/L (ref 22–29)
CREAT SERPL-MCNC: 0.92 MG/DL (ref 0.57–1)
DEPRECATED RDW RBC AUTO: 43.3 FL (ref 37–54)
EGFRCR SERPLBLD CKD-EPI 2021: 67.1 ML/MIN/1.73
EOSINOPHIL # BLD AUTO: 0.21 10*3/MM3 (ref 0–0.4)
EOSINOPHIL NFR BLD AUTO: 3.7 % (ref 0.3–6.2)
ERYTHROCYTE [DISTWIDTH] IN BLOOD BY AUTOMATED COUNT: 14 % (ref 12.3–15.4)
GEN 5 1HR TROPONIN T REFLEX: 32 NG/L
GLOBULIN UR ELPH-MCNC: 2.1 GM/DL
GLUCOSE SERPL-MCNC: 124 MG/DL (ref 65–99)
HCT VFR BLD AUTO: 43.7 % (ref 34–46.6)
HGB BLD-MCNC: 14 G/DL (ref 12–15.9)
HOLD SPECIMEN: NORMAL
HOLD SPECIMEN: NORMAL
IMM GRANULOCYTES # BLD AUTO: 0.01 10*3/MM3 (ref 0–0.05)
IMM GRANULOCYTES NFR BLD AUTO: 0.2 % (ref 0–0.5)
LYMPHOCYTES # BLD AUTO: 2.25 10*3/MM3 (ref 0.7–3.1)
LYMPHOCYTES NFR BLD AUTO: 39.9 % (ref 19.6–45.3)
MCH RBC QN AUTO: 27.2 PG (ref 26.6–33)
MCHC RBC AUTO-ENTMCNC: 32 G/DL (ref 31.5–35.7)
MCV RBC AUTO: 85 FL (ref 79–97)
MONOCYTES # BLD AUTO: 0.36 10*3/MM3 (ref 0.1–0.9)
MONOCYTES NFR BLD AUTO: 6.4 % (ref 5–12)
NEUTROPHILS NFR BLD AUTO: 2.79 10*3/MM3 (ref 1.7–7)
NEUTROPHILS NFR BLD AUTO: 49.4 % (ref 42.7–76)
NRBC BLD AUTO-RTO: 0 /100 WBC (ref 0–0.2)
NT-PROBNP SERPL-MCNC: 120.1 PG/ML (ref 0–900)
PLATELET # BLD AUTO: 211 10*3/MM3 (ref 140–450)
PMV BLD AUTO: 10.2 FL (ref 6–12)
POTASSIUM SERPL-SCNC: 4.1 MMOL/L (ref 3.5–5.2)
PROT SERPL-MCNC: 6.7 G/DL (ref 6–8.5)
QT INTERVAL: 419 MS
QTC INTERVAL: 513 MS
RBC # BLD AUTO: 5.14 10*6/MM3 (ref 3.77–5.28)
SODIUM SERPL-SCNC: 137 MMOL/L (ref 136–145)
TROPONIN T % DELTA: -11
TROPONIN T NUMERIC DELTA: -4 NG/L
TROPONIN T SERPL HS-MCNC: 36 NG/L
WBC NRBC COR # BLD AUTO: 5.64 10*3/MM3 (ref 3.4–10.8)
WHOLE BLOOD HOLD COAG: NORMAL
WHOLE BLOOD HOLD SPECIMEN: NORMAL

## 2025-08-16 PROCEDURE — 80053 COMPREHEN METABOLIC PANEL: CPT

## 2025-08-16 PROCEDURE — 93005 ELECTROCARDIOGRAM TRACING: CPT | Performed by: EMERGENCY MEDICINE

## 2025-08-16 PROCEDURE — 99284 EMERGENCY DEPT VISIT MOD MDM: CPT

## 2025-08-16 PROCEDURE — 93005 ELECTROCARDIOGRAM TRACING: CPT

## 2025-08-16 PROCEDURE — 84484 ASSAY OF TROPONIN QUANT: CPT | Performed by: EMERGENCY MEDICINE

## 2025-08-16 PROCEDURE — 71045 X-RAY EXAM CHEST 1 VIEW: CPT

## 2025-08-16 PROCEDURE — 85025 COMPLETE CBC W/AUTO DIFF WBC: CPT

## 2025-08-16 PROCEDURE — 36415 COLL VENOUS BLD VENIPUNCTURE: CPT

## 2025-08-16 PROCEDURE — 83880 ASSAY OF NATRIURETIC PEPTIDE: CPT

## 2025-08-16 PROCEDURE — 96374 THER/PROPH/DIAG INJ IV PUSH: CPT

## 2025-08-16 PROCEDURE — 84484 ASSAY OF TROPONIN QUANT: CPT

## 2025-08-16 PROCEDURE — 25010000002 MORPHINE PER 10 MG: Performed by: EMERGENCY MEDICINE

## 2025-08-16 RX ORDER — HYDROCODONE BITARTRATE AND ACETAMINOPHEN 5; 325 MG/1; MG/1
1 TABLET ORAL EVERY 6 HOURS PRN
Qty: 8 TABLET | Refills: 0 | Status: SHIPPED | OUTPATIENT
Start: 2025-08-16

## 2025-08-16 RX ORDER — SODIUM CHLORIDE 0.9 % (FLUSH) 0.9 %
10 SYRINGE (ML) INJECTION AS NEEDED
Status: DISCONTINUED | OUTPATIENT
Start: 2025-08-16 | End: 2025-08-16 | Stop reason: HOSPADM

## 2025-08-16 RX ADMIN — MORPHINE SULFATE 4 MG: 4 INJECTION, SOLUTION INTRAMUSCULAR; INTRAVENOUS at 14:41

## 2025-08-26 ENCOUNTER — HOSPITAL ENCOUNTER (EMERGENCY)
Facility: HOSPITAL | Age: 70
Discharge: HOME | End: 2025-08-26
Attending: EMERGENCY MEDICINE | Admitting: EMERGENCY MEDICINE
Payer: MEDICARE

## 2025-08-26 ENCOUNTER — APPOINTMENT (OUTPATIENT)
Dept: GENERAL RADIOLOGY | Facility: HOSPITAL | Age: 70
End: 2025-08-26
Payer: MEDICARE

## 2025-08-26 VITALS
SYSTOLIC BLOOD PRESSURE: 115 MMHG | BODY MASS INDEX: 26.14 KG/M2 | OXYGEN SATURATION: 92 % | DIASTOLIC BLOOD PRESSURE: 95 MMHG | HEIGHT: 61 IN | RESPIRATION RATE: 20 BRPM | TEMPERATURE: 98.7 F | HEART RATE: 84 BPM | WEIGHT: 138.45 LBS

## 2025-08-26 DIAGNOSIS — K29.00 ACUTE SUPERFICIAL GASTRITIS WITHOUT HEMORRHAGE: Primary | ICD-10-CM

## 2025-08-26 LAB
ALBUMIN SERPL-MCNC: 4.5 G/DL (ref 3.5–5.2)
ALBUMIN/GLOB SERPL: 2 G/DL
ALP SERPL-CCNC: 60 U/L (ref 39–117)
ALT SERPL W P-5'-P-CCNC: 33 U/L (ref 1–33)
ANION GAP SERPL CALCULATED.3IONS-SCNC: 12.4 MMOL/L (ref 5–15)
AST SERPL-CCNC: 24 U/L (ref 1–32)
BASOPHILS # BLD AUTO: 0.02 10*3/MM3 (ref 0–0.2)
BASOPHILS NFR BLD AUTO: 0.2 % (ref 0–1.5)
BILIRUB SERPL-MCNC: 0.7 MG/DL (ref 0–1.2)
BUN SERPL-MCNC: 17.5 MG/DL (ref 8–23)
BUN/CREAT SERPL: 25.7 (ref 7–25)
CALCIUM SPEC-SCNC: 9.8 MG/DL (ref 8.6–10.5)
CHLORIDE SERPL-SCNC: 102 MMOL/L (ref 98–107)
CO2 SERPL-SCNC: 23.6 MMOL/L (ref 22–29)
CREAT SERPL-MCNC: 0.68 MG/DL (ref 0.57–1)
DEPRECATED RDW RBC AUTO: 45.6 FL (ref 37–54)
EGFRCR SERPLBLD CKD-EPI 2021: 93.8 ML/MIN/1.73
EOSINOPHIL # BLD AUTO: 0.14 10*3/MM3 (ref 0–0.4)
EOSINOPHIL NFR BLD AUTO: 1.6 % (ref 0.3–6.2)
ERYTHROCYTE [DISTWIDTH] IN BLOOD BY AUTOMATED COUNT: 14.2 % (ref 12.3–15.4)
GEN 5 1HR TROPONIN T REFLEX: 24 NG/L
GLOBULIN UR ELPH-MCNC: 2.3 GM/DL
GLUCOSE SERPL-MCNC: 95 MG/DL (ref 65–99)
HCT VFR BLD AUTO: 42 % (ref 34–46.6)
HGB BLD-MCNC: 13.4 G/DL (ref 12–15.9)
HOLD SPECIMEN: NORMAL
HOLD SPECIMEN: NORMAL
IMM GRANULOCYTES # BLD AUTO: 0.03 10*3/MM3 (ref 0–0.05)
IMM GRANULOCYTES NFR BLD AUTO: 0.3 % (ref 0–0.5)
LIPASE SERPL-CCNC: 22 U/L (ref 13–60)
LYMPHOCYTES # BLD AUTO: 2.02 10*3/MM3 (ref 0.7–3.1)
LYMPHOCYTES NFR BLD AUTO: 23 % (ref 19.6–45.3)
MAGNESIUM SERPL-MCNC: 2 MG/DL (ref 1.6–2.4)
MCH RBC QN AUTO: 27.9 PG (ref 26.6–33)
MCHC RBC AUTO-ENTMCNC: 31.9 G/DL (ref 31.5–35.7)
MCV RBC AUTO: 87.3 FL (ref 79–97)
MONOCYTES # BLD AUTO: 0.65 10*3/MM3 (ref 0.1–0.9)
MONOCYTES NFR BLD AUTO: 7.4 % (ref 5–12)
NEUTROPHILS NFR BLD AUTO: 5.94 10*3/MM3 (ref 1.7–7)
NEUTROPHILS NFR BLD AUTO: 67.5 % (ref 42.7–76)
NRBC BLD AUTO-RTO: 0 /100 WBC (ref 0–0.2)
NT-PROBNP SERPL-MCNC: 255.4 PG/ML (ref 0–900)
PLATELET # BLD AUTO: 194 10*3/MM3 (ref 140–450)
PMV BLD AUTO: 10.4 FL (ref 6–12)
POTASSIUM SERPL-SCNC: 4.5 MMOL/L (ref 3.5–5.2)
PROT SERPL-MCNC: 6.8 G/DL (ref 6–8.5)
RBC # BLD AUTO: 4.81 10*6/MM3 (ref 3.77–5.28)
SODIUM SERPL-SCNC: 138 MMOL/L (ref 136–145)
TROPONIN T % DELTA: -4
TROPONIN T NUMERIC DELTA: -1 NG/L
TROPONIN T SERPL HS-MCNC: 25 NG/L
WBC NRBC COR # BLD AUTO: 8.8 10*3/MM3 (ref 3.4–10.8)
WHOLE BLOOD HOLD COAG: NORMAL
WHOLE BLOOD HOLD SPECIMEN: NORMAL

## 2025-08-26 PROCEDURE — 99284 EMERGENCY DEPT VISIT MOD MDM: CPT

## 2025-08-26 PROCEDURE — 93005 ELECTROCARDIOGRAM TRACING: CPT

## 2025-08-26 PROCEDURE — 83735 ASSAY OF MAGNESIUM: CPT

## 2025-08-26 PROCEDURE — 84484 ASSAY OF TROPONIN QUANT: CPT

## 2025-08-26 PROCEDURE — 83690 ASSAY OF LIPASE: CPT

## 2025-08-26 PROCEDURE — 93005 ELECTROCARDIOGRAM TRACING: CPT | Performed by: EMERGENCY MEDICINE

## 2025-08-26 PROCEDURE — 36415 COLL VENOUS BLD VENIPUNCTURE: CPT

## 2025-08-26 PROCEDURE — 85025 COMPLETE CBC W/AUTO DIFF WBC: CPT

## 2025-08-26 PROCEDURE — 80053 COMPREHEN METABOLIC PANEL: CPT

## 2025-08-26 PROCEDURE — 83880 ASSAY OF NATRIURETIC PEPTIDE: CPT

## 2025-08-26 PROCEDURE — 96374 THER/PROPH/DIAG INJ IV PUSH: CPT

## 2025-08-26 PROCEDURE — 71045 X-RAY EXAM CHEST 1 VIEW: CPT

## 2025-08-26 PROCEDURE — 25010000002 METHYLPREDNISOLONE PER 125 MG

## 2025-08-26 PROCEDURE — 94640 AIRWAY INHALATION TREATMENT: CPT

## 2025-08-26 RX ORDER — IPRATROPIUM BROMIDE AND ALBUTEROL SULFATE 2.5; .5 MG/3ML; MG/3ML
3 SOLUTION RESPIRATORY (INHALATION) ONCE
Status: COMPLETED | OUTPATIENT
Start: 2025-08-26 | End: 2025-08-26

## 2025-08-26 RX ORDER — METHYLPREDNISOLONE SODIUM SUCCINATE 125 MG/2ML
125 INJECTION, POWDER, LYOPHILIZED, FOR SOLUTION INTRAMUSCULAR; INTRAVENOUS ONCE
Status: COMPLETED | OUTPATIENT
Start: 2025-08-26 | End: 2025-08-26

## 2025-08-26 RX ORDER — SODIUM CHLORIDE 0.9 % (FLUSH) 0.9 %
10 SYRINGE (ML) INJECTION AS NEEDED
Status: DISCONTINUED | OUTPATIENT
Start: 2025-08-26 | End: 2025-08-26 | Stop reason: HOSPADM

## 2025-08-26 RX ORDER — ONDANSETRON 4 MG/1
4 TABLET, ORALLY DISINTEGRATING ORAL EVERY 6 HOURS PRN
Qty: 15 TABLET | Refills: 0 | Status: SHIPPED | OUTPATIENT
Start: 2025-08-26

## 2025-08-26 RX ORDER — ASPIRIN 81 MG/1
324 TABLET, CHEWABLE ORAL ONCE
Status: COMPLETED | OUTPATIENT
Start: 2025-08-26 | End: 2025-08-26

## 2025-08-26 RX ADMIN — METHYLPREDNISOLONE SODIUM SUCCINATE 125 MG: 125 INJECTION, POWDER, FOR SOLUTION INTRAMUSCULAR; INTRAVENOUS at 16:33

## 2025-08-26 RX ADMIN — ASPIRIN 324 MG: 81 TABLET, CHEWABLE ORAL at 16:28

## 2025-08-26 RX ADMIN — IPRATROPIUM BROMIDE AND ALBUTEROL SULFATE 3 ML: .5; 3 SOLUTION RESPIRATORY (INHALATION) at 16:26

## 2025-08-27 LAB
QT INTERVAL: 414 MS
QTC INTERVAL: 511 MS

## 2025-08-28 LAB
QT INTERVAL: 419 MS
QTC INTERVAL: 513 MS

## (undated) DEVICE — TUBING, SUCTION, 1/4" X 10', STRAIGHT: Brand: MEDLINE

## (undated) DEVICE — LN SMPL CO2 SHTRM SD STREAM W/M LUER

## (undated) DEVICE — GW EMR FIX EXCHG J STD .035 3MM 260CM

## (undated) DEVICE — ADAPT CLN BIOGUARD AIR/H2O DISP

## (undated) DEVICE — SENSR O2 OXIMAX FNGR A/ 18IN NONSTR

## (undated) DEVICE — CATH DIAG IMPULSE PIG 5F 100CM

## (undated) DEVICE — BITEBLOCK OMNI BLOC

## (undated) DEVICE — CANN O2 ETCO2 FITS ALL CONN CO2 SMPL A/ 7IN DISP LF

## (undated) DEVICE — CATH DIAG IMPULSE FR5 5F 100CM

## (undated) DEVICE — KT ORCA ORCAPOD DISP STRL

## (undated) DEVICE — LUBE JELLY FOIL PACK 1.4 OZ: Brand: MEDLINE INDUSTRIES, INC.

## (undated) DEVICE — SHEATH CATH MANOSHIELD ESOPH

## (undated) DEVICE — GLIDESHEATH SLENDER STAINLESS STEEL KIT: Brand: GLIDESHEATH SLENDER

## (undated) DEVICE — PK CATH CARD 40

## (undated) DEVICE — CATH DIAG IMPULSE FL3.5 5F 100CM